# Patient Record
Sex: MALE | Race: WHITE
[De-identification: names, ages, dates, MRNs, and addresses within clinical notes are randomized per-mention and may not be internally consistent; named-entity substitution may affect disease eponyms.]

---

## 2017-01-18 ENCOUNTER — HOSPITAL ENCOUNTER (OUTPATIENT)
Dept: HOSPITAL 45 - C.LAB | Age: 62
Discharge: HOME | End: 2017-01-18
Attending: INTERNAL MEDICINE
Payer: COMMERCIAL

## 2017-01-18 DIAGNOSIS — E11.9: ICD-10-CM

## 2017-01-18 DIAGNOSIS — K35.3: Primary | ICD-10-CM

## 2017-01-18 LAB
ALBUMIN/GLOB SERPL: 0.6 {RATIO} (ref 0.9–2)
ALP SERPL-CCNC: 70 U/L (ref 45–117)
ALT SERPL-CCNC: 11 U/L (ref 12–78)
ANION GAP SERPL CALC-SCNC: 11 MMOL/L (ref 3–11)
AST SERPL-CCNC: 9 U/L (ref 15–37)
BASOPHILS # BLD: 0.03 K/UL (ref 0–0.2)
BASOPHILS NFR BLD: 0.3 %
BUN SERPL-MCNC: 25 MG/DL (ref 7–18)
BUN/CREAT SERPL: 34.3 (ref 10–20)
CALCIUM SERPL-MCNC: 8.3 MG/DL (ref 8.5–10.1)
CHLORIDE SERPL-SCNC: 99 MMOL/L (ref 98–107)
CO2 SERPL-SCNC: 26 MMOL/L (ref 21–32)
COMPLETE: YES
CREAT SERPL-MCNC: 0.72 MG/DL (ref 0.6–1.4)
EOSINOPHIL NFR BLD AUTO: 382 K/UL (ref 130–400)
GLOBULIN SER-MCNC: 4 GM/DL (ref 2.5–4)
GLUCOSE SERPL-MCNC: 276 MG/DL (ref 70–99)
HCT VFR BLD CALC: 32.6 % (ref 42–52)
IG%: 0.4 %
IMM GRANULOCYTES NFR BLD AUTO: 28.5 %
LYMPHOCYTES # BLD: 2.61 K/UL (ref 1.2–3.4)
MCH RBC QN AUTO: 28.9 PG (ref 25–34)
MCHC RBC AUTO-ENTMCNC: 34 G/DL (ref 32–36)
MCV RBC AUTO: 84.9 FL (ref 80–100)
MONOCYTES NFR BLD: 6.3 %
NEUTROPHILS # BLD AUTO: 2.4 %
NEUTROPHILS NFR BLD AUTO: 62.1 %
PMV BLD AUTO: 9.1 FL (ref 7.4–10.4)
POTASSIUM SERPL-SCNC: 4.4 MMOL/L (ref 3.5–5.1)
RBC # BLD AUTO: 3.84 M/UL (ref 4.7–6.1)
SODIUM SERPL-SCNC: 136 MMOL/L (ref 136–145)
WBC # BLD AUTO: 9.15 K/UL (ref 4.8–10.8)

## 2017-01-19 LAB — EST. AVERAGE GLUCOSE BLD GHB EST-MCNC: 258 MG/DL

## 2017-01-23 ENCOUNTER — HOSPITAL ENCOUNTER (OUTPATIENT)
Dept: HOSPITAL 45 - C.CTS | Age: 62
Discharge: HOME | End: 2017-01-23
Attending: SURGERY
Payer: COMMERCIAL

## 2017-01-23 DIAGNOSIS — I25.10: Primary | ICD-10-CM

## 2017-01-23 DIAGNOSIS — I70.202: ICD-10-CM

## 2017-01-24 NOTE — DIAGNOSTIC IMAGING REPORT
CT ANGIOGRAPHY OF THE ABDOMEN AND PELVIS WITH BILATERAL LOWER EXTREMITY RUNOFF



CT DOSE: 1073.08 mGy.cm



CLINICAL HISTORY: Left leg pain. Atherosclerosis.    



TECHNIQUE: Helical axial images of the abdomen and pelvis and both lower

extremities were obtained during arterial phase following intravenous injection

of 119 cc of Optiray 320 IV. Sagittal and coronal reconstructions were reviewed

as well as maximal intensity projections on an independent 3-D workstation.



COMPARISON STUDY:  CT of the abdomen and pelvis September 30, 2016.



FINDINGS: Visualized portions of lower chest demonstrate trace bilateral pleural

effusions and suspected mild pulmonary edema. The heart is moderately enlarged.

A cyst is noted arising from the lower pole of the left kidney. Visualized

caliber of small and large bowel are normal. Visual portions of the adrenal

glands and liver are unremarkable. The pancreas is unremarkable. No suspicious

skeletal lesions are identified.



There is extensive atherosclerotic plaque of the abdominal aorta and major

branch vessels. There is moderate stenosis at the origin of the celiac axis.

There is no aneurysmal dilatation of the abdominal aorta. The renal arteries are

patent. There is mild narrowing of the distal superior mesenteric artery.



There is extensive atherosclerotic plaque within the right lower extremity with

mild multifocal stenoses within the right common iliac and external iliac

arteries. There is mild stenosis of the right common femoral artery. There are

multiple moderate stenoses of the right superficial femoral artery. There is

severe stenosis of the distal right superficial femoral artery as well as the

right popliteal artery with extensive plaque. Evaluation of the calf vessels is

suboptimal as the bolus without Tavo on this examination. The right anterior

tibial artery is likely occluded. The right posterior tibial and peroneal

arteries are likely patent but suboptimally assessed on this exam.



There is mild multifocal stenoses within the left common iliac and external

iliac arteries as well as mild stenosis within the left common femoral artery.

There is occlusion of the left superficial femoral artery with reconstitution at

the level the distal superficial femoral artery and popliteal artery. The calf

vessels are suboptimally assessed on this exam. There is severe stenosis of the

left popliteal artery. The left anterior tibial artery is occluded. The left

posterior tibial and peroneal arteries are likely patent but suboptimally

assessed on this exam.







IMPRESSION:  



1. Extensive  atherosclerotic plaque within the aortoiliac systems and both

lower extremities, left more severe than right.



2. Occlusion of the left superficial femoral artery with reconstitution at the

level of the popliteal artery. The calf vessels within each lower extremity are

suboptimally assessed on this exam due to timing of the contrast bolus. The left

anterior tibial artery is likely occluded while the left peroneal and posterior

tibial arteries are likely patent.



3. Moderate to severe multifocal stenoses within the right superficial femoral

and popliteal arteries. Suspected occlusion of the right anterior tibial artery

and suspected patency of the right posterior tibial and peroneal arteries.







Electronically signed by:  Patrick Corbett M.D.

1/24/2017 10:24 AM



Dictated Date/Time:  1/23/2017 1:47 PM

## 2017-01-27 ENCOUNTER — HOSPITAL ENCOUNTER (OUTPATIENT)
Dept: HOSPITAL 45 - C.LAB | Age: 62
Discharge: HOME | End: 2017-01-27
Attending: SURGERY
Payer: COMMERCIAL

## 2017-01-27 DIAGNOSIS — E11.9: Primary | ICD-10-CM

## 2017-01-27 DIAGNOSIS — I25.10: ICD-10-CM

## 2017-01-27 DIAGNOSIS — I74.3: ICD-10-CM

## 2017-01-27 LAB
BUN SERPL-MCNC: 20 MG/DL (ref 7–18)
BUN/CREAT SERPL: 25.5 (ref 10–20)
CREAT SERPL-MCNC: 0.77 MG/DL (ref 0.6–1.4)

## 2017-05-02 ENCOUNTER — HOSPITAL ENCOUNTER (OUTPATIENT)
Dept: HOSPITAL 45 - C.RAD1850 | Age: 62
Discharge: HOME | End: 2017-05-02
Attending: PHYSICIAN ASSISTANT
Payer: COMMERCIAL

## 2017-05-02 DIAGNOSIS — L97.529: Primary | ICD-10-CM

## 2017-05-02 DIAGNOSIS — M77.32: ICD-10-CM

## 2017-05-02 DIAGNOSIS — M85.872: ICD-10-CM

## 2017-05-02 NOTE — DIAGNOSTIC IMAGING REPORT
LEFT FOOT 3 VIEWS



CLINICAL HISTORY: Heel ulcer.



FINDINGS: 3 views of the left foot are obtained. No prior studies are available

for comparison at the time of dictation. The skeletal structures are osteopenic.

No fracture is seen. No bony erosion or periostitis is identified. There are

small dorsal and plantar calcaneal enthesophytes. Mild degenerative narrowing is

seen at the first metatarsophalangeal joint. The joint spaces are otherwise

maintained. The overlying soft tissues are normal as visualized. Mild

calcification is seen along the course of the plantar fascia.



IMPRESSION:



1. Osteopenia with no acute bony abnormality seen in the left foot.



2. Small heel spurs.







Electronically signed by:  Heber Hubbard M.D.

5/2/2017 4:11 PM



Dictated Date/Time:  5/2/2017 4:10 PM

## 2017-05-09 ENCOUNTER — HOSPITAL ENCOUNTER (OUTPATIENT)
Dept: HOSPITAL 45 - C.LAB | Age: 62
Discharge: HOME | End: 2017-05-09
Attending: EMERGENCY MEDICINE
Payer: COMMERCIAL

## 2017-05-09 DIAGNOSIS — L97.529: Primary | ICD-10-CM

## 2017-05-09 LAB
BUN SERPL-MCNC: 26 MG/DL (ref 7–18)
CREAT SERPL-MCNC: 0.79 MG/DL (ref 0.6–1.4)

## 2017-05-12 ENCOUNTER — HOSPITAL ENCOUNTER (OUTPATIENT)
Dept: HOSPITAL 45 - C.MRIBC | Age: 62
Discharge: HOME | End: 2017-05-12
Attending: PHYSICIAN ASSISTANT
Payer: COMMERCIAL

## 2017-05-12 DIAGNOSIS — S91.302A: Primary | ICD-10-CM
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
## 2017-05-12 NOTE — DIAGNOSTIC IMAGING REPORT
MRI OF THE LEFT HINDFOOT/ANKLE WITH AND WITHOUT CONTRAST



CLINICAL HISTORY: Diabetic nonhealing left heel wound.    



COMPARISON STUDY:  Left foot radiograph May 2, 2017.



TECHNIQUE: Utilizing a 1.5 Mary magnet and dedicated coil, multiplanar,

multiecho imaging of the left ankle/hindfoot was performed pre and postcontrast

administration. Injection of 8.5 cc of Gadavist IV was uneventful.



FINDINGS: The Achilles tendon is intact. A wound overlying the lateral posterior

aspect of the calcaneus is noted. There is subtle increased T2 signal within the

adjacent portion of the calcaneus. T1 signal is preserved. There is no evidence

for osteomyelitis within the left calcaneus. A 1 cm T2 hyperintense enhancing

lesion within the calcaneus may reflect a complex/proteinaceous cyst. There is

moderate edema within the cuboid. No fracture is identified. Talar dome is

intact. There is no abscess. There is mild plantar calcaneal spurring. There is

a suspected partial thickness tear of the peroneus brevis. 



IMPRESSION:  



1. No evidence of osteomyelitis within the left calcaneus. Minimal increased T2

signal within the calcaneus adjacent to the heel wound could reflect minimal

osteitis.



2. Mild to moderate edema within the cuboid which is nonspecific but could be

stress related.



3. Left heel wound. No abscess.



4. Suspected mild partial thickness tear of the peroneus brevis.









Electronically signed by:  Patrick Corbett M.D.

5/12/2017 2:27 PM



Dictated Date/Time:  5/12/2017 2:10 PM

## 2017-05-22 ENCOUNTER — HOSPITAL ENCOUNTER (INPATIENT)
Dept: HOSPITAL 45 - C.MSN | Age: 62
LOS: 5 days | Discharge: HOME HEALTH | DRG: 571 | End: 2017-05-27
Attending: SURGERY | Admitting: SURGERY
Payer: COMMERCIAL

## 2017-05-22 VITALS — OXYGEN SATURATION: 94 %

## 2017-05-22 VITALS
WEIGHT: 194.01 LBS | HEIGHT: 71 IN | BODY MASS INDEX: 27.16 KG/M2 | HEIGHT: 71 IN | WEIGHT: 194.01 LBS | BODY MASS INDEX: 27.16 KG/M2

## 2017-05-22 VITALS — DIASTOLIC BLOOD PRESSURE: 76 MMHG | HEART RATE: 71 BPM | SYSTOLIC BLOOD PRESSURE: 135 MMHG | TEMPERATURE: 98.24 F

## 2017-05-22 VITALS
HEART RATE: 72 BPM | DIASTOLIC BLOOD PRESSURE: 79 MMHG | TEMPERATURE: 98.78 F | SYSTOLIC BLOOD PRESSURE: 180 MMHG | OXYGEN SATURATION: 93 %

## 2017-05-22 VITALS — DIASTOLIC BLOOD PRESSURE: 82 MMHG | HEART RATE: 75 BPM | SYSTOLIC BLOOD PRESSURE: 181 MMHG

## 2017-05-22 VITALS
TEMPERATURE: 97.88 F | SYSTOLIC BLOOD PRESSURE: 136 MMHG | OXYGEN SATURATION: 95 % | HEART RATE: 66 BPM | DIASTOLIC BLOOD PRESSURE: 68 MMHG

## 2017-05-22 VITALS — SYSTOLIC BLOOD PRESSURE: 187 MMHG | HEART RATE: 80 BPM | DIASTOLIC BLOOD PRESSURE: 76 MMHG

## 2017-05-22 DIAGNOSIS — F32.9: ICD-10-CM

## 2017-05-22 DIAGNOSIS — E11.65: ICD-10-CM

## 2017-05-22 DIAGNOSIS — G47.33: ICD-10-CM

## 2017-05-22 DIAGNOSIS — F17.200: ICD-10-CM

## 2017-05-22 DIAGNOSIS — I73.9: ICD-10-CM

## 2017-05-22 DIAGNOSIS — H91.91: ICD-10-CM

## 2017-05-22 DIAGNOSIS — I10: ICD-10-CM

## 2017-05-22 DIAGNOSIS — E78.5: ICD-10-CM

## 2017-05-22 DIAGNOSIS — I25.2: ICD-10-CM

## 2017-05-22 DIAGNOSIS — F41.9: ICD-10-CM

## 2017-05-22 DIAGNOSIS — I25.10: ICD-10-CM

## 2017-05-22 DIAGNOSIS — E11.42: ICD-10-CM

## 2017-05-22 DIAGNOSIS — L97.529: Primary | ICD-10-CM

## 2017-05-22 DIAGNOSIS — E55.9: ICD-10-CM

## 2017-05-22 DIAGNOSIS — E87.1: ICD-10-CM

## 2017-05-22 DIAGNOSIS — E11.21: ICD-10-CM

## 2017-05-22 DIAGNOSIS — E78.00: ICD-10-CM

## 2017-05-22 DIAGNOSIS — Z01.818: ICD-10-CM

## 2017-05-22 LAB
ANION GAP SERPL CALC-SCNC: 10 MMOL/L (ref 3–11)
B-OH-BUTYR SERPL-SCNC: 0.8 MG/DL (ref 0.2–2.81)
BASOPHILS # BLD: 0.02 K/UL (ref 0–0.2)
BASOPHILS NFR BLD: 0.2 %
BUN SERPL-MCNC: 22 MG/DL (ref 7–18)
BUN/CREAT SERPL: 21.9 (ref 10–20)
CALCIUM SERPL-MCNC: 9.7 MG/DL (ref 8.5–10.1)
CHLORIDE SERPL-SCNC: 94 MMOL/L (ref 98–107)
CO2 SERPL-SCNC: 26 MMOL/L (ref 21–32)
COMPLETE: YES
CREAT CL PREDICTED SERPL C-G-VRATE: 84.7 ML/MIN
CREAT SERPL-MCNC: 1 MG/DL (ref 0.6–1.4)
EOSINOPHIL NFR BLD AUTO: 274 K/UL (ref 130–400)
GLUCOSE SERPL-MCNC: 498 MG/DL (ref 70–99)
HCT VFR BLD CALC: 33.3 % (ref 42–52)
IG%: 0.5 %
IMM GRANULOCYTES NFR BLD AUTO: 17.7 %
LYMPHOCYTES # BLD: 1.9 K/UL (ref 1.2–3.4)
MCH RBC QN AUTO: 29.5 PG (ref 25–34)
MCHC RBC AUTO-ENTMCNC: 34.8 G/DL (ref 32–36)
MCV RBC AUTO: 84.7 FL (ref 80–100)
MONOCYTES NFR BLD: 8.8 %
NEUTROPHILS # BLD AUTO: 0.8 %
NEUTROPHILS NFR BLD AUTO: 72 %
PMV BLD AUTO: 9 FL (ref 7.4–10.4)
POTASSIUM SERPL-SCNC: 4.2 MMOL/L (ref 3.5–5.1)
RBC # BLD AUTO: 3.93 M/UL (ref 4.7–6.1)
SODIUM SERPL-SCNC: 130 MMOL/L (ref 136–145)
WBC # BLD AUTO: 10.71 K/UL (ref 4.8–10.8)

## 2017-05-22 RX ADMIN — MORPHINE SULFATE PRN MG: 4 INJECTION, SOLUTION INTRAMUSCULAR; INTRAVENOUS at 16:23

## 2017-05-22 RX ADMIN — OXYCODONE HYDROCHLORIDE AND ACETAMINOPHEN PRN TAB: 5; 325 TABLET ORAL at 14:36

## 2017-05-22 RX ADMIN — GABAPENTIN SCH MG: 100 CAPSULE ORAL at 21:21

## 2017-05-22 RX ADMIN — INSULIN ASPART SCH UNITS: 100 INJECTION, SOLUTION INTRAVENOUS; SUBCUTANEOUS at 17:15

## 2017-05-22 RX ADMIN — DIVALPROEX SODIUM SCH MG: 500 TABLET, DELAYED RELEASE ORAL at 21:20

## 2017-05-22 RX ADMIN — METOPROLOL SUCCINATE SCH MG: 50 TABLET, EXTENDED RELEASE ORAL at 21:21

## 2017-05-22 RX ADMIN — MORPHINE SULFATE PRN MG: 4 INJECTION, SOLUTION INTRAMUSCULAR; INTRAVENOUS at 21:31

## 2017-05-22 RX ADMIN — LISINOPRIL SCH MG: 40 TABLET ORAL at 21:21

## 2017-05-22 RX ADMIN — INSULIN ASPART SCH UNITS: 100 INJECTION, SOLUTION INTRAVENOUS; SUBCUTANEOUS at 21:20

## 2017-05-22 RX ADMIN — PIPERACILLIN AND TAZOBACTAM SCH MLS/HR: 3; .375 INJECTION, POWDER, LYOPHILIZED, FOR SOLUTION INTRAVENOUS; PARENTERAL at 21:21

## 2017-05-22 NOTE — ANESTHESIOLOGY PROGRESS NOTE
Pre-OP Anesthesia Assessment


Date of Note


May 22, 2017.





Review


patient information reviewed, chart reviewed, labs reviewed





Medicine Eval Requested


Due to:  DM





Notes


62 yo male presented with non-healing ulcer over left calcaneus.  PMH includes 

DM, CAD, MI/PTCA yrs ago, HTN, hypercholesterolemia, PVD, GERD, neuropathy, 

anemia, anxiety, depression.  Pt denies recent chest pain or dyspnea and had 

negative DSE 3/16.  He is however, non-compliant with meds and BSG monitoring.  

Glucose has been markedly elevated (over 450) on this admission.  Requested 

hospitalist consult for glycemic control.  Pt has had general anesthesia before 

without complications. Discussed GETA with him.  He expressed understanding and 

signed informed consent.   He will be acceptable candidate for GA for planned 

procedure after glucose is better controlled.

## 2017-05-22 NOTE — MEDICAL CONSULT
Consultation


Date of Consultation:


May 22, 2017.


Attending Physician:


Tim Gallegos M.D.


Reason for Consultation:


pre op management


History of Present Illness


This is a 60 yo m with a history of CAD, HTN, poorly controlled DM that is 

suffering from a left calcaneal ulceration that is nonhealing and requiring 

operative management. the primary service consulted medicine for pre op and 

post op management considering his past medical history. 


   He states that approx a month ago he feels like he must have cut the bottom 

of his left foot. He notes that the cut was healing poorly so decision was made 

for operative management. When he was evaluated in the hospital he was found to 

have BSG > 400 but not in DKA. He states that he does have humulog and lantus 

at home but takes it on an " as needed basis because it has a tendency to 

bottom me out". He is scheduled to follow up with Dr Daley in approx a 

month to optimize glycemic management. He does take Metformin on a daily basis 

and occasionally will check his sugar. 


   He does have a significant history of a  NSTEMI in 2007 with complicated by 

Vfibb and required stent placement. He had a repeat cath in 2010 which revealed 

significant disease and patient was sent to [facility] for complex PCI. His last 

dobutamine stress test was in march of 2016 and was WNL.  At that time CVS was 

consulted and patient was a candidate for surgery. This clearance was used to 

clear patient for an elective appendectomy s/p appendiceal rupture at [facility] 

in nov of 2016 which patient tolerated. No stress test noted since then. 





In my clinical judgment this beneficiary meets acute admission criteria, 

established by CMS, that includes being hospitalized through two midnights.





Past Medical/Surgical History


Medical Problems:


(1) Anginal pain


Status: Acute  





(2) Appendicitis


Status: Acute  





(3) Intra-abdominal abscess


Status: Acute  





(4) Lower abdominal pain of unknown etiology


Status: Acute  





(5) Precordial chest pain


Status: Acute  











Family History





Cancer


Diabetes mellitus


Heart disease


Hypertension





Social History


Smoking Status:  Current Every Day Smoker


Smokeless Tobacco Use:  No


Drug Use:  none


Marital Status:  


Housing Status:  lives with family


Occupation Status:  employed





Allergies


Coded Allergies:  


     No Known Allergies (Verified , 12/1/16)





Current Inpatient Medications





 Current Inpatient Medications








 Medications


  (Trade)  Dose


 Ordered  Sig/Dc


 Route  Start Time


 Stop Time Status Last Admin


Dose Admin


 


 Piperacillin Sod/


 Tazobactam Sod/


 Dextrose


  (Zosyn Iv/D5


 100ml)  115 ml @ 


 28.7 mls/hr  Q8H


 IV  5/22/17 22:00


 5/23/17 21:59  5/22/17 21:21


28.7 MLS/HR


 


 Ondansetron HCl


  (Zofran Inj)  4 mg  Q4H  PRN


 IV  5/22/17 11:45


 6/21/17 11:44   


 


 


 Oxycodone/


 Acetaminophen


  (Percocet


 5-325mg Tab)  1 tab  Q4H  PRN


 PO  5/22/17 11:45


 6/5/17 11:44  5/22/17 14:36


1 TAB


 


 Insulin Aspart


  (novoLOG ASPART)  **SLIDING


 SCALE**


 **If C...  ACHS


 SC  5/22/17 17:15


 6/21/17 17:14  5/22/17 21:20


7 UNITS


 


 Glucose


  (Glucose 40% Gel)  15-30


 GRAMS 15


 GRAMS...  UD  PRN


 PO  5/22/17 11:45


 6/21/17 11:44   


 


 


 Glucose


  (Glucose Chew


 Tab)  4-8


 Tablets 4


 Tabl...  UD  PRN


 PO  5/22/17 11:45


 6/21/17 11:44   


 


 


 Dextrose


  (Dextrose 50%


 50ML Syringe)  25-50ML OF


 50% DW IV


 FOR...  UD  PRN


 IV  5/22/17 11:45


 6/21/17 11:44   


 


 


 Glucagon


  (Glucagon Inj)  1 mg  UD  PRN


 SQ  5/22/17 11:45


 6/21/17 11:44   


 


 


 Piperacillin Sod/


 Tazobactam Sod


  (Consult)  1 ea  UD  PRN


 N/A  5/22/17 14:00


 5/24/17 23:59   


 


 


 Aspirin


  (Ecotrin Tab)  325 mg  QAM


 PO  5/23/17 09:00


 6/22/17 08:59   


 


 


 Clopidogrel


 Bisulfate


  (plAVix TAB)  75 mg  QAM


 PO  5/23/17 09:00


 6/22/17 08:59   


 


 


 Divalproex Sodium


  (Depakote Delay


 Rel Tab)  2,000 mg  HS


 PO  5/22/17 21:00


 6/21/17 20:59  5/22/17 21:20


2,000 MG


 


 Escitalopram


 Oxalate


  (Lexapro Tab)  20 mg  QAM


 PO  5/23/17 09:00


 6/22/17 08:59   


 


 


 Gabapentin


  (Neurontin Cap)  100 mg  HS


 PO  5/22/17 21:00


 6/21/17 20:59  5/22/17 21:21


100 MG


 


 Lisinopril


  (Zestril Tab)  40 mg  BID


 PO  5/22/17 21:00


 6/21/17 20:59  5/22/17 21:21


40 MG


 


 Metoprolol


 Succinate


  (Toprol Xl Tab)  50 mg  BID


 PO  5/22/17 21:00


 6/21/17 20:59  5/22/17 21:21


50 MG


 


 Nitroglycerin


  (Nitrostat Tab)  0.4 mg  PRN  PRN


 UT  5/22/17 15:15


 6/21/17 15:14   


 


 


 Amlodipine


 Besylate


  (Norvasc Tab)  10 mg  QAM


 PO  5/23/17 09:00


 6/22/17 08:59   


 


 


 Morphine Sulfate


  (MoRPHine


 SULFATE INJ)  4 mg  Q2H  PRN


 IV  5/22/17 16:15


 6/5/17 16:14  5/22/17 21:31


4 MG


 


 Miscellaneous


 Information


  (Consult


 Glycemic


 Management


 Pharmacy)  1 ea  UD  PRN


 N/A  5/22/17 22:21


 6/21/17 22:20   


 


 


 Insulin Glargine


  (Lantus Solostar


 Pen)  15 unit  BID


 SC  5/22/17 22:30


 6/21/17 22:29   


 


 


 Insulin Aspart


  (novoLOG ASPART)  **SLIDING


 SCALE**


 **G...  0000,0400


 SC  5/23/17 00:00


 6/22/17 00:00   


 











Review of Systems


Constitutional:  No fever


Eyes:  No worsening of vision


ENT:  No hearing loss


Respiratory:  No cough, No dyspnea at rest, No dyspnea on exertion, No 

shortness of breath, No sputum, No wheezing


Cardiovascular:  No chest pain


Abdomen:  No constipation, No diarrhea, No nausea, No pain, No vomiting


Musculoskeletal:  + problem reported (left calcaneal ulcer and associated pain)

, No joint pain, No muscle pain


Neurologic:  + numbness/tingling (neuropathy B L)


Endocrine:  No fatigue


Integumentary:  + problem reported (ulcer), No rash





Physical Exam











  Date Time  Temp Pulse Resp B/P Pulse Ox O2 Delivery O2 Flow Rate FiO2


 


5/22/17 22:03  75  181/82    


 


5/22/17 21:25  80  187/76    


 


5/22/17 15:18 36.6 66 18 136/68 95 Room Air  


 


5/22/17 14:22     94 Room Air  


 


5/22/17 14:20 36.8 71 18 135/76    








General Appearance:  no apparent distress


Head:  normocephalic, atraumatic


Eyes:  normal inspection


ENT:  normal ENT inspection


Neck:  supple


Respiratory/Chest:  normal breath sounds, no respiratory distress, no accessory 

muscle use


Cardiovascular:  regular rate, rhythm, no murmur


Abdomen/GI:  normal bowel sounds, non tender, soft


Extremities/Musculoskelatal:  no calf tenderness, + pedal edema (surrounding 

the ulceration, left LE is very tender to touch and inflammed)


Neurologic/Psych:  alert, normal mood/affect, oriented x 3


Skin:  normal color, warm/dry, no rash


Lymphatic:  no adenopathy





Laboratory Results





Last 24 Hours








Test


  5/22/17


14:17 5/22/17


17:07 5/22/17


19:30 5/22/17


20:52


 


White Blood Count 10.71 K/uL    


 


Red Blood Count 3.93 M/uL    


 


Hemoglobin 11.6 g/dL    


 


Hematocrit 33.3 %    


 


Mean Corpuscular Volume 84.7 fL    


 


Mean Corpuscular Hemoglobin 29.5 pg    


 


Mean Corpuscular Hemoglobin


Concent 34.8 g/dl 


  


  


  


 


 


Platelet Count 274 K/uL    


 


Mean Platelet Volume 9.0 fL    


 


Neutrophils (%) (Auto) 72.0 %    


 


Lymphocytes (%) (Auto) 17.7 %    


 


Monocytes (%) (Auto) 8.8 %    


 


Eosinophils (%) (Auto) 0.8 %    


 


Basophils (%) (Auto) 0.2 %    


 


Neutrophils # (Auto) 7.71 K/uL    


 


Lymphocytes # (Auto) 1.90 K/uL    


 


Monocytes # (Auto) 0.94 K/uL    


 


Eosinophils # (Auto) 0.09 K/uL    


 


Basophils # (Auto) 0.02 K/uL    


 


RDW Standard Deviation 40.7 fL    


 


RDW Coefficient of Variation 13.0 %    


 


Immature Granulocyte % (Auto) 0.5 %    


 


Immature Granulocyte # (Auto) 0.05 K/uL    


 


Sodium Level 130 mmol/L    


 


Potassium Level 4.2 mmol/L    


 


Chloride Level 94 mmol/L    


 


Carbon Dioxide Level 26 mmol/L    


 


Anion Gap 10.0 mmol/L    


 


Blood Urea Nitrogen 22 mg/dl    


 


Creatinine 1.00 mg/dl    


 


Est Creatinine Clear Calc


Drug Dose 84.7 ml/min 


  


  


  


 


 


Estimated GFR (


American) 95.1 


  


  


  


 


 


Estimated GFR (Non-


American 82.0 


  


  


  


 


 


BUN/Creatinine Ratio 21.9    


 


Random Glucose 498 mg/dl    


 


Calcium Level 9.7 mg/dl    


 


Beta-Hydroxybutyric Acid 0.80 mg/dL    


 


Bedside Glucose  450 mg/dl  385 mg/dl  397 mg/dl 














Test


  5/22/17


22:25 


  


  


 











Assessment & Plan


This is a 60 yo m with a significant CVS history and poorly controlled DM that 

we have been consulted to help manage pre and post op care





Pre op evaluation


Patient has an RCRI of 6.6% secondary to h/o MI and insulin use prior to 

admission


   - patient was originally cleared in mar 2016 and tolerated the appendectomy 

in Nov 2016


   - most recent dobutamine mar 2016 WNL  however > 1 year


   - would recommend CVS clearance prior to surgery





Hyponatremia


NPO overnight


- gentle rehydration with NSS--> I&O


will check BMP in am





Poorly controlled DMII with severe hyperglycemia 


- glycemic consult placed


- HBA1C pending


- Dm educator 





Hypertension


- Continue metoprolol, lisinopril, amlodipine


- HCTZ was held by primary team 


Significant elevation may be secondary to anxiety for pending surgery and pain





S/P NSTEMI


- ASA and plavix cont 








Resident Physician Supervision Note:


Pt evaluated independently. I discussed the case with the resident and agree 

with the findings and plan as documented in the note.  Any exceptions or 

clarifications are listed here:





Consult for surgery : pt is high risk based on previous assessments - Hx CAD, DM


We have requested a cardiology eval pre-op as his RCRI  6/6%





Documented By:  Eleuterio Serrato

















Additional Copies To


,Claude MCCORD M.D.

## 2017-05-22 NOTE — DIAGNOSTIC IMAGING REPORT
CHEST 2 VIEWS ROUTINE



CLINICAL HISTORY: Preoperative evaluation.    



COMPARISON STUDY:  Chest radiograph September 24, 2016.



FINDINGS: Lung volumes are normal. There is no consolidation to suggest

pneumonia. No pneumothorax or pleural effusion is present. Cardiomediastinal

silhouette is normal. Pulmonary vascularity is normal. 



IMPRESSION:  No acute cardiopulmonary findings. 









Electronically signed by:  Patrick Corbett M.D.

5/22/2017 3:27 PM



Dictated Date/Time:  5/22/2017 3:26 PM

## 2017-05-22 NOTE — PHARMACY PROGRESS NOTE
Glycemic: Assessment & Plan


Date of Service


May 22, 2017.





Assessment & Plan


Assessment:


* 62 y/o diabetic with unknown outpatient glycemic control.  Home doses of 

insulin not known at time of consult, nor are when his last dose was 

administered PTA


* Mr. Dixon is currently severely hyperglycemic despite correctional NovoLog 

at 16:00 and 21:00.


* Plan for OR in near future, so will aggressively treat BSGs





Plan:


* 5 units of IV insulin x1 now to help augment SQ insulin doses


* Basal Insulin with Lantus 15 units SQ BID


 * dose is weight based with a stress of 2.  Will need adjusted in AM


* Bolus insulin with NovoLog


 * add overnight Accu-checks


 * CF/CR based on weight and a stress of 3


      -  CF:  20mg/dL/unit


      -  CR:  1 unit per 7g of CHO consumed


      -  Goal:  140-180mg/dL per ADA recommendations


* A1c is pending


 * add to discharge instructions


 * use this information to help guide future inpatient and outpatient glycemic 

regimen

## 2017-05-23 VITALS
HEART RATE: 63 BPM | DIASTOLIC BLOOD PRESSURE: 75 MMHG | OXYGEN SATURATION: 92 % | TEMPERATURE: 98.6 F | SYSTOLIC BLOOD PRESSURE: 133 MMHG

## 2017-05-23 VITALS
TEMPERATURE: 98.78 F | SYSTOLIC BLOOD PRESSURE: 175 MMHG | OXYGEN SATURATION: 92 % | DIASTOLIC BLOOD PRESSURE: 76 MMHG | HEART RATE: 70 BPM

## 2017-05-23 VITALS
DIASTOLIC BLOOD PRESSURE: 65 MMHG | TEMPERATURE: 98.96 F | OXYGEN SATURATION: 93 % | SYSTOLIC BLOOD PRESSURE: 127 MMHG | HEART RATE: 71 BPM

## 2017-05-23 VITALS
HEART RATE: 64 BPM | OXYGEN SATURATION: 94 % | DIASTOLIC BLOOD PRESSURE: 74 MMHG | SYSTOLIC BLOOD PRESSURE: 135 MMHG | TEMPERATURE: 97.88 F

## 2017-05-23 VITALS — OXYGEN SATURATION: 92 % | DIASTOLIC BLOOD PRESSURE: 68 MMHG | HEART RATE: 63 BPM | SYSTOLIC BLOOD PRESSURE: 122 MMHG

## 2017-05-23 VITALS
TEMPERATURE: 98.6 F | OXYGEN SATURATION: 90 % | SYSTOLIC BLOOD PRESSURE: 151 MMHG | HEART RATE: 61 BPM | DIASTOLIC BLOOD PRESSURE: 69 MMHG

## 2017-05-23 VITALS
DIASTOLIC BLOOD PRESSURE: 57 MMHG | SYSTOLIC BLOOD PRESSURE: 128 MMHG | TEMPERATURE: 98.24 F | OXYGEN SATURATION: 92 % | HEART RATE: 67 BPM

## 2017-05-23 VITALS — DIASTOLIC BLOOD PRESSURE: 59 MMHG | HEART RATE: 64 BPM | OXYGEN SATURATION: 93 % | SYSTOLIC BLOOD PRESSURE: 105 MMHG

## 2017-05-23 VITALS — DIASTOLIC BLOOD PRESSURE: 76 MMHG | SYSTOLIC BLOOD PRESSURE: 158 MMHG

## 2017-05-23 VITALS
OXYGEN SATURATION: 90 % | HEART RATE: 59 BPM | TEMPERATURE: 98.96 F | SYSTOLIC BLOOD PRESSURE: 124 MMHG | DIASTOLIC BLOOD PRESSURE: 68 MMHG

## 2017-05-23 VITALS — SYSTOLIC BLOOD PRESSURE: 136 MMHG | HEART RATE: 64 BPM | DIASTOLIC BLOOD PRESSURE: 69 MMHG

## 2017-05-23 VITALS — DIASTOLIC BLOOD PRESSURE: 69 MMHG | SYSTOLIC BLOOD PRESSURE: 148 MMHG

## 2017-05-23 LAB
ANION GAP SERPL CALC-SCNC: 7 MMOL/L (ref 3–11)
BUN SERPL-MCNC: 22 MG/DL (ref 7–18)
BUN/CREAT SERPL: 25.3 (ref 10–20)
CALCIUM SERPL-MCNC: 8.7 MG/DL (ref 8.5–10.1)
CHLORIDE SERPL-SCNC: 98 MMOL/L (ref 98–107)
CO2 SERPL-SCNC: 27 MMOL/L (ref 21–32)
CREAT CL PREDICTED SERPL C-G-VRATE: 99.6 ML/MIN
CREAT SERPL-MCNC: 0.85 MG/DL (ref 0.6–1.4)
EOSINOPHIL NFR BLD AUTO: 257 K/UL (ref 130–400)
EST. AVERAGE GLUCOSE BLD GHB EST-MCNC: 335 MG/DL
GLUCOSE SERPL-MCNC: 148 MG/DL (ref 70–99)
HCT VFR BLD CALC: 31.2 % (ref 42–52)
INR PPP: 0.9 (ref 0.9–1.1)
MCH RBC QN AUTO: 29 PG (ref 25–34)
MCHC RBC AUTO-ENTMCNC: 34 G/DL (ref 32–36)
MCV RBC AUTO: 85.2 FL (ref 80–100)
PARTIAL THROMBOPLASTIN RATIO: 1.2
PMV BLD AUTO: 8.6 FL (ref 7.4–10.4)
POTASSIUM SERPL-SCNC: 4 MMOL/L (ref 3.5–5.1)
PROTHROMBIN TIME: 10 SECONDS (ref 9–12)
RBC # BLD AUTO: 3.66 M/UL (ref 4.7–6.1)
SODIUM SERPL-SCNC: 132 MMOL/L (ref 136–145)
WBC # BLD AUTO: 10.55 K/UL (ref 4.8–10.8)

## 2017-05-23 PROCEDURE — 0JBR0ZZ EXCISION OF LEFT FOOT SUBCUTANEOUS TISSUE AND FASCIA, OPEN APPROACH: ICD-10-PCS | Performed by: SURGERY

## 2017-05-23 RX ADMIN — PIPERACILLIN AND TAZOBACTAM SCH MLS/HR: 3; .375 INJECTION, POWDER, LYOPHILIZED, FOR SOLUTION INTRAVENOUS; PARENTERAL at 05:56

## 2017-05-23 RX ADMIN — PIPERACILLIN AND TAZOBACTAM SCH MLS/HR: 3; .375 INJECTION, POWDER, LYOPHILIZED, FOR SOLUTION INTRAVENOUS; PARENTERAL at 13:42

## 2017-05-23 RX ADMIN — INSULIN ASPART SCH UNITS: 100 INJECTION, SOLUTION INTRAVENOUS; SUBCUTANEOUS at 21:26

## 2017-05-23 RX ADMIN — MORPHINE SULFATE PRN MG: 4 INJECTION, SOLUTION INTRAMUSCULAR; INTRAVENOUS at 08:03

## 2017-05-23 RX ADMIN — Medication SCH MG: at 09:34

## 2017-05-23 RX ADMIN — CLOPIDOGREL BISULFATE SCH MG: 75 TABLET, FILM COATED ORAL at 09:33

## 2017-05-23 RX ADMIN — DIVALPROEX SODIUM SCH MG: 500 TABLET, DELAYED RELEASE ORAL at 21:21

## 2017-05-23 RX ADMIN — MORPHINE SULFATE PRN MG: 4 INJECTION, SOLUTION INTRAMUSCULAR; INTRAVENOUS at 16:22

## 2017-05-23 RX ADMIN — MORPHINE SULFATE PRN MG: 4 INJECTION, SOLUTION INTRAMUSCULAR; INTRAVENOUS at 23:51

## 2017-05-23 RX ADMIN — INSULIN GLARGINE SCH UNIT: 100 INJECTION, SOLUTION SUBCUTANEOUS at 21:27

## 2017-05-23 RX ADMIN — MORPHINE SULFATE PRN MG: 4 INJECTION, SOLUTION INTRAMUSCULAR; INTRAVENOUS at 00:10

## 2017-05-23 RX ADMIN — OXYCODONE HYDROCHLORIDE AND ACETAMINOPHEN PRN TAB: 5; 325 TABLET ORAL at 22:20

## 2017-05-23 RX ADMIN — AMLODIPINE BESYLATE SCH MG: 5 TABLET ORAL at 09:34

## 2017-05-23 RX ADMIN — INSULIN ASPART SCH UNITS: 100 INJECTION, SOLUTION INTRAVENOUS; SUBCUTANEOUS at 13:39

## 2017-05-23 RX ADMIN — MORPHINE SULFATE PRN MG: 4 INJECTION, SOLUTION INTRAMUSCULAR; INTRAVENOUS at 03:45

## 2017-05-23 RX ADMIN — LISINOPRIL SCH MG: 40 TABLET ORAL at 21:21

## 2017-05-23 RX ADMIN — LISINOPRIL SCH MG: 40 TABLET ORAL at 09:34

## 2017-05-23 RX ADMIN — HEPARIN SODIUM SCH UNIT: 10000 INJECTION, SOLUTION INTRAVENOUS; SUBCUTANEOUS at 21:36

## 2017-05-23 RX ADMIN — INSULIN ASPART SCH UNITS: 100 INJECTION, SOLUTION INTRAVENOUS; SUBCUTANEOUS at 18:27

## 2017-05-23 RX ADMIN — METOPROLOL SUCCINATE SCH MG: 50 TABLET, EXTENDED RELEASE ORAL at 21:21

## 2017-05-23 RX ADMIN — INSULIN ASPART SCH UNITS: 100 INJECTION, SOLUTION INTRAVENOUS; SUBCUTANEOUS at 03:46

## 2017-05-23 RX ADMIN — GABAPENTIN SCH MG: 100 CAPSULE ORAL at 21:21

## 2017-05-23 RX ADMIN — ESCITALOPRAM OXALATE SCH MG: 20 TABLET, FILM COATED ORAL at 09:34

## 2017-05-23 RX ADMIN — INSULIN ASPART SCH UNITS: 100 INJECTION, SOLUTION INTRAVENOUS; SUBCUTANEOUS at 12:00

## 2017-05-23 RX ADMIN — METOPROLOL SUCCINATE SCH MG: 50 TABLET, EXTENDED RELEASE ORAL at 09:35

## 2017-05-23 RX ADMIN — MORPHINE SULFATE PRN MG: 4 INJECTION, SOLUTION INTRAMUSCULAR; INTRAVENOUS at 20:01

## 2017-05-23 RX ADMIN — INSULIN ASPART SCH UNITS: 100 INJECTION, SOLUTION INTRAVENOUS; SUBCUTANEOUS at 08:00

## 2017-05-23 NOTE — OPERATIVE REPORT
DATE OF OPERATION:  05/23/2017

 

PREOPERATIVE DIAGNOSIS:  Chronic ulceration lateral aspect of the left heel

with cellulitis into the foot dorsum aspect.  

 

POSTOPERATIVE DIAGNOSIS:  Same, area of ulceration extending approximately

3-4 cm diameter with cellulitis on the dorsum of the feet and abscess in the

soft tissue.

 

PROCEDURE:  Debridement of the skin and subcutaneous tissue down to the

fascia on the lateral aspect of the heel with incision and drainage  counter

incision towards the lower ankle and lateral aspect to the ulceration site

due to the extension of the abscess formation along the tendon sheaths.

 

SURGEON:  Dr. Gallegos.

 

FIRST ASSISTANT:  Wilber Alvarez PA-C.

 

OPERATION AND FINDINGS: 

 

SUMMARY:  The patient was brought into the operating room theater, general

anesthesia.  The left lower leg and ankle were prepped with Betadine solution

and properly draped.  The patient had an area about 3-4 cm with what looked

like necrolysis.  As we were able then we found some very dry  skin

 underneath and we obviously could see some ischemic changes going

into the soft tissue up towards the dorsum of the foot.  With this we used 

sharp dissection to excise all the necrotic tissue to an area that we saw

some bleeding and  the bleeding appeared to be in the inferior aspect of our

wound on the lateral aspect of the ankle.  That diameter was about 3 cm or

so.  The extension towards the ankle itself from the plantar surface was

approximately 5 cm.  As we went down closer to more of the tissue on the

proximal aspect of the lower ankle we were able then to identify that there

was significant amount of purulent material in tendon sheath mostly proximal.

 We took cultures of this for aerobes and anaerobes.  I made a counter

incision from this site up towards just above the ankle where we were able to

easily dissect that area and placed a quarter inch Penrose drain to drain

that compartment.  This extension from the initial incision around the

initial debridement site to this one  was approximately 4-5 cm.  The area

then appeared to be free of any significant debriding necrosis, although the

vascular supply seemed to be somewhat compromised and even though clinically

when we saw the patient  he said that  he had no problem walking extensive

amount of time until he developed this ulceration.  Due to the amount of

bleeding here that we are seeing I did not see anything really pulsatile and

may require reimaging his vessels.  Due to some edema in the ankle yesterday

in the office  I was not able to feel any pulses, but his foot appeared to be

well perfused.  Once this had been completed, we then packed the area with

quarter inch plain gauze and then an ABD and an Ace wrap.  The procedure was

tolerated well by the patient.  I will keep him on broad spectrum antibiotics

and will ask the wound clinic to see him again in a day or so to apply a VAC

system.  Apparently he was seen in their wound clinic approximately a week

ago.

 

 

I attest to the content of the Intraoperative Record and any orders documented therein. Any exceptio
ns are noted below.

## 2017-05-23 NOTE — PHARMACY PROGRESS NOTE
Glycemic Control:  Progress Nt


Date of Service


May 23, 2017.





Scope


Glycemic Pharmacist consulted by Dr Cabrera on 5/22/2017 for glycemic control 

and to write orders per [facility] inpatient glycemic control protocol.





Objective


Accuchecks BSG (last 24hrs):











Test


  5/22/17


17:07 5/22/17


19:30 5/22/17


20:52 5/22/17


23:40


 


Bedside Glucose


  450 mg/dl


(70-99) 385 mg/dl


(70-99) 397 mg/dl


(70-99) 344 mg/dl


(70-99)














Test


  5/23/17


03:39 5/23/17


05:55 5/23/17


07:48 5/23/17


12:58


 


Bedside Glucose


  221 mg/dl


(70-99) 166 mg/dl


(70-99) 


  188 mg/dl


(70-99)


 


Random Glucose


  


  


  148 mg/dl


(70-99) 


 














Test


  5/23/17


13:30 


  


  


 


 


Bedside Glucose


  195 mg/dl


(70-99) 


  


  


 








Laboratory Data (last 24hrs)











Test


  5/22/17


22:25 5/23/17


07:48


 


Hemoglobin A1c 13.3 %  


 


Anion Gap  7.0 mmol/L 


 


BUN/Creatinine Ratio  25.3 


 


Blood Urea Nitrogen  22 mg/dl 


 


Creatinine  0.85 mg/dl 


 


Potassium Level  4.0 mmol/L 


 


Sodium Level  132 mmol/L 


 


White Blood Count  10.55 K/uL 








HbA1c:





 








Test


  5/22/17


22:25


 


Hemoglobin A1c


  13.3 %


(4.5-5.6)  H











Recent Pertinent Medications


Outpatient Anti-diabetic Regimen:


* Lantus and Humalog at home (per diabetes notes from 2016 Dr Arango was 

trying to have patient give himself one dose of  ~30 units Lantus in middle of 

day plus Humalog with his largest meal of the day ~20 units)


* A1c = 13.2 %  5/22/17








The patient is currently receiving:


* Basal insulin:             Lantus 15 units every 12 hours





* Correctional Insulin:   Novolog Correction per scale ACHS


                          Goal Range: Low 140 mg/dL - High 180 mg/dL


                          Correction Factor: 20 mg/dL/unit





* Prandial insulin:         Per carb ratio of 1 unit per 7 grams CHO consumed








Risk Factors for Insulin Resistance:


* Infection: necrotic foot ulcer on Zosyn


* Recent Surgery: POD 0 of I&d of foot 


* Diet: NPO then type 2 diabetic diet





Assessment & Plan


ASSESSMENT:


* ADA & AACE recommend a goal blood sugar range 140-180 mg/dl for the majority 

of critically ill & non-critically ill patients.  However, more stringent 

targets may be selected in individual cases. Will utilize more stringent goal 

of 110-140mg/dl based on patient age & comorbidities. Additionally, tighter 

glycemic control is warranted to facilitate wound/infection healing.


* Mr Dixon is a 60 y/o M with uncontrolled diabetes who is admitted for a 

necrotic foot ulcer. As an outpatient, he struggles with many issues that 

prevent compliance with his insulin. He received 15 units of Lantus + 5 units 

of IV regular insulin on 5/22/17. He received a total of 32 units yesterday 

with his blood sugar ranging from 344 mg/dL to 498 mg/dL.


* His fasting blood sugar today was 166 mg/dL. Since the patient was NPO this 

morning, only Lantus 10 units was ordered. There is very little data regarding 

the patient and his insulin needs. As an outpatient it appeared that they 

attempted to have him take Lantus 30 units and Humalog 20 -30 units once daily. 

Therefore, I am comfortable giving twice daily Lantus in a scale until more 

information about his glycemic requirements are determined. 


* His goal range is tightened to 100-140 in order to promote wound healing. I 

know that this is low for a patient with an A1C of 13% but I do not feel that 

we will achieve adequate glycemic coverage without this low of a goal range.








PLAN FOR INPATIENT GLYCEMIC CONTROL:


* Basal insulin with LANTUS 10-15 units SQ BID (Lantus 10 units if blood sugar 

less than 180 mg/dL and 15 units if blood sugar greater than or equal to 180 mg/

dL)





* Correctional Insulin with NOVOLOG per scale ACHS


 * Goal Range:  Low 100 mg/dL - High 140 mg/dL


 * Correction Factor: 20 mg/dL/unit


 * Nutritional / Prandial insulin per carb ratio of 1 unit per 7 grams CHO 

consumed








* Please note that the plan above was derived based on current level of insulin 

resistance and hospital stress. These recommendations are appropriate for 

inpatient admission only. Plan of care upon discharge will need to be 

reassessed to avoid potential outpatient hypo/hyperglycemia. 








Thank you.

## 2017-05-23 NOTE — [facility] POST OPERATIVE BRIEF NOTE
Immediate Operative Summary


Operative Date


May 23, 2017.





Pre-Operative Diagnosis





left heel necrotic ulceration with cellulitis extendinf to dorsum foot





Post-Operative Diagnosis





same absceaa extendind to tendon sheet to lower ankle





Procedure(s) Performed





Debridement Left heel ulceration





Surgeon


Dr Gallegos





Assistant Surgeon(s)


SURI BACON





Estimated Blood Loss


10 ml





Findings


extensive necrosis and abscee left foot ulceration and abscess





Specimens





Routine culture and anaerobic culture Left heel wound





A. Debridement tissue left heel





Drains


1/4 penrose sub cut

## 2017-05-23 NOTE — HISTORY & PHYSICAL BRIDGE NOTE
H&P Re-Evaluation


Bridge Note:


I have examined the patient, reviewed the History & Physical and in the 

interval since the performance of the History & Physical I have noted the 

following changes of clinical significance: No changes noted ulceration 

improved and better demarcated with central tissue necrosis and surrounding 

cellulitis dec and drainage dec area affected  with tissue loss and necrosis 

chely 4 cm  recent MRI neg for osteal involvement will proceed with i and d and 

debridement today r and c explained

## 2017-05-23 NOTE — HOSPITALIST PROGRESS NOTE
Hospitalist Progress Note


Date of Service


May 23, 2017.





Subjective


Pt evaluation today including:  conversation w/ patient


Patient doing well, having some pain in the heel status post surgery today.  No 

chest pain or shortness of breath.





   All Other Systems:  Reviewed and Negative





Objective


Vital Signs











  Date Time  Temp Pulse Resp B/P Pulse Ox O2 Delivery O2 Flow Rate FiO2


 


5/23/17 21:20  64  136/69    


 


5/23/17 19:01 36.6 64 17 135/74 94 Room Air  


 


5/23/17 17:07      Nasal Cannula 2.0 


 


5/23/17 16:32 37.2 71 16 127/65 93 Ambu-Bag 2.0 


 


5/23/17 15:29 37.2 59 16 124/68 90 Nasal Cannula 2.0 


 


5/23/17 14:30  63 18 122/68 92 Nasal Cannula 2.0 


 


5/23/17 14:00  64 18 105/59 93 Nasal Cannula 2.0 


 


5/23/17 13:30     92 Nasal Cannula 2.0 


 


5/23/17 13:30 36.8 67 16 128/57 92 Nasal Cannula 2.0 


 


5/23/17 13:15 36.9 68 14 111/53 95 Nasal Cannula 2 


 


5/23/17 13:05 36.9 68 10 142/76 95 Nasal Cannula 2 


 


5/23/17 12:55  69 10 149/66 95 Nasal Cannula 2 


 


5/23/17 12:45  61 10 154/72 98 Mask 10 


 


5/23/17 12:35  63 10 104/54 97 Mask 10 


 


5/23/17 12:25 36.5 63 10 107/57 97 Mask 10 


 


5/23/17 08:03      Room Air  


 


5/23/17 07:14 37.0 63 18 133/75 92 Room Air  


 


5/23/17 06:00    148/69    


 


5/23/17 03:45 37.1 70 18 175/76 92 Room Air  


 


5/23/17 01:30    158/76    


 


5/23/17 00:14      Room Air  


 


5/22/17 23:55 37.1 72 18 180/79 93 Room Air  











Physical Exam


General Appearance:  WD/WN, no apparent distress


Eyes:  normal inspection, sclerae normal


ENT:  hearing grossly normal


Neck:  trachea midline


Respiratory/Chest:  lungs clear, normal breath sounds, no respiratory distress, 

no accessory muscle use


Cardiovascular:  regular rate, rhythm, no edema, no gallop, no murmur


Abdomen:  normal bowel sounds, non tender, soft


Extremities:  no calf tenderness, + pertinent finding (left foot with dressing 

in place)


Neurologic/Psychiatric:  alert, normal mood/affect, oriented x 3


Skin:  normal color, warm/dry, no rash





Laboratory Results





Last 24 Hours








Test


  5/22/17


23:40 5/23/17


03:39 5/23/17


05:55 5/23/17


07:48


 


Bedside Glucose 344 mg/dl  221 mg/dl  166 mg/dl  


 


White Blood Count    10.55 K/uL 


 


Red Blood Count    3.66 M/uL 


 


Hemoglobin    10.6 g/dL 


 


Hematocrit    31.2 % 


 


Mean Corpuscular Volume    85.2 fL 


 


Mean Corpuscular Hemoglobin    29.0 pg 


 


Mean Corpuscular Hemoglobin


Concent 


  


  


  34.0 g/dl 


 


 


RDW Standard Deviation    40.8 fL 


 


RDW Coefficient of Variation    13.1 % 


 


Platelet Count    257 K/uL 


 


Mean Platelet Volume    8.6 fL 


 


Sodium Level    132 mmol/L 


 


Potassium Level    4.0 mmol/L 


 


Chloride Level    98 mmol/L 


 


Carbon Dioxide Level    27 mmol/L 


 


Anion Gap    7.0 mmol/L 


 


Blood Urea Nitrogen    22 mg/dl 


 


Creatinine    0.85 mg/dl 


 


Est Creatinine Clear Calc


Drug Dose 


  


  


  99.6 ml/min 


 


 


Estimated GFR (


American) 


  


  


  110.5 


 


 


Estimated GFR (Non-


American 


  


  


  95.4 


 


 


BUN/Creatinine Ratio    25.3 


 


Random Glucose    148 mg/dl 


 


Calcium Level    8.7 mg/dl 














Test


  5/23/17


08:06 5/23/17


12:58 5/23/17


13:30 5/23/17


14:06


 


Bedside Glucose 173 mg/dl  188 mg/dl  195 mg/dl  


 


Prothrombin Time    10.0 SECONDS 


 


Prothromb Time International


Ratio 


  


  


  0.9 


 


 


Activated Partial


Thromboplast Time 


  


  


  31.3 SECONDS 


 


 


Partial Thromboplastin Ratio    1.2 














Test


  5/23/17


16:51 5/23/17


20:49 


  


 


 


Bedside Glucose 209 mg/dl  222 mg/dl   











Assessment and Plan


This is a 62 yo m with a history of CAD and poorly controlled DMII that we have 

been consulted to help manage pre and post op care





Pre op evaluation-seen by cardiology this morning and was at acceptable risk 

for surgery.  Left foot diabetic ulcer and abscess now status post incision and 

drainage with debridement


-Surgical management


-Continue Zosyn


-Follow CBC


-Wound care consult and may need wound VAC





Hyponatremia-pseudohyponatremia secondary to hyperglycemia-improved this 

morning with controlled blood sugar


-Follow PRP





Poorly controlled DMII with severe hyperglycemia -hemoglobin A1c 13.3%.  

Patient admits this is secondary to poor compliance with insulin at home.  He 

is committed to improving this in the future


- glycemic consult placed


- Dm educator consultation appreciated





Hypertension-stable, controlled


- Continue metoprolol, lisinopril, amlodipine


- HCTZ was held by primary team 





S/P NSTEMI, CAD


- ASA and plavix cont , continue beta blocker





Prophylaxis-SCDs





Disposition-to home possibly tomorrow


Full code

## 2017-05-23 NOTE — ANESTHESIOLOGY PROGRESS NOTE
Anesthesia Post Op Note


Date & Time


May 23, 2017 at 13:08





Vital Signs


Pain Intensity:  0





 Vital Signs Past 12 Hours








  Date Time  Temp Pulse Resp B/P Pulse Ox O2 Delivery O2 Flow Rate FiO2


 


5/23/17 13:05 36.9 68 10 142/76 95 Nasal Cannula 2 


 


5/23/17 12:55  69 10 149/66 95 Nasal Cannula 2 


 


5/23/17 12:45  61 10 154/72 98 Mask 10 


 


5/23/17 12:35  63 10 104/54 97 Mask 10 


 


5/23/17 12:25 36.5 63 10 107/57 97 Mask 10 


 


5/23/17 08:03      Room Air  


 


5/23/17 07:14 37.0 63 18 133/75 92 Room Air  


 


5/23/17 06:00    148/69    


 


5/23/17 03:45 37.1 70 18 175/76 92 Room Air  


 


5/23/17 01:30    158/76    











Notes


Mental Status:  alert / awake / arousable, participated in evaluation


Pt Amnestic to Procedure:  Yes


Nausea / Vomiting:  adequately controlled


Pain:  adequately controlled


Airway Patency, RR, SpO2:  stable & adequate


BP & HR:  stable & adequate


Hydration State:  stable & adequate


Anesthetic Complications:  no major complications apparent

## 2017-05-23 NOTE — CARDIOLOGY CONSULTATION
DATE OF CONSULTATION:  2017

 

DATE OF CONSULTATION:  2017.  

 

PERTINENT HISTORY:  Mr. Dixon was admitted yesterday with a  nonhealing

ulcer on the left heel.  He will require surgery later today.  This

consultation was ordered to assist in his cardiac management and preoperative

risk evaluation.

 

The patient is a 61-year-old white male with a complex past medical history

who is well known to me from the outpatient setting.  The patient injured his

left heel several weeks ago and has failed outpatient management of his

nonhealing ulcer.  As above,  he will require an operative debridement later

today.

 

The patient has longstanding history of coronary artery disease.  He suffered

a non-ST elevation MI in 2007 that was complicated by Vfib arrest. 

He did have a stent placed in the distal left circumflex at that time.

 

He had developed an acute coronary syndrome in 2010 and underwent a

cardiac catheterization at this institution.  This revealed a 90% mid LAD

stenosis which required transfer to [facility] for an atherectomy

and placement of a drug-eluting stent.  Other coronary anatomy included 40%

distal LAD stenosis, a 50% mid LCX stenosis, 100% in-stent stenosis in the

distal circumflex, 100% obtuse marginal stenosis, 60% proximal right coronary

stenosis, and 40% mid RCA stenosis.

 

The patient had a dobutamine stress echocardiogram performed in 2016,

which showed no evidence of myocardial ischemia.  He had normal left

ventricular systolic function with an ejection fraction of 65% at that time. 

There is evidence of severe left ventricular hypertrophy and diastolic

dysfunction.

 

The patient is limited by his left heel ulcer, however, is able to carry out

activities of daily life without exertional chest pain.  He can climb a

flight of stairs without difficulty.

 

The patient also carries a history of peripheral vascular disease and a CT

scan performed in January of this year noted at 100% left superficial femoral

artery stenosis with collateralization distally.  The left anterior tibial

artery was also 100% stenosed.  There is evidence of moderate to severe right

SFA disease.

 

Currently, the patient is resting comfortably in bed without complaints.

 

PAST MEDICAL HISTORY:

1. Coronary artery disease -- see above.

2. Non-ST elevation myocardial infarction - Vfib arrest -- 2007.

3. Mid left anterior descending drug-eluting stent - 2010.

4. Negative dobutamine stress test -- 2016.

5. Hypertension.

6. Severe left ventricular hypertrophy - 2016.  

7. Diastolic dysfunction.

8. Hypercholesterolemia.

9. Diabetes mellitus.

10. Diabetic peripheral neuropathy.

11. Peripheral vascular disease -- see above.

12. Cerebrovascular disease -- less than 50% bilateral carotid stenoses,

2015.

13. Right hand trauma.

14. Bipolar disorder.

15. Appendectomy - 2016.

16. Obstructive sleep apnea.

17. GERD.

18. History of cervical surgery.

 

MEDICATIONS:

1. Metoprolol succinate 50 mg b.i.d.

2. Lisinopril 40 mg b.i.d.

3. Aspirin 325 mg daily.

4. Plavix 75 mg per day.

5. Norvasc 10 mg per day.

6. Lexapro 20 mg daily.

7. Lantus insulin 10 units b.i.d.

8. Piperacillin/tazobactam 3.375 grams IV q. 8 hours.

9. Depakote 2000 mg at bedtime.

10. Neurontin 100 mg at bedtime.

 

ALLERGIES:  None.

 

SOCIAL HISTORY:  The patient is  and lives with his wife.  Admits to

smoking one-half to 1 pack of  cigarettes daily.  Does not use alcohol.

 

FAMILY HISTORY:  Father  at age of 78 from esophageal  carcinoma.  Did

have a history of long QT syndrome.  Mother is alive and  well.  Brother

developed  coronary disease in his mid 50s.

 

REVIEW OF SYSTEMS:  A 10-point review of systems was negative except for that

described above.

 

PHYSICAL EXAMINATION:

GENERAL:  This is a well-developed, well-nourished white male in no acute

distress.

VITAL SIGNS:  Blood pressure 133/75 with a regular pulse of 60.  Respiratory

rate is 18.  The patient is afebrile at 37.0 degrees Celsius.  Saturations

92% on room air.

HEAD, EYES, EARS, NOSE, AND THROAT EXAMINATION:  Negative.

NECK:  Supple with full carotid upstrokes.  No obvious bruits.  Jugular

venous pressure is flat at 90 degrees.  There is no thyromegaly.

CARDIOVASCULAR EXAMINATION:  Reveals a regular rhythm with normal S1 and S2. 

A 1/6 basal systolic ejection murmur is noted.  No S3 or S4.

LUNGS:  Clear without rales, rhonchi, or wheezes.

ABDOMEN:  Soft, nontender without bruits.

EXTREMITIES:  Reveal intact radial artery pulses bilaterally.  Left foot is

dressed.  Right hand notes evidence of previous trauma.

 

LABORATORY DATA:  CBC notes hemoglobin of 10.6, hematocrit 31.2, white count

10.5, platelet count 257,000.  Electrolytes note sodium 132, potassium 4.0,

chloride 98, bicarb 27, BUN 22, creatinine 0.85, glucose 148.  Hemoglobin A1c

is 13.3.  EKG notes normal sinus rhythm with evidence of old inferior

myocardial infarction and left ventricular hypertrophy with repolarization

changes.  QT interval is normal.  A prominent U-wave is present.

 

IMPRESSION:  Mr. Dixon will require debridement of a nonhealing ulcer on

the left heel.  He demonstrates normal functional status and does not

experience limiting cardiac symptoms.  He is an acceptable cardiac risk for

surgery as long as he continues his  usual medications.  No further cardiac

testing is necessary.

 

PLAN:

1. Continue usual cardiac medications.

2. Acceptable cardiac risk for surgery.

3. Further recommendations depending on his clinical course.

## 2017-05-24 VITALS
DIASTOLIC BLOOD PRESSURE: 74 MMHG | SYSTOLIC BLOOD PRESSURE: 174 MMHG | TEMPERATURE: 98.42 F | OXYGEN SATURATION: 90 % | HEART RATE: 61 BPM

## 2017-05-24 VITALS
TEMPERATURE: 98.78 F | SYSTOLIC BLOOD PRESSURE: 167 MMHG | OXYGEN SATURATION: 92 % | DIASTOLIC BLOOD PRESSURE: 83 MMHG | HEART RATE: 58 BPM

## 2017-05-24 VITALS
TEMPERATURE: 98.42 F | SYSTOLIC BLOOD PRESSURE: 134 MMHG | HEART RATE: 60 BPM | DIASTOLIC BLOOD PRESSURE: 76 MMHG | OXYGEN SATURATION: 93 %

## 2017-05-24 VITALS
TEMPERATURE: 98.78 F | SYSTOLIC BLOOD PRESSURE: 180 MMHG | OXYGEN SATURATION: 95 % | DIASTOLIC BLOOD PRESSURE: 72 MMHG | HEART RATE: 67 BPM

## 2017-05-24 VITALS
OXYGEN SATURATION: 93 % | HEART RATE: 64 BPM | TEMPERATURE: 98.6 F | SYSTOLIC BLOOD PRESSURE: 180 MMHG | DIASTOLIC BLOOD PRESSURE: 82 MMHG

## 2017-05-24 VITALS
HEART RATE: 61 BPM | SYSTOLIC BLOOD PRESSURE: 182 MMHG | OXYGEN SATURATION: 90 % | DIASTOLIC BLOOD PRESSURE: 75 MMHG | TEMPERATURE: 98.06 F

## 2017-05-24 VITALS — SYSTOLIC BLOOD PRESSURE: 154 MMHG | HEART RATE: 67 BPM | DIASTOLIC BLOOD PRESSURE: 73 MMHG

## 2017-05-24 VITALS — DIASTOLIC BLOOD PRESSURE: 70 MMHG | SYSTOLIC BLOOD PRESSURE: 154 MMHG | HEART RATE: 53 BPM

## 2017-05-24 RX ADMIN — MORPHINE SULFATE PRN MG: 4 INJECTION, SOLUTION INTRAMUSCULAR; INTRAVENOUS at 07:29

## 2017-05-24 RX ADMIN — DIVALPROEX SODIUM SCH MG: 500 TABLET, DELAYED RELEASE ORAL at 19:25

## 2017-05-24 RX ADMIN — METOPROLOL SUCCINATE SCH MG: 50 TABLET, EXTENDED RELEASE ORAL at 19:26

## 2017-05-24 RX ADMIN — INSULIN ASPART SCH UNITS: 100 INJECTION, SOLUTION INTRAVENOUS; SUBCUTANEOUS at 21:34

## 2017-05-24 RX ADMIN — HEPARIN SODIUM SCH UNIT: 10000 INJECTION, SOLUTION INTRAVENOUS; SUBCUTANEOUS at 05:31

## 2017-05-24 RX ADMIN — GABAPENTIN SCH MG: 100 CAPSULE ORAL at 19:24

## 2017-05-24 RX ADMIN — PIPERACILLIN AND TAZOBACTAM SCH MLS/HR: 3; .375 INJECTION, POWDER, LYOPHILIZED, FOR SOLUTION INTRAVENOUS; PARENTERAL at 23:09

## 2017-05-24 RX ADMIN — INSULIN GLARGINE SCH UNIT: 100 INJECTION, SOLUTION SUBCUTANEOUS at 21:36

## 2017-05-24 RX ADMIN — HEPARIN SODIUM SCH UNIT: 10000 INJECTION, SOLUTION INTRAVENOUS; SUBCUTANEOUS at 15:56

## 2017-05-24 RX ADMIN — METOPROLOL SUCCINATE SCH MG: 50 TABLET, EXTENDED RELEASE ORAL at 08:10

## 2017-05-24 RX ADMIN — AMLODIPINE BESYLATE SCH MG: 5 TABLET ORAL at 08:10

## 2017-05-24 RX ADMIN — INSULIN GLARGINE SCH UNIT: 100 INJECTION, SOLUTION SUBCUTANEOUS at 09:35

## 2017-05-24 RX ADMIN — OXYCODONE HYDROCHLORIDE AND ACETAMINOPHEN PRN TAB: 5; 325 TABLET ORAL at 19:41

## 2017-05-24 RX ADMIN — CLOPIDOGREL BISULFATE SCH MG: 75 TABLET, FILM COATED ORAL at 09:41

## 2017-05-24 RX ADMIN — INSULIN ASPART SCH UNITS: 100 INJECTION, SOLUTION INTRAVENOUS; SUBCUTANEOUS at 18:11

## 2017-05-24 RX ADMIN — MORPHINE SULFATE PRN MG: 4 INJECTION, SOLUTION INTRAMUSCULAR; INTRAVENOUS at 16:01

## 2017-05-24 RX ADMIN — OXYCODONE HYDROCHLORIDE AND ACETAMINOPHEN PRN TAB: 5; 325 TABLET ORAL at 08:09

## 2017-05-24 RX ADMIN — MORPHINE SULFATE PRN MG: 4 INJECTION, SOLUTION INTRAMUSCULAR; INTRAVENOUS at 02:51

## 2017-05-24 RX ADMIN — INSULIN ASPART SCH UNITS: 100 INJECTION, SOLUTION INTRAVENOUS; SUBCUTANEOUS at 12:50

## 2017-05-24 RX ADMIN — HEPARIN SODIUM SCH UNIT: 10000 INJECTION, SOLUTION INTRAVENOUS; SUBCUTANEOUS at 21:38

## 2017-05-24 RX ADMIN — ESCITALOPRAM OXALATE SCH MG: 20 TABLET, FILM COATED ORAL at 09:41

## 2017-05-24 RX ADMIN — PIPERACILLIN AND TAZOBACTAM SCH MLS/HR: 3; .375 INJECTION, POWDER, LYOPHILIZED, FOR SOLUTION INTRAVENOUS; PARENTERAL at 08:13

## 2017-05-24 RX ADMIN — MORPHINE SULFATE PRN MG: 4 INJECTION, SOLUTION INTRAMUSCULAR; INTRAVENOUS at 09:30

## 2017-05-24 RX ADMIN — MORPHINE SULFATE PRN MG: 4 INJECTION, SOLUTION INTRAMUSCULAR; INTRAVENOUS at 11:56

## 2017-05-24 RX ADMIN — LISINOPRIL SCH MG: 40 TABLET ORAL at 19:25

## 2017-05-24 RX ADMIN — INSULIN ASPART SCH UNITS: 100 INJECTION, SOLUTION INTRAVENOUS; SUBCUTANEOUS at 09:34

## 2017-05-24 RX ADMIN — LISINOPRIL SCH MG: 40 TABLET ORAL at 08:09

## 2017-05-24 RX ADMIN — Medication SCH MG: at 09:41

## 2017-05-24 RX ADMIN — HYDRALAZINE HYDROCHLORIDE SCH MG: 10 TABLET ORAL at 19:29

## 2017-05-24 RX ADMIN — MORPHINE SULFATE PRN MG: 4 INJECTION, SOLUTION INTRAMUSCULAR; INTRAVENOUS at 19:40

## 2017-05-24 RX ADMIN — PIPERACILLIN AND TAZOBACTAM SCH MLS/HR: 3; .375 INJECTION, POWDER, LYOPHILIZED, FOR SOLUTION INTRAVENOUS; PARENTERAL at 16:01

## 2017-05-24 NOTE — PHARMACY PROGRESS NOTE
Glycemic Control:  Progress Nt


Date of Service


May 24, 2017.





Scope


Glycemic Pharmacist consulted by Dr Cabrera on 5/22/17 for glycemic control and 

to write orders per [facility] inpatient glycemic control protocol.





Objective


Accuchecks BSG (last 24hrs):











Test


  5/23/17


12:58 5/23/17


13:30 5/23/17


16:51 5/23/17


20:49


 


Bedside Glucose


  188 mg/dl


(70-99) 195 mg/dl


(70-99) 209 mg/dl


(70-99) 222 mg/dl


(70-99)














Test


  5/24/17


08:12 


  


  


 


 


Bedside Glucose


  137 mg/dl


(70-99) 


  


  


 








HbA1c:





 








Test


  5/22/17


22:25


 


Hemoglobin A1c


  13.3 %


(4.5-5.6)  H











Recent Pertinent Medications


Outpatient Anti-diabetic Regimen:


* Lantus and Humalog at home (per diabetes notes from 2016 Dr Arango was 

trying to have patient give himself one dose of  ~30 units Lantus in middle of 

day plus Humalog with his largest meal of the day ~20 units)


* A1c = 13.2 %  5/22/17





The patient is currently receiving:


* Basal insulin:             Lantus 10-15 units every 12 hours





* Correctional Insulin:   Novolog Correction per scale ACHS


                          Goal Range: Low 100 mg/dL - High 140 mg/dL


                          Correction Factor: 20 mg/dL/unit





* Prandial insulin:         Per carb ratio of 1 unit per 7 grams CHO consumed





Risk Factors for Insulin Resistance:


* Infection: necrotic foot ulcer on Zosyn


* Recent Surgery: POD 1 of I&D of foot 


* Diet: Type 2 diabetic diet





Assessment & Plan


ASSESSMENT:


* ADA & AACE recommend a goal blood sugar range 140-180 mg/dl for the majority 

of critically ill & non-critically ill patients.  However, more stringent 

targets may be selected in individual cases. Will utilize more stringent goal 

of 100-140mg/dl based on patient age & comorbidities. Additionally, tighter 

glycemic control is warranted to facilitate wound/infection healing.


* Mr Dixon is a 62 y/o M with uncontrolled diabetes who is admitted for a 

necrotic foot ulcer. Patient remains on broad spectrum antibiotics. As an 

outpatient, he struggles with many issues that prevent compliance with his 

insulin. He received a total of 52 units of insulin over the past 24 hours with 

BSGs ranging from 166 to 222 mg/dL.


* Fasting BSG improved today after 25 units of basal yesterday. 


 * Patient only received a dose of 10 units this am based on partial dose 

parameter of BSG less than 180 mg/dL.


 *  Will continue to titrate basal insulin to 15 units BID as tolerated (Dr. Arango was trailing dose of 30 units daily as outpt)


* Will consider loosening bolus insulin parameters based on weight and stress 

of 2 since severe hyperglycemia has resolved





PLAN FOR INPATIENT GLYCEMIC CONTROL:


* Basal insulin


 * LANTUS 13-15 units SQ BID 


 * 10 units if blood sugar less than 140 mg/dL 


 * 15 units if blood sugar greater than or equal to 140 mg/dL





* Correctional Insulin with NOVOLOG per scale ACHS


 * Goal Range:  Low 100 mg/dL - High 140 mg/dL


 * Correction Factor: 20 mg/dL/unit


 * Nutritional / Prandial insulin per carb ratio of 1 unit per 7 grams CHO 

consumed








LOOKING AHEAD FOR DISCHARGE:


* Poor outpatient control evidenced by A1c of 13.3 md/dL 


* Recommend follow up with Dr. Arango on discharge


 * It is noted that Dr. Arango previously recommended Lantus 30 units once 

daily (given mid-day) and Humalog 20 units with the largest meal of the day. 





* Please note that the plan above was derived based on current level of insulin 

resistance and hospital stress. These recommendations are appropriate for 

inpatient admission only. Plan of care upon discharge will need to be 

reassessed to avoid potential outpatient hypo/hyperglycemia. 








Thank you.

## 2017-05-24 NOTE — DIAGNOSTIC IMAGING REPORT
ULTRASOUND BILATERAL LOWER EXTREMITY ARTERIAL; ANKLE-BRACHIAL INDICES



CLINICAL HISTORY: Peripheral arterial disease. Lower extremity ulcer.



COMPARISON STUDY: Bilateral lower extremity CT runoff dated 01/23/2017.



TECHNIQUE: Real-time, grayscale, and color Doppler sonography of the arteries of

the right or left lower extremities performed from the inguinal crease to the

foot. Ankle-brachial indices were attempted.



FINDINGS:



Right brachial pressure measures 167 and left brachial pressure measures 169.

The posterior tibial and dorsalis pedis arteries were not compressible

bilaterally and ankle-brachial indices could not be assessed.



Right lower extremity: There is advanced atherosclerotic calcification and

plaque seen throughout the areas of the right lower extremity. There are

monophasic arterial waveforms in the right common femoral artery with velocities

measuring 117 cm/s. The right profunda femoris artery is patent with velocities

measure up to 89 cm/s. There are monophasic to biphasic arterial waveforms seen

throughout the right superficial femoral artery. Velocities proximally measure

up to 175 cm/s, velocities in the midportion measure up to 132 cm/s, and

velocities within the distal superficial femoral artery measure up to 150 cm/s.

There are monophasic waveforms in the right popliteal artery with velocities

measuring up to 85 cm/s. There is three-vessel runoff to the foot. Velocities

within the calf arteries measure up to 55 cm/s. There are monophasic arterial

waveforms in the calf arteries with blunted arterial upstrokes. The dorsalis

pedis artery is patent with velocities measuring up to 22 cm/s.



Left lower extremity: There is advanced atherosclerotic plaque and irregularity

seen throughout the arteries of the left lower extremity. There are biphasic

arterial waveforms in the left common femoral artery with velocities measuring

up to 124 cm/s. The left profunda femoris artery is patent with velocities

measuring up to 113 cm/s. Again seen is complete thrombosis of the left

superficial femoral artery. There is reconstitution seen within the very distal

left superficial femoral artery where there are monophasic waveforms and blunted

arterial upstrokes. Velocities in the distal left superficial femoral artery

measure up to 44 cm/s. There are monophasic waveforms in the popliteal artery

with velocities measuring up to 35 cm/s. There is two-vessel runoff to the left

foot. There is thrombosis of the distal left peroneal artery. Velocities within

the calf arteries measure up to 74 cm/s. Arterial waveforms in the calf are

monophasic with very blunted arterial upstroke. The dorsalis pedis artery is

patent with velocities measuring up to 21 cm/s.





IMPRESSION:



1. Advanced peripheral vascular disease is present in the lower extremities.



2. There is no sonographic evidence of high-grade stenosis or focal vessel

cutoff throughout the arteries of the right lower extremity.



3. Again seen is complete thrombosis of the left superficial femoral artery with

reconstitution in the distal left superficial/popliteal artery region.



4. There is two-vessel runoff to the left foot. There is thrombosis of the

distal left peroneal artery.



5. Ankle-brachial indices could not be assessed as the ankle vessels were

noncompressible.









Dictated:  5/24/2017 3:45 PM

Transcribed:  5/24/2017 4:53 PM

[email or web address]







Electronically signed by:  Heber Hubbard M.D.

5/25/2017 9:10 AM



Dictated Date/Time:  5/24/2017 3:45 PM

## 2017-05-24 NOTE — SURGERY PROGRESS NOTE
Surgery Progress Note


Date of Service


May 24, 2017.





Subjective


Post OP Day:  1


+ feeling well, + pain controlled


dressing changed last night





Objective


Vital Signs:











  Date Time  Temp Pulse Resp B/P Pulse Ox O2 Delivery O2 Flow Rate FiO2


 


5/24/17 03:28  53  154/70    


 


5/24/17 02:42 36.7 61 18 182/75 90 Room Air  


 


5/23/17 23:47      Room Air  


 


5/23/17 23:12 37.0 61 18 151/69 90 Room Air  


 


5/23/17 21:20  64  136/69    


 


5/23/17 19:01 36.6 64 17 135/74 94 Room Air  


 


5/23/17 17:07      Nasal Cannula 2.0 


 


5/23/17 16:32 37.2 71 16 127/65 93 Ambu-Bag 2.0 


 


5/23/17 15:29 37.2 59 16 124/68 90 Nasal Cannula 2.0 


 


5/23/17 14:30  63 18 122/68 92 Nasal Cannula 2.0 


 


5/23/17 14:00  64 18 105/59 93 Nasal Cannula 2.0 


 


5/23/17 13:30     92 Nasal Cannula 2.0 


 


5/23/17 13:30 36.8 67 16 128/57 92 Nasal Cannula 2.0 


 


5/23/17 13:15 36.9 68 14 111/53 95 Nasal Cannula 2 


 


5/23/17 13:05 36.9 68 10 142/76 95 Nasal Cannula 2 


 


5/23/17 12:55  69 10 149/66 95 Nasal Cannula 2 


 


5/23/17 12:45  61 10 154/72 98 Mask 10 


 


5/23/17 12:35  63 10 104/54 97 Mask 10 


 


5/23/17 12:25 36.5 63 10 107/57 97 Mask 10 


 


5/23/17 08:03      Room Air  


 


5/23/17 07:14 37.0 63 18 133/75 92 Room Air  








Incision(s):  intact (dressing)


Laboratory Results:





Results Past 24 Hours








Test


  5/23/17


07:48 5/23/17


08:06 5/23/17


12:58 5/23/17


13:30 Range/Units


 


 


White Blood Count 10.55    4.8-10.8  K/uL


 


Red Blood Count 3.66    4.7-6.1  M/uL


 


Hemoglobin 10.6    14.0-18.0  g/dL


 


Hematocrit 31.2    42-52  %


 


Mean Corpuscular Volume 85.2      fL


 


Mean Corpuscular Hemoglobin 29.0    25-34  pg


 


Mean Corpuscular Hemoglobin


Concent 34.0


  


  


  


  32-36  g/dl


 


 


RDW Standard Deviation 40.8    36.4-46.3  fL


 


RDW Coefficient of Variation 13.1    11.5-14.5  %


 


Platelet Count 257    130-400  K/uL


 


Mean Platelet Volume 8.6    7.4-10.4  fL


 


Sodium Level 132    136-145  mmol/L


 


Potassium Level 4.0    3.5-5.1  mmol/L


 


Chloride Level 98      mmol/L


 


Carbon Dioxide Level 27    21-32  mmol/L


 


Anion Gap 7.0    3-11  mmol/L


 


Blood Urea Nitrogen 22    7-18  mg/dl


 


Creatinine


  0.85


  


  


  


  0.60-1.40


mg/dl


 


Est Creatinine Clear Calc


Drug Dose 99.6


  


  


  


   ml/min


 


 


Estimated GFR (


American) 110.5


  


  


  


   


 


 


Estimated GFR (Non-


American 95.4


  


  


  


   


 


 


BUN/Creatinine Ratio 25.3    10-20  


 


Random Glucose 148    70-99  mg/dl


 


Calcium Level 8.7    8.5-10.1  mg/dl


 


Bedside Glucose  173 188 195 70-99  mg/dl














Test


  5/23/17


14:06 5/23/17


16:51 5/23/17


20:49 


  Range/Units


 


 


Prothrombin Time


  10.0


  


  


  


  9.0-12.0


SECONDS


 


Prothromb Time International


Ratio 0.9


  


  


  


  0.9-1.1  


 


 


Activated Partial


Thromboplast Time 31.3


  


  


  


  21.0-31.0


SECONDS


 


Partial Thromboplastin Ratio 1.2     


 


Bedside Glucose  209 222  70-99  mg/dl








 Microbiology Results


5/23/17 Gram Stain - Final, Resulted


          


5/23/17 Bacterial Culture, Resulted


          Pending





Assessment & Plan


s/p debridement left heel ulcer


   wound care eval, possible vac


   continue Zosyn

## 2017-05-24 NOTE — SURGERY CONSULTATION
Consultation


Date of Service


May 24, 2017.





Chief Complaint


LLE heel wound, PAD





History of Present Illness


The patient is a 61 year old male with hx of DMII on insulin, HTN, CAD, 

neuropathy, seen in consultation today for vascular status in relation to L 

heel infected wound which has been present for approx 5 wks.  Pt states he is 

unsure how it began, but believes he may have cut it on something.  States it 

became painful and he began seeing wound care center.  Eventually saw Dr Gallegos, who recommended pt undergo surgical debridement, which occurred 

yesterday.  Pt states he had been started on oral abx by wound care, but he had 

no improvement.  Admits calf claudication L>R, which occurs at about 1/4-1/2 

mile and resolves within a few minutes of rest. Denies rest pain or other 

nonhealing wounds.  Denies HA, fever, chills, chest pain, SOB, abd pain, N/V, 

other complaints.





Vitals





 Vital Signs Past 12 Hours








  Date Time  Temp Pulse Resp B/P Pulse Ox O2 Delivery O2 Flow Rate FiO2


 


5/24/17 11:43 36.9 60 18 134/76 93 Room Air  


 


5/24/17 09:27  67  154/73    


 


5/24/17 08:09      Room Air  


 


5/24/17 07:32 37.1 67 16 180/72 95 Room Air  


 


5/24/17 03:28  53  154/70    


 


5/24/17 02:42 36.7 61 18 182/75 90 Room Air  











Allergies


Coded Allergies:  


     No Known Allergies (Verified , 12/1/16)





Home Medications


Scheduled


Amlodipine Besylate (Amlodipine Besylate), 10 MG PO QAM


Aspirin (Aspirin Ec), 325 MG PO QAM


Clopidogrel (Plavix), 75 MG PO QAM


Cyanocobalamin (Vitamin B-12), 1,000 MCG PO QAM


Divalproex Sodium Delay Rel (Depakote Delay Rel *), 2,000 MG PO HS


Escitalopram Oxalate (Escitalopram Oxalate), 20 MG PO QAM


Gabapentin (Gabapentin), 100 MG PO HS


Hydrochlorothiazide (Hctz *), 25 MG PO QAM


Insulin Glargine (Lantus), UNITS SC HS


Insulin Human Lispro (Humalog Kwikpen), UNITS SC UD


Lisinopril (Zestril), 40 MG PO BID


Metformin HCl (Metformin HCl ER), 1 TAB PO BID


Metoprolol Succ (Toprol Xl) (Toprol-Xl), 50 MG PO BID


Simvastatin (Zocor), 80 MG PO HS





Scheduled PRN


Albuterol Sulfate (Proair Respiclick), 2 PUFF INH Q4 PRN for SOB/Wheezing


Fluticasone Propionate (Fluticasone Propionate), 2 SPRAYS INH DAILY PRN for 

Nasal Congestion


Nitroglycerin (Nitrostat), 0.4 MG UT PRN PRN for Chest Pain





Problem List


Medical Problems:


(1) Acute appendicitis with appendiceal abscess


(2) Angina pectoris


(3) CAD (coronary artery disease)


(4) Cardiac arrest


(5) Dehydration


(6) Diabetes


(7) Enterocolitis


(8) Heel ulcer


(9) Hypertension


(10) Ischemic cardiomyopathy








Surgical / Medical History


Hx Cardiac Surgery:  Yes (HEART CATH X2, STENTS X7)


Hx Abdominal Surgery:  No


Hx Cancer Surgery:  No


Hx Thoracic Surgery:  No


Hx Orthopedic:  Yes (C6-7 FUSION (FULL ROM), RT HAND SURGERY S/P TRAUMA (

AMPUTATION TO 3 MIDDLE))


Hx Urinary Tract Surgery:  No


HX Other Surgery:  Yes (appendex)


Past Medical/Surgical History:  Diabetes, Heart Disease, High Cholesterol, 

Hypertension





Family History





Cancer


Diabetes mellitus


Heart disease


Hypertension





Social History


Smoking Status:  Current Every Day Smoker


Hx Tobacco Use In Past Year?:  Yes


Hx Alcohol Use - Type & Amnt:  No


Hx Substance Use -Type & Amnt:  No





Review of Systems


Constitutional:  No chills, No fever, No malaise


Skin:  + change in color


Eyes:  No visual changes


ENMT:  No sore throat


Respiratory:  No STANTON, No cough, No hemoptysis, No short of breath


Cardiovascular:  + edema (L foot), + intermittent claudication, No chest pain, 

No palpitations


Gastrointestinal:  No abdominal pain, No nausea, No vomiting


Neurologic:  No dizziness, No headache, No lethargy, No numbness, No tingling





Physical Exam


Constitutional:  


   General Apperance:  well-nourished, well-developed


   Level of Distress:  chronically ill (mildly)


   Ambulation:  limited ambulation (pt wearing wound vac to L heel)


Psychiatric:  


   Mental Status:  active & alert, normal mood, normal affect


   Orientation:  oriented except where noted, to time, to place, to person


   Memory:  recent memory normal, remote memory normal


Head:  normocephalic, atraumatic


Eyes:  


   EOM:  EOMI


ENMT:  normal ENT inspection, hearing grossly normal


Neck:  supple, trachea midline


Lungs:  


   Respiratory effort:  no dyspnea


   Auscultation:  no rales/crackles, no rhonchi, decreased breath sounds


Cardiovascular:  


   Apical Impulse:  not displaced


   Heart Auscultation:  RRR, no rubs, no gallops


Peripheral Pulses:  


   Pulses:  full and equal, in all extremities except if noted


   Bruits:  none appreciated


   Carotid Pulse:  normal on the left, normal on the right


   Brachial Pulses:  normal on the left, normal on the right


   Radial Pulse:  normal on the left, normal on the right


   Femoral Pulse:  decreased on the left, decreased on the right


   Posterior Tibialis Pulse:  decreased on the right, pertinent finding (

nonpalpable LLE)


   Dorsalis Pedis Pulse:  decreased on the right, pertinent finding (

nonpalpable LLE)


Abdomen:  


   Bowel Sounds:  normal


   Inspection & Palpation:  soft, non-distended, no tenderness, guarding & 

rebound


Musculoskeletal:  normal strength (5/5 throughout), normal tone


Extremities:  


   Upper Right:  no cyanosis, no edema, no varicosities


   Upper Left:  no cyanosis, no edema, no varicosities


   Lower Right:  no cyanosis, no edema, no varicosities, pertinent finding (

dorsal foot and toes with + hair, pink, warm, brisk cap refill)


   Lower Left:  pertinent finding (L heel wound with vac in place.  Foot with +

2 edema, mild erythema dorsal foot.  + hair dorsal foot, none on toes. toes warm

, difficult to assess cap refill d/t betadine paint)


Neurologic:  


   Cranial Nerves:  grossly intact


   Sensation:  grossly intact





Assessment and Plan


ASSESSMENT and PLAN:


   PAD with claudication


   L heel wound


      Pt likely with significant arterial disease d/t multiple comorbidities 

including cigarette use, HTN, CAD, DMII.  CTA from 1/17 reviewed, demonstrates 

significant disease, however, distal flow assessment not optimal.  Will order 

arterial US to reassess and evaluate with [facility]d tomorrow to determine whether 

pt will benefit from vascular intervention.

## 2017-05-24 NOTE — SURGERY PROGRESS NOTE
DATE: 05/24/2017

 

DATE:  05/24/2017.  

 

Paramjit is alert, coherent.  He is anxious to go home.  The intraoperative

findings were discussed with the patient including the not so significant

bleeding in the  I\T\D site.  I took the dressing off.  He still has some

cellulitis, although resolving in the dorsal aspect of the foot.  The packing

is still intact in the wound.  We left that intact with a Penrose.  Will have

wound clinic see the patient.  Clinically before this started the patient

said he could  walk up to a mile with occasional discomfort, he could go  up

a steep incline but on examination again today he has weak bilateral femoral

pulses.  I did not feel any pulses in his left foot but  this may be due to

his swelling.  He does seem to have a good perfusion overall as far as

capillary refill.  With having said this I am still concerned about the

inflow as a problem.  Therefore, we will ask Dr. Rainey to see the patient to

consider doing  arteriogram and possibly therapy if  lesion  is to be found. 

He did have an angiogram 2 years ago that showed some stenosis in the left

popliteal area, although at that time apparently he was not having any

symptoms.

## 2017-05-24 NOTE — HOSPITALIST PROGRESS NOTE
Hospitalist Progress Note


Date of Service


May 24, 2017.





Subjective


Pt evaluation today including:  conversation w/ patient


Patient has some pain in the left foot, but otherwise feeling well.  No nausea 

or vomiting.  Blood pressures have been elevated.





   Constitutional:  No fever


   Respiratory:  No shortness of breath


   Cardiovascular:  No chest pain


   All Other Systems:  Reviewed and Negative





Objective


Vital Signs











  Date Time  Temp Pulse Resp B/P Pulse Ox O2 Delivery O2 Flow Rate FiO2


 


5/24/17 20:00      Nasal Cannula 2.0 


 


5/24/17 19:30 37.0 64 18 180/82 93 Nasal Cannula 2.0 


 


5/24/17 15:33 36.9 61 17 174/74 90 Room Air  


 


5/24/17 11:43 36.9 60 18 134/76 93 Room Air  


 


5/24/17 09:27  67  154/73    


 


5/24/17 08:09      Room Air  


 


5/24/17 07:32 37.1 67 16 180/72 95 Room Air  


 


5/24/17 03:28  53  154/70    


 


5/24/17 02:42 36.7 61 18 182/75 90 Room Air  


 


5/23/17 23:47      Room Air  


 


5/23/17 23:12 37.0 61 18 151/69 90 Room Air  











Physical Exam


General Appearance:  no apparent distress, + thin


Eyes:  normal inspection, sclerae normal


ENT:  hearing grossly normal


Neck:  trachea midline


Respiratory/Chest:  no respiratory distress, no accessory muscle use, + 

decreased breath sounds (throughout, no wheezing)


Cardiovascular:  regular rate, rhythm, no edema, no murmur


Abdomen:  normal bowel sounds, non tender, soft


Extremities:  + pertinent finding (left foot now with wound VAC in place with 

minimal surrounding erythema of the skin)


Neurologic/Psychiatric:  alert, normal mood/affect


Skin:  no rash





Laboratory Results





Last 24 Hours








Test


  5/24/17


08:12 5/24/17


12:02 5/24/17


17:14 5/24/17


20:35


 


Bedside Glucose 137 mg/dl  129 mg/dl  149 mg/dl  177 mg/dl 











Assessment and Plan


This is a 60 yo m with a history of CAD and poorly controlled DMII that we have 

been consulted to help manage pre and post op care





 Left foot diabetic ulcer and abscess now status post incision and drainage 

with debridement-wound VAC now in place, with significant peripheral arterial 

disease seen on arterial Doppler:


IMPRESSION:


1. Advanced peripheral vascular disease is present in the lower extremities.


2. There is no sonographic evidence of high-grade stenosis or focal vessel


cutoff throughout the arteries of the right lower extremity.


3. Again seen is complete thrombosis of the left superficial femoral artery with


reconstitution in the distal left superficial/popliteal artery region.


4. There is two-vessel runoff to the left foot. There is thrombosis of the


distal left peroneal artery.


5. Ankle-brachial indices could not be assessed as the ankle vessels were


noncompressible.





-Surgical management with wound VAC, pain control, bowel regimen needed


-Continue Zosyn


-Follow CBC


-Wound care consult


-Vascular surgery consult did to see if any procedure necessary to reestablish 

improved blood flow





Hyponatremia-pseudohyponatremia secondary to hyperglycemia-improved with 

control of blood sugar


-Follow PRP





Poorly controlled DMII with severe hyperglycemia -hemoglobin A1c 13.3%.  

Patient admits this is secondary to poor compliance with insulin at home.  He 

is committed to improving this in the future.  Hyperglycemia now improved


- glycemic consult placed-continue Lantus 15 units twice a day and NovoLog with 

carb coverage


- Dm educator consultation appreciated





Hypertension-Now uncontrolled, could be secondary to pain


- Continue metoprolol, lisinopril, amlodipine


- HCTZ was held by primary team 


-Add by mouth hydralazine today





S/P NSTEMI, CAD-no active issues


- ASA and plavix cont , continue beta blocker





Prophylaxis-SCDs, subcutaneous heparin





Disposition-to home eventually


Full code

## 2017-05-24 NOTE — ANESTHESIOLOGY PROGRESS NOTE
Anesthesia Post Op Note


Date & Time


May 24, 2017 at 07:52





Vital Signs


Pain Intensity:  8.0





 Vital Signs Past 12 Hours








  Date Time  Temp Pulse Resp B/P Pulse Ox O2 Delivery O2 Flow Rate FiO2


 


5/24/17 07:32 37.1 67 16 180/72 95 Room Air  


 


5/24/17 03:28  53  154/70    


 


5/24/17 02:42 36.7 61 18 182/75 90 Room Air  


 


5/23/17 23:47      Room Air  


 


5/23/17 23:12 37.0 61 18 151/69 90 Room Air  


 


5/23/17 21:20  64  136/69    











Notes


Mental Status:  alert / awake / arousable, participated in evaluation


Pt Amnestic to Procedure:  Yes


Nausea / Vomiting:  adequately controlled


Pain:  adequately controlled


Airway Patency, RR, SpO2:  stable & adequate


BP & HR:  stable & adequate


Hydration State:  stable & adequate


Anesthetic Complications:  no major complications apparent

## 2017-05-25 VITALS
DIASTOLIC BLOOD PRESSURE: 79 MMHG | OXYGEN SATURATION: 93 % | HEART RATE: 56 BPM | TEMPERATURE: 98.78 F | SYSTOLIC BLOOD PRESSURE: 168 MMHG

## 2017-05-25 VITALS — OXYGEN SATURATION: 94 %

## 2017-05-25 VITALS
HEART RATE: 63 BPM | TEMPERATURE: 98.6 F | DIASTOLIC BLOOD PRESSURE: 81 MMHG | SYSTOLIC BLOOD PRESSURE: 167 MMHG | OXYGEN SATURATION: 94 %

## 2017-05-25 VITALS
OXYGEN SATURATION: 93 % | HEART RATE: 59 BPM | SYSTOLIC BLOOD PRESSURE: 161 MMHG | DIASTOLIC BLOOD PRESSURE: 64 MMHG | TEMPERATURE: 98.24 F

## 2017-05-25 VITALS — OXYGEN SATURATION: 93 % | HEART RATE: 85 BPM

## 2017-05-25 LAB
ANION GAP SERPL CALC-SCNC: 6 MMOL/L (ref 3–11)
BASOPHILS # BLD: 0.01 K/UL (ref 0–0.2)
BASOPHILS NFR BLD: 0.1 %
BUN SERPL-MCNC: 26 MG/DL (ref 7–18)
BUN/CREAT SERPL: 29.9 (ref 10–20)
CALCIUM SERPL-MCNC: 8.5 MG/DL (ref 8.5–10.1)
CHLORIDE SERPL-SCNC: 103 MMOL/L (ref 98–107)
CO2 SERPL-SCNC: 29 MMOL/L (ref 21–32)
COMPLETE: YES
CREAT CL PREDICTED SERPL C-G-VRATE: 96.2 ML/MIN
CREAT SERPL-MCNC: 0.88 MG/DL (ref 0.6–1.4)
EOSINOPHIL NFR BLD AUTO: 302 K/UL (ref 130–400)
GLUCOSE SERPL-MCNC: 111 MG/DL (ref 70–99)
HCT VFR BLD CALC: 30.5 % (ref 42–52)
IG%: 0.4 %
IMM GRANULOCYTES NFR BLD AUTO: 21 %
LYMPHOCYTES # BLD: 1.56 K/UL (ref 1.2–3.4)
MAGNESIUM SERPL-MCNC: 2.2 MG/DL (ref 1.8–2.4)
MCH RBC QN AUTO: 29.2 PG (ref 25–34)
MCHC RBC AUTO-ENTMCNC: 34.1 G/DL (ref 32–36)
MCV RBC AUTO: 85.7 FL (ref 80–100)
MONOCYTES NFR BLD: 10.3 %
NEUTROPHILS # BLD AUTO: 2.7 %
NEUTROPHILS NFR BLD AUTO: 65.5 %
PMV BLD AUTO: 8.7 FL (ref 7.4–10.4)
POTASSIUM SERPL-SCNC: 4.4 MMOL/L (ref 3.5–5.1)
RBC # BLD AUTO: 3.56 M/UL (ref 4.7–6.1)
SODIUM SERPL-SCNC: 138 MMOL/L (ref 136–145)
WBC # BLD AUTO: 7.44 K/UL (ref 4.8–10.8)

## 2017-05-25 RX ADMIN — HYDRALAZINE HYDROCHLORIDE SCH MG: 10 TABLET ORAL at 21:46

## 2017-05-25 RX ADMIN — HYDRALAZINE HYDROCHLORIDE SCH MG: 10 TABLET ORAL at 13:05

## 2017-05-25 RX ADMIN — LISINOPRIL SCH MG: 40 TABLET ORAL at 08:31

## 2017-05-25 RX ADMIN — ESCITALOPRAM OXALATE SCH MG: 20 TABLET, FILM COATED ORAL at 08:32

## 2017-05-25 RX ADMIN — HYDRALAZINE HYDROCHLORIDE SCH MG: 10 TABLET ORAL at 08:32

## 2017-05-25 RX ADMIN — INSULIN GLARGINE SCH UNIT: 100 INJECTION, SOLUTION SUBCUTANEOUS at 21:53

## 2017-05-25 RX ADMIN — INSULIN ASPART SCH UNITS: 100 INJECTION, SOLUTION INTRAVENOUS; SUBCUTANEOUS at 09:23

## 2017-05-25 RX ADMIN — CLOPIDOGREL BISULFATE SCH MG: 75 TABLET, FILM COATED ORAL at 08:32

## 2017-05-25 RX ADMIN — MORPHINE SULFATE PRN MG: 4 INJECTION, SOLUTION INTRAMUSCULAR; INTRAVENOUS at 13:36

## 2017-05-25 RX ADMIN — METOPROLOL SUCCINATE SCH MG: 50 TABLET, EXTENDED RELEASE ORAL at 08:32

## 2017-05-25 RX ADMIN — METOPROLOL SUCCINATE SCH MG: 50 TABLET, EXTENDED RELEASE ORAL at 21:46

## 2017-05-25 RX ADMIN — STANDARDIZED SENNA CONCENTRATE AND DOCUSATE SODIUM SCH TAB: 8.6; 5 TABLET ORAL at 08:31

## 2017-05-25 RX ADMIN — STANDARDIZED SENNA CONCENTRATE AND DOCUSATE SODIUM SCH TAB: 8.6; 5 TABLET ORAL at 21:46

## 2017-05-25 RX ADMIN — HEPARIN SODIUM SCH UNIT: 10000 INJECTION, SOLUTION INTRAVENOUS; SUBCUTANEOUS at 21:54

## 2017-05-25 RX ADMIN — INSULIN ASPART SCH UNITS: 100 INJECTION, SOLUTION INTRAVENOUS; SUBCUTANEOUS at 13:12

## 2017-05-25 RX ADMIN — INSULIN ASPART SCH UNITS: 100 INJECTION, SOLUTION INTRAVENOUS; SUBCUTANEOUS at 21:53

## 2017-05-25 RX ADMIN — LISINOPRIL SCH MG: 40 TABLET ORAL at 21:48

## 2017-05-25 RX ADMIN — HEPARIN SODIUM SCH UNIT: 10000 INJECTION, SOLUTION INTRAVENOUS; SUBCUTANEOUS at 13:13

## 2017-05-25 RX ADMIN — Medication SCH GM: at 08:30

## 2017-05-25 RX ADMIN — Medication SCH MG: at 08:32

## 2017-05-25 RX ADMIN — INSULIN ASPART SCH UNITS: 100 INJECTION, SOLUTION INTRAVENOUS; SUBCUTANEOUS at 18:30

## 2017-05-25 RX ADMIN — PIPERACILLIN AND TAZOBACTAM SCH MLS/HR: 3; .375 INJECTION, POWDER, LYOPHILIZED, FOR SOLUTION INTRAVENOUS; PARENTERAL at 23:41

## 2017-05-25 RX ADMIN — AMLODIPINE BESYLATE SCH MG: 5 TABLET ORAL at 08:32

## 2017-05-25 RX ADMIN — PIPERACILLIN AND TAZOBACTAM SCH MLS/HR: 3; .375 INJECTION, POWDER, LYOPHILIZED, FOR SOLUTION INTRAVENOUS; PARENTERAL at 16:14

## 2017-05-25 RX ADMIN — MORPHINE SULFATE PRN MG: 4 INJECTION, SOLUTION INTRAMUSCULAR; INTRAVENOUS at 18:30

## 2017-05-25 RX ADMIN — HEPARIN SODIUM SCH UNIT: 10000 INJECTION, SOLUTION INTRAVENOUS; SUBCUTANEOUS at 05:57

## 2017-05-25 RX ADMIN — GABAPENTIN SCH MG: 100 CAPSULE ORAL at 21:47

## 2017-05-25 RX ADMIN — DIVALPROEX SODIUM SCH MG: 500 TABLET, DELAYED RELEASE ORAL at 21:47

## 2017-05-25 RX ADMIN — INSULIN GLARGINE SCH UNIT: 100 INJECTION, SOLUTION SUBCUTANEOUS at 09:24

## 2017-05-25 RX ADMIN — OXYCODONE HYDROCHLORIDE AND ACETAMINOPHEN PRN TAB: 5; 325 TABLET ORAL at 21:44

## 2017-05-25 RX ADMIN — PIPERACILLIN AND TAZOBACTAM SCH MLS/HR: 3; .375 INJECTION, POWDER, LYOPHILIZED, FOR SOLUTION INTRAVENOUS; PARENTERAL at 08:30

## 2017-05-25 NOTE — PHARMACY PROGRESS NOTE
Glycemic Control:  Progress Nt


Date of Service


May 25, 2017.





Scope


Glycemic Pharmacist consulted by Dr Cabrera on 5/25/17 for glycemic control and 

to write orders per [facility] inpatient glycemic control protocol.





Objective


Accuchecks BSG (last 24hrs):











Test


  5/24/17


17:14 5/24/17


20:35 5/25/17


07:56


 


Bedside Glucose


  149 mg/dl


(70-99) 177 mg/dl


(70-99) 


 


 


Random Glucose


  


  


  111 mg/dl


(70-99)








Laboratory Data (last 24hrs)











Test


  5/25/17


07:56


 


Anion Gap 6.0 mmol/L 


 


BUN/Creatinine Ratio 29.9 


 


Blood Urea Nitrogen 26 mg/dl 


 


Creatinine 0.88 mg/dl 


 


Potassium Level 4.4 mmol/L 


 


Sodium Level 138 mmol/L 


 


White Blood Count 7.44 K/uL 


 


Red Blood Count 3.56 M/uL 


 


Hemoglobin 10.4 g/dL 


 


Hematocrit 30.5 % 


 


Mean Corpuscular Volume 85.7 fL 


 


Mean Corpuscular Hemoglobin 29.2 pg 


 


Mean Corpuscular Hemoglobin


Concent 34.1 g/dl 


 


 


Platelet Count 302 K/uL 


 


Mean Platelet Volume 8.7 fL 


 


Neutrophils (%) (Auto) 65.5 % 


 


Lymphocytes (%) (Auto) 21.0 % 


 


Monocytes (%) (Auto) 10.3 % 


 


Eosinophils (%) (Auto) 2.7 % 


 


Basophils (%) (Auto) 0.1 % 


 


Neutrophils # (Auto) 4.87 K/uL 


 


Lymphocytes # (Auto) 1.56 K/uL 


 


Monocytes # (Auto) 0.77 K/uL 


 


Eosinophils # (Auto) 0.20 K/uL 


 


Basophils # (Auto) 0.01 K/uL 








HbA1c:





 








Test


  5/22/17


22:25


 


Hemoglobin A1c


  13.3 %


(4.5-5.6)  H











Recent Pertinent Medications


Outpatient Anti-diabetic Regimen:


* Lantus and Humalog at home (per diabetes notes from 2016 Dr Arango was 

trying to have patient give himself one dose of  ~30 units Lantus in middle of 

day plus Humalog with his largest meal of the day ~20 units)


* A1c = 13.2 %  5/22/17





The patient is currently receiving:


* Basal insulin:             Lantus 10-15 units every 12 hours





* Correctional Insulin:   Novolog Correction per scale ACHS


                          Goal Range: Low 100 mg/dL - High 140 mg/dL


                          Correction Factor: 20 mg/dL/unit





* Prandial insulin:         Per carb ratio of 1 unit per 7 grams CHO consumed





Risk Factors for Insulin Resistance:


* Infection: necrotic foot ulcer on Zosyn


* Recent Surgery: POD 2 of I&D of foot 


* Diet: Type 2 diabetic diet





Assessment & Plan


ASSESSMENT:


* ADA & AACE recommend a goal blood sugar range 140-180 mg/dl for the majority 

of critically ill & non-critically ill patients.  However, more stringent 

targets may be selected in individual cases. Will utilize more stringent goal 

of 100-140mg/dl based on patient age & comorbidities. Additionally, tighter 

glycemic control is warranted to facilitate wound/infection healing.


* Mr Dixon is a 60 y/o M with uncontrolled diabetes who is admitted for a 

necrotic foot ulcer. Patient remains on broad spectrum antibiotics. As an 

outpatient, he struggles with many issues that prevent compliance with his 

insulin. He received a total of 45 units of insulin over the past 24 hours with 

BSGs ranging from 129 to 177 mg/dL.


* Will continue to titrate basal insulin to 15 units BID as tolerated (Dr. Arango was trailing dose of 30 units daily as outpt)





PLAN FOR INPATIENT GLYCEMIC CONTROL:


* Basal insulin: LANTUS 15 units SQ BID 


 


* Correctional Insulin with NOVOLOG per scale ACHS


 * Goal Range:  Low 100 mg/dL - High 140 mg/dL


 * Correction Factor: 20 mg/dL/unit


 * Nutritional / Prandial insulin per carb ratio of 1 unit per 7 grams CHO 

consumed








LOOKING AHEAD FOR DISCHARGE:


* Poor outpatient control evidenced by A1c of 13.3 md/dL 


* Recommend follow up with Dr. Arango on discharge


 * It is noted that Dr. Arango previously recommended Lantus 30 units once 

daily (given mid-day) and Humalog 20 units with the largest meal of the day. 





* Please note that the plan above was derived based on current level of insulin 

resistance and hospital stress. These recommendations are appropriate for 

inpatient admission only. Plan of care upon discharge will need to be 

reassessed to avoid potential outpatient hypo/hyperglycemia. 








Thank you.

## 2017-05-25 NOTE — PROGRESS NOTE
Progress Note


Date of Service


May 25, 2017.





Progress Note


Patient has a left superficial femoral occlusion on duplex.  Recommend left 

lower extremity arteriography and possible intervention. 


I have discussed the risks options and benefits of the procedure with the 

patient.  The patient understands the risks options and benefits and agrees to 

the procedure.


We will proceed with this tomorrow

## 2017-05-26 VITALS
TEMPERATURE: 98.6 F | SYSTOLIC BLOOD PRESSURE: 156 MMHG | OXYGEN SATURATION: 96 % | HEART RATE: 61 BPM | DIASTOLIC BLOOD PRESSURE: 76 MMHG

## 2017-05-26 VITALS
TEMPERATURE: 98.6 F | HEART RATE: 69 BPM | SYSTOLIC BLOOD PRESSURE: 159 MMHG | DIASTOLIC BLOOD PRESSURE: 77 MMHG | OXYGEN SATURATION: 96 %

## 2017-05-26 VITALS
SYSTOLIC BLOOD PRESSURE: 153 MMHG | DIASTOLIC BLOOD PRESSURE: 77 MMHG | HEART RATE: 62 BPM | OXYGEN SATURATION: 96 % | TEMPERATURE: 98.6 F

## 2017-05-26 VITALS
DIASTOLIC BLOOD PRESSURE: 76 MMHG | OXYGEN SATURATION: 95 % | SYSTOLIC BLOOD PRESSURE: 196 MMHG | TEMPERATURE: 97.7 F | HEART RATE: 64 BPM

## 2017-05-26 VITALS — OXYGEN SATURATION: 91 %

## 2017-05-26 VITALS — DIASTOLIC BLOOD PRESSURE: 86 MMHG | OXYGEN SATURATION: 92 % | SYSTOLIC BLOOD PRESSURE: 179 MMHG

## 2017-05-26 VITALS
TEMPERATURE: 97.52 F | OXYGEN SATURATION: 91 % | HEART RATE: 64 BPM | SYSTOLIC BLOOD PRESSURE: 192 MMHG | DIASTOLIC BLOOD PRESSURE: 94 MMHG

## 2017-05-26 VITALS — DIASTOLIC BLOOD PRESSURE: 80 MMHG | SYSTOLIC BLOOD PRESSURE: 173 MMHG

## 2017-05-26 VITALS
HEART RATE: 70 BPM | DIASTOLIC BLOOD PRESSURE: 86 MMHG | OXYGEN SATURATION: 95 % | TEMPERATURE: 98.6 F | SYSTOLIC BLOOD PRESSURE: 188 MMHG

## 2017-05-26 VITALS
DIASTOLIC BLOOD PRESSURE: 74 MMHG | OXYGEN SATURATION: 95 % | HEART RATE: 61 BPM | SYSTOLIC BLOOD PRESSURE: 146 MMHG | TEMPERATURE: 98.78 F

## 2017-05-26 VITALS
DIASTOLIC BLOOD PRESSURE: 78 MMHG | HEART RATE: 73 BPM | TEMPERATURE: 97.88 F | OXYGEN SATURATION: 91 % | SYSTOLIC BLOOD PRESSURE: 179 MMHG

## 2017-05-26 VITALS
HEART RATE: 61 BPM | DIASTOLIC BLOOD PRESSURE: 83 MMHG | OXYGEN SATURATION: 96 % | TEMPERATURE: 98.6 F | SYSTOLIC BLOOD PRESSURE: 147 MMHG

## 2017-05-26 VITALS
TEMPERATURE: 98.24 F | OXYGEN SATURATION: 96 % | DIASTOLIC BLOOD PRESSURE: 83 MMHG | HEART RATE: 65 BPM | SYSTOLIC BLOOD PRESSURE: 181 MMHG

## 2017-05-26 VITALS
HEART RATE: 58 BPM | TEMPERATURE: 97.7 F | DIASTOLIC BLOOD PRESSURE: 76 MMHG | OXYGEN SATURATION: 94 % | SYSTOLIC BLOOD PRESSURE: 173 MMHG

## 2017-05-26 LAB
EOSINOPHIL NFR BLD AUTO: 328 K/UL (ref 130–400)
HCT VFR BLD CALC: 29.6 % (ref 42–52)
MCH RBC QN AUTO: 29.1 PG (ref 25–34)
MCHC RBC AUTO-ENTMCNC: 33.8 G/DL (ref 32–36)
MCV RBC AUTO: 86 FL (ref 80–100)
PMV BLD AUTO: 8.4 FL (ref 7.4–10.4)
RBC # BLD AUTO: 3.44 M/UL (ref 4.7–6.1)
WBC # BLD AUTO: 7.27 K/UL (ref 4.8–10.8)

## 2017-05-26 PROCEDURE — 047K05Z DILATION OF RIGHT FEMORAL ARTERY WITH TWO DRUG-ELUTING INTRALUMINAL DEVICES, OPEN APPROACH: ICD-10-PCS | Performed by: SURGERY

## 2017-05-26 RX ADMIN — STANDARDIZED SENNA CONCENTRATE AND DOCUSATE SODIUM SCH TAB: 8.6; 5 TABLET ORAL at 12:03

## 2017-05-26 RX ADMIN — AMLODIPINE BESYLATE SCH MG: 5 TABLET ORAL at 08:41

## 2017-05-26 RX ADMIN — METOPROLOL SUCCINATE SCH MG: 50 TABLET, EXTENDED RELEASE ORAL at 12:00

## 2017-05-26 RX ADMIN — HEPARIN SODIUM SCH UNIT: 10000 INJECTION, SOLUTION INTRAVENOUS; SUBCUTANEOUS at 06:00

## 2017-05-26 RX ADMIN — HYDRALAZINE HYDROCHLORIDE SCH MG: 10 TABLET ORAL at 12:01

## 2017-05-26 RX ADMIN — HYDRALAZINE HYDROCHLORIDE SCH MG: 10 TABLET ORAL at 21:00

## 2017-05-26 RX ADMIN — LISINOPRIL SCH MG: 40 TABLET ORAL at 12:02

## 2017-05-26 RX ADMIN — METOPROLOL SUCCINATE SCH MG: 50 TABLET, EXTENDED RELEASE ORAL at 21:01

## 2017-05-26 RX ADMIN — INSULIN GLARGINE SCH UNIT: 100 INJECTION, SOLUTION SUBCUTANEOUS at 21:07

## 2017-05-26 RX ADMIN — ESCITALOPRAM OXALATE SCH MG: 20 TABLET, FILM COATED ORAL at 08:41

## 2017-05-26 RX ADMIN — Medication SCH MG: at 12:01

## 2017-05-26 RX ADMIN — HYDRALAZINE HYDROCHLORIDE SCH MG: 10 TABLET ORAL at 08:41

## 2017-05-26 RX ADMIN — DIVALPROEX SODIUM SCH MG: 500 TABLET, DELAYED RELEASE ORAL at 21:01

## 2017-05-26 RX ADMIN — CLOPIDOGREL BISULFATE SCH MG: 75 TABLET, FILM COATED ORAL at 08:41

## 2017-05-26 RX ADMIN — INSULIN GLARGINE SCH UNIT: 100 INJECTION, SOLUTION SUBCUTANEOUS at 08:42

## 2017-05-26 RX ADMIN — OXYCODONE HYDROCHLORIDE AND ACETAMINOPHEN PRN TAB: 5; 325 TABLET ORAL at 17:13

## 2017-05-26 RX ADMIN — STANDARDIZED SENNA CONCENTRATE AND DOCUSATE SODIUM SCH TAB: 8.6; 5 TABLET ORAL at 08:41

## 2017-05-26 RX ADMIN — CLOPIDOGREL BISULFATE SCH MG: 75 TABLET, FILM COATED ORAL at 12:00

## 2017-05-26 RX ADMIN — HEPARIN SODIUM SCH UNIT: 10000 INJECTION, SOLUTION INTRAVENOUS; SUBCUTANEOUS at 13:24

## 2017-05-26 RX ADMIN — PIPERACILLIN AND TAZOBACTAM SCH MLS/HR: 3; .375 INJECTION, POWDER, LYOPHILIZED, FOR SOLUTION INTRAVENOUS; PARENTERAL at 15:54

## 2017-05-26 RX ADMIN — STANDARDIZED SENNA CONCENTRATE AND DOCUSATE SODIUM SCH TAB: 8.6; 5 TABLET ORAL at 21:01

## 2017-05-26 RX ADMIN — PIPERACILLIN AND TAZOBACTAM SCH MLS/HR: 3; .375 INJECTION, POWDER, LYOPHILIZED, FOR SOLUTION INTRAVENOUS; PARENTERAL at 07:35

## 2017-05-26 RX ADMIN — LISINOPRIL SCH MG: 40 TABLET ORAL at 08:42

## 2017-05-26 RX ADMIN — MORPHINE SULFATE PRN MG: 4 INJECTION, SOLUTION INTRAMUSCULAR; INTRAVENOUS at 15:53

## 2017-05-26 RX ADMIN — PIPERACILLIN AND TAZOBACTAM SCH MLS/HR: 3; .375 INJECTION, POWDER, LYOPHILIZED, FOR SOLUTION INTRAVENOUS; PARENTERAL at 23:23

## 2017-05-26 RX ADMIN — METOPROLOL SUCCINATE SCH MG: 50 TABLET, EXTENDED RELEASE ORAL at 08:42

## 2017-05-26 RX ADMIN — Medication SCH MG: at 08:41

## 2017-05-26 RX ADMIN — HYDROCHLOROTHIAZIDE SCH MG: 25 TABLET ORAL at 12:02

## 2017-05-26 RX ADMIN — GABAPENTIN SCH MG: 100 CAPSULE ORAL at 21:01

## 2017-05-26 RX ADMIN — INSULIN ASPART SCH UNITS: 100 INJECTION, SOLUTION INTRAVENOUS; SUBCUTANEOUS at 13:16

## 2017-05-26 RX ADMIN — MORPHINE SULFATE PRN MG: 4 INJECTION, SOLUTION INTRAMUSCULAR; INTRAVENOUS at 05:56

## 2017-05-26 RX ADMIN — AMLODIPINE BESYLATE SCH MG: 5 TABLET ORAL at 12:00

## 2017-05-26 RX ADMIN — Medication SCH GM: at 08:41

## 2017-05-26 RX ADMIN — MORPHINE SULFATE PRN MG: 4 INJECTION, SOLUTION INTRAMUSCULAR; INTRAVENOUS at 13:22

## 2017-05-26 RX ADMIN — INSULIN ASPART SCH UNITS: 100 INJECTION, SOLUTION INTRAVENOUS; SUBCUTANEOUS at 21:08

## 2017-05-26 RX ADMIN — INSULIN ASPART SCH UNITS: 100 INJECTION, SOLUTION INTRAVENOUS; SUBCUTANEOUS at 18:30

## 2017-05-26 RX ADMIN — MORPHINE SULFATE PRN MG: 4 INJECTION, SOLUTION INTRAMUSCULAR; INTRAVENOUS at 23:23

## 2017-05-26 RX ADMIN — INSULIN ASPART SCH UNITS: 100 INJECTION, SOLUTION INTRAVENOUS; SUBCUTANEOUS at 08:42

## 2017-05-26 RX ADMIN — HYDRALAZINE HYDROCHLORIDE SCH MG: 10 TABLET ORAL at 13:23

## 2017-05-26 RX ADMIN — HYDROCHLOROTHIAZIDE SCH MG: 25 TABLET ORAL at 08:41

## 2017-05-26 RX ADMIN — LISINOPRIL SCH MG: 40 TABLET ORAL at 21:02

## 2017-05-26 RX ADMIN — ESCITALOPRAM OXALATE SCH MG: 20 TABLET, FILM COATED ORAL at 12:01

## 2017-05-26 NOTE — PROGRESS NOTE
Progress Note


Date of Service


May 26, 2017.





Progress Note


he is feeling well post angio, findings were discussed earlier with Dr. Rainey


arrangements for home vac made, follow-up with [facility]ound [facility]


has some bleeding at right groin dressing 


ok for discharge when ok with Dr. Rainey


will d/c on po Clindamycin, BID Lantus and SSI, he seems motivated to have 

better glucose control

## 2017-05-26 NOTE — PROCEDURE NOTE
Pre-Mod Sedation Assessment


General


Date of Moderate Sedation:


May 26, 2017.


Vital Signs:





 Vital Signs Past 12 Hours








  Date Time  Temp Pulse Resp B/P Pulse Ox O2 Delivery O2 Flow Rate FiO2


 


5/26/17 07:50 36.6 73 18 179/78 91 Room Air  


 


5/25/17 23:33 37.1 56 16 168/79 93 Room Air  


 


5/25/17 23:30      Room Air  











Pre-Sedation Airway Assessment


Smoking Status:  Current Every Day Smoker


Mallampati Classification:  Class I


ASA Classification:  Class II





Notes








The planned sedation has been discussed with the patient and consent obtained.  

I have


identified the patient, determined the appropriateness of sedation and have 

assessed the


patient immediately prior to the procedure.  





All medicine(s) and interventions are by my order.

## 2017-05-26 NOTE — HOSPITALIST PROGRESS NOTE
Hospitalist Progress Note


Date of Service


May 25, 2017.





Subjective


Pt evaluation today including:  conversation w/ patient


pt feeling well, not much pain in foot. Eating and denies CP or SOB





   All Other Systems:  Reviewed and Negative





Objective


Vital Signs











  Date Time  Temp Pulse Resp B/P Pulse Ox O2 Delivery O2 Flow Rate FiO2


 


5/25/17 23:33 37.1 56 16 168/79 93 Room Air  


 


5/25/17 16:10      Room Air  


 


5/25/17 15:35  85 16  93 Room Air  


 


5/25/17 15:25 36.8 59 16 161/64 93 Room Air  


 


5/25/17 08:00     94 Room Air  


 


5/25/17 07:25 37.0 63 14 167/81 94 Room Air  











Physical Exam


General Appearance:  no apparent distress, + thin


Eyes:  normal inspection, sclerae normal


ENT:  hearing grossly normal


Neck:  trachea midline


Respiratory/Chest:  no respiratory distress, no accessory muscle use, + 

decreased breath sounds (throughout), + wheezing (scattered throughout)


Cardiovascular:  regular rate, rhythm, no edema, no murmur


Abdomen:  normal bowel sounds, non tender, soft


Extremities:  + pertinent finding (left foot with mild erythema, wound vac in 

place over lateral foot and heel)


Neurologic/Psychiatric:  alert, normal mood/affect, oriented x 3


Skin:  no rash





Laboratory Results





Last 24 Hours








Test


  5/25/17


07:56 5/25/17


08:10 5/25/17


11:59 5/25/17


17:24


 


White Blood Count 7.44 K/uL    


 


Red Blood Count 3.56 M/uL    


 


Hemoglobin 10.4 g/dL    


 


Hematocrit 30.5 %    


 


Mean Corpuscular Volume 85.7 fL    


 


Mean Corpuscular Hemoglobin 29.2 pg    


 


Mean Corpuscular Hemoglobin


Concent 34.1 g/dl 


  


  


  


 


 


Platelet Count 302 K/uL    


 


Mean Platelet Volume 8.7 fL    


 


Neutrophils (%) (Auto) 65.5 %    


 


Lymphocytes (%) (Auto) 21.0 %    


 


Monocytes (%) (Auto) 10.3 %    


 


Eosinophils (%) (Auto) 2.7 %    


 


Basophils (%) (Auto) 0.1 %    


 


Neutrophils # (Auto) 4.87 K/uL    


 


Lymphocytes # (Auto) 1.56 K/uL    


 


Monocytes # (Auto) 0.77 K/uL    


 


Eosinophils # (Auto) 0.20 K/uL    


 


Basophils # (Auto) 0.01 K/uL    


 


RDW Standard Deviation 41.4 fL    


 


RDW Coefficient of Variation 13.0 %    


 


Immature Granulocyte % (Auto) 0.4 %    


 


Immature Granulocyte # (Auto) 0.03 K/uL    


 


Sodium Level 138 mmol/L    


 


Potassium Level 4.4 mmol/L    


 


Chloride Level 103 mmol/L    


 


Carbon Dioxide Level 29 mmol/L    


 


Anion Gap 6.0 mmol/L    


 


Blood Urea Nitrogen 26 mg/dl    


 


Creatinine 0.88 mg/dl    


 


Est Creatinine Clear Calc


Drug Dose 96.2 ml/min 


  


  


  


 


 


Estimated GFR (


American) 109.0 


  


  


  


 


 


Estimated GFR (Non-


American 94.0 


  


  


  


 


 


BUN/Creatinine Ratio 29.9    


 


Random Glucose 111 mg/dl    


 


Calcium Level 8.5 mg/dl    


 


Magnesium Level 2.2 mg/dl    


 


Bedside Glucose  154 mg/dl  198 mg/dl  150 mg/dl 














Test


  5/25/17


20:32 


  


  


 


 


Bedside Glucose 264 mg/dl    











Assessment and Plan


This is a 62 yo m with a history of CAD and poorly controlled DMII that we have 

been consulted to help manage pre and post op care





 Left foot diabetic ulcer and abscess now status post incision and drainage 

with debridement-wound VAC now in place, with significant peripheral arterial 

disease seen on arterial Doppler:


IMPRESSION:


1. Advanced peripheral vascular disease is present in the lower extremities.


2. There is no sonographic evidence of high-grade stenosis or focal vessel


cutoff throughout the arteries of the right lower extremity.


3. Again seen is complete thrombosis of the left superficial femoral artery with


reconstitution in the distal left superficial/popliteal artery region.


4. There is two-vessel runoff to the left foot. There is thrombosis of the


distal left peroneal artery.


5. Ankle-brachial indices could not be assessed as the ankle vessels were


noncompressible.





-Surgical management with wound VAC, pain control, bowel regimen needed


-Continue Zosyn


-Follow CBC


-Wound care consult


-Vascular surgery consult did to see if any procedure necessary to reestablish 

improved blood flow--> going for LLE arteriography and possible intervention 

tomorrow





Hyponatremia-pseudohyponatremia secondary to hyperglycemia-improved with 

control of blood sugar


-Follow PRP





Poorly controlled DMII with severe hyperglycemia -hemoglobin A1c 13.3%.  

Patient admits this is secondary to poor compliance with insulin at home.  He 

is committed to improving this in the future.  Hyperglycemia continues to 

improve


- glycemic consult placed-continue Lantus 15 units twice a day and NovoLog with 

carb coverage


- Dm educator consultation appreciated





Hypertension-remains uncontrolled, could be secondary to pain


- Continue metoprolol, lisinopril, amlodipine


- HCTZ was held by primary team initially


-Added hydralazine 10mg tid--continue this and add home HCTZ 25mg daily back on





S/P NSTEMI, CAD-no active issues


- ASA and plavix cont , continue beta blocker





Prophylaxis-SCDs, subcutaneous heparin





Disposition-to home eventually


Full code

## 2017-05-26 NOTE — HOSPITALIST PROGRESS NOTE
Hospitalist Progress Note


Date of Service


May 26, 2017.


 (Pratima Rhoades PA-C)





Subjective


Pt evaluation today including:  conversation w/ patient, conversation w/ family

, physical exam, chart review, lab review, review of studies


Pain:  Rated 7-8/10


PO Intake:  Good


Voiding:  no voiding problems


The patient was seen and examined this afternoon.  His wife is present at 

bedside.   Pt reports doing well overall, he is back from surgical intervention 

by Dr. Rainey this morning and reports everything went well.  He is having left 

leg pain rated 7-8/10, minor pins and needle sensation but denies any numbness. 

He denies headache, palpitations or flutter as his blood pressure has been 

elevated.  At bedside it was rechecked and was 196/74.





   Constitutional:  No chills, No fatigue, No fever, No sweats


   Eyes:  No diplopia, No problem reported


   ENT:  No nasal symptoms, No trouble swallowing


   Respiratory:  + wheezing (minor), No cough, No shortness of breath


   Cardiovascular:  No chest pain


   Abdomen:  + constipation (last BM was  5 days ago), No pain, No vomiting


   Musculoskeletal:  + problem reported (L leg pain as per HPI), No muscle pain

, No swelling


   Neurologic:  No numbness/tingling, No weakness


   Skin:  No itch, No rash (Pratima Rhoades, TAWANDA)





Objective


Vital Signs











  Date Time  Temp Pulse Resp B/P Pulse Ox O2 Delivery O2 Flow Rate FiO2


 


5/26/17 11:58 36.4 64 16 192/94 91  95.0 


 


5/26/17 11:35 37.0 61 17 147/83 96 Room Air  


 


5/26/17 11:25 37.0 62 16 153/77 96 Nasal Cannula 2.0 


 


5/26/17 11:15 37.0 61 17 156/76 96 Nasal Cannula 2.0 


 


5/26/17 11:05 37.0 69 17 159/77 96 Nasal Cannula 2.0 


 


5/26/17 10:55 37.0 70 16 188/86 95 Nasal Cannula 2.0 


 


5/26/17 10:44  50 16 191/87 93 Nasal Cannula 4 


 


5/26/17 08:48 36.7 64 18 183/90 93 Room Air  


 


5/26/17 07:50 36.6 73 18 179/78 91 Room Air  


 


5/26/17 07:20     91 Room Air  


 


5/25/17 23:33 37.1 56 16 168/79 93 Room Air  


 


5/25/17 23:30      Room Air  


 


5/25/17 16:10      Room Air  


 


5/25/17 15:35  85 16  93 Room Air  


 


5/25/17 15:25 36.8 59 16 161/64 93 Room Air  








 (Pratima Rhoades PA-C)





Physical Exam


General Appearance:  WD/WN, no apparent distress, + thin


Eyes:  PERRL, EOMI


ENT:  hearing grossly normal, pharynx normal, + pertinent finding (multiple 

missing teeth)


Neck:  supple, no JVD


Respiratory/Chest:  chest non-tender, normal breath sounds, + pertinent finding 

(expiratory wheeze, posterior fields not auscultated due to orders to lie flat s

/p procedure.  no rales or rhonchi.)


Cardiovascular:  regular rate, rhythm, no JVD, no murmur


Abdomen:  normal bowel sounds, non tender, soft


Extremities:  non-tender, no calf tenderness, + pertinent finding (left foot 

with + mild erythema, wound vac in place over lateral foot and heel, + R middle

, ring and pinky finger are amputated. )


Neurologic/Psychiatric:  CNs II-XII nml as tested, alert, normal mood/affect


Skin:  normal color, warm/dry


 (Pratima Rhoades PA-C)





Laboratory Results





Last 24 Hours








Test


  5/25/17


17:24 5/25/17


20:32 5/26/17


07:53 5/26/17


08:52


 


Bedside Glucose 150 mg/dl  264 mg/dl   100 mg/dl 


 


White Blood Count   7.27 K/uL  


 


Red Blood Count   3.44 M/uL  


 


Hemoglobin   10.0 g/dL  


 


Hematocrit   29.6 %  


 


Mean Corpuscular Volume   86.0 fL  


 


Mean Corpuscular Hemoglobin   29.1 pg  


 


Mean Corpuscular Hemoglobin


Concent 


  


  33.8 g/dl 


  


 


 


RDW Standard Deviation   42.3 fL  


 


RDW Coefficient of Variation   13.3 %  


 


Platelet Count   328 K/uL  


 


Mean Platelet Volume   8.4 fL  














Test


  5/26/17


11:03 5/26/17


12:16 


  


 


 


Bedside Glucose 99 mg/dl  123 mg/dl   








 (Pratima Rhoades PA-C)





Assessment and Plan


This is a 62 yo m with a history of CAD and poorly controlled DMII that we have 

been consulted to help manage pre and post op care





 Left foot diabetic ulcer and abscess now status post incision and drainage 

with debridement-wound VAC now in place, with significant peripheral arterial 

disease seen on arterial Doppler


- Vascular surgery consulted: Pt underwent procedure 5/26/17: pt is no S/p s/p 

Left Lower Extemity Angiogram,  Percutaneous Transluminal Angioplasty and 

Stenting Left Popliteal and Superficial Femoral Artery, Mechanical Closure of 

Right Femoral Artery by Dr. Rainey


- Surgical management with wound VAC, pain control, bowel regimen needed


- Continue Zosyn (day #4)


- WBC stable without leukocytosis, Hgb is stable, follow daily cbc


- Wound care consult- appreciate recs





Hyponatremia-pseudohyponatremia secondary to hyperglycemia -improved with 

control of blood sugar


- Follow PRP





Poorly controlled DMII with severe hyperglycemia -hemoglobin A1c 13.3%.  - 

secondary to poor compliance with insulin at home.  He is committed to 

improving this in the future.  Hyperglycemia continues to improve


- glycemic consult placed-continue Lantus 15 units twice a day and NovoLog with 

carb coverage


- Dm educator consultation appreciated





Hypertension-remains uncontrolled, SBP in 160s-180s prior to surgical procedure 

today


- Continue metoprolol, lisinopril, amlodipine


- HCTZ was held by primary team initially


- Cont added hydralazine 10mg tid--continue this and add home HCTZ 25mg daily 

back on - pts meds were held this morning prior to surgical procedure. 





S/P NSTEMI, CAD-no active issues


- ASA and plavix cont, continue beta blocker





DVT ppx: SCDs, subcutaneous heparin





CODE STATUS: FULL CODE





Disposition-from home, discharge when medically stable per primary service














 (Pratima Rhoades, TAWANDA)





Reviewed:  Pt Seen/Exam by Me


 (Marilyn Palacio MD)


History


Physician Assistant Supervision Note:


I interviewed and examined the patient. Discussed with JORDI Rhoades and agree 

with findings and plan as documented in the note. Any exceptions or 

clarifications are listed here: 





Having some issues with bleeding from right groin site this afternoon. RN spoke 

to Dr. Rainey and took down dressing, held pressure for 30 min, then reapplied 

pressure dressing and placed sandbag on top. Not a significant amount of 

bleeding on my observation. Pt feeling fine.





Vitals reviewed, BPs remain quite high


NAD, thin


RRR no mgr


CTAB, breathing unlabored, diminished BS throughout


Abd +BS soft NT ND


Ext: right groin with dressing in place with some fresh blood soaked through 

onto gown that was overlying, left foot with wound vac in place, minimal 

surrounding erythema





62 yo male with uncontrolled DMII on insulin, with DM peripheral neuropathy and 

left foot wound/abscess now s/p debridement, with PAD requiring stenting of 

left LE arteries.


-continue pressure dressing to right groin site


-continue ZOsyn and plan to switch to clinda in AM--> growing coag neg staph 

and Strep sensitivity pending from wound cx


-f/u with Wound Care, Ortho, and Vascular after discharge


-Glucose much improved control with insulin and pt received CDE


-BP improved tonight after adding HCTZ back on, continue hydralazine po as well


-hold SQ heparin for bleeding


-possible dc in AM





Documented By:  Marilyn Palacio


 (Marilyn Palacio MD)


Assessment/Plan


This is a 62 yo m with a history of CAD and poorly controlled DMII that we have 

been consulted to help manage pre and post op care





 Left foot diabetic ulcer and abscess now status post incision and drainage 

with debridement-wound VAC now in place, with significant peripheral arterial 

disease seen on arterial Doppler:


IMPRESSION:


1. Advanced peripheral vascular disease is present in the lower extremities.


2. There is no sonographic evidence of high-grade stenosis or focal vessel


cutoff throughout the arteries of the right lower extremity.


3. Again seen is complete thrombosis of the left superficial femoral artery with


reconstitution in the distal left superficial/popliteal artery region.


4. There is two-vessel runoff to the left foot. There is thrombosis of the


distal left peroneal artery.


5. Ankle-brachial indices could not be assessed as the ankle vessels were


noncompressible.





-Surgical management with wound VAC, pain control, bowel regimen needed


-Continue Zosyn


-Follow CBC


-Wound care consult


-Vascular surgery consult did to see if any procedure necessary to reestablish 

improved blood flow--> going for LLE arteriography and possible intervention 

tomorrow





Hyponatremia-pseudohyponatremia secondary to hyperglycemia-improved with 

control of blood sugar


-Follow PRP





Poorly controlled DMII with severe hyperglycemia -hemoglobin A1c 13.3%.  

Patient admits this is secondary to poor compliance with insulin at home.  He 

is committed to improving this in the future.  Hyperglycemia continues to 

improve


- glycemic consult placed-continue Lantus 15 units twice a day and NovoLog with 

carb coverage


- Dm educator consultation appreciated





Hypertension-remains uncontrolled, could be secondary to pain


- Continue metoprolol, lisinopril, amlodipine


- HCTZ was held by primary team initially


-Added hydralazine 10mg tid--continue this and add home HCTZ 25mg daily back on





S/P NSTEMI, CAD-no active issues


- ASA and plavix cont , continue beta blocker





Prophylaxis-SCDs, subcutaneous heparin





Disposition-to home eventually


Full code


 (Marilyn Palacio MD)

## 2017-05-26 NOTE — DISCHARGE INSTRUCTIONS
Discharge Instructions


Date of Service


May 26, 2017.





Admission


Reason for Admission:  Left Necrotic Calcaneus Ulcer





Discharge


Discharge Diagnosis / Problem:  debridement left heel ulcer





Discharge Goals


Goal(s):  Improve disease control





Activity Recommendations


Activity Limitations:  resume your previous activity





.





Instructions / Follow-Up


Instructions / Follow-Up


Dr. Gallegos as needed, call [phone]


Follow-up with wound care center as instructed





Current Hospital Diet


Patient's current hospital diet: Diabetes Type 2 Diet





Discharge Diet


Recommended Diet:  Diabetes Type 2 Diet





Procedures


Procedures Performed:  


Debridement Left heel ulceration





Pending Studies


Studies pending at discharge:  no





Laboratory Results





 Hemoglobin A1c








Test


  5/22/17


22:25 Range/Units


 


 


Estimated Average Glucose 335   mg/dl


 


Hemoglobin A1c 13.3 H 4.5-5.6  %











Medical Emergencies








.


Who to Call and When:





Medical Emergencies:  If at any time you feel your situation is an emergency, 

please call 911 immediately.





.





Non-Emergent Contact


Non-Emergency issues call your:  Surgeon


Call Non-Emergent contact if:  you have a fever, temperature is above 101.5, 

your pain is not controlled, wound has increased drainage, wound has increased 

redness, wound has increased pain, you have any medication questions


.





"Provider Documentation" section prepared by Walt Alvarez.








.





VTE Core Measure


Inpt VTE Proph given/why not?:  Unfractionated heparin SQ

## 2017-05-26 NOTE — PROCEDURE NOTE
Post-Moderate Sedation Plan


General


Date of Moderate Sedation


May 26, 2017.


Vital Signs:





 Vital Signs Past 12 Hours








  Date Time  Temp Pulse Resp B/P Pulse Ox O2 Delivery O2 Flow Rate FiO2


 


5/26/17 08:48 36.7 64 18 183/90 93 Room Air  


 


5/26/17 07:50 36.6 73 18 179/78 91 Room Air  


 


5/25/17 23:33 37.1 56 16 168/79 93 Room Air  


 


5/25/17 23:30      Room Air  











Review - Discharge Plan


Post Moderate Sedation Plan:





On clinical assessment, the patient appears to have tolerated the conscious 

sedation 


without complications.





Patient is recovering as anticipated.





Patient will continue to be monitored by nursing and may be discharged when 

conscious 


sedation discharge criteria are met.

## 2017-05-26 NOTE — [facility] POST OPERATIVE BRIEF NOTE
Immediate Operative Summary


Operative Date


May 26, 2017.





Pre-Operative Diagnosis





left superficial femoral occlusion with non healing wound





Post-Operative Diagnosis





same





Procedure(s) Performed





Left Lower Extemity Angiogram, 


Percutaneous Transluminal Angioplasty and Stenting Left Popliteal and


Superficial Femoral Artery, Mechanical Closure of Right Femoral Artery,


Moderate Concious Sedation 7515 to 1046





Surgeon


Dr. Rainey





Assistant Surgeon(s)


none





Estimated Blood Loss


10 ml





Findings


good PT to doppler


AT occluded distally





Specimens





none





Anesthesia


Local with conscious sedation





Complication(s)


None





Disposition

## 2017-05-26 NOTE — SURGERY PROGRESS NOTE
DATE: 05/26/2017

 

DATE:  05/26/2017.  

 

Paramjit is resting comfortably.  He has no discomfort in his left lower

extremity.  His last vitals showed a temperature of 37.1, pulse 56,

respirations 16, blood pressure 168/79.  The foot is much improved.  The

cellulitis and edema is decreasing.  The VAC system is intact.  The patient

is scheduled to undergo an arteriogram or possibly therapeutic intervention

this morning by Dr. Rainey.  His lab is pending this morning.  Microbiology

so far only showed gram positive cocci, but we will keep him on broad

spectrum antibiotics.  Pending the results of the arteriogram from my point

of view, the patient could be discharged and follow up in the wound clinic. 

Will continue him on some broad spectrum antibiotics on discharge.

## 2017-05-26 NOTE — PROGRESS NOTE
Progress Note


Date of Service


May 26, 2017.





Progress Note


Patient for left lower extremity arteriography with possible intervention. 


I have discussed the risks options and benefits of the procedure with the 

patient.  The patient understands the risks options and benefits and agrees to 

the procedure.


I have examined the patient, reviewed the History & Physical and in the 

interval since the performance of the History & Physical I have noted the 

following changes of clinical significance: No changes noted

## 2017-05-27 VITALS
OXYGEN SATURATION: 95 % | HEART RATE: 60 BPM | TEMPERATURE: 98.6 F | SYSTOLIC BLOOD PRESSURE: 147 MMHG | DIASTOLIC BLOOD PRESSURE: 58 MMHG

## 2017-05-27 VITALS
OXYGEN SATURATION: 95 % | HEART RATE: 60 BPM | SYSTOLIC BLOOD PRESSURE: 147 MMHG | DIASTOLIC BLOOD PRESSURE: 58 MMHG | TEMPERATURE: 98.6 F

## 2017-05-27 VITALS
SYSTOLIC BLOOD PRESSURE: 165 MMHG | TEMPERATURE: 98.78 F | DIASTOLIC BLOOD PRESSURE: 69 MMHG | OXYGEN SATURATION: 95 % | HEART RATE: 74 BPM

## 2017-05-27 VITALS
OXYGEN SATURATION: 99 % | DIASTOLIC BLOOD PRESSURE: 64 MMHG | SYSTOLIC BLOOD PRESSURE: 192 MMHG | TEMPERATURE: 98.96 F | HEART RATE: 63 BPM

## 2017-05-27 VITALS — DIASTOLIC BLOOD PRESSURE: 68 MMHG | SYSTOLIC BLOOD PRESSURE: 168 MMHG

## 2017-05-27 VITALS
DIASTOLIC BLOOD PRESSURE: 82 MMHG | SYSTOLIC BLOOD PRESSURE: 179 MMHG | TEMPERATURE: 99.5 F | OXYGEN SATURATION: 92 % | HEART RATE: 69 BPM

## 2017-05-27 VITALS — DIASTOLIC BLOOD PRESSURE: 68 MMHG | SYSTOLIC BLOOD PRESSURE: 165 MMHG

## 2017-05-27 LAB
ANION GAP SERPL CALC-SCNC: 8 MMOL/L (ref 3–11)
BASOPHILS # BLD: 0.01 K/UL (ref 0–0.2)
BASOPHILS NFR BLD: 0.1 %
BUN SERPL-MCNC: 22 MG/DL (ref 7–18)
BUN/CREAT SERPL: 26.6 (ref 10–20)
CALCIUM SERPL-MCNC: 8.3 MG/DL (ref 8.5–10.1)
CHLORIDE SERPL-SCNC: 101 MMOL/L (ref 98–107)
CO2 SERPL-SCNC: 29 MMOL/L (ref 21–32)
COMPLETE: YES
CREAT CL PREDICTED SERPL C-G-VRATE: 104.6 ML/MIN
CREAT SERPL-MCNC: 0.81 MG/DL (ref 0.6–1.4)
EOSINOPHIL NFR BLD AUTO: 392 K/UL (ref 130–400)
GLUCOSE SERPL-MCNC: 172 MG/DL (ref 70–99)
HCT VFR BLD CALC: 29.9 % (ref 42–52)
IG%: 0.5 %
IMM GRANULOCYTES NFR BLD AUTO: 11.7 %
LYMPHOCYTES # BLD: 1.16 K/UL (ref 1.2–3.4)
MAGNESIUM SERPL-MCNC: 2.1 MG/DL (ref 1.8–2.4)
MCH RBC QN AUTO: 27.8 PG (ref 25–34)
MCHC RBC AUTO-ENTMCNC: 32.4 G/DL (ref 32–36)
MCV RBC AUTO: 85.7 FL (ref 80–100)
MONOCYTES NFR BLD: 15.1 %
NEUTROPHILS # BLD AUTO: 1.6 %
NEUTROPHILS NFR BLD AUTO: 71 %
PMV BLD AUTO: 8.5 FL (ref 7.4–10.4)
POTASSIUM SERPL-SCNC: 3.9 MMOL/L (ref 3.5–5.1)
RBC # BLD AUTO: 3.49 M/UL (ref 4.7–6.1)
SODIUM SERPL-SCNC: 138 MMOL/L (ref 136–145)
WBC # BLD AUTO: 9.92 K/UL (ref 4.8–10.8)

## 2017-05-27 RX ADMIN — METOPROLOL SUCCINATE SCH MG: 50 TABLET, EXTENDED RELEASE ORAL at 08:53

## 2017-05-27 RX ADMIN — AMLODIPINE BESYLATE SCH MG: 5 TABLET ORAL at 08:53

## 2017-05-27 RX ADMIN — PIPERACILLIN AND TAZOBACTAM SCH MLS/HR: 3; .375 INJECTION, POWDER, LYOPHILIZED, FOR SOLUTION INTRAVENOUS; PARENTERAL at 16:00

## 2017-05-27 RX ADMIN — HEPARIN SODIUM SCH UNIT: 10000 INJECTION, SOLUTION INTRAVENOUS; SUBCUTANEOUS at 13:37

## 2017-05-27 RX ADMIN — INSULIN ASPART SCH UNITS: 100 INJECTION, SOLUTION INTRAVENOUS; SUBCUTANEOUS at 17:15

## 2017-05-27 RX ADMIN — CLOPIDOGREL BISULFATE SCH MG: 75 TABLET, FILM COATED ORAL at 08:54

## 2017-05-27 RX ADMIN — HEPARIN SODIUM SCH UNIT: 10000 INJECTION, SOLUTION INTRAVENOUS; SUBCUTANEOUS at 05:33

## 2017-05-27 RX ADMIN — OXYCODONE HYDROCHLORIDE AND ACETAMINOPHEN PRN TAB: 5; 325 TABLET ORAL at 00:56

## 2017-05-27 RX ADMIN — INSULIN ASPART SCH UNITS: 100 INJECTION, SOLUTION INTRAVENOUS; SUBCUTANEOUS at 13:31

## 2017-05-27 RX ADMIN — HYDROCHLOROTHIAZIDE SCH MG: 25 TABLET ORAL at 08:53

## 2017-05-27 RX ADMIN — Medication SCH GM: at 08:55

## 2017-05-27 RX ADMIN — LISINOPRIL SCH MG: 40 TABLET ORAL at 08:53

## 2017-05-27 RX ADMIN — OXYCODONE HYDROCHLORIDE AND ACETAMINOPHEN PRN TAB: 5; 325 TABLET ORAL at 18:40

## 2017-05-27 RX ADMIN — ESCITALOPRAM OXALATE SCH MG: 20 TABLET, FILM COATED ORAL at 08:54

## 2017-05-27 RX ADMIN — INSULIN ASPART SCH UNITS: 100 INJECTION, SOLUTION INTRAVENOUS; SUBCUTANEOUS at 09:02

## 2017-05-27 RX ADMIN — MORPHINE SULFATE PRN MG: 4 INJECTION, SOLUTION INTRAMUSCULAR; INTRAVENOUS at 09:11

## 2017-05-27 RX ADMIN — INSULIN GLARGINE SCH UNIT: 100 INJECTION, SOLUTION SUBCUTANEOUS at 09:05

## 2017-05-27 RX ADMIN — STANDARDIZED SENNA CONCENTRATE AND DOCUSATE SODIUM SCH TAB: 8.6; 5 TABLET ORAL at 08:52

## 2017-05-27 RX ADMIN — PIPERACILLIN AND TAZOBACTAM SCH MLS/HR: 3; .375 INJECTION, POWDER, LYOPHILIZED, FOR SOLUTION INTRAVENOUS; PARENTERAL at 08:04

## 2017-05-27 RX ADMIN — Medication SCH MG: at 08:53

## 2017-05-27 RX ADMIN — OXYCODONE HYDROCHLORIDE AND ACETAMINOPHEN PRN TAB: 5; 325 TABLET ORAL at 12:32

## 2017-05-27 NOTE — PHARMACY PROGRESS NOTE
Glycemic Control:  Progress Nt


Date of Service


May 27, 2017.





Scope


Glycemic Pharmacist consulted by Dr Cabrera on 5/22/17 for glycemic control and 

to write orders per [facility] inpatient glycemic control protocol.





Objective


Accuchecks BSG (last 24hrs):











Test


  5/26/17


12:16 5/26/17


16:40 5/26/17


20:48 5/27/17


06:15


 


Bedside Glucose


  123 mg/dl


(70-99) 114 mg/dl


(70-99) 158 mg/dl


(70-99) 


 


 


Random Glucose


  


  


  


  172 mg/dl


(70-99)














Test


  5/27/17


08:39 


  


  


 


 


Bedside Glucose


  176 mg/dl


(70-99) 


  


  


 








Laboratory Data (last 24hrs)





HbA1c:





 








Test


  5/22/17


22:25


 


Hemoglobin A1c


  13.3 %


(4.5-5.6)  H











Recent Pertinent Medications


Outpatient Anti-diabetic Regimen:


* Lantus and Humalog at home (per diabetes notes from 2016 Dr Arango was 

trying to have patient give himself one dose of  ~30 units Lantus in middle of 

day plus Humalog with his largest meal of the day ~20 units)





The patient is currently receiving:


* Basal insulin:             Lantus 15 units every 12 hours





* Correctional Insulin:   Novolog Correction per scale ACHS


                          Goal Range: Low 100 mg/dL - High 140 mg/dL


                          Correction Factor: 20 mg/dL/unit





* Prandial insulin:         Per carb ratio of 1 unit per 7 grams CHO consumed





Risk Factors for Insulin Resistance:


* Infection: necrotic foot ulcer on Zosyn


* Recent Surgery: s/p I&D of foot 5/23/17  & arteriogram w/intervention 5/26/17


* Diet: Type 2 diabetic diet








Assessment & Plan


ASSESSMENT:


* Mr Dixon is a 62 y/o M with uncontrolled diabetes who is admitted for a 

necrotic foot ulcer. As an outpatient, he struggles with many issues that 

prevent compliance with his insulin. Works with endo Dr Arango


* Patient is currently receiving an average of ~50-60 units of insulin per day


 * 30 units of basal insulin as Lantus 15 units SQ BID


 * 20-30 units of prandial/correctional insulin as average of ~8 units with 

meals and bedtime 


 * BSGs ranging 114 -176mg/dl over the past 24hrs





* AM fasting BSG is slightly above goal range this morning at 176mg/dl. May be 

slightly elevated secondary to decreased basal insulin dose given 5/26 for NPO/

OR. Will consider increasing dose if BSG does not trend back downwards tomorrow 

AM


* Post-prandial BSGs in range at 123, 114, 158. No changes needed to CF/CR








PLAN FOR INPATIENT GLYCEMIC CONTROL: No changes needed to regimen at this time. 


* Basal Insulin: 


 * Lantus Lantus 15 units SQ BID 


 * May consider increasing dose to maintain AM fasting BSG 80- 130 mg/dl {

Consider Lantus 18 units BID?} 


 


* Bolus Insulin - no changes


 * Novolog per scale ACHS


 * Goal Range:  Low 100 mg/dL - High 140 mg/dL (more stringent goal based on 

patient age & comorbidities. Additionally, tighter glycemic control is 

warranted to facilitate wound/infection healing.) 


 * Correction Factor: 20 mg/dL/unit


 * Nutritional / Prandial insulin per carb ratio of 1 unit per 7 grams CHO 

consumed  {this is averaging as ~ 8 units with meals}








LOOKING AHEAD FOR DISCHARGE:


* Poor outpatient control evidenced by A1c of 13.3%


* Recommend follow up with Dr. Arango on discharge


 * It is noted that Dr. Arango previously recommended Lantus 30 units once 

daily (given mid-day) and Humalog 20 units with the largest meal of the day. 


 * Pt is doing well in house on Lantus 15 units SQ BID + NovoLog 8 units TIDM.

## 2017-05-27 NOTE — SURGERY PROGRESS NOTE
Surgery Progress Note


Date of Service


May 27, 2017.





Subjective


+ feeling well, No nausea, No vomiting





Objective


Vital Signs:











  Date Time  Temp Pulse Resp B/P Pulse Ox O2 Delivery O2 Flow Rate FiO2


 


5/27/17 04:05 37.2 63 16 192/64 99 Room Air  


 


5/27/17 00:20    165/68    


 


5/26/17 23:15      Room Air  


 


5/26/17 23:05 36.8 65 18 181/83 96 Room Air  


 


5/26/17 19:43 37.1 61 16 146/74 95 Nasal Cannula 2.0 


 


5/26/17 15:30      Nasal Cannula 2.0 


 


5/26/17 15:16 36.5 58 16 173/76 94 Nasal Cannula 2.0 


 


5/26/17 13:36    173/80    


 


5/26/17 12:58 36.5 64 18 196/76 95 Nasal Cannula 2.0 


 


5/26/17 11:58 36.4 64 16 192/94 91  95.0 


 


5/26/17 11:50    179/86    


 


5/26/17 11:50     92 Nasal Cannula 2.0 


 


5/26/17 11:35 37.0 61 17 147/83 96 Room Air  


 


5/26/17 11:25 37.0 62 16 153/77 96 Nasal Cannula 2.0 


 


5/26/17 11:15 37.0 61 17 156/76 96 Nasal Cannula 2.0 


 


5/26/17 11:05 37.0 69 17 159/77 96 Nasal Cannula 2.0 


 


5/26/17 10:55 37.0 70 16 188/86 95 Nasal Cannula 2.0 


 


5/26/17 10:44  50 16 191/87 93 Nasal Cannula 4 


 


5/26/17 08:48 36.7 64 18 183/90 93 Room Air  


 


5/26/17 07:50 36.6 73 18 179/78 91 Room Air  


 


5/26/17 07:20     91 Room Air  








General Appearance:  no apparent distress


Respiratory/Chest:  no respiratory distress


Abdomen:  soft


Incision(s):  no drainage


Laboratory Results:





Results Past 24 Hours








Test


  5/26/17


07:53 5/26/17


08:52 5/26/17


11:03 5/26/17


12:16 Range/Units


 


 


White Blood Count 7.27    4.8-10.8  K/uL


 


Red Blood Count 3.44    4.7-6.1  M/uL


 


Hemoglobin 10.0    14.0-18.0  g/dL


 


Hematocrit 29.6    42-52  %


 


Mean Corpuscular Volume 86.0      fL


 


Mean Corpuscular Hemoglobin 29.1    25-34  pg


 


Mean Corpuscular Hemoglobin


Concent 33.8


  


  


  


  32-36  g/dl


 


 


RDW Standard Deviation 42.3    36.4-46.3  fL


 


RDW Coefficient of Variation 13.3    11.5-14.5  %


 


Platelet Count 328    130-400  K/uL


 


Mean Platelet Volume 8.4    7.4-10.4  fL


 


Bedside Glucose  100 99 123 70-99  mg/dl














Test


  5/26/17


16:40 5/26/17


20:48 5/27/17


04:44 


  Range/Units


 


 


Bedside Glucose 114 158   70-99  mg/dl











Assessment & Plan


5/27/17- no acute chgs- stable for d/c home with wound vac


             followup in office/ wound clinic

## 2017-05-27 NOTE — HOSPITALIST PROGRESS NOTE
Hospitalist Progress Note


Date of Service


May 27, 2017.





Subjective


Pt evaluation today including:  conversation w/ patient


DOing well except some pain in left foot. No BM yet and refused suppository here

, wants a Rx to take home. BPs still elevated could be secondary to pain but 

will increase hydralazine prior to discharge. No SOB or CP.





   All Other Systems:  Reviewed and Negative





Objective


Vital Signs











  Date Time  Temp Pulse Resp B/P Pulse Ox O2 Delivery O2 Flow Rate FiO2


 


5/27/17 07:26 37.5 69 18 179/82 92 Room Air  


 


5/27/17 04:05 37.2 63 16 192/64 99 Room Air  


 


5/27/17 00:20    165/68    


 


5/26/17 23:15      Room Air  


 


5/26/17 23:05 36.8 65 18 181/83 96 Room Air  


 


5/26/17 19:43 37.1 61 16 146/74 95 Nasal Cannula 2.0 


 


5/26/17 15:30      Nasal Cannula 2.0 


 


5/26/17 15:16 36.5 58 16 173/76 94 Nasal Cannula 2.0 


 


5/26/17 13:36    173/80    


 


5/26/17 12:58 36.5 64 18 196/76 95 Nasal Cannula 2.0 


 


5/26/17 11:58 36.4 64 16 192/94 91  95.0 


 


5/26/17 11:50    179/86    


 


5/26/17 11:50     92 Nasal Cannula 2.0 


 


5/26/17 11:35 37.0 61 17 147/83 96 Room Air  


 


5/26/17 11:25 37.0 62 16 153/77 96 Nasal Cannula 2.0 


 


5/26/17 11:15 37.0 61 17 156/76 96 Nasal Cannula 2.0 


 


5/26/17 11:05 37.0 69 17 159/77 96 Nasal Cannula 2.0 


 


5/26/17 10:55 37.0 70 16 188/86 95 Nasal Cannula 2.0 


 


5/26/17 10:44  50 16 191/87 93 Nasal Cannula 4 











Physical Exam


General Appearance:  WD/WN, no apparent distress


Eyes:  normal inspection, sclerae normal


ENT:  hearing grossly normal


Neck:  trachea midline


Respiratory/Chest:  no respiratory distress, no accessory muscle use, + 

decreased breath sounds (throughout, no wheezes)


Cardiovascular:  regular rate, rhythm, no edema, no murmur


Abdomen:  normal bowel sounds, non tender, soft (but mildly distended)


Extremities:  + pertinent finding (left foot with wound vac in place, minimal 

surrounding erythema)


Neurologic/Psychiatric:  alert, normal mood/affect, oriented x 3


Skin:  no rash





Laboratory Results





Last 24 Hours








Test


  5/26/17


11:03 5/26/17


12:16 5/26/17


16:40 5/26/17


20:48


 


Bedside Glucose 99 mg/dl  123 mg/dl  114 mg/dl  158 mg/dl 














Test


  5/27/17


06:15 5/27/17


08:39 


  


 


 


White Blood Count 9.92 K/uL    


 


Red Blood Count 3.49 M/uL    


 


Hemoglobin 9.7 g/dL    


 


Hematocrit 29.9 %    


 


Mean Corpuscular Volume 85.7 fL    


 


Mean Corpuscular Hemoglobin 27.8 pg    


 


Mean Corpuscular Hemoglobin


Concent 32.4 g/dl 


  


  


  


 


 


Platelet Count 392 K/uL    


 


Mean Platelet Volume 8.5 fL    


 


Neutrophils (%) (Auto) 71.0 %    


 


Lymphocytes (%) (Auto) 11.7 %    


 


Monocytes (%) (Auto) 15.1 %    


 


Eosinophils (%) (Auto) 1.6 %    


 


Basophils (%) (Auto) 0.1 %    


 


Neutrophils # (Auto) 7.04 K/uL    


 


Lymphocytes # (Auto) 1.16 K/uL    


 


Monocytes # (Auto) 1.50 K/uL    


 


Eosinophils # (Auto) 0.16 K/uL    


 


Basophils # (Auto) 0.01 K/uL    


 


RDW Standard Deviation 41.6 fL    


 


RDW Coefficient of Variation 13.3 %    


 


Immature Granulocyte % (Auto) 0.5 %    


 


Immature Granulocyte # (Auto) 0.05 K/uL    


 


Sodium Level 138 mmol/L    


 


Potassium Level 3.9 mmol/L    


 


Chloride Level 101 mmol/L    


 


Carbon Dioxide Level 29 mmol/L    


 


Anion Gap 8.0 mmol/L    


 


Blood Urea Nitrogen 22 mg/dl    


 


Creatinine 0.81 mg/dl    


 


Est Creatinine Clear Calc


Drug Dose 104.6 ml/min 


  


  


  


 


 


Estimated GFR (


American) 112.7 


  


  


  


 


 


Estimated GFR (Non-


American 97.3 


  


  


  


 


 


BUN/Creatinine Ratio 26.6    


 


Random Glucose 172 mg/dl    


 


Calcium Level 8.3 mg/dl    


 


Magnesium Level 2.1 mg/dl    


 


Bedside Glucose  176 mg/dl   











Assessment and Plan


Left foot diabetic ulcer and abscess now status post incision and drainage with 

debridement-wound VAC now in place, with significant peripheral arterial 

disease seen on arterial Doppler


- Vascular surgery consulted: Pt underwent procedure 5/26/17: pt is no S/p s/p 

Left Lower Extemity Angiogram,  Percutaneous Transluminal Angioplasty and 

Stenting Left Popliteal and Superficial Femoral Artery, Mechanical Closure of 

Right Femoral Artery by Dr. Rainey


- Surgical management with wound VAC, pain control, bowel regimen needed


- received Zosyn and now dc to home on po clinda


- WBC stable without leukocytosis, Hgb is stable, follow daily cbc


- Wound care consult- appreciate recs-f/u with wound vac with home RN and then 

in Wound CLinic





Hyponatremia-pseudohyponatremia secondary to hyperglycemia -improved with 

control of blood sugar


- Follow PRP





Poorly controlled DMII with severe hyperglycemia -hemoglobin A1c 13.3%.  - 

secondary to poor compliance with insulin at home.  He is committed to 

improving this in the future.  Hyperglycemia continues to improve


- glycemic consult placed-continue Lantus 15 units twice a day and NovoLog with 

carb coverage


- DM educator consultation appreciated


-f/u PCP within 1 week





Hypertension-remains uncontrolled, SBP in 160s-180s prior to surgical procedure 

today


- Continue metoprolol, lisinopril, amlodipine


- HCTZ was held by primary team initially


- increased hydralazine 25mg tid--continue this and add home HCTZ 25mg daily 

back on 





S/P NSTEMI, CAD-no active issues


- ASA and plavix cont, continue beta blocker





DVT ppx: SCDs, subcutaneous heparin





CODE STATUS: FULL CODE





Disposition-to home today with home health

## 2017-05-31 NOTE — DISCHARGE SUMMARY
PRIMARY DISCHARGE DIAGNOSES:

1.  Left heel ulcer nonhealing with cellulitis.

2.  Peripheral arterial disease.

3.  Left superficial femoral occlusion.

4.  Type 2 diabetes.

5.  Hypertension.

 

 

SECONDARY DISCHARGE DIAGNOSES:

1.  Coronary artery disease.

2.  Systolic dysfunction.

3.  GERD.

 

PROCEDURES PERFORMED:

1.  Debridement of lateral left heel ulcer with incision and drainage and

counter incision towards the lower ankle due to extension of abscess

formation along tendon sheath by Dr. Gallegos on 05/23/2017.

2.  Left lower extremity angiogram with percutaneous transluminal angioplasty

and stenting left popliteal and superficial femoral artery with mechanical

closure of right femoral artery by Dr. Rainey.

 

CONSULTATIONS:

1.  [facility] hospitalist to assist in medical management primarily

diabetes and hypertension.

2.  [facility] cardiology for routine cardiac followup.

3.  Vascular surgery for peripheral arterial disease.

 

HOSPITAL COURSE:  The patient is a 61-year-old male seen in the outpatient

clinic for a left heel ulcer.  He was admitted directly to the surgical

floor, started on IV antibiotics and debridement planned for the next

morning.  His blood sugars were in the 400s on admission; the hospitalist was

consulted to address his diabetes and hypertension.  By the next morning his

blood sugars were in the mid 100s.  He was taken to the operating room for

debridement as planned.  Wound cultures grew coag negative staph

and enterococcus.  On postoperative day 1 wound nurse was consulted, placed a

wound VAC on the heel ulcer.  We also placed a consultation for vascular

surgery given that he had marginal bleeding at the ulcer site

intraoperatively.  He was taken back to the operating room on 05/26/2017 for

angioplasty and stenting.  The procedure was well tolerated again.  He was

observed overnight.  The next morning he was stable for discharge.  

Arrangements had been made for home VAC.

 

DISCHARGE INSTRUCTIONS:  Discharge home.  Follow up with Dr. Gallegos as

needed.  Follow up with the wound care center and for management of wound

VAC.  Follow up with Dr. Rainey in 2-3 weeks.

 

DISCHARGE MEDICATIONS:  Clindamycin 300 mg p.o. q.i.d. x10 days, Colace 100

mg p.o. b.i.d., hydralazine 25 mg p.o. t.i.d., Lantus SoloSTAR 15 units subQ

b.i.d., Percocet 1-2 tablets every 4 hours as needed, bisacodyl 10 mg as

needed and MiraLax powder packet as needed.  The change in Lantus this from

the once a day dosing 15 units now to b.i.d. dosing 15 units.  Also, changes

were made to Zocor which will be 40 mg at bedtime, previously 80 mg at

bedtime.  He is to resume all other home medications including ProAir 2 puffs

q. 4 hours as needed, amlodipine 10 mg daily, aspirin 325 mg daily, Plavix 75

mg daily, vitamin B12 1000 mcg daily, Depakote 2000 mg p.o. at bedtime,

Lexapro 20 mg daily, Flonase nasal spray as needed, gabapentin 100 mg at

bedtime, hydrochlorothiazide 25 mg daily, metformin 1000 mg b.i.d., Toprol-XL

50 mg b.i.d., p.r.n. Nitrostat, Zestril 40 mg b.i.d. and Humalog KwikPen

sliding scale.

 

 

 

MTDD

## 2017-06-09 ENCOUNTER — HOSPITAL ENCOUNTER (INPATIENT)
Dept: HOSPITAL 45 - C.EDB | Age: 62
LOS: 3 days | Discharge: HOME HEALTH | DRG: 291 | End: 2017-06-12
Attending: HOSPITALIST | Admitting: FAMILY MEDICINE
Payer: COMMERCIAL

## 2017-06-09 VITALS
DIASTOLIC BLOOD PRESSURE: 69 MMHG | OXYGEN SATURATION: 96 % | TEMPERATURE: 98.06 F | SYSTOLIC BLOOD PRESSURE: 145 MMHG | HEART RATE: 64 BPM

## 2017-06-09 VITALS
OXYGEN SATURATION: 94 % | HEART RATE: 80 BPM | DIASTOLIC BLOOD PRESSURE: 78 MMHG | SYSTOLIC BLOOD PRESSURE: 187 MMHG | TEMPERATURE: 97.88 F

## 2017-06-09 VITALS
BODY MASS INDEX: 26.54 KG/M2 | HEIGHT: 71 IN | WEIGHT: 189.6 LBS | WEIGHT: 189.6 LBS | HEIGHT: 71 IN | BODY MASS INDEX: 26.54 KG/M2

## 2017-06-09 VITALS
HEART RATE: 64 BPM | TEMPERATURE: 98.06 F | SYSTOLIC BLOOD PRESSURE: 102 MMHG | DIASTOLIC BLOOD PRESSURE: 46 MMHG | OXYGEN SATURATION: 91 %

## 2017-06-09 VITALS — OXYGEN SATURATION: 93 %

## 2017-06-09 VITALS
HEART RATE: 66 BPM | DIASTOLIC BLOOD PRESSURE: 64 MMHG | TEMPERATURE: 98.78 F | OXYGEN SATURATION: 93 % | SYSTOLIC BLOOD PRESSURE: 155 MMHG

## 2017-06-09 VITALS — OXYGEN SATURATION: 96 %

## 2017-06-09 DIAGNOSIS — E11.622: ICD-10-CM

## 2017-06-09 DIAGNOSIS — I73.9: ICD-10-CM

## 2017-06-09 DIAGNOSIS — F17.200: ICD-10-CM

## 2017-06-09 DIAGNOSIS — Z79.02: ICD-10-CM

## 2017-06-09 DIAGNOSIS — Z79.4: ICD-10-CM

## 2017-06-09 DIAGNOSIS — D64.9: ICD-10-CM

## 2017-06-09 DIAGNOSIS — L97.529: ICD-10-CM

## 2017-06-09 DIAGNOSIS — I25.2: ICD-10-CM

## 2017-06-09 DIAGNOSIS — J96.01: ICD-10-CM

## 2017-06-09 DIAGNOSIS — Z79.82: ICD-10-CM

## 2017-06-09 DIAGNOSIS — I50.33: Primary | ICD-10-CM

## 2017-06-09 DIAGNOSIS — Z95.5: ICD-10-CM

## 2017-06-09 DIAGNOSIS — R60.0: ICD-10-CM

## 2017-06-09 DIAGNOSIS — J44.9: ICD-10-CM

## 2017-06-09 DIAGNOSIS — F31.9: ICD-10-CM

## 2017-06-09 DIAGNOSIS — I10: ICD-10-CM

## 2017-06-09 DIAGNOSIS — Z79.899: ICD-10-CM

## 2017-06-09 DIAGNOSIS — I24.8: ICD-10-CM

## 2017-06-09 DIAGNOSIS — E11.65: ICD-10-CM

## 2017-06-09 DIAGNOSIS — Z79.84: ICD-10-CM

## 2017-06-09 DIAGNOSIS — M79.89: ICD-10-CM

## 2017-06-09 DIAGNOSIS — I25.10: ICD-10-CM

## 2017-06-09 LAB
ALP SERPL-CCNC: 83 U/L (ref 45–117)
ALT SERPL-CCNC: 10 U/L (ref 12–78)
ANION GAP SERPL CALC-SCNC: 9 MMOL/L (ref 3–11)
AST SERPL-CCNC: 10 U/L (ref 15–37)
B-OH-BUTYR SERPL-SCNC: 3.53 MG/DL (ref 0.2–2.81)
BASOPHILS # BLD: 0.03 K/UL (ref 0–0.2)
BASOPHILS NFR BLD: 0.2 %
BUN SERPL-MCNC: 24 MG/DL (ref 7–18)
BUN/CREAT SERPL: 27.7 (ref 10–20)
CALCIUM SERPL-MCNC: 8.4 MG/DL (ref 8.5–10.1)
CHLORIDE SERPL-SCNC: 101 MMOL/L (ref 98–107)
CO2 SERPL-SCNC: 27 MMOL/L (ref 21–32)
COMPLETE: YES
CREAT CL PREDICTED SERPL C-G-VRATE: 97.3 ML/MIN
CREAT SERPL-MCNC: 0.87 MG/DL (ref 0.6–1.4)
EOSINOPHIL NFR BLD AUTO: 553 K/UL (ref 130–400)
GLUCOSE SERPL-MCNC: 382 MG/DL (ref 70–99)
HCT VFR BLD CALC: 29.3 % (ref 42–52)
IG%: 0.6 %
IMM GRANULOCYTES NFR BLD AUTO: 11.5 %
LYMPHOCYTES # BLD: 1.7 K/UL (ref 1.2–3.4)
MCH RBC QN AUTO: 28.4 PG (ref 25–34)
MCHC RBC AUTO-ENTMCNC: 33.1 G/DL (ref 32–36)
MCV RBC AUTO: 85.9 FL (ref 80–100)
MONOCYTES NFR BLD: 7.1 %
NEUTROPHILS # BLD AUTO: 2 %
NEUTROPHILS NFR BLD AUTO: 78.6 %
PMV BLD AUTO: 7.9 FL (ref 7.4–10.4)
POINT OF CARE PRO-BNP: 2390 PG/ML (ref 0–900)
POINT OF CARE TROPONIN I: 0.17 NG/ML (ref 0–0.04)
POTASSIUM SERPL-SCNC: 4.2 MMOL/L (ref 3.5–5.1)
RBC # BLD AUTO: 3.41 M/UL (ref 4.7–6.1)
SODIUM SERPL-SCNC: 137 MMOL/L (ref 136–145)
WBC # BLD AUTO: 14.78 K/UL (ref 4.8–10.8)

## 2017-06-09 RX ADMIN — CLINDAMYCIN HYDROCHLORIDE SCH MG: 150 CAPSULE ORAL at 17:46

## 2017-06-09 RX ADMIN — TRAMADOL HYDROCHLORIDE PRN MG: 50 TABLET, COATED ORAL at 21:04

## 2017-06-09 RX ADMIN — CLINDAMYCIN HYDROCHLORIDE SCH MG: 150 CAPSULE ORAL at 20:46

## 2017-06-09 RX ADMIN — INSULIN GLARGINE SCH UNIT: 100 INJECTION, SOLUTION SUBCUTANEOUS at 20:58

## 2017-06-09 RX ADMIN — SIMVASTATIN SCH MG: 80 TABLET, FILM COATED ORAL at 20:49

## 2017-06-09 RX ADMIN — MORPHINE SULFATE PRN MG: 4 INJECTION, SOLUTION INTRAMUSCULAR; INTRAVENOUS at 13:15

## 2017-06-09 RX ADMIN — METOPROLOL SUCCINATE SCH MG: 50 TABLET, EXTENDED RELEASE ORAL at 20:47

## 2017-06-09 RX ADMIN — INSULIN ASPART SCH UNITS: 100 INJECTION, SOLUTION INTRAVENOUS; SUBCUTANEOUS at 20:58

## 2017-06-09 RX ADMIN — HEPARIN SODIUM SCH UNIT: 10000 INJECTION, SOLUTION INTRAVENOUS; SUBCUTANEOUS at 14:27

## 2017-06-09 RX ADMIN — INSULIN ASPART SCH UNITS: 100 INJECTION, SOLUTION INTRAVENOUS; SUBCUTANEOUS at 17:20

## 2017-06-09 RX ADMIN — LISINOPRIL SCH MG: 40 TABLET ORAL at 20:48

## 2017-06-09 RX ADMIN — MORPHINE SULFATE PRN MG: 4 INJECTION, SOLUTION INTRAMUSCULAR; INTRAVENOUS at 17:18

## 2017-06-09 RX ADMIN — HEPARIN SODIUM SCH UNIT: 10000 INJECTION, SOLUTION INTRAVENOUS; SUBCUTANEOUS at 20:59

## 2017-06-09 RX ADMIN — GABAPENTIN SCH MG: 300 CAPSULE ORAL at 20:47

## 2017-06-09 RX ADMIN — MORPHINE SULFATE PRN MG: 4 INJECTION, SOLUTION INTRAMUSCULAR; INTRAVENOUS at 22:25

## 2017-06-09 RX ADMIN — INSULIN ASPART SCH UNITS: 100 INJECTION, SOLUTION INTRAVENOUS; SUBCUTANEOUS at 11:00

## 2017-06-09 RX ADMIN — DIVALPROEX SODIUM SCH MG: 500 TABLET, DELAYED RELEASE ORAL at 20:48

## 2017-06-09 RX ADMIN — INSULIN ASPART SCH UNITS: 100 INJECTION, SOLUTION INTRAVENOUS; SUBCUTANEOUS at 07:00

## 2017-06-09 RX ADMIN — DOCUSATE SODIUM SCH MG: 100 CAPSULE, LIQUID FILLED ORAL at 20:47

## 2017-06-09 RX ADMIN — MORPHINE SULFATE PRN MG: 4 INJECTION, SOLUTION INTRAMUSCULAR; INTRAVENOUS at 07:37

## 2017-06-09 NOTE — DIAGNOSTIC IMAGING REPORT
LEFT LOWER EXTREMITY VENOUS DOPPLER



CLINICAL HISTORY: Left lower extremity swelling.    



COMPARISON STUDY:  CTA of the abdomen and pelvis and lower extremity January 23, 2017. 



TECHNIQUE:  Sonography of the deep venous system of the left lower extremity was

performed. Compression and augmentation were evaluated.



FINDINGS:  The left common femoral, superficial femoral and popliteal veins were

compressible. Augmentation was normal. Flow was shown within the deep calf

vessels. Calf edema was noted. No was made of a 3.7 x 1.1 x 2.6 cm left inguinal

node with fatty hilum. This is nonspecific but likely reactive.



IMPRESSION: 



1. No evidence of deep venous thrombus within the left lower extremity.



2. Mildly enlarged left inguinal lymph node. While indeterminate, a reactive

etiology is favored.







Electronically signed by:  Patrick Corbett M.D.

6/9/2017 7:35 AM



Dictated Date/Time:  6/9/2017 7:33 AM

## 2017-06-09 NOTE — PHARMACY PROGRESS NOTE
Glycemic Control Intl Consult


Date of Service


Jun 9, 2017.





Scope


Glycemic Pharmacist consulted by Dr. Jimenez on 6/9/17 for glycemic control and 

to write orders per [facility] inpatient glycemic control protocol





Objective


Weight (Kilograms):  86.000


Accuchecks BSG (last 24hrs):











Test


  6/9/17


04:47


 


Random Glucose


  382 mg/dl


(70-99)








Laboratory Data (last 24hrs)











Test


  6/9/17


04:47


 


Anion Gap 9.0 mmol/L 


 


BUN/Creatinine Ratio 27.7 


 


Blood Urea Nitrogen 24 mg/dl 


 


Creatinine 0.87 mg/dl 


 


Potassium Level 4.2 mmol/L 


 


Sodium Level 137 mmol/L 


 


White Blood Count 14.78 K/uL 


 


Red Blood Count 3.41 M/uL 


 


Hemoglobin 9.7 g/dL 


 


Hematocrit 29.3 % 


 


Mean Corpuscular Volume 85.9 fL 


 


Mean Corpuscular Hemoglobin 28.4 pg 


 


Mean Corpuscular Hemoglobin


Concent 33.1 g/dl 


 


 


Platelet Count 553 K/uL 


 


Mean Platelet Volume 7.9 fL 


 


Neutrophils (%) (Auto) 78.6 % 


 


Lymphocytes (%) (Auto) 11.5 % 


 


Monocytes (%) (Auto) 7.1 % 


 


Eosinophils (%) (Auto) 2.0 % 


 


Basophils (%) (Auto) 0.2 % 


 


Neutrophils # (Auto) 11.62 K/uL 


 


Lymphocytes # (Auto) 1.70 K/uL 


 


Monocytes # (Auto) 1.05 K/uL 


 


Eosinophils # (Auto) 0.29 K/uL 


 


Basophils # (Auto) 0.03 K/uL 











Recent Pertinent Medications


Outpatient Anti-diabetic Regimen: 


* Lantus 15 untis BID, Humalog SS, Metformin ER 1gm BIDM


* A1c = 13.3 %  from previous admission (5/22/17)





Risk Factors for Insulin Resistance:


* Diet:DM2/AHA





Assessment & Plan


ASSESSMENT:


* ADA & AACE recommend a goal blood sugar range 140-180 mg/dl for the majority 

of critically ill & non-critically ill patients.  However, more stringent 

targets may be selected in individual cases.





* 60 yo male admitted s/p CP.  Pt was recently admitted to [facility] in May 2017 for 

treatment of an ulcer.  A1c was drawn during that admission and it was 13.3% 

indicating uncontrolled diabetes.


* Based upon previous admission data as well as home DM regimen will begin 

below SQ basal/bolus inpatient regimen.


* Will order a bolus of IV regular insulin this morning for BSG > 300 mg/dl.


* Add overnight Novolog check at 0200 to resolve overnight hyperglycemia 

potentially.








PLAN FOR INPATIENT GLYCEMIC CONTROL:


* Start Lantus 15 units SQ BID





* Novolog ACHS + 02


 * Tighten correction factor to 20 mg/dl/unit


 * Tighten carb ratio to 1 unit per 7 grams CHO consumed


 


* IV Regular Insulin 5units x 1 this morning


 


* Narrow goal range to Low 120 mg/dL - High 160 mg/dL for improved glycemic 

control





* Please note that the plan above was derived based on current level of insulin 

resistance and hospital stress. These recommendations are appropriate for 

inpatient admission only. Plan of care upon discharge will need to be 

reassessed to avoid potential outpatient hypo/hyperglycemia. 





Thank you.

## 2017-06-09 NOTE — DIAGNOSTIC IMAGING REPORT
SINGLE VIEW CHEST



CLINICAL HISTORY: Atypical chest pain.



Findings: 2 AP, portable, upright chest radiographs are compared to study dated

5/22/2017. Correlation is made with chest CT dated 3/1/2008. The examination is

degraded by portable technique and patient rotation.  The heart is enlarged. The

pulmonary vasculature is noncongested. There is atherosclerotic calcification of

the thoracic aorta. Emphysema and chronic interstitial thickening is similar to

previous. There is no evidence of superimposed airspace consolidation or pleural

effusion. There is no pneumothorax. The bony thorax appears intact.



IMPRESSION: Cardiomegaly and emphysema with no acute cardiopulmonary

abnormality.







Electronically signed by:  Heber Hubbard M.D.

6/9/2017 8:14 AM



Dictated Date/Time:  6/9/2017 8:12 AM

## 2017-06-09 NOTE — ECHOCARDIOGRAM REPORT
*NOTICE TO RECEIVING PARTY AGENCY**  This information is strictly Confidential and protected under 
[place] law.  [place] law prohibits you from making any further disclosure of this 
information unless further disclosure is expressly permitted by the written consent of the person to 
whom it pertains or is authorized by law.  A general authorization for the release of medical or 
other information is not sufficient for this purpose.  Hospital accepts no responsibility if the 
information is made available to any other person, INCLUDING THE PATIENT.



Interpretation Summary

  *  Name: MAC SPANGLER  Study Date: 2017 03:16 PM  BP: 102/46 mmHg

  *  MRN: [id number]  Patient Location: C.2T\S\S241\S\2  HR: 65

  *  : 1955 ([birth year]/d[birth year]yyy)  Gender: Male  Height: 71 in

  *  Age: 61 yrs  Ethnicity: [place]  Weight: 189 lb

  *  Ordering Physician: Stanislaw Jerez

  *  Referring Physician: Self, Referred

  *  Performed By: Edilia Morales RCS

  *  Accession# [id number]  Account# [id number]

  *  Reason For Study: CHEST PAIN H/O MI WITH STENTS

  *  BSA: 2.1 m2

  *  -- Conclusions --

  *  There is moderate concentric left ventricular hypertrophy.

  *  Left ventricular systolic function is normal.

  *  No regional wall motion abnormalities noted.

  *  The left atrium is mildly dilated.

  *  There is mild mitral regurgitation.

  *  Compared to an echocardiogram from 3/2016, there is minimal change

Procedure Details

  *  A complete two-dimensional transthoracic echocardiogram was performed (2D, M-mode, Doppler and 
color flow Doppler).

Left Ventricle

  *  The left ventricle is grossly normal size.

  *  There is moderate concentric left ventricular hypertrophy.

  *  The basal septum is thickened and angulated consistent with sigmoid septum.

  *  Ejection Fraction = 50-55%.

  *  Left ventricular systolic function is normal.

  *  No regional wall motion abnormalities noted.

Right Ventricle

  *  The right ventricle is normal in size and function.

Atria

  *  The left atrium is mildly dilated.

  *  Right atrial size is normal.

  *  Cannot exclude a small PFO

Mitral Valve

  *  The mitral valve anatomy is normal.

  *  There is mild mitral regurgitation.

Tricuspid Valve

  *  The tricuspid valve anatomy is normal.

  *  There is trace tricuspid regurgitation.

Aortic Valve

  *  Aortic valve sclerosis moderate, without significant aortic valvular stenosis.

  *  No hemodynamically significant valvular aortic stenosis.

  *  There is no significant aortic regurgitation.

Great Vessels

  *  The aortic root is normal size.

Pericardium/Pleural

  *  There is no pericardial effusion.

Great Vessels

  *  Normal inferior vena cava diameter and respiratory variation suggests normal central venous 
pressure.



MMode 2D Measurements and Calculations

IVSd 2.2 cm

IVSs 2.1 cm



LVIDd 4.7 cm

LVIDs 3.8 cm

LVPWd 1.6 cm

LVPWs 1.9 cm



IVS/LVPW 1.4 

FS 19.5 %

EDV(Teich) 103.8 ml

ESV(Teich) 62.2 ml

EF(Teich) 40.1 %



EDV(cubed) 105.7 ml

ESV(cubed) 55.2 ml

EF(cubed) 47.8 %

% IVS thick -5.22 %

% LVPW thick 18.9 %





LV mass(C)d 417.7 grams

LV mass(C)dI 202.9 grams/m\S\2

LV mass(C)s 339.0 grams

LV mass(C)sI 164.7 grams/m\S\2



SV(Teich) 41.6 ml

SI(Teich) 20.2 ml/m\S\2

SV(cubed) 50.5 ml

SI(cubed) 24.5 ml/m\S\2



Ao root diam 3.7 cm

Ao root area 11.0 cm\S\2

ACS 1.7 cm

LA dimension 4.3 cm



LA/Ao 1.1 

LVOT diam 2.2 cm

LVOT area 3.9 cm\S\2





LVAd ap4 42.7 cm\S\2

LVLd ap4 9.7 cm

EDV(MOD-sp4) 153.8 ml

EDV(sp4-el) 159.4 ml

LVAs ap4 27.6 cm\S\2

LVLs ap4 8.2 cm

ESV(MOD-sp4) 81.7 ml

ESV(sp4-el) 79.0 ml

EF(MOD-sp4) 46.9 %

EF(sp4-el) 50.4 %



LVAd ap2 39.6 cm\S\2

LVLd ap2 8.9 cm

EDV(MOD-sp2) 146.0 ml

EDV(sp2-el) 149.7 ml

LVAs ap2 25.5 cm\S\2

LVLs ap2 8.1 cm

ESV(MOD-sp2) 68.0 ml

ESV(sp2-el) 68.4 ml

EF(MOD-sp2) 53.4 %

EF(sp2-el) 54.3 %



LVLd %diff -8.71 %

EDV(MOD-bp) 155.6 ml

LVLs %diff -1.52 %

ESV(MOD-bp) 75.1 ml

EF(MOD-bp) 51.8 %



SV(MOD-sp4) 72.1 ml

SI(MOD-sp4) 35.0 ml/m\S\2





SV(MOD-sp2) 78.0 ml

SI(MOD-sp2) 37.9 ml/m\S\2



SV(MOD-bp) 80.5 ml

SI(MOD-bp) 39.1 ml/m\S\2



SV(sp4-el) 80.4 ml

SI(sp4-el) 39.0 ml/m\S\2



SV(sp2-el) 81.3 ml

SI(sp2-el) 39.5 ml/m\S\2







Doppler Measurements and Calculations

MV E max elton 94.9 cm/sec

MV A max elton 34.0 cm/sec



MV E/A 2.8 



MV P1/2t max elton 120.1 cm/sec

MV P1/2t 50.2 msec

MVA(P1/2t) 4.4 cm\S\2

MV dec slope 700.9 cm/sec\S\2

MV dec time 0.25 sec



Ao V2 max 161.8 cm/sec

Ao max PG 10.5 mmHg

Ao max PG (full) 6.4 mmHg

DENILSON(V,A) 2.4 cm\S\2

DENILSON(V,D) 2.4 cm\S\2





LV V1 max PG 4.0 mmHg



LV V1 max 100.3 cm/sec



MR max elton 404.5 cm/sec

MR max PG 65.4 mmHg



PA V2 max 89.5 cm/sec

PA max PG 3.2 mmHg

## 2017-06-09 NOTE — EMERGENCY ROOM VISIT NOTE
History


Report prepared by Safia:  Mac Laureano


Under the Supervision of:  Dr. Warren Jin D.O.


First contact with patient:  04:45


Chief Complaint:  CHEST PAIN


Stated Complaint:  CHEST PAIN





History of Present Illness


The patient is a 61 year old male who presents to the Emergency Room with 

complaints of constant chest pain beginning yesterday. He also complains of 

SOB. He has a history of a previous heart attack occurring 10 years ago. The 

patient denies any fevers. He denies any pain radiation. He is a smoker. The 

patient notes that he has an ulcer on his left foot and has had problems with 

swelling in his left leg for the past three weeks. He also notes that he did 

not have his night time medication tonight.





   Source of History:  patient


   Onset:  Yesterday


   Position:  chest


   Timing:  constant


   Associated Symptoms:  + SOB, No fevers





Review of Systems


See HPI for pertinent positives and negatives.  A total of ten systems were 

reviewed and were otherwise negative.





Past Medical & Surgical


Medical Problems:


(1) Acute appendicitis with appendiceal abscess


(2) Angina pectoris


(3) CAD (coronary artery disease)


(4) Cardiac arrest


(5) Dehydration


(6) Diabetes


(7) Enterocolitis


(8) Heel ulcer


(9) Hypertension


(10) Ischemic cardiomyopathy








Family History





Cancer


Diabetes mellitus


Heart disease


Hypertension





Social History


Smoking Status:  Current Every Day Smoker


Drug Use:  none


Marital Status:  


Housing Status:  lives with family


Occupation Status:  employed





Current/Historical Medications


Scheduled


Amlodipine Besylate (Amlodipine Besylate), 10 MG PO QAM


Aspirin (Aspirin Ec), 325 MG PO QAM


Bisacodyl (Bisac-Evac), 10 MG [place] QAM


Clopidogrel (Plavix), 75 MG PO QAM


Cyanocobalamin (Vitamin B-12), 1,000 MCG PO QAM


Divalproex Sodium Delay Rel (Depakote Delay Rel *), 2,000 MG PO HS


Docusate Sodium (Colace), 1 CAP PO BID


Escitalopram Oxalate (Escitalopram Oxalate), 20 MG PO QAM


Gabapentin (Gabapentin), 100 MG PO HS


Hydralazine HCl (Hydralazine HCl), 25 MG PO TID


Hydrochlorothiazide (Hctz *), 25 MG PO QAM


Insulin Glargine (Lantus Solostar), 15 UNIT SC BID


Insulin Human Lispro (Humalog Kwikpen), UNITS SC UD


Lisinopril (Zestril), 40 MG PO BID


Metformin HCl (Metformin HCl ER), 1 TAB PO BID


Metoprolol Succ (Toprol Xl) (Toprol-Xl), 50 MG PO BID


Polyethylene (Miralax), 17 GM PO DAILY


Simvastatin (Zocor), 40 MG PO HS





Scheduled PRN


Albuterol Sulfate (Proair Respiclick), 2 PUFF INH Q4 PRN for SOB/Wheezing


Fluticasone Propionate (Fluticasone Propionate), 2 SPRAYS INH DAILY PRN for 

Nasal Congestion


Nitroglycerin (Nitrostat), 0.4 MG UT PRN PRN for Chest Pain


Oxycodone/Acetaminophen 5MG/325MG (Percocet 5MG/325MG), 1-2 TABLETS PO Q4H PRN 

for Pain





Allergies


Coded Allergies:  


     No Known Allergies (Verified , 12/1/16)





Physical Exam


Vital Signs











  Date Time  Temp Pulse Resp B/P (MAP) Pulse Ox O2 Delivery O2 Flow Rate FiO2


 


6/9/17 04:54  88      


 


6/9/17 04:53      Nasal Cannula 2.0 


 


6/9/17 04:53     94 Room Air  


 


6/9/17 04:41 36.8 90 20 200/84 87 Room Air  











Physical Exam


GENERAL: Awake, alert, well-appearing, in no distress


HENT: Normocephalic, atraumatic. Oropharynx unremarkable.


EYES: Normal conjunctiva. Sclera non-icteric.


NECK: Supple. No nuchal rigidity. FROM. No JVD.


RESPIRATORY: Clear to auscultation.


CARDIAC: Regular rate, normal rhythm. Extremities warm and well perfused. 

Pulses equal.


ABDOMEN: Soft, non-distended. No tenderness to palpation. No rebound or 

guarding. No masses.


RECTAL: Deferred.


MUSCULOSKELETAL: Chest examination reveals no tenderness. The back is 

symmetrical on inspection without obvious abnormality. There is no CVA 

tenderness to palpation. No joint edema. 


LOWER EXTREMITIES: LLE: Swollen calf. Tender calf. Wound vac present at the 

foot. RLE with normal calf size. Non-tender. 


NEURO: Normal sensorium. No sensory or motor deficits noted. 


SKIN: No rash or jaundice noted.





Medical Decision & Procedures


ER Provider


Diagnostic Interpretation:


One View Chest X-ray interpreted by me: CHF, cardiomegaly.





Laboratory Results


6/9/17 04:47








Red Blood Count 3.41, Mean Corpuscular Volume 85.9, Mean Corpuscular Hemoglobin 

28.4, Mean Corpuscular Hemoglobin Concent 33.1, Mean Platelet Volume 7.9, 

Neutrophils (%) (Auto) 78.6, Lymphocytes (%) (Auto) 11.5, Monocytes (%) (Auto) 

7.1, Eosinophils (%) (Auto) 2.0, Basophils (%) (Auto) 0.2, Neutrophils # (Auto) 

11.62, Lymphocytes # (Auto) 1.70, Monocytes # (Auto) 1.05, Eosinophils # (Auto) 

0.29, Basophils # (Auto) 0.03





6/9/17 04:47

















Test


  6/9/17


04:47 6/9/17


04:53


 


White Blood Count


  14.78 K/uL


(4.8-10.8) 


 


 


Red Blood Count


  3.41 M/uL


(4.7-6.1) 


 


 


Hemoglobin


  9.7 g/dL


(14.0-18.0) 


 


 


Hematocrit 29.3 % (42-52)  


 


Mean Corpuscular Volume


  85.9 fL


() 


 


 


Mean Corpuscular Hemoglobin


  28.4 pg


(25-34) 


 


 


Mean Corpuscular Hemoglobin


Concent 33.1 g/dl


(32-36) 


 


 


Platelet Count


  553 K/uL


(130-400) 


 


 


Mean Platelet Volume


  7.9 fL


(7.4-10.4) 


 


 


Neutrophils (%) (Auto) 78.6 %  


 


Lymphocytes (%) (Auto) 11.5 %  


 


Monocytes (%) (Auto) 7.1 %  


 


Eosinophils (%) (Auto) 2.0 %  


 


Basophils (%) (Auto) 0.2 %  


 


Neutrophils # (Auto)


  11.62 K/uL


(1.4-6.5) 


 


 


Lymphocytes # (Auto)


  1.70 K/uL


(1.2-3.4) 


 


 


Monocytes # (Auto)


  1.05 K/uL


(0.11-0.59) 


 


 


Eosinophils # (Auto)


  0.29 K/uL


(0-0.5) 


 


 


Basophils # (Auto)


  0.03 K/uL


(0-0.2) 


 


 


RDW Standard Deviation


  43.2 fL


(36.4-46.3) 


 


 


RDW Coefficient of Variation


  13.7 %


(11.5-14.5) 


 


 


Immature Granulocyte % (Auto) 0.6 %  


 


Immature Granulocyte # (Auto)


  0.09 K/uL


(0.00-0.02) 


 


 


Anion Gap


  9.0 mmol/L


(3-11) 


 


 


Est Creatinine Clear Calc


Drug Dose 97.3 ml/min 


  


 


 


Estimated GFR (


American) 109.5 


  


 


 


Estimated GFR (Non-


American 94.5 


  


 


 


BUN/Creatinine Ratio 27.7 (10-20)  


 


Calcium Level


  8.4 mg/dl


(8.5-10.1) 


 


 


Total Bilirubin


  0.1 mg/dl


(0.2-1) 


 


 


Direct Bilirubin


  < 0.1 mg/dl


(0-0.2) 


 


 


Aspartate Amino Transf


(AST/SGOT) 10 U/L (15-37) 


  


 


 


Alanine Aminotransferase


(ALT/SGPT) 10 U/L (12-78) 


  


 


 


Alkaline Phosphatase


  83 U/L


() 


 


 


Total Protein


  7.3 gm/dl


(6.4-8.2) 


 


 


Albumin


  2.0 gm/dl


(3.4-5.0) 


 


 


Beta-Hydroxybutyric Acid


  3.53 mg/dL


(0.2-2.81) 


 


 


Bedside Troponin I


  


  0.170 ng/ml


(0-0.045)


 


NT-Pro-B-Type Natriuretic


Peptide 


  2390 pg/ml


(0-900)





Laboratory results reviewed by me





Medications Administered











 Medications


  (Trade)  Dose


 Ordered  Sig/Dc


 Route  Start Time


 Stop Time Status Last Admin


Dose Admin


 


 Aspirin


  (Aspirin Chew)  324 mg  NOW  STAT


 PO  6/9/17 05:18


 6/9/17 05:19 DC 6/9/17 05:24


324 MG


 


 Furosemide


  (Lasix Inj)  40 mg  NOW  STAT


 IV  6/9/17 05:18


 6/9/17 05:19 DC 6/9/17 05:24


40 MG











ECG


Indication:  chest pain


Rate (beats per minute):  94


Rhythm:  normal sinus


Findings:  nonspecific-ST abn, RBBB (incomplete)





ED Course


0447: The patient was evaluated in room B4B. A complete history and physical 

exam was performed.





0518: Ordered Lasix Inj 40 mg IV, Aspirin Chew 324 mg PO. 





0530: Upon reexamination, the patient was resting. I discussed the test results 

and treatment plan with him. The patient will be evaluated for further 

management.





Medical Decision


Differential diagnoses include but are not limited to; COPD, pneumonia, 

bronchitis, PE, and acute coronary syndrome. 





Patient resting in no distress on have an elevated BNP as well as an elevated 

troponin.  Patient was placed on aspirin and Lasix.  Patient is in no distress.

  The case was discussed with hospitalist for malignant knee for admission.  

Patient also had an ultrasound left lower extremity that did not reveal a DVT.





Consults


Time Called:  0522


Consulting Physician:  Dr. Ama STOUT


Returned Call:  0530


Discussed the patient's case. The patient will be evaluated for further 

treatment and disposition.





Impression





 Primary Impression:  


 Acute coronary syndrome


 Additional Impression:  


 Acute CHF





Critical Care


I have personally spent greater than 35 minutes of critical care time in the 

direct management of this patient.  This includes bedside care, interpretation 

of diagnostic studies, and testing, discussion with consultants, patient, and 

family members, and other required patient management activities.  This 35 

minutes is in excess of all separately billable procedures.





Scribe Attestation


The scribe's documentation has been prepared under my direction and personally 

reviewed by me in its entirety. I confirm that the note above accurately 

reflects all work, treatment, procedures, and medical decision making performed 

by me.





Departure Information


Dispostion


Being Evaluated By Hospitalist





Referrals


Claude Rowley M.D. (PCP)





Patient Instructions


My [facility]





Problem Qualifiers

## 2017-06-09 NOTE — CARDIOLOGY CONSULTATION
Cardiology Consultation


Date of Consultation:


Jun 9, 2017.


Pt evaluation today including:  conversation w/ patient, conversation w/ family

, physical exam, chart review, lab review, review of studies


History of Present Illness


This is a 60 yo Male  with hx of CAD with NSTEMI  (1/2007)  s/p distal Left 

Circumflex stenting , developed ACS in 2010 s/p catheterization found to have 90

% mid LAD stenosis s/p atherectomy at [facility] and ERICK placement,  Ischemic CM, 

HLD (on Zocor),   HTN , PAD s/p transluminal angioplasty and stenting (5/26/2017

)of left popliteal and superficial femoral artery presenting with hx of 

progressive SOB throughout the day within 24 hrs of arrival followed by episode 

of non-radiating Chest pain, 5/6 intensity from rest. . He reports the chest 

pain was constant and lasted for a few hours. Initially he thought it was 

related to his Hiatal Hernia . He eventually took Nitroglycerine around 4 AM 

and got some relief.  He also reports associated presyncope. He denies 

palpitation, syncope,  orthopnea, paroxysmal dyspnea. He has experienced 

progressive LLE edema.





He had a Dobutamine echo in 3/21/16   showing Normal Resting wall motion Stress 

wall motion: Appropriate increase in Left ventricular systolic function and 

decrease in cavity size.  No stress induced segmental wall motion 

abnormalities. Severe Concentric LVH with Type 1 Diastolic Dysfunction.  

Catheterization in  2010 in addition to 90 % mid LAD stenosis, showed 40 % 

distal LAD Stenosis,  50% mid LCX stenosis, 100%  in-stent  stenosis  in the 

distal circumflex, 100% in-stent stenosis  in distal circumflex, 100% obtuse 

marginal stenosis, 60% proximal right coronary stenosis, 40% mid RCA stenosis.





On arrival to ED ,he was found to have BP of 200/84.  EKG with  nonspecific ST 

changes, and an elevated Troponin of .17. Pro BNP of 2390.  CXR showing 

cardiomegaly. He was given ASA  325 mg, and 40 mg Lasix. Currently his Chest 

pain, Sob have resolved while in the hospital.





Past Medical/Surgical History


PAST MEDICAL/SURGICAL HISTORY:


1. Coronary artery disease -- see above.


2. Non-ST elevation myocardial infarction - Vfib arrest -- January 2007.


3. Mid left anterior descending drug-eluting stent - July 2010.


4. Negative dobutamine stress test -- March 2016.


5. Hypertension.


6. Severe left ventricular hypertrophy - March 2016.  


7. Diastolic dysfunction.


8. Hypercholesterolemia.


9. Diabetes mellitus.


10. Diabetic peripheral neuropathy.


11. Peripheral vascular disease -- see above.


12. Cerebrovascular disease -- less than 50% bilateral carotid stenoses,


November 2015.


13. Right hand trauma.


14. Bipolar disorder.


15. Appendectomy - November 2016.


16. Obstructive sleep apnea.


17. GERD.


18. History of cervical surgery.





Family History





Cancer


Diabetes mellitus


Heart disease


Hypertension


M: DM


F:  DM


Daughter: Vasovagal Syncope, Long QT





Social History


Smoking Status:  Current Every Day Smoker


History of Alcohol Use:  No





Review of Systems


Constitutional:  No fever, No chills


Respiratory:  + shortness of breath, + dyspnea at rest (improved)


Cardiac:  + chest pain (resolved), + edema (LLE)


Abdomen:  No pain, No nausea, No vomiting





Allergies


Coded Allergies:  


     No Known Allergies (Verified , 12/1/16)





Medications





Current Inpatient Medications








 Medications


  (Trade)  Dose


 Ordered  Sig/Dc


 Route  Start Time


 Stop Time Status Last Admin


Dose Admin


 


 Heparin Sodium


  (Porcine)


  (Heparin Sq 5000


 Unit/0.5ml)  5,000 unit  Q8


 SQ  6/9/17 14:00


 7/9/17 13:59   


 


 


 Acetaminophen


  (Tylenol Tab)  650 mg  Q4H  PRN


 PO  6/9/17 06:15


 7/9/17 06:14   


 


 


 Al Hydrox/Mg


 Hydrox/Simethicone


  (Maalox Max Susp)  15 ml  Q4H  PRN


 PO  6/9/17 06:15


 7/9/17 06:14   


 


 


 Ondansetron HCl


  (Zofran Inj)  4 mg  Q6H  PRN


 IV  6/9/17 06:15


 7/9/17 06:14   


 


 


 Clopidogrel


 Bisulfate


  (plAVix TAB)  75 mg  QAM


 PO  6/10/17 09:00


 7/10/17 08:59   


 


 


 Cyanocobalamin


  (Vitamin B-12


 Tab)  1,000 mcg  QAM


 PO  6/10/17 09:00


 7/10/17 08:59   


 


 


 Divalproex Sodium


  (Depakote Delay


 Rel Tab)  2,000 mg  HS


 PO  6/9/17 21:00


 7/9/17 20:59   


 


 


 Docusate Sodium


  (coLACE CAP)  100 mg  BID


 PO  6/9/17 21:00


 7/9/17 20:59   


 


 


 Escitalopram


 Oxalate


  (Lexapro Tab)  20 mg  QAM


 PO  6/10/17 09:00


 7/10/17 08:59   


 


 


 Fluticasone


 Propionate


  (Flonase Nasal


 Spray)  2 sprays  DAILY  PRN


 NA  6/9/17 06:15


 7/9/17 06:14   


 


 


 Gabapentin


  (Neurontin Cap)  100 mg  HS


 PO  6/9/17 21:00


 7/9/17 20:59   


 


 


 Hydralazine HCl


  (Apresoline Tab)  25 mg  TID


 PO  6/9/17 14:00


 7/9/17 13:59   


 


 


 Hydrochlorothiazide


  (Hydrochlorothiazide


 Tab)  25 mg  DAILY


 PO  6/10/17 09:00


 7/10/17 08:59   


 


 


 Lisinopril


  (Zestril Tab)  40 mg  BID


 PO  6/9/17 21:00


 7/9/17 20:59   


 


 


 Metoprolol


 Succinate


  (Toprol Xl Tab)  50 mg  BID


 PO  6/9/17 21:00


 7/9/17 20:59   


 


 


 Nitroglycerin


  (Nitrostat Tab)  0.4 mg  PRN  PRN


 UT  6/9/17 06:15


 7/9/17 06:14   


 


 


 Simvastatin


  (Zocor Tab)  40 mg  HS


 PO  6/9/17 21:00


 7/9/17 20:59   


 


 


 Tramadol HCl


  (Ultram Tab)  50 mg  Q6  PRN


 PO  6/9/17 06:15


 7/9/17 06:14   


 


 


 Albuterol


  (Ventolin Hfa


 Inhaler)  2 puffs  Q4  PRN


 INH  6/9/17 06:15


 7/9/17 06:14   


 


 


 Amlodipine


 Besylate


  (Norvasc Tab)  10 mg  QAM


 PO  6/10/17 09:00


 7/10/17 08:59   


 


 


 Insulin Aspart


  (novoLOG ASPART)  **SLIDING


 SCALE**


 **G...  ACHS


 SC  6/9/17 07:00


 7/9/17 06:59  6/9/17 07:00


10 UNITS


 


 Miscellaneous


 Information


  (Consult


 Glycemic


 Management


 Pharmacy)  1 ea  UD  PRN


 N/A  6/9/17 06:45


 7/9/17 06:44   


 


 


 Morphine Sulfate


  (MoRPHine


 SULFATE INJ)  4 mg  Q4H  PRN


 IV  6/9/17 06:45


 6/23/17 06:44  6/9/17 07:37


4 MG


 


 Glucose


  (Glucose 40% Gel)  15-30


 GRAMS 15


 GRAMS...  UD  PRN


 PO  6/9/17 06:45


 7/9/17 06:44   


 


 


 Glucose


  (Glucose Chew


 Tab)  4-8


 Tablets 4


 Tabl...  UD  PRN


 PO  6/9/17 06:45


 7/9/17 06:44   


 


 


 Dextrose


  (Dextrose 50%


 50ML Syringe)  25-50ML OF


 50% DW IV


 FOR...  UD  PRN


 IV  6/9/17 06:45


 7/9/17 06:44   


 


 


 Glucagon


  (Glucagon Inj)  1 mg  UD  PRN


 SQ  6/9/17 06:45


 7/9/17 06:44   


 


 


 Glucose


  (Glucose 40% Gel)  15-30


 GRAMS 15


 GRAMS...  UD  PRN


 PO  6/9/17 07:45


 7/9/17 07:44   


 


 


 Glucose


  (Glucose Chew


 Tab)  4-8


 Tablets 4


 Tabl...  UD  PRN


 PO  6/9/17 07:45


 7/9/17 07:44   


 


 


 Dextrose


  (Dextrose 50%


 50ML Syringe)  25-50ML OF


 50% DW IV


 FOR...  UD  PRN


 IV  6/9/17 07:45


 7/9/17 07:44   


 


 


 Glucagon


  (Glucagon Inj)  1 mg  UD  PRN


 SQ  6/9/17 07:45


 7/9/17 07:44   


 


 


 Insulin Glargine


  (Lantus Solostar


 Pen)  15 unit  BID


 SC  6/9/17 21:00


 7/9/17 20:59   


 


 


 Insulin Human


 Regular 5 units/


 Syringe  5 ml @ 1


 mls/min  TODAY@0940  ONCE


 IV  6/9/17 09:40


 6/9/17 09:44   


 


 


 Insulin Glargine


  (Lantus Solostar


 Pen)  15 unit  TODAY@0940  ONCE


 SC  6/9/17 09:40


 6/9/17 09:41   


 


 


 Insulin Aspart


  (novoLOG ASPART)  **SLIDING


 SCALE**


 **G...  0200


 SC  6/10/17 02:00


 7/10/17 01:59   


 











Physical Exam





Vital Signs Past 12 Hours








  Date Time  Temp Pulse Resp B/P (MAP) Pulse Ox O2 Delivery O2 Flow Rate FiO2


 


6/9/17 06:18  80 20 189/77 95 Nasal Cannula 2.0 


 


6/9/17 06:11 36.6 80 18 187/78 94 Nasal Cannula 2.0 


 


6/9/17 05:09  82 20  94 Nasal Cannula 2.0 


 


6/9/17 05:02    170/88    


 


6/9/17 04:54  88      


 


6/9/17 04:53      Nasal Cannula 2.0 


 


6/9/17 04:53     94 Room Air  


 


6/9/17 04:48    165/99    


 


6/9/17 04:41 36.8 90 20 200/84 87 Room Air  








Constitutional:  


   Level of Distress:  NAD


   Ambulation:  limited ambulation


Head:  normocephalic, atraumatic


ENMT:  hearing grossly normal


Neck:  supple, trachea midline, no masses


Lungs:  


   Respiratory effort:  no dyspnea


   Auscultation:  breath sounds normal, no wheezing, no rales/crackles, no 

rhonchi


Cardiovascular:  


   Heart Auscultation:  RRR, normal S1, normal S2, no rubs, no gallops, II/VI 

JAK


Abdomen:  


   Bowel Sounds:  normal


   Inspection & Palpation:  soft, non-distended, no tenderness, guarding & 

rebound, no masses


   Liver:  non-tender, no hepatomegaly


Extremities:  pertinent finding (LLE non-pitting edema, warm to touch, non-

tender, LEft foot in bandage s/p Diabetic foot ulcer debridement)





Data


Laboratory Results:





Last 24 Hours








Test


  6/9/17


04:47 6/9/17


04:53


 


White Blood Count 14.78 K/uL  


 


Red Blood Count 3.41 M/uL  


 


Hemoglobin 9.7 g/dL  


 


Hematocrit 29.3 %  


 


Mean Corpuscular Volume 85.9 fL  


 


Mean Corpuscular Hemoglobin 28.4 pg  


 


Mean Corpuscular Hemoglobin


Concent 33.1 g/dl 


  


 


 


Platelet Count 553 K/uL  


 


Mean Platelet Volume 7.9 fL  


 


Neutrophils (%) (Auto) 78.6 %  


 


Lymphocytes (%) (Auto) 11.5 %  


 


Monocytes (%) (Auto) 7.1 %  


 


Eosinophils (%) (Auto) 2.0 %  


 


Basophils (%) (Auto) 0.2 %  


 


Neutrophils # (Auto) 11.62 K/uL  


 


Lymphocytes # (Auto) 1.70 K/uL  


 


Monocytes # (Auto) 1.05 K/uL  


 


Eosinophils # (Auto) 0.29 K/uL  


 


Basophils # (Auto) 0.03 K/uL  


 


RDW Standard Deviation 43.2 fL  


 


RDW Coefficient of Variation 13.7 %  


 


Immature Granulocyte % (Auto) 0.6 %  


 


Immature Granulocyte # (Auto) 0.09 K/uL  


 


Sodium Level 137 mmol/L  


 


Potassium Level 4.2 mmol/L  


 


Chloride Level 101 mmol/L  


 


Carbon Dioxide Level 27 mmol/L  


 


Anion Gap 9.0 mmol/L  


 


Blood Urea Nitrogen 24 mg/dl  


 


Creatinine 0.87 mg/dl  


 


Est Creatinine Clear Calc


Drug Dose 97.3 ml/min 


  


 


 


Estimated GFR (


American) 109.5 


  


 


 


Estimated GFR (Non-


American 94.5 


  


 


 


BUN/Creatinine Ratio 27.7  


 


Random Glucose 382 mg/dl  


 


Calcium Level 8.4 mg/dl  


 


Total Bilirubin 0.1 mg/dl  


 


Direct Bilirubin < 0.1 mg/dl  


 


Aspartate Amino Transf


(AST/SGOT) 10 U/L 


  


 


 


Alanine Aminotransferase


(ALT/SGPT) 10 U/L 


  


 


 


Alkaline Phosphatase 83 U/L  


 


Total Protein 7.3 gm/dl  


 


Albumin 2.0 gm/dl  


 


Beta-Hydroxybutyric Acid 3.53 mg/dL  


 


Bedside Troponin I  0.170 ng/ml 


 


NT-Pro-B-Type Natriuretic


Peptide 


  2390 pg/ml 


 








Imaging: 





EKG:





Telemetry reviewed:





Assessment & Plan


THis is a 60 yo poorly compliant Hypertensive,insulin requiring Type 2 Diabetic 

Smoker with hx significant for CAD with diffuse multivessel involvement s/p 

stenting (L Circumflex in 2007, LAD in 2010), Severe concentric LVH,  PAD s/p 

angioplasty of Popliteal/Femoral arteries,presenting with  acute  worsening of 

SOB and Chest pain , mildly elevated Troponin.17 proBNP of 2390,  currently asx 

following duresis with lasix. Given patient's noncompliance, multiple risk 

factors including multivessel disease and  significant PAD, with complete 

resolution of symptoms s/p lasix,  He would likely not be good candidate for 

cath lab at this time. His distribution of chest pain is atypical and being 

that he has experienced resolution of Chest discomfort, Dyspnea,  with Lasix 

while in hospital, it  could indicate a more acute heart failure associated 

etiology of his presentation as opposed to ACS . While its certainly possible 

that he has newly worsening coronary ischemia given his history , the mild 

elevation in troponin makes this less likely. 





Elevated Troponin, Chest pain /SOB


- Acute Heart Failure exacerbation , eleavted BNP ,vs. ACS althougth less 

likely with mild troponin


- Continue to trend troponins q8h


- Stress echo in March 2016 


- Continue aspirin, Plavix, metoprolol , Zocor


- Plan for repeat Echo today 


- Continue diuresis as tolerated being careful to maintain baseline renal 

function





Hypertension: 


-poorly controlled 


- Continue amlodipine Zestril  hydralazine hydrochlorothiazide





T2DM:


Poor insulin compliance 


- Last hemoglobin A1c 13.3% in May 2017


-Per primary team





Lower left extremity swelling:


- post-surgical change  following revascularization likely, Doppler negative 

for DVT


-Continue to monitor





Anemia:


- Hemoglobin at 9.7, stable 


 - Management Per primary team





Peripheral arterial disease:


- asx currently


- s/p Percutaneous Transluminal Angioplasty and Stenting Left Popliteal and 

Superficial Femoral Artery (5/26/17)


-Management Per primary team





The patient was interviewed and examined by me in the presence of Dr. Pierre.  

Above history and physical exam as well as assessment and recommendations were 

reviewed by me.  Agree with history, exam, assessment, and recommendations.  

Echocardiogram and electrocardiogram and labs reviewed by me.


The patient reports starting to have dyspnea yesterday .This progressed 

throughout the day.  He then developed associated sensation of chest tightness.

  Chest tightness was bilaterally in the lower chest.  It was worse with deep 

inspiration.   He was compliant at home with his medications other than 

insulin.  He is not compliant with a low-sodium diet.  Since admission he has 

been treated with diuretics.  With diuresis his dyspnea has resolved.  No 

longer has any chest tightness.  His exam early this afternoon by me revealed 

no jugular venous distention. Scant rales and wheezes at the right base.  Exam 

of extremities shows no edema in the right lower leg.  He does have edema in 

the left leg.  Of note is that he recently underwent a percutaneous 

revascularization procedure in his left leg.  His echocardiogram reveals 

moderate LVH and no segmental wall motion abnormalities.  Normal overall LV 

systolic function.  His electrocardiograms do not reveal any diagnostic ST 

segment or T-wave abnormalities of myocardial ischemia or injury.  His troponin 

I is mildly elevated.  His chest x-ray did not reveal evidence of heart 

failure.  However, the patient has underlying COPD which could mask the 

presence of heart failure on the chest x-ray.  His BNP was elevated. Suspect 

the patient developed acute diastolic heart failure yesterday.  He does have 

known coronary artery disease.  Higher afterload (increased blood pressure) and 

elevated intraventricular pressures would increase myocardial oxygen demand.  

This is in the presence of LVH.  With his underlying coronary artery disease he 

could then have become ischemic.  This would worsen his left ventricular 

diastolic dysfunction.  His symptoms have improved with treatment for heart 

failure.  Do not suspect that he had an acute coronary process.  Would continue 

to treat him for congestive heart failure with diuresis.  Continue current 

antihypertensive regimen.  His blood pressures have improved since admission.  

Monitor renal function, potassium, and sodium, intake and output, and daily 

weights closely.  At this time would not perform a cardiac catheterization.  He 

is undergoing diuresis.  He is diabetic.  These factors would increase risk of 

contrast dye and nephrotoxicity.  The patient has extensive vascular disease.  

This would also increase the risk of any invasive cardiac procedure.  His 

cardiac catheterization in 2010 revealed extensive coronary calcifications.  

Thus, even if it was attempted to perform a percutaneous intervention would be 

of increased risk because of the coronary calcifications.  At this time would 

favor conservative management of his coronary artery disease.  When his failure 

has completely resolved could consider repeating a dobutamine stress 

echocardiogram.  Did undergo a dobutamine stress echocardiogram in March of 2016 which was negative for evidence of myocardial ischemia.  If a dobutamine 

stress echocardiogram revealed evidence of triple-vessel or left main coronary 

artery disease would then recommend cardiac catheterization.  Also would 

recommend cardiac catheterization if he developed clearly unstable anginal 

symptoms.  All of the above was discussed with the patient and with Dr. Pierre. 

The patient agrees to pursuing a conservative course for management of his 

coronary artery disease at this time.  Thank you for asking us to see this 

patient in Cardiology consultation. GIOVANNY Jerez MD





Resident Tracking


Resident Involvement:  Resident Care Provided


Care Provided:  Adult [facility]

## 2017-06-09 NOTE — HISTORY AND PHYSICAL
History & Physical


Date & Time of Service:


Jun 9, 2017 at 06:18


Chief Complaint:


Chest Pain


Primary Care Physician:


Claude Rowley M.D.


History of Present Illness


Source:  patient, spouse


61-year-old male with past medical history of coronary artery disease status 

post stents about 10 years ago, uncontrolled diabetes mellitus, hypertension, 

ischemic cardiomyopathy, left foot diabetic ulcer status post debridement, 

bipolar disorder, chronic smoker presented to the ER with complaints of chest 

pain and shortness of breath that started yesterday.


The patient stated that he developed shortness of breath yesterday which was 

associated with chest pain and dizziness.


He stated that the chest pain was across his chest especially on the left side 

with no radiation, 5/10 in severity associated with diaphoresis which started 

while he was watching TV.


He had taken Rolaids assuming the discomfort was from his hiatal hernia which 

did not provide any relief.


He also complained of shortness of breath but denied any PND or orthopnea.


He was recently admitted in May 2017 for a necrotic left calcaneal diabetic 

foot ulcer . 


 Per patient's wife, he has been noncompliant with his medications especially 

his insulin.  He also complains about pain in his left lower extremity which 

she rates as 8/10.


Denies any headaches, weakness, numbness or tingling.  Denies any abdominal pain

, nausea or vomiting, diarrhea or constipation .denied any bright red bleeding 

per rectum or melena.


 He had underwent a percutaneous transluminal angioplasty and stenting of his 

left popliteal and superficial femoral artery during his last hospitalization 2 

weeks prior .





Past Medical/Surgical History


Medical Problems:


(1) CAD (coronary artery disease)


Status: Chronic  





(2) Cardiac arrest


Status: Resolved  





(3) Diabetes


Status: Chronic  





(4) Hypertension


Status: Chronic  











Family History





Cancer


Diabetes mellitus


Heart disease


Hypertension





Social History


Smoking Status:  Current Every Day Smoker


Drug Use:  none


Marital Status:  


Housing status:  lives with family


Occupational Status:  employed





Immunizations


History of Influenza Vaccine:  No


Influenza Vaccine Date:  Oct 5, 2009


History of Tetanus Vaccine?:  No


Tetanus Immunization Date:  Mar 1, 2004


History of Pneumococcal:  No


Pneumococcal Date:  Oct 15, 2006


History of Hepatitis B Vaccine:  No





Multi-Drug Resistant Organisms


History of MDRO:  No





Allergies


Coded Allergies:  


     No Known Allergies (Verified , 12/1/16)





Home Medications


Scheduled


Amlodipine Besylate (Amlodipine Besylate), 10 MG PO QAM


Aspirin (Aspirin Ec), 325 MG PO QAM


Clopidogrel (Plavix), 75 MG PO QAM


Cyanocobalamin (Vitamin B-12), 1,000 MCG PO QAM


Divalproex Sodium (Depakote Delay Rel), 2,000 MG PO HS


Escitalopram Oxalate (Escitalopram Oxalate), 20 MG PO QAM


Gabapentin (Gabapentin), 100 MG PO HS


Gabapentin (Gabapentin), 300 MG PO HS


Hydralazine HCl (Hydralazine HCl), 25 MG PO TID


Hydrochlorothiazide (Hydrochlorothiazide), 25 MG PO DAILY


Insulin Glargine (Lantus Solostar), 15 UNIT SC BID


Insulin Human Lispro (Humalog Kwikpen), UNITS SC UD


Lisinopril (Zestril), 40 MG PO BID


Metformin HCl (Metformin HCl ER), 1 TAB PO BID


Metoprolol Succ (Toprol Xl) (Toprol-Xl), 50 MG PO BID


Simvastatin (Zocor), 40 MG PO HS





Scheduled PRN


Albuterol Sulfate (Proair Respiclick), 2 PUFF INH Q4 PRN for SOB/Wheezing


Nitroglycerin (Nitrostat), 0.4 MG UT PRN PRN for Chest Pain


Tramadol HCl (Tramadol HCl), 50 MG PO Q6 PRN for Pain





Review of Systems


Constitutional:  No fever, No chills


Eyes:  No worsening of vision


ENT:  No hearing loss


Respiratory:  + cough, + shortness of breath


Cardiovascular:  + chest pain, + problem reported (dizziness), No orthopnea, No 

palpitations


Abdomen:  No pain, No nausea, No vomiting


Musculoskeletal:  + joint pain (left lower extremity pain)


Genitourinary - Male:  No hematuria, No dysuria, No urinary frequency


Neurologic:  No memory loss


Psychiatric:  No depression symptoms


Endocrine:  No fatigue


Hematologic / Lymphatic:  No abnormal bleeding/bruising





Physical Exam


Vital Signs











  Date Time  Temp Pulse Resp B/P (MAP) Pulse Ox O2 Delivery O2 Flow Rate FiO2


 


6/9/17 05:09  82 20  94 Nasal Cannula 2.0 


 


6/9/17 05:02    170/88    


 


6/9/17 04:54  88      


 


6/9/17 04:53      Nasal Cannula 2.0 


 


6/9/17 04:53     94 Room Air  


 


6/9/17 04:48    165/99    


 


6/9/17 04:41 36.8 90 20 200/84 87 Room Air  








General Appearance:  WD/WN, no apparent distress


Head:  normocephalic


Eyes:  normal inspection


ENT:  normal ENT inspection, hearing grossly normal


Neck:  supple


Respiratory/Chest:  chest non-tender, normal breath sounds, no respiratory 

distress, no accessory muscle use, + wheezing (scattered wheezes)


Cardiovascular:  regular rate, rhythm


Abdomen/GI:  normal bowel sounds, non tender, soft


Extremities/Musculoskelatal:  + pertinent finding (left lower extremity swelling

, LLE foot covered in dressing)


Neurologic/Psych:  alert, normal mood/affect, oriented x 3


Skin:  normal color





Diagnostics


Laboratory Results





Results Past 24 Hours








Test


  6/9/17


04:47 6/9/17


04:53 Range/Units


 


 


White Blood Count 14.78  4.8-10.8  K/uL


 


Red Blood Count 3.41  4.7-6.1  M/uL


 


Hemoglobin 9.7  14.0-18.0  g/dL


 


Hematocrit 29.3  42-52  %


 


Mean Corpuscular Volume 85.9    fL


 


Mean Corpuscular Hemoglobin 28.4  25-34  pg


 


Mean Corpuscular Hemoglobin


Concent 33.1


  


  32-36  g/dl


 


 


Platelet Count 553  130-400  K/uL


 


Mean Platelet Volume 7.9  7.4-10.4  fL


 


Neutrophils (%) (Auto) 78.6   %


 


Lymphocytes (%) (Auto) 11.5   %


 


Monocytes (%) (Auto) 7.1   %


 


Eosinophils (%) (Auto) 2.0   %


 


Basophils (%) (Auto) 0.2   %


 


Neutrophils # (Auto) 11.62  1.4-6.5  K/uL


 


Lymphocytes # (Auto) 1.70  1.2-3.4  K/uL


 


Monocytes # (Auto) 1.05  0.11-0.59  K/uL


 


Eosinophils # (Auto) 0.29  0-0.5  K/uL


 


Basophils # (Auto) 0.03  0-0.2  K/uL


 


RDW Standard Deviation 43.2  36.4-46.3  fL


 


RDW Coefficient of Variation 13.7  11.5-14.5  %


 


Immature Granulocyte % (Auto) 0.6   %


 


Immature Granulocyte # (Auto) 0.09  0.00-0.02  K/uL


 


Sodium Level 137  136-145  mmol/L


 


Potassium Level 4.2  3.5-5.1  mmol/L


 


Chloride Level 101    mmol/L


 


Carbon Dioxide Level 27  21-32  mmol/L


 


Anion Gap 9.0  3-11  mmol/L


 


Blood Urea Nitrogen 24  7-18  mg/dl


 


Creatinine


  0.87


  


  0.60-1.40


mg/dl


 


Est Creatinine Clear Calc


Drug Dose 97.3


  


   ml/min


 


 


Estimated GFR (


American) 109.5


  


   


 


 


Estimated GFR (Non-


American 94.5


  


   


 


 


BUN/Creatinine Ratio 27.7  10-20  


 


Random Glucose 382  70-99  mg/dl


 


Calcium Level 8.4  8.5-10.1  mg/dl


 


Total Bilirubin 0.1  0.2-1  mg/dl


 


Direct Bilirubin < 0.1  0-0.2  mg/dl


 


Aspartate Amino Transf


(AST/SGOT) 10


  


  15-37  U/L


 


 


Alanine Aminotransferase


(ALT/SGPT) 10


  


  12-78  U/L


 


 


Alkaline Phosphatase 83    U/L


 


Total Protein 7.3  6.4-8.2  gm/dl


 


Albumin 2.0  3.4-5.0  gm/dl


 


Beta-Hydroxybutyric Acid 3.53  0.2-2.81  mg/dL


 


Bedside Troponin I  0.170 0-0.045  ng/ml


 


NT-Pro-B-Type Natriuretic


Peptide 


  2390


  0-900  pg/ml


 











Impression


Assessment and Plan


61-year-old male with past medical history of coronary artery disease status 

post stents about 10 years ago, uncontrolled diabetes mellitus, hypertension, 

ischemic cardiomyopathy, left foot diabetic ulcer status post debridement, 

bipolar disorder, chronic smoker presented to the ER with complaints of chest 

pain and shortness of breath that started yesterday.








Precordial chest pain


-History of coronary artery disease status post stents 10 years ago


- EKG:


- Initial troponin 0.17 troponins trended every 8 hours


-Stress echo in March 2016 


* Resting wall motion: Normal.  Stress wall motion: Appropriate increase in 

Left ventricular systolic function and decrease in cavity size.  No stress 

induced segmental wall motion abnormalities.


- Cardiology consult


- Continue aspirin, Plavix, metoprolol 50 mg twice a day, Zocor 40 mg daily





Hypertension: Uncontrolled


- Continue amlodipine 10 mg daily, Zestril 40 mg daily, hydralazine 25 mg 3 

times a day, hydrochlorothiazide 25 mg





Poorly controlled diabetes with hyperglycemia:


Poor compliance with insulin


- Glycemic consult


- Last hemoglobin A1c 13.3% in May 2017





Lower left extremity swelling:


DVT versus postsurgical changes from diabetic foot ulcer debridement


- Doppler ultrasound pending





Left foot diabetic ulcer and abscess status post I&D :


Wound care consult





Anemia:


- Hemoglobin at 9.7, stable since discharge


- No active bleeding , Hemoccult ordered





Peripheral arterial disease:


 s/p Left Lower Extemity Angiogram,  Percutaneous Transluminal Angioplasty and 

Stenting Left Popliteal and Superficial Femoral Artery, Mechanical Closure of 

Right Femoral Artery by Dr. Rainey on 5/26/17 


- Pain control with morphine and tramadol





Bipolar disorder:


- Continue Depakote and Lexapro








DVT prophylaxis:


Heparin





Full code





Disposition: Admitted to telemetry





Resident Physician Supervision Note:





I was present with Dr. Jimenez during the history and exam. I discussed the case 

with the resident and agree with the findings and plan as documented in the 

note.  Any exceptions or clarifications are listed here: 





60 y/o M Complex Hx including CAD, PVD, CHF, COPD - continues to smoke


Presents with SOB and CP - Trop is elevated





OE


AAO x 3 


Reduced air entry at bases - mild end-expiratory wheezing


NT, ND, BS+


L leg edema present


No deficits





P:


Treat for CHF - diuresis


Trend trop - elevation likely 2/2 CHF - would provide full dose anticoagulation 

with upward trend - cont ASA, Plavix, Statin


Will obtain LE doppler to insure no post-op DVT


Advised on smoking cessation








Documented By:  Eleuterio Serrato





Level of Care


Telemetry





Advanced Directives


Existing Living Will:  No


Existing Power of :  No





Resuscitation Status


FULL RESUSCITATION





VTE Prophylaxis


VTE Risk Assessment Done? Y/N:  Yes


Risk Level:  Moderate


Given or contraindicated:  Unfractionated heparin SQ





Resident Tracking


Resident Involvement:  Resident Care Provided


Care Provided:  Adult Hospital Medicine

## 2017-06-10 VITALS
SYSTOLIC BLOOD PRESSURE: 148 MMHG | OXYGEN SATURATION: 91 % | HEART RATE: 66 BPM | DIASTOLIC BLOOD PRESSURE: 61 MMHG | TEMPERATURE: 98.78 F

## 2017-06-10 VITALS
TEMPERATURE: 98.42 F | HEART RATE: 72 BPM | DIASTOLIC BLOOD PRESSURE: 81 MMHG | SYSTOLIC BLOOD PRESSURE: 154 MMHG | OXYGEN SATURATION: 90 %

## 2017-06-10 VITALS
TEMPERATURE: 98.06 F | DIASTOLIC BLOOD PRESSURE: 66 MMHG | SYSTOLIC BLOOD PRESSURE: 152 MMHG | OXYGEN SATURATION: 92 % | HEART RATE: 70 BPM

## 2017-06-10 VITALS
HEART RATE: 61 BPM | DIASTOLIC BLOOD PRESSURE: 78 MMHG | OXYGEN SATURATION: 91 % | TEMPERATURE: 98.24 F | SYSTOLIC BLOOD PRESSURE: 155 MMHG

## 2017-06-10 VITALS — OXYGEN SATURATION: 91 %

## 2017-06-10 VITALS
SYSTOLIC BLOOD PRESSURE: 145 MMHG | HEART RATE: 60 BPM | DIASTOLIC BLOOD PRESSURE: 81 MMHG | TEMPERATURE: 98.96 F | OXYGEN SATURATION: 90 %

## 2017-06-10 VITALS — OXYGEN SATURATION: 93 %

## 2017-06-10 VITALS
TEMPERATURE: 98.24 F | OXYGEN SATURATION: 92 % | DIASTOLIC BLOOD PRESSURE: 68 MMHG | HEART RATE: 64 BPM | SYSTOLIC BLOOD PRESSURE: 152 MMHG

## 2017-06-10 VITALS — OXYGEN SATURATION: 90 %

## 2017-06-10 VITALS — OXYGEN SATURATION: 94 %

## 2017-06-10 LAB
ANION GAP SERPL CALC-SCNC: 7 MMOL/L (ref 3–11)
BASOPHILS # BLD: 0.03 K/UL (ref 0–0.2)
BASOPHILS NFR BLD: 0.3 %
BUN SERPL-MCNC: 25 MG/DL (ref 7–18)
BUN/CREAT SERPL: 32.2 (ref 10–20)
CALCIUM SERPL-MCNC: 7.8 MG/DL (ref 8.5–10.1)
CHLORIDE SERPL-SCNC: 103 MMOL/L (ref 98–107)
CHOLEST/HDLC SERPL: 3.9 {RATIO}
CO2 SERPL-SCNC: 28 MMOL/L (ref 21–32)
COMPLETE: YES
CREAT CL PREDICTED SERPL C-G-VRATE: 108.6 ML/MIN
CREAT SERPL-MCNC: 0.78 MG/DL (ref 0.6–1.4)
EOSINOPHIL NFR BLD AUTO: 423 K/UL (ref 130–400)
EOSINOPHIL NFR BLD AUTO: 471 K/UL (ref 130–400)
GLUCOSE SERPL-MCNC: 144 MG/DL (ref 70–99)
GLUCOSE UR QL: 35 MG/DL
HCT VFR BLD CALC: 24.8 % (ref 42–52)
HCT VFR BLD CALC: 25.1 % (ref 42–52)
IG%: 0.5 %
IMM GRANULOCYTES NFR BLD AUTO: 19.4 %
KETONES UR QL STRIP: 65 MG/DL
LYMPHOCYTES # BLD: 1.87 K/UL (ref 1.2–3.4)
MCH RBC QN AUTO: 28.2 PG (ref 25–34)
MCH RBC QN AUTO: 28.3 PG (ref 25–34)
MCHC RBC AUTO-ENTMCNC: 33.1 G/DL (ref 32–36)
MCHC RBC AUTO-ENTMCNC: 33.1 G/DL (ref 32–36)
MCV RBC AUTO: 85.2 FL (ref 80–100)
MCV RBC AUTO: 85.7 FL (ref 80–100)
MONOCYTES NFR BLD: 8.1 %
NEUTROPHILS # BLD AUTO: 3.1 %
NEUTROPHILS NFR BLD AUTO: 68.6 %
NITRITE UR QL STRIP: 182 MG/DL (ref 0–150)
PH UR: 136 MG/DL (ref 0–200)
PMV BLD AUTO: 7.7 FL (ref 7.4–10.4)
PMV BLD AUTO: 8 FL (ref 7.4–10.4)
POTASSIUM SERPL-SCNC: 4.1 MMOL/L (ref 3.5–5.1)
RBC # BLD AUTO: 2.91 M/UL (ref 4.7–6.1)
RBC # BLD AUTO: 2.93 M/UL (ref 4.7–6.1)
SODIUM SERPL-SCNC: 138 MMOL/L (ref 136–145)
VERY LOW DENSITY LIPOPROT CALC: 36 MG/DL
WBC # BLD AUTO: 10.21 K/UL (ref 4.8–10.8)
WBC # BLD AUTO: 9.62 K/UL (ref 4.8–10.8)

## 2017-06-10 RX ADMIN — DOCUSATE SODIUM SCH MG: 100 CAPSULE, LIQUID FILLED ORAL at 21:01

## 2017-06-10 RX ADMIN — TRAMADOL HYDROCHLORIDE PRN MG: 50 TABLET, COATED ORAL at 07:48

## 2017-06-10 RX ADMIN — METOPROLOL SUCCINATE SCH MG: 50 TABLET, EXTENDED RELEASE ORAL at 21:02

## 2017-06-10 RX ADMIN — HEPARIN SODIUM SCH UNIT: 10000 INJECTION, SOLUTION INTRAVENOUS; SUBCUTANEOUS at 13:52

## 2017-06-10 RX ADMIN — INSULIN GLARGINE SCH UNIT: 100 INJECTION, SOLUTION SUBCUTANEOUS at 07:40

## 2017-06-10 RX ADMIN — ESCITALOPRAM OXALATE SCH MG: 20 TABLET, FILM COATED ORAL at 09:24

## 2017-06-10 RX ADMIN — MORPHINE SULFATE PRN MG: 4 INJECTION, SOLUTION INTRAMUSCULAR; INTRAVENOUS at 09:25

## 2017-06-10 RX ADMIN — GABAPENTIN SCH MG: 300 CAPSULE ORAL at 21:02

## 2017-06-10 RX ADMIN — LISINOPRIL SCH MG: 40 TABLET ORAL at 09:25

## 2017-06-10 RX ADMIN — CLINDAMYCIN HYDROCHLORIDE SCH MG: 150 CAPSULE ORAL at 13:50

## 2017-06-10 RX ADMIN — TRAMADOL HYDROCHLORIDE PRN MG: 50 TABLET, COATED ORAL at 16:48

## 2017-06-10 RX ADMIN — MORPHINE SULFATE PRN MG: 4 INJECTION, SOLUTION INTRAMUSCULAR; INTRAVENOUS at 22:28

## 2017-06-10 RX ADMIN — INSULIN ASPART SCH UNITS: 100 INJECTION, SOLUTION INTRAVENOUS; SUBCUTANEOUS at 12:11

## 2017-06-10 RX ADMIN — CLINDAMYCIN HYDROCHLORIDE SCH MG: 150 CAPSULE ORAL at 18:10

## 2017-06-10 RX ADMIN — DOCUSATE SODIUM SCH MG: 100 CAPSULE, LIQUID FILLED ORAL at 09:23

## 2017-06-10 RX ADMIN — HEPARIN SODIUM SCH UNIT: 10000 INJECTION, SOLUTION INTRAVENOUS; SUBCUTANEOUS at 05:11

## 2017-06-10 RX ADMIN — MORPHINE SULFATE PRN MG: 4 INJECTION, SOLUTION INTRAMUSCULAR; INTRAVENOUS at 13:53

## 2017-06-10 RX ADMIN — INSULIN ASPART SCH UNITS: 100 INJECTION, SOLUTION INTRAVENOUS; SUBCUTANEOUS at 16:49

## 2017-06-10 RX ADMIN — Medication SCH MG: at 09:23

## 2017-06-10 RX ADMIN — CLINDAMYCIN HYDROCHLORIDE SCH MG: 150 CAPSULE ORAL at 21:01

## 2017-06-10 RX ADMIN — ACETAMINOPHEN PRN MG: 325 TABLET ORAL at 19:34

## 2017-06-10 RX ADMIN — Medication SCH MCG: at 09:25

## 2017-06-10 RX ADMIN — HEPARIN SODIUM SCH UNIT: 10000 INJECTION, SOLUTION INTRAVENOUS; SUBCUTANEOUS at 21:06

## 2017-06-10 RX ADMIN — ACETAMINOPHEN PRN MG: 325 TABLET ORAL at 01:42

## 2017-06-10 RX ADMIN — LISINOPRIL SCH MG: 40 TABLET ORAL at 21:03

## 2017-06-10 RX ADMIN — INSULIN ASPART SCH UNITS: 100 INJECTION, SOLUTION INTRAVENOUS; SUBCUTANEOUS at 07:39

## 2017-06-10 RX ADMIN — INSULIN ASPART SCH UNITS: 100 INJECTION, SOLUTION INTRAVENOUS; SUBCUTANEOUS at 20:58

## 2017-06-10 RX ADMIN — MORPHINE SULFATE PRN MG: 4 INJECTION, SOLUTION INTRAMUSCULAR; INTRAVENOUS at 18:10

## 2017-06-10 RX ADMIN — AMLODIPINE BESYLATE SCH MG: 5 TABLET ORAL at 09:24

## 2017-06-10 RX ADMIN — CLOPIDOGREL BISULFATE SCH MG: 75 TABLET, FILM COATED ORAL at 09:24

## 2017-06-10 RX ADMIN — METOPROLOL SUCCINATE SCH MG: 50 TABLET, EXTENDED RELEASE ORAL at 09:25

## 2017-06-10 RX ADMIN — INSULIN GLARGINE SCH UNIT: 100 INJECTION, SOLUTION SUBCUTANEOUS at 21:05

## 2017-06-10 RX ADMIN — DIVALPROEX SODIUM SCH MG: 500 TABLET, DELAYED RELEASE ORAL at 21:02

## 2017-06-10 RX ADMIN — MORPHINE SULFATE PRN MG: 4 INJECTION, SOLUTION INTRAMUSCULAR; INTRAVENOUS at 05:11

## 2017-06-10 RX ADMIN — HYDROCHLOROTHIAZIDE SCH MG: 25 TABLET ORAL at 09:24

## 2017-06-10 RX ADMIN — SIMVASTATIN SCH MG: 80 TABLET, FILM COATED ORAL at 21:04

## 2017-06-10 RX ADMIN — CLINDAMYCIN HYDROCHLORIDE SCH MG: 150 CAPSULE ORAL at 09:23

## 2017-06-10 NOTE — PROGRESS NOTE
DATE: 06/10/2017

 

HISTORY OF PRESENT ILLNESS:  The patient was seen by me early this afternoon

in his telemetry unit room.  He states he slept well overnight.  No orthopnea

or dyspnea.  He has no further complaints of any type of chest discomfort. 

No chest tightness or chest pains.  No palpitations.  No lightheadedness or

syncope.  No abdominal pain or nausea.  He continues to have pain in his left

foot.  It is unchanged.  No calf pain.  No cerebrovascular or peripheral

vascular complaints.  He denies any bleeding complaints of any sort.

 

Intake and output yesterday reported to be 1090/600.  Today 240/450.  Today's

weight 86 kg.  Yesterday's weight 86 kg.

 

PHYSICAL EXAMINATION:

VITAL SIGNS:  This morning with oral temperature 36.8, pulse 61, blood

pressure 155/78, pulse oximetry room air 91%.  Repeat blood pressure just

taken was 152/66.  The pulse was 76.

GENERAL APPEARANCE:  Shows him to be in no distress.

NECK:  No jugular venous distention.

LUNGS:  Normal respiratory effort.  Clear.  No rales or wheezes.

HEART:  Regular rate and rhythm.  S1, S2 normal.  No S3 or S4.  2/6 systolic

murmur second right intercostal space.  No diastolic murmur or rub.

ABDOMEN:  Soft.  Nontender.  No palpable masses or organomegaly.  No bruits.

EXTREMITIES:  1+ left pretibial edema.  No edema in right leg.  No calf

tenderness.

NEUROLOGIC:  Alert and oriented x3.  Motor grossly intact.

PSYCHIATRIC:  Affect is normal.

 

LABORATORY DATA:  Today with WBC 9.62, hemoglobin 8.2, hematocrit 24.8,

platelet count 471.  Metabolic profile; sodium 138, potassium 4.1, chloride

103, carbon dioxide 28, BUN 25, creatinine 0.78, and random glucose 144. 

Troponin I today 0.447.  Yesterday, peak troponin I was 0.535.

 

Lipid profile yesterday with triglycerides 182, total cholesterol 136,

calculated LDL 65, HDL 35.

 

CURRENT MEDICATIONS:  NovoLog sliding scale insulin, docusate sodium 100 mg

b.i.d., lisinopril 40 mg b.i.d., metoprolol succinate ER 50 mg b.i.d., Lantus

insulin 50 units subQ b.i.d., hydrochlorothiazide 25 mg daily, Depakote 2000

mg at bedtime, simvastatin 40 mg at bedtime, gabapentin 200 mg at bedtime,

heparin subQ 5000 units q. 8 hours, clopidogrel 75 mg daily, vitamin B12 1000

mcg daily, Lexapro 20 mg daily, Amlodipine 10 mg daily, aspirin 325 mg daily,

clindamycin 300 mg q.i.d.  He is also on several p.r.n. medications.

 

ALLERGIES:  No known drug allergies.

 

Echocardiogram performed yesterday and reviewed by me showed normal left

ventricular systolic function.  Estimated LV ejection fraction 55%.  No

segmental wall motion abnormalities.  Moderate concentric left ventricular

hypertrophy.  Probable normal central venous pressure.  Trace tricuspid

regurgitation.  Aortic valve sclerosis without stenosis.  Mild mitral

regurgitation.

 

ASSESSMENT:

1.  Acute diastolic congestive heart failure.  This is improved with

diuresis.  No current evidence on exam of pulmonary vascular congestion.  He

is lying flat without any complaints.

2.  Hypertension.  Mildly elevated systolic pressure.  Diastolic pressure

well controlled.

3.  Heart rate well controlled.

4.  No anginal complaints at this time.

5.  Mildly elevated troponin I.  Suspect secondary to demand ischemia.

6.  Peripheral arterial disease.

7.  Longstanding history of coronary artery disease.

## 2017-06-10 NOTE — PROGRESS NOTE
DATE: 06/10/2017

 

SUBJECTIVE:  The patient was seen by me early this afternoon in his telemetry

unit bed.  He denies orthopnea or PND overnight.  No dyspnea today.  No chest

pain or other anginal type pains.  No chest complaints of any sort.  No

palpitations, lightheadedness, syncope, abdominal pain or nausea.  No

cerebrovascular complaints.  Has continued pain in his left foot; this is

stable.  No fevers or chills.  He denies any bleeding complaints.

 

CURRENT MEDICATIONS:  Clopidogrel 75 mg daily, vitamin B12 1000 mcg daily,

Lexapro 20 mg daily, HCTZ 25 mg daily, amlodipine 10 mg daily, aspirin 325 mg

daily, Depakote 2000 mg at bedtime, docusate sodium 100 mg b.i.d., lisinopril

40 mg b.i.d., metoprolol succinate ER 50 mg b.i.d., simvastatin 40 mg at

bedtime, Lantus insulin 50 units subQ b.i.d., gabapentin 300 mg at bedtime,

clindamycin 300 mg q.i.d., subQ heparin 5000 units q. 8 hours and several

p.r.n. medications.

 

PHYSICAL EXAMINATION:

GENERAL:  Intake and output yesterday reportedly 1090/600.  Today; 240/450. 

Today's weight; 86 kg.  Yesterday's weight was reported to be 86 kg.

VITAL SIGNS:  Early this afternoon with oral temperature of 36.7, pulse 70,

blood pressure 152/66 and pulse oximetry on nasal cannula oxygen 92%.

NECK:  No jugular venous distention.

LUNGS:  Normal respiratory effort.  Clear.  No rales or wheezes.

HEART:  Regular rate and rhythm.  S1, S2 normal.  No S3 or S4.  A 2/6

systolic murmur second right intercostal space.  No diastolic murmur or rub.

ABDOMEN:  Soft.  Nontender.  No palpable masses or organomegaly.  No bruits.

EXTREMITIES:  Has 1+ pretibial edema on left.  None on the right.  No calf

tenderness.

NEUROLOGIC:  Alert and oriented x3.  Motor grossly intact.

PSYCHIATRIC:  Affect is normal.

 

DATA:  Echocardiogram performed yesterday and reviewed by me shows moderate

LVH, low normal overall LV systolic function, no segmental wall motion

abnormalities of the left ventricle, mild mitral regurgitation, trace

tricuspid regurgitation and probable normal central venous pressure.

 

LABORATORIES:  Today; with hemoglobin 8.2, hematocrit 24.8 and platelet count

471.  Sodium 138, potassium 4.1, chloride 103, carbon dioxide 28, BUN 25,

creatinine 0.78 and random glucose 144.  Troponin I 0.447.  Lipid profile

with triglycerides 182, total cholesterol 136, calculated LDL 65 and HDL 35. 

Yesterday's troponin I was 0.535; this was the peak troponin I during this

admission.

 

ASSESSMENT:

1.  Resolution of acute diastolic congestive heart failure.  No current signs

or symptoms of congestive heart failure.

2.  Mildly elevated troponin I on this admission.  I suspect secondary to

demand ischemia; this is in the presence of significant underlying coronary

artery disease, I do not suspect an acute coronary syndrome. 

Electrocardiogram did not have any acute changes suggestive of myocardial

ischemia or injury.  Echocardiogram shows normal left ventricular systolic

function.  No segmental wall motion abnormalities of the left ventricle.  The

patient has known diffuse coronary artery disease, diffuse calcifications in

his coronary arteries previously documented.

3.  Anemia.  No symptoms of bleeding.  His hemoglobin has decreased despite

diuresis.

4.  Dyslipidemia, good control of LDL.

 

RECOMMENDATIONS:

1.  Continue medical management at this time of his underlying coronary

artery disease.  Would reserve performing a cardiac catheterization procedure

for evidence of ST elevation myocardial infarction or ongoing and

uncontrollable myocardial ischemia.

2.  Would heme test stools .

3.  Anemia workup.

4.  Continue current cardiac medications and doses.  He recently had

interventional procedures performed to the arteries in his left leg.  In

light of this and his coronary artery disease, ideally would like to keep him

on dual antiplatelet therapy.

5.  From a cardiac standpoint, no further evaluation or change in treatment

at this time.

 

 

 

 

CHIVO

## 2017-06-10 NOTE — PHARMACY PROGRESS NOTE
Glycemic Control:  Progress Nt


Date of Service


Nato 10, 2017.





Scope


Glycemic Pharmacist consulted by Dr Jimenez on 6/9/17 for glycemic control and 

to write orders per [facility] inpatient glycemic control protocol.





Objective


Accuchecks BSG (last 24hrs):











Test


  6/9/17


11:19 6/9/17


16:13 6/9/17


20:09 6/10/17


01:43


 


Bedside Glucose


  284 mg/dl


(70-99) 122 mg/dl


(70-99) 154 mg/dl


(70-99) 144 mg/dl


(70-99)


 


Test


  6/10/17


06:06 6/10/17


06:31 


  


 


 


Random Glucose


  144 mg/dl


(70-99) 


  


  


 


 


Bedside Glucose


  


  179 mg/dl


(70-99) 


  


 








Laboratory Data (last 24hrs)











Test


  6/10/17


06:06


 


Anion Gap 7.0 mmol/L 


 


BUN/Creatinine Ratio 32.2 


 


Blood Urea Nitrogen 25 mg/dl 


 


Creatinine 0.78 mg/dl 


 


Potassium Level 4.1 mmol/L 


 


Sodium Level 138 mmol/L 


 


White Blood Count 9.62 K/uL 


 


Red Blood Count 2.91 M/uL 


 


Hemoglobin 8.2 g/dL 


 


Hematocrit 24.8 % 


 


Mean Corpuscular Volume 85.2 fL 


 


Mean Corpuscular Hemoglobin 28.2 pg 


 


Mean Corpuscular Hemoglobin


Concent 33.1 g/dl 


 


 


Platelet Count 471 K/uL 


 


Mean Platelet Volume 8.0 fL 


 


Neutrophils (%) (Auto) 68.6 % 


 


Lymphocytes (%) (Auto) 19.4 % 


 


Monocytes (%) (Auto) 8.1 % 


 


Eosinophils (%) (Auto) 3.1 % 


 


Basophils (%) (Auto) 0.3 % 


 


Neutrophils # (Auto) 6.59 K/uL 


 


Lymphocytes # (Auto) 1.87 K/uL 


 


Monocytes # (Auto) 0.78 K/uL 


 


Eosinophils # (Auto) 0.30 K/uL 


 


Basophils # (Auto) 0.03 K/uL 








HbA1c:


13.3% 5/22/17





Recent Pertinent Medications


Outpatient Anti-diabetic Regimen:


* Lantus 15 units BID*


* Humalog per sliding scale*


* *Patient has not been using insulin for the past 9 days prior to admission


* Metformin ER 1gm BID


* A1c = 13.3 %  5/22/17








The patient is currently receiving:


* Basal insulin:             Lantus 15 units every 12 hours





* Correctional Insulin:   Novolog Correction per scale ACHS and at 0200


                          Goal Range: Low 120 mg/dL - High 160 mg/dL


                          Correction Factor: 20 mg/dL/unit





* Prandial insulin:         Per carb ratio of 1 unit per 7 grams CHO consumed








* Oral Agents:             None currently








Risk Factors for Insulin Resistance:


* Infection: L foot ulcer; receiving Clindamycin PO


* Diet: ordered T2DM / AHA diet





Assessment & Plan


ASSESSMENT:





6/10/17


* Poorly controlled type 2 diabetic admitted for CP and SOB


* He had not been using his insulin for 9 days prior to admission - BSGs in the 

upper 300's yesterday AM however now down to 140-170's range with current 

insulin orders


* This patient is known to the glycemic control service from prior admission.  

Prior admission data reviewed.  Would estimate this patient to require ~50-60 

units of insulin per day when tolerating a diet (3 meals per day).  On prior 

admission his dietary habits had been erratic (skipping meals, eating meals of 

different sizes) leading to fluctuating daily doses.


* Overall the current orders are a reasonable starting point.  Fasting -

179 this AM w/ 30 units basal on board, this is higher than desired however 

this is not a bad fasting BSG at this point considering yesterday's BSGs, plus 

the current Lantus dose has not achieved steady-state.  Plan to continue the 

same Lantus dose for now.  Novolog CF and CR performed well on last admit 

considering erratic diet - will also continue the same and follow BSG pattern.








PLAN FOR INPATIENT GLYCEMIC CONTROL:


* Continuing Lantus 15 units SQ BID


* Continuing correction factor of 20 mg/dl/unit


* Continuing carb ratio of 1 unit per 7 grams CHO consumed


* Continuing goal range of Low 120 mg/dL - High 160 mg/dL


* D/C 0200 BSG check








* Please note that the plan above was derived based on current level of insulin 

resistance and hospital stress. These recommendations are appropriate for 

inpatient admission only. Plan of care upon discharge will need to be 

reassessed to avoid potential outpatient hypo/hyperglycemia. 








Thank you.

## 2017-06-10 NOTE — HOSPITALIST PROGRESS NOTE
Hospitalist Progress Note


Date of Service


Nato 10, 2017.





Subjective


Pt evaluation today including:  conversation w/ patient


Pt had drop in hgb today. Denies any blood in stool, has not had a BM since 

admission, no hematuria. No further CP. Feels well, no abd pain.


He states he doesn't give himself any insulin at home ever. he doesn't like to 

test his glucose as it hurts his fingers. He also reports he did not take the 

antibiotics for his foot wound as he was supposed to





   Constitutional:  No fever


   Musculoskeletal:  + problem reported (foot pain)


   All Other Systems:  Reviewed and Negative





Objective


Vital Signs











  Date Time  Temp Pulse Resp B/P (MAP) Pulse Ox O2 Delivery O2 Flow Rate FiO2


 


6/10/17 20:00      Nasal Cannula 2.0 


 


6/10/17 19:31 37.2 60 16 145/81 (102) 90 Room Air  


 


6/10/17 16:00     90 Room Air  


 


6/10/17 15:18 36.9 72 16 154/81 (105) 90 Room Air  


 


6/10/17 13:55     94 Room Air  


 


6/10/17 12:19 36.7 70 16 152/66 (94) 92 Nasal Cannula  


 


6/10/17 12:00     93 Room Air  


 


6/10/17 08:00     91 Room Air  


 


6/10/17 07:54 36.8 61 16 155/78 (103) 91 Room Air  


 


6/10/17 04:33 36.8 64 18 152/68 (96) 92 Nasal Cannula 2.0 


 


6/10/17 04:00      Nasal Cannula 2.0 


 


6/10/17 00:06 37.1 66 18 148/61 (90) 91 Nasal Cannula 1.0 


 


6/9/17 23:59     93 Nasal Cannula 1.0 











Physical Exam


General Appearance:  no apparent distress, + thin


Eyes:  normal inspection, sclerae normal


ENT:  hearing grossly normal


Neck:  trachea midline


Respiratory/Chest:  no respiratory distress, no accessory muscle use, + 

decreased breath sounds (throughout with scattered exp wheezes)


Cardiovascular:  regular rate, rhythm, no murmur, + pertinent finding (left leg 

with 1+ pitting edema, wound vac in place on left foot)


Abdomen:  normal bowel sounds, non tender, soft


Extremities:  no calf tenderness


Neurologic/Psychiatric:  alert


Skin:  normal color, warm/dry, no rash





Laboratory Results





Last 24 Hours








Test


  6/10/17


01:43 6/10/17


06:06 6/10/17


06:31 6/10/17


11:21


 


Bedside Glucose 144 mg/dl   179 mg/dl  139 mg/dl 


 


White Blood Count  9.62 K/uL   


 


Red Blood Count  2.91 M/uL   


 


Hemoglobin  8.2 g/dL   


 


Hematocrit  24.8 %   


 


Mean Corpuscular Volume  85.2 fL   


 


Mean Corpuscular Hemoglobin  28.2 pg   


 


Mean Corpuscular Hemoglobin


Concent 


  33.1 g/dl 


  


  


 


 


Platelet Count  471 K/uL   


 


Mean Platelet Volume  8.0 fL   


 


Neutrophils (%) (Auto)  68.6 %   


 


Lymphocytes (%) (Auto)  19.4 %   


 


Monocytes (%) (Auto)  8.1 %   


 


Eosinophils (%) (Auto)  3.1 %   


 


Basophils (%) (Auto)  0.3 %   


 


Neutrophils # (Auto)  6.59 K/uL   


 


Lymphocytes # (Auto)  1.87 K/uL   


 


Monocytes # (Auto)  0.78 K/uL   


 


Eosinophils # (Auto)  0.30 K/uL   


 


Basophils # (Auto)  0.03 K/uL   


 


RDW Standard Deviation  43.1 fL   


 


RDW Coefficient of Variation  13.8 %   


 


Immature Granulocyte % (Auto)  0.5 %   


 


Immature Granulocyte # (Auto)  0.05 K/uL   


 


Sodium Level  138 mmol/L   


 


Potassium Level  4.1 mmol/L   


 


Chloride Level  103 mmol/L   


 


Carbon Dioxide Level  28 mmol/L   


 


Anion Gap  7.0 mmol/L   


 


Blood Urea Nitrogen  25 mg/dl   


 


Creatinine  0.78 mg/dl   


 


Est Creatinine Clear Calc


Drug Dose 


  108.6 ml/min 


  


  


 


 


Estimated GFR (


American) 


  114.5 


  


  


 


 


Estimated GFR (Non-


American 


  98.8 


  


  


 


 


BUN/Creatinine Ratio  32.2   


 


Random Glucose  144 mg/dl   


 


Calcium Level  7.8 mg/dl   


 


Troponin I  0.447 ng/ml   


 


Triglycerides Level  182 mg/dl   


 


Cholesterol Level  136 mg/dl   


 


HDL Cholesterol  35 mg/dl   


 


LDL Cholesterol, Calculated  65 mg/dl   


 


VLDL Cholesterol, Calculated  36 mg/dl   


 


Cholesterol/HDL Ratio  3.9   


 


Test


  6/10/17


16:21 6/10/17


16:29 6/10/17


20:31 


 


 


Bedside Glucose 167 mg/dl   119 mg/dl  


 


White Blood Count  10.21 K/uL   


 


Red Blood Count  2.93 M/uL   


 


Hemoglobin  8.3 g/dL   


 


Hematocrit  25.1 %   


 


Mean Corpuscular Volume  85.7 fL   


 


Mean Corpuscular Hemoglobin  28.3 pg   


 


Mean Corpuscular Hemoglobin


Concent 


  33.1 g/dl 


  


  


 


 


RDW Standard Deviation  43.4 fL   


 


RDW Coefficient of Variation  13.8 %   


 


Platelet Count  423 K/uL   


 


Mean Platelet Volume  7.7 fL   











Assessment and Plan


61-year-old male with past medical history of coronary artery disease status 

post stents about 10 years ago, uncontrolled diabetes mellitus, hypertension, 

PAD s/p recent stent placement left leg, ischemic cardiomyopathy, left foot 

diabetic ulcer status post debridement, bipolar disorder, chronic smoker 

presented to the ER with complaints of chest pain and shortness of breath.


He had mildly elevated but stable troponin. ECHO no WMA, preserved EF


ECHO:


 There is moderate concentric left ventricular hypertrophy.


  *  Left ventricular systolic function is normal.


  *  No regional wall motion abnormalities noted.


  *  The left atrium is mildly dilated.


  *  There is mild mitral regurgitation.


  *  Compared to an echocardiogram from 3/2016, there is minimal change





He reports he didn't take any insulin once he left here 9 days ago. He was 

taking antibiotics for his wound but thought he was done with them--> review of 

record shows he was to still been them through 6/10-this is now restarted.


He has no further chest pain, I reviewed Cardiology consultation, no further 

eval being done at this time. He received 1 dose of lasix IV on admission and 

has had no further CP since then. LLE Doppler neg for DVT despite edema. He 

took NTG on the way to the hospital








Chest pain-likely secondary to acute on chronic diastolic CHF


-Continue home meds, DAPT, statin, antihypertensives-beta blocker, ACEI


-will add on long acting nitrate for chest pain/angina


- Cardiology consult appreciated-no further eval warranted at this point


- Continue aspirin, Plavix, metoprolol 50 mg twice a day, Zocor 40 mg daily


-Counseled on smoking cessation 





Hypertension: Uncontrolled


- Continue amlodipine 10 mg daily, Zestril 40 mg daily, hydrochlorothiazide 25 

mg, metoprolol


-stop hydralazine and starting Imdur 30mg daily





Poorly controlled diabetes with hyperglycemia:


Poor compliance with insulin


- Glycemic consult


- Last hemoglobin A1c 13.3% in May 2017


-will prescribe Lantus 20 units qhs to go home with as this will not likely 

make him hypoglycemic but if he is not willing to check his glucose, he can at 

least take this does and it will be ok to check glucose prn given extreme 

noncompliance





Lower left extremity swelling: Doppler neg for DVT





Left foot diabetic ulcer and abscess status post I&D and arterial stent 

placement :


Wound care consult


-continue wound vac


-restarted clindamycin


-Home wound care  and [facility] on Wed





Anemia:Hemoglobin at 9.7 on admisison, now dropped to 8.2 without obvious 

bleeding, repeat remains at 8.3 which is a drop again from last admission


- No active bleeding , Hemoccult ordered


-check Fe studies, B12, folate, TSH


-keep overnight and follow CBC in AM. If stable or goes up, will dc to home





Peripheral arterial disease:


 s/p Left Lower Extemity Angiogram,  Percutaneous Transluminal Angioplasty and 

Stenting Left Popliteal and Superficial Femoral Artery, Mechanical Closure of 

Right Femoral Artery by Dr. Rainey on 5/26/17 


- Pain control with morphine and tramadol





Bipolar disorder:


- Continue Depakote and Lexapro








DVT prophylaxis:


Heparin





Full code





Disposition: Admitted to telemetry

## 2017-06-11 VITALS
SYSTOLIC BLOOD PRESSURE: 156 MMHG | TEMPERATURE: 98.24 F | OXYGEN SATURATION: 94 % | HEART RATE: 61 BPM | DIASTOLIC BLOOD PRESSURE: 74 MMHG

## 2017-06-11 VITALS
TEMPERATURE: 97.52 F | DIASTOLIC BLOOD PRESSURE: 82 MMHG | SYSTOLIC BLOOD PRESSURE: 145 MMHG | OXYGEN SATURATION: 93 % | HEART RATE: 63 BPM

## 2017-06-11 VITALS
DIASTOLIC BLOOD PRESSURE: 66 MMHG | HEART RATE: 60 BPM | SYSTOLIC BLOOD PRESSURE: 154 MMHG | OXYGEN SATURATION: 90 % | TEMPERATURE: 98.24 F

## 2017-06-11 VITALS
TEMPERATURE: 98.96 F | SYSTOLIC BLOOD PRESSURE: 161 MMHG | DIASTOLIC BLOOD PRESSURE: 72 MMHG | OXYGEN SATURATION: 96 % | HEART RATE: 101 BPM

## 2017-06-11 VITALS
HEART RATE: 67 BPM | SYSTOLIC BLOOD PRESSURE: 173 MMHG | TEMPERATURE: 98.42 F | OXYGEN SATURATION: 91 % | DIASTOLIC BLOOD PRESSURE: 70 MMHG

## 2017-06-11 VITALS
TEMPERATURE: 98.78 F | SYSTOLIC BLOOD PRESSURE: 147 MMHG | OXYGEN SATURATION: 90 % | DIASTOLIC BLOOD PRESSURE: 57 MMHG | HEART RATE: 61 BPM

## 2017-06-11 VITALS — SYSTOLIC BLOOD PRESSURE: 161 MMHG | DIASTOLIC BLOOD PRESSURE: 81 MMHG

## 2017-06-11 VITALS
SYSTOLIC BLOOD PRESSURE: 125 MMHG | HEART RATE: 56 BPM | DIASTOLIC BLOOD PRESSURE: 69 MMHG | OXYGEN SATURATION: 95 % | TEMPERATURE: 98.24 F

## 2017-06-11 VITALS — OXYGEN SATURATION: 93 % | HEART RATE: 56 BPM | SYSTOLIC BLOOD PRESSURE: 135 MMHG | DIASTOLIC BLOOD PRESSURE: 68 MMHG

## 2017-06-11 LAB
ANION GAP SERPL CALC-SCNC: 8 MMOL/L (ref 3–11)
BASOPHILS # BLD: 0.03 K/UL (ref 0–0.2)
BASOPHILS NFR BLD: 0.3 %
BUN SERPL-MCNC: 28 MG/DL (ref 7–18)
BUN/CREAT SERPL: 38.5 (ref 10–20)
CALCIUM SERPL-MCNC: 8.2 MG/DL (ref 8.5–10.1)
CHLORIDE SERPL-SCNC: 101 MMOL/L (ref 98–107)
CO2 SERPL-SCNC: 29 MMOL/L (ref 21–32)
COMPLETE: YES
CREAT CL PREDICTED SERPL C-G-VRATE: 116 ML/MIN
CREAT SERPL-MCNC: 0.73 MG/DL (ref 0.6–1.4)
EOSINOPHIL NFR BLD AUTO: 450 K/UL (ref 130–400)
FERRITIN SERPL-MCNC: 73.4 NG/ML (ref 8–388)
GLUCOSE SERPL-MCNC: 96 MG/DL (ref 70–99)
HCT VFR BLD CALC: 24.4 % (ref 42–52)
IG%: 0.5 %
IMM GRANULOCYTES NFR BLD AUTO: 20.4 %
LYMPHOCYTES # BLD: 1.93 K/UL (ref 1.2–3.4)
MAGNESIUM SERPL-MCNC: 2.3 MG/DL (ref 1.8–2.4)
MCH RBC QN AUTO: 29.7 PG (ref 25–34)
MCHC RBC AUTO-ENTMCNC: 34.8 G/DL (ref 32–36)
MCV RBC AUTO: 85.3 FL (ref 80–100)
MONOCYTES NFR BLD: 12.3 %
NEUTROPHILS # BLD AUTO: 2.7 %
NEUTROPHILS NFR BLD AUTO: 63.8 %
PMV BLD AUTO: 8 FL (ref 7.4–10.4)
POTASSIUM SERPL-SCNC: 4.2 MMOL/L (ref 3.5–5.1)
RBC # BLD AUTO: 2.86 M/UL (ref 4.7–6.1)
SODIUM SERPL-SCNC: 138 MMOL/L (ref 136–145)
TIBC SERPL-MCNC: 219 MCG/DL (ref 250–450)
TSH SERPL-ACNC: 3.05 UIU/ML (ref 0.3–4.5)
WBC # BLD AUTO: 9.48 K/UL (ref 4.8–10.8)

## 2017-06-11 RX ADMIN — CLOPIDOGREL BISULFATE SCH MG: 75 TABLET, FILM COATED ORAL at 07:43

## 2017-06-11 RX ADMIN — HEPARIN SODIUM SCH UNIT: 10000 INJECTION, SOLUTION INTRAVENOUS; SUBCUTANEOUS at 05:53

## 2017-06-11 RX ADMIN — TRAMADOL HYDROCHLORIDE PRN MG: 50 TABLET, COATED ORAL at 11:51

## 2017-06-11 RX ADMIN — METOPROLOL SUCCINATE SCH MG: 50 TABLET, EXTENDED RELEASE ORAL at 07:42

## 2017-06-11 RX ADMIN — CLINDAMYCIN HYDROCHLORIDE SCH MG: 150 CAPSULE ORAL at 07:42

## 2017-06-11 RX ADMIN — INSULIN ASPART SCH UNITS: 100 INJECTION, SOLUTION INTRAVENOUS; SUBCUTANEOUS at 21:47

## 2017-06-11 RX ADMIN — CLINDAMYCIN HYDROCHLORIDE SCH MG: 150 CAPSULE ORAL at 20:17

## 2017-06-11 RX ADMIN — LISINOPRIL SCH MG: 40 TABLET ORAL at 07:41

## 2017-06-11 RX ADMIN — INSULIN GLARGINE SCH UNIT: 100 INJECTION, SOLUTION SUBCUTANEOUS at 21:45

## 2017-06-11 RX ADMIN — Medication SCH MCG: at 07:43

## 2017-06-11 RX ADMIN — MORPHINE SULFATE PRN MG: 4 INJECTION, SOLUTION INTRAMUSCULAR; INTRAVENOUS at 20:10

## 2017-06-11 RX ADMIN — HEPARIN SODIUM SCH UNIT: 10000 INJECTION, SOLUTION INTRAVENOUS; SUBCUTANEOUS at 21:46

## 2017-06-11 RX ADMIN — ESCITALOPRAM OXALATE SCH MG: 20 TABLET, FILM COATED ORAL at 07:43

## 2017-06-11 RX ADMIN — OXYCODONE HYDROCHLORIDE AND ACETAMINOPHEN PRN TAB: 5; 325 TABLET ORAL at 17:38

## 2017-06-11 RX ADMIN — DIVALPROEX SODIUM SCH MG: 500 TABLET, DELAYED RELEASE ORAL at 20:19

## 2017-06-11 RX ADMIN — MORPHINE SULFATE PRN MG: 4 INJECTION, SOLUTION INTRAMUSCULAR; INTRAVENOUS at 13:37

## 2017-06-11 RX ADMIN — AMLODIPINE BESYLATE SCH MG: 5 TABLET ORAL at 07:41

## 2017-06-11 RX ADMIN — INSULIN ASPART SCH UNITS: 100 INJECTION, SOLUTION INTRAVENOUS; SUBCUTANEOUS at 07:00

## 2017-06-11 RX ADMIN — INSULIN GLARGINE SCH UNIT: 100 INJECTION, SOLUTION SUBCUTANEOUS at 07:39

## 2017-06-11 RX ADMIN — INSULIN ASPART SCH UNITS: 100 INJECTION, SOLUTION INTRAVENOUS; SUBCUTANEOUS at 16:54

## 2017-06-11 RX ADMIN — MORPHINE SULFATE PRN MG: 4 INJECTION, SOLUTION INTRAMUSCULAR; INTRAVENOUS at 03:03

## 2017-06-11 RX ADMIN — CLINDAMYCIN HYDROCHLORIDE SCH MG: 150 CAPSULE ORAL at 16:55

## 2017-06-11 RX ADMIN — Medication SCH MG: at 07:41

## 2017-06-11 RX ADMIN — METOPROLOL SUCCINATE SCH MG: 50 TABLET, EXTENDED RELEASE ORAL at 20:00

## 2017-06-11 RX ADMIN — DOCUSATE SODIUM SCH MG: 100 CAPSULE, LIQUID FILLED ORAL at 20:18

## 2017-06-11 RX ADMIN — DOCUSATE SODIUM SCH MG: 100 CAPSULE, LIQUID FILLED ORAL at 07:43

## 2017-06-11 RX ADMIN — INSULIN ASPART SCH UNITS: 100 INJECTION, SOLUTION INTRAVENOUS; SUBCUTANEOUS at 11:53

## 2017-06-11 RX ADMIN — LISINOPRIL SCH MG: 40 TABLET ORAL at 20:20

## 2017-06-11 RX ADMIN — HYDROCHLOROTHIAZIDE SCH MG: 25 TABLET ORAL at 07:42

## 2017-06-11 RX ADMIN — CLINDAMYCIN HYDROCHLORIDE SCH MG: 150 CAPSULE ORAL at 14:04

## 2017-06-11 RX ADMIN — SIMVASTATIN SCH MG: 80 TABLET, FILM COATED ORAL at 20:40

## 2017-06-11 RX ADMIN — GABAPENTIN SCH MG: 300 CAPSULE ORAL at 20:19

## 2017-06-11 RX ADMIN — MORPHINE SULFATE PRN MG: 4 INJECTION, SOLUTION INTRAMUSCULAR; INTRAVENOUS at 08:35

## 2017-06-11 RX ADMIN — HEPARIN SODIUM SCH UNIT: 10000 INJECTION, SOLUTION INTRAVENOUS; SUBCUTANEOUS at 13:37

## 2017-06-11 RX ADMIN — FERROUS SULFATE TAB EC 325 MG (65 MG FE EQUIVALENT) SCH MG: 325 (65 FE) TABLET DELAYED RESPONSE at 16:52

## 2017-06-11 NOTE — HOSPITALIST PROGRESS NOTE
Hospitalist Progress Note


Date of Service


Jun 11, 2017.





Subjective


Pt evaluation today including:  conversation w/ patient, physical exam


Pt feeling fine except pain in foot. No Cp or SOB. He did have a 2-step today 

and needs home O2 but this was not requested during regular CM hours and now 

not able to be arranged for discharge today. He is agreeable to using once 

daily Lantus after dc





   Abdomen:  + constipation


   All Other Systems:  Reviewed and Negative





Objective


Vital Signs











  Date Time  Temp Pulse Resp B/P (MAP) Pulse Ox O2 Delivery O2 Flow Rate FiO2


 


6/11/17 16:00      Nasal Cannula 2.0 


 


6/11/17 15:49 36.4 63 18 145/82 (103) 93 Nasal Cannula 2.0 


 


6/11/17 12:07 37.2 101 16 161/79 (106) 96 Room Air  


 


6/11/17 12:00      Nasal Cannula 2.0 


 


6/11/17 09:30    161/81 (107)    


 


6/11/17 08:20 36.9 67 16 173/70 (104) 91   


 


6/11/17 08:00      Nasal Cannula 2.0 


 


6/11/17 04:00 36.8 60 18 154/66 (95) 90 Room Air  


 


6/11/17 04:00      Nasal Cannula 2.0 


 


6/11/17 00:00 37.1 61 18 147/57 (87) 90 Room Air  


 


6/10/17 23:59      Nasal Cannula 2.0 


 


6/10/17 20:00      Nasal Cannula 2.0 


 


6/10/17 19:31 37.2 60 16 145/81 (102) 90 Room Air  











Physical Exam


General Appearance:  no apparent distress, + thin


Eyes:  normal inspection, sclerae normal


ENT:  hearing grossly normal


Neck:  trachea midline


Respiratory/Chest:  no respiratory distress, no accessory muscle use, + wheezing

 (scattered)


Cardiovascular:  regular rate, rhythm, no murmur, + pertinent finding (1+ 

pitting edema left leg, wound vac in place left foot)


Abdomen:  normal bowel sounds, non tender, soft


Extremities:  no calf tenderness


Neurologic/Psychiatric:  alert, normal mood/affect


Skin:  normal color, warm/dry, no rash





Laboratory Results





Last 24 Hours








Test


  6/10/17


20:31 6/11/17


05:54 6/11/17


05:56 6/11/17


11:28


 


Bedside Glucose 119 mg/dl    144 mg/dl 


 


White Blood Count  9.48 K/uL   


 


Red Blood Count  2.86 M/uL   


 


Hemoglobin  8.5 g/dL   


 


Hematocrit  24.4 %   


 


Mean Corpuscular Volume  85.3 fL   


 


Mean Corpuscular Hemoglobin  29.7 pg   


 


Mean Corpuscular Hemoglobin


Concent 


  34.8 g/dl 


  


  


 


 


Platelet Count  450 K/uL   


 


Mean Platelet Volume  8.0 fL   


 


Neutrophils (%) (Auto)  63.8 %   


 


Lymphocytes (%) (Auto)  20.4 %   


 


Monocytes (%) (Auto)  12.3 %   


 


Eosinophils (%) (Auto)  2.7 %   


 


Basophils (%) (Auto)  0.3 %   


 


Neutrophils # (Auto)  6.04 K/uL   


 


Lymphocytes # (Auto)  1.93 K/uL   


 


Monocytes # (Auto)  1.17 K/uL   


 


Eosinophils # (Auto)  0.26 K/uL   


 


Basophils # (Auto)  0.03 K/uL   


 


RDW Standard Deviation  43.2 fL   


 


RDW Coefficient of Variation  13.9 %   


 


Immature Granulocyte % (Auto)  0.5 %   


 


Immature Granulocyte # (Auto)  0.05 K/uL   


 


Vitamin B12 Level  724 pg/mL   


 


Folate  7.61 ng/mL   


 


Sodium Level   138 mmol/L  


 


Potassium Level   4.2 mmol/L  


 


Chloride Level   101 mmol/L  


 


Carbon Dioxide Level   29 mmol/L  


 


Anion Gap   8.0 mmol/L  


 


Blood Urea Nitrogen   28 mg/dl  


 


Creatinine   0.73 mg/dl  


 


Est Creatinine Clear Calc


Drug Dose 


  


  116.0 ml/min 


  


 


 


Estimated GFR (


American) 


  


  117.7 


  


 


 


Estimated GFR (Non-


American 


  


  101.5 


  


 


 


BUN/Creatinine Ratio   38.5  


 


Random Glucose   96 mg/dl  


 


Calcium Level   8.2 mg/dl  


 


Magnesium Level   2.3 mg/dl  


 


Iron Level   30 mcg/dl  


 


Total Iron Binding Capacity   219 mcg/dl  


 


Transferrin   185 mg/dl  


 


Transferrin % Saturation   12 %  


 


Ferritin   73.4 ng/ml  


 


Thyroid Stimulating Hormone


(TSH) 


  


  3.050 uIu/ml 


  


 


 


Test


  6/11/17


16:20 


  


  


 


 


Bedside Glucose 129 mg/dl    











Assessment and Plan


61-year-old male with past medical history of coronary artery disease status 

post stents about 10 years ago, uncontrolled diabetes mellitus, hypertension, 

PAD s/p recent stent placement left leg, ischemic cardiomyopathy, left foot 

diabetic ulcer status post debridement, bipolar disorder, chronic smoker 

presented to the ER with complaints of chest pain and shortness of breath.


He had mildly elevated but stable troponin. ECHO no WMA, preserved EF


ECHO:


 There is moderate concentric left ventricular hypertrophy.


  *  Left ventricular systolic function is normal.


  *  No regional wall motion abnormalities noted.


  *  The left atrium is mildly dilated.


  *  There is mild mitral regurgitation.


  *  Compared to an echocardiogram from 3/2016, there is minimal change





He reports he didn't take any insulin once he left here 9 days PTA. He was 

taking antibiotics for his wound but thought he was done with them--> review of 

record shows he was to still been them through 6/10-this is now restarted given 

his leukocytosis on admission.


He has no further chest pain, I reviewed Cardiology consultation, no further 

eval being done at this time. He received 1 dose of lasix IV on admission and 

has had no further CP since then. LLE Doppler neg for DVT despite edema. He 

took NTG on the way to the hospital








Chest pain-likely secondary to acute on chronic diastolic CHF-now resolved


-will add on long acting nitrate for chest pain/angina


- Cardiology consult appreciated-no further eval warranted at this point


- Continue aspirin, Plavix, metoprolol 50 mg twice a day, Zocor 40 mg daily, 

lisinopril


-Counseled on smoking cessation 





Hypertension: still uncontrolled


- Continue amlodipine 10 mg daily, Zestril 40 mg daily, hydrochlorothiazide 25 

mg, metoprolol


-stopped hydralazine and started Imdur for angina and HTN


-increase Imdur to 60mg daily








COPD, Acute hypoxemic respiratory failure secondary to COPD and acute on 

chronic diastolic CHF, Chronic smoker


-needs home O2 2LNC at rest, 3LNC with ambulation


-counseled on smoking cessation


-albuterol prn








Poorly controlled diabetes with hyperglycemia:


Poor compliance with insulin


- Glycemic consult


- Last hemoglobin A1c 13.3% in May 2017


-will prescribe Lantus 20 units qhs to go home with as this will not likely 

make him hypoglycemic as he is not willing to check his glucose-he says he is 

willing to do this





Lower left extremity swelling: Doppler neg for DVT, is secondary to wound and 

immobility





Left foot diabetic ulcer and abscess status post I&D and arterial stent 

placement- stable, seen at [facility] on Wed, had leukocytosis on admission 

and was not compliant with po clinda at home since last discharge:


Wound care consult


-continue wound vac


-restarted clindamycin


-Home wound care [facility] and [facility] on Wed


-he will need a Rx for percocet on discharge for 1 week supply and then should f

/u with PCP for refills








Anemia:Hemoglobin at 9.7 on admission, now dropped to 8.2 without obvious 

bleeding, repeat remains at 8.5 which is a drop again from last admission. 

Likely Fe-def plus anemia of chronic dz. B12, folate, TSH all normal, Fe 

borderline low


- No active bleeding , Hemoccult ordered but not collected yet


-start FeSO4 325mg bid along with docusate


-follow CBC as outpatient





Peripheral arterial disease:


 s/p Left Lower Extemity Angiogram,  Percutaneous Transluminal Angioplasty and 

Stenting Left Popliteal and Superficial Femoral Artery, Mechanical Closure of 

Right Femoral Artery by Dr. Rainey on 5/26/17 


- Pain control with morphine and tramadol, percocet





Bipolar disorder:


- Continue Depakote and Lexapro








DVT prophylaxis:


Heparin





Full code





Disposition: Admitted to telemetry--> transfer to med/surg as not able to be 

discharged today as home O2 not arranged

## 2017-06-11 NOTE — PHARMACY PROGRESS NOTE
Glycemic Control:  Progress Nt


Date of Service


Jun 11, 2017.





Scope


Glycemic Pharmacist consulted by Dr Jimenez on 6/9/17 for glycemic control and 

to write orders per [facility] inpatient glycemic control protocol.





Objective


Accuchecks BSG (last 24hrs):











Test


  6/10/17


16:21 6/10/17


20:31 6/11/17


05:56 6/11/17


11:28


 


Bedside Glucose


  167 mg/dl


(70-99) 119 mg/dl


(70-99) 


  144 mg/dl


(70-99)


 


Random Glucose


  


  


  96 mg/dl


(70-99) 


 








Laboratory Data (last 24hrs)











Test


  6/10/17


16:29 6/11/17


05:54 6/11/17


05:56


 


White Blood Count 10.21 K/uL  9.48 K/uL  


 


Red Blood Count  2.86 M/uL  


 


Hemoglobin  8.5 g/dL  


 


Hematocrit  24.4 %  


 


Mean Corpuscular Volume  85.3 fL  


 


Mean Corpuscular Hemoglobin  29.7 pg  


 


Mean Corpuscular Hemoglobin


Concent 


  34.8 g/dl 


  


 


 


Platelet Count  450 K/uL  


 


Mean Platelet Volume  8.0 fL  


 


Neutrophils (%) (Auto)  63.8 %  


 


Lymphocytes (%) (Auto)  20.4 %  


 


Monocytes (%) (Auto)  12.3 %  


 


Eosinophils (%) (Auto)  2.7 %  


 


Basophils (%) (Auto)  0.3 %  


 


Neutrophils # (Auto)  6.04 K/uL  


 


Lymphocytes # (Auto)  1.93 K/uL  


 


Monocytes # (Auto)  1.17 K/uL  


 


Eosinophils # (Auto)  0.26 K/uL  


 


Basophils # (Auto)  0.03 K/uL  


 


Anion Gap   8.0 mmol/L 


 


BUN/Creatinine Ratio   38.5 


 


Blood Urea Nitrogen   28 mg/dl 


 


Creatinine   0.73 mg/dl 


 


Potassium Level   4.2 mmol/L 


 


Sodium Level   138 mmol/L 








HbA1c:


13.3 5/22/17





Recent Pertinent Medications


Outpatient Anti-diabetic Regimen:


* Lantus 15 units BID*


* Humalog per sliding scale*


* *Patient has not been using insulin for the past 9 days prior to admission


* Metformin ER 1gm BID


* A1c = 13.3 %  5/22/17








The patient is currently receiving:


* Basal insulin:             Lantus 15 units every 12 hours





* Correctional Insulin:   Novolog Correction per scale ACHS 


                          Goal Range: Low 120 mg/dL - High 160 mg/dL


                          Correction Factor: 20 mg/dL/unit





* Prandial insulin:         Per carb ratio of 1 unit per 7 grams CHO consumed








* Oral Agents:             None currently








Risk Factors for Insulin Resistance:


* Infection: L foot ulcer; receiving Clindamycin PO


* Diet: ordered T2DM / AHA diet and tolerating well





Assessment & Plan


ASSESSMENT:





6/10/17


* Poorly controlled type 2 diabetic admitted for CP and SOB


* He had not been using his insulin for 9 days prior to admission - BSGs in the 

upper 300's yesterday AM however now down to 140-170's range with current 

insulin orders


* This patient is known to the glycemic control service from prior admission.  

Prior admission data reviewed.  Would estimate this patient to require ~50-60 

units of insulin per day when tolerating a diet (3 meals per day).  On prior 

admission his dietary habits had been erratic (skipping meals, eating meals of 

different sizes) leading to fluctuating daily doses.


* Overall the current orders are a reasonable starting point.  Fasting -

179 this AM w/ 30 units basal on board, this is higher than desired however 

this is not a bad fasting BSG at this point considering yesterday's BSGs, plus 

the current Lantus dose has not achieved steady-state.  Plan to continue the 

same Lantus dose for now.  Novolog CF and CR performed well on last admit 

considering erratic diet - will also continue the same and follow BSG pattern.





6/11/17


* Glycemic control acceptable over last 24 hrs, all BSGs at goal


* Fasting BSG 96 this AM w/ 30 units of Lantus on board.  I am concerned that 

this BSG may continue to fall w/ repeat dosing as we are not seeing Lantus 

effects at steady-state at this time as he was noncompliant for 9 days prior to 

admission.  Will empirically reduce Lantus dose.


* All post-prandial BSGs less than 180 over last 24 hrs; no change in CR or CF








PLAN FOR INPATIENT GLYCEMIC CONTROL:


* Decreasing Lantus to 13 units SQ BID


* Continuing correction factor of 20 mg/dl/unit


* Continuing carb ratio of 1 unit per 7 grams CHO consumed


* Continuing goal range of Low 120 mg/dL - High 160 mg/dL - given A1c 13.3%











* Please note that the plan above was derived based on current level of insulin 

resistance and hospital stress. These recommendations are appropriate for 

inpatient admission only. Plan of care upon discharge will need to be 

reassessed to avoid potential outpatient hypo/hyperglycemia. 








Thank you.

## 2017-06-12 VITALS
OXYGEN SATURATION: 93 % | TEMPERATURE: 98.24 F | SYSTOLIC BLOOD PRESSURE: 164 MMHG | HEART RATE: 70 BPM | DIASTOLIC BLOOD PRESSURE: 77 MMHG

## 2017-06-12 VITALS
OXYGEN SATURATION: 93 % | TEMPERATURE: 98.24 F | DIASTOLIC BLOOD PRESSURE: 77 MMHG | SYSTOLIC BLOOD PRESSURE: 164 MMHG | HEART RATE: 70 BPM

## 2017-06-12 VITALS — OXYGEN SATURATION: 93 %

## 2017-06-12 LAB
BASOPHILS # BLD: 0.02 K/UL (ref 0–0.2)
BASOPHILS NFR BLD: 0.2 %
COMPLETE: YES
EOSINOPHIL NFR BLD AUTO: 455 K/UL (ref 130–400)
HCT VFR BLD CALC: 23.9 % (ref 42–52)
IG%: 0.4 %
IMM GRANULOCYTES NFR BLD AUTO: 15.6 %
LYMPHOCYTES # BLD: 1.4 K/UL (ref 1.2–3.4)
MCH RBC QN AUTO: 27.8 PG (ref 25–34)
MCHC RBC AUTO-ENTMCNC: 32.6 G/DL (ref 32–36)
MCV RBC AUTO: 85.1 FL (ref 80–100)
MONOCYTES NFR BLD: 13.1 %
NEUTROPHILS # BLD AUTO: 1.8 %
NEUTROPHILS NFR BLD AUTO: 68.9 %
PMV BLD AUTO: 8.1 FL (ref 7.4–10.4)
RBC # BLD AUTO: 2.81 M/UL (ref 4.7–6.1)
WBC # BLD AUTO: 9 K/UL (ref 4.8–10.8)

## 2017-06-12 RX ADMIN — MORPHINE SULFATE PRN MG: 4 INJECTION, SOLUTION INTRAMUSCULAR; INTRAVENOUS at 07:56

## 2017-06-12 RX ADMIN — ESCITALOPRAM OXALATE SCH MG: 20 TABLET, FILM COATED ORAL at 08:01

## 2017-06-12 RX ADMIN — Medication SCH MCG: at 08:02

## 2017-06-12 RX ADMIN — CLOPIDOGREL BISULFATE SCH MG: 75 TABLET, FILM COATED ORAL at 08:02

## 2017-06-12 RX ADMIN — METOPROLOL SUCCINATE SCH MG: 50 TABLET, EXTENDED RELEASE ORAL at 08:01

## 2017-06-12 RX ADMIN — CLINDAMYCIN HYDROCHLORIDE SCH MG: 150 CAPSULE ORAL at 12:57

## 2017-06-12 RX ADMIN — HEPARIN SODIUM SCH UNIT: 10000 INJECTION, SOLUTION INTRAVENOUS; SUBCUTANEOUS at 13:18

## 2017-06-12 RX ADMIN — DOCUSATE SODIUM SCH MG: 100 CAPSULE, LIQUID FILLED ORAL at 08:01

## 2017-06-12 RX ADMIN — INSULIN GLARGINE SCH UNIT: 100 INJECTION, SOLUTION SUBCUTANEOUS at 08:56

## 2017-06-12 RX ADMIN — LISINOPRIL SCH MG: 40 TABLET ORAL at 08:01

## 2017-06-12 RX ADMIN — HYDROCHLOROTHIAZIDE SCH MG: 25 TABLET ORAL at 08:01

## 2017-06-12 RX ADMIN — HEPARIN SODIUM SCH UNIT: 10000 INJECTION, SOLUTION INTRAVENOUS; SUBCUTANEOUS at 06:28

## 2017-06-12 RX ADMIN — INSULIN ASPART SCH UNITS: 100 INJECTION, SOLUTION INTRAVENOUS; SUBCUTANEOUS at 14:00

## 2017-06-12 RX ADMIN — Medication SCH MG: at 08:02

## 2017-06-12 RX ADMIN — OXYCODONE HYDROCHLORIDE AND ACETAMINOPHEN PRN TAB: 5; 325 TABLET ORAL at 12:58

## 2017-06-12 RX ADMIN — CLINDAMYCIN HYDROCHLORIDE SCH MG: 150 CAPSULE ORAL at 08:02

## 2017-06-12 RX ADMIN — INSULIN ASPART SCH UNITS: 100 INJECTION, SOLUTION INTRAVENOUS; SUBCUTANEOUS at 08:55

## 2017-06-12 RX ADMIN — AMLODIPINE BESYLATE SCH MG: 5 TABLET ORAL at 08:02

## 2017-06-12 RX ADMIN — FERROUS SULFATE TAB EC 325 MG (65 MG FE EQUIVALENT) SCH MG: 325 (65 FE) TABLET DELAYED RESPONSE at 08:01

## 2017-06-12 NOTE — DISCHARGE SUMMARY
Discharge Summary


Date of Service


Jun 12, 2017.


 (Pratima Rhoades PA-C)





Discharge Summary


Admission Date:


Jun 9, 2017 at 06:15


Discharge Date:  Jun 12, 2017


Discharge Disposition:  Home with services


Principal Diagnosis:  acute on chronic diastolic CHF


Problems/Secondary Diagnoses:


Coronary artery disease status post stents about 10 years ago, uncontrolled 

diabetes mellitus, hypertension, PAD s/p recent stent placement left leg, 

ischemic cardiomyopathy, left foot diabetic ulcer status post debridement, 

bipolar disorder, chronic smoker


Immunizations:  


   Have You Had Influenza Vaccine:  No


   Influenza Vaccine Date:  Oct 5, 2009


   History of Tetanus Vaccine?:  No


   Tetanus Immunization Date:  Mar 1, 2004


   History of Pneumococcal:  No


   Pneumococcal Date:  Oct 15, 2006


   History of Hepatitis B Vaccine:  No


Procedures:





SINGLE VIEW CHEST





CLINICAL HISTORY: Atypical chest pain.





Findings: 2 AP, portable, upright chest radiographs are compared to study dated


5/22/2017. Correlation is made with chest CT dated 3/1/2008. The examination is


degraded by portable technique and patient rotation.  The heart is enlarged. The


pulmonary vasculature is noncongested. There is atherosclerotic calcification of


the thoracic aorta. Emphysema and chronic interstitial thickening is similar to


previous. There is no evidence of superimposed airspace consolidation or pleural


effusion. There is no pneumothorax. The bony thorax appears intact.





IMPRESSION: Cardiomegaly and emphysema with no acute cardiopulmonary


abnormality.











Electronically signed by:  Heber Hubbard M.D.


6/9/2017 8:14 AM





Dictated Date/Time:  6/9/2017 8:12 AM





 The status of this report is Signed.   





LEFT LOWER EXTREMITY VENOUS DOPPLER





CLINICAL HISTORY: Left lower extremity swelling.    





COMPARISON STUDY:  CTA of the abdomen and pelvis and lower extremity January 23, 2017. 





TECHNIQUE:  Sonography of the deep venous system of the left lower extremity was


performed. Compression and augmentation were evaluated.





FINDINGS:  The left common femoral, superficial femoral and popliteal veins were


compressible. Augmentation was normal. Flow was shown within the deep calf


vessels. Calf edema was noted. No was made of a 3.7 x 1.1 x 2.6 cm left inguinal


node with fatty hilum. This is nonspecific but likely reactive.





IMPRESSION: 





1. No evidence of deep venous thrombus within the left lower extremity.





2. Mildly enlarged left inguinal lymph node. While indeterminate, a reactive


etiology is favored.











Electronically signed by:  Patrick Corbett M.D.


6/9/2017 7:35 AM


Consultations:


Wound Care


Cardiology


Glycemic management 


 (Pratima Rhoades PA-C)





Medication Reconciliation


New Medications:  


Clindamycin HCl (Clindamycin HCl) 150 Mg Cap


300 MG PO QID for 7 Days, #26 CAP





Docusate Sodium (Docusate Sodium) 100 Mg Cap


100 MG PO BID for 30 Days, #60 CAP





Ferrous Sulfate (Ferrous Sulfate) 325 Mg Tab


325 MG PO BIDM for 30 Days, #60 TAB





Fluticasone Propionate (Fluticasone Propionate) 50 Mcg/Act Spr


2 SPRAYS NA DAILY PRN for Nasal Congestion for 30 Days, #30 DOSE





Insulin Glargine (Lantus Solostar) 100 Unit/Ml Inj


20 UNIT SC QAM for 30 Days, #30 DOSE





Isosorbide Mononitrate (Isosorbide Mononitrate ER) 60 Mg Tab


60 MG PO QAM for 30 Days, #30 TAB





Oxycodone/Acetaminophen 5MG/325MG (Percocet 5MG/325MG)  Tab


2 TAB PO Q4H PRN for Pain for 7 Days, #48 TAB


PAIN


Polyethylene (Miralax) 17 Gm Pow


17 GM PO DAILY for 30 Days, #30 PKT





 


Continued Medications:  


Albuterol Sulfate (Proair Respiclick) 108 Mcg/Act Aer


2 PUFF INH Q4 PRN for SOB/Wheezing





Amlodipine Besylate (Amlodipine Besylate) 10 Mg Tab


10 MG PO QAM





Aspirin (Aspirin Ec) 325 Mg Tab


325 MG PO QAM





Clopidogrel (Plavix) 75 Mg Tab


75 MG PO QAM





Cyanocobalamin (Vitamin B-12) 1,000 Mcg Tab


1000 MCG PO QAM





Divalproex Sodium (Depakote Delay Rel) 500 Mg Tab


2000 MG PO HS





Escitalopram Oxalate (Escitalopram Oxalate) 20 Mg Tab


20 MG PO QAM





Gabapentin (Gabapentin) 100 Mg Cap


100 MG PO HS





Gabapentin (Gabapentin) 300 Mg Cap


300 MG PO HS





Hydrochlorothiazide (Hydrochlorothiazide) 25 Mg Tab


25 MG PO DAILY





Insulin Human Lispro (Humalog Kwikpen) 100 Units/Ml Inj


UNITS SC UD


PER SLIDING SCALE


Lisinopril (Zestril) 40 Mg Tab


40 MG PO BID





Metformin HCl (Metformin HCl ER) 1,000 Mg Tab


1 TAB PO BID





Metoprolol Succ (Toprol Xl) (Toprol-Xl) 50 Mg Tabcr


50 MG PO BID





Nitroglycerin (Nitrostat) 0.4 Mg Tab


0.4 MG UT PRN PRN for Chest Pain





Simvastatin (Zocor) 80 Mg Tab


40 MG PO HS for 30 Days





Tramadol HCl (Tramadol HCl) 50 Mg Tab


50 MG PO Q6 PRN for Pain, #24





 


Discontinued Medications:  


Hydralazine HCl (Hydralazine HCl) 25 Mg Tab


25 MG PO TID, #90 TAB





Insulin Glargine (Lantus Solostar) 100 Unit/Ml Inj


15 UNIT SC BID, #1 EA 3 Refills











Discharge Exam


The patient was seen and examined this morning. Pt reports feeling good today.  

His left ankle is a little sore and the wound vac kept him awake last night. He 

normally sleeps fine with this wound vac however.  He denies any acute 

complaints. 


 


ROS: 


Constitutional: No fever, sweats or chills


Eyes: No diplopia, no worsening or blurred vision


ENT: normal hearing, no trouble swallowing


Respiratory: No cough, sputum, dyspnea at rest or on exertion


Cardiovascular: No chest pain, tightness or palpitations


Abdomen: No pain, nausea, vomiting, diarrhea or constipation


Musculoskeletal: + left ankle soreness,  calf pain, swelling


Neurologic: No weakness, numbness/tingling, or balance problems


Psychiatric: No anxiety or depression


Skin: No rash or itch





PE: 


General: awake, alert, no apparent distress


Head: Normocephalic, atraumatic


ENT: PERRL, EOMI, no pharyngeal exudate, mucous membranes moist, + poor 

dentition


Chest: Clear to auscultation, on room air, no adventitious breath sounds


Cardiac: Regular rate and rhythm, no murmur, no JVD, normal peripheral pulses, 

good capillary refill


Abdominal: NABS x 4 quadrants, soft, nontender to palpation, no rebound, 

guarding or tenderness


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 

place on the left heel wound, +mild peripheral edema on the left lower extremity

, non pitting, calfs nontender to palpation


Psych: Normal mood and affect


Neuro: AAO x 3, strength intact bilaterally and related 5/5, no motor deficits, 

speech is clear, no peripheral sensory deficits





 (Pratima Rhoades, TAWANDA)





Hospital Course


H&P per Alicia Jimenez MD. Resident


History of Present Illness


Source:  patient, spouse


61-year-old male with past medical history of coronary artery disease status 

post stents about 10 years ago, uncontrolled diabetes mellitus, hypertension, 

ischemic cardiomyopathy, left foot diabetic ulcer status post debridement, 

bipolar disorder, chronic smoker presented to the ER with complaints of chest 

pain and shortness of breath that started yesterday.


The patient stated that he developed shortness of breath yesterday which was 

associated with chest pain and dizziness.


He stated that the chest pain was across his chest especially on the left side 

with no radiation, 5/10 in severity associated with diaphoresis which started 

while he was watching TV.


He had taken Rolaids assuming the discomfort was from his hiatal hernia which 

did not provide any relief.


He also complained of shortness of breath but denied any PND or orthopnea.


He was recently admitted in May 2017 for a necrotic left calcaneal diabetic 

foot ulcer . 


 Per patient's wife, he has been noncompliant with his medications especially 

his insulin.  He also complains about pain in his left lower extremity which 

she rates as 8/10.


Denies any headaches, weakness, numbness or tingling.  Denies any abdominal pain

, nausea or vomiting, diarrhea or constipation .denied any bright red bleeding 

per rectum or melena.


 He had underwent a percutaneous transluminal angioplasty and stenting of his 

left popliteal and superficial femoral artery during his last hospitalization 2 

weeks prior .





Physical Exam


Vital Signs











  Date Time  Temp Pulse Resp B/P (MAP) Pulse Ox O2 Delivery O2 Flow Rate FiO2


 


6/9/17 05:09  82 20  94 Nasal Cannula 2.0 


 


6/9/17 05:02    170/88    


 


6/9/17 04:54  88      


 


6/9/17 04:53      Nasal Cannula 2.0 


 


6/9/17 04:53     94 Room Air  


 


6/9/17 04:48    165/99    


 


6/9/17 04:41 36.8 90 20 200/84 87 Room Air  








General Appearance:  WD/WN, no apparent distress


Head:  normocephalic


Eyes:  normal inspection


ENT:  normal ENT inspection, hearing grossly normal


Neck:  supple


Respiratory/Chest:  chest non-tender, normal breath sounds, no respiratory 

distress, no accessory muscle use, + wheezing (scattered wheezes)


Cardiovascular:  regular rate, rhythm


Abdomen/GI:  normal bowel sounds, non tender, soft


Extremities/Musculoskelatal:  + pertinent finding (left lower extremity swelling

, LLE foot covered in dressing)


Neurologic/Psych:  alert, normal mood/affect, oriented x 3


Skin:  normal color








Hospital Course:


61-year-old male with past medical history of coronary artery disease status 

post stents about 10 years ago, uncontrolled diabetes mellitus, hypertension, 

PAD s/p recent stent placement left leg, ischemic cardiomyopathy, left foot 

diabetic ulcer status post debridement, bipolar disorder, chronic smoker 

presented to the ER with complaints of chest pain and shortness of breath.


He had mildly elevated but stable troponin. ECHO no WMA, preserved EF


ECHO:


 There is moderate concentric left ventricular hypertrophy.


  *  Left ventricular systolic function is normal.


  *  No regional wall motion abnormalities noted.


  *  The left atrium is mildly dilated.


  *  There is mild mitral regurgitation.


  *  Compared to an echocardiogram from 3/2016, there is minimal change





He reports he didn't take any insulin once he left here 9 days PTA. He was 

taking antibiotics for his wound but thought he was done with them--> review of 

record shows he was to still been them through 6/10-this is now restarted given 

his leukocytosis on admission.





Chest pain-likely secondary to acute on chronic diastolic CHF-now resolved


- Added  Iimdur 60 mg daily for chest pain/angina and stopped hydralazine.


- Cardiology consult appreciated-no further eval warranted at this point


- Continue aspirin, Plavix, metoprolol 50 mg twice a day, Zocor 40 mg daily, 

lisinopril


- Counseled on smoking cessation 





Hypertension: still uncontrolled


- Continue amlodipine 10 mg daily, Zestril 40 mg daily, hydrochlorothiazide 25 

mg, metoprolol


-stopped hydralazine and started Imdur for angina and HTN





COPD, Acute hypoxemic respiratory failure secondary to COPD and acute on 

chronic diastolic CHF, Chronic smoker


-needs home O2 2LNC at rest, 3LNC with ambulation


-counseled on smoking cessation


-albuterol prn





Poorly controlled diabetes with hyperglycemia:


Poor compliance with insulin


- Glycemic consult


- Last hemoglobin A1c 13.3% in May 2017


-will prescribe Lantus 20 units qhs to go home with as this will not likely 

make him hypoglycemic as he is not willing to check his glucose-he says he is 

willing to do this





Lower left extremity swelling: Doppler neg for DVT, is secondary to wound and 

immobility





Left foot diabetic ulcer and abscess status post I&D and arterial stent 

placement- stable, seen at [facility] on Wed, had leukocytosis on admission 

and was not compliant with po clinda at home since last discharge:


Wound care consult


-continue wound vac


-restarted clindamycin, continue for total of 10 days, can f/u with wound to 

determine stop date.


-Home wound care [facility] and [facility] on Wed


-given a Rx for percocet on discharge for 1 week supply and then should f/u 

with PCP for refills








Anemia:Hemoglobin at 9.7 on admission, now dropped to 8.2 without obvious 

bleeding, repeat remains at 8.5 which is a drop again from last admission. 

Likely Fe-def plus anemia of chronic dz. B12, folate, TSH all normal, Fe 

borderline low


- No active bleeding , Hemoccult ordered but not collected yet


-start FeSO4 325mg bid along with docusate


-follow CBC as outpatient 





Peripheral arterial disease:


 s/p Left Lower Extemity Angiogram,  Percutaneous Transluminal Angioplasty and 

Stenting Left Popliteal and Superficial Femoral Artery, Mechanical Closure of 

Right Femoral Artery by Dr. Rainey on 5/26/17 


- Pain control with morphine and tramadol, percocet





Bipolar disorder:


- Continue Depakote and Lexapro





DVT prophylaxis:


Heparin





CODE STATUS: Full code





Disposition: discharge to home with health services today


Total Time Spent:  Greater than 30 minutes


This includes examination of the patient, discharge planning, medication 

reconciliation, and communication with other providers.


 (Pratima Rhoades PA-C)


PA Physician Supervision Note:





I interviewed and examined the patient. Discussed with Tereza Rhoades PAC and 

agree with findings and plan as documented in the note. Any exceptions or 

clarifications are listed here: None





PT feels about to baseline, vowing to stop drinking and smoking, will follow up 

with wound care for diabetic leg/foot infection





vitals stable


exam with reg heart and clear lungs, for discharge and close follow up in wound 

care clinic, offered nicotine patches and refused





Documented By:  Chuy Omalley


 (Chuy Omalley M.D.)


Discharge Instructions


Please refer to the electronic Patient Visit Report (Discharge Instructions) 

for additional information.


 (Pratima Rhoades PA-C)





Follow-Up


Follow up with your Primary Care Provider within 1 week. 


Follow up with Wound care within 1 week. 


 (Pratima Rhoades, TAWANDA)





Additional Copies To


,Claude MCCORD M.D.

## 2017-06-12 NOTE — DISCHARGE INSTRUCTIONS
Discharge Instructions


Date of Service


Jun 12, 2017.





Admission


Reason for Admission:  Precordial Chest Pain





Discharge


Discharge Diagnosis / Problem:  chest pain and shortness of breath.





Discharge Goals


Goal(s):  Decrease discomfort, Improve function, Increase independence, Improve 

disease control





Activity Recommendations


Activity Limitations:  resume your previous activity


Lifting Limitations:  no more than 25 pounds, gradually increase as tolerated


Exercise/Sports Limitations:  rest today, gradually increase as tolerated


May Resume Sexual Activity:  when tolerated


Shower/Bathe:  no limitations


Driving or Machine Use:  DO NOT DRIVE while taking percocet for pain, you MUST 

be cleared by your PCP prior to driving.





.





Instructions / Follow-Up


Instructions / Follow-Up


You were admitted to [facility] with chest pain and shortness of breath and diagnosed 

with acute on chronic congestive heart failure (CHF) exacerbation.


During your stay here you were treated with cardiac medications, diuresis, and 

other supportive therapy.  


Imaging studies which were completed include Echocardiogram, which was within 

normal limits.


 


For Diabetes: 


You should take Lantus 20 U once in the morning for diabetes.


Check your blood glucose with meals and at bedtime. 


Follow up with your PCP to have a long term sugar level checked in 3 months. 





For high blood pressure:


Medications which have changed: Stop taking hydralazine, instead take Imdur 60 

mg daily (aka. isosorbide mononitrate)





For left diabetic foot wound and abscess: 


- Continue to follow with the wound clinic on Wednesdays with home wound care 

on Monday and Friday. 


- You have been given a prescription to continue antibiotics (Clindamycin) for 

the next 7 days to complete a 10 day course. Finish on 6/19/17.  


- You have been given one week's worth of pain medications, you must follow up 

with your family doctor for further pain medication/pain management. 





Please continue taking all medications as prescribed.





Follow up:


Follow up with your Primary Care Provider within 1 week. 


Follow up with Wound clinic within 1 week.





Current Hospital Diet


Patient's current hospital diet: Diabetes Type 2 Diet, AHA Diet (Heart Healthy)





Discharge Diet


Recommended Diet:  AHA Diet (Heart Healthy), Diabetes Type 2 Diet





Pending Studies


Studies pending at discharge:  no





Laboratory Results





Hemoglobin A1c








Test


  5/22/17


22:25 Range/Units


 


 


Estimated Average Glucose 335   mg/dl


 


Hemoglobin A1c 13.3 H 4.5-5.6  %








Lipid Panel








Test


  6/10/17


06:06 Range/Units


 


 


Triglycerides Level 182 H 0-150  mg/dl


 


Cholesterol Level 136  0-200  mg/dl


 


HDL Cholesterol 35   mg/dl


 


Cholesterol/HDL Ratio 3.9   


 


LDL Cholesterol, Calculated 65   mg/dl











Medical Emergencies








.


Who to Call and When:





Medical Emergencies:  If at any time you feel your situation is an emergency, 

please call 911 immediately.





.





Non-Emergent Contact


Non-Emergency issues call your:  Primary Care Provider


Call Non-Emergent contact if:  you have a fever, temperature is above 100.5, 

your pain is not controlled, your pain is worsening, your pain is unusual for 

you, your pain is concerning you, you have any medication questions





.





Past History


Medical & Surgical History:  


(1) Acute CHF


(2) Precordial chest pain


(3) CAD (coronary artery disease)


(4) Diabetes


(5) Hypertension


(6) Heel ulcer


.








"Provider Documentation" section prepared by Tereza Rhoades.








.





VTE Core Measure


Inpt VTE Proph given/why not?:  Unfractionated heparin SQ, T.E.D. Stockings, SCD

's

## 2017-06-26 ENCOUNTER — HOSPITAL ENCOUNTER (INPATIENT)
Dept: HOSPITAL 45 - C.EDB | Age: 62
LOS: 2 days | Discharge: HOME HEALTH | DRG: 189 | End: 2017-06-28
Attending: INTERNAL MEDICINE | Admitting: HOSPITALIST
Payer: COMMERCIAL

## 2017-06-26 VITALS
TEMPERATURE: 97.88 F | DIASTOLIC BLOOD PRESSURE: 77 MMHG | OXYGEN SATURATION: 100 % | SYSTOLIC BLOOD PRESSURE: 148 MMHG | HEART RATE: 62 BPM

## 2017-06-26 VITALS
SYSTOLIC BLOOD PRESSURE: 95 MMHG | TEMPERATURE: 97.34 F | DIASTOLIC BLOOD PRESSURE: 57 MMHG | OXYGEN SATURATION: 94 % | HEART RATE: 73 BPM

## 2017-06-26 VITALS — OXYGEN SATURATION: 99 % | HEART RATE: 84 BPM

## 2017-06-26 VITALS
TEMPERATURE: 97.52 F | SYSTOLIC BLOOD PRESSURE: 131 MMHG | HEART RATE: 73 BPM | OXYGEN SATURATION: 95 % | DIASTOLIC BLOOD PRESSURE: 66 MMHG

## 2017-06-26 VITALS
OXYGEN SATURATION: 96 % | SYSTOLIC BLOOD PRESSURE: 120 MMHG | HEART RATE: 68 BPM | DIASTOLIC BLOOD PRESSURE: 66 MMHG | TEMPERATURE: 98.6 F

## 2017-06-26 VITALS
HEIGHT: 71 IN | WEIGHT: 189.6 LBS | HEIGHT: 71 IN | WEIGHT: 189.6 LBS | BODY MASS INDEX: 26.54 KG/M2 | BODY MASS INDEX: 26.54 KG/M2 | BODY MASS INDEX: 26.54 KG/M2

## 2017-06-26 VITALS — HEART RATE: 66 BPM | OXYGEN SATURATION: 90 %

## 2017-06-26 VITALS — OXYGEN SATURATION: 95 % | HEART RATE: 88 BPM

## 2017-06-26 VITALS — OXYGEN SATURATION: 96 %

## 2017-06-26 DIAGNOSIS — E11.65: ICD-10-CM

## 2017-06-26 DIAGNOSIS — E11.621: ICD-10-CM

## 2017-06-26 DIAGNOSIS — Z79.891: ICD-10-CM

## 2017-06-26 DIAGNOSIS — A41.9: ICD-10-CM

## 2017-06-26 DIAGNOSIS — Z91.14: ICD-10-CM

## 2017-06-26 DIAGNOSIS — Z86.74: ICD-10-CM

## 2017-06-26 DIAGNOSIS — Z79.82: ICD-10-CM

## 2017-06-26 DIAGNOSIS — Z79.84: ICD-10-CM

## 2017-06-26 DIAGNOSIS — I73.9: ICD-10-CM

## 2017-06-26 DIAGNOSIS — I11.0: ICD-10-CM

## 2017-06-26 DIAGNOSIS — I25.10: ICD-10-CM

## 2017-06-26 DIAGNOSIS — R60.0: ICD-10-CM

## 2017-06-26 DIAGNOSIS — Z99.81: ICD-10-CM

## 2017-06-26 DIAGNOSIS — I25.5: ICD-10-CM

## 2017-06-26 DIAGNOSIS — Z95.5: ICD-10-CM

## 2017-06-26 DIAGNOSIS — I50.33: ICD-10-CM

## 2017-06-26 DIAGNOSIS — Z79.02: ICD-10-CM

## 2017-06-26 DIAGNOSIS — D50.9: ICD-10-CM

## 2017-06-26 DIAGNOSIS — Z79.899: ICD-10-CM

## 2017-06-26 DIAGNOSIS — F17.210: ICD-10-CM

## 2017-06-26 DIAGNOSIS — Z79.4: ICD-10-CM

## 2017-06-26 DIAGNOSIS — J44.1: ICD-10-CM

## 2017-06-26 DIAGNOSIS — J96.01: Primary | ICD-10-CM

## 2017-06-26 DIAGNOSIS — F31.9: ICD-10-CM

## 2017-06-26 DIAGNOSIS — L97.529: ICD-10-CM

## 2017-06-26 LAB
ALBUMIN/GLOB SERPL: 0.4 {RATIO} (ref 0.9–2)
ALP SERPL-CCNC: 65 U/L (ref 45–117)
ALT SERPL-CCNC: 9 U/L (ref 12–78)
ANION GAP SERPL CALC-SCNC: 8 MMOL/L (ref 3–11)
AST SERPL-CCNC: 11 U/L (ref 15–37)
BASE EXCESS STD BLDV CALC-SCNC: -4.2 MMOL/L
BASOPHILS # BLD: 0.04 K/UL (ref 0–0.2)
BASOPHILS NFR BLD: 0.3 %
BUN SERPL-MCNC: 28 MG/DL (ref 7–18)
BUN/CREAT SERPL: 31 (ref 10–20)
CALCIUM SERPL-MCNC: 8.1 MG/DL (ref 8.5–10.1)
CHLORIDE SERPL-SCNC: 107 MMOL/L (ref 98–107)
CKMB/CK RATIO: 1.4 (ref 0–3)
CO2 SERPL-SCNC: 26 MMOL/L (ref 21–32)
COMPLETE: YES
CREAT SERPL-MCNC: 0.9 MG/DL (ref 0.6–1.4)
CRP SERPL-MCNC: 2.65 MG/DL (ref 0–0.29)
EOSINOPHIL NFR BLD AUTO: 501 K/UL (ref 130–400)
GLOBULIN SER-MCNC: 4.8 GM/DL (ref 2.5–4)
GLUCOSE SERPL-MCNC: 260 MG/DL (ref 70–99)
HCT VFR BLD CALC: 28.2 % (ref 42–52)
IG%: 0.7 %
IMM GRANULOCYTES NFR BLD AUTO: 29.6 %
INR PPP: 1 (ref 0.9–1.1)
LYMPHOCYTES # BLD: 4.42 K/UL (ref 1.2–3.4)
MAGNESIUM SERPL-MCNC: 2.3 MG/DL (ref 1.8–2.4)
MCH RBC QN AUTO: 28.1 PG (ref 25–34)
MCHC RBC AUTO-ENTMCNC: 32.3 G/DL (ref 32–36)
MCV RBC AUTO: 87 FL (ref 80–100)
MONOCYTES NFR BLD: 8.8 %
NEUTROPHILS # BLD AUTO: 2.7 %
NEUTROPHILS NFR BLD AUTO: 57.9 %
PARTIAL THROMBOPLASTIN RATIO: 1.1
PCO2 BLDV: 94 MMHG (ref 38–50)
PHOSPHATE SERPL-MCNC: 4.7 MG/DL (ref 2.5–4.9)
PMV BLD AUTO: 8.3 FL (ref 7.4–10.4)
PO2 BLDV: 136 MMHG
POTASSIUM SERPL-SCNC: 3.3 MMOL/L (ref 3.5–5.1)
PROTHROMBIN TIME: 10.7 SECONDS (ref 9–12)
RBC # BLD AUTO: 3.24 M/UL (ref 4.7–6.1)
SAO2 % BLDV FROM PO2: 95 %
SODIUM SERPL-SCNC: 141 MMOL/L (ref 136–145)
WBC # BLD AUTO: 14.94 K/UL (ref 4.8–10.8)

## 2017-06-26 RX ADMIN — METOPROLOL SUCCINATE SCH MG: 50 TABLET, EXTENDED RELEASE ORAL at 21:21

## 2017-06-26 RX ADMIN — IPRATROPIUM BROMIDE AND ALBUTEROL SULFATE SCH ML: .5; 3 SOLUTION RESPIRATORY (INHALATION) at 19:25

## 2017-06-26 RX ADMIN — DOCUSATE SODIUM SCH MG: 100 CAPSULE, LIQUID FILLED ORAL at 21:21

## 2017-06-26 RX ADMIN — IPRATROPIUM BROMIDE AND ALBUTEROL SULFATE SCH ML: .5; 3 SOLUTION RESPIRATORY (INHALATION) at 15:21

## 2017-06-26 RX ADMIN — METHYLPREDNISOLONE SODIUM SUCCINATE SCH MLS/MIN: 1 INJECTION, POWDER, FOR SOLUTION INTRAMUSCULAR; INTRAVENOUS at 15:51

## 2017-06-26 RX ADMIN — GABAPENTIN SCH MG: 300 CAPSULE ORAL at 21:30

## 2017-06-26 RX ADMIN — Medication SCH MG: at 15:52

## 2017-06-26 RX ADMIN — SIMVASTATIN SCH MG: 40 TABLET, FILM COATED ORAL at 21:21

## 2017-06-26 RX ADMIN — METHYLPREDNISOLONE SODIUM SUCCINATE SCH MLS/MIN: 1 INJECTION, POWDER, FOR SOLUTION INTRAMUSCULAR; INTRAVENOUS at 23:27

## 2017-06-26 RX ADMIN — AMOXICILLIN AND CLAVULANATE POTASSIUM SCH MG: 875; 125 TABLET, FILM COATED ORAL at 15:51

## 2017-06-26 RX ADMIN — CLOPIDOGREL BISULFATE SCH MG: 75 TABLET, FILM COATED ORAL at 15:53

## 2017-06-26 RX ADMIN — INSULIN ASPART SCH UNITS: 100 INJECTION, SOLUTION INTRAVENOUS; SUBCUTANEOUS at 21:26

## 2017-06-26 RX ADMIN — OXYCODONE HYDROCHLORIDE AND ACETAMINOPHEN PRN TAB: 5; 325 TABLET ORAL at 16:07

## 2017-06-26 RX ADMIN — HEPARIN SODIUM SCH UNIT: 10000 INJECTION, SOLUTION INTRAVENOUS; SUBCUTANEOUS at 21:27

## 2017-06-26 RX ADMIN — DIVALPROEX SODIUM SCH MG: 500 TABLET, DELAYED RELEASE ORAL at 21:20

## 2017-06-26 RX ADMIN — GABAPENTIN SCH MG: 100 CAPSULE ORAL at 21:21

## 2017-06-26 RX ADMIN — ESCITALOPRAM OXALATE SCH MG: 20 TABLET, FILM COATED ORAL at 15:52

## 2017-06-26 RX ADMIN — INSULIN ASPART SCH UNITS: 100 INJECTION, SOLUTION INTRAVENOUS; SUBCUTANEOUS at 16:55

## 2017-06-26 RX ADMIN — FERROUS SULFATE TAB EC 325 MG (65 MG FE EQUIVALENT) SCH MG: 325 (65 FE) TABLET DELAYED RESPONSE at 16:53

## 2017-06-26 RX ADMIN — AMOXICILLIN AND CLAVULANATE POTASSIUM SCH MG: 875; 125 TABLET, FILM COATED ORAL at 21:22

## 2017-06-26 NOTE — DIAGNOSTIC IMAGING REPORT
SINGLE VIEW CHEST



CLINICAL HISTORY:  Sepsis.



FINDINGS: An AP, portable, upright chest radiograph is compared to study dated

6/9/2017. Correlation is made with chest CT dated 3/1/2008. The examination is

severe degraded by portable technique, motion artifact, and patient rotation. 

The heart is enlarged and there is atherosclerotic calcification of the thoracic

aorta. The pulmonary vasculature is noncongested. Emphysema and chronic

interstitial thickening are similar to previous. No focal airspace consolidation

or large pleural effusion is identified. No pneumothorax is seen. The skeletal

structures are osteopenic. The bony thorax is grossly intact.



IMPRESSION: Cardiomegaly and emphysema. No acute cardiopulmonary abnormality is

seen.







Electronically signed by:  Heber Hubbard M.D.

6/26/2017 10:58 AM



Dictated Date/Time:  6/26/2017 10:52 AM

## 2017-06-26 NOTE — DIAGNOSTIC IMAGING REPORT
CT ANGIOGRAPHY OF THE CHEST, PULMONARY EMBOLUS PROTOCOL



CLINICAL HISTORY: Shortness of breath. Sepsis.    



COMPARISON STUDY:  Chest CT March 1, 2008 and chest radiograph performed earlier

today. 



TECHNIQUE: Following IV administration of 93 mL of Optiray-320, helical axial

images of the chest were obtained utilizing the pulmonary embolus protocol. 

Maximal intensity projections and sagittal and coronal reformats were viewed on

an independent 3D workstation.  IV contrast was administered without

complication.



CT DOSE: 443.81 mGy.cm



FINDINGS:  This exam is markedly compromised by respiratory motion artifact.

There is no central pulmonary venous. The lobar, segmental and subsegmental

pulmonary arteries are inadequately assessed due to respiratory motion. The

heart is mildly enlarged. There is extensive coronary artery calcification.

There is no evidence of thoracic aortic dissection. There is no pneumothorax.

Small bilateral pleural effusions are present. Lungs are suboptimally assessed

due to respiratory motion. Groundglass opacities likely reflect atelectasis or

less likely pulmonary edema. There is interlobular septal thickening. Bony

thorax and upper abdomen are unremarkable.



IMPRESSION:  



1. Study markedly compromised by respiratory motion artifact. No central

pulmonary emboli. The lobar, segmental and subsegmental pulmonary arteries are

inadequately assessed due to respiratory motion.



2. Findings consistent with interstitial pulmonary edema and small bilateral

pleural effusions.



3. Groundglass opacities which likely reflect atelectasis or pulmonary edema.



4. Mild cardiomegaly and extensive coronary artery calcification.











Electronically signed by:  Patrick Corbett M.D.

6/26/2017 12:00 PM



Dictated Date/Time:  6/26/2017 11:53 AM
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
## 2017-06-26 NOTE — EMERGENCY ROOM VISIT NOTE
History


Report prepared by Safia:  Bailee Chance


Under the Supervision of:  Dr. Claude Marcos D.O.


First contact with patient:  10:17


Stated Complaint:  SEPSIS





History of Present Illness


The patient is a 61 year old male who presents to the Emergency Room with 

complaints of worsening altered mental status beginning just prior to arrival. 

Per EMS, the patient's wound vac for his diabteic legs became disconnected this 

morning. The nurse came in to replace it around 9am. The patient suddenly 

became very short of breath and had trouble breathing. EMS was called. The 

patient then within 5 minutes of speaking with EMS his mental status decreased 

significantly. The patient reports no pain. He has a blood sugar level of 280. 

This HPI is limited due to the patient's altered mental status.





   Source of History:  EMS


   Onset:  just PTA


   Position:  other (global)


   Quality:  other (altered mental status)


   Timing:  worsening


   Associated Symptoms:  + SOB


Note:


The patient is having trouble breathing. He denies pain.





Review of Systems


See HPI for pertinent positives & negatives. A total of 10 systems reviewed and 

were otherwise negative.





Past Medical & Surgical


Medical Problems:


(1) Acute appendicitis with appendiceal abscess


(2) Acute respiratory failure


(3) Angina pectoris


(4) CAD (coronary artery disease)


(5) Cardiac arrest


(6) Dehydration


(7) Diabetes


(8) Enterocolitis


(9) Heel ulcer


(10) Hypertension


(11) Ischemic cardiomyopathy


(12) Osteomyelitis of left foot


(13) Precordial chest pain


(14) Sepsis, unspecified organism








Family History





Cancer


Diabetes mellitus


Heart disease


Hypertension





Social History


Smoking Status:  Current Every Day Smoker


Drug Use:  none


Marital Status:  


Housing Status:  lives with family


Occupation Status:  employed





Current/Historical Medications


Scheduled


Amlodipine Besylate (Amlodipine Besylate), 10 MG PO QAM


Amoxicillin & Pot Clavulanate (Augmentin 875-125 mg), 875 MG PO BID


Aspirin (Aspirin Ec), 325 MG PO QAM


Clopidogrel (Plavix), 75 MG PO QAM


Cyanocobalamin (Vitamin B-12), 1,000 MCG PO QAM


Divalproex Sodium (Depakote Delay Rel), 2,000 MG PO HS


Docusate Sodium (Docusate Sodium), 100 MG PO BID


Escitalopram Oxalate (Escitalopram Oxalate), 20 MG PO QAM


Ferrous Sulfate (Ferrous Sulfate), 325 MG PO BIDM


Gabapentin (Gabapentin), 100 MG PO HS


Gabapentin (Gabapentin), 300 MG PO HS


Hydrochlorothiazide (Hydrochlorothiazide), 25 MG PO DAILY


Insulin Glargine (Lantus Solostar), 20 UNIT SC QAM


Insulin Human Lispro (Humalog Kwikpen), UNITS SC UD


Isosorbide Mononitrate (Isosorbide Mononitrate ER), 60 MG PO QAM


Lisinopril (Zestril), 40 MG PO BID


Metformin HCl (Metformin HCl ER), 1 TAB PO BID


Metoprolol Succ (Toprol Xl) (Toprol-Xl), 50 MG PO BID


Polyethylene (Miralax), 17 GM PO DAILY


Simvastatin (Zocor), 40 MG PO HS





Scheduled PRN


Albuterol Sulfate (Proair Respiclick), 2 PUFF INH Q4 PRN for SOB/Wheezing


Fluticasone Propionate (Fluticasone Propionate), 2 SPRAYS NA DAILY PRN for 

Nasal Congestion


Nitroglycerin (Nitrostat), 0.4 MG UT PRN PRN for Chest Pain


Oxycodone/Acetaminophen 5MG/325MG (Percocet 5MG/325MG), 2 TAB PO Q4H PRN for 

Pain


Tramadol HCl (Tramadol HCl), 50 MG PO Q6 PRN for Pain





Allergies


Coded Allergies:  


     No Known Allergies (Verified , 12/1/16)





Physical Exam


Vital Signs











  Date Time  Temp Pulse Resp B/P (MAP) Pulse Ox O2 Delivery O2 Flow Rate FiO2


 


6/26/17 13:09  67      


 


6/26/17 12:56  70 16 104/49 96 Room Air  


 


6/26/17 12:45  84   99   50


 


6/26/17 12:29 36.3 84 18 112/56 100 Non-Rebreather 15.0 


 


6/26/17 11:45  89 20  99   


 


6/26/17 11:44    134/46  Non-Rebreather  


 


6/26/17 11:26    133/65  Non-Rebreather  


 


6/26/17 11:25  88   97   


 


6/26/17 11:24  90 20 139/58 97 Non-Rebreather  


 


6/26/17 11:15  87 18 142/60 98 Non-Rebreather 15.0 


 


6/26/17 11:09  88 20 115/51 97 Non-Rebreather  


 


6/26/17 11:02  85 24 148/69 98 Non-Rebreather 15.0 


 


6/26/17 10:56  86 20 133/100 95 Non-Rebreather  


 


6/26/17 10:51  87 22 158/74 96 Non-Rebreather  


 


6/26/17 10:47    191/159    


 


6/26/17 10:46  85 16  85 Non-Rebreather  


 


6/26/17 10:41  83 24 175/81 95 Non-Rebreather  


 


6/26/17 10:36  85 19  100 Non-Rebreather  


 


6/26/17 10:35    190/86    


 


6/26/17 10:31  89 15  97 Non-Rebreather  


 


6/26/17 10:26  88 22  96 Non-Rebreather  


 


6/26/17 10:22  84      


 


6/26/17 10:20 35.4 86 34 169/89 90 Non-Rebreather  


 


6/26/17 10:20     90 Non-Rebreather  











Physical Exam


GENERAL:  Patient is listless and in moderate distress, appears to have 

significant respiratory distress with tachypnea, follows commands 

intermittently. 


EYES: The conjunctivae are clear.  The pupils are round and reactive. 


EARS, NOSE, MOUTH AND THROAT: The nose is without any evidence of any 

deformity. Mucous membranes are moist tongue is midline 


NECK: The neck is nontender and supple.


RESPIRATORY: Diminished throughout, poor air movements noted, significant 

tachypnea. 


CARDIOVASCULAR:  Tachycardic rate was noted to auscultation, no definite 

murmur. 


GASTROINTESTINAL: The abdomen is soft. Bowel sounds are present in all 

quadrants. Abdomen is nontender


MUSCULOSKELETAL/EXTREMITIES: There is no evidence of gross deformity full range 

of motion is noted in the hips and shoulders


SKIN: Significant swelling to left lower extremity. Wound vac on left lower 

leg. Warm and dry. Pulses symmetric. Multiple areas of ecchymosis on both lower 

extremities. 


NEUROLOGIC:  Patient follows commands slowly and intermittently. Moves all 

extremities symmetrically.





Medical Decision & Procedures


ER Provider


Diagnostic Interpretation:


Radiology results as stated below per my review and radiologist interpretation:





SINGLE VIEW CHEST





CLINICAL HISTORY:  Sepsis.





FINDINGS: An AP, portable, upright chest radiograph is compared to study dated


6/9/2017. Correlation is made with chest CT dated 3/1/2008. The examination is


severe degraded by portable technique, motion artifact, and patient rotation. 


The heart is enlarged and there is atherosclerotic calcification of the thoracic


aorta. The pulmonary vasculature is noncongested. Emphysema and chronic


interstitial thickening are similar to previous. No focal airspace consolidation


or large pleural effusion is identified. No pneumothorax is seen. The skeletal


structures are osteopenic. The bony thorax is grossly intact.





IMPRESSION: Cardiomegaly and emphysema. No acute cardiopulmonary abnormality is


seen.





Electronically signed by:  Heber Hubbard M.D.


6/26/2017 10:58 AM





Dictated Date/Time:  6/26/2017 10:52 AM





CT ANGIOGRAPHY OF THE CHEST, PULMONARY EMBOLUS PROTOCOL





CLINICAL HISTORY: Shortness of breath. Sepsis.    





COMPARISON STUDY:  Chest CT March 1, 2008 and chest radiograph performed earlier


today. 





TECHNIQUE: Following IV administration of 93 mL of Optiray-320, helical axial


images of the chest were obtained utilizing the pulmonary embolus protocol. 


Maximal intensity projections and sagittal and coronal reformats were viewed on


an independent 3D workstation.  IV contrast was administered without


complication.





CT DOSE: 443.81 mGy.cm





FINDINGS:  This exam is markedly compromised by respiratory motion artifact.


There is no central pulmonary venous. The lobar, segmental and subsegmental


pulmonary arteries are inadequately assessed due to respiratory motion. The


heart is mildly enlarged. There is extensive coronary artery calcification.


There is no evidence of thoracic aortic dissection. There is no pneumothorax.


Small bilateral pleural effusions are present. Lungs are suboptimally assessed


due to respiratory motion. Groundglass opacities likely reflect atelectasis or


less likely pulmonary edema. There is interlobular septal thickening. Bony


thorax and upper abdomen are unremarkable.





IMPRESSION:  





1. Study markedly compromised by respiratory motion artifact. No central


pulmonary emboli. The lobar, segmental and subsegmental pulmonary arteries are


inadequately assessed due to respiratory motion.





2. Findings consistent with interstitial pulmonary edema and small bilateral


pleural effusions.





3. Groundglass opacities which likely reflect atelectasis or pulmonary edema.





4. Mild cardiomegaly and extensive coronary artery calcification.





Electronically signed by:  Patrick Corbett M.D.


6/26/2017 12:00 PM





Dictated Date/Time:  6/26/2017 11:53 AM





Laboratory Results


6/26/17 10:25








Red Blood Count 3.24, Mean Corpuscular Volume 87.0, Mean Corpuscular Hemoglobin 

28.1, Mean Corpuscular Hemoglobin Concent 32.3, Mean Platelet Volume 8.3, 

Neutrophils (%) (Auto) 57.9, Lymphocytes (%) (Auto) 29.6, Monocytes (%) (Auto) 

8.8, Eosinophils (%) (Auto) 2.7, Basophils (%) (Auto) 0.3, Neutrophils # (Auto) 

8.65, Lymphocytes # (Auto) 4.42, Monocytes # (Auto) 1.31, Eosinophils # (Auto) 

0.41, Basophils # (Auto) 0.04





6/26/17 10:25

















Test


  6/26/17


10:25 6/26/17


10:28 6/26/17


10:40 6/26/17


10:43


 


White Blood Count


  14.94 K/uL


(4.8-10.8) 


  


  


 


 


Red Blood Count


  3.24 M/uL


(4.7-6.1) 


  


  


 


 


Hemoglobin


  9.1 g/dL


(14.0-18.0) 


  


  


 


 


Hematocrit 28.2 % (42-52)    


 


Mean Corpuscular Volume


  87.0 fL


() 


  


  


 


 


Mean Corpuscular Hemoglobin


  28.1 pg


(25-34) 


  


  


 


 


Mean Corpuscular Hemoglobin


Concent 32.3 g/dl


(32-36) 


  


  


 


 


Platelet Count


  501 K/uL


(130-400) 


  


  


 


 


Mean Platelet Volume


  8.3 fL


(7.4-10.4) 


  


  


 


 


Neutrophils (%) (Auto) 57.9 %    


 


Lymphocytes (%) (Auto) 29.6 %    


 


Monocytes (%) (Auto) 8.8 %    


 


Eosinophils (%) (Auto) 2.7 %    


 


Basophils (%) (Auto) 0.3 %    


 


Neutrophils # (Auto)


  8.65 K/uL


(1.4-6.5) 


  


  


 


 


Lymphocytes # (Auto)


  4.42 K/uL


(1.2-3.4) 


  


  


 


 


Monocytes # (Auto)


  1.31 K/uL


(0.11-0.59) 


  


  


 


 


Eosinophils # (Auto)


  0.41 K/uL


(0-0.5) 


  


  


 


 


Basophils # (Auto)


  0.04 K/uL


(0-0.2) 


  


  


 


 


RDW Standard Deviation


  45.7 fL


(36.4-46.3) 


  


  


 


 


RDW Coefficient of Variation


  14.3 %


(11.5-14.5) 


  


  


 


 


Immature Granulocyte % (Auto) 0.7 %    


 


Immature Granulocyte # (Auto)


  0.11 K/uL


(0.00-0.02) 


  


  


 


 


Erythrocyte Sedimentation Rate


  62 mm/hr


(0-14) 


  


  


 


 


Prothrombin Time


  10.7 SECONDS


(9.0-12.0) 


  


  


 


 


Prothromb Time International


Ratio 1.0 (0.9-1.1) 


  


  


  


 


 


Activated Partial


Thromboplast Time 28.8 SECONDS


(21.0-31.0) 


  


  


 


 


Partial Thromboplastin Ratio 1.1    


 


Anion Gap


  8.0 mmol/L


(3-11) 


  


  


 


 


Estimated GFR (


American) 108.0 


  


  


  


 


 


Estimated GFR (Non-


American 93.2 


  


  


  


 


 


BUN/Creatinine Ratio 31.0 (10-20)    


 


Calcium Level


  8.1 mg/dl


(8.5-10.1) 


  


  


 


 


Phosphorus Level


  4.7 mg/dl


(2.5-4.9) 


  


  


 


 


Magnesium Level


  2.3 mg/dl


(1.8-2.4) 


  


  


 


 


Total Bilirubin


  0.2 mg/dl


(0.2-1) 


  


  


 


 


Aspartate Amino Transf


(AST/SGOT) 11 U/L (15-37) 


  


  


  


 


 


Alanine Aminotransferase


(ALT/SGPT) 9 U/L (12-78) 


  


  


  


 


 


Alkaline Phosphatase


  65 U/L


() 


  


  


 


 


Total Creatine Kinase


  113 U/L


() 


  


  


 


 


Creatine Kinase MB


  1.6 ng/ml


(0.5-3.6) 


  


  


 


 


Creatine Kinase MB Ratio 1.4 (0-3.0)    


 


Troponin I


  0.032 ng/ml


(0-0.045) 


  


  


 


 


C-Reactive Protein


  2.65 mg/dl


(0-0.29) 


  


  


 


 


Pro-B-Type Natriuretic Peptide


  3954 pg/ml


(0-900) 


  


  


 


 


Total Protein


  6.9 gm/dl


(6.4-8.2) 


  


  


 


 


Albumin


  2.1 gm/dl


(3.4-5.0) 


  


  


 


 


Globulin


  4.8 gm/dl


(2.5-4.0) 


  


  


 


 


Albumin/Globulin Ratio 0.4 (0.9-2)    


 


Lipase


  835 U/L


() 


  


  


 


 


Valproic Acid (Depakene) Level


  45 mcg/ml


() 


  


  


 


 


Bedside Lactic Acid Venous


  


  1.63 mmol/L


(0.90-1.70) 


  


 


 


Venous Blood pH


  


  


  7.07


(7.36-7.41) 


 


 


Venous Blood Partial Pressure


CO2 


  


  94 mmHg


(38.0-50.0) 


 


 


Venous Blood Partial Pressure


O2 


  


  136 mmHg 


  


 


 


Venous Blood HCO3   27 mmol/L  


 


Venous Blood Oxygen Saturation   95.0 %  


 


Venous Blood Base Excess   -4.2 mmol/L  


 


Bedside D-Dimer


  


  


  


  > 450 ng/mlFEU


(0-450)





Laboratory results per my review.





ECG


Indication:  SOB/dyspnea


Rate (beats per minute):  88


Rhythm:  normal sinus


Findings:  LBBB (pattern noted), ST depression (Lateral)


Change:  no significant change (from June 9, 2017)





ED Course


1017: The patient was evaluated in room B1. A complete history and physical 

examination were performed. 





1222: I discussed the patient's case with Dr. Clayton IZAGUIRRE. The patient will 

be evaluated for further management. 





1242: The patient is improving. 





1256: Upon reevaluation, the patient is hemodynamically stable. I discussed 

results and treatment plan with him. He verbalizes agreement and understanding. 

I spoke with Dr. Arnold of the [facility]. The patient will be evaluated for further 

management and care.





Medical Decision


Differential diagnosis:


Etiologies such as infections, reactive airway disease, pneumonia, pneumothorax

, COPD, CHF, cardiac ischemia, pulmonary embolism, musculoskeletal, 

gastrointestinal, as well as others were entertained.





Medication Reconciliation: I attest that I have personally reviewed the patient'

s current medications list.





Blood pressure screening: Patient was found to have normal blood pressure on 

screening and does not require follow-up.





The patient is a 61-year-old male who presented to the emergency department for 

an evaluation of shortness of breath. The patient was recently admitted to our 

facility for lower extremity ulcerations. He has a wound VAC in place in his 

left leg. He noted shortness of breath and was brought to the emergency 

department by ambulance. The patient had very severe hypoxia prior to arrival 

and reportedly had an oxygen saturation in the seventies. The patient was 

placed on nonrebreather mask. His oxygen saturation improved but he was found 

to have significant respiratory acidosis on VBG and was placed on BiPAP. The 

patient was reevaluated multiple times. Initially he was very confused but on 

subsequent reevaluation his mental status significantly improved. I discussed 

the patient's laboratory radiographic studies with him. I also discussed his 

case with the on-call [facility] hospitalist. They've agreed to evaluate the 

patient in the emergency department for further management and disposition.





Consults


Time Called:  1220


Consulting Physician:  Dr. Clayton IZAGUIRRE


Returned Call:  1222


I discussed the patient's case with Dr. Clayton IZAGUIRRE. The patient will be 

evaluated for further management.





Impression





 Primary Impression:  


 Respiratory distress


 Additional Impressions:  


 Respiratory acidosis


 Altered mental status


 Pulmonary edema





Critical Care


I have personally spent greater than 45 minutes of critical care time in the 

direct management of this patient.  This includes bedside care, interpretation 

of diagnostic studies, and testing, discussion with consultants, patient, and 

family members, and other required patient management activities.  This 45 

minutes is in excess of all separately billable procedures.





Scribe Attestation


The scribe's documentation has been prepared under my direction and personally 

reviewed by me in its entirety. I confirm that the note above accurately 

reflects all work, treatment, procedures, and medical decision making performed 

by me.





Departure Information


Dispostion


Being Evaluated By Hospitalist





Referrals


Pro,Claude MCCORD M.D. (PCP)





Problem Qualifiers








 Additional Impressions:  


 Altered mental status


 Altered mental status type:  unspecified  Qualified Codes:  R41.82 - Altered 

mental status, unspecified


 Pulmonary edema


 Chronicity:  acute  Qualified Codes:  J81.0 - Acute pulmonary edema

## 2017-06-27 VITALS
OXYGEN SATURATION: 95 % | TEMPERATURE: 98.42 F | HEART RATE: 68 BPM | DIASTOLIC BLOOD PRESSURE: 70 MMHG | SYSTOLIC BLOOD PRESSURE: 147 MMHG

## 2017-06-27 VITALS
TEMPERATURE: 97.52 F | SYSTOLIC BLOOD PRESSURE: 129 MMHG | DIASTOLIC BLOOD PRESSURE: 65 MMHG | HEART RATE: 66 BPM | OXYGEN SATURATION: 95 %

## 2017-06-27 VITALS
DIASTOLIC BLOOD PRESSURE: 69 MMHG | SYSTOLIC BLOOD PRESSURE: 124 MMHG | OXYGEN SATURATION: 96 % | TEMPERATURE: 97.7 F | HEART RATE: 68 BPM

## 2017-06-27 VITALS — OXYGEN SATURATION: 96 % | HEART RATE: 71 BPM

## 2017-06-27 VITALS — HEART RATE: 65 BPM | OXYGEN SATURATION: 94 %

## 2017-06-27 VITALS
OXYGEN SATURATION: 98 % | TEMPERATURE: 98.42 F | SYSTOLIC BLOOD PRESSURE: 175 MMHG | HEART RATE: 76 BPM | DIASTOLIC BLOOD PRESSURE: 81 MMHG

## 2017-06-27 VITALS
TEMPERATURE: 98.24 F | DIASTOLIC BLOOD PRESSURE: 58 MMHG | OXYGEN SATURATION: 96 % | SYSTOLIC BLOOD PRESSURE: 122 MMHG | HEART RATE: 67 BPM

## 2017-06-27 VITALS
DIASTOLIC BLOOD PRESSURE: 76 MMHG | TEMPERATURE: 98.06 F | SYSTOLIC BLOOD PRESSURE: 156 MMHG | OXYGEN SATURATION: 97 % | HEART RATE: 66 BPM

## 2017-06-27 VITALS — HEART RATE: 64 BPM | OXYGEN SATURATION: 98 %

## 2017-06-27 VITALS — OXYGEN SATURATION: 94 % | HEART RATE: 67 BPM

## 2017-06-27 VITALS — OXYGEN SATURATION: 95 %

## 2017-06-27 VITALS — OXYGEN SATURATION: 96 %

## 2017-06-27 LAB
ANION GAP SERPL CALC-SCNC: 9 MMOL/L (ref 3–11)
APPEARANCE UR: CLEAR
BASOPHILS # BLD: 0 K/UL (ref 0–0.2)
BASOPHILS NFR BLD: 0 %
BILIRUB UR-MCNC: (no result) MG/DL
BUN SERPL-MCNC: 41 MG/DL (ref 7–18)
BUN/CREAT SERPL: 36.9 (ref 10–20)
CALCIUM SERPL-MCNC: 7.9 MG/DL (ref 8.5–10.1)
CHLORIDE SERPL-SCNC: 107 MMOL/L (ref 98–107)
CO2 SERPL-SCNC: 23 MMOL/L (ref 21–32)
COLOR UR: YELLOW
COMPLETE: YES
CREAT CL PREDICTED SERPL C-G-VRATE: 77 ML/MIN
CREAT SERPL-MCNC: 1.1 MG/DL (ref 0.6–1.4)
EOSINOPHIL NFR BLD AUTO: 385 K/UL (ref 130–400)
GLUCOSE SERPL-MCNC: 222 MG/DL (ref 70–99)
HCT VFR BLD CALC: 25.5 % (ref 42–52)
IG%: 0.4 %
IMM GRANULOCYTES NFR BLD AUTO: 6.3 %
LYMPHOCYTES # BLD: 0.67 K/UL (ref 1.2–3.4)
MANUAL MICROSCOPIC REQUIRED?: NO
MCH RBC QN AUTO: 27.2 PG (ref 25–34)
MCHC RBC AUTO-ENTMCNC: 32.2 G/DL (ref 32–36)
MCV RBC AUTO: 84.4 FL (ref 80–100)
MONOCYTES NFR BLD: 1.1 %
NEUTROPHILS # BLD AUTO: 0 %
NEUTROPHILS NFR BLD AUTO: 92.2 %
NITRITE UR QL STRIP: (no result)
PH UR STRIP: 5 [PH] (ref 4.5–7.5)
PMV BLD AUTO: 8.4 FL (ref 7.4–10.4)
POTASSIUM SERPL-SCNC: 4.9 MMOL/L (ref 3.5–5.1)
RBC # BLD AUTO: 3.02 M/UL (ref 4.7–6.1)
REVIEW REQ?: YES
SODIUM SERPL-SCNC: 139 MMOL/L (ref 136–145)
SP GR UR STRIP: 1.03 (ref 1–1.03)
URINE EPITHELIAL CELL AUTO: (no result) /LPF (ref 0–5)
UROBILINOGEN UR-MCNC: (no result) MG/DL
WBC # BLD AUTO: 10.68 K/UL (ref 4.8–10.8)
ZZUR CULT IF INDIC CLEAN CATCH: NO

## 2017-06-27 RX ADMIN — IPRATROPIUM BROMIDE AND ALBUTEROL SULFATE SCH ML: .5; 3 SOLUTION RESPIRATORY (INHALATION) at 11:15

## 2017-06-27 RX ADMIN — INSULIN ASPART SCH UNITS: 100 INJECTION, SOLUTION INTRAVENOUS; SUBCUTANEOUS at 11:53

## 2017-06-27 RX ADMIN — METHYLPREDNISOLONE SODIUM SUCCINATE SCH MLS/MIN: 1 INJECTION, POWDER, FOR SOLUTION INTRAMUSCULAR; INTRAVENOUS at 15:54

## 2017-06-27 RX ADMIN — DIVALPROEX SODIUM SCH MG: 500 TABLET, DELAYED RELEASE ORAL at 21:21

## 2017-06-27 RX ADMIN — METOPROLOL SUCCINATE SCH MG: 50 TABLET, EXTENDED RELEASE ORAL at 08:42

## 2017-06-27 RX ADMIN — INSULIN ASPART SCH UNITS: 100 INJECTION, SOLUTION INTRAVENOUS; SUBCUTANEOUS at 17:17

## 2017-06-27 RX ADMIN — DOCUSATE SODIUM SCH MG: 100 CAPSULE, LIQUID FILLED ORAL at 08:39

## 2017-06-27 RX ADMIN — ISOSORBIDE MONONITRATE SCH MG: 60 TABLET ORAL at 08:39

## 2017-06-27 RX ADMIN — ESCITALOPRAM OXALATE SCH MG: 20 TABLET, FILM COATED ORAL at 08:39

## 2017-06-27 RX ADMIN — AMOXICILLIN AND CLAVULANATE POTASSIUM SCH MG: 875; 125 TABLET, FILM COATED ORAL at 08:40

## 2017-06-27 RX ADMIN — METHYLPREDNISOLONE SODIUM SUCCINATE SCH MLS/MIN: 1 INJECTION, POWDER, FOR SOLUTION INTRAMUSCULAR; INTRAVENOUS at 08:40

## 2017-06-27 RX ADMIN — IPRATROPIUM BROMIDE AND ALBUTEROL SULFATE SCH ML: .5; 3 SOLUTION RESPIRATORY (INHALATION) at 19:30

## 2017-06-27 RX ADMIN — HEPARIN SODIUM SCH UNIT: 10000 INJECTION, SOLUTION INTRAVENOUS; SUBCUTANEOUS at 21:26

## 2017-06-27 RX ADMIN — FERROUS SULFATE TAB EC 325 MG (65 MG FE EQUIVALENT) SCH MG: 325 (65 FE) TABLET DELAYED RESPONSE at 17:08

## 2017-06-27 RX ADMIN — IPRATROPIUM BROMIDE AND ALBUTEROL SULFATE SCH ML: .5; 3 SOLUTION RESPIRATORY (INHALATION) at 15:33

## 2017-06-27 RX ADMIN — DOCUSATE SODIUM SCH MG: 100 CAPSULE, LIQUID FILLED ORAL at 21:20

## 2017-06-27 RX ADMIN — OXYCODONE HYDROCHLORIDE AND ACETAMINOPHEN PRN TAB: 5; 325 TABLET ORAL at 08:51

## 2017-06-27 RX ADMIN — NICOTINE SCH PATCH: 7 PATCH, EXTENDED RELEASE TRANSDERMAL at 08:52

## 2017-06-27 RX ADMIN — METOPROLOL SUCCINATE SCH MG: 50 TABLET, EXTENDED RELEASE ORAL at 21:22

## 2017-06-27 RX ADMIN — HEPARIN SODIUM SCH UNIT: 10000 INJECTION, SOLUTION INTRAVENOUS; SUBCUTANEOUS at 08:51

## 2017-06-27 RX ADMIN — SIMVASTATIN SCH MG: 40 TABLET, FILM COATED ORAL at 21:22

## 2017-06-27 RX ADMIN — CLOPIDOGREL BISULFATE SCH MG: 75 TABLET, FILM COATED ORAL at 08:39

## 2017-06-27 RX ADMIN — FERROUS SULFATE TAB EC 325 MG (65 MG FE EQUIVALENT) SCH MG: 325 (65 FE) TABLET DELAYED RESPONSE at 08:39

## 2017-06-27 RX ADMIN — INSULIN ASPART SCH UNITS: 100 INJECTION, SOLUTION INTRAVENOUS; SUBCUTANEOUS at 21:25

## 2017-06-27 RX ADMIN — GABAPENTIN SCH MG: 100 CAPSULE ORAL at 21:22

## 2017-06-27 RX ADMIN — AMOXICILLIN AND CLAVULANATE POTASSIUM SCH MG: 875; 125 TABLET, FILM COATED ORAL at 21:20

## 2017-06-27 RX ADMIN — INSULIN GLARGINE SCH UNIT: 100 INJECTION, SOLUTION SUBCUTANEOUS at 08:50

## 2017-06-27 RX ADMIN — METHYLPREDNISOLONE SODIUM SUCCINATE SCH MLS/MIN: 1 INJECTION, POWDER, FOR SOLUTION INTRAMUSCULAR; INTRAVENOUS at 23:37

## 2017-06-27 RX ADMIN — GABAPENTIN SCH MG: 300 CAPSULE ORAL at 21:21

## 2017-06-27 RX ADMIN — Medication SCH MCG: at 08:39

## 2017-06-27 RX ADMIN — IPRATROPIUM BROMIDE AND ALBUTEROL SULFATE SCH ML: .5; 3 SOLUTION RESPIRATORY (INHALATION) at 07:47

## 2017-06-27 RX ADMIN — INSULIN ASPART SCH UNITS: 100 INJECTION, SOLUTION INTRAVENOUS; SUBCUTANEOUS at 08:50

## 2017-06-27 RX ADMIN — Medication SCH MG: at 08:39

## 2017-06-27 RX ADMIN — OXYCODONE HYDROCHLORIDE AND ACETAMINOPHEN PRN TAB: 5; 325 TABLET ORAL at 22:23

## 2017-06-27 NOTE — PROGRESS NOTE
Subjective


Date of Service:


Jun 27, 2017.


Subjective


Pt evaluation today including:  conversation w/ patient, physical exam, chart 

review, lab review, review of studies, review of inpatient medication list


Pt resting comfortably in bed


Denies any respiratory distress


No concerns or acute events at this time





Problem List


Medical Problems:


(1) Acute CHF


Status: Acute  





(2) Acute coronary syndrome


Status: Acute  





(3) Altered mental status


Status: Acute  





(4) Anginal pain


Status: Acute  





(5) Appendicitis


Status: Acute  





(6) Intra-abdominal abscess


Status: Acute  





(7) Lower abdominal pain of unknown etiology


Status: Acute  





(8) Precordial chest pain


Status: Acute  





(9) Pulmonary edema


Status: Acute  





(10) Respiratory acidosis


Status: Acute  





(11) Respiratory distress


Status: Acute  











Review of Systems


Constitutional:  No fever, No chills, No sweats, No weight loss, No weakness


ENT:  No hearing loss, No unusual epistaxis, No nasal symptoms, No sore throat


Respiratory:  No cough, No sputum, No wheezing, No shortness of breath, No 

dyspnea on exertion


Cardiac:  No chest pain, No orthopnea, No PND, No edema


Abdomen:  No pain, No nausea, No vomiting, No diarrhea, No constipation


Musculoskeletal:  No joint pain, No muscle pain, No swelling, No calf pain


Male :  No dysuria, No urinary frequency, No incontinence, No slowing stream


Neurologic:  No memory loss, No paralysis, No weakness, No numbness/tingling


Psychiatric:  No depression symptoms, No anhedonism, No anxiety, No insomnia


Endo:  No fatigue, No excessive thirst


Skin:  No rash, No itch





Objective


Vital Signs











  Date Time  Temp Pulse Resp B/P (MAP) Pulse Ox O2 Delivery O2 Flow Rate FiO2


 


6/27/17 12:00      Nasal Cannula 5.0 


 


6/27/17 11:18 36.5 68 18 124/69 (87) 96 Nasal Cannula 5.0 


 


6/27/17 11:15  65 20  94 Nasal Cannula 2.0 


 


6/27/17 08:30      Nasal Cannula 5.0 


 


6/27/17 08:19 36.7 66 20 156/76 (102) 97 Nasal Cannula 5.0 


 


6/27/17 07:47  64 20  98 Nasal Cannula 5.0 


 


6/27/17 04:00      Nasal Cannula 5.0 


 


6/27/17 03:47 36.9 76 20 175/88 (117) 98 Nasal Cannula 5.0 





    175/81 (112)    


 


6/26/17 23:59      Nasal Cannula 5.0 


 


6/26/17 23:42 37.0 68 17 120/66 (84) 96 Nasal Cannula 5.0 


 


6/26/17 20:00      Nasal Cannula 5.0 


 


6/26/17 19:49 36.4 73 19 131/66 (87) 95 Nasal Cannula 5.0 


 


6/26/17 19:25  66 20  90 Nasal Cannula 5.0 


 


6/26/17 16:00 36.3 73 16 95/57 (70) 94 Nasal Cannula 5.0 


 


6/26/17 16:00     92 Nasal Cannula 5.0 


 


6/26/17 15:21  88 20  95 Nasal Cannula 5.0 











Physical Exam


General Appearance:  WD/WN, no apparent distress


Eyes:  normal inspection, PERRL, EOMI, sclerae normal


Neck:  supple, no adenopathy, thyroid normal, no JVD


Respiratory/Chest:  chest non-tender, lungs clear, normal breath sounds, no 

respiratory distress


Cardiovascular:  regular rate, rhythm, no edema, no gallop, no JVD


Abdomen:  normal bowel sounds, non tender, soft, no organomegaly


Extremities:  non-tender, normal inspection, no pedal edema, no calf tenderness


Neurologic/Psychiatric:  alert, normal mood/affect, oriented x 3


Skin:  normal color, warm/dry, no rash


Lymphatic:  no adenopathy





Laboratory Results





Last 24 Hours








Test


  6/26/17


14:37 6/26/17


16:37 6/26/17


20:57 6/27/17


06:33


 


Lactic Acid Level 0.7 mmol/L    


 


Bedside Glucose  308 mg/dl  264 mg/dl  248 mg/dl 


 


Test


  6/27/17


06:46 


  


  


 


 


White Blood Count 10.68 K/uL    


 


Red Blood Count 3.02 M/uL    


 


Hemoglobin 8.2 g/dL    


 


Hematocrit 25.5 %    


 


Mean Corpuscular Volume 84.4 fL    


 


Mean Corpuscular Hemoglobin 27.2 pg    


 


Mean Corpuscular Hemoglobin


Concent 32.2 g/dl 


  


  


  


 


 


Platelet Count 385 K/uL    


 


Mean Platelet Volume 8.4 fL    


 


Neutrophils (%) (Auto) 92.2 %    


 


Lymphocytes (%) (Auto) 6.3 %    


 


Monocytes (%) (Auto) 1.1 %    


 


Eosinophils (%) (Auto) 0.0 %    


 


Basophils (%) (Auto) 0.0 %    


 


Neutrophils # (Auto) 9.85 K/uL    


 


Lymphocytes # (Auto) 0.67 K/uL    


 


Monocytes # (Auto) 0.12 K/uL    


 


Eosinophils # (Auto) 0.00 K/uL    


 


Basophils # (Auto) 0.00 K/uL    


 


RDW Standard Deviation 44.3 fL    


 


RDW Coefficient of Variation 14.4 %    


 


Immature Granulocyte % (Auto) 0.4 %    


 


Immature Granulocyte # (Auto) 0.04 K/uL    


 


Red Blood Cell Morphology Unremarkable    


 


Sodium Level 139 mmol/L    


 


Potassium Level 4.9 mmol/L    


 


Chloride Level 107 mmol/L    


 


Carbon Dioxide Level 23 mmol/L    


 


Anion Gap 9.0 mmol/L    


 


Blood Urea Nitrogen 41 mg/dl    


 


Creatinine 1.10 mg/dl    


 


Est Creatinine Clear Calc


Drug Dose 77.0 ml/min 


  


  


  


 


 


Estimated GFR (


American) 84.7 


  


  


  


 


 


Estimated GFR (Non-


American 73.1 


  


  


  


 


 


BUN/Creatinine Ratio 36.9    


 


Random Glucose 222 mg/dl    


 


Calcium Level 7.9 mg/dl    











Assessment and Plan


62 yo M with PMHx of coronary artery disease status post stents about 10 years 

ago, uncontrolled diabetes mellitus, hypertension, PAD s/p recent stent 

placement left leg, ischemic cardiomyopathy, left foot diabetic ulcer status 

post debridement, bipolar disorder, chronic smoker presented to the ER after 

home health visit where he was found to be hypoxic at 85% on 4 L with 

diminished breath sounds per home health nursing. 





Acute respiratory Failure


Sepsis from unknown source


- Admitted to tele 


-Resolved back to home O2


-Unlikely PE, US LE neg for DVT


- Follow blood cultures x 2 drawn in the ER


- White blood count resolved, lactate WNL


- Patient is on Augmentin 875/125 twice a day per wound clinic for the left 

heel ulceration, wound VAC is in place, will continue.


- BNP elevated at 3954 


- Check UA





Acute on chronic diastolic CHF exacerbation


- Slightly elevated BNP at time of admission- Cont Lasix IV


- Continue 60 mg daily for chest pain/angina


- Echo from last admission was reviewed with a preserved EF, no wall motion 

abnormalities.


- Continue aspirin, Plavix, metoprolol 50 mg BID, Zocor 40 mg daily


- Hold HCTZ and lisinopril, amlodipine





Chronic tobacco abuse


- Patient reports smoking cigarettes this morning, was smoking between 6 and 10 

cigarettes yesterday


- Counseled on smoking cessation, nicotine patch ordered





Hypertension:


- Was initially very high, currently stable


- Continue metoprolol 50 mg BID


- Hold amlodipine 10 mg daily, lisinopril 40 mg BID,  and hydrochlorothiazide 

25 mg daily





COPD


-needs home O2 2LNC at rest, 3LNC with ambulation at baseline.  Wean off oxygen 

as able.


-counseled on smoking cessation


-Solumedrol 40 mg Q8H and Duonebs ordered scheduled and when necessary





Poorly controlled diabetes with hyperglycemia:


Poor compliance with insulin


- Glycemic consult


- Last hemoglobin A1c 13.3% in May 2017


-Continue Lantus 20 units qhs, patient reports that he has been checking his 

glucose at home.





Lower left extremity swelling: 


- We will recheck Dopplers for DVT,  likely is secondary to wound and immobility





Left foot diabetic ulcer and abscess status post I&D and arterial stent 

placement- stable


- Wound care consult, continue wound vac


- Continue Augmentin 875/125 twice a day, will await BCx as above





Anemia secondary to chronic iron deficiency


- Hgb= 9.1


- continue iron supplementation 325mg BID along with docusate





Peripheral arterial disease:


-  s/p Left Lower Extemity Angiogram,  Percutaneous Transluminal Angioplasty 

and Stenting Left Popliteal and Superficial Femoral Artery, Mechanical Closure 

of Right Femoral Artery by Dr. Rainey on 5/26/17 


- Pain control with morphine and tramadol, percocet





Bipolar disorder:


- Continue Depakote and Lexapro


- Depakote level 45





DVT prophylaxis: Heparin subq Q12h





CODE STATUS: Full code

## 2017-06-27 NOTE — DIAGNOSTIC IMAGING REPORT
ULTRASOUND BILATERAL LOWER EXTREMITY VENOUS 



CLINICAL HISTORY: Lower extremity edema. Heel wound.



COMPARISON STUDY: Left lower extremity venous ultrasound dated 6/9/2017.



TECHNIQUE: Real-time, grayscale, and color Doppler sonography of the deep veins

of the right and left lower extremity was performed from the inguinal crease to

the calf. Compression and augmentation were utilized. 



FINDINGS: There is no sonographic evidence of deep venous thrombosis identified

in the right or left lower extremity. The common femoral, superficial femoral,

and popliteal veins are patent and normally compressible bilaterally. The

greater saphenous vein and the profunda femoris vein at the junction with the

common femoral vein are clear in both legs. The visualized calf veins are patent

bilaterally. Mildly enlarged inguinal lymph nodes are likely reactive basis.

Atherosclerotic plaque is incidentally noted in the lower extremity arteries.



IMPRESSION: There is no sonographic evidence of deep venous thrombosis

identified in the right or left lower extremity.







Electronically signed by:  Heber Hubbard M.D.

6/27/2017 7:29 AM



Dictated Date/Time:  6/27/2017 7:28 AM

## 2017-06-28 VITALS
HEART RATE: 65 BPM | OXYGEN SATURATION: 90 % | SYSTOLIC BLOOD PRESSURE: 152 MMHG | DIASTOLIC BLOOD PRESSURE: 63 MMHG | TEMPERATURE: 98.42 F

## 2017-06-28 VITALS
TEMPERATURE: 98.06 F | DIASTOLIC BLOOD PRESSURE: 52 MMHG | SYSTOLIC BLOOD PRESSURE: 181 MMHG | HEART RATE: 69 BPM | OXYGEN SATURATION: 91 %

## 2017-06-28 VITALS
TEMPERATURE: 97.7 F | HEART RATE: 71 BPM | DIASTOLIC BLOOD PRESSURE: 94 MMHG | SYSTOLIC BLOOD PRESSURE: 146 MMHG | OXYGEN SATURATION: 95 %

## 2017-06-28 VITALS
TEMPERATURE: 97.7 F | HEART RATE: 71 BPM | DIASTOLIC BLOOD PRESSURE: 94 MMHG | OXYGEN SATURATION: 95 % | SYSTOLIC BLOOD PRESSURE: 146 MMHG

## 2017-06-28 VITALS — OXYGEN SATURATION: 97 % | HEART RATE: 60 BPM

## 2017-06-28 VITALS — HEART RATE: 62 BPM | OXYGEN SATURATION: 96 %

## 2017-06-28 VITALS — OXYGEN SATURATION: 91 % | HEART RATE: 73 BPM

## 2017-06-28 LAB
ANION GAP SERPL CALC-SCNC: 9 MMOL/L (ref 3–11)
BASOPHILS # BLD: 0 K/UL (ref 0–0.2)
BASOPHILS NFR BLD: 0 %
BUN SERPL-MCNC: 59 MG/DL (ref 7–18)
BUN/CREAT SERPL: 41.9 (ref 10–20)
CALCIUM SERPL-MCNC: 8.4 MG/DL (ref 8.5–10.1)
CHLORIDE SERPL-SCNC: 102 MMOL/L (ref 98–107)
CO2 SERPL-SCNC: 23 MMOL/L (ref 21–32)
COMPLETE: YES
CREAT CL PREDICTED SERPL C-G-VRATE: 60.5 ML/MIN
CREAT SERPL-MCNC: 1.4 MG/DL (ref 0.6–1.4)
EOSINOPHIL NFR BLD AUTO: 403 K/UL (ref 130–400)
GLUCOSE SERPL-MCNC: 271 MG/DL (ref 70–99)
HCT VFR BLD CALC: 26.8 % (ref 42–52)
IG%: 0.4 %
IMM GRANULOCYTES NFR BLD AUTO: 6.7 %
LYMPHOCYTES # BLD: 0.96 K/UL (ref 1.2–3.4)
MCH RBC QN AUTO: 26.6 PG (ref 25–34)
MCHC RBC AUTO-ENTMCNC: 31.7 G/DL (ref 32–36)
MCV RBC AUTO: 83.8 FL (ref 80–100)
MONOCYTES NFR BLD: 2.2 %
NEUTROPHILS # BLD AUTO: 0 %
NEUTROPHILS NFR BLD AUTO: 90.7 %
PMV BLD AUTO: 8.4 FL (ref 7.4–10.4)
POTASSIUM SERPL-SCNC: 4.9 MMOL/L (ref 3.5–5.1)
RBC # BLD AUTO: 3.2 M/UL (ref 4.7–6.1)
SODIUM SERPL-SCNC: 134 MMOL/L (ref 136–145)
WBC # BLD AUTO: 14.35 K/UL (ref 4.8–10.8)

## 2017-06-28 RX ADMIN — NICOTINE SCH PATCH: 7 PATCH, EXTENDED RELEASE TRANSDERMAL at 07:57

## 2017-06-28 RX ADMIN — OXYCODONE HYDROCHLORIDE AND ACETAMINOPHEN PRN TAB: 5; 325 TABLET ORAL at 08:06

## 2017-06-28 RX ADMIN — HEPARIN SODIUM SCH UNIT: 10000 INJECTION, SOLUTION INTRAVENOUS; SUBCUTANEOUS at 08:09

## 2017-06-28 RX ADMIN — IPRATROPIUM BROMIDE AND ALBUTEROL SULFATE SCH ML: .5; 3 SOLUTION RESPIRATORY (INHALATION) at 14:52

## 2017-06-28 RX ADMIN — ESCITALOPRAM OXALATE SCH MG: 20 TABLET, FILM COATED ORAL at 07:59

## 2017-06-28 RX ADMIN — IPRATROPIUM BROMIDE AND ALBUTEROL SULFATE SCH ML: .5; 3 SOLUTION RESPIRATORY (INHALATION) at 11:07

## 2017-06-28 RX ADMIN — METOPROLOL SUCCINATE SCH MG: 50 TABLET, EXTENDED RELEASE ORAL at 08:00

## 2017-06-28 RX ADMIN — AMOXICILLIN AND CLAVULANATE POTASSIUM SCH MG: 875; 125 TABLET, FILM COATED ORAL at 08:09

## 2017-06-28 RX ADMIN — Medication SCH MG: at 07:59

## 2017-06-28 RX ADMIN — IPRATROPIUM BROMIDE AND ALBUTEROL SULFATE SCH ML: .5; 3 SOLUTION RESPIRATORY (INHALATION) at 07:18

## 2017-06-28 RX ADMIN — INSULIN GLARGINE SCH UNIT: 100 INJECTION, SOLUTION SUBCUTANEOUS at 08:08

## 2017-06-28 RX ADMIN — ISOSORBIDE MONONITRATE SCH MG: 60 TABLET ORAL at 07:59

## 2017-06-28 RX ADMIN — Medication SCH MCG: at 07:59

## 2017-06-28 RX ADMIN — FERROUS SULFATE TAB EC 325 MG (65 MG FE EQUIVALENT) SCH MG: 325 (65 FE) TABLET DELAYED RESPONSE at 07:59

## 2017-06-28 RX ADMIN — INSULIN ASPART SCH UNITS: 100 INJECTION, SOLUTION INTRAVENOUS; SUBCUTANEOUS at 12:03

## 2017-06-28 RX ADMIN — DOCUSATE SODIUM SCH MG: 100 CAPSULE, LIQUID FILLED ORAL at 07:59

## 2017-06-28 RX ADMIN — CLOPIDOGREL BISULFATE SCH MG: 75 TABLET, FILM COATED ORAL at 07:59

## 2017-06-28 RX ADMIN — INSULIN ASPART SCH UNITS: 100 INJECTION, SOLUTION INTRAVENOUS; SUBCUTANEOUS at 08:07

## 2017-06-28 NOTE — DISCHARGE SUMMARY
Discharge Summary


Date of Service


Jun 28, 2017.





Discharge Summary


Admission Date:


Jun 26, 2017 at 13:09


Discharge Date:  Jun 28, 2017


Discharge Disposition:  Home


Principal Diagnosis:  Sepsis


Immunizations:  


   Have You Had Influenza Vaccine:  No


   Influenza Vaccine Date:  Oct 5, 2009


   History of Tetanus Vaccine?:  No


   Tetanus Immunization Date:  Mar 1, 2004


   History of Pneumococcal:  No


   Pneumococcal Date:  Oct 15, 2006


   History of Hepatitis B Vaccine:  No





Medication Reconciliation


New Medications:  


Albuterol Hfa (Ventolin Hfa) 200 Puffs/42561 Mcg Aers


2 PUFFS INH Q6H, #1 INHALER





 


Continued Medications:  


Albuterol Sulfate (Proair Respiclick) 108 Mcg/Act Aer


2 PUFF INH Q4 PRN for SOB/Wheezing





Amlodipine Besylate (Amlodipine Besylate) 10 Mg Tab


10 MG PO QAM





Amoxicillin & Pot Clavulanate (Augmentin 875-125 mg) 1 Tab Tab


875 MG PO BID for 30 Days, #60 TAB





Aspirin (Aspirin Ec) 325 Mg Tab


325 MG PO QAM





Clopidogrel (Plavix) 75 Mg Tab


75 MG PO QAM





Cyanocobalamin (Vitamin B-12) 1,000 Mcg Tab


1000 MCG PO QAM





Divalproex Sodium (Depakote Delay Rel) 500 Mg Tab


2000 MG PO HS





Docusate Sodium (Docusate Sodium) 100 Mg Cap


100 MG PO BID for 30 Days, #60 CAP





Escitalopram Oxalate (Escitalopram Oxalate) 20 Mg Tab


20 MG PO QAM





Ferrous Sulfate (Ferrous Sulfate) 325 Mg Tab


325 MG PO BIDM for 30 Days, #60 TAB





Fluticasone Propionate (Fluticasone Propionate) 50 Mcg/Act Spr


2 SPRAYS NA DAILY PRN for Nasal Congestion for 30 Days, #30 DOSE





Gabapentin (Gabapentin) 100 Mg Cap


100 MG PO HS





Gabapentin (Gabapentin) 300 Mg Cap


300 MG PO HS





Hydrochlorothiazide (Hydrochlorothiazide) 25 Mg Tab


25 MG PO DAILY





Insulin Glargine (Lantus Solostar) 100 Unit/Ml Inj


20 UNIT SC QAM for 30 Days, #30 DOSE





Insulin Human Lispro (Humalog Kwikpen) 100 Units/Ml Inj


UNITS SC UD


PER SLIDING SCALE


Isosorbide Mononitrate (Isosorbide Mononitrate ER) 60 Mg Tab


60 MG PO QAM for 30 Days, #30 TAB





Lisinopril (Zestril) 40 Mg Tab


40 MG PO BID





Metformin HCl (Metformin HCl ER) 1,000 Mg Tab


1 TAB PO BID





Metoprolol Succ (Toprol Xl) (Toprol-Xl) 50 Mg Tabcr


50 MG PO BID





Nitroglycerin (Nitrostat) 0.4 Mg Tab


0.4 MG UT PRN PRN for Chest Pain





Oxycodone/Acetaminophen 5MG/325MG (Percocet 5MG/325MG)  Tab


2 TAB PO Q4H PRN for Pain for 7 Days, #48 TAB


PAIN


Polyethylene (Miralax) 17 Gm Pow


17 GM PO DAILY for 30 Days, #30 PKT





Simvastatin (Zocor) 80 Mg Tab


40 MG PO HS for 30 Days





Tramadol HCl (Tramadol HCl) 50 Mg Tab


50 MG PO Q6 PRN for Pain, #24











Discharge Exam


Review of Systems:  


   Constitutional:  No fever, No chills, No sweats, No weakness


   Respiratory:  + shortness of breath, No cough, No sputum, No wheezing, No 

dyspnea on exertion, No dyspnea at rest, No hemoptysis


   Cardiovascular:  No chest pain, No orthopnea, No PND, No edema, No 

claudication


   Abdomen:  No pain, No nausea, No vomiting, No diarrhea, No constipation


   Musculoskeletal:  No joint pain, No muscle pain, No swelling, No calf pain


   Genitourinary - Male:  No hematuria, No dysuria, No urinary frequency, No 

urinary urgency


   Neurologic:  No memory loss, No paralysis, No weakness, No numbness/tingling


   Psychiatric:  + anxiety, No depression symptoms, No anhedonism, No insomnia


   Endocrine:  No fatigue, No excessive thirst


   Integumentary:  No rash, No itch


Physical Exam:  


   General Appearance:  WD/WN, no apparent distress


   Eyes:  normal inspection, PERRL, EOMI, sclerae normal


   Neck:  supple, no adenopathy, thyroid normal, no JVD


   Respiratory/Chest:  chest non-tender, lungs clear, normal breath sounds, no 

respiratory distress


   Cardiovascular:  regular rate, rhythm, no edema, no gallop, no JVD


   Abdomen / GI:  normal bowel sounds, non tender, soft, no organomegaly


   Extremities:  normal inspection, no calf tenderness, normal capillary refill

, no pedal edema


   Neurologic/Psychiatric:  CNs II-XII nml as tested, no motor/sensory deficits

, alert, normal mood/affect, normal reflexes


   Skin:  normal color, warm/dry, no rash


   Lymphatic:  no adenopathy





Hospital Course


62 yo M with PMHx of coronary artery disease status post stents about 10 years 

ago, uncontrolled diabetes mellitus, hypertension, PAD s/p recent stent 

placement left leg, ischemic cardiomyopathy, left foot diabetic ulcer status 

post debridement, bipolar disorder, chronic smoker presented to the ER after 

home health visit where he was found to be hypoxic at 85% on 4 L with 

diminished breath sounds per home health nursing. 





Acute respiratory Failure resolved 


Sepsis from unknown source


- Admitted to tele 


-Resolved back to home O2


-Unlikely PE, US LE neg for DVT


- UA unremarkable


- Blood cultures x 2 neg


- White blood count resolved, lactate WNL


- Patient is on Augmentin 875/125 twice a day per wound clinic for the left 

heel ulceration, wound VAC is in place, will continue on discharge


- BNP elevated at 3954 


-Discharged on ventolin inhaler, also instructed to follow up with PCP, will 

need need PFTs and discussion about anxiety





Acute on chronic diastolic CHF exacerbation resolved


- Continue imdur 60 mg daily for chest pain/angina


- Echo from last admission was reviewed with a preserved EF, no wall motion 

abnormalities.


- Continue aspirin, Plavix, metoprolol 50 mg BID, Zocor 40 mg daily


- Hold HCTZ and lisinopril, amlodipine





Chronic tobacco abuse


- Patient reports smoking cigarettes this morning, was smoking between 6 and 10 

cigarettes yesterday


- Counseled on smoking cessation, nicotine patch ordered





Hypertension:


- Was initially very high, currently stable


- Continue metoprolol 50 mg BID


- Hold amlodipine 10 mg daily, lisinopril 40 mg BID,  and hydrochlorothiazide 

25 mg daily





COPD


-needs home O2 2LNC at rest, 3LNC with ambulation at baseline.  Wean off oxygen 

as able.


-counseled on smoking cessation


-Solumedrol 40 mg Q8H and Duonebs ordered scheduled and when necessary





Poorly controlled diabetes with hyperglycemia:


Poor compliance with insulin


- Glycemic consult


- Last hemoglobin A1c 13.3% in May 2017


-Continue Lantus 20 units qhs, patient reports that he has been checking his 

glucose at home.





Lower left extremity swelling: 


- We will recheck Dopplers for DVT,  likely is secondary to wound and immobility





Left foot diabetic ulcer and abscess status post I&D and arterial stent 

placement- stable


- Wound care consult, continue wound vac


- Continue Augmentin 875/125 twice a day





Anemia secondary to chronic iron deficiency


- Hgb= 9.1


- continue iron supplementation 325mg BID along with docusate





Peripheral arterial disease:


-  s/p Left Lower Extemity Angiogram,  Percutaneous Transluminal Angioplasty 

and Stenting Left Popliteal and Superficial Femoral Artery, Mechanical Closure 

of Right Femoral Artery by Dr. Rainey on 5/26/17 


- Pain control with morphine and tramadol, percocet





Bipolar disorder:


- Continue Depakote and Lexapro


- Depakote level 45





DVT prophylaxis: Heparin subq Q12h





CODE STATUS: Full code


Total Time Spent:  Greater than 30 minutes


This includes examination of the patient, discharge planning, medication 

reconciliation, and communication with other providers.





Discharge Instructions


Please refer to the electronic Patient Visit Report (Discharge Instructions) 

for additional information.





Additional Copies To


Claude Rowley M.D.

## 2017-06-28 NOTE — DISCHARGE INSTRUCTIONS
Discharge Instructions


Date of Service


Jun 28, 2017.





Admission


Reason for Admission:  Acute Respiratory Failure, Sepsis





Discharge


Discharge Diagnosis / Problem:  sepsis, anxiety





Discharge Goals


Goal(s):  Decrease discomfort, Improve function, Increase independence, Improve 

disease control, Learn about illness, Diagnostic testing, Prevent Disease 

Progression





Activity Recommendations


Activity Limitations:  resume your previous activity


Exercise/Sports Limitations:  none


Shower/Bathe:  no limitations


Driving or Machine Use:  no limitations





.





Instructions / Follow-Up


Instructions / Follow-Up


Patient to be discharged home


Prescription for albuterol inhaler sent to pharmacy, please take up to 2 pufss 

every 6 hrs as needed for shortness of breath


Please follow up with Dr Rowley in 1-2 weeks for follow up and discussion of 

anxiety


Please continue to take antibiotic augmentin as directed


No further changes in medications


If worsening fevers, chills, shortness of breath please report to ER





Current Hospital Diet


Patient's current hospital diet: Diabetes Type 2 Diet, AHA Diet (Heart Healthy)





Discharge Diet


Recommended Diet:  AHA Diet (Heart Healthy), Diabetes Type 2 Diet





Pending Studies


Studies pending at discharge:  no





Laboratory Results





Hemoglobin A1c








Test


  5/22/17


22:25 Range/Units


 


 


Estimated Average Glucose 335   mg/dl


 


Hemoglobin A1c 13.3 H 4.5-5.6  %








Lipid Panel








Test


  6/10/17


06:06 Range/Units


 


 


Triglycerides Level 182 H 0-150  mg/dl


 


Cholesterol Level 136  0-200  mg/dl


 


HDL Cholesterol 35   mg/dl


 


Cholesterol/HDL Ratio 3.9   


 


LDL Cholesterol, Calculated 65   mg/dl











Medical Emergencies








.


Who to Call and When:





Medical Emergencies:  If at any time you feel your situation is an emergency, 

please call 911 immediately.





.





Non-Emergent Contact


Non-Emergency issues call your:  Primary Care Provider


Call Non-Emergent contact if:  you have a fever, your pain is worsening





.


.








"Provider Documentation" section prepared by Carlos Lee.








.





VTE Core Measure


Inpt VTE Proph given/why not?:  Unfractionated heparin SQ

## 2017-07-06 ENCOUNTER — HOSPITAL ENCOUNTER (INPATIENT)
Dept: HOSPITAL 45 - C.EDB | Age: 62
LOS: 1 days | Discharge: HOME | DRG: 291 | End: 2017-07-07
Attending: HOSPITALIST | Admitting: HOSPITALIST
Payer: COMMERCIAL

## 2017-07-06 VITALS
SYSTOLIC BLOOD PRESSURE: 128 MMHG | DIASTOLIC BLOOD PRESSURE: 89 MMHG | OXYGEN SATURATION: 97 % | HEART RATE: 63 BPM | TEMPERATURE: 98.96 F

## 2017-07-06 VITALS
SYSTOLIC BLOOD PRESSURE: 106 MMHG | DIASTOLIC BLOOD PRESSURE: 52 MMHG | TEMPERATURE: 97.7 F | OXYGEN SATURATION: 94 % | HEART RATE: 74 BPM

## 2017-07-06 VITALS
TEMPERATURE: 97.7 F | HEART RATE: 54 BPM | SYSTOLIC BLOOD PRESSURE: 110 MMHG | OXYGEN SATURATION: 95 % | DIASTOLIC BLOOD PRESSURE: 58 MMHG

## 2017-07-06 VITALS
DIASTOLIC BLOOD PRESSURE: 55 MMHG | HEART RATE: 60 BPM | SYSTOLIC BLOOD PRESSURE: 106 MMHG | OXYGEN SATURATION: 96 % | TEMPERATURE: 97.52 F

## 2017-07-06 VITALS — OXYGEN SATURATION: 100 % | HEART RATE: 94 BPM

## 2017-07-06 VITALS — HEART RATE: 72 BPM | OXYGEN SATURATION: 95 %

## 2017-07-06 VITALS — OXYGEN SATURATION: 98 % | HEART RATE: 64 BPM

## 2017-07-06 VITALS — HEART RATE: 63 BPM

## 2017-07-06 VITALS
TEMPERATURE: 98.96 F | SYSTOLIC BLOOD PRESSURE: 100 MMHG | DIASTOLIC BLOOD PRESSURE: 64 MMHG | HEART RATE: 61 BPM | OXYGEN SATURATION: 96 %

## 2017-07-06 VITALS
SYSTOLIC BLOOD PRESSURE: 131 MMHG | DIASTOLIC BLOOD PRESSURE: 63 MMHG | OXYGEN SATURATION: 97 % | TEMPERATURE: 98.6 F | HEART RATE: 73 BPM

## 2017-07-06 VITALS
BODY MASS INDEX: 28.09 KG/M2 | BODY MASS INDEX: 28.09 KG/M2 | HEIGHT: 71 IN | HEIGHT: 71 IN | WEIGHT: 200.62 LBS | WEIGHT: 200.62 LBS | BODY MASS INDEX: 28.09 KG/M2

## 2017-07-06 VITALS
OXYGEN SATURATION: 94 % | SYSTOLIC BLOOD PRESSURE: 117 MMHG | TEMPERATURE: 98.24 F | HEART RATE: 54 BPM | DIASTOLIC BLOOD PRESSURE: 63 MMHG

## 2017-07-06 VITALS — HEART RATE: 74 BPM | OXYGEN SATURATION: 95 %

## 2017-07-06 VITALS
OXYGEN SATURATION: 97 % | SYSTOLIC BLOOD PRESSURE: 107 MMHG | HEART RATE: 94 BPM | TEMPERATURE: 98.06 F | DIASTOLIC BLOOD PRESSURE: 46 MMHG

## 2017-07-06 VITALS — OXYGEN SATURATION: 95 % | HEART RATE: 70 BPM

## 2017-07-06 DIAGNOSIS — D64.9: ICD-10-CM

## 2017-07-06 DIAGNOSIS — F17.210: ICD-10-CM

## 2017-07-06 DIAGNOSIS — I25.10: ICD-10-CM

## 2017-07-06 DIAGNOSIS — I10: ICD-10-CM

## 2017-07-06 DIAGNOSIS — I25.5: ICD-10-CM

## 2017-07-06 DIAGNOSIS — G47.33: ICD-10-CM

## 2017-07-06 DIAGNOSIS — I50.33: Primary | ICD-10-CM

## 2017-07-06 DIAGNOSIS — E11.65: ICD-10-CM

## 2017-07-06 DIAGNOSIS — E86.0: ICD-10-CM

## 2017-07-06 DIAGNOSIS — Z83.3: ICD-10-CM

## 2017-07-06 DIAGNOSIS — J96.00: ICD-10-CM

## 2017-07-06 DIAGNOSIS — F31.9: ICD-10-CM

## 2017-07-06 DIAGNOSIS — E11.621: ICD-10-CM

## 2017-07-06 DIAGNOSIS — K21.9: ICD-10-CM

## 2017-07-06 LAB
ALBUMIN/GLOB SERPL: 0.5 {RATIO} (ref 0.9–2)
ALP SERPL-CCNC: 90 U/L (ref 45–117)
ALT SERPL-CCNC: 16 U/L (ref 12–78)
ANION GAP SERPL CALC-SCNC: 7 MMOL/L (ref 3–11)
ANION GAP SERPL CALC-SCNC: 9 MMOL/L (ref 3–11)
ARTERIAL PATENCY WRIST A: (no result)
AST SERPL-CCNC: 16 U/L (ref 15–37)
B-OH-BUTYR SERPL-SCNC: 0.96 MG/DL (ref 0.2–2.81)
B-OH-BUTYR SERPL-SCNC: 2.06 MG/DL (ref 0.2–2.81)
BASE EXCESS BLDA CALC-SCNC: -1.9 MEQ/L (ref -9–1.8)
BASOPHILS # BLD: 0.01 K/UL (ref 0–0.2)
BASOPHILS # BLD: 0.02 K/UL (ref 0–0.2)
BASOPHILS NFR BLD: 0.1 %
BASOPHILS NFR BLD: 0.1 %
BUN SERPL-MCNC: 26 MG/DL (ref 7–18)
BUN SERPL-MCNC: 32 MG/DL (ref 7–18)
BUN/CREAT SERPL: 23.3 (ref 10–20)
BUN/CREAT SERPL: 26.3 (ref 10–20)
CALCIUM SERPL-MCNC: 7.6 MG/DL (ref 8.5–10.1)
CALCIUM SERPL-MCNC: 8.1 MG/DL (ref 8.5–10.1)
CHLORIDE SERPL-SCNC: 103 MMOL/L (ref 98–107)
CHLORIDE SERPL-SCNC: 105 MMOL/L (ref 98–107)
CKMB/CK RATIO: 2.8 (ref 0–3)
CO2 SERPL-SCNC: 25 MMOL/L (ref 21–32)
CO2 SERPL-SCNC: 27 MMOL/L (ref 21–32)
COMPLETE: YES
COMPLETE: YES
CREAT CL PREDICTED SERPL C-G-VRATE: 70.6 ML/MIN
CREAT CL PREDICTED SERPL C-G-VRATE: 83.9 ML/MIN
CREAT SERPL-MCNC: 1.1 MG/DL (ref 0.6–1.4)
CREAT SERPL-MCNC: 1.2 MG/DL (ref 0.6–1.4)
EOSINOPHIL NFR BLD AUTO: 352 K/UL (ref 130–400)
EOSINOPHIL NFR BLD AUTO: 354 K/UL (ref 130–400)
EOSINOPHIL NFR BLD AUTO: 514 K/UL (ref 130–400)
GLOBULIN SER-MCNC: 4.7 GM/DL (ref 2.5–4)
GLUCOSE SERPL-MCNC: 370 MG/DL (ref 70–99)
GLUCOSE SERPL-MCNC: 437 MG/DL (ref 70–99)
HCO3 BLDA-SCNC: 26 MMOL/L (ref 19–24)
HCT VFR BLD CALC: 23.1 % (ref 42–52)
HCT VFR BLD CALC: 24 % (ref 42–52)
HCT VFR BLD CALC: 24.3 % (ref 42–52)
HCT VFR BLD CALC: 24.4 % (ref 42–52)
HCT VFR BLD CALC: 30.6 % (ref 42–52)
IG%: 0.6 %
IG%: 1.2 %
IMM GRANULOCYTES NFR BLD AUTO: 15 %
IMM GRANULOCYTES NFR BLD AUTO: 21 %
INR PPP: 0.9 (ref 0.9–1.1)
LYMPHOCYTES # BLD: 1.59 K/UL (ref 1.2–3.4)
LYMPHOCYTES # BLD: 3.43 K/UL (ref 1.2–3.4)
MAGNESIUM SERPL-MCNC: 2.5 MG/DL (ref 1.8–2.4)
MCH RBC QN AUTO: 26.6 PG (ref 25–34)
MCH RBC QN AUTO: 26.7 PG (ref 25–34)
MCH RBC QN AUTO: 27.2 PG (ref 25–34)
MCHC RBC AUTO-ENTMCNC: 31.6 G/DL (ref 32–36)
MCHC RBC AUTO-ENTMCNC: 31.7 G/DL (ref 32–36)
MCHC RBC AUTO-ENTMCNC: 31.7 G/DL (ref 32–36)
MCV RBC AUTO: 84.3 FL (ref 80–100)
MCV RBC AUTO: 84.4 FL (ref 80–100)
MCV RBC AUTO: 85.7 FL (ref 80–100)
MONOCYTES NFR BLD: 5.8 %
MONOCYTES NFR BLD: 6.3 %
NEUTROPHILS # BLD AUTO: 0.1 %
NEUTROPHILS # BLD AUTO: 1.5 %
NEUTROPHILS NFR BLD AUTO: 69.9 %
NEUTROPHILS NFR BLD AUTO: 78.4 %
O2 ADMINISTRATION: (no result)
PARTIAL THROMBOPLASTIN RATIO: 1
PMV BLD AUTO: 8.3 FL (ref 7.4–10.4)
PMV BLD AUTO: 8.4 FL (ref 7.4–10.4)
PMV BLD AUTO: 8.6 FL (ref 7.4–10.4)
PO2 BLDA: 154 MM/HG (ref 80–95)
POTASSIUM SERPL-SCNC: 4.1 MMOL/L (ref 3.5–5.1)
POTASSIUM SERPL-SCNC: 4.9 MMOL/L (ref 3.5–5.1)
PROTHROMBIN TIME: 9.8 SECONDS (ref 9–12)
RBC # BLD AUTO: 2.74 M/UL (ref 4.7–6.1)
RBC # BLD AUTO: 2.88 M/UL (ref 4.7–6.1)
RBC # BLD AUTO: 3.57 M/UL (ref 4.7–6.1)
SAO2 % BLDA: 97 % (ref 90–95)
SODIUM SERPL-SCNC: 137 MMOL/L (ref 136–145)
SODIUM SERPL-SCNC: 139 MMOL/L (ref 136–145)
WBC # BLD AUTO: 10.6 K/UL (ref 4.8–10.8)
WBC # BLD AUTO: 11 K/UL (ref 4.8–10.8)
WBC # BLD AUTO: 16.37 K/UL (ref 4.8–10.8)

## 2017-07-06 RX ADMIN — OXYCODONE HYDROCHLORIDE AND ACETAMINOPHEN PRN TAB: 5; 325 TABLET ORAL at 17:42

## 2017-07-06 RX ADMIN — NITROGLYCERIN SCH INCH: 20 OINTMENT TOPICAL at 07:40

## 2017-07-06 RX ADMIN — FERROUS SULFATE TAB EC 325 MG (65 MG FE EQUIVALENT) SCH MG: 325 (65 FE) TABLET DELAYED RESPONSE at 07:39

## 2017-07-06 RX ADMIN — NITROGLYCERIN SCH INCH: 20 OINTMENT TOPICAL at 12:32

## 2017-07-06 RX ADMIN — OXYCODONE HYDROCHLORIDE AND ACETAMINOPHEN PRN TAB: 5; 325 TABLET ORAL at 09:13

## 2017-07-06 RX ADMIN — INSULIN ASPART SCH UNITS: 100 INJECTION, SOLUTION INTRAVENOUS; SUBCUTANEOUS at 12:31

## 2017-07-06 RX ADMIN — DOCUSATE SODIUM SCH MG: 100 CAPSULE, LIQUID FILLED ORAL at 20:09

## 2017-07-06 RX ADMIN — NITROGLYCERIN SCH INCH: 20 OINTMENT TOPICAL at 03:27

## 2017-07-06 RX ADMIN — AMLODIPINE BESYLATE SCH MG: 5 TABLET ORAL at 09:06

## 2017-07-06 RX ADMIN — ESCITALOPRAM OXALATE SCH MG: 20 TABLET, FILM COATED ORAL at 09:05

## 2017-07-06 RX ADMIN — IPRATROPIUM BROMIDE AND ALBUTEROL SULFATE SCH ML: .5; 3 SOLUTION RESPIRATORY (INHALATION) at 03:16

## 2017-07-06 RX ADMIN — AMOXICILLIN AND CLAVULANATE POTASSIUM SCH MG: 875; 125 TABLET, FILM COATED ORAL at 16:34

## 2017-07-06 RX ADMIN — INSULIN ASPART SCH UNITS: 100 INJECTION, SOLUTION INTRAVENOUS; SUBCUTANEOUS at 06:01

## 2017-07-06 RX ADMIN — ISOSORBIDE MONONITRATE SCH MG: 60 TABLET ORAL at 09:05

## 2017-07-06 RX ADMIN — DOCUSATE SODIUM SCH MG: 100 CAPSULE, LIQUID FILLED ORAL at 09:05

## 2017-07-06 RX ADMIN — OXYCODONE HYDROCHLORIDE AND ACETAMINOPHEN PRN TAB: 5; 325 TABLET ORAL at 22:28

## 2017-07-06 RX ADMIN — IPRATROPIUM BROMIDE AND ALBUTEROL SULFATE SCH ML: .5; 3 SOLUTION RESPIRATORY (INHALATION) at 19:22

## 2017-07-06 RX ADMIN — INSULIN ASPART SCH UNITS: 100 INJECTION, SOLUTION INTRAVENOUS; SUBCUTANEOUS at 21:00

## 2017-07-06 RX ADMIN — Medication SCH GM: at 09:05

## 2017-07-06 RX ADMIN — LISINOPRIL SCH MG: 40 TABLET ORAL at 09:05

## 2017-07-06 RX ADMIN — NITROGLYCERIN SCH INCH: 20 OINTMENT TOPICAL at 17:43

## 2017-07-06 RX ADMIN — METOPROLOL SUCCINATE SCH MG: 50 TABLET, EXTENDED RELEASE ORAL at 20:10

## 2017-07-06 RX ADMIN — AMOXICILLIN AND CLAVULANATE POTASSIUM SCH MG: 875; 125 TABLET, FILM COATED ORAL at 07:39

## 2017-07-06 RX ADMIN — IPRATROPIUM BROMIDE AND ALBUTEROL SULFATE SCH ML: .5; 3 SOLUTION RESPIRATORY (INHALATION) at 14:45

## 2017-07-06 RX ADMIN — LISINOPRIL SCH MG: 40 TABLET ORAL at 20:10

## 2017-07-06 RX ADMIN — FERROUS SULFATE TAB EC 325 MG (65 MG FE EQUIVALENT) SCH MG: 325 (65 FE) TABLET DELAYED RESPONSE at 16:35

## 2017-07-06 RX ADMIN — INSULIN ASPART SCH UNITS: 100 INJECTION, SOLUTION INTRAVENOUS; SUBCUTANEOUS at 16:38

## 2017-07-06 RX ADMIN — Medication SCH MG: at 09:05

## 2017-07-06 RX ADMIN — METOPROLOL SUCCINATE SCH MG: 50 TABLET, EXTENDED RELEASE ORAL at 09:04

## 2017-07-06 RX ADMIN — CLOPIDOGREL BISULFATE SCH MG: 75 TABLET, FILM COATED ORAL at 09:05

## 2017-07-06 RX ADMIN — IPRATROPIUM BROMIDE AND ALBUTEROL SULFATE SCH ML: .5; 3 SOLUTION RESPIRATORY (INHALATION) at 07:15

## 2017-07-06 RX ADMIN — INSULIN ASPART SCH UNITS: 100 INJECTION, SOLUTION INTRAVENOUS; SUBCUTANEOUS at 09:00

## 2017-07-06 RX ADMIN — Medication SCH MCG: at 09:05

## 2017-07-06 NOTE — DIAGNOSTIC IMAGING REPORT
SINGLE VIEW CHEST



CLINICAL HISTORY:  Dyspnea.



FINDINGS: An AP, portable, upright chest radiograph is compared to chest x-ray

and chest CT dated 6/26/2017. The examination is degraded by portable technique

and apical lordotic positioning.  The heart is enlarged and there is

atherosclerotic calcification of the thoracic aorta. Pulmonary vascular

congestion persists. Small pleural effusions are identified. Emphysema is

suspected. Chronic interstitial thickening is similar to previous. The lungs and

pleural spaces are clear. No pneumothorax is seen. The skeletal structures are

osteopenic. The bony thorax is grossly intact.



IMPRESSION:



1. Cardiomegaly. Pulmonary vascular congestion persists.



2. Small pleural effusions.







Electronically signed by:  Heber Hubbard M.D.

7/6/2017 7:30 AM



Dictated Date/Time:  7/6/2017 7:28 AM

## 2017-07-06 NOTE — PHARMACY PROGRESS NOTE
Glycemic Control Intl Consult


Date of Service


Jul 6, 2017.





Scope


Glycemic Pharmacist consulted by Tala Maradiaga on 7/6/17 for glycemic control 

and to write orders per [facility] inpatient glycemic control protocol





Objective


Weight (Kilograms):  89.700


Accuchecks BSG (last 24hrs):











Test


  7/6/17


00:05 7/6/17


04:58 7/6/17


05:54 7/6/17


07:24


 


Random Glucose


  370 mg/dl


(70-99) 437 mg/dl


(70-99) 


  


 


 


Bedside Glucose


  


  


  435 mg/dl


(70-99) 392 mg/dl


(70-99)








Laboratory Data (last 24hrs)











Test


  7/6/17


00:05 7/6/17


04:58 7/6/17


06:27


 


Anion Gap 9.0 mmol/L  7.0 mmol/L  


 


BUN/Creatinine Ratio 23.3  26.3  


 


Blood Urea Nitrogen 26 mg/dl  32 mg/dl  


 


Creatinine 1.10 mg/dl  1.20 mg/dl  


 


Potassium Level 4.1 mmol/L  4.9 mmol/L  


 


Sodium Level 139 mmol/L  137 mmol/L  


 


White Blood Count 16.37 K/uL  11.00 K/uL  10.60 K/uL 


 


Red Blood Count 3.57 M/uL   2.88 M/uL 


 


Hemoglobin 9.7 g/dL   7.7 g/dL 


 


Hematocrit 30.6 %   24.3 % 


 


Mean Corpuscular Volume 85.7 fL   84.4 fL 


 


Mean Corpuscular Hemoglobin 27.2 pg   26.7 pg 


 


Mean Corpuscular Hemoglobin


Concent 31.7 g/dl 


  


  31.7 g/dl 


 


 


Platelet Count 514 K/uL   352 K/uL 


 


Mean Platelet Volume 8.6 fL   8.3 fL 


 


Neutrophils (%) (Auto) 69.9 %   78.4 % 


 


Lymphocytes (%) (Auto) 21.0 %   15.0 % 


 


Monocytes (%) (Auto) 6.3 %   5.8 % 


 


Eosinophils (%) (Auto) 1.5 %   0.1 % 


 


Basophils (%) (Auto) 0.1 %   0.1 % 


 


Neutrophils # (Auto) 11.45 K/uL   8.32 K/uL 


 


Lymphocytes # (Auto) 3.43 K/uL   1.59 K/uL 


 


Monocytes # (Auto) 1.03 K/uL   0.61 K/uL 


 


Eosinophils # (Auto) 0.25 K/uL   0.01 K/uL 


 


Basophils # (Auto) 0.02 K/uL   0.01 K/uL 











Recent Pertinent Medications


Outpatient Anti-diabetic Regimen: 


* Lantus 20 units SQ q AM


* Humalog SSI 


* Metformin 1000mg PO BID


* A1c = 13.3 %  5/22/17





The patient is currently receiving:


* Basal insulin:              


 * Lantus 30 units every 24 hours





* Bolus Insulin:     


 * NovoLog Correction per scale ACHS


      -  Goal Range: Low 120 mg/dL - High 160 mg/dL


      -  Correction Factor: 40 mg/dL/unit


      -  Carb ratio of 1 unit per 9 grams CHO consumed





* Oral Agents: 


 * none at this time





Risk Factors for Insulin Resistance:


* Infection:  Augmentin (30 day course) from outpatient medication list


* Diet:  T2DM/AHA





Assessment & Plan


ASSESSMENT:


* ADA & AACE recommend a goal blood sugar range 140-180 mg/dl for the majority 

of critically ill & non-critically ill patients.  However, more stringent 

targets may be selected in individual cases.


* 62 y/o uncontrolled type 2 diabetic admitted with CHF exacerbation


* BSG on admission elevated (>400mg/dL)


* Frequent admissions - last glycemic regimen reviewed


 * Will require tighter NovoLog parameters


 * Basal insulin - no change at this time (PRN PM dose added for acute 

hyperglycemia x1 on 7/6)


* A1c elevated from May 2017


 * add to discharge instructions


* Mr. Dixon is fearful of hypoglycemia per prior admission CDE note





PLAN FOR INPATIENT GLYCEMIC CONTROL:


* Basal insulin:


 * Continue Lantus 30 units SQ q AM 


 * Add PM dose if BSG is above 180mg/dL


* Bolus insulin:


 * NovoLog SQ AC and HS


 * Correction factor:  20mg/dL/unit


 * Carb ratio:  1 unit per 6 g of CHO consumed


 * Goal:  140-180mg/dL per ADA recommendations


* Orals: 


 * currently being held


* A1c:  13.3% from 5/22/17


 * added to discharge instructions





RECOMMENDATIONS FOR DISCHARGE:


* Awaited





* Please note that the plan above was derived based on current level of insulin 

resistance and hospital stress. These recommendations are appropriate for 

inpatient admission only. Plan of care upon discharge will need to be 

reassessed to avoid potential outpatient hypo/hyperglycemia. 





Thank you.

## 2017-07-06 NOTE — HISTORY AND PHYSICAL
History & Physical


Date & Time of Service:


Jul 6, 2017 at 01:25


Chief Complaint:


Respiratory Distress


Primary Care Physician:


Claude Rowley M.D.


History of Present Illness


Source:  patient


60 y/o M Hx CAD, diastolic CHF, COPD, DM 2, JANE - continues to smoke.  Frequent 

admissions with CP and CHF exacerbations.  Presented with respiratory distress.

  He was able to avoid intubation with use of Bipap and Nebulizers.  Pt had 

nearly identical complaints when admitted 06/24. He denies CP, a productive 

cough, N/V, diaphoresis or fevers.  He is currently being treated for A RLE 

ulcer with a wound vac and 30 day course of Augmentin.





Past Medical/Surgical History


1) CAD - NSTEMI 2007 - L circ stent  -   ACS 2010 - Catheterization showed 90 % 

mid LAD stenosis, 40 % distal LAD Stenosis,  50% mid LCX stenosis, 100% in-

stent stenosis in the distal L circumflex, 100% obtuse marginal stenosis, 60% 

proximal right coronary stenosis, 40% mid RCA stenosis. Pt had an atherectomy 

and ERICK placed in the LAD at [place].





2) Grade 1 diastolic dysfunction on recent echo - Preserved EF 55%





3) DM 2 





4) Bipolar disease





5) COPD - continues to smoke 





6) Diabetic L foot ulcer requiring wound vac





7) V fib arrest following MI 2007





8) JANE





9) GERD





10) PAD - L popliteal and superficial femoral stents





11) Chronic anemia - Hb 8-10








Surgical


Appendectomy


Vascular stenting 2017


Cervical surgery














Family History





Cancer


Diabetes mellitus


Heart disease


Hypertension





Social History


Continues to smoke 1/2 pack daily


Smoking Status:  Current Every Day Smoker


Drug Use:  none


Marital Status:  


Housing status:  lives with family


Occupational Status:  employed





Immunizations


History of Influenza Vaccine:  No


Influenza Vaccine Date:  Oct 5, 2009


History of Tetanus Vaccine?:  No


Tetanus Immunization Date:  Mar 1, 2004


History of Pneumococcal:  No


Pneumococcal Date:  Oct 15, 2006


History of Hepatitis B Vaccine:  No





Multi-Drug Resistant Organisms


History of MDRO:  No





Allergies


Coded Allergies:  


     No Known Allergies (Verified , 7/6/17)





Home Medications


Scheduled


Albuterol Hfa (Ventolin Hfa), 2 PUFFS INH Q6H


Amlodipine Besylate (Amlodipine Besylate), 10 MG PO QAM


Amoxicillin & Pot Clavulanate (Augmentin 875-125 mg), 875 MG PO BID


Aspirin (Aspirin Ec), 325 MG PO QAM


Clopidogrel (Plavix), 75 MG PO QAM


Cyanocobalamin (Vitamin B-12), 1,000 MCG PO QAM


Divalproex Sodium (Depakote Delay Rel), 2,000 MG PO HS


Docusate Sodium (Docusate Sodium), 100 MG PO BID


Escitalopram Oxalate (Escitalopram Oxalate), 20 MG PO QAM


Ferrous Sulfate (Ferrous Sulfate), 325 MG PO BIDM


Gabapentin (Gabapentin), 100 MG PO HS


Gabapentin (Gabapentin), 300 MG PO HS


Hydrochlorothiazide (Hydrochlorothiazide), 25 MG PO DAILY


Insulin Glargine (Lantus Solostar), 20 UNIT SC QAM


Insulin Human Lispro (Humalog Kwikpen), UNITS SC UD


Isosorbide Mononitrate (Isosorbide Mononitrate ER), 60 MG PO QAM


Lisinopril (Zestril), 40 MG PO BID


Metformin HCl (Metformin HCl ER), 1 TAB PO BID


Metoprolol Succ (Toprol Xl) (Toprol-Xl), 50 MG PO BID


Polyethylene (Miralax), 17 GM PO DAILY


Simvastatin (Zocor), 40 MG PO HS





Scheduled PRN


Albuterol Sulfate (Proair Respiclick), 2 PUFF INH Q4 PRN for SOB/Wheezing


Fluticasone Propionate (Fluticasone Propionate), 2 SPRAYS NA DAILY PRN for 

Nasal Congestion


Nitroglycerin (Nitrostat), 0.4 MG UT PRN PRN for Chest Pain


Oxycodone/Acetaminophen 5MG/325MG (Percocet 5MG/325MG), 2 TAB PO Q4H PRN for 

Pain


Tramadol HCl (Tramadol HCl), 50 MG PO Q6 PRN for Pain





Review of Systems


Constitutional:  No fever, No chills, No sweats


Eyes:  No worsening of vision


ENT:  No hearing loss, No unusual epistaxis, No nasal symptoms


Respiratory:  + shortness of breath, + dyspnea on exertion, + dyspnea at rest, 

No cough, No sputum, No wheezing


Cardiovascular:  No chest pain, No orthopnea


Abdomen:  No pain, No nausea, No vomiting


Musculoskeletal:  No joint pain


Genitourinary - Male:  No hematuria, No dysuria


Neurologic:  No memory loss, No paralysis, No weakness


Psychiatric:  No depression symptoms


Endocrine:  No fatigue


Hematologic / Lymphatic:  No abnormal bleeding/bruising


Integumentary:  + problem reported (Necrotic ulcer on Medial aspect of R foot - 

several shallow ulcers with minimal exudate over calves.  States ulcers are 

chronic)


Allergic / Immunologic:  No environmental allergies





Physical Exam


Vital Signs











  Date Time  Temp Pulse Resp B/P (MAP) Pulse Ox O2 Delivery O2 Flow Rate FiO2


 


7/6/17 00:55  94   100   100


 


7/6/17 00:54  89 18  100 BiPAP  100


 


7/6/17 00:31  96 24 151/82 100 Non-Rebreather 15.0 


 


7/6/17 00:12      Non-Rebreather 15.0 


 


7/6/17 00:10      Non-Rebreather 15.0 


 


7/6/17 00:09  97      


 


7/6/17 00:07 36.4 97 38 191/101 97 Non-Rebreather 15.0 








General Appearance:  WD/WN, no apparent distress


Head:  normocephalic


Eyes:  normal inspection, PERRL, EOMI


ENT:  normal ENT inspection, pharynx normal, + pertinent finding (poor dentition

)


Neck:  supple, + JVD


Respiratory/Chest:  chest non-tender, + decreased breath sounds (decreased at 

bases - no audible wheezing - may have mild crackles at bases)


Cardiovascular:  regular rate, rhythm, no gallop, no JVD, no murmur


Abdomen/GI:  normal bowel sounds, non tender, soft


Back:  normal inspection, no CVA tenderness, no muscle spasm


Extremities/Musculoskelatal:  + pertinent finding (B/L 2+ edema present - 

Necrotic ulcer on Medial aspect of R foot - several shallow ulcers with minimal 

exudate over calves.  )


Neurologic/Psych:  CNs II-XII nml as tested, no motor/sensory deficits, alert, 

oriented x 3


Skin:  + pertinent finding (Necrotic ulcer on Medial aspect of R foot - several 

shallow ulcers with minimal exudate over calves.  )





Diagnostics


Laboratory Results





Results Past 24 Hours








Test


  7/6/17


00:05 7/6/17


00:14 Range/Units


 


 


White Blood Count 16.37  4.8-10.8  K/uL


 


Red Blood Count 3.57  4.7-6.1  M/uL


 


Hemoglobin 9.7  14.0-18.0  g/dL


 


Hematocrit 30.6  42-52  %


 


Mean Corpuscular Volume 85.7    fL


 


Mean Corpuscular Hemoglobin 27.2  25-34  pg


 


Mean Corpuscular Hemoglobin


Concent 31.7


  


  32-36  g/dl


 


 


Platelet Count 514  130-400  K/uL


 


Mean Platelet Volume 8.6  7.4-10.4  fL


 


Neutrophils (%) (Auto) 69.9   %


 


Lymphocytes (%) (Auto) 21.0   %


 


Monocytes (%) (Auto) 6.3   %


 


Eosinophils (%) (Auto) 1.5   %


 


Basophils (%) (Auto) 0.1   %


 


Neutrophils # (Auto) 11.45  1.4-6.5  K/uL


 


Lymphocytes # (Auto) 3.43  1.2-3.4  K/uL


 


Monocytes # (Auto) 1.03  0.11-0.59  K/uL


 


Eosinophils # (Auto) 0.25  0-0.5  K/uL


 


Basophils # (Auto) 0.02  0-0.2  K/uL


 


RDW Standard Deviation 46.8  36.4-46.3  fL


 


RDW Coefficient of Variation 14.8  11.5-14.5  %


 


Immature Granulocyte % (Auto) 1.2   %


 


Immature Granulocyte # (Auto) 0.19  0.00-0.02  K/uL


 


Prothrombin Time


  9.8


  


  9.0-12.0


SECONDS


 


Prothromb Time International


Ratio 0.9


  


  0.9-1.1  


 


 


Activated Partial


Thromboplast Time 27.0


  


  21.0-31.0


SECONDS


 


Partial Thromboplastin Ratio 1.0   


 


Sodium Level 139  136-145  mmol/L


 


Potassium Level 4.1  3.5-5.1  mmol/L


 


Chloride Level 103    mmol/L


 


Carbon Dioxide Level 27  21-32  mmol/L


 


Anion Gap 9.0  3-11  mmol/L


 


Blood Urea Nitrogen 26  7-18  mg/dl


 


Creatinine


  1.10


  


  0.60-1.40


mg/dl


 


Est Creatinine Clear Calc


Drug Dose 83.9


  


   ml/min


 


 


Estimated GFR (


American) 84.7


  


   


 


 


Estimated GFR (Non-


American 73.1


  


   


 


 


BUN/Creatinine Ratio 23.3  10-20  


 


Random Glucose 370  70-99  mg/dl


 


Calcium Level 8.1  8.5-10.1  mg/dl


 


Total Bilirubin 0.1  0.2-1  mg/dl


 


Aspartate Amino Transf


(AST/SGOT) 16


  


  15-37  U/L


 


 


Alanine Aminotransferase


(ALT/SGPT) 16


  


  12-78  U/L


 


 


Alkaline Phosphatase 90    U/L


 


Total Creatine Kinase 71    U/L


 


Creatine Kinase MB 2.0  0.5-3.6  ng/ml


 


Creatine Kinase MB Ratio 2.8  0-3.0  


 


Troponin I 0.024  0-0.045  ng/ml


 


Total Protein 7.0  6.4-8.2  gm/dl


 


Albumin 2.3  3.4-5.0  gm/dl


 


Globulin 4.7  2.5-4.0  gm/dl


 


Albumin/Globulin Ratio 0.5  0.9-2  


 


Beta-Hydroxybutyric Acid 0.96  0.2-2.81  mg/dL


 


Arterial Blood pH  7.21 7.35-7.45  


 


Arterial Blood Partial


Pressure CO2 


  67


  35-46  mmHg


 


 


Arterial Blood Partial


Pressure O2 


  154


  80-95  mm/Hg


 


 


Arterial Blood HCO3  26 19-24  mmol/L


 


Arterial Blood Oxygen


Saturation 


  97.0


  90-95  %


 


 


Arterial Blood Base Excess  -1.9 -9-1.8  mEq/L


 


Arterial Blood Gas Delivery  15L  


 


Cameron Test  POS POS  








Microbiology Results


7/6/17 Blood Culture, Received


         Pending


7/6/17 Blood Culture, Received


         Pending





Diagnostic Radiology


CXR is consistent with vascular congestion





EKG


Sinus , PVCs , incomplete LBBB - no evidence of acute ischemia





Impression


Assessment and Plan


60 y/o M Hx CAD, diastolic CHF, COPD, DM 2, JANE - continues to smoke.  Frequent 

admissions with CP and CHF exacerbations.  Presented with respiratory distress.

  He was able to avoid intubation with use of Bipap and Nebulizers.  Pt had 

nearly identical complaints when admitted 06/24. He denies CP, a productive 

cough, N/V, diaphoresis or fevers.  He is currently being treated for A RLE 

ulcer with a wound vac and 30 day course of Augmentin. 





1) Respiratory distress - likely due to CHF although he improved with Nebs and 

Bipap and no Lasix was given.  Additionally his BP on the Bipap was as low as 

80s systolic.  We will transition to a NC, provide IV Lasix as needed and 

monitor his BP and Is/Os on telemetry.  Cont ACE, Bblocker.  COPD exacerbation 

is in differential for his distress however CXR and exam were more consistent 

with CHF.





2) COPD - possible exacerbation - states he uses 02 only PRN at home - unclear 

if he is noncompliant.  We will provide Duonebs and PRN Albuterol in addition 

to an 02 protocol - there is no apparent wheezing so that we will hold off on 

steroids unless his resp status once again worsens.





3) Foot ulcer - he has Leukocytosis which has been present throughout recent 

admissions - he denies a fever or rigors. A wound vac is in place and we will 

consult wound care for ulcerations on his shins.





4) DM - placed on SS - will increase AM Lantus dose in light of his 

hyperglycemia





5) Bipolar - cont Depakote





6) CAD - troponins pending - cont ASA, Plavix, Statin, Imdur





7) Anemia - Hb stable - taking Iron and B12





8) Advised on smoking cessation - does not appear interested in quiting - pt 

does not display adequate competence in self care and may benefit from home 

health visits to avoid frequent readmissions - would discuss with .








Full code - Heparin prophylaxis 


Total time for this admit including review of labs, meds, EKG - discussion with 

pot and ER attending 45 min





Level of Care


Telemetry





Resuscitation Status


FULL RESUSCITATION





VTE Prophylaxis


Given or contraindicated:  Unfractionated heparin SQ

## 2017-07-06 NOTE — HOSPITALIST PROGRESS NOTE
Hospitalist Progress Note


Date of Service


Jul 6, 2017.


 (Tala Maradiaga ., PA-C)





Subjective


Pt evaluation today including:  conversation w/ patient, physical exam, chart 

review, lab review, review of studies, review of inpatient medication list


Voiding:  no voiding problems, no incontinence


Patient states he is feeling well since admission. 


He currently denies any SOB.


   Requiring 3L O2 NC- chronic requirement


   Able to speak full sentences w/out SOB


   Lying flat in bed 


He states prior to admission, his SOB came on abruptly. 


He admits to smoking 1/2 pack per day.


   Discussed smoking cessation 


States he is compliant with his medications. 


Wound VAC in place- follows routinely w/ wound care.


Noted lesions to R shin region- patient states he dropped hot gravy on himself.





Patient denies any fever, chills, sweats, lightheadedness, dizziness, vision 

changes, CP, palpitations, edema, SOB, wheezing, cough, abdominal pain, nausea, 

vomiting, diarrhea, urinary symptoms, melena, numbness/tingling, weakness, 

muscle/joint pain, anxiety/depression, active bleeding, or new skin 

discoloration/changes. 


 (Tala Maradiaga ., PA-C)





Medications





Current Inpatient Medications








 Medications


  (Trade)  Dose


 Ordered  Sig/Dc


 Route  Start Time


 Stop Time Status Last Admin


Dose Admin


 


 Amoxicillin/


 Clavulanate


 Potassium


  (Augmentin Tab)  875 mg  BIDM


 PO  7/6/17 07:15


 7/16/17 07:14  7/6/17 07:39


875 MG


 


 Aspirin


  (Ecotrin Tab)  325 mg  QAM


 PO  7/6/17 09:00


 8/5/17 08:59  7/6/17 09:05


325 MG


 


 Clopidogrel


 Bisulfate


  (plAVix TAB)  75 mg  QAM


 PO  7/6/17 09:00


 8/5/17 08:59  7/6/17 09:05


75 MG


 


 Cyanocobalamin


  (Vitamin B-12


 Tab)  1,000 mcg  QAM


 PO  7/6/17 09:00


 8/5/17 08:59  7/6/17 09:05


1,000 MCG


 


 Divalproex Sodium


  (Depakote Delay


 Rel Tab)  2,000 mg  HS


 PO  7/6/17 21:00


 8/5/17 20:59   


 


 


 Docusate Sodium


  (coLACE CAP)  100 mg  BID


 PO  7/6/17 09:00


 8/5/17 08:59  7/6/17 09:05


100 MG


 


 Escitalopram


 Oxalate


  (Lexapro Tab)  20 mg  QAM


 PO  7/6/17 09:00


 8/5/17 08:59  7/6/17 09:05


20 MG


 


 Ferrous Sulfate


  (Feosol Tab)  325 mg  BIDM


 PO  7/6/17 07:15


 8/5/17 07:59  7/6/17 07:39


325 MG


 


 Fluticasone


 Propionate


  (Flonase Nasal


 Spray)  2 sprays  DAILY  PRN


 NA  7/6/17 01:45


 8/5/17 01:44   


 


 


 Gabapentin


  (Neurontin Cap)  100 mg  HS


 PO  7/6/17 21:00


 8/5/17 20:59   


 


 


 Gabapentin


  (Neurontin Cap)  300 mg  HS


 PO  7/6/17 21:00


 8/5/17 20:59   


 


 


 Insulin Glargine


  (Lantus Solostar


 Pen)  30 units  QAM


 SC  7/6/17 09:00


 8/5/17 08:59  7/6/17 09:01


30 UNITS


 


 Isosorbide


 Mononitrate


  (Imdur Ext Rel


 Tab)  60 mg  QAM


 PO  7/6/17 09:00


 8/5/17 08:59  7/6/17 09:05


60 MG


 


 Lisinopril


  (Zestril Tab)  40 mg  BID


 PO  7/6/17 09:00


 8/5/17 08:59  7/6/17 09:05


40 MG


 


 Metoprolol


 Succinate


  (Toprol Xl Tab)  50 mg  BID


 PO  7/6/17 09:00


 8/5/17 08:59  7/6/17 09:04


50 MG


 


 Oxycodone/


 Acetaminophen


  (Percocet


 5-325mg Tab)  2 tab  Q4H  PRN


 PO  7/6/17 01:45


 7/20/17 01:44  7/6/17 09:13


2 TAB


 


 Polyethylene


  (Miralax Powder


 Packet)  17 gm  DAILY


 PO  7/6/17 09:00


 8/5/17 08:59  7/6/17 09:05


17 GM


 


 Simvastatin


  (Zocor Tab)  40 mg  HS


 PO  7/6/17 21:00


 8/5/17 20:59   


 


 


 Amlodipine


 Besylate


  (Norvasc Tab)  10 mg  QAM


 PO  7/6/17 09:00


 8/5/17 08:59  7/6/17 09:06


10 MG


 


 Albuterol Sulfate


  (Ventolin 0.083%


 2.5MG/3ML Neb)  2.5 mg  Q4H  PRN


 INH  7/6/17 01:45


 8/5/17 01:44   


 


 


 Insulin Aspart


  (novoLOG ASPART)  **SLIDING


 SCALE**


 **G...  ACHS


 SC  7/6/17 06:45


 8/5/17 06:59  7/6/17 09:00


10 UNITS


 


 Heparin Sodium


  (Porcine)


  (Heparin Sq 5000


 Unit/0.5ml)  5,000 unit  Q8


 SQ  7/6/17 06:00


 8/5/17 05:59  7/6/17 05:44


5,000 UNIT


 


 Acetaminophen


  (Tylenol Tab)  650 mg  Q4H  PRN


 PO  7/6/17 01:45


 8/5/17 01:44   


 


 


 Al Hydrox/Mg


 Hydrox/Simethicone


  (Maalox Max Susp)  15 ml  Q4H  PRN


 PO  7/6/17 01:45


 8/5/17 01:44   


 


 


 Magnesium


 Hydroxide


  (Milk Of


 Magnesia Susp)  30 ml  Q12H  PRN


 PO  7/6/17 01:45


 8/5/17 01:44   


 


 


 Zolpidem Tartrate


  (Ambien Tab)  5 mg  HSZ  PRN


 PO  7/6/17 01:45


 8/5/17 01:44   


 


 


 Ondansetron HCl


  (Zofran Inj)  4 mg  Q6H  PRN


 IV  7/6/17 01:45


 8/5/17 01:44   


 


 


 Nitroglycerin


  (Nitrostat Tab)  0.4 mg  UD  PRN


 SL  7/6/17 01:45


 8/5/17 01:44   


 


 


 Nitroglycerin


  (Nitroglycerin


 2% Oint)  1 inch  Q6


 EXT  7/6/17 03:15


 8/5/17 03:14  7/6/17 07:40


1 INCH


 


 Morphine Sulfate


  (MoRPHine


 SULFATE INJ)  2 mg  Q30M  PRN


 IV  7/6/17 01:45


 7/20/17 01:44  7/6/17 09:16


2 MG


 


 Polyethylene


  (Miralax Powder


 Packet)  17 gm  DAILY  PRN


 PO  7/6/17 01:45


 8/5/17 01:44   


 


 


 Glucose


  (Glucose 40% Gel)  15-30


 GRAMS 15


 GRAMS...  UD  PRN


 PO  7/6/17 02:00


 8/5/17 01:59   


 


 


 Glucose


  (Glucose Chew


 Tab)  4-8


 Tablets 4


 Tabl...  UD  PRN


 PO  7/6/17 02:00


 8/5/17 01:59   


 


 


 Dextrose


  (Dextrose 50%


 50ML Syringe)  25-50ML OF


 50% DW IV


 FOR...  UD  PRN


 IV  7/6/17 02:00


 8/5/17 01:59   


 


 


 Glucagon


  (Glucagon Inj)  1 mg  UD  PRN


 SQ  7/6/17 02:00


 8/5/17 01:59   


 


 


 Albuterol/


 Ipratropium


  (Duoneb)  3 ml  Q6R


 INH  7/6/17 03:00


 8/5/17 02:59  7/6/17 07:15


3 ML


 


 Pantoprazole


 Sodium


  (Protonix Tab)  40 mg  QAM


 PO  7/7/17 09:00


 8/6/17 08:59 UNV  


 


 


 Nicotine


  (Nicoderm Cq


 14MG Patch)  1 patch  QAM


 TD  7/7/17 09:00


 8/6/17 08:59 UNV  


 


 


 Miscellaneous


  (Remove Nicoderm


 Patch)  1 ea  HS


 N/A  7/6/17 21:00


 8/5/17 20:59 UNV  


 








 (Tala Maradiaga, PA-C)





Objective


Vital Signs











  Date Time  Temp Pulse Resp B/P (MAP) Pulse Ox O2 Delivery O2 Flow Rate FiO2


 


7/6/17 08:00     97 Nasal Cannula 3.0 


 


7/6/17 08:00 37.0 73 18 131/63 (85) 98 Nasal Cannula 3.0 


 


7/6/17 07:16  64 16  98 Nasal Cannula 3.0 


 


7/6/17 04:00     97 Room Air 4.0 


 


7/6/17 04:00 36.7 94 20 107/46 (66) 95 Nasal Cannula 4.0 


 


7/6/17 03:16  70 18  95 Nasal Cannula 4.0 


 


7/6/17 02:48 36.5 74 21 106/52 94 Nasal Cannula 4.0 


 


7/6/17 02:38  74 20 90/55 95 Nasal Cannula 4.0 


 


7/6/17 02:29  75 18 100/72 99   


 


7/6/17 02:20  75      


 


7/6/17 02:19  76 18 100/72 99 Nasal Cannula 4.0 


 


7/6/17 02:09    92/46    


 


7/6/17 02:07  71      


 


7/6/17 02:01    103/54    


 


7/6/17 02:00     100   


 


7/6/17 01:45     100   


 


7/6/17 01:31    124/64    


 


7/6/17 01:30  79 18  100   


 


7/6/17 01:26  83 18 118/77 100 BiPAP  100


 


7/6/17 00:55  94   100   100


 


7/6/17 00:54  89 18  100 BiPAP  100


 


7/6/17 00:31  96 24 151/82 100 Non-Rebreather 15.0 


 


7/6/17 00:12      Non-Rebreather 15.0 


 


7/6/17 00:10      Non-Rebreather 15.0 


 


7/6/17 00:09  97      


 


7/6/17 00:07 36.4 97 38 191/101 97 Non-Rebreather 15.0 








 (Tala Maradiaga ., PA-C)





Physical Exam


General Appearance:  no apparent distress, + pertinent finding (3L O2 NC )


Eyes:  normal inspection, PERRL


ENT:  hearing grossly normal


Neck:  supple


Respiratory/Chest:  no respiratory distress, no accessory muscle use, + 

decreased breath sounds (throughout ), + rhonchi (bilateral lung bases ), + 

wheezing (throughout)


Cardiovascular:  regular rate, rhythm


Abdomen:  normal bowel sounds, non tender, soft


Extremities:  + pedal edema (+1 bilateral pitting edema ), + swelling (+1-2 

pitting edema of LLE), + pertinent finding (Wound VAC in place to L lateral 

heel region; noted multiple healing lesions to R shin region )


Neurologic/Psychiatric:  alert, normal mood/affect, oriented x 3


Skin:  normal color, warm/dry, no rash


 (Tala Maradiaga ., PA-C)





Laboratory Results





Last 24 Hours








Test


  7/6/17


00:05 7/6/17


00:14 7/6/17


00:30 7/6/17


04:58


 


White Blood Count 16.37 K/uL    11.00 K/uL 


 


Red Blood Count 3.57 M/uL    2.74 M/uL 


 


Hemoglobin 9.7 g/dL    7.3 g/dL 


 


Hematocrit 30.6 %    23.1 % 


 


Mean Corpuscular Volume 85.7 fL    84.3 fL 


 


Mean Corpuscular Hemoglobin 27.2 pg    26.6 pg 


 


Mean Corpuscular Hemoglobin


Concent 31.7 g/dl 


  


  


  31.6 g/dl 


 


 


Platelet Count 514 K/uL    354 K/uL 


 


Mean Platelet Volume 8.6 fL    8.4 fL 


 


Neutrophils (%) (Auto) 69.9 %    


 


Lymphocytes (%) (Auto) 21.0 %    


 


Monocytes (%) (Auto) 6.3 %    


 


Eosinophils (%) (Auto) 1.5 %    


 


Basophils (%) (Auto) 0.1 %    


 


Neutrophils # (Auto) 11.45 K/uL    


 


Lymphocytes # (Auto) 3.43 K/uL    


 


Monocytes # (Auto) 1.03 K/uL    


 


Eosinophils # (Auto) 0.25 K/uL    


 


Basophils # (Auto) 0.02 K/uL    


 


RDW Standard Deviation 46.8 fL    45.5 fL 


 


RDW Coefficient of Variation 14.8 %    14.8 % 


 


Immature Granulocyte % (Auto) 1.2 %    


 


Immature Granulocyte # (Auto) 0.19 K/uL    


 


Prothrombin Time 9.8 SECONDS    


 


Prothromb Time International


Ratio 0.9 


  


  


  


 


 


Activated Partial


Thromboplast Time 27.0 SECONDS 


  


  


  


 


 


Partial Thromboplastin Ratio 1.0    


 


Sodium Level 139 mmol/L    137 mmol/L 


 


Potassium Level 4.1 mmol/L    4.9 mmol/L 


 


Chloride Level 103 mmol/L    105 mmol/L 


 


Carbon Dioxide Level 27 mmol/L    25 mmol/L 


 


Anion Gap 9.0 mmol/L    7.0 mmol/L 


 


Blood Urea Nitrogen 26 mg/dl    32 mg/dl 


 


Creatinine 1.10 mg/dl    1.20 mg/dl 


 


Est Creatinine Clear Calc


Drug Dose 83.9 ml/min 


  


  


  70.6 ml/min 


 


 


Estimated GFR (


American) 84.7 


  


  


  76.3 


 


 


Estimated GFR (Non-


American 73.1 


  


  


  65.8 


 


 


BUN/Creatinine Ratio 23.3    26.3 


 


Random Glucose 370 mg/dl    437 mg/dl 


 


Calcium Level 8.1 mg/dl    7.6 mg/dl 


 


Total Bilirubin 0.1 mg/dl    


 


Aspartate Amino Transf


(AST/SGOT) 16 U/L 


  


  


  


 


 


Alanine Aminotransferase


(ALT/SGPT) 16 U/L 


  


  


  


 


 


Alkaline Phosphatase 90 U/L    


 


Total Creatine Kinase 71 U/L    


 


Creatine Kinase MB 2.0 ng/ml    


 


Creatine Kinase MB Ratio 2.8    


 


Troponin I 0.024 ng/ml    0.075 ng/ml 


 


Pro-B-Type Natriuretic Peptide 3779 pg/ml    


 


Total Protein 7.0 gm/dl    


 


Albumin 2.3 gm/dl    


 


Globulin 4.7 gm/dl    


 


Albumin/Globulin Ratio 0.5    


 


Beta-Hydroxybutyric Acid 0.96 mg/dL    2.06 mg/dL 


 


Arterial Blood pH  7.21   


 


Arterial Blood Partial


Pressure CO2 


  67 mmHg 


  


  


 


 


Arterial Blood Partial


Pressure O2 


  154 mm/Hg 


  


  


 


 


Arterial Blood HCO3  26 mmol/L   


 


Arterial Blood Oxygen


Saturation 


  97.0 % 


  


  


 


 


Arterial Blood Base Excess  -1.9 mEq/L   


 


Arterial Blood Gas Delivery  15L   


 


Cameron Test  POS   


 


Bedside Lactic Acid Venous   2.81 mmol/L  


 


Magnesium Level    2.5 mg/dl 


 


Hepatitis C Antibody Screen    NEG 


 


Test


  7/6/17


05:54 7/6/17


06:27 7/6/17


07:24 7/6/17


09:33


 


Bedside Glucose 435 mg/dl   392 mg/dl  


 


White Blood Count  10.60 K/uL   


 


Red Blood Count  2.88 M/uL   


 


Hemoglobin  7.7 g/dL   7.5 g/dL 


 


Hematocrit  24.3 %   24.4 % 


 


Mean Corpuscular Volume  84.4 fL   


 


Mean Corpuscular Hemoglobin  26.7 pg   


 


Mean Corpuscular Hemoglobin


Concent 


  31.7 g/dl 


  


  


 


 


Platelet Count  352 K/uL   


 


Mean Platelet Volume  8.3 fL   


 


Neutrophils (%) (Auto)  78.4 %   


 


Lymphocytes (%) (Auto)  15.0 %   


 


Monocytes (%) (Auto)  5.8 %   


 


Eosinophils (%) (Auto)  0.1 %   


 


Basophils (%) (Auto)  0.1 %   


 


Neutrophils # (Auto)  8.32 K/uL   


 


Lymphocytes # (Auto)  1.59 K/uL   


 


Monocytes # (Auto)  0.61 K/uL   


 


Eosinophils # (Auto)  0.01 K/uL   


 


Basophils # (Auto)  0.01 K/uL   


 


RDW Standard Deviation  45.5 fL   


 


RDW Coefficient of Variation  14.7 %   


 


Immature Granulocyte % (Auto)  0.6 %   


 


Immature Granulocyte # (Auto)  0.06 K/uL   


 


Creatine Kinase MB    1.9 ng/ml 


 


Creatine Kinase MB Ratio     


 


Troponin I    0.097 ng/ml 








 (Tala Maradiaga, TAWANDA)





Assessment and Plan


60 y/o M Hx CAD, diastolic CHF, COPD, DM 2, JANE - continues to smoke.  Frequent 

admissions with CP and CHF exacerbations.  Presented with respiratory distress.

  He was able to avoid intubation with use of Bipap and Nebulizers.  Pt had 

nearly identical complaints when admitted 06/24. He denies CP, a productive 

cough, N/V, diaphoresis or fevers.  He is currently being treated for A RLE 

ulcer with a wound vac and 30 day course of Augmentin. 





Respiratory distress, ? secondary to to acute on chronic diastolic CHF vs. 

acute on chronic COPD exacerbation vs anxiety vs medical noncompliance:


- Admit to tele for cardiac monitoring


- Trend cardiac enzymes 


- BNP elevated at 3779 


- ECHO 6/9- 


* There is moderate concentric left ventricular hypertrophy


* Left ventricular systolic function is normal.


* No regional wall motion abnormalities noted.


* The left atrium is mildly dilated.


* There is mild mitral regurgitation.


* Compared to an echocardiogram from 3/2016, there is minimal change


- O2 protocol, wean as tolerated- patient states he wears 2-3L O2 supplement at 

home PRN 


- Monitor I&Os and daily weights 


- DuoNebs q6 hrs PRN 


- BCx pending 





CAD: 


- Continue  mg daily, Plavix 75 mg daily, Metoprolol 50 mg BID, Zocor 40 

mg HS


   -- If blood count continues to further drop, will hold ASA and Plavix 


- Follows w/ Dr. Yusuf 





HTN- STABLE: Lisinopril 40 mg BID, HCTZ 25 mg daily, Amlodipine 10 mg daily  





Anemia- type & screen/cross completed on 7/6 w/ 2 units PRBCs held :


- Follow H&H- STABLE 


- h/o GERD- no medications listed- start Protonix 40 mg daily 


- Iron panel from 6/11 reviewed- continue Ferrous 325 mg BID 


- TSH, B12/folate from 6/11 reviewed- WNL- continue B12 1000 mcg daily 


- Stool Hemoccult- negative 





T2DM- UNCONTROLLED- last hA1c 13.3% on 5/22:


- Metformin 1000 mg BID held 


- Lantus 20 u SQ AM INCREASED to 30 u SQ AM due to hyperglycemia- continue to 

titrate PRN  


- BSG ACHS w/ sliding insulin scale 


- Glycemic management consult, appreciate recommendations 


- Diabetic education consult 





Foot ulcer w/ leukocytosis at admission: 


- Leukocytosis RESOLVED- follow CBC 


- Consult wound care, continue wound VAC 


- Continue Augmentin 875 mg BID 





PAD, s/p LLE angiogram, percutaneous transluminal angioplasty/stenting L 

popliteal and superficial femoral artery/mechanical closure of R femoral artery 

by Dr. Rainey on 5/26/17: 


- Continue Tramadol 50 mg q6 hr PRN, Percocet q4 hrs PRN, and Gabapentin 400 mg 

HS   





Chronic left lower extremity edema: Bilateral venous Doppler 6/26 w/ no 

evidence of DVT 





Bipolar/Anxiety: Continue Depakote 2000 mg HS, Lexapro 20 mg daily  





Tobacco abuse- smokes 1/2 pack per day:


- Advised on smoking cessation at bedside 


- Nicotine patch 





GI Prophylaxis: Protonix, Maalox PRN, IV Zofran PRN, Colace and/or Milk of Mag 

PRN, MiraLAX 





DVT Prophylaxis: Heparin TID started at admission- will hold at this time due 

to anemia and trending H&H





Code Status: LEVEL I, FULL





Dispo: From home, lives w/ wife-  consulted




















 (Tala Maradiaga, PAMelC)





Reviewed:  Pt Seen/Exam by Me


 (Rosibel Arnold, )


History


Pt feels his SOB has resolved.  No chest pain.  States he always uses his O2 

while sleeping.  Denies missing med doses. Denies blood in his stool or unusual 

bleeding. Has been tolerating PO without issue.  No abd pain, n/v/d.





Agree with HPI/ROS


 (Rosibel Arnold, DO)


General Appearance:  WD/WN, no apparent distress


Respiratory:  normal breath sounds, no respiratory distress


Cardiovascular:  normal peripheral pulses, regular rate, rhythm


Gastrointestinal:  non tender, soft


Extremities:  non-tender, no pedal edema


Neurologic/Psychiatric:  alert, oriented x 3


Skin Characteristics:  normal color, warm/dry


 (Rosibel Arnold, )


Assessment/Plan


Agree with plan as outlined above.





Acute on chronic respiratory failure that has resolved with bipap and nebs


? compliance with O2 for JANE


Ongoing tobacco use


May be related to Hb status, baseline Hb is 8-10 and drop may be dilutional.  

Hem neg stool





Trop with mild elevation but stable





Continue to monitor overnight.


 (Rosibel Arnold, DO)

## 2017-07-06 NOTE — EMERGENCY ROOM VISIT NOTE
History


Report prepared by Safia:  Gordon Bourgeois


Under the Supervision of:  Dr. Nicky Gay D.O.


First contact with patient:  00:06


Chief Complaint:  RESPIRATORY DISTRESS


Stated Complaint:  RESPIRATORY DISTRESS





History of Present Illness


The patient is a 61 year old male who presents to the Emergency Room via 

Emer[facility] for respiratory distress. Per EMS staff the patient 

was not responding when they arrived on scene. They administered two albuterol 

inhalers, but C-pap was not tolerated. His responsiveness have improved 

significantly at this time. The patient is still complaining of shortness of 

breath upon arrival to the Emergency Department, but denies chest pain or 

abdominal pain. He is on 3L of oxygen at home, and is a daily smoker. The 

patient was in the emergency department for a similar case and was discharged 

on the 28th of June.





   Source of History:  patient, EMS


   Position:  other (Respiratory)


   Quality:  other (Resp Distress)


   Timing:  constant


   Associated Symptoms:  No chest pain, No abdominal pain





Review of Systems


See HPI for pertinent positives & negatives. A total of 10 systems reviewed and 

were otherwise negative.





Past Medical & Surgical


Medical Problems:


(1) Acute appendicitis with appendiceal abscess


(2) Acute respiratory failure


(3) Angina pectoris


(4) CAD (coronary artery disease)


(5) Cardiac arrest


(6) CHF exacerbation


(7) Dehydration


(8) Diabetes


(9) Enterocolitis


(10) Heel ulcer


(11) Hypertension


(12) Ischemic cardiomyopathy


(13) Osteomyelitis of left foot


(14) Precordial chest pain


(15) Respiratory distress


(16) Sepsis, unspecified organism








Family History





Cancer


Diabetes mellitus


Heart disease


Hypertension





Social History


Smoking Status:  Current Every Day Smoker


Drug Use:  none


Marital Status:  


Housing Status:  lives with family


Occupation Status:  employed





Current/Historical Medications


Scheduled


Albuterol Hfa (Ventolin Hfa), 2 PUFFS INH Q6H


Amlodipine Besylate (Amlodipine Besylate), 10 MG PO QAM


Amoxicillin & Pot Clavulanate (Augmentin 875-125 mg), 875 MG PO BID


Aspirin (Aspirin Ec), 325 MG PO QAM


Clopidogrel (Plavix), 75 MG PO QAM


Cyanocobalamin (Vitamin B-12), 1,000 MCG PO QAM


Divalproex Sodium (Depakote Delay Rel), 2,000 MG PO HS


Docusate Sodium (Docusate Sodium), 100 MG PO BID


Escitalopram Oxalate (Escitalopram Oxalate), 20 MG PO QAM


Ferrous Sulfate (Ferrous Sulfate), 325 MG PO BIDM


Gabapentin (Gabapentin), 100 MG PO HS


Gabapentin (Gabapentin), 300 MG PO HS


Hydrochlorothiazide (Hydrochlorothiazide), 25 MG PO DAILY


Insulin Glargine (Lantus Solostar), 20 UNIT SC QAM


Insulin Human Lispro (Humalog Kwikpen), UNITS SC UD


Isosorbide Mononitrate (Isosorbide Mononitrate ER), 60 MG PO QAM


Lisinopril (Zestril), 40 MG PO BID


Metformin HCl (Metformin HCl ER), 1 TAB PO BID


Metoprolol Succ (Toprol Xl) (Toprol-Xl), 50 MG PO BID


Polyethylene (Miralax), 17 GM PO DAILY


Simvastatin (Zocor), 40 MG PO HS





Scheduled PRN


Albuterol Sulfate (Proair Respiclick), 2 PUFF INH Q4 PRN for SOB/Wheezing


Fluticasone Propionate (Fluticasone Propionate), 2 SPRAYS NA DAILY PRN for 

Nasal Congestion


Nitroglycerin (Nitrostat), 0.4 MG UT PRN PRN for Chest Pain


Oxycodone/Acetaminophen 5MG/325MG (Percocet 5MG/325MG), 2 TAB PO Q4H PRN for 

Pain


Tramadol HCl (Tramadol HCl), 50 MG PO Q6 PRN for Pain





Allergies


Coded Allergies:  


     No Known Allergies (Verified , 7/6/17)





Physical Exam


Vital Signs











  Date Time  Temp Pulse Resp B/P (MAP) Pulse Ox O2 Delivery O2 Flow Rate FiO2


 


7/6/17 01:45     100   


 


7/6/17 01:31    124/64    


 


7/6/17 01:30  79 18  100   


 


7/6/17 01:26  83 18 118/77 100 BiPAP  100


 


7/6/17 00:55  94   100   100


 


7/6/17 00:54  89 18  100 BiPAP  100


 


7/6/17 00:31  96 24 151/82 100 Non-Rebreather 15.0 


 


7/6/17 00:12      Non-Rebreather 15.0 


 


7/6/17 00:10      Non-Rebreather 15.0 


 


7/6/17 00:09  97      


 


7/6/17 00:07 36.4 97 38 191/101 97 Non-Rebreather 15.0 











Physical Exam


General: The patient presents in moderate respiratory distress, pale and 

diaphoretic, speaking in 2-3 word sentences. Poor dentition. 


HEENT: Head - normocephalic and atraumatic   Pupils are equal, round, and 

reactive to light.  Extraocular eye muscles are intact, and sclera are 

anicteric.   Nose -  moist nasal mucosa without discharge. Mouth - moist buccal 

mucosa. Poor dentition.  Oropharynx is nonerythematous and there is no 

tonsillar exudate or edema noted.


Neck: Supple; no JVD, nuchal rigidity, cervical lymphadenopathy.


Heart: Tachycardic rate and normal rhythm.  There is a normal S1 and S2 with no 

murmurs, clicks, or gallops appreciated.


Lungs: Diminished breath sounds in all lung fields. Wheezing in the bases. 


Abdomen: Soft, completely nontender, nondistended, with good bowel sounds.  

There are no palpable pulsatile masses or hepatosplenomegaly.  There is no 

guarding, rigidity, or rebound noted.


Extremities: No evidence of cyanosis, clubbing. There is 1+ edema in the legs. 

There is a wound vac in place for a wound on the right hip.  There are easily 

palpable peripheral pulses.


Skin: Pale and diaphoretic.  warm  with good turgor and no rashes.





Medical Decision & Procedures


ER Provider


Diagnostic Interpretation:


Radiology results as stated below per my review: 





CHEST X-RAY: 





CHF present, fluid in the fissures of the right lung, borderline cardiomegaly.





Laboratory Results











Test


  7/6/17


00:05 7/6/17


00:14


 


Prothrombin Time


  9.8 SECONDS


(9.0-12.0) 


 


 


Prothromb Time International


Ratio 0.9 (0.9-1.1) 


  


 


 


Activated Partial


Thromboplast Time 27.0 SECONDS


(21.0-31.0) 


 


 


Partial Thromboplastin Ratio 1.0  


 


Total Bilirubin


  0.1 mg/dl


(0.2-1) 


 


 


Aspartate Amino Transf


(AST/SGOT) 16 U/L (15-37) 


  


 


 


Alanine Aminotransferase


(ALT/SGPT) 16 U/L (12-78) 


  


 


 


Alkaline Phosphatase


  90 U/L


() 


 


 


Total Creatine Kinase


  71 U/L


() 


 


 


Creatine Kinase MB


  2.0 ng/ml


(0.5-3.6) 


 


 


Creatine Kinase MB Ratio 2.8 (0-3.0)  


 


Pro-B-Type Natriuretic Peptide


  3779 pg/ml


(0-900) 


 


 


Total Protein


  7.0 gm/dl


(6.4-8.2) 


 


 


Albumin


  2.3 gm/dl


(3.4-5.0) 


 


 


Globulin


  4.7 gm/dl


(2.5-4.0) 


 


 


Albumin/Globulin Ratio 0.5 (0.9-2)  


 


Arterial Blood pH


  


  7.21


(7.35-7.45)


 


Arterial Blood Partial


Pressure CO2 


  67 mmHg


(35-46)


 


Arterial Blood Partial


Pressure O2 


  154 mm/Hg


(80-95)


 


Arterial Blood HCO3


  


  26 mmol/L


(19-24)


 


Arterial Blood Oxygen


Saturation 


  97.0 % (90-95) 


 


 


Arterial Blood Base Excess


  


  -1.9 mEq/L


(-9-1.8)


 


Arterial Blood Gas Delivery  15L 


 


Cameron Test  POS (POS) 





Laboratory results per my review.





Procedure


Medications Ordered: Furosemide





ECG


Indication:  SOB/dyspnea


Rate (beats per minute):  98


Rhythm:  normal sinus


Findings:  nonspecific-ST abn, ST depression (Inferior, Lateral)


Comparison ECG Date:  6/26/2017


Change:


ST Depressions are unchanged.





ED Course


0005: Past medical records reviewed. The patient was evaluated in room B1. A 

complete history and physical exam was performed.  The patient was in moderate 

respiratory distress but was able to maintain his O2 saturation on a 

nonrebreather mask.  He had laboratory studies drawn along with an ABG.





0058: I checked on the patient at this time. He was resting in bed.  He was 

placed on BiPAP at this time.





0105: I discussed the case with Dr. Serrato - [facility] at this time. He 

will evaluate the patient for further treatment. 





0150: Ordered Furosemide 40 mg IV.





Medical Decision


Patient was found to have normal blood pressure on screening and does not 

require follow-up. 


I attest that I have personally reviewed the patient's current medication list. 








The patient is a 61 year old male who presents to the Emergency Department for 

respiratory distress. 


Differential Diagnosis includes: sepsis, respiratory failure, CHF, hypercarbic 

respiratory failure, pneumonia, COPD exacerbation. 


Laboratory Results were reviewed and show; Normal coagulations studies, white 

count of 16.3, anemic with a hemoglobin of 9.7, BUN of 26, creatinine of 1.1, 

glucose of 370, normal LFTs, BHA is 0.96, Cardiac Enzymes are pending. 


Blood Gas shows a 7.2 pH, pCO2 of 67, Bicarb of 26, consistent with Respiratory 

Acidosis. 





The patient had a very similar presentation is emergency department 

approximately one week ago.  Again, it seems that he is suffering from acute 

respiratory failure.  He does have evidence of heart failure on chest x-ray.  

He was given some IV Lasix.  I discussed the case with the [facility] 

hospitalist and they will evaluate for further management.





Consults


Time Called:  0100


Consulting Physician:  Dr. Ama IZAGUIRRE Hospitalist


Returned Call:  0105


I discussed the case with Dr. Ama IZAGUIRRE Hospitalist at this time. He will 

evaluate the patient for further treatment.





Impression





 Primary Impression:  


 Acute respiratory failure


 Additional Impression:  


 Diabetes mellitus with hyperglycemia





Critical Care


I have personally spent greater than 40 minutes of critical care time in the 

direct management of this patient.  This includes bedside care, interpretation 

of diagnostic studies, and testing, discussion with consultants, patient, and 

family members, and other required patient management activities.  This 40 

minutes is in excess of all separately billable procedures.





Scribe Attestation


The scribe's documentation has been prepared under my direction and personally 

reviewed by me in its entirety. I confirm that the note above accurately 

reflects all work, treatment, procedures, and medical decision making performed 

by me.





Departure Information


Dispostion


Being Evaluated By Hospitalist





Referrals


Claude Rowley M.D. (PCP)





Patient Instructions


Asthma - [facility], COPD - [facility], Croup - [facility], [facility]





Problem Qualifiers

## 2017-07-07 VITALS — OXYGEN SATURATION: 96 % | HEART RATE: 81 BPM

## 2017-07-07 VITALS
DIASTOLIC BLOOD PRESSURE: 60 MMHG | TEMPERATURE: 98.06 F | HEART RATE: 72 BPM | SYSTOLIC BLOOD PRESSURE: 154 MMHG | OXYGEN SATURATION: 95 %

## 2017-07-07 VITALS
TEMPERATURE: 98.06 F | OXYGEN SATURATION: 91 % | HEART RATE: 65 BPM | DIASTOLIC BLOOD PRESSURE: 68 MMHG | SYSTOLIC BLOOD PRESSURE: 154 MMHG

## 2017-07-07 VITALS — OXYGEN SATURATION: 95 % | HEART RATE: 54 BPM

## 2017-07-07 VITALS — OXYGEN SATURATION: 93 % | HEART RATE: 51 BPM

## 2017-07-07 VITALS
SYSTOLIC BLOOD PRESSURE: 154 MMHG | TEMPERATURE: 98.06 F | OXYGEN SATURATION: 91 % | DIASTOLIC BLOOD PRESSURE: 68 MMHG | HEART RATE: 65 BPM

## 2017-07-07 VITALS
HEART RATE: 60 BPM | SYSTOLIC BLOOD PRESSURE: 124 MMHG | OXYGEN SATURATION: 94 % | TEMPERATURE: 97.7 F | DIASTOLIC BLOOD PRESSURE: 62 MMHG

## 2017-07-07 VITALS
SYSTOLIC BLOOD PRESSURE: 109 MMHG | HEART RATE: 51 BPM | OXYGEN SATURATION: 97 % | TEMPERATURE: 97.7 F | DIASTOLIC BLOOD PRESSURE: 64 MMHG

## 2017-07-07 LAB
EOSINOPHIL NFR BLD AUTO: 349 K/UL (ref 130–400)
HCT VFR BLD CALC: 24.9 % (ref 42–52)
MCH RBC QN AUTO: 26.7 PG (ref 25–34)
MCHC RBC AUTO-ENTMCNC: 31.7 G/DL (ref 32–36)
MCV RBC AUTO: 84.1 FL (ref 80–100)
PMV BLD AUTO: 8.3 FL (ref 7.4–10.4)
RBC # BLD AUTO: 2.96 M/UL (ref 4.7–6.1)
WBC # BLD AUTO: 8.19 K/UL (ref 4.8–10.8)

## 2017-07-07 RX ADMIN — AMOXICILLIN AND CLAVULANATE POTASSIUM SCH MG: 875; 125 TABLET, FILM COATED ORAL at 07:41

## 2017-07-07 RX ADMIN — FERROUS SULFATE TAB EC 325 MG (65 MG FE EQUIVALENT) SCH MG: 325 (65 FE) TABLET DELAYED RESPONSE at 16:36

## 2017-07-07 RX ADMIN — INSULIN ASPART SCH UNITS: 100 INJECTION, SOLUTION INTRAVENOUS; SUBCUTANEOUS at 16:39

## 2017-07-07 RX ADMIN — IPRATROPIUM BROMIDE AND ALBUTEROL SULFATE SCH ML: .5; 3 SOLUTION RESPIRATORY (INHALATION) at 01:46

## 2017-07-07 RX ADMIN — INSULIN ASPART SCH UNITS: 100 INJECTION, SOLUTION INTRAVENOUS; SUBCUTANEOUS at 11:00

## 2017-07-07 RX ADMIN — DOCUSATE SODIUM SCH MG: 100 CAPSULE, LIQUID FILLED ORAL at 07:41

## 2017-07-07 RX ADMIN — INSULIN ASPART SCH UNITS: 100 INJECTION, SOLUTION INTRAVENOUS; SUBCUTANEOUS at 00:00

## 2017-07-07 RX ADMIN — AMLODIPINE BESYLATE SCH MG: 5 TABLET ORAL at 07:41

## 2017-07-07 RX ADMIN — IPRATROPIUM BROMIDE AND ALBUTEROL SULFATE SCH ML: .5; 3 SOLUTION RESPIRATORY (INHALATION) at 06:40

## 2017-07-07 RX ADMIN — CLOPIDOGREL BISULFATE SCH MG: 75 TABLET, FILM COATED ORAL at 07:41

## 2017-07-07 RX ADMIN — NITROGLYCERIN SCH INCH: 20 OINTMENT TOPICAL at 05:40

## 2017-07-07 RX ADMIN — Medication SCH MCG: at 07:41

## 2017-07-07 RX ADMIN — IPRATROPIUM BROMIDE AND ALBUTEROL SULFATE SCH ML: .5; 3 SOLUTION RESPIRATORY (INHALATION) at 14:08

## 2017-07-07 RX ADMIN — METOPROLOL SUCCINATE SCH MG: 50 TABLET, EXTENDED RELEASE ORAL at 07:40

## 2017-07-07 RX ADMIN — AMOXICILLIN AND CLAVULANATE POTASSIUM SCH MG: 875; 125 TABLET, FILM COATED ORAL at 16:35

## 2017-07-07 RX ADMIN — INSULIN ASPART SCH UNITS: 100 INJECTION, SOLUTION INTRAVENOUS; SUBCUTANEOUS at 07:37

## 2017-07-07 RX ADMIN — LISINOPRIL SCH MG: 40 TABLET ORAL at 07:39

## 2017-07-07 RX ADMIN — Medication SCH MG: at 07:41

## 2017-07-07 RX ADMIN — ESCITALOPRAM OXALATE SCH MG: 20 TABLET, FILM COATED ORAL at 07:40

## 2017-07-07 RX ADMIN — ISOSORBIDE MONONITRATE SCH MG: 60 TABLET ORAL at 07:39

## 2017-07-07 RX ADMIN — Medication SCH GM: at 07:38

## 2017-07-07 RX ADMIN — NITROGLYCERIN SCH INCH: 20 OINTMENT TOPICAL at 00:00

## 2017-07-07 RX ADMIN — OXYCODONE HYDROCHLORIDE AND ACETAMINOPHEN PRN TAB: 5; 325 TABLET ORAL at 04:43

## 2017-07-07 RX ADMIN — INSULIN ASPART SCH UNITS: 100 INJECTION, SOLUTION INTRAVENOUS; SUBCUTANEOUS at 04:00

## 2017-07-07 RX ADMIN — FERROUS SULFATE TAB EC 325 MG (65 MG FE EQUIVALENT) SCH MG: 325 (65 FE) TABLET DELAYED RESPONSE at 07:40

## 2017-07-07 NOTE — DISCHARGE INSTRUCTIONS
Discharge Instructions


Date of Service


Jul 7, 2017.





Admission


Reason for Admission:  Chf Exacerbation, Respiratory Distress





Discharge


Discharge Diagnosis / Problem:  Acute respiratory distress





Discharge Goals


Goal(s):  Decrease discomfort, Improve function, Increase independence





Activity Recommendations


Activity Limitations:  resume your previous activity





.





Instructions / Follow-Up


Instructions / Follow-Up


Dr. Rowley next week


Sleep study as scheduled


Wound care on Monday as scheduled








Call your Primary Care doctor if any of the following symptoms or problems 

start or get worse:





* Shortness of breath or difficulty breathing


* Wake up at night short of breath


* Chest pain


* Cough


* Swelling of your hands, feet, or legs


* More fatigued or tired with your normal activity


* Palpitations - sudden fast heart beats





WEIGHT





* Weigh yourself every morning after using the bathroom.


* Use the same scale.


* Wear the same amount of clothing.


* Write your weight down on a chart.


* Call your Primary Care doctor if you gain more than 2-3 pounds in 1-2 days.





MEDICATIONS





* Use this discharge instruction sheet for medication instructions.


* Take your medications at the time your doctor ordered.


* Do not skip a dose of your medicines.


* If you miss a dose of medicine, take it as soon as possible, but DO NOT 

DOUBLE A DOSE.


* Read your medicine information when you get home.


* Know all of the side effects of your medicine.  If in doubt, ask your 

pharmacist


* Call your Primary Care doctor's office if you have any side effects.


* Be sure all of your doctors know what medicine and herbs you take (including 

cold, flu, and herbal medicine).








Take the following with you to your follow-up doctor appointments:





* Weight Chart


* Medication List


* List of questions





Do not drink excessive alcohol, beer or wine.





Current Hospital Diet


Patient's current hospital diet: AHA Diet (Heart Healthy), Diabetes Type 2 Diet





Discharge Diet


Recommended Diet:  AHA Diet (Heart Healthy), Diabetes Type 2 Diet





Pending Studies


Studies pending at discharge:  no





Laboratory Results





Hemoglobin A1c








Test


  5/22/17


22:25 Range/Units


 


 


Estimated Average Glucose 335   mg/dl


 


Hemoglobin A1c 13.3 H 4.5-5.6  %








Lipid Panel








Test


  6/10/17


06:06 Range/Units


 


 


Triglycerides Level 182 H 0-150  mg/dl


 


Cholesterol Level 136  0-200  mg/dl


 


HDL Cholesterol 35   mg/dl


 


Cholesterol/HDL Ratio 3.9   


 


LDL Cholesterol, Calculated 65   mg/dl











Medical Emergencies








.


Who to Call and When:





Call 911 or go to the Emergency Room if:





* If at any time you feel your situation is an emergency


* You have tightness or pain in your chest that does not go away with rest or 

Nitroglycerin


* You are very short of breath even with rest





.





Non-Emergent Contact


Non-Emergency issues call your:  Primary Care Provider





.


.








"Provider Documentation" section prepared by Rosibel Arnold.








.





VTE Core Measure


Inpt VTE Proph given/why not?:  Unfractionated heparin SQ

## 2017-07-07 NOTE — DISCHARGE SUMMARY
Discharge Summary


Date of Service


Jul 7, 2017.





Discharge Summary


Admission Date:


Jul 6, 2017 at 01:46


Discharge Date:  Jul 7, 2017


Discharge Disposition:  Home with services


Principal Diagnosis:  Acute respiratory failure


Problems/Secondary Diagnoses:


dCHF with EF 55%


CAD


COPD


DM


JANE on HS O2


Bipolar


PAD


Chronic anemia baseline 8-10


Ongoing tobacco use


Hx of vfib s/p MI


Immunizations:  


   Have You Had Influenza Vaccine:  No


   Influenza Vaccine Date:  Oct 5, 2009


   History of Tetanus Vaccine?:  No


   Tetanus Immunization Date:  Mar 1, 2004


   History of Pneumococcal:  No


   Pneumococcal Date:  Oct 15, 2006


   History of Hepatitis B Vaccine:  No





Medication Reconciliation


New Medications:  


Ipratropium-Albuterol (Combivent Respimat) 1 Aer Aer


1 PUFFS INH QID for 30 Days, INH





Nicotine (Nicoderm Cq 14MG Patch) 14 Mg/24 Hr Dis


1 PATCH TD DAILY for 30 Days, #30 PATCH





Oxycodone/Acetaminophen 5MG/325MG (Percocet 5MG/325MG)  Tab


1 TABLET PO Q6H PRN for Pain, #10 TAB





Collagenase (Santyl) 250 Unit/Gm Oin


1 APPLN EXT MoWeFr@0900 for 30 Days





 


Continued Medications:  


Albuterol Hfa (Ventolin Hfa) 200 Puffs/40743 Mcg Aers


2 PUFFS INH Q6H, #1 INHALER





Albuterol Sulfate (Proair Respiclick) 108 Mcg/Act Aer


2 PUFF INH Q4 PRN for SOB/Wheezing





Amlodipine Besylate (Amlodipine Besylate) 10 Mg Tab


10 MG PO QAM





Amoxicillin & Pot Clavulanate (Augmentin 875-125 mg) 1 Tab Tab


875 MG PO BID for 30 Days, #60 TAB





Aspirin (Aspirin Ec) 325 Mg Tab


325 MG PO QAM





Clopidogrel (Plavix) 75 Mg Tab


75 MG PO QAM





Cyanocobalamin (Vitamin B-12) 1,000 Mcg Tab


1000 MCG PO QAM





Divalproex Sodium (Depakote Delay Rel) 500 Mg Tab


2000 MG PO HS





Docusate Sodium (Docusate Sodium) 100 Mg Cap


100 MG PO BID for 30 Days, #60 CAP





Escitalopram Oxalate (Escitalopram Oxalate) 20 Mg Tab


20 MG PO QAM





Ferrous Sulfate (Ferrous Sulfate) 325 Mg Tab


325 MG PO BIDM for 30 Days, #60 TAB





Fluticasone Propionate (Fluticasone Propionate) 50 Mcg/Act Spr


2 SPRAYS NA DAILY PRN for Nasal Congestion for 30 Days, #30 DOSE





Gabapentin (Gabapentin) 100 Mg Cap


100 MG PO HS





Gabapentin (Gabapentin) 300 Mg Cap


300 MG PO HS





Hydrochlorothiazide (Hydrochlorothiazide) 25 Mg Tab


25 MG PO DAILY





Insulin Glargine (Lantus Solostar) 100 Unit/Ml Inj


20 UNIT SC QAM for 30 Days, #30 DOSE





Insulin Human Lispro (Humalog Kwikpen) 100 Units/Ml Inj


UNITS SC UD


PER SLIDING SCALE


Isosorbide Mononitrate (Isosorbide Mononitrate ER) 60 Mg Tab


60 MG PO QAM for 30 Days, #30 TAB





Lisinopril (Zestril) 40 Mg Tab


40 MG PO BID





Metformin HCl (Metformin HCl ER) 1,000 Mg Tab


1 TAB PO BID





Metoprolol Succ (Toprol Xl) (Toprol-Xl) 50 Mg Tabcr


50 MG PO BID





Nitroglycerin (Nitrostat) 0.4 Mg Tab


0.4 MG UT PRN PRN for Chest Pain





Oxycodone/Acetaminophen 5MG/325MG (Percocet 5MG/325MG)  Tab


2 TAB PO Q4H PRN for Pain for 7 Days, #48 TAB


PAIN


Polyethylene (Miralax) 17 Gm Pow


17 GM PO DAILY for 30 Days, #30 PKT





Simvastatin (Zocor) 80 Mg Tab


40 MG PO HS for 30 Days





Tramadol HCl (Tramadol HCl) 50 Mg Tab


50 MG PO Q6 PRN for Pain, #24











Discharge Exam


Pt is doing well.  He has had no further issues with his breathing.  No chest 

pain.  Has been eating without issue.  Pt denies fever, abd pain, n/v/c/d, LE 

pain or swelling.





ROS reviewed and otherwise neg


Physical Exam:  


   General Appearance:  WD/WN, no apparent distress


   Respiratory/Chest:  normal breath sounds, no respiratory distress


   Cardiovascular:  regular rate, rhythm, no edema


   Abdomen / GI:  non tender, soft


   Extremities:  no calf tenderness, no pedal edema


   Neurologic/Psychiatric:  alert, normal mood/affect, oriented x 3


   Skin:  normal color, warm/dry





Hospital Course


62 y/o M Hx CAD, diastolic CHF, COPD, DM 2, JANE - continues to smoke.  Frequent 

admissions with CP and CHF exacerbations.  Presented with respiratory distress.

  He was able to avoid intubation with use of Bipap and Nebulizers.  Pt had 

nearly identical complaints when admitted 06/24. He denies CP, a productive 

cough, N/V, diaphoresis or fevers.  He is currently being treated for A RLE 

ulcer with a wound vac and 30 day course of Augmentin. 





Respiratory distress, uncertain etiology, possibly medical noncompliance with O2

, although pt denies. 


COPD is stable, trace pleural effusions on CXR


Pt had no return of sx and has felt at his baseline s/p BIPAP and nebs in the ED


Hx of JANE determined on inpt overnight study, will need formal sleep study as 

outpt--NC may not be fully addressing JANE and causing sx given sx are generally 

at night


- Trop with mild elevation that is stable and neg for monitor events


- BNP elevated at 3779 


- ECHO 6/9- 


* There is moderate concentric left ventricular hypertrophy


* Left ventricular systolic function is normal.


* No regional wall motion abnormalities noted.


* The left atrium is mildly dilated.


* There is mild mitral regurgitation.


* Compared to an echocardiogram from 3/2016, there is minimal change 


- BCx neg 


Advised to start combivent and compliance with home O2


Also discussed smoking, pt is down to 1/2ppd from 1ppd, nicotine patch given





CAD: 


- Continue  mg daily, Plavix 75 mg daily, Metoprolol 50 mg BID, Zocor 40 

mg HS


- Follows w/ Dr. Yusuf 





HTN- STABLE: Lisinopril 40 mg BID, HCTZ 25 mg daily, Amlodipine 10 mg daily  





Anemia- hx of baseline Hb 8-10


- Follow H&H- STABLE and improving s/p initial drop, possibly related to IVF


- h/o GERD- no medications listed- start Protonix 40 mg daily 


- Iron panel from 6/11 reviewed- continue Ferrous 325 mg BID 


- TSH, B12/folate from 6/11 reviewed- WNL- continue B12 1000 mcg daily 


- Stool Hemoccult- negative 





T2DM- UNCONTROLLED- last hA1c 13.3% on 5/22:


Meds as above





Foot ulcer w/ leukocytosis at admission: 


- Leukocytosis RESOLVED- follow CBC 


- Consult wound care, continue wound VAC 


- Continue Augmentin 875 mg BID 





PAD, s/p LLE angiogram, percutaneous transluminal angioplasty/stenting L 

popliteal and superficial femoral artery/mechanical closure of R femoral artery 

by Dr. Rainey on 5/26/17: 


- Continue Tramadol 50 mg q6 hr PRN, Percocet q4 hrs PRN, and Gabapentin 400 mg 

HS   





Chronic left lower extremity edema: Bilateral venous Doppler 6/26 w/ no 

evidence of DVT 





Bipolar/Anxiety: Continue Depakote 2000 mg HS, Lexapro 20 mg daily  





Tobacco abuse- smokes 1/2 pack per day:


- Advised on smoking cessation at bedside 


- Nicotine patch 





GI Prophylaxis: Protonix, Maalox PRN, IV Zofran PRN, Colace and/or Milk of Mag 

PRN, MiraLAX 





DVT Prophylaxis: Heparin TID started at admission- will hold at this time due 

to anemia and trending H&H





Code Status: LEVEL I, FULL





Home with  services


Total Time Spent:  Greater than 30 minutes


This includes examination of the patient, discharge planning, medication 

reconciliation, and communication with other providers.





Discharge Instructions


Please refer to the electronic Patient Visit Report (Discharge Instructions) 

for additional information.





Follow-Up


Dr. Rowley next week


Sleep study as scheduled


Wound Care on Monday as scheduled





Additional Copies To


Claude Rowley M.D.

## 2017-07-11 ENCOUNTER — HOSPITAL ENCOUNTER (EMERGENCY)
Dept: HOSPITAL 45 - C.EDB | Age: 62
Discharge: HOME | End: 2017-07-11
Payer: COMMERCIAL

## 2017-07-11 VITALS
WEIGHT: 198.2 LBS | WEIGHT: 198.2 LBS | HEIGHT: 70.98 IN | BODY MASS INDEX: 27.75 KG/M2 | BODY MASS INDEX: 27.75 KG/M2 | HEIGHT: 70.98 IN

## 2017-07-11 VITALS
OXYGEN SATURATION: 94 % | SYSTOLIC BLOOD PRESSURE: 168 MMHG | TEMPERATURE: 97.52 F | DIASTOLIC BLOOD PRESSURE: 90 MMHG | HEART RATE: 63 BPM

## 2017-07-11 VITALS — OXYGEN SATURATION: 99 %

## 2017-07-11 DIAGNOSIS — E11.9: ICD-10-CM

## 2017-07-11 DIAGNOSIS — Z82.49: ICD-10-CM

## 2017-07-11 DIAGNOSIS — Z79.4: ICD-10-CM

## 2017-07-11 DIAGNOSIS — J44.9: ICD-10-CM

## 2017-07-11 DIAGNOSIS — R06.02: Primary | ICD-10-CM

## 2017-07-11 DIAGNOSIS — Z83.3: ICD-10-CM

## 2017-07-11 DIAGNOSIS — I11.0: ICD-10-CM

## 2017-07-11 DIAGNOSIS — I25.5: ICD-10-CM

## 2017-07-11 DIAGNOSIS — Z99.81: ICD-10-CM

## 2017-07-11 DIAGNOSIS — F17.200: ICD-10-CM

## 2017-07-11 DIAGNOSIS — J90: ICD-10-CM

## 2017-07-11 DIAGNOSIS — Z79.84: ICD-10-CM

## 2017-07-11 DIAGNOSIS — Z79.82: ICD-10-CM

## 2017-07-11 DIAGNOSIS — Z86.74: ICD-10-CM

## 2017-07-11 DIAGNOSIS — I25.10: ICD-10-CM

## 2017-07-11 DIAGNOSIS — I50.9: ICD-10-CM

## 2017-07-11 LAB
ANION GAP SERPL CALC-SCNC: 6 MMOL/L (ref 3–11)
BASOPHILS # BLD: 0.02 K/UL (ref 0–0.2)
BASOPHILS NFR BLD: 0.2 %
BUN SERPL-MCNC: 17 MG/DL (ref 7–18)
BUN/CREAT SERPL: 21.2 (ref 10–20)
CALCIUM SERPL-MCNC: 8.7 MG/DL (ref 8.5–10.1)
CHLORIDE SERPL-SCNC: 104 MMOL/L (ref 98–107)
CO2 SERPL-SCNC: 30 MMOL/L (ref 21–32)
COMPLETE: YES
CREAT CL PREDICTED SERPL C-G-VRATE: 108.6 ML/MIN
CREAT SERPL-MCNC: 0.78 MG/DL (ref 0.6–1.4)
EOSINOPHIL NFR BLD AUTO: 391 K/UL (ref 130–400)
GLUCOSE SERPL-MCNC: 83 MG/DL (ref 70–99)
HCT VFR BLD CALC: 29.5 % (ref 42–52)
IG%: 0.2 %
IMM GRANULOCYTES NFR BLD AUTO: 14.4 %
LYMPHOCYTES # BLD: 1.32 K/UL (ref 1.2–3.4)
MCH RBC QN AUTO: 26.1 PG (ref 25–34)
MCHC RBC AUTO-ENTMCNC: 31.2 G/DL (ref 32–36)
MCV RBC AUTO: 83.8 FL (ref 80–100)
MONOCYTES NFR BLD: 6.6 %
NEUTROPHILS # BLD AUTO: 1.3 %
NEUTROPHILS NFR BLD AUTO: 77.3 %
PMV BLD AUTO: 8 FL (ref 7.4–10.4)
POTASSIUM SERPL-SCNC: 4.3 MMOL/L (ref 3.5–5.1)
RBC # BLD AUTO: 3.52 M/UL (ref 4.7–6.1)
SODIUM SERPL-SCNC: 140 MMOL/L (ref 136–145)
WBC # BLD AUTO: 9.15 K/UL (ref 4.8–10.8)

## 2017-07-11 NOTE — EMERGENCY ROOM VISIT NOTE
History


Report prepared by Safia:  Gela Thompson


Under the Supervision of:  Dr. Heber Mendez M.D.


First contact with patient:  14:33


Chief Complaint:  SHORTNESS OF BREATH


Stated Complaint:  SOB





History of Present Illness


The patient is a 61 year old male who presents to the Emergency Room with 

complaints of persistent shortness of breath starting today. He was admitted to 

the hospital for difficulty breathing and was discharged home 4 days ago. He 

was doing well the first few days after being discharged. Today, he was 

watching TV and sitting down when he had a sudden onset of shortness of breath. 

The patient has a history of COPD. He used his inhaler today without relief. He 

is on 2 L of oxygen. The patient reports that he has been taking Lasix 

intermittently but has not been placed on it for a prolonged period of time. He 

denies any fevers, increased cough, or any other complaints.





   Source of History:  patient


   Onset:  today


   Position:  other (global)


   Quality:  other (shortness of breath)


   Timing:  other (persistent)


   Modifying Factors (Relieving):  other (inhaler without relief)


   Associated Symptoms:  No fevers, No cough (increased)





Review of Systems


See HPI for pertinent positives & negatives. A total of 10 systems reviewed and 

were otherwise negative.





Past Medical & Surgical


Medical Problems:


(1) Acute appendicitis with appendiceal abscess


(2) Acute respiratory failure


(3) Angina pectoris


(4) CAD (coronary artery disease)


(5) Cardiac arrest


(6) CHF exacerbation


(7) Dehydration


(8) Diabetes


(9) Enterocolitis


(10) Heel ulcer


(11) Hypertension


(12) Ischemic cardiomyopathy


(13) Osteomyelitis of left foot


(14) Precordial chest pain


(15) Respiratory distress


(16) Sepsis, unspecified organism








Family History





Cancer


Diabetes mellitus


Heart disease


Hypertension





Social History


Smoking Status:  Current Every Day Smoker


Drug Use:  none


Marital Status:  


Housing Status:  lives with family


Occupation Status:  employed





Current/Historical Medications


Scheduled


Albuterol Hfa (Ventolin Hfa), 2 PUFFS INH Q6H


Amlodipine Besylate (Amlodipine Besylate), 10 MG PO QAM


Amoxicillin & Pot Clavulanate (Augmentin 875-125 mg), 1 TAB PO BID


Aspirin (Aspirin Ec), 325 MG PO QAM


Clopidogrel (Plavix), 75 MG PO QAM


Collagenase (Santyl), 1 APPLN EXT 3XWK


Cyanocobalamin (Vitamin B-12), 1,000 MCG PO QAM


Divalproex Sodium (Depakote Delay Rel), 2,000 MG PO HS


Docusate Sodium (Docusate Sodium), 100 MG PO BID


Escitalopram Oxalate (Escitalopram Oxalate), 20 MG PO QAM


Ferrous Sulfate (Kp Ferrous Sulfate), 325 MG PO BIDM


Furosemide (Lasix), 40 MG PO QD


Gabapentin (Gabapentin), 100 MG PO HS


Gabapentin (Gabapentin), 300 MG PO HS


Hydrochlorothiazide (Hydrochlorothiazide), 25 MG PO DAILY


Insulin Glargine (Lantus Solostar), 20 UNITS SC QAM


Insulin Human Lispro (Humalog Kwikpen), UNITS SC UD


Ipratropium-Albuterol (Combivent Respimat), 1 PUFFS INH QID


Isosorbide Mononitrate Ext Rel (Imdur Ext Rel), 60 MG PO QAM


Lisinopril (Zestril), 40 MG PO BID


Metformin HCl (Metformin HCl ER), 1 TAB PO BID


Metoprolol Succ (Toprol Xl) (Toprol-Xl), 50 MG PO BID


Nicotine (Nicoderm Cq 14MG Patch), 1 PATCH TD DAILY


Polyethylene Glycol 3350 (Miralax), 17 GM PO DAILY


Simvastatin (Zocor), 40 MG PO QPM





Scheduled PRN


Albuterol Sulfate (Proair Respiclick), 2 PUFF INH Q4 PRN for SOB/Wheezing


Fluticasone Propionate (Nasal) (Flonase Allergy Relief), 2 SPRAYS VERONICA DAILY PRN 

for CONGESTION


Nitroglycerin (Nitrostat), 0.4 MG UT PRN PRN for Chest Pain


Oxycodone/Acetaminophen 5MG/325MG (Percocet 5MG/325MG), 1-2 TABLETS PO Q4-6H 

PRN for Pain


Tramadol HCl (Tramadol HCl), 50 MG PO Q6 PRN for Pain





Allergies


Coded Allergies:  


     No Known Allergies (Verified , 7/11/17)





Physical Exam


Vital Signs











  Date Time  Temp Pulse Resp B/P (MAP) Pulse Ox O2 Delivery O2 Flow Rate FiO2


 


7/11/17 17:28 36.4 63 26 168/90 94   


 


7/11/17 16:38  65 27  92   


 


7/11/17 16:31    141/65    


 


7/11/17 16:08  61 11  94   


 


7/11/17 16:01    159/70    


 


7/11/17 15:57     96 Nasal Cannula 3.0 


 


7/11/17 15:54    152/68    


 


7/11/17 15:52  63 17 152/68 95 Nasal Cannula 3.0 


 


7/11/17 15:39     99  4.0 


 


7/11/17 15:38  59 17  97   


 


7/11/17 15:36    164/89    


 


7/11/17 15:08  58 20     


 


7/11/17 15:07  55      


 


7/11/17 14:53     99 Room Air  


 


7/11/17 14:09 36.4 94 20 189/123 98 Nasal Cannula 4.0 











Physical Exam


GENERAL: Patient is in no acute distress.


HEENT: No acute trauma, normocephalic atraumatic, mucous membranes moist, no 

nasal congestion, no scleral icterus.


NECK: No stridor, no adenopathy, no meningismus, trachea is midline.


LUNGS: Clear to auscultation bilaterally but diminished bilaterally, no wheeze, 

no rhonchi, breath sounds equal.


HEART: 2/6 systolic murmur, regular rate and rhythm.


ABDOMEN: Soft, nontender, bowel sounds positive, no hernias, no peritonitis.


EXTREMITIES: No cyanosis or edema, full range of motion of all the joints 

without pain or difficulty, no signs for acute trauma. Wound vac to the lateral 

aspect of the left ankle, dressing to the right lower extremity with some 

healing ulcer-like lesions, no cellulitis.  


NEUROLOGIC: Oriented x 3, no acute motor or sensory deficits, no focal weakness.


SKIN: No rash, no jaundice, no diaphoresis.





Medical Decision & Procedures


ER Provider


Diagnostic Interpretation:


X-ray results as stated below per interpretation by me and the radiologist: 





CHEST ONE VIEW PORTABLE





CLINICAL HISTORY: EVALUATE RESPIRATORY DISTRESS. DYSPNEA dyspnea





COMPARISON STUDY:  7/6/2017





FINDINGS: Persistent mild cardia megaly. Prominent pulmonary vasculature


interval small left effusion. Prominent basal lung markings bilaterally. 





IMPRESSION:  Findings of pulmonary vascular congestion. Potential developing


infiltrate left and to lesser extent right base. Small left effusion. 














Electronically signed by:  Gerard Meier M.D.


7/11/2017 2:55 PM





Dictated Date/Time:  7/11/2017 2:54 PM





Laboratory Results


7/11/17 14:45








Red Blood Count 3.52, Mean Corpuscular Volume 83.8, Mean Corpuscular Hemoglobin 

26.1, Mean Corpuscular Hemoglobin Concent 31.2, Mean Platelet Volume 8.0, 

Neutrophils (%) (Auto) 77.3, Lymphocytes (%) (Auto) 14.4, Monocytes (%) (Auto) 

6.6, Eosinophils (%) (Auto) 1.3, Basophils (%) (Auto) 0.2, Neutrophils # (Auto) 

7.07, Lymphocytes # (Auto) 1.32, Monocytes # (Auto) 0.60, Eosinophils # (Auto) 

0.12, Basophils # (Auto) 0.02





7/11/17 14:45

















Test


  7/11/17


14:45


 


White Blood Count


  9.15 K/uL


(4.8-10.8)


 


Red Blood Count


  3.52 M/uL


(4.7-6.1)


 


Hemoglobin


  9.2 g/dL


(14.0-18.0)


 


Hematocrit 29.5 % (42-52) 


 


Mean Corpuscular Volume


  83.8 fL


()


 


Mean Corpuscular Hemoglobin


  26.1 pg


(25-34)


 


Mean Corpuscular Hemoglobin


Concent 31.2 g/dl


(32-36)


 


Platelet Count


  391 K/uL


(130-400)


 


Mean Platelet Volume


  8.0 fL


(7.4-10.4)


 


Neutrophils (%) (Auto) 77.3 % 


 


Lymphocytes (%) (Auto) 14.4 % 


 


Monocytes (%) (Auto) 6.6 % 


 


Eosinophils (%) (Auto) 1.3 % 


 


Basophils (%) (Auto) 0.2 % 


 


Neutrophils # (Auto)


  7.07 K/uL


(1.4-6.5)


 


Lymphocytes # (Auto)


  1.32 K/uL


(1.2-3.4)


 


Monocytes # (Auto)


  0.60 K/uL


(0.11-0.59)


 


Eosinophils # (Auto)


  0.12 K/uL


(0-0.5)


 


Basophils # (Auto)


  0.02 K/uL


(0-0.2)


 


RDW Standard Deviation


  45.6 fL


(36.4-46.3)


 


RDW Coefficient of Variation


  14.9 %


(11.5-14.5)


 


Immature Granulocyte % (Auto) 0.2 % 


 


Immature Granulocyte # (Auto)


  0.02 K/uL


(0.00-0.02)


 


Anion Gap


  6.0 mmol/L


(3-11)


 


Est Creatinine Clear Calc


Drug Dose 108.6 ml/min 


 


 


Estimated GFR (


American) 114.5 


 


 


Estimated GFR (Non-


American 98.8 


 


 


BUN/Creatinine Ratio 21.2 (10-20) 


 


Calcium Level


  8.7 mg/dl


(8.5-10.1)


 


Troponin I


  0.027 ng/ml


(0-0.045)


 


Pro-B-Type Natriuretic Peptide


  7672 pg/ml


(0-900)














Laboratory results reviewed by me.





Medications Administered











 Medications


  (Trade)  Dose


 Ordered  Sig/Dc


 Route  Start Time


 Stop Time Status Last Admin


Dose Admin


 


 Albuterol/


 Ipratropium


  (Duoneb)  3 ml  NOW  STAT


 INH  7/11/17 14:40


 7/11/17 14:42 DC 7/11/17 14:40


3 ML


 


 Furosemide


  (Lasix Inj)  40 mg  NOW  STAT


 IV  7/11/17 15:38


 7/11/17 15:39 DC 7/11/17 15:52


40 MG











ECG


Indication:  SOB/dyspnea


Rate (beats per minute):  55


Rhythm:  sinus bradycardia


Findings:  no ectopy, other (No obvious acute ischemia)





ED Course


1433: The patient was evaluated in room A11A. A complete history and physical 

exam was performed.





1440: DuoNeb 3 ml INH





1538: Lasix Inj 40 mg IV 





1602: I discussed the patient's case with Dr. Martínez, cardiologist with [facility]. He will evaluated the patient at the [facility]. 





1630: Reevaluated the patient. Discussed results and discharge instructions: He 

verbalized understanding and agreement. The patient is ready for discharge.





Medical Decision


Differential diagnosis includes but is not limited to CHF, exacerbation of COPD

, bronchitis or pneumonia, cardiac ischemia, MI. 





The patient was in the hospital and discharged on July 7. 





There is no leukocytosis.  The patient is anemic but this appears improved from 

recent testing.  No significant electrolyte abnormality or kidney failure.  EKG 

shows a sinus rhythm, no acute ischemia.  Cardiac enzyme testing times one is 

not consistent with acute cardiac injury.  Chest x-ray shows some mild CHF and 

a left basilar effusion.  BNP is elevated consistent with fluid overload.





The patient was given a DuoNeb, he received IV Lasix, he has diuresed.





I spoke to the on-call cardiologist.  The patient will be discharged with a 

dose of Lasix tomorrow morning.  He will be seen in the heart failure clinic 

tomorrow at 12:30.  The patient will continue his inhalers as before, he can 

return here for worsening breathing or worsening symptoms.  He appears to be 

fluid overloaded, I suspect this is what is causing his dyspnea.





Consults


Time Called:  1539


Consulting Physician:  Dr. Martínez, cardiologist with [facility]


Returned Call:  1602


I discussed the patient's case with Dr. Martínez, cardiologist with [facility]. He will evaluated the patient at the Heart Clinic.





Impression





 Primary Impression:  


 SOB (shortness of breath)


 Additional Impression:  


 CHF (congestive heart failure)





Scribe Attestation


The scribe's documentation has been prepared under my direction and personally 

reviewed by me in its entirety. I confirm that the note above accurately 

reflects all work, treatment, procedures, and medical decision making performed 

by me.





Departure Information


Dispostion


Home / Self-Care





Prescriptions





Furosemide (LASIX) 20 Mg Tab


40 MG PO QD for 1 Day, #2 TAB


   Prov: Heber Mendez M.D.         7/11/17





Referrals


Claude Rowley M.D. (PCP)





Forms


HOME CARE DOCUMENTATION FORM,                                                 

               IMPORTANT VISIT INFORMATION





Patient Instructions


My [facility]





Additional Instructions





take the lasix in the am tomorrow


see cardiology tomorrow at 1230 as scheduled


return for worsening breathing or symptoms





return for fever


continue your inhalers and oxygen as before





Problem Qualifiers

## 2017-07-11 NOTE — DIAGNOSTIC IMAGING REPORT
CHEST ONE VIEW PORTABLE



CLINICAL HISTORY: EVALUATE RESPIRATORY DISTRESS. DYSPNEA dyspnea



COMPARISON STUDY:  7/6/2017



FINDINGS: Persistent mild cardia megaly. Prominent pulmonary vasculature

interval small left effusion. Prominent basal lung markings bilaterally. 



IMPRESSION:  Findings of pulmonary vascular congestion. Potential developing

infiltrate left and to lesser extent right base. Small left effusion. 









Electronically signed by:  Gerard Meier M.D.

7/11/2017 2:55 PM



Dictated Date/Time:  7/11/2017 2:54 PM

## 2017-07-17 ENCOUNTER — HOSPITAL ENCOUNTER (OUTPATIENT)
Dept: HOSPITAL 45 - C.LAB1850 | Age: 62
Discharge: HOME | End: 2017-07-17
Attending: INTERNAL MEDICINE
Payer: COMMERCIAL

## 2017-07-17 DIAGNOSIS — I50.33: Primary | ICD-10-CM

## 2017-07-17 LAB
ANION GAP SERPL CALC-SCNC: 6 MMOL/L (ref 3–11)
BUN SERPL-MCNC: 36 MG/DL (ref 7–18)
BUN/CREAT SERPL: 35.9 (ref 10–20)
CALCIUM SERPL-MCNC: 8.6 MG/DL (ref 8.5–10.1)
CHLORIDE SERPL-SCNC: 102 MMOL/L (ref 98–107)
CO2 SERPL-SCNC: 31 MMOL/L (ref 21–32)
CREAT SERPL-MCNC: 1 MG/DL (ref 0.6–1.4)
EOSINOPHIL NFR BLD AUTO: 356 K/UL (ref 130–400)
GLUCOSE SERPL-MCNC: 192 MG/DL (ref 70–99)
HCT VFR BLD CALC: 27.2 % (ref 42–52)
MCH RBC QN AUTO: 26.6 PG (ref 25–34)
MCHC RBC AUTO-ENTMCNC: 31.3 G/DL (ref 32–36)
MCV RBC AUTO: 85.3 FL (ref 80–100)
PMV BLD AUTO: 8.2 FL (ref 7.4–10.4)
POTASSIUM SERPL-SCNC: 4.2 MMOL/L (ref 3.5–5.1)
RBC # BLD AUTO: 3.19 M/UL (ref 4.7–6.1)
SODIUM SERPL-SCNC: 139 MMOL/L (ref 136–145)
WBC # BLD AUTO: 7.78 K/UL (ref 4.8–10.8)

## 2017-07-19 ENCOUNTER — HOSPITAL ENCOUNTER (INPATIENT)
Dept: HOSPITAL 45 - C.EDB | Age: 62
LOS: 3 days | Discharge: HOME | DRG: 570 | End: 2017-07-22
Attending: FAMILY MEDICINE | Admitting: HOSPITALIST
Payer: COMMERCIAL

## 2017-07-19 VITALS
WEIGHT: 205.25 LBS | BODY MASS INDEX: 28.73 KG/M2 | HEIGHT: 71 IN | BODY MASS INDEX: 28.73 KG/M2 | HEIGHT: 71 IN | WEIGHT: 205.25 LBS | BODY MASS INDEX: 28.73 KG/M2

## 2017-07-19 VITALS
TEMPERATURE: 97.88 F | SYSTOLIC BLOOD PRESSURE: 161 MMHG | HEART RATE: 69 BPM | OXYGEN SATURATION: 91 % | DIASTOLIC BLOOD PRESSURE: 78 MMHG

## 2017-07-19 VITALS
HEART RATE: 83 BPM | SYSTOLIC BLOOD PRESSURE: 174 MMHG | TEMPERATURE: 98.6 F | OXYGEN SATURATION: 93 % | DIASTOLIC BLOOD PRESSURE: 73 MMHG

## 2017-07-19 VITALS — OXYGEN SATURATION: 92 %

## 2017-07-19 DIAGNOSIS — I73.9: ICD-10-CM

## 2017-07-19 DIAGNOSIS — F17.200: ICD-10-CM

## 2017-07-19 DIAGNOSIS — T24.232A: ICD-10-CM

## 2017-07-19 DIAGNOSIS — I25.10: ICD-10-CM

## 2017-07-19 DIAGNOSIS — Z79.82: ICD-10-CM

## 2017-07-19 DIAGNOSIS — G47.33: ICD-10-CM

## 2017-07-19 DIAGNOSIS — R07.89: ICD-10-CM

## 2017-07-19 DIAGNOSIS — Z95.5: ICD-10-CM

## 2017-07-19 DIAGNOSIS — E11.621: ICD-10-CM

## 2017-07-19 DIAGNOSIS — F31.9: ICD-10-CM

## 2017-07-19 DIAGNOSIS — Z99.81: ICD-10-CM

## 2017-07-19 DIAGNOSIS — I25.2: ICD-10-CM

## 2017-07-19 DIAGNOSIS — L03.116: Primary | ICD-10-CM

## 2017-07-19 DIAGNOSIS — I50.33: ICD-10-CM

## 2017-07-19 DIAGNOSIS — Z79.4: ICD-10-CM

## 2017-07-19 DIAGNOSIS — J44.9: ICD-10-CM

## 2017-07-19 DIAGNOSIS — Z83.3: ICD-10-CM

## 2017-07-19 DIAGNOSIS — Z82.49: ICD-10-CM

## 2017-07-19 DIAGNOSIS — Z79.02: ICD-10-CM

## 2017-07-19 DIAGNOSIS — D64.9: ICD-10-CM

## 2017-07-19 DIAGNOSIS — L97.429: ICD-10-CM

## 2017-07-19 LAB
ALBUMIN/GLOB SERPL: 0.5 {RATIO} (ref 0.9–2)
ALP SERPL-CCNC: 58 U/L (ref 45–117)
ALT SERPL-CCNC: 10 U/L (ref 12–78)
ANION GAP SERPL CALC-SCNC: 5 MMOL/L (ref 3–11)
APPEARANCE UR: CLEAR
AST SERPL-CCNC: 9 U/L (ref 15–37)
BASOPHILS # BLD: 0.02 K/UL (ref 0–0.2)
BASOPHILS NFR BLD: 0.3 %
BILIRUB UR-MCNC: (no result) MG/DL
BUN SERPL-MCNC: 36 MG/DL (ref 7–18)
BUN/CREAT SERPL: 36.9 (ref 10–20)
CALCIUM SERPL-MCNC: 8.2 MG/DL (ref 8.5–10.1)
CHLORIDE SERPL-SCNC: 102 MMOL/L (ref 98–107)
CO2 SERPL-SCNC: 30 MMOL/L (ref 21–32)
COLOR UR: YELLOW
COMPLETE: YES
CREAT CL PREDICTED SERPL C-G-VRATE: 94.5 ML/MIN
CREAT SERPL-MCNC: 0.98 MG/DL (ref 0.6–1.4)
EOSINOPHIL NFR BLD AUTO: 381 K/UL (ref 130–400)
GLOBULIN SER-MCNC: 4.6 GM/DL (ref 2.5–4)
GLUCOSE SERPL-MCNC: 105 MG/DL (ref 70–99)
HCT VFR BLD CALC: 27.9 % (ref 42–52)
IG%: 0.4 %
IMM GRANULOCYTES NFR BLD AUTO: 13.1 %
INR PPP: 1 (ref 0.9–1.1)
LYMPHOCYTES # BLD: 1.01 K/UL (ref 1.2–3.4)
MANUAL MICROSCOPIC REQUIRED?: NO
MCH RBC QN AUTO: 26.1 PG (ref 25–34)
MCHC RBC AUTO-ENTMCNC: 30.8 G/DL (ref 32–36)
MCV RBC AUTO: 84.5 FL (ref 80–100)
MONOCYTES NFR BLD: 17.7 %
NEUTROPHILS # BLD AUTO: 2.1 %
NEUTROPHILS NFR BLD AUTO: 66.4 %
NITRITE UR QL STRIP: (no result)
PARTIAL THROMBOPLASTIN RATIO: 1.2
PH UR STRIP: 6 [PH] (ref 4.5–7.5)
PMV BLD AUTO: 8.1 FL (ref 7.4–10.4)
POTASSIUM SERPL-SCNC: 4.3 MMOL/L (ref 3.5–5.1)
PROTHROMBIN TIME: 10.2 SECONDS (ref 9–12)
RBC # BLD AUTO: 3.3 M/UL (ref 4.7–6.1)
REVIEW REQ?: NO
SODIUM SERPL-SCNC: 137 MMOL/L (ref 136–145)
SP GR UR STRIP: 1.02 (ref 1–1.03)
URINE BILL WITH OR WITHOUT MIC: (no result)
URINE EPITHELIAL CELL AUTO: (no result) /LPF (ref 0–5)
UROBILINOGEN UR-MCNC: (no result) MG/DL
WBC # BLD AUTO: 7.73 K/UL (ref 4.8–10.8)
ZZUR CULT IF INDIC CLEAN CATCH: NO

## 2017-07-19 PROCEDURE — 0JBN0ZZ EXCISION OF RIGHT LOWER LEG SUBCUTANEOUS TISSUE AND FASCIA, OPEN APPROACH: ICD-10-PCS | Performed by: EMERGENCY MEDICINE

## 2017-07-19 RX ADMIN — MIRTAZAPINE SCH MG: 15 TABLET, FILM COATED ORAL at 22:58

## 2017-07-19 RX ADMIN — ALBUTEROL SULFATE SCH PUFFS: 90 AEROSOL, METERED RESPIRATORY (INHALATION) at 23:00

## 2017-07-19 NOTE — EMERGENCY ROOM VISIT NOTE
History


Report prepared by Safia:  Prisca Stark


Under the Supervision of:  Dr. Heber Mendez M.D.


First contact with patient:  19:06


Chief Complaint:  SHORTNESS OF BREATH


Stated Complaint:  SOB, LOW O2


Nursing Triage Summary:  


Pt c/o being short of breath x 1 hour. 





Wears O2 at home 3L, now wearing 5L. 





COPD history. CHF, DM





wound vac on left ankle, increased redeness and swelling





History of Present Illness


The patient is a 61 year old male who presents to the Emergency Room with 

complaints of persistent shortness of breath that started 8 days ago. The 

patient used his inhaler at home but did not experience any relief. The patient 

has not used any breathing treatments recently. The patient is on 3 L of nasal 

cannula oxygen at home. He experienced low oxygen saturations on 3 L of nasal 

cannula oxygen at home so the home health nurse increased his oxygen to 5 L.  

The patient was evaluated in the ED 8 days ago for similar symptoms. He was 

started on Lasix and discharged with a follow-up appointment the next day with 

the [facility]. The patient states that he saw 

Dr. Yusuf and his Lasix was tripled. He states that he has not experienced any 

relief of his symptoms since then. He states that he is not urinating more 

frequently. The patient is also experiencing worsening left lower extremity 

edema. The patient has a wound vac in place on his left leg for a previous 

infection. His wife states that the wound seems to be worse today. She states 

that it is erythematous and warm which is not how it was this morning. He is 

also experiencing mild chills. The patient is on Augmentin for the infection 

and he states that he has been on that for several weeks. The patient saw the 

wound clinic 2 days ago for a follow-up of the wound on his left lower 

extremity. They cleaned the wound and put the wound vac back on. They said that 

the wound looked about the same as it did previously but it is slowly starting 

to heal. The patient denies any history of DVTs and states that he had an 

ultrasound of his left leg previously without any evidence for a DVT. The 

patient is on Depakote and Plavix.





   Source of History:  patient, spouse/significant other (wife)


   Onset:  8 days ago


   Position:  chest


   Quality:  other (shortness of breath)


   Timing:  worsening


   Associated Symptoms:  + chills


Note:


worsening left lower extremity edema, erythema and warmth surround left lower 

extremity wound





Review of Systems


See HPI for pertinent positives & negatives. A total of 10 systems reviewed and 

were otherwise negative.





Past Medical & Surgical


Medical Problems:


(1) Acute appendicitis with appendiceal abscess


(2) Acute respiratory failure


(3) Angina pectoris


(4) CAD (coronary artery disease)


(5) Cardiac arrest


(6) CHF exacerbation


(7) Dehydration


(8) Diabetes


(9) Enterocolitis


(10) Heel ulcer


(11) Hypertension


(12) Ischemic cardiomyopathy


(13) Osteomyelitis of left foot


(14) Precordial chest pain


(15) Respiratory distress


(16) Sepsis, unspecified organism








Family History





Cancer


Diabetes mellitus


Heart disease


Hypertension





Social History


Smoking Status:  Current Every Day Smoker


Drug Use:  none


Marital Status:  


Housing Status:  lives with family


Occupation Status:  employed





Current/Historical Medications


Scheduled


Albuterol Hfa (Ventolin Hfa), 2 PUFFS INH Q6H


Amlodipine Besylate (Amlodipine Besylate), 10 MG PO QAM


Amoxicillin & Pot Clavulanate (Augmentin 875-125 mg), 1 TAB PO BID


Aspirin (Aspirin Ec), 325 MG PO QAM


Clopidogrel (Plavix), 75 MG PO QAM


Collagenase (Santyl), 1 APPLN EXT 3XWK


Cyanocobalamin (Vitamin B-12), 1,000 MCG PO QAM


Divalproex Sodium (Depakote Delay Rel), 2,000 MG PO HS


Docusate Sodium (Docusate Sodium), 100 MG PO BID


Escitalopram Oxalate (Escitalopram Oxalate), 20 MG PO QAM


Ferrous Sulfate (Kp Ferrous Sulfate), 325 MG PO BIDM


Furosemide (Furosemide), 1 TAB PO TID


Gabapentin (Gabapentin), 300 MG PO HS


Insulin Glargine (Lantus Solostar), 20 UNITS SC QAM


Insulin Human Lispro (Humalog Kwikpen), UNITS SC UD


Ipratropium-Albuterol (Combivent Respimat), 1 PUFFS INH QID


Isosorbide Mononitrate Ext Rel (Imdur Ext Rel), 60 MG PO QAM


Lisinopril (Zestril), 40 MG PO BID


Metformin HCl (Metformin HCl ER), 1 TAB PO BID


Metoprolol Succ (Toprol Xl) (Toprol-Xl), 50 MG PO BID


Mirtazapine (Mirtazapine), 1 TAB PO DAILY


Nicotine (Nicoderm Cq 14MG Patch), 1 PATCH TD DAILY


Polyethylene Glycol 3350 (Miralax), 17 GM PO DAILY


Simvastatin (Zocor), 40 MG PO QPM





Scheduled PRN


Albuterol Sulfate (Proair Respiclick), 2 PUFF INH Q4 PRN for SOB/Wheezing


Fluticasone Propionate (Nasal) (Flonase Allergy Relief), 2 SPRAYS VERONICA DAILY PRN 

for CONGESTION


Hydrocodone/Acetaminophen 5MG/325MG (Norco 5MG/325MG), 1 TABLET PO Q6 PRN for 

Pain


Nitroglycerin (Nitrostat), 0.4 MG UT PRN PRN for Chest Pain


Oxycodone/Acetaminophen 5MG/325MG (Percocet 5MG/325MG), 1-2 TABLETS PO Q4-6H 

PRN for Pain


Tramadol HCl (Tramadol HCl), 50 MG PO Q6 PRN for Pain





Allergies


Coded Allergies:  


     No Known Allergies (Verified , 7/19/17)





Physical Exam


Vital Signs











  Date Time  Temp Pulse Resp B/P (MAP) Pulse Ox O2 Delivery O2 Flow Rate FiO2


 


7/19/17 21:36  70 20 152/73 92 Nasal Cannula 4.0 


 


7/19/17 20:22  74 21 164/68 93 Nasal Cannula 3.0 


 


7/19/17 19:25     93 Nasal Cannula 3.0 


 


7/19/17 19:25      Nasal Cannula 3.0 


 


7/19/17 18:46 36.4 70 20 150/75 91 Nasal Cannula 5.0 











Physical Exam


GENERAL: Patient is in no acute distress.


HEENT: No acute trauma, normocephalic atraumatic, mucous membranes moist, no 

nasal congestion, no scleral icterus.


NECK: No stridor, no adenopathy, no meningismus, trachea is midline.


LUNGS: Crackles and wheezing at the bases bilaterally, breath sounds equal, no 

respiratory distress.


HEART: 2/6 systolic murmur, regular rate and rhythm.


ABDOMEN: Soft, nontender, bowel sounds positive, no hernias, no peritonitis.


EXTREMITIES: Wrap on right leg, left leg is significantly edematous with wound 

vac along lateral ankle, there is surround erythema and warmth in the area of 

the wound vac and extending somewhat proximally. 


NEUROLOGIC: Oriented x 3, no acute motor or sensory deficits, no focal weakness.


SKIN: No rash, no jaundice, no diaphoresis.





Medical Decision & Procedures


ER Provider


Diagnostic Interpretation:


Radiology results as stated below per my review and radiologist interpretation:





CHEST ONE VIEW PORTABLE





FINDINGS: Slight increase in cardiac size. Mild increase in pulmonary


vasculature. 





IMPRESSION:  Congestive heart failure 





The above report was generated using voice recognition software.  It may contain


grammatical, syntax or spelling errors.





Electronically signed by:  Gerard Meier M.D.


7/19/2017 7:43 PM





Dictated Date/Time:  7/19/2017 7:42 PM





LEFT VENOUS DOPP LOWER EXT UNILAT





FINDINGS: Study is negative for deep venous process. Compressibility and


augmentation characteristics are unremarkable. Reactive lymph nodes in the left


inguinal region are present measuring up to 3 x 1.5 cm.





IMPRESSION:  


1. Study is negative for deep venous thrombosis.


2. Left inguinal adenopathy 





The above report was generated using voice recognition software.  It may contain


grammatical, syntax or spelling errors.





Electronically signed by:  Gerard Meier M.D.


7/19/2017 8:09 PM





Dictated Date/Time:  7/19/2017 8:08 PM





Laboratory Results


7/19/17 19:30








Red Blood Count 3.30, Mean Corpuscular Volume 84.5, Mean Corpuscular Hemoglobin 

26.1, Mean Corpuscular Hemoglobin Concent 30.8, Mean Platelet Volume 8.1, 

Neutrophils (%) (Auto) 66.4, Lymphocytes (%) (Auto) 13.1, Monocytes (%) (Auto) 

17.7, Eosinophils (%) (Auto) 2.1, Basophils (%) (Auto) 0.3, Neutrophils # (Auto

) 5.14, Lymphocytes # (Auto) 1.01, Monocytes # (Auto) 1.37, Eosinophils # (Auto

) 0.16, Basophils # (Auto) 0.02





7/19/17 19:30

















Test


  7/19/17


19:30 7/19/17


20:45


 


White Blood Count


  7.73 K/uL


(4.8-10.8) 


 


 


Red Blood Count


  3.30 M/uL


(4.7-6.1) 


 


 


Hemoglobin


  8.6 g/dL


(14.0-18.0) 


 


 


Hematocrit 27.9 % (42-52)  


 


Mean Corpuscular Volume


  84.5 fL


() 


 


 


Mean Corpuscular Hemoglobin


  26.1 pg


(25-34) 


 


 


Mean Corpuscular Hemoglobin


Concent 30.8 g/dl


(32-36) 


 


 


Platelet Count


  381 K/uL


(130-400) 


 


 


Mean Platelet Volume


  8.1 fL


(7.4-10.4) 


 


 


Neutrophils (%) (Auto) 66.4 %  


 


Lymphocytes (%) (Auto) 13.1 %  


 


Monocytes (%) (Auto) 17.7 %  


 


Eosinophils (%) (Auto) 2.1 %  


 


Basophils (%) (Auto) 0.3 %  


 


Neutrophils # (Auto)


  5.14 K/uL


(1.4-6.5) 


 


 


Lymphocytes # (Auto)


  1.01 K/uL


(1.2-3.4) 


 


 


Monocytes # (Auto)


  1.37 K/uL


(0.11-0.59) 


 


 


Eosinophils # (Auto)


  0.16 K/uL


(0-0.5) 


 


 


Basophils # (Auto)


  0.02 K/uL


(0-0.2) 


 


 


RDW Standard Deviation


  48.2 fL


(36.4-46.3) 


 


 


RDW Coefficient of Variation


  15.6 %


(11.5-14.5) 


 


 


Immature Granulocyte % (Auto) 0.4 %  


 


Immature Granulocyte # (Auto)


  0.03 K/uL


(0.00-0.02) 


 


 


Prothrombin Time


  10.2 SECONDS


(9.0-12.0) 


 


 


Prothromb Time International


Ratio 1.0 (0.9-1.1) 


  


 


 


Activated Partial


Thromboplast Time 30.4 SECONDS


(21.0-31.0) 


 


 


Partial Thromboplastin Ratio 1.2  


 


Anion Gap


  5.0 mmol/L


(3-11) 


 


 


Est Creatinine Clear Calc


Drug Dose 94.5 ml/min 


  


 


 


Estimated GFR (


American) 97.4 


  


 


 


Estimated GFR (Non-


American 84.1 


  


 


 


BUN/Creatinine Ratio 36.9 (10-20)  


 


Calcium Level


  8.2 mg/dl


(8.5-10.1) 


 


 


Total Bilirubin


  0.2 mg/dl


(0.2-1) 


 


 


Aspartate Amino Transf


(AST/SGOT) 9 U/L (15-37) 


  


 


 


Alanine Aminotransferase


(ALT/SGPT) 10 U/L (12-78) 


  


 


 


Alkaline Phosphatase


  58 U/L


() 


 


 


Troponin I


  0.020 ng/ml


(0-0.045) 


 


 


Pro-B-Type Natriuretic Peptide


  3302 pg/ml


(0-900) 


 


 


Total Protein


  6.8 gm/dl


(6.4-8.2) 


 


 


Albumin


  2.2 gm/dl


(3.4-5.0) 


 


 


Globulin


  4.6 gm/dl


(2.5-4.0) 


 


 


Albumin/Globulin Ratio 0.5 (0.9-2)  


 


Valproic Acid (Depakene) Level


  33 mcg/ml


() 


 


 


Urine Color  YELLOW 


 


Urine Appearance  CLEAR (CLEAR) 


 


Urine pH  6.0 (4.5-7.5) 


 


Urine Specific Gravity


  


  1.017


(1.000-1.030)


 


Urine Protein  3+ (NEG) 


 


Urine Glucose (UA)  NEG (NEG) 


 


Urine Ketones  NEG (NEG) 


 


Urine Occult Blood  2+ (NEG) 


 


Urine Nitrite  NEG (NEG) 


 


Urine Bilirubin  NEG (NEG) 


 


Urine Urobilinogen  NEG (NEG) 


 


Urine Leukocyte Esterase  NEG (NEG) 


 


Urine WBC (Auto)  0 /hpf (0-5) 


 


Urine RBC (Auto)


  


  10-30 /hpf


(0-4)


 


Urine Hyaline Casts (Auto)  1-5 /lpf (0-5) 


 


Urine Epithelial Cells (Auto)


  


  5-10 /lpf


(0-5)


 


Urine Bacteria (Auto)  NEG (NEG) 











Laboratory results reviewed by me.





Medications Administered











 Medications


  (Trade)  Dose


 Ordered  Sig/Dc


 Route  Start Time


 Stop Time Status Last Admin


Dose Admin


 


 Albuterol/


 Ipratropium


  (Duoneb)  3 ml  NOW  STAT


 INH  7/19/17 19:11


 7/19/17 19:15 DC 7/19/17 19:21


3 ML


 


 Ceftriaxone Sodium


  (Rocephin Inj)  1 gm  NOW  STAT


 IV  7/19/17 20:11


 7/19/17 20:12 DC 7/19/17 20:20


1 GM


 


 Furosemide


  (Lasix Inj)  40 mg  NOW  STAT


 IV  7/19/17 20:23


 7/19/17 20:25 DC 7/19/17 20:31


40 MG


 


 Morphine Sulfate


  (MoRPHine


 SULFATE INJ)  4 mg  Q15M  PRN


 IV  7/19/17 20:45


 8/2/17 20:44  7/19/17 20:41


4 MG











ECG


Indication:  SOB/dyspnea


Rate (beats per minute):  71


Rhythm:  normal sinus


Findings:  no acute ischemic change, no ectopy





ED Course


1908: The patient was evaluated in room C7. A complete history and physical 

exam was performed.





1911: Ordered DuoNeb 3 ml INH





2011: Ordered Rocephin Inj 1 gm IV





2023: Ordered Lasix Inj 40 mg IV





2035: Upon reexamination the patient is resting comfortably. I discussed 

results and treatment plan with the patient. He verbalizes agreement and 

understanding. The patient will be evaluated for further management.





2045: Ordered Morphine Sulfate 4 mg IV





2118: Discussed the patient's case with Dr. Stu Gutierrez, a medical resident 

with the [facility]. The patient will be evaluated for 

further management.





Medical Decision


Differential diagnoses considered include cellulitis, DVT, fluid overload, CHF, 

exacerbation of COPD, pneumonia, renal failure.





There is no leukocytosis.  The patient is anemic but this appears baseline 

looking back at previous testing.  No significant electrolyte abnormality or 

kidney failure.  No hepatitis.  There is no coagulopathy.  BNP is elevated 

consistent with fluid overload.  Chest film does show some CHF, no pneumothorax 

or pneumonia.  EKG shows a sinus rhythm, no acute ischemia.  Cardiac enzyme 

testing times one is not consistent with acute cardiac injury.  Left leg 

ultrasound does not show any evidence for DVT.  Blood cultures are pending.  

Valproic acid level is not toxic, urinalysis does not show infection.





The patient is in CHF and has fluid overload.  This is despite outpatient 

treatment for the CHF for the last week.  He also has a worsening left leg 

cellulitis despite Augmentin.  Looking at a recent culture, one of the 

organisms growing is resistant to Augmentin.





The patient requires admission/observation.  He is fluid overloaded and failing 

outpatient treatment for CHF as well as failing outpatient treatment for 

cellulitis.  He did receive IV ceftriaxone, IV Lasix and IV morphine while in 

the ED.  I talked about the findings with the patient.  I talked to the case 

manager.  The on-call hospitalist was consulted.





Medication Reconcilliation


Current Medication List:  was personally reviewed by me





Blood Pressure Screening


Patient's blood pressure:  Elevated blood pressure


Blood pressure disposition:  Elevated BP felt to be situational





Consults


Time Called:  2037


Consulting Physician:  [place]. Stu Gutierrez [place] [facility]


Returned Call:  [phone]


Discussed the patient's case with Dr. Stu Gutierrez, a medical resident with 

the [facility]. The patient will be evaluated for 

further management.





Impression





 Primary Impression:  


 Congestive heart failure


 Additional Impressions:  


 Cellulitis


 Failure of outpatient treatment





Scribe Attestation


The scribe's documentation has been prepared under my direction and personally 

reviewed by me in its entirety. I confirm that the note above accurately 

reflects all work, treatment, procedures, and medical decision making performed 

by me.





Departure Information


Dispostion


Being Evaluated By Hospitalist





Referrals


Pro,Claude MCCORD M.D. (PCP)





Patient Instructions


My [facility]





Problem Qualifiers








 Primary Impression:  


 Congestive heart failure


 Congestive heart failure type:  unspecified congestive heart failure type  


 Additional Impressions:  


 Cellulitis


 Site of cellulitis:  extremity  Site of cellulitis of extremity:  lower 

extremity  Laterality:  left  Qualified Codes:  L03.116 - Cellulitis of left 

lower limb

## 2017-07-19 NOTE — DIAGNOSTIC IMAGING REPORT
CHEST ONE VIEW PORTABLE



CLINICAL HISTORY: EVALUATE RESPIRATORY DISTRESS. DYSPNEA    



COMPARISON STUDY:  7/11/2017



FINDINGS: Slight increase in cardiac size. Mild increase in pulmonary

vasculature. 



IMPRESSION:  Congestive heart failure 











The above report was generated using voice recognition software.  It may contain

grammatical, syntax or spelling errors.









Electronically signed by:  Gerard Meier M.D.

7/19/2017 7:43 PM



Dictated Date/Time:  7/19/2017 7:42 PM

## 2017-07-19 NOTE — DIAGNOSTIC IMAGING REPORT
LEFT VENOUS DOPP LOWER EXT UNILAT



CLINICAL HISTORY: swelling pain



TECHNIQUE: Venous Doppler



COMPARISON STUDY:  6/26/2017



FINDINGS: Study is negative for deep venous process. Compressibility and

augmentation characteristics are unremarkable. Reactive lymph nodes in the left

inguinal region are present measuring up to 3 x 1.5 cm.



IMPRESSION:  

1. Study is negative for deep venous thrombosis.

2. Left inguinal adenopathy 









The above report was generated using voice recognition software.  It may contain

grammatical, syntax or spelling errors.







Electronically signed by:  Gerard Meier M.D.

7/19/2017 8:09 PM



Dictated Date/Time:  7/19/2017 8:08 PM

## 2017-07-19 NOTE — HISTORY AND PHYSICAL
History & Physical


Date & Time of Service:


Jul 19, 2017 at 21:47


Chief Complaint:


Sob, Low O2


Primary Care Physician:


Claude Rowley M.D.


History of Present Illness


Source:  patient, clinic records, hospital records


Mr Dixon is a 61 year old male who presents with shortness of breath. He has 

had multiple recent admissions for diastolic CHF exacerbations. This episode 

started around 16:30 when he lied down on the couch with his legs raised for a 

change of his wound vac and he became acutely short of breath with a heavy 

pressure on his chest. This episode last until he came into the ER. Associated 

coughing. He currently feels back to his baseline. Since discharged on 7/7/17 

he feels he has gained 10lb in weight. That is despite following up in heart 

failure clinic and his Lasix being increased from 40mg PO daily to 80 + 40 mg 

daily. He reports being compliant with medication.





He is also being treated with Augmentin for longstanding diabetic foot ulcer 

and is under the care of wound care management. Wound culture was taken on 7/17/ 2017 grew enterococcus faecalis and klebsiella oxytoca (resistant to 

amoxicillin and cefazolin). He feels the redness and swelling around his foot 

ulcer is becoming much worse in the past 2 days. He also notes subjective 

fevers and chills for the past 2 days.





Past Medical/Surgical History


1) CAD - NSTEMI 2007 - L circ stent  -   ACS 2010 - Catheterization showed 90 % 

mid LAD stenosis, 40 % distal LAD Stenosis,  50% mid LCX stenosis, 100% in-

stent stenosis in the distal L circumflex, 100% obtuse marginal stenosis, 60% 

proximal right coronary stenosis, 40% mid RCA stenosis. Pt had an atherectomy 

and ERICK placed in the LAD at [facility].





2) Grade 1 diastolic dysfunction on recent echo - Preserved EF 55%





3) DM 2 





4) Bipolar disease





5) COPD - continues to smoke 





6) Diabetic L foot ulcer requiring wound vac





7) V fib arrest following MI 2007





8) JANE





9) GERD





10) PAD - L popliteal and superficial femoral stents





11) Chronic anemia - Hb 8-10





PSHx


Appendectomy


Vascular stenting 2017


Cervical surgery





Family History





Cancer


Diabetes mellitus


Heart disease


Hypertension





Social History


Smoking Status:  Current Every Day Smoker


Drug Use:  none


Marital Status:  


Housing status:  lives with family


Occupational Status:  employed





Immunizations


History of Influenza Vaccine:  No


Influenza Vaccine Date:  Oct 5, 2009


History of Tetanus Vaccine?:  No


Tetanus Immunization Date:  Mar 1, 2004


History of Pneumococcal:  No


Pneumococcal Date:  Oct 15, 2006


History of Hepatitis B Vaccine:  No





Multi-Drug Resistant Organisms


History of MDRO:  No





Allergies


Coded Allergies:  


     No Known Allergies (Verified , 7/19/17)





Home Medications


Scheduled


Albuterol Hfa (Ventolin Hfa), 2 PUFFS INH Q6H


Amlodipine Besylate (Amlodipine Besylate), 10 MG PO QAM


Amoxicillin & Pot Clavulanate (Augmentin 875-125 mg), 1 TAB PO BID


Aspirin (Aspirin Ec), 325 MG PO QAM


Clopidogrel (Plavix), 75 MG PO QAM


Collagenase (Santyl), 1 APPLN EXT 3XWK


Cyanocobalamin (Vitamin B-12), 1,000 MCG PO QAM


Divalproex Sodium (Depakote Delay Rel), 2,000 MG PO HS


Docusate Sodium (Docusate Sodium), 100 MG PO BID


Escitalopram Oxalate (Escitalopram Oxalate), 20 MG PO QAM


Ferrous Sulfate (Kp Ferrous Sulfate), 325 MG PO BIDM


Furosemide (Furosemide), 1 TAB PO TID


Gabapentin (Gabapentin), 300 MG PO HS


Insulin Glargine (Lantus Solostar), 20 UNITS SC QAM


Insulin Human Lispro (Humalog Kwikpen), UNITS SC UD


Ipratropium-Albuterol (Combivent Respimat), 1 PUFFS INH QID


Isosorbide Mononitrate Ext Rel (Imdur Ext Rel), 60 MG PO QAM


Lisinopril (Zestril), 40 MG PO BID


Metformin HCl (Metformin HCl ER), 1 TAB PO BID


Metoprolol Succ (Toprol Xl) (Toprol-Xl), 50 MG PO BID


Mirtazapine (Mirtazapine), 1 TAB PO DAILY


Nicotine (Nicoderm Cq 14MG Patch), 1 PATCH TD DAILY


Polyethylene Glycol 3350 (Miralax), 17 GM PO DAILY


Simvastatin (Zocor), 40 MG PO QPM





Scheduled PRN


Albuterol Sulfate (Proair Respiclick), 2 PUFF INH Q4 PRN for SOB/Wheezing


Fluticasone Propionate (Nasal) (Flonase Allergy Relief), 2 SPRAYS VERONICA DAILY PRN 

for CONGESTION


Hydrocodone/Acetaminophen 5MG/325MG (Norco 5MG/325MG), 1 TABLET PO Q6 PRN for 

Pain


Nitroglycerin (Nitrostat), 0.4 MG UT PRN PRN for Chest Pain


Oxycodone/Acetaminophen 5MG/325MG (Percocet 5MG/325MG), 1-2 TABLETS PO Q4-6H 

PRN for Pain


Tramadol HCl (Tramadol HCl), 50 MG PO Q6 PRN for Pain





Review of Systems


Constitutional:  + fever (see HPI), + chills


Eyes:  No worsening of vision


ENT:  No hearing loss


Respiratory:  + cough


Cardiovascular:  + chest pain, + orthopnea, + PND, + edema, No claudication


Abdomen:  No pain, No nausea, No vomiting, No diarrhea, No constipation, No GI 

bleeding


Musculoskeletal:  No joint pain, No muscle pain


Genitourinary - Male:  No hematuria, No dysuria, No urinary frequency, No 

urinary urgency


Endocrine:  + fatigue


Integumentary:  + problem reported (see HPI)





Physical Exam


Vital Signs











  Date Time  Temp Pulse Resp B/P (MAP) Pulse Ox O2 Delivery O2 Flow Rate FiO2


 


7/19/17 21:36  70 20  92 Nasal Cannula 4.0 


 


7/19/17 20:22  74 21 164/68 93 Nasal Cannula 3.0 


 


7/19/17 19:25     93 Nasal Cannula 3.0 


 


7/19/17 19:25      Nasal Cannula 3.0 


 


7/19/17 18:46 36.4 70 20 150/75 91 Nasal Cannula 5.0 








General Appearance:  no apparent distress


Eyes:  normal inspection (pupils equal)


Neck:  supple, no JVD


Respiratory/Chest:  no respiratory distress, no accessory muscle use, + crackles

 (bibasal fine crackles)


Cardiovascular:  regular rate, rhythm, no murmur, normal peripheral pulses


Abdomen/GI:  normal bowel sounds, non tender, soft


Back:  no CVA tenderness


Extremities/Musculoskelatal:  no calf tenderness, normal capillary refill, no 

pedal edema


Neurologic/Psych:  CNs II-XII nml as tested (no facial droop), alert, oriented 

x 3


Skin:  + pertinent finding (erythema and swelling surrounding wound vac 

dressing.)





Diagnostics


Laboratory Results





Results Past 24 Hours








Test


  7/19/17


19:30 7/19/17


20:45 Range/Units


 


 


White Blood Count 7.73  4.8-10.8  K/uL


 


Red Blood Count 3.30  4.7-6.1  M/uL


 


Hemoglobin 8.6  14.0-18.0  g/dL


 


Hematocrit 27.9  42-52  %


 


Mean Corpuscular Volume 84.5    fL


 


Mean Corpuscular Hemoglobin 26.1  25-34  pg


 


Mean Corpuscular Hemoglobin


Concent 30.8


  


  32-36  g/dl


 


 


Platelet Count 381  130-400  K/uL


 


Mean Platelet Volume 8.1  7.4-10.4  fL


 


Neutrophils (%) (Auto) 66.4   %


 


Lymphocytes (%) (Auto) 13.1   %


 


Monocytes (%) (Auto) 17.7   %


 


Eosinophils (%) (Auto) 2.1   %


 


Basophils (%) (Auto) 0.3   %


 


Neutrophils # (Auto) 5.14  1.4-6.5  K/uL


 


Lymphocytes # (Auto) 1.01  1.2-3.4  K/uL


 


Monocytes # (Auto) 1.37  0.11-0.59  K/uL


 


Eosinophils # (Auto) 0.16  0-0.5  K/uL


 


Basophils # (Auto) 0.02  0-0.2  K/uL


 


RDW Standard Deviation 48.2  36.4-46.3  fL


 


RDW Coefficient of Variation 15.6  11.5-14.5  %


 


Immature Granulocyte % (Auto) 0.4   %


 


Immature Granulocyte # (Auto) 0.03  0.00-0.02  K/uL


 


Prothrombin Time


  10.2


  


  9.0-12.0


SECONDS


 


Prothromb Time International


Ratio 1.0


  


  0.9-1.1  


 


 


Activated Partial


Thromboplast Time 30.4


  


  21.0-31.0


SECONDS


 


Partial Thromboplastin Ratio 1.2   


 


Sodium Level 137  136-145  mmol/L


 


Potassium Level 4.3  3.5-5.1  mmol/L


 


Chloride Level 102    mmol/L


 


Carbon Dioxide Level 30  21-32  mmol/L


 


Anion Gap 5.0  3-11  mmol/L


 


Blood Urea Nitrogen 36  7-18  mg/dl


 


Creatinine


  0.98


  


  0.60-1.40


mg/dl


 


Est Creatinine Clear Calc


Drug Dose 94.5


  


   ml/min


 


 


Estimated GFR (


American) 97.4


  


   


 


 


Estimated GFR (Non-


American 84.1


  


   


 


 


BUN/Creatinine Ratio 36.9  10-20  


 


Random Glucose 105  70-99  mg/dl


 


Calcium Level 8.2  8.5-10.1  mg/dl


 


Total Bilirubin 0.2  0.2-1  mg/dl


 


Aspartate Amino Transf


(AST/SGOT) 9


  


  15-37  U/L


 


 


Alanine Aminotransferase


(ALT/SGPT) 10


  


  12-78  U/L


 


 


Alkaline Phosphatase 58    U/L


 


Troponin I 0.020  0-0.045  ng/ml


 


Pro-B-Type Natriuretic Peptide 3302  0-900  pg/ml


 


Total Protein 6.8  6.4-8.2  gm/dl


 


Albumin 2.2  3.4-5.0  gm/dl


 


Globulin 4.6  2.5-4.0  gm/dl


 


Albumin/Globulin Ratio 0.5  0.9-2  


 


Valproic Acid (Depakene) Level 33    mcg/ml


 


Urine Color  YELLOW  


 


Urine Appearance  CLEAR CLEAR  


 


Urine pH  6.0 4.5-7.5  


 


Urine Specific Gravity  1.017 1.000-1.030  


 


Urine Protein  3+ NEG  


 


Urine Glucose (UA)  NEG NEG  


 


Urine Ketones  NEG NEG  


 


Urine Occult Blood  2+ NEG  


 


Urine Nitrite  NEG NEG  


 


Urine Bilirubin  NEG NEG  


 


Urine Urobilinogen  NEG NEG  


 


Urine Leukocyte Esterase  NEG NEG  


 


Urine WBC (Auto)  0 0-5  /hpf


 


Urine RBC (Auto)  10-30 0-4  /hpf


 


Urine Hyaline Casts (Auto)  1-5 0-5  /lpf


 


Urine Epithelial Cells (Auto)  5-10 0-5  /lpf


 


Urine Bacteria (Auto)  NEG NEG  








Microbiology Results


7/19/17 Blood Culture, Received


          Pending


7/19/17 Blood Culture, Received


          Pending





Diagnostic Radiology


LEFT VENOUS DOPP LOWER EXT UNILAT





CLINICAL HISTORY: swelling pain





TECHNIQUE: Venous Doppler





COMPARISON STUDY:  6/26/2017





FINDINGS: Study is negative for deep venous process. Compressibility and


augmentation characteristics are unremarkable. Reactive lymph nodes in the left


inguinal region are present measuring up to 3 x 1.5 cm.





IMPRESSION:  


1. Study is negative for deep venous thrombosis.


2. Left inguinal adenopathy 





The above report was generated using voice recognition software.  It may contain


grammatical, syntax or spelling errors.





Electronically signed by:  Gerard Meier M.D.


7/19/2017 8:09 PM





Dictated Date/Time:  7/19/2017 8:08 PM





CHEST ONE VIEW PORTABLE





CLINICAL HISTORY: EVALUATE RESPIRATORY DISTRESS. DYSPNEA    





COMPARISON STUDY:  7/11/2017





FINDINGS: Slight increase in cardiac size. Mild increase in pulmonary


vasculature. 





IMPRESSION:  Congestive heart failure 





The above report was generated using voice recognition software.  It may contain


grammatical, syntax or spelling errors.





Electronically signed by:  Gerard Meier M.D.


7/19/2017 7:43 PM





Dictated Date/Time:  7/19/2017 7:42 PM





EKG


71bpm NSR, TWI in III and avF





Impression


Assessment and Plan


61 year old male with multiple recent admissions for CHF who presents to the ER 

with shortness of breath after lying flat and increasing cellulitis





Complicated cellulitis and diabetic ulcer with peripheral artery disease, 

failed outpatient treatment with Augmentin.


- CRP increased from June


- treat with ceftriaxone 


- consult wound care provider





Chest pain rule out - TWI new in inferior leads


- serial troponin in morning


- already on aspirin, chest pain and shortness of breath now resolved.





Congestive heart failure - chronic diastolic 


- exacerbated due to positioning (lying down) while changing his wound vac 

dressing. However he has also significantly gained weight since his last 

admission despite increasing his lasix to 80/40mg PO in heart failure clinic.


- shortness of breath now back to baseline. CXR shows slight increase in 

pulmonary congestion


- treat with IV lasix (40mg given in the ER), start 80mg IV tomorrow morning 

then 40mg IV at noon and reassess


- BMP in morning





T2DM


- continue lantus


- sliding scale


- hold metformin while inpatient and increasing lasix dose





Other non acute issues: Chronic anemia (at baseline), CAD, PAD, Bipolar disease

, COPD - continues to smoke, V fib arrest following MI 2007, JANE, GERD


- continue all outpatient medications





VTE Prophylaxis


- heparin 5000 units Q8H





Code


- Full as per patient wishes





Disposition


- observation status to telemetry overnight








Resident Physician Supervision Note:





I was present with Dr. Gutierrez during the history and exam. I discussed the case 

with the resident and agree with the findings and plan as documented in the 

note.  Any exceptions or clarifications are listed here:





60 y/o M with multiple recent admission related to CP, CHF exacerbations - has 

a RLE nonhealing ulcer - treated with a wound vac abd Augmentin - may be 

worsening


Returns for a CC of acute SOB due to recurrent exacerbation of CHF - pt is 

known to me from recent admissions





OE


AAO x 3 - I have found him to be minimally conversive


S1,2 R 


B/L crackles and poor air movement


NT, ND


RLE wound - may be worsening





P:


Pt will be placed on Ceftriaxone and Vanc - we may need to involve ID and 

surgery


Placed on IV Lasix for diuresis 





The pt may not be competent to deal with his multiple medical issues at present 

and may benefit from short-term placement





Documented By:  Eleuterio Serrato





Level of Care


Telemetry





Resuscitation Status


FULL RESUSCITATION





VTE Prophylaxis


VTE Risk Assessment Done? Y/N:  Yes


Risk Level:  High


Given or contraindicated:  Unfractionated heparin SQ





Additional Copies To


Claude Rowley M.D.





Resident Tracking


Resident Involvement:  Resident Care Provided


Care Provided:  Adult ED

## 2017-07-20 VITALS
SYSTOLIC BLOOD PRESSURE: 119 MMHG | HEART RATE: 60 BPM | DIASTOLIC BLOOD PRESSURE: 54 MMHG | OXYGEN SATURATION: 93 % | TEMPERATURE: 97.52 F

## 2017-07-20 VITALS
OXYGEN SATURATION: 91 % | TEMPERATURE: 97.7 F | HEART RATE: 66 BPM | DIASTOLIC BLOOD PRESSURE: 58 MMHG | SYSTOLIC BLOOD PRESSURE: 124 MMHG

## 2017-07-20 VITALS — OXYGEN SATURATION: 92 %

## 2017-07-20 VITALS
SYSTOLIC BLOOD PRESSURE: 99 MMHG | HEART RATE: 72 BPM | OXYGEN SATURATION: 93 % | TEMPERATURE: 98.78 F | DIASTOLIC BLOOD PRESSURE: 42 MMHG

## 2017-07-20 VITALS
HEART RATE: 63 BPM | DIASTOLIC BLOOD PRESSURE: 57 MMHG | OXYGEN SATURATION: 92 % | SYSTOLIC BLOOD PRESSURE: 106 MMHG | TEMPERATURE: 97.52 F

## 2017-07-20 VITALS
TEMPERATURE: 98.78 F | OXYGEN SATURATION: 92 % | DIASTOLIC BLOOD PRESSURE: 58 MMHG | HEART RATE: 68 BPM | SYSTOLIC BLOOD PRESSURE: 122 MMHG

## 2017-07-20 LAB
ANION GAP SERPL CALC-SCNC: 3 MMOL/L (ref 3–11)
BUN SERPL-MCNC: 33 MG/DL (ref 7–18)
BUN/CREAT SERPL: 32.7 (ref 10–20)
CALCIUM SERPL-MCNC: 8.3 MG/DL (ref 8.5–10.1)
CHLORIDE SERPL-SCNC: 103 MMOL/L (ref 98–107)
CO2 SERPL-SCNC: 33 MMOL/L (ref 21–32)
CREAT CL PREDICTED SERPL C-G-VRATE: 91.6 ML/MIN
CREAT SERPL-MCNC: 1 MG/DL (ref 0.6–1.4)
GLUCOSE SERPL-MCNC: 169 MG/DL (ref 70–99)
POTASSIUM SERPL-SCNC: 4.1 MMOL/L (ref 3.5–5.1)
SODIUM SERPL-SCNC: 139 MMOL/L (ref 136–145)

## 2017-07-20 RX ADMIN — FUROSEMIDE SCH MLS/MIN: 10 INJECTION, SOLUTION INTRAMUSCULAR; INTRAVENOUS at 13:28

## 2017-07-20 RX ADMIN — ALBUTEROL SULFATE SCH PUFFS: 90 AEROSOL, METERED RESPIRATORY (INHALATION) at 03:30

## 2017-07-20 RX ADMIN — FUROSEMIDE SCH MLS/MIN: 10 INJECTION, SOLUTION INTRAMUSCULAR; INTRAVENOUS at 09:52

## 2017-07-20 RX ADMIN — ESCITALOPRAM OXALATE SCH MG: 20 TABLET, FILM COATED ORAL at 08:23

## 2017-07-20 RX ADMIN — INSULIN ASPART SCH UNITS: 100 INJECTION, SOLUTION INTRAVENOUS; SUBCUTANEOUS at 13:31

## 2017-07-20 RX ADMIN — ALBUTEROL SULFATE SCH PUFFS: 90 AEROSOL, METERED RESPIRATORY (INHALATION) at 16:15

## 2017-07-20 RX ADMIN — INSULIN ASPART SCH UNITS: 100 INJECTION, SOLUTION INTRAVENOUS; SUBCUTANEOUS at 18:02

## 2017-07-20 RX ADMIN — Medication SCH MCG: at 08:23

## 2017-07-20 RX ADMIN — FERROUS SULFATE TAB EC 325 MG (65 MG FE EQUIVALENT) SCH MG: 325 (65 FE) TABLET DELAYED RESPONSE at 08:21

## 2017-07-20 RX ADMIN — ALBUTEROL SULFATE SCH PUFFS: 90 AEROSOL, METERED RESPIRATORY (INHALATION) at 21:30

## 2017-07-20 RX ADMIN — HYDROCODONE BITARTRATE AND ACETAMINOPHEN PRN TAB: 5; 325 TABLET ORAL at 08:16

## 2017-07-20 RX ADMIN — METOPROLOL SUCCINATE SCH MG: 50 TABLET, EXTENDED RELEASE ORAL at 21:56

## 2017-07-20 RX ADMIN — SIMVASTATIN SCH MG: 40 TABLET, FILM COATED ORAL at 21:59

## 2017-07-20 RX ADMIN — METOPROLOL SUCCINATE SCH MG: 50 TABLET, EXTENDED RELEASE ORAL at 08:23

## 2017-07-20 RX ADMIN — INSULIN ASPART SCH UNITS: 100 INJECTION, SOLUTION INTRAVENOUS; SUBCUTANEOUS at 08:28

## 2017-07-20 RX ADMIN — IPRATROPIUM BROMIDE AND ALBUTEROL SCH PUFFS: 20; 100 SPRAY, METERED RESPIRATORY (INHALATION) at 17:53

## 2017-07-20 RX ADMIN — IPRATROPIUM BROMIDE AND ALBUTEROL SCH PUFFS: 20; 100 SPRAY, METERED RESPIRATORY (INHALATION) at 13:28

## 2017-07-20 RX ADMIN — INSULIN ASPART SCH UNITS: 100 INJECTION, SOLUTION INTRAVENOUS; SUBCUTANEOUS at 22:13

## 2017-07-20 RX ADMIN — HEPARIN SODIUM SCH UNIT: 10000 INJECTION, SOLUTION INTRAVENOUS; SUBCUTANEOUS at 06:13

## 2017-07-20 RX ADMIN — CLOPIDOGREL BISULFATE SCH MG: 75 TABLET, FILM COATED ORAL at 08:23

## 2017-07-20 RX ADMIN — DOCUSATE SODIUM SCH MG: 100 CAPSULE, LIQUID FILLED ORAL at 21:57

## 2017-07-20 RX ADMIN — DIVALPROEX SODIUM SCH MG: 500 TABLET, DELAYED RELEASE ORAL at 21:58

## 2017-07-20 RX ADMIN — DOCUSATE SODIUM SCH MG: 100 CAPSULE, LIQUID FILLED ORAL at 08:22

## 2017-07-20 RX ADMIN — Medication SCH GM: at 08:23

## 2017-07-20 RX ADMIN — ISOSORBIDE MONONITRATE SCH MG: 60 TABLET ORAL at 08:22

## 2017-07-20 RX ADMIN — FERROUS SULFATE TAB EC 325 MG (65 MG FE EQUIVALENT) SCH MG: 325 (65 FE) TABLET DELAYED RESPONSE at 17:54

## 2017-07-20 RX ADMIN — GABAPENTIN SCH MG: 300 CAPSULE ORAL at 21:55

## 2017-07-20 RX ADMIN — Medication SCH MG: at 08:23

## 2017-07-20 RX ADMIN — IPRATROPIUM BROMIDE AND ALBUTEROL SCH PUFFS: 20; 100 SPRAY, METERED RESPIRATORY (INHALATION) at 08:24

## 2017-07-20 RX ADMIN — NICOTINE SCH PATCH: 7 PATCH, EXTENDED RELEASE TRANSDERMAL at 08:19

## 2017-07-20 RX ADMIN — LISINOPRIL SCH MG: 40 TABLET ORAL at 08:23

## 2017-07-20 RX ADMIN — HEPARIN SODIUM SCH UNIT: 10000 INJECTION, SOLUTION INTRAVENOUS; SUBCUTANEOUS at 22:14

## 2017-07-20 RX ADMIN — HEPARIN SODIUM SCH UNIT: 10000 INJECTION, SOLUTION INTRAVENOUS; SUBCUTANEOUS at 13:31

## 2017-07-20 RX ADMIN — CEFTRIAXONE SODIUM SCH MLS/HR: 1 INJECTION, POWDER, FOR SOLUTION INTRAVENOUS at 20:22

## 2017-07-20 RX ADMIN — AMLODIPINE BESYLATE SCH MG: 5 TABLET ORAL at 08:22

## 2017-07-20 RX ADMIN — LISINOPRIL SCH MG: 40 TABLET ORAL at 22:00

## 2017-07-20 RX ADMIN — ALBUTEROL SULFATE SCH PUFFS: 90 AEROSOL, METERED RESPIRATORY (INHALATION) at 09:52

## 2017-07-20 RX ADMIN — INSULIN GLARGINE SCH UNITS: 100 INJECTION, SOLUTION SUBCUTANEOUS at 08:29

## 2017-07-20 RX ADMIN — HYDROCODONE BITARTRATE AND ACETAMINOPHEN PRN TAB: 5; 325 TABLET ORAL at 17:53

## 2017-07-20 RX ADMIN — MIRTAZAPINE SCH MG: 15 TABLET, FILM COATED ORAL at 21:59

## 2017-07-20 RX ADMIN — IPRATROPIUM BROMIDE AND ALBUTEROL SCH PUFFS: 20; 100 SPRAY, METERED RESPIRATORY (INHALATION) at 21:55

## 2017-07-20 NOTE — FAMILY MEDICINE PROGRESS NOTE
Progress Note


Date of Service


Jul 20, 2017.





Subjective


Pt evaluation today including:  conversation w/ patient, physical exam, chart 

review, lab review, review of studies


Pain:  Patient denies any pain this morning


Voiding:  no voiding problems, no incontinence


Patient is resting comfortably in bed with no acute complaints overnight.  

Patient states that he is feeling better and is less short of breath being 

started on Lasix.  He also says that he was seen this morning by wound care, 

and his wound appears improved since his last admission.  The patient also 

states that he has had a 10 pound weight gain since his last admission.





   Constitutional:  No fever, No chills


   Respiratory:  + shortness of breath, No cough, No sputum, No wheezing


   Cardiovascular:  No chest pain, No palpitations


   Abdomen:  No pain, No nausea, No vomiting


   Musculoskeletal:  No joint pain


   Skin:  + problem reported (Improvement of right foot ulcer)





Medications





Current Inpatient Medications








 Medications


  (Trade)  Dose


 Ordered  Sig/Dc


 Route  Start Time


 Stop Time Status Last Admin


Dose Admin


 


 Acetaminophen


  (Tylenol Tab)  650 mg  Q4H  PRN


 PO  7/19/17 21:30


 8/18/17 21:29   


 


 


 Ondansetron HCl


  (Zofran Inj)  4 mg  Q6H  PRN


 IV  7/19/17 21:30


 8/18/17 21:29   


 


 


 Albuterol


  (Ventolin Hfa


 Inhaler)  2 puffs  Q6H


 INH  7/19/17 21:30


 8/18/17 21:29  7/20/17 09:52


2 PUFFS


 


 Aspirin


  (Ecotrin Tab)  325 mg  QAM


 PO  7/20/17 09:00


 8/19/17 08:59  7/20/17 08:23


325 MG


 


 Clopidogrel


 Bisulfate


  (plAVix TAB)  75 mg  QAM


 PO  7/20/17 09:00


 8/19/17 08:59  7/20/17 08:23


75 MG


 


 Cyanocobalamin


  (Vitamin B-12


 Tab)  1,000 mcg  QAM


 PO  7/20/17 09:00


 8/19/17 08:59  7/20/17 08:23


1,000 MCG


 


 Divalproex Sodium


  (Depakote Delay


 Rel Tab)  2,000 mg  HS


 PO  7/20/17 21:00


 8/19/17 20:59   


 


 


 Docusate Sodium


  (coLACE CAP)  100 mg  BID


 PO  7/20/17 09:00


 8/19/17 08:59  7/20/17 08:22


100 MG


 


 Escitalopram


 Oxalate


  (Lexapro Tab)  20 mg  QAM


 PO  7/20/17 09:00


 8/19/17 08:59  7/20/17 08:23


20 MG


 


 Fluticasone


 Propionate


  (Flonase Nasal


 Spray)  2 sprays  DAILY  PRN


 VERONICA  7/19/17 21:30


 8/18/17 21:29   


 


 


 Gabapentin


  (Neurontin Cap)  300 mg  HS


 PO  7/20/17 21:00


 8/19/17 20:59   


 


 


 Acetaminophen/


 Hydrocodone Bitart


  (Norco 5/325 Tab)  1 tab  Q6  PRN


 PO  7/19/17 21:30


 8/2/17 21:29  7/20/17 08:16


1 TAB


 


 Insulin Glargine


  (Lantus Solostar


 Pen)  20 units  QAM


 SC  7/20/17 09:00


 8/19/17 08:59  7/20/17 08:29


20 UNITS


 


 Albuterol/


 Ipratropium


  (Combivent


 Respimat Inh)  1 puffs  QID


 INH  7/20/17 09:00


 8/19/17 08:59  7/20/17 13:28


1 PUFFS


 


 Isosorbide


 Mononitrate


  (Imdur Ext Rel


 Tab)  60 mg  QAM


 PO  7/20/17 09:00


 8/19/17 08:59  7/20/17 08:22


60 MG


 


 Lisinopril


  (Zestril Tab)  40 mg  BID


 PO  7/20/17 09:00


 8/19/17 08:59  7/20/17 08:23


40 MG


 


 Metoprolol


 Succinate


  (Toprol Xl Tab)  50 mg  BID


 PO  7/20/17 09:00


 8/19/17 08:59  7/20/17 08:23


50 MG


 


 Nicotine


  (Nicoderm Cq


 14MG Patch)  1 patch  DAILY


 TD  7/20/17 09:00


 8/19/17 08:59  7/20/17 08:19


1 PATCH


 


 Nitroglycerin


  (Nitrostat Tab)  0.4 mg  PRN  PRN


 UT  7/19/17 21:30


 8/18/17 21:29   


 


 


 Simvastatin


  (Zocor Tab)  40 mg  QPM


 PO  7/20/17 21:00


 8/19/17 20:59   


 


 


 Tramadol HCl


  (Ultram Tab)  50 mg  Q6  PRN


 PO  7/19/17 21:30


 8/18/17 21:29   


 


 


 Amlodipine


 Besylate


  (Norvasc Tab)  10 mg  QAM


 PO  7/20/17 09:00


 8/19/17 08:59  7/20/17 08:22


10 MG


 


 Ferrous Sulfate


  (Feosol Tab)  325 mg  BIDM


 PO  7/20/17 07:30


 8/19/17 07:59  7/20/17 08:21


325 MG


 


 Mirtazapine


  (Remeron Tab)  45 mg  HS


 PO  7/19/17 22:32


 8/18/17 22:31  7/19/17 22:58


45 MG


 


 Polyethylene


  (Miralax Powder


 Packet)  17 gm  DAILY


 PO  7/20/17 09:00


 8/19/17 08:59  7/20/17 08:23


17 GM


 


 Furosemide 80 mg/


 Syringe  8 ml @ 4


 mls/min  QAM


 IV  7/20/17 09:00


 8/19/17 08:59  7/20/17 09:52


4 MLS/MIN


 


 Furosemide 40 mg/


 Syringe  4 ml @ 4


 mls/min  DAILY@1200


 IV  7/20/17 12:00


 8/19/17 11:59  7/20/17 13:28


4 MLS/MIN


 


 Miscellaneous


  (Iv Fluids


 Completed)  1 ea  PRN  PRN


 N/A  7/19/17 21:45


 7/19/18 21:44   


 


 


 Ceftriaxone


 Sodium 1 gm/


 Dextrose  50 ml @ 


 100 mls/hr  Q24H


 IV  7/20/17 20:00


 7/29/17 19:59   


 


 


 Insulin Aspart


  (novoLOG ASPART)  **SLIDING


 SCALE**


 **G...  ACHS


 SC  7/20/17 07:00


 8/19/17 06:59  7/20/17 13:31


5 UNITS


 


 Glucose


  (Glucose 40% Gel)  15-30


 GRAMS 15


 GRAMS...  UD  PRN


 PO  7/19/17 22:15


 8/18/17 22:14   


 


 


 Glucose


  (Glucose Chew


 Tab)  4-8


 Tablets 4


 Tabl...  UD  PRN


 PO  7/19/17 22:15


 8/18/17 22:14   


 


 


 Dextrose


  (Dextrose 50%


 50ML Syringe)  25-50ML OF


 50% DW IV


 FOR...  UD  PRN


 IV  7/19/17 22:15


 8/18/17 22:14   


 


 


 Glucagon


  (Glucagon Inj)  1 mg  UD  PRN


 SQ  7/19/17 22:15


 8/18/17 22:14   


 


 


 Miscellaneous


  (Remove Nicoderm


 Patch)  1 ea  HS


 N/A  7/20/17 21:00


 8/19/17 20:59   


 


 


 Heparin Sodium


  (Porcine)


  (Heparin Sq 5000


 Unit/0.5ml)  5,000 unit  Q8H


 SQ  7/20/17 06:00


 8/19/17 05:59  7/20/17 13:31


5,000 UNIT


 


 Vancomycin HCl


 1500 mg/Sodium


 Chloride  530 ml @ 


 200 mls/hr  Q12H


 IV  7/20/17 18:00


 7/30/17 07:59   


 


 


 Vancomycin HCl


  (Consult)  1 ea  UD  PRN


 N/A  7/20/17 07:00


 8/19/17 06:59   


 











Objective


Vital Signs











  Date Time  Temp Pulse Resp B/P (MAP) Pulse Ox O2 Delivery O2 Flow Rate FiO2


 


7/20/17 15:36 36.4 60 16 119/54 (75) 93 Nasal Cannula 3.0 


 


7/20/17 11:11 36.5 66 20 124/58 (80) 91  3.0 


 


7/20/17 07:37 37.1 68 18 122/58 (79) 92  3.0 


 


7/20/17 04:00     92 Nasal Cannula 3.0 


 


7/20/17 03:42 37.1 72 18 99/42 (61) 93 Nasal Cannula 3.0 


 


7/20/17 00:01     92 Nasal Cannula 3.0 


 


7/19/17 23:50 37.0 83 18 174/73 (106) 93 Nasal Cannula 3.0 


 


7/19/17 22:34 36.6 69 20 161/78 (105) 91 Nasal Cannula 3.0 


 


7/19/17 22:00     92 Nasal Cannula 4.0 


 


7/19/17 21:36  70 20 152/73 92 Nasal Cannula 4.0 


 


7/19/17 20:22  74 21 164/68 93 Nasal Cannula 3.0 


 


7/19/17 19:25     93 Nasal Cannula 3.0 


 


7/19/17 19:25      Nasal Cannula 3.0 


 


7/19/17 18:46 36.4 70 20 150/75 91 Nasal Cannula 5.0 











Physical Exam


General Appearance:  WD/WN, no apparent distress


Eyes:  normal inspection, sclerae normal


Respiratory/Chest:  chest non-tender, + decreased breath sounds, + rales


Cardiovascular:  regular rate, rhythm, no edema, no gallop


Abdomen:  normal bowel sounds, non tender, soft


Extremities:  + pertinent finding (Dressing over the legs are c/d/i. +3 Pitting 

edema of the lower extremities)


Neurologic/Psychiatric:  alert, normal mood/affect, oriented x 3





Laboratory Results





Results Past 24 Hours








Test


  7/19/17


19:30 7/19/17


20:45 7/20/17


06:31 7/20/17


06:41 Range/Units


 


 


White Blood Count 7.73    4.8-10.8  K/uL


 


Red Blood Count 3.30    4.7-6.1  M/uL


 


Hemoglobin 8.6    14.0-18.0  g/dL


 


Hematocrit 27.9    42-52  %


 


Mean Corpuscular Volume 84.5      fL


 


Mean Corpuscular Hemoglobin 26.1    25-34  pg


 


Mean Corpuscular Hemoglobin


Concent 30.8


  


  


  


  32-36  g/dl


 


 


Platelet Count 381    130-400  K/uL


 


Mean Platelet Volume 8.1    7.4-10.4  fL


 


Neutrophils (%) (Auto) 66.4     %


 


Lymphocytes (%) (Auto) 13.1     %


 


Monocytes (%) (Auto) 17.7     %


 


Eosinophils (%) (Auto) 2.1     %


 


Basophils (%) (Auto) 0.3     %


 


Neutrophils # (Auto) 5.14    1.4-6.5  K/uL


 


Lymphocytes # (Auto) 1.01    1.2-3.4  K/uL


 


Monocytes # (Auto) 1.37    0.11-0.59  K/uL


 


Eosinophils # (Auto) 0.16    0-0.5  K/uL


 


Basophils # (Auto) 0.02    0-0.2  K/uL


 


RDW Standard Deviation 48.2    36.4-46.3  fL


 


RDW Coefficient of Variation 15.6    11.5-14.5  %


 


Immature Granulocyte % (Auto) 0.4     %


 


Immature Granulocyte # (Auto) 0.03    0.00-0.02  K/uL


 


Prothrombin Time


  10.2


  


  


  


  9.0-12.0


SECONDS


 


Prothromb Time International


Ratio 1.0


  


  


  


  0.9-1.1  


 


 


Activated Partial


Thromboplast Time 30.4


  


  


  


  21.0-31.0


SECONDS


 


Partial Thromboplastin Ratio 1.2     


 


Sodium Level 137   139 136-145  mmol/L


 


Potassium Level 4.3   4.1 3.5-5.1  mmol/L


 


Chloride Level 102   103   mmol/L


 


Carbon Dioxide Level 30   33 21-32  mmol/L


 


Anion Gap 5.0   3.0 3-11  mmol/L


 


Blood Urea Nitrogen 36   33 7-18  mg/dl


 


Creatinine


  0.98


  


  


  1.00


  0.60-1.40


mg/dl


 


Est Creatinine Clear Calc


Drug Dose 94.5


  


  


  91.6


   ml/min


 


 


Estimated GFR (


American) 97.4


  


  


  95.1


   


 


 


Estimated GFR (Non-


American 84.1


  


  


  82.0


   


 


 


BUN/Creatinine Ratio 36.9   32.7 10-20  


 


Random Glucose 105   169 70-99  mg/dl


 


Calcium Level 8.2   8.3 8.5-10.1  mg/dl


 


Total Bilirubin 0.2    0.2-1  mg/dl


 


Aspartate Amino Transf


(AST/SGOT) 9


  


  


  


  15-37  U/L


 


 


Alanine Aminotransferase


(ALT/SGPT) 10


  


  


  


  12-78  U/L


 


 


Alkaline Phosphatase 58      U/L


 


Troponin I 0.020   0.026 0-0.045  ng/ml


 


C-Reactive Protein 4.84    0-0.29  mg/dl


 


Pro-B-Type Natriuretic Peptide 3302    0-900  pg/ml


 


Total Protein 6.8    6.4-8.2  gm/dl


 


Albumin 2.2    3.4-5.0  gm/dl


 


Globulin 4.6    2.5-4.0  gm/dl


 


Albumin/Globulin Ratio 0.5    0.9-2  


 


Valproic Acid (Depakene) Level 33      mcg/ml


 


Urine Color  YELLOW    


 


Urine Appearance  CLEAR   CLEAR  


 


Urine pH  6.0   4.5-7.5  


 


Urine Specific Gravity  1.017   1.000-1.030  


 


Urine Protein  3+   NEG  


 


Urine Glucose (UA)  NEG   NEG  


 


Urine Ketones  NEG   NEG  


 


Urine Occult Blood  2+   NEG  


 


Urine Nitrite  NEG   NEG  


 


Urine Bilirubin  NEG   NEG  


 


Urine Urobilinogen  NEG   NEG  


 


Urine Leukocyte Esterase  NEG   NEG  


 


Urine WBC (Auto)  0   0-5  /hpf


 


Urine RBC (Auto)  10-30   0-4  /hpf


 


Urine Hyaline Casts (Auto)  1-5   0-5  /lpf


 


Urine Epithelial Cells (Auto)  5-10   0-5  /lpf


 


Urine Bacteria (Auto)  NEG   NEG  


 


Bedside Glucose   176  70-99  mg/dl


 


Test


  7/20/17


11:19 


  


  


  Range/Units


 


 


Bedside Glucose 181    70-99  mg/dl








Microbiology Results


7/19/17 Blood Culture, Received


          Pending


7/19/17 Blood Culture, Received


          Pending





Assessment and Plan


Patient is a 61-year-old male with a complex past medical history is with a 

COPD exacerbation as well as right leg cellulitis.  The patient states today 

that his chest pain is complaining of now resolved, and that his right leg 

cellulitis appears to have improved at the area of the diabetic ulcer.  The 

patient states that his shortness of breath is also improved that he is still 

having significant lower extremity swelling. 





1) Lower Extremity Cellulitis


- Started on Augmentin prior to admission 


- Currently on Vancomycin and Ceftriaxone IV (started on admission)


- Previous wound culture and sensitivities on 7/17 showed resistance to 

Amoxicillin, Cefazolin, and Unasyn


- Wound cellulitis appears to be improving


- Consult to wound care


- Awaiting Dr. Paz recommendations





2) Congestive heart failure


- Currently responding well to IV Lasix daily


- Patient received 40 mg of Lasix in the ED, and a 80 mg of Lasix this morning 

and 40 mg this afternoon.


- Discharge will increase home regimen from Lasix 40 mg a.m. and 80 mg p.m., to 

80 mg twice a day or consider changing the patient to Bumex


- Continue to monitor I's and O's and daily weights


- Continue to monitor kidney function





3) T wave inversions


- Serial troponins negative


- Chest pain resolved 


- Continue home aspirin  





4) T2D


- Sliding Scale insulin


- Lantus 20 mg every morning


- Hold metformin





5) Home Medications


- Continue remaining home medications





6) DVT Prophylaxis


- Heparin





7) Code Status


- Full resuscitation





History


Resident Physician Supervision Note:





I was present with Dr. Spivey during the history and exam. I discussed the case 

with the resident and agree with the findings and plan as documented in the 

note.  Any exceptions or clarifications are listed here.





Pt reports resolution of shortness of breath without chest pain but with 

persistent swelling of the b/l LE greater than normal and some dysuria/

frequency complaint.


General Appearance:  WD/WN, no apparent distress


Respiratory:  chest non-tender, no respiratory distress, decreased breath sounds

, wheezing


Cardiovascular:  normal peripheral pulses, regular rate, rhythm, no murmur, 

other (2+ pitting of the b/l LE to the knees)


Gastrointestinal:  normal bowel sounds, non tender, soft, no organomegaly


Assessment/Plan


62 y/o male h/o CHF and DMII presents w/ progressive orthopnea and worsening 

cellulitis





Cellulitis w/ chronic diabetic LE wound - wound care aware and input 

appreciated - continue ceftriaxone/vanc, check recent Cx and wound care note


Chest pain - improved - continue ASA, trend troponins


CHF - transition to PO lasix 80mg BID tomorrow, trend I/O w/ daily wts


DMII - lantus w/ ISS, holding outpatient metformin





CAD w/ PAD, h/o MI


Bipolar d/o


COPD


Tobacco use


JANE

## 2017-07-20 NOTE — PHARMACY PROGRESS NOTE
Pharmacy Antibiotic Consult


Date of Service:


Jul 20, 2017.


Pharmacy Dosing Scope


Pharmacy is consulted to initiate vancomycin IV dosing therapy, order 

appropriate labs and adjust drug dose/frequency.





Subjective


The patient is a 61 year old male admitted on Jul 19, 2017 at 21:36.





Objective


Height (Feet):  5


Height (Inches):  11.00


Weight (Kilograms):  90.600


Lab Results (24hrs):











Test


  7/19/17


19:30 7/19/17


20:45 7/20/17


06:31 7/20/17


06:41


 


White Blood Count


  7.73 K/uL


(4.8-10.8) 


  


  


 


 


Red Blood Count


  3.30 M/uL


(4.7-6.1) 


  


  


 


 


Hemoglobin


  8.6 g/dL


(14.0-18.0) 


  


  


 


 


Hematocrit 27.9 % (42-52)    


 


Mean Corpuscular Volume


  84.5 fL


() 


  


  


 


 


Mean Corpuscular Hemoglobin


  26.1 pg


(25-34) 


  


  


 


 


Mean Corpuscular Hemoglobin


Concent 30.8 g/dl


(32-36) 


  


  


 


 


Platelet Count


  381 K/uL


(130-400) 


  


  


 


 


Mean Platelet Volume


  8.1 fL


(7.4-10.4) 


  


  


 


 


Neutrophils (%) (Auto) 66.4 %    


 


Lymphocytes (%) (Auto) 13.1 %    


 


Monocytes (%) (Auto) 17.7 %    


 


Eosinophils (%) (Auto) 2.1 %    


 


Basophils (%) (Auto) 0.3 %    


 


Neutrophils # (Auto)


  5.14 K/uL


(1.4-6.5) 


  


  


 


 


Lymphocytes # (Auto)


  1.01 K/uL


(1.2-3.4) 


  


  


 


 


Monocytes # (Auto)


  1.37 K/uL


(0.11-0.59) 


  


  


 


 


Eosinophils # (Auto)


  0.16 K/uL


(0-0.5) 


  


  


 


 


Basophils # (Auto)


  0.02 K/uL


(0-0.2) 


  


  


 


 


RDW Standard Deviation


  48.2 fL


(36.4-46.3) 


  


  


 


 


RDW Coefficient of Variation


  15.6 %


(11.5-14.5) 


  


  


 


 


Immature Granulocyte % (Auto) 0.4 %    


 


Immature Granulocyte # (Auto)


  0.03 K/uL


(0.00-0.02) 


  


  


 


 


Prothrombin Time


  10.2 SECONDS


(9.0-12.0) 


  


  


 


 


Prothromb Time International


Ratio 1.0 (0.9-1.1) 


  


  


  


 


 


Activated Partial


Thromboplast Time 30.4 SECONDS


(21.0-31.0) 


  


  


 


 


Partial Thromboplastin Ratio 1.2    


 


Sodium Level


  137 mmol/L


(136-145) 


  


  139 mmol/L


(136-145)


 


Potassium Level


  4.3 mmol/L


(3.5-5.1) 


  


  4.1 mmol/L


(3.5-5.1)


 


Chloride Level


  102 mmol/L


() 


  


  103 mmol/L


()


 


Carbon Dioxide Level


  30 mmol/L


(21-32) 


  


  33 mmol/L


(21-32)


 


Anion Gap


  5.0 mmol/L


(3-11) 


  


  3.0 mmol/L


(3-11)


 


Blood Urea Nitrogen


  36 mg/dl


(7-18) 


  


  33 mg/dl


(7-18)


 


Creatinine


  0.98 mg/dl


(0.60-1.40) 


  


  1.00 mg/dl


(0.60-1.40)


 


Est Creatinine Clear Calc


Drug Dose 94.5 ml/min 


  


  


  91.6 ml/min 


 


 


Estimated GFR (


American) 97.4 


  


  


  95.1 


 


 


Estimated GFR (Non-


American 84.1 


  


  


  82.0 


 


 


BUN/Creatinine Ratio 36.9 (10-20)    32.7 (10-20) 


 


Random Glucose


  105 mg/dl


(70-99) 


  


  169 mg/dl


(70-99)


 


Calcium Level


  8.2 mg/dl


(8.5-10.1) 


  


  8.3 mg/dl


(8.5-10.1)


 


Total Bilirubin


  0.2 mg/dl


(0.2-1) 


  


  


 


 


Aspartate Amino Transf


(AST/SGOT) 9 U/L (15-37) 


  


  


  


 


 


Alanine Aminotransferase


(ALT/SGPT) 10 U/L (12-78) 


  


  


  


 


 


Alkaline Phosphatase


  58 U/L


() 


  


  


 


 


Troponin I


  0.020 ng/ml


(0-0.045) 


  


  0.026 ng/ml


(0-0.045)


 


C-Reactive Protein


  4.84 mg/dl


(0-0.29) 


  


  


 


 


Pro-B-Type Natriuretic Peptide


  3302 pg/ml


(0-900) 


  


  


 


 


Total Protein


  6.8 gm/dl


(6.4-8.2) 


  


  


 


 


Albumin


  2.2 gm/dl


(3.4-5.0) 


  


  


 


 


Globulin


  4.6 gm/dl


(2.5-4.0) 


  


  


 


 


Albumin/Globulin Ratio 0.5 (0.9-2)    


 


Valproic Acid (Depakene) Level


  33 mcg/ml


() 


  


  


 


 


Urine Color  YELLOW   


 


Urine Appearance  CLEAR (CLEAR)   


 


Urine pH  6.0 (4.5-7.5)   


 


Urine Specific Gravity


  


  1.017


(1.000-1.030) 


  


 


 


Urine Protein  3+ (NEG)   


 


Urine Glucose (UA)  NEG (NEG)   


 


Urine Ketones  NEG (NEG)   


 


Urine Occult Blood  2+ (NEG)   


 


Urine Nitrite  NEG (NEG)   


 


Urine Bilirubin  NEG (NEG)   


 


Urine Urobilinogen  NEG (NEG)   


 


Urine Leukocyte Esterase  NEG (NEG)   


 


Urine WBC (Auto)  0 /hpf (0-5)   


 


Urine RBC (Auto)


  


  10-30 /hpf


(0-4) 


  


 


 


Urine Hyaline Casts (Auto)  1-5 /lpf (0-5)   


 


Urine Epithelial Cells (Auto)


  


  5-10 /lpf


(0-5) 


  


 


 


Urine Bacteria (Auto)  NEG (NEG)   


 


Bedside Glucose


  


  


  176 mg/dl


(70-99) 


 








Micro Results:











Item Value  Date Time


 


RECENT ADMISSION:  


 


Gram Stain - Gemma Coagulase Negative Staph 5/9/17 0000





Ulcer Foot Left Group B beta Strep 


 


Gram Stain - Gemma Enterococcus faecalis 5/23/17 1237





Abscess Heel , Left Coagulase Negative Staph 


 


Gram Stain - Final Enterococcus faecalis 7/17/17 1330





Ulcer Foot Left Klebsiella oxytoca 





    Corynebacterium 


 


THIS ADMISSION:  


 


Blood Culture Received 7/19/17 1923





Blood Pending 


 


Blood Culture Received 7/19/17 1930





Blood Pending 











Assessment & Plan


Assessment:


* 60 y/o who is admitted with acute respiratory failure, CHF exacerbation, and 

chronic diabetic wound


* Recent admission in June and July of 2017 with a multiple antibiotic exposure


 * Most recently on Augmentin, however last wound culture grew Klebsiella 

species that was resistant.


 * It is noted that wound does look worse (increased redness and swelling)





Plan:


* Broaden coverage to include enterococcus AND gram negative coverage for 

worsening wound infection





Vancomycin


* Loading dose:  2250 mg IV (~25mg/kg) x1


* Maintenance dose:  1500 mg IV (~16.5mg/kg) IV every 12 hours


* Estimated T1/2 ~8.5 hours


* Goal trough ~15mcg/mL


* Trough level to be drawn 7/22/17 prior to the 06:00 dose





Ceftriaxone:


* Maintenance dose:  1000 mg IV every 24 hours


 * may consider increasing dose to 2000mg since patient weight is >80kg





Of Note:


* Mr. Dixon does have a history of DM and will be losing anaerobic coverage 

with the discontinuation of Augmentin


 * may consider the addition of metronidazole if anaerobic coverage necessary








Pharmacy will continue to follow and will adjust dose/frequency as necessary.  

Thank you

## 2017-07-21 VITALS
DIASTOLIC BLOOD PRESSURE: 49 MMHG | SYSTOLIC BLOOD PRESSURE: 111 MMHG | HEART RATE: 53 BPM | TEMPERATURE: 98.06 F | OXYGEN SATURATION: 93 %

## 2017-07-21 VITALS
SYSTOLIC BLOOD PRESSURE: 112 MMHG | OXYGEN SATURATION: 95 % | TEMPERATURE: 97.52 F | HEART RATE: 60 BPM | DIASTOLIC BLOOD PRESSURE: 56 MMHG

## 2017-07-21 VITALS
HEART RATE: 73 BPM | OXYGEN SATURATION: 94 % | TEMPERATURE: 99.32 F | DIASTOLIC BLOOD PRESSURE: 84 MMHG | SYSTOLIC BLOOD PRESSURE: 146 MMHG

## 2017-07-21 VITALS
DIASTOLIC BLOOD PRESSURE: 73 MMHG | SYSTOLIC BLOOD PRESSURE: 127 MMHG | TEMPERATURE: 98.96 F | HEART RATE: 61 BPM | OXYGEN SATURATION: 94 %

## 2017-07-21 VITALS
OXYGEN SATURATION: 94 % | SYSTOLIC BLOOD PRESSURE: 162 MMHG | TEMPERATURE: 98.42 F | DIASTOLIC BLOOD PRESSURE: 76 MMHG | HEART RATE: 67 BPM

## 2017-07-21 VITALS
DIASTOLIC BLOOD PRESSURE: 66 MMHG | TEMPERATURE: 98.78 F | OXYGEN SATURATION: 86 % | SYSTOLIC BLOOD PRESSURE: 158 MMHG | HEART RATE: 87 BPM

## 2017-07-21 VITALS — OXYGEN SATURATION: 96 %

## 2017-07-21 LAB
ANION GAP SERPL CALC-SCNC: 5 MMOL/L (ref 3–11)
ANISOCYTOSIS BLD QL SMEAR: PRESENT
BASOPHILS # BLD: 0.02 K/UL (ref 0–0.2)
BASOPHILS NFR BLD: 0.3 %
BUN SERPL-MCNC: 36 MG/DL (ref 7–18)
BUN/CREAT SERPL: 30.3 (ref 10–20)
CALCIUM SERPL-MCNC: 8.4 MG/DL (ref 8.5–10.1)
CHLORIDE SERPL-SCNC: 102 MMOL/L (ref 98–107)
CO2 SERPL-SCNC: 31 MMOL/L (ref 21–32)
COMPLETE: YES
CREAT CL PREDICTED SERPL C-G-VRATE: 77 ML/MIN
CREAT CL PREDICTED SERPL C-G-VRATE: 77 ML/MIN
CREAT SERPL-MCNC: 1.2 MG/DL (ref 0.6–1.4)
CREAT SERPL-MCNC: 1.2 MG/DL (ref 0.6–1.4)
EOSINOPHIL NFR BLD AUTO: 440 K/UL (ref 130–400)
GLUCOSE SERPL-MCNC: 152 MG/DL (ref 70–99)
HCT VFR BLD CALC: 25.8 % (ref 42–52)
HYPOCHROMIA BLD QL SMEAR: PRESENT
IG%: 0.3 %
IMM GRANULOCYTES NFR BLD AUTO: 15 %
LYMPHOCYTES # BLD: 1.18 K/UL (ref 1.2–3.4)
MCH RBC QN AUTO: 26.1 PG (ref 25–34)
MCHC RBC AUTO-ENTMCNC: 30.6 G/DL (ref 32–36)
MCV RBC AUTO: 85.1 FL (ref 80–100)
MONOCYTES NFR BLD: 15.4 %
NEUTROPHILS # BLD AUTO: 2.8 %
NEUTROPHILS NFR BLD AUTO: 66.2 %
PMV BLD AUTO: 8.3 FL (ref 7.4–10.4)
POTASSIUM SERPL-SCNC: 4.5 MMOL/L (ref 3.5–5.1)
RBC # BLD AUTO: 3.03 M/UL (ref 4.7–6.1)
SODIUM SERPL-SCNC: 138 MMOL/L (ref 136–145)
TOXIC GRANULES BLD QL SMEAR: (no result)
VACUOLIZATION: (no result)
WBC # BLD AUTO: 7.88 K/UL (ref 4.8–10.8)

## 2017-07-21 RX ADMIN — DOCUSATE SODIUM SCH MG: 100 CAPSULE, LIQUID FILLED ORAL at 20:32

## 2017-07-21 RX ADMIN — IPRATROPIUM BROMIDE AND ALBUTEROL SCH PUFFS: 20; 100 SPRAY, METERED RESPIRATORY (INHALATION) at 20:31

## 2017-07-21 RX ADMIN — ESCITALOPRAM OXALATE SCH MG: 20 TABLET, FILM COATED ORAL at 07:43

## 2017-07-21 RX ADMIN — LISINOPRIL SCH MG: 40 TABLET ORAL at 07:43

## 2017-07-21 RX ADMIN — Medication SCH MG: at 07:42

## 2017-07-21 RX ADMIN — INSULIN ASPART SCH UNITS: 100 INJECTION, SOLUTION INTRAVENOUS; SUBCUTANEOUS at 20:39

## 2017-07-21 RX ADMIN — HEPARIN SODIUM SCH UNIT: 10000 INJECTION, SOLUTION INTRAVENOUS; SUBCUTANEOUS at 21:42

## 2017-07-21 RX ADMIN — FUROSEMIDE SCH MLS/MIN: 10 INJECTION, SOLUTION INTRAMUSCULAR; INTRAVENOUS at 12:41

## 2017-07-21 RX ADMIN — HEPARIN SODIUM SCH UNIT: 10000 INJECTION, SOLUTION INTRAVENOUS; SUBCUTANEOUS at 14:26

## 2017-07-21 RX ADMIN — CLOPIDOGREL BISULFATE SCH MG: 75 TABLET, FILM COATED ORAL at 07:42

## 2017-07-21 RX ADMIN — IPRATROPIUM BROMIDE AND ALBUTEROL SCH PUFFS: 20; 100 SPRAY, METERED RESPIRATORY (INHALATION) at 07:41

## 2017-07-21 RX ADMIN — HYDROCODONE BITARTRATE AND ACETAMINOPHEN PRN TAB: 5; 325 TABLET ORAL at 20:30

## 2017-07-21 RX ADMIN — LISINOPRIL SCH MG: 40 TABLET ORAL at 20:35

## 2017-07-21 RX ADMIN — INSULIN ASPART SCH UNITS: 100 INJECTION, SOLUTION INTRAVENOUS; SUBCUTANEOUS at 12:01

## 2017-07-21 RX ADMIN — FERROUS SULFATE TAB EC 325 MG (65 MG FE EQUIVALENT) SCH MG: 325 (65 FE) TABLET DELAYED RESPONSE at 17:01

## 2017-07-21 RX ADMIN — INSULIN GLARGINE SCH UNITS: 100 INJECTION, SOLUTION SUBCUTANEOUS at 07:46

## 2017-07-21 RX ADMIN — Medication SCH MCG: at 07:42

## 2017-07-21 RX ADMIN — INSULIN ASPART SCH UNITS: 100 INJECTION, SOLUTION INTRAVENOUS; SUBCUTANEOUS at 17:04

## 2017-07-21 RX ADMIN — MIRTAZAPINE SCH MG: 15 TABLET, FILM COATED ORAL at 20:34

## 2017-07-21 RX ADMIN — ALBUTEROL SULFATE SCH PUFFS: 90 AEROSOL, METERED RESPIRATORY (INHALATION) at 04:22

## 2017-07-21 RX ADMIN — IPRATROPIUM BROMIDE AND ALBUTEROL SCH PUFFS: 20; 100 SPRAY, METERED RESPIRATORY (INHALATION) at 12:41

## 2017-07-21 RX ADMIN — FUROSEMIDE SCH MLS/MIN: 10 INJECTION, SOLUTION INTRAMUSCULAR; INTRAVENOUS at 12:03

## 2017-07-21 RX ADMIN — ALBUTEROL SULFATE SCH PUFFS: 90 AEROSOL, METERED RESPIRATORY (INHALATION) at 17:04

## 2017-07-21 RX ADMIN — HYDROCODONE BITARTRATE AND ACETAMINOPHEN PRN TAB: 5; 325 TABLET ORAL at 00:09

## 2017-07-21 RX ADMIN — HEPARIN SODIUM SCH UNIT: 10000 INJECTION, SOLUTION INTRAVENOUS; SUBCUTANEOUS at 06:14

## 2017-07-21 RX ADMIN — METOPROLOL SUCCINATE SCH MG: 50 TABLET, EXTENDED RELEASE ORAL at 20:32

## 2017-07-21 RX ADMIN — DOCUSATE SODIUM SCH MG: 100 CAPSULE, LIQUID FILLED ORAL at 07:42

## 2017-07-21 RX ADMIN — FUROSEMIDE SCH MLS/MIN: 10 INJECTION, SOLUTION INTRAMUSCULAR; INTRAVENOUS at 07:42

## 2017-07-21 RX ADMIN — HYDROCODONE BITARTRATE AND ACETAMINOPHEN PRN TAB: 5; 325 TABLET ORAL at 06:13

## 2017-07-21 RX ADMIN — ALBUTEROL SULFATE SCH PUFFS: 90 AEROSOL, METERED RESPIRATORY (INHALATION) at 09:58

## 2017-07-21 RX ADMIN — METOPROLOL SUCCINATE SCH MG: 50 TABLET, EXTENDED RELEASE ORAL at 07:43

## 2017-07-21 RX ADMIN — AMLODIPINE BESYLATE SCH MG: 5 TABLET ORAL at 07:42

## 2017-07-21 RX ADMIN — NICOTINE SCH PATCH: 7 PATCH, EXTENDED RELEASE TRANSDERMAL at 07:44

## 2017-07-21 RX ADMIN — ALBUTEROL SULFATE SCH PUFFS: 90 AEROSOL, METERED RESPIRATORY (INHALATION) at 21:40

## 2017-07-21 RX ADMIN — HYDROCODONE BITARTRATE AND ACETAMINOPHEN PRN TAB: 5; 325 TABLET ORAL at 14:24

## 2017-07-21 RX ADMIN — ISOSORBIDE MONONITRATE SCH MG: 60 TABLET ORAL at 07:44

## 2017-07-21 RX ADMIN — INSULIN ASPART SCH UNITS: 100 INJECTION, SOLUTION INTRAVENOUS; SUBCUTANEOUS at 07:40

## 2017-07-21 RX ADMIN — FERROUS SULFATE TAB EC 325 MG (65 MG FE EQUIVALENT) SCH MG: 325 (65 FE) TABLET DELAYED RESPONSE at 07:39

## 2017-07-21 RX ADMIN — SIMVASTATIN SCH MG: 40 TABLET, FILM COATED ORAL at 20:33

## 2017-07-21 RX ADMIN — IPRATROPIUM BROMIDE AND ALBUTEROL SCH PUFFS: 20; 100 SPRAY, METERED RESPIRATORY (INHALATION) at 17:01

## 2017-07-21 RX ADMIN — DIVALPROEX SODIUM SCH MG: 500 TABLET, DELAYED RELEASE ORAL at 20:34

## 2017-07-21 RX ADMIN — GABAPENTIN SCH MG: 300 CAPSULE ORAL at 20:32

## 2017-07-21 RX ADMIN — CEFTRIAXONE SODIUM SCH MLS/HR: 1 INJECTION, POWDER, FOR SOLUTION INTRAVENOUS at 20:29

## 2017-07-21 RX ADMIN — Medication SCH GM: at 07:43
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
## 2017-07-21 NOTE — CLINICAL DOCUMENTATION QUERY
**** CLINICAL DOCUMENTATION QUERY****



Acute on chronic diastolic CHF exacerbation was well documented on H&P but has fallen off 
the record. He was initially aggressively treated to IV Lasix. This diagnosis carries 
significant relative weight and Severity of Illness (SOI). Unless carried through 
documentation it may not be coded by a professional  and its' clinical significance 
lost.



In your clinical opinion is this patient being managed for:

    

                        (X) Acute on chronic diastolic CHF treated with IV Lasix. (Treated 
and resolved)

                        (  ) Not Agree ( Please state diagnosis as ruled out)



*****YOU MUST DOCUMENT ABOVE DIAGNOSTIC STATEMENT IN DAILY PROGRESS NOTES AND DISCHARGE 
SUMMARY. This document is not part of the patient's record.*****



Thank You, Aroldo Reyes, RN  [phone]
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
## 2017-07-22 VITALS
HEART RATE: 63 BPM | SYSTOLIC BLOOD PRESSURE: 126 MMHG | OXYGEN SATURATION: 93 % | TEMPERATURE: 98.78 F | DIASTOLIC BLOOD PRESSURE: 87 MMHG

## 2017-07-22 VITALS
DIASTOLIC BLOOD PRESSURE: 65 MMHG | HEART RATE: 67 BPM | TEMPERATURE: 97.7 F | OXYGEN SATURATION: 90 % | SYSTOLIC BLOOD PRESSURE: 128 MMHG

## 2017-07-22 VITALS
SYSTOLIC BLOOD PRESSURE: 145 MMHG | HEART RATE: 75 BPM | TEMPERATURE: 98.24 F | OXYGEN SATURATION: 95 % | DIASTOLIC BLOOD PRESSURE: 74 MMHG

## 2017-07-22 VITALS
TEMPERATURE: 97.7 F | SYSTOLIC BLOOD PRESSURE: 128 MMHG | HEART RATE: 67 BPM | DIASTOLIC BLOOD PRESSURE: 65 MMHG | OXYGEN SATURATION: 90 %

## 2017-07-22 VITALS
DIASTOLIC BLOOD PRESSURE: 60 MMHG | HEART RATE: 64 BPM | OXYGEN SATURATION: 92 % | SYSTOLIC BLOOD PRESSURE: 120 MMHG | TEMPERATURE: 98.24 F

## 2017-07-22 LAB
CREAT CL PREDICTED SERPL C-G-VRATE: 84.3 ML/MIN
CREAT SERPL-MCNC: 1.1 MG/DL (ref 0.6–1.4)

## 2017-07-22 RX ADMIN — INSULIN ASPART SCH UNITS: 100 INJECTION, SOLUTION INTRAVENOUS; SUBCUTANEOUS at 07:43

## 2017-07-22 RX ADMIN — AMLODIPINE BESYLATE SCH MG: 5 TABLET ORAL at 08:01

## 2017-07-22 RX ADMIN — NICOTINE SCH PATCH: 7 PATCH, EXTENDED RELEASE TRANSDERMAL at 08:03

## 2017-07-22 RX ADMIN — INSULIN GLARGINE SCH UNITS: 100 INJECTION, SOLUTION SUBCUTANEOUS at 08:41

## 2017-07-22 RX ADMIN — Medication SCH MCG: at 08:02

## 2017-07-22 RX ADMIN — FERROUS SULFATE TAB EC 325 MG (65 MG FE EQUIVALENT) SCH MG: 325 (65 FE) TABLET DELAYED RESPONSE at 07:38

## 2017-07-22 RX ADMIN — FUROSEMIDE SCH MLS/MIN: 10 INJECTION, SOLUTION INTRAMUSCULAR; INTRAVENOUS at 08:01

## 2017-07-22 RX ADMIN — IPRATROPIUM BROMIDE AND ALBUTEROL SCH PUFFS: 20; 100 SPRAY, METERED RESPIRATORY (INHALATION) at 11:46

## 2017-07-22 RX ADMIN — HYDROCODONE BITARTRATE AND ACETAMINOPHEN PRN TAB: 5; 325 TABLET ORAL at 04:55

## 2017-07-22 RX ADMIN — IPRATROPIUM BROMIDE AND ALBUTEROL SCH PUFFS: 20; 100 SPRAY, METERED RESPIRATORY (INHALATION) at 08:01

## 2017-07-22 RX ADMIN — ALBUTEROL SULFATE SCH PUFFS: 90 AEROSOL, METERED RESPIRATORY (INHALATION) at 08:03

## 2017-07-22 RX ADMIN — ALBUTEROL SULFATE SCH PUFFS: 90 AEROSOL, METERED RESPIRATORY (INHALATION) at 15:53

## 2017-07-22 RX ADMIN — ESCITALOPRAM OXALATE SCH MG: 20 TABLET, FILM COATED ORAL at 08:02

## 2017-07-22 RX ADMIN — FUROSEMIDE SCH MLS/MIN: 10 INJECTION, SOLUTION INTRAMUSCULAR; INTRAVENOUS at 11:45

## 2017-07-22 RX ADMIN — HEPARIN SODIUM SCH UNIT: 10000 INJECTION, SOLUTION INTRAVENOUS; SUBCUTANEOUS at 14:00

## 2017-07-22 RX ADMIN — ISOSORBIDE MONONITRATE SCH MG: 60 TABLET ORAL at 08:02

## 2017-07-22 RX ADMIN — INSULIN ASPART SCH UNITS: 100 INJECTION, SOLUTION INTRAVENOUS; SUBCUTANEOUS at 11:52

## 2017-07-22 RX ADMIN — ALBUTEROL SULFATE SCH PUFFS: 90 AEROSOL, METERED RESPIRATORY (INHALATION) at 03:21

## 2017-07-22 RX ADMIN — CLOPIDOGREL BISULFATE SCH MG: 75 TABLET, FILM COATED ORAL at 08:02

## 2017-07-22 RX ADMIN — Medication SCH MG: at 08:01

## 2017-07-22 RX ADMIN — Medication SCH GM: at 08:03

## 2017-07-22 RX ADMIN — HEPARIN SODIUM SCH UNIT: 10000 INJECTION, SOLUTION INTRAVENOUS; SUBCUTANEOUS at 05:56

## 2017-07-22 RX ADMIN — METOPROLOL SUCCINATE SCH MG: 50 TABLET, EXTENDED RELEASE ORAL at 08:02

## 2017-07-22 RX ADMIN — DOCUSATE SODIUM SCH MG: 100 CAPSULE, LIQUID FILLED ORAL at 08:01

## 2017-07-22 RX ADMIN — HYDROCODONE BITARTRATE AND ACETAMINOPHEN PRN TAB: 5; 325 TABLET ORAL at 14:44

## 2017-07-22 RX ADMIN — LISINOPRIL SCH MG: 40 TABLET ORAL at 08:03

## 2017-07-22 NOTE — DIAGNOSTIC IMAGING REPORT
CHEST 2 VIEWS ROUTINE



CLINICAL HISTORY: Nighttime shortness of breath.    



COMPARISON STUDY:  Chest radiograph July 19, 2017.



FINDINGS: There is no pneumothorax. There are small bilateral pleural effusions.

Mild left basilar opacity suggests atelectasis. Mild pulmonary edema persists.

Cardiomediastinal silhouette is stable. 



IMPRESSION:  No change in mild pulmonary edema and small bilateral pleural

effusions. 









Electronically signed by:  Patrick Corbett M.D.

7/22/2017 10:48 AM



Dictated Date/Time:  7/22/2017 10:47 AM

## 2017-07-22 NOTE — DISCHARGE SUMMARY
Discharge Summary


Date of Service


Jul 22, 2017.





Discharge Summary


Admission Date:


Jul 20, 2017 at 14:44


Discharge Date:  Jul 22, 2017


Discharge Disposition:  Home


Principal Diagnosis:  Chronic wound infection, SOB, CHF


Immunizations:  


   Have You Had Influenza Vaccine:  No


   Influenza Vaccine Date:  Oct 5, 2009


   History of Tetanus Vaccine?:  No


   Tetanus Immunization Date:  Mar 1, 2004


   History of Pneumococcal:  No


   Pneumococcal Date:  Oct 15, 2006


   History of Hepatitis B Vaccine:  No





Discharge Exam


Pt seen and examined at bedside. Chronic LE wound remains unchanged/mildly 

improved with wound care from consulting service. Patient had another episode 

of subjective shortness of breath overnight which spontaneously resolved but 

did receive nighttime O2. He describes this episode as being anxious and 

gasping upon waking which resolved promptly following. He reports no current SOB

, leg swelling, CP/palpitations, n/v, lightheadedness. His present weight is 

roughly the same as his previous discharge weight and he is at least 2L 

negative (was not using urinal during one day of admission, so no tracked neg 

but significant diuresis)


Review of Systems:  


   Constitutional:  No fever, No chills, No sweats, No weakness


   Respiratory:  + shortness of breath, No cough, No wheezing, No dyspnea on 

exertion


   Cardiovascular:  No chest pain, No edema, No palpitations


   Abdomen:  No pain, No nausea, No vomiting


   Musculoskeletal:  No joint pain, No muscle pain, No swelling


Physical Exam:  


   General Appearance:  WD/WN, no apparent distress


   Respiratory/Chest:  chest non-tender, lungs clear, no respiratory distress, 

+ decreased breath sounds


   Cardiovascular:  regular rate, rhythm, normal peripheral pulses, + pertinent 

finding (trace/1+ pitting edema of the LE stable and chronic)


   Abdomen / GI:  normal bowel sounds, non tender, soft, no organomegaly





Hospital Course


62 y/o male h/o CHF and DMII presented w/ new onset SOB at night in the setting 

of CHF, h/o CAD w/ PAD and MI, chronic LE wound





New onset SOB w/ underlying suspected JANE - no apparent fluid on CXR, troponins 

negative, not positional on exam - would strongly consider testing as 

outpatient and subsequent CPAP use


CHF - continue lasix at 80mg qAM and 80mg qPM, trend I/O w/ daily wts as 

outpatient, f/u w/ PCP within 1 wk


Cellulitis w/ chronic diabetic LE wound - wound care consulted - BCx grew 

contaminant x 1 - restart augmentin on discharge (d/w ID)


DMII - resume metformin as scheduled


CAD w/ PAD, h/o MI - continue ASA, metoprolol


Bipolar d/o - continue depakote, lexapro


COPD - resume home regimen


Tobacco use - encourage cessation, has used NRT in hospital with minimal issue


Total Time Spent:  Greater than 30 minutes


This includes examination of the patient, discharge planning, medication 

reconciliation, and communication with other providers.





Discharge Instructions


Please refer to the electronic Patient Visit Report (Discharge Instructions) 

for additional information.

## 2017-07-22 NOTE — DISCHARGE INSTRUCTIONS
Discharge Instructions


Date of Service


Jul 22, 2017.





Admission


Reason for Admission:  Acute Respiratory Failure, Cellulitis, Chf Exacerb





Discharge


Discharge Diagnosis / Problem:  Chronic wound infection, shortness of breath, 

CHF





Discharge Goals


Goal(s):  Decrease discomfort, Improve function





Activity Recommendations


Activity Limitations:  resume your previous activity





.





Instructions / Follow-Up


Instructions / Follow-Up





Call your Primary Care doctor if any of the following symptoms or problems 

start or get worse:





* Shortness of breath or difficulty breathing


* Wake up at night short of breath


* Chest pain


* Cough


* Swelling of your hands, feet, or legs


* More fatigued or tired with your normal activity


* Palpitations - sudden fast heart beats





WEIGHT





* Weigh yourself every morning after using the bathroom.


* Use the same scale.


* Wear the same amount of clothing.


* Write your weight down on a chart.


* Call your Primary Care doctor if you gain more than 2-3 pounds in 1-2 days.





MEDICATIONS





* Use this discharge instruction sheet for medication instructions.


* Take your medications at the time your doctor ordered.


* Do not skip a dose of your medicines.


* If you miss a dose of medicine, take it as soon as possible, but DO NOT 

DOUBLE A DOSE.


* Read your medicine information when you get home.


* Know all of the side effects of your medicine.  If in doubt, ask your 

pharmacist


* Call your Primary Care doctor's office if you have any side effects.


* Be sure all of your doctors know what medicine and herbs you take (including 

cold, flu, and herbal medicine).








Take the following with you to your follow-up doctor appointments:





* Weight Chart


* Medication List


* List of questions





Do not drink excessive alcohol, beer or wine.





Current Hospital Diet


Patient's current hospital diet: Diabetes Type 2 Diet





Discharge Diet


Recommended Diet:  AHA Diet (Heart Healthy), Low Sodium Diet (2gm Na)





Pending Studies


Studies pending at discharge:  no





Laboratory Results





Hemoglobin A1c








Test


  5/22/17


22:25 Range/Units


 


 


Estimated Average Glucose 335   mg/dl


 


Hemoglobin A1c 13.3 H 4.5-5.6  %








Lipid Panel








Test


  6/10/17


06:06 Range/Units


 


 


Triglycerides Level 182 H 0-150  mg/dl


 


Cholesterol Level 136  0-200  mg/dl


 


HDL Cholesterol 35   mg/dl


 


Cholesterol/HDL Ratio 3.9   


 


LDL Cholesterol, Calculated 65   mg/dl











Medical Emergencies








.


Who to Call and When:





Call 911 or go to the Emergency Room if:





* If at any time you feel your situation is an emergency


* You have tightness or pain in your chest that does not go away with rest or 

Nitroglycerin


* You are very short of breath even with rest





.





Non-Emergent Contact


Non-Emergency issues call your:  Primary Care Provider


Call Non-Emergent contact if:  temperature is above 100.5, your pain is unusual 

for you, wound has increased drainage, wound has increased redness, wound has 

increased pain





.


.








"Provider Documentation" section prepared by Jeffry Miller.








.





VTE Core Measure


Inpt VTE Proph given/why not?:  Unfractionated heparin SQ

## 2017-08-03 NOTE — OPERATIVE REPORT
DATE OF OPERATION:  05/26/2017

 

PREOPERATIVE DIAGNOSIS:  Left superficial femoral artery occlusion with

nonhealing foot wound.

 

POSTOPERATIVE DIAGNOSIS:  Same.

 

PROCEDURES:

1. Left lower extremity arteriography.

2. Percutaneous balloon angioplasty and stenting of the left popliteal and

superficial femoral artery.

3. Mechanical closure of the right femoral artery.

4. Conscious sedation 0924 to 1042.

 

SURGEON:  Dr. Rainey.

 

ANESTHETIC:  Local with sedation.

 

PROCEDURE INDICATIONS:  The patient is a 61-year-old male with occlusion of

his superficial femoral and popliteal artery, and nonhealing ulcer right

foot.  This was debrided and had very little bleeding.  Attempt at

revascularization was recommended.  He understood the risks, options and

benefits and agreed to have this procedure.  The patient was taken to the  

angiogram suite and placed in supine position.  After groins were prepped and

draped in a sterile manner, local anesthetic was administered.  Percutaneous

puncture was made of the right common femoral artery.  A short 0.035 wire was

inserted and 5-Chinese sheath was inserted over the wire.  Using a rim

catheter and 0.035 Glidewire, the left side was cannulated from the right

side.  The rim catheter was passed down to the external iliac artery and

arteriography was performed.  This showed the left common femoral and

profunda femoral artery to be widely patent.  The superficial femoral artery

was occluded at its origin except for small little stump.  It reconstituted

for a short segment at the  proximal popliteal and then was further occluded

and reconstituted in the tibioperoneal trunk.  The outflow was  via a widely

patent posterior tibial artery and a widely patent peroneal which was smaller

than the posterior tibial.  Anterior tibial artery was chronically occluded. 

At that point, the wire was reinserted.  The rim catheter was removed.  Using

the wire the proximal portion of the superficial femoral artery was

cannulated.  The 5 Chinese sheath was then exchanged for a 6-Chinese

destination.  Using a Quick-Cross and the 0.035 wire, the lesion was crossed

with some mild difficulty.  Once the wire was through into the tibioperoneal

trunk the  Quick-Cross was advanced.  Arteriography at that point showed it

to be true lumen of the tibioperoneal trunk.  It was decided at that time to 

balloon this lesion prior.  The stiffened wire was then reinserted.  Using a

4 x 10 Gaastra balloon the entire lesion was ballooned with a 4 mm balloon. 

Due to the extensive amount of plaque present it was decided to stent the

entire lesion.  We started in the distal popliteal with a 5 x 100 Tigress 

followed by a 6 x 100 Tigress for the proximal popliteal and distal SFA.  We

then used a 6 x 100, 7 x 100, 7 x 80 for the proximal superficial femoral

artery.  We then ballooned all the stented area with a 6 x 120 Gaastra.  There

is still some small lip of plaque present above the  previous stent,  the

last stent placed causing a narrowing.  We then placed a 7 x 40 Tigress stent

across this lesion then ballooned it with a 7 x 20 Gaastra.  The end result

was  fairly good.  There was at that point good flow seen going through  the

superficial femoral artery.  The popliteal and tibioperoneal trunk also had

good flow.  After opening up the  tibioperoneal trunk and superficial femoral

and popliteal we were able to pass the wire into the anterior tibial artery

which actually was patent.  The last set of films showed  the outflow  being

the posterior tibial artery  and peroneal artery off the  tibioperoneal trunk

and anterior tibial artery which came off higher in the proximal popliteal. 

Anterior tibial did not go all the way to the foot and was seen only in the

upper calf.  At that point, the 6-Chinese destination was pulled over to the

right side.  Arteriography showed the puncture to be anterior in the common

femoral artery.  This was therefore closed using a Star closure device. 

Adequate hemostasis was then noted at that time.  Excellent Doppler signals

were heard in the foot at the end of the procedure.  The patient left the

angio suite in good condition and tolerated the procedure well.

 

 

I attest to the content of the Intraoperative Record and any orders documented therein. Any exception
s are noted below.

## 2017-08-04 ENCOUNTER — HOSPITAL ENCOUNTER (OUTPATIENT)
Dept: HOSPITAL 45 - C.LAB1850 | Age: 62
Discharge: HOME | End: 2017-08-04
Attending: PHYSICIAN ASSISTANT
Payer: COMMERCIAL

## 2017-08-04 DIAGNOSIS — I10: Primary | ICD-10-CM

## 2017-08-04 DIAGNOSIS — I25.10: ICD-10-CM

## 2017-08-04 LAB
ANION GAP SERPL CALC-SCNC: 5 MMOL/L (ref 3–11)
BUN SERPL-MCNC: 51 MG/DL (ref 7–18)
BUN/CREAT SERPL: 29.7 (ref 10–20)
CALCIUM SERPL-MCNC: 8.5 MG/DL (ref 8.5–10.1)
CHLORIDE SERPL-SCNC: 95 MMOL/L (ref 98–107)
CO2 SERPL-SCNC: 33 MMOL/L (ref 21–32)
CREAT SERPL-MCNC: 1.7 MG/DL (ref 0.6–1.4)
GLUCOSE SERPL-MCNC: 149 MG/DL (ref 70–99)
POTASSIUM SERPL-SCNC: 4 MMOL/L (ref 3.5–5.1)
SODIUM SERPL-SCNC: 133 MMOL/L (ref 136–145)

## 2017-09-26 ENCOUNTER — HOSPITAL ENCOUNTER (OUTPATIENT)
Dept: HOSPITAL 45 - C.LAB1850 | Age: 62
Discharge: HOME | End: 2017-09-26
Attending: PHYSICIAN ASSISTANT
Payer: COMMERCIAL

## 2017-09-26 DIAGNOSIS — M79.605: Primary | ICD-10-CM

## 2017-09-26 LAB
ALBUMIN/GLOB SERPL: 0.5 {RATIO} (ref 0.9–2)
ALP SERPL-CCNC: 62 U/L (ref 45–117)
ALT SERPL-CCNC: 13 U/L (ref 12–78)
ANION GAP SERPL CALC-SCNC: 8 MMOL/L (ref 3–11)
AST SERPL-CCNC: 14 U/L (ref 15–37)
BASOPHILS # BLD: 0.03 K/UL (ref 0–0.2)
BASOPHILS NFR BLD: 0.5 %
BUN SERPL-MCNC: 39 MG/DL (ref 7–18)
BUN/CREAT SERPL: 35.1 (ref 10–20)
CALCIUM SERPL-MCNC: 8.7 MG/DL (ref 8.5–10.1)
CHLORIDE SERPL-SCNC: 104 MMOL/L (ref 98–107)
CO2 SERPL-SCNC: 25 MMOL/L (ref 21–32)
COMPLETE: YES
CREAT SERPL-MCNC: 1.1 MG/DL (ref 0.6–1.4)
CRP SERPL-MCNC: < 0.29 MG/DL (ref 0–0.29)
EOSINOPHIL NFR BLD AUTO: 245 K/UL (ref 130–400)
GLOBULIN SER-MCNC: 4.7 GM/DL (ref 2.5–4)
GLUCOSE SERPL-MCNC: 139 MG/DL (ref 70–99)
HCT VFR BLD CALC: 30.8 % (ref 42–52)
IG%: 0.3 %
IMM GRANULOCYTES NFR BLD AUTO: 13.8 %
LYMPHOCYTES # BLD: 0.87 K/UL (ref 1.2–3.4)
MCH RBC QN AUTO: 27.3 PG (ref 25–34)
MCHC RBC AUTO-ENTMCNC: 31.8 G/DL (ref 32–36)
MCV RBC AUTO: 85.8 FL (ref 80–100)
MONOCYTES NFR BLD: 13 %
NEUTROPHILS # BLD AUTO: 4.4 %
NEUTROPHILS NFR BLD AUTO: 68 %
PMV BLD AUTO: 9.4 FL (ref 7.4–10.4)
POTASSIUM SERPL-SCNC: 4.4 MMOL/L (ref 3.5–5.1)
RBC # BLD AUTO: 3.59 M/UL (ref 4.7–6.1)
SODIUM SERPL-SCNC: 137 MMOL/L (ref 136–145)
WBC # BLD AUTO: 6.3 K/UL (ref 4.8–10.8)

## 2017-10-30 ENCOUNTER — HOSPITAL ENCOUNTER (INPATIENT)
Dept: HOSPITAL 45 - C.EDA | Age: 62
LOS: 17 days | Discharge: NURSING, REHAB OR LONG-TERM CARE | DRG: 292 | End: 2017-11-16
Attending: HOSPITALIST | Admitting: HOSPITALIST
Payer: COMMERCIAL

## 2017-10-30 VITALS
SYSTOLIC BLOOD PRESSURE: 132 MMHG | OXYGEN SATURATION: 95 % | HEART RATE: 56 BPM | TEMPERATURE: 97.7 F | DIASTOLIC BLOOD PRESSURE: 61 MMHG

## 2017-10-30 VITALS
HEART RATE: 67 BPM | SYSTOLIC BLOOD PRESSURE: 171 MMHG | DIASTOLIC BLOOD PRESSURE: 65 MMHG | TEMPERATURE: 97.88 F | OXYGEN SATURATION: 92 %

## 2017-10-30 VITALS
WEIGHT: 195.11 LBS | HEIGHT: 70 IN | HEIGHT: 70 IN | BODY MASS INDEX: 27.93 KG/M2 | BODY MASS INDEX: 27.93 KG/M2 | WEIGHT: 195.11 LBS

## 2017-10-30 DIAGNOSIS — I25.10: ICD-10-CM

## 2017-10-30 DIAGNOSIS — D63.8: ICD-10-CM

## 2017-10-30 DIAGNOSIS — Z95.5: ICD-10-CM

## 2017-10-30 DIAGNOSIS — I11.0: Primary | ICD-10-CM

## 2017-10-30 DIAGNOSIS — Z87.891: ICD-10-CM

## 2017-10-30 DIAGNOSIS — E11.40: ICD-10-CM

## 2017-10-30 DIAGNOSIS — Z79.02: ICD-10-CM

## 2017-10-30 DIAGNOSIS — Z79.899: ICD-10-CM

## 2017-10-30 DIAGNOSIS — N17.9: ICD-10-CM

## 2017-10-30 DIAGNOSIS — J44.9: ICD-10-CM

## 2017-10-30 DIAGNOSIS — I25.2: ICD-10-CM

## 2017-10-30 DIAGNOSIS — T45.515A: ICD-10-CM

## 2017-10-30 DIAGNOSIS — L97.929: ICD-10-CM

## 2017-10-30 DIAGNOSIS — R33.9: ICD-10-CM

## 2017-10-30 DIAGNOSIS — M43.6: ICD-10-CM

## 2017-10-30 DIAGNOSIS — S81.801A: ICD-10-CM

## 2017-10-30 DIAGNOSIS — E78.5: ICD-10-CM

## 2017-10-30 DIAGNOSIS — I50.32: ICD-10-CM

## 2017-10-30 DIAGNOSIS — K21.9: ICD-10-CM

## 2017-10-30 DIAGNOSIS — I73.9: ICD-10-CM

## 2017-10-30 DIAGNOSIS — Z79.01: ICD-10-CM

## 2017-10-30 DIAGNOSIS — F31.9: ICD-10-CM
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
## 2017-10-30 DIAGNOSIS — D50.9: ICD-10-CM

## 2017-10-30 DIAGNOSIS — Z99.81: ICD-10-CM

## 2017-10-30 DIAGNOSIS — E11.628: ICD-10-CM

## 2017-10-30 DIAGNOSIS — Z79.4: ICD-10-CM

## 2017-10-30 DIAGNOSIS — J96.10: ICD-10-CM

## 2017-10-30 DIAGNOSIS — G47.33: ICD-10-CM

## 2017-10-30 DIAGNOSIS — Z83.3: ICD-10-CM

## 2017-10-30 DIAGNOSIS — R79.1: ICD-10-CM

## 2017-10-30 LAB
ALBUMIN/GLOB SERPL: 0.3 {RATIO} (ref 0.9–2)
ALP SERPL-CCNC: 61 U/L (ref 45–117)
ALT SERPL-CCNC: 7 U/L (ref 12–78)
ANION GAP SERPL CALC-SCNC: 8 MMOL/L (ref 3–11)
APPEARANCE UR: CLEAR
AST SERPL-CCNC: 11 U/L (ref 15–37)
BASOPHILS # BLD: 0.03 K/UL (ref 0–0.2)
BASOPHILS NFR BLD: 0.3 %
BILIRUB UR-MCNC: (no result) MG/DL
BUN SERPL-MCNC: 39 MG/DL (ref 7–18)
BUN/CREAT SERPL: 37.7 (ref 10–20)
CALCIUM SERPL-MCNC: 9 MG/DL (ref 8.5–10.1)
CHLORIDE SERPL-SCNC: 104 MMOL/L (ref 98–107)
CO2 SERPL-SCNC: 28 MMOL/L (ref 21–32)
COLOR UR: YELLOW
COMPLETE: YES
CREAT CL PREDICTED SERPL C-G-VRATE: 3.6 ML/MIN
CREAT SERPL-MCNC: 1.03 MG/DL (ref 0.6–1.4)
EOSINOPHIL NFR BLD AUTO: 495 K/UL (ref 130–400)
FLUAV RNA SPEC QL NAA+PROBE: (no result)
FLUBV RNA SPEC QL NAA+PROBE: (no result)
GLOBULIN SER-MCNC: 5.6 GM/DL (ref 2.5–4)
GLUCOSE SERPL-MCNC: 164 MG/DL (ref 70–99)
HCT VFR BLD CALC: 27.1 % (ref 42–52)
IG%: 1 %
IMM GRANULOCYTES NFR BLD AUTO: 11.6 %
INR PPP: 5.9 (ref 0.9–1.1)
LYMPHOCYTES # BLD: 1.26 K/UL (ref 1.2–3.4)
MANUAL MICROSCOPIC REQUIRED?: NO
MCH RBC QN AUTO: 28.1 PG (ref 25–34)
MCHC RBC AUTO-ENTMCNC: 32.8 G/DL (ref 32–36)
MCV RBC AUTO: 85.5 FL (ref 80–100)
MONOCYTES NFR BLD: 10.4 %
NEUTROPHILS # BLD AUTO: 0.6 %
NEUTROPHILS NFR BLD AUTO: 76.1 %
NITRITE UR QL STRIP: (no result)
PARTIAL THROMBOPLASTIN RATIO: 2.4
PH UR STRIP: 5.5 [PH] (ref 4.5–7.5)
PMV BLD AUTO: 8.5 FL (ref 7.4–10.4)
POTASSIUM SERPL-SCNC: 4 MMOL/L (ref 3.5–5.1)
PROTHROMBIN TIME: 68.3 SECONDS (ref 9–12)
RBC # BLD AUTO: 3.17 M/UL (ref 4.7–6.1)
REVIEW REQ?: YES
SODIUM SERPL-SCNC: 140 MMOL/L (ref 136–145)
SP GR UR STRIP: 1.02 (ref 1–1.03)
UROBILINOGEN UR-MCNC: (no result) MG/DL
WBC # BLD AUTO: 10.87 K/UL (ref 4.8–10.8)
ZZUR CULT IF INDIC CLEAN CATCH: NO

## 2017-10-30 RX ADMIN — SIMVASTATIN SCH MG: 40 TABLET, FILM COATED ORAL at 21:15

## 2017-10-30 RX ADMIN — MIRTAZAPINE SCH MG: 15 TABLET, FILM COATED ORAL at 21:14

## 2017-10-30 RX ADMIN — METOPROLOL SUCCINATE SCH MG: 50 TABLET, EXTENDED RELEASE ORAL at 21:15

## 2017-10-30 RX ADMIN — ALBUTEROL SULFATE SCH PUFFS: 90 AEROSOL, METERED RESPIRATORY (INHALATION) at 18:00

## 2017-10-30 RX ADMIN — NITROFURANTOIN (MONOHYDRATE/MACROCRYSTALS) SCH MG: 75; 25 CAPSULE ORAL at 21:13

## 2017-10-30 RX ADMIN — PHYTONADIONE ONE MLS/HR: 10 INJECTION, EMULSION INTRAMUSCULAR; INTRAVENOUS; SUBCUTANEOUS at 13:34

## 2017-10-30 RX ADMIN — TAMSULOSIN HYDROCHLORIDE SCH MG: 0.4 CAPSULE ORAL at 21:14

## 2017-10-30 RX ADMIN — GABAPENTIN SCH MG: 100 CAPSULE ORAL at 21:15

## 2017-10-30 RX ADMIN — CLONAZEPAM SCH MG: 0.5 TABLET ORAL at 21:13

## 2017-10-30 RX ADMIN — PHYTONADIONE ONE MLS/HR: 10 INJECTION, EMULSION INTRAMUSCULAR; INTRAVENOUS; SUBCUTANEOUS at 13:00

## 2017-10-30 RX ADMIN — IPRATROPIUM BROMIDE AND ALBUTEROL SULFATE SCH ML: .5; 3 SOLUTION RESPIRATORY (INHALATION) at 18:51

## 2017-10-30 RX ADMIN — DICLOFENAC SODIUM SCH APPLN: 10 GEL TOPICAL at 20:45

## 2017-10-30 RX ADMIN — OXYCODONE HYDROCHLORIDE PRN MG: 5 TABLET ORAL at 17:31

## 2017-10-30 RX ADMIN — DIVALPROEX SODIUM SCH MG: 500 TABLET, FILM COATED, EXTENDED RELEASE ORAL at 21:15

## 2017-10-30 RX ADMIN — INSULIN GLARGINE SCH UNITS: 100 INJECTION, SOLUTION SUBCUTANEOUS at 21:20

## 2017-10-30 NOTE — HISTORY AND PHYSICAL
History & Physical


Date & Time of Service:


Oct 30, 2017 at 14:36


Chief Complaint:


Headache/Elevated Inr


Primary Care Physician:


Richard Benitez M.D.


History of Present Illness


Source:  patient, spouse, clinic records, hospital records, other ([facility] records)


This is a 60 y/o male with a history of CAD, h/o MI s/p stents, HTN, HLD, 

diastolic CHF, COPD, DM II, anxiety, depression, bipolar disorder, anemia and 

GERD who presented to the ED on 10/30 with persistent headache and elevated 

INR.  The patient states he developed a severe, sharp headache that has 

persisted through today.  He states that the pain is located towards the base 

of his skull and radiates down into the middle of his neck and to his 

shoulders.  He currently rates his pain a 10/10 sharp pain.  He has associated 

neck stiffness and limited neck ROM due to pain.  He denies any recent fevers.  

He notes some intermittent dizziness upon standing.  The patient was also found 

to have an elevated INR of 5.3 per outpatient labs.  His warfarin dose has been 

held since 10/29.  He denies any sherlyn bleeding.  The patient complains of 

ongoing urinary retention and arrives with a High catheter in place, which has 

been there for several weeks.  The patient denies fevers, chills, sweats, chest 

pain, palpitations, claudication, cough, wheezing, shortness of breath, nausea, 

vomiting, abdominal pain, dysuria, hematuria, paralysis, weakness, numbness and 

tingling.





Past Medical/Surgical History


Medical Problems:


(1) CAD (coronary artery disease)


Status: Chronic  





(2) Cardiac arrest


Status: Resolved  





(3) Diabetes mellitus type 2


Status: Chronic  





(4) Hypertension


Status: Chronic  





HLD





H/o MI s/p stents





Diastolic CHF





COPD





Anxiety





Depression





Bipolar disorder





Anemia





GERD





Family History





Cancer (esophageal)


Diabetes mellitus


Heart disease


Hypertension





Social History


Smoking Status:  Former Smoker (recently quit 2-3 months ago)


Smokeless Tobacco Use:  No


Alcohol Use:  none


Drug Use:  none


Marital Status:  


Housing status:  other ([facility])


Occupational Status:  unemployed





Immunizations


History of Influenza Vaccine:  No


Influenza Vaccine Date:  Oct 5, 2009


History of Tetanus Vaccine?:  No


Tetanus Immunization Date:  Mar 1, 2004


History of Pneumococcal:  No


Pneumococcal Date:  Oct 15, 2006


History of Hepatitis B Vaccine:  No





Multi-Drug Resistant Organisms


History of MDRO:  No





Allergies


Coded Allergies:  


     No Known Allergies (Verified , 7/19/17)





Home Medications


Scheduled


Albuterol Hfa (Ventolin Hfa), 2 PUFFS INH Q6H


Amlodipine Besylate (Amlodipine Besylate), 10 MG PO QAM


Bisacodyl (Bisac-Evac), 10 MG NE UD


Clonazepam (Klonopin), 1 MG PO HS


Clopidogrel (Plavix), 75 MG PO QAM


Cyanocobalamin (Vitamin B-12), 100 MCG PO DAILY


Divalproex Sodium (Depakote Delay Rel), 2,000 MG PO HS


Ergocalciferol (Vitamin D 85969 Unit), 50,000 UNIT PO MWF


Escitalopram Oxalate (Escitalopram Oxalate), 20 MG PO QAM


Gabapentin (Neurontin), 100 MG PO HS


Insulin Glargine (Lantus Solostar), 10 UNITS SC HS


Lisinopril (Lisinopril), 40 MG PO DAILY


Magnesium Hydroxide (Milk of Magnesia), 30 MG PO UD


Metoprolol Succ (Toprol Xl) (Toprol-Xl), 50 MG PO BID


Mirtazapine (Remeron), 45 MG PO HS


Nitrofurantoin Monohyd Macrocr (Macrobid), 100 MG PO BID


Pantoprazole (Protonix), 40 MG PO DAILY


Polyethylene (Miralax), 17 GM PO UD


Senna/Docusate Sod (Senokot S), 1 TAB PO QDL


Simvastatin (Zocor), 40 MG PO QPM


Sodium Phosphates (Fleet Enema Six Pack), 133 ML NE UD


Tamsulosin HCl (Tamsulosin HCl), 0.4 MG PO HS


Umeclidinium Bromide (Incruse Ellipta), 1 PUFF INH DAILY





Scheduled PRN


Calcium Carbonate (Tums), 500 MG PO TID PRN for Indigestion


Diclofenac Sodium (Topical) (Voltaren 1% Top Gel), 2 GM TOP QID PRN for back 

pain


Docusate Sodium (Docusate Sodium), 100 MG PO BID PRN for Constipation


Fluticasone Propionate (Nasal) (Flonase Allergy Relief), 2 SPRAYS VERONICA DAILY PRN 

for CONGESTION


Nitroglycerin (Nitrostat), 0.4 MG UT PRN PRN for Chest Pain


Oxycodone Ir (Roxicodone Ir), 5 MG PO Q3H PRN for Pain





Miscellaneous Medications


Glucagon (Glucagon Emergency Kit)


Insulin Aspart (Novolog)





Review of Systems


Constitutional:  No fever, No chills, No sweats


Eyes:  No worsening of vision, No eye pain, No discharge, No diplopia


ENT:  No hearing loss, No nasal symptoms, No trouble swallowing


Respiratory:  No cough, No wheezing, No shortness of breath


Cardiovascular:  No chest pain, No claudication, No palpitations


Abdomen:  No pain, No nausea, No vomiting, No GI bleeding


Musculoskeletal:  No joint pain, No muscle pain, No swelling


Genitourinary - Male:  + urinary retention (chronic High), No hematuria, No 

dysuria


Neurologic:  No paralysis, No weakness, No numbness/tingling


Integumentary:  No rash, No itch, No color change





Physical Exam


Vital Signs











  Date Time  Temp Pulse Resp B/P (MAP) Pulse Ox O2 Delivery O2 Flow Rate FiO2


 


10/30/17 13:30  66      


 


10/30/17 13:07  67 18 156/87 93 Room Air 3.0 


 


10/30/17 11:36     94 Nasal Cannula 3.0 


 


10/30/17 11:36  66 18 173/90 94 Nasal Cannula 3.0 


 


10/30/17 11:25 37.2       


 


10/30/17 10:39  68      


 


10/30/17 10:35 36.5 72 20 157/110 97 Nasal Cannula 2.0 








General appearance:  Well-developed, well-nourished, no apparent distress


Head:  Normocephalic, atraumatic


Eyes:  Normal inspection, PERRL, EOMI


ENT:  Normal ENT inspection, hearing grossly normal, pharynx normal


Neck:  +Limited ROM.  Supple, no JVD, trachea midline


Respiratory/Chest:  +Wheezing throughout.  Normal breath sounds, no respiratory 

distress


Cardiovascular:  Regular rate & rhythm, no gallop, no murmur


Abdomen/GI:  Normal bowel sounds, non-tender, soft


Extremities/Musculoskeletal:  +RLE with wound vac.  LLE in boot.  2+ pitting 

edema bilaterally.  No calf tenderness


Neurological/Psych:  Alert, normal mood/affect, oriented x 3


Skin:  Normal color, warm/dry, no rash





Diagnostics


Laboratory Results





Results Past 24 Hours








Test


  10/30/17


11:25 10/30/17


11:32 10/30/17


11:49 Range/Units


 


 


White Blood Count 10.87   4.8-10.8  K/uL


 


Red Blood Count 3.17   4.7-6.1  M/uL


 


Hemoglobin 8.9   14.0-18.0  g/dL


 


Hematocrit 27.1   42-52  %


 


Mean Corpuscular Volume 85.5     fL


 


Mean Corpuscular Hemoglobin 28.1   25-34  pg


 


Mean Corpuscular Hemoglobin


Concent 32.8


  


  


  32-36  g/dl


 


 


Platelet Count 495   130-400  K/uL


 


Mean Platelet Volume 8.5   7.4-10.4  fL


 


Neutrophils (%) (Auto) 76.1    %


 


Lymphocytes (%) (Auto) 11.6    %


 


Monocytes (%) (Auto) 10.4    %


 


Eosinophils (%) (Auto) 0.6    %


 


Basophils (%) (Auto) 0.3    %


 


Neutrophils # (Auto) 8.28   1.4-6.5  K/uL


 


Lymphocytes # (Auto) 1.26   1.2-3.4  K/uL


 


Monocytes # (Auto) 1.13   0.11-0.59  K/uL


 


Eosinophils # (Auto) 0.06   0-0.5  K/uL


 


Basophils # (Auto) 0.03   0-0.2  K/uL


 


RDW Standard Deviation 44.6   36.4-46.3  fL


 


RDW Coefficient of Variation 14.2   11.5-14.5  %


 


Immature Granulocyte % (Auto) 1.0    %


 


Immature Granulocyte # (Auto) 0.11   0.00-0.02  K/uL


 


Red Blood Cell Morphology Unremarkable    


 


Prothrombin Time


  68.3


  


  


  9.0-12.0


SECONDS


 


Prothromb Time International


Ratio 5.9


  


  


  0.9-1.1  


 


 


Activated Partial


Thromboplast Time 62.3


  


  


  21.0-31.0


SECONDS


 


Partial Thromboplastin Ratio 2.4    


 


Sodium Level 140   136-145  mmol/L


 


Potassium Level 4.0   3.5-5.1  mmol/L


 


Chloride Level 104     mmol/L


 


Carbon Dioxide Level 28   21-32  mmol/L


 


Anion Gap 8.0   3-11  mmol/L


 


Blood Urea Nitrogen 39   7-18  mg/dl


 


Creatinine


  1.03


  


  


  0.60-1.40


mg/dl


 


Est Creatinine Clear Calc


Drug Dose 3.6


  


  


   ml/min


 


 


Estimated GFR (


American) 91.7


  


  


   


 


 


Estimated GFR (Non-


American 79.1


  


  


   


 


 


BUN/Creatinine Ratio 37.7   10-20  


 


Random Glucose 164   70-99  mg/dl


 


Calcium Level 9.0   8.5-10.1  mg/dl


 


Total Bilirubin 0.2   0.2-1  mg/dl


 


Aspartate Amino Transf


(AST/SGOT) 11


  


  


  15-37  U/L


 


 


Alanine Aminotransferase


(ALT/SGPT) 7


  


  


  12-78  U/L


 


 


Alkaline Phosphatase 61     U/L


 


Total Protein 7.4   6.4-8.2  gm/dl


 


Albumin 1.8   3.4-5.0  gm/dl


 


Globulin 5.6   2.5-4.0  gm/dl


 


Albumin/Globulin Ratio 0.3   0.9-2  


 


Bedside Lactic Acid Venous


  


  0.70


  


  0.90-1.70


mmol/L


 


Urine Color   YELLOW  


 


Urine Appearance   CLEAR CLEAR  


 


Urine pH   5.5 4.5-7.5  


 


Urine Specific Gravity   1.021 1.000-1.030  


 


Urine Protein   3+ NEG  


 


Urine Glucose (UA)   NEG NEG  


 


Urine Ketones   1+ NEG  


 


Urine Occult Blood   3+ NEG  


 


Urine Nitrite   NEG NEG  


 


Urine Bilirubin   NEG NEG  


 


Urine Urobilinogen   NEG NEG  


 


Urine Leukocyte Esterase   NEG NEG  


 


Urine WBC (Auto)   1-5 0-5  /hpf


 


Urine RBC (Auto)   >30 0-4  /hpf


 


Urine Hyaline Casts (Auto)   1-5 0-5  /lpf


 


Urine Epithelial Cells (Auto)   10-20 0-5  /lpf


 


Urine Bacteria (Auto)   NEG NEG  


 


Urine Pathogenic Casts    0  /lpf








Microbiology Results


10/30/17 Blood Culture, Received


           Pending


10/30/17 Blood Culture, Received


           Pending





Diagnostic Radiology


Reviewed the following studies and agree with interpretation as follows:





                                                                               

                                                                 


Patient Name: MAC SPANGLER                               Unit Number:  

[id number]                  


 





 











Dictated: 10/30/17 1104


 


Transcribed: 10/30/17 1104


 


JR


 


Printed Date/Time: [~ rep prt dt]/[~ rep prt tm]








 [~ rep ct labl] - [~ rep ct ivnm]


 





   [facility]


 Radiology Department


 [place] [address] [phone]





 











Dictated: 10/30/17 1104


 


Transcribed: 10/30/17 1104


 


JR


 


Printed Date/Time: [~ rep prt dt]/[~ rep prt tm]








 [~ rep ct labl] - [~ rep ct ivnm]


 





 











Patient:  MAC SPANGLER Address1:  [address] Rec:  [id number] Address2:  [facility]


 


Acct ID:  [id number] [facility] Zip:  [place] [address]


 


YOB: 1955          Sex:  M Room/Bed:  


 


Ref Phy:  Richard Benitez M.D. SC: JENNY


 


Att Phy:   Report #:  [id number]


 


Marilia Phy:  Richard Benitez M.D. Test:  HWO


 


Admit Phy:   Technician:  YOANDY


 


Interpreting Phy:  James Bhatia D.O. Diagnosis:  HEADACHE/ELEVATED INR


 


Ordering Phy:  ED, PROTOCOL Service Date:  10/30/17


 


Admit Date: 10/30/17 MNE: [email or web address]


 


 CONF:


 


 DICTATED BY: James Bhatia D.O.]]


 


CC: Chuy Dahl M.D.


ED,PROTOCOL


Richard Benitez M.D.


 


 Endcc:











[~ rep ct add3]]


HEAD WITHOUT CONTRAST (CT)





CLINICAL HISTORY: 61 years-old Male with A4B/ approval by Dr dahl.  Acute


headache without reported trauma.  





TECHNIQUE: Multiple axial CT images of the head were obtained without contrast. 


A dose lowering technique was utilized adhering to the principles of ALARA.





CT DOSE:  729.78 mGycm





COMPARISON: CT head 11/20/2007.





FINDINGS: 





No acute intracranial hemorrhage, midline shift, mass, large territorial


ischemia or abnormal extra-axial collection.  





The calvarium is intact.  The mastoid air cells, and middle ear cavities are


clear. Mild mucosal thickening of the maxillary and ethmoid sinuses. Soft


tissues are unremarkable.





IMPRESSION:  No acute intracranial abnormality.











The above report was generated using voice recognition software. It may contain


grammatical, syntax or spelling errors.











Electronically signed by:  Ashwin Bhatia M.D.


10/30/2017 11:06 AM





Dictated Date/Time:  10/30/2017 11:04 AM





 The status of this report is Signed.   


 Draft = Not yet reviewed or approved by Radiologist.  


 Signed = Reviewed and approved by Radiologist.


<AttendingPhy></AttendingPhy> <FamilyPhy>Richard Benitez M.D.</FamilyPhy> <

PrimaryPhy>Richard Benitez M.D.</PrimaryPhy> <UnitNumber>[id number]</UnitNumber> <

VisitNumber>[id number]</VisitNumber> <PatientName>MAC SPANGLER</

PatientName> <DateOfBirth>1955</DateOfBirth> <Location>[facility]</Location> <

ServiceDate>10/30/17</ServiceDate> <MNE>ESINDI</MNE> <OrderingPhy>ED, PROTOCOL</

OrderingPhy> <OrderingPhyMNE>f rep ord dr new</OrderingPhyMNE> <DictatingPhyMNE>

f rep dict dr new</DictatingPhyMNE> <CCListMNE>f rep ct mne</CCListMNE> <

AdmittingPhyMNE>f pt admit dr new</AdmittingPhyMNE> <AttendingPhyMNE>f pt 

attend dr new</AttendingPhyMNE>


<ConsultingPhyMNE>f pt consult dr new</ConsultingPhyMNE> <FamilyPhyMNE>f pt fam 

dr new</FamilyPhyMNE> <OtherPhyMNE>f pt other dr new</OtherPhyMNE> <

PrimaryPhyMNE>f pt prim care dr new</PrimaryPhyMNE> <ReferringPhyMNE>f pt 

referring dr new</ReferringPhyMNE>














                                                                               

                                                                 


Patient Name: MAC SPANGLER                               Unit Number:  

[id number]                  


 





 











Dictated: 10/30/17 1140


 


Transcribed: 10/30/17 1140


 


JRB


 


Printed Date/Time: [~ rep prt dt]/[~ rep prt tm]








 [~ rep ct labl] - [~ rep ct ivnm]


 





   [facility]


 Radiology Department


 [place] [address] [phone]





 











Dictated: 10/30/17 1140


 


Transcribed: 10/30/17 1140


 


JRB


 


Printed Date/Time: [~ rep prt dt]/[~ rep prt tm]








 [~ rep ct labl] - [~ rep ct ivnm]


 





 











Patient:  MAC SPANGLER Address1:  [address]


 


Med Rec:  [id number] Address2:  [facility]


 


Acct ID:  [id number] [facility] Zip:  [place] [address]


 


YOB: 1955          Sex:  M Room/Bed:  


 


Ref Phy:  Richard Benitez M.D. SC: JENNY Martinez Phy:   Report #:  [id number]


 


Marilia Phy:  Richard Benitez M.D. Test:  CXR1P


 


Admit Phy:   Technician:  DANY


 


Interpreting Phy:  James Bhatia D.O. Diagnosis:  HEADACHE/ELEVATED INR


 


Ordering Phy:  Chuy Dahl MD Service Date:  10/30/17


 


Admit Date: 10/30/17 MNE: PWRSCRIBE


 


 CONF:


 


 DICTATED BY: James Bhatia D.O.]]


 


CC: Chuy Dahl M.D. Koberna, Paul, M.D.


 


 Endcc:











[~ rep ct add3]]


CHEST ONE VIEW PORTABLE





HISTORY:  61 years-old Male Sepsis acute headache with sepsis





COMPARISON: Chest radiograph 8/15/2017





TECHNIQUE: Semiupright AP view of the chest





FINDINGS: 


Cardiac silhouette is mildly enlarged. Pulmonary vascular congestion is noted


with improvement of the previously seen mild pulmonary edema pattern.


Atherosclerosis of the aorta. No pneumothorax, pleural effusion or focal


airspace consolidation. The bones of the chest appear grossly intact.





IMPRESSION: Cardiomegaly and pulmonary vascular congestion without overt


pulmonary edema or focal airspace consolidation to suggest pneumonia. 











The above report was generated using voice recognition software. It may contain


grammatical, syntax or spelling errors.











Electronically signed by:  Ashwin Bhatia M.D.


10/30/2017 11:41 AM





Dictated Date/Time:  10/30/2017 11:40 AM





 The status of this report is Signed.   


 Draft = Not yet reviewed or approved by Radiologist.  


 Signed = Reviewed and approved by Radiologist.


<AttendingPhy></AttendingPhy> <FamilyPhy>Richard Benitez M.D.</FamilyPhy> <

PrimaryPhy>Richard Benitez M.D.</PrimaryPhy> <UnitNumber>[id number]</UnitNumber> <

VisitNumber>[id number]</VisitNumber> <PatientName>MAC SPANGLER</

PatientName> <DateOfBirth>1955</DateOfBirth> <Location>[facility]</Location> <

ServiceDate>10/30/17</ServiceDate> <MNE>ESINDI</MNE> <OrderingPhy>Chuy Dahl MD</OrderingPhy> <OrderingPhyMNE>f rep ord dr new</OrderingPhyMNE> <

DictatingPhyMNE>f rep dict dr new</DictatingPhyMNE> <CCListMNE>f rep ct shaq</

CCListMNE> <AdmittingPhyMNE>f pt admit dr new</AdmittingPhyMNE> <AttendingPhyMNE

>f pt attend dr new</AttendingPhyMNE>


<ConsultingPhyMNE>f pt consult dr new</ConsultingPhyMNE> <FamilyPhyMNE>f pt fam 

dr new</FamilyPhyMNE> <OtherPhyMNE>f pt other dr new</OtherPhyMNE> <

PrimaryPhyMNE>f pt prim care dr new</PrimaryPhyMNE> <ReferringPhyMNE>f pt 

referring dr new</ReferringPhyMNE>





EKG


Reviewed EKG and agree with interpretation as follows:





66 bpm, sinus rhythm with PVCs





Impression


Assessment and Plan


60 y/o male with a history of CAD, h/o MI s/p stents, HTN, HLD, diastolic CHF, 

COPD, DM II, anxiety, depression, bipolar disorder, anemia and GERD who 

presented to the ED on 10/30 with persistent headache and elevated INR.  Head 

CT negative for bleed, no acute abnormality.  CXR shows pulmonary congestion 

without overt pulmonary edema.  EKG shows no ischemic changes.  INR elevated at 

5.9.  WBC slightly elevated at 10.87, hgb 8.9.  





Headache, neck stiffness--cannot rule out meningitis, however this is less 

likely as neck stiffness seems more muscular than nuchal rigidity, and pt has h/

o cervical degenerative disease and sclerosis


-Admit to med/surg for observation


-Blood cultures pending


-Toradol 15 mg IV q6h prn pain


-May apply home Voltaren gel to neck QID prn pain


-Observe for fevers, but suspect this is more muscular





Supratherapeutic INR--HSNV note h/o thrombus, unclear where this is located


-INR 5.9 on arrival


-Hold warfarin, continue to monitor


-No bleed on head CT.  Observe for sherlyn bleeding.  No reversal at this time 

since no bleeding and hgb at baseline





RLE wound, wound vac


-Consult wound care


-Continue oxycodone 5 mg PO q3h prn pain





CAD, h/o MI s/p stents, HTN, HLD--stable


-Continue metoprolol succinate 50 mg PO BID, amlodipine 10 mg PO qd, 

simvastatin 40 mg PO qd, lisinopril 40 mg PO qd





Diastolic CHF--stable, no acute exacerbation





COPD--no acute exacerbation, asymptomatic but wheezing on exam


-DuoNebs QIDR and q2h prn SOB/wheezing


-Continue home inhalers


-O2 by protocol.  H/o chronic respiratory failure w/supplemental O2





DM II--last HgbA1c checked 8/11/17 was 7.5


-Lantus 10 units SC hs


-Insulin sliding scale


-Check BSGs q ac and qhs


-Continue gabapentin 100 mg PO hs for neuropathy





Urinary retention


-Chronic High


-Continue Flomax 0.4 mg PO hs





Anxiety, depression, bipolar disorder


-Continue escitalopram 20 mg PO qd, mirtazapine 45 mg PO hs, Klonopin 1 mg PO hs

, and Depakote 2000 mg PO hs





Anemia of chronic disease--stable


-Baseline hemoglobin around 8, Hgb at baseline on arrival





GERD


-Continue pantoprazole 40 mg PO qd





DVT prophylaxis


-Warfarin supratherapeutic


-SCDs





Code Status


-Level I, FULL RESUSCITATION STATUS





i personally examined pt and verified all eddy points w A Iverson PAC





neck pain.  no f/c/s.  


vitals noted.  laying in bed, appearing uncomfortable.  cn 2-12 intact, no 

focal motor or sensory deficits.  ost/msk - b/l Cspine paraspinals high tone/

tender/decreased ROM - gentle unwinding - improved ROM and pain quite 

significantly (~20% improvement) - pt tolerated well





neck pain - examines and responds biomechanical.  highly doubt meningitis -- LP 

too risky right now w high INR but also seems so low probability will follow 

clinically





somatic dysfunction cervical - OMT as above





otherwise as above





Level of Care


Med/Surg





Resuscitation Status


FULL RESUSCITATION





VTE Prophylaxis


VTE Risk Assessment Done? Y/N:  Yes


Risk Level:  Moderate


Given or contraindicated:  SCD's

## 2017-10-30 NOTE — DIAGNOSTIC IMAGING REPORT
HEAD WITHOUT CONTRAST (CT)



CLINICAL HISTORY: 61 years-old Male with A4B/ approval by Dr springer.  Acute

headache without reported trauma.  



TECHNIQUE: Multiple axial CT images of the head were obtained without contrast. 

A dose lowering technique was utilized adhering to the principles of ALARA.



CT DOSE:  729.78 mGycm



COMPARISON: CT head 11/20/2007.



FINDINGS: 



No acute intracranial hemorrhage, midline shift, mass, large territorial

ischemia or abnormal extra-axial collection.  



The calvarium is intact.  The mastoid air cells, and middle ear cavities are

clear. Mild mucosal thickening of the maxillary and ethmoid sinuses. Soft

tissues are unremarkable.



IMPRESSION:  No acute intracranial abnormality.







The above report was generated using voice recognition software. It may contain

grammatical, syntax or spelling errors.







Electronically signed by:  Ashwin Bhatia M.D.

10/30/2017 11:06 AM



Dictated Date/Time:  10/30/2017 11:04 AM

## 2017-10-30 NOTE — DIAGNOSTIC IMAGING REPORT
CHEST ONE VIEW PORTABLE



HISTORY:  61 years-old Male Sepsis acute headache with sepsis



COMPARISON: Chest radiograph 8/15/2017



TECHNIQUE: Semiupright AP view of the chest



FINDINGS: 

Cardiac silhouette is mildly enlarged. Pulmonary vascular congestion is noted

with improvement of the previously seen mild pulmonary edema pattern.

Atherosclerosis of the aorta. No pneumothorax, pleural effusion or focal

airspace consolidation. The bones of the chest appear grossly intact.



IMPRESSION: Cardiomegaly and pulmonary vascular congestion without overt

pulmonary edema or focal airspace consolidation to suggest pneumonia. 







The above report was generated using voice recognition software. It may contain

grammatical, syntax or spelling errors.







Electronically signed by:  Ashwin Bhatia M.D.

10/30/2017 11:41 AM



Dictated Date/Time:  10/30/2017 11:40 AM

## 2017-10-30 NOTE — EMERGENCY ROOM VISIT NOTE
History


Report prepared by Safia:  Perez Gimenez


Under the Supervision of:  Dr. Chuy Dahl M.D.


First contact with patient:  10:48


Chief Complaint:  HEADACHE


Stated Complaint:  HEADACHE/ELEVATED INR





History of Present Illness


The patient is a 61 year old male on Coumadin who presents to the Emergency 

Room from [facility] with complaints of a persistent headache that started 

last night. He rates the pain as a 7 out of 10 in severity. The patient notes 

that the pain is around his neck and ears. He adds that he started to have 

bilateral shoulder pain yesterday, and just feels "rough". Per the nursing staff

, the patient was 87% on room air on arrival, and does not wear oxygen at home. 

The patient's headache has not been relieved by OxyIR, and his INR today at 

[facility] was 5.3. The patient just had a left leg wound infection, and 

says that he was treated at [facility] and the infection is healing well. He 

denies any fevers, chills, chest pain, shortness of breath, vomiting, or 

abdominal pain.





   Source of History:  patient, nursing staff


   Onset:  Last night


   Position:  head


   Symptom Intensity:  7/10 in severity


   Quality:  other (pain around neck and ears)


   Timing:  other (persistent)


   Associated Symptoms:  + neck pain, No fevers, No chills, No chest pain, No 

SOB, No vomiting, No abdominal pain


Note:


Associated symptoms: Bilateral shoulder pain starting yesterday. 87% on room 

air on arrival. Feels "rough".





Review of Systems


See HPI for pertinent positives & negatives. A total of 10 systems reviewed and 

were otherwise negative.





Past Medical & Surgical


Medical Problems:


(1) Acute appendicitis with appendiceal abscess


(2) Acute renal insufficiency


(3) Acute respiratory failure


(4) Angina pectoris


(5) CAD (coronary artery disease)


(6) Cardiac arrest


(7) Cellulitis


(8) CHF exacerbation


(9) Dehydration


(10) Diabetes


(11) Elevated INR


(12) Enterocolitis


(13) Headache


(14) Heel ulcer


(15) Hypertension


(16) Ischemic cardiomyopathy


(17) Osteomyelitis of left foot


(18) Precordial chest pain


(19) Respiratory distress


(20) Sepsis, unspecified organism


(21) Volume overload








Family History





Cancer (esophageal)


Diabetes mellitus


Heart disease


Hypertension





Social History


Smoking Status:  Current Every Day Smoker


Drug Use:  none


Marital Status:  


Housing Status:  lives with family


Occupation Status:  employed





Current/Historical Medications


Scheduled


Albuterol Hfa (Ventolin Hfa), 2 PUFFS INH Q6H


Amlodipine Besylate (Amlodipine Besylate), 10 MG PO QAM


Bisacodyl (Bisac-Evac), 10 MG ID UD


Clonazepam (Klonopin), 1 MG PO HS


Clopidogrel (Plavix), 75 MG PO QAM


Cyanocobalamin (Vitamin B-12), 100 MCG PO DAILY


Divalproex Sodium (Depakote Delay Rel), 2,000 MG PO HS


Ergocalciferol (Vitamin D [address]603 Unit), 50,000 UNIT PO MWF


Escitalopram Oxalate (Escitalopram Oxalate), 20 MG PO QAM


Gabapentin (Neurontin), 100 MG PO HS


Insulin Glargine (Lantus Solostar), 10 UNITS SC HS


Lisinopril (Lisinopril), 40 MG PO DAILY


Magnesium Hydroxide (Milk of Magnesia), 30 MG PO UD


Metoprolol Succ (Toprol Xl) (Toprol-Xl), 50 MG PO BID


Mirtazapine (Remeron), 45 MG PO HS


Nitrofurantoin Monohyd Macrocr (Macrobid), 100 MG PO BID


Pantoprazole (Protonix), 40 MG PO DAILY


Polyethylene (Miralax), 17 GM PO UD


Senna/Docusate Sod (Senokot S), 1 TAB PO QDL


Simvastatin (Zocor), 40 MG PO QPM


Sodium Phosphates (Fleet Enema Six Pack), 133 ML ID UD


Tamsulosin HCl (Tamsulosin HCl), 0.4 MG PO HS


Umeclidinium Bromide (Incruse Ellipta), 1 PUFF INH DAILY





Scheduled PRN


Calcium Carbonate (Tums), 500 MG PO TID PRN for Indigestion


Diclofenac Sodium (Topical) (Voltaren 1% Top Gel), 2 GM TOP QID PRN for back 

pain


Docusate Sodium (Docusate Sodium), 100 MG PO BID PRN for Constipation


Fluticasone Propionate (Nasal) (Flonase Allergy Relief), 2 SPRAYS VERONICA DAILY PRN 

for CONGESTION


Nitroglycerin (Nitrostat), 0.4 MG UT PRN PRN for Chest Pain


Oxycodone Ir (Roxicodone Ir), 5 MG PO Q3H PRN for Pain





Miscellaneous Medications


Glucagon (Glucagon Emergency Kit)


Insulin Aspart (Novolog)





Allergies


Coded Allergies:  


     No Known Allergies (Verified , 7/19/17)





Physical Exam


Vital Signs











  Date Time  Temp Pulse Resp B/P (MAP) Pulse Ox O2 Delivery O2 Flow Rate FiO2


 


10/30/17 13:30  66      


 


10/30/17 13:07  67 18 156/87 93 Room Air 3.0 


 


10/30/17 11:36     94 Nasal Cannula 3.0 


 


10/30/17 11:36  66 18 173/90 94 Nasal Cannula 3.0 


 


10/30/17 11:25 37.2       


 


10/30/17 10:39  68      


 


10/30/17 10:35 36.5 72 20 157/110 97 Nasal Cannula 2.0 











Physical Exam


Constitutional: Vital signs reviewed.


Eyes: Pupils are equal round reactive to light.  Conjunctiva are noninjected.  


ENT: Pharynx is clear without erythema or exudate.  Mucous membranes are dry.  

Pain with flexion of neck.


Respiratory: Clear to auscultation bilaterally.  Breath sounds are equal 

bilaterally. 


Cardiovascular: Regular rate and rhythm.  No rubs or gallops.


GI: Soft, nondistended and nontender.  Bowel sounds are present.


Musculoskeletal: Wound vac to right leg with lower extremity edema. Amputation 

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


Integumentary: No cyanosis.


Neurological:  The patient is awake but slightly slow to answer questions.  

Cranial nerves II-XII are intact. Motor intact throughout upper extremities. 

Difficult to assess right lower extremity. Left lower extremity is able to 

wiggle toes.


Psychiatric: Normal affect.





Medical Decision & Procedures


ER Provider


Diagnostic Interpretation:


Radiology results as stated below per my review and the radiologist's 

interpretation: 











HEAD WITHOUT CONTRAST (CT)





CLINICAL HISTORY: 61 years-old Male with A4B/ approval by Dr dahl.  Acute


headache without reported trauma.  





TECHNIQUE: Multiple axial CT images of the head were obtained without contrast. 


A dose lowering technique was utilized adhering to the principles of ALARA.





CT DOSE:  729.78 mGycm





COMPARISON: CT head 11/20/2007.





FINDINGS: 





No acute intracranial hemorrhage, midline shift, mass, large territorial


ischemia or abnormal extra-axial collection.  





The calvarium is intact.  The mastoid air cells, and middle ear cavities are


clear. Mild mucosal thickening of the maxillary and ethmoid sinuses. Soft


tissues are unremarkable.





IMPRESSION:  No acute intracranial abnormality.





The above report was generated using voice recognition software. It may contain


grammatical, syntax or spelling errors.





Electronically signed by:  Ashwin Bhatia M.D.


10/30/2017 11:06 AM





Dictated Date/Time:  10/30/2017 11:04 AM











CHEST ONE VIEW PORTABLE





HISTORY:  61 years-old Male Sepsis acute headache with sepsis





COMPARISON: Chest radiograph 8/15/2017





TECHNIQUE: Semiupright AP view of the chest





FINDINGS: 


Cardiac silhouette is mildly enlarged. Pulmonary vascular congestion is noted


with improvement of the previously seen mild pulmonary edema pattern.


Atherosclerosis of the aorta. No pneumothorax, pleural effusion or focal


airspace consolidation. The bones of the chest appear grossly intact.





IMPRESSION: Cardiomegaly and pulmonary vascular congestion without overt


pulmonary edema or focal airspace consolidation to suggest pneumonia. 











The above report was generated using voice recognition software. It may contain


grammatical, syntax or spelling errors.





Electronically signed by:  Ashwin Bhatia M.D.


10/30/2017 11:41 AM





Dictated Date/Time:  10/30/2017 11:40 AM





Laboratory Results


10/30/17 11:25








Red Blood Count 3.17, Mean Corpuscular Volume 85.5, Mean Corpuscular Hemoglobin 

28.1, Mean Corpuscular Hemoglobin Concent 32.8, Mean Platelet Volume 8.5, 

Neutrophils (%) (Auto) 76.1, Lymphocytes (%) (Auto) 11.6, Monocytes (%) (Auto) 

10.4, Eosinophils (%) (Auto) 0.6, Basophils (%) (Auto) 0.3, Neutrophils # (Auto

) 8.28, Lymphocytes # (Auto) 1.26, Monocytes # (Auto) 1.13, Eosinophils # (Auto

) 0.06, Basophils # (Auto) 0.03





10/30/17 11:25

















Test


  10/30/17


11:25 10/30/17


11:32 10/30/17


11:49


 


White Blood Count


  10.87 K/uL


(4.8-10.8) 


  


 


 


Red Blood Count


  3.17 M/uL


(4.7-6.1) 


  


 


 


Hemoglobin


  8.9 g/dL


(14.0-18.0) 


  


 


 


Hematocrit 27.1 % (42-52)   


 


Mean Corpuscular Volume


  85.5 fL


() 


  


 


 


Mean Corpuscular Hemoglobin


  28.1 pg


(25-34) 


  


 


 


Mean Corpuscular Hemoglobin


Concent 32.8 g/dl


(32-36) 


  


 


 


Platelet Count


  495 K/uL


(130-400) 


  


 


 


Mean Platelet Volume


  8.5 fL


(7.4-10.4) 


  


 


 


Neutrophils (%) (Auto) 76.1 %   


 


Lymphocytes (%) (Auto) 11.6 %   


 


Monocytes (%) (Auto) 10.4 %   


 


Eosinophils (%) (Auto) 0.6 %   


 


Basophils (%) (Auto) 0.3 %   


 


Neutrophils # (Auto)


  8.28 K/uL


(1.4-6.5) 


  


 


 


Lymphocytes # (Auto)


  1.26 K/uL


(1.2-3.4) 


  


 


 


Monocytes # (Auto)


  1.13 K/uL


(0.11-0.59) 


  


 


 


Eosinophils # (Auto)


  0.06 K/uL


(0-0.5) 


  


 


 


Basophils # (Auto)


  0.03 K/uL


(0-0.2) 


  


 


 


RDW Standard Deviation


  44.6 fL


(36.4-46.3) 


  


 


 


RDW Coefficient of Variation


  14.2 %


(11.5-14.5) 


  


 


 


Immature Granulocyte % (Auto) 1.0 %   


 


Immature Granulocyte # (Auto)


  0.11 K/uL


(0.00-0.02) 


  


 


 


Red Blood Cell Morphology Unremarkable   


 


Prothrombin Time


  68.3 SECONDS


(9.0-12.0) 


  


 


 


Prothromb Time International


Ratio 5.9 (0.9-1.1) 


  


  


 


 


Activated Partial


Thromboplast Time 62.3 SECONDS


(21.0-31.0) 


  


 


 


Partial Thromboplastin Ratio 2.4   


 


Anion Gap


  8.0 mmol/L


(3-11) 


  


 


 


Est Creatinine Clear Calc


Drug Dose 3.6 ml/min 


  


  


 


 


Estimated GFR (


American) 91.7 


  


  


 


 


Estimated GFR (Non-


American 79.1 


  


  


 


 


BUN/Creatinine Ratio 37.7 (10-20)   


 


Calcium Level


  9.0 mg/dl


(8.5-10.1) 


  


 


 


Total Bilirubin


  0.2 mg/dl


(0.2-1) 


  


 


 


Aspartate Amino Transf


(AST/SGOT) 11 U/L (15-37) 


  


  


 


 


Alanine Aminotransferase


(ALT/SGPT) 7 U/L (12-78) 


  


  


 


 


Alkaline Phosphatase


  61 U/L


() 


  


 


 


Total Protein


  7.4 gm/dl


(6.4-8.2) 


  


 


 


Albumin


  1.8 gm/dl


(3.4-5.0) 


  


 


 


Globulin


  5.6 gm/dl


(2.5-4.0) 


  


 


 


Albumin/Globulin Ratio 0.3 (0.9-2)   


 


Bedside Lactic Acid Venous


  


  0.70 mmol/L


(0.90-1.70) 


 


 


Urine Color   YELLOW 


 


Urine Appearance   CLEAR (CLEAR) 


 


Urine pH   5.5 (4.5-7.5) 


 


Urine Specific Gravity


  


  


  1.021


(1.000-1.030)


 


Urine Protein   3+ (NEG) 


 


Urine Glucose (UA)   NEG (NEG) 


 


Urine Ketones   1+ (NEG) 


 


Urine Occult Blood   3+ (NEG) 


 


Urine Nitrite   NEG (NEG) 


 


Urine Bilirubin   NEG (NEG) 


 


Urine Urobilinogen   NEG (NEG) 


 


Urine Leukocyte Esterase   NEG (NEG) 


 


Urine WBC (Auto)   1-5 /hpf (0-5) 


 


Urine RBC (Auto)   >30 /hpf (0-4) 


 


Urine Hyaline Casts (Auto)   1-5 /lpf (0-5) 


 


Urine Epithelial Cells (Auto)


  


  


  10-20 /lpf


(0-5)


 


Urine Bacteria (Auto)   NEG (NEG) 


 


Urine Pathogenic Casts    /lpf (0) 


 


Influenza Type A (RT-PCR)


  


  


  Neg for Influ


A (NEG)


 


Influenza Type B (RT-PCR)


  


  


  Neg for Influ


B (NEG)





Laboratory results as reviewed by me.





ECG


Indication:  other (nursing protocol)


Rate (beats per minute):  66


Rhythm:  sinus rhythm


Findings:  PVC, other (nonspecific ST and T-wave changes)





ED Course


1100: The patient was evaluated in room A4B. A complete history and physical 

exam was performed.





1235: I reevaluated the patient and he says that he still has a bad headache. 

The patient's wife says that the patient has a history of neck problems normally

, but has never had pain this bad. She also says that the patient's father has 

a history of cluster headaches, but he has never been diagnosed. The patient 

states that he is off the Vancomycin that he was previously on as far as he 

knows. The patient verbally expressed understanding and agreement of the 

treatment plan. The patient will be evaluated for further treatment.





1240: I spoke with Dr. Prater - [facility] internist. We discussed the patient and 

his results. The patient will be further evaluated by Dr. Prater.





Medical Decision


This is a 61-year-old male who presents with headache and an elevated INR.  

Differential diagnosis includes intracranial hemorrhage, migraine headache, 

intracranial mass, cluster headaches, meningitis, encephalitis, influenza.  I 

did perform a limited focused review of portions of the patient's old chart on 

the electronic medical record. The patient's last INR was 1.1 in August. The 

patient had surgery in August for a left leg abscess and myonecrosis of the 

peroneus, longus and brevis.





I did evaluate the patient as noted above.  The patient is presenting with a 

headache which started last night.  He also has neck pain.  Unfortunately his 

INR today was noted to be 5.3 at an outpatient lab.  Lumbar puncture would not 

be indicated at this time given the risk of bleeding.  He has had no fevers but 

I was concerned about the possibility of meningitis.  He was placed in 

isolation.  I did obtain further history from his wife who stated that he has a 

history of neck problems and does have neck pain at times although not as bad 

today.  His father also has a history of cluster headaches although the patient 

has never been diagnosed with cluster headaches.  IV access was established.  

The patient was placed on a continuous cardiac monitor.  I did order a stat CT 

of the head.  I did review the images myself as well as the radiology report as 

described above.  There is no evidence of hemorrhage.  I did order and 

personally review the patient's 12-lead EKG and chest x-ray as described above.

  I did order and review the patient's blood work as noted in the electronic 

medical record.  His white blood cell count is slightly elevated.  He is 

anemic.  His INR is 5.9.  I did reassess the patient.  I did discuss the test 

results with him.  I did recommend hospitalization for further evaluation and 

consideration of empiric treatment for possible meningitis and/or MRI/MRA of 

the head.  I did discuss the case with the hospitalist and .  I did 

treat the patient with IV vitamin K 10 mg.





Medication Reconcilliation


Current Medication List:  was personally reviewed by me





Blood Pressure Screening


Patient's blood pressure:  Elevated blood pressure





Consults


Time Called:  1235


Consulting Physician:  Dr. Josi IZAGUIRRE internist


Returned Call:  1240


I spoke with Dr. Josi IZAGUIRRE internist. We discussed the patient and his 

results. The patient will be further evaluated by Dr. Prater.





Impression





 Primary Impression:  


 Acute headache


 Additional Impressions:  


 Neck pain


 Supertherapeutic INR


 Anemia





Scribe Attestation


The scribe's documentation has been prepared under my direct and personally 

reviewed by me in its entirety. I confirm that the note above accurately 

reflects all work, treatment, procedures, and medical decision making performed 

by me.





Departure Information


Dispostion


Being Evaluated By Hospitalist





Richard Patel M.D. (PCP)





Patient Instructions


My [facility]





Problem Qualifiers








 Primary Impression:  


 Acute headache


 Headache type:  unspecified  Intractability:  intractable  Qualified Codes:  

R51 - Headache


 Additional Impressions:  


 Anemia


 Anemia type:  unspecified type  Qualified Codes:  D64.9 - Anemia, unspecified

## 2017-10-31 VITALS
DIASTOLIC BLOOD PRESSURE: 73 MMHG | OXYGEN SATURATION: 97 % | SYSTOLIC BLOOD PRESSURE: 142 MMHG | HEART RATE: 60 BPM | TEMPERATURE: 97.16 F

## 2017-10-31 VITALS
SYSTOLIC BLOOD PRESSURE: 146 MMHG | DIASTOLIC BLOOD PRESSURE: 76 MMHG | HEART RATE: 59 BPM | TEMPERATURE: 97.7 F | OXYGEN SATURATION: 99 %

## 2017-10-31 VITALS
SYSTOLIC BLOOD PRESSURE: 167 MMHG | TEMPERATURE: 97.7 F | DIASTOLIC BLOOD PRESSURE: 72 MMHG | HEART RATE: 58 BPM | OXYGEN SATURATION: 92 %

## 2017-10-31 VITALS — HEART RATE: 61 BPM | OXYGEN SATURATION: 99 % | TEMPERATURE: 97.52 F

## 2017-10-31 VITALS — HEART RATE: 58 BPM | OXYGEN SATURATION: 96 %

## 2017-10-31 LAB
EOSINOPHIL NFR BLD AUTO: 466 K/UL (ref 130–400)
HCT VFR BLD CALC: 26.7 % (ref 42–52)
INR PPP: 6.7 (ref 0.9–1.1)
MCH RBC QN AUTO: 27.6 PG (ref 25–34)
MCHC RBC AUTO-ENTMCNC: 31.8 G/DL (ref 32–36)
MCV RBC AUTO: 86.7 FL (ref 80–100)
PMV BLD AUTO: 8.5 FL (ref 7.4–10.4)
PROTHROMBIN TIME: 77.4 SECONDS (ref 9–12)
RBC # BLD AUTO: 3.08 M/UL (ref 4.7–6.1)
WBC # BLD AUTO: 8.49 K/UL (ref 4.8–10.8)

## 2017-10-31 RX ADMIN — CLONAZEPAM SCH MG: 0.5 TABLET ORAL at 22:56

## 2017-10-31 RX ADMIN — ALBUTEROL SULFATE SCH PUFFS: 90 AEROSOL, METERED RESPIRATORY (INHALATION) at 23:31

## 2017-10-31 RX ADMIN — SIMVASTATIN SCH MG: 40 TABLET, FILM COATED ORAL at 22:48

## 2017-10-31 RX ADMIN — NITROFURANTOIN (MONOHYDRATE/MACROCRYSTALS) SCH MG: 75; 25 CAPSULE ORAL at 22:48

## 2017-10-31 RX ADMIN — DICLOFENAC SODIUM SCH APPLN: 10 GEL TOPICAL at 16:26

## 2017-10-31 RX ADMIN — DICLOFENAC SODIUM SCH APPLN: 10 GEL TOPICAL at 07:48

## 2017-10-31 RX ADMIN — MIRTAZAPINE SCH MG: 15 TABLET, FILM COATED ORAL at 22:49

## 2017-10-31 RX ADMIN — TAMSULOSIN HYDROCHLORIDE SCH MG: 0.4 CAPSULE ORAL at 22:50

## 2017-10-31 RX ADMIN — Medication SCH CAN: at 16:26

## 2017-10-31 RX ADMIN — INSULIN GLARGINE SCH UNITS: 100 INJECTION, SOLUTION SUBCUTANEOUS at 22:52

## 2017-10-31 RX ADMIN — ALBUTEROL SULFATE SCH PUFFS: 90 AEROSOL, METERED RESPIRATORY (INHALATION) at 16:26

## 2017-10-31 RX ADMIN — IPRATROPIUM BROMIDE AND ALBUTEROL SULFATE SCH ML: .5; 3 SOLUTION RESPIRATORY (INHALATION) at 07:16

## 2017-10-31 RX ADMIN — ALBUTEROL SULFATE SCH PUFFS: 90 AEROSOL, METERED RESPIRATORY (INHALATION) at 12:38

## 2017-10-31 RX ADMIN — DICLOFENAC SODIUM SCH APPLN: 10 GEL TOPICAL at 12:38

## 2017-10-31 RX ADMIN — ALBUTEROL SULFATE SCH PUFFS: 90 AEROSOL, METERED RESPIRATORY (INHALATION) at 06:33

## 2017-10-31 RX ADMIN — STANDARDIZED SENNA CONCENTRATE AND DOCUSATE SODIUM SCH TAB: 8.6; 5 TABLET ORAL at 12:38

## 2017-10-31 RX ADMIN — IPRATROPIUM BROMIDE AND ALBUTEROL SULFATE SCH ML: .5; 3 SOLUTION RESPIRATORY (INHALATION) at 11:46

## 2017-10-31 RX ADMIN — PANTOPRAZOLE SCH MG: 40 TABLET, DELAYED RELEASE ORAL at 07:46

## 2017-10-31 RX ADMIN — METOPROLOL SUCCINATE SCH MG: 50 TABLET, EXTENDED RELEASE ORAL at 20:00

## 2017-10-31 RX ADMIN — LISINOPRIL SCH MG: 20 TABLET ORAL at 07:46

## 2017-10-31 RX ADMIN — GABAPENTIN SCH MG: 100 CAPSULE ORAL at 22:48

## 2017-10-31 RX ADMIN — OXYCODONE HYDROCHLORIDE PRN MG: 5 TABLET ORAL at 12:39

## 2017-10-31 RX ADMIN — METOPROLOL SUCCINATE SCH MG: 50 TABLET, EXTENDED RELEASE ORAL at 07:46

## 2017-10-31 RX ADMIN — ESCITALOPRAM OXALATE SCH MG: 20 TABLET, FILM COATED ORAL at 07:46

## 2017-10-31 RX ADMIN — CLONAZEPAM SCH MG: 0.5 TABLET ORAL at 21:00

## 2017-10-31 RX ADMIN — ALBUTEROL SULFATE SCH PUFFS: 90 AEROSOL, METERED RESPIRATORY (INHALATION) at 00:00

## 2017-10-31 RX ADMIN — Medication SCH MCG: at 07:47

## 2017-10-31 RX ADMIN — DIVALPROEX SODIUM SCH MG: 500 TABLET, FILM COATED, EXTENDED RELEASE ORAL at 22:49

## 2017-10-31 RX ADMIN — AMLODIPINE BESYLATE SCH MG: 5 TABLET ORAL at 07:47

## 2017-10-31 RX ADMIN — NITROFURANTOIN (MONOHYDRATE/MACROCRYSTALS) SCH MG: 75; 25 CAPSULE ORAL at 07:46

## 2017-10-31 RX ADMIN — DICLOFENAC SODIUM SCH APPLN: 10 GEL TOPICAL at 22:47

## 2017-10-31 NOTE — PROGRESS NOTE
Subjective


Date of Service:


Oct 31, 2017.


Subjective


Pt evaluation today including:  conversation w/ patient, physical exam, chart 

review, lab review, review of studies, review of inpatient medication list


Pt confused and oriented only to person


Denies any distress at this time


Resting comfortably in bed





Problem List


Medical Problems:


(1) Acute CHF


Status: Acute  





(2) Acute coronary syndrome


Status: Acute  





(3) Acute headache


Status: Acute  





(4) Altered mental status


Status: Acute  





(5) Anemia


Status: Acute  





(6) Anginal pain


Status: Acute  





(7) Appendicitis


Status: Acute  





(8) Cellulitis


Status: Acute  





(9) CHF (congestive heart failure)


Status: Acute  





(10) Congestive heart failure


Status: Acute  





(11) Diabetes mellitus with hyperglycemia


Status: Acute  





(12) Failure of outpatient treatment


Status: Acute  





(13) Intra-abdominal abscess


Status: Acute  





(14) Lower abdominal pain of unknown etiology


Status: Acute  





(15) Neck pain


Status: Acute  





(16) Precordial chest pain


Status: Acute  





(17) Pulmonary edema


Status: Acute  





(18) Respiratory acidosis


Status: Acute  





(19) Respiratory distress


Status: Acute  





(20) SOB (shortness of breath)


Status: Acute  











Review of Systems


Constitutional:  No fever, No chills, No sweats, No weakness


Eyes:  No worsening of vision, No eye pain, No redness, No discharge


Respiratory:  No cough, No sputum, No wheezing, No shortness of breath


Cardiac:  No chest pain, No orthopnea, No PND, No edema


Abdomen:  No pain, No nausea, No vomiting, No diarrhea, No constipation


Musculoskeletal:  No joint pain, No muscle pain, No swelling, No calf pain


Male :  No dysuria, No urinary frequency, No incontinence, No slowing stream


Neurologic:  No memory loss, No paralysis, No weakness, No numbness/tingling


Psychiatric:  No depression symptoms, No anhedonism, No anxiety, No insomnia


Endo:  No fatigue, No excessive thirst


Skin:  No rash, No itch





Objective


Vital Signs











  Date Time  Temp Pulse Resp B/P (MAP) Pulse Ox O2 Delivery O2 Flow Rate FiO2


 


10/31/17 11:46 36.4 61 18  99 Nasal Cannula 3.0 


 


10/31/17 08:09 36.2 60 16 142/73 (96) 97 Nasal Cannula 3.0 


 


10/31/17 08:00      Nasal Cannula 3.0 


 


10/31/17 07:20  58 18  96 Nasal Cannula 3.0 


 


10/31/17 00:00      Nasal Cannula 3.0 


 


10/30/17 23:50 36.5 56 18 132/61 (84) 95  3.0 


 


10/30/17 16:09 36.6 67 18 171/65 (100) 92  2.0 


 


10/30/17 16:00      Nasal Cannula 3.0 











Physical Exam


General Appearance:  WD/WN, no apparent distress


Eyes:  normal inspection, PERRL, EOMI, sclerae normal


Neck:  supple, no adenopathy, thyroid normal, no JVD


Respiratory/Chest:  chest non-tender, lungs clear, normal breath sounds, no 

respiratory distress


Cardiovascular:  regular rate, rhythm, no edema, no gallop, no JVD


Abdomen:  normal bowel sounds, non tender, soft, no organomegaly


Extremities:  normal range of motion, non-tender, normal inspection, no pedal 

edema


Neurologic/Psychiatric:  no motor/sensory deficits, alert, normal mood/affect, 

+ disoriented


Skin:  normal color, warm/dry, no rash


Lymphatic:  no adenopathy





Laboratory Results





Last 24 Hours








Test


  10/30/17


16:40 10/30/17


20:00 10/31/17


05:26 10/31/17


07:36


 


Bedside Glucose 142 mg/dl  131 mg/dl   106 mg/dl 


 


White Blood Count   8.49 K/uL  


 


Red Blood Count   3.08 M/uL  


 


Hemoglobin   8.5 g/dL  


 


Hematocrit   26.7 %  


 


Mean Corpuscular Volume   86.7 fL  


 


Mean Corpuscular Hemoglobin   27.6 pg  


 


Mean Corpuscular Hemoglobin


Concent 


  


  31.8 g/dl 


  


 


 


RDW Standard Deviation   45.0 fL  


 


RDW Coefficient of Variation   14.3 %  


 


Platelet Count   466 K/uL  


 


Mean Platelet Volume   8.5 fL  


 


Prothrombin Time   77.4 SECONDS  


 


Prothromb Time International


Ratio 


  


  6.7 


  


 


 


Magnesium Level   2.6 mg/dl  


 


Test


  10/31/17


11:32 


  


  


 


 


Bedside Glucose 109 mg/dl    











Assessment and Plan


62 y/o male with a history of CAD, h/o MI s/p stents, HTN, HLD, diastolic CHF, 

COPD, DM II, anxiety, depression, bipolar disorder, anemia and GERD who 

presented to the ED on 10/30 with persistent headache and elevated INR.  Head 

CT negative for bleed, no acute abnormality.  CXR shows pulmonary congestion 

without overt pulmonary edema.  EKG shows no ischemic changes.  INR elevated at 

5.9.  WBC slightly elevated at 10.87, hgb 8.9.  





Headache, neck stiffness--resolved, unlikely infectious


-Admit to med/surg for observation


-Blood cultures pending, NGTD


-Toradol 15 mg IV q6h prn pain


-May apply home Voltaren gel to neck QID prn pain


-Observe for fevers, but suspect this is more muscular





Supratherapeutic INR--HSNV note h/o thrombus, unclear where this is located


-INR 5.9 --> 6.7


-Hold warfarin, continue to monitor


-Observe for sherlyn bleeding.  No reversal at this time since no bleeding and 

hgb at baseline





RLE wound, wound vac


-Consult wound care


-Continue oxycodone 5 mg PO q3h prn pain





CAD, h/o MI s/p stents, HTN, HLD--stable


-Continue metoprolol succinate 50 mg PO BID, amlodipine 10 mg PO qd, 

simvastatin 40 mg PO qd, lisinopril 40 mg PO qd





Diastolic CHF--stable, no acute exacerbation





COPD--no acute exacerbation, asymptomatic but wheezing on exam


-DuoNebs QIDR and q2h prn SOB/wheezing


-Continue home inhalers


-O2 by protocol.  H/o chronic respiratory failure w/supplemental O2





DM II--last HgbA1c checked 8/11/17 was 7.5


-Lantus 10 units SC hs


-Insulin sliding scale


-Check BSGs q ac and qhs


-Continue gabapentin 100 mg PO hs for neuropathy





Urinary retention


-Chronic High


-Continue Flomax 0.4 mg PO hs





Anxiety, depression, bipolar disorder


-Continue escitalopram 20 mg PO qd, mirtazapine 45 mg PO hs, Klonopin 1 mg PO hs

, and Depakote 2000 mg PO hs





Anemia of chronic disease--stable


-Baseline hemoglobin around 8, Hgb at baseline on arrival





GERD


-Continue pantoprazole 40 mg PO qd





DVT prophylaxis


-Warfarin supratherapeutic


-SCDs





Code Status


-Level I, FULL RESUSCITATION STATUS

## 2017-11-01 VITALS
SYSTOLIC BLOOD PRESSURE: 167 MMHG | DIASTOLIC BLOOD PRESSURE: 75 MMHG | HEART RATE: 66 BPM | OXYGEN SATURATION: 98 % | TEMPERATURE: 97.52 F

## 2017-11-01 VITALS
HEART RATE: 60 BPM | DIASTOLIC BLOOD PRESSURE: 73 MMHG | SYSTOLIC BLOOD PRESSURE: 166 MMHG | TEMPERATURE: 97.34 F | OXYGEN SATURATION: 100 %

## 2017-11-01 VITALS — DIASTOLIC BLOOD PRESSURE: 56 MMHG | HEART RATE: 61 BPM | SYSTOLIC BLOOD PRESSURE: 137 MMHG

## 2017-11-01 VITALS — OXYGEN SATURATION: 98 %

## 2017-11-01 LAB
EOSINOPHIL NFR BLD AUTO: 441 K/UL (ref 130–400)
HCT VFR BLD CALC: 26.9 % (ref 42–52)
INR PPP: > 8 (ref 0.9–1.1)
MCH RBC QN AUTO: 27.7 PG (ref 25–34)
MCHC RBC AUTO-ENTMCNC: 32 G/DL (ref 32–36)
MCV RBC AUTO: 86.8 FL (ref 80–100)
PMV BLD AUTO: 8.1 FL (ref 7.4–10.4)
PROTHROMBIN TIME: > 100 SECONDS (ref 9–12)
RBC # BLD AUTO: 3.1 M/UL (ref 4.7–6.1)
WBC # BLD AUTO: 6.8 K/UL (ref 4.8–10.8)

## 2017-11-01 RX ADMIN — ALBUTEROL SULFATE SCH PUFFS: 90 AEROSOL, METERED RESPIRATORY (INHALATION) at 23:20

## 2017-11-01 RX ADMIN — ALBUTEROL SULFATE SCH PUFFS: 90 AEROSOL, METERED RESPIRATORY (INHALATION) at 16:53

## 2017-11-01 RX ADMIN — MIRTAZAPINE SCH MG: 15 TABLET, FILM COATED ORAL at 19:43

## 2017-11-01 RX ADMIN — Medication SCH MCG: at 08:18

## 2017-11-01 RX ADMIN — CLONAZEPAM SCH MG: 0.5 TABLET ORAL at 19:43

## 2017-11-01 RX ADMIN — NITROFURANTOIN (MONOHYDRATE/MACROCRYSTALS) SCH MG: 75; 25 CAPSULE ORAL at 08:18

## 2017-11-01 RX ADMIN — NITROFURANTOIN (MONOHYDRATE/MACROCRYSTALS) SCH MG: 75; 25 CAPSULE ORAL at 19:42

## 2017-11-01 RX ADMIN — Medication SCH CAN: at 16:53

## 2017-11-01 RX ADMIN — AMLODIPINE BESYLATE SCH MG: 5 TABLET ORAL at 08:18

## 2017-11-01 RX ADMIN — LISINOPRIL SCH MG: 20 TABLET ORAL at 08:19

## 2017-11-01 RX ADMIN — ALBUTEROL SULFATE SCH PUFFS: 90 AEROSOL, METERED RESPIRATORY (INHALATION) at 06:17

## 2017-11-01 RX ADMIN — SIMVASTATIN SCH MG: 40 TABLET, FILM COATED ORAL at 19:42

## 2017-11-01 RX ADMIN — ALBUTEROL SULFATE SCH PUFFS: 90 AEROSOL, METERED RESPIRATORY (INHALATION) at 13:20

## 2017-11-01 RX ADMIN — OXYCODONE HYDROCHLORIDE PRN MG: 5 TABLET ORAL at 21:20

## 2017-11-01 RX ADMIN — KETOROLAC TROMETHAMINE PRN MG: 15 INJECTION INTRAMUSCULAR; INTRAVENOUS at 08:12

## 2017-11-01 RX ADMIN — Medication SCH CAN: at 08:20

## 2017-11-01 RX ADMIN — METOPROLOL SUCCINATE SCH MG: 50 TABLET, EXTENDED RELEASE ORAL at 08:18

## 2017-11-01 RX ADMIN — DICLOFENAC SODIUM SCH APPLN: 10 GEL TOPICAL at 16:53

## 2017-11-01 RX ADMIN — TAMSULOSIN HYDROCHLORIDE SCH MG: 0.4 CAPSULE ORAL at 19:42

## 2017-11-01 RX ADMIN — METOPROLOL SUCCINATE SCH MG: 50 TABLET, EXTENDED RELEASE ORAL at 19:43

## 2017-11-01 RX ADMIN — DICLOFENAC SODIUM SCH APPLN: 10 GEL TOPICAL at 08:00

## 2017-11-01 RX ADMIN — DICLOFENAC SODIUM SCH APPLN: 10 GEL TOPICAL at 08:19

## 2017-11-01 RX ADMIN — DICLOFENAC SODIUM SCH APPLN: 10 GEL TOPICAL at 13:20

## 2017-11-01 RX ADMIN — Medication SCH CAN: at 13:20

## 2017-11-01 RX ADMIN — GABAPENTIN SCH MG: 100 CAPSULE ORAL at 19:42

## 2017-11-01 RX ADMIN — ESCITALOPRAM OXALATE SCH MG: 20 TABLET, FILM COATED ORAL at 08:18

## 2017-11-01 RX ADMIN — STANDARDIZED SENNA CONCENTRATE AND DOCUSATE SODIUM SCH TAB: 8.6; 5 TABLET ORAL at 13:20

## 2017-11-01 RX ADMIN — PANTOPRAZOLE SCH MG: 40 TABLET, DELAYED RELEASE ORAL at 08:18

## 2017-11-01 RX ADMIN — DIVALPROEX SODIUM SCH MG: 500 TABLET, FILM COATED, EXTENDED RELEASE ORAL at 19:42

## 2017-11-01 RX ADMIN — DICLOFENAC SODIUM SCH APPLN: 10 GEL TOPICAL at 19:08

## 2017-11-01 RX ADMIN — KETOROLAC TROMETHAMINE PRN MG: 15 INJECTION INTRAMUSCULAR; INTRAVENOUS at 19:07

## 2017-11-01 RX ADMIN — INSULIN GLARGINE SCH UNITS: 100 INJECTION, SOLUTION SUBCUTANEOUS at 19:50

## 2017-11-01 NOTE — PROGRESS NOTE
Subjective


Date of Service:


Nov 1, 2017.


Subjective


Pt evaluation today including:  conversation w/ patient, physical exam, chart 

review, lab review, review of studies, review of inpatient medication list


Reports neck pain this AM and slight headache


No fevers or chills


No bleeding reported per staff


No acute issues overnight





Problem List


Medical Problems:


(1) Acute CHF


Status: Acute  





(2) Acute coronary syndrome


Status: Acute  





(3) Acute headache


Status: Acute  





(4) Altered mental status


Status: Acute  





(5) Anemia


Status: Acute  





(6) Anginal pain


Status: Acute  





(7) Appendicitis


Status: Acute  





(8) Cellulitis


Status: Acute  





(9) CHF (congestive heart failure)


Status: Acute  





(10) Congestive heart failure


Status: Acute  





(11) Diabetes mellitus with hyperglycemia


Status: Acute  





(12) Failure of outpatient treatment


Status: Acute  





(13) Intra-abdominal abscess


Status: Acute  





(14) Lower abdominal pain of unknown etiology


Status: Acute  





(15) Neck pain


Status: Acute  





(16) Precordial chest pain


Status: Acute  





(17) Pulmonary edema


Status: Acute  





(18) Respiratory acidosis


Status: Acute  





(19) Respiratory distress


Status: Acute  





(20) SOB (shortness of breath)


Status: Acute  











Review of Systems


Constitutional:  No fever, No chills, No sweats, No weakness


Eyes:  No worsening of vision, No eye pain, No redness, No discharge


Respiratory:  No cough, No sputum, No wheezing, No shortness of breath, No 

dyspnea on exertion


Cardiac:  No chest pain, No orthopnea, No PND, No edema


Abdomen:  No pain, No nausea, No vomiting, No diarrhea, No constipation


Musculoskeletal:  + muscle pain, No joint pain, No swelling, No calf pain


Male :  No dysuria, No urinary frequency, No incontinence


Neurologic:  No memory loss, No paralysis, No weakness, No numbness/tingling


Psychiatric:  No depression symptoms, No anhedonism, No anxiety, No insomnia


Endo:  No fatigue, No excessive thirst, No excessive urination


Skin:  No rash, No itch





Objective


Vital Signs











  Date Time  Temp Pulse Resp B/P (MAP) Pulse Ox O2 Delivery O2 Flow Rate FiO2


 


11/1/17 13:19     98  3.0 


 


11/1/17 13:17  61 16 137/56 (83)    


 


11/1/17 08:35      Nasal Cannula 3.0 


 


11/1/17 07:01 36.4 66 18 167/75 (105) 98 Nasal Cannula 3.0 


 


11/1/17 00:00      Nasal Cannula 3.0 


 


10/31/17 23:51 36.5 58 20 167/72 (103) 92 Room Air  


 


10/31/17 20:00      Nasal Cannula 3.0 


 


10/31/17 16:44 36.5 59 18 146/76 (99) 99   


 


10/31/17 16:00      Nasal Cannula 3.0 











Physical Exam


General Appearance:  WD/WN, + mild distress


Eyes:  normal inspection, PERRL, EOMI, sclerae normal


Neck:  supple, no adenopathy, thyroid normal, no JVD


Respiratory/Chest:  chest non-tender, lungs clear, normal breath sounds, no 

respiratory distress


Cardiovascular:  regular rate, rhythm, no edema, no gallop, no JVD


Abdomen:  normal bowel sounds, non tender, soft, no organomegaly


Extremities:  normal range of motion, non-tender, normal inspection, no pedal 

edema


Neurologic/Psychiatric:  no motor/sensory deficits, alert, normal mood/affect, 

+ disoriented





Laboratory Results





Last 24 Hours








Test


  10/31/17


16:49 10/31/17


20:04 11/1/17


06:01 11/1/17


07:36


 


Bedside Glucose 108 mg/dl  120 mg/dl   93 mg/dl 


 


White Blood Count   6.80 K/uL  


 


Red Blood Count   3.10 M/uL  


 


Hemoglobin   8.6 g/dL  


 


Hematocrit   26.9 %  


 


Mean Corpuscular Volume   86.8 fL  


 


Mean Corpuscular Hemoglobin   27.7 pg  


 


Mean Corpuscular Hemoglobin


Concent 


  


  32.0 g/dl 


  


 


 


RDW Standard Deviation   45.3 fL  


 


RDW Coefficient of Variation   14.2 %  


 


Platelet Count   441 K/uL  


 


Mean Platelet Volume   8.1 fL  


 


Prothrombin Time


  


  


  > 100.0


SECONDS 


 


 


Prothromb Time International


Ratio 


  


  > 8.0 


  


 


 


Magnesium Level   2.5 mg/dl  


 


Test


  11/1/17


11:40 


  


  


 


 


Bedside Glucose 216 mg/dl    











Assessment and Plan


60 y/o male with a history of CAD, h/o MI s/p stents, HTN, HLD, diastolic CHF, 

COPD, DM II, anxiety, depression, bipolar disorder, anemia and GERD who 

presented to the ED on 10/30 with persistent headache and elevated INR.  Head 

CT negative for bleed, no acute abnormality.  CXR shows pulmonary congestion 

without overt pulmonary edema.  EKG shows no ischemic changes.  INR elevated at 

5.9.  WBC slightly elevated at 10.87, hgb 8.9.  





Headache, neck stiffness--mild, unlikely infectious


-Admit to med/surg for observation


-Blood cultures pending, NGTD


-Toradol 15 mg IV q6h prn pain


-May apply home Voltaren gel to neck QID prn pain, heating pad also added


-Observe for fevers, but suspect this is more muscular





Supratherapeutic INR--[facility] note h/o thrombus, unclear where this is located


-INR 5.9 --> 6.7 --> >8


-Hold warfarin, continue to monitor, Vit K 5 mg x 1 now 11/1/17


-Observe for sherlyn bleeding.  





RLE wound, wound vac


-Consult wound care


-Continue oxycodone 5 mg PO q3h prn pain





CAD, h/o MI s/p stents, HTN, HLD--stable


-Continue metoprolol succinate 50 mg PO BID, amlodipine 10 mg PO qd, 

simvastatin 40 mg PO qd, lisinopril 40 mg PO qd





Diastolic CHF--stable, no acute exacerbation





COPD--no acute exacerbation, asymptomatic but wheezing on exam


-DuoNebs QIDR and q2h prn SOB/wheezing


-Continue home inhalers


-O2 by protocol.  H/o chronic respiratory failure w/supplemental O2





DM II--last HgbA1c checked 8/11/17 was 7.5


-Lantus 10 units SC hs


-Insulin sliding scale


-Check BSGs q ac and qhs


-Continue gabapentin 100 mg PO hs for neuropathy





Urinary retention


-Chronic High


-Continue Flomax 0.4 mg PO hs





Anxiety, depression, bipolar disorder


-Continue escitalopram 20 mg PO qd, mirtazapine 45 mg PO hs, Klonopin 1 mg PO hs

, and Depakote 2000 mg PO hs





Anemia of chronic disease--stable


-Baseline hemoglobin around 8, Hgb at baseline on arrival





GERD


-Continue pantoprazole 40 mg PO qd





DVT prophylaxis


-Warfarin supratherapeutic


-SCDs





Code Status


-Level I, FULL RESUSCITATION STATUS

## 2017-11-02 VITALS
HEART RATE: 49 BPM | TEMPERATURE: 97.52 F | SYSTOLIC BLOOD PRESSURE: 155 MMHG | DIASTOLIC BLOOD PRESSURE: 67 MMHG | OXYGEN SATURATION: 94 %

## 2017-11-02 VITALS — OXYGEN SATURATION: 99 % | SYSTOLIC BLOOD PRESSURE: 143 MMHG | HEART RATE: 58 BPM | DIASTOLIC BLOOD PRESSURE: 87 MMHG

## 2017-11-02 VITALS
DIASTOLIC BLOOD PRESSURE: 67 MMHG | HEART RATE: 49 BPM | TEMPERATURE: 97.52 F | SYSTOLIC BLOOD PRESSURE: 155 MMHG | OXYGEN SATURATION: 94 %

## 2017-11-02 VITALS — SYSTOLIC BLOOD PRESSURE: 181 MMHG | HEART RATE: 64 BPM | DIASTOLIC BLOOD PRESSURE: 73 MMHG

## 2017-11-02 VITALS
OXYGEN SATURATION: 98 % | HEART RATE: 51 BPM | SYSTOLIC BLOOD PRESSURE: 149 MMHG | DIASTOLIC BLOOD PRESSURE: 76 MMHG | TEMPERATURE: 97.52 F

## 2017-11-02 VITALS
TEMPERATURE: 97.52 F | DIASTOLIC BLOOD PRESSURE: 68 MMHG | HEART RATE: 66 BPM | SYSTOLIC BLOOD PRESSURE: 153 MMHG | OXYGEN SATURATION: 96 %

## 2017-11-02 LAB
EOSINOPHIL NFR BLD AUTO: 371 K/UL (ref 130–400)
HCT VFR BLD CALC: 23.2 % (ref 42–52)
INR PPP: 1.4 (ref 0.9–1.1)
MCH RBC QN AUTO: 29 PG (ref 25–34)
MCHC RBC AUTO-ENTMCNC: 33.6 G/DL (ref 32–36)
MCV RBC AUTO: 86.2 FL (ref 80–100)
PMV BLD AUTO: 7.9 FL (ref 7.4–10.4)
PROTHROMBIN TIME: 15.4 SECONDS (ref 9–12)
RBC # BLD AUTO: 2.69 M/UL (ref 4.7–6.1)
WBC # BLD AUTO: 5.81 K/UL (ref 4.8–10.8)

## 2017-11-02 RX ADMIN — LISINOPRIL SCH MG: 20 TABLET ORAL at 09:46

## 2017-11-02 RX ADMIN — WARFARIN SCH MG: 2 TABLET ORAL at 16:26

## 2017-11-02 RX ADMIN — OXYCODONE HYDROCHLORIDE PRN MG: 5 TABLET ORAL at 00:21

## 2017-11-02 RX ADMIN — CLONAZEPAM SCH MG: 0.5 TABLET ORAL at 20:33

## 2017-11-02 RX ADMIN — SIMVASTATIN SCH MG: 40 TABLET, FILM COATED ORAL at 20:26

## 2017-11-02 RX ADMIN — TAMSULOSIN HYDROCHLORIDE SCH MG: 0.4 CAPSULE ORAL at 20:27

## 2017-11-02 RX ADMIN — STANDARDIZED SENNA CONCENTRATE AND DOCUSATE SODIUM SCH TAB: 8.6; 5 TABLET ORAL at 12:11

## 2017-11-02 RX ADMIN — GABAPENTIN SCH MG: 100 CAPSULE ORAL at 20:28

## 2017-11-02 RX ADMIN — METOPROLOL SUCCINATE SCH MG: 50 TABLET, EXTENDED RELEASE ORAL at 20:29

## 2017-11-02 RX ADMIN — KETOROLAC TROMETHAMINE PRN MG: 15 INJECTION INTRAMUSCULAR; INTRAVENOUS at 10:55

## 2017-11-02 RX ADMIN — AMLODIPINE BESYLATE SCH MG: 5 TABLET ORAL at 09:45

## 2017-11-02 RX ADMIN — Medication SCH CAN: at 12:11

## 2017-11-02 RX ADMIN — DIVALPROEX SODIUM SCH MG: 500 TABLET, FILM COATED, EXTENDED RELEASE ORAL at 20:28

## 2017-11-02 RX ADMIN — ESCITALOPRAM OXALATE SCH MG: 20 TABLET, FILM COATED ORAL at 09:44

## 2017-11-02 RX ADMIN — ALBUTEROL SULFATE SCH PUFFS: 90 AEROSOL, METERED RESPIRATORY (INHALATION) at 17:36

## 2017-11-02 RX ADMIN — PANTOPRAZOLE SCH MG: 40 TABLET, DELAYED RELEASE ORAL at 09:45

## 2017-11-02 RX ADMIN — DICLOFENAC SODIUM SCH APPLN: 10 GEL TOPICAL at 20:24

## 2017-11-02 RX ADMIN — DICLOFENAC SODIUM SCH APPLN: 10 GEL TOPICAL at 12:00

## 2017-11-02 RX ADMIN — METOPROLOL SUCCINATE SCH MG: 50 TABLET, EXTENDED RELEASE ORAL at 10:02

## 2017-11-02 RX ADMIN — ALBUTEROL SULFATE SCH PUFFS: 90 AEROSOL, METERED RESPIRATORY (INHALATION) at 06:28

## 2017-11-02 RX ADMIN — NITROFURANTOIN (MONOHYDRATE/MACROCRYSTALS) SCH MG: 75; 25 CAPSULE ORAL at 09:45

## 2017-11-02 RX ADMIN — ALBUTEROL SULFATE SCH PUFFS: 90 AEROSOL, METERED RESPIRATORY (INHALATION) at 23:47

## 2017-11-02 RX ADMIN — ALBUTEROL SULFATE SCH PUFFS: 90 AEROSOL, METERED RESPIRATORY (INHALATION) at 12:11

## 2017-11-02 RX ADMIN — DICLOFENAC SODIUM SCH APPLN: 10 GEL TOPICAL at 09:44

## 2017-11-02 RX ADMIN — NITROFURANTOIN (MONOHYDRATE/MACROCRYSTALS) SCH MG: 75; 25 CAPSULE ORAL at 20:24

## 2017-11-02 RX ADMIN — INSULIN GLARGINE SCH UNITS: 100 INJECTION, SOLUTION SUBCUTANEOUS at 21:16

## 2017-11-02 RX ADMIN — MIRTAZAPINE SCH MG: 15 TABLET, FILM COATED ORAL at 20:27

## 2017-11-02 RX ADMIN — Medication SCH MCG: at 09:45

## 2017-11-02 RX ADMIN — Medication SCH CAN: at 09:44

## 2017-11-02 RX ADMIN — STANDARDIZED SENNA CONCENTRATE AND DOCUSATE SODIUM SCH TAB: 8.6; 5 TABLET ORAL at 12:00

## 2017-11-02 RX ADMIN — DICLOFENAC SODIUM SCH APPLN: 10 GEL TOPICAL at 17:37

## 2017-11-02 RX ADMIN — Medication SCH CAN: at 17:04

## 2017-11-02 NOTE — DISCHARGE INSTRUCTIONS
Discharge Instructions


Date of Service


Nov 2, 2017.





Admission


Reason for Admission:  Elevated Inr,Headache





Discharge


Discharge Diagnosis / Problem:  Elevated INR, headache





Discharge Goals


Goal(s):  Decrease discomfort, Improve function, Increase independence, Improve 

disease control, Learn about illness, Diagnostic testing, Therapeutic 

intervention, Prevent Disease Progression





Activity Recommendations


Activity Limitations:  resume your previous activity


Exercise/Sports Limitations:  as tolerated





.





Instructions / Follow-Up


Instructions / Follow-Up


Patient to be discharged back to [facility]


Discharged on coumadin 2 mg by mouth daily, please check INR on 11/4 and adjust 

accordingly


Neck pain has resolved, likely musculoskeletal in nature


Please resume all other home medications


Follow up with provider at [facility] for further recommendations





Current Hospital Diet


Patient's current hospital diet: Diabetes Type 2 Diet, AHA Diet (Heart Healthy)





Discharge Diet


Recommended Diet:  AHA Diet (Heart Healthy), Diabetes Type 2 Diet





Pending Studies


Studies pending at discharge:  no





Laboratory Results





Hemoglobin A1c








Test


  8/11/17


07:07 Range/Units


 


 


Estimated Average Glucose 169   mg/dl


 


Hemoglobin A1c 7.5 H 4.5-5.6  %











Medical Emergencies








.


Who to Call and When:





Medical Emergencies:  If at any time you feel your situation is an emergency, 

please call 911 immediately.





.





Non-Emergent Contact


Non-Emergency issues call your:  Primary Care Provider


Call Non-Emergent contact if:  you have any medication questions





.


.








"Provider Documentation" section prepared by Carlos Lee.








.





VTE Core Measure


Inpt VTE Proph given/why not?:  SCD's, Contraindicated

## 2017-11-02 NOTE — DISCHARGE SUMMARY
Discharge Summary


Date of Service


Nov 2, 2017.





Discharge Summary


Admission Date:


Oct 30, 2017 at 14:35


Discharge Date:  Nov 2, 2017


Discharge Disposition:  Rehab


Principal Diagnosis:  Supratherapetic INR, neck pain


Immunizations:  


   Have You Had Influenza Vaccine:  No


   Influenza Vaccine Date:  Oct 5, 2009


   History of Tetanus Vaccine?:  No


   Tetanus Immunization Date:  Mar 1, 2004


   History of Pneumococcal:  No


   Pneumococcal Date:  Oct 15, 2006


   History of Hepatitis B Vaccine:  No





Medication Reconciliation


New Medications:  


Warfarin Sod (Coumadin) 2 Mg Tab


2 MG PO DAILY@16 for 30 Days, #30 TAB





 


Continued Medications:  


Albuterol Hfa (Ventolin Hfa) 200 Puffs/49640 Mcg Aers


2 PUFFS INH Q6H, #1 INHALER





Amlodipine Besylate (Amlodipine Besylate) 10 Mg Tab


10 MG PO QAM





Bisacodyl (Bisac-Evac) 10 Mg Sup


10 MG UT UD





Calcium Carbonate (Tums) 500 Mg Chew


500 MG PO TID PRN for Indigestion





Clonazepam (Klonopin) 0.5 Mg Tab


1 MG PO HS, TAB





Clopidogrel (Plavix) 75 Mg Tab


75 MG PO QAM





Cyanocobalamin (Vitamin B-12) 100 Mcg Tab


100 MCG PO DAILY, TAB





Diclofenac Sodium (Topical) (Voltaren 1% Top Gel) 1 % Gel


2 GM TOP QID PRN for back pain





Divalproex Sodium (Depakote Delay Rel) 500 Mg Tab


2000 MG PO HS





Docusate Sodium (Docusate Sodium) 100 Mg Cap


100 MG PO BID PRN for Constipation





Ergocalciferol (Vitamin D 16819 Unit) 50,000 Unit Cap


42732 UNIT PO MWF, CAP





Escitalopram Oxalate (Escitalopram Oxalate) 20 Mg Tab


20 MG PO QAM





Fluticasone Propionate (Nasal) (Flonase Allergy Relief) 50 Mcg/Act Spr


2 SPRAYS VERONICA DAILY PRN for CONGESTION





Gabapentin (Neurontin) 100 Mg Cap


100 MG PO HS, CAP





Glucagon (Glucagon Emergency Kit) 1 Mg Kit








Insulin Aspart (Novolog) 100 Units/Ml Inj





sliding scale


 


 less than 70 = hypoglycemia protocol


  = 0 units


 131-180 = 2 units


 181-240= 4 units


 241-300 = 6 units


 301-350 = 8 units


 351-400 = 10 units


 greater than 400 = 12 units; call md


Insulin Glargine (Lantus Solostar) 100 Unit/Ml Inj


10 UNITS SC HS, PEN





Lisinopril (Lisinopril) 20 Mg Tab


40 MG PO DAILY





Magnesium Hydroxide (Milk of Magnesia) 30 Ml Susp


30 MG PO UD





Metoprolol Succ (Toprol Xl) (Toprol-Xl) 50 Mg Tabcr


50 MG PO BID





Mirtazapine (Remeron) 15 Mg Tab


45 MG PO HS, TAB





Nitrofurantoin Monohyd Macrocr (Macrobid) 100 Mg Cap


100 MG PO BID, #6 CAP


stop 11/03/17


Nitroglycerin (Nitrostat) 0.4 Mg Tab


0.4 MG UT PRN PRN for Chest Pain





Oxycodone Ir (Roxicodone Ir) 5 Mg Tab


5 MG PO Q3H PRN for Pain, TAB





Pantoprazole (Protonix) 40 Mg Tab


40 MG PO DAILY, #30 TAB





Polyethylene (Miralax) 17 Gm Pow


17 GM PO UD





Senna/Docusate Sod (Senokot S) 1 Tab Tab


1 TAB PO QDL, TAB





Simvastatin (Zocor) 40 Mg Tab


40 MG PO QPM, TAB





Sodium Phosphates (Fleet Enema Six Pack) 1 Fide Fide


133 ML UT UD





Tamsulosin HCl (Tamsulosin HCl) 0.4 Mg Cap


0.4 MG PO HS





Umeclidinium Bromide (Incruse Ellipta) 62.5 Mcg/Inh Inh


1 PUFF INH DAILY











Discharge Exam


Review of Systems:  


   Constitutional:  No fever, No chills, No sweats, No weakness


   Respiratory:  No cough, No sputum, No wheezing, No shortness of breath, No 

dyspnea on exertion, No dyspnea at rest


   Cardiovascular:  No chest pain, No orthopnea, No PND, No edema


   Abdomen:  No pain, No nausea, No vomiting, No diarrhea


   Musculoskeletal:  No joint pain, No muscle pain, No swelling, No calf pain


   Genitourinary - Male:  No hematuria, No dysuria, No urinary frequency, No 

urinary urgency


   Neurologic:  No memory loss, No paralysis, No weakness, No numbness/tingling


   Psychiatric:  No depression symptoms, No anhedonism, No anxiety, No insomnia


   Endocrine:  No fatigue, No excessive thirst


   Integumentary:  No rash, No itch


Physical Exam:  


   General Appearance:  WD/WN, no apparent distress


   Eyes:  normal inspection, PERRL, EOMI, sclerae normal


   Neck:  supple, no adenopathy, thyroid normal, no JVD


   Respiratory/Chest:  chest non-tender, lungs clear, normal breath sounds, no 

respiratory distress


   Cardiovascular:  regular rate, rhythm, no edema, no gallop, no JVD


   Abdomen / GI:  normal bowel sounds, non tender, soft, no organomegaly


   Extremities:  normal inspection, no calf tenderness, normal capillary refill

, no pedal edema


   Neurologic/Psychiatric:  CNs II-XII nml as tested, no motor/sensory deficits

, alert, oriented x 3


   Skin:  normal color, warm/dry, no rash


   Lymphatic:  no adenopathy





Hospital Course


60 y/o male with a history of CAD, h/o MI s/p stents, HTN, HLD, diastolic CHF, 

COPD, DM II, anxiety, depression, bipolar disorder, anemia and GERD who 

presented to the ED on 10/30 with persistent headache and elevated INR.  Head 

CT negative for bleed, no acute abnormality.  CXR shows pulmonary congestion 

without overt pulmonary edema.  EKG shows no ischemic changes.  INR elevated at 

5.9.  WBC slightly elevated at 10.87, hgb 8.9.  





Headache, neck stiffness--mild, unlikely infectious, resolved on discharge


-Admit to med/surg for observation


-Blood cultures pending, NGTD


-Toradol 15 mg IV q6h prn pain


-May apply home Voltaren gel to neck QID prn pain, heating pad also added


-Observe for fevers, but suspect this is more muscular





Supratherapeutic INR--[facility] note h/o thrombus, unclear where this is located


-INR 5.9 --> 6.7 --> >8 --> 1.4, continued on coumadin 2 mg PO daily on 

discharge, recheck INR on 11/4


-Hold warfarin, continue to monitor, Vit K 5 mg x 1 11/1/17


-Observe for sherlyn bleeding.  





RLE wound, wound vac


-Consult wound care


-Continue oxycodone 5 mg PO q3h prn pain





CAD, h/o MI s/p stents, HTN, HLD--stable


-Continue metoprolol succinate 50 mg PO BID, amlodipine 10 mg PO qd, 

simvastatin 40 mg PO qd, lisinopril 40 mg PO qd





Diastolic CHF--stable, no acute exacerbation





COPD--no acute exacerbation, asymptomatic but wheezing on exam


-DuoNebs QIDR and q2h prn SOB/wheezing


-Continue home inhalers


-O2 by protocol.  H/o chronic respiratory failure w/supplemental O2





DM II--last HgbA1c checked 8/11/17 was 7.5


-Lantus 10 units SC hs


-Insulin sliding scale


-Check BSGs q ac and qhs


-Continue gabapentin 100 mg PO hs for neuropathy





Urinary retention


-Chronic High


-Continue Flomax 0.4 mg PO hs





Anxiety, depression, bipolar disorder


-Continue escitalopram 20 mg PO qd, mirtazapine 45 mg PO hs, Klonopin 1 mg PO hs

, and Depakote 2000 mg PO hs





Anemia of chronic disease--stable


-Baseline hemoglobin around 8, Hgb at baseline on arrival





GERD


-Continue pantoprazole 40 mg PO qd





DVT prophylaxis


-Warfarin supratherapeutic


-SCDs





Code Status


-Level I, FULL RESUSCITATION STATUS


Total Time Spent:  Greater than 30 minutes


This includes examination of the patient, discharge planning, medication 

reconciliation, and communication with other providers.





Discharge Instructions


Please refer to the electronic Patient Visit Report (Discharge Instructions) 

for additional information.





Additional Copies To


Richard Benitez M.D.

## 2017-11-03 VITALS — SYSTOLIC BLOOD PRESSURE: 171 MMHG | HEART RATE: 56 BPM | DIASTOLIC BLOOD PRESSURE: 74 MMHG

## 2017-11-03 VITALS — DIASTOLIC BLOOD PRESSURE: 88 MMHG | HEART RATE: 64 BPM | SYSTOLIC BLOOD PRESSURE: 182 MMHG

## 2017-11-03 VITALS — DIASTOLIC BLOOD PRESSURE: 78 MMHG | SYSTOLIC BLOOD PRESSURE: 179 MMHG | HEART RATE: 69 BPM

## 2017-11-03 VITALS
OXYGEN SATURATION: 99 % | SYSTOLIC BLOOD PRESSURE: 181 MMHG | DIASTOLIC BLOOD PRESSURE: 75 MMHG | TEMPERATURE: 97.88 F | HEART RATE: 62 BPM

## 2017-11-03 VITALS
OXYGEN SATURATION: 100 % | HEART RATE: 58 BPM | SYSTOLIC BLOOD PRESSURE: 190 MMHG | DIASTOLIC BLOOD PRESSURE: 77 MMHG | TEMPERATURE: 97.7 F

## 2017-11-03 VITALS
SYSTOLIC BLOOD PRESSURE: 178 MMHG | TEMPERATURE: 98.06 F | DIASTOLIC BLOOD PRESSURE: 74 MMHG | OXYGEN SATURATION: 96 % | HEART RATE: 64 BPM

## 2017-11-03 VITALS — OXYGEN SATURATION: 99 %

## 2017-11-03 LAB
INR PPP: 1.2 (ref 0.9–1.1)
PROTHROMBIN TIME: 12.6 SECONDS (ref 9–12)

## 2017-11-03 RX ADMIN — SIMVASTATIN SCH MG: 40 TABLET, FILM COATED ORAL at 21:02

## 2017-11-03 RX ADMIN — STANDARDIZED SENNA CONCENTRATE AND DOCUSATE SODIUM SCH TAB: 8.6; 5 TABLET ORAL at 12:32

## 2017-11-03 RX ADMIN — GABAPENTIN SCH MG: 100 CAPSULE ORAL at 21:01

## 2017-11-03 RX ADMIN — PANTOPRAZOLE SCH MG: 40 TABLET, DELAYED RELEASE ORAL at 08:35

## 2017-11-03 RX ADMIN — KETOROLAC TROMETHAMINE PRN MG: 15 INJECTION INTRAMUSCULAR; INTRAVENOUS at 13:48

## 2017-11-03 RX ADMIN — OXYCODONE HYDROCHLORIDE PRN MG: 5 TABLET ORAL at 10:45

## 2017-11-03 RX ADMIN — DIVALPROEX SODIUM SCH MG: 500 TABLET, FILM COATED, EXTENDED RELEASE ORAL at 21:01

## 2017-11-03 RX ADMIN — NITROFURANTOIN (MONOHYDRATE/MACROCRYSTALS) SCH MG: 75; 25 CAPSULE ORAL at 21:01

## 2017-11-03 RX ADMIN — Medication SCH MCG: at 08:35

## 2017-11-03 RX ADMIN — Medication SCH CAN: at 17:00

## 2017-11-03 RX ADMIN — AMLODIPINE BESYLATE SCH MG: 5 TABLET ORAL at 08:35

## 2017-11-03 RX ADMIN — WARFARIN SCH MG: 2 TABLET ORAL at 15:40

## 2017-11-03 RX ADMIN — DICLOFENAC SODIUM SCH APPLN: 10 GEL TOPICAL at 12:33

## 2017-11-03 RX ADMIN — ESCITALOPRAM OXALATE SCH MG: 20 TABLET, FILM COATED ORAL at 08:34

## 2017-11-03 RX ADMIN — Medication SCH CAN: at 12:33

## 2017-11-03 RX ADMIN — INSULIN GLARGINE SCH UNITS: 100 INJECTION, SOLUTION SUBCUTANEOUS at 21:04

## 2017-11-03 RX ADMIN — METOPROLOL SUCCINATE SCH MG: 50 TABLET, EXTENDED RELEASE ORAL at 08:35

## 2017-11-03 RX ADMIN — ALBUTEROL SULFATE SCH PUFFS: 90 AEROSOL, METERED RESPIRATORY (INHALATION) at 17:13

## 2017-11-03 RX ADMIN — Medication SCH CAN: at 08:34

## 2017-11-03 RX ADMIN — OXYCODONE HYDROCHLORIDE PRN MG: 5 TABLET ORAL at 15:40

## 2017-11-03 RX ADMIN — METOPROLOL SUCCINATE SCH MG: 50 TABLET, EXTENDED RELEASE ORAL at 21:02

## 2017-11-03 RX ADMIN — DICLOFENAC SODIUM SCH APPLN: 10 GEL TOPICAL at 20:59

## 2017-11-03 RX ADMIN — MIRTAZAPINE SCH MG: 15 TABLET, FILM COATED ORAL at 21:00

## 2017-11-03 RX ADMIN — CLONAZEPAM SCH MG: 0.5 TABLET ORAL at 20:59

## 2017-11-03 RX ADMIN — DICLOFENAC SODIUM SCH APPLN: 10 GEL TOPICAL at 08:34

## 2017-11-03 RX ADMIN — DICLOFENAC SODIUM SCH APPLN: 10 GEL TOPICAL at 17:12

## 2017-11-03 RX ADMIN — NITROFURANTOIN (MONOHYDRATE/MACROCRYSTALS) SCH MG: 75; 25 CAPSULE ORAL at 08:35

## 2017-11-03 RX ADMIN — LISINOPRIL SCH MG: 20 TABLET ORAL at 08:36

## 2017-11-03 RX ADMIN — ALBUTEROL SULFATE SCH PUFFS: 90 AEROSOL, METERED RESPIRATORY (INHALATION) at 05:58

## 2017-11-03 RX ADMIN — TAMSULOSIN HYDROCHLORIDE SCH MG: 0.4 CAPSULE ORAL at 21:00

## 2017-11-03 RX ADMIN — OXYCODONE HYDROCHLORIDE PRN MG: 5 TABLET ORAL at 20:59

## 2017-11-03 RX ADMIN — ALBUTEROL SULFATE SCH PUFFS: 90 AEROSOL, METERED RESPIRATORY (INHALATION) at 12:33

## 2017-11-03 NOTE — PROGRESS NOTE
Subjective


Date of Service:


Nov 3, 2017.


Subjective


Pt evaluation today including:  conversation w/ patient, physical exam, chart 

review, lab review, review of studies, review of inpatient medication list


Pt resting in bed


States mild pain in neck but not as bad as it was on admission


No fevers or chills


No acute events overnight





Problem List


Medical Problems:


(1) Acute CHF


Status: Acute  





(2) Acute coronary syndrome


Status: Acute  





(3) Acute headache


Status: Acute  





(4) Altered mental status


Status: Acute  





(5) Anemia


Status: Acute  





(6) Anginal pain


Status: Acute  





(7) Appendicitis


Status: Acute  





(8) Cellulitis


Status: Acute  





(9) CHF (congestive heart failure)


Status: Acute  





(10) Congestive heart failure


Status: Acute  





(11) Diabetes mellitus with hyperglycemia


Status: Acute  





(12) Failure of outpatient treatment


Status: Acute  





(13) Intra-abdominal abscess


Status: Acute  





(14) Lower abdominal pain of unknown etiology


Status: Acute  





(15) Neck pain


Status: Acute  





(16) Precordial chest pain


Status: Acute  





(17) Pulmonary edema


Status: Acute  





(18) Respiratory acidosis


Status: Acute  





(19) Respiratory distress


Status: Acute  





(20) SOB (shortness of breath)


Status: Acute  











Review of Systems


Constitutional:  No fever, No chills, No sweats, No weakness


Eyes:  No worsening of vision, No eye pain, No redness, No discharge


Respiratory:  No cough, No sputum, No wheezing, No shortness of breath


Cardiac:  No chest pain, No orthopnea, No PND


Abdomen:  No pain, No nausea, No vomiting, No diarrhea


Musculoskeletal:  + muscle pain, No joint pain, No swelling, No calf pain


Male :  No dysuria, No urinary frequency, No incontinence, No slowing stream


Psychiatric:  No depression symptoms, No anhedonism, No anxiety, No insomnia


Endo:  No fatigue, No excessive thirst, No excessive urination


Skin:  No rash, No itch





Objective


Vital Signs











  Date Time  Temp Pulse Resp B/P (MAP) Pulse Ox O2 Delivery O2 Flow Rate FiO2


 


11/3/17 09:55      Nasal Cannula 3.0 


 


11/3/17 07:27 36.7 64 18 178/74 (108) 96 Room Air  


 


11/3/17 01:40  56  171/74 (106)    


 


11/3/17 00:41 36.5 58 20 190/77 (114) 100 Nasal Cannula 2.0 


 


11/3/17 00:00      Nasal Cannula 3.0 


 


11/2/17 23:48  58  143/87 (105) 99 Nasal Cannula 3.0 


 


11/2/17 20:20  64  181/73 (109)    


 


11/2/17 16:00      Nasal Cannula 3.0 


 


11/2/17 15:50 36.4 51 16 149/76 (100) 98 Nasal Cannula 3.0 











Physical Exam


General Appearance:  WD/WN, no apparent distress


Eyes:  normal inspection, PERRL, EOMI, sclerae normal


Neck:  supple, no adenopathy, thyroid normal, no JVD


Respiratory/Chest:  chest non-tender, lungs clear, normal breath sounds, no 

respiratory distress


Cardiovascular:  regular rate, rhythm, no edema, no gallop, no JVD


Abdomen:  normal bowel sounds, non tender, soft, no organomegaly


Extremities:  normal range of motion, non-tender, normal inspection, no pedal 

edema


Neurologic/Psychiatric:  CNs II-XII nml as tested, no motor/sensory deficits, 

alert, normal mood/affect


Skin:  normal color, warm/dry, no rash


Lymphatic:  no adenopathy





Laboratory Results





Last 24 Hours








Test


  11/2/17


16:40 11/2/17


20:50 11/3/17


07:44 11/3/17


07:53


 


Bedside Glucose 138 mg/dl  147 mg/dl   146 mg/dl 


 


Prothrombin Time   12.6 SECONDS  


 


Prothromb Time International


Ratio 


  


  1.2 


  


 


 


Test


  11/3/17


11:33 


  


  


 


 


Bedside Glucose 175 mg/dl    











Assessment and Plan


60 y/o male with a history of CAD, h/o MI s/p stents, HTN, HLD, diastolic CHF, 

COPD, DM II, anxiety, depression, bipolar disorder, anemia and GERD who 

presented to the ED on 10/30 with persistent headache and elevated INR.  Head 

CT negative for bleed, no acute abnormality.  CXR shows pulmonary congestion 

without overt pulmonary edema.  EKG shows no ischemic changes.  INR elevated at 

5.9.  WBC slightly elevated at 10.87, hgb 8.9.  





Headache, neck stiffness--mild, unlikely infectious, resolved on discharge


-Admit to med/surg for observation


-Blood cultures pending, NGTD


-Toradol 15 mg IV q6h prn pain


-May apply home Voltaren gel to neck QID prn pain, heating pad also added


-Observe for fevers, but suspect this is more muscular





Supratherapeutic INR--[facility] note h/o thrombus, unclear where this is located


-INR 5.9 --> 6.7 --> >8 --> 1.4, continued on coumadin 2 mg PO daily on 

discharge, recheck INR on 11/4


-Hold warfarin, continue to monitor, Vit K 5 mg x 1 11/1/17


-Observe for sherlyn bleeding.  





RLE wound, wound vac


-Consult wound care


-Continue oxycodone 5 mg PO q3h prn pain





CAD, h/o MI s/p stents, HTN, HLD--stable


-Continue metoprolol succinate 50 mg PO BID, amlodipine 10 mg PO qd, 

simvastatin 40 mg PO qd, lisinopril 40 mg PO qd





Diastolic CHF--stable, no acute exacerbation





COPD--no acute exacerbation, asymptomatic but wheezing on exam


-DuoNebs QIDR and q2h prn SOB/wheezing


-Continue home inhalers


-O2 by protocol.  H/o chronic respiratory failure w/supplemental O2





DM II--last HgbA1c checked 8/11/17 was 7.5


-Lantus 10 units SC hs


-Insulin sliding scale


-Check BSGs q ac and qhs


-Continue gabapentin 100 mg PO hs for neuropathy





Urinary retention


-Chronic High


-Continue Flomax 0.4 mg PO hs





Anxiety, depression, bipolar disorder


-Continue escitalopram 20 mg PO qd, mirtazapine 45 mg PO hs, Klonopin 1 mg PO hs

, and Depakote 2000 mg PO hs





Anemia of chronic disease--stable


-Baseline hemoglobin around 8, Hgb at baseline on arrival





GERD


-Continue pantoprazole 40 mg PO qd





DVT prophylaxis


-Warfarin supratherapeutic


-SCDs





Code Status


-Level I, FULL RESUSCITATION STATUS





Dispo: Awaiting [facility] insurance auth

## 2017-11-04 VITALS
TEMPERATURE: 98.42 F | DIASTOLIC BLOOD PRESSURE: 77 MMHG | HEART RATE: 62 BPM | SYSTOLIC BLOOD PRESSURE: 170 MMHG | OXYGEN SATURATION: 94 %

## 2017-11-04 VITALS — OXYGEN SATURATION: 99 %

## 2017-11-04 VITALS — DIASTOLIC BLOOD PRESSURE: 86 MMHG | SYSTOLIC BLOOD PRESSURE: 188 MMHG | HEART RATE: 53 BPM

## 2017-11-04 VITALS
DIASTOLIC BLOOD PRESSURE: 85 MMHG | HEART RATE: 51 BPM | SYSTOLIC BLOOD PRESSURE: 172 MMHG | OXYGEN SATURATION: 100 % | TEMPERATURE: 97.7 F

## 2017-11-04 VITALS — DIASTOLIC BLOOD PRESSURE: 70 MMHG | HEART RATE: 66 BPM | SYSTOLIC BLOOD PRESSURE: 170 MMHG

## 2017-11-04 VITALS
HEART RATE: 56 BPM | OXYGEN SATURATION: 99 % | TEMPERATURE: 98.24 F | DIASTOLIC BLOOD PRESSURE: 76 MMHG | SYSTOLIC BLOOD PRESSURE: 180 MMHG

## 2017-11-04 VITALS
HEART RATE: 51 BPM | TEMPERATURE: 98.24 F | DIASTOLIC BLOOD PRESSURE: 64 MMHG | OXYGEN SATURATION: 99 % | SYSTOLIC BLOOD PRESSURE: 136 MMHG

## 2017-11-04 VITALS — HEART RATE: 60 BPM

## 2017-11-04 LAB
ALBUMIN/GLOB SERPL: 0.3 {RATIO} (ref 0.9–2)
ALP SERPL-CCNC: 53 U/L (ref 45–117)
ALT SERPL-CCNC: < 6 U/L (ref 12–78)
ANION GAP SERPL CALC-SCNC: 1 MMOL/L (ref 3–11)
AST SERPL-CCNC: 12 U/L (ref 15–37)
BASOPHILS # BLD: 0.02 K/UL (ref 0–0.2)
BASOPHILS NFR BLD: 0.4 %
BUN SERPL-MCNC: 33 MG/DL (ref 7–18)
BUN/CREAT SERPL: 33.4 (ref 10–20)
CALCIUM SERPL-MCNC: 8.5 MG/DL (ref 8.5–10.1)
CHLORIDE SERPL-SCNC: 107 MMOL/L (ref 98–107)
CO2 SERPL-SCNC: 28 MMOL/L (ref 21–32)
COMPLETE: YES
CREAT CL PREDICTED SERPL C-G-VRATE: 90.9 ML/MIN
CREAT SERPL-MCNC: 0.98 MG/DL (ref 0.6–1.4)
EOSINOPHIL NFR BLD AUTO: 384 K/UL (ref 130–400)
GLOBULIN SER-MCNC: 5 GM/DL (ref 2.5–4)
GLUCOSE SERPL-MCNC: 112 MG/DL (ref 70–99)
HCT VFR BLD CALC: 25.6 % (ref 42–52)
IG%: 0.8 %
IMM GRANULOCYTES NFR BLD AUTO: 24 %
INR PPP: 1.1 (ref 0.9–1.1)
LYMPHOCYTES # BLD: 1.25 K/UL (ref 1.2–3.4)
MCH RBC QN AUTO: 28.2 PG (ref 25–34)
MCHC RBC AUTO-ENTMCNC: 32.8 G/DL (ref 32–36)
MCV RBC AUTO: 85.9 FL (ref 80–100)
MONOCYTES NFR BLD: 13.6 %
NEUTROPHILS # BLD AUTO: 3.5 %
NEUTROPHILS NFR BLD AUTO: 57.7 %
PARTIAL THROMBOPLASTIN RATIO: 1.3
PARTIAL THROMBOPLASTIN RATIO: 1.8
PMV BLD AUTO: 8 FL (ref 7.4–10.4)
POTASSIUM SERPL-SCNC: 4.5 MMOL/L (ref 3.5–5.1)
PROTHROMBIN TIME: 12.3 SECONDS (ref 9–12)
RBC # BLD AUTO: 2.98 M/UL (ref 4.7–6.1)
SODIUM SERPL-SCNC: 136 MMOL/L (ref 136–145)
WBC # BLD AUTO: 5.21 K/UL (ref 4.8–10.8)

## 2017-11-04 RX ADMIN — ESCITALOPRAM OXALATE SCH MG: 20 TABLET, FILM COATED ORAL at 08:48

## 2017-11-04 RX ADMIN — METOPROLOL SUCCINATE SCH MG: 50 TABLET, EXTENDED RELEASE ORAL at 08:49

## 2017-11-04 RX ADMIN — Medication SCH CAN: at 16:50

## 2017-11-04 RX ADMIN — DIVALPROEX SODIUM SCH MG: 500 TABLET, FILM COATED, EXTENDED RELEASE ORAL at 21:07

## 2017-11-04 RX ADMIN — METOPROLOL SUCCINATE SCH MG: 50 TABLET, EXTENDED RELEASE ORAL at 19:49

## 2017-11-04 RX ADMIN — OXYCODONE HYDROCHLORIDE PRN MG: 5 TABLET ORAL at 23:41

## 2017-11-04 RX ADMIN — CLONAZEPAM SCH MG: 0.5 TABLET ORAL at 21:06

## 2017-11-04 RX ADMIN — DICLOFENAC SODIUM SCH APPLN: 10 GEL TOPICAL at 19:49

## 2017-11-04 RX ADMIN — Medication SCH CAN: at 12:31

## 2017-11-04 RX ADMIN — MIRTAZAPINE SCH MG: 15 TABLET, FILM COATED ORAL at 21:08

## 2017-11-04 RX ADMIN — DICLOFENAC SODIUM SCH APPLN: 10 GEL TOPICAL at 08:46

## 2017-11-04 RX ADMIN — STANDARDIZED SENNA CONCENTRATE AND DOCUSATE SODIUM SCH TAB: 8.6; 5 TABLET ORAL at 12:31

## 2017-11-04 RX ADMIN — AMLODIPINE BESYLATE SCH MG: 5 TABLET ORAL at 08:49

## 2017-11-04 RX ADMIN — SIMVASTATIN SCH MG: 40 TABLET, FILM COATED ORAL at 21:07

## 2017-11-04 RX ADMIN — INSULIN GLARGINE SCH UNITS: 100 INJECTION, SOLUTION SUBCUTANEOUS at 21:10

## 2017-11-04 RX ADMIN — ALBUTEROL SULFATE SCH PUFFS: 90 AEROSOL, METERED RESPIRATORY (INHALATION) at 05:51

## 2017-11-04 RX ADMIN — Medication SCH CAN: at 08:47

## 2017-11-04 RX ADMIN — DICLOFENAC SODIUM SCH APPLN: 10 GEL TOPICAL at 12:31

## 2017-11-04 RX ADMIN — PANTOPRAZOLE SCH MG: 40 TABLET, DELAYED RELEASE ORAL at 08:49

## 2017-11-04 RX ADMIN — Medication SCH MCG: at 08:50

## 2017-11-04 RX ADMIN — HEPARIN SODIUM PRN MLS/HR: 1000 INJECTION, SOLUTION INTRAVENOUS; SUBCUTANEOUS at 11:10

## 2017-11-04 RX ADMIN — ALBUTEROL SULFATE SCH PUFFS: 90 AEROSOL, METERED RESPIRATORY (INHALATION) at 12:31

## 2017-11-04 RX ADMIN — TAMSULOSIN HYDROCHLORIDE SCH MG: 0.4 CAPSULE ORAL at 21:08

## 2017-11-04 RX ADMIN — GABAPENTIN SCH MG: 100 CAPSULE ORAL at 21:08

## 2017-11-04 RX ADMIN — WARFARIN SCH MG: 2 TABLET ORAL at 16:51

## 2017-11-04 RX ADMIN — ALBUTEROL SULFATE SCH PUFFS: 90 AEROSOL, METERED RESPIRATORY (INHALATION) at 18:14

## 2017-11-04 RX ADMIN — ALBUTEROL SULFATE SCH PUFFS: 90 AEROSOL, METERED RESPIRATORY (INHALATION) at 00:45

## 2017-11-04 RX ADMIN — LISINOPRIL SCH MG: 20 TABLET ORAL at 08:50

## 2017-11-04 RX ADMIN — DICLOFENAC SODIUM SCH APPLN: 10 GEL TOPICAL at 16:50

## 2017-11-04 RX ADMIN — KETOROLAC TROMETHAMINE PRN MG: 15 INJECTION INTRAMUSCULAR; INTRAVENOUS at 06:04

## 2017-11-04 NOTE — DIAGNOSTIC IMAGING REPORT
CT HEAD WITHOUT CONTRAST (CT)



CLINICAL HISTORY: Headache and neck stiffness, prior CT head is negative,     



COMPARISON STUDY:  10/30/2017



TECHNIQUE:  Axial CT of the brain is performed from the vertex to the skull

base. IV contrast was not administered for this examination. A dose lowering

technique was utilized adhering to the principles of ALARA.

 



CT DOSE: 709.48 mGy.cm



FINDINGS:



No intra or extra-axial mass lesions are visualized. There is no CT evidence of

acute cortical infarction. There is no evidence of midline shift. There is no

acute  hemorrhage. No calvarial fractures are visualized. 

There are minimal white matter hypodensities likely on a small vessel basis.

There is no evidence of pathologic ventricular dilatation.

There is a left mastoid effusion.



IMPRESSION: 

1. Left mastoid effusion

2. Otherwise negative noncontrast head CT.







Electronically signed by:  Basil Chapin M.D.

11/4/2017 10:36 AM



Dictated Date/Time:  11/4/2017 10:31 AM

## 2017-11-04 NOTE — PROGRESS NOTE
Subjective


Date of Service:


Nov 4, 2017.


Subjective


Pt evaluation today including:  conversation w/ patient, physical exam, chart 

review, lab review, review of studies, review of inpatient medication list





Problem List


Medical Problems:


(1) Acute CHF


Status: Acute  





(2) Acute coronary syndrome


Status: Acute  





(3) Acute headache


Status: Acute  





(4) Altered mental status


Status: Acute  





(5) Anemia


Status: Acute  





(6) Anginal pain


Status: Acute  





(7) Appendicitis


Status: Acute  





(8) Cellulitis


Status: Acute  





(9) CHF (congestive heart failure)


Status: Acute  





(10) Congestive heart failure


Status: Acute  





(11) Diabetes mellitus with hyperglycemia


Status: Acute  





(12) Failure of outpatient treatment


Status: Acute  





(13) Intra-abdominal abscess


Status: Acute  





(14) Lower abdominal pain of unknown etiology


Status: Acute  





(15) Neck pain


Status: Acute  





(16) Precordial chest pain


Status: Acute  





(17) Pulmonary edema


Status: Acute  





(18) Respiratory acidosis


Status: Acute  





(19) Respiratory distress


Status: Acute  





(20) SOB (shortness of breath)


Status: Acute  











Review of Systems


Constitutional:  + fatigue, No see HPI, No fever, No chills, No sweats, No 

weight loss, No weakness, No problem reported


Eyes:  No see HPI, No worsening of vision, No eye pain, No redness, No discharge

, No diplopia, No problem reported


ENT:  No see HPI, No hearing loss, No unusual epistaxis, No nasal symptoms, No 

sore throat, No tinnitus, No dental problems, No trouble swallowing, No problem 

reported


Respiratory:  No see HPI, No cough, No sputum, No wheezing, No shortness of 

breath, No dyspnea on exertion, No dyspnea at rest, No hemoptysis, No problem 

reported


Cardiac:  No see HPI, No chest pain, No orthopnea, No PND, No edema, No 

claudication, No palpitations, No problem reported


Abdomen:  No see HPI, No pain, No nausea, No vomiting, No diarrhea, No 

constipation, No GI bleeding, No problem reported


Musculoskeletal:  + joint pain, + muscle pain, + calf pain


Male :  No see HPI, No dysuria, No urinary frequency, No incontinence, No 

nocturia more than once/night, No slowing stream, No hematuria, No sexual 

dysfunction, No problem reported


Neurologic:  No see HPI, No memory loss, No paralysis, No weakness, No numbness/

tingling, No vertigo, No balance problems, No problem reported


Psychiatric:  No see HPI, No depression symptoms, No anhedonism, No anxiety, No 

insomnia, No substance abuse, No problem reported


Endo:  No see HPI, No fatigue, No excessive thirst, No excessive urination, No 

problem reported


Skin:  No see HPI, No rash, No itch, No new/changing skin lesions, No color 

change, No bleeding, No problem reported





Medications





Current Inpatient Medications








 Medications


  (Trade)  Dose


 Ordered  Sig/Dc


 Route  Start Time


 Stop Time Status Last Admin


Dose Admin


 


 Acetaminophen


  (Tylenol Tab)  650 mg  Q4H  PRN


 PO  10/30/17 14:15


 11/29/17 14:14   


 


 


 Al Hydrox/Mg


 Hydrox/Simethicone


  (Maalox Max Susp)  15 ml  Q4H  PRN


 PO  10/30/17 14:15


 11/29/17 14:14   


 


 


 Magnesium


 Hydroxide


  (Milk Of


 Magnesia Susp)  30 ml  Q6H  PRN


 PO  10/30/17 14:15


 11/29/17 14:14   


 


 


 Polyethylene


  (Miralax Powder


 Packet)  17 gm  DAILY  PRN


 PO  10/30/17 14:15


 11/29/17 14:14   


 


 


 Ondansetron HCl


  (Zofran Inj)  4 mg  Q6H  PRN


 IV  10/30/17 14:15


 11/29/17 14:14   


 


 


 Glucose


  (Glucose 40% Gel)  15-30


 GRAMS 15


 GRAMS...  UD  PRN


 PO  10/30/17 14:15


 11/29/17 14:14   


 


 


 Glucose


  (Glucose Chew


 Tab)  4-8


 Tablets 4


 Tabl...  UD  PRN


 PO  10/30/17 14:15


 11/29/17 14:14   


 


 


 Dextrose


  (Dextrose 50%


 50ML Syringe)  25-50ML OF


 50% DW IV


 FOR...  UD  PRN


 IV  10/30/17 14:15


 11/29/17 14:14   


 


 


 Glucagon


  (Glucagon Inj)  1 mg  UD  PRN


 SQ  10/30/17 14:15


 11/29/17 14:14   


 


 


 Albuterol


  (Ventolin Hfa


 Inhaler)  2 puffs  Q6


 INH  10/30/17 18:00


 11/29/17 17:59  11/4/17 05:51


2 PUFFS


 


 Bisacodyl


  (Dulcolax Supp)  10 mg  DAILY  PRN


 LA  10/30/17 14:15


 11/29/17 14:14   


 


 


 Calcium Carbonate


  (Tums Chew Tab)  500 mg  TID  PRN


 PO  10/30/17 14:15


 11/29/17 14:14   


 


 


 Clonazepam


  (Klonopin Tab)  1 mg  HS


 PO  10/30/17 21:00


 11/29/17 20:59  11/3/17 20:59


1 MG


 


 Cyanocobalamin


  (Vitamin B-12


 Tab)  100 mcg  DAILY


 PO  10/31/17 08:00


 11/30/17 08:59  11/4/17 08:50


100 MCG


 


 Divalproex Sodium


  (Depakote


 Extended Rel Tab)  2,000 mg  HS


 PO  10/30/17 21:00


 11/29/17 20:59  11/3/17 21:01


2,000 MG


 


 Docusate Sodium


  (coLACE CAP)  100 mg  BID  PRN


 PO  10/30/17 14:15


 11/29/17 14:14   


 


 


 Escitalopram


 Oxalate


  (Lexapro Tab)  20 mg  QAM


 PO  10/31/17 08:00


 11/30/17 08:59  11/4/17 08:48


20 MG


 


 Fluticasone


 Propionate


  (Flonase Nasal


 Spray)  2 sprays  DAILY  PRN


 VERONICA  10/30/17 14:15


 11/29/17 14:14   


 


 


 Gabapentin


  (Neurontin Cap)  100 mg  HS


 PO  10/30/17 21:00


 11/29/17 20:59  11/3/17 21:01


100 MG


 


 Insulin Glargine


  (Lantus Solostar


 Pen)  10 units  HS


 SC  10/30/17 21:00


 11/29/17 20:59  11/3/17 21:04


10 UNITS


 


 Lisinopril


  (Zestril Tab)  40 mg  DAILY


 PO  10/31/17 08:00


 11/30/17 08:59  11/4/17 08:50


40 MG


 


 Metoprolol


 Succinate


  (Toprol Xl Tab)  50 mg  BID


 PO  10/30/17 20:00


 11/29/17 20:59  11/4/17 08:49


50 MG


 


 Mirtazapine


  (Remeron Tab)  45 mg  HS


 PO  10/30/17 21:00


 11/29/17 20:59  11/3/17 21:00


45 MG


 


 Nitroglycerin


  (Nitrostat Tab)  0.4 mg  PRN  PRN


 UT  10/30/17 14:15


 11/29/17 14:14   


 


 


 Oxycodone HCl


  (Roxicodone


 Immediate Rel Tab)  5 mg  Q3H  PRN


 PO  10/30/17 14:15


 11/13/17 14:14  11/3/17 20:59


5 MG


 


 Pantoprazole


 Sodium


  (Protonix Tab)  40 mg  DAILY


 PO  10/31/17 08:00


 11/30/17 08:59  11/4/17 08:49


40 MG


 


 Senna/Docusate


 Sodium


  (Senokot S Tab)  1 tab  QDL


 PO  10/31/17 11:00


 11/30/17 10:59  10/31/17 12:38


1 TAB


 


 Simvastatin


  (Zocor Tab)  40 mg  QPM


 PO  10/30/17 21:00


 11/29/17 20:59  11/3/17 21:02


40 MG


 


 Tamsulosin HCl


  (Flomax Cap)  0.4 mg  HS


 PO  10/30/17 21:00


 11/29/17 20:59  11/3/17 21:00


0.4 MG


 


 Amlodipine


 Besylate


  (Norvasc Tab)  10 mg  QAM


 PO  10/31/17 08:00


 11/30/17 08:59  11/4/17 08:49


10 MG


 


 Ketorolac


 Tromethamine


  (Toradol Inj)  15 mg  Q6H  PRN


 IV  10/30/17 14:15


 11/4/17 14:14  11/4/17 06:04


15 MG


 


 Miscellaneous


 Information


  (Order Awaiting


 Action)  1 ea  QS


 N/A  10/31/17 00:00


 11/30/17 00:00   


 


 


 Diclofenac Sodium


  (Voltaren 1% Top


 Gel)  1 appln  QID


 EXT  10/30/17 20:45


 11/29/17 14:14  11/3/17 20:59


1 APPLN


 


 Miscellaneous


  (Iv Fluids


 Completed)  1 ea  PRN  PRN


 N/A  10/30/17 21:00


 10/30/18 20:59   


 


 


 Enteral


 Nutritional


 Formula


  (Boost Glucose


 Control)  1 can  TIDM


 PO  10/31/17 17:00


 11/30/17 16:59  11/3/17 12:33


1 CAN


 


 Warfarin Sodium


  (Coumadin Tab)  2 mg  DAILY@16


 PO  11/2/17 16:00


 12/2/17 15:59  11/3/17 15:40


2 MG


 


 Hydralazine HCl


  (Apresoline Tab)  100 mg  BID


 PO  11/4/17 09:05


 12/4/17 09:04   


 











Objective


Vital Signs











  Date Time  Temp Pulse Resp B/P (MAP) Pulse Ox O2 Delivery O2 Flow Rate FiO2


 


11/4/17 07:25 36.5 51 18 172/85 (114) 100  3.0 


 


11/4/17 00:20 36.8 56 18 209/85 (126) 99 Room Air  





    180/76 (110)    


 


11/4/17 00:00     99 Nasal Cannula 3.0 


 


11/3/17 22:29  69  179/78 (111)    


 


11/3/17 20:55  64  182/88 (119)    


 


11/3/17 16:00     99 Nasal Cannula 3.0 


 


11/3/17 15:39 36.6 62 18 181/75 (110) 99 Nasal Cannula 3.0 


 


11/3/17 09:55      Nasal Cannula 3.0 











Physical Exam


General Appearance:  WD/WN, no apparent distress


Eyes:  normal inspection, EOMI


ENT:  normal ENT inspection, hearing grossly normal


Neck:  supple


Respiratory/Chest:  chest non-tender, lungs clear, normal breath sounds, no 

respiratory distress, no accessory muscle use


Cardiovascular:  regular rate, rhythm, no edema, no gallop, no JVD, no murmur


Abdomen:  normal bowel sounds, non tender, soft, no organomegaly, no pulsatile 

mass


Extremities:  normal range of motion, non-tender, normal inspection, + 

pertinent finding (wound vac is in place)


Neurologic/Psychiatric:  CNs II-XII nml as tested, no motor/sensory deficits, 

alert, normal mood/affect, oriented x 3


Skin:  normal color, warm/dry, no rash





Laboratory Results





Last 24 Hours








Test


  11/3/17


11:33 11/3/17


16:22 11/3/17


20:20 11/4/17


07:27


 


Bedside Glucose 175 mg/dl  170 mg/dl  168 mg/dl  122 mg/dl 


 


Test


  11/4/17


08:10 11/4/17


09:17 


  


 


 


White Blood Count 5.21 K/uL    


 


Red Blood Count 2.98 M/uL    


 


Hemoglobin 8.4 g/dL    


 


Hematocrit 25.6 %    


 


Mean Corpuscular Volume 85.9 fL    


 


Mean Corpuscular Hemoglobin 28.2 pg    


 


Mean Corpuscular Hemoglobin


Concent 32.8 g/dl 


  


  


  


 


 


Platelet Count 384 K/uL    


 


Mean Platelet Volume 8.0 fL    


 


Neutrophils (%) (Auto) 57.7 %    


 


Lymphocytes (%) (Auto) 24.0 %    


 


Monocytes (%) (Auto) 13.6 %    


 


Eosinophils (%) (Auto) 3.5 %    


 


Basophils (%) (Auto) 0.4 %    


 


Neutrophils # (Auto) 3.01 K/uL    


 


Lymphocytes # (Auto) 1.25 K/uL    


 


Monocytes # (Auto) 0.71 K/uL    


 


Eosinophils # (Auto) 0.18 K/uL    


 


Basophils # (Auto) 0.02 K/uL    


 


RDW Standard Deviation 45.2 fL    


 


RDW Coefficient of Variation 14.3 %    


 


Immature Granulocyte % (Auto) 0.8 %    


 


Immature Granulocyte # (Auto) 0.04 K/uL    


 


Sodium Level 136 mmol/L    


 


Potassium Level 4.5 mmol/L    


 


Chloride Level 107 mmol/L    


 


Carbon Dioxide Level 28 mmol/L    


 


Anion Gap 1.0 mmol/L    


 


Blood Urea Nitrogen 33 mg/dl    


 


Creatinine 0.98 mg/dl    


 


Est Creatinine Clear Calc


Drug Dose 90.9 ml/min 


  


  


  


 


 


Estimated GFR (


American) 97.4 


  


  


  


 


 


Estimated GFR (Non-


American 84.1 


  


  


  


 


 


BUN/Creatinine Ratio 33.4    


 


Random Glucose 112 mg/dl    


 


Lactic Acid Level 0.8 mmol/L    


 


Calcium Level 8.5 mg/dl    


 


Total Bilirubin 0.2 mg/dl    


 


Aspartate Amino Transf


(AST/SGOT) 12 U/L 


  


  


  


 


 


Alanine Aminotransferase


(ALT/SGPT) < 6 U/L 


  


  


  


 


 


Alkaline Phosphatase 53 U/L    


 


Total Protein 6.7 gm/dl    


 


Albumin 1.7 gm/dl    


 


Globulin 5.0 gm/dl    


 


Albumin/Globulin Ratio 0.3    











Assessment and Plan


61 years old man with multiple medical problems.  Patient had severe CAD with 

non-STEMI 2007 complicated by V. fib and stent distal left circumflex.


2010 also required a drug-eluting stents in mid LAD, and prior mention stent in 

left circumflex had 100% in-stent stenosis.


Patient also has a peripheral vascular disease with 100 a stenosis of left 

superficial femoral artery and 100% stenosis of left anterior tibial artery 

status post angiography and stenting of the gluteal and SFA on May 2017.


Patient was admitted on August 2017 for cellulitis and myonecrosis.  He 

required extensive I&D currently has a wound VAC.


He presented on October 13 to the ER with headache and neck stiffness, found to 

have a supratherapeutic INR


Patient is poor historian he does not know why he is in Coumadin, mentioned 

somewhere in his history that he had a blood clot without any details about 

location or nature of the blood clot


As mentioned above patient had a recent stent placed in his gluteal and SFA 6 

months ago


Patient distinctly remember that when they started him in Coumadin they took 

him off Plavix





Assessment


Supratherapeutic INR, currently subtherapeutic


Giving his recent arterial stents in gluteal and SFA, will start him on heparin 

drip until INR is therapeutic


Report in order to retrieve patient's records from primary care physician and 

from [facility] in order to understand why he is on Coumadin and if another 

alternative like Eliquis can be considered during his problems and keeping a 

therapeutic INR





Headache and neck stiffness


As patient is on a blood thinner will stop Toradol


Headache improved, no neurologic deficit


Before starting heparin will get a CT scan head to rule out any occult bleed or 

evolving bleeding, previous CAT scan head was negative


Avoid MRI as he gave a history of sharp nail in his finger





CAD with multiple stents in the past


Obviously outpatient treatment team decided that anticoagulation is sufficient, 

currently not on any antiplatelet





Chronic anemia, combined anemia of chronic disease plus iron deficiency anemia


Continue iron supplement and stool softener





Essential hypertension, uncontrolled


Patient was in lisinopril 20 mg twice a day currently taking 40 mg daily


Also on Toprol-XL twice a day


-Continue metoprolol succinate 50 mg PO BID, amlodipine 10 mg PO qd, 

simvastatin 40 mg PO qd, lisinopril 40 mg PO qd


Added hydralazine 100 mg twice a day today


Continue to monitor





Chronic respiratory failure on home O2 2-3 L


Likely multifactorial, suspect pulmonary hypertension


When I entered the room today patient was snoring really loud, suspected sleep 

apnea


We'll order nighttime oxygen monitor





Recent lower extremity abscess/myonecrosis


wound vac looks intact


Consult wound care


Patient said that he takes care of this wound VAC with [facility] and he will 

continue to follow up with them








Diastolic CHF--stable, no acute exacerbation





COPD--no acute exacerbation, asymptomatic but wheezing on exam


-DuoNebs QIDR and q2h prn SOB/wheezing


-Continue home inhalers


-O2 by protocol.  H/o chronic respiratory failure w/supplemental O2





DM II--last HgbA1c checked 8/11/17 was 7.5


-Lantus 10 units SC hs


-Insulin sliding scale


-Check BSGs q ac and qhs


-Continue gabapentin 100 mg PO hs for neuropathy





Urinary retention


-Chronic High


-Continue Flomax 0.4 mg PO hs





Anxiety, depression, bipolar disorder


-Continue escitalopram 20 mg PO qd, mirtazapine 45 mg PO hs, Klonopin 1 mg PO hs

, and Depakote 2000 mg PO hs








GERD


-Continue pantoprazole 40 mg PO qd





DVT prophylaxis


-Warfarin supratherapeutic


-SCDs





Code Status


-Level I, FULL RESUSCITATION STATUS

## 2017-11-05 VITALS
SYSTOLIC BLOOD PRESSURE: 171 MMHG | HEART RATE: 64 BPM | TEMPERATURE: 98.24 F | DIASTOLIC BLOOD PRESSURE: 65 MMHG | OXYGEN SATURATION: 95 %

## 2017-11-05 VITALS — DIASTOLIC BLOOD PRESSURE: 75 MMHG | OXYGEN SATURATION: 96 % | SYSTOLIC BLOOD PRESSURE: 190 MMHG | HEART RATE: 62 BPM

## 2017-11-05 VITALS — OXYGEN SATURATION: 95 %

## 2017-11-05 VITALS
TEMPERATURE: 98.24 F | HEART RATE: 63 BPM | OXYGEN SATURATION: 95 % | SYSTOLIC BLOOD PRESSURE: 183 MMHG | DIASTOLIC BLOOD PRESSURE: 71 MMHG

## 2017-11-05 VITALS
TEMPERATURE: 97.88 F | SYSTOLIC BLOOD PRESSURE: 184 MMHG | OXYGEN SATURATION: 97 % | HEART RATE: 54 BPM | DIASTOLIC BLOOD PRESSURE: 77 MMHG

## 2017-11-05 VITALS — SYSTOLIC BLOOD PRESSURE: 194 MMHG | DIASTOLIC BLOOD PRESSURE: 71 MMHG

## 2017-11-05 LAB
ALBUMIN/GLOB SERPL: 0.4 {RATIO} (ref 0.9–2)
ALP SERPL-CCNC: 58 U/L (ref 45–117)
ALT SERPL-CCNC: 7 U/L (ref 12–78)
ANION GAP SERPL CALC-SCNC: 7 MMOL/L (ref 3–11)
AST SERPL-CCNC: 13 U/L (ref 15–37)
BASOPHILS # BLD: 0.02 K/UL (ref 0–0.2)
BASOPHILS NFR BLD: 0.3 %
BUN SERPL-MCNC: 35 MG/DL (ref 7–18)
BUN/CREAT SERPL: 34.3 (ref 10–20)
CALCIUM SERPL-MCNC: 8.5 MG/DL (ref 8.5–10.1)
CHLORIDE SERPL-SCNC: 104 MMOL/L (ref 98–107)
CO2 SERPL-SCNC: 28 MMOL/L (ref 21–32)
COMPLETE: YES
CREAT CL PREDICTED SERPL C-G-VRATE: 88.2 ML/MIN
CREAT SERPL-MCNC: 1.01 MG/DL (ref 0.6–1.4)
EOSINOPHIL NFR BLD AUTO: 369 K/UL (ref 130–400)
GLOBULIN SER-MCNC: 4.8 GM/DL (ref 2.5–4)
GLUCOSE SERPL-MCNC: 116 MG/DL (ref 70–99)
HCT VFR BLD CALC: 27.3 % (ref 42–52)
IG%: 0.9 %
IMM GRANULOCYTES NFR BLD AUTO: 22.5 %
INR PPP: 1.1 (ref 0.9–1.1)
LYMPHOCYTES # BLD: 1.31 K/UL (ref 1.2–3.4)
MAGNESIUM SERPL-MCNC: 2.2 MG/DL (ref 1.8–2.4)
MCH RBC QN AUTO: 27.3 PG (ref 25–34)
MCHC RBC AUTO-ENTMCNC: 31.9 G/DL (ref 32–36)
MCV RBC AUTO: 85.6 FL (ref 80–100)
MONOCYTES NFR BLD: 9.6 %
NEUTROPHILS # BLD AUTO: 3.4 %
NEUTROPHILS NFR BLD AUTO: 63.3 %
PARTIAL THROMBOPLASTIN RATIO: 1.5
PARTIAL THROMBOPLASTIN RATIO: 2.1
PMV BLD AUTO: 8 FL (ref 7.4–10.4)
POTASSIUM SERPL-SCNC: 4.4 MMOL/L (ref 3.5–5.1)
PROTHROMBIN TIME: 11.4 SECONDS (ref 9–12)
RBC # BLD AUTO: 3.19 M/UL (ref 4.7–6.1)
SODIUM SERPL-SCNC: 139 MMOL/L (ref 136–145)
WBC # BLD AUTO: 5.83 K/UL (ref 4.8–10.8)

## 2017-11-05 RX ADMIN — LISINOPRIL SCH MG: 20 TABLET ORAL at 08:09

## 2017-11-05 RX ADMIN — ESCITALOPRAM OXALATE SCH MG: 20 TABLET, FILM COATED ORAL at 08:09

## 2017-11-05 RX ADMIN — Medication SCH CAN: at 12:35

## 2017-11-05 RX ADMIN — CLONAZEPAM SCH MG: 0.5 TABLET ORAL at 20:52

## 2017-11-05 RX ADMIN — HEPARIN SODIUM PRN MLS/HR: 1000 INJECTION, SOLUTION INTRAVENOUS; SUBCUTANEOUS at 02:51

## 2017-11-05 RX ADMIN — ALBUTEROL SULFATE SCH PUFFS: 90 AEROSOL, METERED RESPIRATORY (INHALATION) at 17:48

## 2017-11-05 RX ADMIN — Medication SCH CAN: at 17:45

## 2017-11-05 RX ADMIN — DICLOFENAC SODIUM SCH APPLN: 10 GEL TOPICAL at 08:00

## 2017-11-05 RX ADMIN — HEPARIN SODIUM PRN MLS/HR: 1000 INJECTION, SOLUTION INTRAVENOUS; SUBCUTANEOUS at 23:08

## 2017-11-05 RX ADMIN — CLONAZEPAM SCH MG: 0.5 TABLET ORAL at 12:35

## 2017-11-05 RX ADMIN — SIMVASTATIN SCH MG: 40 TABLET, FILM COATED ORAL at 21:01

## 2017-11-05 RX ADMIN — STANDARDIZED SENNA CONCENTRATE AND DOCUSATE SODIUM SCH TAB: 8.6; 5 TABLET ORAL at 12:36

## 2017-11-05 RX ADMIN — METOPROLOL SUCCINATE SCH MG: 50 TABLET, EXTENDED RELEASE ORAL at 08:09

## 2017-11-05 RX ADMIN — ALBUTEROL SULFATE SCH PUFFS: 90 AEROSOL, METERED RESPIRATORY (INHALATION) at 07:22

## 2017-11-05 RX ADMIN — METOPROLOL SUCCINATE SCH MG: 50 TABLET, EXTENDED RELEASE ORAL at 21:01

## 2017-11-05 RX ADMIN — Medication SCH CAN: at 08:10

## 2017-11-05 RX ADMIN — DIVALPROEX SODIUM SCH MG: 500 TABLET, FILM COATED, EXTENDED RELEASE ORAL at 20:58

## 2017-11-05 RX ADMIN — OXYCODONE HYDROCHLORIDE PRN MG: 5 TABLET ORAL at 20:52

## 2017-11-05 RX ADMIN — HEPARIN SODIUM PRN MLS/HR: 1000 INJECTION, SOLUTION INTRAVENOUS; SUBCUTANEOUS at 10:19

## 2017-11-05 RX ADMIN — DICLOFENAC SODIUM SCH APPLN: 10 GEL TOPICAL at 12:35

## 2017-11-05 RX ADMIN — MIRTAZAPINE SCH MG: 15 TABLET, FILM COATED ORAL at 20:58

## 2017-11-05 RX ADMIN — ALBUTEROL SULFATE SCH PUFFS: 90 AEROSOL, METERED RESPIRATORY (INHALATION) at 12:36

## 2017-11-05 RX ADMIN — OXYCODONE HYDROCHLORIDE PRN MG: 5 TABLET ORAL at 14:28

## 2017-11-05 RX ADMIN — DICLOFENAC SODIUM SCH APPLN: 10 GEL TOPICAL at 17:45

## 2017-11-05 RX ADMIN — AMLODIPINE BESYLATE SCH MG: 5 TABLET ORAL at 08:11

## 2017-11-05 RX ADMIN — DICLOFENAC SODIUM SCH APPLN: 10 GEL TOPICAL at 21:04

## 2017-11-05 RX ADMIN — TAMSULOSIN HYDROCHLORIDE SCH MG: 0.4 CAPSULE ORAL at 20:58

## 2017-11-05 RX ADMIN — GABAPENTIN SCH MG: 100 CAPSULE ORAL at 20:58

## 2017-11-05 RX ADMIN — PANTOPRAZOLE SCH MG: 40 TABLET, DELAYED RELEASE ORAL at 08:09

## 2017-11-05 RX ADMIN — Medication SCH MCG: at 08:09

## 2017-11-05 RX ADMIN — SPIRONOLACTONE SCH MG: 25 TABLET, FILM COATED ORAL at 20:56

## 2017-11-05 RX ADMIN — HEPARIN SODIUM PRN MLS/HR: 1000 INJECTION, SOLUTION INTRAVENOUS; SUBCUTANEOUS at 17:47

## 2017-11-05 RX ADMIN — INSULIN GLARGINE SCH UNITS: 100 INJECTION, SOLUTION SUBCUTANEOUS at 21:04

## 2017-11-05 RX ADMIN — ALBUTEROL SULFATE SCH PUFFS: 90 AEROSOL, METERED RESPIRATORY (INHALATION) at 00:00

## 2017-11-05 RX ADMIN — WARFARIN SCH MG: 2 TABLET ORAL at 16:02

## 2017-11-05 NOTE — PROGRESS NOTE
Subjective


Date of Service:


Nov 5, 2017.


Subjective


Pt evaluation today including:  conversation w/ patient, physical exam, chart 

review, lab review, review of inpatient medication list





Problem List


Medical Problems:


(1) Acute CHF


Status: Acute  





(2) Acute coronary syndrome


Status: Acute  





(3) Acute headache


Status: Acute  





(4) Altered mental status


Status: Acute  





(5) Anemia


Status: Acute  





(6) Anginal pain


Status: Acute  





(7) Appendicitis


Status: Acute  





(8) Cellulitis


Status: Acute  





(9) CHF (congestive heart failure)


Status: Acute  





(10) Congestive heart failure


Status: Acute  





(11) Diabetes mellitus with hyperglycemia


Status: Acute  





(12) Failure of outpatient treatment


Status: Acute  





(13) Intra-abdominal abscess


Status: Acute  





(14) Lower abdominal pain of unknown etiology


Status: Acute  





(15) Neck pain


Status: Acute  





(16) Precordial chest pain


Status: Acute  





(17) Pulmonary edema


Status: Acute  





(18) Respiratory acidosis


Status: Acute  





(19) Respiratory distress


Status: Acute  





(20) SOB (shortness of breath)


Status: Acute  











Review of Systems


Constitutional:  No see HPI, No fever, No chills, No sweats, No weight loss, No 

weakness, No fatigue, No problem reported


Eyes:  No see HPI, No worsening of vision, No eye pain, No redness, No discharge

, No diplopia, No problem reported


ENT:  No see HPI, No hearing loss, No unusual epistaxis, No nasal symptoms, No 

sore throat, No tinnitus, No dental problems, No trouble swallowing, No problem 

reported


Respiratory:  No see HPI, No cough, No sputum, No wheezing, No shortness of 

breath, No dyspnea on exertion, No dyspnea at rest, No hemoptysis, No problem 

reported


Cardiac:  No see HPI, No chest pain, No orthopnea, No PND, No edema, No 

claudication, No palpitations, No problem reported


Abdomen:  No see HPI, No pain, No nausea, No vomiting, No diarrhea, No 

constipation, No GI bleeding, No problem reported


Musculoskeletal:  + problem reported (wound vac appears in place), No see HPI, 

No joint pain, No muscle pain, No swelling, No calf pain


Male :  No see HPI, No dysuria, No urinary frequency, No incontinence, No 

nocturia more than once/night, No slowing stream, No hematuria, No sexual 

dysfunction, No problem reported


Neurologic:  No see HPI, No memory loss, No paralysis, No weakness, No numbness/

tingling, No vertigo, No balance problems, No problem reported


Psychiatric:  No see HPI, No depression symptoms, No anhedonism, No anxiety, No 

insomnia, No substance abuse, No problem reported


Heme:  No see HPI, No abnormal bleeding/bruising, No clotting problems, No 

swollen lymph nodes, No night sweats, No problem reported


Endo:  No see HPI, No fatigue, No excessive thirst, No excessive urination, No 

problem reported


Skin:  No see HPI, No rash, No itch, No new/changing skin lesions, No color 

change, No bleeding, No problem reported





Objective


Vital Signs











  Date Time  Temp Pulse Resp B/P (MAP) Pulse Ox O2 Delivery O2 Flow Rate FiO2


 


11/5/17 16:00     95 Nasal Cannula 3.0 


 


11/5/17 15:28 36.8 63 18 183/71 (108) 95 Room Air  


 


11/5/17 08:22      Nasal Cannula 3.0 


 


11/5/17 07:24 36.6 54 16 184/77 (112) 97 Room Air  


 


11/5/17 00:01      Nasal Cannula 3.0 


 


11/4/17 23:21 36.9 62 16 170/77 (108) 94 Room Air  


 


11/4/17 20:00      Nasal Cannula 3.0 


 


11/4/17 19:51  66  170/70 (103)    











Physical Exam


General Appearance:  WD/WN, no apparent distress


Eyes:  normal inspection, EOMI


ENT:  normal ENT inspection, hearing grossly normal


Neck:  supple


Respiratory/Chest:  chest non-tender, lungs clear, normal breath sounds, no 

respiratory distress, no accessory muscle use


Cardiovascular:  regular rate, rhythm, no edema, no gallop, no JVD, no murmur


Abdomen:  non tender, soft, no organomegaly, no pulsatile mass


Extremities:  normal range of motion, + pertinent finding (wound VAC appears in 

place)


Neurologic/Psychiatric:  CNs II-XII nml as tested, no motor/sensory deficits, 

alert, normal mood/affect, oriented x 3


Skin:  normal color, warm/dry, no rash





Laboratory Results





Last 24 Hours








Test


  11/4/17


20:09 11/5/17


07:33 11/5/17


07:59 11/5/17


10:51


 


Bedside Glucose 157 mg/dl  139 mg/dl   182 mg/dl 


 


White Blood Count   5.83 K/uL  


 


Red Blood Count   3.19 M/uL  


 


Hemoglobin   8.7 g/dL  


 


Hematocrit   27.3 %  


 


Mean Corpuscular Volume   85.6 fL  


 


Mean Corpuscular Hemoglobin   27.3 pg  


 


Mean Corpuscular Hemoglobin


Concent 


  


  31.9 g/dl 


  


 


 


Platelet Count   369 K/uL  


 


Mean Platelet Volume   8.0 fL  


 


Neutrophils (%) (Auto)   63.3 %  


 


Lymphocytes (%) (Auto)   22.5 %  


 


Monocytes (%) (Auto)   9.6 %  


 


Eosinophils (%) (Auto)   3.4 %  


 


Basophils (%) (Auto)   0.3 %  


 


Neutrophils # (Auto)   3.69 K/uL  


 


Lymphocytes # (Auto)   1.31 K/uL  


 


Monocytes # (Auto)   0.56 K/uL  


 


Eosinophils # (Auto)   0.20 K/uL  


 


Basophils # (Auto)   0.02 K/uL  


 


RDW Standard Deviation   45.4 fL  


 


RDW Coefficient of Variation   14.3 %  


 


Immature Granulocyte % (Auto)   0.9 %  


 


Immature Granulocyte # (Auto)   0.05 K/uL  


 


Red Blood Cell Morphology   Unremarkable  


 


Prothrombin Time   11.4 SECONDS  


 


Prothromb Time International


Ratio 


  


  1.1 


  


 


 


Activated Partial


Thromboplast Time 


  


  39.8 SECONDS 


  


 


 


Partial Thromboplastin Ratio   1.5  


 


Sodium Level   139 mmol/L  


 


Potassium Level   4.4 mmol/L  


 


Chloride Level   104 mmol/L  


 


Carbon Dioxide Level   28 mmol/L  


 


Anion Gap   7.0 mmol/L  


 


Blood Urea Nitrogen   35 mg/dl  


 


Creatinine   1.01 mg/dl  


 


Est Creatinine Clear Calc


Drug Dose 


  


  88.2 ml/min 


  


 


 


Estimated GFR (


American) 


  


  93.9 


  


 


 


Estimated GFR (Non-


American 


  


  81.0 


  


 


 


BUN/Creatinine Ratio   34.3  


 


Random Glucose   116 mg/dl  


 


Calcium Level   8.5 mg/dl  


 


Magnesium Level   2.2 mg/dl  


 


Total Bilirubin   0.1 mg/dl  


 


Aspartate Amino Transf


(AST/SGOT) 


  


  13 U/L 


  


 


 


Alanine Aminotransferase


(ALT/SGPT) 


  


  7 U/L 


  


 


 


Alkaline Phosphatase   58 U/L  


 


Total Protein   6.6 gm/dl  


 


Albumin   1.8 gm/dl  


 


Globulin   4.8 gm/dl  


 


Albumin/Globulin Ratio   0.4  


 


Test


  11/5/17


16:07 11/5/17


16:36 


  


 


 


Activated Partial


Thromboplast Time 55.3 SECONDS 


  


  


  


 


 


Partial Thromboplastin Ratio 2.1    


 


Bedside Glucose  195 mg/dl   











Assessment and Plan


61 years old man with multiple medical problems.  Patient had severe CAD with 

non-STEMI 2007 complicated by V. fib and stent distal left circumflex.


2010 also required a drug-eluting stents in mid LAD, and prior mention stent in 

left circumflex had 100% in-stent stenosis.


Patient also has a peripheral vascular disease with 100 a stenosis of left 

superficial femoral artery and 100% stenosis of left anterior tibial artery 

status post angiography and stenting of the gluteal and SFA on May 2017.


Patient was admitted on August 2017 for cellulitis and myonecrosis.  He 

required extensive I&D currently has a wound VAC.


He presented on October 13 to the ER with headache and neck stiffness, found to 

have a supratherapeutic INR


Patient is poor historian he does not know why he is in Coumadin, mentioned 

somewhere in his history that he had a blood clot without any details about 

location or nature of the blood clot


As mentioned above patient had a recent stent placed in his gluteal and SFA 6 

months ago


I confirmed with [facility] his meds, he was on both Coumadin and Plavix


I also confirmed the presence of Aortic thrombus.








Patient was discharged and awaiting preauthorization


Supposed to bridged by Lovenox until INR becomes therapeutic





Assessment


Supratherapeutic INR, currently subtherapeutic, will start him on heparin drip 

until INR is therapeutic


Giving his recent arterial stents in gluteal and SFA, will restart his plavix 

as per his discharge meds list from [place]





Headache and neck stiffness


As patient is on a blood thinner will stop Toradol


Headache improved, no neurologic deficit


Before starting heparin  CT scan head was done and ruled out any intracranial 

bleed


Avoid MRI as he gave a history of sharp nail in his finger





CAD with multiple stents in the past


Continue Plavix and statin





Chronic anemia, combined anemia of chronic disease plus iron deficiency anemia


Continue iron supplement and stool softener





Essential hypertension, uncontrolled


Patient was in lisinopril 20 mg twice a day currently taking 40 mg daily


Also on Toprol-XL twice a day


-Continue metoprolol succinate 50 mg PO BID, amlodipine 10 mg PO qd, 

simvastatin 40 mg PO qd, lisinopril 40 mg PO qd


11/4 Added hydralazine 100 mg TID and continue to have high blood pressure


Spironolactone 25 mg twice a day was added 11/5, potassium level should be 

monitored closely


Continue to monitor





Chronic respiratory failure on home O2 2-3 L


Likely multifactorial, suspect pulmonary hypertension


When I entered the room today patient was snoring really loud, suspected sleep 

apnea


nighttime oxygen monitor showed severe obstructive sleep apnea, oxygen 

saturation dropped between 85 and 89  almost 25 times, and between 80 and 85 

once, possibly contributing to his high blood pressure, will need official 

sleep study and CPAP machine as an outpatient





Recent lower extremity abscess/myonecrosis


wound vac looks intact


Consult wound care


Patient said that he takes care of this wound VAC with [facility] and he will 

continue to follow up with them








Diastolic CHF--stable, no acute exacerbation





COPD--no acute exacerbation, asymptomatic but wheezing on exam


-DuoNebs QIDR and q2h prn SOB/wheezing


-Continue home inhalers


-O2 by protocol.  H/o chronic respiratory failure w/supplemental O2





DM II--last HgbA1c checked 8/11/17 was 7.5


-Lantus 10 units SC hs


-Insulin sliding scale


-Check BSGs q ac and qhs


-Continue gabapentin 100 mg PO hs for neuropathy





Urinary retention


-Chronic High


-Continue Flomax 0.4 mg PO hs





Anxiety, depression, bipolar disorder


-Continue escitalopram 20 mg PO qd, mirtazapine 45 mg PO hs, Klonopin 1 mg PO hs

, and Depakote 2000 mg PO hs








GERD


-Continue pantoprazole 40 mg PO qd





DVT prophylaxis


-Warfarin supratherapeutic


-SCDs





Code Status


-Level I, FULL RESUSCITATION STATUS

## 2017-11-06 VITALS
OXYGEN SATURATION: 95 % | HEART RATE: 54 BPM | DIASTOLIC BLOOD PRESSURE: 71 MMHG | SYSTOLIC BLOOD PRESSURE: 191 MMHG | TEMPERATURE: 98.24 F

## 2017-11-06 VITALS — SYSTOLIC BLOOD PRESSURE: 180 MMHG | DIASTOLIC BLOOD PRESSURE: 88 MMHG

## 2017-11-06 VITALS
SYSTOLIC BLOOD PRESSURE: 187 MMHG | HEART RATE: 63 BPM | DIASTOLIC BLOOD PRESSURE: 64 MMHG | TEMPERATURE: 97.52 F | OXYGEN SATURATION: 99 %

## 2017-11-06 LAB
ALBUMIN/GLOB SERPL: 0.4 {RATIO} (ref 0.9–2)
ALP SERPL-CCNC: 51 U/L (ref 45–117)
ALT SERPL-CCNC: 9 U/L (ref 12–78)
ANION GAP SERPL CALC-SCNC: 8 MMOL/L (ref 3–11)
AST SERPL-CCNC: 11 U/L (ref 15–37)
BASOPHILS # BLD: 0.01 K/UL (ref 0–0.2)
BASOPHILS NFR BLD: 0.1 %
BUN SERPL-MCNC: 35 MG/DL (ref 7–18)
BUN/CREAT SERPL: 29.5 (ref 10–20)
CALCIUM SERPL-MCNC: 8.1 MG/DL (ref 8.5–10.1)
CHLORIDE SERPL-SCNC: 107 MMOL/L (ref 98–107)
CO2 SERPL-SCNC: 26 MMOL/L (ref 21–32)
COMPLETE: YES
CREAT CL PREDICTED SERPL C-G-VRATE: 74.9 ML/MIN
CREAT SERPL-MCNC: 1.19 MG/DL (ref 0.6–1.4)
EOSINOPHIL NFR BLD AUTO: 254 K/UL (ref 130–400)
GLOBULIN SER-MCNC: 4.4 GM/DL (ref 2.5–4)
GLUCOSE SERPL-MCNC: 125 MG/DL (ref 70–99)
HCT VFR BLD CALC: 22.9 % (ref 42–52)
IG%: 0.9 %
IMM GRANULOCYTES NFR BLD AUTO: 21.3 %
INR PPP: 1.1 (ref 0.9–1.1)
LYMPHOCYTES # BLD: 1.49 K/UL (ref 1.2–3.4)
MAGNESIUM SERPL-MCNC: 2.1 MG/DL (ref 1.8–2.4)
MCH RBC QN AUTO: 27.6 PG (ref 25–34)
MCHC RBC AUTO-ENTMCNC: 32.3 G/DL (ref 32–36)
MCV RBC AUTO: 85.4 FL (ref 80–100)
MONOCYTES NFR BLD: 13.5 %
NEUTROPHILS # BLD AUTO: 2.9 %
NEUTROPHILS NFR BLD AUTO: 61.3 %
PARTIAL THROMBOPLASTIN RATIO: 1.8
PARTIAL THROMBOPLASTIN RATIO: 2.1
PHOSPHATE SERPL-MCNC: 4.3 MG/DL (ref 2.5–4.9)
PMV BLD AUTO: 7.6 FL (ref 7.4–10.4)
POTASSIUM SERPL-SCNC: 4 MMOL/L (ref 3.5–5.1)
PROTHROMBIN TIME: 11.8 SECONDS (ref 9–12)
RBC # BLD AUTO: 2.68 M/UL (ref 4.7–6.1)
SODIUM SERPL-SCNC: 141 MMOL/L (ref 136–145)
WBC # BLD AUTO: 6.98 K/UL (ref 4.8–10.8)

## 2017-11-06 RX ADMIN — Medication SCH CAN: at 08:26

## 2017-11-06 RX ADMIN — DICLOFENAC SODIUM SCH APPLN: 10 GEL TOPICAL at 19:52

## 2017-11-06 RX ADMIN — STANDARDIZED SENNA CONCENTRATE AND DOCUSATE SODIUM SCH TAB: 8.6; 5 TABLET ORAL at 12:00

## 2017-11-06 RX ADMIN — METOPROLOL SUCCINATE SCH MG: 50 TABLET, EXTENDED RELEASE ORAL at 08:00

## 2017-11-06 RX ADMIN — PANTOPRAZOLE SCH MG: 40 TABLET, DELAYED RELEASE ORAL at 08:21

## 2017-11-06 RX ADMIN — INSULIN GLARGINE SCH UNITS: 100 INJECTION, SOLUTION SUBCUTANEOUS at 21:43

## 2017-11-06 RX ADMIN — Medication SCH CAN: at 12:12

## 2017-11-06 RX ADMIN — SPIRONOLACTONE SCH MG: 25 TABLET, FILM COATED ORAL at 18:04

## 2017-11-06 RX ADMIN — CLOPIDOGREL BISULFATE SCH MG: 75 TABLET, FILM COATED ORAL at 08:20

## 2017-11-06 RX ADMIN — GABAPENTIN SCH MG: 100 CAPSULE ORAL at 19:52

## 2017-11-06 RX ADMIN — ALBUTEROL SULFATE SCH PUFFS: 90 AEROSOL, METERED RESPIRATORY (INHALATION) at 18:03

## 2017-11-06 RX ADMIN — Medication SCH CAN: at 18:03

## 2017-11-06 RX ADMIN — WARFARIN SCH MG: 3 TABLET ORAL at 16:18

## 2017-11-06 RX ADMIN — ACETAMINOPHEN PRN MG: 325 TABLET ORAL at 00:10

## 2017-11-06 RX ADMIN — ENOXAPARIN SODIUM SCH MG: 100 INJECTION SUBCUTANEOUS at 20:55

## 2017-11-06 RX ADMIN — METOPROLOL SUCCINATE SCH MG: 50 TABLET, EXTENDED RELEASE ORAL at 19:53

## 2017-11-06 RX ADMIN — ACETAMINOPHEN PRN MG: 325 TABLET ORAL at 13:46

## 2017-11-06 RX ADMIN — TAMSULOSIN HYDROCHLORIDE SCH MG: 0.4 CAPSULE ORAL at 19:54

## 2017-11-06 RX ADMIN — SIMVASTATIN SCH MG: 40 TABLET, FILM COATED ORAL at 19:52

## 2017-11-06 RX ADMIN — DICLOFENAC SODIUM SCH APPLN: 10 GEL TOPICAL at 12:31

## 2017-11-06 RX ADMIN — DICLOFENAC SODIUM SCH APPLN: 10 GEL TOPICAL at 08:20

## 2017-11-06 RX ADMIN — ALBUTEROL SULFATE SCH PUFFS: 90 AEROSOL, METERED RESPIRATORY (INHALATION) at 12:12

## 2017-11-06 RX ADMIN — CLONIDINE HYDROCHLORIDE SCH MG: 0.1 TABLET ORAL at 19:56

## 2017-11-06 RX ADMIN — ALBUTEROL SULFATE SCH PUFFS: 90 AEROSOL, METERED RESPIRATORY (INHALATION) at 00:10

## 2017-11-06 RX ADMIN — ALBUTEROL SULFATE SCH PUFFS: 90 AEROSOL, METERED RESPIRATORY (INHALATION) at 05:41

## 2017-11-06 RX ADMIN — OXYCODONE HYDROCHLORIDE PRN MG: 5 TABLET ORAL at 16:18

## 2017-11-06 RX ADMIN — HEPARIN SODIUM PRN MLS/HR: 1000 INJECTION, SOLUTION INTRAVENOUS; SUBCUTANEOUS at 07:02

## 2017-11-06 RX ADMIN — CLONAZEPAM SCH MG: 0.5 TABLET ORAL at 20:51

## 2017-11-06 RX ADMIN — HEPARIN SODIUM PRN MLS/HR: 1000 INJECTION, SOLUTION INTRAVENOUS; SUBCUTANEOUS at 07:28

## 2017-11-06 RX ADMIN — HEPARIN SODIUM PRN MLS/HR: 1000 INJECTION, SOLUTION INTRAVENOUS; SUBCUTANEOUS at 05:22

## 2017-11-06 RX ADMIN — DICLOFENAC SODIUM SCH APPLN: 10 GEL TOPICAL at 18:03

## 2017-11-06 RX ADMIN — DIVALPROEX SODIUM SCH MG: 500 TABLET, FILM COATED, EXTENDED RELEASE ORAL at 19:55

## 2017-11-06 RX ADMIN — LISINOPRIL SCH MG: 20 TABLET ORAL at 08:21

## 2017-11-06 RX ADMIN — SPIRONOLACTONE SCH MG: 25 TABLET, FILM COATED ORAL at 08:21

## 2017-11-06 RX ADMIN — MIRTAZAPINE SCH MG: 15 TABLET, FILM COATED ORAL at 19:54

## 2017-11-06 RX ADMIN — AMLODIPINE BESYLATE SCH MG: 5 TABLET ORAL at 08:20

## 2017-11-06 RX ADMIN — ESCITALOPRAM OXALATE SCH MG: 20 TABLET, FILM COATED ORAL at 08:20

## 2017-11-06 RX ADMIN — Medication SCH MCG: at 08:21

## 2017-11-06 RX ADMIN — ALBUTEROL SULFATE SCH PUFFS: 90 AEROSOL, METERED RESPIRATORY (INHALATION) at 23:43

## 2017-11-06 NOTE — DIAGNOSTIC IMAGING REPORT
ANGIO ABD/PELVIS WITH CONTRAST



CLINICAL HISTORY: 61 years-old Male presenting with evaluate renal artery. 



TECHNIQUE: Multidetector CT angiography of the abdomen and pelvis was performed

after the administration of intravenous contrast. 3-D volumetric and/or maximum

intensity projection (MIP) images were subsequently reconstructed for review. IV

contrast: 95 mL of Optiray 320. A dose lowering technique was used consistent

with the principles of ALARA (as low as reasonably achievable). Stenosis

measurements were based on NASCET-like criteria.



COMPARISON: 1/23/2017.



CT DOSE (mGy.cm): The estimated cumulative dose is 689.11 mGy.cm.



FINDINGS:



 topogram: Unremarkable.



Lung bases: Dependent changes likely atelectasis. Mild multichamber enlargement

of the heart. Coronary artery calcification. Small bilateral pleural effusions. 



Liver: Normal morphology. Conventional hepatic arterial anatomy.



Biliary: No gross biliary ductal dilatation allowing for the phase of contrast.

Normal gallbladder.



Pancreas: Normal allowing for the phase of contrast.



Spleen: Well-defined round hypodensity at the inferior aspect of the spleen,

possibly cyst/pseudocyst, lymphangioma, or hemangioma.



Adrenal glands: Normal allowing for the phase of contrast.



Kidneys and ureters: Mild nonspecific perinephric fat stranding. Normal

appearance of the renal parenchyma allowing for the phase of contrast. Single

main renal arteries patent at their origins and along their courses. Significant

calcified atherosclerotic plaque burden throughout. No hydronephrosis.



Bladder: Decompressed with a High catheter.



Pelvic organs: Prostate and seminal vesicles normal.



Bowel: Few diverticula noted in the sigmoid colon. Moderate stool burden

throughout normal caliber colon. No bowel obstruction.



Peritoneal cavity: No free fluid or intraperitoneal gas.



Lymph nodes: No gross lymphadenopathy allowing for the phase of contrast.



Vasculature: Significant calcified and noncalcified atherosclerotic plaque

throughout the normal caliber abdominal aorta. A stent is likely present within

the left superficial femoral artery, which is patent. With mild stenosis of the

proximal sella iliac artery. Calcified plaque at the origin of the superior

mesenteric artery does not significantly narrow its origin. Origins of the renal

arteries are patent bilaterally. Inferior mesenteric artery origin may be mildly

narrowed by calcified atherosclerotic plaque. More distally along the course of

the DANY, the luminal diameter is narrowed by approximately 50% (series 3 image

233). Bilateral common and external iliac arteries patent.



Abdominal wall: Mild diffuse body wall edema suggested.



Musculoskeletal: Degenerative changes of the spine. The greatest degree of

degenerative changes noted at L3-4.



IMPRESSION:

1.  Atherosclerosis without significant narrowing of the single main renal

arteries. Additional disease burden as above.







Electronically signed by:  Richard Deluna M.D.

11/6/2017 3:18 PM



Dictated Date/Time:  11/6/2017 3:05 PM

## 2017-11-06 NOTE — HOSPITALIST PROGRESS NOTE
Hospitalist Progress Note


Date of Service


Nov 6, 2017.


 (Natalia Ha CRNP)





Subjective


Pt evaluation today including:  conversation w/ patient, physical exam, chart 

review, lab review, review of studies, review of inpatient medication list


Voiding:  gillespie catheter in place


Mr. Dixon reports pain in his legs but otherwise has no complaints. Wound 

vac in place. 





ROS


Constitutional: no chills, aches, sweats or fever


Respiratory: no sob,cough, sputum, or wheezing


Cardiac: no chest pain, palpitations, edema, orthopnea or lightheadedness


GI: no abdominal pain, nausea, vomiting, diarrhea or constipation


: no dysuria or hesitancy


Extremities: see HPI


Skin: no rash


 (Natalia Ha CRNP)





Medications











 Medications


  (Trade)  Dose


 Ordered  Sig/Dc


 Route  Start Time


 Stop Time Status Last Admin


Dose Admin


 


 Clopidogrel


 Bisulfate


  (plAVix TAB)  75 mg  QAM


 PO  11/6/17 08:00


 12/6/17 07:59  11/6/17 08:20


75 MG


 


 Hydralazine HCl


  (Apresoline Tab)  100 mg  TID


 PO  11/5/17 16:00


 12/5/17 15:59  11/6/17 13:44


100 MG


 


 Spironolactone


  (Aldactone Tab)  25 mg  BID17


 PO  11/5/17 19:30


 12/5/17 19:29  11/6/17 08:21


25 MG


 


 Heparin Sodium


  (Porcine) 3000


 unit/Syringe  3 ml @ 10


 mls/min  NOW  ONCE


 IV  11/6/17 05:30


 11/6/17 05:31 DC 11/6/17 05:40


10 MLS/MIN


 


 Clonidine HCl


  (Catapres Tab)  0.1 mg  TODAY@1315  ONCE


 PO  11/6/17 13:15


 11/6/17 13:16 DC 11/6/17 13:44


0.1 MG








 (Natalia Ha CRNP)





Objective


Vital Signs











  Date Time  Temp Pulse Resp B/P (MAP) Pulse Ox O2 Delivery O2 Flow Rate FiO2


 


11/6/17 09:35      Room Air  


 


11/6/17 07:13 36.8 54 20 191/71 (111) 95   


 


11/6/17 00:05      Room Air  


 


11/5/17 22:50 36.8 64 18 171/65 (100) 95 Room Air  


 


11/5/17 20:51  62  190/75 (113) 96 Room Air  


 


11/5/17 18:10    194/71 (112)    


 


11/5/17 16:00     95 Nasal Cannula 3.0 


 


11/5/17 15:28 36.8 63 18 183/71 (108) 95 Room Air  








 (Natalia Ha CRNP)





Physical Exam


Notes:


General: no distress


Eyes: normal inspection, PERLL


Respiratory: chest non tender, clear to auscultation, normal breath sounds, no 

respiratory distress, no accessory muscle use


Cardiac: regular rate and rhythm, no rub or gallop, no murmur, no edema, no jvd


GI/: active bowel sounds, no abd pain or tenderness, soft, non distended


Extremities: normal range of motion, generalized weakness, non tender 


Neuro/Psych: alert and oriented x 3, normal mood and affect


Skin: normal color, dry


 (Natalia Ha CRNP)





Laboratory Results





Last 24 Hours








Test


  11/5/17


16:07 11/5/17


16:36 11/5/17


20:10 11/6/17


04:38


 


Activated Partial


Thromboplast Time 55.3 SECONDS 


  


  


  45.6 SECONDS 


 


 


Partial Thromboplastin Ratio 2.1    1.8 


 


Bedside Glucose  195 mg/dl  166 mg/dl  


 


White Blood Count    6.98 K/uL 


 


Red Blood Count    2.68 M/uL 


 


Hemoglobin    7.4 g/dL 


 


Hematocrit    22.9 % 


 


Mean Corpuscular Volume    85.4 fL 


 


Mean Corpuscular Hemoglobin    27.6 pg 


 


Mean Corpuscular Hemoglobin


Concent 


  


  


  32.3 g/dl 


 


 


Platelet Count    254 K/uL 


 


Mean Platelet Volume    7.6 fL 


 


Neutrophils (%) (Auto)    61.3 % 


 


Lymphocytes (%) (Auto)    21.3 % 


 


Monocytes (%) (Auto)    13.5 % 


 


Eosinophils (%) (Auto)    2.9 % 


 


Basophils (%) (Auto)    0.1 % 


 


Neutrophils # (Auto)    4.28 K/uL 


 


Lymphocytes # (Auto)    1.49 K/uL 


 


Monocytes # (Auto)    0.94 K/uL 


 


Eosinophils # (Auto)    0.20 K/uL 


 


Basophils # (Auto)    0.01 K/uL 


 


RDW Standard Deviation    45.3 fL 


 


RDW Coefficient of Variation    14.5 % 


 


Immature Granulocyte % (Auto)    0.9 % 


 


Immature Granulocyte # (Auto)    0.06 K/uL 


 


Red Blood Cell Morphology    Unremarkable 


 


Prothrombin Time    11.8 SECONDS 


 


Prothromb Time International


Ratio 


  


  


  1.1 


 


 


Sodium Level    141 mmol/L 


 


Potassium Level    4.0 mmol/L 


 


Chloride Level    107 mmol/L 


 


Carbon Dioxide Level    26 mmol/L 


 


Anion Gap    8.0 mmol/L 


 


Blood Urea Nitrogen    35 mg/dl 


 


Creatinine    1.19 mg/dl 


 


Est Creatinine Clear Calc


Drug Dose 


  


  


  74.9 ml/min 


 


 


Estimated GFR (


American) 


  


  


  77.0 


 


 


Estimated GFR (Non-


American 


  


  


  66.5 


 


 


BUN/Creatinine Ratio    29.5 


 


Random Glucose    125 mg/dl 


 


Calcium Level    8.1 mg/dl 


 


Phosphorus Level    4.3 mg/dl 


 


Magnesium Level    2.1 mg/dl 


 


Total Bilirubin    0.1 mg/dl 


 


Aspartate Amino Transf


(AST/SGOT) 


  


  


  11 U/L 


 


 


Alanine Aminotransferase


(ALT/SGPT) 


  


  


  9 U/L 


 


 


Alkaline Phosphatase    51 U/L 


 


Total Protein    6.1 gm/dl 


 


Albumin    1.7 gm/dl 


 


Globulin    4.4 gm/dl 


 


Albumin/Globulin Ratio    0.4 


 


Test


  11/6/17


07:26 11/6/17


11:23 11/6/17


11:50 


 


 


Bedside Glucose 99 mg/dl  141 mg/dl   


 


Activated Partial


Thromboplast Time 


  


  53.3 SECONDS 


  


 


 


Partial Thromboplastin Ratio   2.1  








 (Natalia Ha, TERI)





Assessment and Plan


60 y/o male with a history of CAD, h/o MI s/p stents, HTN, HLD, diastolic CHF, 

COPD, DM II, anxiety, depression, bipolar disorder, anemia and GERD who 

presented to the ED on 10/30 with persistent headache and elevated INR.  Head 

CT negative for bleed, no acute abnormality.  CXR shows pulmonary congestion 

without overt pulmonary edema.  EKG shows no ischemic changes.  INR elevated at 

5.9.  WBC slightly elevated at 10.87, hgb 8.9.  





Headache, neck stiffness--mild, unlikely infectious, resolved on discharge


-Blood cultures pending, NGTD


-May apply home Voltaren gel to neck QID prn pain, heating pad also added


-Observe for fevers, but suspect this is more muscular


- resolved





Supratherapeutic INR--[facility] note h/o thrombus, unclear where this is located


-INR 5.9 --> 6.7 --> >8 --> 1.4


-Hold warfarin, continue to monitor, Vit K 5 mg x 1 11/1/17


-Observe for sherlyn bleeding.  


- 1.1 after 4 doses of coumadin resumed, increased dose to 3 mg 11/6





RLE wound, wound vac


-Consult wound care


-Continue oxycodone 5 mg PO q3h prn pain





CAD, h/o MI s/p stents, HTN, HLD--stable


-Continue metoprolol succinate 50 mg PO BID, amlodipine 10 mg PO qd, 

simvastatin 40 mg PO qd, lisinopril 40 mg PO qd, spironolactone 25 mg


- blood pressures continue to be elevated - SBP 190s - started clonidine 


- CTA renal artery





Diastolic CHF--stable, no acute exacerbation





COPD--no acute exacerbation, asymptomatic but wheezing on exam


-DuoNebs QIDR and q2h prn SOB/wheezing


-Continue home inhalers


-O2 by protocol.  H/o chronic respiratory failure w/supplemental O2





DM II--last HgbA1c checked 8/11/17 was 7.5


-Lantus 10 units SC hs


-Insulin sliding scale


-Check BSGs q ac and qhs


-Continue gabapentin 100 mg PO hs for neuropathy





Urinary retention


-Chronic Gillespie


-Continue Flomax 0.4 mg PO hs





Anxiety, depression, bipolar disorder


-Continue escitalopram 20 mg PO qd, mirtazapine 45 mg PO hs, Klonopin 1 mg PO hs

, and Depakote 2000 mg PO hs





Anemia of chronic disease--stable


-Baseline hemoglobin around 8, Hgb at baseline on arrival


- dropped to 7.4 11/6 - monitor for s/s of sherlyn bleeding





GERD


-Continue pantoprazole 40 mg PO qd





DVT prophylaxis


-Warfarin supratherapeutic on admission


- enoxaparin bridge/warfarin


-SCDs





Code Status


-Level I, FULL RESUSCITATION STATUS





Dispo: Denies [facility], awaiting SNF placement


 (Natalia Ha ., TERI)


NP Physician Supervision Note:





I interviewed and examined the patient. Discussed with Natalia Ha NP and 

agree with findings and plan as documented in the note. Any exceptions or 

clarifications are listed here: None





Patient here with multiple complications of diabetes including macrovascular 

peripheral disease with recent stenting lower extremity infections with wound 

VAC in place and at the coagulation in need of bridging he's having difficulty 

with blood pressure control there was concern for renal artery stenosis however 

CT angiogram refutes this thought he'll need continued management with multiple 

medications to attempt to control his blood pressure is





His biggest concerns or leg pain however he continues a poor capillary refill 

of his lower extremities consistent with his macrovascular peripheral artery 

disease 5 vital signs show hypertension persists


Cardiac exam is regular with no murmurs or gallops lungs are clear lower 

extremities are warm to touch but poor capillary refill next





Plans will be to try to augment blood pressure control continue to bridge until 

Coumadin takes effect initially his Coumadin was fine his home dose but now it 

is low on lower than his home dose with looking towards placement in a subacute 

nursing facility for his multiple comorbid conditions 


Documented By:  Chuy Omalley


 (Chuy Omalley M.D.)

## 2017-11-07 VITALS
TEMPERATURE: 98.06 F | DIASTOLIC BLOOD PRESSURE: 64 MMHG | OXYGEN SATURATION: 94 % | SYSTOLIC BLOOD PRESSURE: 168 MMHG | HEART RATE: 58 BPM

## 2017-11-07 VITALS
TEMPERATURE: 97.52 F | SYSTOLIC BLOOD PRESSURE: 174 MMHG | OXYGEN SATURATION: 93 % | DIASTOLIC BLOOD PRESSURE: 75 MMHG | HEART RATE: 64 BPM

## 2017-11-07 VITALS
DIASTOLIC BLOOD PRESSURE: 64 MMHG | HEART RATE: 53 BPM | SYSTOLIC BLOOD PRESSURE: 146 MMHG | TEMPERATURE: 97.88 F | OXYGEN SATURATION: 98 %

## 2017-11-07 VITALS
DIASTOLIC BLOOD PRESSURE: 91 MMHG | TEMPERATURE: 97.7 F | OXYGEN SATURATION: 96 % | SYSTOLIC BLOOD PRESSURE: 186 MMHG | HEART RATE: 58 BPM

## 2017-11-07 VITALS — SYSTOLIC BLOOD PRESSURE: 145 MMHG | DIASTOLIC BLOOD PRESSURE: 69 MMHG

## 2017-11-07 VITALS — SYSTOLIC BLOOD PRESSURE: 190 MMHG | DIASTOLIC BLOOD PRESSURE: 65 MMHG

## 2017-11-07 VITALS — SYSTOLIC BLOOD PRESSURE: 169 MMHG | HEART RATE: 58 BPM | DIASTOLIC BLOOD PRESSURE: 82 MMHG

## 2017-11-07 VITALS — OXYGEN SATURATION: 98 %

## 2017-11-07 LAB
EOSINOPHIL NFR BLD AUTO: 272 K/UL (ref 130–400)
HCT VFR BLD CALC: 25.9 % (ref 42–52)
INR PPP: 1.1 (ref 0.9–1.1)
MCH RBC QN AUTO: 26.8 PG (ref 25–34)
MCHC RBC AUTO-ENTMCNC: 30.9 G/DL (ref 32–36)
MCV RBC AUTO: 86.6 FL (ref 80–100)
PARTIAL THROMBOPLASTIN RATIO: 1.2
PMV BLD AUTO: 7.8 FL (ref 7.4–10.4)
PROTHROMBIN TIME: 11.6 SECONDS (ref 9–12)
RBC # BLD AUTO: 2.99 M/UL (ref 4.7–6.1)
WBC # BLD AUTO: 5.24 K/UL (ref 4.8–10.8)

## 2017-11-07 RX ADMIN — OXYCODONE HYDROCHLORIDE PRN MG: 5 TABLET ORAL at 22:00

## 2017-11-07 RX ADMIN — SIMVASTATIN SCH MG: 40 TABLET, FILM COATED ORAL at 20:06

## 2017-11-07 RX ADMIN — LISINOPRIL SCH MG: 20 TABLET ORAL at 08:01

## 2017-11-07 RX ADMIN — TAMSULOSIN HYDROCHLORIDE SCH MG: 0.4 CAPSULE ORAL at 20:04

## 2017-11-07 RX ADMIN — Medication SCH MCG: at 08:00

## 2017-11-07 RX ADMIN — CLONAZEPAM SCH MG: 0.5 TABLET ORAL at 20:01

## 2017-11-07 RX ADMIN — ESCITALOPRAM OXALATE SCH MG: 20 TABLET, FILM COATED ORAL at 08:00

## 2017-11-07 RX ADMIN — DICLOFENAC SODIUM SCH APPLN: 10 GEL TOPICAL at 12:36

## 2017-11-07 RX ADMIN — CLOPIDOGREL BISULFATE SCH MG: 75 TABLET, FILM COATED ORAL at 08:00

## 2017-11-07 RX ADMIN — ACETAMINOPHEN PRN MG: 325 TABLET ORAL at 23:42

## 2017-11-07 RX ADMIN — ALBUTEROL SULFATE SCH PUFFS: 90 AEROSOL, METERED RESPIRATORY (INHALATION) at 23:41

## 2017-11-07 RX ADMIN — ALBUTEROL SULFATE SCH PUFFS: 90 AEROSOL, METERED RESPIRATORY (INHALATION) at 06:38

## 2017-11-07 RX ADMIN — PANTOPRAZOLE SCH MG: 40 TABLET, DELAYED RELEASE ORAL at 08:00

## 2017-11-07 RX ADMIN — ENOXAPARIN SODIUM SCH MG: 100 INJECTION SUBCUTANEOUS at 08:02

## 2017-11-07 RX ADMIN — ALBUTEROL SULFATE SCH PUFFS: 90 AEROSOL, METERED RESPIRATORY (INHALATION) at 12:36

## 2017-11-07 RX ADMIN — Medication SCH CAN: at 16:31

## 2017-11-07 RX ADMIN — DICLOFENAC SODIUM SCH APPLN: 10 GEL TOPICAL at 07:59

## 2017-11-07 RX ADMIN — OXYCODONE HYDROCHLORIDE PRN MG: 5 TABLET ORAL at 13:18

## 2017-11-07 RX ADMIN — INSULIN GLARGINE SCH UNITS: 100 INJECTION, SOLUTION SUBCUTANEOUS at 20:11

## 2017-11-07 RX ADMIN — STANDARDIZED SENNA CONCENTRATE AND DOCUSATE SODIUM SCH TAB: 8.6; 5 TABLET ORAL at 12:00

## 2017-11-07 RX ADMIN — AMLODIPINE BESYLATE SCH MG: 5 TABLET ORAL at 08:00

## 2017-11-07 RX ADMIN — CLONIDINE HYDROCHLORIDE SCH MG: 0.1 TABLET ORAL at 08:00

## 2017-11-07 RX ADMIN — Medication SCH CAN: at 07:59

## 2017-11-07 RX ADMIN — METOPROLOL SUCCINATE SCH MG: 50 TABLET, EXTENDED RELEASE ORAL at 19:59

## 2017-11-07 RX ADMIN — DICLOFENAC SODIUM SCH APPLN: 10 GEL TOPICAL at 16:31

## 2017-11-07 RX ADMIN — ENOXAPARIN SODIUM SCH MG: 100 INJECTION SUBCUTANEOUS at 20:07

## 2017-11-07 RX ADMIN — SPIRONOLACTONE SCH MG: 25 TABLET, FILM COATED ORAL at 08:01

## 2017-11-07 RX ADMIN — MIRTAZAPINE SCH MG: 15 TABLET, FILM COATED ORAL at 20:05

## 2017-11-07 RX ADMIN — DIVALPROEX SODIUM SCH MG: 500 TABLET, FILM COATED, EXTENDED RELEASE ORAL at 20:03

## 2017-11-07 RX ADMIN — DICLOFENAC SODIUM SCH APPLN: 10 GEL TOPICAL at 20:00

## 2017-11-07 RX ADMIN — GABAPENTIN SCH MG: 100 CAPSULE ORAL at 20:05

## 2017-11-07 RX ADMIN — WARFARIN SCH MG: 3 TABLET ORAL at 16:31

## 2017-11-07 RX ADMIN — METOPROLOL SUCCINATE SCH MG: 50 TABLET, EXTENDED RELEASE ORAL at 08:00

## 2017-11-07 RX ADMIN — DICLOFENAC SODIUM SCH APPLN: 10 GEL TOPICAL at 12:00

## 2017-11-07 RX ADMIN — Medication SCH CAN: at 12:37

## 2017-11-07 RX ADMIN — ALBUTEROL SULFATE SCH PUFFS: 90 AEROSOL, METERED RESPIRATORY (INHALATION) at 18:21

## 2017-11-07 RX ADMIN — SPIRONOLACTONE SCH MG: 25 TABLET, FILM COATED ORAL at 18:20

## 2017-11-07 NOTE — HOSPITALIST PROGRESS NOTE
Hospitalist Progress Note


Date of Service


Nov 7, 2017.


 (Natalia Ha CRNP)





Subjective


Pt evaluation today including:  conversation w/ patient, physical exam, chart 

review, lab review, review of inpatient medication list


Voiding:  gillespie catheter in place


Mr. Dixon reports some pain in his legs when ambulating but otherwise has no 

complaints. His headaches have resolved. 





ROS


Constitutional: no chills, aches, sweats or fever


Respiratory: no sob,cough, sputum, or wheezing


Cardiac: no chest pain, palpitations, edema, orthopnea or lightheadedness


GI: no abdominal pain, nausea, vomiting, diarrhea or constipation


: no dysuria or hesitancy


Extremities: see HPI


Skin: no rash


 (Natalia Ha CRNP)





Medications





Medications Administered








 Medications


  (Trade)  Dose


 Ordered  Sig/Dc


 Route  Start Time


 Stop Time Status Last Admin


Dose Admin


 


 Acetaminophen


  (Tylenol Tab)  650 mg  Q4H  PRN


 PO  10/30/17 14:15


 11/29/17 14:14  11/6/17 13:46


650 MG


 


 Albuterol


  (Ventolin Hfa


 Inhaler)  2 puffs  Q6


 INH  10/30/17 18:00


 11/29/17 17:59  11/7/17 12:36


2 PUFFS


 


 Clonazepam


  (Klonopin Tab)  1 mg  HS


 PO  10/30/17 21:00


 11/5/17 10:54 DC 11/4/17 21:06


1 MG


 


 Cyanocobalamin


  (Vitamin B-12


 Tab)  100 mcg  DAILY


 PO  10/31/17 08:00


 11/30/17 08:59  11/7/17 08:00


100 MCG


 


 Divalproex Sodium


  (Depakote


 Extended Rel Tab)  2,000 mg  HS


 PO  10/30/17 21:00


 11/29/17 20:59  11/6/17 19:55


2,000 MG


 


 Escitalopram


 Oxalate


  (Lexapro Tab)  20 mg  QAM


 PO  10/31/17 08:00


 11/30/17 08:59  11/7/17 08:00


20 MG


 


 Gabapentin


  (Neurontin Cap)  100 mg  HS


 PO  10/30/17 21:00


 11/29/17 20:59  11/6/17 19:52


100 MG


 


 Insulin Glargine


  (Lantus Solostar


 Pen)  10 units  HS


 SC  10/30/17 21:00


 11/29/17 20:59  11/6/17 21:43


10 UNITS


 


 Lisinopril


  (Zestril Tab)  40 mg  DAILY


 PO  10/31/17 08:00


 11/30/17 08:59  11/7/17 08:01


40 MG


 


 Metoprolol


 Succinate


  (Toprol Xl Tab)  50 mg  BID


 PO  10/30/17 20:00


 11/29/17 20:59  11/6/17 19:53


50 MG


 


 Mirtazapine


  (Remeron Tab)  45 mg  HS


 PO  10/30/17 21:00


 11/29/17 20:59  11/6/17 19:54


45 MG


 


 Nitrofurantoin


 Macrocrystals


  (Macrobid Cap)  100 mg  BID


 PO  10/30/17 20:00


 11/3/17 20:59 DC 11/3/17 21:01


100 MG


 


 Oxycodone HCl


  (Roxicodone


 Immediate Rel Tab)  5 mg  Q3H  PRN


 PO  10/30/17 14:15


 11/6/17 15:18 DC 11/5/17 20:52


5 MG


 


 Pantoprazole


 Sodium


  (Protonix Tab)  40 mg  DAILY


 PO  10/31/17 08:00


 11/30/17 08:59  11/7/17 08:00


40 MG


 


 Senna/Docusate


 Sodium


  (Senokot S Tab)  1 tab  QDL


 PO  10/31/17 11:00


 11/30/17 10:59  10/31/17 12:38


1 TAB


 


 Simvastatin


  (Zocor Tab)  40 mg  QPM


 PO  10/30/17 21:00


 11/29/17 20:59  11/6/17 19:52


40 MG


 


 Tamsulosin HCl


  (Flomax Cap)  0.4 mg  HS


 PO  10/30/17 21:00


 11/29/17 20:59  11/6/17 19:54


0.4 MG


 


 Amlodipine


 Besylate


  (Norvasc Tab)  10 mg  QAM


 PO  10/31/17 08:00


 11/30/17 08:59  11/7/17 08:00


10 MG


 


 Ketorolac


 Tromethamine


  (Toradol Inj)  15 mg  Q6H  PRN


 IV  10/30/17 14:15


 11/4/17 09:16 DC 11/4/17 06:04


15 MG


 


 Albuterol/


 Ipratropium


  (Duoneb)  3 ml  QIDR


 INH  10/30/17 16:00


 10/31/17 12:19 DC 10/31/17 07:16


3 ML


 


 Diclofenac Sodium


  (Voltaren 1% Top


 Gel)  1 appln  QID


 EXT  10/30/17 20:45


 11/29/17 14:14  11/7/17 07:59


1 APPLN


 


 Enteral


 Nutritional


 Formula


  (Boost Glucose


 Control)  1 can  TIDM


 PO  10/31/17 17:00


 11/30/17 16:59  11/7/17 12:37


1 CAN


 


 Phytonadione


  (Mephyton Tab)  5 mg  NOW  STAT


 PO  11/1/17 07:42


 11/1/17 07:46 DC 11/1/17 08:19


5 MG


 


 Diphenhydramine


 HCl


  (Benadryl Cap)  25 mg  NOW  ONCE


 PO  11/1/17 21:00


 11/1/17 21:01 DC 11/1/17 20:59


25 MG


 


 Warfarin Sodium


  (Coumadin Tab)  2 mg  DAILY@16


 PO  11/2/17 16:00


 11/6/17 12:51 DC 11/5/17 16:02


2 MG


 


 Hydralazine HCl


  (Apresoline Tab)  100 mg  BID


 PO  11/4/17 09:05


 11/5/17 15:46 DC 11/5/17 08:10


100 MG


 


 Heparin Sodium


  (Porcine) 6000


 unit/Syringe  6 ml @ 10


 mls/min  1045  ONCE


 IV  11/4/17 10:45


 11/4/17 10:46 DC 11/4/17 11:07


10 MLS/MIN


 


 Heparin Sodium


  (Porcine) 43367


 unit/Dextrose  500 ml @ 


 38 mls/hr  [id number] PRN


 IV  11/4/17 10:30


 11/7/17 12:46 DC 11/6/17 07:28


38 MLS/HR


 


 Heparin Sodium


  (Porcine) 6000


 unit/Syringe  6 ml @ 10


 mls/min  0945  ONCE


 IV  11/5/17 09:45


 11/5/17 09:46 DC 11/5/17 10:17


10 MLS/MIN


 


 Clopidogrel


 Bisulfate


  (plAVix TAB)  75 mg  QAM


 PO  11/6/17 08:00


 12/6/17 07:59  11/7/17 08:00


75 MG


 


 Clonazepam


  (Klonopin Tab)  1 mg  HS


 PO  11/5/17 12:00


 11/29/17 20:59  11/6/17 20:51


1 MG


 


 Hydralazine HCl


  (Apresoline Tab)  100 mg  TID


 PO  11/5/17 16:00


 12/5/17 15:59  11/7/17 14:02


100 MG


 


 Spironolactone


  (Aldactone Tab)  25 mg  BID17


 PO  11/5/17 19:30


 12/5/17 19:29  11/7/17 08:01


25 MG


 


 Heparin Sodium


  (Porcine) 3000


 unit/Syringe  3 ml @ 10


 mls/min  NOW  ONCE


 IV  11/6/17 05:30


 11/6/17 05:31 DC 11/6/17 05:40


10 MLS/MIN


 


 Warfarin Sodium


  (Coumadin Tab)  3 mg  DAILY@16


 PO  11/6/17 16:00


 12/6/17 15:59  11/6/17 16:18


3 MG


 


 Enoxaparin Sodium


  (Lovenox Inj)  90 mg  Q12


 SQ  11/6/17 21:00


 12/6/17 20:59  11/7/17 08:02


90 MG


 


 Clonidine HCl


  (Catapres Tab)  0.1 mg  TODAY@1315  ONCE


 PO  11/6/17 13:15


 11/6/17 13:16 DC 11/6/17 13:44


0.1 MG


 


 Miscellaneous


  (Stop Order)  1 ea  TODAY@2100


 N/A  11/6/17 21:00


 11/6/17 22:00 DC 11/6/17 20:51


1 EA


 


 Clonidine HCl


  (Catapres Tab)  0.1 mg  BID


 PO  11/6/17 20:00


 11/7/17 08:33 DC 11/7/17 08:00


0.1 MG


 


 Oxycodone HCl


  (Roxicodone


 Immediate Rel Tab)  10 mg  Q3H  PRN


 PO  11/6/17 15:30


 11/13/17 14:14  11/7/17 13:18


10 MG


 


 Clonidine HCl


  (Catapres-Tts-2


 0.2mg/24hr Patch)  1 patch  Q7D


 TD  11/7/17 10:00


 12/7/17 09:59  11/7/17 11:20


1 PATCH


 


 Furosemide


  (Lasix Tab)  20 mg  NOW  ONCE


 PO  11/7/17 10:45


 11/7/17 11:33 DC 11/7/17 12:36


20 MG








 (Natalia Ha CRNP)





Objective


Vital Signs











  Date Time  Temp Pulse Resp B/P (MAP) Pulse Ox O2 Delivery O2 Flow Rate FiO2


 


11/7/17 14:02    190/65 (106)    


 


11/7/17 10:54      Room Air  


 


11/7/17 07:18 36.5 58 18 186/91 (122) 96 Room Air  


 


11/7/17 00:48      Nasal Cannula 3.0 


 


11/7/17 00:02 36.4 64 18 174/75 (108) 93 Room Air  


 


11/6/17 20:00      Nasal Cannula 3.0 


 


11/6/17 16:00      Room Air  


 


11/6/17 15:36 36.4 63 18 187/64 (105) 99 Room Air  








 (Natalia Ha CRNP)





Physical Exam


Notes:


General: no distress


Eyes: normal inspection, PERLL


Respiratory: chest non tender, clear to auscultation, normal breath sounds, no 

respiratory distress, no accessory muscle use


Cardiac: regular rate and rhythm, no rub or gallop, systolic murmur 3/6, no 

edema, no jvd


GI/: active bowel sounds, no abd pain or tenderness, soft, non distended


Extremities: normal range of motion, normal strength, non tender 


Neuro/Psych: alert and oriented x 3, normal mood and affect


Skin: normal color, dry, wound vac in place


 (Natalia Ha CRNP)





Laboratory Results





Last 24 Hours








Test


  11/6/17


16:13 11/6/17


21:21 11/7/17


07:46 11/7/17


08:12


 


Bedside Glucose 162 mg/dl  201 mg/dl  131 mg/dl  


 


Activated Partial


Thromboplast Time 


  


  


  31.7 SECONDS 


 


 


Partial Thromboplastin Ratio    1.2 


 


Test


  11/7/17


08:13 11/7/17


10:18 11/7/17


11:34 


 


 


White Blood Count 5.24 K/uL    


 


Red Blood Count 2.99 M/uL    


 


Hemoglobin 8.0 g/dL    


 


Hematocrit 25.9 %    


 


Mean Corpuscular Volume 86.6 fL    


 


Mean Corpuscular Hemoglobin 26.8 pg    


 


Mean Corpuscular Hemoglobin


Concent 30.9 g/dl 


  


  


  


 


 


RDW Standard Deviation 47.7 fL    


 


RDW Coefficient of Variation 15.0 %    


 


Platelet Count 272 K/uL    


 


Mean Platelet Volume 7.8 fL    


 


Prothrombin Time  11.6 SECONDS   


 


Prothromb Time International


Ratio 


  1.1 


  


  


 


 


Bedside Glucose   190 mg/dl  








 (Natalia Ha, TERI)





Assessment and Plan


60 y/o male with a history of CAD, h/o MI s/p stents, HTN, HLD, diastolic CHF, 

COPD, DM II, anxiety, depression, bipolar disorder, anemia and GERD who 

presented to the ED on 10/30 with persistent headache and elevated INR.  Head 

CT negative for bleed, no acute abnormality.  CXR shows pulmonary congestion 

without overt pulmonary edema.  EKG shows no ischemic changes.  INR elevated at 

5.9.  WBC slightly elevated at 10.87, hgb 8.9.  





Headache, neck stiffness--mild, unlikely infectious, resolved on discharge


-Blood cultures pending, NGTD


-May apply home Voltaren gel to neck QID prn pain, heating pad also added


-Observe for fevers, but suspect this is more muscular


- resolved





Supratherapeutic INR--[facility] note h/o thrombus, unclear where this is located


-INR 5.9 --> 6.7 --> >8 --> 1.4


-Held warfarinand gave Vit K 5 mg x 1 11/1/17


- 1.1 after 4 doses of coumadin resumed, increased dose to 3 mg 11/6, given 

extra 2mg today for a total of 5mg





RLE wound, wound vac


-Consult wound care


-Continue oxycodone 5 mg PO q3h prn pain





CAD, h/o MI s/p stents, HTN, HLD--stable


-Continue metoprolol succinate 50 mg PO BID, amlodipine 10 mg PO qd, 

simvastatin 40 mg PO qd, lisinopril 40 mg PO qd, clonidine patch


- CTA renal artery did not show renal artery stenosis


- per Dr. Mullins recommendation, increased spironolactone to 50 mg BID


- orthostatics qs





Diastolic CHF--stable, no acute exacerbation





COPD--no acute exacerbation, asymptomatic but wheezing on exam


-DuoNebs QIDR and q2h prn SOB/wheezing


-Continue home inhalers


-O2 by protocol.  H/o chronic respiratory failure w/supplemental O2





DM II--last HgbA1c checked 8/11/17 was 7.5


-Lantus 10 units SC hs


-Insulin sliding scale


-Check BSGs q ac and qhs


-Continue gabapentin 100 mg PO hs for neuropathy





Urinary retention


-Chronic Gillespie


-Continue Flomax 0.4 mg PO hs





Anxiety, depression, bipolar disorder


-Continue escitalopram 20 mg PO qd, mirtazapine 45 mg PO hs, Klonopin 1 mg PO hs

, and Depakote 2000 mg PO hs





Anemia of chronic disease--stable


-Baseline hemoglobin around 8, Hgb at baseline on arrival





GERD


-Continue pantoprazole 40 mg PO qd





DVT prophylaxis


-Warfarin supratherapeutic on admission


- enoxaparin bridge/warfarin


-SCDs





Code Status


-Level I, FULL RESUSCITATION STATUS





Dispo: Denied [facility], awaiting SNF placement


 (Natalia Ha ., TERI)


NP Physician Supervision Note:





I interviewed and examined the patient. Discussed with Natalia Ha NP and 

agree with findings and plan as documented in the note. Any exceptions or 

clarifications are listed here: None





Patient says he feels quite well today's headache is resolved she's having some 

leg pain and is encouraged to ambulate.  He's had challenges having his INR 

returned to normal after being reversed with vitamin K 


vital signs show his blood pressure be still elevated





Cardiac exam is regular with no murmurs lungs are clear his distal extremities 

are with poor capillary refill but are warm his catheter without cords or focal 

areas of pain or swelling





Patient has fair peripheral artery disease marked hypertension here initially 

with coagulopathy from Coumadin and awaiting nursing home placement and 

reentrant coagulation due to concern for arterial thrombus





Documented By:  Chuy Omalley


 (Chuy Omalley M.D.)

## 2017-11-07 NOTE — NEPHROLOGY CONSULTATION
Nephrology Consultation


Date & Providers





Date of Consultation:  Nov 7, 2017.





Primary Care Provider:  Richard Benitez M.D.





Referring Provider:





Reason for Consultation


Hypertension





History of Present Illness


Paramjit Dixon is a 61-year-old  male who was seen and evaluated this 

morning for evaluation of persistent hypertension. I first met Paramjit during a 

prior hospitalization at [facility] in August 2017.  Paramjit was admitted a few days 

ago with headache and accelerated hypertension. Since admission, clonidine, 

hydralazine and spironolactone have been added. At this time, Paramjit feels well. 

He was resting comfortably during my assessment. He reported resolution of his 

headache. He denied chest pain or palpitations. He denied shortness of breath. 





Medical history is complicated and includes diabetes mellitus II, hypertension, 

coronary artery disease, chronic diastolic CHF, COPD, anemia, hyperlipidemia, 

tobacco abuse, depression, bipolar disorder and PAD.





Blood pressure was previously well controlled on a regimen of amlodipine 10 mg 

daily, lisinopril 40 mg daily, Toprol XL 50 mg BID, and furosemide 80 mg twice 

daily. Records from the cardiology/CHF clinic as well as the patient's PCP were 

reviewed today. Paramjit does not know his home medications. It is not clear to me 

at this time why furosemide was not listed on home medications. HCTZ was also 

listed on recent outpatient notes but it is not clear Paramjit was taking these 

medications.





There is some concern for medical adherence. Paramjit never had follow up in the 

nephrology clinic after his discharge in August. He has a history of some 

confusion with home medications.





Past Medical/Surgical History


Medical:


Chronic diastolic CHF


Essential hypertension


History of FITZ


Diabetes mellitus


Coronary artery disease


Hyperlipidemia


Tobacco abuse


LVH


PAD


Lower extremity ulcers


Depression


Bipolar disorder


Surgical:


Cardiac catheterization with PCI and stent placement, Left leg irrigation and 

debridement, application of VAC, Left ankle irrigation and debridement of 

diabetic foot ulcer, LLE angio with stenting in 5/2017








Allergies


Coded Allergies:  


     No Known Allergies (Verified , 7/19/17)





Inpatient Medications





Current Inpatient Medications








 Medications


  (Trade)  Dose


 Ordered  Sig/Dc


 Route  Start Time


 Stop Time Status Last Admin


Dose Admin


 


 Acetaminophen


  (Tylenol Tab)  650 mg  Q4H  PRN


 PO  10/30/17 14:15


 11/29/17 14:14  11/6/17 13:46


650 MG


 


 Al Hydrox/Mg


 Hydrox/Simethicone


  (Maalox Max Susp)  15 ml  Q4H  PRN


 PO  10/30/17 14:15


 11/29/17 14:14   


 


 


 Magnesium


 Hydroxide


  (Milk Of


 Magnesia Susp)  30 ml  Q6H  PRN


 PO  10/30/17 14:15


 11/29/17 14:14   


 


 


 Polyethylene


  (Miralax Powder


 Packet)  17 gm  DAILY  PRN


 PO  10/30/17 14:15


 11/29/17 14:14   


 


 


 Ondansetron HCl


  (Zofran Inj)  4 mg  Q6H  PRN


 IV  10/30/17 14:15


 11/29/17 14:14   


 


 


 Glucose


  (Glucose 40% Gel)  15-30


 GRAMS 15


 GRAMS...  UD  PRN


 PO  10/30/17 14:15


 11/29/17 14:14   


 


 


 Glucose


  (Glucose Chew


 Tab)  4-8


 Tablets 4


 Tabl...  UD  PRN


 PO  10/30/17 14:15


 11/29/17 14:14   


 


 


 Dextrose


  (Dextrose 50%


 50ML Syringe)  25-50ML OF


 50% DW IV


 FOR...  UD  PRN


 IV  10/30/17 14:15


 11/29/17 14:14   


 


 


 Glucagon


  (Glucagon Inj)  1 mg  UD  PRN


 SQ  10/30/17 14:15


 11/29/17 14:14   


 


 


 Albuterol


  (Ventolin Hfa


 Inhaler)  2 puffs  Q6


 INH  10/30/17 18:00


 11/29/17 17:59  11/7/17 06:38


2 PUFFS


 


 Bisacodyl


  (Dulcolax Supp)  10 mg  DAILY  PRN


 UT  10/30/17 14:15


 11/29/17 14:14   


 


 


 Calcium Carbonate


  (Tums Chew Tab)  500 mg  TID  PRN


 PO  10/30/17 14:15


 11/29/17 14:14   


 


 


 Cyanocobalamin


  (Vitamin B-12


 Tab)  100 mcg  DAILY


 PO  10/31/17 08:00


 11/30/17 08:59  11/7/17 08:00


100 MCG


 


 Divalproex Sodium


  (Depakote


 Extended Rel Tab)  2,000 mg  HS


 PO  10/30/17 21:00


 11/29/17 20:59  11/6/17 19:55


2,000 MG


 


 Docusate Sodium


  (coLACE CAP)  100 mg  BID  PRN


 PO  10/30/17 14:15


 11/29/17 14:14   


 


 


 Escitalopram


 Oxalate


  (Lexapro Tab)  20 mg  QAM


 PO  10/31/17 08:00


 11/30/17 08:59  11/7/17 08:00


20 MG


 


 Fluticasone


 Propionate


  (Flonase Nasal


 Spray)  2 sprays  DAILY  PRN


 VERONICA  10/30/17 14:15


 11/29/17 14:14   


 


 


 Gabapentin


  (Neurontin Cap)  100 mg  HS


 PO  10/30/17 21:00


 11/29/17 20:59  11/6/17 19:52


100 MG


 


 Insulin Glargine


  (Lantus Solostar


 Pen)  10 units  HS


 SC  10/30/17 21:00


 11/29/17 20:59  11/6/17 21:43


10 UNITS


 


 Lisinopril


  (Zestril Tab)  40 mg  DAILY


 PO  10/31/17 08:00


 11/30/17 08:59  11/7/17 08:01


40 MG


 


 Metoprolol


 Succinate


  (Toprol Xl Tab)  50 mg  BID


 PO  10/30/17 20:00


 11/29/17 20:59  11/6/17 19:53


50 MG


 


 Mirtazapine


  (Remeron Tab)  45 mg  HS


 PO  10/30/17 21:00


 11/29/17 20:59  11/6/17 19:54


45 MG


 


 Nitroglycerin


  (Nitrostat Tab)  0.4 mg  PRN  PRN


 UT  10/30/17 14:15


 11/29/17 14:14   


 


 


 Pantoprazole


 Sodium


  (Protonix Tab)  40 mg  DAILY


 PO  10/31/17 08:00


 11/30/17 08:59  11/7/17 08:00


40 MG


 


 Senna/Docusate


 Sodium


  (Senokot S Tab)  1 tab  QDL


 PO  10/31/17 11:00


 11/30/17 10:59  10/31/17 12:38


1 TAB


 


 Simvastatin


  (Zocor Tab)  40 mg  QPM


 PO  10/30/17 21:00


 11/29/17 20:59  11/6/17 19:52


40 MG


 


 Tamsulosin HCl


  (Flomax Cap)  0.4 mg  HS


 PO  10/30/17 21:00


 11/29/17 20:59  11/6/17 19:54


0.4 MG


 


 Amlodipine


 Besylate


  (Norvasc Tab)  10 mg  QAM


 PO  10/31/17 08:00


 11/30/17 08:59  11/7/17 08:00


10 MG


 


 Miscellaneous


 Information


  (Order Awaiting


 Action)  1 ea  QS


 N/A  10/31/17 00:00


 11/30/17 00:00   


 


 


 Diclofenac Sodium


  (Voltaren 1% Top


 Gel)  1 appln  QID


 EXT  10/30/17 20:45


 11/29/17 14:14  11/7/17 07:59


1 APPLN


 


 Miscellaneous


  (Iv Fluids


 Completed)  1 ea  PRN  PRN


 N/A  10/30/17 21:00


 10/30/18 20:59   


 


 


 Enteral


 Nutritional


 Formula


  (Boost Glucose


 Control)  1 can  TIDM


 PO  10/31/17 17:00


 11/30/17 16:59  11/7/17 07:59


1 CAN


 


 Heparin Sodium


  (Porcine) 30310


 unit/Dextrose  500 ml @ 


 38 mls/hr  [id number] PRN


 IV  11/4/17 10:30


 12/6/17 21:00  11/6/17 07:28


38 MLS/HR


 


 Clopidogrel


 Bisulfate


  (plAVix TAB)  75 mg  QAM


 PO  11/6/17 08:00


 12/6/17 07:59  11/7/17 08:00


75 MG


 


 Clonazepam


  (Klonopin Tab)  1 mg  HS


 PO  11/5/17 12:00


 11/29/17 20:59  11/6/17 20:51


1 MG


 


 Hydralazine HCl


  (Apresoline Tab)  100 mg  TID


 PO  11/5/17 16:00


 12/5/17 15:59  11/7/17 07:59


100 MG


 


 Spironolactone


  (Aldactone Tab)  25 mg  BID17


 PO  11/5/17 19:30


 12/5/17 19:29  11/7/17 08:01


25 MG


 


 Warfarin Sodium


  (Coumadin Tab)  3 mg  DAILY@16


 PO  11/6/17 16:00


 12/6/17 15:59  11/6/17 16:18


3 MG


 


 Enoxaparin Sodium


  (Lovenox Inj)  90 mg  Q12


 SQ  11/6/17 21:00


 12/6/17 20:59  11/7/17 08:02


90 MG


 


 Ioversol


  (Optiray 320)  100 ml  UD  PRN


 IV  11/6/17 14:15


 11/10/17 14:14   


 


 


 Oxycodone HCl


  (Roxicodone


 Immediate Rel Tab)  10 mg  Q3H  PRN


 PO  11/6/17 15:30


 11/13/17 14:14  11/6/17 16:18


10 MG


 


 Clonidine HCl


  (Catapres-Tts-2


 0.2mg/24hr Patch)  1 patch  Q7D


 TD  11/7/17 10:00


 12/7/17 09:59  11/7/17 11:20


1 PATCH


 


 Miscellaneous


  (Remove


 Clonidine Patch)  1 ea  Q7D


 N/A  11/14/17 09:59


 12/14/17 09:58   


 


 


 Miscellaneous


 Information


  (Check Clonidine


 Patch Placement)  1 ea  QS


 N/A  11/7/17 16:00


 12/7/17 15:59   


 











Family History





Cancer (esophageal)


Diabetes mellitus


Heart disease


Hypertension





Social History


Smoking Status:  Current Every Day Smoker


Smokeless Tobacco Use:  No


Alcohol Use:  none


Drug Use:  none


Marital Status:  


Housing Status:  other ([facility])


Occupation:  employed





Review of Systems


A complete review of systems was performed.  Pertinent positives are noted 

above. All other systems are negative.





Physical Exam











  Date Time  Temp Pulse Resp B/P (MAP) Pulse Ox O2 Delivery O2 Flow Rate FiO2


 


11/7/17 10:54      Room Air  


 


11/7/17 07:18 36.5 58 18 186/91 (122) 96 Room Air  


 


11/7/17 00:48      Nasal Cannula 3.0 


 


11/7/17 00:02 36.4 64 18 174/75 (108) 93 Room Air  


 


11/6/17 20:00      Nasal Cannula 3.0 


 


11/6/17 16:00      Room Air  


 


11/6/17 15:36 36.4 63 18 187/64 (105) 99 Room Air  


 


11/6/17 13:39    180/88 (118)    








General Appearance:  no apparent distress, + thin


Head:  normocephalic, atraumatic


Eyes:  normal inspection, sclerae normal


ENT:  normal ENT inspection, pharynx normal, + pertinent finding (oral mucosa 

slightly dry)


Neck:  supple, + JVD


Respiratory/Chest:  lungs clear, no respiratory distress, no accessory muscle 

use


Cardiovascular:  regular rate, rhythm, no gallop


Abdomen/GI:  non tender, soft


Back:  no CVA tenderness


Extremities/Musculoskelatal:  normal inspection, + pedal edema (trace-1+ 

pitting edema)


Neurologic/Psych:  alert, normal mood/affect


Skin:  normal color





Laboratory Results





Last 24 Hours








Test


  11/6/17


16:13 11/6/17


21:21 11/7/17


07:46 11/7/17


08:12


 


Bedside Glucose 162 mg/dl  201 mg/dl  131 mg/dl  


 


Activated Partial


Thromboplast Time 


  


  


  31.7 SECONDS 


 


 


Partial Thromboplastin Ratio    1.2 


 


Test


  11/7/17


08:13 11/7/17


10:18 11/7/17


11:34 


 


 


White Blood Count 5.24 K/uL    


 


Red Blood Count 2.99 M/uL    


 


Hemoglobin 8.0 g/dL    


 


Hematocrit 25.9 %    


 


Mean Corpuscular Volume 86.6 fL    


 


Mean Corpuscular Hemoglobin 26.8 pg    


 


Mean Corpuscular Hemoglobin


Concent 30.9 g/dl 


  


  


  


 


 


RDW Standard Deviation 47.7 fL    


 


RDW Coefficient of Variation 15.0 %    


 


Platelet Count 272 K/uL    


 


Mean Platelet Volume 7.8 fL    


 


Prothrombin Time  11.6 SECONDS   


 


Prothromb Time International


Ratio 


  1.1 


  


  


 


 


Bedside Glucose   190 mg/dl  











Impression





(1) Accelerated hypertension


(2) PAD (peripheral artery disease)


(3) Diastolic CHF, chronic


(4) LVH (left ventricular hypertrophy)


Paramjit is a 61-year-old  male with DMII, hypertension, dyslipidemia, CAD

, chronic diastolic CHF, diffuse calcific vascular disease, PAD and depression 

as well as bipolar disorder. He was admitted with headache. Hospitalization 

complicated by difficult to manage blood pressure.





Volume status replete. No evidence of decompensated heart failure.





CTA of the abdomen and renal arteries reviewed today. Diffuse calcific and 

atherosclerotic disease noted without significant KIANA. 





Hypertension consistent with essential hypertension. Cannot completely exclude 

serotonin syndrome but presentation is atypical and considered unlikely. 





I suspect that Paramjit will benefit most from titrate of a diuretic at this time. 

I will restart a loop diuretic gently today and increase as tolerated.  There 

certainly remains room to increase spironolactone as well. 





He is adequately beta blocked with a heart rate of 50-60. I will try to wean 

clonidine as tolerated.





Recommendations


-- Furosemide 20 mg now


-- Continue amlodipine 10 mg daily, lisinopril 40 mg daily and Toprol XL 50 mg 

BID


-- Wean clonidine once BP appropriate


-- Hydralazine 100 mg Q 8 hrs PRN hypertension


-- Document I/O's


-- Check BP in both arms


-- Check metabolic profile daily


-- Sodium restrict diet


-- Check orthostatic vital signs QAM

## 2017-11-08 VITALS — OXYGEN SATURATION: 96 %

## 2017-11-08 VITALS
SYSTOLIC BLOOD PRESSURE: 193 MMHG | HEART RATE: 69 BPM | OXYGEN SATURATION: 97 % | DIASTOLIC BLOOD PRESSURE: 84 MMHG | TEMPERATURE: 97.7 F

## 2017-11-08 VITALS
SYSTOLIC BLOOD PRESSURE: 184 MMHG | DIASTOLIC BLOOD PRESSURE: 71 MMHG | HEART RATE: 48 BPM | OXYGEN SATURATION: 96 % | TEMPERATURE: 97.52 F

## 2017-11-08 VITALS — OXYGEN SATURATION: 98 % | DIASTOLIC BLOOD PRESSURE: 86 MMHG | SYSTOLIC BLOOD PRESSURE: 199 MMHG | HEART RATE: 75 BPM

## 2017-11-08 VITALS — HEART RATE: 75 BPM | SYSTOLIC BLOOD PRESSURE: 180 MMHG | DIASTOLIC BLOOD PRESSURE: 75 MMHG

## 2017-11-08 VITALS
OXYGEN SATURATION: 95 % | HEART RATE: 58 BPM | DIASTOLIC BLOOD PRESSURE: 62 MMHG | SYSTOLIC BLOOD PRESSURE: 180 MMHG | TEMPERATURE: 97.52 F

## 2017-11-08 VITALS — DIASTOLIC BLOOD PRESSURE: 69 MMHG | SYSTOLIC BLOOD PRESSURE: 185 MMHG

## 2017-11-08 VITALS — TEMPERATURE: 97.88 F | OXYGEN SATURATION: 97 % | HEART RATE: 59 BPM

## 2017-11-08 VITALS — DIASTOLIC BLOOD PRESSURE: 84 MMHG | SYSTOLIC BLOOD PRESSURE: 150 MMHG | HEART RATE: 88 BPM

## 2017-11-08 VITALS — OXYGEN SATURATION: 98 %

## 2017-11-08 VITALS — SYSTOLIC BLOOD PRESSURE: 175 MMHG | DIASTOLIC BLOOD PRESSURE: 68 MMHG

## 2017-11-08 LAB
ANION GAP SERPL CALC-SCNC: 6 MMOL/L (ref 3–11)
BUN SERPL-MCNC: 37 MG/DL (ref 7–18)
BUN/CREAT SERPL: 32.1 (ref 10–20)
CALCIUM SERPL-MCNC: 8.3 MG/DL (ref 8.5–10.1)
CHLORIDE SERPL-SCNC: 109 MMOL/L (ref 98–107)
CO2 SERPL-SCNC: 26 MMOL/L (ref 21–32)
CREAT CL PREDICTED SERPL C-G-VRATE: 76.8 ML/MIN
CREAT SERPL-MCNC: 1.16 MG/DL (ref 0.6–1.4)
GLUCOSE SERPL-MCNC: 89 MG/DL (ref 70–99)
INR PPP: 1.1 (ref 0.9–1.1)
PHOSPHATE SERPL-MCNC: 4.8 MG/DL (ref 2.5–4.9)
POTASSIUM SERPL-SCNC: 4.4 MMOL/L (ref 3.5–5.1)
PROTHROMBIN TIME: 12 SECONDS (ref 9–12)
SODIUM SERPL-SCNC: 141 MMOL/L (ref 136–145)

## 2017-11-08 RX ADMIN — WARFARIN SCH MG: 3 TABLET ORAL at 16:13

## 2017-11-08 RX ADMIN — METOPROLOL SUCCINATE SCH MG: 50 TABLET, EXTENDED RELEASE ORAL at 07:44

## 2017-11-08 RX ADMIN — AMLODIPINE BESYLATE SCH MG: 5 TABLET ORAL at 08:06

## 2017-11-08 RX ADMIN — METOPROLOL SUCCINATE SCH MG: 50 TABLET, EXTENDED RELEASE ORAL at 20:28

## 2017-11-08 RX ADMIN — PANTOPRAZOLE SCH MG: 40 TABLET, DELAYED RELEASE ORAL at 08:06

## 2017-11-08 RX ADMIN — ALBUTEROL SULFATE SCH PUFFS: 90 AEROSOL, METERED RESPIRATORY (INHALATION) at 23:42

## 2017-11-08 RX ADMIN — INSULIN GLARGINE SCH UNITS: 100 INJECTION, SOLUTION SUBCUTANEOUS at 20:38

## 2017-11-08 RX ADMIN — CLOPIDOGREL BISULFATE SCH MG: 75 TABLET, FILM COATED ORAL at 08:06

## 2017-11-08 RX ADMIN — STANDARDIZED SENNA CONCENTRATE AND DOCUSATE SODIUM SCH TAB: 8.6; 5 TABLET ORAL at 12:00

## 2017-11-08 RX ADMIN — ALBUTEROL SULFATE SCH PUFFS: 90 AEROSOL, METERED RESPIRATORY (INHALATION) at 05:57

## 2017-11-08 RX ADMIN — OXYCODONE HYDROCHLORIDE PRN MG: 5 TABLET ORAL at 13:16

## 2017-11-08 RX ADMIN — TAMSULOSIN HYDROCHLORIDE SCH MG: 0.4 CAPSULE ORAL at 20:29

## 2017-11-08 RX ADMIN — OXYCODONE HYDROCHLORIDE PRN MG: 5 TABLET ORAL at 18:11

## 2017-11-08 RX ADMIN — FUROSEMIDE SCH MG: 20 TABLET ORAL at 16:14

## 2017-11-08 RX ADMIN — DICLOFENAC SODIUM SCH APPLN: 10 GEL TOPICAL at 08:05

## 2017-11-08 RX ADMIN — Medication SCH CAN: at 08:00

## 2017-11-08 RX ADMIN — OXYCODONE HYDROCHLORIDE PRN MG: 5 TABLET ORAL at 21:19

## 2017-11-08 RX ADMIN — Medication SCH CAN: at 17:33

## 2017-11-08 RX ADMIN — DICLOFENAC SODIUM SCH APPLN: 10 GEL TOPICAL at 12:00

## 2017-11-08 RX ADMIN — ENOXAPARIN SODIUM SCH MG: 100 INJECTION SUBCUTANEOUS at 08:08

## 2017-11-08 RX ADMIN — ESCITALOPRAM OXALATE SCH MG: 20 TABLET, FILM COATED ORAL at 08:06

## 2017-11-08 RX ADMIN — SPIRONOLACTONE SCH MG: 25 TABLET, FILM COATED ORAL at 08:07

## 2017-11-08 RX ADMIN — ALBUTEROL SULFATE SCH PUFFS: 90 AEROSOL, METERED RESPIRATORY (INHALATION) at 18:06

## 2017-11-08 RX ADMIN — SPIRONOLACTONE SCH MG: 25 TABLET, FILM COATED ORAL at 16:13

## 2017-11-08 RX ADMIN — LISINOPRIL SCH MG: 20 TABLET ORAL at 08:07

## 2017-11-08 RX ADMIN — MIRTAZAPINE SCH MG: 15 TABLET, FILM COATED ORAL at 20:28

## 2017-11-08 RX ADMIN — ALBUTEROL SULFATE SCH PUFFS: 90 AEROSOL, METERED RESPIRATORY (INHALATION) at 17:33

## 2017-11-08 RX ADMIN — ENOXAPARIN SODIUM SCH MG: 100 INJECTION SUBCUTANEOUS at 20:30

## 2017-11-08 RX ADMIN — Medication SCH MCG: at 08:06

## 2017-11-08 RX ADMIN — SIMVASTATIN SCH MG: 40 TABLET, FILM COATED ORAL at 20:28

## 2017-11-08 RX ADMIN — DICLOFENAC SODIUM SCH APPLN: 10 GEL TOPICAL at 17:32

## 2017-11-08 RX ADMIN — CLONAZEPAM SCH MG: 0.5 TABLET ORAL at 20:27

## 2017-11-08 RX ADMIN — GABAPENTIN SCH MG: 100 CAPSULE ORAL at 21:18

## 2017-11-08 RX ADMIN — Medication SCH CAN: at 12:25

## 2017-11-08 RX ADMIN — DIVALPROEX SODIUM SCH MG: 500 TABLET, FILM COATED, EXTENDED RELEASE ORAL at 20:29

## 2017-11-08 RX ADMIN — ALBUTEROL SULFATE SCH PUFFS: 90 AEROSOL, METERED RESPIRATORY (INHALATION) at 12:26

## 2017-11-08 RX ADMIN — FUROSEMIDE SCH MG: 20 TABLET ORAL at 10:42

## 2017-11-08 RX ADMIN — DICLOFENAC SODIUM SCH APPLN: 10 GEL TOPICAL at 20:40

## 2017-11-08 NOTE — HOSPITALIST PROGRESS NOTE
Hospitalist Progress Note


Date of Service


Nov 8, 2017.


 (Natalia Ha CRNP)





Subjective


Pt evaluation today including:  conversation w/ patient, physical exam, chart 

review, lab review, review of inpatient medication list


Voiding:  gillespie catheter in place


Mr. Dixon still has some pain in his legs, he is hoping ambulating today 

will help.  He does not have a headache now, though it does come and go but 

seems to be tolerable and well controlled with pain regimen. He has not had any 

dizziness with standing with the increase in his blood pressure medications and 

apparent orthostasis in his vital signs. 





ROS


Constitutional: no chills, aches, sweats or fever


Respiratory: no sob,cough, sputum, or wheezing


Cardiac: no chest pain, palpitations, edema, orthopnea or lightheadedness


GI: no abdominal pain, nausea, vomiting, diarrhea or constipation


: no dysuria or hesitancy


Extremities: see HPI


Skin: no rash


 (Natalia Ha CRNP)





Medications











 Medications


  (Trade)  Dose


 Ordered  Sig/Dc


 Route  Start Time


 Stop Time Status Last Admin


Dose Admin


 


 Miscellaneous


 Information


  (Check Clonidine


 Patch Placement)  1 ea  QS


 N/A  11/7/17 16:00


 12/7/17 15:59  11/8/17 08:11


1 EA


 


 Warfarin Sodium


  (Coumadin Tab)  2 mg  1600  ONCE


 PO  11/7/17 16:00


 11/7/17 16:01 DC 11/7/17 16:31


2 MG


 


 Spironolactone


  (Aldactone Tab)  50 mg  BID17


 PO  11/7/17 17:00


 12/5/17 19:29  11/8/17 08:07


50 MG


 


 Furosemide


  (Lasix Tab)  20 mg  BID17


 PO  11/8/17 09:00


 12/8/17 08:59  11/8/17 10:42


20 MG


 


 Furosemide


  (Lasix Tab)  20 mg  1745  ONCE


 PO  11/7/17 17:45


 11/7/17 17:46 DC 11/7/17 18:21


20 MG








 (Natalia Ha CRNP)





Objective


Vital Signs











  Date Time  Temp Pulse Resp B/P (MAP) Pulse Ox O2 Delivery O2 Flow Rate FiO2


 


11/8/17 10:46 36.4 58 18 180/62 (101) 95   


 


11/8/17 10:35      Room Air  


 


11/8/17 08:11     96 Room Air  


 


11/8/17 07:30 36.4 48 16 184/71 (108) 96   


 


11/8/17 00:26    175/68 (103)    


 


11/8/17 00:00      Room Air  


 


11/7/17 23:52 36.7 58 20 168/64 (98) 94 Room Air  


 


11/7/17 19:58  58  169/82 (111)    


 


11/7/17 18:34     98 Room Air  


 


11/7/17 17:15    165/68 (100)    





    145/69 (94)    





    123/64 (83)    


 


11/7/17 15:29 36.6 53 18 146/64 (91) 98 Room Air  


 


11/7/17 14:02    190/65 (106)    








 (Natalia Ha CRNP)





Physical Exam


Notes:


General: no distress


Eyes: normal inspection, PERLL


Respiratory: chest non tender, clear to auscultation, normal breath sounds, no 

respiratory distress, no accessory muscle use


Cardiac: regular rate and rhythm, no rub or gallop, III/VI systolic murmur, no 

edema, no jvd


GI/: active bowel sounds, no abd pain or tenderness, soft, non distended


Extremities: normal range of motion, normal strength, non tender 


Neuro/Psych: alert and oriented x 3, normal mood and affect


Skin: normal color, dry, wound vac in place


 (Natalia Ha CRNP)





Laboratory Results





Last 24 Hours








Test


  11/7/17


11:34 11/7/17


16:30 11/7/17


19:50 11/8/17


05:39


 


Bedside Glucose 190 mg/dl  157 mg/dl  198 mg/dl  


 


Prothrombin Time    12.0 SECONDS 


 


Prothromb Time International


Ratio 


  


  


  1.1 


 


 


Sodium Level    141 mmol/L 


 


Potassium Level    4.4 mmol/L 


 


Chloride Level    109 mmol/L 


 


Carbon Dioxide Level    26 mmol/L 


 


Anion Gap    6.0 mmol/L 


 


Blood Urea Nitrogen    37 mg/dl 


 


Creatinine    1.16 mg/dl 


 


Est Creatinine Clear Calc


Drug Dose 


  


  


  76.8 ml/min 


 


 


Estimated GFR (


American) 


  


  


  79.4 


 


 


Estimated GFR (Non-


American 


  


  


  68.5 


 


 


BUN/Creatinine Ratio    32.1 


 


Random Glucose    89 mg/dl 


 


Calcium Level    8.3 mg/dl 


 


Phosphorus Level    4.8 mg/dl 


 


Albumin    1.8 gm/dl 


 


Test


  11/8/17


07:26 


  


  


 


 


Bedside Glucose 104 mg/dl    








 (Natalia Ha, TERI)





Assessment and Plan


60 y/o male with a history of CAD, h/o MI s/p stents, HTN, HLD, diastolic CHF, 

COPD, DM II, anxiety, depression, bipolar disorder, anemia and GERD who 

presented to the ED on 10/30 with persistent headache and elevated INR.    





Headache, neck stiffness


-Blood cultures showed no growth


-Voltaren gel, heating pad 


- largely resolved





Supratherapeutic INR--[facility] note h/o thrombus, unclear where this is located


-INR peaked at >8 and patient was given Vitamin K 11/1, no s/s of bleeding


- Lovenox bridge, coumadin restarted 11/2





RLE wound, wound vac


-Wound care


-Continue oxycodone 5 mg PO q3h prn pain





CAD, h/o MI s/p stents, HTN, HLD


-Continue metoprolol succinate 50 mg PO BID, amlodipine 10 mg PO qd, 

simvastatin 40 mg PO qd, lisinopril 40 mg PO qd, clonidine patch


- CTA renal artery did not show renal artery stenosis


- per Dr. Mullins recommendation, increased spironolactone to 50 mg BID and started 

bid lasix 11/7


- orthostatics qs showed orthostasis however patient is asymptomatic





Diastolic CHF


- chest x ray on admission initially showed pulmonary congestion which appears 

to have resolved


- continue metoprolol, also receiving diuretics for BP





COPD


- no s/s of exacerbation


-DuoNebs QIDR and q2h prn SOB/wheezing


-Continue home inhalers


-O2 by protocol, wears 2L chronically





DM II--last HgbA1c checked 8/11/17 was 7.5


-Lantus 10 units SC hs


-Insulin sliding scale


-Check BSGs q ac and qhs


-Continue gabapentin 100 mg PO hs for neuropathy





Urinary retention


-Chronic Gillespie


-Continue Flomax 0.4 mg PO hs





Anxiety, depression, bipolar disorder


-Continue escitalopram 20 mg PO qd, mirtazapine 45 mg PO hs, Klonopin 1 mg PO hs

, and Depakote 2000 mg PO hs





Anemia of chronic disease-


-Baseline hemoglobin around 8 and has remained stable





GERD


-Continue pantoprazole 40 mg PO qd





DVT prophylaxis


- enoxaprin


-SCDs





Code Status


-Level I, FULL RESUSCITATION STATUS





Dispo: approved for [facility], awaiting bed


 (Natalia Ha ., TERI)


NP Physician Supervision Note:





I interviewed and examined the patient. Discussed with Natalia Ha NP and 

agree with findings and plan as documented in the note. Any exceptions or 

clarifications are listed here: None





Patient is in about the same condition he is improving awaiting anticoagulation 

for the concerning aortic thrombus and severe peripheral vascular disease he 

does need help with regard to his care as he has wound vacs on his lower 

extremities from diabetic associated infection





Regarding his bipolar disease this has been extremely stable this has no 

nothing to do with his need for subacute rehabilitation and skilled nursing 

care next





Vital signs are reviewed and stable with exception of orthostatic blood 

pressure changes in the face of significant continued hypertension





Cardiac exam is distant lungs are clear lower extremity with poor pulses wound 

VAC in place





Most pressing issue revolves around the patient's hypertension this is now 

requiring multiple agents including diuretics with the oversight of nephrology





The patient's coagulopathy is reversed almost too much now having difficult 

time regaining re-anticoagulation using bridging Lovenox and reinstituting his 

heparin





His current conditions including his mental health COPD diabetes coronary 

disease have been stable during the stay his lower extremity wounds are 

attended to by wound care nurses with wound VAC in place will anticipate 

disposition to skilled nursing facility the next few days











Documented By:  Chuy Omalley


 (Chuy Omalley M.D.)

## 2017-11-08 NOTE — NEPHROLOGY PROGRESS NOTE
Nephrology Progress Note


Date of Service


Nov 8, 2017.





Chief Complaint


Hypertension





Subjective


No acute events overnight. Paramjit is comfortable this morning with wound care 

being provided.  No chest pains or palpitations. No shortness of breath. No 

headache. BP checked prior to AM medications.





Review of Systems


A complete review of systems was performed.  Pertinent positives are noted 

above. All other systems are negative.





Vital Signs





Last 8 Hrs








  Date Time  Temp Pulse Resp B/P (MAP) Pulse Ox O2 Delivery O2 Flow Rate FiO2


 


11/8/17 10:35      Room Air  


 


11/8/17 08:11     96 Room Air  


 


11/8/17 07:30 36.4 48 16 184/71 (108) 96   











Last Recorded Weight


Weight (Kilograms):  88.500





Physical Exam


General Appearance:  no apparent distress, + thin


Head:  normocephalic, atraumatic


Eyes:  normal inspection, sclerae normal


ENT:  normal ENT inspection, + pertinent finding (saumya mucosa slightly dry)


Neck:  supple, + JVD (10 cm)


Respiratory/Chest:  lungs clear, no respiratory distress, no accessory muscle 

use


Cardiovascular:  regular rate, rhythm, no gallop


Abdomen/GI:  non tender, soft


Extremities/Musculoskelatal:  normal inspection, no pedal edema


Neurologic/Psych:  alert, normal mood/affect





Family History





Cancer (esophageal)


Diabetes mellitus


Heart disease


Hypertension





Social History


Smokeless Tobacco Use:  No


Alcohol Use:  none


Drug Use:  none


Marital Status:  


Housing Status:  other ([facility])


Occupation:  employed





Laboratory Results


Past 24 Hours


11/8/17 05:39

















Test


  11/7/17


11:34 11/7/17


16:30 11/7/17


19:50 11/8/17


05:39


 


Bedside Glucose


  190 mg/dl


(70-99) 157 mg/dl


(70-99) 198 mg/dl


(70-99) 


 


 


Prothrombin Time


  


  


  


  12.0 SECONDS


(9.0-12.0)


 


Prothromb Time International


Ratio 


  


  


  1.1 (0.9-1.1) 


 


 


Anion Gap


  


  


  


  6.0 mmol/L


(3-11)


 


Est Creatinine Clear Calc


Drug Dose 


  


  


  76.8 ml/min 


 


 


Estimated GFR (


American) 


  


  


  79.4 


 


 


Estimated GFR (Non-


American 


  


  


  68.5 


 


 


BUN/Creatinine Ratio    32.1 (10-20) 


 


Calcium Level


  


  


  


  8.3 mg/dl


(8.5-10.1)


 


Phosphorus Level


  


  


  


  4.8 mg/dl


(2.5-4.9)


 


Albumin


  


  


  


  1.8 gm/dl


(3.4-5.0)


 


Test


  11/8/17


07:26 


  


  


 


 


Bedside Glucose


  104 mg/dl


(70-99) 


  


  


 











Allergies


Coded Allergies:  


     No Known Allergies (Verified , 7/19/17)





Medications





Current Inpatient Medications








 Medications


  (Trade)  Dose


 Ordered  Sig/Dc


 Route  Start Time


 Stop Time Status Last Admin


Dose Admin


 


 Acetaminophen


  (Tylenol Tab)  650 mg  Q4H  PRN


 PO  10/30/17 14:15


 11/29/17 14:14  11/7/17 23:42


650 MG


 


 Al Hydrox/Mg


 Hydrox/Simethicone


  (Maalox Max Susp)  15 ml  Q4H  PRN


 PO  10/30/17 14:15


 11/29/17 14:14   


 


 


 Magnesium


 Hydroxide


  (Milk Of


 Magnesia Susp)  30 ml  Q6H  PRN


 PO  10/30/17 14:15


 11/29/17 14:14   


 


 


 Polyethylene


  (Miralax Powder


 Packet)  17 gm  DAILY  PRN


 PO  10/30/17 14:15


 11/29/17 14:14   


 


 


 Ondansetron HCl


  (Zofran Inj)  4 mg  Q6H  PRN


 IV  10/30/17 14:15


 11/29/17 14:14   


 


 


 Glucose


  (Glucose 40% Gel)  15-30


 GRAMS 15


 GRAMS...  UD  PRN


 PO  10/30/17 14:15


 11/29/17 14:14   


 


 


 Glucose


  (Glucose Chew


 Tab)  4-8


 Tablets 4


 Tabl...  UD  PRN


 PO  10/30/17 14:15


 11/29/17 14:14   


 


 


 Dextrose


  (Dextrose 50%


 50ML Syringe)  25-50ML OF


 50% DW IV


 FOR...  UD  PRN


 IV  10/30/17 14:15


 11/29/17 14:14   


 


 


 Glucagon


  (Glucagon Inj)  1 mg  UD  PRN


 SQ  10/30/17 14:15


 11/29/17 14:14   


 


 


 Albuterol


  (Ventolin Hfa


 Inhaler)  2 puffs  Q6


 INH  10/30/17 18:00


 11/29/17 17:59  11/7/17 18:21


2 PUFFS


 


 Bisacodyl


  (Dulcolax Supp)  10 mg  DAILY  PRN


 ID  10/30/17 14:15


 11/29/17 14:14   


 


 


 Calcium Carbonate


  (Tums Chew Tab)  500 mg  TID  PRN


 PO  10/30/17 14:15


 11/29/17 14:14   


 


 


 Cyanocobalamin


  (Vitamin B-12


 Tab)  100 mcg  DAILY


 PO  10/31/17 08:00


 11/30/17 08:59  11/8/17 08:06


100 MCG


 


 Divalproex Sodium


  (Depakote


 Extended Rel Tab)  2,000 mg  HS


 PO  10/30/17 21:00


 11/29/17 20:59  11/7/17 20:03


2,000 MG


 


 Docusate Sodium


  (coLACE CAP)  100 mg  BID  PRN


 PO  10/30/17 14:15


 11/29/17 14:14   


 


 


 Escitalopram


 Oxalate


  (Lexapro Tab)  20 mg  QAM


 PO  10/31/17 08:00


 11/30/17 08:59  11/8/17 08:06


20 MG


 


 Fluticasone


 Propionate


  (Flonase Nasal


 Spray)  2 sprays  DAILY  PRN


 VERONICA  10/30/17 14:15


 11/29/17 14:14   


 


 


 Gabapentin


  (Neurontin Cap)  100 mg  HS


 PO  10/30/17 21:00


 11/29/17 20:59  11/7/17 20:05


100 MG


 


 Insulin Glargine


  (Lantus Solostar


 Pen)  10 units  HS


 SC  10/30/17 21:00


 11/29/17 20:59  11/7/17 20:11


10 UNITS


 


 Lisinopril


  (Zestril Tab)  40 mg  DAILY


 PO  10/31/17 08:00


 11/30/17 08:59  11/8/17 08:07


40 MG


 


 Metoprolol


 Succinate


  (Toprol Xl Tab)  50 mg  BID


 PO  10/30/17 20:00


 11/29/17 20:59  11/6/17 19:53


50 MG


 


 Mirtazapine


  (Remeron Tab)  45 mg  HS


 PO  10/30/17 21:00


 11/29/17 20:59  11/7/17 20:05


45 MG


 


 Nitroglycerin


  (Nitrostat Tab)  0.4 mg  PRN  PRN


 UT  10/30/17 14:15


 11/29/17 14:14   


 


 


 Pantoprazole


 Sodium


  (Protonix Tab)  40 mg  DAILY


 PO  10/31/17 08:00


 11/30/17 08:59  11/8/17 08:06


40 MG


 


 Senna/Docusate


 Sodium


  (Senokot S Tab)  1 tab  QDL


 PO  10/31/17 11:00


 11/30/17 10:59  10/31/17 12:38


1 TAB


 


 Simvastatin


  (Zocor Tab)  40 mg  QPM


 PO  10/30/17 21:00


 11/29/17 20:59  11/7/17 20:06


40 MG


 


 Tamsulosin HCl


  (Flomax Cap)  0.4 mg  HS


 PO  10/30/17 21:00


 11/29/17 20:59  11/7/17 20:04


0.4 MG


 


 Amlodipine


 Besylate


  (Norvasc Tab)  10 mg  QAM


 PO  10/31/17 08:00


 11/30/17 08:59  11/8/17 08:06


10 MG


 


 Miscellaneous


 Information


  (Order Awaiting


 Action)  1 ea  QS


 N/A  10/31/17 00:00


 11/30/17 00:00   


 


 


 Diclofenac Sodium


  (Voltaren 1% Top


 Gel)  1 appln  QID


 EXT  10/30/17 20:45


 11/29/17 14:14  11/8/17 08:05


1 APPLN


 


 Miscellaneous


  (Iv Fluids


 Completed)  1 ea  PRN  PRN


 N/A  10/30/17 21:00


 10/30/18 20:59   


 


 


 Enteral


 Nutritional


 Formula


  (Boost Glucose


 Control)  1 can  TIDM


 PO  10/31/17 17:00


 11/30/17 16:59  11/8/17 08:00


1 CAN


 


 Clopidogrel


 Bisulfate


  (plAVix TAB)  75 mg  QAM


 PO  11/6/17 08:00


 12/6/17 07:59  11/8/17 08:06


75 MG


 


 Clonazepam


  (Klonopin Tab)  1 mg  HS


 PO  11/5/17 12:00


 11/29/17 20:59  11/7/17 20:01


1 MG


 


 Hydralazine HCl


  (Apresoline Tab)  100 mg  TID


 PO  11/5/17 16:00


 12/5/17 15:59  11/8/17 08:06


100 MG


 


 Warfarin Sodium


  (Coumadin Tab)  3 mg  DAILY@16


 PO  11/6/17 16:00


 12/6/17 15:59  11/7/17 16:31


3 MG


 


 Enoxaparin Sodium


  (Lovenox Inj)  90 mg  Q12


 SQ  11/6/17 21:00


 12/6/17 20:59  11/8/17 08:08


90 MG


 


 Ioversol


  (Optiray 320)  100 ml  UD  PRN


 IV  11/6/17 14:15


 11/10/17 14:14   


 


 


 Oxycodone HCl


  (Roxicodone


 Immediate Rel Tab)  10 mg  Q3H  PRN


 PO  11/6/17 15:30


 11/13/17 14:14  11/7/17 22:00


10 MG


 


 Clonidine HCl


  (Catapres-Tts-2


 0.2mg/24hr Patch)  1 patch  Q7D


 TD  11/7/17 10:00


 12/7/17 09:59  11/7/17 11:20


1 PATCH


 


 Miscellaneous


  (Remove


 Clonidine Patch)  1 ea  Q7D


 N/A  11/14/17 09:59


 12/14/17 09:58   


 


 


 Miscellaneous


 Information


  (Check Clonidine


 Patch Placement)  1 ea  QS


 N/A  11/7/17 16:00


 12/7/17 15:59  11/8/17 08:11


1 EA


 


 Spironolactone


  (Aldactone Tab)  50 mg  BID17


 PO  11/7/17 17:00


 12/5/17 19:29  11/8/17 08:07


50 MG


 


 Furosemide


  (Lasix Tab)  20 mg  BID17


 PO  11/8/17 09:00


 12/8/17 08:59   


 


 


 Warfarin Sodium


  (Coumadin Tab)  2 mg  1600  ONCE


 PO  11/8/17 16:00


 11/8/17 16:01   


 











Impression





(1) Accelerated hypertension


(2) PAD (peripheral artery disease)


(3) Diastolic CHF, chronic


(4) LVH (left ventricular hypertrophy)


Paramjit is a 61-year-old  male with DMII, hypertension, dyslipidemia, CAD

, chronic diastolic CHF, diffuse calcific vascular disease, PAD and depression 

as well as bipolar disorder. He was admitted with headache. Hospitalization 

complicated by difficult to manage blood pressure.





Volume status replete. No evidence of decompensated heart failure.





CTA of the abdomen and renal arteries reviewed. Diffuse calcific and 

atherosclerotic disease noted without significant KIANA. 





Hypertension consistent with essential hypertension. Cannot completely exclude 

serotonin syndrome but presentation is atypical and considered unlikely. 





Diuretics being titrated. Metabolic profile stable. 





Beta blockers have been held for bradycardia.





Recommendations


-- Check BP in both arms


-- Medications will not be adjusted for first morning blood pressure prior to 

medications


-- Avoid checking BP overnight during hours of sleep


-- Continue amlodipine 10 mg daily, lisinopril 40 mg daily and Toprol XL 50 mg 

BID


-- Furosemide 20 mg twice daily


-- Spironolactone 50 mg twice daily


-- Hydralazine 100 mg Q 8 hrs PRN hypertension


-- Document I/O's


-- Check metabolic profile daily


-- Sodium restrict diet


-- Check orthostatic vital signs daily

## 2017-11-09 VITALS — SYSTOLIC BLOOD PRESSURE: 180 MMHG | DIASTOLIC BLOOD PRESSURE: 55 MMHG | HEART RATE: 61 BPM

## 2017-11-09 VITALS
SYSTOLIC BLOOD PRESSURE: 165 MMHG | TEMPERATURE: 97.34 F | DIASTOLIC BLOOD PRESSURE: 60 MMHG | OXYGEN SATURATION: 96 % | HEART RATE: 53 BPM

## 2017-11-09 VITALS — DIASTOLIC BLOOD PRESSURE: 35 MMHG | HEART RATE: 58 BPM | SYSTOLIC BLOOD PRESSURE: 110 MMHG

## 2017-11-09 VITALS — OXYGEN SATURATION: 96 %

## 2017-11-09 VITALS — TEMPERATURE: 98.24 F | HEART RATE: 62 BPM | OXYGEN SATURATION: 95 %

## 2017-11-09 VITALS
TEMPERATURE: 98.06 F | OXYGEN SATURATION: 96 % | SYSTOLIC BLOOD PRESSURE: 144 MMHG | HEART RATE: 47 BPM | DIASTOLIC BLOOD PRESSURE: 57 MMHG

## 2017-11-09 VITALS — HEART RATE: 53 BPM | DIASTOLIC BLOOD PRESSURE: 40 MMHG | SYSTOLIC BLOOD PRESSURE: 140 MMHG

## 2017-11-09 VITALS
DIASTOLIC BLOOD PRESSURE: 50 MMHG | TEMPERATURE: 97.88 F | HEART RATE: 55 BPM | OXYGEN SATURATION: 94 % | SYSTOLIC BLOOD PRESSURE: 170 MMHG

## 2017-11-09 LAB
ANION GAP SERPL CALC-SCNC: 7 MMOL/L (ref 3–11)
BUN SERPL-MCNC: 39 MG/DL (ref 7–18)
BUN/CREAT SERPL: 28 (ref 10–20)
CALCIUM SERPL-MCNC: 8.4 MG/DL (ref 8.5–10.1)
CHLORIDE SERPL-SCNC: 107 MMOL/L (ref 98–107)
CO2 SERPL-SCNC: 25 MMOL/L (ref 21–32)
CREAT CL PREDICTED SERPL C-G-VRATE: 64.6 ML/MIN
CREAT SERPL-MCNC: 1.38 MG/DL (ref 0.6–1.4)
EOSINOPHIL NFR BLD AUTO: 248 K/UL (ref 130–400)
GLUCOSE SERPL-MCNC: 133 MG/DL (ref 70–99)
HCT VFR BLD CALC: 24.6 % (ref 42–52)
INR PPP: 1.1 (ref 0.9–1.1)
MCH RBC QN AUTO: 28.2 PG (ref 25–34)
MCHC RBC AUTO-ENTMCNC: 32.5 G/DL (ref 32–36)
MCV RBC AUTO: 86.6 FL (ref 80–100)
PMV BLD AUTO: 8.8 FL (ref 7.4–10.4)
POTASSIUM SERPL-SCNC: 4.4 MMOL/L (ref 3.5–5.1)
PROTHROMBIN TIME: 12.1 SECONDS (ref 9–12)
RBC # BLD AUTO: 2.84 M/UL (ref 4.7–6.1)
SODIUM SERPL-SCNC: 139 MMOL/L (ref 136–145)
WBC # BLD AUTO: 5.95 K/UL (ref 4.8–10.8)

## 2017-11-09 RX ADMIN — ENOXAPARIN SODIUM SCH MG: 100 INJECTION SUBCUTANEOUS at 08:18

## 2017-11-09 RX ADMIN — PANTOPRAZOLE SCH MG: 40 TABLET, DELAYED RELEASE ORAL at 08:16

## 2017-11-09 RX ADMIN — ENOXAPARIN SODIUM SCH MG: 100 INJECTION SUBCUTANEOUS at 19:58

## 2017-11-09 RX ADMIN — ESCITALOPRAM OXALATE SCH MG: 20 TABLET, FILM COATED ORAL at 08:16

## 2017-11-09 RX ADMIN — ALBUTEROL SULFATE SCH PUFFS: 90 AEROSOL, METERED RESPIRATORY (INHALATION) at 17:51

## 2017-11-09 RX ADMIN — DICLOFENAC SODIUM SCH APPLN: 10 GEL TOPICAL at 16:39

## 2017-11-09 RX ADMIN — OXYCODONE HYDROCHLORIDE PRN MG: 5 TABLET ORAL at 18:34

## 2017-11-09 RX ADMIN — CLONAZEPAM SCH MG: 0.5 TABLET ORAL at 19:57

## 2017-11-09 RX ADMIN — GABAPENTIN SCH MG: 100 CAPSULE ORAL at 19:57

## 2017-11-09 RX ADMIN — DICLOFENAC SODIUM SCH APPLN: 10 GEL TOPICAL at 12:00

## 2017-11-09 RX ADMIN — DIVALPROEX SODIUM SCH MG: 500 TABLET, FILM COATED, EXTENDED RELEASE ORAL at 19:57

## 2017-11-09 RX ADMIN — MIRTAZAPINE SCH MG: 15 TABLET, FILM COATED ORAL at 19:57

## 2017-11-09 RX ADMIN — STANDARDIZED SENNA CONCENTRATE AND DOCUSATE SODIUM SCH TAB: 8.6; 5 TABLET ORAL at 12:00

## 2017-11-09 RX ADMIN — SIMVASTATIN SCH MG: 40 TABLET, FILM COATED ORAL at 19:57

## 2017-11-09 RX ADMIN — ALBUTEROL SULFATE SCH PUFFS: 90 AEROSOL, METERED RESPIRATORY (INHALATION) at 23:20

## 2017-11-09 RX ADMIN — Medication SCH CAN: at 08:16

## 2017-11-09 RX ADMIN — WARFARIN SCH MG: 3 TABLET ORAL at 16:41

## 2017-11-09 RX ADMIN — LISINOPRIL SCH MG: 20 TABLET ORAL at 08:17

## 2017-11-09 RX ADMIN — SPIRONOLACTONE SCH MG: 25 TABLET, FILM COATED ORAL at 16:40

## 2017-11-09 RX ADMIN — DICLOFENAC SODIUM SCH APPLN: 10 GEL TOPICAL at 19:51

## 2017-11-09 RX ADMIN — SPIRONOLACTONE SCH MG: 25 TABLET, FILM COATED ORAL at 08:17

## 2017-11-09 RX ADMIN — FUROSEMIDE SCH MG: 20 TABLET ORAL at 08:17

## 2017-11-09 RX ADMIN — OXYCODONE HYDROCHLORIDE PRN MG: 5 TABLET ORAL at 12:49

## 2017-11-09 RX ADMIN — ALBUTEROL SULFATE SCH PUFFS: 90 AEROSOL, METERED RESPIRATORY (INHALATION) at 05:33

## 2017-11-09 RX ADMIN — DICLOFENAC SODIUM SCH APPLN: 10 GEL TOPICAL at 08:15

## 2017-11-09 RX ADMIN — CLOPIDOGREL BISULFATE SCH MG: 75 TABLET, FILM COATED ORAL at 08:16

## 2017-11-09 RX ADMIN — METOPROLOL SUCCINATE SCH MG: 50 TABLET, EXTENDED RELEASE ORAL at 19:57

## 2017-11-09 RX ADMIN — TAMSULOSIN HYDROCHLORIDE SCH MG: 0.4 CAPSULE ORAL at 19:56

## 2017-11-09 RX ADMIN — AMLODIPINE BESYLATE SCH MG: 5 TABLET ORAL at 08:16

## 2017-11-09 RX ADMIN — Medication SCH CAN: at 12:28

## 2017-11-09 RX ADMIN — METOPROLOL SUCCINATE SCH MG: 50 TABLET, EXTENDED RELEASE ORAL at 08:00

## 2017-11-09 RX ADMIN — Medication SCH CAN: at 16:39

## 2017-11-09 RX ADMIN — ALBUTEROL SULFATE SCH PUFFS: 90 AEROSOL, METERED RESPIRATORY (INHALATION) at 12:29

## 2017-11-09 RX ADMIN — Medication SCH MCG: at 08:16

## 2017-11-09 RX ADMIN — OXYCODONE HYDROCHLORIDE PRN MG: 5 TABLET ORAL at 02:04

## 2017-11-09 RX ADMIN — INSULIN GLARGINE SCH UNITS: 100 INJECTION, SOLUTION SUBCUTANEOUS at 19:59

## 2017-11-09 NOTE — HOSPITALIST PROGRESS NOTE
Hospitalist Progress Note


Date of Service


Nov 9, 2017.


 (Natalia Ha CRNP)





Subjective


Pt evaluation today including:  conversation w/ patient, physical exam, chart 

review, lab review, review of inpatient medication list


Voiding:  no voiding problems


Mr. Del Toro leg pain has improved. He continues to be headache free. He was 

able to ambulate around the room. 





ROS


Constitutional: no chills, aches, sweats or fever


Respiratory: no sob,cough, sputum, or wheezing


Cardiac: no chest pain, palpitations, edema, orthopnea or lightheadedness


GI: no abdominal pain, nausea, vomiting, diarrhea or constipation


: no dysuria or hesitancy


Extremities: see HPI


Skin: no rash


 (Natalia Ha CRNP)





Objective


Vital Signs











  Date Time  Temp Pulse Resp B/P (MAP) Pulse Ox O2 Delivery O2 Flow Rate FiO2


 


11/9/17 14:51 36.7 47 18 144/57 (86) 96 Room Air  


 


11/9/17 11:28 36.6 55 12 170/50 (90) 94 Room Air  


 


11/9/17 11:07      Room Air  


 


11/9/17 10:53     96 Room Air  


 


11/9/17 09:49  58  110/35 (60)    


 


11/9/17 09:48  53  140/40 (73)    


 


11/9/17 09:47  61  180/55 (96)    


 


11/9/17 07:34 36.3 53 16 165/60 (95) 96 Room Air  


 


11/9/17 00:00      Room Air  


 


11/8/17 23:53 36.6 59 20  97   


 


11/8/17 23:42    185/69 (107)    


 


11/8/17 23:19  88  150/84 (106)    


 


11/8/17 19:23      Room Air  


 


11/8/17 16:00     98 Room Air  








 (Natalia Ha CRNP)





Physical Exam


Notes:


General: no distress


Eyes: normal inspection, PERLL


Respiratory: chest non tender, clear to auscultation, normal breath sounds, no 

respiratory distress, no accessory muscle use


Cardiac: regular rate and rhythm, no rub or gallop, no murmur, no edema, no jvd


GI/: active bowel sounds, no abd pain or tenderness, soft, non distended


Extremities: normal range of motion, normal strength, non tender 


Neuro/Psych: alert and oriented x 3, normal mood and affect


Skin: normal color, dry, wound vac in place 


 (Natalia Ha CRNP)





Laboratory Results





Last 24 Hours








Test


  11/8/17


16:48 11/8/17


20:28 11/9/17


06:06 11/9/17


07:32


 


Bedside Glucose 182 mg/dl  190 mg/dl   116 mg/dl 


 


White Blood Count   5.95 K/uL  


 


Red Blood Count   2.84 M/uL  


 


Hemoglobin   8.0 g/dL  


 


Hematocrit   24.6 %  


 


Mean Corpuscular Volume   86.6 fL  


 


Mean Corpuscular Hemoglobin   28.2 pg  


 


Mean Corpuscular Hemoglobin


Concent 


  


  32.5 g/dl 


  


 


 


RDW Standard Deviation   48.8 fL  


 


RDW Coefficient of Variation   15.1 %  


 


Platelet Count   248 K/uL  


 


Mean Platelet Volume   8.8 fL  


 


Prothrombin Time   12.1 SECONDS  


 


Prothromb Time International


Ratio 


  


  1.1 


  


 


 


Sodium Level   139 mmol/L  


 


Potassium Level   4.4 mmol/L  


 


Chloride Level   107 mmol/L  


 


Carbon Dioxide Level   25 mmol/L  


 


Anion Gap   7.0 mmol/L  


 


Blood Urea Nitrogen   39 mg/dl  


 


Creatinine   1.38 mg/dl  


 


Est Creatinine Clear Calc


Drug Dose 


  


  64.6 ml/min 


  


 


 


Estimated GFR (


American) 


  


  64.4 


  


 


 


Estimated GFR (Non-


American 


  


  55.6 


  


 


 


BUN/Creatinine Ratio   28.0  


 


Random Glucose   133 mg/dl  


 


Calcium Level   8.4 mg/dl  


 


Test


  11/9/17


11:26 


  


  


 


 


Bedside Glucose 157 mg/dl    








 (Natalia Ha CRNP)





Assessment and Plan


60 y/o male with a history of CAD, h/o MI s/p stents, HTN, HLD, diastolic CHF, 

COPD, DM II, anxiety, depression, bipolar disorder, anemia and GERD who 

presented to the ED on 10/30 with persistent headache and elevated INR.    





Headache, neck stiffness


-Blood cultures showed no growth


-Voltaren gel, heating pad 


- resolved





Supratherapeutic INR--[facility] note h/o thrombus, unclear where this is located


-INR peaked at >8 and patient was given Vitamin K 11/1, no s/s of bleeding


- Lovenox bridge, coumadin restarted 11/2, slow to become therapeutic, given 5 

mg the past two days but will continue today with 3 mg dose to avoid 

overshooting his INR and becoming supratherapeutic again





RLE wound, wound vac


-Wound care 


-Continue oxycodone 5 mg PO q3h prn pain





CAD, h/o MI s/p stents, HTN, HLD, diastolic CHF


-Clonidine patch dose increased as pressures remain resistant to treatment, 

continue metoprolol, amlodipine, simvastatin, lisinopril 


- per Dr. Mullins recommendation lasix decreased today 


- orthostatics qs showed orthostasis, patient denies dizziness or light 

headedness when standing


- no s/s of continued pulmonary congestion as evident on cxr at admission





COPD


-continue DuoNebs, wears 2L chronically





DM II--last HgbA1c checked 8/11/17 was 7.5


-Lantus, ss


-Continue gabapentin for neuropathy





Urinary retention


-Chronic High


-Continue Flomax 0.4 mg PO hs





Anxiety, depression, bipolar disorder


-Continue escitalopram, mirtazapine, Klonopin, and Depakote





Anemia of chronic disease-


-Baseline hemoglobin around 8 and has remained stable


- continue to monitor cbc





GERD


-Continue pantoprazole 40 mg PO qd





DVT prophylaxis


- enoxaprin bridge


-SCDs





Code Status


-Level I, FULL RESUSCITATION STATUS





Dispo: approved for [facility], awaiting bed


 (Natalia Ha .TERI)


NP Physician Supervision Note:





I interviewed and examined the patient. Discussed with Natalia Ha NP and 

agree with findings and plan as documented in the note. Any exceptions or 

clarifications are listed here: None





Patient has no complaints or problems he still has minor leg discomfort 

difficulty with blood pressure control however overall doing well with stable 

mental state despite his history of bipolar disorder vital signs continue show 

elevated blood pressure and some orthostatic change without symptoms





Cardiac exam is distant but regular lungs are clear without wheezes or crackles 

distal extremities have poor pulses but intact capillary refill





Patient initially presented with coagulopathy and treatment for diabetic lower 

extremity infections.  Anticoagulations we believe is for aortic thrombus 

continue to bridge with Lovenox while Coumadin re-and states itself the patient 

was given vitamin K and this has been a longer process than usual continuing 

treatment for his lower extremity wounds and having renal oversight for his 

renal failure and hypertension





Documented By:  Chuy Omalley


 (Chuy Omalley M.D.)

## 2017-11-09 NOTE — NEPHROLOGY PROGRESS NOTE
Nephrology Progress Note


Date of Service


Nov 9, 2017.





Chief Complaint


Hypertension





Nicole Yusuf was seen and evaluated this morning. He feels well. He had some leg pain 

following wound vac change yesterday. This is improving. He notes edema in his 

legs is receding. Appetite is good. He is breathing comfortably. He has been 

out of bed and ambulating in his room. He does note some lightheadedness when 

he initially stands but this improves quickly. He denies any chest pain or 

palpitations. He denies significant dyspnea.





Review of Systems


A complete review of systems was performed.  Pertinent positives are noted 

above. All other systems are negative.





Vital Signs





Last 8 Hrs








  Date Time  Temp Pulse Resp B/P (MAP) Pulse Ox O2 Delivery O2 Flow Rate FiO2


 


11/9/17 11:28 36.6 55 12 170/50 (90) 94 Room Air  


 


11/9/17 11:07      Room Air  


 


11/9/17 10:53     96 Room Air  


 


11/9/17 09:49  58  110/35 (60)    


 


11/9/17 09:48  53  140/40 (73)    


 


11/9/17 09:47  61  180/55 (96)    


 


11/9/17 07:34 36.3 53 16 165/60 (95) 96 Room Air  











Last Recorded Weight


Weight (Kilograms):  88.500





Physical Exam


General Appearance:  no apparent distress, + thin


Head:  normocephalic, atraumatic


Eyes:  normal inspection, sclerae normal


ENT:  normal ENT inspection, pharynx normal, + pertinent finding (oral mucosa 

slightly dry)


Neck:  supple, no JVD, + pertinent finding (L>R BL bruits)


Respiratory/Chest:  lungs clear, no accessory muscle use, + decreased breath 

sounds


Cardiovascular:  no gallop, no murmur, + bradycardia


Abdomen/GI:  non tender, soft


Genitourinary - Male:  + pertinent finding (High draining yellow urine)


Extremities/Musculoskelatal:  normal capillary refill, + pedal edema, + 

pertinent finding (LE ulcers and wound with RLE wound vac)


Neurologic/Psych:  alert, normal mood/affect





Family History





Cancer (esophageal)


Diabetes mellitus


Heart disease


Hypertension





Social History


Smokeless Tobacco Use:  No


Alcohol Use:  none


Drug Use:  none


Marital Status:  


Housing Status:  other ([facility])


Occupation:  employed





Laboratory Results


Past 24 Hours


11/9/17 06:06








11/9/17 06:06

















Test


  11/8/17


16:48 11/8/17


20:28 11/9/17


06:06 11/9/17


07:32


 


Bedside Glucose


  182 mg/dl


(70-99) 190 mg/dl


(70-99) 


  116 mg/dl


(70-99)


 


Red Blood Count


  


  


  2.84 M/uL


(4.7-6.1) 


 


 


Mean Corpuscular Volume


  


  


  86.6 fL


() 


 


 


Mean Corpuscular Hemoglobin


  


  


  28.2 pg


(25-34) 


 


 


Mean Corpuscular Hemoglobin


Concent 


  


  32.5 g/dl


(32-36) 


 


 


RDW Standard Deviation


  


  


  48.8 fL


(36.4-46.3) 


 


 


RDW Coefficient of Variation


  


  


  15.1 %


(11.5-14.5) 


 


 


Mean Platelet Volume


  


  


  8.8 fL


(7.4-10.4) 


 


 


Prothrombin Time


  


  


  12.1 SECONDS


(9.0-12.0) 


 


 


Prothromb Time International


Ratio 


  


  1.1 (0.9-1.1) 


  


 


 


Anion Gap


  


  


  7.0 mmol/L


(3-11) 


 


 


Est Creatinine Clear Calc


Drug Dose 


  


  64.6 ml/min 


  


 


 


Estimated GFR (


American) 


  


  64.4 


  


 


 


Estimated GFR (Non-


American 


  


  55.6 


  


 


 


BUN/Creatinine Ratio   28.0 (10-20)  


 


Calcium Level


  


  


  8.4 mg/dl


(8.5-10.1) 


 


 


Test


  11/9/17


11:26 


  


  


 


 


Bedside Glucose


  157 mg/dl


(70-99) 


  


  


 











Allergies


Coded Allergies:  


     No Known Allergies (Verified , 7/19/17)





Medications





Current Inpatient Medications








 Medications


  (Trade)  Dose


 Ordered  Sig/Dc


 Route  Start Time


 Stop Time Status Last Admin


Dose Admin


 


 Acetaminophen


  (Tylenol Tab)  650 mg  Q4H  PRN


 PO  10/30/17 14:15


 11/29/17 14:14  11/7/17 23:42


650 MG


 


 Al Hydrox/Mg


 Hydrox/Simethicone


  (Maalox Max Susp)  15 ml  Q4H  PRN


 PO  10/30/17 14:15


 11/29/17 14:14   


 


 


 Magnesium


 Hydroxide


  (Milk Of


 Magnesia Susp)  30 ml  Q6H  PRN


 PO  10/30/17 14:15


 11/29/17 14:14   


 


 


 Polyethylene


  (Miralax Powder


 Packet)  17 gm  DAILY  PRN


 PO  10/30/17 14:15


 11/29/17 14:14   


 


 


 Ondansetron HCl


  (Zofran Inj)  4 mg  Q6H  PRN


 IV  10/30/17 14:15


 11/29/17 14:14   


 


 


 Glucose


  (Glucose 40% Gel)  15-30


 GRAMS 15


 GRAMS...  UD  PRN


 PO  10/30/17 14:15


 11/29/17 14:14   


 


 


 Glucose


  (Glucose Chew


 Tab)  4-8


 Tablets 4


 Tabl...  UD  PRN


 PO  10/30/17 14:15


 11/29/17 14:14   


 


 


 Dextrose


  (Dextrose 50%


 50ML Syringe)  25-50ML OF


 50% DW IV


 FOR...  UD  PRN


 IV  10/30/17 14:15


 11/29/17 14:14   


 


 


 Glucagon


  (Glucagon Inj)  1 mg  UD  PRN


 SQ  10/30/17 14:15


 11/29/17 14:14   


 


 


 Albuterol


  (Ventolin Hfa


 Inhaler)  2 puffs  Q6


 INH  10/30/17 18:00


 11/29/17 17:59  11/9/17 12:29


2 PUFFS


 


 Bisacodyl


  (Dulcolax Supp)  10 mg  DAILY  PRN


 ID  10/30/17 14:15


 11/29/17 14:14   


 


 


 Calcium Carbonate


  (Tums Chew Tab)  500 mg  TID  PRN


 PO  10/30/17 14:15


 11/29/17 14:14   


 


 


 Cyanocobalamin


  (Vitamin B-12


 Tab)  100 mcg  DAILY


 PO  10/31/17 08:00


 11/30/17 08:59  11/9/17 08:16


100 MCG


 


 Divalproex Sodium


  (Depakote


 Extended Rel Tab)  2,000 mg  HS


 PO  10/30/17 21:00


 11/29/17 20:59  11/8/17 20:29


2,000 MG


 


 Docusate Sodium


  (coLACE CAP)  100 mg  BID  PRN


 PO  10/30/17 14:15


 11/29/17 14:14   


 


 


 Escitalopram


 Oxalate


  (Lexapro Tab)  20 mg  QAM


 PO  10/31/17 08:00


 11/30/17 08:59  11/9/17 08:16


20 MG


 


 Fluticasone


 Propionate


  (Flonase Nasal


 Spray)  2 sprays  DAILY  PRN


 VERONICA  10/30/17 14:15


 11/29/17 14:14   


 


 


 Gabapentin


  (Neurontin Cap)  100 mg  HS


 PO  10/30/17 21:00


 11/29/17 20:59  11/8/17 21:18


100 MG


 


 Insulin Glargine


  (Lantus Solostar


 Pen)  10 units  HS


 SC  10/30/17 21:00


 11/29/17 20:59  11/8/17 20:38


10 UNITS


 


 Lisinopril


  (Zestril Tab)  40 mg  DAILY


 PO  10/31/17 08:00


 11/30/17 08:59  11/9/17 08:17


40 MG


 


 Metoprolol


 Succinate


  (Toprol Xl Tab)  50 mg  BID


 PO  10/30/17 20:00


 11/29/17 20:59  11/8/17 20:28


50 MG


 


 Mirtazapine


  (Remeron Tab)  45 mg  HS


 PO  10/30/17 21:00


 11/29/17 20:59  11/8/17 20:28


45 MG


 


 Nitroglycerin


  (Nitrostat Tab)  0.4 mg  PRN  PRN


 UT  10/30/17 14:15


 11/29/17 14:14   


 


 


 Pantoprazole


 Sodium


  (Protonix Tab)  40 mg  DAILY


 PO  10/31/17 08:00


 11/30/17 08:59  11/9/17 08:16


40 MG


 


 Senna/Docusate


 Sodium


  (Senokot S Tab)  1 tab  QDL


 PO  10/31/17 11:00


 11/30/17 10:59  10/31/17 12:38


1 TAB


 


 Simvastatin


  (Zocor Tab)  40 mg  QPM


 PO  10/30/17 21:00


 11/29/17 20:59  11/8/17 20:28


40 MG


 


 Tamsulosin HCl


  (Flomax Cap)  0.4 mg  HS


 PO  10/30/17 21:00


 11/29/17 20:59  11/8/17 20:29


0.4 MG


 


 Amlodipine


 Besylate


  (Norvasc Tab)  10 mg  QAM


 PO  10/31/17 08:00


 11/30/17 08:59  11/9/17 08:16


10 MG


 


 Miscellaneous


 Information


  (Order Awaiting


 Action)  1 ea  QS


 N/A  10/31/17 00:00


 11/30/17 00:00   


 


 


 Diclofenac Sodium


  (Voltaren 1% Top


 Gel)  1 appln  QID


 EXT  10/30/17 20:45


 11/29/17 14:14  11/9/17 08:15


1 APPLN


 


 Miscellaneous


  (Iv Fluids


 Completed)  1 ea  PRN  PRN


 N/A  10/30/17 21:00


 10/30/18 20:59   


 


 


 Enteral


 Nutritional


 Formula


  (Boost Glucose


 Control)  1 can  TIDM


 PO  10/31/17 17:00


 11/30/17 16:59  11/9/17 12:28


1 CAN


 


 Clopidogrel


 Bisulfate


  (plAVix TAB)  75 mg  QAM


 PO  11/6/17 08:00


 12/6/17 07:59  11/9/17 08:16


75 MG


 


 Clonazepam


  (Klonopin Tab)  1 mg  HS


 PO  11/5/17 12:00


 11/29/17 20:59  11/8/17 20:27


1 MG


 


 Hydralazine HCl


  (Apresoline Tab)  100 mg  TID


 PO  11/5/17 16:00


 12/5/17 15:59  11/9/17 08:16


100 MG


 


 Warfarin Sodium


  (Coumadin Tab)  3 mg  DAILY@16


 PO  11/6/17 16:00


 12/6/17 15:59  11/8/17 16:13


3 MG


 


 Enoxaparin Sodium


  (Lovenox Inj)  90 mg  Q12


 SQ  11/6/17 21:00


 12/6/17 20:59  11/9/17 08:18


90 MG


 


 Ioversol


  (Optiray 320)  100 ml  UD  PRN


 IV  11/6/17 14:15


 11/10/17 14:14   


 


 


 Oxycodone HCl


  (Roxicodone


 Immediate Rel Tab)  10 mg  Q3H  PRN


 PO  11/6/17 15:30


 11/13/17 14:14  11/9/17 12:49


10 MG


 


 Spironolactone


  (Aldactone Tab)  50 mg  BID17


 PO  11/7/17 17:00


 12/5/17 19:29  11/9/17 08:17


50 MG


 


 Furosemide


  (Lasix Tab)  20 mg  BID17


 PO  11/8/17 09:00


 12/8/17 08:59  11/9/17 08:17


20 MG


 


 Clonidine HCl


  (Catapres-Tts-3


 0.3mg/24hr Patch)  1 patch  CQWK


 TD  11/9/17 13:00


 12/9/17 12:59   


 


 


 Miscellaneous


  (Remove


 Clonidine Patch)  1 ea  Q7D


 N/A  11/9/17 12:59


 12/9/17 12:58   


 


 


 Miscellaneous


 Information


  (Check Clonidine


 Patch Placement)  1 ea  QS


 N/A  11/9/17 16:00


 12/9/17 15:59   


 











Impression





(1) Accelerated hypertension


(2) PAD (peripheral artery disease)


(3) Diastolic CHF, chronic


(4) LVH (left ventricular hypertrophy)


Paramjit is a 61-year-old  male with DMII, hypertension, dyslipidemia, CAD

, chronic diastolic CHF, diffuse calcific vascular disease, PAD and depression 

as well as bipolar disorder. He has chronic urinary retention and an indwelling 

High. He was admitted with headache. Hospitalization complicated by 

accelerated hypertension.





He does have a component of pseudohypertension. There is a blood pressure 

discrepancy between arms. He also has known severe calcific vascular disease.





Blood pressure is to be checked in his left arm, manually and with the patient 

sitting.





Volume status replete. Edema improving. Creatinine raised slightly overnight. 

No evidence of decompensated heart failure.





Orthostatic vitals noted. Significant drop in systolic pressure. Heart rate 

unchanged.





CTA of the abdomen and renal arteries showed diffuse calcific and 

atherosclerotic disease noted without significant KIANA. 





Diuretics being titrated. Metabolic profile stable with subtle upward trend in 

creatinine.





Beta blockers have been held for bradycardia.





Recommendations


-- BP should be checked 3 times per day in the left arm with patient sitting at 

rest using a manual cuff


-- Medications will not be adjusted for first morning blood pressure


-- Avoid checking BP overnight during hours of sleep


-- Continue amlodipine 10 mg daily, lisinopril 40 mg daily and Toprol XL 50 mg 

BID (held for bradycardia)


-- Furosemide 20 mg twice daily will be reduced to once daily today


-- Spironolactone 50 mg twice daily


-- Hydralazine 100 mg Q 8 hrs PRN SBP >160 mmHg


-- Document I/O's


-- Check metabolic profile daily


-- Sodium restrict diet


-- Check orthostatic vital signs each morning following morning medications

## 2017-11-10 VITALS
OXYGEN SATURATION: 97 % | DIASTOLIC BLOOD PRESSURE: 62 MMHG | SYSTOLIC BLOOD PRESSURE: 130 MMHG | TEMPERATURE: 97.7 F | HEART RATE: 53 BPM

## 2017-11-10 VITALS
TEMPERATURE: 97.7 F | DIASTOLIC BLOOD PRESSURE: 56 MMHG | OXYGEN SATURATION: 96 % | SYSTOLIC BLOOD PRESSURE: 140 MMHG | HEART RATE: 58 BPM

## 2017-11-10 VITALS
SYSTOLIC BLOOD PRESSURE: 121 MMHG | DIASTOLIC BLOOD PRESSURE: 55 MMHG | TEMPERATURE: 97.88 F | HEART RATE: 53 BPM | OXYGEN SATURATION: 93 %

## 2017-11-10 VITALS — OXYGEN SATURATION: 96 %

## 2017-11-10 VITALS — DIASTOLIC BLOOD PRESSURE: 84 MMHG | SYSTOLIC BLOOD PRESSURE: 168 MMHG

## 2017-11-10 LAB
ANION GAP SERPL CALC-SCNC: 8 MMOL/L (ref 3–11)
BUN SERPL-MCNC: 43 MG/DL (ref 7–18)
BUN/CREAT SERPL: 27.5 (ref 10–20)
CALCIUM SERPL-MCNC: 8.5 MG/DL (ref 8.5–10.1)
CHLORIDE SERPL-SCNC: 109 MMOL/L (ref 98–107)
CO2 SERPL-SCNC: 25 MMOL/L (ref 21–32)
CREAT CL PREDICTED SERPL C-G-VRATE: 57.1 ML/MIN
CREAT SERPL-MCNC: 1.56 MG/DL (ref 0.6–1.4)
GLUCOSE SERPL-MCNC: 100 MG/DL (ref 70–99)
INR PPP: 1.2 (ref 0.9–1.1)
POTASSIUM SERPL-SCNC: 4.8 MMOL/L (ref 3.5–5.1)
PROTHROMBIN TIME: 12.8 SECONDS (ref 9–12)
SODIUM SERPL-SCNC: 142 MMOL/L (ref 136–145)

## 2017-11-10 RX ADMIN — PANTOPRAZOLE SCH MG: 40 TABLET, DELAYED RELEASE ORAL at 08:22

## 2017-11-10 RX ADMIN — STANDARDIZED SENNA CONCENTRATE AND DOCUSATE SODIUM SCH TAB: 8.6; 5 TABLET ORAL at 12:00

## 2017-11-10 RX ADMIN — DIVALPROEX SODIUM SCH MG: 500 TABLET, FILM COATED, EXTENDED RELEASE ORAL at 21:04

## 2017-11-10 RX ADMIN — Medication SCH CAN: at 12:00

## 2017-11-10 RX ADMIN — INSULIN GLARGINE SCH UNITS: 100 INJECTION, SOLUTION SUBCUTANEOUS at 20:56

## 2017-11-10 RX ADMIN — LISINOPRIL SCH MG: 20 TABLET ORAL at 08:23

## 2017-11-10 RX ADMIN — CLOPIDOGREL BISULFATE SCH MG: 75 TABLET, FILM COATED ORAL at 08:23

## 2017-11-10 RX ADMIN — ENOXAPARIN SODIUM SCH MG: 100 INJECTION SUBCUTANEOUS at 21:03

## 2017-11-10 RX ADMIN — WARFARIN SCH MG: 3 TABLET ORAL at 16:56

## 2017-11-10 RX ADMIN — MIRTAZAPINE SCH MG: 15 TABLET, FILM COATED ORAL at 21:02

## 2017-11-10 RX ADMIN — ALBUTEROL SULFATE SCH PUFFS: 90 AEROSOL, METERED RESPIRATORY (INHALATION) at 16:57

## 2017-11-10 RX ADMIN — OXYCODONE HYDROCHLORIDE PRN MG: 5 TABLET ORAL at 18:28

## 2017-11-10 RX ADMIN — Medication SCH MCG: at 08:24

## 2017-11-10 RX ADMIN — OXYCODONE HYDROCHLORIDE PRN MG: 5 TABLET ORAL at 09:20

## 2017-11-10 RX ADMIN — GABAPENTIN SCH MG: 100 CAPSULE ORAL at 21:01

## 2017-11-10 RX ADMIN — DICLOFENAC SODIUM SCH APPLN: 10 GEL TOPICAL at 08:21

## 2017-11-10 RX ADMIN — ALBUTEROL SULFATE SCH PUFFS: 90 AEROSOL, METERED RESPIRATORY (INHALATION) at 05:19

## 2017-11-10 RX ADMIN — ENOXAPARIN SODIUM SCH MG: 100 INJECTION SUBCUTANEOUS at 08:26

## 2017-11-10 RX ADMIN — Medication SCH CAN: at 08:00

## 2017-11-10 RX ADMIN — ESCITALOPRAM OXALATE SCH MG: 20 TABLET, FILM COATED ORAL at 08:26

## 2017-11-10 RX ADMIN — METOPROLOL SUCCINATE SCH MG: 50 TABLET, EXTENDED RELEASE ORAL at 08:22

## 2017-11-10 RX ADMIN — AMLODIPINE BESYLATE SCH MG: 5 TABLET ORAL at 08:22

## 2017-11-10 RX ADMIN — Medication SCH CAN: at 16:57

## 2017-11-10 RX ADMIN — TAMSULOSIN HYDROCHLORIDE SCH MG: 0.4 CAPSULE ORAL at 21:01

## 2017-11-10 RX ADMIN — DICLOFENAC SODIUM SCH APPLN: 10 GEL TOPICAL at 12:00

## 2017-11-10 RX ADMIN — CLONAZEPAM SCH MG: 0.5 TABLET ORAL at 21:17

## 2017-11-10 RX ADMIN — OXYCODONE HYDROCHLORIDE PRN MG: 5 TABLET ORAL at 21:32

## 2017-11-10 RX ADMIN — ALBUTEROL SULFATE SCH PUFFS: 90 AEROSOL, METERED RESPIRATORY (INHALATION) at 14:32

## 2017-11-10 RX ADMIN — SPIRONOLACTONE SCH MG: 25 TABLET, FILM COATED ORAL at 16:56

## 2017-11-10 RX ADMIN — METOPROLOL SUCCINATE SCH MG: 50 TABLET, EXTENDED RELEASE ORAL at 21:07

## 2017-11-10 RX ADMIN — SIMVASTATIN SCH MG: 40 TABLET, FILM COATED ORAL at 21:00

## 2017-11-10 RX ADMIN — SPIRONOLACTONE SCH MG: 25 TABLET, FILM COATED ORAL at 08:26

## 2017-11-10 RX ADMIN — DICLOFENAC SODIUM SCH APPLN: 10 GEL TOPICAL at 16:57

## 2017-11-10 RX ADMIN — DICLOFENAC SODIUM SCH APPLN: 10 GEL TOPICAL at 20:57

## 2017-11-10 NOTE — NEPHROLOGY PROGRESS NOTE
Nephrology Progress Note


Date of Service


Nov 10, 2017.





Chief Complaint


Follow-up for resistant hypertension and FITZ





Subjective


Paramjit was seen and examined this am.  Blood pressure seems to be slowly 

improving.  Overall feeling well.  Denies headache.  Renal function has been 

slowly worsening, creatinine 1.6.  Decent urine output.





Review of Systems


A complete review of systems was performed.  Pertinent positives are noted 

above. All other systems are negative.





Vital Signs





Last 8 Hrs








  Date Time  Temp Pulse Resp B/P (MAP) Pulse Ox O2 Delivery O2 Flow Rate FiO2


 


11/10/17 07:55 36.5 58 18 140/56 (84) 96 Room Air  


 


11/10/17 05:15    168/64 (98)    


 


11/10/17 05:15    152/64 (93)    


 


11/10/17 05:15    172/84 (113)    











Last Recorded Weight


Weight (Kilograms):  88.500





Physical Exam


GENERAL:  Middle-aged male, AAA x 3,  pleasant, healthy-appearing, not in any 

distress.


NECK: Supple, no JVD.


RESPIRATORY: Normal breathing efforts, no accessory muscle use, clear to 

auscultation bilaterally, no wheezes or rales.


CARDIOVASCULAR: S1, S2 normal, rate rhythm regular.


EXTREMITY:  Wound VAC in right leg


NEURO: speech fluent.


PSYCHIATRY: Normal mood and judgment





Family History





Cancer (esophageal)


Diabetes mellitus


Heart disease


Hypertension





Social History


Smokeless Tobacco Use:  No


Alcohol Use:  none


Drug Use:  none


Marital Status:  


Housing Status:  other ([facility])


Occupation:  employed





Laboratory Results


Past 24 Hours


11/10/17 05:38

















Test


  11/9/17


11:26 11/9/17


16:45 11/9/17


20:14 11/10/17


05:38


 


Bedside Glucose


  157 mg/dl


(70-99) 122 mg/dl


(70-99) 108 mg/dl


(70-99) 


 


 


Prothrombin Time


  


  


  


  12.8 SECONDS


(9.0-12.0)


 


Prothromb Time International


Ratio 


  


  


  1.2 (0.9-1.1) 


 


 


Anion Gap


  


  


  


  8.0 mmol/L


(3-11)


 


Est Creatinine Clear Calc


Drug Dose 


  


  


  57.1 ml/min 


 


 


Estimated GFR (


American) 


  


  


  55.5 


 


 


Estimated GFR (Non-


American 


  


  


  47.9 


 


 


BUN/Creatinine Ratio    27.5 (10-20) 


 


Calcium Level


  


  


  


  8.5 mg/dl


(8.5-10.1)


 


Test


  11/10/17


07:48 


  


  


 


 


Bedside Glucose


  100 mg/dl


(70-99) 


  


  


 











Allergies


Coded Allergies:  


     No Known Allergies (Verified , 7/19/17)





Medications





Current Inpatient Medications








 Medications


  (Trade)  Dose


 Ordered  Sig/Dc


 Route  Start Time


 Stop Time Status Last Admin


Dose Admin


 


 Acetaminophen


  (Tylenol Tab)  650 mg  Q4H  PRN


 PO  10/30/17 14:15


 11/29/17 14:14  11/7/17 23:42


650 MG


 


 Al Hydrox/Mg


 Hydrox/Simethicone


  (Maalox Max Susp)  15 ml  Q4H  PRN


 PO  10/30/17 14:15


 11/29/17 14:14   


 


 


 Magnesium


 Hydroxide


  (Milk Of


 Magnesia Susp)  30 ml  Q6H  PRN


 PO  10/30/17 14:15


 11/29/17 14:14   


 


 


 Polyethylene


  (Miralax Powder


 Packet)  17 gm  DAILY  PRN


 PO  10/30/17 14:15


 11/29/17 14:14   


 


 


 Ondansetron HCl


  (Zofran Inj)  4 mg  Q6H  PRN


 IV  10/30/17 14:15


 11/29/17 14:14   


 


 


 Glucose


  (Glucose 40% Gel)  15-30


 GRAMS 15


 GRAMS...  UD  PRN


 PO  10/30/17 14:15


 11/29/17 14:14   


 


 


 Glucose


  (Glucose Chew


 Tab)  4-8


 Tablets 4


 Tabl...  UD  PRN


 PO  10/30/17 14:15


 11/29/17 14:14   


 


 


 Dextrose


  (Dextrose 50%


 50ML Syringe)  25-50ML OF


 50% DW IV


 FOR...  UD  PRN


 IV  10/30/17 14:15


 11/29/17 14:14   


 


 


 Glucagon


  (Glucagon Inj)  1 mg  UD  PRN


 SQ  10/30/17 14:15


 11/29/17 14:14   


 


 


 Albuterol


  (Ventolin Hfa


 Inhaler)  2 puffs  Q6


 INH  10/30/17 18:00


 11/29/17 17:59  11/10/17 05:19


2 PUFFS


 


 Bisacodyl


  (Dulcolax Supp)  10 mg  DAILY  PRN


 MT  10/30/17 14:15


 11/29/17 14:14   


 


 


 Calcium Carbonate


  (Tums Chew Tab)  500 mg  TID  PRN


 PO  10/30/17 14:15


 11/29/17 14:14   


 


 


 Cyanocobalamin


  (Vitamin B-12


 Tab)  100 mcg  DAILY


 PO  10/31/17 08:00


 11/30/17 08:59  11/10/17 08:24


100 MCG


 


 Divalproex Sodium


  (Depakote


 Extended Rel Tab)  2,000 mg  HS


 PO  10/30/17 21:00


 11/29/17 20:59  11/9/17 19:57


2,000 MG


 


 Docusate Sodium


  (coLACE CAP)  100 mg  BID  PRN


 PO  10/30/17 14:15


 11/29/17 14:14   


 


 


 Escitalopram


 Oxalate


  (Lexapro Tab)  20 mg  QAM


 PO  10/31/17 08:00


 11/30/17 08:59  11/10/17 08:26


20 MG


 


 Fluticasone


 Propionate


  (Flonase Nasal


 Spray)  2 sprays  DAILY  PRN


 VERONICA  10/30/17 14:15


 11/29/17 14:14   


 


 


 Gabapentin


  (Neurontin Cap)  100 mg  HS


 PO  10/30/17 21:00


 11/29/17 20:59  11/9/17 19:57


100 MG


 


 Insulin Glargine


  (Lantus Solostar


 Pen)  10 units  HS


 SC  10/30/17 21:00


 11/29/17 20:59  11/9/17 19:59


10 UNITS


 


 Lisinopril


  (Zestril Tab)  40 mg  DAILY


 PO  10/31/17 08:00


 11/30/17 08:59  11/10/17 08:23


40 MG


 


 Metoprolol


 Succinate


  (Toprol Xl Tab)  50 mg  BID


 PO  10/30/17 20:00


 11/29/17 20:59  11/10/17 08:22


50 MG


 


 Mirtazapine


  (Remeron Tab)  45 mg  HS


 PO  10/30/17 21:00


 11/29/17 20:59  11/9/17 19:57


45 MG


 


 Nitroglycerin


  (Nitrostat Tab)  0.4 mg  PRN  PRN


 UT  10/30/17 14:15


 11/29/17 14:14   


 


 


 Pantoprazole


 Sodium


  (Protonix Tab)  40 mg  DAILY


 PO  10/31/17 08:00


 11/30/17 08:59  11/10/17 08:22


40 MG


 


 Senna/Docusate


 Sodium


  (Senokot S Tab)  1 tab  QDL


 PO  10/31/17 11:00


 11/30/17 10:59  10/31/17 12:38


1 TAB


 


 Simvastatin


  (Zocor Tab)  40 mg  QPM


 PO  10/30/17 21:00


 11/29/17 20:59  11/9/17 19:57


40 MG


 


 Tamsulosin HCl


  (Flomax Cap)  0.4 mg  HS


 PO  10/30/17 21:00


 11/29/17 20:59  11/9/17 19:56


0.4 MG


 


 Amlodipine


 Besylate


  (Norvasc Tab)  10 mg  QAM


 PO  10/31/17 08:00


 11/30/17 08:59  11/10/17 08:22


10 MG


 


 Miscellaneous


 Information


  (Order Awaiting


 Action)  1 ea  QS


 N/A  10/31/17 00:00


 11/30/17 00:00   


 


 


 Diclofenac Sodium


  (Voltaren 1% Top


 Gel)  1 appln  QID


 EXT  10/30/17 20:45


 11/29/17 14:14  11/10/17 08:21


1 APPLN


 


 Miscellaneous


  (Iv Fluids


 Completed)  1 ea  PRN  PRN


 N/A  10/30/17 21:00


 10/30/18 20:59   


 


 


 Enteral


 Nutritional


 Formula


  (Boost Glucose


 Control)  1 can  TIDM


 PO  10/31/17 17:00


 11/30/17 16:59  11/10/17 08:00


1 CAN


 


 Clopidogrel


 Bisulfate


  (plAVix TAB)  75 mg  QAM


 PO  11/6/17 08:00


 12/6/17 07:59  11/10/17 08:23


75 MG


 


 Clonazepam


  (Klonopin Tab)  1 mg  HS


 PO  11/5/17 12:00


 11/29/17 20:59  11/9/17 19:57


1 MG


 


 Hydralazine HCl


  (Apresoline Tab)  100 mg  TID


 PO  11/5/17 16:00


 12/5/17 15:59  11/10/17 08:23


100 MG


 


 Warfarin Sodium


  (Coumadin Tab)  3 mg  DAILY@16


 PO  11/6/17 16:00


 12/6/17 15:59  11/9/17 16:41


3 MG


 


 Enoxaparin Sodium


  (Lovenox Inj)  90 mg  Q12


 SQ  11/6/17 21:00


 12/6/17 20:59  11/10/17 08:26


90 MG


 


 Ioversol


  (Optiray 320)  100 ml  UD  PRN


 IV  11/6/17 14:15


 11/10/17 14:14   


 


 


 Oxycodone HCl


  (Roxicodone


 Immediate Rel Tab)  10 mg  Q3H  PRN


 PO  11/6/17 15:30


 11/13/17 14:14  11/10/17 09:20


10 MG


 


 Spironolactone


  (Aldactone Tab)  50 mg  BID17


 PO  11/7/17 17:00


 12/5/17 19:29  11/10/17 08:26


50 MG


 


 Clonidine HCl


  (Catapres-Tts-3


 0.3mg/24hr Patch)  1 patch  CQWK


 TD  11/9/17 13:00


 12/9/17 12:59  11/9/17 14:18


1 PATCH


 


 Miscellaneous


  (Remove


 Clonidine Patch)  1 ea  Q7D


 N/A  11/9/17 12:59


 12/9/17 12:58  11/9/17 14:18


1 EA


 


 Miscellaneous


 Information


  (Check Clonidine


 Patch Placement)  1 ea  QS


 N/A  11/9/17 16:00


 12/9/17 15:59  11/10/17 08:00


1 EA











Impression





(1) Accelerated hypertension


(2) PAD (peripheral artery disease)


(3) Diastolic CHF, chronic


(4) LVH (left ventricular hypertrophy)


Paramjit is a 61-year-old  male with DMII, hypertension, dyslipidemia, CAD

, chronic diastolic CHF, diffuse calcific vascular disease, PAD and depression 

as well as bipolar disorder. He has chronic urinary retention and an indwelling 

High. He was admitted with headache. Hospitalization complicated by 

accelerated hypertension.





He does have a component of pseudohypertension. There is a blood pressure 

discrepancy between arms. He also has known severe calcific vascular disease.





Blood pressure is to be checked in his left arm, manually and with the patient 

sitting.





Volume status replete. Edema improving. Creatinine raised slightly overnight. 

No evidence of decompensated heart failure.





Orthostatic vitals noted. Significant drop in systolic pressure. Heart rate 

unchanged.





CTA of the abdomen and renal arteries showed diffuse calcific and 

atherosclerotic disease noted without significant KIANA. 





Diuretics being titrated. Metabolic profile stable with subtle upward trend in 

creatinine.





Beta blockers have been held for bradycardia.





Recommendations





-- Continue amlodipine 10 mg daily, lisinopril 40 mg daily and Toprol XL 50 mg 

BID (held for bradycardia)


-- hold furosemide 


-- continue Spironolactone 50 mg twice daily and Hydralazine 100 mg Q 8 hrs PRN 

SBP >160 mmHg


-- Document I/O's


-- Check metabolic profile daily


-- Sodium restrict diet


-- Check orthostatic vital signs each morning following morning medications

## 2017-11-10 NOTE — HOSPITALIST PROGRESS NOTE
Hospitalist Progress Note


Date of Service


Nov 10, 2017.


 (Natalia Ha CRNP)





Subjective


Pt evaluation today including:  conversation w/ patient, physical exam, chart 

review, lab review, review of inpatient medication list


Voiding:  no voiding problems


Mr. Dixon complains of pain in his legs that is worst when dangling over the 

edge of the bed and is relieved by putting his legs up.  He is otherwise 

without complaint.





ROS


Constitutional: no chills, aches, sweats or fever


Respiratory: no sob,cough, sputum, or wheezing


Cardiac: no chest pain, palpitations, edema, orthopnea or lightheadedness


GI: no abdominal pain, nausea, vomiting, diarrhea or constipation


: no dysuria or hesitancy


Extremities:see HPI


Skin: no rash


 (Natalia Ha CRNP)





Medications





Medications Administered








 Medications


  (Trade)  Dose


 Ordered  Sig/Dc


 Route  Start Time


 Stop Time Status Last Admin


Dose Admin


 


 Acetaminophen


  (Tylenol Tab)  650 mg  Q4H  PRN


 PO  10/30/17 14:15


 11/29/17 14:14  11/7/17 23:42


650 MG


 


 Albuterol


  (Ventolin Hfa


 Inhaler)  2 puffs  Q6


 INH  10/30/17 18:00


 11/29/17 17:59  11/10/17 05:19


2 PUFFS


 


 Clonazepam


  (Klonopin Tab)  1 mg  HS


 PO  10/30/17 21:00


 11/5/17 10:54 DC 11/4/17 21:06


1 MG


 


 Cyanocobalamin


  (Vitamin B-12


 Tab)  100 mcg  DAILY


 PO  10/31/17 08:00


 11/30/17 08:59  11/10/17 08:24


100 MCG


 


 Divalproex Sodium


  (Depakote


 Extended Rel Tab)  2,000 mg  HS


 PO  10/30/17 21:00


 11/29/17 20:59  11/9/17 19:57


2,000 MG


 


 Escitalopram


 Oxalate


  (Lexapro Tab)  20 mg  QAM


 PO  10/31/17 08:00


 11/30/17 08:59  11/10/17 08:26


20 MG


 


 Gabapentin


  (Neurontin Cap)  100 mg  HS


 PO  10/30/17 21:00


 11/29/17 20:59  11/9/17 19:57


100 MG


 


 Insulin Glargine


  (Lantus Solostar


 Pen)  10 units  HS


 SC  10/30/17 21:00


 11/29/17 20:59  11/9/17 19:59


10 UNITS


 


 Lisinopril


  (Zestril Tab)  40 mg  DAILY


 PO  10/31/17 08:00


 11/30/17 08:59  11/10/17 08:23


40 MG


 


 Metoprolol


 Succinate


  (Toprol Xl Tab)  50 mg  BID


 PO  10/30/17 20:00


 11/29/17 20:59  11/10/17 08:22


50 MG


 


 Mirtazapine


  (Remeron Tab)  45 mg  HS


 PO  10/30/17 21:00


 11/29/17 20:59  11/9/17 19:57


45 MG


 


 Nitrofurantoin


 Macrocrystals


  (Macrobid Cap)  100 mg  BID


 PO  10/30/17 20:00


 11/3/17 20:59 DC 11/3/17 21:01


100 MG


 


 Oxycodone HCl


  (Roxicodone


 Immediate Rel Tab)  5 mg  Q3H  PRN


 PO  10/30/17 14:15


 11/6/17 15:18 DC 11/5/17 20:52


5 MG


 


 Pantoprazole


 Sodium


  (Protonix Tab)  40 mg  DAILY


 PO  10/31/17 08:00


 11/30/17 08:59  11/10/17 08:22


40 MG


 


 Senna/Docusate


 Sodium


  (Senokot S Tab)  1 tab  QDL


 PO  10/31/17 11:00


 11/30/17 10:59  10/31/17 12:38


1 TAB


 


 Simvastatin


  (Zocor Tab)  40 mg  QPM


 PO  10/30/17 21:00


 11/29/17 20:59  11/9/17 19:57


40 MG


 


 Tamsulosin HCl


  (Flomax Cap)  0.4 mg  HS


 PO  10/30/17 21:00


 11/29/17 20:59  11/9/17 19:56


0.4 MG


 


 Amlodipine


 Besylate


  (Norvasc Tab)  10 mg  QAM


 PO  10/31/17 08:00


 11/30/17 08:59  11/10/17 08:22


10 MG


 


 Ketorolac


 Tromethamine


  (Toradol Inj)  15 mg  Q6H  PRN


 IV  10/30/17 14:15


 11/4/17 09:16 DC 11/4/17 06:04


15 MG


 


 Albuterol/


 Ipratropium


  (Duoneb)  3 ml  QIDR


 INH  10/30/17 16:00


 10/31/17 12:19 DC 10/31/17 07:16


3 ML


 


 Diclofenac Sodium


  (Voltaren 1% Top


 Gel)  1 appln  QID


 EXT  10/30/17 20:45


 11/29/17 14:14  11/10/17 08:21


1 APPLN


 


 Enteral


 Nutritional


 Formula


  (Boost Glucose


 Control)  1 can  TIDM


 PO  10/31/17 17:00


 11/30/17 16:59  11/10/17 08:00


1 CAN


 


 Phytonadione


  (Mephyton Tab)  5 mg  NOW  STAT


 PO  11/1/17 07:42


 11/1/17 07:46 DC 11/1/17 08:19


5 MG


 


 Diphenhydramine


 HCl


  (Benadryl Cap)  25 mg  NOW  ONCE


 PO  11/1/17 21:00


 11/1/17 21:01 DC 11/1/17 20:59


25 MG


 


 Warfarin Sodium


  (Coumadin Tab)  2 mg  DAILY@16


 PO  11/2/17 16:00


 11/6/17 12:51 DC 11/5/17 16:02


2 MG


 


 Hydralazine HCl


  (Apresoline Tab)  100 mg  BID


 PO  11/4/17 09:05


 11/5/17 15:46 DC 11/5/17 08:10


100 MG


 


 Heparin Sodium


  (Porcine) 6000


 unit/Syringe  6 ml @ 10


 mls/min  1045  ONCE


 IV  11/4/17 10:45


 11/4/17 10:46 DC 11/4/17 11:07


10 MLS/MIN


 


 Heparin Sodium


  (Porcine) 68642


 unit/Dextrose  500 ml @ 


 38 mls/hr  [id number] PRN


 IV  11/4/17 10:30


 11/7/17 12:46 DC 11/6/17 07:28


38 MLS/HR


 


 Heparin Sodium


  (Porcine) 6000


 unit/Syringe  6 ml @ 10


 mls/min  0945  ONCE


 IV  11/5/17 09:45


 11/5/17 09:46 DC 11/5/17 10:17


10 MLS/MIN


 


 Clopidogrel


 Bisulfate


  (plAVix TAB)  75 mg  QAM


 PO  11/6/17 08:00


 12/6/17 07:59  11/10/17 08:23


75 MG


 


 Clonazepam


  (Klonopin Tab)  1 mg  HS


 PO  11/5/17 12:00


 11/29/17 20:59  11/9/17 19:57


1 MG


 


 Hydralazine HCl


  (Apresoline Tab)  100 mg  TID


 PO  11/5/17 16:00


 12/5/17 15:59  11/10/17 08:23


100 MG


 


 Spironolactone


  (Aldactone Tab)  25 mg  BID17


 PO  11/5/17 19:30


 11/7/17 15:41 DC 11/7/17 08:01


25 MG


 


 Heparin Sodium


  (Porcine) 3000


 unit/Syringe  3 ml @ 10


 mls/min  NOW  ONCE


 IV  11/6/17 05:30


 11/6/17 05:31 DC 11/6/17 05:40


10 MLS/MIN


 


 Warfarin Sodium


  (Coumadin Tab)  3 mg  DAILY@16


 PO  11/6/17 16:00


 12/6/17 15:59  11/9/17 16:41


3 MG


 


 Enoxaparin Sodium


  (Lovenox Inj)  90 mg  Q12


 SQ  11/6/17 21:00


 12/6/17 20:59  11/10/17 08:26


90 MG


 


 Clonidine HCl


  (Catapres Tab)  0.1 mg  TODAY@1315  ONCE


 PO  11/6/17 13:15


 11/6/17 13:16 DC 11/6/17 13:44


0.1 MG


 


 Miscellaneous


  (Stop Order)  1 ea  TODAY@2100


 N/A  11/6/17 21:00


 11/6/17 22:00 DC 11/6/17 20:51


1 EA


 


 Clonidine HCl


  (Catapres Tab)  0.1 mg  BID


 PO  11/6/17 20:00


 11/7/17 08:33 DC 11/7/17 08:00


0.1 MG


 


 Oxycodone HCl


  (Roxicodone


 Immediate Rel Tab)  10 mg  Q3H  PRN


 PO  11/6/17 15:30


 11/13/17 14:14  11/10/17 09:20


10 MG


 


 Clonidine HCl


  (Catapres-Tts-2


 0.2mg/24hr Patch)  1 patch  Q7D


 TD  11/7/17 10:00


 11/9/17 12:02 DC 11/7/17 11:20


1 PATCH


 


 Miscellaneous


 Information


  (Check Clonidine


 Patch Placement)  1 ea  QS


 N/A  11/7/17 16:00


 11/9/17 12:02 DC 11/9/17 08:18


1 EA


 


 Furosemide


  (Lasix Tab)  20 mg  NOW  ONCE


 PO  11/7/17 10:45


 11/7/17 11:33 DC 11/7/17 12:36


20 MG


 


 Warfarin Sodium


  (Coumadin Tab)  2 mg  1600  ONCE


 PO  11/7/17 16:00


 11/7/17 16:01 DC 11/7/17 16:31


2 MG


 


 Spironolactone


  (Aldactone Tab)  50 mg  BID17


 PO  11/7/17 17:00


 12/5/17 19:29  11/10/17 08:26


50 MG


 


 Furosemide


  (Lasix Tab)  20 mg  BID17


 PO  11/8/17 09:00


 11/9/17 13:01 DC 11/9/17 08:17


20 MG


 


 Furosemide


  (Lasix Tab)  20 mg  1745  ONCE


 PO  11/7/17 17:45


 11/7/17 17:46 DC 11/7/17 18:21


20 MG


 


 Warfarin Sodium


  (Coumadin Tab)  2 mg  1600  ONCE


 PO  11/8/17 16:00


 11/8/17 16:01 DC 11/8/17 16:12


2 MG


 


 Clonidine HCl


  (Catapres-Tts-3


 0.3mg/24hr Patch)  1 patch  CQWK


 TD  11/9/17 13:00


 12/9/17 12:59  11/9/17 14:18


1 PATCH


 


 Miscellaneous


  (Remove


 Clonidine Patch)  1 ea  Q7D


 N/A  11/9/17 12:59


 12/9/17 12:58  11/9/17 14:18


1 EA


 


 Miscellaneous


 Information


  (Check Clonidine


 Patch Placement)  1 ea  QS


 N/A  11/9/17 16:00


 12/9/17 15:59  11/10/17 08:00


1 EA


 


 Furosemide


  (Lasix Tab)  20 mg  DAILY


 PO  11/10/17 08:00


 11/10/17 09:29 DC 11/10/17 08:24


20 MG








 (Natalia Ha, TERI)





Objective


Vital Signs











  Date Time  Temp Pulse Resp B/P (MAP) Pulse Ox O2 Delivery O2 Flow Rate FiO2


 


11/10/17 11:52 36.5 53 18 130/62 (84) 97 Room Air  


 


11/10/17 08:00     96 Room Air  


 


11/10/17 07:55 36.5 58 18 140/56 (84) 96 Room Air  


 


11/10/17 05:15    168/64 (98)    


 


11/10/17 05:15    152/64 (93)    


 


11/10/17 05:15    172/84 (113)    


 


11/10/17 00:00      Room Air  


 


11/9/17 23:42 36.8 62 20  95   


 


11/9/17 20:00      Room Air  


 


11/9/17 16:52      Room Air  


 


11/9/17 14:51 36.7 47 18 144/57 (86) 96 Room Air  








 (Natalia Ha CRNP)





Physical Exam


Notes:


General: no distress


Eyes: normal inspection, PERLL


Respiratory: chest non tender, clear to auscultation, normal breath sounds, no 

respiratory distress, no accessory muscle use


Cardiac: regular rate and rhythm, no rub or gallop, III/IV systolic murmur, 

trace edema ankles bilaterally, no jvd


GI/: active bowel sounds, no abd pain or tenderness, soft, non distended


Extremities: normal range of motion, normal strength, non tender 


Neuro/Psych: alert and oriented x 3, normal mood and affect


Skin: normal color, dry, wound vac in place


 (Natalia Ha CRNP)





Laboratory Results





Last 24 Hours








Test


  11/9/17


16:45 11/9/17


20:14 11/10/17


05:38 11/10/17


07:48


 


Bedside Glucose 122 mg/dl  108 mg/dl   100 mg/dl 


 


Prothrombin Time   12.8 SECONDS  


 


Prothromb Time International


Ratio 


  


  1.2 


  


 


 


Sodium Level   142 mmol/L  


 


Potassium Level   4.8 mmol/L  


 


Chloride Level   109 mmol/L  


 


Carbon Dioxide Level   25 mmol/L  


 


Anion Gap   8.0 mmol/L  


 


Blood Urea Nitrogen   43 mg/dl  


 


Creatinine   1.56 mg/dl  


 


Est Creatinine Clear Calc


Drug Dose 


  


  57.1 ml/min 


  


 


 


Estimated GFR (


American) 


  


  55.5 


  


 


 


Estimated GFR (Non-


American 


  


  47.9 


  


 


 


BUN/Creatinine Ratio   27.5  


 


Random Glucose   100 mg/dl  


 


Calcium Level   8.5 mg/dl  


 


Test


  11/10/17


11:47 


  


  


 


 


Bedside Glucose 141 mg/dl    








 (Natalia Ha CRNP)





Assessment and Plan


62 y/o male with a history of CAD, h/o MI s/p stents, HTN, HLD, diastolic CHF, 

COPD, DM II, anxiety, depression, bipolar disorder, anemia and GERD who 

presented to the ED on 10/30 with persistent headache and elevated INR.    





Headache, neck stiffness


-Blood cultures showed no growth


-Voltaren gel, heating pad 


- resolved





Supratherapeutic INR--HSNV note h/o thrombus, unclear where this is located


-INR peaked at >8 and patient was given Vitamin K 11/1, no s/s of bleeding


- Lovenox bridge, coumadin restarted 11/2, slow to become therapeutic, continue 

home dose of 3mg per day coumadin dose to slowly bring INR back to therapeutic





RLE wound, wound vac


-Wound care 


-Continue oxycodone 5 mg PO q3h prn pain


- elevate legs when possible





CAD, h/o MI s/p stents, HTN, HLD, diastolic CHF


-Blood pressures finally decreasing after being quite resistant to treatment, 

continue metoprolol, amlodipine, simvastatin, lisinopril, clonidine patch


- per Dr. Galvan, hold further furosemide


- orthostatics qs showed orthostasis, patient does have some lightheadedness 

but is able to come to standing slowly to avoid symptoms





COPD


-continue DuoNebs, wears 2L chronically





DM II--last HgbA1c checked 8/11/17 was 7.5


-Lantus, ss


-Continue gabapentin for neuropathy





Urinary retention


-Chronic High


-Continue Flomax 0.4 mg PO hs





Anxiety, depression, bipolar disorder


-Continue escitalopram, mirtazapine, Klonopin, and Depakote


- bipolar disorder stable





Anemia of chronic disease-


-Baseline hemoglobin around 8 and has remained stable


- cbc with am labs





GERD


-Continue pantoprazole 40 mg PO qd





DVT prophylaxis


- enoxaprin bridge


-SCDs





Code Status


-Level I, FULL RESUSCITATION STATUS





Dispo: approved for [facility], bed available Monday


 (Natalia Ha ., TERI)


Attending Attestation:


Pt seen/examined, chart reviewed, care plan d/w TERI Ha.


I agree w/ the eddy components of her documentation.





Pt's posterior head/neck pain is improved.  


Has limited ROM of the neck at times.


Otherwise feeling ok with no new complaints.





VSS, no fever





gen - nad


neck - with rotation and flexion there is restricted movement


mouth - MMM


neck - no JVD


heart - RRR


lungs - CTA b/l 


abd - soft, NT


ext - trace edema right leg, none on left; wound vac in place right distal leg; 

pulses 1+ b/l 





A/P:


1.  recent headaches - improved.  Due to cervical spine DJD? 


Has h/o surgery of c-spine for DJD.  


Would reimage if pain recurs.





2.  anemia - repeat CBC am.





3.  supratherapeutic INR - resolved, resumed coumadin. 





4.  acute kidney injury - repeat BMP in am.  Uncertain etiology.





dispo - SNF on Monday





Stu Youngblood MD


 (Stu Youngblood MD)

## 2017-11-11 VITALS
HEART RATE: 56 BPM | TEMPERATURE: 97.7 F | SYSTOLIC BLOOD PRESSURE: 152 MMHG | DIASTOLIC BLOOD PRESSURE: 78 MMHG | OXYGEN SATURATION: 95 %

## 2017-11-11 VITALS — HEART RATE: 59 BPM | TEMPERATURE: 97.52 F | OXYGEN SATURATION: 92 %

## 2017-11-11 VITALS — OXYGEN SATURATION: 92 %

## 2017-11-11 VITALS — OXYGEN SATURATION: 94 %

## 2017-11-11 VITALS — OXYGEN SATURATION: 95 % | TEMPERATURE: 98.06 F | HEART RATE: 63 BPM

## 2017-11-11 VITALS — DIASTOLIC BLOOD PRESSURE: 60 MMHG | HEART RATE: 61 BPM | SYSTOLIC BLOOD PRESSURE: 140 MMHG

## 2017-11-11 VITALS — HEART RATE: 47 BPM | TEMPERATURE: 98.06 F | OXYGEN SATURATION: 95 %

## 2017-11-11 VITALS — SYSTOLIC BLOOD PRESSURE: 158 MMHG | DIASTOLIC BLOOD PRESSURE: 87 MMHG | HEART RATE: 66 BPM

## 2017-11-11 LAB
ANION GAP SERPL CALC-SCNC: 7 MMOL/L (ref 3–11)
BUN SERPL-MCNC: 44 MG/DL (ref 7–18)
BUN/CREAT SERPL: 25 (ref 10–20)
CALCIUM SERPL-MCNC: 7.9 MG/DL (ref 8.5–10.1)
CHLORIDE SERPL-SCNC: 107 MMOL/L (ref 98–107)
CO2 SERPL-SCNC: 25 MMOL/L (ref 21–32)
CREAT CL PREDICTED SERPL C-G-VRATE: 50.6 ML/MIN
CREAT SERPL-MCNC: 1.76 MG/DL (ref 0.6–1.4)
EOSINOPHIL NFR BLD AUTO: 178 K/UL (ref 130–400)
GLUCOSE SERPL-MCNC: 130 MG/DL (ref 70–99)
HCT VFR BLD CALC: 23.6 % (ref 42–52)
INR PPP: 1.2 (ref 0.9–1.1)
MCH RBC QN AUTO: 27.6 PG (ref 25–34)
MCHC RBC AUTO-ENTMCNC: 31.8 G/DL (ref 32–36)
MCV RBC AUTO: 86.8 FL (ref 80–100)
PMV BLD AUTO: 8.7 FL (ref 7.4–10.4)
POTASSIUM SERPL-SCNC: 4.3 MMOL/L (ref 3.5–5.1)
PROTHROMBIN TIME: 12.5 SECONDS (ref 9–12)
RBC # BLD AUTO: 2.72 M/UL (ref 4.7–6.1)
SODIUM SERPL-SCNC: 139 MMOL/L (ref 136–145)
WBC # BLD AUTO: 5.24 K/UL (ref 4.8–10.8)

## 2017-11-11 RX ADMIN — ALBUTEROL SULFATE SCH PUFFS: 90 AEROSOL, METERED RESPIRATORY (INHALATION) at 00:46

## 2017-11-11 RX ADMIN — CLOPIDOGREL BISULFATE SCH MG: 75 TABLET, FILM COATED ORAL at 08:49

## 2017-11-11 RX ADMIN — GABAPENTIN SCH MG: 100 CAPSULE ORAL at 21:02

## 2017-11-11 RX ADMIN — CLONAZEPAM SCH MG: 0.5 TABLET ORAL at 21:01

## 2017-11-11 RX ADMIN — TAMSULOSIN HYDROCHLORIDE SCH MG: 0.4 CAPSULE ORAL at 21:02

## 2017-11-11 RX ADMIN — METOPROLOL SUCCINATE SCH MG: 50 TABLET, EXTENDED RELEASE ORAL at 21:04

## 2017-11-11 RX ADMIN — OXYCODONE HYDROCHLORIDE PRN MG: 5 TABLET ORAL at 00:47

## 2017-11-11 RX ADMIN — Medication SCH CAN: at 08:00

## 2017-11-11 RX ADMIN — METOPROLOL SUCCINATE SCH MG: 50 TABLET, EXTENDED RELEASE ORAL at 08:51

## 2017-11-11 RX ADMIN — SPIRONOLACTONE SCH MG: 25 TABLET, FILM COATED ORAL at 08:51

## 2017-11-11 RX ADMIN — DICLOFENAC SODIUM SCH APPLN: 10 GEL TOPICAL at 12:00

## 2017-11-11 RX ADMIN — ALBUTEROL SULFATE SCH PUFFS: 90 AEROSOL, METERED RESPIRATORY (INHALATION) at 05:46

## 2017-11-11 RX ADMIN — ENOXAPARIN SODIUM SCH MG: 100 INJECTION SUBCUTANEOUS at 21:03

## 2017-11-11 RX ADMIN — Medication SCH CAN: at 17:27

## 2017-11-11 RX ADMIN — Medication SCH MCG: at 08:46

## 2017-11-11 RX ADMIN — DIVALPROEX SODIUM SCH MG: 500 TABLET, FILM COATED, EXTENDED RELEASE ORAL at 21:03

## 2017-11-11 RX ADMIN — INSULIN GLARGINE SCH UNITS: 100 INJECTION, SOLUTION SUBCUTANEOUS at 20:56

## 2017-11-11 RX ADMIN — ALBUTEROL SULFATE SCH PUFFS: 90 AEROSOL, METERED RESPIRATORY (INHALATION) at 12:38

## 2017-11-11 RX ADMIN — MIRTAZAPINE SCH MG: 15 TABLET, FILM COATED ORAL at 21:03

## 2017-11-11 RX ADMIN — SPIRONOLACTONE SCH MG: 25 TABLET, FILM COATED ORAL at 17:28

## 2017-11-11 RX ADMIN — ESCITALOPRAM OXALATE SCH MG: 20 TABLET, FILM COATED ORAL at 08:49

## 2017-11-11 RX ADMIN — AMLODIPINE BESYLATE SCH MG: 5 TABLET ORAL at 08:50

## 2017-11-11 RX ADMIN — DICLOFENAC SODIUM SCH APPLN: 10 GEL TOPICAL at 17:00

## 2017-11-11 RX ADMIN — ALBUTEROL SULFATE SCH PUFFS: 90 AEROSOL, METERED RESPIRATORY (INHALATION) at 17:28

## 2017-11-11 RX ADMIN — Medication SCH CAN: at 12:00

## 2017-11-11 RX ADMIN — PANTOPRAZOLE SCH MG: 40 TABLET, DELAYED RELEASE ORAL at 08:47

## 2017-11-11 RX ADMIN — LISINOPRIL SCH MG: 20 TABLET ORAL at 08:47

## 2017-11-11 RX ADMIN — STANDARDIZED SENNA CONCENTRATE AND DOCUSATE SODIUM SCH TAB: 8.6; 5 TABLET ORAL at 12:00

## 2017-11-11 RX ADMIN — DICLOFENAC SODIUM SCH APPLN: 10 GEL TOPICAL at 20:00

## 2017-11-11 RX ADMIN — ENOXAPARIN SODIUM SCH MG: 100 INJECTION SUBCUTANEOUS at 08:46

## 2017-11-11 RX ADMIN — SIMVASTATIN SCH MG: 40 TABLET, FILM COATED ORAL at 21:04

## 2017-11-11 RX ADMIN — ALBUTEROL SULFATE SCH PUFFS: 90 AEROSOL, METERED RESPIRATORY (INHALATION) at 21:05

## 2017-11-11 RX ADMIN — OXYCODONE HYDROCHLORIDE PRN MG: 5 TABLET ORAL at 08:49

## 2017-11-11 RX ADMIN — DICLOFENAC SODIUM SCH APPLN: 10 GEL TOPICAL at 08:00

## 2017-11-11 RX ADMIN — WARFARIN SCH MG: 3 TABLET ORAL at 17:28

## 2017-11-11 NOTE — PROGRESS NOTE
Subjective


Date of Service:


Nov 11, 2017.


Subjective


Pt evaluation today including:  conversation w/ patient, physical exam, chart 

review, lab review


Pain:  denies


PO Intake:  normal


Voiding:  gillespie catheter in place


no issues overnight


just resting and watching TV


has not noted any melena or BRBPR





Problem List


Medical Problems:


(1) Acute CHF


Status: Acute  





(2) Acute coronary syndrome


Status: Acute  





(3) Acute headache


Status: Acute  





(4) Altered mental status


Status: Acute  





(5) Anemia


Status: Acute  





(6) Anginal pain


Status: Acute  





(7) Appendicitis


Status: Acute  





(8) Cellulitis


Status: Acute  





(9) CHF (congestive heart failure)


Status: Acute  





(10) Congestive heart failure


Status: Acute  





(11) Diabetes mellitus with hyperglycemia


Status: Acute  





(12) Failure of outpatient treatment


Status: Acute  





(13) Intra-abdominal abscess


Status: Acute  





(14) Lower abdominal pain of unknown etiology


Status: Acute  





(15) Neck pain


Status: Acute  





(16) Precordial chest pain


Status: Acute  





(17) Pulmonary edema


Status: Acute  





(18) Respiratory acidosis


Status: Acute  





(19) Respiratory distress


Status: Acute  





(20) SOB (shortness of breath)


Status: Acute  











Review of Systems


Constitutional:  No fever


Respiratory:  No shortness of breath


Cardiac:  No chest pain, No orthopnea


Abdomen:  No pain





Objective


Vital Signs











  Date Time  Temp Pulse Resp B/P (MAP) Pulse Ox O2 Delivery O2 Flow Rate FiO2


 


11/11/17 21:05  61  140/60 (86)    


 


11/11/17 20:00     92 Room Air  


 


11/11/17 16:00     92 Room Air  


 


11/11/17 15:37 36.5 56 18 152/78 (102) 95 Room Air  


 


11/11/17 08:42  66  158/87 (110)    


 


11/11/17 08:00     94 Room Air  


 


11/11/17 07:38 36.4 59 18  92 Room Air  


 


11/11/17 00:17 36.7 63 20  95 Room Air  


 


11/11/17 00:00      Room Air  











Physical Exam


General Appearance:  no apparent distress, + pertinent finding (chronically ill 

appearing)


ENT:  pharynx normal


Neck:  no JVD


Respiratory/Chest:  lungs clear, no respiratory distress, no accessory muscle 

use


Cardiovascular:  regular rate, rhythm, no gallop


Abdomen:  normal bowel sounds, non tender, soft, no organomegaly


Extremities:  + pedal edema


Neurologic/Psychiatric:  alert, oriented x 3


Skin:  + pallor


Comments:


pulses b/l feet about 1+





wound vac in place right shin; multiple optifoam dressings in place b/l shins





Laboratory Results





Last 24 Hours








Test


  11/11/17


05:39 11/11/17


07:26 11/11/17


11:40 11/11/17


16:54


 


White Blood Count 5.24 K/uL    


 


Red Blood Count 2.72 M/uL    


 


Hemoglobin 7.5 g/dL    


 


Hematocrit 23.6 %    


 


Mean Corpuscular Volume 86.8 fL    


 


Mean Corpuscular Hemoglobin 27.6 pg    


 


Mean Corpuscular Hemoglobin


Concent 31.8 g/dl 


  


  


  


 


 


RDW Standard Deviation 48.4 fL    


 


RDW Coefficient of Variation 15.2 %    


 


Platelet Count 178 K/uL    


 


Mean Platelet Volume 8.7 fL    


 


Prothrombin Time 12.5 SECONDS    


 


Prothromb Time International


Ratio 1.2 


  


  


  


 


 


Sodium Level 139 mmol/L    


 


Potassium Level 4.3 mmol/L    


 


Chloride Level 107 mmol/L    


 


Carbon Dioxide Level 25 mmol/L    


 


Anion Gap 7.0 mmol/L    


 


Blood Urea Nitrogen 44 mg/dl    


 


Creatinine 1.76 mg/dl    


 


Est Creatinine Clear Calc


Drug Dose 50.6 ml/min 


  


  


  


 


 


Estimated GFR (


American) 48.0 


  


  


  


 


 


Estimated GFR (Non-


American 41.4 


  


  


  


 


 


BUN/Creatinine Ratio 25.0    


 


Random Glucose 130 mg/dl    


 


Calcium Level 7.9 mg/dl    


 


Bedside Glucose  156 mg/dl  145 mg/dl  153 mg/dl 


 


Test


  11/11/17


19:41 


  


  


 


 


Bedside Glucose 169 mg/dl    











Assessment and Plan


60yo male -





1.  recent headaches - improved/resolved.  Due to cervical spine DJD? 


Has h/o surgery of c-spine for DJD.  


Would reimage if pain recurs.





2.  anemia - chronic, likely due to chronic disease anemia.  


Iron studies earlier this summer acceptable. 


B12 normal. 


folate was borderline low.


recheck folate & iron studies in am. 


Tx if Hb drops below 7.5.  





3.  supratherapeutic INR - resolved, resumed coumadin.  Also on therapeutic 

lovenox. 





4.  acute kidney injury - worse today modestly.  Hold ACE.  Appreciate 

nephrology input. 





5.  CAD, h/o MI s/p stents - noted; no ischemic symptoms at this time.  Cont BB

, statin, plavix. 





6.  PAD s/p stents of LEs - Dr. Rainey 2017 - cont plavix. 





7.  chronic LE wounds with wound vac on right - managed by Dr. Paz & wound 

care team. 





8.  hyperlipidemia - statin. 





9.  chronic diastolic CHF - compensated. 





10.  COPD - stable. 





11.  T2DM - controlled. 





12.  bipolar disorder - on depakote large dose - check level AM. 





[facility] Monday?


Discharge planning:  skilled nursing facility

## 2017-11-11 NOTE — NEPHROLOGY PROGRESS NOTE
Nephrology Progress Note


Date of Service


Nov 11, 2017.





Chief Complaint


Follow-up for resistant hypertension and FITZ





Nicole Yusuf was seen and examined in his room this morning.  Blood pressure much 

better controlled, denies any headache, chest pain. Creatinine continues to 

worsen slowly, 1.8 this morning.  He has been net negative.





Review of Systems


A complete review of systems was performed.  Pertinent positives are noted 

above. All other systems are negative.





Vital Signs





Last 8 Hrs








  Date Time  Temp Pulse Resp B/P (MAP) Pulse Ox O2 Delivery O2 Flow Rate FiO2


 


11/11/17 08:42  66  158/87 (110)    


 


11/11/17 08:00     94 Room Air  


 


11/11/17 07:38 36.4 59 18  92 Room Air  











Last Recorded Weight


Weight (Kilograms):  88.500





Physical Exam


GENERAL:  Middle-aged male, AAA x 3,  pleasant, healthy-appearing, not in any 

distress.


NECK: Supple, no JVD.


RESPIRATORY: Normal breathing efforts, no accessory muscle use, clear to 

auscultation bilaterally, no wheezes or rales.


CARDIOVASCULAR: S1, S2 normal, rate rhythm regular.


EXTREMITY:  Wound VAC in right leg


NEURO: speech fluent.


PSYCHIATRY: Normal mood and judgment





Family History





Cancer (esophageal)


Diabetes mellitus


Heart disease


Hypertension





Social History


Smokeless Tobacco Use:  No


Alcohol Use:  none


Drug Use:  none


Marital Status:  


Housing Status:  other ([facility])


Occupation:  employed





Laboratory Results


Past 24 Hours


11/11/17 05:39








11/11/17 05:39

















Test


  11/10/17


11:47 11/10/17


16:57 11/10/17


20:22 11/11/17


05:39


 


Bedside Glucose


  141 mg/dl


(70-99) 137 mg/dl


(70-99) 175 mg/dl


(70-99) 


 


 


Red Blood Count


  


  


  


  2.72 M/uL


(4.7-6.1)


 


Mean Corpuscular Volume


  


  


  


  86.8 fL


()


 


Mean Corpuscular Hemoglobin


  


  


  


  27.6 pg


(25-34)


 


Mean Corpuscular Hemoglobin


Concent 


  


  


  31.8 g/dl


(32-36)


 


RDW Standard Deviation


  


  


  


  48.4 fL


(36.4-46.3)


 


RDW Coefficient of Variation


  


  


  


  15.2 %


(11.5-14.5)


 


Mean Platelet Volume


  


  


  


  8.7 fL


(7.4-10.4)


 


Prothrombin Time


  


  


  


  12.5 SECONDS


(9.0-12.0)


 


Prothromb Time International


Ratio 


  


  


  1.2 (0.9-1.1) 


 


 


Anion Gap


  


  


  


  7.0 mmol/L


(3-11)


 


Est Creatinine Clear Calc


Drug Dose 


  


  


  50.6 ml/min 


 


 


Estimated GFR (


American) 


  


  


  48.0 


 


 


Estimated GFR (Non-


American 


  


  


  41.4 


 


 


BUN/Creatinine Ratio    25.0 (10-20) 


 


Calcium Level


  


  


  


  7.9 mg/dl


(8.5-10.1)


 


Test


  11/11/17


07:26 


  


  


 


 


Bedside Glucose


  156 mg/dl


(70-99) 


  


  


 











Allergies


Coded Allergies:  


     No Known Allergies (Verified , 7/19/17)





Medications





Current Inpatient Medications








 Medications


  (Trade)  Dose


 Ordered  Sig/Dc


 Route  Start Time


 Stop Time Status Last Admin


Dose Admin


 


 Acetaminophen


  (Tylenol Tab)  650 mg  Q4H  PRN


 PO  10/30/17 14:15


 11/29/17 14:14  11/7/17 23:42


650 MG


 


 Al Hydrox/Mg


 Hydrox/Simethicone


  (Maalox Max Susp)  15 ml  Q4H  PRN


 PO  10/30/17 14:15


 11/29/17 14:14   


 


 


 Magnesium


 Hydroxide


  (Milk Of


 Magnesia Susp)  30 ml  Q6H  PRN


 PO  10/30/17 14:15


 11/29/17 14:14   


 


 


 Polyethylene


  (Miralax Powder


 Packet)  17 gm  DAILY  PRN


 PO  10/30/17 14:15


 11/29/17 14:14   


 


 


 Ondansetron HCl


  (Zofran Inj)  4 mg  Q6H  PRN


 IV  10/30/17 14:15


 11/29/17 14:14   


 


 


 Glucose


  (Glucose 40% Gel)  15-30


 GRAMS 15


 GRAMS...  UD  PRN


 PO  10/30/17 14:15


 11/29/17 14:14   


 


 


 Glucose


  (Glucose Chew


 Tab)  4-8


 Tablets 4


 Tabl...  UD  PRN


 PO  10/30/17 14:15


 11/29/17 14:14   


 


 


 Dextrose


  (Dextrose 50%


 50ML Syringe)  25-50ML OF


 50% DW IV


 FOR...  UD  PRN


 IV  10/30/17 14:15


 11/29/17 14:14   


 


 


 Glucagon


  (Glucagon Inj)  1 mg  UD  PRN


 SQ  10/30/17 14:15


 11/29/17 14:14   


 


 


 Albuterol


  (Ventolin Hfa


 Inhaler)  2 puffs  Q6


 INH  10/30/17 18:00


 11/29/17 17:59  11/11/17 00:46


2 PUFFS


 


 Bisacodyl


  (Dulcolax Supp)  10 mg  DAILY  PRN


 MN  10/30/17 14:15


 11/29/17 14:14   


 


 


 Calcium Carbonate


  (Tums Chew Tab)  500 mg  TID  PRN


 PO  10/30/17 14:15


 11/29/17 14:14   


 


 


 Cyanocobalamin


  (Vitamin B-12


 Tab)  100 mcg  DAILY


 PO  10/31/17 08:00


 11/30/17 08:59  11/11/17 08:46


100 MCG


 


 Divalproex Sodium


  (Depakote


 Extended Rel Tab)  2,000 mg  HS


 PO  10/30/17 21:00


 11/29/17 20:59  11/10/17 21:04


2,000 MG


 


 Docusate Sodium


  (coLACE CAP)  100 mg  BID  PRN


 PO  10/30/17 14:15


 11/29/17 14:14   


 


 


 Escitalopram


 Oxalate


  (Lexapro Tab)  20 mg  QAM


 PO  10/31/17 08:00


 11/30/17 08:59  11/11/17 08:49


20 MG


 


 Fluticasone


 Propionate


  (Flonase Nasal


 Spray)  2 sprays  DAILY  PRN


 VERONICA  10/30/17 14:15


 11/29/17 14:14   


 


 


 Gabapentin


  (Neurontin Cap)  100 mg  HS


 PO  10/30/17 21:00


 11/29/17 20:59  11/10/17 21:01


100 MG


 


 Insulin Glargine


  (Lantus Solostar


 Pen)  10 units  HS


 SC  10/30/17 21:00


 11/29/17 20:59  11/10/17 20:56


10 UNITS


 


 Metoprolol


 Succinate


  (Toprol Xl Tab)  50 mg  BID


 PO  10/30/17 20:00


 11/29/17 20:59  11/11/17 08:51


50 MG


 


 Mirtazapine


  (Remeron Tab)  45 mg  HS


 PO  10/30/17 21:00


 11/29/17 20:59  11/10/17 21:02


45 MG


 


 Nitroglycerin


  (Nitrostat Tab)  0.4 mg  PRN  PRN


 UT  10/30/17 14:15


 11/29/17 14:14   


 


 


 Pantoprazole


 Sodium


  (Protonix Tab)  40 mg  DAILY


 PO  10/31/17 08:00


 11/30/17 08:59  11/11/17 08:47


40 MG


 


 Senna/Docusate


 Sodium


  (Senokot S Tab)  1 tab  QDL


 PO  10/31/17 11:00


 11/30/17 10:59  10/31/17 12:38


1 TAB


 


 Simvastatin


  (Zocor Tab)  40 mg  QPM


 PO  10/30/17 21:00


 11/29/17 20:59  11/10/17 21:00


40 MG


 


 Tamsulosin HCl


  (Flomax Cap)  0.4 mg  HS


 PO  10/30/17 21:00


 11/29/17 20:59  11/10/17 21:01


0.4 MG


 


 Amlodipine


 Besylate


  (Norvasc Tab)  10 mg  QAM


 PO  10/31/17 08:00


 11/30/17 08:59  11/11/17 08:50


10 MG


 


 Miscellaneous


 Information


  (Order Awaiting


 Action)  1 ea  QS


 N/A  10/31/17 00:00


 11/30/17 00:00   


 


 


 Diclofenac Sodium


  (Voltaren 1% Top


 Gel)  1 appln  QID


 EXT  10/30/17 20:45


 11/29/17 14:14  11/10/17 08:21


1 APPLN


 


 Miscellaneous


  (Iv Fluids


 Completed)  1 ea  PRN  PRN


 N/A  10/30/17 21:00


 10/30/18 20:59   


 


 


 Enteral


 Nutritional


 Formula


  (Boost Glucose


 Control)  1 can  TIDM


 PO  10/31/17 17:00


 11/30/17 16:59  11/11/17 08:00


1 CAN


 


 Clopidogrel


 Bisulfate


  (plAVix TAB)  75 mg  QAM


 PO  11/6/17 08:00


 12/6/17 07:59  11/11/17 08:49


75 MG


 


 Clonazepam


  (Klonopin Tab)  1 mg  HS


 PO  11/5/17 12:00


 11/29/17 20:59  11/10/17 21:17


1 MG


 


 Hydralazine HCl


  (Apresoline Tab)  100 mg  TID


 PO  11/5/17 16:00


 12/5/17 15:59  11/11/17 08:50


100 MG


 


 Warfarin Sodium


  (Coumadin Tab)  3 mg  DAILY@16


 PO  11/6/17 16:00


 12/6/17 15:59  11/10/17 16:56


3 MG


 


 Enoxaparin Sodium


  (Lovenox Inj)  90 mg  Q12


 SQ  11/6/17 21:00


 12/6/17 20:59  11/11/17 08:46


90 MG


 


 Oxycodone HCl


  (Roxicodone


 Immediate Rel Tab)  10 mg  Q3H  PRN


 PO  11/6/17 15:30


 11/13/17 14:14  11/11/17 08:49


10 MG


 


 Spironolactone


  (Aldactone Tab)  50 mg  BID17


 PO  11/7/17 17:00


 12/5/17 19:29  11/11/17 08:51


50 MG


 


 Clonidine HCl


  (Catapres-Tts-3


 0.3mg/24hr Patch)  1 patch  CQWK


 TD  11/9/17 13:00


 12/9/17 12:59  11/9/17 14:18


1 PATCH


 


 Miscellaneous


  (Remove


 Clonidine Patch)  1 ea  Q7D


 N/A  11/9/17 12:59


 12/9/17 12:58  11/9/17 14:18


1 EA


 


 Miscellaneous


 Information


  (Check Clonidine


 Patch Placement)  1 ea  QS


 N/A  11/9/17 16:00


 12/9/17 15:59  11/11/17 08:00


1 EA











Impression





(1) Accelerated hypertension


(2) PAD (peripheral artery disease)


(3) Diastolic CHF, chronic


(4) LVH (left ventricular hypertrophy)


Paramjit is a 61-year-old  male with DMII, hypertension, dyslipidemia, CAD

, chronic diastolic CHF, diffuse calcific vascular disease, PAD and depression 

as well as bipolar disorder. He has chronic urinary retention and an indwelling 

High. He was admitted with headache. Hospitalization complicated by 

accelerated hypertension.





He does have a component of pseudohypertension. There is a blood pressure 

discrepancy between arms. He also has known severe calcific vascular disease.





Blood pressure is to be checked in his left arm, manually and with the patient 

sitting.





Volume status replete. Edema improving. Creatinine raised slightly overnight. 

No evidence of decompensated heart failure.





Orthostatic vitals noted. Significant drop in systolic pressure. Heart rate 

unchanged.





CTA of the abdomen and renal arteries showed diffuse calcific and 

atherosclerotic disease noted without significant KIANA. 





Diuretics being titrated. Metabolic profile stable with subtle upward trend in 

creatinine.





Beta blockers have been held for bradycardia.





Recommendations





-- Continue amlodipine 10 mg daily and Toprol XL 50 mg BID (held for bradycardia

)


-- hold lisinopril in the setting of progressive worsening of renal function


-- continue Spironolactone 50 mg twice daily and Hydralazine 100 mg Q 8 hrs PRN 

SBP >160 mmHg


-- Document I/O's


-- Check metabolic profile daily


-- Sodium restrict diet


-- Check orthostatic vital signs each morning following morning medications

## 2017-11-12 VITALS — DIASTOLIC BLOOD PRESSURE: 70 MMHG | SYSTOLIC BLOOD PRESSURE: 168 MMHG | HEART RATE: 62 BPM

## 2017-11-12 VITALS
HEART RATE: 55 BPM | DIASTOLIC BLOOD PRESSURE: 79 MMHG | SYSTOLIC BLOOD PRESSURE: 169 MMHG | TEMPERATURE: 97.88 F | OXYGEN SATURATION: 95 %

## 2017-11-12 VITALS — OXYGEN SATURATION: 96 % | HEART RATE: 57 BPM | TEMPERATURE: 97.7 F

## 2017-11-12 VITALS — TEMPERATURE: 97.7 F | HEART RATE: 54 BPM | OXYGEN SATURATION: 97 %

## 2017-11-12 VITALS — OXYGEN SATURATION: 92 %

## 2017-11-12 LAB
ANION GAP SERPL CALC-SCNC: 6 MMOL/L (ref 3–11)
BUN SERPL-MCNC: 44 MG/DL (ref 7–18)
BUN/CREAT SERPL: 26.4 (ref 10–20)
CALCIUM SERPL-MCNC: 8 MG/DL (ref 8.5–10.1)
CHLORIDE SERPL-SCNC: 108 MMOL/L (ref 98–107)
CO2 SERPL-SCNC: 27 MMOL/L (ref 21–32)
CREAT CL PREDICTED SERPL C-G-VRATE: 53.4 ML/MIN
CREAT SERPL-MCNC: 1.67 MG/DL (ref 0.6–1.4)
EOSINOPHIL NFR BLD AUTO: 188 K/UL (ref 130–400)
FERRITIN SERPL-MCNC: 71.9 NG/ML (ref 8–388)
GLUCOSE SERPL-MCNC: 144 MG/DL (ref 70–99)
HCT VFR BLD CALC: 24.1 % (ref 42–52)
INR PPP: 1.1 (ref 0.9–1.1)
MCH RBC QN AUTO: 27.4 PG (ref 25–34)
MCHC RBC AUTO-ENTMCNC: 32 G/DL (ref 32–36)
MCV RBC AUTO: 85.8 FL (ref 80–100)
PMV BLD AUTO: 9.1 FL (ref 7.4–10.4)
POTASSIUM SERPL-SCNC: 4.6 MMOL/L (ref 3.5–5.1)
PROTHROMBIN TIME: 11.9 SECONDS (ref 9–12)
RBC # BLD AUTO: 2.81 M/UL (ref 4.7–6.1)
SODIUM SERPL-SCNC: 141 MMOL/L (ref 136–145)
TIBC SERPL-MCNC: 244 MCG/DL (ref 250–450)
WBC # BLD AUTO: 5.72 K/UL (ref 4.8–10.8)

## 2017-11-12 RX ADMIN — CLOPIDOGREL BISULFATE SCH MG: 75 TABLET, FILM COATED ORAL at 08:08

## 2017-11-12 RX ADMIN — DIVALPROEX SODIUM SCH MG: 500 TABLET, FILM COATED, EXTENDED RELEASE ORAL at 21:52

## 2017-11-12 RX ADMIN — WARFARIN SCH MG: 3 TABLET ORAL at 15:36

## 2017-11-12 RX ADMIN — SPIRONOLACTONE SCH MG: 25 TABLET, FILM COATED ORAL at 09:06

## 2017-11-12 RX ADMIN — Medication SCH CAN: at 08:09

## 2017-11-12 RX ADMIN — PANTOPRAZOLE SCH MG: 40 TABLET, DELAYED RELEASE ORAL at 08:08

## 2017-11-12 RX ADMIN — CLONAZEPAM SCH MG: 0.5 TABLET ORAL at 21:47

## 2017-11-12 RX ADMIN — DICLOFENAC SODIUM SCH APPLN: 10 GEL TOPICAL at 12:05

## 2017-11-12 RX ADMIN — ENOXAPARIN SODIUM SCH MG: 100 INJECTION SUBCUTANEOUS at 08:11

## 2017-11-12 RX ADMIN — MIRTAZAPINE SCH MG: 15 TABLET, FILM COATED ORAL at 21:53

## 2017-11-12 RX ADMIN — Medication SCH CAN: at 12:05

## 2017-11-12 RX ADMIN — ESCITALOPRAM OXALATE SCH MG: 20 TABLET, FILM COATED ORAL at 08:08

## 2017-11-12 RX ADMIN — ALBUTEROL SULFATE SCH PUFFS: 90 AEROSOL, METERED RESPIRATORY (INHALATION) at 06:00

## 2017-11-12 RX ADMIN — DICLOFENAC SODIUM SCH APPLN: 10 GEL TOPICAL at 21:51

## 2017-11-12 RX ADMIN — Medication SCH MCG: at 08:07

## 2017-11-12 RX ADMIN — OXYCODONE HYDROCHLORIDE PRN MG: 5 TABLET ORAL at 16:28

## 2017-11-12 RX ADMIN — DICLOFENAC SODIUM SCH APPLN: 10 GEL TOPICAL at 15:36

## 2017-11-12 RX ADMIN — DICLOFENAC SODIUM SCH APPLN: 10 GEL TOPICAL at 08:10

## 2017-11-12 RX ADMIN — SIMVASTATIN SCH MG: 40 TABLET, FILM COATED ORAL at 21:57

## 2017-11-12 RX ADMIN — GABAPENTIN SCH MG: 100 CAPSULE ORAL at 21:50

## 2017-11-12 RX ADMIN — OXYCODONE HYDROCHLORIDE PRN MG: 5 TABLET ORAL at 21:48

## 2017-11-12 RX ADMIN — TAMSULOSIN HYDROCHLORIDE SCH MG: 0.4 CAPSULE ORAL at 21:50

## 2017-11-12 RX ADMIN — SPIRONOLACTONE SCH MG: 25 TABLET, FILM COATED ORAL at 16:29

## 2017-11-12 RX ADMIN — ALBUTEROL SULFATE SCH PUFFS: 90 AEROSOL, METERED RESPIRATORY (INHALATION) at 12:05

## 2017-11-12 RX ADMIN — INSULIN GLARGINE SCH UNITS: 100 INJECTION, SOLUTION SUBCUTANEOUS at 21:56

## 2017-11-12 RX ADMIN — METOPROLOL SUCCINATE SCH MG: 50 TABLET, EXTENDED RELEASE ORAL at 08:08

## 2017-11-12 RX ADMIN — AMLODIPINE BESYLATE SCH MG: 5 TABLET ORAL at 08:08

## 2017-11-12 RX ADMIN — ENOXAPARIN SODIUM SCH MG: 100 INJECTION SUBCUTANEOUS at 21:51

## 2017-11-12 RX ADMIN — Medication SCH CAN: at 16:29

## 2017-11-12 RX ADMIN — METOPROLOL SUCCINATE SCH MG: 50 TABLET, EXTENDED RELEASE ORAL at 21:54

## 2017-11-12 RX ADMIN — ALBUTEROL SULFATE SCH PUFFS: 90 AEROSOL, METERED RESPIRATORY (INHALATION) at 18:00

## 2017-11-12 RX ADMIN — STANDARDIZED SENNA CONCENTRATE AND DOCUSATE SODIUM SCH TAB: 8.6; 5 TABLET ORAL at 12:06

## 2017-11-12 NOTE — PROGRESS NOTE
Subjective


Date of Service:


Nov 12, 2017.


Subjective


Pt evaluation today including:  conversation w/ patient, physical exam, chart 

review, lab review, review of inpatient medication list


Pain:  feet - chronic


PO Intake:  fair


Voiding:  gillespie catheter in place


pt w/o complaints today


he was resting upon my arrival


he stated "I've just been sleeping and watching football"





when asked if he is depressed he said "well, maybe"





Problem List


Medical Problems:


(1) Acute CHF


Status: Acute  





(2) Acute coronary syndrome


Status: Acute  





(3) Acute headache


Status: Acute  





(4) Altered mental status


Status: Acute  





(5) Anemia


Status: Acute  





(6) Anginal pain


Status: Acute  





(7) Appendicitis


Status: Acute  





(8) Cellulitis


Status: Acute  





(9) CHF (congestive heart failure)


Status: Acute  





(10) Congestive heart failure


Status: Acute  





(11) Diabetes mellitus with hyperglycemia


Status: Acute  





(12) Failure of outpatient treatment


Status: Acute  





(13) Intra-abdominal abscess


Status: Acute  





(14) Lower abdominal pain of unknown etiology


Status: Acute  





(15) Neck pain


Status: Acute  





(16) Precordial chest pain


Status: Acute  





(17) Pulmonary edema


Status: Acute  





(18) Respiratory acidosis


Status: Acute  





(19) Respiratory distress


Status: Acute  





(20) SOB (shortness of breath)


Status: Acute  











Review of Systems


Constitutional:  No fever


Respiratory:  No shortness of breath


Cardiac:  No chest pain, No orthopnea


Abdomen:  No pain, No diarrhea





Objective


Vital Signs











  Date Time  Temp Pulse Resp B/P (MAP) Pulse Ox O2 Delivery O2 Flow Rate FiO2


 


11/12/17 15:43 36.6 55 16 169/79 (109) 95 Room Air  


 


11/12/17 15:12      Room Air  


 


11/12/17 08:30  62  168/70 (102)    


 


11/12/17 08:00      Room Air  


 


11/12/17 07:42 36.5 57 18  96   


 


11/12/17 00:00     92 Room Air  


 


11/11/17 23:03 36.7 47 18  95 Room Air  


 


11/11/17 21:05  61  140/60 (86)    


 


11/11/17 20:00     92 Room Air  











Physical Exam


General Appearance:  no apparent distress, + pertinent finding (flat affect)


ENT:  pharynx normal


Neck:  no JVD


Respiratory/Chest:  lungs clear, no respiratory distress, no accessory muscle 

use


Cardiovascular:  regular rate, rhythm, no gallop, + systolic murmur (1/6 LSB), 

+ extra beats


Abdomen:  normal bowel sounds, non tender, soft, no organomegaly


Extremities:  + pedal edema (right leg - about 1+; left leg about trace )


Neurologic/Psychiatric:  alert, oriented x 3, + depressed affect


Skin:  + pertinent finding (wound vac in place on right shin; multiple 

ulcerations on both lower legs; largest is the lateral aspect of the left shin; 

this is about 3cm in width and 8-10 cm in height; there are copious secretions, 

some purulent, on the surface of this ulcer; inferior to this ulcer is another 

linear ulcer extending onto the foot )


Comments:


vascular - pulses both feet about 1+ b/l





Laboratory Results





Last 24 Hours








Test


  11/11/17


16:54 11/11/17


19:41 11/12/17


05:19 11/12/17


07:38


 


Bedside Glucose 153 mg/dl  169 mg/dl   151 mg/dl 


 


White Blood Count   5.72 K/uL  


 


Red Blood Count   2.81 M/uL  


 


Hemoglobin   7.7 g/dL  


 


Hematocrit   24.1 %  


 


Mean Corpuscular Volume   85.8 fL  


 


Mean Corpuscular Hemoglobin   27.4 pg  


 


Mean Corpuscular Hemoglobin


Concent 


  


  32.0 g/dl 


  


 


 


RDW Standard Deviation   48.2 fL  


 


RDW Coefficient of Variation   15.3 %  


 


Platelet Count   188 K/uL  


 


Mean Platelet Volume   9.1 fL  


 


Sodium Level   141 mmol/L  


 


Potassium Level   4.6 mmol/L  


 


Chloride Level   108 mmol/L  


 


Carbon Dioxide Level   27 mmol/L  


 


Anion Gap   6.0 mmol/L  


 


Blood Urea Nitrogen   44 mg/dl  


 


Creatinine   1.67 mg/dl  


 


Est Creatinine Clear Calc


Drug Dose 


  


  53.4 ml/min 


  


 


 


Estimated GFR (


American) 


  


  51.1 


  


 


 


Estimated GFR (Non-


American 


  


  44.1 


  


 


 


BUN/Creatinine Ratio   26.4  


 


Random Glucose   144 mg/dl  


 


Calcium Level   8.0 mg/dl  


 


Iron Level   56 mcg/dl  


 


Total Iron Binding Capacity   244 mcg/dl  


 


Transferrin   195 mg/dl  


 


Transferrin % Saturation   21 %  


 


Ferritin   71.9 ng/ml  


 


Folate   6.01 ng/mL  


 


Valproic Acid (Depakene) Level   47 mcg/ml  


 


Test


  11/12/17


11:41 11/12/17


13:50 


  


 


 


Bedside Glucose 174 mg/dl    


 


Prothrombin Time  11.9 SECONDS   


 


Prothromb Time International


Ratio 


  1.1 


  


  


 











Assessment and Plan


62yo male -





1.  recent headaches - improved/resolved.  Due to cervical spine DJD? 


Has h/o surgery of c-spine for DJD.  


Would reimage if pain recurs.


He has not had any further symptoms. 





2.  anemia - chronic, likely due to chronic disease anemia.  


Baseline Hb is about 9-10. 


Iron studies acceptable.  B12 wnl.  Folate low-normal. 


Tx if Hb drops below 7.5.  


At this time does not appear to be symptomatic from it. 





3.  supratherapeutic INR - resolved, resumed coumadin.  Also on therapeutic 

lovenox until coumadin is back to goal. 





4.  acute kidney injury - slightly better today.  Continue to hold ACE.  

Appreciate nephrology input. 





5.  CAD, h/o MI s/p stents - noted; no ischemic symptoms at this time.  Cont BB

, statin, plavix. 





6.  PAD s/p stents of LEs - Dr. Rainey 2017 - cont plavix. 





7.  chronic LE wounds with wound vac on right - managed by Dr. Paz & wound 

care team. 





8.  hyperlipidemia - statin. 





9.  chronic diastolic CHF - compensated. 





10.  COPD - stable. 





11.  T2DM - controlled. 





12.  bipolar disorder - on depakote - level is acceptable.


He expressed depressive symptoms today and during all my visits he is either 

sleeping or with flat affect.


Will ask psych to see given the complexity of his medications. 





13.  HTN - was quite labile earlier in his stay.  Nephrology adjusted meds. 


Lasix/ACE being held due to FITZ. 


Other meds - amlodipine, BB, aldactone - have been continued. 








dispo - [facility] Monday?


Continued [facility] stay due to:  other (acute kidney injury)


Discharge planning:  skilled nursing facility

## 2017-11-12 NOTE — NEPHROLOGY PROGRESS NOTE
Nephrology Progress Note


Date of Service


Nov 12, 2017.





Chief Complaint


Follow-up for resistant hypertension and FITZ





Subjective


Paramjit was seen and examined in his room this morning.  Blood pressure still 

remained somewhat variable but overall much better controlled.  Denies any 

headache, chest pain or shortness of Breath.  Creatinine stabilized at 1.7, 

Lasix and lisinopril on hold.  Electrolyte acceptable.





Review of Systems


A complete review of systems was performed.  Pertinent positives are noted 

above. All other systems are negative.





Vital Signs





Last 8 Hrs








  Date Time  Temp Pulse Resp B/P (MAP) Pulse Ox O2 Delivery O2 Flow Rate FiO2


 


11/12/17 08:30  62  168/70 (102)    


 


11/12/17 08:00      Room Air  


 


11/12/17 07:42 36.5 57 18  96   











Last Recorded Weight


Weight (Kilograms):  88.500





Physical Exam


GENERAL:  Middle-aged male, AAA x 3,  pleasant, healthy-appearing, not in any 

distress.


NECK: Supple, no JVD.


RESPIRATORY: Normal breathing efforts, no accessory muscle use, clear to 

auscultation bilaterally, no wheezes or rales.


CARDIOVASCULAR: S1, S2 normal, rate rhythm regular.


EXTREMITY:  Wound VAC in right leg


NEURO: speech fluent.


PSYCHIATRY: Normal mood and judgment





Family History





Cancer (esophageal)


Diabetes mellitus


Heart disease


Hypertension





Social History


Smokeless Tobacco Use:  No


Alcohol Use:  none


Drug Use:  none


Marital Status:  


Housing Status:  other ([facility])


Occupation:  employed





Laboratory Results


Past 24 Hours


11/12/17 05:19








11/12/17 05:19

















Test


  11/11/17


11:40 11/11/17


16:54 11/11/17


19:41 11/12/17


05:19


 


Bedside Glucose


  145 mg/dl


(70-99) 153 mg/dl


(70-99) 169 mg/dl


(70-99) 


 


 


Red Blood Count


  


  


  


  2.81 M/uL


(4.7-6.1)


 


Mean Corpuscular Volume


  


  


  


  85.8 fL


()


 


Mean Corpuscular Hemoglobin


  


  


  


  27.4 pg


(25-34)


 


Mean Corpuscular Hemoglobin


Concent 


  


  


  32.0 g/dl


(32-36)


 


RDW Standard Deviation


  


  


  


  48.2 fL


(36.4-46.3)


 


RDW Coefficient of Variation


  


  


  


  15.3 %


(11.5-14.5)


 


Mean Platelet Volume


  


  


  


  9.1 fL


(7.4-10.4)


 


Anion Gap


  


  


  


  6.0 mmol/L


(3-11)


 


Est Creatinine Clear Calc


Drug Dose 


  


  


  53.4 ml/min 


 


 


Estimated GFR (


American) 


  


  


  51.1 


 


 


Estimated GFR (Non-


American 


  


  


  44.1 


 


 


BUN/Creatinine Ratio    26.4 (10-20) 


 


Calcium Level


  


  


  


  8.0 mg/dl


(8.5-10.1)


 


Iron Level


  


  


  


  56 mcg/dl


()


 


Total Iron Binding Capacity


  


  


  


  244 mcg/dl


(250-450)


 


Transferrin


  


  


  


  195 mg/dl


(200-360)


 


Transferrin % Saturation    21 % (20-50) 


 


Ferritin


  


  


  


  71.9 ng/ml


(8.0-388.0)


 


Folate


  


  


  


  6.01 ng/mL


(>5.38)


 


Valproic Acid (Depakene) Level


  


  


  


  47 mcg/ml


()


 


Test


  11/12/17


07:38 


  


  


 


 


Bedside Glucose


  151 mg/dl


(70-99) 


  


  


 











Allergies


Coded Allergies:  


     No Known Allergies (Verified , 7/19/17)





Medications





Current Inpatient Medications








 Medications


  (Trade)  Dose


 Ordered  Sig/Dc


 Route  Start Time


 Stop Time Status Last Admin


Dose Admin


 


 Acetaminophen


  (Tylenol Tab)  650 mg  Q4H  PRN


 PO  10/30/17 14:15


 11/29/17 14:14  11/7/17 23:42


650 MG


 


 Al Hydrox/Mg


 Hydrox/Simethicone


  (Maalox Max Susp)  15 ml  Q4H  PRN


 PO  10/30/17 14:15


 11/29/17 14:14   


 


 


 Magnesium


 Hydroxide


  (Milk Of


 Magnesia Susp)  30 ml  Q6H  PRN


 PO  10/30/17 14:15


 11/29/17 14:14   


 


 


 Polyethylene


  (Miralax Powder


 Packet)  17 gm  DAILY  PRN


 PO  10/30/17 14:15


 11/29/17 14:14   


 


 


 Ondansetron HCl


  (Zofran Inj)  4 mg  Q6H  PRN


 IV  10/30/17 14:15


 11/29/17 14:14   


 


 


 Glucose


  (Glucose 40% Gel)  15-30


 GRAMS 15


 GRAMS...  UD  PRN


 PO  10/30/17 14:15


 11/29/17 14:14   


 


 


 Glucose


  (Glucose Chew


 Tab)  4-8


 Tablets 4


 Tabl...  UD  PRN


 PO  10/30/17 14:15


 11/29/17 14:14   


 


 


 Dextrose


  (Dextrose 50%


 50ML Syringe)  25-50ML OF


 50% DW IV


 FOR...  UD  PRN


 IV  10/30/17 14:15


 11/29/17 14:14   


 


 


 Glucagon


  (Glucagon Inj)  1 mg  UD  PRN


 SQ  10/30/17 14:15


 11/29/17 14:14   


 


 


 Albuterol


  (Ventolin Hfa


 Inhaler)  2 puffs  Q6


 INH  10/30/17 18:00


 11/29/17 17:59  11/11/17 12:38


2 PUFFS


 


 Bisacodyl


  (Dulcolax Supp)  10 mg  DAILY  PRN


 AL  10/30/17 14:15


 11/29/17 14:14   


 


 


 Calcium Carbonate


  (Tums Chew Tab)  500 mg  TID  PRN


 PO  10/30/17 14:15


 11/29/17 14:14   


 


 


 Cyanocobalamin


  (Vitamin B-12


 Tab)  100 mcg  DAILY


 PO  10/31/17 08:00


 11/30/17 08:59  11/12/17 08:07


100 MCG


 


 Divalproex Sodium


  (Depakote


 Extended Rel Tab)  2,000 mg  HS


 PO  10/30/17 21:00


 11/29/17 20:59  11/11/17 21:03


2,000 MG


 


 Docusate Sodium


  (coLACE CAP)  100 mg  BID  PRN


 PO  10/30/17 14:15


 11/29/17 14:14   


 


 


 Escitalopram


 Oxalate


  (Lexapro Tab)  20 mg  QAM


 PO  10/31/17 08:00


 11/30/17 08:59  11/12/17 08:08


20 MG


 


 Fluticasone


 Propionate


  (Flonase Nasal


 Spray)  2 sprays  DAILY  PRN


 VERONICA  10/30/17 14:15


 11/29/17 14:14   


 


 


 Gabapentin


  (Neurontin Cap)  100 mg  HS


 PO  10/30/17 21:00


 11/29/17 20:59  11/11/17 21:02


100 MG


 


 Insulin Glargine


  (Lantus Solostar


 Pen)  10 units  HS


 SC  10/30/17 21:00


 11/29/17 20:59  11/11/17 20:56


10 UNITS


 


 Metoprolol


 Succinate


  (Toprol Xl Tab)  50 mg  BID


 PO  10/30/17 20:00


 11/29/17 20:59  11/12/17 08:08


50 MG


 


 Mirtazapine


  (Remeron Tab)  45 mg  HS


 PO  10/30/17 21:00


 11/29/17 20:59  11/11/17 21:03


45 MG


 


 Nitroglycerin


  (Nitrostat Tab)  0.4 mg  PRN  PRN


 UT  10/30/17 14:15


 11/29/17 14:14   


 


 


 Pantoprazole


 Sodium


  (Protonix Tab)  40 mg  DAILY


 PO  10/31/17 08:00


 11/30/17 08:59  11/12/17 08:08


40 MG


 


 Senna/Docusate


 Sodium


  (Senokot S Tab)  1 tab  QDL


 PO  10/31/17 11:00


 11/30/17 10:59  10/31/17 12:38


1 TAB


 


 Simvastatin


  (Zocor Tab)  40 mg  QPM


 PO  10/30/17 21:00


 11/29/17 20:59  11/11/17 21:04


40 MG


 


 Tamsulosin HCl


  (Flomax Cap)  0.4 mg  HS


 PO  10/30/17 21:00


 11/29/17 20:59  11/11/17 21:02


0.4 MG


 


 Amlodipine


 Besylate


  (Norvasc Tab)  10 mg  QAM


 PO  10/31/17 08:00


 11/30/17 08:59  11/12/17 08:08


10 MG


 


 Miscellaneous


 Information


  (Order Awaiting


 Action)  1 ea  QS


 N/A  10/31/17 00:00


 11/30/17 00:00   


 


 


 Diclofenac Sodium


  (Voltaren 1% Top


 Gel)  1 appln  QID


 EXT  10/30/17 20:45


 11/29/17 14:14  11/12/17 08:10


1 APPLN


 


 Miscellaneous


  (Iv Fluids


 Completed)  1 ea  PRN  PRN


 N/A  10/30/17 21:00


 10/30/18 20:59   


 


 


 Enteral


 Nutritional


 Formula


  (Boost Glucose


 Control)  1 can  TIDM


 PO  10/31/17 17:00


 11/30/17 16:59  11/12/17 08:09


1 CAN


 


 Clopidogrel


 Bisulfate


  (plAVix TAB)  75 mg  QAM


 PO  11/6/17 08:00


 12/6/17 07:59  11/12/17 08:08


75 MG


 


 Clonazepam


  (Klonopin Tab)  1 mg  HS


 PO  11/5/17 12:00


 11/29/17 20:59  11/11/17 21:01


1 MG


 


 Hydralazine HCl


  (Apresoline Tab)  100 mg  TID


 PO  11/5/17 16:00


 12/5/17 15:59  11/12/17 08:09


100 MG


 


 Warfarin Sodium


  (Coumadin Tab)  3 mg  DAILY@16


 PO  11/6/17 16:00


 12/6/17 15:59  11/11/17 17:28


3 MG


 


 Enoxaparin Sodium


  (Lovenox Inj)  90 mg  Q12


 SQ  11/6/17 21:00


 12/6/17 20:59  11/12/17 08:11


90 MG


 


 Oxycodone HCl


  (Roxicodone


 Immediate Rel Tab)  10 mg  Q3H  PRN


 PO  11/6/17 15:30


 11/13/17 14:14  11/11/17 08:49


10 MG


 


 Spironolactone


  (Aldactone Tab)  50 mg  BID17


 PO  11/7/17 17:00


 12/5/17 19:29  11/12/17 09:06


50 MG


 


 Clonidine HCl


  (Catapres-Tts-3


 0.3mg/24hr Patch)  1 patch  CQWK


 TD  11/9/17 13:00


 12/9/17 12:59  11/9/17 14:18


1 PATCH


 


 Miscellaneous


  (Remove


 Clonidine Patch)  1 ea  Q7D


 N/A  11/9/17 12:59


 12/9/17 12:58  11/9/17 14:18


1 EA


 


 Miscellaneous


 Information


  (Check Clonidine


 Patch Placement)  1 ea  QS


 N/A  11/9/17 16:00


 12/9/17 15:59  11/12/17 08:10


1 EA











Impression





(1) Accelerated hypertension


(2) PAD (peripheral artery disease)


(3) Diastolic CHF, chronic


(4) LVH (left ventricular hypertrophy)


Paramjit is a 61-year-old  male with DMII, hypertension, dyslipidemia, CAD

, chronic diastolic CHF, diffuse calcific vascular disease, PAD and depression 

as well as bipolar disorder. He has chronic urinary retention and an indwelling 

High. He was admitted with headache. Hospitalization complicated by 

accelerated hypertension.





He does have a component of pseudohypertension. There is a blood pressure 

discrepancy between arms. He also has known severe calcific vascular disease. 

Blood pressure is to be checked in his left arm, manually and with the patient 

sitting. Creatinine was 1.1 on admission which has been slowly worsening during 

hospital stay over last few days, creatinine went up to 1.8 yesterday.  Lasix 

and lisinopril has been on hold and creatinine seems to be stabilizing, 1.7 

today, electrolyte acceptable.  Blood pressure did not change significantly 

after stopping Lasix and with lisinopril. Orthostatic vitals noted. Significant 

drop in systolic pressure. Heart rate unchanged. CTA of the abdomen and renal 

arteries showed diffuse calcific and atherosclerotic disease noted without 

significant KIANA. 





Beta blockers have been held for bradycardia.





Recommendations





-- Continue amlodipine 10 mg daily and Toprol XL 50 mg BID (held for bradycardia

), continue to hold Lasix and lisinopril for now, hopefully renal function will 

stabilize and start to improve


-- continue Spironolactone 50 mg twice daily and Hydralazine 100 mg Q 8 hrs PRN 

SBP >160 mmHg


-- Document I/O's


-- Check metabolic profile daily


-- Sodium restrict diet


-- Check orthostatic vital signs each morning following morning medications

## 2017-11-13 VITALS — TEMPERATURE: 97.7 F | DIASTOLIC BLOOD PRESSURE: 63 MMHG | SYSTOLIC BLOOD PRESSURE: 138 MMHG | HEART RATE: 44 BPM

## 2017-11-13 VITALS — SYSTOLIC BLOOD PRESSURE: 158 MMHG | HEART RATE: 56 BPM | TEMPERATURE: 97.52 F | DIASTOLIC BLOOD PRESSURE: 66 MMHG

## 2017-11-13 VITALS
OXYGEN SATURATION: 95 % | TEMPERATURE: 97.52 F | SYSTOLIC BLOOD PRESSURE: 148 MMHG | DIASTOLIC BLOOD PRESSURE: 54 MMHG | HEART RATE: 48 BPM

## 2017-11-13 VITALS
OXYGEN SATURATION: 98 % | HEART RATE: 67 BPM | TEMPERATURE: 98.24 F | DIASTOLIC BLOOD PRESSURE: 70 MMHG | SYSTOLIC BLOOD PRESSURE: 142 MMHG

## 2017-11-13 VITALS — HEART RATE: 61 BPM | DIASTOLIC BLOOD PRESSURE: 64 MMHG | TEMPERATURE: 97.34 F | SYSTOLIC BLOOD PRESSURE: 145 MMHG

## 2017-11-13 VITALS
HEART RATE: 47 BPM | OXYGEN SATURATION: 94 % | DIASTOLIC BLOOD PRESSURE: 81 MMHG | TEMPERATURE: 97.52 F | SYSTOLIC BLOOD PRESSURE: 156 MMHG

## 2017-11-13 VITALS — DIASTOLIC BLOOD PRESSURE: 56 MMHG | TEMPERATURE: 97.52 F | HEART RATE: 48 BPM | SYSTOLIC BLOOD PRESSURE: 134 MMHG

## 2017-11-13 VITALS
OXYGEN SATURATION: 95 % | SYSTOLIC BLOOD PRESSURE: 165 MMHG | TEMPERATURE: 97.7 F | DIASTOLIC BLOOD PRESSURE: 66 MMHG | HEART RATE: 49 BPM

## 2017-11-13 VITALS — OXYGEN SATURATION: 95 %

## 2017-11-13 VITALS
DIASTOLIC BLOOD PRESSURE: 49 MMHG | HEART RATE: 51 BPM | SYSTOLIC BLOOD PRESSURE: 127 MMHG | OXYGEN SATURATION: 96 % | TEMPERATURE: 97.34 F

## 2017-11-13 VITALS
OXYGEN SATURATION: 96 % | DIASTOLIC BLOOD PRESSURE: 74 MMHG | SYSTOLIC BLOOD PRESSURE: 131 MMHG | HEART RATE: 44 BPM | TEMPERATURE: 97.34 F

## 2017-11-13 VITALS
SYSTOLIC BLOOD PRESSURE: 121 MMHG | HEART RATE: 50 BPM | OXYGEN SATURATION: 95 % | TEMPERATURE: 97.88 F | DIASTOLIC BLOOD PRESSURE: 57 MMHG

## 2017-11-13 VITALS
DIASTOLIC BLOOD PRESSURE: 47 MMHG | OXYGEN SATURATION: 95 % | HEART RATE: 49 BPM | TEMPERATURE: 97.7 F | SYSTOLIC BLOOD PRESSURE: 131 MMHG

## 2017-11-13 VITALS — HEART RATE: 52 BPM | TEMPERATURE: 97.7 F | SYSTOLIC BLOOD PRESSURE: 150 MMHG | DIASTOLIC BLOOD PRESSURE: 70 MMHG

## 2017-11-13 VITALS — OXYGEN SATURATION: 95 % | HEART RATE: 62 BPM

## 2017-11-13 VITALS — OXYGEN SATURATION: 96 %

## 2017-11-13 LAB
ANION GAP SERPL CALC-SCNC: 9 MMOL/L (ref 3–11)
BUN SERPL-MCNC: 47 MG/DL (ref 7–18)
BUN/CREAT SERPL: 26.2 (ref 10–20)
CALCIUM SERPL-MCNC: 8.4 MG/DL (ref 8.5–10.1)
CHLORIDE SERPL-SCNC: 107 MMOL/L (ref 98–107)
CO2 SERPL-SCNC: 25 MMOL/L (ref 21–32)
CREAT CL PREDICTED SERPL C-G-VRATE: 50.1 ML/MIN
CREAT SERPL-MCNC: 1.78 MG/DL (ref 0.6–1.4)
EOSINOPHIL NFR BLD AUTO: 175 K/UL (ref 130–400)
GLUCOSE SERPL-MCNC: 131 MG/DL (ref 70–99)
HCT VFR BLD CALC: 22.4 % (ref 42–52)
INR PPP: 1.1 (ref 0.9–1.1)
MCH RBC QN AUTO: 27.9 PG (ref 25–34)
MCHC RBC AUTO-ENTMCNC: 32.1 G/DL (ref 32–36)
MCV RBC AUTO: 86.8 FL (ref 80–100)
PMV BLD AUTO: 9.2 FL (ref 7.4–10.4)
POTASSIUM SERPL-SCNC: 4.9 MMOL/L (ref 3.5–5.1)
PROTHROMBIN TIME: 11.8 SECONDS (ref 9–12)
RBC # BLD AUTO: 2.58 M/UL (ref 4.7–6.1)
SODIUM SERPL-SCNC: 141 MMOL/L (ref 136–145)
WBC # BLD AUTO: 5.3 K/UL (ref 4.8–10.8)

## 2017-11-13 RX ADMIN — ALBUTEROL SULFATE SCH PUFFS: 90 AEROSOL, METERED RESPIRATORY (INHALATION) at 00:52

## 2017-11-13 RX ADMIN — DICLOFENAC SODIUM SCH APPLN: 10 GEL TOPICAL at 17:30

## 2017-11-13 RX ADMIN — DIVALPROEX SODIUM SCH MG: 500 TABLET, FILM COATED, EXTENDED RELEASE ORAL at 19:46

## 2017-11-13 RX ADMIN — OXYCODONE HYDROCHLORIDE PRN MG: 5 TABLET ORAL at 19:47

## 2017-11-13 RX ADMIN — ALBUTEROL SULFATE SCH PUFFS: 90 AEROSOL, METERED RESPIRATORY (INHALATION) at 18:30

## 2017-11-13 RX ADMIN — INSULIN GLARGINE SCH UNITS: 100 INJECTION, SOLUTION SUBCUTANEOUS at 19:53

## 2017-11-13 RX ADMIN — PANTOPRAZOLE SCH MG: 40 TABLET, DELAYED RELEASE ORAL at 08:00

## 2017-11-13 RX ADMIN — DICLOFENAC SODIUM SCH APPLN: 10 GEL TOPICAL at 12:15

## 2017-11-13 RX ADMIN — Medication SCH CAN: at 12:14

## 2017-11-13 RX ADMIN — CLONAZEPAM SCH MG: 0.5 TABLET ORAL at 19:52

## 2017-11-13 RX ADMIN — OXYCODONE HYDROCHLORIDE PRN MG: 5 TABLET ORAL at 03:10

## 2017-11-13 RX ADMIN — SIMVASTATIN SCH MG: 40 TABLET, FILM COATED ORAL at 19:46

## 2017-11-13 RX ADMIN — STANDARDIZED SENNA CONCENTRATE AND DOCUSATE SODIUM SCH TAB: 8.6; 5 TABLET ORAL at 12:14

## 2017-11-13 RX ADMIN — ALBUTEROL SULFATE SCH PUFFS: 90 AEROSOL, METERED RESPIRATORY (INHALATION) at 23:58

## 2017-11-13 RX ADMIN — ENOXAPARIN SODIUM SCH MG: 100 INJECTION SUBCUTANEOUS at 09:00

## 2017-11-13 RX ADMIN — Medication SCH CAN: at 08:07

## 2017-11-13 RX ADMIN — TAMSULOSIN HYDROCHLORIDE SCH MG: 0.4 CAPSULE ORAL at 19:46

## 2017-11-13 RX ADMIN — METOPROLOL SUCCINATE SCH MG: 50 TABLET, EXTENDED RELEASE ORAL at 08:00

## 2017-11-13 RX ADMIN — SPIRONOLACTONE SCH MG: 25 TABLET, FILM COATED ORAL at 08:03

## 2017-11-13 RX ADMIN — ALBUTEROL SULFATE SCH PUFFS: 90 AEROSOL, METERED RESPIRATORY (INHALATION) at 12:15

## 2017-11-13 RX ADMIN — ENOXAPARIN SODIUM SCH MG: 100 INJECTION SUBCUTANEOUS at 08:04

## 2017-11-13 RX ADMIN — Medication SCH CAN: at 17:31

## 2017-11-13 RX ADMIN — OXYCODONE HYDROCHLORIDE PRN MG: 5 TABLET ORAL at 12:51

## 2017-11-13 RX ADMIN — DICLOFENAC SODIUM SCH APPLN: 10 GEL TOPICAL at 19:43

## 2017-11-13 RX ADMIN — GABAPENTIN SCH MG: 100 CAPSULE ORAL at 19:46

## 2017-11-13 RX ADMIN — Medication SCH MCG: at 08:02

## 2017-11-13 RX ADMIN — ESCITALOPRAM OXALATE SCH MG: 20 TABLET, FILM COATED ORAL at 08:01

## 2017-11-13 RX ADMIN — AMLODIPINE BESYLATE SCH MG: 5 TABLET ORAL at 08:02

## 2017-11-13 RX ADMIN — METOPROLOL SUCCINATE SCH MG: 50 TABLET, EXTENDED RELEASE ORAL at 19:45

## 2017-11-13 RX ADMIN — CLOPIDOGREL BISULFATE SCH MG: 75 TABLET, FILM COATED ORAL at 08:00

## 2017-11-13 RX ADMIN — DICLOFENAC SODIUM SCH APPLN: 10 GEL TOPICAL at 08:02

## 2017-11-13 RX ADMIN — ALBUTEROL SULFATE SCH PUFFS: 90 AEROSOL, METERED RESPIRATORY (INHALATION) at 06:00

## 2017-11-13 RX ADMIN — SPIRONOLACTONE SCH MG: 25 TABLET, FILM COATED ORAL at 17:31

## 2017-11-13 RX ADMIN — MIRTAZAPINE SCH MG: 15 TABLET, FILM COATED ORAL at 19:47

## 2017-11-13 NOTE — PSYCHIATRIC HISTORY & PHYSICAL
History


Date of Service


Nov 13, 2017.





Identifying Data





Paramjit Dixon is a 61-year-old male who currently lives in [place], has a 

history of bipolar disorder and anxiety, and is admitted with multiple medical 

problems including acute kidney injury, elevated INR, uncontrolled blood 

pressure, and headaches.  Psychiatry was consulted for depression and due to 

nursing home referral and target process.





Chief Complaint


"Well just a little depressed, that's all".





History of Present Illness


The patient's states that his mood has been lower over the past month or so 

since he started having more medical problems and frequent hospitalizations.  

He rates his mood a 2 out of 10, and states that he is frustrated with having 

to be in the hospital so much and with not feeling well.  He scored a 5 on the 

PHQ 9, which is indicative of mild depression.  He denies that he has had 

suicidal thoughts, and is aware that he is going to a nursing home.  He reports 

good compliance with his psychotropic medications, and thinks that they are 

helpful for mood.  He was seen Dr. Johnathan Castaneda at [facility], but admits 

he has not been seen in many months due to frequent hospitalizations.





Past Psychiatric History


Current OP Treatment:  psychiatrist (Dr. Skinner at [facility] from 2006 

until May 2017)


Prior Psych Hospitalizations:  none


Suicide Attempts:  No


Past Medication Trials


Effexor Xr, sertraline





Past Medical/Surgical History





(1) Ischemic cardiomyopathy


(2) Anemia


(3) LVH (left ventricular hypertrophy)


(4) PAD (peripheral artery disease)


(5) Diastolic CHF, chronic


(6) CAD (coronary artery disease)


(7) Diabetes


(8) Hypertension





Allergies


Allergies:  


Coded Allergies:  


     No Known Allergies (Verified , 7/19/17)





Home Medications


Scheduled


Albuterol Hfa (Ventolin Hfa), 2 PUFFS INH Q6H


Amlodipine Besylate (Amlodipine Besylate), 10 MG PO QAM


Bisacodyl (Bisac-Evac), 10 MG KY UD


Clonazepam (Klonopin), 1 MG PO HS


Clopidogrel (Plavix), 75 MG PO QAM


Cyanocobalamin (Vitamin B-12), 100 MCG PO DAILY


Diclofenac Sod (Voltaren), 1 APPLN EXT BID


Divalproex Sodium (Depakote Delay Rel), 2,000 MG PO HS


Ergocalciferol (Vitamin D 63456 Unit), 50,000 UNIT PO MWF


Escitalopram Oxalate (Escitalopram Oxalate), 20 MG PO QAM


Gabapentin (Neurontin), 100 MG PO HS


Hydralazine HCl (Hydralazine HCl), 100 MG PO TID


Insulin Glargine (Lantus Solostar), 10 UNITS SC HS


Lisinopril (Lisinopril), 40 MG PO DAILY


Magnesium Hydroxide (Milk of Magnesia), 30 MG PO UD


Metoprolol Succ (Toprol Xl) (Toprol-Xl), 50 MG PO BID


Mirtazapine (Remeron), 45 MG PO HS


Nitrofurantoin Monohyd Macrocr (Macrobid), 100 MG PO BID


Nutritional Supplements (Boost), 1 CAN PO TIDM


Pantoprazole (Protonix), 40 MG PO DAILY


Polyethylene (Miralax), 17 GM PO UD


Senna/Docusate Sod (Senokot S), 1 TAB PO QDL


Simvastatin (Zocor), 40 MG PO QPM


Sodium Phosphates (Fleet Enema Six Pack), 133 ML KY UD


Tamsulosin HCl (Tamsulosin HCl), 0.4 MG PO HS


Thiamine Hcl (Vitamin B-1), 100 MG PO DAILY


Umeclidinium Bromide (Incruse Ellipta), 1 PUFF INH DAILY


Warfarin Sod (Coumadin), 3 MG PO DAILY@16





Scheduled PRN


Calcium Carbonate (Tums), 500 MG PO TID PRN for Indigestion


Diclofenac Sodium (Topical) (Voltaren 1% Top Gel), 2 GM TOP QID PRN for back 

pain


Docusate Sodium (Docusate Sodium), 100 MG PO BID PRN for Constipation


Fluticasone Propionate (Nasal) (Flonase Allergy Relief), 2 SPRAYS VERONICA DAILY PRN 

for CONGESTION


Nitroglycerin (Nitrostat), 0.4 MG UT PRN PRN for Chest Pain


Oxycodone Ir (Roxicodone Ir), 5 MG PO Q3H PRN for Pain


Polyethylene (Miralax), 17 GM PO DAILY PRN for Constipation





Miscellaneous Medications


Glucagon (Glucagon Emergency Kit)


Insulin Aspart (Novolog)





Family History





Cancer (esophageal)


Diabetes mellitus


Heart disease


Hypertension


History of Suicide:  No


History of Substance Abuse:  No


Psychiatric History:  Yes (sister with bipolar disorder, mother with depression 

and multiple psychiatric hospitalizations)





Alcohol Use


Alcohol Use In Past 12 Months:  No





Smoking Use


Smoking Status:  Former Smoker (smoked 1 pack per day for 45 years, and quit 1-1

/2 months ago)





Personal History


Lives in:  [place]


Childhood:


Born and raised in [place].


Education:  other ([employer] in [employer], and [employer] for [employer])


Work History:


Retired. Previously worked as a  for [employer] for over 10 years.


Relationship History:  


Children:  1 adult daughter


Spiritual Affiliation:  Buddhist


Legal History:  none


Psychological Trauma History:  Denies Hx Traumatic Event





Examination


Physical Examination


A physical exam was performed [in the ER] [on the medical floor] prior to 

admission to the unit by [ ].  I accept that physical as correct/medical 

clearance for the inpatient physical exam.





Vital Signs





Vital Signs Past 12 Hours








  Date Time  Temp Pulse Resp B/P (MAP) Pulse Ox O2 Delivery O2 Flow Rate FiO2


 


11/13/17 08:30     95 Room Air 3.0 


 


11/13/17 08:00  62 18  95 Room Air  


 


11/13/17 07:22 36.5 49 16 131/47 (75) 95 Room Air  











Laboratory Results





Last 24 Hours








Test


  11/12/17


13:50 11/12/17


17:03 11/12/17


20:04 11/13/17


06:40


 


Prothrombin Time 11.9 SECONDS    11.8 SECONDS 


 


Prothromb Time International


Ratio 1.1 


  


  


  1.1 


 


 


Bedside Glucose  192 mg/dl  184 mg/dl  


 


White Blood Count    5.30 K/uL 


 


Red Blood Count    2.58 M/uL 


 


Hemoglobin    7.2 g/dL 


 


Hematocrit    22.4 % 


 


Mean Corpuscular Volume    86.8 fL 


 


Mean Corpuscular Hemoglobin    27.9 pg 


 


Mean Corpuscular Hemoglobin


Concent 


  


  


  32.1 g/dl 


 


 


RDW Standard Deviation    48.8 fL 


 


RDW Coefficient of Variation    15.4 % 


 


Platelet Count    175 K/uL 


 


Mean Platelet Volume    9.2 fL 


 


Sodium Level    141 mmol/L 


 


Potassium Level    4.9 mmol/L 


 


Chloride Level    107 mmol/L 


 


Carbon Dioxide Level    25 mmol/L 


 


Anion Gap    9.0 mmol/L 


 


Blood Urea Nitrogen    47 mg/dl 


 


Creatinine    1.78 mg/dl 


 


Est Creatinine Clear Calc


Drug Dose 


  


  


  50.1 ml/min 


 


 


Estimated GFR (


American) 


  


  


  47.3 


 


 


Estimated GFR (Non-


American 


  


  


  40.8 


 


 


BUN/Creatinine Ratio    26.2 


 


Random Glucose    131 mg/dl 


 


Calcium Level    8.4 mg/dl 


 


Test


  11/13/17


07:42 11/13/17


11:20 


  


 


 


Bedside Glucose 113 mg/dl  134 mg/dl   











Mental Examination


During interview pt is:  alert and oriented, cooperative


Appearance:  appropriately dressed, other (skin yellow)


Eye contact is:  fair


Motor behavior is:  no abnormal motor movements


Speech:  normal in rate, rhythm & volume


Affect:  constricted


Mood is:  other ("a little depressed")


Thought process:  goal directed


Thought content:  reality based without delusions


Suicidal thought are:  denied


Homicidal thoughts are:  denied


Hallucinations:  denies auditory, denies visual


Intelligence estimated to be:  average


Insight:  fair


Judgement:  fair





Impression / Recommendations


Impression


61-year-old  white male with a history of bipolar disorder and anxiety 

who has had frequent hospitalizations for multiple medical problems, and is now 

being referred to a nursing home.  He reports that mood has been lower over the 

past 6 months in the context of multiple medical problems, and is frustrated 

with his frequent hospitalizations.  He does feel that his psychotropic 

medications are helpful, and would recommend continuing them with outpatient 

follow-up once he goes to the nursing home.  His mood does not improve even 

with improvement of his medical problems, could consider medication adjustments

, and as he is on maximum doses of citalopram and mirtazapine, he may require a 

different antidepressant trial.  He may benefit from interpersonal therapy as 

well as it is available.  There are no psychiatric contraindications to nursing 

home placement.





CPT Code


Initial Hospital Care:  86006

## 2017-11-13 NOTE — HOSPITALIST PROGRESS NOTE
Hospitalist Progress Note


Date of Service


Nov 13, 2017.





Subjective


Pt evaluation today including:  conversation w/ patient, physical exam, lab 

review, review of studies, review of inpatient medication list


Voiding:  gillespie catheter in place (draining clear/yellow urine )


Patient laying in bed. Alert and oriented. 


Denies any complaints at present. 


Eating and drinking OK. 


Denies headache. 





Patient denies any fever, chills, sweats, lightheadedness, dizziness, vision 

changes, CP, palpitations, edema, SOB, wheezing, cough, abdominal pain, nausea, 

vomiting, diarrhea, urinary symptoms, melena, numbness/tingling, weakness, 

muscle/joint pain, anxiety/depression, active bleeding, or new skin 

discoloration/changes.





Medications





Current Inpatient Medications








 Medications


  (Trade)  Dose


 Ordered  Sig/Dc


 Route  Start Time


 Stop Time Status Last Admin


Dose Admin


 


 Acetaminophen


  (Tylenol Tab)  650 mg  Q4H  PRN


 PO  10/30/17 14:15


 11/29/17 14:14  11/7/17 23:42


650 MG


 


 Al Hydrox/Mg


 Hydrox/Simethicone


  (Maalox Max Susp)  15 ml  Q4H  PRN


 PO  10/30/17 14:15


 11/29/17 14:14   


 


 


 Magnesium


 Hydroxide


  (Milk Of


 Magnesia Susp)  30 ml  Q6H  PRN


 PO  10/30/17 14:15


 11/29/17 14:14   


 


 


 Polyethylene


  (Miralax Powder


 Packet)  17 gm  DAILY  PRN


 PO  10/30/17 14:15


 11/29/17 14:14   


 


 


 Ondansetron HCl


  (Zofran Inj)  4 mg  Q6H  PRN


 IV  10/30/17 14:15


 11/29/17 14:14   


 


 


 Glucose


  (Glucose 40% Gel)  15-30


 GRAMS 15


 GRAMS...  UD  PRN


 PO  10/30/17 14:15


 11/29/17 14:14   


 


 


 Glucose


  (Glucose Chew


 Tab)  4-8


 Tablets 4


 Tabl...  UD  PRN


 PO  10/30/17 14:15


 11/29/17 14:14   


 


 


 Dextrose


  (Dextrose 50%


 50ML Syringe)  25-50ML OF


 50% DW IV


 FOR...  UD  PRN


 IV  10/30/17 14:15


 11/29/17 14:14   


 


 


 Glucagon


  (Glucagon Inj)  1 mg  UD  PRN


 SQ  10/30/17 14:15


 11/29/17 14:14   


 


 


 Albuterol


  (Ventolin Hfa


 Inhaler)  2 puffs  Q6


 INH  10/30/17 18:00


 11/29/17 17:59  11/12/17 18:00


2 PUFFS


 


 Bisacodyl


  (Dulcolax Supp)  10 mg  DAILY  PRN


 NE  10/30/17 14:15


 11/29/17 14:14   


 


 


 Calcium Carbonate


  (Tums Chew Tab)  500 mg  TID  PRN


 PO  10/30/17 14:15


 11/29/17 14:14   


 


 


 Cyanocobalamin


  (Vitamin B-12


 Tab)  100 mcg  DAILY


 PO  10/31/17 08:00


 11/30/17 08:59  11/13/17 08:02


100 MCG


 


 Divalproex Sodium


  (Depakote


 Extended Rel Tab)  2,000 mg  HS


 PO  10/30/17 21:00


 11/29/17 20:59  11/12/17 21:52


2,000 MG


 


 Docusate Sodium


  (coLACE CAP)  100 mg  BID  PRN


 PO  10/30/17 14:15


 11/29/17 14:14   


 


 


 Escitalopram


 Oxalate


  (Lexapro Tab)  20 mg  QAM


 PO  10/31/17 08:00


 11/30/17 08:59  11/13/17 08:01


20 MG


 


 Fluticasone


 Propionate


  (Flonase Nasal


 Spray)  2 sprays  DAILY  PRN


 VERONICA  10/30/17 14:15


 11/29/17 14:14   


 


 


 Gabapentin


  (Neurontin Cap)  100 mg  HS


 PO  10/30/17 21:00


 11/29/17 20:59  11/12/17 21:50


100 MG


 


 Insulin Glargine


  (Lantus Solostar


 Pen)  10 units  HS


 SC  10/30/17 21:00


 11/29/17 20:59  11/12/17 21:56


10 UNITS


 


 Metoprolol


 Succinate


  (Toprol Xl Tab)  50 mg  BID


 PO  10/30/17 20:00


 11/29/17 20:59  11/13/17 08:00


50 MG


 


 Mirtazapine


  (Remeron Tab)  45 mg  HS


 PO  10/30/17 21:00


 11/29/17 20:59  11/12/17 21:53


45 MG


 


 Nitroglycerin


  (Nitrostat Tab)  0.4 mg  PRN  PRN


 UT  10/30/17 14:15


 11/29/17 14:14   


 


 


 Pantoprazole


 Sodium


  (Protonix Tab)  40 mg  DAILY


 PO  10/31/17 08:00


 11/30/17 08:59  11/13/17 08:00


40 MG


 


 Senna/Docusate


 Sodium


  (Senokot S Tab)  1 tab  QDL


 PO  10/31/17 11:00


 11/30/17 10:59  11/12/17 12:06


1 TAB


 


 Simvastatin


  (Zocor Tab)  40 mg  QPM


 PO  10/30/17 21:00


 11/29/17 20:59  11/12/17 21:57


40 MG


 


 Tamsulosin HCl


  (Flomax Cap)  0.4 mg  HS


 PO  10/30/17 21:00


 11/29/17 20:59  11/12/17 21:50


0.4 MG


 


 Amlodipine


 Besylate


  (Norvasc Tab)  10 mg  QAM


 PO  10/31/17 08:00


 11/30/17 08:59  11/13/17 08:02


10 MG


 


 Miscellaneous


 Information


  (Order Awaiting


 Action)  1 ea  QS


 N/A  10/31/17 00:00


 11/30/17 00:00   


 


 


 Diclofenac Sodium


  (Voltaren 1% Top


 Gel)  1 appln  QID


 EXT  10/30/17 20:45


 11/29/17 14:14  11/13/17 08:02


1 APPLN


 


 Miscellaneous


  (Iv Fluids


 Completed)  1 ea  PRN  PRN


 N/A  10/30/17 21:00


 10/30/18 20:59   


 


 


 Enteral


 Nutritional


 Formula


  (Boost Glucose


 Control)  1 can  TIDM


 PO  10/31/17 17:00


 11/30/17 16:59  11/13/17 08:07


1 CAN


 


 Clopidogrel


 Bisulfate


  (plAVix TAB)  75 mg  QAM


 PO  11/6/17 08:00


 12/6/17 07:59  11/13/17 08:00


75 MG


 


 Clonazepam


  (Klonopin Tab)  1 mg  HS


 PO  11/5/17 12:00


 11/29/17 20:59  11/12/17 21:47


1 MG


 


 Hydralazine HCl


  (Apresoline Tab)  100 mg  TID


 PO  11/5/17 16:00


 12/5/17 15:59  11/13/17 08:03


100 MG


 


 Warfarin Sodium


  (Coumadin Tab)  3 mg  DAILY@16


 PO  11/6/17 16:00


 12/6/17 15:59 Future Hold 11/12/17 15:36


3 MG


 


 Enoxaparin Sodium


  (Lovenox Inj)  90 mg  Q12


 SQ  11/6/17 21:00


 12/6/17 20:59 Future Hold 11/12/17 21:51


90 MG


 


 Oxycodone HCl


  (Roxicodone


 Immediate Rel Tab)  10 mg  Q3H  PRN


 PO  11/6/17 15:30


 11/13/17 14:14  11/13/17 03:10


10 MG


 


 Spironolactone


  (Aldactone Tab)  50 mg  BID17


 PO  11/7/17 17:00


 12/5/17 19:29  11/13/17 08:03


50 MG


 


 Clonidine HCl


  (Catapres-Tts-3


 0.3mg/24hr Patch)  1 patch  CQWK


 TD  11/9/17 13:00


 12/9/17 12:59  11/9/17 14:18


1 PATCH


 


 Miscellaneous


  (Remove


 Clonidine Patch)  1 ea  Q7D


 N/A  11/9/17 12:59


 12/9/17 12:58  11/9/17 14:18


1 EA


 


 Miscellaneous


 Information


  (Check Clonidine


 Patch Placement)  1 ea  QS


 N/A  11/9/17 16:00


 12/9/17 15:59  11/13/17 08:04


1 EA











Objective


Vital Signs











  Date Time  Temp Pulse Resp B/P (MAP) Pulse Ox O2 Delivery O2 Flow Rate FiO2


 


11/13/17 08:00  62 18  95 Room Air  


 


11/13/17 07:22 36.5 49 16 131/47 (75) 95 Room Air  


 


11/13/17 00:00      Room Air  


 


11/12/17 23:00 36.5 54 18  97 Room Air  


 


11/12/17 20:00      Room Air  


 


11/12/17 15:43 36.6 55 16 169/79 (109) 95 Room Air  


 


11/12/17 15:12      Room Air  











Physical Exam


General Appearance:  no apparent distress


Eyes:  normal inspection, PERRL


ENT:  hearing grossly normal


Neck:  supple


Respiratory/Chest:  lungs clear, no respiratory distress, no accessory muscle 

use


Cardiovascular:  regular rate, rhythm


Abdomen:  normal bowel sounds, non tender, soft


Extremities:  + pertinent finding (+wound vac to RLE )


Neurologic/Psychiatric:  alert, normal mood/affect, oriented x 3


Skin:  warm/dry, no rash, + pallor





Laboratory Results





Last 24 Hours








Test


  11/12/17


11:41 11/12/17


13:50 11/12/17


17:03 11/12/17


20:04


 


Bedside Glucose 174 mg/dl   192 mg/dl  184 mg/dl 


 


Prothrombin Time  11.9 SECONDS   


 


Prothromb Time International


Ratio 


  1.1 


  


  


 


 


Test


  11/13/17


06:40 11/13/17


07:42 


  


 


 


White Blood Count 5.30 K/uL    


 


Red Blood Count 2.58 M/uL    


 


Hemoglobin 7.2 g/dL    


 


Hematocrit 22.4 %    


 


Mean Corpuscular Volume 86.8 fL    


 


Mean Corpuscular Hemoglobin 27.9 pg    


 


Mean Corpuscular Hemoglobin


Concent 32.1 g/dl 


  


  


  


 


 


RDW Standard Deviation 48.8 fL    


 


RDW Coefficient of Variation 15.4 %    


 


Platelet Count 175 K/uL    


 


Mean Platelet Volume 9.2 fL    


 


Prothrombin Time 11.8 SECONDS    


 


Prothromb Time International


Ratio 1.1 


  


  


  


 


 


Sodium Level 141 mmol/L    


 


Potassium Level 4.9 mmol/L    


 


Chloride Level 107 mmol/L    


 


Carbon Dioxide Level 25 mmol/L    


 


Anion Gap 9.0 mmol/L    


 


Blood Urea Nitrogen 47 mg/dl    


 


Creatinine 1.78 mg/dl    


 


Est Creatinine Clear Calc


Drug Dose 50.1 ml/min 


  


  


  


 


 


Estimated GFR (


American) 47.3 


  


  


  


 


 


Estimated GFR (Non-


American 40.8 


  


  


  


 


 


BUN/Creatinine Ratio 26.2    


 


Random Glucose 131 mg/dl    


 


Calcium Level 8.4 mg/dl    


 


Bedside Glucose  113 mg/dl   











Assessment and Plan


60 y/o male, with PMHx of CAD, h/o MI s/p stents, HTN, HLD, diastolic CHF, COPD

, DM II, anxiety, depression, bipolar disorder, anemia and GERD, who presented 

to the ED because of persistent headache and elevated INR. 





Acute on chronic anemia, likely due to chronic disease anemia- baseline hgb 9.0-

10.0:   


- Iron studies, b12, folate- reviewed and WNL 


- Fecal occult pending


- Consent obtained- transfuse 2 u PRBC on 11/13 due to hgb of 7.2


- Follow H&H  





Supratherapeutic INR at >8.0- RESOLVED: 


- Held Coumadin until INR within goal range 


- Resumed Coumadin on 11/2- following PT/INR and adjusting dosage PRN for INR 

goal of 2-3- also bridging with Lovenox 1mg/kg until therapeutic INR 


   -- Hold anticoagulation on 11/13 due to worsening anemia  





FITZ:


- Holding nephrotoxic agents (Lasix and Lisinopril) and renally dosing 

medication 


- Nephrology consulted, appreciate recommendations 





Accelerated HTN- STABLE: 


- Lasix and Lisinopril held due to FITZ 


- Continue Amlodipine 10 mg daily, Hydralazine 100 mg TID, BB, Aldactone 


- CTA unremarkable for significant renal artery stenosis


- Nephrology following 


   -- Need to simplify regime prior to discharge- recommending changing regimen 

to Labetalol 100 mg BID, Amlodipine 10 QAM and Spironolactone 50 mg BID





Recent headaches, ?secondary to cervical spine DJD and accelerated HTN- RESOLVED

:


- BCx negative 


- Voltaren gel QID and heating pad 





CAD, h/o MI s/p stents- STABLE: Continue Toprol XL 50 mg BID, Zocor 40 mg daily

, Plavix 75 mg daily 





PAD s/p stents of LEs- Dr. Rainey 2017: Continue Plavix 





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 Paz & wound care 

team 





HLD: Continue Zocor 40 mg daily 





Chronic diastolic CHF- compensated: 


- Lasix held due to FITZ 


- Continue Aldactone 50 mg BID 





COPD- STABLE: Continue home inhalers 





T2DM w/ neuropathy- CONTROLLED: 


- Continue Lantus 10 u HS 


- BSG ACHS and ISS 


- Continue Gabapentin 100 mg HS





Bipolar disorder, anxiety, depression : 


- Continue Depakote 2,000 mg HS- Depakote level reviewed 


- Continue Klonopin 1 mg HS, Remeron 45 mg HS, Lexapro 20 mg HS  


- Psychiatry consulted due to active depression, appreciate recommendations 





GI prophylaxis: Protonix 40 mg daily





DVT prophylaxis: Chemical anticoagulation held due to anemia 





Code Status: LEVEL I, FULL 





Dispo: Planning for [facility] once medically stable- PT/OT and CM following

## 2017-11-13 NOTE — NEPHROLOGY PROGRESS NOTE
Nephrology Progress Note


Date of Service


Nov 13, 2017.





Chief Complaint


Hypertension, FITZ





Subjective


Mr. Dixon was seen & examined in the PCU this morning.  He currently denies 

HA, angina or uremic symptoms.  He readily acknowledges that he does not always 

take his medications as prescribed at home due to the complexity of his medical 

regimen.  He denies overt blood loss





Review of Systems


Constitutional:  No fever


Cardiovascular:  No chest pain


Respiratory:  No dyspnea at rest


Abdomen:  No pain, No nausea, No vomiting, No hematochezia, No melena


Genitourinary - Male:  No dysuria


Extremities:  No leg edema


A complete review of systems was performed.  Pertinent positives are noted 

above. All other systems are negative.





Vital Signs





Last 8 Hrs








  Date Time  Temp Pulse Resp B/P (MAP) Pulse Ox O2 Delivery O2 Flow Rate FiO2


 


11/13/17 08:30     95 Room Air 3.0 


 


11/13/17 08:00  62 18  95 Room Air  


 


11/13/17 07:22 36.5 49 16 131/47 (75) 95 Room Air  











Last Recorded Weight


Weight (Kilograms):  88.500





Physical Exam


General Appearance:  no apparent distress


Head:  normocephalic, atraumatic


Eyes:  PERRL, EOMI


Neck:  no adenopathy


Respiratory/Chest:  lungs clear, no respiratory distress


Cardiovascular:  regular rate, rhythm


Abdomen/GI:  normal bowel sounds, non tender, soft


Extremities/Musculoskelatal:  no pedal edema


Neurologic/Psych:  alert, oriented x 3





Family History





Cancer (esophageal)


Diabetes mellitus


Heart disease


Hypertension





Social History


Smokeless Tobacco Use:  No


Alcohol Use:  none


Drug Use:  none


Marital Status:  


Housing Status:  other ([facility])


Occupation:  employed





Laboratory Results


Past 24 Hours


11/13/17 06:40








11/13/17 06:40

















Test


  11/12/17


11:41 11/12/17


13:50 11/12/17


17:03 11/12/17


20:04


 


Bedside Glucose


  174 mg/dl


(70-99) 


  192 mg/dl


(70-99) 184 mg/dl


(70-99)


 


Prothrombin Time


  


  11.9 SECONDS


(9.0-12.0) 


  


 


 


Prothromb Time International


Ratio 


  1.1 (0.9-1.1) 


  


  


 


 


Test


  11/13/17


06:40 11/13/17


07:42 


  


 


 


Red Blood Count


  2.58 M/uL


(4.7-6.1) 


  


  


 


 


Mean Corpuscular Volume


  86.8 fL


() 


  


  


 


 


Mean Corpuscular Hemoglobin


  27.9 pg


(25-34) 


  


  


 


 


Mean Corpuscular Hemoglobin


Concent 32.1 g/dl


(32-36) 


  


  


 


 


RDW Standard Deviation


  48.8 fL


(36.4-46.3) 


  


  


 


 


RDW Coefficient of Variation


  15.4 %


(11.5-14.5) 


  


  


 


 


Mean Platelet Volume


  9.2 fL


(7.4-10.4) 


  


  


 


 


Prothrombin Time


  11.8 SECONDS


(9.0-12.0) 


  


  


 


 


Prothromb Time International


Ratio 1.1 (0.9-1.1) 


  


  


  


 


 


Anion Gap


  9.0 mmol/L


(3-11) 


  


  


 


 


Est Creatinine Clear Calc


Drug Dose 50.1 ml/min 


  


  


  


 


 


Estimated GFR (


American) 47.3 


  


  


  


 


 


Estimated GFR (Non-


American 40.8 


  


  


  


 


 


BUN/Creatinine Ratio 26.2 (10-20)    


 


Calcium Level


  8.4 mg/dl


(8.5-10.1) 


  


  


 


 


Bedside Glucose


  


  113 mg/dl


(70-99) 


  


 











Allergies


Coded Allergies:  


     No Known Allergies (Verified , 7/19/17)





Medications





Current Inpatient Medications








 Medications


  (Trade)  Dose


 Ordered  Sig/Dc


 Route  Start Time


 Stop Time Status Last Admin


Dose Admin


 


 Acetaminophen


  (Tylenol Tab)  650 mg  Q4H  PRN


 PO  10/30/17 14:15


 11/29/17 14:14  11/7/17 23:42


650 MG


 


 Al Hydrox/Mg


 Hydrox/Simethicone


  (Maalox Max Susp)  15 ml  Q4H  PRN


 PO  10/30/17 14:15


 11/29/17 14:14   


 


 


 Magnesium


 Hydroxide


  (Milk Of


 Magnesia Susp)  30 ml  Q6H  PRN


 PO  10/30/17 14:15


 11/29/17 14:14   


 


 


 Polyethylene


  (Miralax Powder


 Packet)  17 gm  DAILY  PRN


 PO  10/30/17 14:15


 11/29/17 14:14   


 


 


 Ondansetron HCl


  (Zofran Inj)  4 mg  Q6H  PRN


 IV  10/30/17 14:15


 11/29/17 14:14   


 


 


 Glucose


  (Glucose 40% Gel)  15-30


 GRAMS 15


 GRAMS...  UD  PRN


 PO  10/30/17 14:15


 11/29/17 14:14   


 


 


 Glucose


  (Glucose Chew


 Tab)  4-8


 Tablets 4


 Tabl...  UD  PRN


 PO  10/30/17 14:15


 11/29/17 14:14   


 


 


 Dextrose


  (Dextrose 50%


 50ML Syringe)  25-50ML OF


 50% DW IV


 FOR...  UD  PRN


 IV  10/30/17 14:15


 11/29/17 14:14   


 


 


 Glucagon


  (Glucagon Inj)  1 mg  UD  PRN


 SQ  10/30/17 14:15


 11/29/17 14:14   


 


 


 Albuterol


  (Ventolin Hfa


 Inhaler)  2 puffs  Q6


 INH  10/30/17 18:00


 11/29/17 17:59  11/12/17 18:00


2 PUFFS


 


 Bisacodyl


  (Dulcolax Supp)  10 mg  DAILY  PRN


 CO  10/30/17 14:15


 11/29/17 14:14   


 


 


 Calcium Carbonate


  (Tums Chew Tab)  500 mg  TID  PRN


 PO  10/30/17 14:15


 11/29/17 14:14   


 


 


 Cyanocobalamin


  (Vitamin B-12


 Tab)  100 mcg  DAILY


 PO  10/31/17 08:00


 11/30/17 08:59  11/13/17 08:02


100 MCG


 


 Divalproex Sodium


  (Depakote


 Extended Rel Tab)  2,000 mg  HS


 PO  10/30/17 21:00


 11/29/17 20:59  11/12/17 21:52


2,000 MG


 


 Docusate Sodium


  (coLACE CAP)  100 mg  BID  PRN


 PO  10/30/17 14:15


 11/29/17 14:14   


 


 


 Escitalopram


 Oxalate


  (Lexapro Tab)  20 mg  QAM


 PO  10/31/17 08:00


 11/30/17 08:59  11/13/17 08:01


20 MG


 


 Fluticasone


 Propionate


  (Flonase Nasal


 Spray)  2 sprays  DAILY  PRN


 VERONICA  10/30/17 14:15


 11/29/17 14:14   


 


 


 Gabapentin


  (Neurontin Cap)  100 mg  HS


 PO  10/30/17 21:00


 11/29/17 20:59  11/12/17 21:50


100 MG


 


 Insulin Glargine


  (Lantus Solostar


 Pen)  10 units  HS


 SC  10/30/17 21:00


 11/29/17 20:59  11/12/17 21:56


10 UNITS


 


 Metoprolol


 Succinate


  (Toprol Xl Tab)  50 mg  BID


 PO  10/30/17 20:00


 11/29/17 20:59  11/13/17 08:00


50 MG


 


 Mirtazapine


  (Remeron Tab)  45 mg  HS


 PO  10/30/17 21:00


 11/29/17 20:59  11/12/17 21:53


45 MG


 


 Nitroglycerin


  (Nitrostat Tab)  0.4 mg  PRN  PRN


 UT  10/30/17 14:15


 11/29/17 14:14   


 


 


 Pantoprazole


 Sodium


  (Protonix Tab)  40 mg  DAILY


 PO  10/31/17 08:00


 11/30/17 08:59  11/13/17 08:00


40 MG


 


 Senna/Docusate


 Sodium


  (Senokot S Tab)  1 tab  QDL


 PO  10/31/17 11:00


 11/30/17 10:59  11/12/17 12:06


1 TAB


 


 Simvastatin


  (Zocor Tab)  40 mg  QPM


 PO  10/30/17 21:00


 11/29/17 20:59  11/12/17 21:57


40 MG


 


 Tamsulosin HCl


  (Flomax Cap)  0.4 mg  HS


 PO  10/30/17 21:00


 11/29/17 20:59  11/12/17 21:50


0.4 MG


 


 Amlodipine


 Besylate


  (Norvasc Tab)  10 mg  QAM


 PO  10/31/17 08:00


 11/30/17 08:59  11/13/17 08:02


10 MG


 


 Miscellaneous


 Information


  (Order Awaiting


 Action)  1 ea  QS


 N/A  10/31/17 00:00


 11/30/17 00:00   


 


 


 Diclofenac Sodium


  (Voltaren 1% Top


 Gel)  1 appln  QID


 EXT  10/30/17 20:45


 11/29/17 14:14  11/13/17 08:02


1 APPLN


 


 Miscellaneous


  (Iv Fluids


 Completed)  1 ea  PRN  PRN


 N/A  10/30/17 21:00


 10/30/18 20:59   


 


 


 Enteral


 Nutritional


 Formula


  (Boost Glucose


 Control)  1 can  TIDM


 PO  10/31/17 17:00


 11/30/17 16:59  11/13/17 08:07


1 CAN


 


 Clopidogrel


 Bisulfate


  (plAVix TAB)  75 mg  QAM


 PO  11/6/17 08:00


 12/6/17 07:59  11/13/17 08:00


75 MG


 


 Clonazepam


  (Klonopin Tab)  1 mg  HS


 PO  11/5/17 12:00


 11/29/17 20:59  11/12/17 21:47


1 MG


 


 Hydralazine HCl


  (Apresoline Tab)  100 mg  TID


 PO  11/5/17 16:00


 12/5/17 15:59  11/13/17 08:03


100 MG


 


 Warfarin Sodium


  (Coumadin Tab)  3 mg  DAILY@16


 PO  11/6/17 16:00


 12/6/17 15:59 Future Hold 11/12/17 15:36


3 MG


 


 Enoxaparin Sodium


  (Lovenox Inj)  90 mg  Q12


 SQ  11/6/17 21:00


 12/6/17 20:59 Future Hold 11/12/17 21:51


90 MG


 


 Oxycodone HCl


  (Roxicodone


 Immediate Rel Tab)  10 mg  Q3H  PRN


 PO  11/6/17 15:30


 11/13/17 14:14  11/13/17 03:10


10 MG


 


 Spironolactone


  (Aldactone Tab)  50 mg  BID17


 PO  11/7/17 17:00


 12/5/17 19:29  11/13/17 08:03


50 MG


 


 Clonidine HCl


  (Catapres-Tts-3


 0.3mg/24hr Patch)  1 patch  CQWK


 TD  11/9/17 13:00


 12/9/17 12:59  11/9/17 14:18


1 PATCH


 


 Miscellaneous


  (Remove


 Clonidine Patch)  1 ea  Q7D


 N/A  11/9/17 12:59


 12/9/17 12:58  11/9/17 14:18


1 EA


 


 Miscellaneous


 Information


  (Check Clonidine


 Patch Placement)  1 ea  QS


 N/A  11/9/17 16:00


 12/9/17 15:59  11/13/17 08:04


1 EA











Impression





(1) Accelerated hypertension


(2) PAD (peripheral artery disease)


(3) Diastolic CHF, chronic


(4) LVH (left ventricular hypertrophy)


Paramjit is a 61-year-old  male with DMII, hypertension, dyslipidemia, CAD

, chronic diastolic CHF, diffuse calcific vascular disease, PAD and depression 

as well as bipolar disorder. He has chronic urinary retention and an indwelling 

High. He was admitted with headache. Hospitalization complicated by 

accelerated hypertension.





He does have a component of pseudohypertension. There is a blood pressure 

discrepancy between arms. He also has known severe calcific vascular disease. 

Blood pressure is to be checked in his left arm, manually and with the patient 

sitting. Creatinine was 1.1 on admission which has been slowly worsening during 

hospital stay over last few days, creatinine went up to 1.8 yesterday.  Lasix 

and lisinopril has been on hold and creatinine seems to be stabilizing, 1.7 

today, electrolyte acceptable.  Blood pressure did not change significantly 

after stopping Lasix and with lisinopril. Orthostatic vitals noted. Significant 

drop in systolic pressure. Heart rate unchanged. CTA of the abdomen and renal 

arteries showed diffuse calcific and atherosclerotic disease noted without 

significant KIANA.





Recommendations


HYPERTENSION:


-- Current regimen consists of Metoprolol Succinate, Amlodipine, Hydralazine, 

Spironolactone and Clonidine TTS-3


-- Lisinopril stopped due to progressive renal insufficiency


-- Renal artery doppler was negative for KIANA


-- Need to simplify regime prior to discharge.  Combination Metoprolol + 

Clonidine may be contributing to progressive bradycardia.  Hydralazine may not 

provide additional vasodilatory effect over Amlodipine.  Recommend changing 

regimen to Labetalol 100 mg BID, Amlodipine 10 mg qAM and Spironolactone 50 mg 

po BID.





ACUTE KIDNEY INJURY:


-- Continue to hold Lisinopril


-- Question whether anemia may be contributing


-- Monitor serial PRP





ANEMIA:


-- Consider blood transfusion to keep Hgb > 8.0

## 2017-11-14 VITALS — SYSTOLIC BLOOD PRESSURE: 182 MMHG | DIASTOLIC BLOOD PRESSURE: 54 MMHG | HEART RATE: 66 BPM

## 2017-11-14 VITALS
TEMPERATURE: 97.7 F | DIASTOLIC BLOOD PRESSURE: 62 MMHG | SYSTOLIC BLOOD PRESSURE: 158 MMHG | OXYGEN SATURATION: 96 % | HEART RATE: 52 BPM

## 2017-11-14 VITALS
TEMPERATURE: 98.06 F | DIASTOLIC BLOOD PRESSURE: 64 MMHG | SYSTOLIC BLOOD PRESSURE: 167 MMHG | HEART RATE: 49 BPM | OXYGEN SATURATION: 96 %

## 2017-11-14 VITALS — OXYGEN SATURATION: 96 %

## 2017-11-14 VITALS — DIASTOLIC BLOOD PRESSURE: 66 MMHG | SYSTOLIC BLOOD PRESSURE: 195 MMHG | HEART RATE: 63 BPM

## 2017-11-14 VITALS
TEMPERATURE: 97.88 F | DIASTOLIC BLOOD PRESSURE: 62 MMHG | OXYGEN SATURATION: 96 % | HEART RATE: 52 BPM | SYSTOLIC BLOOD PRESSURE: 168 MMHG

## 2017-11-14 LAB
ANION GAP SERPL CALC-SCNC: 8 MMOL/L (ref 3–11)
BUN SERPL-MCNC: 42 MG/DL (ref 7–18)
BUN/CREAT SERPL: 28.6 (ref 10–20)
CALCIUM SERPL-MCNC: 8.2 MG/DL (ref 8.5–10.1)
CHLORIDE SERPL-SCNC: 108 MMOL/L (ref 98–107)
CO2 SERPL-SCNC: 25 MMOL/L (ref 21–32)
CREAT CL PREDICTED SERPL C-G-VRATE: 60.2 ML/MIN
CREAT SERPL-MCNC: 1.48 MG/DL (ref 0.6–1.4)
GLUCOSE SERPL-MCNC: 91 MG/DL (ref 70–99)
HCT VFR BLD CALC: 27.7 % (ref 42–52)
INR PPP: 1.1 (ref 0.9–1.1)
PARTIAL THROMBOPLASTIN RATIO: 1.1
POTASSIUM SERPL-SCNC: 4.9 MMOL/L (ref 3.5–5.1)
PROTHROMBIN TIME: 11.5 SECONDS (ref 9–12)
SODIUM SERPL-SCNC: 141 MMOL/L (ref 136–145)

## 2017-11-14 RX ADMIN — ESCITALOPRAM OXALATE SCH MG: 20 TABLET, FILM COATED ORAL at 08:32

## 2017-11-14 RX ADMIN — GABAPENTIN SCH MG: 100 CAPSULE ORAL at 20:30

## 2017-11-14 RX ADMIN — SIMVASTATIN SCH MG: 40 TABLET, FILM COATED ORAL at 20:46

## 2017-11-14 RX ADMIN — PANTOPRAZOLE SCH MG: 40 TABLET, DELAYED RELEASE ORAL at 08:32

## 2017-11-14 RX ADMIN — DICLOFENAC SODIUM SCH APPLN: 10 GEL TOPICAL at 08:45

## 2017-11-14 RX ADMIN — CLONAZEPAM SCH MG: 0.5 TABLET ORAL at 20:49

## 2017-11-14 RX ADMIN — CLOPIDOGREL BISULFATE SCH MG: 75 TABLET, FILM COATED ORAL at 08:34

## 2017-11-14 RX ADMIN — LABETALOL HYDROCHLORIDE SCH MG: 100 TABLET, FILM COATED ORAL at 20:43

## 2017-11-14 RX ADMIN — STANDARDIZED SENNA CONCENTRATE AND DOCUSATE SODIUM SCH TAB: 8.6; 5 TABLET ORAL at 12:00

## 2017-11-14 RX ADMIN — Medication SCH CAN: at 08:32

## 2017-11-14 RX ADMIN — ALBUTEROL SULFATE SCH PUFFS: 90 AEROSOL, METERED RESPIRATORY (INHALATION) at 05:34

## 2017-11-14 RX ADMIN — METOPROLOL SUCCINATE SCH MG: 50 TABLET, EXTENDED RELEASE ORAL at 08:35

## 2017-11-14 RX ADMIN — TAMSULOSIN HYDROCHLORIDE SCH MG: 0.4 CAPSULE ORAL at 20:45

## 2017-11-14 RX ADMIN — Medication SCH CAN: at 12:00

## 2017-11-14 RX ADMIN — Medication SCH MCG: at 08:33

## 2017-11-14 RX ADMIN — SPIRONOLACTONE SCH MG: 25 TABLET, FILM COATED ORAL at 08:35

## 2017-11-14 RX ADMIN — ALBUTEROL SULFATE SCH PUFFS: 90 AEROSOL, METERED RESPIRATORY (INHALATION) at 18:49

## 2017-11-14 RX ADMIN — Medication SCH CAN: at 17:00

## 2017-11-14 RX ADMIN — WARFARIN SCH MG: 3 TABLET ORAL at 16:11

## 2017-11-14 RX ADMIN — DIVALPROEX SODIUM SCH MG: 500 TABLET, FILM COATED, EXTENDED RELEASE ORAL at 20:44

## 2017-11-14 RX ADMIN — MIRTAZAPINE SCH MG: 15 TABLET, FILM COATED ORAL at 20:46

## 2017-11-14 RX ADMIN — DICLOFENAC SODIUM SCH APPLN: 10 GEL TOPICAL at 20:29

## 2017-11-14 RX ADMIN — DICLOFENAC SODIUM SCH APPLN: 10 GEL TOPICAL at 16:12

## 2017-11-14 RX ADMIN — INSULIN GLARGINE SCH UNITS: 100 INJECTION, SOLUTION SUBCUTANEOUS at 20:55

## 2017-11-14 RX ADMIN — OXYCODONE HYDROCHLORIDE PRN MG: 5 TABLET ORAL at 19:20

## 2017-11-14 RX ADMIN — DICLOFENAC SODIUM SCH APPLN: 10 GEL TOPICAL at 12:00

## 2017-11-14 RX ADMIN — ACETAMINOPHEN PRN MG: 325 TABLET ORAL at 20:53

## 2017-11-14 RX ADMIN — ALBUTEROL SULFATE SCH PUFFS: 90 AEROSOL, METERED RESPIRATORY (INHALATION) at 23:53

## 2017-11-14 RX ADMIN — AMLODIPINE BESYLATE SCH MG: 5 TABLET ORAL at 08:34

## 2017-11-14 RX ADMIN — SPIRONOLACTONE SCH MG: 25 TABLET, FILM COATED ORAL at 16:14

## 2017-11-14 RX ADMIN — ALBUTEROL SULFATE SCH PUFFS: 90 AEROSOL, METERED RESPIRATORY (INHALATION) at 12:09

## 2017-11-14 NOTE — HOSPITALIST PROGRESS NOTE
Hospitalist Progress Note


Date of Service


Nov 14, 2017.





Subjective


Pt evaluation today including:  conversation w/ patient, physical exam, lab 

review, review of inpatient medication list


Voiding:  gillespie catheter in place


Patient laying bed. Admits to tiredness this AM. 


Denies any complaints. 


Eating and drinking OK. 





Patient denies any fever, chills, sweats, lightheadedness, dizziness, vision 

changes, CP, palpitations, edema, SOB, wheezing, cough, abdominal pain, nausea, 

vomiting, diarrhea, urinary symptoms, melena, numbness/tingling, weakness, 

muscle/joint pain, anxiety/depression, active bleeding, or new skin 

discoloration/changes.





Medications





Current Inpatient Medications








 Medications


  (Trade)  Dose


 Ordered  Sig/Dc


 Route  Start Time


 Stop Time Status Last Admin


Dose Admin


 


 Acetaminophen


  (Tylenol Tab)  650 mg  Q4H  PRN


 PO  10/30/17 14:15


 11/29/17 14:14  11/7/17 23:42


650 MG


 


 Al Hydrox/Mg


 Hydrox/Simethicone


  (Maalox Max Susp)  15 ml  Q4H  PRN


 PO  10/30/17 14:15


 11/29/17 14:14   


 


 


 Magnesium


 Hydroxide


  (Milk Of


 Magnesia Susp)  30 ml  Q6H  PRN


 PO  10/30/17 14:15


 11/29/17 14:14   


 


 


 Polyethylene


  (Miralax Powder


 Packet)  17 gm  DAILY  PRN


 PO  10/30/17 14:15


 11/29/17 14:14   


 


 


 Ondansetron HCl


  (Zofran Inj)  4 mg  Q6H  PRN


 IV  10/30/17 14:15


 11/29/17 14:14   


 


 


 Glucose


  (Glucose 40% Gel)  15-30


 GRAMS 15


 GRAMS...  UD  PRN


 PO  10/30/17 14:15


 11/29/17 14:14   


 


 


 Glucose


  (Glucose Chew


 Tab)  4-8


 Tablets 4


 Tabl...  UD  PRN


 PO  10/30/17 14:15


 11/29/17 14:14   


 


 


 Dextrose


  (Dextrose 50%


 50ML Syringe)  25-50ML OF


 50% DW IV


 FOR...  UD  PRN


 IV  10/30/17 14:15


 11/29/17 14:14   


 


 


 Glucagon


  (Glucagon Inj)  1 mg  UD  PRN


 SQ  10/30/17 14:15


 11/29/17 14:14   


 


 


 Albuterol


  (Ventolin Hfa


 Inhaler)  2 puffs  Q6


 INH  10/30/17 18:00


 11/29/17 17:59  11/14/17 05:34


2 PUFFS


 


 Bisacodyl


  (Dulcolax Supp)  10 mg  DAILY  PRN


 OR  10/30/17 14:15


 11/29/17 14:14   


 


 


 Calcium Carbonate


  (Tums Chew Tab)  500 mg  TID  PRN


 PO  10/30/17 14:15


 11/29/17 14:14   


 


 


 Cyanocobalamin


  (Vitamin B-12


 Tab)  100 mcg  DAILY


 PO  10/31/17 08:00


 11/30/17 08:59  11/14/17 08:33


100 MCG


 


 Divalproex Sodium


  (Depakote


 Extended Rel Tab)  2,000 mg  HS


 PO  10/30/17 21:00


 11/29/17 20:59  11/13/17 19:46


2,000 MG


 


 Docusate Sodium


  (coLACE CAP)  100 mg  BID  PRN


 PO  10/30/17 14:15


 11/29/17 14:14   


 


 


 Escitalopram


 Oxalate


  (Lexapro Tab)  20 mg  QAM


 PO  10/31/17 08:00


 11/30/17 08:59  11/14/17 08:32


20 MG


 


 Fluticasone


 Propionate


  (Flonase Nasal


 Spray)  2 sprays  DAILY  PRN


 VERONICA  10/30/17 14:15


 11/29/17 14:14   


 


 


 Gabapentin


  (Neurontin Cap)  100 mg  HS


 PO  10/30/17 21:00


 11/29/17 20:59  11/13/17 19:46


100 MG


 


 Insulin Glargine


  (Lantus Solostar


 Pen)  10 units  HS


 SC  10/30/17 21:00


 11/29/17 20:59  11/13/17 19:53


10 UNITS


 


 Mirtazapine


  (Remeron Tab)  45 mg  HS


 PO  10/30/17 21:00


 11/29/17 20:59  11/13/17 19:47


45 MG


 


 Nitroglycerin


  (Nitrostat Tab)  0.4 mg  PRN  PRN


 UT  10/30/17 14:15


 11/29/17 14:14   


 


 


 Pantoprazole


 Sodium


  (Protonix Tab)  40 mg  DAILY


 PO  10/31/17 08:00


 11/30/17 08:59  11/14/17 08:32


40 MG


 


 Senna/Docusate


 Sodium


  (Senokot S Tab)  1 tab  QDL


 PO  10/31/17 11:00


 11/30/17 10:59  11/13/17 12:14


1 TAB


 


 Simvastatin


  (Zocor Tab)  40 mg  QPM


 PO  10/30/17 21:00


 11/29/17 20:59  11/13/17 19:46


40 MG


 


 Tamsulosin HCl


  (Flomax Cap)  0.4 mg  HS


 PO  10/30/17 21:00


 11/29/17 20:59  11/13/17 19:46


0.4 MG


 


 Amlodipine


 Besylate


  (Norvasc Tab)  10 mg  QAM


 PO  10/31/17 08:00


 11/30/17 08:59  11/14/17 08:34


10 MG


 


 Miscellaneous


 Information


  (Order Awaiting


 Action)  1 ea  QS


 N/A  10/31/17 00:00


 11/30/17 00:00   


 


 


 Diclofenac Sodium


  (Voltaren 1% Top


 Gel)  1 appln  QID


 EXT  10/30/17 20:45


 11/29/17 14:14  11/13/17 12:15


1 APPLN


 


 Miscellaneous


  (Iv Fluids


 Completed)  1 ea  PRN  PRN


 N/A  10/30/17 21:00


 10/30/18 20:59   


 


 


 Enteral


 Nutritional


 Formula


  (Boost Glucose


 Control)  1 can  TIDM


 PO  10/31/17 17:00


 11/30/17 16:59  11/13/17 17:31


1 CAN


 


 Clopidogrel


 Bisulfate


  (plAVix TAB)  75 mg  QAM


 PO  11/6/17 08:00


 12/6/17 07:59  11/14/17 08:34


75 MG


 


 Clonazepam


  (Klonopin Tab)  1 mg  HS


 PO  11/5/17 12:00


 11/29/17 20:59  11/13/17 19:52


1 MG


 


 Warfarin Sodium


  (Coumadin Tab)  3 mg  DAILY@16


 PO  11/6/17 16:00


 12/6/17 15:59 Future hold 11/12/17 15:36


3 MG


 


 Enoxaparin Sodium


  (Lovenox Inj)  90 mg  Q12


 SQ  11/6/17 21:00


 12/6/17 20:59 Future Hold 11/12/17 21:51


90 MG


 


 Spironolactone


  (Aldactone Tab)  50 mg  BID17


 PO  11/7/17 17:00


 12/5/17 19:29  11/14/17 08:35


50 MG


 


 Miscellaneous


  (Remove


 Clonidine Patch)  1 ea  Q7D


 N/A  11/9/17 12:59


 12/9/17 12:58  11/9/17 14:18


1 EA


 


 Miscellaneous


 Information


  (Check Clonidine


 Patch Placement)  1 ea  QS


 N/A  11/9/17 16:00


 12/9/17 15:59  11/14/17 08:35


1 EA


 


 Oxycodone HCl


  (Roxicodone


 Immediate Rel Tab)  10 mg  Q3H  PRN


 PO  11/13/17 18:00


 11/27/17 17:59  11/13/17 19:47


10 MG


 


 Labetalol HCl


  (Normodyne Tab)  100 mg  BID


 PO  11/14/17 20:00


 12/14/17 19:59   


 











Objective


Vital Signs











  Date Time  Temp Pulse Resp B/P (MAP) Pulse Ox O2 Delivery O2 Flow Rate FiO2


 


11/14/17 11:43 36.6 52 20 168/62 (97) 96 Room Air  


 


11/14/17 10:27 36.5 52 20 158/62 (94) 96 Room Air  


 


11/14/17 08:30      Room Air  


 


11/14/17 00:00     96 Room Air  


 


11/13/17 23:19 36.8 67 20 142/70 (94) 98 Room Air  


 


11/13/17 20:00     96 Room Air  


 


11/13/17 18:00 36.3 61 20 145/64    


 


11/13/17 17:30 36.4 56 20 158/66    


 


11/13/17 16:48 36.3 44 18 131/74 96   


 


11/13/17 16:20 36.5 44 20 138/63    


 


11/13/17 16:02 36.3 51 20 127/49 96   


 


11/13/17 15:58     95 Room Air  


 


11/13/17 14:53 36.5 49 20 165/66 95   


 


11/13/17 14:13 36.4 47 18 156/81 94   


 


11/13/17 13:31 36.6 50 18 121/57 95   


 


11/13/17 13:02 36.4 48 20 134/56    


 


11/13/17 12:45 36.4 48 20 148/54 95   


 


11/13/17 12:25 36.5 52 20 150/70    











Physical Exam


General Appearance:  no apparent distress


Eyes:  normal inspection, PERRL


ENT:  hearing grossly normal


Neck:  supple


Respiratory/Chest:  lungs clear, no respiratory distress, no accessory muscle 

use


Cardiovascular:  regular rate, rhythm


Abdomen:  normal bowel sounds, non tender, soft


Extremities:  no pedal edema, no calf tenderness, + pertinent finding (wound 

vac RLE )


Neurologic/Psychiatric:  alert, normal mood/affect, oriented x 3


Skin:  normal color, warm/dry, no rash





Laboratory Results





Last 24 Hours








Test


  11/13/17


16:25 11/13/17


19:56 11/14/17


05:42 11/14/17


07:29


 


Bedside Glucose 129 mg/dl  119 mg/dl   90 mg/dl 


 


Hemoglobin   9.2 g/dL  


 


Hematocrit   27.7 %  


 


Prothrombin Time   11.5 SECONDS  


 


Prothromb Time International


Ratio 


  


  1.1 


  


 


 


Activated Partial


Thromboplast Time 


  


  29.2 SECONDS 


  


 


 


Partial Thromboplastin Ratio   1.1  


 


Sodium Level   141 mmol/L  


 


Potassium Level   4.9 mmol/L  


 


Chloride Level   108 mmol/L  


 


Carbon Dioxide Level   25 mmol/L  


 


Anion Gap   8.0 mmol/L  


 


Blood Urea Nitrogen   42 mg/dl  


 


Creatinine   1.48 mg/dl  


 


Est Creatinine Clear Calc


Drug Dose 


  


  60.2 ml/min 


  


 


 


Estimated GFR (


American) 


  


  59.2 


  


 


 


Estimated GFR (Non-


American 


  


  51.1 


  


 


 


BUN/Creatinine Ratio   28.6  


 


Random Glucose   91 mg/dl  


 


Calcium Level   8.2 mg/dl  


 


Test


  11/14/17


08:55 11/14/17


11:32 


  


 


 


Stool Occult Blood NEGATIVE    


 


Bedside Glucose  124 mg/dl   











Assessment and Plan


62 y/o male, with PMHx of CAD, h/o MI s/p stents, HTN, HLD, diastolic CHF, COPD

, DM II, anxiety, depression, bipolar disorder, anemia and GERD, who presented 

to the ED because of persistent headache and elevated INR. 





Acute on chronic anemia, likely due to chronic disease anemia- baseline hgb 9.0-

10.0:   


- Iron studies, b12, folate- reviewed and WNL 


- Fecal occult negative 


- Consent obtained- transfuse 2 u PRBC on 11/13 due to hgb of 7.2


- Follow H&H- STABLE at 9.2 





Supratherapeutic INR at >8.0- RESOLVED, h/o thrombus: 


- Held Coumadin until INR within goal range while supratherapeutic 


- Resumed Coumadin on 11/2- following PT/INR and adjusting dosage PRN for INR 

goal of 2-3- also bridged with Lovenox 1mg/kg until therapeutic INR 


   -- Hold anticoagulation on 11/13 due to worsening anemia- no signs of 

bleeding, H&H stable, resume on 11/14





FITZ- IMPROVING:


- Holding nephrotoxic agents (Lisinopril) and renally dosing medication 


- Nephrology consulted, appreciate recommendations


   -- f/u w/ Dr. Mullins in 1-2 weeks after discharge  





Accelerated HTN- STABLE: 


- Lisinopril held due to FITZ 


- Amlodipine 10 mg daily, Hydralazine 100 mg TID, BB, Aldactone, Clonidine- 

discontinued Clonidine and Hydralazine, changed BB per nephro recommendations   


- CTA unremarkable for significant renal artery stenosis


- Nephrology following 


   -- Need to simplify regimen- recommend changing regimen to Labetalol 100 mg 

BID, Amlodipine 10 QAM and Spironolactone 50 mg BID





Recent headaches, ?secondary to cervical spine DJD and accelerated HTN- RESOLVED

:


- BCx negative 


- Voltaren gel QID and heating pad 





CAD, h/o MI s/p stents- STABLE: 


- Continue Zocor 40 mg daily, Plavix 75 mg daily 


- Toprol XL 50 mg BID changed to Labetalol 100 mg BID per nephrology 

recommendations 





PAD s/p stents of LEs- Dr. Rainey 2017: Continue Plavix 





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 Paz & wound care 

team 





HLD: Continue Zocor 40 mg daily 





Chronic diastolic CHF- COMPENSATED: Continue Aldactone 50 mg BID 





COPD- STABLE: Continue home inhalers 





T2DM w/ neuropathy- CONTROLLED: 


- Continue Lantus 10 u HS 


- BSG ACHS and ISS 


- Continue Gabapentin 100 mg HS





Bipolar disorder, anxiety, depression : 


- Continue Depakote 2,000 mg HS- Depakote level reviewed 


- Continue Klonopin 1 mg HS, Remeron 45 mg HS, Lexapro 20 mg HS  


- Psychiatry consulted due to active depression, appreciate recommendations- no 

new recommendations at this time 





GI prophylaxis: Protonix 40 mg daily





DVT prophylaxis: Coumadin 





Code Status: LEVEL I, FULL 





Dispo: Medically stable for discharge pending placement, planning for [facility]- PT/OT and CM following

## 2017-11-14 NOTE — PSYCHIATRIC CONSULTATION
Psychiatric Consultation





Date of Service:  Nov 14, 2017.








Identifying Data





Paramjit Dixon is a 61-year-old male who currently lives in [place], has a 

history of bipolar disorder and anxiety, and is admitted with multiple medical 

problems including acute kidney injury, elevated INR, uncontrolled blood 

pressure, and headaches.  Psychiatry was consulted for depression and due to 

nursing home referral and target process.





Chief Complaint


"Well just a little depressed, that's all".





History of Present Illness


The patient's states that his mood has been lower over the past month or so 

since he started having more medical problems and frequent hospitalizations.  

He rates his mood a 2 out of 10, and states that he is frustrated with having 

to be in the hospital so much and with not feeling well.  He scored a 5 on the 

PHQ 9, which is indicative of mild depression.  He denies that he has had 

suicidal thoughts, and is aware that he is going to a nursing home.  He reports 

good compliance with his psychotropic medications, and thinks that they are 

helpful for mood.  He was seen Dr. Johnathan Castaneda at [facility], but admits 

he has not been seen in many months due to frequent hospitalizations.





Past Psychiatric History


Current OP Treatment:  psychiatrist (Dr. Skinner at [facility] from 2006 

until May 2017)


Prior Psych Hospitalizations:  none


Suicide Attempts:  No


Past Medication Trials


Effexor Xr, sertraline





Past Medical/Surgical History





(1) Ischemic cardiomyopathy


(2) Anemia


(3) LVH (left ventricular hypertrophy)


(4) PAD (peripheral artery disease)


(5) Diastolic CHF, chronic


(6) CAD (coronary artery disease)


(7) Diabetes


(8) Hypertension





Allergies


Allergies:  


Coded Allergies:  


     No Known Allergies (Verified , 7/19/17)





Home Medications


Scheduled


Albuterol Hfa (Ventolin Hfa), 2 PUFFS INH Q6H


Amlodipine Besylate (Amlodipine Besylate), 10 MG PO QAM


Bisacodyl (Bisac-Evac), 10 MG ME UD


Clonazepam (Klonopin), 1 MG PO HS


Clopidogrel (Plavix), 75 MG PO QAM


Cyanocobalamin (Vitamin B-12), 100 MCG PO DAILY


Diclofenac Sod (Voltaren), 1 APPLN EXT BID


Divalproex Sodium (Depakote Delay Rel), 2,000 MG PO HS


Ergocalciferol (Vitamin D 02096 Unit), 50,000 UNIT PO MWF


Escitalopram Oxalate (Escitalopram Oxalate), 20 MG PO QAM


Gabapentin (Neurontin), 100 MG PO HS


Hydralazine HCl (Hydralazine HCl), 100 MG PO TID


Insulin Glargine (Lantus Solostar), 10 UNITS SC HS


Lisinopril (Lisinopril), 40 MG PO DAILY


Magnesium Hydroxide (Milk of Magnesia), 30 MG PO UD


Metoprolol Succ (Toprol Xl) (Toprol-Xl), 50 MG PO BID


Mirtazapine (Remeron), 45 MG PO HS


Nitrofurantoin Monohyd Macrocr (Macrobid), 100 MG PO BID


Nutritional Supplements (Boost), 1 CAN PO TIDM


Pantoprazole (Protonix), 40 MG PO DAILY


Polyethylene (Miralax), 17 GM PO UD


Senna/Docusate Sod (Senokot S), 1 TAB PO QDL


Simvastatin (Zocor), 40 MG PO QPM


Sodium Phosphates (Fleet Enema Six Pack), 133 ML ME UD


Tamsulosin HCl (Tamsulosin HCl), 0.4 MG PO HS


Thiamine Hcl (Vitamin B-1), 100 MG PO DAILY


Umeclidinium Bromide (Incruse Ellipta), 1 PUFF INH DAILY


Warfarin Sod (Coumadin), 3 MG PO DAILY@16





Scheduled PRN


Calcium Carbonate (Tums), 500 MG PO TID PRN for Indigestion


Diclofenac Sodium (Topical) (Voltaren 1% Top Gel), 2 GM TOP QID PRN for back 

pain


Docusate Sodium (Docusate Sodium), 100 MG PO BID PRN for Constipation


Fluticasone Propionate (Nasal) (Flonase Allergy Relief), 2 SPRAYS VERONICA DAILY PRN 

for CONGESTION


Nitroglycerin (Nitrostat), 0.4 MG UT PRN PRN for Chest Pain


Oxycodone Ir (Roxicodone Ir), 5 MG PO Q3H PRN for Pain


Polyethylene (Miralax), 17 GM PO DAILY PRN for Constipation





Miscellaneous Medications


Glucagon (Glucagon Emergency Kit)


Insulin Aspart (Novolog)





Family History





Cancer (esophageal)


Diabetes mellitus


Heart disease


Hypertension


History of Suicide:  No


History of Substance Abuse:  No


Psychiatric History:  Yes (sister with bipolar disorder, mother with depression 

and multiple psychiatric hospitalizations)





Alcohol Use


Alcohol Use In Past 12 Months:  No





Smoking Use


Smoking Status:  Former Smoker (smoked 1 pack per day for 45 years, and quit 1-1

/2 months ago)





Personal History


Lives in:  [place]


Childhood:


Born and raised in [place].


Education:  other ([employer] in [employer], and [employer] for [employer])


Work History:


Retired. Previously worked as a  for [employer] for over 10 years.


Relationship History:  


Children:  1 adult daughter


Spiritual Affiliation:  Spiritism


Legal History:  none


Psychological Trauma History:  Denies Hx Traumatic Event





Vital Signs





Vital Signs Past 12 Hours








  Date Time  Temp Pulse Resp B/P (MAP) Pulse Ox O2 Delivery O2 Flow Rate FiO2


 


11/13/17 08:30     95 Room Air 3.0 


 


11/13/17 08:00  62 18  95 Room Air  


 


11/13/17 07:22 36.5 49 16 131/47 (75) 95 Room Air  











Laboratory Results





Last 24 Hours








Test


  11/12/17


13:50 11/12/17


17:03 11/12/17


20:04 11/13/17


06:40


 


Prothrombin Time 11.9 SECONDS    11.8 SECONDS 


 


Prothromb Time International


Ratio 1.1 


  


  


  1.1 


 


 


Bedside Glucose  192 mg/dl  184 mg/dl  


 


White Blood Count    5.30 K/uL 


 


Red Blood Count    2.58 M/uL 


 


Hemoglobin    7.2 g/dL 


 


Hematocrit    22.4 % 


 


Mean Corpuscular Volume    86.8 fL 


 


Mean Corpuscular Hemoglobin    27.9 pg 


 


Mean Corpuscular Hemoglobin


Concent 


  


  


  32.1 g/dl 


 


 


RDW Standard Deviation    48.8 fL 


 


RDW Coefficient of Variation    15.4 % 


 


Platelet Count    175 K/uL 


 


Mean Platelet Volume    9.2 fL 


 


Sodium Level    141 mmol/L 


 


Potassium Level    4.9 mmol/L 


 


Chloride Level    107 mmol/L 


 


Carbon Dioxide Level    25 mmol/L 


 


Anion Gap    9.0 mmol/L 


 


Blood Urea Nitrogen    47 mg/dl 


 


Creatinine    1.78 mg/dl 


 


Est Creatinine Clear Calc


Drug Dose 


  


  


  50.1 ml/min 


 


 


Estimated GFR (


American) 


  


  


  47.3 


 


 


Estimated GFR (Non-


American 


  


  


  40.8 


 


 


BUN/Creatinine Ratio    26.2 


 


Random Glucose    131 mg/dl 


 


Calcium Level    8.4 mg/dl 


 


Test


  11/13/17


07:42 11/13/17


11:20 


  


 


 


Bedside Glucose 113 mg/dl  134 mg/dl   











Mental Examination


During interview pt is:  alert and oriented, cooperative


Appearance:  appropriately dressed, other (skin yellow)


Eye contact is:  fair


Motor behavior is:  no abnormal motor movements


Speech:  normal in rate, rhythm & volume


Affect:  constricted


Mood is:  other ("a little depressed")


Thought process:  goal directed


Thought content:  reality based without delusions


Suicidal thought are:  denied


Homicidal thoughts are:  denied


Hallucinations:  denies auditory, denies visual


Intelligence estimated to be:  average


Insight:  fair


Judgement:  fair





 Psychiatric H&P: Impression v4 


Impression / Recommendations


Impression


61-year-old  white male with a history of bipolar disorder and anxiety 

who has had frequent hospitalizations for multiple medical problems, and is now 

being referred to a nursing home.  He reports that mood has been lower over the 

past 6 months in the context of multiple medical problems, and is frustrated 

with his frequent hospitalizations.  He does feel that his psychotropic 

medications are helpful, and would recommend continuing them with outpatient 

follow-up once he goes to the nursing home.  His mood does not improve even 

with improvement of his medical problems, could consider medication adjustments

, and as he is on maximum doses of citalopram and mirtazapine, he may require a 

different antidepressant trial.  He may benefit from interpersonal therapy as 

well as it is available.  There are no psychiatric contraindications to nursing 

home placement.





CPT Code


Initial Hospital Care:  [id number]

## 2017-11-14 NOTE — NEPHROLOGY PROGRESS NOTE
Nephrology Progress Note


Date of Service


Nov 14, 2017.





Chief Complaint


Hypertension, FTIZ





Subjective


Mr. Dixon was seen & examined in his hospital room this morning.  He 

complained of fatigue but voiced no other medical concerns.  He noted that he 

will likely be transferred to [facility] soon.  He indicated 

that nursing assistance will be helpful to ensure that he is taking his 

medications as prescribed.





Review of Systems


Constitutional:  No fever


Cardiovascular:  No chest pain


Respiratory:  No dyspnea at rest


Abdomen:  No pain, No nausea, No vomiting


Genitourinary - Male:  No dysuria


Extremities:  No leg edema


A complete review of systems was performed.  Pertinent positives are noted 

above. All other systems are negative.





Vital Signs





Last 8 Hrs








  Date Time  Temp Pulse Resp B/P (MAP) Pulse Ox O2 Delivery O2 Flow Rate FiO2


 


11/14/17 10:27 36.5 52 20 158/62 (94) 96 Room Air  


 


11/14/17 08:30      Room Air  











Last Recorded Weight


Weight (Kilograms):  88.500





Physical Exam


General Appearance:  no apparent distress


Head:  normocephalic, atraumatic


Eyes:  PERRL, EOMI


Neck:  no adenopathy


Respiratory/Chest:  lungs clear, no respiratory distress


Cardiovascular:  regular rate, rhythm


Abdomen/GI:  normal bowel sounds, non tender, soft


Extremities/Musculoskelatal:  no calf tenderness, no pedal edema


Neurologic/Psych:  alert, oriented x 3





Family History





Cancer (esophageal)


Diabetes mellitus


Heart disease


Hypertension





Social History


Smokeless Tobacco Use:  No


Alcohol Use:  none


Drug Use:  none


Marital Status:  


Housing Status:  other ([facility])


Occupation:  employed





Laboratory Results


Past 24 Hours


11/14/17 05:42








11/14/17 05:42

















Test


  11/13/17


11:20 11/13/17


16:25 11/13/17


19:56 11/14/17


05:42


 


Bedside Glucose


  134 mg/dl


(70-99) 129 mg/dl


(70-99) 119 mg/dl


(70-99) 


 


 


Prothrombin Time


  


  


  


  11.5 SECONDS


(9.0-12.0)


 


Prothromb Time International


Ratio 


  


  


  1.1 (0.9-1.1) 


 


 


Activated Partial


Thromboplast Time 


  


  


  29.2 SECONDS


(21.0-31.0)


 


Partial Thromboplastin Ratio    1.1 


 


Anion Gap


  


  


  


  8.0 mmol/L


(3-11)


 


Est Creatinine Clear Calc


Drug Dose 


  


  


  60.2 ml/min 


 


 


Estimated GFR (


American) 


  


  


  59.2 


 


 


Estimated GFR (Non-


American 


  


  


  51.1 


 


 


BUN/Creatinine Ratio    28.6 (10-20) 


 


Calcium Level


  


  


  


  8.2 mg/dl


(8.5-10.1)


 


Test


  11/14/17


07:29 11/14/17


08:55 


  


 


 


Bedside Glucose


  90 mg/dl


(70-99) 


  


  


 


 


Stool Occult Blood


  


  NEGATIVE


(NEGATIVE) 


  


 











Allergies


Coded Allergies:  


     No Known Allergies (Verified , 7/19/17)





Medications





Current Inpatient Medications








 Medications


  (Trade)  Dose


 Ordered  Sig/Dc


 Route  Start Time


 Stop Time Status Last Admin


Dose Admin


 


 Acetaminophen


  (Tylenol Tab)  650 mg  Q4H  PRN


 PO  10/30/17 14:15


 11/29/17 14:14  11/7/17 23:42


650 MG


 


 Al Hydrox/Mg


 Hydrox/Simethicone


  (Maalox Max Susp)  15 ml  Q4H  PRN


 PO  10/30/17 14:15


 11/29/17 14:14   


 


 


 Magnesium


 Hydroxide


  (Milk Of


 Magnesia Susp)  30 ml  Q6H  PRN


 PO  10/30/17 14:15


 11/29/17 14:14   


 


 


 Polyethylene


  (Miralax Powder


 Packet)  17 gm  DAILY  PRN


 PO  10/30/17 14:15


 11/29/17 14:14   


 


 


 Ondansetron HCl


  (Zofran Inj)  4 mg  Q6H  PRN


 IV  10/30/17 14:15


 11/29/17 14:14   


 


 


 Glucose


  (Glucose 40% Gel)  15-30


 GRAMS 15


 GRAMS...  UD  PRN


 PO  10/30/17 14:15


 11/29/17 14:14   


 


 


 Glucose


  (Glucose Chew


 Tab)  4-8


 Tablets 4


 Tabl...  UD  PRN


 PO  10/30/17 14:15


 11/29/17 14:14   


 


 


 Dextrose


  (Dextrose 50%


 50ML Syringe)  25-50ML OF


 50% DW IV


 FOR...  UD  PRN


 IV  10/30/17 14:15


 11/29/17 14:14   


 


 


 Glucagon


  (Glucagon Inj)  1 mg  UD  PRN


 SQ  10/30/17 14:15


 11/29/17 14:14   


 


 


 Albuterol


  (Ventolin Hfa


 Inhaler)  2 puffs  Q6


 INH  10/30/17 18:00


 11/29/17 17:59  11/14/17 05:34


2 PUFFS


 


 Bisacodyl


  (Dulcolax Supp)  10 mg  DAILY  PRN


 NH  10/30/17 14:15


 11/29/17 14:14   


 


 


 Calcium Carbonate


  (Tums Chew Tab)  500 mg  TID  PRN


 PO  10/30/17 14:15


 11/29/17 14:14   


 


 


 Cyanocobalamin


  (Vitamin B-12


 Tab)  100 mcg  DAILY


 PO  10/31/17 08:00


 11/30/17 08:59  11/14/17 08:33


100 MCG


 


 Divalproex Sodium


  (Depakote


 Extended Rel Tab)  2,000 mg  HS


 PO  10/30/17 21:00


 11/29/17 20:59  11/13/17 19:46


2,000 MG


 


 Docusate Sodium


  (coLACE CAP)  100 mg  BID  PRN


 PO  10/30/17 14:15


 11/29/17 14:14   


 


 


 Escitalopram


 Oxalate


  (Lexapro Tab)  20 mg  QAM


 PO  10/31/17 08:00


 11/30/17 08:59  11/14/17 08:32


20 MG


 


 Fluticasone


 Propionate


  (Flonase Nasal


 Spray)  2 sprays  DAILY  PRN


 VERONICA  10/30/17 14:15


 11/29/17 14:14   


 


 


 Gabapentin


  (Neurontin Cap)  100 mg  HS


 PO  10/30/17 21:00


 11/29/17 20:59  11/13/17 19:46


100 MG


 


 Insulin Glargine


  (Lantus Solostar


 Pen)  10 units  HS


 SC  10/30/17 21:00


 11/29/17 20:59  11/13/17 19:53


10 UNITS


 


 Mirtazapine


  (Remeron Tab)  45 mg  HS


 PO  10/30/17 21:00


 11/29/17 20:59  11/13/17 19:47


45 MG


 


 Nitroglycerin


  (Nitrostat Tab)  0.4 mg  PRN  PRN


 UT  10/30/17 14:15


 11/29/17 14:14   


 


 


 Pantoprazole


 Sodium


  (Protonix Tab)  40 mg  DAILY


 PO  10/31/17 08:00


 11/30/17 08:59  11/14/17 08:32


40 MG


 


 Senna/Docusate


 Sodium


  (Senokot S Tab)  1 tab  QDL


 PO  10/31/17 11:00


 11/30/17 10:59  11/13/17 12:14


1 TAB


 


 Simvastatin


  (Zocor Tab)  40 mg  QPM


 PO  10/30/17 21:00


 11/29/17 20:59  11/13/17 19:46


40 MG


 


 Tamsulosin HCl


  (Flomax Cap)  0.4 mg  HS


 PO  10/30/17 21:00


 11/29/17 20:59  11/13/17 19:46


0.4 MG


 


 Amlodipine


 Besylate


  (Norvasc Tab)  10 mg  QAM


 PO  10/31/17 08:00


 11/30/17 08:59  11/14/17 08:34


10 MG


 


 Miscellaneous


 Information


  (Order Awaiting


 Action)  1 ea  QS


 N/A  10/31/17 00:00


 11/30/17 00:00   


 


 


 Diclofenac Sodium


  (Voltaren 1% Top


 Gel)  1 appln  QID


 EXT  10/30/17 20:45


 11/29/17 14:14  11/13/17 12:15


1 APPLN


 


 Miscellaneous


  (Iv Fluids


 Completed)  1 ea  PRN  PRN


 N/A  10/30/17 21:00


 10/30/18 20:59   


 


 


 Enteral


 Nutritional


 Formula


  (Boost Glucose


 Control)  1 can  TIDM


 PO  10/31/17 17:00


 11/30/17 16:59  11/13/17 17:31


1 CAN


 


 Clopidogrel


 Bisulfate


  (plAVix TAB)  75 mg  QAM


 PO  11/6/17 08:00


 12/6/17 07:59  11/14/17 08:34


75 MG


 


 Clonazepam


  (Klonopin Tab)  1 mg  HS


 PO  11/5/17 12:00


 11/29/17 20:59  11/13/17 19:52


1 MG


 


 Hydralazine HCl


  (Apresoline Tab)  100 mg  TID


 PO  11/5/17 16:00


 12/5/17 15:59  11/14/17 08:34


100 MG


 


 Warfarin Sodium


  (Coumadin Tab)  3 mg  DAILY@16


 PO  11/6/17 16:00


 12/6/17 15:59 Future hold 11/12/17 15:36


3 MG


 


 Enoxaparin Sodium


  (Lovenox Inj)  90 mg  Q12


 SQ  11/6/17 21:00


 12/6/17 20:59 Future Hold 11/12/17 21:51


90 MG


 


 Spironolactone


  (Aldactone Tab)  50 mg  BID17


 PO  11/7/17 17:00


 12/5/17 19:29  11/14/17 08:35


50 MG


 


 Miscellaneous


  (Remove


 Clonidine Patch)  1 ea  Q7D


 N/A  11/9/17 12:59


 12/9/17 12:58  11/9/17 14:18


1 EA


 


 Miscellaneous


 Information


  (Check Clonidine


 Patch Placement)  1 ea  QS


 N/A  11/9/17 16:00


 12/9/17 15:59  11/14/17 08:35


1 EA


 


 Oxycodone HCl


  (Roxicodone


 Immediate Rel Tab)  10 mg  Q3H  PRN


 PO  11/13/17 18:00


 11/27/17 17:59  11/13/17 19:47


10 MG


 


 Labetalol HCl


  (Normodyne Tab)  100 mg  BID


 PO  11/14/17 20:00


 12/14/17 19:59   


 











Impression





(1) Accelerated hypertension


(2) PAD (peripheral artery disease)


(3) Diastolic CHF, chronic


(4) LVH (left ventricular hypertrophy)


Paramjit is a 61-year-old  male with DMII, hypertension, dyslipidemia, CAD

, chronic diastolic CHF, diffuse calcific vascular disease, PAD and depression 

as well as bipolar disorder. He has chronic urinary retention and an indwelling 

High. He was admitted with headache. Hospitalization complicated by 

accelerated hypertension.





Patient does have significant PVD.  There is a difference in blood pressure 

readings between arms.  All blood pressures should be obtained from the L arm 

while in the seated position.





Recommendations


HYPERTENSION:


-- Current regimen consists of Metoprolol Succinate, Amlodipine, Hydralazine, 

Spironolactone and Clonidine TTS-3


-- Lisinopril stopped due to progressive renal insufficiency


-- Renal artery doppler was negative for KIANA


-- Need to simplify regimen prior to discharge.  Combination Metoprolol + 

Clonidine may be contributing to progressive bradycardia.  Hydralazine may not 

provide additional vasodilatory effect over Amlodipine.  Recommend changing 

regimen to Labetalol 100 mg BID, Amlodipine 10 mg qAM and Spironolactone 50 mg 

po BID.





ACUTE KIDNEY INJURY:


-- Continue to hold Lisinopril


-- Patient was transfused 2 U PRBC yesterday.  Kidney function is now 

improving.  Patient is nonoliguric 


-- Monitor PRP 





ANEMIA:


-- Hgb has improved from 7.2 to 9.2 following 2 U PRBC.  FOBT was negative x 1.

  Will monitor.





OTHER:


-- If discharge is anticipated please schedule hospital follow up visit w/ Dr. Felix Mullins ([facility] [place] [facility] [phone]) in 1 - 2 weeks

## 2017-11-15 VITALS
DIASTOLIC BLOOD PRESSURE: 121 MMHG | SYSTOLIC BLOOD PRESSURE: 172 MMHG | HEART RATE: 63 BPM | OXYGEN SATURATION: 90 % | TEMPERATURE: 98.06 F

## 2017-11-15 VITALS — HEART RATE: 62 BPM

## 2017-11-15 VITALS — OXYGEN SATURATION: 91 % | HEART RATE: 57 BPM | TEMPERATURE: 98.42 F

## 2017-11-15 VITALS — HEART RATE: 48 BPM | DIASTOLIC BLOOD PRESSURE: 82 MMHG | SYSTOLIC BLOOD PRESSURE: 170 MMHG

## 2017-11-15 VITALS — TEMPERATURE: 97.34 F | SYSTOLIC BLOOD PRESSURE: 164 MMHG | DIASTOLIC BLOOD PRESSURE: 60 MMHG | OXYGEN SATURATION: 96 %

## 2017-11-15 VITALS
OXYGEN SATURATION: 94 % | TEMPERATURE: 97.7 F | HEART RATE: 60 BPM | SYSTOLIC BLOOD PRESSURE: 152 MMHG | DIASTOLIC BLOOD PRESSURE: 60 MMHG

## 2017-11-15 VITALS — DIASTOLIC BLOOD PRESSURE: 74 MMHG | SYSTOLIC BLOOD PRESSURE: 168 MMHG

## 2017-11-15 LAB
ANION GAP SERPL CALC-SCNC: 7 MMOL/L (ref 3–11)
BUN SERPL-MCNC: 43 MG/DL (ref 7–18)
BUN/CREAT SERPL: 29.9 (ref 10–20)
CALCIUM SERPL-MCNC: 8.7 MG/DL (ref 8.5–10.1)
CHLORIDE SERPL-SCNC: 107 MMOL/L (ref 98–107)
CO2 SERPL-SCNC: 28 MMOL/L (ref 21–32)
CREAT CL PREDICTED SERPL C-G-VRATE: 62.7 ML/MIN
CREAT SERPL-MCNC: 1.42 MG/DL (ref 0.6–1.4)
EOSINOPHIL NFR BLD AUTO: 162 K/UL (ref 130–400)
GLUCOSE SERPL-MCNC: 114 MG/DL (ref 70–99)
HCT VFR BLD CALC: 27.1 % (ref 42–52)
INR PPP: 1.1 (ref 0.9–1.1)
MCH RBC QN AUTO: 27.9 PG (ref 25–34)
MCHC RBC AUTO-ENTMCNC: 32.1 G/DL (ref 32–36)
MCV RBC AUTO: 86.9 FL (ref 80–100)
PMV BLD AUTO: 8.7 FL (ref 7.4–10.4)
POTASSIUM SERPL-SCNC: 4.4 MMOL/L (ref 3.5–5.1)
PROTHROMBIN TIME: 11.5 SECONDS (ref 9–12)
RBC # BLD AUTO: 3.12 M/UL (ref 4.7–6.1)
SODIUM SERPL-SCNC: 142 MMOL/L (ref 136–145)
WBC # BLD AUTO: 5.72 K/UL (ref 4.8–10.8)

## 2017-11-15 RX ADMIN — Medication SCH CAN: at 12:33

## 2017-11-15 RX ADMIN — PANTOPRAZOLE SCH MG: 40 TABLET, DELAYED RELEASE ORAL at 07:57

## 2017-11-15 RX ADMIN — ALBUTEROL SULFATE SCH PUFFS: 90 AEROSOL, METERED RESPIRATORY (INHALATION) at 17:17

## 2017-11-15 RX ADMIN — WARFARIN SCH MG: 3 TABLET ORAL at 17:14

## 2017-11-15 RX ADMIN — SPIRONOLACTONE SCH MG: 25 TABLET, FILM COATED ORAL at 07:58

## 2017-11-15 RX ADMIN — SPIRONOLACTONE SCH MG: 25 TABLET, FILM COATED ORAL at 17:15

## 2017-11-15 RX ADMIN — STANDARDIZED SENNA CONCENTRATE AND DOCUSATE SODIUM SCH TAB: 8.6; 5 TABLET ORAL at 12:33

## 2017-11-15 RX ADMIN — INSULIN GLARGINE SCH UNITS: 100 INJECTION, SOLUTION SUBCUTANEOUS at 20:03

## 2017-11-15 RX ADMIN — Medication SCH CAN: at 17:00

## 2017-11-15 RX ADMIN — ESCITALOPRAM OXALATE SCH MG: 20 TABLET, FILM COATED ORAL at 07:56

## 2017-11-15 RX ADMIN — ACETAMINOPHEN PRN MG: 325 TABLET ORAL at 14:12

## 2017-11-15 RX ADMIN — Medication SCH CAN: at 08:00

## 2017-11-15 RX ADMIN — OXYCODONE HYDROCHLORIDE PRN MG: 5 TABLET ORAL at 23:21

## 2017-11-15 RX ADMIN — DICLOFENAC SODIUM SCH APPLN: 10 GEL TOPICAL at 08:00

## 2017-11-15 RX ADMIN — DICLOFENAC SODIUM SCH APPLN: 10 GEL TOPICAL at 17:00

## 2017-11-15 RX ADMIN — DIVALPROEX SODIUM SCH MG: 500 TABLET, FILM COATED, EXTENDED RELEASE ORAL at 20:00

## 2017-11-15 RX ADMIN — OXYCODONE HYDROCHLORIDE PRN MG: 5 TABLET ORAL at 11:09

## 2017-11-15 RX ADMIN — ALBUTEROL SULFATE SCH PUFFS: 90 AEROSOL, METERED RESPIRATORY (INHALATION) at 12:33

## 2017-11-15 RX ADMIN — ALBUTEROL SULFATE SCH PUFFS: 90 AEROSOL, METERED RESPIRATORY (INHALATION) at 06:00

## 2017-11-15 RX ADMIN — LABETALOL HYDROCHLORIDE SCH MG: 100 TABLET, FILM COATED ORAL at 20:00

## 2017-11-15 RX ADMIN — AMLODIPINE BESYLATE SCH MG: 5 TABLET ORAL at 07:58

## 2017-11-15 RX ADMIN — GABAPENTIN SCH MG: 100 CAPSULE ORAL at 19:58

## 2017-11-15 RX ADMIN — CLOPIDOGREL BISULFATE SCH MG: 75 TABLET, FILM COATED ORAL at 07:56

## 2017-11-15 RX ADMIN — LABETALOL HYDROCHLORIDE SCH MG: 100 TABLET, FILM COATED ORAL at 07:57

## 2017-11-15 RX ADMIN — FERROUS SULFATE TAB EC 325 MG (65 MG FE EQUIVALENT) SCH MG: 325 (65 FE) TABLET DELAYED RESPONSE at 17:16

## 2017-11-15 RX ADMIN — DICLOFENAC SODIUM SCH APPLN: 10 GEL TOPICAL at 12:33

## 2017-11-15 RX ADMIN — MIRTAZAPINE SCH MG: 15 TABLET, FILM COATED ORAL at 20:01

## 2017-11-15 RX ADMIN — Medication SCH MCG: at 07:56

## 2017-11-15 RX ADMIN — SIMVASTATIN SCH MG: 40 TABLET, FILM COATED ORAL at 19:59

## 2017-11-15 RX ADMIN — TAMSULOSIN HYDROCHLORIDE SCH MG: 0.4 CAPSULE ORAL at 19:59

## 2017-11-15 RX ADMIN — DICLOFENAC SODIUM SCH APPLN: 10 GEL TOPICAL at 19:54

## 2017-11-15 RX ADMIN — STANDARDIZED SENNA CONCENTRATE AND DOCUSATE SODIUM SCH TAB: 8.6; 5 TABLET ORAL at 12:34

## 2017-11-15 RX ADMIN — CLONAZEPAM SCH MG: 0.5 TABLET ORAL at 19:56

## 2017-11-15 NOTE — HOSPITALIST PROGRESS NOTE
Hospitalist Progress Note


Date of Service


Nov 15, 2017.





Subjective


Pt evaluation today including:  conversation w/ patient, physical exam, lab 

review, review of inpatient medication list


Voiding:  gillespie catheter in place (draining clear/yellow urine )


Patient resting in bed. States he is feeling well. 


Eating and drinking OK. 


Discussed the need to work w/ PT today- agrees.


Eating and drinking OK.





Patient denies any fever, chills, sweats, lightheadedness, dizziness, vision 

changes, CP, palpitations, edema, SOB, wheezing, cough, abdominal pain, nausea, 

vomiting, diarrhea, urinary symptoms, melena, numbness/tingling, weakness, 

muscle/joint pain, anxiety/depression, active bleeding, or new skin 

discoloration/changes.





Medications





Current Inpatient Medications








 Medications


  (Trade)  Dose


 Ordered  Sig/Dc


 Route  Start Time


 Stop Time Status Last Admin


Dose Admin


 


 Acetaminophen


  (Tylenol Tab)  650 mg  Q4H  PRN


 PO  10/30/17 14:15


 11/29/17 14:14  11/14/17 20:53


650 MG


 


 Al Hydrox/Mg


 Hydrox/Simethicone


  (Maalox Max Susp)  15 ml  Q4H  PRN


 PO  10/30/17 14:15


 11/29/17 14:14   


 


 


 Magnesium


 Hydroxide


  (Milk Of


 Magnesia Susp)  30 ml  Q6H  PRN


 PO  10/30/17 14:15


 11/29/17 14:14   


 


 


 Polyethylene


  (Miralax Powder


 Packet)  17 gm  DAILY  PRN


 PO  10/30/17 14:15


 11/29/17 14:14   


 


 


 Ondansetron HCl


  (Zofran Inj)  4 mg  Q6H  PRN


 IV  10/30/17 14:15


 11/29/17 14:14   


 


 


 Glucose


  (Glucose 40% Gel)  15-30


 GRAMS 15


 GRAMS...  UD  PRN


 PO  10/30/17 14:15


 11/29/17 14:14   


 


 


 Glucose


  (Glucose Chew


 Tab)  4-8


 Tablets 4


 Tabl...  UD  PRN


 PO  10/30/17 14:15


 11/29/17 14:14   


 


 


 Dextrose


  (Dextrose 50%


 50ML Syringe)  25-50ML OF


 50% DW IV


 FOR...  UD  PRN


 IV  10/30/17 14:15


 11/29/17 14:14   


 


 


 Glucagon


  (Glucagon Inj)  1 mg  UD  PRN


 SQ  10/30/17 14:15


 11/29/17 14:14   


 


 


 Albuterol


  (Ventolin Hfa


 Inhaler)  2 puffs  Q6


 INH  10/30/17 18:00


 11/29/17 17:59  11/15/17 12:33


2 PUFFS


 


 Bisacodyl


  (Dulcolax Supp)  10 mg  DAILY  PRN


 IL  10/30/17 14:15


 11/29/17 14:14   


 


 


 Calcium Carbonate


  (Tums Chew Tab)  500 mg  TID  PRN


 PO  10/30/17 14:15


 11/29/17 14:14   


 


 


 Cyanocobalamin


  (Vitamin B-12


 Tab)  100 mcg  DAILY


 PO  10/31/17 08:00


 11/30/17 08:59  11/15/17 07:56


100 MCG


 


 Divalproex Sodium


  (Depakote


 Extended Rel Tab)  2,000 mg  HS


 PO  10/30/17 21:00


 11/29/17 20:59  11/14/17 20:44


2,000 MG


 


 Docusate Sodium


  (coLACE CAP)  100 mg  BID  PRN


 PO  10/30/17 14:15


 11/29/17 14:14   


 


 


 Escitalopram


 Oxalate


  (Lexapro Tab)  20 mg  QAM


 PO  10/31/17 08:00


 11/30/17 08:59  11/15/17 07:56


20 MG


 


 Fluticasone


 Propionate


  (Flonase Nasal


 Spray)  2 sprays  DAILY  PRN


 VERONICA  10/30/17 14:15


 11/29/17 14:14   


 


 


 Gabapentin


  (Neurontin Cap)  100 mg  HS


 PO  10/30/17 21:00


 11/29/17 20:59  11/14/17 20:30


100 MG


 


 Insulin Glargine


  (Lantus Solostar


 Pen)  10 units  HS


 SC  10/30/17 21:00


 11/29/17 20:59  11/14/17 20:55


10 UNITS


 


 Mirtazapine


  (Remeron Tab)  45 mg  HS


 PO  10/30/17 21:00


 11/29/17 20:59  11/14/17 20:46


45 MG


 


 Nitroglycerin


  (Nitrostat Tab)  0.4 mg  PRN  PRN


 UT  10/30/17 14:15


 11/29/17 14:14   


 


 


 Pantoprazole


 Sodium


  (Protonix Tab)  40 mg  DAILY


 PO  10/31/17 08:00


 11/30/17 08:59  11/15/17 07:57


40 MG


 


 Senna/Docusate


 Sodium


  (Senokot S Tab)  1 tab  QDL


 PO  10/31/17 11:00


 11/30/17 10:59  11/13/17 12:14


1 TAB


 


 Simvastatin


  (Zocor Tab)  40 mg  QPM


 PO  10/30/17 21:00


 11/29/17 20:59  11/14/17 20:46


40 MG


 


 Tamsulosin HCl


  (Flomax Cap)  0.4 mg  HS


 PO  10/30/17 21:00


 11/29/17 20:59  11/14/17 20:45


0.4 MG


 


 Amlodipine


 Besylate


  (Norvasc Tab)  10 mg  QAM


 PO  10/31/17 08:00


 11/30/17 08:59  11/15/17 07:58


10 MG


 


 Miscellaneous


 Information


  (Order Awaiting


 Action)  1 ea  QS


 N/A  10/31/17 00:00


 11/30/17 00:00   


 


 


 Diclofenac Sodium


  (Voltaren 1% Top


 Gel)  1 appln  QID


 EXT  10/30/17 20:45


 11/29/17 14:14  11/14/17 20:29


1 APPLN


 


 Miscellaneous


  (Iv Fluids


 Completed)  1 ea  PRN  PRN


 N/A  10/30/17 21:00


 10/30/18 20:59   


 


 


 Enteral


 Nutritional


 Formula


  (Boost Glucose


 Control)  1 can  TIDM


 PO  10/31/17 17:00


 11/30/17 16:59  11/13/17 17:31


1 CAN


 


 Clopidogrel


 Bisulfate


  (plAVix TAB)  75 mg  QAM


 PO  11/6/17 08:00


 12/6/17 07:59  11/15/17 07:56


75 MG


 


 Clonazepam


  (Klonopin Tab)  1 mg  HS


 PO  11/5/17 12:00


 11/29/17 20:59  11/14/17 20:49


1 MG


 


 Warfarin Sodium


  (Coumadin Tab)  3 mg  DAILY@16


 PO  11/6/17 16:00


 12/6/17 15:59 Future hold 11/14/17 16:11


3 MG


 


 Enoxaparin Sodium


  (Lovenox Inj)  90 mg  Q12


 SQ  11/6/17 21:00


 12/6/17 20:59 Future Hold 11/12/17 21:51


90 MG


 


 Spironolactone


  (Aldactone Tab)  50 mg  BID17


 PO  11/7/17 17:00


 12/5/17 19:29  11/15/17 07:58


50 MG


 


 Oxycodone HCl


  (Roxicodone


 Immediate Rel Tab)  10 mg  Q3H  PRN


 PO  11/13/17 18:00


 11/27/17 17:59  11/15/17 11:09


10 MG


 


 Labetalol HCl


  (Normodyne Tab)  100 mg  BID


 PO  11/14/17 20:00


 12/14/17 19:59  11/15/17 07:57


100 MG











Objective


Vital Signs











  Date Time  Temp Pulse Resp B/P (MAP) Pulse Ox O2 Delivery O2 Flow Rate FiO2


 


11/15/17 09:00      Room Air  


 


11/15/17 08:45  62      


 


11/15/17 08:04      Room Air  


 


11/15/17 07:49 36.5 60 18 152/60 (90) 94   


 


11/15/17 00:22 36.9 57 20  91 Room Air  


 


11/15/17 00:00      Room Air  


 


11/14/17 20:41  66 16 182/54 (96)    


 


11/14/17 20:32  63  195/66 (109)    


 


11/14/17 16:00      Room Air  


 


11/14/17 15:33 36.7 49 20 167/64 (98) 96 Room Air  











Physical Exam


General Appearance:  no apparent distress


Eyes:  PERRL


ENT:  hearing grossly normal


Neck:  supple


Respiratory/Chest:  lungs clear, no respiratory distress, no accessory muscle 

use


Cardiovascular:  regular rate, rhythm


Abdomen:  normal bowel sounds, non tender, soft


Extremities:  + pertinent finding (RLE wound vac )


Neurologic/Psychiatric:  alert, oriented x 3


Skin:  normal color, warm/dry, no rash





Laboratory Results





Last 24 Hours








Test


  11/14/17


16:24 11/14/17


19:43 11/15/17


05:38 11/15/17


07:36


 


Bedside Glucose 169 mg/dl  175 mg/dl   113 mg/dl 


 


White Blood Count   5.72 K/uL  


 


Red Blood Count   3.12 M/uL  


 


Hemoglobin   8.7 g/dL  


 


Hematocrit   27.1 %  


 


Mean Corpuscular Volume   86.9 fL  


 


Mean Corpuscular Hemoglobin   27.9 pg  


 


Mean Corpuscular Hemoglobin


Concent 


  


  32.1 g/dl 


  


 


 


RDW Standard Deviation   48.1 fL  


 


RDW Coefficient of Variation   15.1 %  


 


Platelet Count   162 K/uL  


 


Mean Platelet Volume   8.7 fL  


 


Prothrombin Time   11.5 SECONDS  


 


Prothromb Time International


Ratio 


  


  1.1 


  


 


 


Sodium Level   142 mmol/L  


 


Potassium Level   4.4 mmol/L  


 


Chloride Level   107 mmol/L  


 


Carbon Dioxide Level   28 mmol/L  


 


Anion Gap   7.0 mmol/L  


 


Blood Urea Nitrogen   43 mg/dl  


 


Creatinine   1.42 mg/dl  


 


Est Creatinine Clear Calc


Drug Dose 


  


  62.7 ml/min 


  


 


 


Estimated GFR (


American) 


  


  62.2 


  


 


 


Estimated GFR (Non-


American 


  


  53.7 


  


 


 


BUN/Creatinine Ratio   29.9  


 


Random Glucose   114 mg/dl  


 


Calcium Level   8.7 mg/dl  


 


Test


  11/15/17


11:22 


  


  


 


 


Bedside Glucose 157 mg/dl    











Assessment and Plan


60 y/o male, with PMHx of CAD, h/o MI s/p stents, HTN, HLD, diastolic CHF, COPD

, DM II, anxiety, depression, bipolar disorder, anemia and GERD, who presented 

to the ED because of persistent headache and elevated INR. 





Acute on chronic anemia, likely due to chronic disease anemia- baseline hgb 9.0-

10.0:   


- Iron studies, b12, folate- reviewed and WNL 


- Fecal occult negative 


- Start Ferrous sulfate supplement BID 


- Consent obtained- transfuse 2 u PRBC on 11/13 due to hgb of 7.2


- Follow H&H- STABLE 





Supratherapeutic INR at >8.0- RESOLVED: Held Coumadin until INR within goal 

range while supratherapeutic 





h/o thrombus: Coumadin resumed- following PT/INR and adjusting dosage PRN for 

INR goal of 2-3





FITZ- STABLE:


- Holding nephrotoxic agents (Lisinopril) and renally dosing medication 


- Nephrology consulted, appreciate recommendations


   -- f/u w/ Dr. Mullins in 1-2 weeks after discharge  





Accelerated HTN- STABLE: 


- Lisinopril held due to FITZ   


- CTA unremarkable for significant renal artery stenosis


- Nephrology following 


   -- Simplified regimen- Labetalol 100 mg BID, Amlodipine 10 QAM, and 

Spironolactone 50 mg BID





Recent headaches, ?secondary to cervical spine DJD and accelerated HTN- RESOLVED

:


- BCx negative 


- Voltaren gel QID and heating pad 





CAD, h/o MI s/p stents- STABLE: Continue Zocor 40 mg daily, Plavix 75 mg daily, 

Labetalol 100 mg BID 





PAD s/p stents of LEs- Dr. Rainey 2017: Continue Plavix 





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 Paz & wound care 

team 





HLD: Continue Zocor 40 mg daily 





Chronic diastolic CHF- COMPENSATED: Continue Aldactone 50 mg BID 





COPD- STABLE: Continue home inhalers 





T2DM w/ neuropathy- CONTROLLED: 


- Continue Lantus 10 u HS 


- BSG ACHS and ISS 


- Continue Gabapentin 100 mg HS





Bipolar disorder, anxiety, depression : 


- Continue Depakote 2,000 mg HS- Depakote level reviewed 


- Continue Klonopin 1 mg HS, Remeron 45 mg HS, Lexapro 20 mg HS  


- Psychiatry consulted due to active depression, appreciate recommendations- no 

new recommendations at this time 





Urinary retention w/ chronic Gillespie: Continue Flomax 0.4 mg HS 





GI prophylaxis: Protonix 40 mg daily





DVT prophylaxis: Coumadin 





Code Status: LEVEL I, FULL 





Dispo: Medically stable for discharge pending placement, planning for [facility]- PT/OT and CM following

## 2017-11-15 NOTE — NEPHROLOGY PROGRESS NOTE
Nephrology Progress Note


Date of Service


Nov 15, 2017.





Chief Complaint


Hypertension, FITZ





Subjective


Mr. Dixon was seen & examined in his hospital room this morning.  He denied 

HA or overt blood loss.  He is tolerating his blood pressure regimen without 

side effect.  He is awaiting transfer to [facility].





Review of Systems


Constitutional:  No fever


Cardiovascular:  No chest pain


Respiratory:  No dyspnea at rest


Abdomen:  No pain, No nausea, No vomiting


Extremities:  No leg edema


A complete review of systems was performed.  Pertinent positives are noted 

above. All other systems are negative.





Vital Signs





Last 8 Hrs








  Date Time  Temp Pulse Resp B/P (MAP) Pulse Ox O2 Delivery O2 Flow Rate FiO2


 


11/15/17 09:00      Room Air  


 


11/15/17 08:04      Room Air  


 


11/15/17 07:49 36.5 60 18 152/60 (90) 94   











Last Recorded Weight


Weight (Kilograms):  88.500





Physical Exam


General Appearance:  no apparent distress


Head:  normocephalic, atraumatic


Eyes:  PERRL


Neck:  no adenopathy


Respiratory/Chest:  lungs clear


Cardiovascular:  regular rate, rhythm


Abdomen/GI:  normal bowel sounds, non tender, soft


Extremities/Musculoskelatal:  no calf tenderness, no pedal edema


Neurologic/Psych:  alert, oriented x 3





Family History





Cancer (esophageal)


Diabetes mellitus


Heart disease


Hypertension





Social History


Smokeless Tobacco Use:  No


Alcohol Use:  none


Drug Use:  none


Marital Status:  


Housing Status:  other ([facility])


Occupation:  employed





Laboratory Results


Past 24 Hours


11/15/17 05:38








11/15/17 05:38

















Test


  11/14/17


11:32 11/14/17


16:24 11/14/17


19:43 11/15/17


05:38


 


Bedside Glucose


  124 mg/dl


(70-99) 169 mg/dl


(70-99) 175 mg/dl


(70-99) 


 


 


Red Blood Count


  


  


  


  3.12 M/uL


(4.7-6.1)


 


Mean Corpuscular Volume


  


  


  


  86.9 fL


()


 


Mean Corpuscular Hemoglobin


  


  


  


  27.9 pg


(25-34)


 


Mean Corpuscular Hemoglobin


Concent 


  


  


  32.1 g/dl


(32-36)


 


RDW Standard Deviation


  


  


  


  48.1 fL


(36.4-46.3)


 


RDW Coefficient of Variation


  


  


  


  15.1 %


(11.5-14.5)


 


Mean Platelet Volume


  


  


  


  8.7 fL


(7.4-10.4)


 


Prothrombin Time


  


  


  


  11.5 SECONDS


(9.0-12.0)


 


Prothromb Time International


Ratio 


  


  


  1.1 (0.9-1.1) 


 


 


Anion Gap


  


  


  


  7.0 mmol/L


(3-11)


 


Est Creatinine Clear Calc


Drug Dose 


  


  


  62.7 ml/min 


 


 


Estimated GFR (


American) 


  


  


  62.2 


 


 


Estimated GFR (Non-


American 


  


  


  53.7 


 


 


BUN/Creatinine Ratio    29.9 (10-20) 


 


Calcium Level


  


  


  


  8.7 mg/dl


(8.5-10.1)


 


Test


  11/15/17


07:36 


  


  


 


 


Bedside Glucose


  113 mg/dl


(70-99) 


  


  


 











Allergies


Coded Allergies:  


     No Known Allergies (Verified , 7/19/17)





Medications





Current Inpatient Medications








 Medications


  (Trade)  Dose


 Ordered  Sig/Dc


 Route  Start Time


 Stop Time Status Last Admin


Dose Admin


 


 Acetaminophen


  (Tylenol Tab)  650 mg  Q4H  PRN


 PO  10/30/17 14:15


 11/29/17 14:14  11/14/17 20:53


650 MG


 


 Al Hydrox/Mg


 Hydrox/Simethicone


  (Maalox Max Susp)  15 ml  Q4H  PRN


 PO  10/30/17 14:15


 11/29/17 14:14   


 


 


 Magnesium


 Hydroxide


  (Milk Of


 Magnesia Susp)  30 ml  Q6H  PRN


 PO  10/30/17 14:15


 11/29/17 14:14   


 


 


 Polyethylene


  (Miralax Powder


 Packet)  17 gm  DAILY  PRN


 PO  10/30/17 14:15


 11/29/17 14:14   


 


 


 Ondansetron HCl


  (Zofran Inj)  4 mg  Q6H  PRN


 IV  10/30/17 14:15


 11/29/17 14:14   


 


 


 Glucose


  (Glucose 40% Gel)  15-30


 GRAMS 15


 GRAMS...  UD  PRN


 PO  10/30/17 14:15


 11/29/17 14:14   


 


 


 Glucose


  (Glucose Chew


 Tab)  4-8


 Tablets 4


 Tabl...  UD  PRN


 PO  10/30/17 14:15


 11/29/17 14:14   


 


 


 Dextrose


  (Dextrose 50%


 50ML Syringe)  25-50ML OF


 50% DW IV


 FOR...  UD  PRN


 IV  10/30/17 14:15


 11/29/17 14:14   


 


 


 Glucagon


  (Glucagon Inj)  1 mg  UD  PRN


 SQ  10/30/17 14:15


 11/29/17 14:14   


 


 


 Albuterol


  (Ventolin Hfa


 Inhaler)  2 puffs  Q6


 INH  10/30/17 18:00


 11/29/17 17:59  11/14/17 18:49


2 PUFFS


 


 Bisacodyl


  (Dulcolax Supp)  10 mg  DAILY  PRN


 UT  10/30/17 14:15


 11/29/17 14:14   


 


 


 Calcium Carbonate


  (Tums Chew Tab)  500 mg  TID  PRN


 PO  10/30/17 14:15


 11/29/17 14:14   


 


 


 Cyanocobalamin


  (Vitamin B-12


 Tab)  100 mcg  DAILY


 PO  10/31/17 08:00


 11/30/17 08:59  11/15/17 07:56


100 MCG


 


 Divalproex Sodium


  (Depakote


 Extended Rel Tab)  2,000 mg  HS


 PO  10/30/17 21:00


 11/29/17 20:59  11/14/17 20:44


2,000 MG


 


 Docusate Sodium


  (coLACE CAP)  100 mg  BID  PRN


 PO  10/30/17 14:15


 11/29/17 14:14   


 


 


 Escitalopram


 Oxalate


  (Lexapro Tab)  20 mg  QAM


 PO  10/31/17 08:00


 11/30/17 08:59  11/15/17 07:56


20 MG


 


 Fluticasone


 Propionate


  (Flonase Nasal


 Spray)  2 sprays  DAILY  PRN


 VERONICA  10/30/17 14:15


 11/29/17 14:14   


 


 


 Gabapentin


  (Neurontin Cap)  100 mg  HS


 PO  10/30/17 21:00


 11/29/17 20:59  11/14/17 20:30


100 MG


 


 Insulin Glargine


  (Lantus Solostar


 Pen)  10 units  HS


 SC  10/30/17 21:00


 11/29/17 20:59  11/14/17 20:55


10 UNITS


 


 Mirtazapine


  (Remeron Tab)  45 mg  HS


 PO  10/30/17 21:00


 11/29/17 20:59  11/14/17 20:46


45 MG


 


 Nitroglycerin


  (Nitrostat Tab)  0.4 mg  PRN  PRN


 UT  10/30/17 14:15


 11/29/17 14:14   


 


 


 Pantoprazole


 Sodium


  (Protonix Tab)  40 mg  DAILY


 PO  10/31/17 08:00


 11/30/17 08:59  11/15/17 07:57


40 MG


 


 Senna/Docusate


 Sodium


  (Senokot S Tab)  1 tab  QDL


 PO  10/31/17 11:00


 11/30/17 10:59  11/13/17 12:14


1 TAB


 


 Simvastatin


  (Zocor Tab)  40 mg  QPM


 PO  10/30/17 21:00


 11/29/17 20:59  11/14/17 20:46


40 MG


 


 Tamsulosin HCl


  (Flomax Cap)  0.4 mg  HS


 PO  10/30/17 21:00


 11/29/17 20:59  11/14/17 20:45


0.4 MG


 


 Amlodipine


 Besylate


  (Norvasc Tab)  10 mg  QAM


 PO  10/31/17 08:00


 11/30/17 08:59  11/15/17 07:58


10 MG


 


 Miscellaneous


 Information


  (Order Awaiting


 Action)  1 ea  QS


 N/A  10/31/17 00:00


 11/30/17 00:00   


 


 


 Diclofenac Sodium


  (Voltaren 1% Top


 Gel)  1 appln  QID


 EXT  10/30/17 20:45


 11/29/17 14:14  11/14/17 20:29


1 APPLN


 


 Miscellaneous


  (Iv Fluids


 Completed)  1 ea  PRN  PRN


 N/A  10/30/17 21:00


 10/30/18 20:59   


 


 


 Enteral


 Nutritional


 Formula


  (Boost Glucose


 Control)  1 can  TIDM


 PO  10/31/17 17:00


 11/30/17 16:59  11/13/17 17:31


1 CAN


 


 Clopidogrel


 Bisulfate


  (plAVix TAB)  75 mg  QAM


 PO  11/6/17 08:00


 12/6/17 07:59  11/15/17 07:56


75 MG


 


 Clonazepam


  (Klonopin Tab)  1 mg  HS


 PO  11/5/17 12:00


 11/29/17 20:59  11/14/17 20:49


1 MG


 


 Warfarin Sodium


  (Coumadin Tab)  3 mg  DAILY@16


 PO  11/6/17 16:00


 12/6/17 15:59 Future hold 11/14/17 16:11


3 MG


 


 Enoxaparin Sodium


  (Lovenox Inj)  90 mg  Q12


 SQ  11/6/17 21:00


 12/6/17 20:59 Future Hold 11/12/17 21:51


90 MG


 


 Spironolactone


  (Aldactone Tab)  50 mg  BID17


 PO  11/7/17 17:00


 12/5/17 19:29  11/15/17 07:58


50 MG


 


 Oxycodone HCl


  (Roxicodone


 Immediate Rel Tab)  10 mg  Q3H  PRN


 PO  11/13/17 18:00


 11/27/17 17:59  11/14/17 19:20


10 MG


 


 Labetalol HCl


  (Normodyne Tab)  100 mg  BID


 PO  11/14/17 20:00


 12/14/17 19:59  11/15/17 07:57


100 MG











Impression





(1) Accelerated hypertension


(2) PAD (peripheral artery disease)


(3) Diastolic CHF, chronic


(4) LVH (left ventricular hypertrophy)


Paramjit is a 61-year-old  male with DMII, hypertension, dyslipidemia, CAD

, chronic diastolic CHF, diffuse calcific vascular disease, PAD and depression 

as well as bipolar disorder. He has chronic urinary retention and an indwelling 

High. He was admitted with headache. Hospitalization complicated by 

accelerated hypertension.





Patient does have significant PVD.  There is a difference in blood pressure 

readings between arms.  All blood pressures should be obtained from the L arm 

while in the seated position.





Recommendations


HYPERTENSION:


-- Lisinopril stopped due to progressive renal insufficiency


-- Renal artery doppler was negative for KIANA


-- Medical regimen changed to Labetalol 100 mg BID, Amlodipine 10 mg qAM and 

Spironolactone 50 mg po BID this am.





ACUTE KIDNEY INJURY:


-- Continue to hold Lisinopril


-- Patient was transfused 2 U PRBC yesterday.  Kidney function continues to 

gradually improve.  Patient is nonoliguric 


-- Monitor PRP 





ANEMIA:


-- Hgb is trending down.  FOBT was negative x 1. 


-- Will order iron studies and repeat FOBT





OTHER:


-- If discharge is anticipated please schedule hospital follow up visit w/ Dr. Felix Mullins ([facility] [place] [facility] [phone]) in 1 - 2 weeks

## 2017-11-16 VITALS — SYSTOLIC BLOOD PRESSURE: 158 MMHG | HEART RATE: 52 BPM | DIASTOLIC BLOOD PRESSURE: 76 MMHG

## 2017-11-16 VITALS — SYSTOLIC BLOOD PRESSURE: 142 MMHG | DIASTOLIC BLOOD PRESSURE: 68 MMHG | HEART RATE: 80 BPM

## 2017-11-16 VITALS — SYSTOLIC BLOOD PRESSURE: 148 MMHG | DIASTOLIC BLOOD PRESSURE: 72 MMHG | HEART RATE: 72 BPM

## 2017-11-16 VITALS — SYSTOLIC BLOOD PRESSURE: 154 MMHG | HEART RATE: 56 BPM | DIASTOLIC BLOOD PRESSURE: 72 MMHG

## 2017-11-16 VITALS
HEART RATE: 64 BPM | TEMPERATURE: 97.52 F | DIASTOLIC BLOOD PRESSURE: 70 MMHG | OXYGEN SATURATION: 97 % | SYSTOLIC BLOOD PRESSURE: 150 MMHG

## 2017-11-16 VITALS
SYSTOLIC BLOOD PRESSURE: 154 MMHG | OXYGEN SATURATION: 96 % | TEMPERATURE: 97.52 F | DIASTOLIC BLOOD PRESSURE: 72 MMHG | HEART RATE: 56 BPM

## 2017-11-16 VITALS — OXYGEN SATURATION: 96 %

## 2017-11-16 VITALS
HEART RATE: 52 BPM | DIASTOLIC BLOOD PRESSURE: 78 MMHG | TEMPERATURE: 97.52 F | OXYGEN SATURATION: 96 % | SYSTOLIC BLOOD PRESSURE: 160 MMHG

## 2017-11-16 LAB
ANION GAP SERPL CALC-SCNC: 8 MMOL/L (ref 3–11)
BUN SERPL-MCNC: 38 MG/DL (ref 7–18)
BUN/CREAT SERPL: 29.5 (ref 10–20)
CALCIUM SERPL-MCNC: 8.4 MG/DL (ref 8.5–10.1)
CHLORIDE SERPL-SCNC: 108 MMOL/L (ref 98–107)
CO2 SERPL-SCNC: 26 MMOL/L (ref 21–32)
CREAT CL PREDICTED SERPL C-G-VRATE: 69.1 ML/MIN
CREAT SERPL-MCNC: 1.29 MG/DL (ref 0.6–1.4)
EOSINOPHIL NFR BLD AUTO: 186 K/UL (ref 130–400)
FERRITIN SERPL-MCNC: 93.9 NG/ML (ref 8–388)
GLUCOSE SERPL-MCNC: 146 MG/DL (ref 70–99)
HCT VFR BLD CALC: 27.8 % (ref 42–52)
MCH RBC QN AUTO: 28.1 PG (ref 25–34)
MCHC RBC AUTO-ENTMCNC: 32.4 G/DL (ref 32–36)
MCV RBC AUTO: 86.9 FL (ref 80–100)
PMV BLD AUTO: 9.1 FL (ref 7.4–10.4)
POTASSIUM SERPL-SCNC: 4.3 MMOL/L (ref 3.5–5.1)
RBC # BLD AUTO: 3.2 M/UL (ref 4.7–6.1)
SODIUM SERPL-SCNC: 142 MMOL/L (ref 136–145)
TIBC SERPL-MCNC: 255 MCG/DL (ref 250–450)
WBC # BLD AUTO: 5.54 K/UL (ref 4.8–10.8)

## 2017-11-16 RX ADMIN — FERROUS SULFATE TAB EC 325 MG (65 MG FE EQUIVALENT) SCH MG: 325 (65 FE) TABLET DELAYED RESPONSE at 07:49

## 2017-11-16 RX ADMIN — Medication SCH MCG: at 07:50

## 2017-11-16 RX ADMIN — ALBUTEROL SULFATE SCH PUFFS: 90 AEROSOL, METERED RESPIRATORY (INHALATION) at 05:18

## 2017-11-16 RX ADMIN — STANDARDIZED SENNA CONCENTRATE AND DOCUSATE SODIUM SCH TAB: 8.6; 5 TABLET ORAL at 12:00

## 2017-11-16 RX ADMIN — AMLODIPINE BESYLATE SCH MG: 5 TABLET ORAL at 07:49

## 2017-11-16 RX ADMIN — ESCITALOPRAM OXALATE SCH MG: 20 TABLET, FILM COATED ORAL at 07:48

## 2017-11-16 RX ADMIN — ALBUTEROL SULFATE SCH PUFFS: 90 AEROSOL, METERED RESPIRATORY (INHALATION) at 12:11

## 2017-11-16 RX ADMIN — DICLOFENAC SODIUM SCH APPLN: 10 GEL TOPICAL at 07:49

## 2017-11-16 RX ADMIN — PANTOPRAZOLE SCH MG: 40 TABLET, DELAYED RELEASE ORAL at 07:49

## 2017-11-16 RX ADMIN — DICLOFENAC SODIUM SCH APPLN: 10 GEL TOPICAL at 12:00

## 2017-11-16 RX ADMIN — LABETALOL HYDROCHLORIDE SCH MG: 100 TABLET, FILM COATED ORAL at 07:48

## 2017-11-16 RX ADMIN — CLOPIDOGREL BISULFATE SCH MG: 75 TABLET, FILM COATED ORAL at 07:49

## 2017-11-16 RX ADMIN — Medication SCH CAN: at 07:54

## 2017-11-16 RX ADMIN — OXYCODONE HYDROCHLORIDE PRN MG: 5 TABLET ORAL at 07:54

## 2017-11-16 RX ADMIN — OXYCODONE HYDROCHLORIDE PRN MG: 5 TABLET ORAL at 15:18

## 2017-11-16 RX ADMIN — ALBUTEROL SULFATE SCH PUFFS: 90 AEROSOL, METERED RESPIRATORY (INHALATION) at 00:00

## 2017-11-16 RX ADMIN — SPIRONOLACTONE SCH MG: 25 TABLET, FILM COATED ORAL at 07:48

## 2017-11-16 RX ADMIN — Medication SCH CAN: at 12:11

## 2017-11-16 NOTE — DISCHARGE SUMMARY
Discharge Summary


Date of Service


Nov 16, 2017.





Discharge Summary


Admission Date:


Nov 4, 2017 at 19:22


Discharge Date:  Nov 16, 2017


Discharge Disposition:  Rehab


Principal Diagnosis:  Accelerated HTN


Problems/Secondary Diagnoses:


Acute on chronic anemia, likely due to chronic disease anemia


h/o thrombus


Supratherapeutic INR 


FITZ


headaches


cervical spine DJD 


CAD


h/o MI s/p stents


PAD s/p stents of LEs


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


HLD


Chronic diastolic CHF


COPD


T2DM w/ neuropathy


Bipolar disorder


anxiety


depression


Urinary retention w/ chronic High


Immunizations:  


   Have You Had Influenza Vaccine:  No


   Influenza Vaccine Date:  Oct 5, 2009


   History of Tetanus Vaccine?:  No


   Tetanus Immunization Date:  Mar 1, 2004


   History of Pneumococcal:  No


   Pneumococcal Date:  Oct 15, 2006


   History of Hepatitis B Vaccine:  No


Procedures:


CHEST ONE VIEW PORTABLE





HISTORY:  61 years-old Male Sepsis acute headache with sepsis





COMPARISON: Chest radiograph 8/15/2017





TECHNIQUE: Semiupright AP view of the chest





FINDINGS: 


Cardiac silhouette is mildly enlarged. Pulmonary vascular congestion is noted


with improvement of the previously seen mild pulmonary edema pattern.


Atherosclerosis of the aorta. No pneumothorax, pleural effusion or focal


airspace consolidation. The bones of the chest appear grossly intact.





IMPRESSION: Cardiomegaly and pulmonary vascular congestion without overt


pulmonary edema or focal airspace consolidation to suggest pneumonia. 











The above report was generated using voice recognition software. It may contain


grammatical, syntax or spelling errors.











Electronically signed by:  Ashwin Bhatia M.D.


10/30/2017 11:41 AM





Dictated Date/Time:  10/30/2017 11:40 AM





 The status of this report is Signed.   


 Draft = Not yet reviewed or approved by Radiologist.  


Signed = Reviewed and approved by Radiologist.





HEAD WITHOUT CONTRAST (CT)





CLINICAL HISTORY: 61 years-old Male with A4B/ approval by Dr springer.  Acute


headache without reported trauma.  





TECHNIQUE: Multiple axial CT images of the head were obtained without contrast. 


A dose lowering technique was utilized adhering to the principles of ALARA.





CT DOSE:  729.78 mGycm





COMPARISON: CT head 11/20/2007.





FINDINGS: 





No acute intracranial hemorrhage, midline shift, mass, large territorial


ischemia or abnormal extra-axial collection.  





The calvarium is intact.  The mastoid air cells, and middle ear cavities are


clear. Mild mucosal thickening of the maxillary and ethmoid sinuses. Soft


tissues are unremarkable.





IMPRESSION:  No acute intracranial abnormality.











The above report was generated using voice recognition software. It may contain


grammatical, syntax or spelling errors.











Electronically signed by:  Ashwin Bhatia M.D.


10/30/2017 11:06 AM





Dictated Date/Time:  10/30/2017 11:04 AM





The status of this report is Signed.   


Draft = Not yet reviewed or approved by Radiologist.  


Signed = Reviewed and approved by Radiologist.





CT HEAD WITHOUT CONTRAST (CT)





CLINICAL HISTORY: Headache and neck stiffness, prior CT head is negative,     





COMPARISON STUDY:  10/30/2017





TECHNIQUE:  Axial CT of the brain is performed from the vertex to the skull


base. IV contrast was not administered for this examination. A dose lowering


technique was utilized adhering to the principles of ALARA.


 





CT DOSE: 709.48 mGy.cm





FINDINGS:





No intra or extra-axial mass lesions are visualized. There is no CT evidence of


acute cortical infarction. There is no evidence of midline shift. There is no


acute  hemorrhage. No calvarial fractures are visualized. 


There are minimal white matter hypodensities likely on a small vessel basis.


There is no evidence of pathologic ventricular dilatation.


There is a left mastoid effusion.





IMPRESSION: 


1. Left mastoid effusion


2. Otherwise negative noncontrast head CT.











Electronically signed by:  Basil Chapin M.D.


11/4/2017 10:36 AM





Dictated Date/Time:  11/4/2017 10:31 AM





 The status of this report is Signed.   


 Draft = Not yet reviewed or approved by Radiologist.  


Signed = Reviewed and approved by Radiologist.





ANGIO ABD/PELVIS WITH CONTRAST





CLINICAL HISTORY: 61 years-old Male presenting with evaluate renal artery. 





TECHNIQUE: Multidetector CT angiography of the abdomen and pelvis was performed


after the administration of intravenous contrast. 3-D volumetric and/or maximum


intensity projection (MIP) images were subsequently reconstructed for review. IV


contrast: 95 mL of Optiray 320. A dose lowering technique was used consistent


with the principles of ALARA (as low as reasonably achievable). Stenosis


measurements were based on NASCET-like criteria.





COMPARISON: 1/23/2017.





CT DOSE (mGy.cm): The estimated cumulative dose is 689.11 mGy.cm.





FINDINGS:





 topogram: Unremarkable.





Lung bases: Dependent changes likely atelectasis. Mild multichamber enlargement


of the heart. Coronary artery calcification. Small bilateral pleural effusions. 





Liver: Normal morphology. Conventional hepatic arterial anatomy.





Biliary: No gross biliary ductal dilatation allowing for the phase of contrast.


Normal gallbladder.





Pancreas: Normal allowing for the phase of contrast.





Spleen: Well-defined round hypodensity at the inferior aspect of the spleen,


possibly cyst/pseudocyst, lymphangioma, or hemangioma.





Adrenal glands: Normal allowing for the phase of contrast.





Kidneys and ureters: Mild nonspecific perinephric fat stranding. Normal


appearance of the renal parenchyma allowing for the phase of contrast. Single


main renal arteries patent at their origins and along their courses. Significant


calcified atherosclerotic plaque burden throughout. No hydronephrosis.





Bladder: Decompressed with a High catheter.





Pelvic organs: Prostate and seminal vesicles normal.





Bowel: Few diverticula noted in the sigmoid colon. Moderate stool burden


throughout normal caliber colon. No bowel obstruction.





Peritoneal cavity: No free fluid or intraperitoneal gas.





Lymph nodes: No gross lymphadenopathy allowing for the phase of contrast.





Vasculature: Significant calcified and noncalcified atherosclerotic plaque


throughout the normal caliber abdominal aorta. A stent is likely present within


the left superficial femoral artery, which is patent. With mild stenosis of the


proximal sella iliac artery. Calcified plaque at the origin of the superior


mesenteric artery does not significantly narrow its origin. Origins of the renal


arteries are patent bilaterally. Inferior mesenteric artery origin may be mildly


narrowed by calcified atherosclerotic plaque. More distally along the course of


the DANY, the luminal diameter is narrowed by approximately 50% (series 3 image


233). Bilateral common and external iliac arteries patent.





Abdominal wall: Mild diffuse body wall edema suggested.





Musculoskeletal: Degenerative changes of the spine. The greatest degree of


degenerative changes noted at L3-4.





IMPRESSION:


1.  Atherosclerosis without significant narrowing of the single main renal


arteries. Additional disease burden as above.











Electronically signed by:  Richard Deluna M.D.


11/6/2017 3:18 PM





Dictated Date/Time:  11/6/2017 3:05 PM





 The status of this report is Signed.   


 Draft = Not yet reviewed or approved by Radiologist.  


Signed = Reviewed and approved by Radiologist.


Consultations:


Nephrology


Psychiatry





Medication Reconciliation


New Medications:  


Labetalol Hcl (Labetalol Hcl) 200 Mg Tab


1 TAB PO BID for 30 Days, #60 TAB 5 Refills





Diclofenac Sod (Voltaren) 100 Appln/100 Gm Gel


1 APPLN EXT BID for 7 Days, #60 GM





Ferrous Sulfate (Ferrous Sulfate) 325 Mg Tab


325 MG PO BIDM for 30 Days, TAB





Nutritional Supplements (Boost) 1 Liq Liq


1 CAN PO TIDM for 30 Days, #90 CAN





Oxycodone HCl (Oxycodone HCl) 5 Mg Tab


10 MG PO Q3H PRN for Pain, #12 TAB





Polyethylene (Miralax) 17 Gm Pow


17 GM PO DAILY PRN for Constipation for 30 Days, #30 PKT





Spironolactone (Spironolactone) 25 Mg Tab


50 MG PO BID17 for 30 Days, TAB





Warfarin Sod (Coumadin) 2 Mg Tab


5 MG PO DAILY@16 for 30 Days, #30 TAB


check INR daily and adjust dose accordingly


 


Continued Medications:  


Albuterol Hfa (Ventolin Hfa) 200 Puffs/85637 Mcg Aers


2 PUFFS INH Q6H, #1 INHALER





Amlodipine Besylate (Amlodipine Besylate) 10 Mg Tab


10 MG PO QAM





Bisacodyl (Bisac-Evac) 10 Mg Sup


10 MG TN UD





Calcium Carbonate (Tums) 500 Mg Chew


500 MG PO TID PRN for Indigestion





Clonazepam (Klonopin) 0.5 Mg Tab


1 MG PO HS, TAB





Clopidogrel (Plavix) 75 Mg Tab


75 MG PO QAM





Cyanocobalamin (Vitamin B-12) 100 Mcg Tab


100 MCG PO DAILY, TAB





Diclofenac Sodium (Topical) (Voltaren 1% Top Gel) 1 % Gel


2 GM TOP QID PRN for back pain





Divalproex Sodium (Depakote Delay Rel) 500 Mg Tab


2000 MG PO HS





Docusate Sodium (Docusate Sodium) 100 Mg Cap


100 MG PO BID PRN for Constipation





Ergocalciferol (Vitamin D 14645 Unit) 50,000 Unit Cap


42449 UNIT PO MWF, CAP





Escitalopram Oxalate (Escitalopram Oxalate) 20 Mg Tab


20 MG PO QAM





Fluticasone Propionate (Nasal) (Flonase Allergy Relief) 50 Mcg/Act Spr


2 SPRAYS VERONICA DAILY PRN for CONGESTION





Gabapentin (Neurontin) 100 Mg Cap


100 MG PO HS, CAP





Glucagon (Glucagon Emergency Kit) 1 Mg Kit








Insulin Aspart (Novolog) 100 Units/Ml Inj





sliding scale


 


 less than 70 = hypoglycemia protocol


  = 0 units


 131-180 = 2 units


 181-240= 4 units


 241-300 = 6 units


 301-350 = 8 units


 351-400 = 10 units


 greater than 400 = 12 units; call md


Insulin Glargine (Lantus Solostar) 100 Unit/Ml Inj


10 UNITS SC HS, PEN





Magnesium Hydroxide (Milk of Magnesia) 30 Ml Susp


30 MG PO UD





Mirtazapine (Remeron) 15 Mg Tab


45 MG PO HS, TAB





Nitroglycerin (Nitrostat) 0.4 Mg Tab


0.4 MG UT PRN PRN for Chest Pain





Pantoprazole (Protonix) 40 Mg Tab


40 MG PO DAILY, #30 TAB





Polyethylene (Miralax) 17 Gm Pow


17 GM PO UD





Senna/Docusate Sod (Senokot S) 1 Tab Tab


1 TAB PO QDL, TAB





Simvastatin (Zocor) 40 Mg Tab


40 MG PO QPM, TAB





Sodium Phosphates (Fleet Enema Six Pack) 1 Fide Fide


133 ML TN UD





Tamsulosin HCl (Tamsulosin HCl) 0.4 Mg Cap


0.4 MG PO HS





Umeclidinium Bromide (Incruse Ellipta) 62.5 Mcg/Inh Inh


1 PUFF INH DAILY











Discharge Exam


Review of Systems:  


   Constitutional:  No fever, No chills, No sweats, No weakness, No fatigue


   ENT:  No hearing loss


   Respiratory:  No cough, No shortness of breath, No hemoptysis


   Cardiovascular:  No chest pain, No edema, No palpitations


   Abdomen:  No pain, No nausea, No vomiting, No diarrhea, No constipation


   Musculoskeletal:  No joint pain, No muscle pain, No swelling, No calf pain


   Genitourinary - Male:  No hematuria, No dysuria


   Neurologic:  No weakness, No numbness/tingling


   Psychiatric:  No depression symptoms, No anxiety


   Endocrine:  No fatigue


   Hematologic / Lymphatic:  No abnormal bleeding/bruising


   Integumentary:  No rash, No itch, No new/changing skin lesions


Physical Exam:  


   General Appearance:  no apparent distress


   Eyes:  normal inspection, PERRL


   ENT:  hearing grossly normal


   Neck:  supple


   Respiratory/Chest:  lungs clear, no respiratory distress, no accessory 

muscle use


   Cardiovascular:  regular rate, rhythm


   Abdomen / GI:  normal bowel sounds, non tender, soft


   Extremities:  + swelling (+1 pitting edema to RLE ), + pertinent finding (

RLE wound vac)


   Neurologic/Psychiatric:  alert, oriented x 3, + depressed affect


   Skin:  normal color, warm/dry, no rash





Hospital Course


Admission H&P:





This is a 62 y/o male with a history of CAD, h/o MI s/p stents, HTN, HLD, 

diastolic CHF, COPD, DM II, anxiety, depression, bipolar disorder, anemia and 

GERD who presented to the ED on 10/30 with persistent headache and elevated 

INR.  The patient states he developed a severe, sharp headache that has 

persisted through today.  He states that the pain is located towards the base 

of his skull and radiates down into the middle of his neck and to his 

shoulders.  He currently rates his pain a 10/10 sharp pain.  He has associated 

neck stiffness and limited neck ROM due to pain.  He denies any recent fevers.  

He notes some intermittent dizziness upon standing.  The patient was also found 

to have an elevated INR of 5.3 per outpatient labs.  His warfarin dose has been 

held since 10/29.  He denies any sherlyn bleeding.  The patient complains of 

ongoing urinary retention and arrives with a High catheter in place, which has 

been there for several weeks.  The patient denies fevers, chills, sweats, chest 

pain, palpitations, claudication, cough, wheezing, shortness of breath, nausea, 

vomiting, abdominal pain, dysuria, hematuria, paralysis, weakness, numbness and 

tingling.





Physical Exam


Vital Signs











  Date Time  Temp Pulse Resp B/P (MAP) Pulse Ox O2 Delivery O2 Flow Rate FiO2


 


10/30/17 13:30  66      


 


10/30/17 13:07  67 18 156/87 93 Room Air 3.0 


 


10/30/17 11:36     94 Nasal Cannula 3.0 


 


10/30/17 11:36  66 18 173/90 94 Nasal Cannula 3.0 


 


10/30/17 11:25 37.2       


 


10/30/17 10:39  68      


 


10/30/17 10:35 36.5 72 20 157/110 97 Nasal Cannula 2.0 








General appearance:  Well-developed, well-nourished, no apparent distress


Head:  Normocephalic, atraumatic


Eyes:  Normal inspection, PERRL, EOMI


ENT:  Normal ENT inspection, hearing grossly normal, pharynx normal


Neck:  +Limited ROM.  Supple, no JVD, trachea midline


Respiratory/Chest:  +Wheezing throughout.  Normal breath sounds, no respiratory 

distress


Cardiovascular:  Regular rate & rhythm, no gallop, no murmur


Abdomen/GI:  Normal bowel sounds, non-tender, soft


Extremities/Musculoskeletal:  +RLE with wound vac.  LLE in boot.  2+ pitting 

edema bilaterally.  No calf tenderness


Neurological/Psych:  Alert, normal mood/affect, oriented x 3


Skin:  Normal color, warm/dry, no rash





Hospital Course:





62 y/o male, with PMHx of CAD, h/o MI s/p stents, HTN, HLD, diastolic CHF, COPD

, DM II, anxiety, depression, bipolar disorder, anemia and GERD, who presented 

to the ED because of persistent headache and elevated INR. 





Acute on chronic anemia, likely due to chronic disease anemia- baseline hgb 9.0-

10.0- STABLE:


- Consent obtained- transfused 2 u PRBC on 11/13 due to hgb of 7.2- at 

discharge 9.0    


- Iron studies, b12, folate- reviewed and WNL 


- Fecal occult negative 


- Start Ferrous sulfate supplement BID 


- Follow H&H- STABLE 





h/o thrombus: 


- Continue Coumadin- given 7.5 mg prior to discharge- start 5 mg tomorrow (11/17

)- check INR on 11/17, then MWF- adjust dose PRN for INR goal of 2-3 





Supratherapeutic INR at >8.0- RESOLVED: Held Coumadin until INR within goal 

range while supratherapeutic 





FITZ- RESOLVED:


- Discontinue Lisinopril 


- Nephrology consulted, appreciate recommendations- f/u w/ Dr. Mullins in 1-2 weeks 

after discharge  





Accelerated HTN- STABLE: Labetalol 200 mg BID, Amlodipine 10 QAM, and 

Spironolactone 50 mg BID





Recent headaches, ?secondary to cervical spine DJD and accelerated HTN- RESOLVED

: Voltaren gel QID and heating pad 





CAD, h/o MI s/p stents- STABLE: Continue Zocor 40 mg daily, Plavix 75 mg daily, 

Labetalol 200 mg BID 





PAD s/p stents of LEs- Dr. Rainey 2017: Continue Plavix 





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 Paz & wound care 

team 





HLD: Continue Zocor 40 mg daily 





Chronic diastolic CHF- COMPENSATED: Continue Aldactone 50 mg BID 





COPD- STABLE: Continue home inhalers 





T2DM w/ neuropathy- CONTROLLED: 


- Continue Lantus 10 u HS 


- BSG ACHS and ISS 


- Continue Gabapentin 100 mg HS





Bipolar disorder, anxiety, depression : 


- Continue Depakote 2,000 mg HS- Depakote level reviewed 


- Continue Klonopin 1 mg HS, Remeron 45 mg HS, Lexapro 20 mg HS  


- Psychiatry consulted due to active depression, appreciate recommendations- no 

new recommendations at this time 





Urinary retention w/ chronic High: Continue Flomax 0.4 mg HS 





GI prophylaxis: Protonix 40 mg daily





DVT prophylaxis: Coumadin 





Code Status: LEVEL I, FULL 





Dispo: Discharge to [facility]


Total Time Spent:  Greater than 30 minutes


This includes examination of the patient, discharge planning, medication 

reconciliation, and communication with other providers.





Discharge Instructions


Please refer to the electronic Patient Visit Report (Discharge Instructions) 

for additional information.





Follow-Up


Follow-up with [facility] provider within 24-48 hours 





Please follow-up with your PCP within 5-7 days after discharge from [facility]





Please follow-up with Nephrology- Dr. Mullins in 1-2 weeks 





Continue outpatient follow-up with psychiatry 





Please follow-up/keep all of your subspecialty appointments





Additional Copies To


[facility]

## 2017-11-16 NOTE — NEPHROLOGY PROGRESS NOTE
Nephrology Progress Note


Date of Service


Nov 16, 2017.





Chief Complaint


Hypertension, FITZ





Subjective


Mr. Dixon was seen & examined in his hospital room this morning.  He is 

tolerating his current blood pressure regimen without side effect.  His primary 

concern today is RLE discomfort.  The R foot is pink and warm.  I am unable to 

palpate pulses, however, 05/17 RLE arterial doppler was negative for 

significant stenosis.  Wound vac remains in place.





Review of Systems


Constitutional:  No fever


Cardiovascular:  No chest pain


Respiratory:  No dyspnea at rest


Abdomen:  No pain, No nausea, No vomiting


Extremities:  No leg edema


A complete review of systems was performed.  Pertinent positives are noted 

above. All other systems are negative.





Vital Signs





Last 8 Hrs








  Date Time  Temp Pulse Resp B/P (MAP) Pulse Ox O2 Delivery O2 Flow Rate FiO2


 


11/16/17 08:04  72  148/72 (97)    


 


11/16/17 08:02  80  142/68 (92)    


 


11/16/17 08:00 36.4 64 16 150/70 (96) 97 Room Air  











Last Recorded Weight


Weight (Kilograms):  88.500





Physical Exam


General Appearance:  no apparent distress


Head:  normocephalic, atraumatic


Eyes:  PERRL, EOMI


Neck:  no adenopathy


Respiratory/Chest:  lungs clear


Cardiovascular:  regular rate, rhythm


Abdomen/GI:  normal bowel sounds, non tender, soft


Extremities/Musculoskelatal:  + pertinent finding (trace LE edema.  Unable to 

palpate R DP/PT pulses)


Neurologic/Psych:  alert, oriented x 3





Family History





Cancer (esophageal)


Diabetes mellitus


Heart disease


Hypertension





Social History


Smokeless Tobacco Use:  No


Alcohol Use:  none


Drug Use:  none


Marital Status:  


Housing Status:  other ([facility])


Occupation:  employed





Laboratory Results


Past 24 Hours


11/16/17 05:14








11/16/17 05:14

















Test


  11/15/17


11:22 11/15/17


16:14 11/15/17


20:01 11/16/17


05:14


 


Bedside Glucose


  157 mg/dl


(70-99) 173 mg/dl


(70-99) 157 mg/dl


(70-99) 


 


 


Red Blood Count


  


  


  


  3.20 M/uL


(4.7-6.1)


 


Mean Corpuscular Volume


  


  


  


  86.9 fL


()


 


Mean Corpuscular Hemoglobin


  


  


  


  28.1 pg


(25-34)


 


Mean Corpuscular Hemoglobin


Concent 


  


  


  32.4 g/dl


(32-36)


 


RDW Standard Deviation


  


  


  


  47.1 fL


(36.4-46.3)


 


RDW Coefficient of Variation


  


  


  


  14.9 %


(11.5-14.5)


 


Mean Platelet Volume


  


  


  


  9.1 fL


(7.4-10.4)


 


Anion Gap


  


  


  


  8.0 mmol/L


(3-11)


 


Est Creatinine Clear Calc


Drug Dose 


  


  


  69.1 ml/min 


 


 


Estimated GFR (


American) 


  


  


  69.9 


 


 


Estimated GFR (Non-


American 


  


  


  60.3 


 


 


BUN/Creatinine Ratio    29.5 (10-20) 


 


Calcium Level


  


  


  


  8.4 mg/dl


(8.5-10.1)


 


Iron Level


  


  


  


  42 mcg/dl


()


 


Total Iron Binding Capacity


  


  


  


  255 mcg/dl


(250-450)


 


Ferritin


  


  


  


  93.9 ng/ml


(8.0-388.0)


 


Test


  11/16/17


07:53 


  


  


 


 


Bedside Glucose


  126 mg/dl


(70-99) 


  


  


 











Allergies


Coded Allergies:  


     No Known Allergies (Verified , 7/19/17)





Medications





Current Inpatient Medications








 Medications


  (Trade)  Dose


 Ordered  Sig/Dc


 Route  Start Time


 Stop Time Status Last Admin


Dose Admin


 


 Acetaminophen


  (Tylenol Tab)  650 mg  Q4H  PRN


 PO  10/30/17 14:15


 11/29/17 14:14  11/15/17 14:12


650 MG


 


 Al Hydrox/Mg


 Hydrox/Simethicone


  (Maalox Max Susp)  15 ml  Q4H  PRN


 PO  10/30/17 14:15


 11/29/17 14:14   


 


 


 Magnesium


 Hydroxide


  (Milk Of


 Magnesia Susp)  30 ml  Q6H  PRN


 PO  10/30/17 14:15


 11/29/17 14:14   


 


 


 Polyethylene


  (Miralax Powder


 Packet)  17 gm  DAILY  PRN


 PO  10/30/17 14:15


 11/29/17 14:14   


 


 


 Ondansetron HCl


  (Zofran Inj)  4 mg  Q6H  PRN


 IV  10/30/17 14:15


 11/29/17 14:14   


 


 


 Glucose


  (Glucose 40% Gel)  15-30


 GRAMS 15


 GRAMS...  UD  PRN


 PO  10/30/17 14:15


 11/29/17 14:14   


 


 


 Glucose


  (Glucose Chew


 Tab)  4-8


 Tablets 4


 Tabl...  UD  PRN


 PO  10/30/17 14:15


 11/29/17 14:14   


 


 


 Dextrose


  (Dextrose 50%


 50ML Syringe)  25-50ML OF


 50% DW IV


 FOR...  UD  PRN


 IV  10/30/17 14:15


 11/29/17 14:14   


 


 


 Glucagon


  (Glucagon Inj)  1 mg  UD  PRN


 SQ  10/30/17 14:15


 11/29/17 14:14   


 


 


 Albuterol


  (Ventolin Hfa


 Inhaler)  2 puffs  Q6


 INH  10/30/17 18:00


 11/29/17 17:59  11/16/17 05:18


2 PUFFS


 


 Bisacodyl


  (Dulcolax Supp)  10 mg  DAILY  PRN


 NV  10/30/17 14:15


 11/29/17 14:14   


 


 


 Calcium Carbonate


  (Tums Chew Tab)  500 mg  TID  PRN


 PO  10/30/17 14:15


 11/29/17 14:14   


 


 


 Cyanocobalamin


  (Vitamin B-12


 Tab)  100 mcg  DAILY


 PO  10/31/17 08:00


 11/30/17 08:59  11/16/17 07:50


100 MCG


 


 Divalproex Sodium


  (Depakote


 Extended Rel Tab)  2,000 mg  HS


 PO  10/30/17 21:00


 11/29/17 20:59  11/15/17 20:00


2,000 MG


 


 Docusate Sodium


  (coLACE CAP)  100 mg  BID  PRN


 PO  10/30/17 14:15


 11/29/17 14:14   


 


 


 Escitalopram


 Oxalate


  (Lexapro Tab)  20 mg  QAM


 PO  10/31/17 08:00


 11/30/17 08:59  11/16/17 07:48


20 MG


 


 Fluticasone


 Propionate


  (Flonase Nasal


 Spray)  2 sprays  DAILY  PRN


 VERONICA  10/30/17 14:15


 11/29/17 14:14   


 


 


 Gabapentin


  (Neurontin Cap)  100 mg  HS


 PO  10/30/17 21:00


 11/29/17 20:59  11/15/17 19:58


100 MG


 


 Insulin Glargine


  (Lantus Solostar


 Pen)  10 units  HS


 SC  10/30/17 21:00


 11/29/17 20:59  11/15/17 20:03


10 UNITS


 


 Mirtazapine


  (Remeron Tab)  45 mg  HS


 PO  10/30/17 21:00


 11/29/17 20:59  11/15/17 20:01


45 MG


 


 Nitroglycerin


  (Nitrostat Tab)  0.4 mg  PRN  PRN


 UT  10/30/17 14:15


 11/29/17 14:14   


 


 


 Pantoprazole


 Sodium


  (Protonix Tab)  40 mg  DAILY


 PO  10/31/17 08:00


 11/30/17 08:59  11/16/17 07:49


40 MG


 


 Senna/Docusate


 Sodium


  (Senokot S Tab)  1 tab  QDL


 PO  10/31/17 11:00


 11/30/17 10:59  11/13/17 12:14


1 TAB


 


 Simvastatin


  (Zocor Tab)  40 mg  QPM


 PO  10/30/17 21:00


 11/29/17 20:59  11/15/17 19:59


40 MG


 


 Tamsulosin HCl


  (Flomax Cap)  0.4 mg  HS


 PO  10/30/17 21:00


 11/29/17 20:59  11/15/17 19:59


0.4 MG


 


 Amlodipine


 Besylate


  (Norvasc Tab)  10 mg  QAM


 PO  10/31/17 08:00


 11/30/17 08:59  11/16/17 07:49


10 MG


 


 Miscellaneous


 Information


  (Order Awaiting


 Action)  1 ea  QS


 N/A  10/31/17 00:00


 11/30/17 00:00   


 


 


 Diclofenac Sodium


  (Voltaren 1% Top


 Gel)  1 appln  QID


 EXT  10/30/17 20:45


 11/29/17 14:14  11/14/17 20:29


1 APPLN


 


 Miscellaneous


  (Iv Fluids


 Completed)  1 ea  PRN  PRN


 N/A  10/30/17 21:00


 10/30/18 20:59   


 


 


 Enteral


 Nutritional


 Formula


  (Boost Glucose


 Control)  1 can  TIDM


 PO  10/31/17 17:00


 11/30/17 16:59  11/16/17 07:54


1 CAN


 


 Clopidogrel


 Bisulfate


  (plAVix TAB)  75 mg  QAM


 PO  11/6/17 08:00


 12/6/17 07:59  11/16/17 07:49


75 MG


 


 Clonazepam


  (Klonopin Tab)  1 mg  HS


 PO  11/5/17 12:00


 11/29/17 20:59  11/15/17 19:56


1 MG


 


 Warfarin Sodium


  (Coumadin Tab)  3 mg  DAILY@16


 PO  11/6/17 16:00


 12/6/17 15:59 Future hold 11/15/17 17:14


3 MG


 


 Enoxaparin Sodium


  (Lovenox Inj)  90 mg  Q12


 SQ  11/6/17 21:00


 12/6/17 20:59 Future Hold 11/12/17 21:51


90 MG


 


 Spironolactone


  (Aldactone Tab)  50 mg  BID17


 PO  11/7/17 17:00


 12/5/17 19:29  11/16/17 07:48


50 MG


 


 Oxycodone HCl


  (Roxicodone


 Immediate Rel Tab)  10 mg  Q3H  PRN


 PO  11/13/17 18:00


 11/27/17 17:59  11/16/17 07:54


10 MG


 


 Ferrous Sulfate


  (Feosol Tab)  325 mg  BIDM


 PO  11/15/17 17:00


 12/15/17 16:59  11/16/17 07:49


325 MG


 


 Labetalol HCl


  (Normodyne Tab)  200 mg  BID


 PO  11/16/17 20:00


 12/14/17 19:59   


 











Impression





(1) Accelerated hypertension


(2) PAD (peripheral artery disease)


(3) Diastolic CHF, chronic


(4) LVH (left ventricular hypertrophy)


Paramjit is a 61-year-old  male with DMII, hypertension, dyslipidemia, CAD

, chronic diastolic CHF, diffuse calcific vascular disease, PAD and depression 

as well as bipolar disorder. He has chronic urinary retention and an indwelling 

High. He was admitted with headache. Hospitalization complicated by 

accelerated hypertension.  11/06/17 abdominal CTA was negative for significant 

KIANA





Patient does have significant PVD.  There is a difference in blood pressure 

readings between arms.  All blood pressures should be obtained from the L arm 

while in the seated position.





Recommendations


HYPERTENSION:


-- Lisinopril stopped due to progressive renal insufficiency


-- 11/06/17 abdominal CTA was negative for KIANA


-- Medical regimen changed to Labetalol, Amlodipine 10 mg qAM and 

Spironolactone 50 mg po BID.  


-- Blood pressure remains mildly elevated.  Will increase Labetalol to 200 mg 

po BID





ACUTE KIDNEY INJURY:


-- Continue to hold Lisinopril


-- Kidney function has recovered.  Volume status and electrolyte balance are 

acceptable.  Will monitor





ANEMIA:


-- Hgb has stablilized at ~ 9.0.  FOBT was negative x 3. 


-- Iron saturation 21%, ferritin 93.





OTHER:


-- If discharge is anticipated please schedule hospital follow up visit w/ Dr. Felix Mullins ([facility] [place] [facility] [phone]) in 1 - 2 weeks

## 2017-11-16 NOTE — NEPHROLOGY PROGRESS NOTE
Nephrology Progress Note


Date of Service


Nov 16, 2017.





Chief Complaint


Hypertension, FITZ





Subjective


Mr. Dixon was seen & examined in his hospital room this morning.





Review of Systems


A complete review of systems was performed.  Pertinent positives are noted 

above. All other systems are negative.





Vital Signs





Last 8 Hrs








  Date Time  Temp Pulse Resp B/P (MAP) Pulse Ox O2 Delivery O2 Flow Rate FiO2


 


11/16/17 08:04  72  148/72 (97)    


 


11/16/17 08:02  80  142/68 (92)    


 


11/16/17 08:00 36.4 64 16 150/70 (96) 97 Room Air  











Last Recorded Weight


Weight (Kilograms):  88.500





Family History





Cancer (esophageal)


Diabetes mellitus


Heart disease


Hypertension





Social History


Smokeless Tobacco Use:  No


Alcohol Use:  none


Drug Use:  none


Marital Status:  


Housing Status:  other ([facility])


Occupation:  employed





Laboratory Results


Past 24 Hours


11/16/17 05:14








11/16/17 05:14

















Test


  11/15/17


11:22 11/15/17


16:14 11/15/17


20:01 11/16/17


05:14


 


Bedside Glucose


  157 mg/dl


(70-99) 173 mg/dl


(70-99) 157 mg/dl


(70-99) 


 


 


Red Blood Count


  


  


  


  3.20 M/uL


(4.7-6.1)


 


Mean Corpuscular Volume


  


  


  


  86.9 fL


()


 


Mean Corpuscular Hemoglobin


  


  


  


  28.1 pg


(25-34)


 


Mean Corpuscular Hemoglobin


Concent 


  


  


  32.4 g/dl


(32-36)


 


RDW Standard Deviation


  


  


  


  47.1 fL


(36.4-46.3)


 


RDW Coefficient of Variation


  


  


  


  14.9 %


(11.5-14.5)


 


Mean Platelet Volume


  


  


  


  9.1 fL


(7.4-10.4)


 


Anion Gap


  


  


  


  8.0 mmol/L


(3-11)


 


Est Creatinine Clear Calc


Drug Dose 


  


  


  69.1 ml/min 


 


 


Estimated GFR (


American) 


  


  


  69.9 


 


 


Estimated GFR (Non-


American 


  


  


  60.3 


 


 


BUN/Creatinine Ratio    29.5 (10-20) 


 


Calcium Level


  


  


  


  8.4 mg/dl


(8.5-10.1)


 


Iron Level


  


  


  


  42 mcg/dl


()


 


Total Iron Binding Capacity


  


  


  


  255 mcg/dl


(250-450)


 


Ferritin


  


  


  


  93.9 ng/ml


(8.0-388.0)


 


Test


  11/16/17


07:53 


  


  


 


 


Bedside Glucose


  126 mg/dl


(70-99) 


  


  


 











Allergies


Coded Allergies:  


     No Known Allergies (Verified , 7/19/17)





Medications





Current Inpatient Medications








 Medications


  (Trade)  Dose


 Ordered  Sig/Dc


 Route  Start Time


 Stop Time Status Last Admin


Dose Admin


 


 Acetaminophen


  (Tylenol Tab)  650 mg  Q4H  PRN


 PO  10/30/17 14:15


 11/29/17 14:14  11/15/17 14:12


650 MG


 


 Al Hydrox/Mg


 Hydrox/Simethicone


  (Maalox Max Susp)  15 ml  Q4H  PRN


 PO  10/30/17 14:15


 11/29/17 14:14   


 


 


 Magnesium


 Hydroxide


  (Milk Of


 Magnesia Susp)  30 ml  Q6H  PRN


 PO  10/30/17 14:15


 11/29/17 14:14   


 


 


 Polyethylene


  (Miralax Powder


 Packet)  17 gm  DAILY  PRN


 PO  10/30/17 14:15


 11/29/17 14:14   


 


 


 Ondansetron HCl


  (Zofran Inj)  4 mg  Q6H  PRN


 IV  10/30/17 14:15


 11/29/17 14:14   


 


 


 Glucose


  (Glucose 40% Gel)  15-30


 GRAMS 15


 GRAMS...  UD  PRN


 PO  10/30/17 14:15


 11/29/17 14:14   


 


 


 Glucose


  (Glucose Chew


 Tab)  4-8


 Tablets 4


 Tabl...  UD  PRN


 PO  10/30/17 14:15


 11/29/17 14:14   


 


 


 Dextrose


  (Dextrose 50%


 50ML Syringe)  25-50ML OF


 50% DW IV


 FOR...  UD  PRN


 IV  10/30/17 14:15


 11/29/17 14:14   


 


 


 Glucagon


  (Glucagon Inj)  1 mg  UD  PRN


 SQ  10/30/17 14:15


 11/29/17 14:14   


 


 


 Albuterol


  (Ventolin Hfa


 Inhaler)  2 puffs  Q6


 INH  10/30/17 18:00


 11/29/17 17:59  11/16/17 05:18


2 PUFFS


 


 Bisacodyl


  (Dulcolax Supp)  10 mg  DAILY  PRN


 CT  10/30/17 14:15


 11/29/17 14:14   


 


 


 Calcium Carbonate


  (Tums Chew Tab)  500 mg  TID  PRN


 PO  10/30/17 14:15


 11/29/17 14:14   


 


 


 Cyanocobalamin


  (Vitamin B-12


 Tab)  100 mcg  DAILY


 PO  10/31/17 08:00


 11/30/17 08:59  11/16/17 07:50


100 MCG


 


 Divalproex Sodium


  (Depakote


 Extended Rel Tab)  2,000 mg  HS


 PO  10/30/17 21:00


 11/29/17 20:59  11/15/17 20:00


2,000 MG


 


 Docusate Sodium


  (coLACE CAP)  100 mg  BID  PRN


 PO  10/30/17 14:15


 11/29/17 14:14   


 


 


 Escitalopram


 Oxalate


  (Lexapro Tab)  20 mg  QAM


 PO  10/31/17 08:00


 11/30/17 08:59  11/16/17 07:48


20 MG


 


 Fluticasone


 Propionate


  (Flonase Nasal


 Spray)  2 sprays  DAILY  PRN


 VERONICA  10/30/17 14:15


 11/29/17 14:14   


 


 


 Gabapentin


  (Neurontin Cap)  100 mg  HS


 PO  10/30/17 21:00


 11/29/17 20:59  11/15/17 19:58


100 MG


 


 Insulin Glargine


  (Lantus Solostar


 Pen)  10 units  HS


 SC  10/30/17 21:00


 11/29/17 20:59  11/15/17 20:03


10 UNITS


 


 Mirtazapine


  (Remeron Tab)  45 mg  HS


 PO  10/30/17 21:00


 11/29/17 20:59  11/15/17 20:01


45 MG


 


 Nitroglycerin


  (Nitrostat Tab)  0.4 mg  PRN  PRN


 UT  10/30/17 14:15


 11/29/17 14:14   


 


 


 Pantoprazole


 Sodium


  (Protonix Tab)  40 mg  DAILY


 PO  10/31/17 08:00


 11/30/17 08:59  11/16/17 07:49


40 MG


 


 Senna/Docusate


 Sodium


  (Senokot S Tab)  1 tab  QDL


 PO  10/31/17 11:00


 11/30/17 10:59  11/13/17 12:14


1 TAB


 


 Simvastatin


  (Zocor Tab)  40 mg  QPM


 PO  10/30/17 21:00


 11/29/17 20:59  11/15/17 19:59


40 MG


 


 Tamsulosin HCl


  (Flomax Cap)  0.4 mg  HS


 PO  10/30/17 21:00


 11/29/17 20:59  11/15/17 19:59


0.4 MG


 


 Amlodipine


 Besylate


  (Norvasc Tab)  10 mg  QAM


 PO  10/31/17 08:00


 11/30/17 08:59  11/16/17 07:49


10 MG


 


 Miscellaneous


 Information


  (Order Awaiting


 Action)  1 ea  QS


 N/A  10/31/17 00:00


 11/30/17 00:00   


 


 


 Diclofenac Sodium


  (Voltaren 1% Top


 Gel)  1 appln  QID


 EXT  10/30/17 20:45


 11/29/17 14:14  11/14/17 20:29


1 APPLN


 


 Miscellaneous


  (Iv Fluids


 Completed)  1 ea  PRN  PRN


 N/A  10/30/17 21:00


 10/30/18 20:59   


 


 


 Enteral


 Nutritional


 Formula


  (Boost Glucose


 Control)  1 can  TIDM


 PO  10/31/17 17:00


 11/30/17 16:59  11/16/17 07:54


1 CAN


 


 Clopidogrel


 Bisulfate


  (plAVix TAB)  75 mg  QAM


 PO  11/6/17 08:00


 12/6/17 07:59  11/16/17 07:49


75 MG


 


 Clonazepam


  (Klonopin Tab)  1 mg  HS


 PO  11/5/17 12:00


 11/29/17 20:59  11/15/17 19:56


1 MG


 


 Warfarin Sodium


  (Coumadin Tab)  3 mg  DAILY@16


 PO  11/6/17 16:00


 12/6/17 15:59 Future hold 11/15/17 17:14


3 MG


 


 Enoxaparin Sodium


  (Lovenox Inj)  90 mg  Q12


 SQ  11/6/17 21:00


 12/6/17 20:59 Future Hold 11/12/17 21:51


90 MG


 


 Spironolactone


  (Aldactone Tab)  50 mg  BID17


 PO  11/7/17 17:00


 12/5/17 19:29  11/16/17 07:48


50 MG


 


 Oxycodone HCl


  (Roxicodone


 Immediate Rel Tab)  10 mg  Q3H  PRN


 PO  11/13/17 18:00


 11/27/17 17:59  11/16/17 07:54


10 MG


 


 Ferrous Sulfate


  (Feosol Tab)  325 mg  BIDM


 PO  11/15/17 17:00


 12/15/17 16:59  11/16/17 07:49


325 MG


 


 Labetalol HCl


  (Normodyne Tab)  200 mg  BID


 PO  11/16/17 20:00


 12/14/17 19:59   


 











Impression





(1) Accelerated hypertension


(2) PAD (peripheral artery disease)


(3) Diastolic CHF, chronic


(4) LVH (left ventricular hypertrophy)


Paramjit is a 61-year-old  male with DMII, hypertension, dyslipidemia, CAD

, chronic diastolic CHF, diffuse calcific vascular disease, PAD and depression 

as well as bipolar disorder. He has chronic urinary retention and an indwelling 

High. He was admitted with headache. Hospitalization complicated by 

accelerated hypertension.





Patient does have significant PVD.  There is a difference in blood pressure 

readings between arms.  All blood pressures should be obtained from the L arm 

while in the seated position.





Recommendations


HYPERTENSION:


-- Lisinopril stopped due to progressive renal insufficiency


-- Renal artery doppler was negative for KIANA


-- Medical regimen changed to Labetalol 100 mg BID, Amlodipine 10 mg qAM and 

Spironolactone 50 mg po BID this am.





ACUTE KIDNEY INJURY:


-- Continue to hold Lisinopril


-- Patient was transfused 2 U PRBC yesterday.  Kidney function continues to 

gradually improve.  Patient is nonoliguric 


-- Monitor PRP 





ANEMIA:


-- Hgb is trending down.  FOBT was negative x 1. 


-- Will order iron studies and repeat FOBT





OTHER:


-- If discharge is anticipated please schedule hospital follow up visit w/ Dr. Felix Mullins ([facility] [place] [facility] [phone]) in 1 - 2 weeks

## 2017-11-16 NOTE — DISCHARGE INSTRUCTIONS
Discharge Instructions


Date of Service


Nov 16, 2017.





Admission


Reason for Admission:  Elevated Inr,Headache





Discharge


Discharge Diagnosis / Problem:  HTN; anemia; FITZ





Discharge Goals


Goal(s):  Decrease discomfort, Improve function, Increase independence, Improve 

disease control, Improve nutritional status, Learn about illness, Diagnostic 

testing, Therapeutic intervention, Prevent Disease Progression





Activity Recommendations


Activity Level:  Assistance Required


Therapies:  Physical Therapy, Occupational Therapy





.





Additional Information


Patient informed of condition:  Yes


Advance Directives:  No


DNR:  No


Level of Care:  Acute Rehab


Communicable Disease:  No


Prognosis:  Improving


High Catheter:  Yes





Instructions / Follow-Up


Instructions / Follow-Up


Acute on chronic anemia, likely due to chronic disease anemia- baseline hgb 9.0-

10.0- STABLE:


- Consent obtained- transfused 2 u PRBC on 11/13 due to hgb of 7.2   


- Iron studies, b12, folate- reviewed and WNL 


- Fecal occult negative 


- Start Ferrous sulfate supplement BID 


- Follow H&H- STABLE 





h/o thrombus: Continue Coumadin- given 7.5 mg prior to discharge (11/16)- start 

5 mg tomorrow (11/17)- check INR on 11/17, then MWF- adjust dose PRN for INR 

goal of 2-3 





FITZ- RESOLVED:


- Discontinue Lisinopril 


- Nephrology consulted, appreciate recommendations- f/u w/ Dr. Mullins in 1-2 weeks 

after discharge  





Accelerated HTN- STABLE: Labetalol 200 mg BID, Amlodipine 10 QAM, and 

Spironolactone 50 mg BID





Recent headaches, ?secondary to cervical spine DJD and accelerated HTN- RESOLVED

: Voltaren gel QID and heating pad 





CAD, h/o MI s/p stents- STABLE: Continue Zocor 40 mg daily, Plavix 75 mg daily, 

Labetalol 200 mg BID 





PAD s/p stents of LEs- Dr. Rainey 2017: Continue Plavix 





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 Paz & wound care 

team 





HLD: Continue Zocor 40 mg daily 





Chronic diastolic CHF- COMPENSATED: Continue Aldactone 50 mg BID 





COPD- STABLE: Continue home inhalers 





T2DM w/ neuropathy- CONTROLLED: 


- Continue Lantus 10 u HS 


- BSG ACHS and ISS 


- Continue Gabapentin 100 mg HS





Bipolar disorder, anxiety, depression : 


- Continue Depakote 2,000 mg HS- Depakote level reviewed 


- Continue Klonopin 1 mg HS, Remeron 45 mg HS, Lexapro 20 mg HS  


- Psychiatry consulted due to active depression, appreciate recommendations- no 

new recommendations at this time 





Urinary retention w/ chronic High: Continue Flomax 0.4 mg HS 





GI prophylaxis: Protonix 40 mg daily





DVT prophylaxis: Coumadin 





Code Status: LEVEL I, FULL 





Dispo: Discharge to [facility] 





FOLLOW-UPS:





Follow-up with [facility] provider within 24-48 hours 





Please follow-up with your PCP within 5-7 days after discharge from [facility]





Please follow-up with Nephrology- Dr. Mullins in 1-2 weeks 





Continue outpatient follow-up with Psychiatry





Please follow-up/keep all of your subspecialty appointments





Current Hospital Diet


Patient's current hospital diet: Diabetes Type 2 Diet, AHA Diet (Heart Healthy)

, Low Sodium Diet (2gm Na)





Discharge Diet


Recommended Diet:  AHA Diet (Heart Healthy), Low Sodium Diet (2gm Na), Diabetes 

Type 2 Diet





Pending Studies


Studies pending at discharge:  no





Physician Orders On Transfer


Special Precautions:


Fall precautions


Dressing Changes:


Continue RLE wound care


IV Therapy:


None


Vital Signs:


Routine


Additional Orders:


Follow INR


POLST Discussion:  without POLST completion





Medical Emergencies








.


Who to Call and When:





Medical Emergencies:  If at any time you feel your situation is an emergency, 

please call 911 immediately.





.





Non-Emergent Contact


Non-Emergency issues call your:  Primary Care Provider





.


.








"Provider Documentation" section prepared by Tala Maradiaga.








.





Core Measure Problem


Core Measures:  None

## 2017-11-18 ENCOUNTER — HOSPITAL ENCOUNTER (OUTPATIENT)
Dept: HOSPITAL 45 - C.LABCC | Age: 62
Discharge: NURSING, REHAB OR LONG-TERM CARE | End: 2017-11-18
Attending: INTERNAL MEDICINE
Payer: COMMERCIAL

## 2017-11-18 DIAGNOSIS — I25.2: ICD-10-CM

## 2017-11-18 DIAGNOSIS — Z98.890: ICD-10-CM

## 2017-11-18 DIAGNOSIS — I25.10: Primary | ICD-10-CM

## 2017-11-18 LAB
INR PPP: 1.1 (ref 0.9–1.1)
PROTHROMBIN TIME: 11.4 SECONDS (ref 9–12)

## 2017-11-20 ENCOUNTER — HOSPITAL ENCOUNTER (OUTPATIENT)
Dept: HOSPITAL 45 - C.LABCC | Age: 62
Discharge: NURSING, REHAB OR LONG-TERM CARE | End: 2017-11-20
Attending: INTERNAL MEDICINE
Payer: COMMERCIAL

## 2017-11-20 DIAGNOSIS — Z95.5: ICD-10-CM

## 2017-11-20 DIAGNOSIS — I25.10: Primary | ICD-10-CM

## 2017-11-20 DIAGNOSIS — I25.2: ICD-10-CM

## 2017-11-20 LAB
INR PPP: 1.1 (ref 0.9–1.1)
PROTHROMBIN TIME: 12 SECONDS (ref 9–12)

## 2017-11-24 ENCOUNTER — HOSPITAL ENCOUNTER (OUTPATIENT)
Dept: HOSPITAL 45 - C.LABCC | Age: 62
Discharge: NURSING, REHAB OR LONG-TERM CARE | End: 2017-11-24
Attending: INTERNAL MEDICINE
Payer: COMMERCIAL

## 2017-11-24 DIAGNOSIS — Z86.718: Primary | ICD-10-CM

## 2017-11-24 LAB
INR PPP: 1.3 (ref 0.9–1.1)
PROTHROMBIN TIME: 14.5 SECONDS (ref 9–12)

## 2017-11-27 ENCOUNTER — HOSPITAL ENCOUNTER (OUTPATIENT)
Dept: HOSPITAL 45 - C.LABCC | Age: 62
Discharge: NURSING, REHAB OR LONG-TERM CARE | End: 2017-11-27
Attending: INTERNAL MEDICINE
Payer: COMMERCIAL

## 2017-11-27 DIAGNOSIS — N17.9: ICD-10-CM

## 2017-11-27 DIAGNOSIS — E78.6: ICD-10-CM

## 2017-11-27 DIAGNOSIS — D64.9: Primary | ICD-10-CM

## 2017-11-27 DIAGNOSIS — I25.10: ICD-10-CM

## 2017-11-27 LAB
ALBUMIN/GLOB SERPL: 0.5 {RATIO} (ref 0.9–2)
ALP SERPL-CCNC: 47 U/L (ref 45–117)
ALT SERPL-CCNC: 12 U/L (ref 12–78)
ANION GAP SERPL CALC-SCNC: 9 MMOL/L (ref 3–11)
AST SERPL-CCNC: 14 U/L (ref 15–37)
BASOPHILS # BLD: 0.03 K/UL (ref 0–0.2)
BASOPHILS NFR BLD: 0.6 %
BUN SERPL-MCNC: 62 MG/DL (ref 7–18)
BUN/CREAT SERPL: 40.5 (ref 10–20)
CALCIUM SERPL-MCNC: 8.5 MG/DL (ref 8.5–10.1)
CHLORIDE SERPL-SCNC: 108 MMOL/L (ref 98–107)
CO2 SERPL-SCNC: 24 MMOL/L (ref 21–32)
COMPLETE: YES
CREAT SERPL-MCNC: 1.53 MG/DL (ref 0.6–1.4)
EOSINOPHIL NFR BLD AUTO: 174 K/UL (ref 130–400)
GLOBULIN SER-MCNC: 4 GM/DL (ref 2.5–4)
GLUCOSE SERPL-MCNC: 116 MG/DL (ref 70–99)
HCT VFR BLD CALC: 26.5 % (ref 42–52)
IG%: 0.4 %
IMM GRANULOCYTES NFR BLD AUTO: 26.7 %
INR PPP: 1.7 (ref 0.9–1.1)
LYMPHOCYTES # BLD: 1.33 K/UL (ref 1.2–3.4)
MCH RBC QN AUTO: 28.1 PG (ref 25–34)
MCHC RBC AUTO-ENTMCNC: 32.1 G/DL (ref 32–36)
MCV RBC AUTO: 87.7 FL (ref 80–100)
MONOCYTES NFR BLD: 13.8 %
NEUTROPHILS # BLD AUTO: 8.2 %
NEUTROPHILS NFR BLD AUTO: 50.3 %
PMV BLD AUTO: 9.4 FL (ref 7.4–10.4)
POTASSIUM SERPL-SCNC: 4.9 MMOL/L (ref 3.5–5.1)
PROTHROMBIN TIME: 18.3 SECONDS (ref 9–12)
RBC # BLD AUTO: 3.02 M/UL (ref 4.7–6.1)
SODIUM SERPL-SCNC: 141 MMOL/L (ref 136–145)
TOXIC GRANULES BLD QL SMEAR: (no result)
WBC # BLD AUTO: 4.99 K/UL (ref 4.8–10.8)

## 2017-12-08 ENCOUNTER — HOSPITAL ENCOUNTER (OUTPATIENT)
Dept: HOSPITAL 45 - C.LAB1850 | Age: 62
Discharge: HOME | End: 2017-12-08
Attending: PHYSICIAN ASSISTANT
Payer: COMMERCIAL

## 2017-12-08 DIAGNOSIS — I10: Primary | ICD-10-CM

## 2017-12-08 DIAGNOSIS — I25.10: ICD-10-CM

## 2017-12-08 LAB
ANION GAP SERPL CALC-SCNC: 8 MMOL/L (ref 3–11)
BUN SERPL-MCNC: 74 MG/DL (ref 7–18)
BUN/CREAT SERPL: 44.1 (ref 10–20)
CALCIUM SERPL-MCNC: 8.3 MG/DL (ref 8.5–10.1)
CHLORIDE SERPL-SCNC: 107 MMOL/L (ref 98–107)
CO2 SERPL-SCNC: 26 MMOL/L (ref 21–32)
CREAT SERPL-MCNC: 1.68 MG/DL (ref 0.6–1.4)
GLUCOSE SERPL-MCNC: 104 MG/DL (ref 70–99)
INR PPP: 1 (ref 0.9–1.1)
MAGNESIUM SERPL-MCNC: 2 MG/DL (ref 1.8–2.4)
PHOSPHATE SERPL-MCNC: 3.8 MG/DL (ref 2.5–4.9)
POTASSIUM SERPL-SCNC: 4 MMOL/L (ref 3.5–5.1)
PROTHROMBIN TIME: 10.5 SECONDS (ref 9–12)
SODIUM SERPL-SCNC: 141 MMOL/L (ref 136–145)

## 2017-12-14 ENCOUNTER — HOSPITAL ENCOUNTER (OUTPATIENT)
Dept: HOSPITAL 45 - C.LAB1850 | Age: 62
Discharge: HOME | End: 2017-12-14
Attending: INTERNAL MEDICINE
Payer: COMMERCIAL

## 2017-12-14 DIAGNOSIS — I50.33: Primary | ICD-10-CM

## 2017-12-14 DIAGNOSIS — I11.0: ICD-10-CM

## 2017-12-14 LAB
ANION GAP SERPL CALC-SCNC: 7 MMOL/L (ref 3–11)
BUN SERPL-MCNC: 46 MG/DL (ref 7–18)
BUN/CREAT SERPL: 31.9 (ref 10–20)
CALCIUM SERPL-MCNC: 8.6 MG/DL (ref 8.5–10.1)
CHLORIDE SERPL-SCNC: 105 MMOL/L (ref 98–107)
CO2 SERPL-SCNC: 26 MMOL/L (ref 21–32)
CREAT SERPL-MCNC: 1.43 MG/DL (ref 0.6–1.4)
GLUCOSE SERPL-MCNC: 139 MG/DL (ref 70–99)
MAGNESIUM SERPL-MCNC: 2.1 MG/DL (ref 1.8–2.4)
PHOSPHATE SERPL-MCNC: 4.8 MG/DL (ref 2.5–4.9)
POTASSIUM SERPL-SCNC: 4.8 MMOL/L (ref 3.5–5.1)
SODIUM SERPL-SCNC: 138 MMOL/L (ref 136–145)

## 2017-12-20 ENCOUNTER — HOSPITAL ENCOUNTER (OUTPATIENT)
Dept: HOSPITAL 45 - C.EDB | Age: 62
Setting detail: OBSERVATION
LOS: 1 days | Discharge: HOME HEALTH | End: 2017-12-21
Attending: HOSPITALIST | Admitting: HOSPITALIST
Payer: COMMERCIAL

## 2017-12-20 VITALS
WEIGHT: 196.43 LBS | BODY MASS INDEX: 27.5 KG/M2 | HEIGHT: 71 IN | BODY MASS INDEX: 27.5 KG/M2 | HEIGHT: 71 IN | WEIGHT: 196.43 LBS

## 2017-12-20 VITALS
DIASTOLIC BLOOD PRESSURE: 60 MMHG | HEART RATE: 69 BPM | TEMPERATURE: 97.88 F | SYSTOLIC BLOOD PRESSURE: 167 MMHG | OXYGEN SATURATION: 94 %

## 2017-12-20 VITALS
HEART RATE: 69 BPM | DIASTOLIC BLOOD PRESSURE: 60 MMHG | SYSTOLIC BLOOD PRESSURE: 167 MMHG | TEMPERATURE: 97.88 F | OXYGEN SATURATION: 92 %

## 2017-12-20 DIAGNOSIS — R10.9: ICD-10-CM

## 2017-12-20 DIAGNOSIS — I25.10: ICD-10-CM

## 2017-12-20 DIAGNOSIS — I50.32: ICD-10-CM

## 2017-12-20 DIAGNOSIS — Z79.01: ICD-10-CM

## 2017-12-20 DIAGNOSIS — D64.9: ICD-10-CM

## 2017-12-20 DIAGNOSIS — Z79.899: ICD-10-CM

## 2017-12-20 DIAGNOSIS — G47.33: ICD-10-CM

## 2017-12-20 DIAGNOSIS — Z79.4: ICD-10-CM

## 2017-12-20 DIAGNOSIS — R07.9: Primary | ICD-10-CM

## 2017-12-20 DIAGNOSIS — J44.9: ICD-10-CM

## 2017-12-20 DIAGNOSIS — Z87.891: ICD-10-CM

## 2017-12-20 DIAGNOSIS — I73.9: ICD-10-CM

## 2017-12-20 DIAGNOSIS — Z95.5: ICD-10-CM

## 2017-12-20 DIAGNOSIS — E11.9: ICD-10-CM

## 2017-12-20 DIAGNOSIS — F31.9: ICD-10-CM

## 2017-12-20 DIAGNOSIS — L97.509: ICD-10-CM

## 2017-12-20 DIAGNOSIS — I25.2: ICD-10-CM

## 2017-12-20 LAB
ALBUMIN SERPL-MCNC: 2.6 GM/DL (ref 3.4–5)
ALP SERPL-CCNC: 60 U/L (ref 45–117)
ALT SERPL-CCNC: 11 U/L (ref 12–78)
AST SERPL-CCNC: 12 U/L (ref 15–37)
BASOPHILS # BLD: 0.01 K/UL (ref 0–0.2)
BASOPHILS NFR BLD: 0.1 %
BUN SERPL-MCNC: 36 MG/DL (ref 7–18)
CALCIUM SERPL-MCNC: 8.1 MG/DL (ref 8.5–10.1)
CK MB SERPL-MCNC: 3.8 NG/ML (ref 0.5–3.6)
CO2 SERPL-SCNC: 24 MMOL/L (ref 21–32)
CREAT SERPL-MCNC: 1.29 MG/DL (ref 0.6–1.4)
EOS ABS #: 0.18 K/UL (ref 0–0.5)
EOSINOPHIL NFR BLD AUTO: 278 K/UL (ref 130–400)
GLUCOSE SERPL-MCNC: 140 MG/DL (ref 70–99)
HCT VFR BLD CALC: 25.8 % (ref 42–52)
HGB BLD-MCNC: 8.6 G/DL (ref 14–18)
IG#: 0.05 K/UL (ref 0–0.02)
IMM GRANULOCYTES NFR BLD AUTO: 12.3 %
INR PPP: 1.4 (ref 0.9–1.1)
LIPASE: 96 U/L (ref 73–393)
LYMPHOCYTES # BLD: 0.89 K/UL (ref 1.2–3.4)
MCH RBC QN AUTO: 29.6 PG (ref 25–34)
MCHC RBC AUTO-ENTMCNC: 33.3 G/DL (ref 32–36)
MCV RBC AUTO: 88.7 FL (ref 80–100)
MONO ABS #: 0.85 K/UL (ref 0.11–0.59)
MONOCYTES NFR BLD: 11.8 %
NEUT ABS #: 5.25 K/UL (ref 1.4–6.5)
NEUTROPHILS # BLD AUTO: 2.5 %
NEUTROPHILS NFR BLD AUTO: 72.6 %
PMV BLD AUTO: 8.7 FL (ref 7.4–10.4)
POTASSIUM SERPL-SCNC: 5.1 MMOL/L (ref 3.5–5.1)
PROT SERPL-MCNC: 7.3 GM/DL (ref 6.4–8.2)
RED CELL DISTRIBUTION WIDTH CV: 16.4 % (ref 11.5–14.5)
RED CELL DISTRIBUTION WIDTH SD: 53.3 FL (ref 36.4–46.3)
SODIUM SERPL-SCNC: 138 MMOL/L (ref 136–145)
WBC # BLD AUTO: 7.23 K/UL (ref 4.8–10.8)

## 2017-12-20 RX ADMIN — INSULIN ASPART SCH UNITS: 100 INJECTION, SOLUTION INTRAVENOUS; SUBCUTANEOUS at 22:21

## 2017-12-20 NOTE — NUR
A/Obs: Pt admitted for precordial chest pain. A+O X 4. SR w/1st degree and IVCD on monitor, 
denies chest pain at this time. Lung sounds clear on 2L nasal cannula. 1 assist w/walker 
OOB. SL in L AC, WNL. Oriented to room and call bell. Will continue to monitor.

## 2017-12-20 NOTE — DIAGNOSTIC IMAGING REPORT
CHEST ONE VIEW PORTABLE



CLINICAL HISTORY: 62 years-old Male presenting with chest pain. 



TECHNIQUE: Portable upright AP view of the chest was obtained.



COMPARISON: 10/30/2017.



FINDINGS:

Atherosclerosis of aortic arch. Cardiac silhouette enlarged, unchanged.

Pulmonary vascular prominence. Hazy and reticular central and basilar opacities.

No large pleural effusion or pneumothorax. Osseous structures normal. Upper

abdomen normal.



IMPRESSION:

1.  Cardiomegaly with developing pulmonary edema.







Electronically signed by:  Richard Deluna M.D.

12/20/2017 7:20 PM



Dictated Date/Time:  12/20/2017 7:19 PM

## 2017-12-20 NOTE — DIAGNOSTIC IMAGING REPORT
HEAD WITHOUT CONTRAST (CT)



CLINICAL HISTORY: 62 years-old Male presenting with dizziness, blurry vision. 



TECHNIQUE: Multidetector CT imaging of the head was performed without the use of

intravenous contrast. IV contrast: None. A dose lowering technique was used

consistent with the principles of ALARA (as low as reasonably achievable). 



COMPARISON: 11/4/2017..



CT DOSE (mGy.cm): The estimated cumulative dose is 634.23 mGy.cm.



FINDINGS: 



 topogram: Unremarkable.



Ventricles and sulci normal in size. Brain parenchyma normal in appearance with

preserved gray-white differentiation. No mass effect or midline shift. No

hemorrhage or acute territorial infarct. No extra-axial fluid collection. Trace

fluid in the left mastoid air cells. Calvarium intact.    



IMPRESSION:

1.  No acute intracranial abnormality. 







Electronically signed by:  Richard Deluan M.D.

12/20/2017 6:39 PM



Dictated Date/Time:  12/20/2017 6:35 PM

## 2017-12-20 NOTE — HISTORY AND PHYSICAL
History & Physical


Date & Time of Service:


Dec 20, 2017 at 20:49


Chief Complaint:


Ab Pain


Primary Care Physician:


Claude Rowley M.D.


History of Present Illness


Source:  patient


63 y/o M with extensive medical issues including severe CAD, PVD, COPD, DM II, 

bipolar disease, chronic anemia, diastolic CHF, recent diagnosis of atrial 

thrombus - on Lovenox bridging to Coumadin - pt had an MI at [facility] 10/17 

which was medically managed.  Presents with L subcostal pain and central chest 

pressure - denies SOB, N/V, lightheadedness or diaphoresis.  Initial EKG and 

troponin do not support acute ischemia.





Past Medical/Surgical History


Medical Problems:


1) CAD - NSTEMI 2007 - L circ stent  -   ACS 2010 - Catheterization showed 90 % 

mid LAD stenosis, 40 % distal LAD Stenosis,  50% mid LCX stenosis, 100% in-

stent stenosis in the distal L circumflex, 100% obtuse marginal stenosis, 60% 

proximal right coronary stenosis, 40% mid RCA stenosis. Pt had an atherectomy 

and ERICK placed in the LAD at [facility].  Suffered additional MI at [facility] 10/17.





2) Grade 1 diastolic dysfunction on recent echo - Preserved EF 55%





3) DM 2 





4) Bipolar disease





5) COPD - continues to smoke 





6) Diabetic L foot ulcer requiring wound vac





7) V fib arrest following MI 2007





8) JANE





9) GERD





10) PAD - B/L  femoral stents





11) Chronic anemia - Hb 8-10





12) Atrial thrombus





13) Chronic LE ulcers








Family History





Cancer (esophageal)


Diabetes mellitus


Heart disease


Hypertension





Social History


The pt quit smoking 2 months ago


Smoking Status:  Former Smoker


Drug Use:  none


Marital Status:  


Housing status:  other


Occupational Status:  employed





Immunizations


History of Influenza Vaccine:  No


Influenza Vaccine Date:  Oct 5, 2009


History of Tetanus Vaccine?:  No


Tetanus Immunization Date:  Mar 1, 2004


History of Pneumococcal:  No


Pneumococcal Date:  Oct 15, 2006


History of Hepatitis B Vaccine:  No





Multi-Drug Resistant Organisms


History of MDRO:  No





Allergies


Coded Allergies:  


     No Known Allergies (Verified , 7/19/17)





Home Medications


Scheduled


Amlodipine Besylate (Amlodipine Besylate), 10 MG PO QAM


Clopidogrel Bisulfate (Clopidogrel), 75 MG PO QAM


Cyanocobalamin (Vitamin B12), 1,000 MCG PO DAILY


Divalproex Sodium (Depakote Delay Rel), 2,000 MG PO HS


Enoxaparin Sodium (Lovenox), 0.8 ML SC QD@1700


Escitalopram Oxalate (Escitalopram Oxalate), 20 MG PO QAM


Ferrous Sulfate (Ferrous Sulfate), 325 MG PO BIDM


Gabapentin (Neurontin), 300 MG PO HS


Insulin Glargine (Lantus Solostar), 10 UNITS SC HS


Insulin Lispro (Human) (Humalog Kwikpen), 1 DOSE SC AC


Metoprolol Succinate (Metoprolol Succinate ER), 50 MG PO BID


Mirtazapine (Mirtazapine), 45 MG PO HS


Simvastatin (Simvastatin), 80 MG PO DAILY


Spironolactone (Aldactone), 50 MG PO BID


Tamsulosin HCl (Tamsulosin HCl), 0.4 MG PO HS


Warfarin Sodium (Warfarin Sodium), 1 DOSE PO UD





Scheduled PRN


Acetaminophen (Tylenol), 500 MG PO Q4-6HRS PRN for Pain


Albuterol Sulfate (Proair Respiclick), 1-2 PUFFS INH Q4-6HRS PRN for SOB/

Wheezing


Clonazepam (Clonazepam), 1 MG PO HS PRN for Anxiety


Docusate Sodium (Docusate Sodium), 100 MG PO BID PRN for Constipation


Fluticasone Propionate (Nasal) (Flonase Allergy Relief), 2 SPRAYS VERONICA DAILY PRN 

for Nasal Congestion


Glucagon (Glucagon Emergency Kit), 1 MG UD PRN for Hypoglycemia Protocol


Nitroglycerin (Nitrostat), 0.4 MG UT UD PRN for Chest Pain


Oxycodone HCl (Oxycodone HCl), 5 MG PO Q6H PRN for Breakthrough Pain


Oxycodone Hcl (Oxycodone Hcl), 10 MG PO Q12 PRN for Severe Pain





Review of Systems


Constitutional:  No fever, No chills, No sweats


Eyes:  No worsening of vision


ENT:  No hearing loss, No unusual epistaxis, No nasal symptoms


Respiratory:  No cough, No sputum, No wheezing


Cardiovascular:  + chest pain, No orthopnea, No PND


Abdomen:  No pain, No nausea, No vomiting


Musculoskeletal:  No joint pain


Genitourinary - Male:  No hematuria, No dysuria


Neurologic:  No memory loss, No paralysis, No weakness


Psychiatric:  No depression symptoms


Endocrine:  No fatigue


Hematologic / Lymphatic:  No abnormal bleeding/bruising


Integumentary:  + problem reported (Chronic B/L LE ulcers)


Allergic / Immunologic:  No environmental allergies





Physical Exam


Vital Signs











  Date Time  Temp Pulse Resp B/P (MAP) Pulse Ox O2 Delivery O2 Flow Rate FiO2


 


12/20/17 20:02  69 20 186/81 93 Room Air  


 


12/20/17 19:37  71 21  95 Room Air  


 


12/20/17 19:32    178/63    


 


12/20/17 19:07  59 14  96   


 


12/20/17 19:02  68 20 167/101 94 Room Air  


 


12/20/17 18:47  60 20 175/81 94 Nasal Cannula 2.0 


 


12/20/17 18:20  79 16  88   


 


12/20/17 18:03    188/82    


 


12/20/17 17:50  75 15  87   


 


12/20/17 17:48  61      


 


12/20/17 17:32    197/78    


 


12/20/17 17:28    158/77    


 


12/20/17 17:25    158/111    


 


12/20/17 17:22     94 Nasal Cannula 2.0 


 


12/20/17 17:22     94 Nasal Cannula 2.0 


 


12/20/17 17:22 36.6 66 15 158/111 90 Room Air  


 


12/20/17 17:22     90 Room Air  








General Appearance:  WD/WN, no apparent distress


Head:  normocephalic


ENT:  normal ENT inspection, hearing grossly normal


Neck:  supple, no JVD


Respiratory/Chest:  chest non-tender, lungs clear


Cardiovascular:  regular rate, rhythm, no edema, no gallop


Abdomen/GI:  normal bowel sounds, non tender, soft


Back:  normal inspection, no CVA tenderness, no muscle spasm


Extremities/Musculoskelatal:  normal inspection, no calf tenderness, normal 

capillary refill, no pedal edema, normal range of motion


Neurologic/Psych:  CNs II-XII nml as tested, no motor/sensory deficits, alert, 

oriented x 3


Skin:  normal color, warm/dry, no rash





Diagnostics


Laboratory Results





Results Past 24 Hours








Test


  12/20/17


17:42 12/20/17


18:00 12/20/17


18:12 Range/Units


 


 


Creatine Kinase MB Ratio    0-3.0  


 


White Blood Count  7.23  4.8-10.8  K/uL


 


Red Blood Count  2.91  4.7-6.1  M/uL


 


Hemoglobin  8.6  14.0-18.0  g/dL


 


Hematocrit  25.8  42-52  %


 


Mean Corpuscular Volume  88.7    fL


 


Mean Corpuscular Hemoglobin  29.6  25-34  pg


 


Mean Corpuscular Hemoglobin


Concent 


  33.3


  


  32-36  g/dl


 


 


Platelet Count  278  130-400  K/uL


 


Mean Platelet Volume  8.7  7.4-10.4  fL


 


Neutrophils (%) (Auto)  72.6   %


 


Lymphocytes (%) (Auto)  12.3   %


 


Monocytes (%) (Auto)  11.8   %


 


Eosinophils (%) (Auto)  2.5   %


 


Basophils (%) (Auto)  0.1   %


 


Neutrophils # (Auto)  5.25  1.4-6.5  K/uL


 


Lymphocytes # (Auto)  0.89  1.2-3.4  K/uL


 


Monocytes # (Auto)  0.85  0.11-0.59  K/uL


 


Eosinophils # (Auto)  0.18  0-0.5  K/uL


 


Basophils # (Auto)  0.01  0-0.2  K/uL


 


RDW Standard Deviation  53.3  36.4-46.3  fL


 


RDW Coefficient of Variation  16.4  11.5-14.5  %


 


Immature Granulocyte % (Auto)  0.7   %


 


Immature Granulocyte # (Auto)  0.05  0.00-0.02  K/uL


 


Polychromasia  1+   


 


Prothrombin Time


  


  15.0


  


  9.0-12.0


SECONDS


 


Prothromb Time International


Ratio 


  1.4


  


  0.9-1.1  


 


 


Sodium Level  138  136-145  mmol/L


 


Potassium Level  5.1  3.5-5.1  mmol/L


 


Chloride Level  108    mmol/L


 


Carbon Dioxide Level  24  21-32  mmol/L


 


Anion Gap  6.0  3-11  mmol/L


 


Blood Urea Nitrogen  36  7-18  mg/dl


 


Creatinine


  


  1.29


  


  0.60-1.40


mg/dl


 


Est Creatinine Clear Calc


Drug Dose 


  70.6


  


   ml/min


 


 


Estimated GFR (


American) 


  69.4


  


   


 


 


Estimated GFR (Non-


American 


  59.9


  


   


 


 


BUN/Creatinine Ratio  27.5  10-20  


 


Random Glucose  140  70-99  mg/dl


 


Calcium Level  8.1  8.5-10.1  mg/dl


 


Total Bilirubin  0.3  0.2-1  mg/dl


 


Aspartate Amino Transf


(AST/SGOT) 


  12


  


  15-37  U/L


 


 


Alanine Aminotransferase


(ALT/SGPT) 


  11


  


  12-78  U/L


 


 


Alkaline Phosphatase  60    U/L


 


Creatine Kinase MB  3.8  0.5-3.6  ng/ml


 


Troponin I  0.020  0-0.045  ng/ml


 


Total Protein  7.3  6.4-8.2  gm/dl


 


Albumin  2.6  3.4-5.0  gm/dl


 


Globulin  4.7  2.5-4.0  gm/dl


 


Albumin/Globulin Ratio  0.6  0.9-2  


 


Lipase  96    U/L


 


Urine Color   YELLOW  


 


Urine Appearance   CLEAR CLEAR  


 


Urine pH   5.0 4.5-7.5  


 


Urine Specific Gravity   1.019 1.000-1.030  


 


Urine Protein   3+ NEG  


 


Urine Glucose (UA)   NEG NEG  


 


Urine Ketones   TRACE NEG  


 


Urine Occult Blood   2+ NEG  


 


Urine Nitrite   NEG NEG  


 


Urine Bilirubin   NEG NEG  


 


Urine Urobilinogen   NEG NEG  


 


Urine Leukocyte Esterase   NEG NEG  


 


Urine WBC (Auto)   1-5 0-5  /hpf


 


Urine RBC (Auto)   10-30 0-4  /hpf


 


Urine Hyaline Casts (Auto)   1-5 0-5  /lpf


 


Urine Epithelial Cells (Auto)   5-10 0-5  /lpf


 


Urine Bacteria (Auto)   NEG NEG  











EKG


Sinus rhythm - frequent PVCs





Impression


Assessment and Plan


63 y/o M with extensive medical issues including severe CAD, PVD, COPD, DM II, 

bipolar disease, chronic anemia, diastolic CHF, recent diagnosis of atrial 

thrombus - on Lovenox bridging to Coumadin - pt had an MI at [facility] 10/17 

which was medically managed.  Presents with L subcostal pain and central chest 

pressure - denies SOB, N/V, lightheadedness or diaphoresis.  Initial EKG and 

troponin do not support acute ischemia.





1) CP - documented severe CAD - assigned to telemetry - serial enzymes 

requested.  The pt is anticoagulated with Lovenox and Coumadin presently. Cont 

Statin and B blocker.





2) Atrial thrombus - Pt received Lovenox today and is bridging to Coumadin - 

INR is subtherapeutic.  We will increase his Coumadin dose AM.  





3) Diastolic CHF - frequently presents with volume overload - currently 

euvolemic  - cont diuretic, Bblocker





4) COPD - no evidence of exacerbation





5) Bipolar disease - cont Depakote, Clonazepam, Remeron, Citalopram





6) PVD - follows with Dr Rainey - he has had B/L lower extrem intervention and 

per pt is due for additional procedures eventually





7) Anemia - Hb approximately at baseline - trend AM





8) DM - placed on SS





9) LE ulcers - has home wound care EOD








Full code - Lovenox and Coumadin anticoagulation


Total time for this admit including review of labs, meds, EKG, extyensive 

records - discussion with pt and ER attending - 45 min





Level of Care


Telemetry





Resuscitation Status


FULL RESUSCITATION





VTE Prophylaxis


Given or contraindicated:  Enoxaparin (Lovenox)SQ, Warfarin (Coumadin)

## 2017-12-20 NOTE — EMERGENCY ROOM VISIT NOTE
History


First contact with patient:  17:24


Chief Complaint:  CARDIAC ASSESSMENT


Stated Complaint:  AB PAIN


Nursing Triage Summary:  


Pt. reported sudden onset of left upper quadrant abdominal pain at 1600, 


stabbing in nature, the lasted only a few minutes. The patient reports that 

this 


pain was accompanied by chest pressure and shortness of breath. Upon arrival, 


pt. does not report abdominal pain but does have chest pressure.





History of Present Illness


The patient is a 62 year old male who presents to the Emergency Room with 

complaints of abdominal pain and chest pain that started earlier today.  The 

patient reports the abdominal pain as a sharp, stabbing sensation that has been 

intermittent.  He denies any nausea or vomiting.  No fevers.  Denies any 

changes in bowel habits.  He also had an episode of chest pressure, dizziness 

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 

therapist.  The patient does have a history of cardiac disease.  He is also 

feeling short of breath.  He did not take anything for pain.  He does not 

tolerate nitroglycerin.





Review of Systems


10 system review performed and  negative unless noted in HPI or below





Past Medical/Surgical History


Medical Problems:


(1) Accelerated hypertension


(2) Acute appendicitis with appendiceal abscess


(3) Acute renal insufficiency


(4) Acute respiratory failure


(5) Angina pectoris


(6) Bipolar disorder


(7) CAD (coronary artery disease)


(8) Cardiac arrest


(9) Cellulitis


(10) CHF exacerbation


(11) Dehydration


(12) Diabetes


(13) Diastolic CHF, chronic


(14) Elevated INR


(15) Enterocolitis


(16) Headache


(17) Heel ulcer


(18) Hypertension


(19) Ischemic cardiomyopathy


(20) LVH (left ventricular hypertrophy)


(21) Osteomyelitis of left foot


(22) PAD (peripheral artery disease)


(23) Precordial chest pain


(24) Respiratory distress


(25) Sepsis, unspecified organism


(26) Volume overload








Family History





Cancer (esophageal)


Diabetes mellitus


Heart disease


Hypertension





Social History


Smoking Status:  Former Smoker


Drug Use:  none


Marital Status:  


Housing Status:  lives with family


Occupation Status:  employed





Current/Historical Medications


Scheduled


Amlodipine Besylate (Amlodipine Besylate), 10 MG PO QAM


Clopidogrel Bisulfate (Clopidogrel), 75 MG PO QAM


Cyanocobalamin (Vitamin B12), 1,000 MCG PO DAILY


Divalproex Sodium (Depakote Delay Rel), 2,000 MG PO HS


Enoxaparin Sodium (Lovenox), 0.8 ML SC QD@1700


Escitalopram Oxalate (Escitalopram Oxalate), 20 MG PO QAM


Ferrous Sulfate (Ferrous Sulfate), 325 MG PO BIDM


Gabapentin (Neurontin), 300 MG PO HS


Insulin Glargine (Lantus Solostar), 10 UNITS SC HS


Insulin Lispro (Human) (Humalog Kwikpen), 1 DOSE SC AC


Metoprolol Succinate (Metoprolol Succinate ER), 50 MG PO BID


Mirtazapine (Mirtazapine), 45 MG PO HS


Simvastatin (Simvastatin), 80 MG PO DAILY


Spironolactone (Aldactone), 50 MG PO BID


Tamsulosin HCl (Tamsulosin HCl), 0.4 MG PO HS


Warfarin Sodium (Warfarin Sodium), 1 DOSE PO UD





Scheduled PRN


Acetaminophen (Tylenol), 500 MG PO Q4-6HRS PRN for Pain


Albuterol Sulfate (Proair Respiclick), 1-2 PUFFS INH Q4-6HRS PRN for SOB/

Wheezing


Clonazepam (Clonazepam), 1 MG PO HS PRN for Anxiety


Docusate Sodium (Docusate Sodium), 100 MG PO BID PRN for Constipation


Fluticasone Propionate (Nasal) (Flonase Allergy Relief), 2 SPRAYS VERONICA DAILY PRN 

for Nasal Congestion


Glucagon (Glucagon Emergency Kit), 1 MG UD PRN for Hypoglycemia Protocol


Nitroglycerin (Nitrostat), 0.4 MG UT UD PRN for Chest Pain


Oxycodone HCl (Oxycodone HCl), 5 MG PO Q6H PRN for Breakthrough Pain


Oxycodone Hcl (Oxycodone Hcl), 10 MG PO Q12 PRN for Severe Pain





Physical Exam


Vital Signs











  Date Time  Temp Pulse Resp B/P (MAP) Pulse Ox O2 Delivery O2 Flow Rate FiO2


 


12/20/17 20:37  63 15  91   


 


12/20/17 20:32    151/79    


 


12/20/17 20:07  69 18  93   


 


12/20/17 20:02  69 20 186/81 93 Room Air  


 


12/20/17 19:37  71 21  95 Room Air  


 


12/20/17 19:32    178/63    


 


12/20/17 19:07  59 14  96   


 


12/20/17 19:02  68 20 167/101 94 Room Air  


 


12/20/17 18:47  60 20 175/81 94 Nasal Cannula 2.0 


 


12/20/17 18:20  79 16  88   


 


12/20/17 18:03    188/82    


 


12/20/17 17:50  75 15  87   


 


12/20/17 17:48  61      


 


12/20/17 17:32    197/78    


 


12/20/17 17:28    158/77    


 


12/20/17 17:25    158/111    


 


12/20/17 17:22     94 Nasal Cannula 2.0 


 


12/20/17 17:22     94 Nasal Cannula 2.0 


 


12/20/17 17:22 36.6 66 15 158/111 90 Room Air  


 


12/20/17 17:22     90 Room Air  











Physical Exam


GENERAL: 62-year-old male, in no acute distress, nondiaphoretic, 


SKIN: There are incisional healing wounds with good granulation tissue noted to 

the medial and lateral aspect of the right calf.  There is also a pressure 

ulcer noted to the left lateral malleolus with mild surrounding erythema.  No 

purulent drainage from the wounds..


HEAD: Normocephalic atraumatic.  


EYES: Pupils equal round and reactive to light and accommodation.  Conjunctivae 

without injection, sclerae without icterus.  Extraocular movements intact.  


MOUTH: Mucous membranes moist.  


NECK: No lymphadenopathy. Cervical spine is nontender.  No JVD.


HEART: Occasionally irregular.  No murmurs.


LUNGS: Clear to auscultation bilaterally without wheezes, rales or rhonchi.   

No accessory muscle use.


ABDOMEN: Positive bowel sounds x 4.Soft, nontender, without organomegaly. No 

guarding or rebound tenderness.


MUSCULOSKELETAL: No muscle atrophy, erythema, or edema noted.  Strength 5/5 

throughout.


NEURO: Patient was alert and oriented to person place and time.  Cerebellar 

function intact.  Cranial nerves grossly intact.  Normal sensation to touch. No 

focal neurological deficits.





Medical Decision & Procedures


ER Provider


Diagnostic Interpretation:











CT head


IMPRESSION:


                                                                               

                                                                 


Patient Name: MAC SPANGLER                               Unit Number:  

[id number]                  


 








 











Dictated: 12/20/17 1921


 


Transcribed: 12/20/17 1921


 


PBS


 


Printed Date/Time: [~ rep prt dt]/[~ rep prt tm]








 [~ rep ct labl] - [~ rep ct ivnm]


 





   [facility]


 Radiology Department


 [place] [id number] [phone]





 











Dictated: 12/20/17 1921


 


Transcribed: 12/20/17 1921


 


PBS


 


Printed Date/Time: [~ rep prt dt]/[~ rep prt tm]








 [~ rep ct labl] - [~ rep ct ivnm]


 





IMPRESSION:


1.  No obstruction or gross free air.











Electronically signed by:  Richard Deluna M.D.


12/20/2017 7:23 PM





Dictated Date/Time:  12/20/2017 7:21 PM





 The status of this report is Signed.   


 Draft = Not yet reviewed or approved by Radiologist.  


 Signed = Reviewed and approved by Radiologist.


<AttendingPhy></AttendingPhy> <FamilyPhy>Claude Rowley M.D.</FamilyPhy> <

PrimaryPhy>Claude Rowley M.D.</PrimaryPhy> <UnitNumber>[id number]</UnitNumber

> <VisitNumber>[id number]</VisitNumber> <PatientName>MAC SPANGLER</

PatientName> <DateOfBirth>1955</DateOfBirth> <Location>C.EDB</Location> <

ServiceDate>12/20/17</ServiceDate> <MNE>ESINDI</MNE> <OrderingPhy>Genet Maya PA-C</OrderingPhy> <OrderingPhyMNE>f rep ord dr new</OrderingPhyMNE> <

DictatingPhyMNE>f rep dict dr new</DictatingPhyMNE> <CCListMNE>f rep ct mne</

CCListMNE> <AdmittingPhyMNE>f pt admit dr new</AdmittingPhyMNE> <AttendingPhyMNE

>f pt attend dr new</AttendingPhyMNE>


<ConsultingPhyMNE>f pt consult dr new</ConsultingPhyMNE> <FamilyPhyMNE>f pt fam 

dr new</FamilyPhyMNE> <OtherPhyMNE>f pt other dr new</OtherPhyMNE> <

PrimaryPhyMNE>f pt prim care dr new</PrimaryPhyMNE> <ReferringPhyMNE>f pt 

referring dr new</ReferringPhyMNE>





1.  No acute intracranial abnormality. 











Electronically signed by:  Richard Deluna M.D.


12/20/2017 6:39 PM





Dictated Date/Time:  12/20/2017 6:35 PM





 The status of this report is Signed.   


 Draft = Not yet reviewed or approved by Radiologist.  


 Signed = Reviewed and approved by Radiologist.


<AttendingPhy></AttendingPhy> <FamilyPhy>Claude Rowley M.D.</FamilyPhy> <

PrimaryPhy>Claude Rowley M.D.</PrimaryPhy> <UnitNumber>[id number]</UnitNumber

> <VisitNumber>[id number]</VisitNumber> <PatientName>MAC SPANGLER</

PatientName> <DateOfBirth>1955</DateOfBirth> <Location>C.EDB</Location> <

ServiceDate>12/20/17</ServiceDate> <MNE>ESINDI</MNE> <OrderingPhy>Genet Maya PA-C</OrderingPhy> <OrderingPhyMNE>f rep ord dr new</OrderingPhyMNE> <

DictatingPhyMNE>f rep dict dr new</DictatingPhyMNE> <CCListMNE>f rep ct mne</

CCListMNE> <AdmittingPhyMNE>f pt admit dr new</AdmittingPhyMNE> <AttendingPhyMNE

>f pt attend dr new</AttendingPhyMNE>


<ConsultingPhyMNE>f pt consult dr new</ConsultingPhyMNE> <FamilyPhyMNE>f pt fam 

dr new</FamilyPhyMNE> <OtherPhyMNE>f pt other dr new</OtherPhyMNE> <

PrimaryPhyMNE>f pt prim care dr new</PrimaryPhyMNE> <ReferringPhyMNE>f pt 

referring dr new</ReferringPhyMNE>





CXR





                                                                               

                                                                 


Patient Name: MAC SPANGLER                               Unit Number:  

[id number]                  


 








 











Dictated: 12/20/17 1919


 


Transcribed: 12/20/17 1919


 


PBS


 


Printed Date/Time: [~ rep prt dt]/[~ rep prt tm]








 [~ rep ct labl] - [~ rep ct ivnm]


 





   [facility]


 Radiology Department


 [place] [address] [phone]





 











Dictated: 12/20/17 1919


 


Transcribed: 12/20/17 1919


 


PBS


 


Printed Date/Time: [~ rep prt dt]/[~ rep prt tm]








 [~ rep ct labl] - [~ rep ct ivnm]


 





IMPRESSION:


1.  Cardiomegaly with developing pulmonary edema.











Electronically signed by:  Richard Deluna M.D.


12/20/2017 7:20 PM





Dictated Date/Time:  12/20/2017 7:19 PM





 The status of this report is Signed.   


 Draft = Not yet reviewed or approved by Radiologist.  


 Signed = Reviewed and approved by Radiologist.


<AttendingPhy></AttendingPhy> <FamilyPhy>Claude Rowley M.D.</FamilyPhy> <

PrimaryPhy>Claude Rowley M.D.</PrimaryPhy> <UnitNumber>[id number]</UnitNumber

> <VisitNumber>[id number]</VisitNumber> <PatientName>MAC SPANGLER SONNY</

PatientName> <DateOfBirth>1955</DateOfBirth> <Location>C.EDB</Location> <

ServiceDate>12/20/17</ServiceDate> <MNE>ESINDI</MNE> <OrderingPhy>Genet Maya PA-C</OrderingPhy> <OrderingPhyMNE>f rep ord dr new</OrderingPhyMNE> <

DictatingPhyMNE>f rep dict dr new</DictatingPhyMNE> <CCListMNE>f rep ct mne</

CCListMNE> <AdmittingPhyMNE>f pt admit dr new</AdmittingPhyMNE> <AttendingPhyMNE

>f pt attend dr new</AttendingPhyMNE>


<ConsultingPhyMNE>f pt consult dr new</ConsultingPhyMNE> <FamilyPhyMNE>f pt fam 

dr new</FamilyPhyMNE> <OtherPhyMNE>f pt other dr new</OtherPhyMNE> <

PrimaryPhyMNE>f pt prim care dr new</PrimaryPhyMNE> <ReferringPhyMNE>f pt 

referring dr new</ReferringPhyMNE>





Laboratory Results


12/20/17 18:00








Red Blood Count 2.91, Mean Corpuscular Volume 88.7, Mean Corpuscular Hemoglobin 

29.6, Mean Corpuscular Hemoglobin Concent 33.3, Mean Platelet Volume 8.7, 

Neutrophils (%) (Auto) 72.6, Lymphocytes (%) (Auto) 12.3, Monocytes (%) (Auto) 

11.8, Eosinophils (%) (Auto) 2.5, Basophils (%) (Auto) 0.1, Neutrophils # (Auto

) 5.25, Lymphocytes # (Auto) 0.89, Monocytes # (Auto) 0.85, Eosinophils # (Auto

) 0.18, Basophils # (Auto) 0.01





12/20/17 18:00

















Test


  12/20/17


17:42 12/20/17


18:00 12/20/17


18:12


 


Creatine Kinase MB Ratio  (0-3.0)   


 


White Blood Count


  


  7.23 K/uL


(4.8-10.8) 


 


 


Red Blood Count


  


  2.91 M/uL


(4.7-6.1) 


 


 


Hemoglobin


  


  8.6 g/dL


(14.0-18.0) 


 


 


Hematocrit  25.8 % (42-52)  


 


Mean Corpuscular Volume


  


  88.7 fL


() 


 


 


Mean Corpuscular Hemoglobin


  


  29.6 pg


(25-34) 


 


 


Mean Corpuscular Hemoglobin


Concent 


  33.3 g/dl


(32-36) 


 


 


Platelet Count


  


  278 K/uL


(130-400) 


 


 


Mean Platelet Volume


  


  8.7 fL


(7.4-10.4) 


 


 


Neutrophils (%) (Auto)  72.6 %  


 


Lymphocytes (%) (Auto)  12.3 %  


 


Monocytes (%) (Auto)  11.8 %  


 


Eosinophils (%) (Auto)  2.5 %  


 


Basophils (%) (Auto)  0.1 %  


 


Neutrophils # (Auto)


  


  5.25 K/uL


(1.4-6.5) 


 


 


Lymphocytes # (Auto)


  


  0.89 K/uL


(1.2-3.4) 


 


 


Monocytes # (Auto)


  


  0.85 K/uL


(0.11-0.59) 


 


 


Eosinophils # (Auto)


  


  0.18 K/uL


(0-0.5) 


 


 


Basophils # (Auto)


  


  0.01 K/uL


(0-0.2) 


 


 


RDW Standard Deviation


  


  53.3 fL


(36.4-46.3) 


 


 


RDW Coefficient of Variation


  


  16.4 %


(11.5-14.5) 


 


 


Immature Granulocyte % (Auto)  0.7 %  


 


Immature Granulocyte # (Auto)


  


  0.05 K/uL


(0.00-0.02) 


 


 


Polychromasia  1+  


 


Prothrombin Time


  


  15.0 SECONDS


(9.0-12.0) 


 


 


Prothromb Time International


Ratio 


  1.4 (0.9-1.1) 


  


 


 


Anion Gap


  


  6.0 mmol/L


(3-11) 


 


 


Est Creatinine Clear Calc


Drug Dose 


  70.6 ml/min 


  


 


 


Estimated GFR (


American) 


  69.4 


  


 


 


Estimated GFR (Non-


American 


  59.9 


  


 


 


BUN/Creatinine Ratio  27.5 (10-20)  


 


Calcium Level


  


  8.1 mg/dl


(8.5-10.1) 


 


 


Total Bilirubin


  


  0.3 mg/dl


(0.2-1) 


 


 


Aspartate Amino Transf


(AST/SGOT) 


  12 U/L (15-37) 


  


 


 


Alanine Aminotransferase


(ALT/SGPT) 


  11 U/L (12-78) 


  


 


 


Alkaline Phosphatase


  


  60 U/L


() 


 


 


Creatine Kinase MB


  


  3.8 ng/ml


(0.5-3.6) 


 


 


Total Protein


  


  7.3 gm/dl


(6.4-8.2) 


 


 


Albumin


  


  2.6 gm/dl


(3.4-5.0) 


 


 


Globulin


  


  4.7 gm/dl


(2.5-4.0) 


 


 


Albumin/Globulin Ratio  0.6 (0.9-2)  


 


Lipase


  


  96 U/L


() 


 


 


Urine Color   YELLOW 


 


Urine Appearance   CLEAR (CLEAR) 


 


Urine pH   5.0 (4.5-7.5) 


 


Urine Specific Gravity


  


  


  1.019


(1.000-1.030)


 


Urine Protein   3+ (NEG) 


 


Urine Glucose (UA)   NEG (NEG) 


 


Urine Ketones   TRACE (NEG) 


 


Urine Occult Blood   2+ (NEG) 


 


Urine Nitrite   NEG (NEG) 


 


Urine Bilirubin   NEG (NEG) 


 


Urine Urobilinogen   NEG (NEG) 


 


Urine Leukocyte Esterase   NEG (NEG) 


 


Urine WBC (Auto)   1-5 /hpf (0-5) 


 


Urine RBC (Auto)


  


  


  10-30 /hpf


(0-4)


 


Urine Hyaline Casts (Auto)   1-5 /lpf (0-5) 


 


Urine Epithelial Cells (Auto)


  


  


  5-10 /lpf


(0-5)


 


Urine Bacteria (Auto)   NEG (NEG) 











Medications Administered











 Medications


  (Trade)  Dose


 Ordered  Sig/Dc


 Route  Start Time


 Stop Time Status Last Admin


Dose Admin


 


 Aspirin


  (Aspirin Chew)  324 mg  NOW  STAT


 PO  12/20/17 17:42


 12/20/17 17:48 DC 12/20/17 17:51


324 MG


 


 Morphine Sulfate


  (MoRPHine


 SULFATE INJ)  4 mg  ONE  STAT


 IV  12/20/17 18:28


 12/20/17 18:29 DC 12/20/17 18:47


4 MG


 


 Hydromorphone HCl


  (Dilaudid Inj)  1 mg  ONE  ONCE


 IV  12/20/17 20:00


 12/20/17 20:01 DC 12/20/17 20:06


1 MG











ECG


Indication:  chest pain


Rate (beats per minute):  65


Rhythm:  normal sinus


Findings:  PVC, other (peaked T waves in the anterior leads.  No significant 

change when compared to prior.)


Change:  no significant change





ED Course


Patient was seen and examined


Vital signs including  blood pressure were reviewed


medications list was verified with patient


Labs were obtained, and a saline lock was established


The patient was given aspirin 324 mg chewable by mouth


He was then ordered morphine 4 mg IV


The workup was discussed my attending physician who is in agreement with my plan


The patient was reassessed and still complaining of pain.  He was given an 

additional dose of pain medication.  We discussed his workup.  He voiced 

understanding.


The case was discussed with case management and subsequently the [facility] who agreed to observe the patient overnight.





Medical Decision


Differential diagnosis: Acute myocardial infarction, cardiac arrhythmia, anemia

, gastritis, pancreatitis, vascular disease, pneumothorax, pneumonia, bronchitis

, pericarditis,





This patient is a 62-year-old male with a history of coronary disease presents 

to emergency department with abdominal pain, chest pain and shortness of 

breath.  The abdominal pain had resolved.  On exam, his abdomen was benign.  

His EKG shows sinus rhythm, and appears unchanged when compared to prior.  The 

patient does have a significant cardiac history.  His initial troponin is 

negative.  I am suspicious of cardiac ischemia with his history of heart 

disease.  Do not feel comfortable sending the patient home, and feel that he 

would be best observed overnight with continued workup and treatment.  The 

patient is in agreement with this plan.  He will stay for observation overnight 

for further workup and treatment.





This chart was completed in part utilizing Dragon Speech Voice Recognition 

software. Attempts were made to minimize the grammatical errors, random word 

insertions, pronoun errors and incomplete sentences. Any formal questions or 

concerns about the content, text or information contained within the body of 

this dictation should be directly addressed to the provider for clarification.





Medication Reconcilliation


Current Medication List:  was personally reviewed by me





Blood Pressure Screening


Patient's blood pressure:  Elevated blood pressure





Impression





 Primary Impression:  


 Chest pain





Departure Information


Referrals


Pro,Claude MCCORD M.D. (PCP)





Patient Instructions


My [facility]

## 2017-12-21 VITALS
TEMPERATURE: 97.88 F | OXYGEN SATURATION: 94 % | DIASTOLIC BLOOD PRESSURE: 82 MMHG | HEART RATE: 61 BPM | SYSTOLIC BLOOD PRESSURE: 131 MMHG

## 2017-12-21 VITALS — DIASTOLIC BLOOD PRESSURE: 61 MMHG | SYSTOLIC BLOOD PRESSURE: 113 MMHG | HEART RATE: 78 BPM

## 2017-12-21 VITALS
HEART RATE: 64 BPM | TEMPERATURE: 98.24 F | SYSTOLIC BLOOD PRESSURE: 149 MMHG | DIASTOLIC BLOOD PRESSURE: 59 MMHG | OXYGEN SATURATION: 100 %

## 2017-12-21 VITALS — OXYGEN SATURATION: 92 %

## 2017-12-21 VITALS
SYSTOLIC BLOOD PRESSURE: 173 MMHG | OXYGEN SATURATION: 93 % | HEART RATE: 61 BPM | TEMPERATURE: 98.24 F | DIASTOLIC BLOOD PRESSURE: 72 MMHG

## 2017-12-21 VITALS
OXYGEN SATURATION: 93 % | TEMPERATURE: 98.24 F | SYSTOLIC BLOOD PRESSURE: 110 MMHG | HEART RATE: 61 BPM | DIASTOLIC BLOOD PRESSURE: 66 MMHG

## 2017-12-21 VITALS
TEMPERATURE: 98.24 F | HEART RATE: 61 BPM | OXYGEN SATURATION: 93 % | DIASTOLIC BLOOD PRESSURE: 66 MMHG | SYSTOLIC BLOOD PRESSURE: 110 MMHG

## 2017-12-21 LAB
BUN SERPL-MCNC: 35 MG/DL (ref 7–18)
CALCIUM SERPL-MCNC: 7.8 MG/DL (ref 8.5–10.1)
CO2 SERPL-SCNC: 25 MMOL/L (ref 21–32)
CREAT SERPL-MCNC: 1.38 MG/DL (ref 0.6–1.4)
GLUCOSE SERPL-MCNC: 146 MG/DL (ref 70–99)
INR PPP: 1.5 (ref 0.9–1.1)
POTASSIUM SERPL-SCNC: 4.7 MMOL/L (ref 3.5–5.1)
SODIUM SERPL-SCNC: 139 MMOL/L (ref 136–145)

## 2017-12-21 RX ADMIN — SPIRONOLACTONE SCH MG: 25 TABLET, FILM COATED ORAL at 08:20

## 2017-12-21 RX ADMIN — OXYCODONE HYDROCHLORIDE PRN MG: 5 TABLET ORAL at 11:10

## 2017-12-21 RX ADMIN — INSULIN ASPART SCH UNITS: 100 INJECTION, SOLUTION INTRAVENOUS; SUBCUTANEOUS at 08:19

## 2017-12-21 RX ADMIN — INSULIN ASPART SCH UNITS: 100 INJECTION, SOLUTION INTRAVENOUS; SUBCUTANEOUS at 16:30

## 2017-12-21 RX ADMIN — OXYCODONE HYDROCHLORIDE PRN MG: 5 TABLET ORAL at 12:23

## 2017-12-21 RX ADMIN — FERROUS SULFATE TAB EC 325 MG (65 MG FE EQUIVALENT) SCH MG: 325 (65 FE) TABLET DELAYED RESPONSE at 08:19

## 2017-12-21 RX ADMIN — FERROUS SULFATE TAB EC 325 MG (65 MG FE EQUIVALENT) SCH MG: 325 (65 FE) TABLET DELAYED RESPONSE at 16:52

## 2017-12-21 RX ADMIN — INSULIN ASPART SCH UNITS: 100 INJECTION, SOLUTION INTRAVENOUS; SUBCUTANEOUS at 12:25

## 2017-12-21 RX ADMIN — SPIRONOLACTONE SCH MG: 25 TABLET, FILM COATED ORAL at 16:52

## 2017-12-21 NOTE — NUR
Pt seen due to WOCN notification.  Please refer to linked assessment

-------------------------------------------------------------------------------

Addendum: 12/21/17 at 1236 by Bernard Arizmendi RD

-------------------------------------------------------------------------------

Amended: Links added.

## 2017-12-21 NOTE — NUR
RN Navigator [facility]: Pt has an appt in place w/ Dr. Yusuf on Friday December 22nd at 2:00 pm.  He is also scheduled w/ Dr. Rowley on Thursday December 28th at 2:00 
pm.  



Per request of Brenda Reyes PA-C, I called the wound clinic and left a message asking them 
to schedule an appt regarding diabetic ulcers on bilateral lower extremities and to contact 
the pt w/ the details.

## 2017-12-21 NOTE — NUR
A:Patient resting in bed. A&O x4. VSS on 2L N.C. SR c 1 degree IVCD on monitor. Patient 
denies chest pain or shortness of breath at this time. Patient indicates chronic bilateral 
leg pain. Medication administered per MD order. B/L lower leg dressing removed and redressed 
with same dressing as prior, adaptic, abd, kerlix. WCN consulted. Patient states he is 
working with PT to regain strength to walk independently, states currently he uses a walker 
with assist. Chronic gillespie patent draining concentrated yellow. Patient instructed to ring 
to assistance. Bed alarm and Q1hour rounding in place. Will continue to monitor.

## 2017-12-21 NOTE — NUR
A: Pt A&Ox4 and denies chest pain at this time. VSS. Pt in sinus bradycardia with PVCs on 
the monitor. High intact and draining clear yellow urine. Dressings to bilateral lower 
extremities C, D, I. No verbalized needs at this time. Will continue to monitor. 

-------------------------------------------------------------------------------

Addendum: 12/21/17 at 1903 by Susan Vicente RN

-------------------------------------------------------------------------------

IVCD also noted on strip

## 2017-12-21 NOTE — NUR
A: Resting in bed. No issues at this time. Assessment completed. High patent and draining. 
Will continue to monitor.

## 2017-12-21 NOTE — NUR
A: The patient's Right lower extremity dressing changed due to soil. Supplies setup to the 
bedside. Removed the dressing RLE. Ulcer noted. Reddish wound bed, pink periwound noted. 
Scant green/yellowish drainage noted to the dressing. Two pieces of adaptic applied to the 
ulcer of the right out aspect RLE. 3 4x4 gauze pads applied to the Left lateral skin field. 
ABD Applied. Wrapped with Kerlex. Secured with tape. The patient tolerated this dressing 
change well. Ate 100% Diet. Dangle to the edge of the bed. SL intact, patent. High catheter 
patent and draining clear yellow urine. Educated the patient to ring for assistance as 
needed. Also, educated the patient to call for the nurse if a need arises.

## 2017-12-21 NOTE — NUR
A: Patient resting in bed. VSS on 2L N.C. SB c 1 degree on monitor. Bed alarm and Q1 hour 
rounding maintained. Assessment unchanged from previous. Will continue to monitor.

## 2017-12-21 NOTE — NUR
A: Pt discharged. All discharge instructions given. Pt verbalizes understanding. Dressings 
on bilateral legs changed. All discharge instructions give to patient who verbalizes 
understanding. Pt is A&Ox4. IV site removed with bleeding which was resolved by applying 
direct pressure. Pt able to dress himself and was escorted to the car by CNA via wheelchair. 
All belongings with patient.

## 2017-12-21 NOTE — DISCHARGE INSTRUCTIONS
Discharge Instructions


Date of Service


Dec 21, 2017.





Admission


Reason for Admission:  Precordial Chest Pain





Discharge


Discharge Diagnosis / Problem:  Chest Pain





Discharge Goals


Goal(s):  Decrease discomfort, Improve function, Increase independence





Activity Recommendations


Activity Limitations:  resume your previous activity





.





Instructions / Follow-Up


Instructions / Follow-Up


Chest Pain:


- Your cardiac enzymes were checked and are negative and no findings suggesting 

this is a new heart attack


- Keep your appointment with Dr. Yusuf and the coagulation clinic tomorrow for 

follow-up.


- If your pain returns please use your nitroglycerin and if the pain does not 

resolve please return to the hospital





Atrial Clot:


- Continue your coumadin and Lovenox as prescribed by the coumadin clinic and 

see them tomorrow to adjust your medication. INR is still low at 1.5 today





Leg Ulcers:


- Continue your services at home for these and a message was left at the wound 

clinic to call and see about having a follow-up appointment with them.





Current Hospital Diet


Patient's current hospital diet: AHA Diet (Heart Healthy), Diabetes Type 2 Diet





Discharge Diet


Recommended Diet:  AHA Diet (Heart Healthy), Diabetes Type 2 Diet





Pending Studies


Studies pending at discharge:  no





Medical Emergencies








.


Who to Call and When:





Medical Emergencies:  If at any time you feel your situation is an emergency, 

please call 911 immediately.





.





Non-Emergent Contact


Non-Emergency issues call your:  Primary Care Provider


Call Non-Emergent contact if:  you have a fever, your pain is concerning you, 

you have any medication questions





.


.








"Provider Documentation" section prepared by Brenda Reyes.








.





VTE Core Measure


Inpt VTE Proph given/why not?:  Enoxaparin (Lovenox)SQ, Warfarin (Coumadin)





[place] Drug Monitoring Program


Search Results:  patient reviewed within database, no issues identified

## 2017-12-21 NOTE — DISCHARGE SUMMARY
Discharge Summary


Date of Service


Dec 21, 2017.





Discharge Summary


Admission Date:


Dec 20, 2017 at 20:52


Discharge Date:  Dec 21, 2017


Discharge Disposition:  Home


Principal Diagnosis:  Chest Pain/Abdominal Pain


Problems/Secondary Diagnoses:


1. CAD S/P NSTEMI (2007) and MI (Oct 2017)


2. S/P Stents and Atherectomy


3. Chronic Diastolic CHF


4. T2DM


5. Bipolar Disease


6. COPD


7. Diabetic Foot Ulceration


8. VF Arrest after MI (2007)


9. JANE


10. GERD


11. PAD S/P B/L Femoral Stents


12. Chronic Anemia


13. Atrial Thrombus


Immunizations:  


   Have You Had Influenza Vaccine:  No


   Influenza Vaccine Date:  Oct 5, 2009


   History of Tetanus Vaccine?:  No


   Tetanus Immunization Date:  Mar 1, 2004


   History of Pneumococcal:  No


   Pneumococcal Date:  Oct 15, 2006


   History of Hepatitis B Vaccine:  No


Procedures:


KUB





FINDINGS:


Nonobstructive bowel gas pattern. No gross pneumoperitoneum.





Significant atherosclerosis. The renal shadows are poorly evaluated.





Degenerative changes of the spine. Lung bases clear.





IMPRESSION:


1.  No obstruction or gross free air.





HEAD WITHOUT CONTRAST (CT)





FINDINGS: 





 topogram: Unremarkable.





Ventricles and sulci normal in size. Brain parenchyma normal in appearance with


preserved gray-white differentiation. No mass effect or midline shift. No


hemorrhage or acute territorial infarct. No extra-axial fluid collection. Trace


fluid in the left mastoid air cells. Calvarium intact.    





IMPRESSION:


1.  No acute intracranial abnormality.





CHEST ONE VIEW PORTABLE





FINDINGS:


Atherosclerosis of aortic arch. Cardiac silhouette enlarged, unchanged.


Pulmonary vascular prominence. Hazy and reticular central and basilar opacities.


No large pleural effusion or pneumothorax. Osseous structures normal. Upper


abdomen normal.





IMPRESSION:


1.  Cardiomegaly with developing pulmonary edema.





Medication Reconciliation


Continued Medications:  


Acetaminophen (Tylenol) 500 Mg Tab


500 MG PO Q4-6HRS PRN for Pain, TAB





Albuterol Sulfate (Proair Respiclick) 108 Mcg/Act Aer


1-2 PUFFS INH Q4-6HRS PRN for SOB/Wheezing





Amlodipine Besylate (Amlodipine Besylate) 10 Mg Tab


10 MG PO QAM





Clonazepam (Clonazepam) 1 Mg Tab


1 MG PO HS PRN for Anxiety





Clopidogrel Bisulfate (Clopidogrel) 75 Mg Tab


75 MG PO QAM





Cyanocobalamin (Vitamin B12) 1,000 Mcg Tab


1000 MCG PO DAILY





Divalproex Sodium (Depakote Delay Rel) 500 Mg Tab


2000 MG PO HS





Docusate Sodium (Docusate Sodium) 100 Mg Cap


100 MG PO BID PRN for Constipation





Enoxaparin Sodium (Lovenox) 80 Mg/0.8 Ml Inj


0.8 ML SC QD@1700





Escitalopram Oxalate (Escitalopram Oxalate) 20 Mg Tab


20 MG PO QAM





Ferrous Sulfate (Ferrous Sulfate) 325 Mg Tab


325 MG PO BIDM





Fluticasone Propionate (Nasal) (Flonase Allergy Relief) 50 Mcg/Act Spr


2 SPRAYS VERONICA DAILY PRN for Nasal Congestion





Gabapentin (Neurontin) 300 Mg Cap


300 MG PO HS, CAP





Glucagon (Glucagon Emergency Kit) 1 Mg Kit


1 MG UD PRN for Hypoglycemia Protocol





Insulin Glargine (Lantus Solostar) 100 Unit/Ml Inj


10 UNITS SC HS, PEN





Insulin Lispro (Human) (Humalog Kwikpen) 100 Unit/Ml Inj


1 DOSE SC AC


COVERAGE AS DIRECTED BY SLIDING SCALE


Metoprolol Succinate (Metoprolol Succinate ER) 50 Mg Tabcr


50 MG PO BID





Mirtazapine (Mirtazapine) 45 Mg Tab


45 MG PO HS, TAB





Nitroglycerin (Nitrostat) 0.4 Mg Tab


0.4 MG UT UD PRN for Chest Pain


DISSOLVE ONE TABLET UNDER THE TONGUE AS DIRECTED


Oxycodone HCl (Oxycodone HCl) 5 Mg Tab


5 MG PO Q6H PRN for Breakthrough Pain





Oxycodone Hcl (Oxycodone Hcl) 10 Mg Tab


10 MG PO Q12 PRN for Severe Pain





Simvastatin (Simvastatin) 80 Mg Tab


80 MG PO DAILY





Spironolactone (Aldactone) 50 Mg Tab


50 MG PO BID





Tamsulosin HCl (Tamsulosin HCl) 0.4 Mg Cap


0.4 MG PO HS





Warfarin Sodium (Warfarin Sodium) 5 Mg Tab


1 DOSE PO UD


TAKE DAILY AS DIRECTED BY ANTICOAGULATION CLINIC/MD








Discharge Exam


Review of Systems:  


   Constitutional:  No fever, No chills


   Respiratory:  No cough, No shortness of breath


   Cardiovascular:  No chest pain, No palpitations


   Abdomen:  + pain (intermittent [id number]), No nausea, No vomiting, 

No diarrhea, No constipation, No GI bleeding, No problem reported (denies 

heartburn)


   Musculoskeletal:  + problem reported (pain around ulceration of b/l legs - 

chronic), No swelling


   Genitourinary - Male:  No dysuria


   Hematologic / Lymphatic:  No abnormal bleeding/bruising


   Integumentary:  No rash


Physical Exam:  


   General Appearance:  no apparent distress


   Eyes:  sclerae normal


   ENT:  hearing grossly normal


   Neck:  supple, no JVD, trachea midline


   Respiratory/Chest:  lungs clear, normal breath sounds, no respiratory 

distress, no accessory muscle use


   Cardiovascular:  regular rate, rhythm, no gallop, no murmur


   Abdomen / GI:  normal bowel sounds, non tender, soft


   Extremities:  + pertinent finding (dressing applied to b/l lower extremities 

wounds C/D/I)


   Neurologic/Psychiatric:  alert, oriented x 3


   Skin:  normal color, warm/dry





Hospital Course


ADMISSION: 63 y/o M with extensive medical issues including severe CAD, PVD, 

COPD, DM II, bipolar disease, chronic anemia, diastolic CHF, recent diagnosis 

of atrial thrombus - on Lovenox bridging to Coumadin - pt had an MI at [place] 

10/17 which was medically managed.  Presents with L subcostal pain and central 

chest pressure - denies SOB, N/V, lightheadedness or diaphoresis.  Initial EKG 

and troponin do not support acute ischemia.





HOSPITAL COURSE: Mr. Dixon was admitted for LUQ Abdominal Pain with 

Radiation into L Chest. He reports that this pain was similar to his previous 

MIs and came in for evaluation. He states the pain came on suddenly when 

working with PT and did not completely resolve until he received Dilaudid 1 mg. 

Patient state he was chest pain free throughout the night. He does have NTG but 

states he does not need it and states he doesn't tolerate this medication well. 

Serial troponins drawn and negative. He was mostly NSR and sinus jorge on 

monitor with occ. bigeminy and PVCs rates largely in 60s. Upon examination on 12 /21 patient reported that he had no chest pain but some pain around his chronic 

ulcers of his legs. States he his Oxy did not give relief and requested further 

Dilaudid so he could sleep. Did discuss that I cannot give pain medication for 

sleep and then stated he did still have pain in his legs and then stated it was 

still present in his LUQ and into his chest. Patient took his prescribed pain 

for breakthrough pain. Patient has remained chest pain free throughout the day 

and did give a GI cocktail to see if this produced some improvement. Patient is 

undergoing Lovenox to Coumadin bridge and states he has been doing this x 2 

weeks and his INR is still subtherapeutic. States he is not eating a large 

amount of green vegetables but says he tries to be consistent with it. Will 

give dose of Coumadin 10 mg today, states he is currently taking 7.5 mg daily. 

Patient does have frequent admissions for fluid overload but appears euvolemic 

at this time and will continue his prescribed Spironolactone. Patient will have 

F/U with wound care and continue dressing changes per home nursing. Patient is 

chest pain free and only complaining of chronic leg pain and will be D/C'd 

without follow-up appointments tomorrow.











Dr. Hazel Note


I performed a history and physical exam during my face to face encounter with 

patient. I reviewed above note and agree with it. I discussed plan of care with 

patient and APC and answered all of the patient's questions





My exam is below


  General Appearance:  WD/WN, no apparent distress


   Eyes:  sclerae normal


   Neck:  supple, no JVD, trachea midline


   Respiratory/Chest:  no respiratory distress, no accessory muscle use, 


   Cardiovascular:  regular rate, rhythm, 


   Abdomen / GI:  normal bowel sounds, non tender, soft


   Neurologic/Psychiatric:  alert


   Skin:  normal color, warm/dry, dressing applied to lower extremity wound


Total Time Spent:  Greater than 30 minutes


This includes examination of the patient, discharge planning, medication 

reconciliation, and communication with other providers.





Discharge Instructions


Please refer to the electronic Patient Visit Report (Discharge Instructions) 

for additional information.





Additional Copies To


Claude Yusuf M.D.; Claude Rowley M.D.

## 2017-12-22 ENCOUNTER — HOSPITAL ENCOUNTER (OUTPATIENT)
Dept: HOSPITAL 45 - C.EDB | Age: 62
Setting detail: OBSERVATION
LOS: 2 days | Discharge: HOME | End: 2017-12-24
Attending: HOSPITALIST | Admitting: HOSPITALIST
Payer: COMMERCIAL

## 2017-12-22 VITALS
TEMPERATURE: 97.88 F | DIASTOLIC BLOOD PRESSURE: 63 MMHG | OXYGEN SATURATION: 92 % | HEART RATE: 75 BPM | SYSTOLIC BLOOD PRESSURE: 173 MMHG

## 2017-12-22 VITALS
HEART RATE: 80 BPM | SYSTOLIC BLOOD PRESSURE: 182 MMHG | TEMPERATURE: 97.88 F | OXYGEN SATURATION: 93 % | DIASTOLIC BLOOD PRESSURE: 81 MMHG

## 2017-12-22 VITALS — OXYGEN SATURATION: 92 %

## 2017-12-22 VITALS
HEART RATE: 70 BPM | DIASTOLIC BLOOD PRESSURE: 61 MMHG | TEMPERATURE: 97.88 F | OXYGEN SATURATION: 94 % | SYSTOLIC BLOOD PRESSURE: 155 MMHG

## 2017-12-22 VITALS
WEIGHT: 200.4 LBS | BODY MASS INDEX: 28.69 KG/M2 | HEIGHT: 70 IN | WEIGHT: 200.4 LBS | BODY MASS INDEX: 28.69 KG/M2 | HEIGHT: 70 IN

## 2017-12-22 VITALS
TEMPERATURE: 98.24 F | SYSTOLIC BLOOD PRESSURE: 200 MMHG | DIASTOLIC BLOOD PRESSURE: 74 MMHG | OXYGEN SATURATION: 94 % | HEART RATE: 91 BPM

## 2017-12-22 DIAGNOSIS — N18.9: ICD-10-CM

## 2017-12-22 DIAGNOSIS — Z79.4: ICD-10-CM

## 2017-12-22 DIAGNOSIS — Z79.01: ICD-10-CM

## 2017-12-22 DIAGNOSIS — G47.33: ICD-10-CM

## 2017-12-22 DIAGNOSIS — Z82.49: ICD-10-CM

## 2017-12-22 DIAGNOSIS — K21.9: ICD-10-CM

## 2017-12-22 DIAGNOSIS — J96.10: ICD-10-CM

## 2017-12-22 DIAGNOSIS — Z83.3: ICD-10-CM

## 2017-12-22 DIAGNOSIS — I25.10: ICD-10-CM

## 2017-12-22 DIAGNOSIS — F31.9: ICD-10-CM

## 2017-12-22 DIAGNOSIS — D64.9: ICD-10-CM

## 2017-12-22 DIAGNOSIS — I73.9: ICD-10-CM

## 2017-12-22 DIAGNOSIS — E11.621: ICD-10-CM

## 2017-12-22 DIAGNOSIS — L97.529: ICD-10-CM

## 2017-12-22 DIAGNOSIS — I25.2: ICD-10-CM

## 2017-12-22 DIAGNOSIS — F17.200: ICD-10-CM

## 2017-12-22 DIAGNOSIS — I50.41: Primary | ICD-10-CM

## 2017-12-22 DIAGNOSIS — I34.0: ICD-10-CM

## 2017-12-22 DIAGNOSIS — J44.9: ICD-10-CM

## 2017-12-22 LAB
ALBUMIN SERPL-MCNC: 2.4 GM/DL (ref 3.4–5)
ALP SERPL-CCNC: 60 U/L (ref 45–117)
ALT SERPL-CCNC: 11 U/L (ref 12–78)
AST SERPL-CCNC: 13 U/L (ref 15–37)
BASOPHILS # BLD: 0.01 K/UL (ref 0–0.2)
BASOPHILS NFR BLD: 0.2 %
BUN SERPL-MCNC: 41 MG/DL (ref 7–18)
CALCIUM SERPL-MCNC: 8.4 MG/DL (ref 8.5–10.1)
CO2 SERPL-SCNC: 27 MMOL/L (ref 21–32)
CREAT SERPL-MCNC: 1.27 MG/DL (ref 0.6–1.4)
EOS ABS #: 0.12 K/UL (ref 0–0.5)
EOSINOPHIL NFR BLD AUTO: 299 K/UL (ref 130–400)
GLUCOSE SERPL-MCNC: 133 MG/DL (ref 70–99)
HCT VFR BLD CALC: 23.9 % (ref 42–52)
HGB BLD-MCNC: 7.9 G/DL (ref 14–18)
IG#: 0.04 K/UL (ref 0–0.02)
IMM GRANULOCYTES NFR BLD AUTO: 19.4 %
INFLUENZA B ANTIGEN: (no result)
INR PPP: 1.9 (ref 0.9–1.1)
LYMPHOCYTES # BLD: 1.06 K/UL (ref 1.2–3.4)
MCH RBC QN AUTO: 29.4 PG (ref 25–34)
MCHC RBC AUTO-ENTMCNC: 33.1 G/DL (ref 32–36)
MCV RBC AUTO: 88.8 FL (ref 80–100)
MONO ABS #: 0.56 K/UL (ref 0.11–0.59)
MONOCYTES NFR BLD: 10.3 %
NEUT ABS #: 3.67 K/UL (ref 1.4–6.5)
NEUTROPHILS # BLD AUTO: 2.2 %
NEUTROPHILS NFR BLD AUTO: 67.2 %
PMV BLD AUTO: 8.5 FL (ref 7.4–10.4)
POTASSIUM SERPL-SCNC: 5 MMOL/L (ref 3.5–5.1)
PROT SERPL-MCNC: 7 GM/DL (ref 6.4–8.2)
PTT PATIENT: 36.4 SECONDS (ref 21–31)
RED CELL DISTRIBUTION WIDTH CV: 16.5 % (ref 11.5–14.5)
RED CELL DISTRIBUTION WIDTH SD: 53.2 FL (ref 36.4–46.3)
SODIUM SERPL-SCNC: 139 MMOL/L (ref 136–145)
WBC # BLD AUTO: 5.46 K/UL (ref 4.8–10.8)

## 2017-12-22 RX ADMIN — MIRTAZAPINE SCH MG: 15 TABLET, FILM COATED ORAL at 22:10

## 2017-12-22 RX ADMIN — NITROGLYCERIN SCH INCH: 20 OINTMENT TOPICAL at 22:15

## 2017-12-22 RX ADMIN — SPIRONOLACTONE SCH MG: 25 TABLET, FILM COATED ORAL at 22:11

## 2017-12-22 RX ADMIN — GABAPENTIN SCH MG: 300 CAPSULE ORAL at 22:11

## 2017-12-22 RX ADMIN — METOPROLOL SUCCINATE SCH MG: 50 TABLET, EXTENDED RELEASE ORAL at 22:10

## 2017-12-22 RX ADMIN — DIVALPROEX SODIUM SCH MG: 500 TABLET, DELAYED RELEASE ORAL at 22:12

## 2017-12-22 RX ADMIN — TAMSULOSIN HYDROCHLORIDE SCH MG: 0.4 CAPSULE ORAL at 22:12

## 2017-12-22 NOTE — DIAGNOSTIC IMAGING REPORT
CHEST ONE VIEW PORTABLE



CLINICAL HISTORY: Respiratory distress    



COMPARISON STUDY:  12/20/2017



FINDINGS: The heart is borderline enlarged. There is slight progression of the

interstitial pulmonary edema pattern. There is no lobar consolidation.[ Trace

pleural effusions are suspected



IMPRESSION: Slight worsening of the interstitial pulmonary edema pattern







Electronically signed by:  Basil Chapin M.D.

12/22/2017 5:59 PM



Dictated Date/Time:  12/22/2017 5:58 PM

## 2017-12-22 NOTE — EMERGENCY ROOM VISIT NOTE
History


Report prepared by Safia:  Mac Laureano


Under the Supervision of:  Dr. Heber Mendez M.D.


First contact with patient:  17:34


Chief Complaint:  RESPIRATORY PROBLEMS


Stated Complaint:  SOB





History of Present Illness


The patient is a 62 year old male who presents to the Emergency Room with 

complaints of persistent shortness of breath beginning last night. He was 

admitted as an inpatient for chest pain two days ago and was discharged 

yesterday. His hemoglobin was 8.4 upon discharge. The patient's oxygen 

saturation was found to be 84% upon arrival to the ED today. He states that his 

oxygen saturation dropped to 75% at the lowest last night. He states that his 

oxygen saturations have been in the 80's all day today. The patient states that 

he felt normal upon discharge from the hospital yesterday. He also complains 

"feeling wheezy". He recently stopped using supplemental oxygen at home, but 

was previously on 2-4 L. The patient denies any fevers, or new cough. He is on 

Depakote and Coumadin. He has not been on steroids for his breathing problems 

before.





   Source of History:  patient


   Onset:  Last night


   Symptom Intensity:  Oxygen saturation of 75% at the lowest


   Quality:  other (shortness of breath)


   Timing:  other (persistent)


   Associated Symptoms:  + abdominal pain (LLQ), No fevers, No cough (new)


Note:


Additional symptoms: "feeling wheezy".





Review of Systems


See HPI for pertinent positives & negatives. A total of 10 systems reviewed and 

were otherwise negative.





Past Medical & Surgical


Medical Problems:


(1) Accelerated hypertension


(2) Acute appendicitis with appendiceal abscess


(3) Acute renal insufficiency


(4) Acute respiratory failure


(5) Angina pectoris


(6) Bipolar disorder


(7) CAD (coronary artery disease)


(8) Cardiac arrest


(9) Cellulitis


(10) CHF exacerbation


(11) Dehydration


(12) Diabetes


(13) Diastolic CHF, chronic


(14) Elevated INR


(15) Enterocolitis


(16) Headache


(17) Heel ulcer


(18) Hypertension


(19) Ischemic cardiomyopathy


(20) LVH (left ventricular hypertrophy)


(21) Osteomyelitis of left foot


(22) PAD (peripheral artery disease)


(23) Precordial chest pain


(24) Respiratory distress


(25) Sepsis, unspecified organism


(26) Volume overload








Family History





Cancer (esophageal)


Diabetes mellitus


Heart disease


Hypertension





Social History


Smoking Status:  Former Smoker


Drug Use:  none


Marital Status:  


Housing Status:  lives with family


Occupation Status:  employed





Current/Historical Medications


Scheduled


Amlodipine Besylate (Amlodipine Besylate), 10 MG PO QAM


Clopidogrel Bisulfate (Clopidogrel), 75 MG PO QAM


Cyanocobalamin (Vitamin B12), 1,000 MCG PO DAILY


Divalproex Sodium (Depakote Delay Rel), 2,000 MG PO HS


Enoxaparin Sodium (Lovenox), 0.8 ML SC QD@1700


Escitalopram Oxalate (Escitalopram Oxalate), 20 MG PO QAM


Ferrous Sulfate (Ferrous Sulfate), 325 MG PO BIDM


Gabapentin (Neurontin), 300 MG PO HS


Insulin Glargine (Lantus Solostar), 10 UNITS SC HS


Insulin Lispro (Human) (Humalog Kwikpen), 1 DOSE SC AC


Metoprolol Succinate (Metoprolol Succinate ER), 50 MG PO BID


Mirtazapine (Mirtazapine), 45 MG PO HS


Simvastatin (Simvastatin), 80 MG PO DAILY


Spironolactone (Aldactone), 50 MG PO BID


Tamsulosin HCl (Tamsulosin HCl), 0.4 MG PO HS


Warfarin Sodium (Warfarin Sodium), 1 DOSE PO UD





Scheduled PRN


Acetaminophen (Tylenol), 500 MG PO Q4-6HRS PRN for Pain


Albuterol Sulfate (Proair Respiclick), 1-2 PUFFS INH Q4-6HRS PRN for SOB/

Wheezing


Clonazepam (Clonazepam), 1 MG PO HS PRN for Anxiety


Docusate Sodium (Docusate Sodium), 100 MG PO BID PRN for Constipation


Fluticasone Propionate (Nasal) (Flonase Allergy Relief), 2 SPRAYS VERONICA DAILY PRN 

for Nasal Congestion


Glucagon (Glucagon Emergency Kit), 1 MG UD PRN for Hypoglycemia Protocol


Nitroglycerin (Nitrostat), 0.4 MG UT UD PRN for Chest Pain


Oxycodone HCl (Oxycodone HCl), 5 MG PO Q6H PRN for Breakthrough Pain


Oxycodone Hcl (Oxycodone Hcl), 10 MG PO Q12 PRN for Severe Pain





Allergies


Coded Allergies:  


     No Known Allergies (Verified , 7/19/17)





Physical Exam


Vital Signs











  Date Time  Temp Pulse Resp B/P (MAP) Pulse Ox O2 Delivery O2 Flow Rate FiO2


 


12/22/17 20:16  72 20 158/89 91 Nasal Cannula 4.0 


 


12/22/17 18:42  68 16 175/75 97 Nasal Cannula  


 


12/22/17 17:50  67      


 


12/22/17 17:44     97 Nasal Cannula 4.0 


 


12/22/17 17:43     95 Nasal Cannula 4.0 


 


12/22/17 17:42     97 Nasal Cannula 4.0 


 


12/22/17 17:36      Nasal Cannula 4.0 


 


12/22/17 17:36 36.6 68 16 190/81 95 Nasal Cannula 4.0 











Physical Exam


GENERAL: Patient is in no acute distress.


HEENT: No acute trauma, normocephalic atraumatic, mucous membranes moist, no 

nasal congestion, no scleral icterus.


NECK: No stridor, no adenopathy, no meningismus, trachea is midline.


LUNGS: Decreased breath sounds bilaterally. Crackles at both bases. Breath 

sounds equal. No wheezing. 


HEART: 2/6 systolic murmur. Irregular rhythm with a normal rate. 


ABDOMEN: Soft, nontender, bowel sounds positive, no hernias, no peritonitis.


EXTREMITIES: No cyanosis, full range of motion of all the joints without pain 

or difficulty, no signs for acute trauma. Mild bilateral pedal edema. Bandage 

on the right leg. 


NEUROLOGIC: Oriented x 3, no acute motor or sensory deficits, no focal weakness.


SKIN: No rash, no jaundice, no diaphoresis.





Medical Decision & Procedures


ER Provider


Diagnostic Interpretation:


Radiology results as stated below per my review and radiologist interpretation:





CHEST ONE VIEW PORTABLE





FINDINGS: The heart is borderline enlarged. There is slight progression of the


interstitial pulmonary edema pattern. There is no lobar consolidation.[ Trace


pleural effusions are suspected





IMPRESSION: Slight worsening of the interstitial pulmonary edema pattern





Electronically signed by:  Basil Chapin M.D.


12/22/2017 5:59 PM





Laboratory Results


12/22/17 17:55








Red Blood Count 2.69, Mean Corpuscular Volume 88.8, Mean Corpuscular Hemoglobin 

29.4, Mean Corpuscular Hemoglobin Concent 33.1, Mean Platelet Volume 8.5, 

Neutrophils (%) (Auto) 67.2, Lymphocytes (%) (Auto) 19.4, Monocytes (%) (Auto) 

10.3, Eosinophils (%) (Auto) 2.2, Basophils (%) (Auto) 0.2, Neutrophils # (Auto

) 3.67, Lymphocytes # (Auto) 1.06, Monocytes # (Auto) 0.56, Eosinophils # (Auto

) 0.12, Basophils # (Auto) 0.01





12/22/17 17:55

















Test


  12/22/17


17:55 12/22/17


18:55


 


White Blood Count


  5.46 K/uL


(4.8-10.8) 


 


 


Red Blood Count


  2.69 M/uL


(4.7-6.1) 


 


 


Hemoglobin


  7.9 g/dL


(14.0-18.0) 


 


 


Hematocrit 23.9 % (42-52)  


 


Mean Corpuscular Volume


  88.8 fL


() 


 


 


Mean Corpuscular Hemoglobin


  29.4 pg


(25-34) 


 


 


Mean Corpuscular Hemoglobin


Concent 33.1 g/dl


(32-36) 


 


 


Platelet Count


  299 K/uL


(130-400) 


 


 


Mean Platelet Volume


  8.5 fL


(7.4-10.4) 


 


 


Neutrophils (%) (Auto) 67.2 %  


 


Lymphocytes (%) (Auto) 19.4 %  


 


Monocytes (%) (Auto) 10.3 %  


 


Eosinophils (%) (Auto) 2.2 %  


 


Basophils (%) (Auto) 0.2 %  


 


Neutrophils # (Auto)


  3.67 K/uL


(1.4-6.5) 


 


 


Lymphocytes # (Auto)


  1.06 K/uL


(1.2-3.4) 


 


 


Monocytes # (Auto)


  0.56 K/uL


(0.11-0.59) 


 


 


Eosinophils # (Auto)


  0.12 K/uL


(0-0.5) 


 


 


Basophils # (Auto)


  0.01 K/uL


(0-0.2) 


 


 


RDW Standard Deviation


  53.2 fL


(36.4-46.3) 


 


 


RDW Coefficient of Variation


  16.5 %


(11.5-14.5) 


 


 


Immature Granulocyte % (Auto) 0.7 %  


 


Immature Granulocyte # (Auto)


  0.04 K/uL


(0.00-0.02) 


 


 


Microcytosis PRESENT  


 


Prothrombin Time


  20.2 SECONDS


(9.0-12.0) 


 


 


Prothromb Time International


Ratio 1.9 (0.9-1.1) 


  


 


 


Activated Partial


Thromboplast Time 36.4 SECONDS


(21.0-31.0) 


 


 


Partial Thromboplastin Ratio 1.4  


 


Anion Gap


  3.0 mmol/L


(3-11) 


 


 


Est Creatinine Clear Calc


Drug Dose 71.3 ml/min 


  


 


 


Estimated GFR (


American) 70.7 


  


 


 


Estimated GFR (Non-


American 61.0 


  


 


 


BUN/Creatinine Ratio 31.9 (10-20)  


 


Calcium Level


  8.4 mg/dl


(8.5-10.1) 


 


 


Magnesium Level


  2.7 mg/dl


(1.8-2.4) 


 


 


Total Bilirubin


  0.2 mg/dl


(0.2-1) 


 


 


Aspartate Amino Transf


(AST/SGOT) 13 U/L (15-37) 


  


 


 


Alanine Aminotransferase


(ALT/SGPT) 11 U/L (12-78) 


  


 


 


Alkaline Phosphatase


  60 U/L


() 


 


 


Troponin I


  0.990 ng/ml


(0-0.045) 


 


 


Pro-B-Type Natriuretic Peptide


  06718 pg/ml


(0-900) 


 


 


Total Protein


  7.0 gm/dl


(6.4-8.2) 


 


 


Albumin


  2.4 gm/dl


(3.4-5.0) 


 


 


Globulin


  4.6 gm/dl


(2.5-4.0) 


 


 


Albumin/Globulin Ratio 0.5 (0.9-2)  


 


Valproic Acid (Depakene) Level


  55 mcg/ml


() 


 


 


Influenza Type A Antigen


  


  Neg for Influ


A (NEG)


 


Influenza Type B Antigen


  


  Neg for Influ


B (NEG)





Laboratory results reviewed by me.





Medications Administered











 Medications


  (Trade)  Dose


 Ordered  Sig/Dc


 Route  Start Time


 Stop Time Status Last Admin


Dose Admin


 


 Albuterol/


 Ipratropium


  (Duoneb)  3 ml  NOW  STAT


 INH  12/22/17 17:37


 12/22/17 17:41 DC 12/22/17 18:01


3 ML


 


 Methylprednisolone


 Sodium Succinate


  (Solu-Medrol IV)  60 mg  NOW  STAT


 IV  12/22/17 17:41


 12/22/17 17:42 DC 12/22/17 18:01


60 MG


 


 Ceftriaxone Sodium


  (Rocephin Inj)  1 gm  NOW  STAT


 IV  12/22/17 17:41


 12/22/17 17:42 DC 12/22/17 18:40


1 GM


 


 Furosemide


  (Lasix Inj)  40 mg  NOW  STAT


 IV  12/22/17 18:18


 12/22/17 18:19 DC 12/22/17 18:53


40 MG











ECG


Indication:  SOB/dyspnea


Rate (beats per minute):  65


Rhythm:  normal sinus


Findings:  no acute ischemic change, no ectopy, other (Non-specific T-wave 

change)





ED Course


1735: The patient was evaluated in room B4B. A complete history and physical 

exam was performed.





1737: Ordered 3 mL INH.





1741: Ordered Rocephin Inj 1 gm IV, Solu-Medrol 60 mg IV.





1818: Ordered Lasix Inj 40 mg IV. 





1901: Upon reexamination the patient is resting comfortably. I discussed 

results and treatment plan with the patient. He verbalizes agreement and 

understanding. I spoke with Dr. Serrato of the [facility]. We 

discussed the patient's results and findings. The patient will be evaluated by 

[facility] for further management.





Medical Decision


The patient is a 62 year old male who presents to the ED with complaints of 

shortness of breath.  Differential diagnoses considered include exacerbation of 

COPD, bronchitis, pneumonia, anemia, cardiac ischemia, CHF, electrolyte 

imbalance and influenza. 





There is no leukocytosis.  The patient is anemic, the number today is slightly 

lower than before-the value may be low secondary to dilutional factors.





There is no significant electrolyte abnormality, kidney failure or hepatitis.  

INR is elevated consistent with Coumadin use.  Chest x-ray shows heart failure, 

no pneumonia.  BNP is quite elevated consistent with fluid overload.  EKG shows 

a sinus rhythm, there is no acute ischemia.  Cardiac enzyme testing times one 

is elevated consistent with possible cardiac injury or strain.  Influenza 

testing is negative.  Blood cultures are pending.  Valproic acid level was 

nontoxic.





The patient presents hypoxic and short of breath.  He appears fluid overloaded 

by laboratory testing and x-ray.  He has a low hemoglobin, the value is lower 

than his baseline, this lower value may be dilutional.





Given the elevated cardiac markers, given the hypoxia, given the elevated BNP, 

given the findings of CHF on film, given the elevated troponin, a hospital stay 

was felt warranted.  I spoke to the patient and the .  The on-call 

hospitalist was consulted.





The patient received a Xopenex Atrovent neb, he was given IV Solu-Medrol and IV 

ceftriaxone.  He received a dose of IV Lasix.  He is currently resting 

comfortably with supplemental O2.





Medication Reconcilliation


Current Medication List:  was personally reviewed by me





Blood Pressure Screening


Patient's blood pressure:  Elevated blood pressure


Blood pressure disposition:  Referred to PCP





Consults


Time Called:  1853


Consulting Physician:  Dr. Serrato -[facility]


Returned Call:  [phone]


Discussed the patient's case. The patient will be evaluated for further 

management.





Impression





 Primary Impression:  


 Hypoxia


 Additional Impressions:  


 CHF (congestive heart failure)


 Elevated troponin


 Anemia





Critical Care


I have personally spent greater than 45 minutes of critical care time in the 

direct management of this patient.  This includes bedside care, interpretation 

of diagnostic studies, and testing, discussion with consultants, patient, and 

family members, and other required patient management activities.  This 45 

minutes is in excess of all separately billable procedures.





Scribe Attestation


The scribe's documentation has been prepared under my direction and personally 

reviewed by me in its entirety. I confirm that the note above accurately 

reflects all work, treatment, procedures, and medical decision making performed 

by me.





Departure Information


Dispostion


Being Evaluated By Hospitalist





Referrals


Pro,Claude MCCORD M.D. (PCP)





Patient Instructions


My [facility]





Problem Qualifiers

## 2017-12-22 NOTE — NUR
A:  Assessment completed, full assessment in the EMR.   Patient is resting at this time, 
only complaint is that of leg pain.  Patient was given morphine by previous shift, is not 
due for any other medications at this time.  Patient to be received blood this evening, he 
is aware, has had transfusion before.  No other needs at this time, call bell within reach.  
Dr. Frankel also made aware of patients elevated blood pressures.

## 2017-12-22 NOTE — NUR
A: Patient arrived onto unit from ED into room 230-1. Patient alert and oriented. Patient 
educated on fall risk. Red socks applied. Patient verbalized understanding of safety 
concerns. Monitor applied. Patient SR with PVCs. VS stable. Left leg fasciotomy sites c/d/i. 
dressings changed with Vaseline gauze 4x4 and Kerlix. Right ankle ulcer dressing changed 
with Vaseline gauze, 4x4, and Kerlix. All other skin intact. Patient denies any needs at 
this time. Will continue to monitor.

## 2017-12-23 VITALS
TEMPERATURE: 97.88 F | HEART RATE: 91 BPM | DIASTOLIC BLOOD PRESSURE: 60 MMHG | OXYGEN SATURATION: 92 % | SYSTOLIC BLOOD PRESSURE: 145 MMHG

## 2017-12-23 VITALS
OXYGEN SATURATION: 96 % | HEART RATE: 91 BPM | TEMPERATURE: 97.88 F | DIASTOLIC BLOOD PRESSURE: 60 MMHG | SYSTOLIC BLOOD PRESSURE: 145 MMHG

## 2017-12-23 VITALS
HEART RATE: 75 BPM | SYSTOLIC BLOOD PRESSURE: 160 MMHG | DIASTOLIC BLOOD PRESSURE: 65 MMHG | TEMPERATURE: 97.88 F | OXYGEN SATURATION: 96 %

## 2017-12-23 VITALS
DIASTOLIC BLOOD PRESSURE: 94 MMHG | SYSTOLIC BLOOD PRESSURE: 175 MMHG | OXYGEN SATURATION: 96 % | TEMPERATURE: 98.06 F | HEART RATE: 60 BPM

## 2017-12-23 VITALS
DIASTOLIC BLOOD PRESSURE: 58 MMHG | TEMPERATURE: 97.52 F | SYSTOLIC BLOOD PRESSURE: 135 MMHG | HEART RATE: 92 BPM | OXYGEN SATURATION: 91 %

## 2017-12-23 VITALS
HEART RATE: 73 BPM | DIASTOLIC BLOOD PRESSURE: 64 MMHG | TEMPERATURE: 97.88 F | OXYGEN SATURATION: 95 % | SYSTOLIC BLOOD PRESSURE: 161 MMHG

## 2017-12-23 VITALS — OXYGEN SATURATION: 92 %

## 2017-12-23 VITALS
DIASTOLIC BLOOD PRESSURE: 67 MMHG | HEART RATE: 87 BPM | TEMPERATURE: 97.88 F | OXYGEN SATURATION: 94 % | SYSTOLIC BLOOD PRESSURE: 162 MMHG

## 2017-12-23 VITALS
SYSTOLIC BLOOD PRESSURE: 167 MMHG | OXYGEN SATURATION: 94 % | HEART RATE: 59 BPM | DIASTOLIC BLOOD PRESSURE: 72 MMHG | TEMPERATURE: 98.06 F

## 2017-12-23 VITALS
DIASTOLIC BLOOD PRESSURE: 72 MMHG | OXYGEN SATURATION: 95 % | TEMPERATURE: 97.88 F | SYSTOLIC BLOOD PRESSURE: 166 MMHG | HEART RATE: 54 BPM

## 2017-12-23 VITALS
SYSTOLIC BLOOD PRESSURE: 145 MMHG | DIASTOLIC BLOOD PRESSURE: 51 MMHG | HEART RATE: 77 BPM | TEMPERATURE: 98.6 F | OXYGEN SATURATION: 93 %

## 2017-12-23 VITALS
TEMPERATURE: 98.24 F | SYSTOLIC BLOOD PRESSURE: 200 MMHG | HEART RATE: 91 BPM | OXYGEN SATURATION: 94 % | DIASTOLIC BLOOD PRESSURE: 74 MMHG

## 2017-12-23 VITALS
DIASTOLIC BLOOD PRESSURE: 86 MMHG | SYSTOLIC BLOOD PRESSURE: 146 MMHG | TEMPERATURE: 97.52 F | HEART RATE: 59 BPM | OXYGEN SATURATION: 97 %

## 2017-12-23 LAB
BASOPHILS # BLD: 0.01 K/UL (ref 0–0.2)
BASOPHILS # BLD: 0.02 K/UL (ref 0–0.2)
BASOPHILS NFR BLD: 0.2 %
BASOPHILS NFR BLD: 0.3 %
BUN SERPL-MCNC: 48 MG/DL (ref 7–18)
BUN SERPL-MCNC: 52 MG/DL (ref 7–18)
CALCIUM SERPL-MCNC: 8.1 MG/DL (ref 8.5–10.1)
CALCIUM SERPL-MCNC: 8.4 MG/DL (ref 8.5–10.1)
CO2 SERPL-SCNC: 26 MMOL/L (ref 21–32)
CO2 SERPL-SCNC: 30 MMOL/L (ref 21–32)
CREAT SERPL-MCNC: 1.56 MG/DL (ref 0.6–1.4)
CREAT SERPL-MCNC: 1.59 MG/DL (ref 0.6–1.4)
EOS ABS #: 0 K/UL (ref 0–0.5)
EOS ABS #: 0.11 K/UL (ref 0–0.5)
EOSINOPHIL NFR BLD AUTO: 261 K/UL (ref 130–400)
EOSINOPHIL NFR BLD AUTO: 263 K/UL (ref 130–400)
GLUCOSE SERPL-MCNC: 148 MG/DL (ref 70–99)
GLUCOSE SERPL-MCNC: 222 MG/DL (ref 70–99)
HCT VFR BLD CALC: 28.8 % (ref 42–52)
HCT VFR BLD CALC: 29.4 % (ref 42–52)
HGB BLD-MCNC: 9.6 G/DL (ref 14–18)
HGB BLD-MCNC: 9.8 G/DL (ref 14–18)
IG#: 0.04 K/UL (ref 0–0.02)
IG#: 0.06 K/UL (ref 0–0.02)
IMM GRANULOCYTES NFR BLD AUTO: 15.5 %
IMM GRANULOCYTES NFR BLD AUTO: 26 %
INR PPP: 4.3 (ref 0.9–1.1)
LYMPHOCYTES # BLD: 0.95 K/UL (ref 1.2–3.4)
LYMPHOCYTES # BLD: 1.8 K/UL (ref 1.2–3.4)
MCH RBC QN AUTO: 29.5 PG (ref 25–34)
MCH RBC QN AUTO: 29.9 PG (ref 25–34)
MCHC RBC AUTO-ENTMCNC: 33.3 G/DL (ref 32–36)
MCHC RBC AUTO-ENTMCNC: 33.3 G/DL (ref 32–36)
MCV RBC AUTO: 88.6 FL (ref 80–100)
MCV RBC AUTO: 89.6 FL (ref 80–100)
MONO ABS #: 0.56 K/UL (ref 0.11–0.59)
MONO ABS #: 0.71 K/UL (ref 0.11–0.59)
MONOCYTES NFR BLD: 10.2 %
MONOCYTES NFR BLD: 9.2 %
NEUT ABS #: 4.23 K/UL (ref 1.4–6.5)
NEUT ABS #: 4.55 K/UL (ref 1.4–6.5)
NEUTROPHILS # BLD AUTO: 0 %
NEUTROPHILS # BLD AUTO: 1.6 %
NEUTROPHILS NFR BLD AUTO: 61 %
NEUTROPHILS NFR BLD AUTO: 74.4 %
PMV BLD AUTO: 8.4 FL (ref 7.4–10.4)
PMV BLD AUTO: 8.5 FL (ref 7.4–10.4)
POTASSIUM SERPL-SCNC: 4.8 MMOL/L (ref 3.5–5.1)
POTASSIUM SERPL-SCNC: 4.8 MMOL/L (ref 3.5–5.1)
RED CELL DISTRIBUTION WIDTH CV: 15.8 % (ref 11.5–14.5)
RED CELL DISTRIBUTION WIDTH CV: 16.2 % (ref 11.5–14.5)
RED CELL DISTRIBUTION WIDTH SD: 51 FL (ref 36.4–46.3)
RED CELL DISTRIBUTION WIDTH SD: 52.1 FL (ref 36.4–46.3)
SODIUM SERPL-SCNC: 138 MMOL/L (ref 136–145)
SODIUM SERPL-SCNC: 138 MMOL/L (ref 136–145)
WBC # BLD AUTO: 6.11 K/UL (ref 4.8–10.8)
WBC # BLD AUTO: 6.93 K/UL (ref 4.8–10.8)

## 2017-12-23 RX ADMIN — ATORVASTATIN CALCIUM SCH MG: 40 TABLET, FILM COATED ORAL at 08:43

## 2017-12-23 RX ADMIN — SPIRONOLACTONE SCH MG: 25 TABLET, FILM COATED ORAL at 19:35

## 2017-12-23 RX ADMIN — FERROUS SULFATE TAB EC 325 MG (65 MG FE EQUIVALENT) SCH MG: 325 (65 FE) TABLET DELAYED RESPONSE at 16:45

## 2017-12-23 RX ADMIN — OXYCODONE HYDROCHLORIDE PRN MG: 5 TABLET ORAL at 10:15

## 2017-12-23 RX ADMIN — CLOPIDOGREL BISULFATE SCH MG: 75 TABLET, FILM COATED ORAL at 08:44

## 2017-12-23 RX ADMIN — OXYCODONE HYDROCHLORIDE PRN MG: 5 TABLET ORAL at 11:05

## 2017-12-23 RX ADMIN — INSULIN ASPART SCH UNITS: 100 INJECTION, SOLUTION INTRAVENOUS; SUBCUTANEOUS at 12:00

## 2017-12-23 RX ADMIN — SPIRONOLACTONE SCH MG: 25 TABLET, FILM COATED ORAL at 08:43

## 2017-12-23 RX ADMIN — MIRTAZAPINE SCH MG: 15 TABLET, FILM COATED ORAL at 19:34

## 2017-12-23 RX ADMIN — METOPROLOL SUCCINATE SCH MG: 50 TABLET, EXTENDED RELEASE ORAL at 08:45

## 2017-12-23 RX ADMIN — INSULIN ASPART SCH UNITS: 100 INJECTION, SOLUTION INTRAVENOUS; SUBCUTANEOUS at 21:00

## 2017-12-23 RX ADMIN — ESCITALOPRAM OXALATE SCH MG: 20 TABLET, FILM COATED ORAL at 08:44

## 2017-12-23 RX ADMIN — DIVALPROEX SODIUM SCH MG: 500 TABLET, DELAYED RELEASE ORAL at 19:36

## 2017-12-23 RX ADMIN — TAMSULOSIN HYDROCHLORIDE SCH MG: 0.4 CAPSULE ORAL at 19:35

## 2017-12-23 RX ADMIN — NITROGLYCERIN SCH INCH: 20 OINTMENT TOPICAL at 11:57

## 2017-12-23 RX ADMIN — GABAPENTIN SCH MG: 300 CAPSULE ORAL at 19:35

## 2017-12-23 RX ADMIN — AMLODIPINE BESYLATE SCH MG: 5 TABLET ORAL at 08:45

## 2017-12-23 RX ADMIN — INSULIN ASPART SCH UNITS: 100 INJECTION, SOLUTION INTRAVENOUS; SUBCUTANEOUS at 10:08

## 2017-12-23 RX ADMIN — INSULIN ASPART SCH UNITS: 100 INJECTION, SOLUTION INTRAVENOUS; SUBCUTANEOUS at 16:45

## 2017-12-23 RX ADMIN — Medication SCH MCG: at 08:45

## 2017-12-23 RX ADMIN — METOPROLOL SUCCINATE SCH MG: 50 TABLET, EXTENDED RELEASE ORAL at 19:34

## 2017-12-23 RX ADMIN — FERROUS SULFATE TAB EC 325 MG (65 MG FE EQUIVALENT) SCH MG: 325 (65 FE) TABLET DELAYED RESPONSE at 08:44

## 2017-12-23 RX ADMIN — NITROGLYCERIN SCH INCH: 20 OINTMENT TOPICAL at 06:00

## 2017-12-23 RX ADMIN — OXYCODONE HYDROCHLORIDE PRN MG: 5 TABLET ORAL at 21:39

## 2017-12-23 NOTE — NUR
Patient in NAD.  SB-SR on monitor with PVC's.  Denies any CP/pressure.  Was having some leg 
pain earlier and this has been relieved with Oxycodone.  Blood sugar 158.  Tolerating diet.  
Voiding CYU.



Denies needs at this time.  Will continue to monitor.

## 2017-12-23 NOTE — NUR
Spoke with RN from [facility].  Paramjit had his flu vaccine there on 10/1/2017.  Will D/C 
flu vaccine order.

## 2017-12-23 NOTE — NUR
Patient awake, A+Ox4.  Denies pain.  Vital signs are stable.  SR on monitor with PVC's.  
Denies any CP/pressure/palpitations.  Pulses palpable.  Some B/LLE edema noted.  Lung sounds 
clear/diminished throughout.  Currently on 3L O2 sats > 93%.  He denies SOB.  +BS x 4.  Had 
BM last night.  Tolerating diet.  Blood sugar 235- no Insulin orders.  Voiding CYU in 
urinal.  B/LLE dressings C/D/I.  Peripheral IV x 2 saline locked and WNL.



Explained plan for day.  Will get in touch with patient's doctor about Insulin orders.  
Patient denies needs at this time.  Will continue to monitor.

## 2017-12-23 NOTE — NUR
Patient resting in bed, alert and oriented. Patient on 2L NC lungs clear but diminished. 
Patient denies SOB at this time. Patient SB with PVCs, PACs in the mid 50s. Pulses palpable, 
+2 to +3 BLLE edema especially in the feet.  Abdomen soft and nontender. BM noted earlier 
this afternoon. Patient voiding adequately in the urinal and toilet. Skin intact except BL 
leg fasciotomy sites. Dressings clean dry and intact. Dressing consists of Vaseline gauze, 
4x4, abd pads, and Kerlix. 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 IV 
sites flushed and patent. Patient denies any further needs at this time. VS stable. Will 
continue to monitor.

## 2017-12-23 NOTE — ECHOCARDIOGRAM REPORT
*NOTICE TO RECEIVING PARTY AGENCY**  This information is strictly Confidential and protected under 
[place] law.  [place] law prohibits you from making any further disclosure of this 
information unless further disclosure is expressly permitted by the written consent of the person to 
whom it pertains or is authorized by law.  A general authorization for the release of medical or 
other information is not sufficient for this purpose.  Hospital accepts no responsibility if the 
information is made available to any other person, INCLUDING THE PATIENT.



Interpretation Summary

  *  Name: MAC SPANGLER  Study Date: 2017 11:12 AM  BP: 145/60 mmHg

  *  MRN: [id number]  Patient Location: [facility]  HR: 57

  *  : 1955 (M/d/yyyy)  Gender: Male  Height: 71 in

  *  Age: 62 yrs  Ethnicity: [place]  Weight: 200 lb

  *  Ordering Physician: Eleuterio Serrato

  *  Referring Physician: Self, Referred

  *  Performed By: Mitchell Morocho RDCS

  *  Accession# [id number]  Account# [id number]

  *  Reason For Study: Assess for wall motion abnormality

  *  BSA: 2.1 m2

  *  -- Conclusions --

  *  The left ventricle is normal in size.

  *  There is moderate concentric left ventricular hypertrophy.

  *  Ejection Fraction = 55%.

  *  Left ventricular systolic function is normal.

  *  The basal inferio, basal to mid inferolateral and basal to mid lateral wall are akinetic.

  *  The right ventricular cavity size is enlarged (proximal parasternal long axis right ventricular 
outflow tract dimension >3.3 cm).

  *  The right ventricular systolic function is normal as assessed by tricuspid annular plane 
systolic excursion (TAPSE) (normal >1.5 cm).

  *  The left atrium is severely dilated.

  *  Mild to moderate valvular aortic stenosis.

  *  MG 11 mm/hg with a DI of 0.39

  *  Dilated inferior vena cava with reduced collapsability with sniff indicates an elevated right 
atrial pressure of 15 mmHg

  *  Diastolic dysfunction, Grade II, consistent with elevated left atrial pressure.

Procedure Details

  *  A complete two-dimensional transthoracic echocardiogram was performed (2D, M-mode, Doppler and 
color flow Doppler).

  *  The study was diagnostic quality.

Left Ventricle

  *  The left ventricle is normal in size.

  *  There is moderate concentric left ventricular hypertrophy.

  *  Ejection Fraction = 55-60%.

  *  Left ventricular systolic function is normal.

  *  The basal inferio, basal to mid inferolateral and basal to mid lateral wall are akinetic.

Right Ventricle

  *  The right ventricular cavity size is enlarged (proximal parasternal long axis right ventricular 
outflow tract dimension >3.3 cm).

  *  The right ventricular systolic function is normal as assessed by tricuspid annular plane 
systolic excursion (TAPSE) (normal >1.5 cm).

Atria

  *  The left atrium is severely dilated.

  *  The right atrium is mildly dilated.

Mitral Valve

  *  The mitral valve leaflets appear normal. There is no evidence of stenosis, fluttering, or 
prolapse.

  *  There is mild mitral regurgitation.

Tricuspid Valve

  *  The tricuspid valve is not well visualized, but is grossly normal.

  *  There is trace tricuspid regurgitation.

Aortic Valve

  *  Mild to moderate valvular aortic stenosis.

  *  MG 11 mm/hg with a DI of 0.39

  *  Trace aortic regurgitation.

Pulmonic Valve

  *  The pulmonic valve is not well seen, but is grossly normal.

  *  Mild pulmonic valvular regurgitation.

Great Vessels

  *  The aortic root is normal size.

Pericardium/Pleural

  *  There is no pericardial effusion.

Great Vessels

  *  Dilated inferior vena cava with reduced collapsability with sniff indicates an elevated right 
atrial pressure of 15 mmHg

Left Ventricular Diastolic Function

  *  Diastolic dysfunction, Grade II, consistent with elevated left atrial pressure.



MMode 2D Measurements and Calculations

IVSd 1.7 cm

IVSs 2.2 cm



LVIDd 5.5 cm

LVIDs 3.7 cm

LVPWd 1.3 cm

LVPWs 1.7 cm



IVS/LVPW 1.3 

FS 33.1 %

EDV(Teich) 144.8 ml

ESV(Teich) 56.4 ml

EF(Teich) 61.1 %



EDV(cubed) 162.6 ml

ESV(cubed) 48.7 ml

EF(cubed) 70.0 %

% IVS thick 28.7 %

% LVPW thick 29.9 %





LV mass(C)d 381.8 grams

LV mass(C)dI 181.0 grams/m\S\2

LV mass(C)s 328.6 grams

LV mass(C)sI 155.8 grams/m\S\2



SV(Teich) 88.5 ml

SI(Teich) 41.9 ml/m\S\2

SV(cubed) 113.8 ml

SI(cubed) 54.0 ml/m\S\2



Ao root diam 3.6 cm

Ao root area 10.0 cm\S\2

ACS 1.5 cm

LA dimension 5.1 cm



asc Aorta Diam 3.9 cm





LA/Ao 1.4 

LVOT diam 2.2 cm

LVOT area 3.8 cm\S\2



LVAd ap4 43.2 cm\S\2

LVLd ap4 9.8 cm

EDV(MOD-sp4) 158.8 ml

EDV(sp4-el) 161.5 ml

LVAs ap4 24.4 cm\S\2

LVLs ap4 8.5 cm

ESV(MOD-sp4) 64.5 ml

ESV(sp4-el) 59.3 ml

EF(MOD-sp4) 59.4 %

EF(sp4-el) 63.3 %



LVAd ap2 36.6 cm\S\2

LVLd ap2 10.2 cm

EDV(MOD-sp2) 108.0 ml

EDV(sp2-el) 111.5 ml

LVAs ap2 21.8 cm\S\2

LVLs ap2 9.1 cm

ESV(MOD-sp2) 47.6 ml

ESV(sp2-el) 44.6 ml

EF(MOD-sp2) 55.9 %

EF(sp2-el) 60.0 %



LVLd %diff 3.8 %

EDV(MOD-bp) 133.8 ml

LVLs %diff 6.2 %

ESV(MOD-bp) 54.4 ml

EF(MOD-bp) 59.3 %





SV(MOD-sp4) 94.4 ml

SI(MOD-sp4) 44.7 ml/m\S\2



SV(MOD-sp2) 60.4 ml

SI(MOD-sp2) 28.6 ml/m\S\2



SV(MOD-bp) 79.4 ml

SI(MOD-bp) 37.6 ml/m\S\2



SV(sp4-el) 102.2 ml

SI(sp4-el) 48.4 ml/m\S\2





SV(sp2-el) 66.8 ml

SI(sp2-el) 31.7 ml/m\S\2













Doppler Measurements and Calculations

MV E max elton 119.0 cm/sec

MV A max elton 66.2 cm/sec



MV E/A 1.8 



MV dec time 0.15 sec



Ao V2 max 224.5 cm/sec

Ao max PG 20.2 mmHg

Ao max PG (full) 17.1 mmHg

Ao V2 mean 138.5 cm/sec

Ao mean PG 8.9 mmHg

Ao mean PG (full) 7.6 mmHg

Ao V2 VTI 53.1 cm

DENILSON(I,A) 1.7 cm\S\2

DENILSON(I,D) 1.7 cm\S\2

DENILSON(V,A) 1.5 cm\S\2

DENILSON(V,D) 1.5 cm\S\2





LV V1 max PG 3.1 mmHg

LV V1 mean PG 1.3 mmHg



LV V1 max 87.9 cm/sec

LV V1 mean 51.6 cm/sec

LV V1 VTI 23.0 cm



SV(Ao) 533.0 ml

SI(Ao) 252.8 ml/m\S\2

SV(LVOT) 88.2 ml

SI(LVOT) 41.8 ml/m\S\2



PA V2 max 104.4 cm/sec

PA max PG 4.4 mmHg

## 2017-12-23 NOTE — NUR
On call case management note.  Pt identified on social service screening tool as 30 day 
readmission.  Pt was setup with wound clinic last admission-per EMR.  Spoke with pt.  Pt 
a&o.  Pt lives at home with wife.  Pt uses a walker and wheelchair.  Pt is independent with 
adl's.  Pts wife does the driving.  Pt lives in a bilevel house with a ramp to enter with 1 
story living.  Pts sister in law helps with laundry.  Pt said he wife is not in good health 
either.  Pt is open with [facility] for therapy and wound care.  Pt said "needs new 
home health when insurance changes."  Gave pt list however pt is going to wait and keep [facility] 
and have them switch him.  With permission made rereferral to [facility].  Assistant 
to fax.  Pt denies any further discharge needs at this time.  Recommend PT/OT evals prior to 
discharge.  Case management to follow with pt.

## 2017-12-23 NOTE — CARDIOLOGY CONSULTATION
DATE OF CONSULTATION:  12/23/2017

 

REQUESTING PHYSICIAN:  Eleuterio Serrato.

 

CONSULTANT:  Fredy Luna DO, [facility] for Dr. Claude Yusuf, who is the patient's primary cardiologist.

 

REASON FOR CONSULTATION:  Recent discharge for chest discomfort and

readmission for heart failure.

 

HISTORY OF PRESENT ILLNESS:  Paramjit was recently admitted with chest

discomfort.  His troponins were negative.  His EKG did not show any changes

and he was subsequently discharged home.  He returned with symptoms

consistent with heart failure.  He had PND and orthopnea.  He notes lying

flat, he was short of breath.  He denies any chest pain or chest pressure

with these episodes.  He notes with just walking, he was more short of breath

and he denied any chest pain or chest pressure with walking either.

 

He denies any lightheadedness or dizziness, palpitations or feeling his heart

racing.  He describes his appetite as relatively stable.  He does believe his

abdomen is more distended.  He has mild chronic lower extremity edema after

fasciotomies from October 2017 in [place].

 

He denies any fevers, chills or sweats.  He denies any bleeding, bruising,

dark stools or black stools.  He does note that his weight at home has been

relatively stable.  The rest of review of systems is otherwise negative.

 

PAST MEDICAL HISTORY:

1.  Coronary artery disease, status post non-ST elevation myocardial

infarction in 2007 with stenting of the distal circumflex; V-fib arrest at

the time of his 2007 heart attack.

2.  Acute coronary syndrome in 2010 with a drug-eluting stent to the mid LAD

and a previous stent in the circumflex was occluded.

3.  Peripheral arterial disease with total occlusion of the left FSA and

total occlusion of the left anterior tibial, status post stenting of his

popliteal and SFA in May 2017.

4.  Dobutamine stress echo in March 2016 without evidence of ischemia.

5.  History of cellulitis secondary to a history of diabetic ulcers.

6.  History of heart failure and pulmonary edema in August of 2017.

7.  History of acute kidney injury on chronic kidney disease.

8.  Possible gastrointestinal blood loss, status post blood transfusions in

the past with blood transfusion this admission.

9.  History of chronic obstructive pulmonary disease and chronic respiratory

failure.

10.  Echo in June of 2017, moderate left ventricular hypertrophy, preserved

left ventricular systolic function, no wall motion abnormalities, mild left

atrial enlargement and mild mitral regurgitation.

11.  Bipolar disorder.

12.  Esophageal reflux disease.

13.  Obstructive sleep apnea.

14.  Left atrial thrombus, on anticoagulation.

 

FAMILY HISTORY:  Positive for esophageal cancer, diabetes, heart disease and

hypertension.

 

SOCIAL HISTORY:  He is a smoker.  He is .

 

ALLERGIES:  No known drug allergies.

 

OUTPATIENT MEDICATIONS:  Reviewed.

 

PHYSICAL EXAMINATION:

GENERAL:  He is awake, alert, and oriented x3.  He is in no acute distress. 

He notes his breathing feels much better and is back to baseline.  His

orthopnea has resolved and he denies any chest discomfort.

HEENNT:  2+ carotid upstrokes.  No evidence of carotid bruits.  His jugular

venous pressure appeared elevated.  His sclerae anicteric.  His hearing is

normal.

LUNGS:  Decreased breath sounds in the right base.  No rales, rhonchi or

wheezing.

HEART:  Regular rate and rhythm.  He had a 2/6 crescendo-decrescendo murmur,

which was early to mid peaking.  There were no appreciable diastolic murmurs.

ABDOMEN:  Soft and chronically distended.  Positive bowel sounds.

EXTREMITIES:  Mild bilateral lower extremity edema.

PSYCHIATRIC:  His affect appeared appropriate.

 

DIAGNOSTIC STUDIES:  Chest x-ray was read as heart failure.  Peak troponin

1.260, trending down to 1.010.  BUN of 48 with a creatinine of 1.56 and a BNP

on admission of 12,802.  Of note, his admission BUN was 41 with a creatinine

of 1.27.  Hemoglobin on admission was 7.9 and after 2 units of blood 9.6. 

His echocardiogram from June 2017 was reviewed.

 

IMPRESSION:

1.  Admission for paroxysmal nocturnal dyspnea and orthopnea and acute on

chronic diastolic heart failure.

2.  Elevated troponin, likely demand ischemia.  He has no anginal symptoms. 

He does have significant left ventricular hypertrophy, which may be

contributing to this as well.

3.  Diffuse coronary artery disease and peripheral arterial disease for

medical therapy.

4.  Recent non-ST elevation myocardial infarction in October 2017 at [facility] with an unknown peak troponin level.

5.  Chronic kidney disease.

6.  Obstructive sleep apnea.

 

He is on a very high dose of Aldactone at 50 mg twice a day.  It is unclear

as to whether this may be for liver disease.  From a cardiac standpoint, this

would be a very high dose.  He looks more prerenal today.  I would weigh him

on a standing scale and I would reduce his diuretics given the concern for

progressive renal insufficiency.

 

From a coronary artery disease standpoint, I would treat him medically.  He

is already on Plavix and anticoagulation with Coumadin, beta blockers and

amlodipine.  If he can be on statin therapy given his diffuse coronary

disease and vascular disease, he should be on high intensity statin therapy.

 

I would try to keep his hemoglobin greater than 9 given his diffuse vascular

disease.

 

We will reduce his furosemide to 20 mg p.o. in the morning.  Otherwise, I

would continue to treat him medically.



I would consider a renal arterial duplex given his diffuse CAD and flash 
pulmonary edema

 

Thank you for allowing us to participate in his care.

 

 

 

CHIVO

## 2017-12-23 NOTE — PROGRESS NOTE
Subjective


Date of Service:


Dec 23, 2017.


Subjective


Pt evaluation today including:  conversation w/ patient, physical exam


Patient reports having SOB upon exertion. He also reports that he requires 

oxygen at rest and that this helps ease his shortness of breath.


Patient  denies any chest pain, nausea, vomiting.





Problem List


Medical Problems:


(1) Acute CHF


Status: Acute  





(2) Acute coronary syndrome


Status: Acute  





(3) Acute headache


Status: Acute  





(4) Altered mental status


Status: Acute  





(5) Anemia


Status: Acute  





(6) Anemia


Status: Acute  





(7) Anginal pain


Status: Acute  





(8) Appendicitis


Status: Acute  





(9) Cellulitis


Status: Acute  





(10) Chest pain


Status: Acute  





(11) CHF (congestive heart failure)


Status: Acute  





(12) CHF (congestive heart failure)


Status: Acute  





(13) Congestive heart failure


Status: Acute  





(14) Diabetes mellitus with hyperglycemia


Status: Acute  





(15) Elevated troponin


Status: Acute  





(16) Failure of outpatient treatment


Status: Acute  





(17) Hypoxia


Status: Acute  





(18) Intra-abdominal abscess


Status: Acute  





(19) Lower abdominal pain of unknown etiology


Status: Acute  





(20) Neck pain


Status: Acute  





(21) Precordial chest pain


Status: Acute  





(22) Pulmonary edema


Status: Acute  





(23) Respiratory acidosis


Status: Acute  





(24) Respiratory distress


Status: Acute  





(25) SOB (shortness of breath)


Status: Acute  











Review of Systems


Constitutional:  No fever, No chills


Respiratory:  No cough, No sputum


Cardiac:  No chest pain, No orthopnea


Abdomen:  No pain, No nausea


Neurologic:  No memory loss, No paralysis


Heme:  No abnormal bleeding/bruising


Endo:  No fatigue


Skin:  No rash


All Other Systems:  Reviewed and Negative





Objective


Vital Signs











  Date Time  Temp Pulse Resp B/P (MAP) Pulse Ox O2 Delivery O2 Flow Rate FiO2


 


12/23/17 20:00      Nasal Cannula 2.0 


 


12/23/17 19:34 36.7 59 20 167/72 (103) 94 Nasal Cannula 4.0 


 


12/23/17 16:00      Nasal Cannula 3.0 


 


12/23/17 15:26 36.6 54 20 166/72 (103) 95 Nasal Cannula 3.0 


 


12/23/17 12:25 36.4 59 20 146/86 (106) 97 Nasal Cannula 3.5 


 


12/23/17 12:00      Nasal Cannula 3.0 


 


12/23/17 08:00      Nasal Cannula 3.0 


 


12/23/17 07:18 36.6 73 16 161/64 (96) 95 Room Air  


 


12/23/17 04:37 36.6 91 20 145/60 96   


 


12/23/17 04:20     92 Nasal Cannula 3.0 


 


12/23/17 03:37 36.6 91 20 145/60 92   


 


12/23/17 03:07 36.4 92 20 135/58 91   


 


12/23/17 02:50 37.0 77 20 145/51 93   


 


12/23/17 01:26 36.6 87 20 162/67 94   


 


12/23/17 00:26 36.6 75 20 160/65 96   


 


12/22/17 23:59     92 Nasal Cannula 3.0 


 


12/22/17 23:56 36.6 75 20 173/63 92   


 


12/22/17 23:38 36.6 70 20 155/61 94   


 


12/22/17 23:05 36.8 91 19 200/74 (116) 94 Nasal Cannula 2.0 











Physical Exam


Comments:





General Appearance:  WD/WN, no apparent distress


Head:  normocephalic


Eyes:  normal inspection


ENT:  normal ENT inspection


Neck:  supple, + JVD


Respiratory/Chest:  chest non-tender, + crackles (B/L at bases)


Cardiovascular:  regular rate, rhythm, no edema, no gallop


Abdomen/GI:  normal bowel sounds, non tender, soft


Back:  normal inspection, no CVA tenderness


Extremities/Musculoskelatal:  normal inspection, no calf tenderness, normal 

capillary refill


Neurologic/Psych:  CNs II-XII nml as tested, no motor/sensory deficits, alert


Skin:  + pallor





Laboratory Results





Last 24 Hours








Test


  12/23/17


05:27 12/23/17


06:49 12/23/17


09:30 12/23/17


09:31


 


Troponin I 1.080 ng/ml   1.010 ng/ml  


 


Bedside Glucose  235 mg/dl   


 


Sodium Level   138 mmol/L  


 


Potassium Level   4.8 mmol/L  


 


Chloride Level   105 mmol/L  


 


Carbon Dioxide Level   30 mmol/L  


 


Anion Gap   3.0 mmol/L  


 


Blood Urea Nitrogen   48 mg/dl  


 


Creatinine   1.56 mg/dl  


 


Est Creatinine Clear Calc


Drug Dose 


  


  56.3 ml/min 


  


 


 


Estimated GFR (


American) 


  


  55.1 


  


 


 


Estimated GFR (Non-


American 


  


  47.6 


  


 


 


BUN/Creatinine Ratio   31.0  


 


Random Glucose   222 mg/dl  


 


Calcium Level   8.4 mg/dl  


 


White Blood Count    6.11 K/uL 


 


Red Blood Count    3.25 M/uL 


 


Hemoglobin    9.6 g/dL 


 


Hematocrit    28.8 % 


 


Mean Corpuscular Volume    88.6 fL 


 


Mean Corpuscular Hemoglobin    29.5 pg 


 


Mean Corpuscular Hemoglobin


Concent 


  


  


  33.3 g/dl 


 


 


Platelet Count    263 K/uL 


 


Mean Platelet Volume    8.4 fL 


 


Neutrophils (%) (Auto)    74.4 % 


 


Lymphocytes (%) (Auto)    15.5 % 


 


Monocytes (%) (Auto)    9.2 % 


 


Eosinophils (%) (Auto)    0.0 % 


 


Basophils (%) (Auto)    0.2 % 


 


Neutrophils # (Auto)    4.55 K/uL 


 


Lymphocytes # (Auto)    0.95 K/uL 


 


Monocytes # (Auto)    0.56 K/uL 


 


Eosinophils # (Auto)    0.00 K/uL 


 


Basophils # (Auto)    0.01 K/uL 


 


RDW Standard Deviation    51.0 fL 


 


RDW Coefficient of Variation    15.8 % 


 


Immature Granulocyte % (Auto)    0.7 % 


 


Immature Granulocyte # (Auto)    0.04 K/uL 


 


Test


  12/23/17


11:52 12/23/17


15:07 12/23/17


16:13 12/23/17


21:01


 


Bedside Glucose 158 mg/dl   119 mg/dl  124 mg/dl 


 


Prothrombin Time  44.3 SECONDS   


 


Prothromb Time International


Ratio 


  4.3 


  


  


 











Assessment and Plan


63 y/o M with extensive medical issues including severe CAD, PVD, COPD, DM II, 

bipolar disease, chronic anemia, diastolic CHF, recent diagnosis of atrial 

thrombus - on Lovenox bridging to Coumadin - pt had an MI at [place] 10/17 

which was medically managed. The was admitted to the hospital on day prior with 

complaints of CP and was discharged the following day after ruling out for an 

MI.  He presented with SOB and was hypoxic on arrival to the ER.  He denies CP, 

cough, fever, N/V, dysuria.  





1) SOB - H/O chronic, diastolic CHF exacerbation 


- pt received Lasix in the ER


-Patient appears to be pre-renal. Will diecrease diurtetic lasix to 20 mg PO 

DAILY


Patient has severe CAD which may be worsened by being hypovolemic from the 

diuretic


  We will continue his B blocker and apply NTG ointment.


-appreciate cardio recommendations  





2) Anemia - as his troponin is elevated and he has a history of CAD, 


transfused 2 units PRBCs 


Hemoglobin stable


- he last required transfusions in mid November.





3) CAD 


- elevated troponin w/out related symptoms


 - may be due to CHF exacerbation or demand ischemia as he was hypoxic on 

arrival.


Echo shows LVH which may be contributing.


 He is fully anticoagulated.  


We will cont his B blocker and change his statin dose to reflect high-intensity 

therapy.  





4) DM II - placed on SS





5) Atrial thrombus - His INR is therapeutic - 


we will recheck AM and can DC daily Lovenox 





6) COPD - no evidence of exacerbation - will remain on prescribed inhalers.





7) Bipolar disease - stable - cont Remeron, Clonazepam, Depakote








Full code - anticoagulated with Coumadin

## 2017-12-23 NOTE — PHARMACY PROGRESS NOTE
Glycemic Control Intl Consult


Date of Service


Dec 23, 2017.





Scope


Glycemic Pharmacist consulted by Dr Hazel on 12/23/17 for glycemic control 

and to write orders per [facility] inpatient glycemic control protocol





Objective


Weight (Kilograms):  88.200


Accuchecks BSG (last 24hrs):











Test


  12/22/17


17:55 12/22/17


22:13 12/23/17


06:49 12/23/17


09:30


 


Random Glucose


  133 mg/dl


(70-99) 


  


  


 


 


Bedside Glucose


  


  201 mg/dl


(70-99) 235 mg/dl


(70-99) 


 








Laboratory Data (last 24hrs)











Test


  12/22/17


17:55 12/23/17


09:30 12/23/17


09:31


 


Anion Gap 3.0 mmol/L   


 


BUN/Creatinine Ratio 31.9   


 


Blood Urea Nitrogen 41 mg/dl   


 


Creatinine 1.27 mg/dl   


 


Potassium Level 5.0 mmol/L   


 


Sodium Level 139 mmol/L   


 


White Blood Count 5.46 K/uL   6.11 K/uL 


 


Red Blood Count 2.69 M/uL   3.25 M/uL 


 


Hemoglobin 7.9 g/dL   9.6 g/dL 


 


Hematocrit 23.9 %   28.8 % 


 


Mean Corpuscular Volume 88.8 fL   88.6 fL 


 


Mean Corpuscular Hemoglobin 29.4 pg   29.5 pg 


 


Mean Corpuscular Hemoglobin


Concent 33.1 g/dl 


  


  33.3 g/dl 


 


 


Platelet Count 299 K/uL   263 K/uL 


 


Mean Platelet Volume 8.5 fL   8.4 fL 


 


Neutrophils (%) (Auto) 67.2 %   74.4 % 


 


Lymphocytes (%) (Auto) 19.4 %   15.5 % 


 


Monocytes (%) (Auto) 10.3 %   9.2 % 


 


Eosinophils (%) (Auto) 2.2 %   0.0 % 


 


Basophils (%) (Auto) 0.2 %   0.2 % 


 


Neutrophils # (Auto) 3.67 K/uL   4.55 K/uL 


 


Lymphocytes # (Auto) 1.06 K/uL   0.95 K/uL 


 


Monocytes # (Auto) 0.56 K/uL   0.56 K/uL 


 


Eosinophils # (Auto) 0.12 K/uL   0.00 K/uL 


 


Basophils # (Auto) 0.01 K/uL   0.01 K/uL 








HbA1c


7.5% 8/11/17





Recent Pertinent Medications


Outpatient Anti-diabetic Regimen: 


* Lantus 10 units SQ Q HS


* A1c = 7.5 %  8/11/17











Risk Factors for Insulin Resistance:


* Steroids: Solu-medrol 60mg IV x 1 12/22/17 @ 1801


* Diet: ordered T2DM / AHA diet





Assessment & Plan


ASSESSMENT:





12/23/17


* Type 2 diabetic admitted last evening for SOB, hypoxia, likely secondary to 

ADHF and anemia


* Glycemic control service has followed this patient on previous admissions


* Patient's admission Glu 133, however has climbed since admission likely due 

to stress combined with IV steroid administration and missing his Lantus dose 

yesterday


* Fasting BSG up to 235 this AM, will initiate Lantus this AM in a "stressed" 

home dose, then resume HS dosing this evening however utilize a scale for the 

dose as he will still have Lantus on board from this AM


* Will initiate sliding scale Novolog CF / CR using parameters that performed 

well on prior admission


* Patient is tolerating his breakfast meal today.  No further steroid doses 

have been ordered.








PLAN FOR INPATIENT GLYCEMIC CONTROL:


* Lantus 15 units SQ x 1, then Lantus Q HS using the following scale:


 * BSG less than 110: 0 units


 * -160: give 5 units


 * BSG above 160: give 10 units


* Correction factor 25 mg/dl/unit


* Carb ratio 1 unit per 8 grams CHO consumed


* Goal range Low 120 mg/dL - High 160 mg/dL





* Please note that the plan above was derived based on current level of insulin 

resistance and hospital stress. These recommendations are appropriate for 

inpatient admission only. Plan of care upon discharge will need to be 

reassessed to avoid potential outpatient hypo/hyperglycemia. 





Thank you.

## 2017-12-23 NOTE — NUR
Patient in NAD.  Resting comfortably.  SB-SR on monitor with PAC's and PVC's.  Denies 
CP/pressure.  Voiding without issue.  Dressing changed to B/LLE.  Wounds healing.  2+ edema 
to bilateral feet.  Tolerating diet.  Blood sugars improving.  



Denies needs at this time.  Will continue to monitor.

## 2017-12-24 VITALS
TEMPERATURE: 98.24 F | DIASTOLIC BLOOD PRESSURE: 62 MMHG | HEART RATE: 54 BPM | OXYGEN SATURATION: 95 % | SYSTOLIC BLOOD PRESSURE: 144 MMHG

## 2017-12-24 VITALS
TEMPERATURE: 98.06 F | OXYGEN SATURATION: 93 % | DIASTOLIC BLOOD PRESSURE: 47 MMHG | SYSTOLIC BLOOD PRESSURE: 98 MMHG | HEART RATE: 72 BPM

## 2017-12-24 VITALS
HEART RATE: 59 BPM | TEMPERATURE: 98.6 F | SYSTOLIC BLOOD PRESSURE: 148 MMHG | DIASTOLIC BLOOD PRESSURE: 62 MMHG | OXYGEN SATURATION: 96 %

## 2017-12-24 VITALS
DIASTOLIC BLOOD PRESSURE: 47 MMHG | HEART RATE: 72 BPM | OXYGEN SATURATION: 93 % | SYSTOLIC BLOOD PRESSURE: 98 MMHG | TEMPERATURE: 98.06 F

## 2017-12-24 VITALS
DIASTOLIC BLOOD PRESSURE: 70 MMHG | HEART RATE: 60 BPM | TEMPERATURE: 98.42 F | SYSTOLIC BLOOD PRESSURE: 138 MMHG | OXYGEN SATURATION: 97 %

## 2017-12-24 LAB
EOSINOPHIL NFR BLD AUTO: 273 K/UL (ref 130–400)
HCT VFR BLD CALC: 27.5 % (ref 42–52)
HGB BLD-MCNC: 8.9 G/DL (ref 14–18)
INR PPP: 2.6 (ref 0.9–1.1)
MCH RBC QN AUTO: 29 PG (ref 25–34)
MCHC RBC AUTO-ENTMCNC: 32.4 G/DL (ref 32–36)
MCV RBC AUTO: 89.6 FL (ref 80–100)
PMV BLD AUTO: 8.8 FL (ref 7.4–10.4)
RED CELL DISTRIBUTION WIDTH CV: 16 % (ref 11.5–14.5)
RED CELL DISTRIBUTION WIDTH SD: 51.5 FL (ref 36.4–46.3)
WBC # BLD AUTO: 5.72 K/UL (ref 4.8–10.8)

## 2017-12-24 RX ADMIN — SPIRONOLACTONE SCH MG: 25 TABLET, FILM COATED ORAL at 08:07

## 2017-12-24 RX ADMIN — AMLODIPINE BESYLATE SCH MG: 5 TABLET ORAL at 08:06

## 2017-12-24 RX ADMIN — CLOPIDOGREL BISULFATE SCH MG: 75 TABLET, FILM COATED ORAL at 08:06

## 2017-12-24 RX ADMIN — FERROUS SULFATE TAB EC 325 MG (65 MG FE EQUIVALENT) SCH MG: 325 (65 FE) TABLET DELAYED RESPONSE at 08:05

## 2017-12-24 RX ADMIN — FERROUS SULFATE TAB EC 325 MG (65 MG FE EQUIVALENT) SCH MG: 325 (65 FE) TABLET DELAYED RESPONSE at 17:06

## 2017-12-24 RX ADMIN — ATORVASTATIN CALCIUM SCH MG: 40 TABLET, FILM COATED ORAL at 08:06

## 2017-12-24 RX ADMIN — Medication SCH MCG: at 08:06

## 2017-12-24 RX ADMIN — ESCITALOPRAM OXALATE SCH MG: 20 TABLET, FILM COATED ORAL at 08:06

## 2017-12-24 RX ADMIN — OXYCODONE HYDROCHLORIDE PRN MG: 5 TABLET ORAL at 10:03

## 2017-12-24 RX ADMIN — INSULIN ASPART SCH UNITS: 100 INJECTION, SOLUTION INTRAVENOUS; SUBCUTANEOUS at 12:17

## 2017-12-24 RX ADMIN — INSULIN ASPART SCH UNITS: 100 INJECTION, SOLUTION INTRAVENOUS; SUBCUTANEOUS at 08:09

## 2017-12-24 RX ADMIN — METOPROLOL SUCCINATE SCH MG: 50 TABLET, EXTENDED RELEASE ORAL at 08:08

## 2017-12-24 NOTE — DISCHARGE INSTRUCTIONS
Discharge Instructions


Date of Service


Dec 24, 2017.





Admission


Reason for Admission:  Anemia; Chf Exacerbation





Discharge


Discharge Diagnosis / Problem:  Anemia; CHF exacerbation





Discharge Goals


Goal(s):  Decrease discomfort, Improve function





Activity Recommendations


Activity Limitations:  resume your previous activity





.





Instructions / Follow-Up


Instructions / Follow-Up


F/U with cardiology in 1 week


Followup with Primary care provider this week.





will now be taking 7.5 mg of warfarin. will get retested on tuesday





Current Hospital Diet


Patient's current hospital diet: AHA Diet (Heart Healthy), Diabetes Type 2 Diet





Discharge Diet


Recommended Diet:  AHA Diet (Heart Healthy), Diabetes Type 2 Diet





Pending Studies


Studies pending at discharge:  no





Medical Emergencies








.


Who to Call and When:





Medical Emergencies:  If at any time you feel your situation is an emergency, 

please call 911 immediately.





.





Non-Emergent Contact


Non-Emergency issues call your:  Primary Care Provider


Call Non-Emergent contact if:  you have any medication questions





.


.








"Provider Documentation" section prepared by Jorge Hazel.








.





VTE Core Measure


Inpt VTE Proph given/why not?:  Warfarin (Coumadin)

## 2017-12-24 NOTE — NUR
Patient in NAD.  VSS.  SB on monitor with PAC's and PVC's.  Denies CP/pressure.  Had 2-step 
done; did not qualify for home O2.  Patient is concerned about this.  In fact, he is 
currently wearing 2L O2 via NC because it makes him feel more comfortable.  



Will continue to monitor.

## 2017-12-24 NOTE — DIAGNOSTIC IMAGING REPORT
DUPLEX RENAL ARTERY ULTRASOUND



CLINICAL HISTORY: Flash Pulmonary edema. Peripheral artery disease    



COMPARISON STUDY:  CT angiography abdomen pelvis dated 11/6/2017



FINDINGS: The right kidney measures 14 cm in length. The left kidney measures

14.3 cm in length.



Both renal arteries are patent.



The peak systolic velocity within the aorta was 98 cm/s.



Peak systolic velocity within the right renal artery is 116 cm/s. Peak systolic

velocity within the left renal artery is 109 cm/s.



There is trace ascites. There are bilateral pleural effusions.



IMPRESSION:  

1. No evidence of renal artery stenosis

2. Bilateral pleural effusions

3. Trace ascites 









Electronically signed by:  Basil Chapin M.D.

12/24/2017 4:51 PM



Dictated Date/Time:  12/24/2017 4:48 PM

## 2017-12-24 NOTE — NUR
Patient A+Ox4, in NAD.  Denies pain at this time.  Sinus jorge/sinus arrhythmia on monitor; 
bigeminy at times, rate 50's.  He denies any CP/pressure.  Radial pulse 60's-70's.  B/L feet 
2+ edema.  Lung sounds clear/diminished.  Sats > 92% on 2L O2 via NC.  Denies SOB; states 
that he feels he needs O2 at home- before he came to the hospital he couldn't get his pulse 
oximeter to read > 85% and it scared him.  Denies nausea.  Tolerating diet.  +BS x 4.  Blood 
sugar this morning= 98.  Voiding CYU in urinal.  B/LLE dressings are C/D/I.  #22 right 
forearm and #20 left forearm saline locked and WNL.



Explained plan for day.  Will talk to physician about patient's concerns re: O2.  Will talk 
to cardiologist about bigeminy/rhythm- will get EKG.  Patient denies needs.  Will continue 
to monitor.

## 2017-12-24 NOTE — NUR
Previous assessment unchanged. Patient asleep in bed. VS remain stable. Patient appears 
comfortable. Will continue to monitor.

## 2017-12-24 NOTE — NUR
Patient discharged home.  His wife is here to take him home.  All discharge instructions 
reviewed and all questions answered.  Patient given dose of Isosorbide for tomorrow due to 
the holiday.  Saline lock x 2 and cardiac monitor removed.  



Patient and wife escorted to main lobby via wheelchair with CNA.

## 2017-12-24 NOTE — CARDIOLOGY FOLLOW-UP
Subjective


General


Date of Service:


Dec 24, 2017.


Pt evaluation today including:  conversation w/ patient, chart review, lab 

review, review of studies, conversation w/ consultant





History of Present Illness


The patient is a 62 year old male





Allergies


Coded Allergies:  


     No Known Allergies (Verified , 7/19/17)





Social History


Smoking Status:  Former Smoker


Hx Tobacco Use In Past Year?:  Yes (August 2017.)


Hx Alcohol Use - Type And Amou:  No


Hx Substance Use - Type And Am:  Yes (morphine.)





Problem List


Medical Problems:


(1) Acute CHF


Status: Acute  





(2) Acute coronary syndrome


Status: Acute  





(3) Acute headache


Status: Acute  





(4) Altered mental status


Status: Acute  





(5) Anemia


Status: Acute  





(6) Anemia


Status: Acute  





(7) Anginal pain


Status: Acute  





(8) Appendicitis


Status: Acute  





(9) Cellulitis


Status: Acute  





(10) Chest pain


Status: Acute  





(11) CHF (congestive heart failure)


Status: Acute  





(12) CHF (congestive heart failure)


Status: Acute  





(13) Congestive heart failure


Status: Acute  





(14) Diabetes mellitus with hyperglycemia


Status: Acute  





(15) Elevated troponin


Status: Acute  





(16) Failure of outpatient treatment


Status: Acute  





(17) Hypoxia


Status: Acute  





(18) Intra-abdominal abscess


Status: Acute  





(19) Lower abdominal pain of unknown etiology


Status: Acute  





(20) Neck pain


Status: Acute  





(21) Precordial chest pain


Status: Acute  





(22) Pulmonary edema


Status: Acute  





(23) Respiratory acidosis


Status: Acute  





(24) Respiratory distress


Status: Acute  





(25) SOB (shortness of breath)


Status: Acute  











Review of Systems


Respiratory:  + dyspnea on exertion, No shortness of breath, No dyspnea at rest


Cardiac:  No chest pain, No edema, No palpitations





Physical Exam


Vital Signs





Last Vital Signs Documentation








  Date Time  Temp Pulse Resp B/P (MAP) Pulse Ox O2 Delivery O2 Flow Rate FiO2


 


12/24/17 07:46 37.0 59 18 148/62 (90) 96   


 


12/24/17 04:50      Nasal Cannula 3.0 











Physical Exam


Constitutional:  


   Level of Distress:  chronically ill


Lungs:  


   Auscultation:  no wheezing, no rales/crackles, no rhonchi, decreased breath 

sounds


Cardiovascular:  


   Heart Auscultation:  RRR (with ectopy), no murmurs, no rubs, no gallops


Abdomen:  


   Bowel Sounds:  normal


   Inspection & Palpation:  soft, no tenderness, guarding & rebound, distended


Extremities:  no edema





Assessment and Plan


Assessment and Plan


IMPRESSION:


1.  Admission for paroxysmal nocturnal dyspnea and orthopnea and acute on


chronic diastolic heart failure.


2.  Elevated troponin, likely demand ischemia.  He has no anginal symptoms. 


He does have significant left ventricular hypertrophy, which may be


contributing to this as well.


3.  Diffuse coronary artery disease and peripheral arterial disease for


medical therapy.


4.  Recent non-ST elevation myocardial infarction in October 2017 at [facility] with an unknown peak troponin level.


5.  Chronic kidney disease.


6.  Obstructive sleep apnea.





Add Imdur to medical regiment


No room to increase BB and on Max dose norvasc


Lasix 20mg and extra dose for weight gain 2 lbs in 24-48 hours


On plavix and coumadin


O2 sat 90% with ambulation--concern with not having O2 at home





Laboratory Results





Last 24 Hours








Test


  12/23/17


11:52 12/23/17


15:07 12/23/17


16:13 12/23/17


21:01


 


Bedside Glucose 158 mg/dl   119 mg/dl  124 mg/dl 


 


Prothrombin Time  44.3 SECONDS   


 


Prothromb Time International


Ratio 


  4.3 


  


  


 


 


Test


  12/23/17


23:11 12/24/17


05:56 


  


 


 


White Blood Count 6.93 K/uL  5.72 K/uL   


 


Red Blood Count 3.28 M/uL  3.07 M/uL   


 


Hemoglobin 9.8 g/dL  8.9 g/dL   


 


Hematocrit 29.4 %  27.5 %   


 


Mean Corpuscular Volume 89.6 fL  89.6 fL   


 


Mean Corpuscular Hemoglobin 29.9 pg  29.0 pg   


 


Mean Corpuscular Hemoglobin


Concent 33.3 g/dl 


  32.4 g/dl 


  


  


 


 


Platelet Count 261 K/uL  273 K/uL   


 


Mean Platelet Volume 8.5 fL  8.8 fL   


 


Neutrophils (%) (Auto) 61.0 %    


 


Lymphocytes (%) (Auto) 26.0 %    


 


Monocytes (%) (Auto) 10.2 %    


 


Eosinophils (%) (Auto) 1.6 %    


 


Basophils (%) (Auto) 0.3 %    


 


Neutrophils # (Auto) 4.23 K/uL    


 


Lymphocytes # (Auto) 1.80 K/uL    


 


Monocytes # (Auto) 0.71 K/uL    


 


Eosinophils # (Auto) 0.11 K/uL    


 


Basophils # (Auto) 0.02 K/uL    


 


RDW Standard Deviation 52.1 fL  51.5 fL   


 


RDW Coefficient of Variation 16.2 %  16.0 %   


 


Immature Granulocyte % (Auto) 0.9 %    


 


Immature Granulocyte # (Auto) 0.06 K/uL    


 


Sodium Level 138 mmol/L    


 


Potassium Level 4.8 mmol/L    


 


Chloride Level 106 mmol/L    


 


Carbon Dioxide Level 26 mmol/L    


 


Anion Gap 6.0 mmol/L    


 


Blood Urea Nitrogen 52 mg/dl    


 


Creatinine 1.59 mg/dl    


 


Est Creatinine Clear Calc


Drug Dose 55.3 ml/min 


  


  


  


 


 


Estimated GFR (


American) 53.9 


  


  


  


 


 


Estimated GFR (Non-


American 46.5 


  


  


  


 


 


BUN/Creatinine Ratio 32.5    


 


Random Glucose 148 mg/dl    


 


Calcium Level 8.1 mg/dl    


 


Prothrombin Time  26.5 SECONDS   


 


Prothromb Time International


Ratio 


  2.6

## 2017-12-24 NOTE — NUR
Patient in NAD.  Has been resting comfortably this afternoon.  SR on monitor with PAC's and 
PVC's.  Denies any CP/pressure.  Sats > 92% on 2L NC.  Tolerating diet.  Dressings to lower 
extremities remain C/D/I.  



Patient going to Ultrasound for renal artery duplex.  Goes with RN off monitor (okay per Dr. Hazel).  Will continue to monitor.

## 2017-12-24 NOTE — PHARMACY PROGRESS NOTE
Glycemic Control Progress Note


Date of Service


Dec 24, 2017.





Scope


Glycemic Pharmacist consulted for glycemic control to write orders per [facility] 

inpatient glycemic control protocol.





Objective


Accuchecks BSG (last 24hrs):











Test


  12/23/17


11:52 12/23/17


16:13 12/23/17


21:01 12/23/17


23:11


 


Bedside Glucose


  158 mg/dl


(70-99) 119 mg/dl


(70-99) 124 mg/dl


(70-99) 


 


 


Random Glucose


  


  


  


  148 mg/dl


(70-99)








HbA1c:


7.5% 8/11/17





Recent Pertinent Medications


Outpatient Anti-diabetic Regimen: 


* Lantus 10 units SQ Q HS


* A1c = 7.5 %  8/11/17 (not rechecked this admission secondary to transfusion)











Risk Factors for Insulin Resistance:


* Steroids: Solu-medrol 60mg IV x 1 12/22/17 @ 1801


* Diet: ordered T2DM / AHA diet





Assessment & Plan


ASSESSMENT:





12/23/17


* Type 2 diabetic admitted last evening for SOB, hypoxia, likely secondary to 

ADHF and anemia


* Glycemic control service has followed this patient on previous admissions


* Patient's admission Glu 133, however has climbed since admission likely due 

to stress combined with IV steroid administration and missing his Lantus dose 

yesterday


* Fasting BSG up to 235 this AM, will initiate Lantus this AM in a "stressed" 

home dose, then resume HS dosing this evening however utilize a scale for the 

dose as he will still have Lantus on board from this AM


* Will initiate sliding scale Novolog CF / CR using parameters that performed 

well on prior admission


* Patient is tolerating his breakfast meal today.  No further steroid doses 

have been ordered.





12/24/17


* BSGs have been at goal since insulin changes implemented yesterday


* Steroid effects likely minimal at this point, solumedrol last given 12/22 PM


* Fasting BSG 98 this AM with 20 units Lantus on board (15 units in AM + 5 

units in the PM yesterday).  Will begin to titrate back to HS dosing of Lantus 

as this was his outpt regimen.  Will attempt to use his outpt regimen and 

adjust if inadequate.  His basal insulin requirements can vary widely based 

upon prior admissions.  


* On the whole however, he typically requires ~40 units of insulin per day when 

tolerating a diet.


* His post-prandial BSGs were well controlled yesterday using CR 8 in the AM (

while some steroid effect still present), then using CR 10 later in the day.  

Will continue the larger prandial doses in the AM for now given we are moving 

basal dose to bedtime and he may need extra rapid acting initially this AM.








PLAN FOR INPATIENT GLYCEMIC CONTROL:


* Titrate Lantus to HS dosing; give 5 units Lantus this AM, then 5-10 units 

this PM based upon the following scale:


 * BSG less than 110 give 5 units


 *  or greater give 10 units


* Continue correction factor 25 mg/dl/unit


* Carb ratio 1 unit per 8 grams CHO consumed w/ breakfast and lunch, 1 unit per 

10 gms CHO consumed with dinner 


* Goal range Low 120 mg/dL - High 160 mg/dL





* Please note that the plan above was derived based on current level of insulin 

resistance and hospital stress. These recommendations are appropriate for 

inpatient admission only. Plan of care upon discharge will need to be 

reassessed to avoid potential outpatient hypo/hyperglycemia. 





Thank you.

## 2017-12-25 NOTE — NUR
on call case management note.  Upon chart review found pt to be discharged.  Case management 
was not notified of any further discharge needs.  Notified [facility] of discharge.  
Faxed discharge instructions.

## 2017-12-31 ENCOUNTER — HOSPITAL ENCOUNTER (INPATIENT)
Dept: HOSPITAL 45 - C.EDA | Age: 62
LOS: 6 days | Discharge: HOME | DRG: 193 | End: 2018-01-06
Attending: FAMILY MEDICINE | Admitting: STUDENT IN AN ORGANIZED HEALTH CARE EDUCATION/TRAINING PROGRAM
Payer: COMMERCIAL

## 2017-12-31 VITALS
HEIGHT: 71 IN | BODY MASS INDEX: 28.64 KG/M2 | BODY MASS INDEX: 28.64 KG/M2 | WEIGHT: 204.59 LBS | HEIGHT: 71 IN | WEIGHT: 204.59 LBS

## 2017-12-31 DIAGNOSIS — Z83.3: ICD-10-CM

## 2017-12-31 DIAGNOSIS — I51.3: ICD-10-CM

## 2017-12-31 DIAGNOSIS — I25.10: ICD-10-CM

## 2017-12-31 DIAGNOSIS — D63.8: ICD-10-CM

## 2017-12-31 DIAGNOSIS — Z82.49: ICD-10-CM

## 2017-12-31 DIAGNOSIS — G89.29: ICD-10-CM

## 2017-12-31 DIAGNOSIS — I10: ICD-10-CM

## 2017-12-31 DIAGNOSIS — J96.01: ICD-10-CM

## 2017-12-31 DIAGNOSIS — Z79.4: ICD-10-CM

## 2017-12-31 DIAGNOSIS — J44.1: ICD-10-CM

## 2017-12-31 DIAGNOSIS — D50.9: ICD-10-CM

## 2017-12-31 DIAGNOSIS — I25.2: ICD-10-CM

## 2017-12-31 DIAGNOSIS — N18.3: ICD-10-CM

## 2017-12-31 DIAGNOSIS — Z87.891: ICD-10-CM

## 2017-12-31 DIAGNOSIS — J18.9: Primary | ICD-10-CM

## 2017-12-31 DIAGNOSIS — F41.9: ICD-10-CM

## 2017-12-31 DIAGNOSIS — F31.9: ICD-10-CM

## 2017-12-31 DIAGNOSIS — E78.5: ICD-10-CM

## 2017-12-31 NOTE — DISCHARGE SUMMARY
Discharge Summary


Date of Service


Dec 24, 2017.





Discharge Summary


Admission Date:


Dec 22, 2017 at 20:29


Discharge Date:  Dec 24, 2017


Discharge Disposition:  Home


Principal Diagnosis:  SOB in setting of severe CAD and systolic and diastolic 

failure


Problems/Secondary Diagnoses:


As discussed below in the hospital course


Immunizations:  


   Have You Had Influenza Vaccine:  No


   Influenza Vaccine Date:  Oct 5, 2009


   History of Tetanus Vaccine?:  No


   Tetanus Immunization Date:  Mar 1, 2004


   History of Pneumococcal:  No


   Pneumococcal Date:  Oct 15, 2006


   History of Hepatitis B Vaccine:  No


Consultations:


REASON FOR CONSULTATION:  Recent discharge for chest discomfort and


readmission for heart failure.


 


HISTORY OF PRESENT ILLNESS:  Paramjit was recently admitted with chest


discomfort.  His troponins were negative.  His EKG did not show any changes


and he was subsequently discharged home.  He returned with symptoms


consistent with heart failure.  He had PND and orthopnea.  He notes lying


flat, he was short of breath.  He denies any chest pain or chest pressure


with these episodes.  He notes with just walking, he was more short of breath


and he denied any chest pain or chest pressure with walking either.


 


He denies any lightheadedness or dizziness, palpitations or feeling his heart


racing.  He describes his appetite as relatively stable.  He does believe his


abdomen is more distended.  He has mild chronic lower extremity edema after


fasciotomies from October 2017 in [place].


 


He denies any fevers, chills or sweats.  He denies any bleeding, bruising,


dark stools or black stools.  He does note that his weight at home has been


relatively stable.  The rest of review of systems is otherwise negative.


 


PAST MEDICAL HISTORY:


1.  Coronary artery disease, status post non-ST elevation myocardial


infarction in 2007 with stenting of the distal circumflex; V-fib arrest at


the time of his 2007 heart attack.


2.  Acute coronary syndrome in 2010 with a drug-eluting stent to the mid LAD


and a previous stent in the circumflex was occluded.


3.  Peripheral arterial disease with total occlusion of the left FSA and


total occlusion of the left anterior tibial, status post stenting of his


popliteal and SFA in May 2017.


4.  Dobutamine stress echo in March 2016 without evidence of ischemia.


5.  History of cellulitis secondary to a history of diabetic ulcers.


6.  History of heart failure and pulmonary edema in August of 2017.


7.  History of acute kidney injury on chronic kidney disease.


8.  Possible gastrointestinal blood loss, status post blood transfusions in


the past with blood transfusion this admission.


9.  History of chronic obstructive pulmonary disease and chronic respiratory


failure.


10.  Echo in June of 2017, moderate left ventricular hypertrophy, preserved


left ventricular systolic function, no wall motion abnormalities, mild left


atrial enlargement and mild mitral regurgitation.


11.  Bipolar disorder.


12.  Esophageal reflux disease.


13.  Obstructive sleep apnea.


14.  Left atrial thrombus, on anticoagulation.


 


FAMILY HISTORY:  Positive for esophageal cancer, diabetes, heart disease and


hypertension.


 


SOCIAL HISTORY:  He is a smoker.  He is .


 


ALLERGIES:  No known drug allergies.


 


OUTPATIENT MEDICATIONS:  Reviewed.


 


PHYSICAL EXAMINATION:


GENERAL:  He is awake, alert, and oriented x3.  He is in no acute distress. 


He notes his breathing feels much better and is back to baseline.  His


orthopnea has resolved and he denies any chest discomfort.


HEENNT:  2+ carotid upstrokes.  No evidence of carotid bruits.  His jugular


venous pressure appeared elevated.  His sclerae anicteric.  His hearing is


normal.


LUNGS:  Decreased breath sounds in the right base.  No rales, rhonchi or


wheezing.


HEART:  Regular rate and rhythm.  He had a 2/6 crescendo-decrescendo murmur,


which was early to mid peaking.  There were no appreciable diastolic murmurs.


ABDOMEN:  Soft and chronically distended.  Positive bowel sounds.


EXTREMITIES:  Mild bilateral lower extremity edema.


PSYCHIATRIC:  His affect appeared appropriate.


 


DIAGNOSTIC STUDIES:  Chest x-ray was read as heart failure.  Peak troponin


1.260, trending down to 1.010.  BUN of 48 with a creatinine of 1.56 and a BNP


on admission of 12,802.  Of note, his admission BUN was 41 with a creatinine


of 1.27.  Hemoglobin on admission was 7.9 and after 2 units of blood 9.6. 


His echocardiogram from June 2017 was reviewed.


 


IMPRESSION:


1.  Admission for paroxysmal nocturnal dyspnea and orthopnea and acute on


chronic diastolic heart failure.


2.  Elevated troponin, likely demand ischemia.  He has no anginal symptoms. 


He does have significant left ventricular hypertrophy, which may be


contributing to this as well.


3.  Diffuse coronary artery disease and peripheral arterial disease for


medical therapy.


4.  Recent non-ST elevation myocardial infarction in October 2017 at [facility] with an unknown peak troponin level.


5.  Chronic kidney disease.


6.  Obstructive sleep apnea.


 


He is on a very high dose of Aldactone at 50 mg twice a day.  It is unclear


as to whether this may be for liver disease.  From a cardiac standpoint, this


would be a very high dose.  He looks more prerenal today.  I would weigh him


on a standing scale and I would reduce his diuretics given the concern for


progressive renal insufficiency.


 


From a coronary artery disease standpoint, I would treat him medically.  He


is already on Plavix and anticoagulation with Coumadin, beta blockers and


amlodipine.  If he can be on statin therapy given his diffuse coronary


disease and vascular disease, he should be on high intensity statin therapy.


 


I would try to keep his hemoglobin greater than 9 given his diffuse vascular


disease.


 


We will reduce his furosemide to 20 mg p.o. in the morning.  Otherwise, I


would continue to treat him medically.





I would consider a renal arterial duplex given his diffuse CAD and flash 

pulmonary edema


 


Thank you for allowing us to participate in his care.





Followup on 12/24/17


IMPRESSION:


1.  Admission for paroxysmal nocturnal dyspnea and orthopnea and acute on


chronic diastolic heart failure.


2.  Elevated troponin, likely demand ischemia.  He has no anginal symptoms. 


He does have significant left ventricular hypertrophy, which may be


contributing to this as well.


3.  Diffuse coronary artery disease and peripheral arterial disease for


medical therapy.


4.  Recent non-ST elevation myocardial infarction in October 2017 at [facility] with an unknown peak troponin level.


5.  Chronic kidney disease.


6.  Obstructive sleep apnea.





Add Imdur to medical regiment


No room to increase BB and on Max dose norvasc


Lasix 20mg and extra dose for weight gain 2 lbs in 24-48 hours


On plavix and coumadin


O2 sat 90% with ambulation--concern with not having O2 at home





Medication Reconciliation


New Medications:  


Atorvastatin (Atorvastatin Calcium) 40 Mg Tab


80 MG PO QAM for 30 Days, #60 TAB





Isosorbide Mononitrate (Isosorbide Mononitrate ER) 30 Mg Tabcr


30 MG PO QAM for 30 Days, #30 TAB 1 Refill





 


Continued Medications:  


Acetaminophen (Tylenol) 500 Mg Tab


500 MG PO Q4-6HRS PRN for Pain, TAB





Albuterol Sulfate (Proair Respiclick) 108 Mcg/Act Aer


1-2 PUFFS INH Q4-6HRS PRN for SOB/Wheezing





Amlodipine Besylate (Amlodipine Besylate) 10 Mg Tab


10 MG PO QAM





Clonazepam (Clonazepam) 1 Mg Tab


1 MG PO HS PRN for Anxiety





Clopidogrel Bisulfate (Clopidogrel) 75 Mg Tab


75 MG PO QAM





Cyanocobalamin (Vitamin B12) 1,000 Mcg Tab


1000 MCG PO DAILY





Divalproex Sodium (Depakote Delay Rel) 500 Mg Tab


2000 MG PO HS





Docusate Sodium (Docusate Sodium) 100 Mg Cap


100 MG PO BID PRN for Constipation





Escitalopram Oxalate (Escitalopram Oxalate) 20 Mg Tab


20 MG PO QAM





Ferrous Sulfate (Ferrous Sulfate) 325 Mg Tab


325 MG PO BIDM





Fluticasone Propionate (Nasal) (Flonase Allergy Relief) 50 Mcg/Act Spr


2 SPRAYS VERONICA DAILY PRN for Nasal Congestion





Gabapentin (Neurontin) 300 Mg Cap


300 MG PO HS, CAP





Glucagon (Glucagon Emergency Kit) 1 Mg Kit


1 MG UD PRN for Hypoglycemia Protocol





Insulin Glargine (Lantus Solostar) 100 Unit/Ml Inj


10 UNITS SC HS, PEN





Insulin Lispro (Human) (Humalog Kwikpen) 100 Unit/Ml Inj


1 DOSE SC AC


COVERAGE AS DIRECTED BY SLIDING SCALE


Metoprolol Succinate (Metoprolol Succinate ER) 50 Mg Tabcr


50 MG PO BID





Mirtazapine (Mirtazapine) 45 Mg Tab


45 MG PO HS, TAB





Nitroglycerin (Nitrostat) 0.4 Mg Tab


0.4 MG UT UD PRN for Chest Pain


DISSOLVE ONE TABLET UNDER THE TONGUE AS DIRECTED


Oxycodone HCl (Oxycodone HCl) 5 Mg Tab


5 MG PO Q6H PRN for Breakthrough Pain





Oxycodone Hcl (Oxycodone Hcl) 10 Mg Tab


10 MG PO Q12 PRN for Severe Pain





Spironolactone (Aldactone) 50 Mg Tab


50 MG PO BID





Tamsulosin HCl (Tamsulosin HCl) 0.4 Mg Cap


0.4 MG PO HS





 


Discontinued Medications:  


Enoxaparin Sodium (Lovenox) 80 Mg/0.8 Ml Inj


0.8 ML SC QD@1700





Simvastatin (Simvastatin) 80 Mg Tab


80 MG PO DAILY





Warfarin Sodium (Warfarin Sodium) 5 Mg Tab


1 DOSE PO UD


TAKE DAILY AS DIRECTED BY ANTICOAGULATION CLINIC/MD








Discharge Exam


Review of Systems:  


   Constitutional:  No fever, No chills


   Respiratory:  No cough, No sputum


   Cardiovascular:  + orthopnea, No chest pain


   Abdomen:  No pain, No nausea


   Neurologic:  No memory loss, No paralysis


   Psychiatric:  No depression symptoms, No anhedonism


   Endocrine:  No fatigue


   Hematologic / Lymphatic:  No abnormal bleeding/bruising


   Integumentary:  No rash, No itch


Physical Exam:  


   General Appearance:  WD/WN, no apparent distress


   Neck:  supple, no adenopathy


   Respiratory/Chest:  chest non-tender, lungs clear, normal breath sounds


   Cardiovascular:  regular rate, rhythm, no edema


   Abdomen / GI:  normal bowel sounds, non tender, soft


   Skin:  normal color


   Lymphatic:  no adenopathy





Hospital Course





Hospital Course


61 y/o M with extensive medical issues including severe CAD, PVD, COPD, DM II, 

bipolar disease, chronic anemia, diastolic CHF, recent diagnosis of atrial 

thrombus - on Lovenox bridging to Coumadin - pt had an MI at [place] 10/17 

which was medically managed. The was admitted to the hospital on day prior with 

complaints of CP and was discharged the following day after ruling out for an 

MI.  He presented with SOB and was hypoxic on arrival to the ER.  He denies CP, 

cough, fever, N/V, dysuria.  





1) SOB - H/O chronic, diastolic CHF exacerbation and severe CAD


Patient did not appear to be anginal and his echo cardiogram did not show any 

new signs of infarct


Patient did  however have LVH which was deemed severe


Patient was also anemic and required blood transfusions


Lasix was then decreased by Cardio and Imdur was added.


Patient improved and was able to ambulate keeping his o2 sat above 88% (90%)


Will continue current home meds including BB, aldactone.


May consider decreasing as outpatient.


Statin was increased to high intensity.





2) Anemia - as his troponin is elevated and he has a history of CAD, 


transfused 2 units PRBCs 


Hemoglobin stable


- he last required transfusions in mid November.





3) CAD 


- elevated troponin w/out related symptoms


 - may be due to CHF exacerbation or demand ischemia as he was hypoxic on 

arrival.


Echo shows LVH which may be contributing.


 He is fully anticoagulated.  


We will cont his B blocker and change his statin dose to reflect high-intensity 

therapy.  





4) DM II - placed on SS





5) Atrial thrombus - His INR is therapeutic - 


we will recheck AM and can DC daily Lovenox 





6) COPD - no evidence of exacerbation - will remain on prescribed inhalers.





7) Bipolar disease - stable - cont Remeron, Clonazepam, Depakote


Total Time Spent:  Greater than 30 minutes


This includes examination of the patient, discharge planning, medication 

reconciliation, and communication with other providers.





Discharge Instructions


Please refer to the electronic Patient Visit Report (Discharge Instructions) 

for additional information.





Follow-Up


As per discharge instructions.





Additional Copies To


Claude Rowley M.D.

## 2018-01-01 VITALS — HEART RATE: 83 BPM | OXYGEN SATURATION: 92 %

## 2018-01-01 VITALS
SYSTOLIC BLOOD PRESSURE: 144 MMHG | TEMPERATURE: 98.24 F | HEART RATE: 86 BPM | OXYGEN SATURATION: 92 % | DIASTOLIC BLOOD PRESSURE: 57 MMHG

## 2018-01-01 VITALS
DIASTOLIC BLOOD PRESSURE: 61 MMHG | SYSTOLIC BLOOD PRESSURE: 156 MMHG | OXYGEN SATURATION: 90 % | TEMPERATURE: 98.24 F | HEART RATE: 72 BPM

## 2018-01-01 VITALS — OXYGEN SATURATION: 94 % | HEART RATE: 70 BPM

## 2018-01-01 VITALS
HEART RATE: 73 BPM | DIASTOLIC BLOOD PRESSURE: 65 MMHG | SYSTOLIC BLOOD PRESSURE: 161 MMHG | OXYGEN SATURATION: 91 % | TEMPERATURE: 97.88 F

## 2018-01-01 VITALS
DIASTOLIC BLOOD PRESSURE: 82 MMHG | HEART RATE: 78 BPM | SYSTOLIC BLOOD PRESSURE: 159 MMHG | OXYGEN SATURATION: 95 % | TEMPERATURE: 98.42 F

## 2018-01-01 VITALS
HEART RATE: 72 BPM | SYSTOLIC BLOOD PRESSURE: 158 MMHG | OXYGEN SATURATION: 91 % | TEMPERATURE: 97.7 F | DIASTOLIC BLOOD PRESSURE: 67 MMHG

## 2018-01-01 VITALS
OXYGEN SATURATION: 90 % | HEART RATE: 69 BPM | SYSTOLIC BLOOD PRESSURE: 131 MMHG | TEMPERATURE: 98.06 F | DIASTOLIC BLOOD PRESSURE: 58 MMHG

## 2018-01-01 VITALS — HEART RATE: 85 BPM | OXYGEN SATURATION: 91 %

## 2018-01-01 VITALS — HEART RATE: 69 BPM | OXYGEN SATURATION: 94 %

## 2018-01-01 VITALS — OXYGEN SATURATION: 91 % | HEART RATE: 74 BPM

## 2018-01-01 LAB
ALBUMIN SERPL-MCNC: 2.4 GM/DL (ref 3.4–5)
ALP SERPL-CCNC: 57 U/L (ref 45–117)
ALT SERPL-CCNC: 12 U/L (ref 12–78)
AST SERPL-CCNC: 11 U/L (ref 15–37)
BASOPHILS # BLD: 0.01 K/UL (ref 0–0.2)
BASOPHILS NFR BLD: 0.1 %
BUN SERPL-MCNC: 40 MG/DL (ref 7–18)
CALCIUM SERPL-MCNC: 8 MG/DL (ref 8.5–10.1)
CO2 SERPL-SCNC: 24 MMOL/L (ref 21–32)
CREAT SERPL-MCNC: 1.7 MG/DL (ref 0.6–1.4)
EOS ABS #: 0.16 K/UL (ref 0–0.5)
EOSINOPHIL NFR BLD AUTO: 263 K/UL (ref 130–400)
GLUCOSE SERPL-MCNC: 216 MG/DL (ref 70–99)
HBA1C MFR BLD: 6.3 % (ref 4.5–5.6)
HCT VFR BLD CALC: 29.3 % (ref 42–52)
HGB BLD-MCNC: 9.6 G/DL (ref 14–18)
IG#: 0.07 K/UL (ref 0–0.02)
IMM GRANULOCYTES NFR BLD AUTO: 9.8 %
INR PPP: 2.2 (ref 0.9–1.1)
LYMPHOCYTES # BLD: 1.12 K/UL (ref 1.2–3.4)
MCH RBC QN AUTO: 29.9 PG (ref 25–34)
MCHC RBC AUTO-ENTMCNC: 32.8 G/DL (ref 32–36)
MCV RBC AUTO: 91.3 FL (ref 80–100)
MONO ABS #: 1.24 K/UL (ref 0.11–0.59)
MONOCYTES NFR BLD: 10.8 %
NEUT ABS #: 8.84 K/UL (ref 1.4–6.5)
NEUTROPHILS # BLD AUTO: 1.4 %
NEUTROPHILS NFR BLD AUTO: 77.3 %
PMV BLD AUTO: 9 FL (ref 7.4–10.4)
POTASSIUM SERPL-SCNC: 4.4 MMOL/L (ref 3.5–5.1)
PROT SERPL-MCNC: 7.2 GM/DL (ref 6.4–8.2)
RED CELL DISTRIBUTION WIDTH CV: 15.4 % (ref 11.5–14.5)
RED CELL DISTRIBUTION WIDTH SD: 51.4 FL (ref 36.4–46.3)
SODIUM SERPL-SCNC: 141 MMOL/L (ref 136–145)
WBC # BLD AUTO: 11.44 K/UL (ref 4.8–10.8)

## 2018-01-01 RX ADMIN — DIVALPROEX SODIUM SCH MG: 500 TABLET, DELAYED RELEASE ORAL at 20:38

## 2018-01-01 RX ADMIN — ISOSORBIDE MONONITRATE SCH MG: 30 TABLET, EXTENDED RELEASE ORAL at 09:33

## 2018-01-01 RX ADMIN — IPRATROPIUM BROMIDE AND ALBUTEROL SULFATE SCH ML: .5; 3 SOLUTION RESPIRATORY (INHALATION) at 07:06

## 2018-01-01 RX ADMIN — IPRATROPIUM BROMIDE AND ALBUTEROL SULFATE SCH ML: .5; 3 SOLUTION RESPIRATORY (INHALATION) at 05:47

## 2018-01-01 RX ADMIN — TAMSULOSIN HYDROCHLORIDE SCH MG: 0.4 CAPSULE ORAL at 20:37

## 2018-01-01 RX ADMIN — SPIRONOLACTONE SCH MG: 25 TABLET, FILM COATED ORAL at 17:31

## 2018-01-01 RX ADMIN — METOPROLOL SUCCINATE SCH MG: 50 TABLET, EXTENDED RELEASE ORAL at 09:34

## 2018-01-01 RX ADMIN — IPRATROPIUM BROMIDE AND ALBUTEROL SULFATE SCH ML: .5; 3 SOLUTION RESPIRATORY (INHALATION) at 20:00

## 2018-01-01 RX ADMIN — SPIRONOLACTONE SCH MG: 25 TABLET, FILM COATED ORAL at 09:33

## 2018-01-01 RX ADMIN — IPRATROPIUM BROMIDE AND ALBUTEROL SULFATE SCH ML: .5; 3 SOLUTION RESPIRATORY (INHALATION) at 11:15

## 2018-01-01 RX ADMIN — GABAPENTIN SCH MG: 300 CAPSULE ORAL at 20:41

## 2018-01-01 RX ADMIN — INSULIN ASPART SCH UNITS: 100 INJECTION, SOLUTION INTRAVENOUS; SUBCUTANEOUS at 09:37

## 2018-01-01 RX ADMIN — OXYCODONE HYDROCHLORIDE PRN MG: 5 TABLET ORAL at 10:41

## 2018-01-01 RX ADMIN — AMLODIPINE BESYLATE SCH MG: 5 TABLET ORAL at 09:34

## 2018-01-01 RX ADMIN — INSULIN GLARGINE SCH UNITS: 100 INJECTION, SOLUTION SUBCUTANEOUS at 21:12

## 2018-01-01 RX ADMIN — INSULIN ASPART SCH UNITS: 100 INJECTION, SOLUTION INTRAVENOUS; SUBCUTANEOUS at 21:13

## 2018-01-01 RX ADMIN — ESCITALOPRAM OXALATE SCH MG: 20 TABLET, FILM COATED ORAL at 09:35

## 2018-01-01 RX ADMIN — WARFARIN SODIUM SCH MG: 7.5 TABLET ORAL at 17:31

## 2018-01-01 RX ADMIN — INSULIN ASPART SCH UNITS: 100 INJECTION, SOLUTION INTRAVENOUS; SUBCUTANEOUS at 12:06

## 2018-01-01 RX ADMIN — PIPERACILLIN AND TAZOBACTAM SCH MLS/HR: 3; .375 INJECTION, POWDER, LYOPHILIZED, FOR SOLUTION INTRAVENOUS; PARENTERAL at 11:59

## 2018-01-01 RX ADMIN — MIRTAZAPINE SCH MG: 15 TABLET, FILM COATED ORAL at 20:41

## 2018-01-01 RX ADMIN — IPRATROPIUM BROMIDE AND ALBUTEROL SULFATE SCH ML: .5; 3 SOLUTION RESPIRATORY (INHALATION) at 15:14

## 2018-01-01 RX ADMIN — CLOPIDOGREL BISULFATE SCH MG: 75 TABLET, FILM COATED ORAL at 09:33

## 2018-01-01 RX ADMIN — ATORVASTATIN CALCIUM SCH MG: 40 TABLET, FILM COATED ORAL at 09:35

## 2018-01-01 RX ADMIN — PIPERACILLIN AND TAZOBACTAM SCH MLS/HR: 3; .375 INJECTION, POWDER, LYOPHILIZED, FOR SOLUTION INTRAVENOUS; PARENTERAL at 20:27

## 2018-01-01 RX ADMIN — INSULIN ASPART SCH UNITS: 100 INJECTION, SOLUTION INTRAVENOUS; SUBCUTANEOUS at 17:35

## 2018-01-01 RX ADMIN — METOPROLOL SUCCINATE SCH MG: 50 TABLET, EXTENDED RELEASE ORAL at 20:41

## 2018-01-01 RX ADMIN — IPRATROPIUM BROMIDE AND ALBUTEROL SULFATE SCH ML: .5; 3 SOLUTION RESPIRATORY (INHALATION) at 23:21

## 2018-01-01 RX ADMIN — OXYCODONE HYDROCHLORIDE PRN MG: 5 TABLET ORAL at 22:44

## 2018-01-01 NOTE — EMERGENCY ROOM VISIT NOTE
History


Report prepared by Pamelaibrhona:  Mac Laureano


Under the Supervision of:  Dr. Aleksandra Mir D.O.


First contact with patient:  00:00


Chief Complaint:  SHORTNESS OF BREATH


Stated Complaint:  SHORT OF BREATH





History of Present Illness


The patient is a 62 year old male who presents to the Emergency Room with 

complaints of worsening shortness of breath beginning this morning. The patient'

s symptoms were present upon waking up. He has been admitted as an inpatient 

multiple times for similar symptoms in the past month. He was discharged 

without supplemental oxygen each time. The patient was given a breathing 

treatment en route which improved his symptoms. He also complains of a cough. 

His cough produces a green sputum. The patient denies chest pain, fevers, 

diarrhea, urinary symptoms, or runny nose. EMS states that the patient's oxygen 

saturations were 83 on scene, but it raised to the low 90's with supplemental 

oxygen. The patient denies missing any recent medication dosages. He notes that 

he had hot dogs and sauerkraut for dinner tonight. He is a former smoker and 

quit 6 months ago. The patient has a history of MI with cardiac stents in 

place.  Patient does not wear home oxygen, does use nebulizer treatments at 

home.  States his breathing treatments at home to help temporarily but he feels 

the do not last as long as they have previously.  Denies sick contact at home.





Review of EMR shows patient with recent admissions for acute on chronic CHF and 

chest pain.  Patient had recently been in rehabilitation following fasciotomy 

of right lower extremity and wounds to left lower extremity.





   Source of History:  patient


   Onset:  This morning


   Quality:  other (shortness of breath)


   Timing:  worsening


   Modifying Factors (Relieving):  other (Breathing treatment)


   Associated Symptoms:  + cough (produces green sputum), No fevers, No chest 

pain, No diarrhea, No urinary symptoms





Review of Systems


See HPI for pertinent positives & negatives. A total of 10 systems reviewed and 

were otherwise negative.





Past Medical & Surgical


Medical Problems:


(1) Accelerated hypertension


(2) Acute appendicitis with appendiceal abscess


(3) Acute renal insufficiency


(4) Acute respiratory failure


(5) Angina pectoris


(6) Bipolar disorder


(7) CAD (coronary artery disease)


(8) Cardiac arrest


(9) Cellulitis


(10) CHF exacerbation


(11) Dehydration


(12) Diabetes


(13) Diastolic CHF, chronic


(14) Elevated INR


(15) Enterocolitis


(16) HCAP (healthcare-associated pneumonia)


(17) Headache


(18) Heel ulcer


(19) Hypertension


(20) Ischemic cardiomyopathy


(21) LVH (left ventricular hypertrophy)


(22) Osteomyelitis of left foot


(23) PAD (peripheral artery disease)


(24) Precordial chest pain


(25) Respiratory distress


(26) Sepsis, unspecified organism


(27) Volume overload








Family History





Cancer (esophageal)


Diabetes mellitus


Heart disease


Hypertension





Social History


Smoking Status:  Former Smoker


Drug Use:  none


Marital Status:  


Housing Status:  lives with family


Occupation Status:  employed





Current/Historical Medications


Scheduled


Amlodipine Besylate (Amlodipine Besylate), 10 MG PO QAM


Atorvastatin (Atorvastatin Calcium), 80 MG PO QAM


Clopidogrel Bisulfate (Clopidogrel), 75 MG PO QAM


Cyanocobalamin (Vitamin B12), 1,000 MCG PO DAILY


Divalproex Sodium (Depakote Delay Rel), 2,000 MG PO HS


Escitalopram Oxalate (Escitalopram Oxalate), 20 MG PO QAM


Ferrous Sulfate (Ferrous Sulfate), 325 MG PO BIDM


Gabapentin (Neurontin), 300 MG PO HS


Insulin Glargine (Lantus Solostar), 10 UNITS SC HS


Insulin Lispro (Human) (Humalog Kwikpen), 1 DOSE SC AC


Isosorbide Mononitrate (Isosorbide Mononitrate ER), 30 MG PO QAM


Metoprolol Succinate (Metoprolol Succinate ER), 50 MG PO BID


Mirtazapine (Mirtazapine), 45 MG PO HS


Spironolactone (Aldactone), 50 MG PO BID


Tamsulosin HCl (Tamsulosin HCl), 0.4 MG PO HS


Warfarin Sodium (Warfarin Sodium), 7.5 MG PO 5XWK


Warfarin Sodium (Warfarin Sodium), 10 MG PO 2XWK





Scheduled PRN


Acetaminophen (Tylenol), 500 MG PO Q4-6HRS PRN for Pain


Albuterol Sulfate (Proair Respiclick), 1-2 PUFFS INH Q4-6HRS PRN for SOB/

Wheezing


Clonazepam (Clonazepam), 1 MG PO HS PRN for Anxiety


Docusate Sodium (Docusate Sodium), 100 MG PO BID PRN for Constipation


Fluticasone Propionate (Nasal) (Flonase Allergy Relief), 2 SPRAYS VERONICA DAILY PRN 

for Nasal Congestion


Glucagon (Glucagon Emergency Kit), 1 MG UD PRN for Hypoglycemia Protocol


Nitroglycerin (Nitrostat), 0.4 MG UT UD PRN for Chest Pain


Oxycodone HCl (Oxycodone HCl), 5 MG PO Q6H PRN for Breakthrough Pain


Oxycodone Hcl (Oxycodone Hcl), 10 MG PO Q12 PRN for Severe Pain





Allergies


Coded Allergies:  


     No Known Allergies (Verified , 1/1/18)





Physical Exam


Vital Signs











  Date Time  Temp Pulse Resp B/P (MAP) Pulse Ox O2 Delivery O2 Flow Rate FiO2


 


1/1/18 03:15  76 22 124/89 93   


 


1/1/18 02:36  74 22 136/92 93 Mask 3.0 


 


1/1/18 01:15  84 22 162/75 94 Mask 5.0 


 


1/1/18 00:24     95 Mask 5.0 


 


1/1/18 00:06  88      


 


1/1/18 00:00 36.9 88 28 156/99 87 Nasal Cannula 4.0 


 


1/1/18 00:00     95 Mask 5.0 


 


1/1/18 00:00     83 Room Air  











Physical Exam


GENERAL: alert, well appearing, well nourished, no distress, non-toxic 


EYE EXAM: normal conjunctiva, PERRL and EOM's grossly intact


OROPHARYNX: no exudate, no erythema, lips, buccal mucosa, and tongue normal. 

Poor dentition. Dry mucous membranes.


NECK: supple, no nuchal rigidity, no adenopathy, non-tender


LUNGS: Decreased breath sounds. Slightly coarse at the bases. Normal chest wall 

mechanics


HEART: no murmurs, S1 normal and S2 normal 


ABDOMEN: abdomen soft, non-tender, normo-active bowel sounds, no masses, no 

rebound or guarding. 


BACK: Back is symmetrical on inspection and there is no deformity, no midline 

tenderness, no CVA tenderness. 


SKIN: no rashes and no bruising 


UPPER EXTREMITIES: upper extremities are grossly normal. 


LOWER EXTREMITIES: Dressing to the RLE intact. Atrophy noted to the distal RLE 

and foot. Normal pulse. Dressing to the LLE intact. Prior amputations noted to 

the right hand. Well healed. 


NEURO EXAM: Normal sensorium, cranial nerves II-XII grossly intact, normal 

speech,  no gross weakness of arms, no gross weakness of legs.





Medical Decision & Procedures


ER Provider


Diagnostic Interpretation:


One View Chest X-ray interpreted by me: No effusion. No cardiomegaly. Mild 

calcification of the aortic knob. Bilateral increased interstitial markings 

compared to prior. Area of questionable consolidation in the right middle lobe.





Laboratory Results


1/1/18 00:12








Red Blood Count 3.21, Mean Corpuscular Volume 91.3, Mean Corpuscular Hemoglobin 

29.9, Mean Corpuscular Hemoglobin Concent 32.8, Mean Platelet Volume 9.0, 

Neutrophils (%) (Auto) 77.3, Lymphocytes (%) (Auto) 9.8, Monocytes (%) (Auto) 

10.8, Eosinophils (%) (Auto) 1.4, Basophils (%) (Auto) 0.1, Neutrophils # (Auto

) 8.84, Lymphocytes # (Auto) 1.12, Monocytes # (Auto) 1.24, Eosinophils # (Auto

) 0.16, Basophils # (Auto) 0.01





1/1/18 00:12

















Test


  1/1/18


00:12


 


White Blood Count


  11.44 K/uL


(4.8-10.8)


 


Red Blood Count


  3.21 M/uL


(4.7-6.1)


 


Hemoglobin


  9.6 g/dL


(14.0-18.0)


 


Hematocrit 29.3 % (42-52) 


 


Mean Corpuscular Volume


  91.3 fL


()


 


Mean Corpuscular Hemoglobin


  29.9 pg


(25-34)


 


Mean Corpuscular Hemoglobin


Concent 32.8 g/dl


(32-36)


 


Platelet Count


  263 K/uL


(130-400)


 


Mean Platelet Volume


  9.0 fL


(7.4-10.4)


 


Neutrophils (%) (Auto) 77.3 % 


 


Lymphocytes (%) (Auto) 9.8 % 


 


Monocytes (%) (Auto) 10.8 % 


 


Eosinophils (%) (Auto) 1.4 % 


 


Basophils (%) (Auto) 0.1 % 


 


Neutrophils # (Auto)


  8.84 K/uL


(1.4-6.5)


 


Lymphocytes # (Auto)


  1.12 K/uL


(1.2-3.4)


 


Monocytes # (Auto)


  1.24 K/uL


(0.11-0.59)


 


Eosinophils # (Auto)


  0.16 K/uL


(0-0.5)


 


Basophils # (Auto)


  0.01 K/uL


(0-0.2)


 


RDW Standard Deviation


  51.4 fL


(36.4-46.3)


 


RDW Coefficient of Variation


  15.4 %


(11.5-14.5)


 


Immature Granulocyte % (Auto) 0.6 % 


 


Immature Granulocyte # (Auto)


  0.07 K/uL


(0.00-0.02)


 


Prothrombin Time


  23.1 SECONDS


(9.0-12.0)


 


Prothromb Time International


Ratio 2.2 (0.9-1.1) 


 


 


Anion Gap


  9.0 mmol/L


(3-11)


 


Est Creatinine Clear Calc


Drug Dose 53.9 ml/min 


 


 


Estimated GFR (


American) 49.7 


 


 


Estimated GFR (Non-


American 42.9 


 


 


BUN/Creatinine Ratio 23.7 (10-20) 


 


Calcium Level


  8.0 mg/dl


(8.5-10.1)


 


Total Bilirubin


  0.2 mg/dl


(0.2-1)


 


Aspartate Amino Transf


(AST/SGOT) 11 U/L (15-37) 


 


 


Alanine Aminotransferase


(ALT/SGPT) 12 U/L (12-78) 


 


 


Alkaline Phosphatase


  57 U/L


()


 


Troponin I


  0.019 ng/ml


(0-0.045)


 


Pro-B-Type Natriuretic Peptide


  39807 pg/ml


(0-900)


 


Total Protein


  7.2 gm/dl


(6.4-8.2)


 


Albumin


  2.4 gm/dl


(3.4-5.0)


 


Globulin


  4.8 gm/dl


(2.5-4.0)


 


Albumin/Globulin Ratio 0.5 (0.9-2) 














 Date/Time


Source Procedure


Growth Status


 


 


 1/1/18 00:00


Nasal MRSA DNA Surveillance Screen - Final


Specimen Negative for MRSA by DNA Probe Complete





Laboratory results per my review.





Medications Administered











 Medications


  (Trade)  Dose


 Ordered  Sig/Dc


 Route  Start Time


 Stop Time Status Last Admin


Dose Admin


 


 Furosemide


  (Lasix Inj)  40 mg  NOW  STAT


 IV  1/1/18 01:06


 1/1/18 01:07 DC 1/1/18 01:14


40 MG


 


 Levofloxacin


  (Levaquin / D5W)  750 mg  NOW  ONCE


 IV  1/1/18 01:15


 1/1/18 01:16 DC 1/1/18 01:14


750 MG


 


 Cefepime HCl 2000


 mg/Dextrose  122 ml @ 


 200 mls/hr  NOW  STAT


 IV  1/1/18 01:08


 1/1/18 01:44 DC 1/1/18 02:27


200 MLS/HR











ECG


Indication:  SOB/dyspnea


Rate (beats per minute):  87


Rhythm:  normal sinus


Findings:  other (Normal axis. Normal intervals. Non-specific ST changes in V5-

V6. No acute ST elevation. )





ED Course


0009: The patient was evaluated in room A11B. A complete history and physical 

exam was performed. 





0255: Upon reevaluation, the patient is resting comfortably. I discussed the 

findings and the treatment plan with the patient.  He expresses agreement and 

understanding.  I spoke with Dr. Rivera of the [facility].  

The patient will be evaluated for further management.





Medical Decision


Differential diagnosis:


Etiologies such as infections, reactive airway disease, pneumonia, pneumothorax

, COPD, CHF, cardiac ischemia, pulmonary embolism, musculoskeletal, 

gastrointestinal, as well as others were entertained.





Patient with significant reactive medical history over the course of the last 

year and several recent admissions.  Patient recently admitted within the last 

month.  Patient's anemia appears stable compared to prior episodes, chronic 

kidney disease appear stable compared to prior episodes.  Patient INR 

therapeutic at 2.2, doubt PE contribute shortness of breath.  Given patient's 

description of symptoms as well as chest x-ray findings, I feel this is a 

combination of acute on chronic CHF and evolving pneumonia.  Patient admits to 

recent dietary indiscretion over the holidays which could've contributed to his 

CHF.  Patient is a prior smoker and has underlying COPD which is likely a risk 

for his pneumonia as well, however was recently admitted to the hospital so 

will be covered for potential healthcare associated pneumonia.  Patient was 

able to be maintained on additional supplemental oxygen here.  Initially 

requiring 5-6 L/m, was able to be turned back to 3 L/m, but cannot be taken off 

down to room air.  Patient felt improved following initial nebulizer treatment.

  Given dose of Lasix and antibiotics started.  Patient aware of all results, 

and my concern regarding his respiratory status.  Patient's bilateral lower 

extremities have dressings in place, no evidence of evolving cellulitis, 

bilateral pulses palpable, patient reported that they have slowly been 

improving with wound care and dressing changes.





Medication Reconcilliation


Current Medication List:  was personally reviewed by me





Blood Pressure Screening


Patient's blood pressure:  Elevated blood pressure


Blood pressure disposition:  Referred to PCP





Consults


Time Called:  0225


Consulting Physician:  Dr. Rivera -[facility]


Returned Call:  0255


I reviewed the patient's case with Dr. Rivera.  [facility] will evaluate the 

patient for further management.





Impression





 Primary Impression:  


 Dyspnea


 Additional Impressions:  


 Hypoxia


 Tachypnea


 Pneumonia


 Acute on chronic congestive heart failure


 COPD (chronic obstructive pulmonary disease)





Scribe Attestation


The scribe's documentation has been prepared under my direction and personally 

reviewed by me in its entirety. I confirm that the note above accurately 

reflects all work, treatment, procedures, and medical decision making performed 

by me.





Departure Information


Dispostion


Being Evaluated By Hospitalist





Referrals


Pro,Claude MCCORD M.D. (PCP)





Patient Instructions


My [facility]





Problem Qualifiers








 Primary Impression:  


 Dyspnea


 Dyspnea type:  shortness of breath  Qualified Codes:  R06.02 - Shortness of 

breath


 Additional Impressions:  


 Pneumonia


 Pneumonia type:  due to unspecified organism  Laterality:  right  Lung location

:  middle lobe of lung  Qualified Codes:  J18.1 - Lobar pneumonia, unspecified 

organism


 Acute on chronic congestive heart failure


 Congestive heart failure type:  combined  Qualified Codes:  I50.43 - Acute on 

chronic combined systolic (congestive) and diastolic (congestive) heart failure


 COPD (chronic obstructive pulmonary disease)


 COPD type:  unspecified COPD  Qualified Codes:  J44.9 - Chronic obstructive 

pulmonary disease, unspecified

## 2018-01-01 NOTE — FAMILY MEDICINE PROGRESS NOTE
Progress Note


Date of Service


Jan 1, 2018.





Subjective


Pt evaluation today including:  conversation w/ patient, physical exam, chart 

review, lab review, review of studies, review of inpatient medication list


Pain:  denies


PO Intake:  adequate


Voiding:  no voiding problems


NO acute events overnight. Patient reprots productive cough. Patient denies SOB

, Chest pain, Palpitation, presyncope.





   Constitutional:  No fever, No chills


   Respiratory:  + cough, + sputum, No shortness of breath


   Cardiovascular:  No chest pain, No edema, No palpitations


   Abdomen:  No pain, No nausea, No vomiting


   Skin:  No rash, No itch





Medications





Current Inpatient Medications








 Medications


  (Trade)  Dose


 Ordered  Sig/Dc


 Route  Start Time


 Stop Time Status Last Admin


Dose Admin


 


 Acetaminophen


  (Tylenol Tab)  650 mg  Q4H  PRN


 PO  1/1/18 02:45


 1/31/18 02:44   


 


 


 Magnesium


 Hydroxide


  (Milk Of


 Magnesia Susp)  30 ml  Q6H  PRN


 PO  1/1/18 02:45


 1/31/18 02:44   


 


 


 Polyethylene


  (Miralax Powder


 Packet)  17 gm  DAILY  PRN


 PO  1/1/18 02:45


 1/31/18 02:44   


 


 


 Ondansetron HCl


  (Zofran Inj)  4 mg  Q6H  PRN


 IV  1/1/18 02:45


 1/31/18 02:44   


 


 


 Levofloxacin 750


 mg/Prmx  150 ml @ 


 100 mls/hr  Q24H


 IV  1/2/18 01:00


 1/2/18 02:29   


 


 


 Atorvastatin


 Calcium


  (Lipitor Tab)  80 mg  QAM


 PO  1/1/18 09:00


 1/31/18 08:59  1/1/18 09:35


80 MG


 


 Clonazepam


  (Klonopin Tab)  1 mg  HS  PRN


 PO  1/1/18 02:45


 1/31/18 02:44   


 


 


 Clopidogrel


 Bisulfate


  (plAVix TAB)  75 mg  QAM


 PO  1/1/18 09:00


 1/31/18 08:59  1/1/18 09:33


75 MG


 


 Divalproex Sodium


  (Depakote Delay


 Rel Tab)  2,000 mg  HS


 PO  1/1/18 21:00


 1/31/18 20:59   


 


 


 Docusate Sodium


  (coLACE CAP)  100 mg  BID  PRN


 PO  1/1/18 02:45


 1/31/18 02:44   


 


 


 Escitalopram


 Oxalate


  (Lexapro Tab)  20 mg  QAM


 PO  1/1/18 09:00


 1/31/18 08:59  1/1/18 09:35


20 MG


 


 Fluticasone


 Propionate


  (Flonase Nasal


 Spray)  2 sprays  DAILY  PRN


 VERONICA  1/1/18 02:45


 1/31/18 02:44   


 


 


 Gabapentin


  (Neurontin Cap)  300 mg  HS


 PO  1/1/18 21:00


 1/31/18 20:59   


 


 


 Isosorbide


 Mononitrate


  (Imdur Ext Rel


 Tab)  30 mg  QAM


 PO  1/1/18 09:00


 1/31/18 08:59  1/1/18 09:33


30 MG


 


 Metoprolol


 Succinate


  (Toprol Xl Tab)  50 mg  BID


 PO  1/1/18 09:00


 1/31/18 08:59  1/1/18 09:34


50 MG


 


 Nitroglycerin


  (Nitrostat Tab)  0.4 mg  UD  PRN


 UT  1/1/18 02:45


 1/31/18 02:44   


 


 


 Tamsulosin HCl


  (Flomax Cap)  0.4 mg  HS


 PO  1/1/18 21:00


 1/31/18 20:59   


 


 


 Warfarin Sodium


  (Coumadin Tab)  7.5 mg  SuMoWeThFr@1600


 PO  1/1/18 16:00


 1/31/18 15:59   


 


 


 Amlodipine


 Besylate


  (Norvasc Tab)  10 mg  QAM


 PO  1/1/18 09:00


 1/31/18 08:59  1/1/18 09:34


10 MG


 


 Mirtazapine


  (Remeron Tab)  45 mg  HS


 PO  1/1/18 21:00


 1/31/18 20:59   


 


 


 Spironolactone


  (Aldactone Tab)  50 mg  BID17


 PO  1/1/18 09:00


 1/31/18 08:59  1/1/18 09:33


50 MG


 


 Warfarin Sodium


  (Coumadin Tab)  10 mg  TuSa@1600


 PO  1/2/18 16:00


 2/1/18 15:59   


 


 


 Albuterol


  (Ventolin Hfa


 Inhaler)  2 puffs  Q4H  PRN


 INH  1/1/18 02:45


 1/31/18 02:44   


 


 


 Glucose


  (Glucose 40% Gel)  15-30


 GRAMS 15


 GRAMS...  UD  PRN


 PO  1/1/18 03:15


 1/31/18 03:14   


 


 


 Glucose


  (Glucose Chew


 Tab)  4-8


 Tablets 4


 Tabl...  UD  PRN


 PO  1/1/18 03:15


 1/31/18 03:14   


 


 


 Dextrose


  (Dextrose 50%


 50ML Syringe)  25-50ML OF


 50% DW IV


 FOR...  UD  PRN


 IV  1/1/18 03:15


 1/31/18 03:14   


 


 


 Glucagon


  (Glucagon Inj)  1 mg  UD  PRN


 SQ  1/1/18 03:15


 1/31/18 03:14   


 


 


 Albuterol/


 Ipratropium


  (Duoneb)  3 ml  QIDR


 INH  1/1/18 03:30


 1/31/18 03:29  1/1/18 11:15


3 ML


 


 Piperacillin Sod/


 Tazobactam Sod


  (Consult)  1 ea  UD  PRN


 N/A  1/1/18 04:45


 1/31/18 04:44   


 


 


 Vancomycin HCl


  (Consult)  1 ea  UD  PRN


 N/A  1/1/18 05:00


 1/31/18 04:59   


 


 


 Oxycodone HCl


  (Roxicodone


 Immediate Rel Tab)  5 mg  QID  PRN


 PO  1/1/18 05:30


 1/15/18 05:29  1/1/18 10:41


5 MG


 


 Miscellaneous


 Information


  (Consult


 Glycemic


 Management


 Pharmacy)  1 ea  UD  PRN


 N/A  1/1/18 07:31


 1/31/18 07:30   


 


 


 Insulin Glargine


  (Lantus Solostar


 Pen)  10 units  BID


 SC  1/1/18 09:00


 1/31/18 08:59  1/1/18 09:38


10 UNITS


 


 Insulin Aspart


  (novoLOG ASPART)  SLIDING


 SCALE  ACHS


 SC  1/1/18 07:30


 1/31/18 07:29  1/1/18 09:37


5 UNITS


 


 Piperacillin Sod/


 Tazobactam Sod


 3.375 gm/Dextrose  115 ml @ 


 28.75 mls/


 hr  Q8H


 IV  1/1/18 12:00


 1/3/18 11:59   


 











Objective


Vital Signs











  Date Time  Temp Pulse Resp B/P (MAP) Pulse Ox O2 Delivery O2 Flow Rate FiO2


 


1/1/18 12:00      Nasal Cannula 3.0 


 


1/1/18 11:47 36.8 72 18 156/61 (92) 90 Nasal Cannula 2.0 


 


1/1/18 11:15  69 14  94 Nasal Cannula 3.0 


 


1/1/18 08:00      Nasal Cannula 3.0 


 


1/1/18 07:34 36.5 72 24 158/67 (97) 91 Nasal Cannula 3.0 


 


1/1/18 07:09  74 14  91 Nasal Cannula 3.0 


 


1/1/18 04:20 36.9 78 24 159/82 95 Mask 3.0 


 


1/1/18 03:15  76 22 124/89 93   


 


1/1/18 02:36  74 22 136/92 93 Mask 3.0 


 


1/1/18 01:15  84 22 162/75 94 Mask 5.0 


 


1/1/18 00:24     95 Mask 5.0 


 


1/1/18 00:06  88      


 


1/1/18 00:00 36.9 88 28 156/99 87 Nasal Cannula 4.0 


 


1/1/18 00:00     95 Mask 5.0 


 


1/1/18 00:00     83 Room Air  











Physical Exam


Notes:


GENERAL: alert, no distress, non-toxic 


EYE EXAM: normal conjunctiva, PERRL and EOM's grossly intact


NECK: supple, no nuchal rigidity, no adenopathy, non-tender


LUNGS: RLL crackles. Normal chest wall mechanics


HEART: no murmurs, S1 normal and S2 normal 


ABDOMEN: abdomen soft, non-tender, normo-active bowel sounds, no masses, no 

rebound or guarding. 


LOWER EXTREMITIES: No pitting edema.


NEURO EXAM: Normal sensorium, cranial nerves II-XII  grossly intact, normal 

speech





Laboratory Results





Results Past 24 Hours








Test


  1/1/18


00:12 1/1/18


06:43 1/1/18


09:56 Range/Units


 


 


White Blood Count 11.44   4.8-10.8  K/uL


 


Red Blood Count 3.21   4.7-6.1  M/uL


 


Hemoglobin 9.6   14.0-18.0  g/dL


 


Hematocrit 29.3   42-52  %


 


Mean Corpuscular Volume 91.3     fL


 


Mean Corpuscular Hemoglobin 29.9   25-34  pg


 


Mean Corpuscular Hemoglobin


Concent 32.8


  


  


  32-36  g/dl


 


 


Platelet Count 263   130-400  K/uL


 


Mean Platelet Volume 9.0   7.4-10.4  fL


 


Neutrophils (%) (Auto) 77.3    %


 


Lymphocytes (%) (Auto) 9.8    %


 


Monocytes (%) (Auto) 10.8    %


 


Eosinophils (%) (Auto) 1.4    %


 


Basophils (%) (Auto) 0.1    %


 


Neutrophils # (Auto) 8.84   1.4-6.5  K/uL


 


Lymphocytes # (Auto) 1.12   1.2-3.4  K/uL


 


Monocytes # (Auto) 1.24   0.11-0.59  K/uL


 


Eosinophils # (Auto) 0.16   0-0.5  K/uL


 


Basophils # (Auto) 0.01   0-0.2  K/uL


 


RDW Standard Deviation 51.4   36.4-46.3  fL


 


RDW Coefficient of Variation 15.4   11.5-14.5  %


 


Immature Granulocyte % (Auto) 0.6    %


 


Immature Granulocyte # (Auto) 0.07   0.00-0.02  K/uL


 


Prothrombin Time


  23.1


  


  


  9.0-12.0


SECONDS


 


Prothromb Time International


Ratio 2.2


  


  


  0.9-1.1  


 


 


Sodium Level 141   136-145  mmol/L


 


Potassium Level 4.4   3.5-5.1  mmol/L


 


Chloride Level 108     mmol/L


 


Carbon Dioxide Level 24   21-32  mmol/L


 


Anion Gap 9.0   3-11  mmol/L


 


Blood Urea Nitrogen 40   7-18  mg/dl


 


Creatinine


  1.70


  


  


  0.60-1.40


mg/dl


 


Est Creatinine Clear Calc


Drug Dose 53.9


  


  


   ml/min


 


 


Estimated GFR (


American) 49.7


  


  


   


 


 


Estimated GFR (Non-


American 42.9


  


  


   


 


 


BUN/Creatinine Ratio 23.7   10-20  


 


Random Glucose 216   70-99  mg/dl


 


Calcium Level 8.0   8.5-10.1  mg/dl


 


Total Bilirubin 0.2   0.2-1  mg/dl


 


Aspartate Amino Transf


(AST/SGOT) 11


  


  


  15-37  U/L


 


 


Alanine Aminotransferase


(ALT/SGPT) 12


  


  


  12-78  U/L


 


 


Alkaline Phosphatase 57     U/L


 


Troponin I 0.019   0-0.045  ng/ml


 


Pro-B-Type Natriuretic Peptide 65578   0-900  pg/ml


 


Total Protein 7.2   6.4-8.2  gm/dl


 


Albumin 2.4   3.4-5.0  gm/dl


 


Globulin 4.8   2.5-4.0  gm/dl


 


Albumin/Globulin Ratio 0.5   0.9-2  


 


Bedside Glucose  251  70-99  mg/dl


 


Estimated Average Glucose   134  mg/dl


 


Hemoglobin A1c   6.3 4.5-5.6  %








Microbiology Results


1/1/18 MRSA DNA Surveillance Screen, Received


         Pending





Assessment and Plan


61 yo M w/ COPD , HTN, PAD, DM, CKD Stage III, L Atrial Thrombus, Bipolar 

disorder/Anxiety presenting with Dyspnea, found to be in Acute Hypoxic 

Respiratory failure





 


1) Acute Hypoxic Respiratory Failure: likely secondary to HCAP 


-Cough persists,  SOB improves, still requiring 3L O2


- CXR - Airspace opacities are present at both lung bases. likely related to 

interstitial edema,


- Obtained MRSA Swab: negative


- D/C Vancomycin based on MRSA swab,Continue to Levaquin,  Continue Zosyn


- Maintian Oxygen replacement to maintain sats > 90%,  Attempt to Wean Oxygen 


- Duonebs q4h while awake and q2h PRN for SOB














2) CKD Stage 3


- stable


- Creatinine of 1.7 (baseline approximately 1.4)


- F/u daily BMP's





3) CAD - stable asx


      s/p NSTEMI (2007) s/p stent distal circumflex; V-fib arrest at the time 

of his 2007 MI.  s/p drug-eluting stent( 2010) to the mid LAD and a previous 

stent in the circumflex was occluded.


- PAD - S/p stenting of his popliteal and SFA in May 2017


- Plavix 75mg QAM


- Metoprolol Succinate 50mg





4) Hyperlipidemia


- Atorvastatin 80mg QAM





5) CHF


- Asx, stable


- Lasix 40mg IV in the ED


- Lasix reportedly taken at home for weight gain


- ProBNP 39298 (on 12/23 was 09069)


- Echo 12/23: 


* Ejection Fraction = 55%.


* The basal inferior, basal to mid inferolateral and basal to mid lateral wall 

are akinetic.


* The right ventricular cavity size is enlarged (proximal parasternal long axis 

right ventricular outflow tract dimension >3.3 cm).


* The right ventricular systolic function is normal as assessed by tricuspid 

annular plane systolic excursion (TAPSE) (normal >1.5 cm).


* The left atrium is severely dilated.


* Mild to moderate valvular aortic stenosis.


* Diastolic dysfunction, Grade II, consistent with elevated left atrial 

pressure.





6) Anxiety


- Clonazepam 1mg PO HS PRN 





7) Left Atrial Thrombus


- Coumadin  10 mg daily





8) HTN


- Continue home Metoprolol, Amlodipine, Aldactone





9) Anemia of Chronic Disease


- Hgb 9.6 --> Baseline 9-10


- On Ferrous Sulfate 325mg PO BID at Home





10) Bipolar Disorder


- Continue home Depakote





11) Chronic Pain in lower extremities


- Oxycodone 5mg qid





12) Code Status


- Full Resuscitation





13) DVT Prophylaxis


- Warfarin


Continued [facility] stay due to:  multiple IV medications needed


Discharge planning:  home, home with home health





Resident Tracking


Resident Involvement:  Resident Care Provided


Care Provided:  Adult Hospital Medicine





History


Resident Physician Supervision Note:





I was present with Dr. Pierre during the history and exam. I discussed the case 

with the resident and agree with the findings and plan as documented in the 

note.  Any exceptions or clarifications are listed here.





Pt reports improvement in shortness of breath and discomfort with activity, 

though still persistent and requiring oxygen. Reports no fever, chest pain, 

lightheadedness, nausea.


General Appearance:  WD/WN, no apparent distress


Respiratory:  chest non-tender, no respiratory distress, decreased breath sounds

, rales (RLL rales)


Cardiovascular:  normal peripheral pulses, regular rate, rhythm, no murmur


Assessment/Plan


61 y/o male h/o COPD, HTN, PAD, DMII, CKD III, left atrial thrombus, bipolar/

anxeity p/w SOB





AHRF 2/2 HCAP - O2 requirement persists, CXR w/ airspace opacities w/ ?RLL, 

MRSA swab negative


- MRSA swab negative - can d/c vancomycin


- continue levofloxacin and Zosyn, monitor


- wean O2 as tolerated


- duonebs





CHF - continue furosemide, metoprolol - echo completed w/ EF 55% - would 

consider addition of ACE-I if not contraindicated when CKD returns to baseline


CKD III - mildly elevated Cr @ 1.7. Trend, avoid nephrotoxic medications


DMII - adjust for hyperglycemia


CAD s/p STEMI 2007 w/ arrest and ERICK - continue plavix, metoprolol


HLD - continue atorvastatin


Anxiety - continue clonazepam


Left atrial thrombus - continue coumadin, INR 2.2. Caution re: levofloxacin


HTN - continue amlodipine, metoprolol, aldactone


Anemia - stable, will restart FeSO4 at home





FULL Code

## 2018-01-01 NOTE — PHARMACY PROGRESS NOTE
Pharmacy Abx Dose Short Note


Date of Service


Jan 1, 2018.





Assessment & Plan











Item Value  Date Time


 


MRSA DNA Surveillance Screen - Final Complete 1/1/18 0000





Nasal Specimen Negative for MRSA by DNA Probe 


 


White Blood Count 11.44 K/uL H 1/1/18 0012


 


Creatinine 1.70 mg/dl H 1/1/18 0012


 


Est Creatinine Clear Calc Drug Dose 53.9 ml/min 1/1/18 0012








Assessment


62 year old male receiving broad spectrum VANC/ZOSYN for empiric treatment


* Day # 1/2 of antimicrobial therapy.





Plan


Vancomycin


* Estimated P'kinetics:  Vd0.7L/kg, Ke~0.0492hr-1, T1/2~14 hours


* Modified VANC load:  VANC 1 g + VANC 1 g (22mg/kg total dose), then


* Maintenance Dose:  Vanc 1500mg (~16mg/kg) IV every 16 hours


* If VANC therapy continued beyond 48-hour EMPIRIC duration, will order VANC 

trough prior to 1/3/18 1600 dose


* Goal trough level: 15 to 20 mcg/mL





ZOSYN-IV:  Continue 3.375 grams IV CI every 8 hours for est GFR > 20mL/min





Pharmacy will continue to follow and will adjust dose/frequency as necessary.  

Thank you.

## 2018-01-01 NOTE — PHARMACY PROGRESS NOTE
Glycemic Control Intl Consult


Date of Service


Jan 1, 2018.





Scope


Glycemic Pharmacist consulted by Dr Pierre on 1/1/2018 for glycemic control and 

to write orders per formerly [facility] inpatient glycemic control protocol





Objective


Weight (Kilograms):  90.300


Accuchecks BSG (last 24hrs):











Test


  1/1/18


00:12 1/1/18


06:43 1/1/18


11:08


 


Random Glucose


  216 mg/dl


(70-99) 


  


 


 


Bedside Glucose


  


  251 mg/dl


(70-99) 264 mg/dl


(70-99)








Laboratory Data (last 24hrs)











Test


  1/1/18


00:12 1/1/18


09:56


 


Anion Gap 9.0 mmol/L  


 


BUN/Creatinine Ratio 23.7  


 


Blood Urea Nitrogen 40 mg/dl  


 


Creatinine 1.70 mg/dl  


 


Potassium Level 4.4 mmol/L  


 


Sodium Level 141 mmol/L  


 


White Blood Count 11.44 K/uL  


 


Red Blood Count 3.21 M/uL  


 


Hemoglobin 9.6 g/dL  


 


Hematocrit 29.3 %  


 


Mean Corpuscular Volume 91.3 fL  


 


Mean Corpuscular Hemoglobin 29.9 pg  


 


Mean Corpuscular Hemoglobin


Concent 32.8 g/dl 


  


 


 


Platelet Count 263 K/uL  


 


Mean Platelet Volume 9.0 fL  


 


Neutrophils (%) (Auto) 77.3 %  


 


Lymphocytes (%) (Auto) 9.8 %  


 


Monocytes (%) (Auto) 10.8 %  


 


Eosinophils (%) (Auto) 1.4 %  


 


Basophils (%) (Auto) 0.1 %  


 


Neutrophils # (Auto) 8.84 K/uL  


 


Lymphocytes # (Auto) 1.12 K/uL  


 


Monocytes # (Auto) 1.24 K/uL  


 


Eosinophils # (Auto) 0.16 K/uL  


 


Basophils # (Auto) 0.01 K/uL  


 


Hemoglobin A1c  6.3 % 








HbA1c











Test


  1/1/18


09:56


 


Hemoglobin A1c


  6.3 %


(4.5-5.6)  H











Recent Pertinent Medications


Outpatient Anti-diabetic Regimen: 


* Lantus 10 units SQ qPM








The patient is currently receiving:


* Basal insulin:              Lantus 10 units every 24 hours in the evening (

ordered to start evening of 1/1/18)





* Correctional Insulin:     Novolog Correction per scale ACHS


                            Goal Range: Low -- mg/dL - High -- mg/dL


                            Correction Factor: -- mg/dL/unit





* Prandial insulin:           Per carb ratio of 1 unit per -- grams CHO consumed








Risk Factors for Insulin Resistance:


* Infection: Pneumonia on vanco/Zosyn/LVQ


* Diet: type 2 diabetic diet





Assessment & Plan


ASSESSMENT:


* Mr Dixon is a 63 y/o M with a PMH of COPD, HTN, PAD, CAD, and anxiety who 

presents with pneumonia (considered hospital acquired). He is well-known to the 

glycemic. Typically whenever he is here, the patient requires around 40 units/

day...however, his recorded home regimen is half of what he was taking before 

his prior admission (HbA1C indicates controlled). 


* The patient did not receive any insulin yet this morning. Schedule 10 units 

of Lantus twice daily, hold if blood sugar less than 120 mg/dL. Around 20 units 

of basal appears to be sufficient for patient based upon several previous 

admissions. At lunch, the patient's blood sugar remained elevated which 

supports that the patient may need more insulin that what his home regimen 

indicates. Add on 0200 accucheck to ensure patient has blood sugar controlled 

over 24 hours.


* For Novolog, utilized parameters from multiple other previous 

hospitalizations which may be too aggressive... however, as mentioned above, 

the patient's blood sugar remained high at lunchtime so his insulin 

requirements may be higher than previous admissions.





PLAN FOR INPATIENT GLYCEMIC CONTROL:





* Basal insulin with LANTUS 10 units SQ BID hold if blood sugar less than 120 mg

/dL





* Correctional Insulin with NOVOLOG / REGULAR per scale ACHS or Q6hrs while NPO


 * Goal Range:  Low 110 mg/dL - High 140 mg/dL


 * Correction Factor: 25 mg/dL/unit


 * Nutritional / Prandial insulin per carb ratio of 1 unit per 8 grams CHO 

consumed





* Please note that the plan above was derived based on current level of insulin 

resistance and hospital stress. These recommendations are appropriate for 

inpatient admission only. Plan of care upon discharge will need to be 

reassessed to avoid potential outpatient hypo/hyperglycemia. 





Thank you.

## 2018-01-01 NOTE — DIAGNOSTIC IMAGING REPORT
SINGLE VIEW CHEST



CLINICAL HISTORY:  Dyspnea.



FINDINGS: An AP, portable, upright chest radiograph is compared to study dated

12/22/2017. The heart is enlarged and there is atherosclerotic calcification of

the thoracic aorta. There is pulmonary vascular congestion and interstitial

edema. More focal airspace opacities are present at the lung bases. Small

pleural effusions are noted. No pneumothorax is seen. The skeletal structures

are osteopenic. The bony thorax is grossly intact.



IMPRESSION:



1. Cardiomegaly with evidence of congestive failure and interstitial edema.



2. Small pleural effusions.



3. Airspace opacities are present at both lung bases. This is likely related to

interstitial edema. Correlate clinically for evidence of superimposed pneumonia.







Electronically signed by:  Heber Hubbard M.D.

1/1/2018 11:08 AM



Dictated Date/Time:  1/1/2018 11:06 AM

## 2018-01-01 NOTE — HISTORY AND PHYSICAL
History & Physical


Date & Time of Service:


Jan 1, 2018 at 03:00


Chief Complaint:


Short Of Breath


Primary Care Physician:


Claude Rowley M.D.


History of Present Illness


Source:  patient


Patient is a 62 year old male with a past medical history of COPD, HTN, PAD, HLD

, DM, Anxiety, Bipolar Disorder, Chronic Pain, Left Atrial Thrombus on 

Anticoagulation that presents with a 1 day history of shortness of breath. The 

patient states that he woke up this morning with new onset shortness of breath. 

He has been having progressive shortness of breath for the last few months 

requiring multiple admissions, but states that this shortness of breath is more 

pronounced. He has been having an associated cough with productive green 

sputum. He also appreciates that he was feeling feverish today. He denies any 

chest pain, nausea, vomiting, abdominal pain, sick contacts, or travel. At 

baseline he has PND and orthopnea and sleeps on multiple pillows at night. He 

has a 45 Pack year smoking history. He is on lasix as needed for weight gain 

and states he took one dose this morning although it did not improve his 

symptoms. The patient is not currently on home oxygen.





Past Medical/Surgical History


Medical Problems:


(1) CAD (coronary artery disease)


Status: Chronic  





(2) Cardiac arrest


Status: Resolved  





(3) Diabetes


Status: Chronic  





(4) Hypertension


Status: Chronic  











Family History





Cancer (esophageal)


Diabetes mellitus


Heart disease


Hypertension





Social History


Smoking Status:  Former Smoker


Smokeless Tobacco Use:  No


Alcohol Use:  none


Drug Use:  none


Marital Status:  


Housing status:  lives with family, other


Occupational Status:  employed





Immunizations


History of Influenza Vaccine:  No


Influenza Vaccine Date:  Oct 5, 2009


History of Tetanus Vaccine?:  No


Tetanus Immunization Date:  Mar 1, 2004


History of Pneumococcal:  No


Pneumococcal Date:  Oct 15, 2006


History of Hepatitis B Vaccine:  No





Multi-Drug Resistant Organisms


History of MDRO:  No





Allergies


Coded Allergies:  


     No Known Allergies (Verified , 1/1/18)





Home Medications


Scheduled


Amlodipine Besylate (Amlodipine Besylate), 10 MG PO QAM


Atorvastatin (Atorvastatin Calcium), 80 MG PO QAM


Clopidogrel Bisulfate (Clopidogrel), 75 MG PO QAM


Cyanocobalamin (Vitamin B12), 1,000 MCG PO DAILY


Divalproex Sodium (Depakote Delay Rel), 2,000 MG PO HS


Escitalopram Oxalate (Escitalopram Oxalate), 20 MG PO QAM


Ferrous Sulfate (Ferrous Sulfate), 325 MG PO BIDM


Gabapentin (Neurontin), 300 MG PO HS


Insulin Glargine (Lantus Solostar), 10 UNITS SC HS


Insulin Lispro (Human) (Humalog Kwikpen), 1 DOSE SC AC


Isosorbide Mononitrate (Isosorbide Mononitrate ER), 30 MG PO QAM


Metoprolol Succinate (Metoprolol Succinate ER), 50 MG PO BID


Mirtazapine (Mirtazapine), 45 MG PO HS


Spironolactone (Aldactone), 50 MG PO BID


Tamsulosin HCl (Tamsulosin HCl), 0.4 MG PO HS


Warfarin Sodium (Warfarin Sodium), 7.5 MG PO 5XWK


Warfarin Sodium (Warfarin Sodium), 10 MG PO 2XWK





Scheduled PRN


Acetaminophen (Tylenol), 500 MG PO Q4-6HRS PRN for Pain


Albuterol Sulfate (Proair Respiclick), 1-2 PUFFS INH Q4-6HRS PRN for SOB/

Wheezing


Clonazepam (Clonazepam), 1 MG PO HS PRN for Anxiety


Docusate Sodium (Docusate Sodium), 100 MG PO BID PRN for Constipation


Fluticasone Propionate (Nasal) (Flonase Allergy Relief), 2 SPRAYS VERONICA DAILY PRN 

for Nasal Congestion


Glucagon (Glucagon Emergency Kit), 1 MG UD PRN for Hypoglycemia Protocol


Nitroglycerin (Nitrostat), 0.4 MG UT UD PRN for Chest Pain


Oxycodone HCl (Oxycodone HCl), 5 MG PO Q6H PRN for Breakthrough Pain


Oxycodone Hcl (Oxycodone Hcl), 10 MG PO Q12 PRN for Severe Pain





Review of Systems


Constitutional:  + fever, + fatigue, No chills, No sweats


Respiratory:  + cough, + sputum, + shortness of breath, + dyspnea on exertion, 

No wheezing


Cardiovascular:  + orthopnea, + PND, No chest pain, No edema


Abdomen:  No pain, No nausea, No vomiting, No diarrhea, No constipation





Physical Exam


Vital Signs











  Date Time  Temp Pulse Resp B/P (MAP) Pulse Ox O2 Delivery O2 Flow Rate FiO2


 


1/1/18 02:36  74 22 136/92 93 Mask 3.0 


 


1/1/18 01:15  84 22 162/75 94 Mask 5.0 


 


1/1/18 00:24     95 Mask 5.0 


 


1/1/18 00:06  88      


 


1/1/18 00:00 36.9 88 28 156/99 87 Nasal Cannula 4.0 


 


1/1/18 00:00     95 Mask 5.0 


 


1/1/18 00:00     83 Room Air  








General Appearance:  WD/WN, no apparent distress


Head:  normocephalic, atraumatic


Eyes:  normal inspection, sclerae normal


Respiratory/Chest:  chest non-tender, no respiratory distress, + decreased 

breath sounds, + crackles, + wheezing


Cardiovascular:  regular rate, rhythm, no JVD, no murmur


Abdomen/GI:  normal bowel sounds, non tender, soft


Extremities/Musculoskelatal:  no calf tenderness, no pedal edema


Neurologic/Psych:  alert, normal mood/affect, oriented x 3





Diagnostics


Laboratory Results





Results Past 24 Hours








Test


  1/1/18


00:12 Range/Units


 


 


White Blood Count 11.44 4.8-10.8  K/uL


 


Red Blood Count 3.21 4.7-6.1  M/uL


 


Hemoglobin 9.6 14.0-18.0  g/dL


 


Hematocrit 29.3 42-52  %


 


Mean Corpuscular Volume 91.3   fL


 


Mean Corpuscular Hemoglobin 29.9 25-34  pg


 


Mean Corpuscular Hemoglobin


Concent 32.8


  32-36  g/dl


 


 


Platelet Count 263 130-400  K/uL


 


Mean Platelet Volume 9.0 7.4-10.4  fL


 


Neutrophils (%) (Auto) 77.3  %


 


Lymphocytes (%) (Auto) 9.8  %


 


Monocytes (%) (Auto) 10.8  %


 


Eosinophils (%) (Auto) 1.4  %


 


Basophils (%) (Auto) 0.1  %


 


Neutrophils # (Auto) 8.84 1.4-6.5  K/uL


 


Lymphocytes # (Auto) 1.12 1.2-3.4  K/uL


 


Monocytes # (Auto) 1.24 0.11-0.59  K/uL


 


Eosinophils # (Auto) 0.16 0-0.5  K/uL


 


Basophils # (Auto) 0.01 0-0.2  K/uL


 


RDW Standard Deviation 51.4 36.4-46.3  fL


 


RDW Coefficient of Variation 15.4 11.5-14.5  %


 


Immature Granulocyte % (Auto) 0.6  %


 


Immature Granulocyte # (Auto) 0.07 0.00-0.02  K/uL


 


Prothrombin Time


  23.1


  9.0-12.0


SECONDS


 


Prothromb Time International


Ratio 2.2


  0.9-1.1  


 


 


Sodium Level 141 136-145  mmol/L


 


Potassium Level 4.4 3.5-5.1  mmol/L


 


Chloride Level 108   mmol/L


 


Carbon Dioxide Level 24 21-32  mmol/L


 


Anion Gap 9.0 3-11  mmol/L


 


Blood Urea Nitrogen 40 7-18  mg/dl


 


Creatinine


  1.70


  0.60-1.40


mg/dl


 


Est Creatinine Clear Calc


Drug Dose 53.9


   ml/min


 


 


Estimated GFR (


American) 49.7


   


 


 


Estimated GFR (Non-


American 42.9


   


 


 


BUN/Creatinine Ratio 23.7 10-20  


 


Random Glucose 216 70-99  mg/dl


 


Calcium Level 8.0 8.5-10.1  mg/dl


 


Total Bilirubin 0.2 0.2-1  mg/dl


 


Aspartate Amino Transf


(AST/SGOT) 11


  15-37  U/L


 


 


Alanine Aminotransferase


(ALT/SGPT) 12


  12-78  U/L


 


 


Alkaline Phosphatase 57   U/L


 


Troponin I 0.019 0-0.045  ng/ml


 


Pro-B-Type Natriuretic Peptide 60497 0-900  pg/ml


 


Total Protein 7.2 6.4-8.2  gm/dl


 


Albumin 2.4 3.4-5.0  gm/dl


 


Globulin 4.8 2.5-4.0  gm/dl


 


Albumin/Globulin Ratio 0.5 0.9-2  











Impression


Assessment and Plan


Patient is a 62 year old male with a past medical history of COPD, HTN, PAD, HLD

, DM, Anxiety, Bipolar Disorder, Chronic Pain, Left Atrial Thrombus on 

Anticoagulation that presents with a 1 day history of shortness of breath





1) Acute Hypoxic Respiratory Failure 2/2 HCAP 


- Admit to telemetry for continue O2 monitoring


- [facility] Antibiotic Coverage - Vancomycin, Zosyn, Levaquin


- Oxygen replacement to maintain sats > 90% --> Currently on 2L


- Duonebs q4h while awake and q2h PRN for SOB


- CXR - worsening right lower/middle lobe infiltrate





2) CKD Stage 3


- Creatinine of 1.7 (1.59 on discharge last week, baseline approximately 1.4)





3) CAD - status post non-ST elevation myocardial infarction in 2007 with 

stenting of the distal circumflex; V-fib arrest at the time of his 2007 heart 

attack. 2010 drug-eluting stent to the mid LAD and a previous stent in the 

circumflex was occluded.


- PAD - S/p stenting of his popliteal and SFA in May 2017


- Plavix 75mg QAM


- Metoprolol Succinate 50mg





4) Hyperlipidemia


- Atorvastatin 80mg QAM





5) CHF


- Lasix 40mg IV in the ED


- Lasix is currently not on his medication reconciliation, but patient states 

that he take it PRN for weight gain


- ProBNP 21515 (on 12/23 was 13392)


- Echo 12/23: 


* Ejection Fraction = 55%.


* The basal inferior, basal to mid inferolateral and basal to mid lateral wall 

are akinetic.


* The right ventricular cavity size is enlarged (proximal parasternal long axis 

right ventricular outflow tract dimension >3.3 cm).


* The right ventricular systolic function is normal as assessed by tricuspid 

annular plane systolic excursion (TAPSE) (normal >1.5 cm).


* The left atrium is severely dilated.


* Mild to moderate valvular aortic stenosis.


* Diastolic dysfunction, Grade II, consistent with elevated left atrial 

pressure.





6) Anxiety


- Clonazepam 1mg PO HS PRN 





7) Left Atrial Thrombus


- Coumadin 





8) HTN


- Continue home Metoprolol, Amlodipine, Aldactone





9) Anemia of Chronic Disease


- Hgb 9.6 --> Baseline 9-10


- On Ferrous Sulfate 325mg PO BID at Home





10) Bipolar Disorder


- Continue home Depakote





11) Chronic Pain in lower extremities


- Oxycodone 5mg qid





12) Code Status


- Full Resuscitation





13) DVT Prophylaxis


- Warfarin








Attending addendum:








I have physically seen this patient, have supervised the medical residents 

activities, and agree with the H&P unless as otherwise noted.





Assessment and Plan:





Acute respiratory failure with hypoxia/pneumonia--


The patient will be admitted to telemetry for serial cardiac enzymes, cardiac 

rhythm monitoring and a 2-D echocardiogram with Dopplers.


Vancomycin IV per pharmacokinetic monitoring, Zosyn IV and Levaquin IV


duonebs every 4 hours while awake every 2 hours when necessary





CAD/hypertension/CHF/left atrial thrombus--


Continue amlodipine 10 mg by mouth every morning, Plavix 75 mg by mouth daily, 

Imdur extended release 30 mg every morning, metoprolol succinate ER 50 mg by 

mouth twice a day, spironolactone 50 mg by mouth twice a day and warfarin.





Hyperlipidemia--


Continue atorvastatin 80 mg by mouth every morning





Diabetes mellitus--


Continue Lantus insulin 10 units subcutaneous daily at bedtime


Place on Accu-Cheks before meals and at bedtime with NovoLog coverage per scale





Seizure disorder--


Continue Depakote extended release 2000 mg at bedtime, gabapentin 300 mg at 

bedtime.





BPH--


Continue tamsulosin 0.4 mg by mouth bedtime





Anxiety/insomnia--continue Lexapro 20 mg by mouth every morning and mirtazapine 

45 mg by mouth at bedtime





Level of Care


Telemetry





Advanced Directives


Existing Advance Directive:  No


Existing Living Will:  No


Existing Power of :  No





Resuscitation Status


FULL RESUSCITATION





VTE Prophylaxis


VTE Risk Assessment Done? Y/N:  Yes


Risk Level:  Low


Given or contraindicated:  Warfarin (Coumadin)





Social Service Consult


None Apply

## 2018-01-02 VITALS — HEART RATE: 61 BPM | OXYGEN SATURATION: 94 %

## 2018-01-02 VITALS
HEART RATE: 63 BPM | DIASTOLIC BLOOD PRESSURE: 77 MMHG | SYSTOLIC BLOOD PRESSURE: 137 MMHG | TEMPERATURE: 97.88 F | OXYGEN SATURATION: 91 %

## 2018-01-02 VITALS — HEART RATE: 74 BPM | OXYGEN SATURATION: 91 %

## 2018-01-02 VITALS
SYSTOLIC BLOOD PRESSURE: 119 MMHG | DIASTOLIC BLOOD PRESSURE: 61 MMHG | HEART RATE: 81 BPM | OXYGEN SATURATION: 92 % | TEMPERATURE: 98.06 F

## 2018-01-02 VITALS
SYSTOLIC BLOOD PRESSURE: 126 MMHG | TEMPERATURE: 98.24 F | OXYGEN SATURATION: 91 % | DIASTOLIC BLOOD PRESSURE: 65 MMHG | HEART RATE: 66 BPM

## 2018-01-02 VITALS
SYSTOLIC BLOOD PRESSURE: 137 MMHG | HEART RATE: 68 BPM | DIASTOLIC BLOOD PRESSURE: 49 MMHG | TEMPERATURE: 98.24 F | OXYGEN SATURATION: 91 %

## 2018-01-02 VITALS — HEART RATE: 67 BPM | OXYGEN SATURATION: 91 %

## 2018-01-02 VITALS
OXYGEN SATURATION: 90 % | SYSTOLIC BLOOD PRESSURE: 127 MMHG | DIASTOLIC BLOOD PRESSURE: 50 MMHG | TEMPERATURE: 97.7 F | HEART RATE: 60 BPM

## 2018-01-02 VITALS
TEMPERATURE: 97.7 F | SYSTOLIC BLOOD PRESSURE: 159 MMHG | OXYGEN SATURATION: 92 % | HEART RATE: 63 BPM | DIASTOLIC BLOOD PRESSURE: 71 MMHG

## 2018-01-02 VITALS — OXYGEN SATURATION: 95 %

## 2018-01-02 VITALS — OXYGEN SATURATION: 93 % | HEART RATE: 57 BPM

## 2018-01-02 LAB
BASOPHILS # BLD: 0.01 K/UL (ref 0–0.2)
BASOPHILS NFR BLD: 0.1 %
BUN SERPL-MCNC: 51 MG/DL (ref 7–18)
CALCIUM SERPL-MCNC: 8.3 MG/DL (ref 8.5–10.1)
CO2 SERPL-SCNC: 26 MMOL/L (ref 21–32)
CREAT SERPL-MCNC: 1.82 MG/DL (ref 0.6–1.4)
CREAT SERPL-MCNC: 1.89 MG/DL (ref 0.6–1.4)
EOS ABS #: 0.07 K/UL (ref 0–0.5)
EOSINOPHIL NFR BLD AUTO: 254 K/UL (ref 130–400)
GLUCOSE SERPL-MCNC: 103 MG/DL (ref 70–99)
HCT VFR BLD CALC: 25.5 % (ref 42–52)
HGB BLD-MCNC: 8.4 G/DL (ref 14–18)
IG#: 0.05 K/UL (ref 0–0.02)
IMM GRANULOCYTES NFR BLD AUTO: 13.8 %
INR PPP: 2.8 (ref 0.9–1.1)
LYMPHOCYTES # BLD: 1.17 K/UL (ref 1.2–3.4)
MCH RBC QN AUTO: 30.1 PG (ref 25–34)
MCHC RBC AUTO-ENTMCNC: 32.9 G/DL (ref 32–36)
MCV RBC AUTO: 91.4 FL (ref 80–100)
MONO ABS #: 0.98 K/UL (ref 0.11–0.59)
MONOCYTES NFR BLD: 11.5 %
NEUT ABS #: 6.22 K/UL (ref 1.4–6.5)
NEUTROPHILS # BLD AUTO: 0.8 %
NEUTROPHILS NFR BLD AUTO: 73.2 %
PMV BLD AUTO: 9 FL (ref 7.4–10.4)
POTASSIUM SERPL-SCNC: 4.8 MMOL/L (ref 3.5–5.1)
RED CELL DISTRIBUTION WIDTH CV: 15.7 % (ref 11.5–14.5)
RED CELL DISTRIBUTION WIDTH SD: 53.5 FL (ref 36.4–46.3)
SODIUM SERPL-SCNC: 140 MMOL/L (ref 136–145)
WBC # BLD AUTO: 8.5 K/UL (ref 4.8–10.8)

## 2018-01-02 RX ADMIN — IPRATROPIUM BROMIDE AND ALBUTEROL SULFATE SCH ML: .5; 3 SOLUTION RESPIRATORY (INHALATION) at 15:08

## 2018-01-02 RX ADMIN — DIVALPROEX SODIUM SCH MG: 500 TABLET, DELAYED RELEASE ORAL at 20:04

## 2018-01-02 RX ADMIN — WARFARIN SODIUM SCH MG: 5 TABLET ORAL at 16:51

## 2018-01-02 RX ADMIN — ACETAMINOPHEN PRN MG: 325 TABLET ORAL at 12:23

## 2018-01-02 RX ADMIN — INSULIN ASPART SCH UNITS: 100 INJECTION, SOLUTION INTRAVENOUS; SUBCUTANEOUS at 16:57

## 2018-01-02 RX ADMIN — IPRATROPIUM BROMIDE AND ALBUTEROL SULFATE SCH ML: .5; 3 SOLUTION RESPIRATORY (INHALATION) at 07:20

## 2018-01-02 RX ADMIN — OXYCODONE HYDROCHLORIDE PRN MG: 5 TABLET ORAL at 08:08

## 2018-01-02 RX ADMIN — INSULIN ASPART SCH UNITS: 100 INJECTION, SOLUTION INTRAVENOUS; SUBCUTANEOUS at 08:05

## 2018-01-02 RX ADMIN — MIRTAZAPINE SCH MG: 15 TABLET, FILM COATED ORAL at 20:03

## 2018-01-02 RX ADMIN — IPRATROPIUM BROMIDE AND ALBUTEROL SULFATE SCH ML: .5; 3 SOLUTION RESPIRATORY (INHALATION) at 20:19

## 2018-01-02 RX ADMIN — AMOXICILLIN AND CLAVULANATE POTASSIUM SCH MG: 875; 125 TABLET, FILM COATED ORAL at 17:20

## 2018-01-02 RX ADMIN — OXYCODONE HYDROCHLORIDE PRN MG: 5 TABLET ORAL at 12:23

## 2018-01-02 RX ADMIN — METOPROLOL SUCCINATE SCH MG: 50 TABLET, EXTENDED RELEASE ORAL at 08:10

## 2018-01-02 RX ADMIN — CLOPIDOGREL BISULFATE SCH MG: 75 TABLET, FILM COATED ORAL at 08:10

## 2018-01-02 RX ADMIN — SPIRONOLACTONE SCH MG: 25 TABLET, FILM COATED ORAL at 16:52

## 2018-01-02 RX ADMIN — ATORVASTATIN CALCIUM SCH MG: 40 TABLET, FILM COATED ORAL at 08:10

## 2018-01-02 RX ADMIN — GABAPENTIN SCH MG: 300 CAPSULE ORAL at 20:04

## 2018-01-02 RX ADMIN — OXYCODONE HYDROCHLORIDE PRN MG: 5 TABLET ORAL at 20:03

## 2018-01-02 RX ADMIN — INSULIN GLARGINE SCH UNITS: 100 INJECTION, SOLUTION SUBCUTANEOUS at 08:06

## 2018-01-02 RX ADMIN — PIPERACILLIN AND TAZOBACTAM SCH MLS/HR: 3; .375 INJECTION, POWDER, LYOPHILIZED, FOR SOLUTION INTRAVENOUS; PARENTERAL at 04:16

## 2018-01-02 RX ADMIN — IPRATROPIUM BROMIDE AND ALBUTEROL SULFATE SCH ML: .5; 3 SOLUTION RESPIRATORY (INHALATION) at 11:01

## 2018-01-02 RX ADMIN — SPIRONOLACTONE SCH MG: 25 TABLET, FILM COATED ORAL at 08:09

## 2018-01-02 RX ADMIN — INSULIN GLARGINE SCH UNITS: 100 INJECTION, SOLUTION SUBCUTANEOUS at 21:31

## 2018-01-02 RX ADMIN — TAMSULOSIN HYDROCHLORIDE SCH MG: 0.4 CAPSULE ORAL at 20:04

## 2018-01-02 RX ADMIN — ISOSORBIDE MONONITRATE SCH MG: 30 TABLET, EXTENDED RELEASE ORAL at 08:09

## 2018-01-02 RX ADMIN — INSULIN ASPART SCH UNITS: 100 INJECTION, SOLUTION INTRAVENOUS; SUBCUTANEOUS at 11:53

## 2018-01-02 RX ADMIN — ESCITALOPRAM OXALATE SCH MG: 20 TABLET, FILM COATED ORAL at 08:10

## 2018-01-02 RX ADMIN — AMLODIPINE BESYLATE SCH MG: 5 TABLET ORAL at 08:10

## 2018-01-02 RX ADMIN — METOPROLOL SUCCINATE SCH MG: 50 TABLET, EXTENDED RELEASE ORAL at 21:27

## 2018-01-02 RX ADMIN — INSULIN ASPART SCH UNITS: 100 INJECTION, SOLUTION INTRAVENOUS; SUBCUTANEOUS at 21:00

## 2018-01-02 NOTE — FAMILY MEDICINE PROGRESS NOTE
Progress Note


Date of Service


Jan 2, 2018.





Subjective


Pt evaluation today including:  conversation w/ patient, physical exam, chart 

review, lab review, review of studies, review of inpatient medication list


Pain:  worsneing LE pain at wounds


PO Intake:  adequarte.


Voiding:  no voiding problems


NO acute events overnight. Patient reports SOB but improved.  He denies fevers 

chills, n/v, abdominal pain. He reports 2 episodes of diarrhea this morning.  

He denies Chest Pain palpitation, presyncope.





Medications





Current Inpatient Medications








 Medications


  (Trade)  Dose


 Ordered  Sig/Dc


 Route  Start Time


 Stop Time Status Last Admin


Dose Admin


 


 Acetaminophen


  (Tylenol Tab)  650 mg  Q4H  PRN


 PO  1/1/18 02:45


 1/31/18 02:44   


 


 


 Magnesium


 Hydroxide


  (Milk Of


 Magnesia Susp)  30 ml  Q6H  PRN


 PO  1/1/18 02:45


 1/31/18 02:44   


 


 


 Polyethylene


  (Miralax Powder


 Packet)  17 gm  DAILY  PRN


 PO  1/1/18 02:45


 1/31/18 02:44   


 


 


 Ondansetron HCl


  (Zofran Inj)  4 mg  Q6H  PRN


 IV  1/1/18 02:45


 1/31/18 02:44   


 


 


 Atorvastatin


 Calcium


  (Lipitor Tab)  80 mg  QAM


 PO  1/1/18 09:00


 1/31/18 08:59  1/2/18 08:10


80 MG


 


 Clonazepam


  (Klonopin Tab)  1 mg  HS  PRN


 PO  1/1/18 02:45


 1/31/18 02:44   


 


 


 Clopidogrel


 Bisulfate


  (plAVix TAB)  75 mg  QAM


 PO  1/1/18 09:00


 1/31/18 08:59  1/2/18 08:10


75 MG


 


 Divalproex Sodium


  (Depakote Delay


 Rel Tab)  2,000 mg  HS


 PO  1/1/18 21:00


 1/31/18 20:59  1/1/18 20:38


2,000 MG


 


 Docusate Sodium


  (coLACE CAP)  100 mg  BID  PRN


 PO  1/1/18 02:45


 1/31/18 02:44   


 


 


 Escitalopram


 Oxalate


  (Lexapro Tab)  20 mg  QAM


 PO  1/1/18 09:00


 1/31/18 08:59  1/2/18 08:10


20 MG


 


 Fluticasone


 Propionate


  (Flonase Nasal


 Spray)  2 sprays  DAILY  PRN


 VERONICA  1/1/18 02:45


 1/31/18 02:44   


 


 


 Gabapentin


  (Neurontin Cap)  300 mg  HS


 PO  1/1/18 21:00


 1/31/18 20:59  1/1/18 20:41


300 MG


 


 Isosorbide


 Mononitrate


  (Imdur Ext Rel


 Tab)  30 mg  QAM


 PO  1/1/18 09:00


 1/31/18 08:59  1/2/18 08:09


30 MG


 


 Metoprolol


 Succinate


  (Toprol Xl Tab)  50 mg  BID


 PO  1/1/18 09:00


 1/31/18 08:59  1/2/18 08:10


50 MG


 


 Nitroglycerin


  (Nitrostat Tab)  0.4 mg  UD  PRN


 UT  1/1/18 02:45


 1/31/18 02:44   


 


 


 Tamsulosin HCl


  (Flomax Cap)  0.4 mg  HS


 PO  1/1/18 21:00


 1/31/18 20:59  1/1/18 20:37


0.4 MG


 


 Warfarin Sodium


  (Coumadin Tab)  7.5 mg  SuMoWeThFr@1600


 PO  1/1/18 16:00


 1/31/18 15:59  1/1/18 17:31


7.5 MG


 


 Amlodipine


 Besylate


  (Norvasc Tab)  10 mg  QAM


 PO  1/1/18 09:00


 1/31/18 08:59  1/2/18 08:10


10 MG


 


 Mirtazapine


  (Remeron Tab)  45 mg  HS


 PO  1/1/18 21:00


 1/31/18 20:59  1/1/18 20:41


45 MG


 


 Spironolactone


  (Aldactone Tab)  50 mg  BID17


 PO  1/1/18 09:00


 1/31/18 08:59  1/2/18 08:09


50 MG


 


 Warfarin Sodium


  (Coumadin Tab)  10 mg  TuSa@1600


 PO  1/2/18 16:00


 2/1/18 15:59   


 


 


 Albuterol


  (Ventolin Hfa


 Inhaler)  2 puffs  Q4H  PRN


 INH  1/1/18 02:45


 1/31/18 02:44   


 


 


 Glucose


  (Glucose 40% Gel)  15-30


 GRAMS 15


 GRAMS...  UD  PRN


 PO  1/1/18 03:15


 1/31/18 03:14   


 


 


 Glucose


  (Glucose Chew


 Tab)  4-8


 Tablets 4


 Tabl...  UD  PRN


 PO  1/1/18 03:15


 1/31/18 03:14   


 


 


 Dextrose


  (Dextrose 50%


 50ML Syringe)  25-50ML OF


 50% DW IV


 FOR...  UD  PRN


 IV  1/1/18 03:15


 1/31/18 03:14   


 


 


 Glucagon


  (Glucagon Inj)  1 mg  UD  PRN


 SQ  1/1/18 03:15


 1/31/18 03:14   


 


 


 Albuterol/


 Ipratropium


  (Duoneb)  3 ml  QIDR


 INH  1/1/18 03:30


 1/31/18 03:29  1/2/18 07:20


3 ML


 


 Piperacillin Sod/


 Tazobactam Sod


  (Consult)  1 ea  UD  PRN


 N/A  1/1/18 04:45


 1/31/18 04:44   


 


 


 Oxycodone HCl


  (Roxicodone


 Immediate Rel Tab)  5 mg  QID  PRN


 PO  1/1/18 05:30


 1/15/18 05:29  1/2/18 08:08


5 MG


 


 Miscellaneous


 Information


  (Consult


 Glycemic


 Management


 Pharmacy)  1 ea  UD  PRN


 N/A  1/1/18 07:31


 1/31/18 07:30   


 


 


 Insulin Aspart


  (novoLOG ASPART)  SLIDING


 SCALE  ACHS


 SC  1/1/18 07:30


 1/31/18 07:29  1/2/18 08:05


10 UNITS


 


 Piperacillin Sod/


 Tazobactam Sod


 3.375 gm/Dextrose  115 ml @ 


 28.75 mls/


 hr  Q8H


 IV  1/1/18 12:00


 1/3/18 11:59  1/2/18 04:16


28.75 MLS/HR


 


 Insulin Glargine


  (Lantus Solostar


 Pen)  SEE


 PROTOCOL


 TEXT  BID


 SC  1/1/18 21:00


 1/31/18 20:59  1/2/18 08:06


10 UNITS











Objective


Vital Signs











  Date Time  Temp Pulse Resp B/P (MAP) Pulse Ox O2 Delivery O2 Flow Rate FiO2


 


1/2/18 07:42 36.7 81 16 119/61 (80) 92   


 


1/2/18 07:20  67 20  91 Nasal Cannula 1.0 


 


1/2/18 04:00      Nasal Cannula 1.0 





      Humidified Oxygen  


 


1/2/18 03:11 36.6 63 19 137/77 (97) 91 Nasal Cannula 1.0 


 


1/1/18 23:59      Nasal Cannula 1.0 





      Humidified Oxygen  


 


1/1/18 23:37 36.6 73 26 161/65 (97) 91 Nasal Cannula 1.0 


 


1/1/18 23:21  83 22  92 Nasal Cannula 1.0 


 


1/1/18 20:30  85 14  91 Nasal Cannula 1.0 


 


1/1/18 20:00      Nasal Cannula 1.0 


 


1/1/18 19:05 36.7 69 19 131/58 (82) 90 Nasal Cannula 2.0 


 


1/1/18 16:00      Nasal Cannula 1.0 


 


1/1/18 15:44 36.8 86 19 144/57 (86) 92 Nasal Cannula 1.0 


 


1/1/18 15:14  70 14  94 Nasal Cannula 2.0 


 


1/1/18 12:00      Nasal Cannula 3.0 


 


1/1/18 11:47 36.8 72 18 156/61 (92) 90 Nasal Cannula 2.0 


 


1/1/18 11:15  69 14  94 Nasal Cannula 3.0 











Physical Exam


Notes:


GENERAL: alert, no distress, non-toxic 


EYE EXAM: normal conjunctiva, PERRL and EOM's grossly intact


NECK: supple, no nuchal rigidity, no adenopathy, non-tender


LUNGS: chest clear . Normal chest wall mechanics


HEART:systolic  murmur, S1 normal and S2 normal 


ABDOMEN: abdomen soft, non-tender, normo-active bowel sounds, no masses, no 

rebound or guarding. 


LOWER EXTREMITIES: No pitting edema.


NEURO EXAM: Normal sensorium, cranial nerves II-XII  grossly intact, normal 

speech





Laboratory Results





Results Past 24 Hours








Test


  1/1/18


11:08 1/1/18


16:00 1/1/18


20:07 1/2/18


02:02 Range/Units


 


 


Bedside Glucose 264 250 166 148 70-99  mg/dl


 


Test


  1/2/18


06:36 1/2/18


07:18 


  


  Range/Units


 


 


Bedside Glucose 122    70-99  mg/dl


 


White Blood Count  8.50   4.8-10.8  K/uL


 


Red Blood Count  2.79   4.7-6.1  M/uL


 


Hemoglobin  8.4   14.0-18.0  g/dL


 


Hematocrit  25.5   42-52  %


 


Mean Corpuscular Volume  91.4     fL


 


Mean Corpuscular Hemoglobin  30.1   25-34  pg


 


Mean Corpuscular Hemoglobin


Concent 


  32.9


  


  


  32-36  g/dl


 


 


Platelet Count  254   130-400  K/uL


 


Mean Platelet Volume  9.0   7.4-10.4  fL


 


Neutrophils (%) (Auto)  73.2    %


 


Lymphocytes (%) (Auto)  13.8    %


 


Monocytes (%) (Auto)  11.5    %


 


Eosinophils (%) (Auto)  0.8    %


 


Basophils (%) (Auto)  0.1    %


 


Neutrophils # (Auto)  6.22   1.4-6.5  K/uL


 


Lymphocytes # (Auto)  1.17   1.2-3.4  K/uL


 


Monocytes # (Auto)  0.98   0.11-0.59  K/uL


 


Eosinophils # (Auto)  0.07   0-0.5  K/uL


 


Basophils # (Auto)  0.01   0-0.2  K/uL


 


RDW Standard Deviation  53.5   36.4-46.3  fL


 


RDW Coefficient of Variation  15.7   11.5-14.5  %


 


Immature Granulocyte % (Auto)  0.6    %


 


Immature Granulocyte # (Auto)  0.05   0.00-0.02  K/uL


 


Sodium Level  140   136-145  mmol/L


 


Potassium Level  4.8   3.5-5.1  mmol/L


 


Chloride Level  109     mmol/L


 


Carbon Dioxide Level  26   21-32  mmol/L


 


Anion Gap  5.0   3-11  mmol/L


 


Blood Urea Nitrogen  51   7-18  mg/dl


 


Creatinine


  


  1.89


  


  


  0.60-1.40


mg/dl


 


Est Creatinine Clear Calc


Drug Dose 


  47.8


  


  


   ml/min


 


 


Estimated GFR (


American) 


  43.7


  


  


   


 


 


Estimated GFR (Non-


American 


  37.7


  


  


   


 


 


BUN/Creatinine Ratio  26.9   10-20  


 


Random Glucose  103   70-99  mg/dl


 


Calcium Level  8.3   8.5-10.1  mg/dl








Microbiology Results


1/2/18 Gram Stain - Final, Resulted


         


1/2/18 Sputum Culture, Resulted


         Pending





Assessment and Plan


61 yo M w/ COPD , HTN, PAD, DM, CKD Stage III, L Atrial Thrombus, Bipolar 

disorder/Anxiety presenting with Dyspnea, found to be in Acute Hypoxic 

Respiratory failure





 


1) Acute Hypoxic Respiratory Failure: likely secondary to HCAP 


-productive Cough persists,  SOB improved, weaned down to 1L o2, maintaining O2 

saturation


- CXR - Airspace opacities are present at both lung bases. likely related to 

interstitial edema,


- Obtained MRSA Swab: negative


- D/C'd levaquin, D/C 'd Vancomycin based on MRSA swab 1/1


  D/C Zosyn,   Switch to PO Augmentin 875 mg BID


- Duonebs q4h while awake and q2h PRN for SOB








2) CKD Stage 3


- stable


- Creatinine of 1.7 (baseline approximately 1.4)


- F/u daily BMP's





3) CAD - stable asx


      s/p NSTEMI (2007) s/p stent distal circumflex; V-fib arrest at the time 

of his 2007 MI.  s/p drug-eluting stent( 2010) to the mid LAD and a previous 

stent in the circumflex was occluded.


- PAD - S/p stenting of his popliteal and SFA in May 2017


- Plavix 75mg QAM


- Metoprolol Succinate 50mg





4) Hyperlipidemia


- Atorvastatin 80mg QAM





5) CHF


- Asx, stable


- Lasix 40mg IV in the ED


- Lasix reportedly taken at home for weight gain


- ProBNP 39353 (on 12/23 was 57954)


- Echo 12/23: 


* Ejection Fraction = 55%.


* The basal inferior, basal to mid inferolateral and basal to mid lateral wall 

are akinetic.


* The right ventricular cavity size is enlarged (proximal parasternal long axis 

right ventricular outflow tract dimension >3.3 cm).


* The right ventricular systolic function is normal as assessed by tricuspid 

annular plane systolic excursion (TAPSE) (normal >1.5 cm).


* The left atrium is severely dilated.


* Mild to moderate valvular aortic stenosis.


* Diastolic dysfunction, Grade II, consistent with elevated left atrial 

pressure.





6) Anxiety


- Clonazepam 1mg PO HS PRN 





7) Left Atrial Thrombus


- Coumadin  10 mg daily





8) HTN


- Continue home Metoprolol, Amlodipine, Aldactone





9) Anemia of Chronic Disease


- at Baseline 9-10


- On Ferrous Sulfate 325mg PO BID at Home





10) Bipolar Disorder


- Continue home Depakote





11) Chronic Pain in lower extremities


- Oxycodone 5mg qid





12) Code Status


- Full Resuscitation





13) DVT Prophylaxis


- Warfarin


Continued [facility] stay due to:  multiple IV medications needed


Discharge planning:  home with home health





Resident Tracking


Resident Involvement:  Resident Care Provided


Care Provided:  Adult Hospital Medicine





History


Resident Physician Supervision Note:





I was present with Dr. Pierre during the history and exam. I discussed the case 

with the resident and agree with the findings and plan as documented in the 

note.  Any exceptions or clarifications are listed here.





Pt reports persistent intermittently productive cough of yellow sputum and 

shortness of breath which continues to improve. 2 episodes today of loose, 

watery green diarrhea w/o foul odor or blood reported. Reports no abd pain, n/v

, lightheadedness, CP/palpitations.


General Appearance:  no apparent distress


Respiratory:  chest non-tender, no respiratory distress, decreased breath sounds


Cardiovascular:  normal peripheral pulses, regular rate, rhythm, no murmur


Gastrointestinal:  normal bowel sounds, non tender, soft, no organomegaly


Assessment/Plan


63 y/o male h/o COPD, HTN, PAD, DMII, CKD III, left atrial thrombus, bipolar/

anxeity p/w SOB





AHRF 2/2 HCAP - O2 requirement persists, CXR w/ airspace opacities w/ ?RLL, 

MRSA swab negative


- continue levofloxacin and Zosyn, monitor - transition to PO tm, likely 

levofloxacin


- wean O2 as tolerated


- duonebs PRN





Anemia - drop from 9.6 to 8.4 - guiac today, repeat INR, recheck CBC in AM, 

monitor for melena





CHF - continue furosemide, metoprolol - echo completed w/ EF 55% - would 

consider addition of ACE-I if not contraindicated when CKD returns to baseline


CKD III - mildly elevated Cr. Trend, avoid nephrotoxic medications


DMII - adjust for hyperglycemia


CAD s/p STEMI 2007 w/ arrest and ERICK - continue plavix, metoprolol


HLD - continue atorvastatin


Anxiety - continue clonazepam


Left atrial thrombus - continue coumadin, INR 2.8


HTN - continue amlodipine, metoprolol, aldactone





FULL Code

## 2018-01-02 NOTE — DIAGNOSTIC IMAGING REPORT
CHEST ONE VIEW PORTABLE



HISTORY:  62 years-old Male shortness of breath acute shortness of breath



COMPARISON: Chest radiograph 1/1/2018



TECHNIQUE: Portable AP view of the chest



FINDINGS: 

Cardiac silhouette is mildly enlarged. Atherosclerosis of the aorta. Slightly

improved pulmonary vascular congestion with interstitial edema. Patchy alveolar

opacities are noted at the level the lung bases with trace effusions. No

pneumothorax identified. Degenerative changes are seen within the shoulders and

spine.



IMPRESSION: 

1. Cardiomegaly with slightly improved mild pulmonary edema.

2. Small bilateral pleural effusions.

3. Bibasilar alveolar opacities likely related to alveolar pulmonary edema and

atelectasis. Superimposed pneumonia is also within the differential. 







The above report was generated using voice recognition software. It may contain

grammatical, syntax or spelling errors.







Electronically signed by:  Ashwin Bhatia M.D.

1/2/2018 10:21 PM



Dictated Date/Time:  1/2/2018 10:18 PM

## 2018-01-03 VITALS
SYSTOLIC BLOOD PRESSURE: 162 MMHG | OXYGEN SATURATION: 95 % | DIASTOLIC BLOOD PRESSURE: 73 MMHG | TEMPERATURE: 98.24 F | HEART RATE: 56 BPM

## 2018-01-03 VITALS
OXYGEN SATURATION: 95 % | SYSTOLIC BLOOD PRESSURE: 134 MMHG | DIASTOLIC BLOOD PRESSURE: 48 MMHG | TEMPERATURE: 98.96 F | HEART RATE: 62 BPM

## 2018-01-03 VITALS — HEART RATE: 57 BPM | OXYGEN SATURATION: 91 %

## 2018-01-03 VITALS
OXYGEN SATURATION: 92 % | TEMPERATURE: 98.06 F | SYSTOLIC BLOOD PRESSURE: 166 MMHG | DIASTOLIC BLOOD PRESSURE: 72 MMHG | HEART RATE: 61 BPM

## 2018-01-03 VITALS
TEMPERATURE: 98.24 F | OXYGEN SATURATION: 92 % | DIASTOLIC BLOOD PRESSURE: 73 MMHG | SYSTOLIC BLOOD PRESSURE: 157 MMHG | HEART RATE: 63 BPM

## 2018-01-03 VITALS
DIASTOLIC BLOOD PRESSURE: 74 MMHG | SYSTOLIC BLOOD PRESSURE: 187 MMHG | TEMPERATURE: 97.88 F | OXYGEN SATURATION: 94 % | HEART RATE: 68 BPM

## 2018-01-03 VITALS — HEART RATE: 76 BPM | OXYGEN SATURATION: 94 %

## 2018-01-03 VITALS — HEART RATE: 78 BPM | OXYGEN SATURATION: 92 %

## 2018-01-03 VITALS
OXYGEN SATURATION: 94 % | HEART RATE: 65 BPM | SYSTOLIC BLOOD PRESSURE: 163 MMHG | DIASTOLIC BLOOD PRESSURE: 66 MMHG | TEMPERATURE: 97.88 F

## 2018-01-03 VITALS — OXYGEN SATURATION: 92 % | HEART RATE: 54 BPM

## 2018-01-03 LAB
BASOPHILS # BLD: 0.02 K/UL (ref 0–0.2)
BASOPHILS NFR BLD: 0.2 %
BUN SERPL-MCNC: 53 MG/DL (ref 7–18)
CALCIUM SERPL-MCNC: 8.5 MG/DL (ref 8.5–10.1)
CO2 SERPL-SCNC: 25 MMOL/L (ref 21–32)
CREAT SERPL-MCNC: 1.68 MG/DL (ref 0.6–1.4)
CREAT SERPL-MCNC: 1.72 MG/DL (ref 0.6–1.4)
EOS ABS #: 0.23 K/UL (ref 0–0.5)
EOSINOPHIL NFR BLD AUTO: 281 K/UL (ref 130–400)
GLUCOSE SERPL-MCNC: 79 MG/DL (ref 70–99)
HCT VFR BLD CALC: 26.7 % (ref 42–52)
HGB BLD-MCNC: 8.5 G/DL (ref 14–18)
IG#: 0.06 K/UL (ref 0–0.02)
IMM GRANULOCYTES NFR BLD AUTO: 13.2 %
LYMPHOCYTES # BLD: 1.11 K/UL (ref 1.2–3.4)
MCH RBC QN AUTO: 29.3 PG (ref 25–34)
MCHC RBC AUTO-ENTMCNC: 31.8 G/DL (ref 32–36)
MCV RBC AUTO: 92.1 FL (ref 80–100)
MONO ABS #: 1 K/UL (ref 0.11–0.59)
MONOCYTES NFR BLD: 11.9 %
NEUT ABS #: 5.97 K/UL (ref 1.4–6.5)
NEUTROPHILS # BLD AUTO: 2.7 %
NEUTROPHILS NFR BLD AUTO: 71.3 %
PMV BLD AUTO: 8.9 FL (ref 7.4–10.4)
POTASSIUM SERPL-SCNC: 4.9 MMOL/L (ref 3.5–5.1)
RED CELL DISTRIBUTION WIDTH CV: 15.7 % (ref 11.5–14.5)
RED CELL DISTRIBUTION WIDTH SD: 52.5 FL (ref 36.4–46.3)
SODIUM SERPL-SCNC: 139 MMOL/L (ref 136–145)
WBC # BLD AUTO: 8.39 K/UL (ref 4.8–10.8)

## 2018-01-03 RX ADMIN — INSULIN ASPART SCH UNITS: 100 INJECTION, SOLUTION INTRAVENOUS; SUBCUTANEOUS at 17:17

## 2018-01-03 RX ADMIN — ATORVASTATIN CALCIUM SCH MG: 40 TABLET, FILM COATED ORAL at 07:57

## 2018-01-03 RX ADMIN — OXYCODONE HYDROCHLORIDE PRN MG: 5 TABLET ORAL at 23:57

## 2018-01-03 RX ADMIN — AMOXICILLIN AND CLAVULANATE POTASSIUM SCH MG: 875; 125 TABLET, FILM COATED ORAL at 07:57

## 2018-01-03 RX ADMIN — IPRATROPIUM BROMIDE AND ALBUTEROL SULFATE SCH ML: .5; 3 SOLUTION RESPIRATORY (INHALATION) at 11:18

## 2018-01-03 RX ADMIN — OXYCODONE HYDROCHLORIDE PRN MG: 5 TABLET ORAL at 15:37

## 2018-01-03 RX ADMIN — INSULIN GLARGINE SCH UNITS: 100 INJECTION, SOLUTION SUBCUTANEOUS at 21:01

## 2018-01-03 RX ADMIN — ACETAMINOPHEN PRN MG: 325 TABLET ORAL at 07:54

## 2018-01-03 RX ADMIN — IPRATROPIUM BROMIDE AND ALBUTEROL SULFATE SCH ML: .5; 3 SOLUTION RESPIRATORY (INHALATION) at 06:28

## 2018-01-03 RX ADMIN — ISOSORBIDE MONONITRATE SCH MG: 30 TABLET, EXTENDED RELEASE ORAL at 07:57

## 2018-01-03 RX ADMIN — AMOXICILLIN AND CLAVULANATE POTASSIUM SCH MG: 875; 125 TABLET, FILM COATED ORAL at 15:38

## 2018-01-03 RX ADMIN — OXYCODONE HYDROCHLORIDE PRN MG: 5 TABLET ORAL at 07:54

## 2018-01-03 RX ADMIN — DIVALPROEX SODIUM SCH MG: 500 TABLET, DELAYED RELEASE ORAL at 20:55

## 2018-01-03 RX ADMIN — ACETAMINOPHEN PRN MG: 325 TABLET ORAL at 15:37

## 2018-01-03 RX ADMIN — ESCITALOPRAM OXALATE SCH MG: 20 TABLET, FILM COATED ORAL at 07:57

## 2018-01-03 RX ADMIN — METOPROLOL SUCCINATE SCH MG: 50 TABLET, EXTENDED RELEASE ORAL at 20:56

## 2018-01-03 RX ADMIN — TAMSULOSIN HYDROCHLORIDE SCH MG: 0.4 CAPSULE ORAL at 20:55

## 2018-01-03 RX ADMIN — MIRTAZAPINE SCH MG: 15 TABLET, FILM COATED ORAL at 20:56

## 2018-01-03 RX ADMIN — IPRATROPIUM BROMIDE AND ALBUTEROL SULFATE SCH ML: .5; 3 SOLUTION RESPIRATORY (INHALATION) at 19:47

## 2018-01-03 RX ADMIN — CLOPIDOGREL BISULFATE SCH MG: 75 TABLET, FILM COATED ORAL at 07:57

## 2018-01-03 RX ADMIN — AMLODIPINE BESYLATE SCH MG: 5 TABLET ORAL at 07:56

## 2018-01-03 RX ADMIN — INSULIN ASPART SCH UNITS: 100 INJECTION, SOLUTION INTRAVENOUS; SUBCUTANEOUS at 21:00

## 2018-01-03 RX ADMIN — WARFARIN SODIUM SCH MG: 7.5 TABLET ORAL at 15:38

## 2018-01-03 RX ADMIN — INSULIN ASPART SCH UNITS: 100 INJECTION, SOLUTION INTRAVENOUS; SUBCUTANEOUS at 11:56

## 2018-01-03 RX ADMIN — GUAIFENESIN SCH MG: 600 TABLET, EXTENDED RELEASE ORAL at 20:55

## 2018-01-03 RX ADMIN — METOPROLOL SUCCINATE SCH MG: 50 TABLET, EXTENDED RELEASE ORAL at 07:57

## 2018-01-03 RX ADMIN — GABAPENTIN SCH MG: 300 CAPSULE ORAL at 20:56

## 2018-01-03 RX ADMIN — SPIRONOLACTONE SCH MG: 25 TABLET, FILM COATED ORAL at 15:39

## 2018-01-03 RX ADMIN — SPIRONOLACTONE SCH MG: 25 TABLET, FILM COATED ORAL at 07:58

## 2018-01-03 RX ADMIN — FAMOTIDINE SCH MG: 20 TABLET, FILM COATED ORAL at 07:58

## 2018-01-03 RX ADMIN — IPRATROPIUM BROMIDE AND ALBUTEROL SULFATE SCH ML: .5; 3 SOLUTION RESPIRATORY (INHALATION) at 15:16

## 2018-01-03 RX ADMIN — INSULIN ASPART SCH UNITS: 100 INJECTION, SOLUTION INTRAVENOUS; SUBCUTANEOUS at 08:01

## 2018-01-03 RX ADMIN — INSULIN GLARGINE SCH UNITS: 100 INJECTION, SOLUTION SUBCUTANEOUS at 08:02

## 2018-01-03 NOTE — FAMILY MEDICINE PROGRESS NOTE
Progress Note


Date of Service


Andrea 3, 2018.





Subjective


Pt evaluation today including:  conversation w/ patient, conversation w/ family

, physical exam, chart review, lab review, review of studies, review of 

inpatient medication list


Pain:  denies


PO Intake:  adequate


Voiding:  no voiding problems


Overnight, patient experienced worsening SOB, cough.  CXR was ordered showing  

Bibasilar alveolar opacities likely related to alveolar pulmonary edema vs. 

atelectasis vs  Superimposed pneumonia. Oxygen was increased to 4L though 

patient had not desaturated below 90%.  His SOB has improved. He denied Chest 

pain, SOB, calf tenderness, presyncope.





   Constitutional:  No fever, No chills, No weakness


   Respiratory:  + cough, + shortness of breath


   Cardiovascular:  No chest pain, No edema


   Abdomen:  No pain, No nausea, No vomiting, No diarrhea


   Skin:  No rash, No itch





Medications





Current Inpatient Medications








 Medications


  (Trade)  Dose


 Ordered  Sig/Dc


 Route  Start Time


 Stop Time Status Last Admin


Dose Admin


 


 Acetaminophen


  (Tylenol Tab)  650 mg  Q4H  PRN


 PO  1/1/18 02:45


 1/31/18 02:44  1/2/18 12:23


650 MG


 


 Magnesium


 Hydroxide


  (Milk Of


 Magnesia Susp)  30 ml  Q6H  PRN


 PO  1/1/18 02:45


 1/31/18 02:44   


 


 


 Polyethylene


  (Miralax Powder


 Packet)  17 gm  DAILY  PRN


 PO  1/1/18 02:45


 1/31/18 02:44   


 


 


 Ondansetron HCl


  (Zofran Inj)  4 mg  Q6H  PRN


 IV  1/1/18 02:45


 1/31/18 02:44   


 


 


 Atorvastatin


 Calcium


  (Lipitor Tab)  80 mg  QAM


 PO  1/1/18 09:00


 1/31/18 08:59  1/2/18 08:10


80 MG


 


 Clonazepam


  (Klonopin Tab)  1 mg  HS  PRN


 PO  1/1/18 02:45


 1/31/18 02:44   


 


 


 Clopidogrel


 Bisulfate


  (plAVix TAB)  75 mg  QAM


 PO  1/1/18 09:00


 1/31/18 08:59  1/2/18 08:10


75 MG


 


 Divalproex Sodium


  (Depakote Delay


 Rel Tab)  2,000 mg  HS


 PO  1/1/18 21:00


 1/31/18 20:59  1/2/18 20:04


2,000 MG


 


 Docusate Sodium


  (coLACE CAP)  100 mg  BID  PRN


 PO  1/1/18 02:45


 1/31/18 02:44   


 


 


 Escitalopram


 Oxalate


  (Lexapro Tab)  20 mg  QAM


 PO  1/1/18 09:00


 1/31/18 08:59  1/2/18 08:10


20 MG


 


 Fluticasone


 Propionate


  (Flonase Nasal


 Spray)  2 sprays  DAILY  PRN


 VERONICA  1/1/18 02:45


 1/31/18 02:44   


 


 


 Gabapentin


  (Neurontin Cap)  300 mg  HS


 PO  1/1/18 21:00


 1/31/18 20:59  1/2/18 20:04


300 MG


 


 Isosorbide


 Mononitrate


  (Imdur Ext Rel


 Tab)  30 mg  QAM


 PO  1/1/18 09:00


 1/31/18 08:59  1/2/18 08:09


30 MG


 


 Metoprolol


 Succinate


  (Toprol Xl Tab)  50 mg  BID


 PO  1/1/18 09:00


 1/31/18 08:59  1/2/18 21:27


50 MG


 


 Nitroglycerin


  (Nitrostat Tab)  0.4 mg  UD  PRN


 UT  1/1/18 02:45


 1/31/18 02:44   


 


 


 Tamsulosin HCl


  (Flomax Cap)  0.4 mg  HS


 PO  1/1/18 21:00


 1/31/18 20:59  1/2/18 20:04


0.4 MG


 


 Warfarin Sodium


  (Coumadin Tab)  7.5 mg  SuMoWeThFr@1600


 PO  1/1/18 16:00


 1/31/18 15:59  1/1/18 17:31


7.5 MG


 


 Amlodipine


 Besylate


  (Norvasc Tab)  10 mg  QAM


 PO  1/1/18 09:00


 1/31/18 08:59  1/2/18 08:10


10 MG


 


 Mirtazapine


  (Remeron Tab)  45 mg  HS


 PO  1/1/18 21:00


 1/31/18 20:59  1/2/18 20:03


45 MG


 


 Spironolactone


  (Aldactone Tab)  50 mg  BID17


 PO  1/1/18 09:00


 1/31/18 08:59  1/2/18 16:52


50 MG


 


 Warfarin Sodium


  (Coumadin Tab)  10 mg  TuSa@1600


 PO  1/2/18 16:00


 2/1/18 15:59  1/2/18 16:51


10 MG


 


 Albuterol


  (Ventolin Hfa


 Inhaler)  2 puffs  Q4H  PRN


 INH  1/1/18 02:45


 1/31/18 02:44   


 


 


 Glucose


  (Glucose 40% Gel)  15-30


 GRAMS 15


 GRAMS...  UD  PRN


 PO  1/1/18 03:15


 1/31/18 03:14   


 


 


 Glucose


  (Glucose Chew


 Tab)  4-8


 Tablets 4


 Tabl...  UD  PRN


 PO  1/1/18 03:15


 1/31/18 03:14   


 


 


 Dextrose


  (Dextrose 50%


 50ML Syringe)  25-50ML OF


 50% DW IV


 FOR...  UD  PRN


 IV  1/1/18 03:15


 1/31/18 03:14   


 


 


 Glucagon


  (Glucagon Inj)  1 mg  UD  PRN


 SQ  1/1/18 03:15


 1/31/18 03:14   


 


 


 Albuterol/


 Ipratropium


  (Duoneb)  3 ml  QIDR


 INH  1/1/18 03:30


 1/31/18 03:29  1/3/18 06:28


3 ML


 


 Oxycodone HCl


  (Roxicodone


 Immediate Rel Tab)  5 mg  QID  PRN


 PO  1/1/18 05:30


 1/15/18 05:29  1/2/18 20:03


5 MG


 


 Miscellaneous


 Information


  (Consult


 Glycemic


 Management


 Pharmacy)  1 ea  UD  PRN


 N/A  1/1/18 07:31


 1/31/18 07:30   


 


 


 Insulin Aspart


  (novoLOG ASPART)  SLIDING


 SCALE  ACHS


 SC  1/1/18 07:30


 1/31/18 07:29  1/2/18 16:57


5 UNITS


 


 Insulin Glargine


  (Lantus Solostar


 Pen)  SEE


 PROTOCOL


 TEXT  BID


 SC  1/1/18 21:00


 1/31/18 20:59  1/2/18 21:31


10 UNITS


 


 Amoxicillin/


 Clavulanate


 Potassium


  (Augmentin Tab)  875 mg  BIDM


 PO  1/2/18 16:45


 1/8/18 16:44  1/2/18 17:20


875 MG


 


 Famotidine


  (Pepcid Tab)  20 mg  QAM


 PO  1/3/18 09:00


 2/2/18 08:59   


 











Objective


Vital Signs











  Date Time  Temp Pulse Resp B/P (MAP) Pulse Ox O2 Delivery O2 Flow Rate FiO2


 


1/3/18 07:37 36.8 63 20 157/73 (101) 92  4.0 


 


1/3/18 06:28  78 16  92 Nasal Cannula 4.0 


 


1/3/18 04:00      Nasal Cannula 3.0 


 


1/3/18 03:13 36.6 65 19 163/66 (98) 94 Nasal Cannula 3.0 


 


1/2/18 23:59      Nasal Cannula 3.0 


 


1/2/18 23:55 36.8 68 19 137/49 (78) 91 Nasal Cannula 4.0 


 


1/2/18 20:47     95 Nasal Cannula 4.0 


 


1/2/18 20:19  74 16  91 Room Air  


 


1/2/18 20:00      Nasal Cannula 1.0 


 


1/2/18 19:21 36.5 63 20 159/71 (100) 92 Nasal Cannula 1.0 


 


1/2/18 16:00      Nasal Cannula 1.0 


 


1/2/18 15:28 36.5 60 20 127/50 (75) 90 Room Air  


 


1/2/18 15:08  57 16  93 Nasal Cannula 1.0 


 


1/2/18 12:00      Nasal Cannula 1.0 


 


1/2/18 11:34 36.8 66 18 126/65 (85) 91 Nasal Cannula 1.0 


 


1/2/18 11:01  61 16  94 Nasal Cannula 1.0 


 


1/2/18 08:00      Nasal Cannula 1.0 











Physical Exam


Notes:


GENERAL: alert, no distress, non-toxic 


EYE EXAM: normal conjunctiva, PERRL and EOM's grossly intact


NECK: supple, no nuchal rigidity, no adenopathy, non-tender


LUNGS: Bilateral expiratory wheeze Normal chest wall mechanics


HEART:systolic  murmur, S1 normal and S2 normal 


ABDOMEN: abdomen soft, non-tender, normo-active bowel sounds, no masses, no 

rebound or guarding. 


LOWER EXTREMITIES: No pitting edema.


NEURO EXAM: Normal sensorium, cranial nerves II-XII  grossly intact, normal 

speech





Laboratory Results





Results Past 24 Hours








Test


  1/2/18


10:47 1/2/18


11:15 1/2/18


16:30 1/2/18


18:20 Range/Units


 


 


Prothrombin Time


  29.3


  


  


  


  9.0-12.0


SECONDS


 


Prothromb Time International


Ratio 2.8


  


  


  


  0.9-1.1  


 


 


Bedside Glucose  148 82  70-99  mg/dl


 


Stool Occult Blood    NEGATIVE NEGATIVE  


 


Test


  1/2/18


20:09 1/3/18


06:41 1/3/18


07:06 


  Range/Units


 


 


Bedside Glucose 92 86   70-99  mg/dl








Microbiology Results


1/2/18 C.difficile Toxin B Gene (PCR) - Final, Complete


         No C. difficile toxin B gene detected





Assessment and Plan


61 yo M w/ COPD , HTN, PAD, DM, CKD Stage III, L Atrial Thrombus, Bipolar 

disorder/Anxiety presenting with Dyspnea, found to be in Acute Hypoxic 

Respiratory failure





 


1) Acute Hypoxic Respiratory Failure: likely secondary to HCAP 


-productive Cough persists,  worsened SOB overnight, up to 4L. attempt to wean


- CXR - Airspace opacities are present at both lung bases. likely related to 

interstitial edema,


- Obtained MRSA Swab: negative


- D/C'd levaquin, D/C 'd Vancomycin based on MRSA swab 1/1


  D/C Zosyn,   Switch to PO Augmentin 875 mg BID


- Wheezing: Duonebs q4h while awake and q2h PRN for SOB








2) CKD Stage 3


- stable


- (baseline approximately 1.4)


- F/u daily BMP's





3) CAD - stable asx


      s/p NSTEMI (2007) s/p stent distal circumflex; V-fib arrest at the time 

of his 2007 MI.  s/p drug-eluting stent( 2010) to the mid LAD and a previous 

stent in the circumflex was occluded.


- PAD - S/p stenting of his popliteal and SFA in May 2017


- Plavix 75mg QAM


- Metoprolol Succinate 50mg





4) Hyperlipidemia


- Atorvastatin 80mg QAM





5) CHF


- Asx, stable


- Lasix 40mg IV in the ED


- Lasix reportedly taken at home for weight gain


- ProBNP 71761 (on 12/23 was [id number])


- Echo 12/23: 


* Ejection Fraction = 55%.


* The basal inferior, basal to mid inferolateral and basal to mid lateral wall 

are akinetic.


* The right ventricular cavity size is enlarged (proximal parasternal long axis 

right ventricular outflow tract dimension >3.3 cm).


* The right ventricular systolic function is normal as assessed by tricuspid 

annular plane systolic excursion (TAPSE) (normal >1.5 cm).


* The left atrium is severely dilated.


* Mild to moderate valvular aortic stenosis.


* Diastolic dysfunction, Grade II, consistent with elevated left atrial 

pressure.





6) Anxiety


- Clonazepam 1mg PO HS PRN 





7) Left Atrial Thrombus


- Coumadin  10 mg daily





8) HTN


- Continue home Metoprolol, Amlodipine, Aldactone





9) Anemia of Chronic Disease


- at Baseline 9-10


- On Ferrous Sulfate 325mg PO BID at Home





10) Bipolar Disorder


- Continue home Depakote





11) Chronic Pain in lower extremities


- Oxycodone 5mg qid





12) Code Status


- Full Resuscitation





13) DVT Prophylaxis


- Warfarin


Continued [facility] stay due to:  abnormal vital signs


Discharge planning:  home with home health





Resident Tracking


Resident Involvement:  Resident Care Provided


Care Provided:  Adult Hospital Medicine





History


Resident Physician Supervision Note:





I was present with Dr. Pierre during the history and exam. I discussed the case 

with the resident and agree with the findings and plan as documented in the 

note.  Any exceptions or clarifications are listed here.





Overnight, pt experienced increased shortness of breath which resulted in 4L O2 

for comfort which ameliorated symptoms. On review today, he does not recall any 

events that may have precipitated this change. On review of his history, he has 

been recently started on tiotropium with an evident diagnosis of COPD. His 

recent echocardiogram shows preserved EF. He reports no lightheadedness, nausea/

vomiting, chest pain, paresthesias.


General Appearance:  no apparent distress


Respiratory:  chest non-tender, no respiratory distress, decreased breath 

sounds (stable from previous), wheezing (mild end exp wheeze scattered)


Cardiovascular:  normal peripheral pulses, regular rate, rhythm, no edema, no 

murmur


Gastrointestinal:  normal bowel sounds, non tender, soft, no organomegaly


Assessment/Plan


61 y/o male h/o COPD, HTN, PAD, DMII, CKD III, left atrial thrombus, bipolar/

anxeity p/w SOB





AHRF 2/2 HCAP  in the setting of ?COPD - O2 requirement increased - did narrow 

abx (no WBC increase, fever, cough), new wheeze without considerable CXR changes

, MRSA swab negative, FITZ on CKD persists


- continue augmentin


- trial of prednisone in the setting of ?COPD with monitoring for improvement


- wean O2 as tolerated


- duonebs q4 w/ PRN





Anemia - stable, guiac and C. diff neg w/ nl BM now


CHF - continue furosemide, metoprolol - echo completed w/ EF 55% - would 

consider addition of ACE-I if not contraindicated when CKD returns to baseline


CKD III - elevated Cr. Trend, avoid nephrotoxic medications


DMII - adjust for hyperglycemia


CAD s/p STEMI 2007 w/ arrest and ERICK - continue plavix, metoprolol


HLD - continue atorvastatin


Anxiety - continue clonazepam


Left atrial thrombus - continue coumadin, INR 2.8


HTN - continue amlodipine, metoprolol, aldactone





FULL Code

## 2018-01-03 NOTE — PHARMACY PROGRESS NOTE
Glycemic Control Progress Note


Date of Service


Andrea 3, 2018.





Scope


Glycemic Pharmacist consulted for glycemic control to write orders per [facility], located within [facility] 

inpatient glycemic control protocol.





Objective


Accuchecks BSG (last 24hrs):











Test


  1/2/18


11:15 1/2/18


16:30 1/2/18


20:09 1/3/18


06:41


 


Bedside Glucose


  148 mg/dl


(70-99) 82 mg/dl


(70-99) 92 mg/dl


(70-99) 86 mg/dl


(70-99)


 


Test


  1/3/18


07:06 


  


  


 


 


Random Glucose


  79 mg/dl


(70-99) 


  


  


 








HbA1c:











Test


  1/1/18


09:56


 


Hemoglobin A1c


  6.3 %


(4.5-5.6)  H











Recent Pertinent Medications








The patient is currently receiving:


* Basal insulin:             Lantus 10 units every 12 hours





* Correctional Insulin:   Novolog Correction per scale ACHS


                          Goal Range: Low 110 mg/dL - High 140 mg/dL


                          Correction Factor: 25 mg/dL/unit





* Prandial insulin:         Per carb ratio of 1 unit per 8 grams CHO consumed





Outpatient Anti-Diabetic Meds


Lantus 10 units qPM





Novolog sliding scale





Assessment & Plan


ASSESSMENT:


* See progress note from 1/1/18 for more background info, in short: patient 

admitted with acute hypoxic respiratory failure likely secondary to healthcare 

associated pneumonia.  He is a very complicated patient with frequent 

hospitalizations past few months. 


 


* Pt receiving SQ basal bolus insulin regimen for hyperglycemia secondary to 

baseline DM, HCAP on Augmentin





* Patient is currently receiving an average of 44 units of insulin per day


 * 20 units of basal insulin


 


 * 24 units of prandial/correctional insulin


 


 * BSGs ranging 82 -  148 mg/dl over the past 24hrs





* Changes needed to insulin regimen: 


 * AM Fasting BSG = 86 mg/dl. This is below goal range for patient based on 

inpatient targets and co-morbidities. Therefore Basal insulin needs decreased 

to prevent hypoglycemia


 


 * Post-prandial BSGs are in range therefore no changes needed to CF/CR


 


 * Total daily dose = 44 units.  Decrease in total daily dose is needed








PLAN FOR INPATIENT GLYCEMIC CONTROL:


* Decreasing Lantus to 7 units SQ BID (HOLD if BG <80)


* Continuing correction factor 25 mg/dl/unit


* Continuing carb ratio 1 unit per 8 grams CHO consumed


* Continuing goal range Low 110 mg/dL - High 140 mg/dL








RECOMMENDATIONS FOR DISCHARGE:


* Patient with history of poor medication adherence and dietary indiscretion.  

It seems reasonable to restart outpatient regimen with close f/u needed to 

titrate doses.  








* Please note that the plan above was derived based on current level of insulin 

resistance and hospital stress. These recommendations are appropriate for 

inpatient admission only. Plan of care upon discharge will need to be 

reassessed to avoid potential outpatient hypo/hyperglycemia. 








Thank you.

## 2018-01-04 VITALS
TEMPERATURE: 97.88 F | HEART RATE: 63 BPM | OXYGEN SATURATION: 92 % | SYSTOLIC BLOOD PRESSURE: 174 MMHG | DIASTOLIC BLOOD PRESSURE: 87 MMHG

## 2018-01-04 VITALS
TEMPERATURE: 99.14 F | SYSTOLIC BLOOD PRESSURE: 164 MMHG | DIASTOLIC BLOOD PRESSURE: 63 MMHG | OXYGEN SATURATION: 92 % | HEART RATE: 55 BPM

## 2018-01-04 VITALS
SYSTOLIC BLOOD PRESSURE: 164 MMHG | OXYGEN SATURATION: 94 % | DIASTOLIC BLOOD PRESSURE: 70 MMHG | HEART RATE: 57 BPM | TEMPERATURE: 97.88 F

## 2018-01-04 VITALS
OXYGEN SATURATION: 92 % | SYSTOLIC BLOOD PRESSURE: 164 MMHG | HEART RATE: 63 BPM | DIASTOLIC BLOOD PRESSURE: 73 MMHG | TEMPERATURE: 98.42 F

## 2018-01-04 VITALS
DIASTOLIC BLOOD PRESSURE: 91 MMHG | OXYGEN SATURATION: 94 % | HEART RATE: 88 BPM | SYSTOLIC BLOOD PRESSURE: 183 MMHG | TEMPERATURE: 97.34 F

## 2018-01-04 VITALS
OXYGEN SATURATION: 92 % | SYSTOLIC BLOOD PRESSURE: 174 MMHG | DIASTOLIC BLOOD PRESSURE: 87 MMHG | HEART RATE: 63 BPM | TEMPERATURE: 97.88 F

## 2018-01-04 VITALS — HEART RATE: 54 BPM | OXYGEN SATURATION: 94 %

## 2018-01-04 VITALS — SYSTOLIC BLOOD PRESSURE: 172 MMHG | DIASTOLIC BLOOD PRESSURE: 75 MMHG

## 2018-01-04 VITALS
SYSTOLIC BLOOD PRESSURE: 206 MMHG | TEMPERATURE: 97.52 F | HEART RATE: 53 BPM | DIASTOLIC BLOOD PRESSURE: 77 MMHG | OXYGEN SATURATION: 92 %

## 2018-01-04 VITALS — HEART RATE: 54 BPM | OXYGEN SATURATION: 96 %

## 2018-01-04 VITALS — OXYGEN SATURATION: 92 %

## 2018-01-04 VITALS — HEART RATE: 64 BPM | OXYGEN SATURATION: 94 %

## 2018-01-04 VITALS — OXYGEN SATURATION: 92 % | HEART RATE: 54 BPM

## 2018-01-04 LAB
BASOPHILS # BLD: 0.01 K/UL (ref 0–0.2)
BASOPHILS NFR BLD: 0.1 %
BUN SERPL-MCNC: 56 MG/DL (ref 7–18)
CALCIUM SERPL-MCNC: 8.3 MG/DL (ref 8.5–10.1)
CO2 SERPL-SCNC: 24 MMOL/L (ref 21–32)
CREAT SERPL-MCNC: 1.58 MG/DL (ref 0.6–1.4)
EOS ABS #: 0.01 K/UL (ref 0–0.5)
EOSINOPHIL NFR BLD AUTO: 257 K/UL (ref 130–400)
GLUCOSE SERPL-MCNC: 165 MG/DL (ref 70–99)
HCT VFR BLD CALC: 28.1 % (ref 42–52)
HGB BLD-MCNC: 9.3 G/DL (ref 14–18)
IG#: 0.06 K/UL (ref 0–0.02)
IMM GRANULOCYTES NFR BLD AUTO: 11 %
INR PPP: 3.1 (ref 0.9–1.1)
LYMPHOCYTES # BLD: 0.85 K/UL (ref 1.2–3.4)
MCH RBC QN AUTO: 29.7 PG (ref 25–34)
MCHC RBC AUTO-ENTMCNC: 33.1 G/DL (ref 32–36)
MCV RBC AUTO: 89.8 FL (ref 80–100)
MONO ABS #: 0.59 K/UL (ref 0.11–0.59)
MONOCYTES NFR BLD: 7.6 %
NEUT ABS #: 6.2 K/UL (ref 1.4–6.5)
NEUTROPHILS # BLD AUTO: 0.1 %
NEUTROPHILS NFR BLD AUTO: 80.4 %
PMV BLD AUTO: 8.6 FL (ref 7.4–10.4)
POTASSIUM SERPL-SCNC: 5 MMOL/L (ref 3.5–5.1)
RED CELL DISTRIBUTION WIDTH CV: 15.3 % (ref 11.5–14.5)
RED CELL DISTRIBUTION WIDTH SD: 49.6 FL (ref 36.4–46.3)
SODIUM SERPL-SCNC: 136 MMOL/L (ref 136–145)
WBC # BLD AUTO: 7.72 K/UL (ref 4.8–10.8)

## 2018-01-04 RX ADMIN — AMOXICILLIN AND CLAVULANATE POTASSIUM SCH MG: 875; 125 TABLET, FILM COATED ORAL at 17:01

## 2018-01-04 RX ADMIN — IPRATROPIUM BROMIDE AND ALBUTEROL SULFATE SCH ML: .5; 3 SOLUTION RESPIRATORY (INHALATION) at 07:49

## 2018-01-04 RX ADMIN — SPIRONOLACTONE SCH MG: 25 TABLET, FILM COATED ORAL at 08:06

## 2018-01-04 RX ADMIN — IPRATROPIUM BROMIDE AND ALBUTEROL SULFATE SCH ML: .5; 3 SOLUTION RESPIRATORY (INHALATION) at 11:19

## 2018-01-04 RX ADMIN — WARFARIN SODIUM SCH MG: 7.5 TABLET ORAL at 17:01

## 2018-01-04 RX ADMIN — FAMOTIDINE SCH MG: 20 TABLET, FILM COATED ORAL at 08:07

## 2018-01-04 RX ADMIN — AMLODIPINE BESYLATE SCH MG: 5 TABLET ORAL at 08:05

## 2018-01-04 RX ADMIN — IPRATROPIUM BROMIDE AND ALBUTEROL SULFATE SCH ML: .5; 3 SOLUTION RESPIRATORY (INHALATION) at 15:16

## 2018-01-04 RX ADMIN — INSULIN GLARGINE SCH UNITS: 100 INJECTION, SOLUTION SUBCUTANEOUS at 20:18

## 2018-01-04 RX ADMIN — GUAIFENESIN SCH MG: 600 TABLET, EXTENDED RELEASE ORAL at 08:07

## 2018-01-04 RX ADMIN — CLOPIDOGREL BISULFATE SCH MG: 75 TABLET, FILM COATED ORAL at 08:05

## 2018-01-04 RX ADMIN — METOPROLOL SUCCINATE SCH MG: 50 TABLET, EXTENDED RELEASE ORAL at 20:10

## 2018-01-04 RX ADMIN — INSULIN GLARGINE SCH UNITS: 100 INJECTION, SOLUTION SUBCUTANEOUS at 08:12

## 2018-01-04 RX ADMIN — ISOSORBIDE MONONITRATE SCH MG: 30 TABLET, EXTENDED RELEASE ORAL at 08:06

## 2018-01-04 RX ADMIN — TAMSULOSIN HYDROCHLORIDE SCH MG: 0.4 CAPSULE ORAL at 20:09

## 2018-01-04 RX ADMIN — INSULIN ASPART SCH UNITS: 100 INJECTION, SOLUTION INTRAVENOUS; SUBCUTANEOUS at 12:21

## 2018-01-04 RX ADMIN — GUAIFENESIN SCH MG: 600 TABLET, EXTENDED RELEASE ORAL at 20:09

## 2018-01-04 RX ADMIN — INSULIN ASPART SCH UNITS: 100 INJECTION, SOLUTION INTRAVENOUS; SUBCUTANEOUS at 17:05

## 2018-01-04 RX ADMIN — ESCITALOPRAM OXALATE SCH MG: 20 TABLET, FILM COATED ORAL at 08:05

## 2018-01-04 RX ADMIN — IPRATROPIUM BROMIDE AND ALBUTEROL SULFATE SCH ML: .5; 3 SOLUTION RESPIRATORY (INHALATION) at 19:37

## 2018-01-04 RX ADMIN — ACETAMINOPHEN PRN MG: 325 TABLET ORAL at 00:48

## 2018-01-04 RX ADMIN — GABAPENTIN SCH MG: 300 CAPSULE ORAL at 20:09

## 2018-01-04 RX ADMIN — MIRTAZAPINE SCH MG: 15 TABLET, FILM COATED ORAL at 20:08

## 2018-01-04 RX ADMIN — OXYCODONE HYDROCHLORIDE PRN MG: 5 TABLET ORAL at 20:58

## 2018-01-04 RX ADMIN — HYDRALAZINE HYDROCHLORIDE SCH MG: 10 TABLET ORAL at 20:08

## 2018-01-04 RX ADMIN — ATORVASTATIN CALCIUM SCH MG: 40 TABLET, FILM COATED ORAL at 08:05

## 2018-01-04 RX ADMIN — DIVALPROEX SODIUM SCH MG: 500 TABLET, DELAYED RELEASE ORAL at 20:09

## 2018-01-04 RX ADMIN — INSULIN ASPART SCH UNITS: 100 INJECTION, SOLUTION INTRAVENOUS; SUBCUTANEOUS at 08:10

## 2018-01-04 RX ADMIN — METOPROLOL SUCCINATE SCH MG: 50 TABLET, EXTENDED RELEASE ORAL at 08:05

## 2018-01-04 RX ADMIN — AMOXICILLIN AND CLAVULANATE POTASSIUM SCH MG: 875; 125 TABLET, FILM COATED ORAL at 08:04

## 2018-01-04 RX ADMIN — INSULIN ASPART SCH UNITS: 100 INJECTION, SOLUTION INTRAVENOUS; SUBCUTANEOUS at 20:18

## 2018-01-04 RX ADMIN — SPIRONOLACTONE SCH MG: 25 TABLET, FILM COATED ORAL at 17:02

## 2018-01-04 NOTE — PHARMACY PROGRESS NOTE
Pharmacy Glycemic Short Note 2


Date of Service


Jan 4, 2018.





OUTPATIENT ANTIDIABETIC REGIMEN: 


* Lantus 10 units qPM


* Novolog sliding scale


* A1c 6.3% 1/1/18








ASSESSMENT:


* Mr. Dixon received 44 units of insulin yesterday with BSGs ranging from 76-

184 mg/dL in the past 24 hours


* Changes to insulin resistance:


 * Started once daily prednisone 40 mg (1st dose late yesterday)


* Fasting BSG = 184; this is already up 100 mg/dL from yesterday, most likely 

due to the dose of prednisone last night


* Postprandial BSGs were on the lower side yesterday but I expect them to 

increase today with the once daily prednisone


* Once daily NPH given with once daily prednisone is typically the most 

effective way to control postprandial elevation from steroids


* Will plan to add 0.4 units/kg of NPH x 1 today and re-assess tomorrow


* Lantus was already titrated down yesterday for lower fasting BSG.  Will 

continue this for usual basal needs.


* Will also continue Novolog to cover standard needs outside of prednisone








PLAN FOR INPATIENT GLYCEMIC CONTROL:





* NPH 35 units x 1 this AM to cover prednisone dose





* Basal insulin


 * Lantus 7 units SQ BID 





* Bolus insulin


 * NovoLog per scale ACHS or Q6hrs while NPO


 * Goal Range:  Low 110 mg/dL - High 140 mg/dL


 * Correction Factor:  25 mg/dL/unit


 * Nutritional / Prandial insulin per carb ratio of  1 unit per 8 grams CHO 

consumed

## 2018-01-04 NOTE — FAMILY MEDICINE PROGRESS NOTE
Progress Note


Date of Service


Jan 4, 2018.





Subjective


Pt evaluation today including:  conversation w/ patient, physical exam, chart 

review, lab review, review of studies, conversation w/ consultant, review of 

inpatient medication list


Pain:  denies


PO Intake:  adequate


Voiding:  no voiding problems


BP this am elevated up to 206/89 prior to antihypertensive administration. Bp 

eventually came down to 180's systolic. Patient denies HA, visual changes, N/V 

Chest pain, . SOB has resolved per patient. He reports persistent cough.





   Constitutional:  No fever, No chills, No weakness


   Respiratory:  + cough, No shortness of breath


   Cardiovascular:  No chest pain, No edema, No palpitations


   Abdomen:  No pain, No nausea, No vomiting, No diarrhea, No constipation





Medications





Current Inpatient Medications








 Medications


  (Trade)  Dose


 Ordered  Sig/Dc


 Route  Start Time


 Stop Time Status Last Admin


Dose Admin


 


 Acetaminophen


  (Tylenol Tab)  650 mg  Q4H  PRN


 PO  1/1/18 02:45


 1/31/18 02:44  1/4/18 00:48


650 MG


 


 Magnesium


 Hydroxide


  (Milk Of


 Magnesia Susp)  30 ml  Q6H  PRN


 PO  1/1/18 02:45


 1/31/18 02:44   


 


 


 Polyethylene


  (Miralax Powder


 Packet)  17 gm  DAILY  PRN


 PO  1/1/18 02:45


 1/31/18 02:44   


 


 


 Ondansetron HCl


  (Zofran Inj)  4 mg  Q6H  PRN


 IV  1/1/18 02:45


 1/31/18 02:44   


 


 


 Atorvastatin


 Calcium


  (Lipitor Tab)  80 mg  QAM


 PO  1/1/18 09:00


 1/31/18 08:59  1/4/18 08:05


80 MG


 


 Clonazepam


  (Klonopin Tab)  1 mg  HS  PRN


 PO  1/1/18 02:45


 1/31/18 02:44  1/4/18 00:48


1 MG


 


 Clopidogrel


 Bisulfate


  (plAVix TAB)  75 mg  QAM


 PO  1/1/18 09:00


 1/31/18 08:59  1/4/18 08:05


75 MG


 


 Divalproex Sodium


  (Depakote Delay


 Rel Tab)  2,000 mg  HS


 PO  1/1/18 21:00


 1/31/18 20:59  1/3/18 20:55


2,000 MG


 


 Docusate Sodium


  (coLACE CAP)  100 mg  BID  PRN


 PO  1/1/18 02:45


 1/31/18 02:44   


 


 


 Escitalopram


 Oxalate


  (Lexapro Tab)  20 mg  QAM


 PO  1/1/18 09:00


 1/31/18 08:59  1/4/18 08:05


20 MG


 


 Fluticasone


 Propionate


  (Flonase Nasal


 Spray)  2 sprays  DAILY  PRN


 VERONICA  1/1/18 02:45


 1/31/18 02:44   


 


 


 Gabapentin


  (Neurontin Cap)  300 mg  HS


 PO  1/1/18 21:00


 1/31/18 20:59  1/3/18 20:56


300 MG


 


 Isosorbide


 Mononitrate


  (Imdur Ext Rel


 Tab)  30 mg  QAM


 PO  1/1/18 09:00


 1/31/18 08:59  1/4/18 08:06


30 MG


 


 Metoprolol


 Succinate


  (Toprol Xl Tab)  50 mg  BID


 PO  1/1/18 09:00


 1/31/18 08:59  1/4/18 08:05


50 MG


 


 Nitroglycerin


  (Nitrostat Tab)  0.4 mg  UD  PRN


 UT  1/1/18 02:45


 1/31/18 02:44   


 


 


 Tamsulosin HCl


  (Flomax Cap)  0.4 mg  HS


 PO  1/1/18 21:00


 1/31/18 20:59  1/3/18 20:55


0.4 MG


 


 Warfarin Sodium


  (Coumadin Tab)  7.5 mg  SuMoWeThFr@1600


 PO  1/1/18 16:00


 1/31/18 15:59  1/3/18 15:38


7.5 MG


 


 Amlodipine


 Besylate


  (Norvasc Tab)  10 mg  QAM


 PO  1/1/18 09:00


 1/31/18 08:59  1/4/18 08:05


10 MG


 


 Mirtazapine


  (Remeron Tab)  45 mg  HS


 PO  1/1/18 21:00


 1/31/18 20:59  1/3/18 20:56


45 MG


 


 Spironolactone


  (Aldactone Tab)  50 mg  BID17


 PO  1/1/18 09:00


 1/31/18 08:59  1/4/18 08:06


50 MG


 


 Warfarin Sodium


  (Coumadin Tab)  10 mg  TuSa@1600


 PO  1/2/18 16:00


 2/1/18 15:59  1/2/18 16:51


10 MG


 


 Albuterol


  (Ventolin Hfa


 Inhaler)  2 puffs  Q4H  PRN


 INH  1/1/18 02:45


 1/31/18 02:44   


 


 


 Glucose


  (Glucose 40% Gel)  15-30


 GRAMS 15


 GRAMS...  UD  PRN


 PO  1/1/18 03:15


 1/31/18 03:14   


 


 


 Glucose


  (Glucose Chew


 Tab)  4-8


 Tablets 4


 Tabl...  UD  PRN


 PO  1/1/18 03:15


 1/31/18 03:14   


 


 


 Dextrose


  (Dextrose 50%


 50ML Syringe)  25-50ML OF


 50% DW IV


 FOR...  UD  PRN


 IV  1/1/18 03:15


 1/31/18 03:14   


 


 


 Glucagon


  (Glucagon Inj)  1 mg  UD  PRN


 SQ  1/1/18 03:15


 1/31/18 03:14   


 


 


 Albuterol/


 Ipratropium


  (Duoneb)  3 ml  QIDR


 INH  1/1/18 03:30


 1/31/18 03:29  1/4/18 11:19


3 ML


 


 Oxycodone HCl


  (Roxicodone


 Immediate Rel Tab)  5 mg  QID  PRN


 PO  1/1/18 05:30


 1/15/18 05:29  1/3/18 23:57


5 MG


 


 Insulin Aspart


  (novoLOG ASPART)  SLIDING


 SCALE  ACHS


 SC  1/1/18 07:30


 1/31/18 07:29  1/4/18 12:21


5 UNITS


 


 Insulin Glargine


  (Lantus Solostar


 Pen)  SEE


 PROTOCOL


 TEXT  BID


 SC  1/1/18 21:00


 1/31/18 20:59  1/4/18 08:12


7 UNITS


 


 Amoxicillin/


 Clavulanate


 Potassium


  (Augmentin Tab)  875 mg  BIDM


 PO  1/2/18 16:45


 1/8/18 16:44  1/4/18 08:04


875 MG


 


 Famotidine


  (Pepcid Tab)  20 mg  QAM


 PO  1/3/18 09:00


 2/2/18 08:59  1/4/18 08:07


20 MG


 


 Guaifenesin


  (Mucinex Contr


 Rel Tab)  1,200 mg  Q12


 PO  1/3/18 21:00


 2/2/18 20:59  1/4/18 08:07


1,200 MG


 


 Prednisone


  (PredniSONE TAB)  40 mg  DAILY


 PO  1/4/18 09:00


 2/3/18 08:59  1/4/18 08:06


40 MG


 


 Hydralazine HCl


  (Apresoline Tab)  10 mg  Q6  PRN


 PO  1/4/18 08:00


 2/3/18 07:59  1/4/18 12:21


10 MG











Objective


Vital Signs











  Date Time  Temp Pulse Resp B/P (MAP) Pulse Ox O2 Delivery O2 Flow Rate FiO2


 


1/4/18 12:00      Nasal Cannula 2.0 


 


1/4/18 11:46 36.3 88 18 183/91 (121) 94  3.0 


 


1/4/18 11:19  54 16  94 Nasal Cannula 3.0 


 


1/4/18 08:17 36.4 53 18 206/89 (128) 92  2.0 





    194/77 (116)    


 


1/4/18 08:00      Nasal Cannula 2.0 


 


1/4/18 07:50  54 16  92 Nasal Cannula 3.0 


 


1/4/18 04:00      Nasal Cannula 2.0 


 


1/4/18 03:05 37.3 55 19 164/63 (96) 92 Nasal Cannula 2.0 


 


1/4/18 00:49    172/75 (107)    


 


1/3/18 23:59      Nasal Cannula 2.0 


 


1/3/18 23:38 36.6 68 24 187/74 (111) 94 Nasal Cannula 2.0 


 


1/3/18 20:23 36.7 61 18 166/72 (103) 92 Nasal Cannula 3.0 


 


1/3/18 20:00      Nasal Cannula 4.0 


 


1/3/18 19:49  54 16  92 Nasal Cannula 3.0 


 


1/3/18 16:00      Nasal Cannula 4.0 


 


1/3/18 15:43 36.8 56 20 162/73 (102) 95 Nasal Cannula 3.0 


 


1/3/18 15:16  57 16  91 Nasal Cannula 3.0 











Physical Exam


General Appearance:  WD/WN, no apparent distress


Eyes:  PERRL, EOMI


Neck:  supple, trachea midline


Respiratory/Chest:  normal breath sounds, no respiratory distress, + crackles


Cardiovascular:  no edema, no murmur


Abdomen:  normal bowel sounds, non tender, soft


Extremities:  no pedal edema


Neurologic/Psychiatric:  alert, normal mood/affect, oriented x 3


Skin:  normal color, warm/dry





Laboratory Results


  


Results Past 24 Hours








Test


  1/3/18


20:44 1/4/18


06:50 1/4/18


07:33 1/4/18


11:33 Range/Units


 


 


Bedside Glucose 91 184  154 70-99  mg/dl


 


White Blood Count   7.72  4.8-10.8  K/uL


 


Red Blood Count   3.13  4.7-6.1  M/uL


 


Hemoglobin   9.3  14.0-18.0  g/dL


 


Hematocrit   28.1  42-52  %


 


Mean Corpuscular Volume   89.8    fL


 


Mean Corpuscular Hemoglobin   29.7  25-34  pg


 


Mean Corpuscular Hemoglobin


Concent 


  


  33.1


  


  32-36  g/dl


 


 


Platelet Count   257  130-400  K/uL


 


Mean Platelet Volume   8.6  7.4-10.4  fL


 


Neutrophils (%) (Auto)   80.4   %


 


Lymphocytes (%) (Auto)   11.0   %


 


Monocytes (%) (Auto)   7.6   %


 


Eosinophils (%) (Auto)   0.1   %


 


Basophils (%) (Auto)   0.1   %


 


Neutrophils # (Auto)   6.20  1.4-6.5  K/uL


 


Lymphocytes # (Auto)   0.85  1.2-3.4  K/uL


 


Monocytes # (Auto)   0.59  0.11-0.59  K/uL


 


Eosinophils # (Auto)   0.01  0-0.5  K/uL


 


Basophils # (Auto)   0.01  0-0.2  K/uL


 


RDW Standard Deviation   49.6  36.4-46.3  fL


 


RDW Coefficient of Variation   15.3  11.5-14.5  %


 


Immature Granulocyte % (Auto)   0.8   %


 


Immature Granulocyte # (Auto)   0.06  0.00-0.02  K/uL


 


Prothrombin Time


  


  


  31.6


  


  9.0-12.0


SECONDS


 


Prothromb Time International


Ratio 


  


  3.1


  


  0.9-1.1  


 


 


Sodium Level   136  136-145  mmol/L


 


Potassium Level   5.0  3.5-5.1  mmol/L


 


Chloride Level   107    mmol/L


 


Carbon Dioxide Level   24  21-32  mmol/L


 


Anion Gap   5.0  3-11  mmol/L


 


Blood Urea Nitrogen   56  7-18  mg/dl


 


Creatinine


  


  


  1.58


  


  0.60-1.40


mg/dl


 


Est Creatinine Clear Calc


Drug Dose 


  


  57.9


  


   ml/min


 


 


Estimated GFR (


American) 


  


  54.3


  


   


 


 


Estimated GFR (Non-


American 


  


  46.8


  


   


 


 


BUN/Creatinine Ratio   35.4  10-20  


 


Random Glucose   165  70-99  mg/dl


 


Calcium Level   8.3  8.5-10.1  mg/dl











Assessment and Plan


61 yo M w/ COPD , HTN, PAD, DM, CKD Stage III, L Atrial Thrombus, Bipolar 

disorder/Anxiety presenting with Dyspnea, found to be in Acute Hypoxic 

Respiratory failure





 


1) Acute Hypoxic Respiratory Failure: likely secondary to HCAP 


-productive Cough persists,  SOB significantly improved, weaned to 2L NC. 

attempt to wean further


- CXR (1/2) - Airspace opacities are present at both lung bases. likely related 

to interstitial edema,


- Obtained MRSA Swab: negative


- Continue Augmentin 875 mg BID


- Wheezing: resolved, Continue  Duonebs








2) CKD Stage 3


- stable


- (baseline approximately 1.4)


- F/u daily BMP's





3) CAD - stable asx


      s/p NSTEMI (2007) s/p stent distal circumflex; V-fib arrest at the time 

of his 2007 MI.  s/p drug-eluting stent( 2010) to the mid LAD and a previous 

stent in the circumflex was occluded.


- PAD - S/p stenting of his popliteal and SFA in May 2017


- Plavix 75mg QAM


- Metoprolol Succinate 50mg





4) Hyperlipidemia


- Atorvastatin 80mg QAM





5) CHF


- Asx, stable


- Lasix 40mg IV in the ED


- Lasix reportedly taken at home for weight gain


- ProBNP 13000 (on 12/23 was [id number])


- Echo 12/23: 


* Ejection Fraction = 55%.


* The basal inferior, basal to mid inferolateral and basal to mid lateral wall 

are akinetic.


* The right ventricular cavity size is enlarged (proximal parasternal long axis 

right ventricular outflow tract dimension >3.3 cm).


* The right ventricular systolic function is normal as assessed by tricuspid 

annular plane systolic excursion (TAPSE) (normal >1.5 cm).


* The left atrium is severely dilated.


* Mild to moderate valvular aortic stenosis.


* Diastolic dysfunction, Grade II, consistent with elevated left atrial 

pressure.





6) Anxiety


- Clonazepam 1mg PO HS PRN 





7) Left Atrial Thrombus


- Coumadin  10 mg daily





8) HTN


- inadequately controlled


- Continue home Metoprolol, Amlodipine, Aldactone


- Added PRN Hydralazine





9) Anemia of Chronic Disease


- at Baseline 9-10


- On Ferrous Sulfate 325mg PO BID at Home





10) Bipolar Disorder


- Continue home Depakote





11) Chronic Pain in lower extremities


- Oxycodone 5mg qid





12) Code Status


- Full Resuscitation





13) DVT Prophylaxis


- Warfarin


Continued [facility] stay due to:  abnormal vital signs


Discharge planning:  home





Resident Tracking


Resident Involvement:  Resident Care Provided


Care Provided:  Adult Hospital Medicine





History


Resident Physician Supervision Note:





I was present with Dr. Pierre during the history and exam. I discussed the case 

with the resident and agree with the findings and plan as documented in the 

note.  Any exceptions or clarifications are listed here.





Pt reports improvement in shortness of breath and wheezing since starting 

steroid therapy and is feeling closer to baseline. Reports no HA, 

lightheadedness, chest pain, palpitations, nausea, melena, diarrhea/constipation

, fatigue, vision/hearing changes.


General Appearance:  no apparent distress


Eye Exam:  bilateral eye PERRL, bilateral eye EOMI


Respiratory:  chest non-tender, no respiratory distress, decreased breath sounds


Cardiovascular:  normal peripheral pulses, regular rate, rhythm, no edema, no 

murmur


Gastrointestinal:  normal bowel sounds, non tender, soft, no organomegaly


Neurologic/Psychiatric:  CNs II-XII nml as tested, alert, normal mood/affect, 

oriented x 3


Assessment/Plan


63 y/o male h/o COPD, HTN, PAD, DMII, CKD III, left atrial thrombus, bipolar/

anxeity p/w SOB





AHRF 2/2 HCAP  in the setting of ?COPD - O2 requirement improving - continue 

augmentin, prednisone, duonebs. Wean O2 as tolerated





CHF - continue furosemide, metoprolol - echo completed w/ EF 55% - would 

consider addition of ACE-I if not contraindicated when CKD returns to baseline


HTN - amlodipine 10mg, metoprolol 50mg, imdur 30mg, spironolactone 50mg. Add 

chlorthalidone 25mg. Hydralazine PRN


Anemia - stable, guiac and C. diff neg w/ nl BM now


CKD III - elevated Cr. Trend, avoid nephrotoxic medications


DMII - adjust for hyperglycemia


CAD s/p STEMI 2007 w/ arrest and ERICK - continue plavix, metoprolol


HLD - continue atorvastatin


Anxiety - continue clonazepam


Left atrial thrombus - continue coumadin


HTN - continue amlodipine, metoprolol, aldactone





FULL Code

## 2018-01-05 VITALS — HEART RATE: 59 BPM | OXYGEN SATURATION: 92 %

## 2018-01-05 VITALS — OXYGEN SATURATION: 96 % | HEART RATE: 71 BPM | SYSTOLIC BLOOD PRESSURE: 162 MMHG | DIASTOLIC BLOOD PRESSURE: 73 MMHG

## 2018-01-05 VITALS — OXYGEN SATURATION: 91 %

## 2018-01-05 VITALS — HEART RATE: 62 BPM | DIASTOLIC BLOOD PRESSURE: 101 MMHG | SYSTOLIC BLOOD PRESSURE: 207 MMHG

## 2018-01-05 VITALS
TEMPERATURE: 97.88 F | DIASTOLIC BLOOD PRESSURE: 96 MMHG | OXYGEN SATURATION: 93 % | HEART RATE: 63 BPM | SYSTOLIC BLOOD PRESSURE: 213 MMHG

## 2018-01-05 VITALS — SYSTOLIC BLOOD PRESSURE: 173 MMHG | DIASTOLIC BLOOD PRESSURE: 76 MMHG | HEART RATE: 71 BPM

## 2018-01-05 VITALS — HEART RATE: 60 BPM | DIASTOLIC BLOOD PRESSURE: 73 MMHG | SYSTOLIC BLOOD PRESSURE: 186 MMHG

## 2018-01-05 VITALS
HEART RATE: 60 BPM | OXYGEN SATURATION: 98 % | TEMPERATURE: 98.06 F | SYSTOLIC BLOOD PRESSURE: 167 MMHG | DIASTOLIC BLOOD PRESSURE: 68 MMHG

## 2018-01-05 VITALS — HEART RATE: 61 BPM | OXYGEN SATURATION: 92 %

## 2018-01-05 VITALS — DIASTOLIC BLOOD PRESSURE: 67 MMHG | OXYGEN SATURATION: 92 % | HEART RATE: 93 BPM | SYSTOLIC BLOOD PRESSURE: 122 MMHG

## 2018-01-05 VITALS — OXYGEN SATURATION: 96 % | HEART RATE: 57 BPM

## 2018-01-05 VITALS — DIASTOLIC BLOOD PRESSURE: 75 MMHG | SYSTOLIC BLOOD PRESSURE: 206 MMHG

## 2018-01-05 VITALS — HEART RATE: 64 BPM | OXYGEN SATURATION: 95 %

## 2018-01-05 VITALS — OXYGEN SATURATION: 98 %

## 2018-01-05 LAB
BASOPHILS # BLD: 0.02 K/UL (ref 0–0.2)
BASOPHILS NFR BLD: 0.2 %
BUN SERPL-MCNC: 51 MG/DL (ref 7–18)
CALCIUM SERPL-MCNC: 8.4 MG/DL (ref 8.5–10.1)
CO2 SERPL-SCNC: 27 MMOL/L (ref 21–32)
CREAT SERPL-MCNC: 1.45 MG/DL (ref 0.6–1.4)
EOS ABS #: 0.05 K/UL (ref 0–0.5)
EOSINOPHIL NFR BLD AUTO: 287 K/UL (ref 130–400)
GLUCOSE SERPL-MCNC: 163 MG/DL (ref 70–99)
HCT VFR BLD CALC: 28.4 % (ref 42–52)
HGB BLD-MCNC: 9.3 G/DL (ref 14–18)
IG#: 0.16 K/UL (ref 0–0.02)
IMM GRANULOCYTES NFR BLD AUTO: 15.7 %
LYMPHOCYTES # BLD: 1.42 K/UL (ref 1.2–3.4)
MCH RBC QN AUTO: 29.5 PG (ref 25–34)
MCHC RBC AUTO-ENTMCNC: 32.7 G/DL (ref 32–36)
MCV RBC AUTO: 90.2 FL (ref 80–100)
MONO ABS #: 0.95 K/UL (ref 0.11–0.59)
MONOCYTES NFR BLD: 10.5 %
NEUT ABS #: 6.45 K/UL (ref 1.4–6.5)
NEUTROPHILS # BLD AUTO: 0.6 %
NEUTROPHILS NFR BLD AUTO: 71.2 %
PMV BLD AUTO: 8.9 FL (ref 7.4–10.4)
POTASSIUM SERPL-SCNC: 4.7 MMOL/L (ref 3.5–5.1)
RED CELL DISTRIBUTION WIDTH CV: 15.4 % (ref 11.5–14.5)
RED CELL DISTRIBUTION WIDTH SD: 50.7 FL (ref 36.4–46.3)
SODIUM SERPL-SCNC: 138 MMOL/L (ref 136–145)
WBC # BLD AUTO: 9.05 K/UL (ref 4.8–10.8)

## 2018-01-05 RX ADMIN — ATORVASTATIN CALCIUM SCH MG: 40 TABLET, FILM COATED ORAL at 07:52

## 2018-01-05 RX ADMIN — CLOPIDOGREL BISULFATE SCH MG: 75 TABLET, FILM COATED ORAL at 07:53

## 2018-01-05 RX ADMIN — GABAPENTIN SCH MG: 300 CAPSULE ORAL at 20:48

## 2018-01-05 RX ADMIN — INSULIN ASPART SCH UNITS: 100 INJECTION, SOLUTION INTRAVENOUS; SUBCUTANEOUS at 17:12

## 2018-01-05 RX ADMIN — INSULIN GLARGINE SCH UNITS: 100 INJECTION, SOLUTION SUBCUTANEOUS at 21:00

## 2018-01-05 RX ADMIN — SPIRONOLACTONE SCH MG: 25 TABLET, FILM COATED ORAL at 17:09

## 2018-01-05 RX ADMIN — MIRTAZAPINE SCH MG: 15 TABLET, FILM COATED ORAL at 20:49

## 2018-01-05 RX ADMIN — OXYCODONE HYDROCHLORIDE PRN MG: 5 TABLET ORAL at 09:18

## 2018-01-05 RX ADMIN — TAMSULOSIN HYDROCHLORIDE SCH MG: 0.4 CAPSULE ORAL at 20:47

## 2018-01-05 RX ADMIN — INSULIN GLARGINE SCH UNITS: 100 INJECTION, SOLUTION SUBCUTANEOUS at 08:00

## 2018-01-05 RX ADMIN — INSULIN ASPART SCH UNITS: 100 INJECTION, SOLUTION INTRAVENOUS; SUBCUTANEOUS at 07:59

## 2018-01-05 RX ADMIN — HYDRALAZINE HYDROCHLORIDE SCH MG: 10 TABLET ORAL at 00:32

## 2018-01-05 RX ADMIN — HYDRALAZINE HYDROCHLORIDE SCH MG: 10 TABLET ORAL at 05:03

## 2018-01-05 RX ADMIN — GUAIFENESIN SCH MG: 600 TABLET, EXTENDED RELEASE ORAL at 20:47

## 2018-01-05 RX ADMIN — HYDRALAZINE HYDROCHLORIDE SCH MG: 10 TABLET ORAL at 12:28

## 2018-01-05 RX ADMIN — AMOXICILLIN AND CLAVULANATE POTASSIUM SCH MG: 875; 125 TABLET, FILM COATED ORAL at 17:09

## 2018-01-05 RX ADMIN — INSULIN ASPART SCH UNITS: 100 INJECTION, SOLUTION INTRAVENOUS; SUBCUTANEOUS at 12:32

## 2018-01-05 RX ADMIN — WARFARIN SODIUM SCH MG: 7.5 TABLET ORAL at 16:31

## 2018-01-05 RX ADMIN — FAMOTIDINE SCH MG: 20 TABLET, FILM COATED ORAL at 07:51

## 2018-01-05 RX ADMIN — SPIRONOLACTONE SCH MG: 25 TABLET, FILM COATED ORAL at 07:52

## 2018-01-05 RX ADMIN — DIVALPROEX SODIUM SCH MG: 500 TABLET, DELAYED RELEASE ORAL at 20:46

## 2018-01-05 RX ADMIN — ISOSORBIDE MONONITRATE SCH MG: 30 TABLET, EXTENDED RELEASE ORAL at 07:51

## 2018-01-05 RX ADMIN — INSULIN ASPART SCH UNITS: 100 INJECTION, SOLUTION INTRAVENOUS; SUBCUTANEOUS at 20:59

## 2018-01-05 RX ADMIN — HYDRALAZINE HYDROCHLORIDE SCH MG: 10 TABLET ORAL at 23:47

## 2018-01-05 RX ADMIN — HYDRALAZINE HYDROCHLORIDE SCH MG: 10 TABLET ORAL at 17:56

## 2018-01-05 RX ADMIN — AMLODIPINE BESYLATE SCH MG: 5 TABLET ORAL at 06:26

## 2018-01-05 RX ADMIN — GUAIFENESIN SCH MG: 600 TABLET, EXTENDED RELEASE ORAL at 07:52

## 2018-01-05 RX ADMIN — IPRATROPIUM BROMIDE AND ALBUTEROL SULFATE SCH ML: .5; 3 SOLUTION RESPIRATORY (INHALATION) at 06:57

## 2018-01-05 RX ADMIN — IPRATROPIUM BROMIDE AND ALBUTEROL SULFATE SCH ML: .5; 3 SOLUTION RESPIRATORY (INHALATION) at 19:35

## 2018-01-05 RX ADMIN — METOPROLOL SUCCINATE SCH MG: 50 TABLET, EXTENDED RELEASE ORAL at 20:49

## 2018-01-05 RX ADMIN — ESCITALOPRAM OXALATE SCH MG: 20 TABLET, FILM COATED ORAL at 07:52

## 2018-01-05 RX ADMIN — IPRATROPIUM BROMIDE AND ALBUTEROL SULFATE SCH ML: .5; 3 SOLUTION RESPIRATORY (INHALATION) at 11:09

## 2018-01-05 RX ADMIN — IPRATROPIUM BROMIDE AND ALBUTEROL SULFATE SCH ML: .5; 3 SOLUTION RESPIRATORY (INHALATION) at 15:24

## 2018-01-05 RX ADMIN — METOPROLOL SUCCINATE SCH MG: 50 TABLET, EXTENDED RELEASE ORAL at 06:25

## 2018-01-05 RX ADMIN — AMOXICILLIN AND CLAVULANATE POTASSIUM SCH MG: 875; 125 TABLET, FILM COATED ORAL at 07:51

## 2018-01-05 RX ADMIN — OXYCODONE HYDROCHLORIDE PRN MG: 5 TABLET ORAL at 17:38

## 2018-01-05 NOTE — FAMILY MEDICINE PROGRESS NOTE
Progress Note


Date of Service


Jan 5, 2018.





Subjective


Pt evaluation today including:  conversation w/ patient, physical exam, chart 

review, lab review, review of studies, review of inpatient medication list


Feels similar to the previous day. Still having productive white phlegm cough. 

Feels he is going to need oxygen in order to go home.





   All Other Systems:  Reviewed and Negative





Medications





Current Inpatient Medications








 Medications


  (Trade)  Dose


 Ordered  Sig/Dc


 Route  Start Time


 Stop Time Status Last Admin


Dose Admin


 


 Acetaminophen


  (Tylenol Tab)  650 mg  Q4H  PRN


 PO  1/1/18 02:45


 1/31/18 02:44  1/4/18 00:48


650 MG


 


 Magnesium


 Hydroxide


  (Milk Of


 Magnesia Susp)  30 ml  Q6H  PRN


 PO  1/1/18 02:45


 1/31/18 02:44   


 


 


 Polyethylene


  (Miralax Powder


 Packet)  17 gm  DAILY  PRN


 PO  1/1/18 02:45


 1/31/18 02:44   


 


 


 Ondansetron HCl


  (Zofran Inj)  4 mg  Q6H  PRN


 IV  1/1/18 02:45


 1/31/18 02:44   


 


 


 Atorvastatin


 Calcium


  (Lipitor Tab)  80 mg  QAM


 PO  1/1/18 09:00


 1/31/18 08:59  1/5/18 07:52


80 MG


 


 Clonazepam


  (Klonopin Tab)  1 mg  HS  PRN


 PO  1/1/18 02:45


 1/31/18 02:44  1/4/18 00:48


1 MG


 


 Clopidogrel


 Bisulfate


  (plAVix TAB)  75 mg  QAM


 PO  1/1/18 09:00


 1/31/18 08:59  1/5/18 07:53


75 MG


 


 Divalproex Sodium


  (Depakote Delay


 Rel Tab)  2,000 mg  HS


 PO  1/1/18 21:00


 1/31/18 20:59  1/4/18 20:09


2,000 MG


 


 Docusate Sodium


  (coLACE CAP)  100 mg  BID  PRN


 PO  1/1/18 02:45


 1/31/18 02:44   


 


 


 Escitalopram


 Oxalate


  (Lexapro Tab)  20 mg  QAM


 PO  1/1/18 09:00


 1/31/18 08:59  1/5/18 07:52


20 MG


 


 Fluticasone


 Propionate


  (Flonase Nasal


 Spray)  2 sprays  DAILY  PRN


 VERONICA  1/1/18 02:45


 1/31/18 02:44   


 


 


 Gabapentin


  (Neurontin Cap)  300 mg  HS


 PO  1/1/18 21:00


 1/31/18 20:59  1/4/18 20:09


300 MG


 


 Isosorbide


 Mononitrate


  (Imdur Ext Rel


 Tab)  30 mg  QAM


 PO  1/1/18 09:00


 1/31/18 08:59  1/5/18 07:51


30 MG


 


 Metoprolol


 Succinate


  (Toprol Xl Tab)  50 mg  BID


 PO  1/1/18 09:00


 1/31/18 08:59  1/5/18 06:25


50 MG


 


 Nitroglycerin


  (Nitrostat Tab)  0.4 mg  UD  PRN


 UT  1/1/18 02:45


 1/31/18 02:44   


 


 


 Tamsulosin HCl


  (Flomax Cap)  0.4 mg  HS


 PO  1/1/18 21:00


 1/31/18 20:59  1/4/18 20:09


0.4 MG


 


 Warfarin Sodium


  (Coumadin Tab)  7.5 mg  SuMoWeThFr@1600


 PO  1/1/18 16:00


 1/31/18 15:59  1/4/18 17:01


7.5 MG


 


 Amlodipine


 Besylate


  (Norvasc Tab)  10 mg  QAM


 PO  1/1/18 09:00


 1/31/18 08:59  1/5/18 06:26


10 MG


 


 Mirtazapine


  (Remeron Tab)  45 mg  HS


 PO  1/1/18 21:00


 1/31/18 20:59  1/4/18 20:08


45 MG


 


 Spironolactone


  (Aldactone Tab)  50 mg  BID17


 PO  1/1/18 09:00


 1/31/18 08:59  1/5/18 07:52


50 MG


 


 Warfarin Sodium


  (Coumadin Tab)  10 mg  TuSa@1600


 PO  1/2/18 16:00


 2/1/18 15:59  1/2/18 16:51


10 MG


 


 Albuterol


  (Ventolin Hfa


 Inhaler)  2 puffs  Q4H  PRN


 INH  1/1/18 02:45


 1/31/18 02:44   


 


 


 Glucose


  (Glucose 40% Gel)  15-30


 GRAMS 15


 GRAMS...  UD  PRN


 PO  1/1/18 03:15


 1/31/18 03:14   


 


 


 Glucose


  (Glucose Chew


 Tab)  4-8


 Tablets 4


 Tabl...  UD  PRN


 PO  1/1/18 03:15


 1/31/18 03:14   


 


 


 Dextrose


  (Dextrose 50%


 50ML Syringe)  25-50ML OF


 50% DW IV


 FOR...  UD  PRN


 IV  1/1/18 03:15


 1/31/18 03:14   


 


 


 Glucagon


  (Glucagon Inj)  1 mg  UD  PRN


 SQ  1/1/18 03:15


 1/31/18 03:14   


 


 


 Albuterol/


 Ipratropium


  (Duoneb)  3 ml  QIDR


 INH  1/1/18 03:30


 1/31/18 03:29  1/5/18 06:57


3 ML


 


 Oxycodone HCl


  (Roxicodone


 Immediate Rel Tab)  5 mg  QID  PRN


 PO  1/1/18 05:30


 1/15/18 05:29  1/5/18 09:18


5 MG


 


 Insulin Aspart


  (novoLOG ASPART)  SLIDING


 SCALE  ACHS


 SC  1/1/18 07:30


 1/31/18 07:29  1/5/18 07:59


10 UNITS


 


 Insulin Glargine


  (Lantus Solostar


 Pen)  SEE


 PROTOCOL


 TEXT  BID


 SC  1/1/18 21:00


 1/31/18 20:59  1/5/18 08:00


7 UNITS


 


 Amoxicillin/


 Clavulanate


 Potassium


  (Augmentin Tab)  875 mg  BIDM


 PO  1/2/18 16:45


 1/8/18 16:44  1/5/18 07:51


875 MG


 


 Famotidine


  (Pepcid Tab)  20 mg  QAM


 PO  1/3/18 09:00


 2/2/18 08:59  1/5/18 07:51


20 MG


 


 Guaifenesin


  (Mucinex Contr


 Rel Tab)  1,200 mg  Q12


 PO  1/3/18 21:00


 2/2/18 20:59  1/5/18 07:52


1,200 MG


 


 Prednisone


  (PredniSONE TAB)  40 mg  DAILY


 PO  1/4/18 09:00


 2/3/18 08:59  1/5/18 07:53


40 MG


 


 Hydralazine HCl


  (Apresoline Tab)  10 mg  Q6


 PO  1/4/18 20:00


 2/3/18 19:59  1/5/18 05:03


10 MG











Objective


Vital Signs











  Date Time  Temp Pulse Resp B/P (MAP) Pulse Ox O2 Delivery O2 Flow Rate FiO2


 


1/5/18 08:11 36.6 63 20 213/96 (135) 93 Room Air  


 


1/5/18 07:01  57 16  96 Nasal Cannula 3.0 


 


1/5/18 06:21  62  207/101 (136)    


 


1/5/18 05:02    206/75 (118)    


 


1/5/18 00:00      Nasal Cannula 2.0 


 


1/4/18 23:23 36.9 63 18 164/73 (103) 92 Nasal Cannula 3.0 


 


1/4/18 21:00     92 Nasal Cannula 2.0 


 


1/4/18 20:25 36.6 63 24  92  2.0 


 


1/4/18 20:00      Nasal Cannula 2.0 


 


1/4/18 19:43 36.6 63 24 174/87 (116) 92 Nasal Cannula 3.0 


 


1/4/18 19:39  64 16  94 Nasal Cannula 3.0 


 


1/4/18 16:00      Nasal Cannula 4.0 


 


1/4/18 15:22 36.6 57 18 164/70 (101) 94 Room Air  


 


1/4/18 15:16  54 16  96 Nasal Cannula 3.0 


 


1/4/18 12:00      Nasal Cannula 2.0 


 


1/4/18 11:46 36.3 88 18 183/91 (121) 94  3.0 


 


1/4/18 11:19  54 16  94 Nasal Cannula 3.0 











Physical Exam


General Appearance:  WD/WN, no apparent distress


Eyes:  normal inspection


Neck:  no JVD, trachea midline


Respiratory/Chest:  lungs clear (clear but reduced breath sounds throughout), 

normal breath sounds, no respiratory distress, no accessory muscle use


Cardiovascular:  regular rate, rhythm, no edema, no murmur


Abdomen:  normal bowel sounds, non tender, soft


Extremities:  no pedal edema


Neurologic/Psychiatric:  no motor/sensory deficits (grossly), alert, normal mood

/affect


Skin:  + pertinent finding (leg scabbing noted without surrounding cellulitis, 

right wound dressed)





Laboratory Results


1/5/18 05:22








Red Blood Count 3.15, Mean Corpuscular Volume 90.2, Mean Corpuscular Hemoglobin 

29.5, Mean Corpuscular Hemoglobin Concent 32.7, Mean Platelet Volume 8.9, 

Neutrophils (%) (Auto) 71.2, Lymphocytes (%) (Auto) 15.7, Monocytes (%) (Auto) 

10.5, Eosinophils (%) (Auto) 0.6, Basophils (%) (Auto) 0.2, Neutrophils # (Auto

) 6.45, Lymphocytes # (Auto) 1.42, Monocytes # (Auto) 0.95, Eosinophils # (Auto

) 0.05, Basophils # (Auto) 0.02





1/5/18 05:22

















Test


  1/5/18


05:22 1/5/18


07:27


 


White Blood Count


  9.05 K/uL


(4.8-10.8) 


 


 


Red Blood Count


  3.15 M/uL


(4.7-6.1) 


 


 


Hemoglobin


  9.3 g/dL


(14.0-18.0) 


 


 


Hematocrit 28.4 % (42-52)  


 


Mean Corpuscular Volume


  90.2 fL


() 


 


 


Mean Corpuscular Hemoglobin


  29.5 pg


(25-34) 


 


 


Mean Corpuscular Hemoglobin


Concent 32.7 g/dl


(32-36) 


 


 


Platelet Count


  287 K/uL


(130-400) 


 


 


Mean Platelet Volume


  8.9 fL


(7.4-10.4) 


 


 


Neutrophils (%) (Auto) 71.2 %  


 


Lymphocytes (%) (Auto) 15.7 %  


 


Monocytes (%) (Auto) 10.5 %  


 


Eosinophils (%) (Auto) 0.6 %  


 


Basophils (%) (Auto) 0.2 %  


 


Neutrophils # (Auto)


  6.45 K/uL


(1.4-6.5) 


 


 


Lymphocytes # (Auto)


  1.42 K/uL


(1.2-3.4) 


 


 


Monocytes # (Auto)


  0.95 K/uL


(0.11-0.59) 


 


 


Eosinophils # (Auto)


  0.05 K/uL


(0-0.5) 


 


 


Basophils # (Auto)


  0.02 K/uL


(0-0.2) 


 


 


RDW Standard Deviation


  50.7 fL


(36.4-46.3) 


 


 


RDW Coefficient of Variation


  15.4 %


(11.5-14.5) 


 


 


Immature Granulocyte % (Auto) 1.8 %  


 


Immature Granulocyte # (Auto)


  0.16 K/uL


(0.00-0.02) 


 


 


Anion Gap


  4.0 mmol/L


(3-11) 


 


 


Est Creatinine Clear Calc


Drug Dose 63.0 ml/min 


  


 


 


Estimated GFR (


American) 60.2 


  


 


 


Estimated GFR (Non-


American 52.0 


  


 


 


BUN/Creatinine Ratio 35.2 (10-20)  


 


Calcium Level


  8.4 mg/dl


(8.5-10.1) 


 


 


Bedside Glucose


  


  193 mg/dl


(70-99)











Assessment and Plan


63 yo M w/ COPD , HTN, PAD, DM, CKD Stage III, L Atrial Thrombus, Bipolar 

disorder/Anxiety presenting with Dyspnea, found to be in Acute Hypoxic 

Respiratory failure





 


Acute Hypoxic Respiratory Failure: likely secondary to HCAP 


-productive Cough persists,  SOB significantly improved, weaned to 2L NC. 

attempt to wean further - will likely need 2 step before discharge


- CXR (1/2) - Airspace opacities are present at both lung bases. likely related 

to interstitial edema,


- Obtained MRSA Swab: negative


- Continue Augmentin 875 mg BID, not on azithromycin and appears to be 

improving therefore will defer coverage for atypicals


- Wheezing: resolved, Continue  Duonebs





COPD exacerbation - wheezing on examination, never had PFTs but trial of 

tiotropium by PCP.


- started prednisone yesterday 50mg PO daily


- continue duonebs Q4H DC and PRN





Hypertension


- started chlorthalidone yesterday


- increase hydralazine to 25mg Q6H DC


- Continue metoprolol (limited by HR) and aldactone


- recently stopped ACEi as outpatient due to rising Cr therefore will not 

implement that at this time





CKD Stage 3


- stable


- (baseline approximately 1.4)


- F/u daily BMP's





CAD - stable asx


      s/p NSTEMI (2007) s/p stent distal circumflex; V-fib arrest at the time 

of his 2007 MI.  s/p drug-eluting stent( 2010) to the mid LAD and a previous 

stent in the circumflex was occluded.


- PAD - S/p stenting of his popliteal and SFA in May 2017


- Plavix 75mg QAM


- Metoprolol Succinate 50mg


- Atorvastatin 80mg QPM





Hyperlipidemia


- Atorvastatin 80mg QAM





CHF


- Lasix 40mg IV in the ED


- Lasix reportedly taken at home PRN for weight gain


- ProBNP 75016 (on 12/23 was [id number])


- Echo 12/23: 


* Ejection Fraction = 55%.


* The basal inferior, basal to mid inferolateral and basal to mid lateral wall 

are akinetic.


* The right ventricular cavity size is enlarged (proximal parasternal long axis 

right ventricular outflow tract dimension >3.3 cm).


* The right ventricular systolic function is normal as assessed by tricuspid 

annular plane systolic excursion (TAPSE) (normal >1.5 cm).


* The left atrium is severely dilated.


* Mild to moderate valvular aortic stenosis.


* Diastolic dysfunction, Grade II, consistent with elevated left atrial 

pressure.


- no current exacerbation


- I&Os stable





Anxiety


- Clonazepam 1mg PO HS PRN 





Left Atrial Thrombus


- Coumadin 10 mg daily


- INR 3.1 1/4/17





Anemia of Chronic Disease


- at Baseline 9-10


- On Ferrous Sulfate 325mg PO BID at Home





Bipolar Disorder


- Continue home Depakote





Chronic Pain in lower extremities


- Oxycodone 5mg qid





VTE Prophylaxis


- Warfarin with therapeutic INR





Code


- Full





Disposition


- plan for home in discharge, continued stay due to uncontrolled HTN and O2 

requirements





History


Resident Physician Supervision Note:





I was present with Dr. Gutierrez during the history and exam. I discussed the case 

with the resident and agree with the findings and plan as documented in the 

note.  Any exceptions or clarifications are listed here.





Pt reports continued improvement of shortness of breath and nonproductive 

cough. Reports no HA, CP, palpitations, leg swelling, nausea, abd pain


General Appearance:  WD/WN, no apparent distress


Respiratory:  chest non-tender, lungs clear, normal breath sounds, no 

respiratory distress


Cardiovascular:  normal peripheral pulses, regular rate, rhythm, no murmur


Gastrointestinal:  normal bowel sounds, non tender, soft, no organomegaly


Assessment/Plan


63 y/o male h/o COPD, HTN, PAD, DMII, CKD III, left atrial thrombus, bipolar/

anxeity p/w SOB





AHRF 2/2 HCAP  in the setting of ?COPD - O2 requirement improving - continue 

augmentin, prednisone, duonebs. Wean O2 as tolerated


CHF - continue furosemide, metoprolol - echo completed w/ EF 55% - would 

consider addition of ACE-I if not contraindicated when CKD returns to baseline


HTN - amlodipine 10mg, metoprolol 50mg, imdur 30mg, spironolactone 50mg. Add 

chlorthalidone 25mg. Hydralazine 25mg q6





CKD III - elevated Cr. Trend, avoid nephrotoxic medications


DMII - adjust for hyperglycemia


Anemia - stable, guiac and C. diff neg w/ nl BM now


CAD s/p STEMI 2007 w/ arrest and ERICK - continue plavix, metoprolol


HLD - continue atorvastatin


Anxiety - continue clonazepam


Left atrial thrombus - continue coumadin


HTN - continue amlodipine, metoprolol, aldactone





FULL Code

## 2018-01-06 VITALS — HEART RATE: 56 BPM | DIASTOLIC BLOOD PRESSURE: 63 MMHG | SYSTOLIC BLOOD PRESSURE: 156 MMHG

## 2018-01-06 VITALS — DIASTOLIC BLOOD PRESSURE: 73 MMHG | HEART RATE: 62 BPM | SYSTOLIC BLOOD PRESSURE: 167 MMHG

## 2018-01-06 VITALS — OXYGEN SATURATION: 91 % | HEART RATE: 57 BPM

## 2018-01-06 VITALS
SYSTOLIC BLOOD PRESSURE: 151 MMHG | OXYGEN SATURATION: 92 % | DIASTOLIC BLOOD PRESSURE: 74 MMHG | HEART RATE: 61 BPM | TEMPERATURE: 97.52 F

## 2018-01-06 VITALS
HEART RATE: 66 BPM | OXYGEN SATURATION: 94 % | TEMPERATURE: 97.88 F | SYSTOLIC BLOOD PRESSURE: 146 MMHG | DIASTOLIC BLOOD PRESSURE: 66 MMHG

## 2018-01-06 VITALS — OXYGEN SATURATION: 92 % | HEART RATE: 55 BPM

## 2018-01-06 VITALS — DIASTOLIC BLOOD PRESSURE: 75 MMHG | HEART RATE: 58 BPM | SYSTOLIC BLOOD PRESSURE: 170 MMHG

## 2018-01-06 VITALS
TEMPERATURE: 98.42 F | OXYGEN SATURATION: 92 % | SYSTOLIC BLOOD PRESSURE: 175 MMHG | HEART RATE: 64 BPM | DIASTOLIC BLOOD PRESSURE: 77 MMHG

## 2018-01-06 VITALS — OXYGEN SATURATION: 94 % | HEART RATE: 57 BPM

## 2018-01-06 LAB
BUN SERPL-MCNC: 44 MG/DL (ref 7–18)
CALCIUM SERPL-MCNC: 8.4 MG/DL (ref 8.5–10.1)
CO2 SERPL-SCNC: 28 MMOL/L (ref 21–32)
CREAT SERPL-MCNC: 1.2 MG/DL (ref 0.6–1.4)
GLUCOSE SERPL-MCNC: 96 MG/DL (ref 70–99)
INR PPP: 3.5 (ref 0.9–1.1)
POTASSIUM SERPL-SCNC: 4.6 MMOL/L (ref 3.5–5.1)
SODIUM SERPL-SCNC: 139 MMOL/L (ref 136–145)

## 2018-01-06 RX ADMIN — SPIRONOLACTONE SCH MG: 25 TABLET, FILM COATED ORAL at 08:01

## 2018-01-06 RX ADMIN — HYDRALAZINE HYDROCHLORIDE SCH MG: 10 TABLET ORAL at 11:25

## 2018-01-06 RX ADMIN — IPRATROPIUM BROMIDE AND ALBUTEROL SULFATE SCH ML: .5; 3 SOLUTION RESPIRATORY (INHALATION) at 15:27

## 2018-01-06 RX ADMIN — AMOXICILLIN AND CLAVULANATE POTASSIUM SCH MG: 875; 125 TABLET, FILM COATED ORAL at 17:31

## 2018-01-06 RX ADMIN — IPRATROPIUM BROMIDE AND ALBUTEROL SULFATE SCH ML: .5; 3 SOLUTION RESPIRATORY (INHALATION) at 11:19

## 2018-01-06 RX ADMIN — INSULIN ASPART SCH UNITS: 100 INJECTION, SOLUTION INTRAVENOUS; SUBCUTANEOUS at 11:47

## 2018-01-06 RX ADMIN — FAMOTIDINE SCH MG: 20 TABLET, FILM COATED ORAL at 07:59

## 2018-01-06 RX ADMIN — OXYCODONE HYDROCHLORIDE PRN MG: 5 TABLET ORAL at 15:58

## 2018-01-06 RX ADMIN — AMOXICILLIN AND CLAVULANATE POTASSIUM SCH MG: 875; 125 TABLET, FILM COATED ORAL at 08:00

## 2018-01-06 RX ADMIN — ISOSORBIDE MONONITRATE SCH MG: 30 TABLET, EXTENDED RELEASE ORAL at 08:00

## 2018-01-06 RX ADMIN — OXYCODONE HYDROCHLORIDE PRN MG: 5 TABLET ORAL at 08:56

## 2018-01-06 RX ADMIN — HYDRALAZINE HYDROCHLORIDE SCH MG: 10 TABLET ORAL at 17:37

## 2018-01-06 RX ADMIN — HYDRALAZINE HYDROCHLORIDE SCH MG: 10 TABLET ORAL at 06:16

## 2018-01-06 RX ADMIN — INSULIN GLARGINE SCH UNITS: 100 INJECTION, SOLUTION SUBCUTANEOUS at 07:45

## 2018-01-06 RX ADMIN — AMLODIPINE BESYLATE SCH MG: 5 TABLET ORAL at 07:59

## 2018-01-06 RX ADMIN — METOPROLOL SUCCINATE SCH MG: 50 TABLET, EXTENDED RELEASE ORAL at 08:00

## 2018-01-06 RX ADMIN — SPIRONOLACTONE SCH MG: 25 TABLET, FILM COATED ORAL at 17:31

## 2018-01-06 RX ADMIN — GUAIFENESIN SCH MG: 600 TABLET, EXTENDED RELEASE ORAL at 07:59

## 2018-01-06 RX ADMIN — ATORVASTATIN CALCIUM SCH MG: 40 TABLET, FILM COATED ORAL at 07:59

## 2018-01-06 RX ADMIN — WARFARIN SODIUM SCH MG: 5 TABLET ORAL at 16:00

## 2018-01-06 RX ADMIN — INSULIN ASPART SCH UNITS: 100 INJECTION, SOLUTION INTRAVENOUS; SUBCUTANEOUS at 17:26

## 2018-01-06 RX ADMIN — IPRATROPIUM BROMIDE AND ALBUTEROL SULFATE SCH ML: .5; 3 SOLUTION RESPIRATORY (INHALATION) at 07:40

## 2018-01-06 RX ADMIN — ACETAMINOPHEN PRN MG: 325 TABLET ORAL at 17:37

## 2018-01-06 RX ADMIN — INSULIN ASPART SCH UNITS: 100 INJECTION, SOLUTION INTRAVENOUS; SUBCUTANEOUS at 07:45

## 2018-01-06 RX ADMIN — ESCITALOPRAM OXALATE SCH MG: 20 TABLET, FILM COATED ORAL at 07:59

## 2018-01-06 RX ADMIN — CLOPIDOGREL BISULFATE SCH MG: 75 TABLET, FILM COATED ORAL at 07:59

## 2018-01-06 NOTE — DISCHARGE INSTRUCTIONS
Discharge Instructions


Date of Service


Jan 6, 2018.





Admission


Reason for Admission:  HCAP





Discharge


Discharge Diagnosis / Problem:  Pneumonia, COPD exacerbation





Discharge Goals


Goal(s):  Increase independence





Activity Recommendations


Activity Limitations:  resume your previous activity





.





Instructions / Follow-Up


Instructions / Follow-Up


You were diagnosed with pneumonia and COPD exacerbation. Oxygen testing on 

discharge did not show you needed oxygen. Do not take your warfarin today as 

INR was 3.5 on discharge. You can start it as previously prescribed tomorrow. 

Please take all other medications as prescribed.





Follow up appt made w/ Dr. Andrés NEFF, Annmarie Ayala, on Thursday January 11th 

at 9:30 am.





Current Hospital Diet


Patient's current hospital diet: AHA Diet (Heart Healthy), Diabetes Type 2 Diet





Discharge Diet


Recommended Diet:  AHA Diet (Heart Healthy), Diabetes Type 2 Diet





Pending Studies


Studies pending at discharge:  no





Laboratory Results





Hemoglobin A1c








Test


  1/1/18


09:56 Range/Units


 


 


Estimated Average Glucose 134   mg/dl


 


Hemoglobin A1c 6.3 H 4.5-5.6  %











Medical Emergencies








.


Who to Call and When:





Medical Emergencies:  If at any time you feel your situation is an emergency, 

please call 911 immediately.





.





Non-Emergent Contact


Non-Emergency issues call your:  Primary Care Provider





.


.








"Provider Documentation" section prepared by Stu Gutierrez.








.





VTE Core Measure


Inpt VTE Proph given/why not?:  Warfarin (Coumadin)

## 2018-01-06 NOTE — FAMILY MEDICINE PROGRESS NOTE
Progress Note


Date of Service


Jan 6, 2018.





Subjective


Pt evaluation today including:  conversation w/ patient, physical exam, chart 

review, lab review, review of studies, review of inpatient medication list


Patient feeling well today. Ready for discharge but awaiting 2 step 

examination. Still not 100% but feels he is improving day to day.





   All Other Systems:  Reviewed and Negative





Medications





Current Inpatient Medications








 Medications


  (Trade)  Dose


 Ordered  Sig/Dc


 Route  Start Time


 Stop Time Status Last Admin


Dose Admin


 


 Acetaminophen


  (Tylenol Tab)  650 mg  Q4H  PRN


 PO  1/1/18 02:45


 1/31/18 02:44  1/4/18 00:48


650 MG


 


 Magnesium


 Hydroxide


  (Milk Of


 Magnesia Susp)  30 ml  Q6H  PRN


 PO  1/1/18 02:45


 1/31/18 02:44   


 


 


 Polyethylene


  (Miralax Powder


 Packet)  17 gm  DAILY  PRN


 PO  1/1/18 02:45


 1/31/18 02:44   


 


 


 Ondansetron HCl


  (Zofran Inj)  4 mg  Q6H  PRN


 IV  1/1/18 02:45


 1/31/18 02:44   


 


 


 Atorvastatin


 Calcium


  (Lipitor Tab)  80 mg  QAM


 PO  1/1/18 09:00


 1/31/18 08:59  1/6/18 07:59


80 MG


 


 Clonazepam


  (Klonopin Tab)  1 mg  HS  PRN


 PO  1/1/18 02:45


 1/31/18 02:44  1/4/18 00:48


1 MG


 


 Clopidogrel


 Bisulfate


  (plAVix TAB)  75 mg  QAM


 PO  1/1/18 09:00


 1/31/18 08:59  1/6/18 07:59


75 MG


 


 Divalproex Sodium


  (Depakote Delay


 Rel Tab)  2,000 mg  HS


 PO  1/1/18 21:00


 1/31/18 20:59  1/5/18 20:46


2,000 MG


 


 Docusate Sodium


  (coLACE CAP)  100 mg  BID  PRN


 PO  1/1/18 02:45


 1/31/18 02:44   


 


 


 Escitalopram


 Oxalate


  (Lexapro Tab)  20 mg  QAM


 PO  1/1/18 09:00


 1/31/18 08:59  1/6/18 07:59


20 MG


 


 Fluticasone


 Propionate


  (Flonase Nasal


 Spray)  2 sprays  DAILY  PRN


 VERONICA  1/1/18 02:45


 1/31/18 02:44   


 


 


 Gabapentin


  (Neurontin Cap)  300 mg  HS


 PO  1/1/18 21:00


 1/31/18 20:59  1/5/18 20:48


300 MG


 


 Isosorbide


 Mononitrate


  (Imdur Ext Rel


 Tab)  30 mg  QAM


 PO  1/1/18 09:00


 1/31/18 08:59  1/6/18 08:00


30 MG


 


 Metoprolol


 Succinate


  (Toprol Xl Tab)  50 mg  BID


 PO  1/1/18 09:00


 1/31/18 08:59  1/6/18 08:00


50 MG


 


 Nitroglycerin


  (Nitrostat Tab)  0.4 mg  UD  PRN


 UT  1/1/18 02:45


 1/31/18 02:44   


 


 


 Tamsulosin HCl


  (Flomax Cap)  0.4 mg  HS


 PO  1/1/18 21:00


 1/31/18 20:59  1/5/18 20:47


0.4 MG


 


 Warfarin Sodium


  (Coumadin Tab)  7.5 mg  SuMoWeThFr@1600


 PO  1/1/18 16:00


 1/31/18 15:59  1/5/18 16:31


7.5 MG


 


 Amlodipine


 Besylate


  (Norvasc Tab)  10 mg  QAM


 PO  1/1/18 09:00


 1/31/18 08:59  1/6/18 07:59


10 MG


 


 Mirtazapine


  (Remeron Tab)  45 mg  HS


 PO  1/1/18 21:00


 1/31/18 20:59  1/5/18 20:49


45 MG


 


 Spironolactone


  (Aldactone Tab)  50 mg  BID17


 PO  1/1/18 09:00


 1/31/18 08:59  1/6/18 08:01


50 MG


 


 Warfarin Sodium


  (Coumadin Tab)  10 mg  TuSa@1600


 PO  1/2/18 16:00


 2/1/18 15:59  1/2/18 16:51


10 MG


 


 Albuterol


  (Ventolin Hfa


 Inhaler)  2 puffs  Q4H  PRN


 INH  1/1/18 02:45


 1/31/18 02:44   


 


 


 Glucose


  (Glucose 40% Gel)  15-30


 GRAMS 15


 GRAMS...  UD  PRN


 PO  1/1/18 03:15


 1/31/18 03:14   


 


 


 Glucose


  (Glucose Chew


 Tab)  4-8


 Tablets 4


 Tabl...  UD  PRN


 PO  1/1/18 03:15


 1/31/18 03:14   


 


 


 Dextrose


  (Dextrose 50%


 50ML Syringe)  25-50ML OF


 50% DW IV


 FOR...  UD  PRN


 IV  1/1/18 03:15


 1/31/18 03:14   


 


 


 Glucagon


  (Glucagon Inj)  1 mg  UD  PRN


 SQ  1/1/18 03:15


 1/31/18 03:14   


 


 


 Albuterol/


 Ipratropium


  (Duoneb)  3 ml  QIDR


 INH  1/1/18 03:30


 1/31/18 03:29  1/6/18 07:40


3 ML


 


 Oxycodone HCl


  (Roxicodone


 Immediate Rel Tab)  5 mg  QID  PRN


 PO  1/1/18 05:30


 1/15/18 05:29  1/6/18 08:56


5 MG


 


 Insulin Aspart


  (novoLOG ASPART)  SLIDING


 SCALE  ACHS


 SC  1/1/18 07:30


 1/31/18 07:29  1/6/18 07:45


10 UNITS


 


 Amoxicillin/


 Clavulanate


 Potassium


  (Augmentin Tab)  875 mg  BIDM


 PO  1/2/18 16:45


 1/8/18 16:44  1/6/18 08:00


875 MG


 


 Famotidine


  (Pepcid Tab)  20 mg  QAM


 PO  1/3/18 09:00


 2/2/18 08:59  1/6/18 07:59


20 MG


 


 Guaifenesin


  (Mucinex Contr


 Rel Tab)  1,200 mg  Q12


 PO  1/3/18 21:00


 2/2/18 20:59  1/6/18 07:59


1,200 MG


 


 Prednisone


  (PredniSONE TAB)  40 mg  DAILY


 PO  1/4/18 09:00


 2/3/18 08:59  1/6/18 08:00


40 MG


 


 Hydralazine HCl


  (Apresoline Tab)  25 mg  Q6


 PO  1/5/18 12:00


 2/3/18 19:59  1/6/18 06:16


25 MG


 


 Insulin Glargine


  (Lantus Solostar


 Pen)  SEE


 PROTOCOL


 TEXT  BID


 SC  1/5/18 21:00


 1/31/18 20:59  1/6/18 07:45


15 UNITS











Objective


Vital Signs











  Date Time  Temp Pulse Resp B/P (MAP) Pulse Ox O2 Delivery O2 Flow Rate FiO2


 


1/6/18 07:56 36.4 61 18 151/74 (99) 92 Room Air  


 


1/6/18 07:40  57 16  94 Room Air  


 


1/6/18 06:14  58 18 170/75 (106)    


 


1/6/18 00:08 36.9 64 20 175/77 (109) 92 Room Air  


 


1/6/18 00:00      Nasal Cannula 2.0 


 


1/5/18 21:00     91 Room Air  


 


1/5/18 19:35  64 16  95 Room Air  


 


1/5/18 17:53  71 18 162/73 (102) 96  2.0 


 


1/5/18 16:00     98 Nebulizer 2.0 


 


1/5/18 15:36 36.7 60 16 167/68 (101) 98 Nebulizer  


 


1/5/18 15:24  59 16  92 Nasal Cannula 2.0 


 


1/5/18 14:00  93 18 122/67 (85) 92 Nasal Cannula 2.0 


 


1/5/18 12:15  71  173/76 (108)    


 


1/5/18 11:09  61 16  92 Nasal Cannula 2.0 











Physical Exam


General Appearance:  WD/WN, no apparent distress


Respiratory/Chest:  lungs clear, normal breath sounds (no wheezing or crackles)

, no respiratory distress, no accessory muscle use


Cardiovascular:  regular rate, rhythm, no murmur


Abdomen:  normal bowel sounds, non tender, soft


Extremities:  normal capillary refill


Neurologic/Psychiatric:  no motor/sensory deficits (grossly), alert





Assessment and Plan


63 yo M w/ COPD , HTN, PAD, DM, CKD Stage III, L Atrial Thrombus, Bipolar 

disorder/Anxiety presenting with Dyspnea, found to be in Acute Hypoxic 

Respiratory failure





Uncontrolled Hypertension


- started chlorthalidone 1/4/18


- increased hydralazine to 25mg Q6H DC 1/5/18


- Continue metoprolol (limited by HR) and Aldactone


- recently stopped ACEi as outpatient due to rising Cr therefore will not 

implement that at this time


- will need outpatient follow up


 


Acute Hypoxic Respiratory Failure: likely secondary to HCAP and COPD 

exacerbation


- productive Cough persists,  SOB significantly improved, weaned to 2L NC. 

attempt to wean further - will likely need 2 step before discharge


- CXR (1/2) - Airspace opacities are present at both lung bases. likely related 

to interstitial edema,


- Obtained MRSA Swab: negative


- Continue Augmentin 875 mg BID, not on azithromycin and appears to be 

improving therefore will defer coverage for atypicals


- Wheezing: resolved, Continue  Duonebs and prednisone





COPD exacerbation - wheezing on examination, never had PFTs but trial of 

tiotropium by PCP and long term smoker.


- Continue prednisone for total 7 day burst and stop


- continue duonebs Q4H DC and PRN





CKD Stage 3


- stable


- (baseline approximately 1.4)


- F/u daily BMP's





CAD - stable asx


      s/p NSTEMI (2007) s/p stent distal circumflex; V-fib arrest at the time 

of his 2007 MI.  s/p drug-eluting stent( 2010) to the mid LAD and a previous 

stent in the circumflex was occluded.


- PAD - S/p stenting of his popliteal and SFA in May 2017


- Plavix 75mg QAM


- Metoprolol Succinate 50mg


- Atorvastatin 80mg QPM





Hyperlipidemia


- Atorvastatin 80mg QAM





CHF


- Lasix 40mg IV in the ED


- Lasix reportedly taken at home PRN for weight gain


- ProBNP 14625 (on 12/23 was 67240)


- Echo 12/23: 


* Ejection Fraction = 55%.


* The basal inferior, basal to mid inferolateral and basal to mid lateral wall 

are akinetic.


* The right ventricular cavity size is enlarged (proximal parasternal long axis 

right ventricular outflow tract dimension >3.3 cm).


* The right ventricular systolic function is normal as assessed by tricuspid 

annular plane systolic excursion (TAPSE) (normal >1.5 cm).


* The left atrium is severely dilated.


* Mild to moderate valvular aortic stenosis.


* Diastolic dysfunction, Grade II, consistent with elevated left atrial 

pressure.


- no current exacerbation


- I&Os stable





Anxiety


- Clonazepam 1mg PO HS PRN 





Left Atrial Thrombus


- Coumadin 10 mg daily


- INR 3.1 1/4/17, pending from today





Anemia of Chronic Disease and iron def.


- at Baseline 9-10


- On Ferrous Sulfate 325mg PO BID at Home





Bipolar Disorder


- Continue home Depakote





Chronic Pain in lower extremities


- Oxycodone 5mg qid PRN





VTE Prophylaxis


- Warfarin with therapeutic INR





Code


- Full





Disposition


- plan for home today after 2 step to determine oxygen needs





Resident Tracking


Resident Involvement:  Resident Care Provided


Care Provided:  Adult Hospital Medicine





History


Resident Physician Supervision Note:





I was present with Dr. Gutierrez during the history and exam. I discussed the case 

with the resident and agree with the findings and plan as documented in the 

note.  Any exceptions or clarifications are listed here.





Pt reports return to near baseline and is resting comfortably off O2. 

Tolerating hydralazine regimen for blood pressure control while on prednisone. 

Reports no HA, lightheadedness, chest pain, palpitations, leg swelling.


General Appearance:  WD/WN, no apparent distress


Respiratory:  chest non-tender, no respiratory distress, decreased breath sounds


Cardiovascular:  normal peripheral pulses, regular rate, rhythm, no murmur


Assessment/Plan


63 y/o male h/o COPD, HTN, PAD, DMII, CKD III, left atrial thrombus, bipolar/

anxeity p/w SOB





AHRF 2/2 HCAP  in the setting of ?COPD - O2 requirement improving - continue 

augmentin, prednisone, duonebs.


CHF - continue furosemide, metoprolol - echo completed w/ EF 55% - would 

consider addition of ACE-I if not contraindicated when CKD returns to baseline


HTN - amlodipine 10mg, metoprolol 50mg, imdur 30mg, spironolactone 50mg. Add 

chlorthalidone 25mg. Hydralazine 25mg q6





CKD III - elevated Cr. Trend, avoid nephrotoxic medications


DMII - adjust for hyperglycemia


Anemia - stable, guiac and C. diff neg w/ nl BM now


CAD s/p STEMI 2007 w/ arrest and ERICK - continue plavix, metoprolol


HLD - continue atorvastatin


Anxiety - continue clonazepam


Left atrial thrombus - continue coumadin


HTN - continue amlodipine, metoprolol, aldactone





FULL Code

## 2018-01-06 NOTE — DISCHARGE SUMMARY
Discharge Summary


Date of Service


Jan 6, 2018.





Discharge Summary


Admission Date:


Jan 1, 2018 at 03:18


Discharge Date:  Jan 6, 2018


Discharge Disposition:  Home


Principal Diagnosis:  Healthcare acquired pneumonia


Problems/Secondary Diagnoses:


Uncontrolled Hypertension


Acute Hypoxic Respiratory Failure


COPD exacerbation


Immunizations:  


   Have You Had Influenza Vaccine:  No


   Influenza Vaccine Date:  Oct 5, 2009


   History of Tetanus Vaccine?:  No


   Tetanus Immunization Date:  Mar 1, 2004


   History of Pneumococcal:  No


   Pneumococcal Date:  Oct 15, 2006


   History of Hepatitis B Vaccine:  No





Medication Reconciliation


New Medications:  


Amoxicillin & Pot Clavulanate (Amoxicillin/Clavulanate P) 1 Tab Tab


875 MG PO BIDM for 1 Day, #2 TAB





Hydralazine HCl (Hydralazine HCl) 10 Mg Tab


25 MG PO Q6 for 30 Days, #300 TAB





Prednisone (Prednisone) 20 Mg Tab


40 MG PO DAILY for 3 Days, #6 TAB





 


Continued Medications:  


Acetaminophen (Tylenol) 500 Mg Tab


500 MG PO Q4-6HRS PRN for Pain, TAB





Albuterol Sulfate (Proair Respiclick) 108 Mcg/Act Aer


1-2 PUFFS INH Q4-6HRS PRN for SOB/Wheezing





Amlodipine Besylate (Amlodipine Besylate) 10 Mg Tab


10 MG PO QAM





Atorvastatin (Atorvastatin Calcium) 40 Mg Tab


80 MG PO QAM for 30 Days, #60 TAB





Clonazepam (Clonazepam) 1 Mg Tab


1 MG PO HS PRN for Anxiety





Clopidogrel Bisulfate (Clopidogrel) 75 Mg Tab


75 MG PO QAM





Cyanocobalamin (Vitamin B12) 1,000 Mcg Tab


1000 MCG PO DAILY





Divalproex Sodium (Depakote Delay Rel) 500 Mg Tab


2000 MG PO HS





Docusate Sodium (Docusate Sodium) 100 Mg Cap


100 MG PO BID PRN for Constipation





Escitalopram Oxalate (Escitalopram Oxalate) 20 Mg Tab


20 MG PO QAM





Ferrous Sulfate (Ferrous Sulfate) 325 Mg Tab


325 MG PO BIDM





Fluticasone Propionate (Nasal) (Flonase Allergy Relief) 50 Mcg/Act Spr


2 SPRAYS VERONICA DAILY PRN for Nasal Congestion





Gabapentin (Neurontin) 300 Mg Cap


300 MG PO HS, CAP





Glucagon (Glucagon Emergency Kit) 1 Mg Kit


1 MG UD PRN for Hypoglycemia Protocol





Insulin Glargine (Lantus Solostar) 100 Unit/Ml Inj


10 UNITS SC HS, PEN





Insulin Lispro (Human) (Humalog Kwikpen) 100 Unit/Ml Inj


1 DOSE SC AC


COVERAGE AS DIRECTED BY SLIDING SCALE


Isosorbide Mononitrate (Isosorbide Mononitrate ER) 30 Mg Tabcr


30 MG PO QAM for 30 Days, #30 TAB 1 Refill





Metoprolol Succinate (Metoprolol Succinate ER) 50 Mg Tabcr


50 MG PO BID





Mirtazapine (Mirtazapine) 45 Mg Tab


45 MG PO HS, TAB





Nitroglycerin (Nitrostat) 0.4 Mg Tab


0.4 MG UT UD PRN for Chest Pain


DISSOLVE ONE TABLET UNDER THE TONGUE AS DIRECTED


Oxycodone HCl (Oxycodone HCl) 5 Mg Tab


5 MG PO Q6H PRN for Breakthrough Pain





Oxycodone Hcl (Oxycodone Hcl) 10 Mg Tab


10 MG PO Q12 PRN for Severe Pain





Spironolactone (Aldactone) 50 Mg Tab


50 MG PO BID





Tamsulosin HCl (Tamsulosin HCl) 0.4 Mg Cap


0.4 MG PO HS





Warfarin Sodium (Warfarin Sodium) 7.5 Mg Tab


7.5 MG PO 5XWK, TAB





Warfarin Sodium (Warfarin Sodium) 10 Mg Tab


10 MG PO 2XWK


TUES, SAT








Discharge Exam


please see progress note from today





Hospital Course


Mr Raymundo hay is a 62 year old male with COPD, HTN, PAD, DM, CKD Stage 3, L 

Atrial Thrombus, Bipolar disorder/Anxiety who presented to [facility] ER 

with shortness of breath and found to be in Acute Hypoxic Respiratory failure. 

He was treated with IV antibiotics but did not improve significantly until the 

addition of prednisone.





Uncontrolled Hypertension


- started chlorthalidone 1/4/18


- increased hydralazine to 25mg Q6H KAYLA 1/5/18


- Continue metoprolol (limited by HR) and Aldactone


- recently stopped ACEi as outpatient due to rising Cr therefore will not 

implement that at this time


- will need outpatient follow up


 


Acute Hypoxic Respiratory Failure: likely secondary to HCAP and COPD 

exacerbation


- productive Cough persists,  SOB significantly improved, weaned to 2L NC. 

attempt to wean further - will likely need 2 step before discharge


- CXR (1/2) - Airspace opacities are present at both lung bases. likely related 

to interstitial edema,


- Obtained MRSA Swab: negative


- Continue Augmentin 875 mg BID, not on azithromycin and appears to be 

improving therefore will defer coverage for atypicals


- Wheezing: resolved, Continue  Duonebs and prednisone





COPD exacerbation - wheezing on examination, never had PFTs but trial of 

tiotropium by PCP and long term smoker.


- Continue prednisone for total 7 day burst and stop


- continue duonebs Q4H KAYLA and PRN





CKD Stage 3


- stable


- (baseline approximately 1.4)


- F/u daily BMP's





CAD - stable asx


      s/p NSTEMI (2007) s/p stent distal circumflex; V-fib arrest at the time 

of his 2007 MI.  s/p drug-eluting stent( 2010) to the mid LAD and a previous 

stent in the circumflex was occluded.


- PAD - S/p stenting of his popliteal and SFA in May 2017


- Plavix 75mg QAM


- Metoprolol Succinate 50mg


- Atorvastatin 80mg QPM





Hyperlipidemia


- Atorvastatin 80mg QAM





CHF


- Lasix 40mg IV in the ED


- Lasix reportedly taken at home PRN for weight gain


- ProBNP 94690 (on 12/23 was 09816)


- Echo 12/23: 


* Ejection Fraction = 55%.


* The basal inferior, basal to mid inferolateral and basal to mid lateral wall 

are akinetic.


* The right ventricular cavity size is enlarged (proximal parasternal long axis 

right ventricular outflow tract dimension >3.3 cm).


* The right ventricular systolic function is normal as assessed by tricuspid 

annular plane systolic excursion (TAPSE) (normal >1.5 cm).


* The left atrium is severely dilated.


* Mild to moderate valvular aortic stenosis.


* Diastolic dysfunction, Grade II, consistent with elevated left atrial 

pressure.


- no current exacerbation


- I&Os stable





Anxiety


- Clonazepam 1mg PO HS PRN 





Left Atrial Thrombus


- Coumadin 10 mg daily


- INR 3.1 1/4/17, pending from today





Anemia of Chronic Disease and iron def.


- at Baseline 9-10


- On Ferrous Sulfate 325mg PO BID at Home





Bipolar Disorder


- Continue home Depakote





Chronic Pain in lower extremities


- Oxycodone 5mg qid PRN





VTE Prophylaxis


- Warfarin with therapeutic INR





Code


- Full





Disposition


- plan for home today after 2 step to determine oxygen needs


This includes examination of the patient, discharge planning, medication 

reconciliation, and communication with other providers.





Discharge Instructions


Please refer to the electronic Patient Visit Report (Discharge Instructions) 

for additional information.

## 2018-01-12 ENCOUNTER — HOSPITAL ENCOUNTER (EMERGENCY)
Dept: HOSPITAL 45 - C.EDB | Age: 63
Discharge: HOME | End: 2018-01-12
Payer: COMMERCIAL

## 2018-01-12 VITALS
HEIGHT: 70.98 IN | HEIGHT: 70.98 IN | WEIGHT: 209.44 LBS | BODY MASS INDEX: 29.32 KG/M2 | BODY MASS INDEX: 29.32 KG/M2 | WEIGHT: 209.44 LBS

## 2018-01-12 VITALS — SYSTOLIC BLOOD PRESSURE: 160 MMHG | DIASTOLIC BLOOD PRESSURE: 63 MMHG | HEART RATE: 62 BPM | OXYGEN SATURATION: 92 %

## 2018-01-12 VITALS — TEMPERATURE: 97.88 F

## 2018-01-12 DIAGNOSIS — I10: ICD-10-CM

## 2018-01-12 DIAGNOSIS — Z83.3: ICD-10-CM

## 2018-01-12 DIAGNOSIS — I25.5: ICD-10-CM

## 2018-01-12 DIAGNOSIS — Z79.01: ICD-10-CM

## 2018-01-12 DIAGNOSIS — Z82.49: ICD-10-CM

## 2018-01-12 DIAGNOSIS — E11.9: ICD-10-CM

## 2018-01-12 DIAGNOSIS — F31.9: ICD-10-CM

## 2018-01-12 DIAGNOSIS — Z79.4: ICD-10-CM

## 2018-01-12 DIAGNOSIS — Z87.891: ICD-10-CM

## 2018-01-12 DIAGNOSIS — Z79.899: ICD-10-CM

## 2018-01-12 DIAGNOSIS — Z80.9: ICD-10-CM

## 2018-01-12 DIAGNOSIS — Z87.19: ICD-10-CM

## 2018-01-12 DIAGNOSIS — I50.33: ICD-10-CM

## 2018-01-12 DIAGNOSIS — Z86.19: ICD-10-CM

## 2018-01-12 DIAGNOSIS — J18.9: Primary | ICD-10-CM

## 2018-01-12 DIAGNOSIS — I73.9: ICD-10-CM

## 2018-01-12 LAB
ALBUMIN SERPL-MCNC: 2.4 GM/DL (ref 3.4–5)
ALP SERPL-CCNC: 57 U/L (ref 45–117)
ALT SERPL-CCNC: 19 U/L (ref 12–78)
AST SERPL-CCNC: 14 U/L (ref 15–37)
BASOPHILS # BLD: 0.03 K/UL (ref 0–0.2)
BASOPHILS NFR BLD: 0.2 %
BUN SERPL-MCNC: 42 MG/DL (ref 7–18)
CALCIUM SERPL-MCNC: 8 MG/DL (ref 8.5–10.1)
CK MB SERPL-MCNC: 8.6 NG/ML (ref 0.5–3.6)
CO2 SERPL-SCNC: 24 MMOL/L (ref 21–32)
CREAT SERPL-MCNC: 1.42 MG/DL (ref 0.6–1.4)
EOS ABS #: 0.19 K/UL (ref 0–0.5)
EOSINOPHIL NFR BLD AUTO: 253 K/UL (ref 130–400)
GLUCOSE SERPL-MCNC: 128 MG/DL (ref 70–99)
HCT VFR BLD CALC: 28.5 % (ref 42–52)
HGB BLD-MCNC: 9.4 G/DL (ref 14–18)
IG#: 0.1 K/UL (ref 0–0.02)
IMM GRANULOCYTES NFR BLD AUTO: 6.2 %
INR PPP: 1.1 (ref 0.9–1.1)
LIPASE: 102 U/L (ref 73–393)
LYMPHOCYTES # BLD: 0.9 K/UL (ref 1.2–3.4)
MCH RBC QN AUTO: 29.9 PG (ref 25–34)
MCHC RBC AUTO-ENTMCNC: 33 G/DL (ref 32–36)
MCV RBC AUTO: 90.8 FL (ref 80–100)
MONO ABS #: 0.4 K/UL (ref 0.11–0.59)
MONOCYTES NFR BLD: 2.7 %
NEUT ABS #: 12.96 K/UL (ref 1.4–6.5)
NEUTROPHILS # BLD AUTO: 1.3 %
NEUTROPHILS NFR BLD AUTO: 88.9 %
PMV BLD AUTO: 8.5 FL (ref 7.4–10.4)
POTASSIUM SERPL-SCNC: 5.2 MMOL/L (ref 3.5–5.1)
PROT SERPL-MCNC: 6.3 GM/DL (ref 6.4–8.2)
PTT PATIENT: 26.1 SECONDS (ref 21–31)
RED CELL DISTRIBUTION WIDTH CV: 15.8 % (ref 11.5–14.5)
RED CELL DISTRIBUTION WIDTH SD: 52.2 FL (ref 36.4–46.3)
SODIUM SERPL-SCNC: 140 MMOL/L (ref 136–145)
WBC # BLD AUTO: 14.58 K/UL (ref 4.8–10.8)

## 2018-01-12 NOTE — DIAGNOSTIC IMAGING REPORT
SINGLE VIEW CHEST



CLINICAL HISTORY:  Fever. Sepsis.



FINDINGS: An AP, portable, upright chest radiograph is compared to study dated

1/2/2018. The examination is degraded by portable technique and patient

rotation.  The heart is enlarged and there is atherosclerotic calcification of

the thoracic aorta. Mild pulmonary vascular congestion persists. This has

improved from 1/2/2018. There is left basilar consolidation. Small pleural

effusions are identified. No pneumothorax is seen. The skeletal structures are

osteopenic. The bony thorax is grossly intact.



IMPRESSION:



1. Cardiomegaly with mild pulmonary vascular congestion. This has improved from

1/2/2018.



2. There is left basilar consolidation concerning for pneumonia. Clinical

correlation will be required and radiographic Follow-up to resolution is

recommended.



3. Small pleural effusions, left larger than right.







Electronically signed by:  Heber Hubbard M.D.

1/12/2018 2:44 PM



Dictated Date/Time:  1/12/2018 2:43 PM

## 2018-01-12 NOTE — EMERGENCY ROOM VISIT NOTE
History


Report prepared by Safia:  Jaun Ruelas


Under the Supervision of:  Dr. Felix Villalobos D.O.


First contact with patient:  14:17


Chief Complaint:  SWELLING TO EXTREMITY


Stated Complaint:  WEIGHT GAIN DR REFERRED





History of Present Illness


The patient is a 62 year old male who presents to the Emergency Room with 

complaints of persistent weight gain for two weeks PTA. The patient states that 

he has gained 25 pounds in two weeks. He notes shortness of breath, bilateral 

leg pain, and abdominal swelling. He currently rates his pain a 6/10 in 

severity. He notes that he was getting lab work done today and was advised to 

come to the ED for evaluation.He states that he did not get his lab drawn while 

at his PCPs today. He has a history of CHF. He was recently seen in the ED for 

pneumonia January 1, 2018. He was prescribed Augmentin and Prednisone. He takes 

Lasix, though he has not taken it today. He has gained 5 pounds in the last 

five days.





   Source of History:  patient


   Onset:  two weeks PTA


   Position:  toe(s) (global ), other


   Symptom Intensity:  6/10


   Quality:  other (weight gain)


   Timing:  other (persistent)


   Associated Symptoms:  + SOB


Note:


He notes leg pain, abdominal swelling, and weight gain.





Review of Systems


See HPI for pertinent positives & negatives. A total of 10 systems reviewed and 

were otherwise negative.





Past Medical & Surgical


Medical Problems:


(1) Accelerated hypertension


(2) Acute appendicitis with appendiceal abscess


(3) Acute renal insufficiency


(4) Acute respiratory failure


(5) Angina pectoris


(6) Bipolar disorder


(7) CAD (coronary artery disease)


(8) Cardiac arrest


(9) Cellulitis


(10) CHF exacerbation


(11) Dehydration


(12) Diabetes


(13) Diastolic CHF, chronic


(14) Elevated INR


(15) Enterocolitis


(16) HCAP (healthcare-associated pneumonia)


(17) Headache


(18) Heel ulcer


(19) Hypertension


(20) Ischemic cardiomyopathy


(21) LVH (left ventricular hypertrophy)


(22) Osteomyelitis of left foot


(23) PAD (peripheral artery disease)


(24) Precordial chest pain


(25) Respiratory distress


(26) Sepsis, unspecified organism


(27) Volume overload








Family History





Cancer (esophageal)


Diabetes mellitus


Heart disease


Hypertension





Social History


Smoking Status:  Former Smoker


Drug Use:  none


Marital Status:  


Housing Status:  lives with family


Occupation Status:  employed





Current/Historical Medications


Scheduled


Amlodipine Besylate (Amlodipine Besylate), 10 MG PO QAM


Atorvastatin (Lipitor), 80 MG PO DAILY


Azithromycin (Zithromax Z-Dima), 0 PO UD


Clopidogrel Bisulfate (Clopidogrel), 75 MG PO QAM


Cyanocobalamin (Vitamin B12), 1,000 MCG PO DAILY


Divalproex Sodium (Depakote Delay Rel), 2,000 MG PO HS


Escitalopram Oxalate (Escitalopram Oxalate), 20 MG PO QAM


Ferrous Sulfate (Ferrous Sulfate), 325 MG PO BIDM


Furosemide (Lasix), 20 MG PO PRN


Gabapentin (Neurontin), 300 MG PO HS


Hydralazine Hcl (Apresoline), 25 MG PO QID


Insulin Glargine (Lantus Solostar), 10 UNITS SC HS


Insulin Lispro (Human) (Humalog Kwikpen), 1 DOSE SC AC


Isosorbide Mononitrate Ext Rel (Imdur Ext Rel), 30 MG PO QAM


Metoprolol Succinate (Metoprolol Succinate ER), 50 MG PO BID


Mirtazapine (Mirtazapine), 45 MG PO HS


Spironolactone (Aldactone), 50 MG PO BID


Tamsulosin HCl (Tamsulosin HCl), 0.4 MG PO HS


Tiotropium Bromide (Spiriva Handihaler), 1 CAP INH DAILY


Warfarin Sodium (Warfarin Sodium), 7.5 MG PO 5XWK


Warfarin Sodium (Warfarin Sodium), 10 MG PO 2XWK





Scheduled PRN


Acetaminophen (Tylenol), 500 MG PO Q4-6HRS PRN for Pain


Albuterol Sulfate (Proair Respiclick), 1-2 PUFFS INH Q4-6HRS PRN for SOB/

Wheezing


Clonazepam (Clonazepam), 1 MG PO HS PRN for Anxiety


Docusate Sodium (Docusate Sodium), 100 MG PO BID PRN for Constipation


Fluticasone Propionate (Nasal) (Flonase Allergy Relief), 2 SPRAYS VERONICA DAILY PRN 

for Nasal Congestion


Glucagon (Glucagon Emergency Kit), 1 MG UD PRN for Hypoglycemia Protocol


Nitroglycerin (Nitrostat), 0.4 MG UT UD PRN for Chest Pain


Oxycodone HCl (Oxycodone HCl), 5 MG PO Q6H PRN for Breakthrough Pain


Oxycodone Hcl (Oxycodone Hcl), 10 MG PO Q12 PRN for Severe Pain





Allergies


Coded Allergies:  


     No Known Allergies (Verified , 1/12/18)





Physical Exam


Vital Signs











  Date Time  Temp Pulse Resp B/P (MAP) Pulse Ox O2 Delivery O2 Flow Rate FiO2


 


1/12/18 16:05  58      


 


1/12/18 15:00  58 20 175/79 91 Room Air  


 


1/12/18 14:12 36.6 60 20 152/76 96 Room Air  











Physical Exam


CONSTITUTIONAL/VITAL SIGNS: Reviewed / noted above.


GENERAL: Non-toxic in appearance. 


INTEGUMENTARY: Warm, dry, and Pink.


HEAD: Normocephalic.


EYES: without scleral icterus or trauma.


ENT/OROPHARYNX: clear and moist.


LYMPHADENOPATHY/NECK: Is supple without lymphadenopathy or meningismus.


RESPIRATORY: Lungs clear and equal.


CARDIOVASCULAR: Regular rate and rhythm.


GI/ABDOMEN: Soft and nontender. No organomegaly or pulsatile mass. No rebound 

or guarding. Normal bowel sounds.


EXTREMITIES: Warm and well perfused. Mild lower extremity edema. 


BACK: No CVA tenderness.


NEUROLOGICAL: Intact without focal deficits. 


PSYCHIATRIC: normal affect.


MUSCULOSKELETAL: Normally developed with good muscle tone.





Medical Decision & Procedures


ER Provider


Diagnostic Interpretation:


Radiology results as stated below per my review and radiologist interpretation:








SINGLE VIEW CHEST





CLINICAL HISTORY:  Fever. Sepsis.





FINDINGS: An AP, portable, upright chest radiograph is compared to study dated


1/2/2018. The examination is degraded by portable technique and patient


rotation.  The heart is enlarged and there is atherosclerotic calcification of


the thoracic aorta. Mild pulmonary vascular congestion persists. This has


improved from 1/2/2018. There is left basilar consolidation. Small pleural


effusions are identified. No pneumothorax is seen. The skeletal structures are


osteopenic. The bony thorax is grossly intact.





IMPRESSION:





1. Cardiomegaly with mild pulmonary vascular congestion. This has improved from


1/2/2018.





2. There is left basilar consolidation concerning for pneumonia. Clinical


correlation will be required and radiographic Follow-up to resolution is


recommended.





3. Small pleural effusions, left larger than right.











Electronically signed by:  Heber Hubbard M.D.


1/12/2018 2:44 PM





Dictated Date/Time:  1/12/2018 2:43 PM





Laboratory Results


1/12/18 14:45








Red Blood Count 3.14, Mean Corpuscular Volume 90.8, Mean Corpuscular Hemoglobin 

29.9, Mean Corpuscular Hemoglobin Concent 33.0, Mean Platelet Volume 8.5, 

Neutrophils (%) (Auto) 88.9, Lymphocytes (%) (Auto) 6.2, Monocytes (%) (Auto) 

2.7, Eosinophils (%) (Auto) 1.3, Basophils (%) (Auto) 0.2, Neutrophils # (Auto) 

12.96, Lymphocytes # (Auto) 0.90, Monocytes # (Auto) 0.40, Eosinophils # (Auto) 

0.19, Basophils # (Auto) 0.03





1/12/18 14:45

















Test


  1/12/18


14:45


 


White Blood Count


  14.58 K/uL


(4.8-10.8)


 


Red Blood Count


  3.14 M/uL


(4.7-6.1)


 


Hemoglobin


  9.4 g/dL


(14.0-18.0)


 


Hematocrit 28.5 % (42-52) 


 


Mean Corpuscular Volume


  90.8 fL


()


 


Mean Corpuscular Hemoglobin


  29.9 pg


(25-34)


 


Mean Corpuscular Hemoglobin


Concent 33.0 g/dl


(32-36)


 


Platelet Count


  253 K/uL


(130-400)


 


Mean Platelet Volume


  8.5 fL


(7.4-10.4)


 


Neutrophils (%) (Auto) 88.9 % 


 


Lymphocytes (%) (Auto) 6.2 % 


 


Monocytes (%) (Auto) 2.7 % 


 


Eosinophils (%) (Auto) 1.3 % 


 


Basophils (%) (Auto) 0.2 % 


 


Neutrophils # (Auto)


  12.96 K/uL


(1.4-6.5)


 


Lymphocytes # (Auto)


  0.90 K/uL


(1.2-3.4)


 


Monocytes # (Auto)


  0.40 K/uL


(0.11-0.59)


 


Eosinophils # (Auto)


  0.19 K/uL


(0-0.5)


 


Basophils # (Auto)


  0.03 K/uL


(0-0.2)


 


RDW Standard Deviation


  52.2 fL


(36.4-46.3)


 


RDW Coefficient of Variation


  15.8 %


(11.5-14.5)


 


Immature Granulocyte % (Auto) 0.7 % 


 


Immature Granulocyte # (Auto)


  0.10 K/uL


(0.00-0.02)


 


Prothrombin Time


  11.8 SECONDS


(9.0-12.0)


 


Prothromb Time International


Ratio 1.1 (0.9-1.1) 


 


 


Activated Partial


Thromboplast Time 26.1 SECONDS


(21.0-31.0)


 


Partial Thromboplastin Ratio 1.0 


 


Anion Gap


  5.0 mmol/L


(3-11)


 


Est Creatinine Clear Calc


Drug Dose 65.1 ml/min 


 


 


Estimated GFR (


American) 61.8 


 


 


Estimated GFR (Non-


American 53.3 


 


 


BUN/Creatinine Ratio 29.6 (10-20) 


 


Calcium Level


  8.0 mg/dl


(8.5-10.1)


 


Total Bilirubin


  0.4 mg/dl


(0.2-1)


 


Direct Bilirubin


  < 0.1 mg/dl


(0-0.2)


 


Aspartate Amino Transf


(AST/SGOT) 14 U/L (15-37) 


 


 


Alanine Aminotransferase


(ALT/SGPT) 19 U/L (12-78) 


 


 


Alkaline Phosphatase


  57 U/L


()


 


Total Creatine Kinase


  127 U/L


()


 


Creatine Kinase MB


  8.6 ng/ml


(0.5-3.6)


 


Creatine Kinase MB Ratio 6.8 (0-3.0) 


 


Troponin I


  0.033 ng/ml


(0-0.045)


 


Total Protein


  6.3 gm/dl


(6.4-8.2)


 


Albumin


  2.4 gm/dl


(3.4-5.0)


 


Lipase


  102 U/L


()





Laboratory results as stated above per my review.





Medications Administered











 Medications


  (Trade)  Dose


 Ordered  Sig/Dc


 Route  Start Time


 Stop Time Status Last Admin


Dose Admin


 


 Azithromycin


  (Zithromax Tab)  500 mg  NOW  STAT


 PO  1/12/18 15:21


 1/12/18 15:22 DC 1/12/18 15:33


500 MG











ECG


Indication:  weakness


Rate (beats per minute):  56


Rhythm:  sinus rhythm


Findings:  PVC, no acute ischemic change





ED Course


1420: Previous medical records were reviewed. The patient was evaluated in room 

C1B. A complete history and physical examination was performed.





1521: Ordered Azithromycin 500 mg PO





1650: I reassessed the patient at this time. He is feeling better and resting 

comfortably. I discussed the results and treatment plan with the patient. I 

answered all pertaining questions that he had. He expressed understanding and 

verbalized agreement. The patient will be discharged home.





Medical Decision


Differentials considered include acute myocardial infarction, acute coronary 

syndrome, myocarditis, pericarditis, pericardial effusions /tamponade, 

esophageal perforation, pulmonary embolism, pneumonia, pneumothorax, 

cardiomyopathy, congestive heart, anemia, and COPD/asthma exacerbation.





This is a 62-year-old male who presents to the ED with a chief complaint of 

being sent over for blood work.  The patient states that he was at the PCPs 

office and was to have blood work today.  The PCP contacted the physician 

assistant on-call for the cardiologist and advised them that he had gained 25 

pounds in the past 2 weeks.  She did not realize the patient was just 

discharged from the hospital after a 6 day stay, 6 days ago.  The patient is on 

again about 2 kg since that time.  The patient states that he does have some 

when necessary Lasix to take if he gains weight but states that his scale at 

home did not suggest that he gained any weight over the past 6 days.  The 

patient has no specific complaints.  He states that he might be a little short 

of breath compared to his baseline and might have a little bloating.  He has no 

other complaints.  He is on Coumadin chronically.  His vital signs are stable.  

His physical exam was unremarkable.  He has some mild bilateral lower extremity 

edema.  The lungs are diminished but mostly clear.  Chest x-ray reveals a left 

base infiltrate.  White blood cell count was 14.5.  Hemoglobin is 9.4.  This is 

baseline.  BUN and creatinine are 42 and 1.4 respectively.  This is baseline 

for the patient.  Potassium was mildly elevated at 5.2.  Metabolic panel was 

otherwise unremarkable and a troponin was negative.  EKG did not show any acute 

ischemic changes.  The patient was told the results of the test.  He is felt to 

be stable for discharge and outpatient follow-up.





Medication Reconcilliation


Current Medication List:  was personally reviewed by me





Blood Pressure Screening


Patient's blood pressure:  Elevated blood pressure


Blood pressure disposition:  Referred to PCP





Impression





 Primary Impression:  


 Pneumonia





Scribe Attestation


The scribe's documentation has been prepared under my direction and personally 

reviewed by me in its entirety. I confirm that the note above accurately 

reflects all work, treatment, procedures, and medical decision making performed 

by me.





Departure Information


Dispostion


Home / Self-Care





Prescriptions





Azithromycin (ZITHROMAX Z-DIMA) 250 Mg Tab


0 PO UD, #1 PKT


   2 TABS DAY 1, THEN 1 TAB DAILY FOR 4 DAYS


   Prov: Felix Villalobos D.O.         1/12/18





Referrals


Pro,Claude MCCORD M.D. (PCP)





Forms


HOME CARE DOCUMENTATION FORM,                                                 

               IMPORTANT VISIT INFORMATION, WORK / SCHOOL INSTRUCTIONS





Patient Instructions


ED Pneumonia Adult, My [facility]





Additional Instructions





Follow-up with your doctor for further care and evaluation in 1-2 days.  Return 

to the emergency department for worsening or new symptoms or any concerns.


You have been examined and treated today on an emergency basis only. This is 

not a substitute for, or an effort to provide, complete comprehensive medical 

care. It is impossible to recognize and treat all injuries or illnesses in a 

single emergency department visit. It is therefore important that you follow up 

closely with your doctor.  Call as soon as possible for an appointment..





Zithromax as prescribed.

## 2018-01-16 ENCOUNTER — HOSPITAL ENCOUNTER (INPATIENT)
Dept: HOSPITAL 45 - C.EDB | Age: 63
LOS: 7 days | Discharge: HOME | DRG: 291 | End: 2018-01-23
Attending: HOSPITALIST | Admitting: HOSPITALIST
Payer: COMMERCIAL

## 2018-01-16 VITALS
DIASTOLIC BLOOD PRESSURE: 66 MMHG | HEART RATE: 54 BPM | TEMPERATURE: 98.24 F | SYSTOLIC BLOOD PRESSURE: 147 MMHG | OXYGEN SATURATION: 92 %

## 2018-01-16 VITALS
OXYGEN SATURATION: 94 % | HEART RATE: 60 BPM | TEMPERATURE: 98.06 F | DIASTOLIC BLOOD PRESSURE: 69 MMHG | SYSTOLIC BLOOD PRESSURE: 147 MMHG

## 2018-01-16 VITALS
HEART RATE: 68 BPM | DIASTOLIC BLOOD PRESSURE: 71 MMHG | TEMPERATURE: 99.14 F | SYSTOLIC BLOOD PRESSURE: 150 MMHG | OXYGEN SATURATION: 93 %

## 2018-01-16 VITALS
HEIGHT: 70 IN | WEIGHT: 199.74 LBS | BODY MASS INDEX: 28.59 KG/M2 | BODY MASS INDEX: 28.59 KG/M2 | HEIGHT: 70 IN | BODY MASS INDEX: 28.59 KG/M2 | WEIGHT: 199.74 LBS

## 2018-01-16 VITALS — OXYGEN SATURATION: 93 % | HEART RATE: 48 BPM | DIASTOLIC BLOOD PRESSURE: 70 MMHG | SYSTOLIC BLOOD PRESSURE: 135 MMHG

## 2018-01-16 VITALS
TEMPERATURE: 98.06 F | HEART RATE: 63 BPM | DIASTOLIC BLOOD PRESSURE: 57 MMHG | OXYGEN SATURATION: 93 % | SYSTOLIC BLOOD PRESSURE: 120 MMHG

## 2018-01-16 VITALS
TEMPERATURE: 98.24 F | HEART RATE: 51 BPM | SYSTOLIC BLOOD PRESSURE: 126 MMHG | OXYGEN SATURATION: 94 % | DIASTOLIC BLOOD PRESSURE: 64 MMHG

## 2018-01-16 DIAGNOSIS — I25.110: ICD-10-CM

## 2018-01-16 DIAGNOSIS — Z79.4: ICD-10-CM

## 2018-01-16 DIAGNOSIS — H43.391: ICD-10-CM

## 2018-01-16 DIAGNOSIS — I73.9: ICD-10-CM

## 2018-01-16 DIAGNOSIS — I51.3: ICD-10-CM

## 2018-01-16 DIAGNOSIS — D64.9: ICD-10-CM

## 2018-01-16 DIAGNOSIS — N18.2: ICD-10-CM

## 2018-01-16 DIAGNOSIS — E11.21: ICD-10-CM

## 2018-01-16 DIAGNOSIS — E11.65: ICD-10-CM

## 2018-01-16 DIAGNOSIS — M10.072: ICD-10-CM

## 2018-01-16 DIAGNOSIS — L97.909: ICD-10-CM

## 2018-01-16 DIAGNOSIS — B95.61: ICD-10-CM

## 2018-01-16 DIAGNOSIS — B95.62: ICD-10-CM

## 2018-01-16 DIAGNOSIS — F31.9: ICD-10-CM

## 2018-01-16 DIAGNOSIS — Z79.01: ICD-10-CM

## 2018-01-16 DIAGNOSIS — J96.01: ICD-10-CM

## 2018-01-16 DIAGNOSIS — E11.621: ICD-10-CM

## 2018-01-16 DIAGNOSIS — I50.33: Primary | ICD-10-CM

## 2018-01-16 DIAGNOSIS — Z87.891: ICD-10-CM

## 2018-01-16 DIAGNOSIS — I25.2: ICD-10-CM

## 2018-01-16 DIAGNOSIS — G47.33: ICD-10-CM

## 2018-01-16 DIAGNOSIS — E11.51: ICD-10-CM

## 2018-01-16 DIAGNOSIS — J44.9: ICD-10-CM

## 2018-01-16 LAB
ALBUMIN SERPL-MCNC: 2.2 GM/DL (ref 3.4–5)
ALP SERPL-CCNC: 61 U/L (ref 45–117)
ALT SERPL-CCNC: 17 U/L (ref 12–78)
AST SERPL-CCNC: 12 U/L (ref 15–37)
BASOPHILS # BLD: 0.03 K/UL (ref 0–0.2)
BASOPHILS NFR BLD: 0.2 %
BUN SERPL-MCNC: 45 MG/DL (ref 7–18)
BUN SERPL-MCNC: 46 MG/DL (ref 7–18)
CALCIUM SERPL-MCNC: 7.7 MG/DL (ref 8.5–10.1)
CALCIUM SERPL-MCNC: 8 MG/DL (ref 8.5–10.1)
CO2 SERPL-SCNC: 25 MMOL/L (ref 21–32)
CO2 SERPL-SCNC: 26 MMOL/L (ref 21–32)
CREAT SERPL-MCNC: 1.66 MG/DL (ref 0.6–1.4)
CREAT SERPL-MCNC: 1.73 MG/DL (ref 0.6–1.4)
EOS ABS #: 0.18 K/UL (ref 0–0.5)
EOSINOPHIL NFR BLD AUTO: 253 K/UL (ref 130–400)
GLUCOSE SERPL-MCNC: 156 MG/DL (ref 70–99)
GLUCOSE SERPL-MCNC: 243 MG/DL (ref 70–99)
HCT VFR BLD CALC: 26.7 % (ref 42–52)
HGB BLD-MCNC: 8.7 G/DL (ref 14–18)
IG#: 0.07 K/UL (ref 0–0.02)
IMM GRANULOCYTES NFR BLD AUTO: 7.2 %
INR PPP: 2.7 (ref 0.9–1.1)
LYMPHOCYTES # BLD: 0.95 K/UL (ref 1.2–3.4)
MCH RBC QN AUTO: 29.9 PG (ref 25–34)
MCHC RBC AUTO-ENTMCNC: 32.6 G/DL (ref 32–36)
MCV RBC AUTO: 91.8 FL (ref 80–100)
MONO ABS #: 0.89 K/UL (ref 0.11–0.59)
MONOCYTES NFR BLD: 6.8 %
NEUT ABS #: 11 K/UL (ref 1.4–6.5)
NEUTROPHILS # BLD AUTO: 1.4 %
NEUTROPHILS NFR BLD AUTO: 83.9 %
PMV BLD AUTO: 8.9 FL (ref 7.4–10.4)
POTASSIUM SERPL-SCNC: 5.2 MMOL/L (ref 3.5–5.1)
POTASSIUM SERPL-SCNC: 5.4 MMOL/L (ref 3.5–5.1)
PROT SERPL-MCNC: 6 GM/DL (ref 6.4–8.2)
PTT PATIENT: 33 SECONDS (ref 21–31)
RED CELL DISTRIBUTION WIDTH CV: 15.7 % (ref 11.5–14.5)
RED CELL DISTRIBUTION WIDTH SD: 52.7 FL (ref 36.4–46.3)
SODIUM SERPL-SCNC: 138 MMOL/L (ref 136–145)
SODIUM SERPL-SCNC: 140 MMOL/L (ref 136–145)
WBC # BLD AUTO: 13.12 K/UL (ref 4.8–10.8)

## 2018-01-16 RX ADMIN — FERROUS SULFATE TAB EC 325 MG (65 MG FE EQUIVALENT) SCH MG: 325 (65 FE) TABLET DELAYED RESPONSE at 07:05

## 2018-01-16 RX ADMIN — SPIRONOLACTONE SCH MG: 25 TABLET, FILM COATED ORAL at 07:05

## 2018-01-16 RX ADMIN — ATORVASTATIN CALCIUM SCH MG: 40 TABLET, FILM COATED ORAL at 07:32

## 2018-01-16 RX ADMIN — NITROGLYCERIN SCH INCH: 20 OINTMENT TOPICAL at 07:03

## 2018-01-16 RX ADMIN — GABAPENTIN SCH MG: 300 CAPSULE ORAL at 20:16

## 2018-01-16 RX ADMIN — DIVALPROEX SODIUM SCH MG: 500 TABLET, DELAYED RELEASE ORAL at 20:16

## 2018-01-16 RX ADMIN — OXYCODONE HYDROCHLORIDE PRN MG: 5 TABLET ORAL at 07:30

## 2018-01-16 RX ADMIN — MIRTAZAPINE SCH MG: 15 TABLET, FILM COATED ORAL at 20:17

## 2018-01-16 RX ADMIN — WARFARIN SODIUM SCH MG: 7.5 TABLET ORAL at 16:29

## 2018-01-16 RX ADMIN — Medication SCH MCG: at 07:33

## 2018-01-16 RX ADMIN — ACETAMINOPHEN PRN MG: 325 TABLET ORAL at 17:18

## 2018-01-16 RX ADMIN — NITROGLYCERIN SCH INCH: 20 OINTMENT TOPICAL at 16:29

## 2018-01-16 RX ADMIN — CLOPIDOGREL BISULFATE SCH MG: 75 TABLET, FILM COATED ORAL at 07:32

## 2018-01-16 RX ADMIN — ISOSORBIDE MONONITRATE SCH MG: 30 TABLET, EXTENDED RELEASE ORAL at 07:31

## 2018-01-16 RX ADMIN — FERROUS SULFATE TAB EC 325 MG (65 MG FE EQUIVALENT) SCH MG: 325 (65 FE) TABLET DELAYED RESPONSE at 16:29

## 2018-01-16 RX ADMIN — METOPROLOL SUCCINATE SCH MG: 50 TABLET, EXTENDED RELEASE ORAL at 20:08

## 2018-01-16 RX ADMIN — INSULIN GLARGINE SCH UNITS: 100 INJECTION, SOLUTION SUBCUTANEOUS at 20:19

## 2018-01-16 RX ADMIN — AMLODIPINE BESYLATE SCH MG: 5 TABLET ORAL at 07:32

## 2018-01-16 RX ADMIN — SPIRONOLACTONE SCH MG: 25 TABLET, FILM COATED ORAL at 16:29

## 2018-01-16 RX ADMIN — ESCITALOPRAM OXALATE SCH MG: 20 TABLET, FILM COATED ORAL at 07:31

## 2018-01-16 RX ADMIN — NITROGLYCERIN SCH INCH: 20 OINTMENT TOPICAL at 11:40

## 2018-01-16 RX ADMIN — TAMSULOSIN HYDROCHLORIDE SCH MG: 0.4 CAPSULE ORAL at 20:17

## 2018-01-16 RX ADMIN — FUROSEMIDE SCH MLS/MIN: 10 INJECTION, SOLUTION INTRAMUSCULAR; INTRAVENOUS at 20:22

## 2018-01-16 RX ADMIN — METOPROLOL SUCCINATE SCH MG: 50 TABLET, EXTENDED RELEASE ORAL at 07:32

## 2018-01-16 RX ADMIN — TIOTROPIUM BROMIDE SCH PUFF: 18 CAPSULE ORAL; RESPIRATORY (INHALATION) at 07:30

## 2018-01-16 RX ADMIN — FUROSEMIDE SCH MLS/MIN: 10 INJECTION, SOLUTION INTRAMUSCULAR; INTRAVENOUS at 20:15

## 2018-01-16 NOTE — HOSPITALIST PROGRESS NOTE
Hospitalist Progress Note


Date of Service


Jan 16, 2018.





Subjective


Pt evaluation today including:  conversation w/ patient, physical exam, chart 

review, lab review, review of studies, review of inpatient medication list


Patient seen and evaluated. Admitted early this morning for CHF exacerbation 

with reporting non-compliance with dietary restrictions. Frequent admissions 

due to fluid overload.


Reporting already feeling better. Laying flat in bed without dyspnea. No 

labored breathing. Still with supplementation O2 on. Cr. mildly elevated but 

baseline hard to ascertain given labile nature of it.


Verbalizes no complaints. States he has chronic lower extremity pain that is 

currently controlled.





   Constitutional:  No fever, No chills


   Respiratory:  + dyspnea on exertion, No cough, No dyspnea at rest


   Cardiovascular:  No chest pain, No orthopnea


   Abdomen:  No pain, No nausea, No vomiting, No diarrhea, No constipation


   Musculoskeletal:  + problem reported (b/l leg pain - chronic)


   Male :  No dysuria


   Heme:  No abnormal bleeding/bruising





Medications





Current Inpatient Medications








 Medications


  (Trade)  Dose


 Ordered  Sig/Dc


 Route  Start Time


 Stop Time Status Last Admin


Dose Admin


 


 Atorvastatin


 Calcium


  (Lipitor Tab)  80 mg  DAILY


 PO  1/16/18 09:00


 2/15/18 08:59  1/16/18 07:32


80 MG


 


 Clonazepam


  (Klonopin Tab)  1 mg  HS  PRN


 PO  1/16/18 06:00


 2/15/18 05:59   


 


 


 Clopidogrel


 Bisulfate


  (plAVix TAB)  75 mg  QAM


 PO  1/16/18 09:00


 2/15/18 08:59  1/16/18 07:32


75 MG


 


 Divalproex Sodium


  (Depakote Delay


 Rel Tab)  2,000 mg  HS


 PO  1/16/18 21:00


 2/15/18 20:59   


 


 


 Escitalopram


 Oxalate


  (Lexapro Tab)  20 mg  QAM


 PO  1/16/18 09:00


 2/15/18 08:59  1/16/18 07:31


20 MG


 


 Gabapentin


  (Neurontin Cap)  300 mg  HS


 PO  1/16/18 21:00


 2/15/18 20:59   


 


 


 Hydralazine HCl


  (Apresoline Tab)  25 mg  QID


 PO  1/16/18 09:00


 2/15/18 08:59  1/16/18 07:31


25 MG


 


 Insulin Glargine


  (Lantus Solostar


 Pen)  10 units  HS


 SC  1/16/18 21:00


 2/15/18 20:59   


 


 


 Isosorbide


 Mononitrate


  (Imdur Ext Rel


 Tab)  30 mg  QAM


 PO  1/16/18 09:00


 2/15/18 08:59  1/16/18 07:31


30 MG


 


 Metoprolol


 Succinate


  (Toprol Xl Tab)  50 mg  BID


 PO  1/16/18 09:00


 2/15/18 08:59  1/16/18 07:32


50 MG


 


 Tamsulosin HCl


  (Flomax Cap)  0.4 mg  HS


 PO  1/16/18 21:00


 2/15/18 20:59   


 


 


 Tiotropium Bromide


  (Spiriva


 Handihaler


 Inhaler)  1 puff  DAILY


 INH  1/16/18 09:00


 2/15/18 08:59  1/16/18 07:30


1 PUFF


 


 Warfarin Sodium


  (Coumadin Tab)  7.5 mg  DAILY@1600


 PO  1/16/18 16:00


 2/15/18 15:59   


 


 


 Albuterol


  (Ventolin Hfa


 Inhaler)  2 puffs  Q4H  PRN


 INH  1/16/18 06:00


 2/15/18 05:59   


 


 


 Amlodipine


 Besylate


  (Norvasc Tab)  10 mg  QAM


 PO  1/16/18 09:00


 2/15/18 08:59  1/16/18 07:32


10 MG


 


 Cyanocobalamin


  (Vitamin B-12


 Tab)  1,000 mcg  DAILY


 PO  1/16/18 09:00


 2/15/18 08:59  1/16/18 07:33


1,000 MCG


 


 Ferrous Sulfate


  (Feosol Tab)  325 mg  BIDM


 PO  1/16/18 07:15


 2/15/18 07:59  1/16/18 07:05


325 MG


 


 Mirtazapine


  (Remeron Tab)  45 mg  HS


 PO  1/16/18 21:00


 2/15/18 20:59   


 


 


 Oxycodone HCl


  (Roxicodone


 Immediate Rel Tab)  10 mg  Q12  PRN


 PO  1/16/18 06:00


 2/15/18 05:59  1/16/18 07:30


10 MG


 


 Spironolactone


  (Aldactone Tab)  50 mg  BIDM


 PO  1/16/18 07:15


 2/15/18 07:14  1/16/18 07:05


50 MG


 


 Furosemide 20 mg/


 Syringe  2 ml @ 4


 mls/min  BID


 IV  1/16/18 20:00


 2/15/18 19:59   


 


 


 Acetaminophen


  (Tylenol Tab)  650 mg  Q4H  PRN


 PO  1/16/18 06:00


 2/15/18 05:59   


 


 


 Al Hydrox/Mg


 Hydrox/Simethicone


  (Maalox Max Susp)  15 ml  Q4H  PRN


 PO  1/16/18 06:00


 2/15/18 05:59   


 


 


 Magnesium


 Hydroxide


  (Milk Of


 Magnesia Susp)  30 ml  Q12H  PRN


 PO  1/16/18 06:00


 2/15/18 05:59   


 


 


 Ondansetron HCl


  (Zofran Inj)  4 mg  Q6H  PRN


 IV  1/16/18 06:00


 2/15/18 05:59   


 


 


 Nitroglycerin


  (Nitrostat Tab)  0.4 mg  UD  PRN


 SL  1/16/18 06:00


 2/15/18 05:59   


 


 


 Nitroglycerin


  (Nitroglycerin


 2% Oint)  1 inch  Q6


 EXT  1/16/18 06:45


 2/15/18 06:44  1/16/18 07:03


1 INCH


 


 Morphine Sulfate


  (MoRPHine


 SULFATE INJ)  2 mg  Q30M  PRN


 IV  1/16/18 06:00


 1/30/18 05:59   


 


 


 Polyethylene


  (Miralax Powder


 Packet)  17 gm  DAILY  PRN


 PO  1/16/18 06:00


 2/15/18 05:59   


 


 


 Glucose


  (Glucose 40% Gel)  15-30


 GRAMS 15


 GRAMS...  UD  PRN


 PO  1/16/18 06:45


 2/15/18 06:44   


 


 


 Glucose


  (Glucose Chew


 Tab)  4-8


 Tablets 4


 Tabl...  UD  PRN


 PO  1/16/18 06:45


 2/15/18 06:44   


 


 


 Dextrose


  (Dextrose 50%


 50ML Syringe)  25-50ML OF


 50% DW IV


 FOR...  UD  PRN


 IV  1/16/18 06:45


 2/15/18 06:44   


 


 


 Glucagon


  (Glucagon Inj)  1 mg  UD  PRN


 SQ  1/16/18 06:45


 2/15/18 06:44   


 











Objective


Vital Signs











  Date Time  Temp Pulse Resp B/P (MAP) Pulse Ox O2 Delivery O2 Flow Rate FiO2


 


1/16/18 08:00 37.3 68 18 150/71 (97) 93 Nasal Cannula 2.0 


 


1/16/18 08:00     93 Nasal Cannula 2.0 


 


1/16/18 06:30 36.7 60 18 147/69 94 Nasal Cannula 2.0 


 


1/16/18 06:24  65 20 134/52 95   


 


1/16/18 04:44  70 22 135/65 94 Nasal Cannula 2.0 


 


1/16/18 04:05  67      


 


1/16/18 03:48  72 18 177/91 99 Oxymask 5.0 


 


1/16/18 03:28     94 Oxyhood 5.0 


 


1/16/18 03:28     94 Oxymask 5.0 


 


1/16/18 03:26  82      


 


1/16/18 03:20 36.5 79 28 202/89 87 Room Air  


 


1/16/18 03:20     87 Room Air  











Physical Exam


General Appearance:  WD/WN, no apparent distress


ENT:  hearing grossly normal


Neck:  no JVD


Respiratory/Chest:  no respiratory distress, no accessory muscle use, + crackles

 (bases b/l)


Cardiovascular:  regular rate, rhythm, + systolic murmur


Abdomen:  normal bowel sounds, non tender, soft


Extremities:  + pertinent finding (dressing applied to lower extremities C/D/I)


Neurologic/Psychiatric:  alert


Skin:  normal color, warm/dry





Laboratory Results





Last 24 Hours








Test


  1/16/18


03:50 1/16/18


04:05 1/16/18


04:12


 


White Blood Count 13.12 K/uL   


 


Red Blood Count 2.91 M/uL   


 


Hemoglobin 8.7 g/dL   


 


Hematocrit 26.7 %   


 


Mean Corpuscular Volume 91.8 fL   


 


Mean Corpuscular Hemoglobin 29.9 pg   


 


Mean Corpuscular Hemoglobin


Concent 32.6 g/dl 


  


  


 


 


Platelet Count 253 K/uL   


 


Mean Platelet Volume 8.9 fL   


 


Neutrophils (%) (Auto) 83.9 %   


 


Lymphocytes (%) (Auto) 7.2 %   


 


Monocytes (%) (Auto) 6.8 %   


 


Eosinophils (%) (Auto) 1.4 %   


 


Basophils (%) (Auto) 0.2 %   


 


Neutrophils # (Auto) 11.00 K/uL   


 


Lymphocytes # (Auto) 0.95 K/uL   


 


Monocytes # (Auto) 0.89 K/uL   


 


Eosinophils # (Auto) 0.18 K/uL   


 


Basophils # (Auto) 0.03 K/uL   


 


RDW Standard Deviation 52.7 fL   


 


RDW Coefficient of Variation 15.7 %   


 


Immature Granulocyte % (Auto) 0.5 %   


 


Immature Granulocyte # (Auto) 0.07 K/uL   


 


Red Blood Cell Morphology Unremarkable   


 


Prothrombin Time 28.0 SECONDS   


 


Prothromb Time International


Ratio 2.7 


  


  


 


 


Activated Partial


Thromboplast Time 33.0 SECONDS 


  


  


 


 


Partial Thromboplastin Ratio 1.3   


 


Sodium Level 140 mmol/L   


 


Potassium Level 5.4 mmol/L   


 


Chloride Level 110 mmol/L   


 


Carbon Dioxide Level 25 mmol/L   


 


Anion Gap 5.0 mmol/L   


 


Blood Urea Nitrogen 45 mg/dl   


 


Creatinine 1.73 mg/dl   


 


Estimated GFR (


American) 48.7 


  


  


 


 


Estimated GFR (Non-


American 42.0 


  


  


 


 


BUN/Creatinine Ratio 26.1   


 


Random Glucose 243 mg/dl   


 


Calcium Level 7.7 mg/dl   


 


Total Bilirubin 0.2 mg/dl   


 


Aspartate Amino Transf


(AST/SGOT) 12 U/L 


  


  


 


 


Alanine Aminotransferase


(ALT/SGPT) 17 U/L 


  


  


 


 


Alkaline Phosphatase 61 U/L   


 


Troponin I 0.026 ng/ml   


 


Pro-B-Type Natriuretic Peptide 9978 pg/ml   


 


Total Protein 6.0 gm/dl   


 


Albumin 2.2 gm/dl   


 


Globulin 3.8 gm/dl   


 


Albumin/Globulin Ratio 0.6   


 


Procalcitonin < 0.05 ng/ml   


 


Bedside Lactic Acid Venous  0.89 mmol/L  


 


Arterial Blood pH   7.33 


 


Arterial Blood Partial


Pressure CO2 


  


  44 mmHg 


 


 


Arterial Blood Partial


Pressure O2 


  


  99 mm/Hg 


 


 


Arterial Blood HCO3   23 mmol/L 


 


Arterial Blood Oxygen


Saturation 


  


  95.6 % 


 


 


Arterial Blood Base Excess   -2.8 mEq/L 


 


Arterial Blood Gas Delivery   5L 


 


Cameron Test   POS 











Assessment and Plan


61 y/o M with extensive medical issues including severe CAD, PVD, COPD, DM II, 

bipolar disease, chronic anemia, diastolic CHF, recent diagnosis of atrial 

thrombus - on Coumadin - pt had an MI at [place] 10/17 which was medically 

managed. He frequently presents to the hospital due to SOB/volume overload and 

recently presented with CP and anemia as well. 


The pt presents today due to acute SOB.  Initial imaging and exam is consistent 

with exacerbation of CHF.  He was requiring high-flow oxygen on arrival but has 

improved with Lasix administration.  He denies CP, N/V, diarrhea or dysuria.  





Acute Hypoxic Respiratory Failure 2/2 Acute on Chronic Diastolic CHF:


- Reporting dietary non-compliance - normally does not use home O2


- Continue Lasix 20 mg IV daily and monitor I&Os and daily weights; monitor 

kidney function


- Continue Spironolactone 50 mg BID





Elevated Cr:


- Baseline is hard to determine due to labile on labs in-hospital


- Continue to monitor with diuresis 





CAD: STABLE


- Troponins remain negative


- Plavix 75 mg daily


- Norvasc 10 mg daily, Hydralazine 25 mg QID, Imdur 30 mg daily, and Toprol XL 

50 mg BID, Lipitor 80 mg daily





T2DM:


- Continue Lantus and SSI





Chronic Atrial Thrombus:


- INR continues to be therapeutic - continue Coumadin and monitor INR





COPD without Exacerbation:


- Ventolin PRN





Bipolar Disorder: STABLE


- Depakote 2 g dailiy, Lexapro 20 mg daily, Remeron 45 mg daily, and Klonopin





DVT Prophylaxis: Coumadin





Code Status: FULL RESUSCITATION





Disposition: Continue diuresis and hopeful D/C 1-2 days


Continued [facility] stay due to:  multiple IV medications needed


Discharge planning:  home

## 2018-01-16 NOTE — DIAGNOSTIC IMAGING REPORT
CHEST ONE VIEW PORTABLE



HISTORY:  62 years-old Male SOB. Cough. Hypoxia.  Acute cough with shortness of

breath



COMPARISON: Chest radiograph 1/12/2018



TECHNIQUE: Portable AP view of the chest



FINDINGS: 

Cardiac silhouette is again mildly enlarged. Atherosclerosis of the aorta.

Moderate pulmonary vascular congestion with progressive interstitial coarsening.

No pneumothorax. Trace bilateral pleural effusions. Trace fluid tracks along the

minor fissure. Patchy bibasilar opacities are noted, right than left. Bones of

the chest appear grossly intact.



IMPRESSION: 

1. Cardiomegaly with pulmonary vascular congestion and mild pulmonary edema

which has slightly progressed from comparison.

2. Patchy bibasilar opacities, right greater than left suggests atelectasis or

pneumonia.

3. Trace pleural effusions. 







The above report was generated using voice recognition software. It may contain

grammatical, syntax or spelling errors.







Electronically signed by:  Ashwin Bhatia M.D.

1/16/2018 7:00 AM



Dictated Date/Time:  1/16/2018 6:57 AM

## 2018-01-16 NOTE — EMERGENCY ROOM VISIT NOTE
History


First contact with patient:  03:22


Chief Complaint:  SHORTNESS OF BREATH


Stated Complaint:  SHORT OF BREATH


Nursing Triage Summary:  


pt reports that he was seen here in the ed for pneumonia on friday and was 


started on z pack. pt has +2 pitting edema to BLE and dry cough. hx copd, chf, 


MI, DM.  per ems O2 was 68% on room air and pt was placed on NRB.





History of Present Illness


The patient is a 62 year old male who presents to the Emergency Room via 

ambulance with complaints of shortness of breath worsening over the past one 

day.  The patient has a long-standing history of multiple medical conditions 

including coronary artery disease, CHF, atrial thrombus, pneumonia, and COPD.  

The patient has a home pulse oximeter and states that his oxygen was 76% on 

room air.  He does not wear oxygen at home, and recently completed Zithromax 

for possible pneumonia.  The patient was initially feeling better while on 

Zithromax, but states that he has now worsened.  He is also complaining of 

weight gain of around 5 pounds over the past 2 or 3 days.  He does take Lasix 

but is unsure if he took this today.  The patient has dyspnea on exertion, 

which is not new.  He has been given high flow oxygen by EMS, and this has 

brought his oxygen saturation into the 90's.  He is not having distinct chest 

pain or abdominal pain.  He rates his discomfort a 2/10.





Review of Systems


More than 10 systems were reviewed and otherwise negative with the exception of 

history of present illness.





Past Medical/Surgical History


Medical Problems:


(1) Accelerated hypertension


(2) Acute appendicitis with appendiceal abscess


(3) Acute renal insufficiency


(4) Acute respiratory failure


(5) Angina pectoris


(6) Bipolar disorder


(7) CAD (coronary artery disease)


(8) Cardiac arrest


(9) Cellulitis


(10) CHF exacerbation


(11) Dehydration


(12) Diabetes


(13) Diastolic CHF, chronic


(14) Elevated INR


(15) Enterocolitis


(16) HCAP (healthcare-associated pneumonia)


(17) Headache


(18) Heel ulcer


(19) Hypertension


(20) Ischemic cardiomyopathy


(21) LVH (left ventricular hypertrophy)


(22) Osteomyelitis of left foot


(23) PAD (peripheral artery disease)


(24) Precordial chest pain


(25) Respiratory distress


(26) Sepsis, unspecified organism


(27) Volume overload








Family History





Cancer (esophageal)


Diabetes mellitus


Heart disease


Hypertension





Social History


Smoking Status:  Former Smoker


Drug Use:  none


Marital Status:  


Housing Status:  lives with family


Occupation Status:  employed





Current/Historical Medications


Scheduled


Amlodipine Besylate (Amlodipine Besylate), 10 MG PO QAM


Atorvastatin (Lipitor), 80 MG PO DAILY


Azithromycin (Zithromax Z-Dima), 0 PO UD


Clopidogrel Bisulfate (Clopidogrel), 75 MG PO QAM


Cyanocobalamin (Vitamin B12), 1,000 MCG PO DAILY


Divalproex Sodium (Depakote Delay Rel), 2,000 MG PO HS


Escitalopram Oxalate (Escitalopram Oxalate), 20 MG PO QAM


Ferrous Sulfate (Ferrous Sulfate), 325 MG PO BIDM


Furosemide (Lasix), 20 MG PO PRN


Gabapentin (Neurontin), 300 MG PO HS


Hydralazine Hcl (Apresoline), 25 MG PO QID


Insulin Glargine (Lantus Solostar), 10 UNITS SC HS


Insulin Lispro (Human) (Humalog Kwikpen), 1 DOSE SC AC


Isosorbide Mononitrate Ext Rel (Imdur Ext Rel), 30 MG PO QAM


Metoprolol Succinate (Metoprolol Succinate ER), 50 MG PO BID


Mirtazapine (Mirtazapine), 45 MG PO HS


Spironolactone (Aldactone), 50 MG PO BID


Tamsulosin HCl (Tamsulosin HCl), 0.4 MG PO HS


Tiotropium Bromide (Spiriva Handihaler), 1 CAP INH DAILY


Warfarin Sodium (Warfarin Sodium), 7.5 MG PO 5XWK


Warfarin Sodium (Warfarin Sodium), 10 MG PO 2XWK





Scheduled PRN


Acetaminophen (Tylenol), 500 MG PO Q4-6HRS PRN for Pain


Albuterol Sulfate (Proair Respiclick), 1-2 PUFFS INH Q4-6HRS PRN for SOB/

Wheezing


Clonazepam (Clonazepam), 1 MG PO HS PRN for Anxiety


Docusate Sodium (Docusate Sodium), 100 MG PO BID PRN for Constipation


Fluticasone Propionate (Nasal) (Flonase Allergy Relief), 2 SPRAYS VERONICA DAILY PRN 

for Nasal Congestion


Glucagon (Glucagon Emergency Kit), 1 MG UD PRN for Hypoglycemia Protocol


Nitroglycerin (Nitrostat), 0.4 MG UT UD PRN for Chest Pain


Oxycodone HCl (Oxycodone HCl), 5 MG PO Q6H PRN for Breakthrough Pain


Oxycodone Hcl (Oxycodone Hcl), 10 MG PO Q12 PRN for Severe Pain





Physical Exam


Vital Signs











  Date Time  Temp Pulse Resp B/P (MAP) Pulse Ox O2 Delivery O2 Flow Rate FiO2


 


1/16/18 04:44  70 22 135/65 94 Nasal Cannula 2.0 


 


1/16/18 04:05  67      


 


1/16/18 03:48  72 18 177/91 99 Oxymask 5.0 


 


1/16/18 03:28     94 Oxyhood 5.0 


 


1/16/18 03:28     94 Oxymask 5.0 


 


1/16/18 03:26  82      


 


1/16/18 03:20 36.5 79 28 202/89 87 Room Air  


 


1/16/18 03:20     87 Room Air  











Physical Exam


VITALS: Vitals are noted on the nurse's note and reviewed by myself.  Vital 

signs with retention and hypoxia


GENERAL: White male who appears in respiratory distress  


NECK: Supple without nuchal rigidity.  No lymphadenopathy.  No thyromegaly.  

Cervical spine is nontender.  


HEART: Regular rate and rhythm  


LUNGS: Diminished breath sounds appreciated throughout.  Crackles heard best in 

the left side lung base and more minimally in the right lung base.


ABDOMEN: Positive normal bowel sounds x 4.  Soft, nontender, without masses or 

organomegaly.  No guarding or rebound tenderness.


MUSCULOSKELETAL:  Bilateral lower extremity pitting edema noted.


NEURO: Patient was alert and oriented to person place and time. CN II through 

XII grossly intact.





Medical Decision & Procedures


Laboratory Results


1/16/18 03:50








Red Blood Count 2.91, Mean Corpuscular Volume 91.8, Mean Corpuscular Hemoglobin 

29.9, Mean Corpuscular Hemoglobin Concent 32.6, Mean Platelet Volume 8.9, 

Neutrophils (%) (Auto) 83.9, Lymphocytes (%) (Auto) 7.2, Monocytes (%) (Auto) 

6.8, Eosinophils (%) (Auto) 1.4, Basophils (%) (Auto) 0.2, Neutrophils # (Auto) 

11.00, Lymphocytes # (Auto) 0.95, Monocytes # (Auto) 0.89, Eosinophils # (Auto) 

0.18, Basophils # (Auto) 0.03





1/16/18 03:50

















Test


  1/16/18


03:50 1/16/18


04:05 1/16/18


04:12


 


White Blood Count


  13.12 K/uL


(4.8-10.8) 


  


 


 


Red Blood Count


  2.91 M/uL


(4.7-6.1) 


  


 


 


Hemoglobin


  8.7 g/dL


(14.0-18.0) 


  


 


 


Hematocrit 26.7 % (42-52)   


 


Mean Corpuscular Volume


  91.8 fL


() 


  


 


 


Mean Corpuscular Hemoglobin


  29.9 pg


(25-34) 


  


 


 


Mean Corpuscular Hemoglobin


Concent 32.6 g/dl


(32-36) 


  


 


 


Platelet Count


  253 K/uL


(130-400) 


  


 


 


Mean Platelet Volume


  8.9 fL


(7.4-10.4) 


  


 


 


Neutrophils (%) (Auto) 83.9 %   


 


Lymphocytes (%) (Auto) 7.2 %   


 


Monocytes (%) (Auto) 6.8 %   


 


Eosinophils (%) (Auto) 1.4 %   


 


Basophils (%) (Auto) 0.2 %   


 


Neutrophils # (Auto)


  11.00 K/uL


(1.4-6.5) 


  


 


 


Lymphocytes # (Auto)


  0.95 K/uL


(1.2-3.4) 


  


 


 


Monocytes # (Auto)


  0.89 K/uL


(0.11-0.59) 


  


 


 


Eosinophils # (Auto)


  0.18 K/uL


(0-0.5) 


  


 


 


Basophils # (Auto)


  0.03 K/uL


(0-0.2) 


  


 


 


RDW Standard Deviation


  52.7 fL


(36.4-46.3) 


  


 


 


RDW Coefficient of Variation


  15.7 %


(11.5-14.5) 


  


 


 


Immature Granulocyte % (Auto) 0.5 %   


 


Immature Granulocyte # (Auto)


  0.07 K/uL


(0.00-0.02) 


  


 


 


Red Blood Cell Morphology Unremarkable   


 


Prothrombin Time


  28.0 SECONDS


(9.0-12.0) 


  


 


 


Prothromb Time International


Ratio 2.7 (0.9-1.1) 


  


  


 


 


Activated Partial


Thromboplast Time 33.0 SECONDS


(21.0-31.0) 


  


 


 


Partial Thromboplastin Ratio 1.3   


 


Anion Gap


  5.0 mmol/L


(3-11) 


  


 


 


Estimated GFR (


American) 48.7 


  


  


 


 


Estimated GFR (Non-


American 42.0 


  


  


 


 


BUN/Creatinine Ratio 26.1 (10-20)   


 


Calcium Level


  7.7 mg/dl


(8.5-10.1) 


  


 


 


Total Bilirubin


  0.2 mg/dl


(0.2-1) 


  


 


 


Aspartate Amino Transf


(AST/SGOT) 12 U/L (15-37) 


  


  


 


 


Alanine Aminotransferase


(ALT/SGPT) 17 U/L (12-78) 


  


  


 


 


Alkaline Phosphatase


  61 U/L


() 


  


 


 


Pro-B-Type Natriuretic Peptide


  9978 pg/ml


(0-900) 


  


 


 


Total Protein


  6.0 gm/dl


(6.4-8.2) 


  


 


 


Albumin


  2.2 gm/dl


(3.4-5.0) 


  


 


 


Globulin


  3.8 gm/dl


(2.5-4.0) 


  


 


 


Albumin/Globulin Ratio 0.6 (0.9-2)   


 


Procalcitonin


  < 0.05 ng/ml


(0-0.5) 


  


 


 


Bedside Lactic Acid Venous


  


  0.89 mmol/L


(0.90-1.70) 


 


 


Arterial Blood pH


  


  


  7.33


(7.35-7.45)


 


Arterial Blood Partial


Pressure CO2 


  


  44 mmHg


(35-46)


 


Arterial Blood Partial


Pressure O2 


  


  99 mm/Hg


(80-95)


 


Arterial Blood HCO3


  


  


  23 mmol/L


(19-24)


 


Arterial Blood Oxygen


Saturation 


  


  95.6 % (90-95) 


 


 


Arterial Blood Base Excess


  


  


  -2.8 mEq/L


(-9-1.8)


 


Arterial Blood Gas Delivery   5L 


 


Cameron Test   POS (POS) 











Medications Administered











 Medications


  (Trade)  Dose


 Ordered  Sig/Dc


 Route  Start Time


 Stop Time Status Last Admin


Dose Admin


 


 Albuterol/


 Ipratropium


  (Duoneb)  3 ml  NOW  ONCE


 INH  1/16/18 03:45


 1/16/18 03:46 DC 1/16/18 04:16


3 ML


 


 Furosemide


  (Lasix Inj)  40 mg  STK-MED ONCE


 .ROUTE  1/16/18 04:14


 1/16/18 04:15 DC 1/16/18 04:16


20 MG











ED Course


Physical exam and history were performed. Nursing notes, EMR, and Medication 

List were personally reviewed. 





Patient appears to be in acute respiratory distress.  He has several 

comorbidities that may be contributing to this.  IV access was established and 

labs were obtained.  The patient was given a DuoNeb as well as IV Lasix here in 

the department.  Portable chest x-ray was performed. EKG was normal sinus 

rhythm without acute ST elevation.  The patient was placed on a cardiac monitor.





The patient's blood work is as above and was reviewed.  He does have a mildly 

elevated white blood cell count 13,000.  He is slightly anemic, which does 

appear chronic.  The patient does have an elevated BUN/creatinine, which 

appears slightly worse than normal.  His point-of-care troponin was 0.03.  ABGs 

do show acidosis with PO2 of 99.  BNP is nearly 10,000.  Lactic acid is less 

than 1 with cultures pending.  His INR is 2.7 which is considered therapeutic.  

Chest x-ray is consistent with fluid overload.  The patient's blood pressure 

did improve with the DuoNeb and diuretics.





The case was discussed with my attending physician, Dr. Gay, who also 

reviewed the patient's studies.  We do not feel the patient is stable for 

discharge home as he appears to be in acute respiratory distress.  The case was 

discussed with the on-call hospitalist who agreed to evaluate the patient here 

in the department.  Please see their dictation for further patient course, plan

, and disposition.





The chart was completed utilizing Dragon Speech Voice Recognition Software. 

Grammatical errors, random word insertions, pronoun errors, and incomplete 

sentences are an occasional consequence of this system due to software 

limitations, ambient noise, and hardware issues. Any formal questions or 

concerns about the content, text, or information contained within the body of 

this dictation should be directly addressed to the provider for clarification.


.





Medical Decision


Differential diagnosis includes, but is not limited to:CHF, COPD exacerbation, 

myocardial infarction, dysrhythmia, pericarditis, pneumothorax, aortic aneurysm/

dissection, DVT/PE, anxiety, GERD, PUD, electrolyte imbalance, thyroid disorder

, pneumonia, bronchitis, pancreatitis, and others





Blood Pressure Screening


Blood pressure disposition:  Elevated BP felt to be situational





Impression





 Primary Impression:  


 Respiratory distress


 Additional Impression:  


 Congestive heart failure





Departure Information


Referrals


Pro,Claude MCCORD M.D. (PCP)





Patient Instructions


My [facility]





Problem Qualifiers

## 2018-01-16 NOTE — HISTORY AND PHYSICAL
History & Physical


Date & Time of Service:


Jan 16, 2018 at 05:20


Chief Complaint:


Short Of Breath


Primary Care Physician:


Claude Rowley M.D.


History of Present Illness


Source:  patient


63 y/o M with extensive medical issues including severe CAD, PVD, COPD, DM II, 

bipolar disease, chronic anemia, diastolic CHF, recent diagnosis of atrial 

thrombus - on Coumadin - pt had an MI at [place] 10/17 which was medically 

managed. He frequently presents to the hospital due to SOB/volume overload and 

recently presented with CP and anemia as well. 


The pt presents today due to acute SOB.  Initial imaging and exam is consistent 

with exacerbation of CHF.  He was requiring high-flow oxygen on arrival but has 

improved with Lasix administration.  He denies CP, N/V, diarrhea or dysuria.  


The pt admits to dietary indiscretion and states that he had dinner at a truck 

stop where he enjoyed salad with ranch dressing and a pork chop.











Past Medical/Surgical History


1) CAD - NSTEMI 2007 - L circ stent  -   ACS 2010 - Catheterization showed 90 % 

mid LAD stenosis, 40 % distal LAD Stenosis,  50% mid LCX stenosis, 100% in-

stent stenosis in the distal L circumflex, 100% obtuse marginal stenosis, 60% 

proximal right coronary stenosis, 40% mid RCA stenosis. Pt had an atherectomy 

and ERICK placed in the LAD at [place].  Suffered additional MI at [place] 10/17.





2) Grade 1 diastolic dysfunction on recent echo - Preserved EF 55%





3) DM 2 





4) Bipolar disease





5) COPD - continues to smoke 





6) Diabetic L foot ulcer requiring wound vac





7) V fib arrest following MI 2007





8) JANE





9) GERD





10) PAD - B/L  femoral stents





11) Chronic anemia - Hb 8-10





12) Atrial thrombus





13) Chronic LE ulcers








Family History





Cancer (esophageal)


Diabetes mellitus


Heart disease


Hypertension





Social History


Smoking Status:  Former Smoker


Drug Use:  none


Marital Status:  


Housing status:  lives with family, other


Occupational Status:  employed





Immunizations


History of Influenza Vaccine:  No


Influenza Vaccine Date:  Oct 5, 2009


History of Tetanus Vaccine?:  No


Tetanus Immunization Date:  Mar 1, 2004


History of Pneumococcal:  No


Pneumococcal Date:  Oct 15, 2006


History of Hepatitis B Vaccine:  No





Multi-Drug Resistant Organisms


History of MDRO:  No





Allergies


Coded Allergies:  


     No Known Allergies (Verified , 1/16/18)





Home Medications


Scheduled


Amlodipine Besylate (Amlodipine Besylate), 10 MG PO QAM


Atorvastatin (Lipitor), 80 MG PO DAILY


Azithromycin (Zithromax Z-Dima), 0 PO UD


Clopidogrel Bisulfate (Clopidogrel), 75 MG PO QAM


Cyanocobalamin (Vitamin B12), 1,000 MCG PO DAILY


Divalproex Sodium (Depakote Delay Rel), 2,000 MG PO HS


Escitalopram Oxalate (Escitalopram Oxalate), 20 MG PO QAM


Ferrous Sulfate (Ferrous Sulfate), 325 MG PO BIDM


Furosemide (Lasix), 20 MG PO PRN


Gabapentin (Neurontin), 300 MG PO HS


Hydralazine Hcl (Apresoline), 25 MG PO QID


Insulin Glargine (Lantus Solostar), 10 UNITS SC HS


Insulin Lispro (Human) (Humalog Kwikpen), 1 DOSE SC AC


Isosorbide Mononitrate Ext Rel (Imdur Ext Rel), 30 MG PO QAM


Metoprolol Succinate (Metoprolol Succinate ER), 50 MG PO BID


Mirtazapine (Mirtazapine), 45 MG PO HS


Spironolactone (Aldactone), 50 MG PO BID


Tamsulosin HCl (Tamsulosin HCl), 0.4 MG PO HS


Tiotropium Bromide (Spiriva Handihaler), 1 CAP INH DAILY


Warfarin Sodium (Warfarin Sodium), 7.5 MG PO 5XWK


Warfarin Sodium (Warfarin Sodium), 10 MG PO 2XWK





Scheduled PRN


Acetaminophen (Tylenol), 500 MG PO Q4-6HRS PRN for Pain


Albuterol Sulfate (Proair Respiclick), 1-2 PUFFS INH Q4-6HRS PRN for SOB/

Wheezing


Clonazepam (Clonazepam), 1 MG PO HS PRN for Anxiety


Docusate Sodium (Docusate Sodium), 100 MG PO BID PRN for Constipation


Fluticasone Propionate (Nasal) (Flonase Allergy Relief), 2 SPRAYS VERONICA DAILY PRN 

for Nasal Congestion


Glucagon (Glucagon Emergency Kit), 1 MG UD PRN for Hypoglycemia Protocol


Nitroglycerin (Nitrostat), 0.4 MG UT UD PRN for Chest Pain


Oxycodone HCl (Oxycodone HCl), 5 MG PO Q6H PRN for Breakthrough Pain


Oxycodone Hcl (Oxycodone Hcl), 10 MG PO Q12 PRN for Severe Pain





Review of Systems


Constitutional:  No fever, No chills, No sweats


Eyes:  No worsening of vision


ENT:  No hearing loss, No unusual epistaxis, No nasal symptoms


Respiratory:  + shortness of breath, + dyspnea on exertion, + dyspnea at rest, 

No cough, No sputum, No wheezing


Cardiovascular:  No chest pain, No orthopnea, No PND


Abdomen:  No pain, No vomiting


Musculoskeletal:  No joint pain


Genitourinary - Male:  No hematuria, No dysuria


Neurologic:  No memory loss, No paralysis, No weakness


Psychiatric:  No depression symptoms


Endocrine:  No fatigue


Hematologic / Lymphatic:  No abnormal bleeding/bruising


Integumentary:  No rash


Allergic / Immunologic:  No environmental allergies





Physical Exam


Vital Signs











  Date Time  Temp Pulse Resp B/P (MAP) Pulse Ox O2 Delivery O2 Flow Rate FiO2


 


1/16/18 04:44  70 22 135/65 94 Nasal Cannula 2.0 


 


1/16/18 04:05  67      


 


1/16/18 03:48  72 18 177/91 99 Oxymask 5.0 


 


1/16/18 03:28     94 Oxyhood 5.0 


 


1/16/18 03:28     94 Oxymask 5.0 


 


1/16/18 03:26  82      


 


1/16/18 03:20 36.5 79 28 202/89 87 Room Air  


 


1/16/18 03:20     87 Room Air  








General Appearance:  WD/WN, no apparent distress


Head:  normocephalic


Eyes:  normal inspection


ENT:  normal ENT inspection, hearing grossly normal, TMs normal, pharynx normal


Neck:  supple, no JVD


Respiratory/Chest:  + crackles (BL at bases)


Cardiovascular:  regular rate, rhythm, no edema, no gallop, + systolic murmur (3

/6)


Abdomen/GI:  normal bowel sounds, non tender, soft


Back:  normal inspection, no CVA tenderness


Extremities/Musculoskelatal:  normal inspection, no calf tenderness, normal 

capillary refill


Neurologic/Psych:  CNs II-XII nml as tested, no motor/sensory deficits, alert, 

normal mood/affect, normal reflexes, oriented x 3


Skin:  normal color, warm/dry, no rash





Diagnostics


Laboratory Results





Results Past 24 Hours








Test


  1/16/18


03:50 1/16/18


04:05 1/16/18


04:12 1/16/18


05:18 Range/Units


 


 


White Blood Count 13.12    4.8-10.8  K/uL


 


Red Blood Count 2.91    4.7-6.1  M/uL


 


Hemoglobin 8.7    14.0-18.0  g/dL


 


Hematocrit 26.7    42-52  %


 


Mean Corpuscular Volume 91.8      fL


 


Mean Corpuscular Hemoglobin 29.9    25-34  pg


 


Mean Corpuscular Hemoglobin


Concent 32.6


  


  


  


  32-36  g/dl


 


 


Platelet Count 253    130-400  K/uL


 


Mean Platelet Volume 8.9    7.4-10.4  fL


 


Neutrophils (%) (Auto) 83.9     %


 


Lymphocytes (%) (Auto) 7.2     %


 


Monocytes (%) (Auto) 6.8     %


 


Eosinophils (%) (Auto) 1.4     %


 


Basophils (%) (Auto) 0.2     %


 


Neutrophils # (Auto) 11.00    1.4-6.5  K/uL


 


Lymphocytes # (Auto) 0.95    1.2-3.4  K/uL


 


Monocytes # (Auto) 0.89    0.11-0.59  K/uL


 


Eosinophils # (Auto) 0.18    0-0.5  K/uL


 


Basophils # (Auto) 0.03    0-0.2  K/uL


 


RDW Standard Deviation 52.7    36.4-46.3  fL


 


RDW Coefficient of Variation 15.7    11.5-14.5  %


 


Immature Granulocyte % (Auto) 0.5     %


 


Immature Granulocyte # (Auto) 0.07    0.00-0.02  K/uL


 


Red Blood Cell Morphology Unremarkable     


 


Prothrombin Time


  28.0


  


  


  


  9.0-12.0


SECONDS


 


Prothromb Time International


Ratio 2.7


  


  


  


  0.9-1.1  


 


 


Activated Partial


Thromboplast Time 33.0


  


  


  


  21.0-31.0


SECONDS


 


Partial Thromboplastin Ratio 1.3     


 


Sodium Level 140    136-145  mmol/L


 


Potassium Level 5.4    3.5-5.1  mmol/L


 


Chloride Level 110      mmol/L


 


Carbon Dioxide Level 25    21-32  mmol/L


 


Anion Gap 5.0    3-11  mmol/L


 


Blood Urea Nitrogen 45    7-18  mg/dl


 


Creatinine


  1.73


  


  


  


  0.60-1.40


mg/dl


 


Estimated GFR (


American) 48.7


  


  


  


   


 


 


Estimated GFR (Non-


American 42.0


  


  


  


   


 


 


BUN/Creatinine Ratio 26.1    10-20  


 


Random Glucose 243    70-99  mg/dl


 


Calcium Level 7.7    8.5-10.1  mg/dl


 


Total Bilirubin 0.2    0.2-1  mg/dl


 


Aspartate Amino Transf


(AST/SGOT) 12


  


  


  


  15-37  U/L


 


 


Alanine Aminotransferase


(ALT/SGPT) 17


  


  


  


  12-78  U/L


 


 


Alkaline Phosphatase 61      U/L


 


Pro-B-Type Natriuretic Peptide 9978    0-900  pg/ml


 


Total Protein 6.0    6.4-8.2  gm/dl


 


Albumin 2.2    3.4-5.0  gm/dl


 


Globulin 3.8    2.5-4.0  gm/dl


 


Albumin/Globulin Ratio 0.6    0.9-2  


 


Procalcitonin < 0.05    0-0.5  ng/ml


 


Bedside Lactic Acid Venous


  


  0.89


  


  


  0.90-1.70


mmol/L


 


Arterial Blood pH   7.33  7.35-7.45  


 


Arterial Blood Partial


Pressure CO2 


  


  44


  


  35-46  mmHg


 


 


Arterial Blood Partial


Pressure O2 


  


  99


  


  80-95  mm/Hg


 


 


Arterial Blood HCO3   23  19-24  mmol/L


 


Arterial Blood Oxygen


Saturation 


  


  95.6


  


  90-95  %


 


 


Arterial Blood Base Excess   -2.8  -9-1.8  mEq/L


 


Arterial Blood Gas Delivery   5L   


 


Cameron Test   POS  POS  








Microbiology Results


1/16/18 Blood Culture, Received


          Pending


1/16/18 Blood Culture, Received


          Pending





Diagnostic Radiology


CXR: CHF





EKG


NSR - PACs - no evidence of ischemia





Impression


Assessment and Plan


63 y/o M with extensive medical issues including severe CAD, PVD, COPD, DM II, 

bipolar disease, chronic anemia, diastolic CHF, recent diagnosis of atrial 

thrombus - on Coumadin - pt had an MI at [place] 10/17 which was medically 

managed. He frequently presents to the hospital due to SOB/volume overload and 

recently presented with CP and anemia as well. 


The pt presents today due to acute SOB.  Initial imaging and exam is consistent 

with exacerbation of CHF.  He was requiring high-flow oxygen on arrival but has 

improved with Lasix administration.  He denies CP, N/V, diarrhea or dysuria.  





1) SOB - acute on chronic, diastolic CHF - pt received Lasix in the ER - 

Continue diuresis with Lasix and Aldactone until hypoxia resolves.  We will 

continue his B blocker and apply NTG ointment.  He does not normally use 02 at 

home.  He has been informed reg dietary indiscretion that sodium may cancel out 

the effect of diuretics and should be avoided.  





3) CAD - Troponin is pending on admission - We will cont his B blocker and 

statin   





4) DM II - placed on SS 





5) Atrial thrombus - INR is therapeutic at 2.7 - cont Coumadin.





6) COPD - no evidence of exacerbation - will remain on prescribed inhalers and 

an 02 protocol.





7) Bipolar disease - stable - cont Remeron, Clonazepam, Depakote.





8) Pt states he recently quit smoking.





9) Pts renal function appears to vary depending on his volume status.  BUN/

Creat are slightly above baseline on admission.  Can trend following diuresis.





10) Anemia - Hb is currently above baseline 











Full code - anticoagulated with Coumadin


Total time for this admit including review of labs, meds imaging, records, EKG 

- discussion with pt and ER attending - 42 min





Level of Care


Telemetry





Resuscitation Status


FULL RESUSCITATION





VTE Prophylaxis


Given or contraindicated:  Warfarin (Coumadin)

## 2018-01-17 VITALS
TEMPERATURE: 97.7 F | DIASTOLIC BLOOD PRESSURE: 80 MMHG | OXYGEN SATURATION: 93 % | SYSTOLIC BLOOD PRESSURE: 158 MMHG | HEART RATE: 58 BPM

## 2018-01-17 VITALS
DIASTOLIC BLOOD PRESSURE: 81 MMHG | SYSTOLIC BLOOD PRESSURE: 114 MMHG | HEART RATE: 60 BPM | TEMPERATURE: 98.42 F | OXYGEN SATURATION: 96 %

## 2018-01-17 VITALS
TEMPERATURE: 98.6 F | HEART RATE: 72 BPM | SYSTOLIC BLOOD PRESSURE: 129 MMHG | OXYGEN SATURATION: 95 % | DIASTOLIC BLOOD PRESSURE: 65 MMHG

## 2018-01-17 VITALS
TEMPERATURE: 97.7 F | OXYGEN SATURATION: 94 % | HEART RATE: 62 BPM | SYSTOLIC BLOOD PRESSURE: 130 MMHG | DIASTOLIC BLOOD PRESSURE: 49 MMHG

## 2018-01-17 VITALS
TEMPERATURE: 99.14 F | SYSTOLIC BLOOD PRESSURE: 141 MMHG | HEART RATE: 56 BPM | DIASTOLIC BLOOD PRESSURE: 56 MMHG | OXYGEN SATURATION: 94 %

## 2018-01-17 VITALS
TEMPERATURE: 98.6 F | DIASTOLIC BLOOD PRESSURE: 63 MMHG | HEART RATE: 67 BPM | OXYGEN SATURATION: 95 % | SYSTOLIC BLOOD PRESSURE: 134 MMHG

## 2018-01-17 LAB
BUN SERPL-MCNC: 51 MG/DL (ref 7–18)
CALCIUM SERPL-MCNC: 7.9 MG/DL (ref 8.5–10.1)
CO2 SERPL-SCNC: 23 MMOL/L (ref 21–32)
CREAT SERPL-MCNC: 1.59 MG/DL (ref 0.6–1.4)
EOSINOPHIL NFR BLD AUTO: 212 K/UL (ref 130–400)
GLUCOSE SERPL-MCNC: 169 MG/DL (ref 70–99)
HCT VFR BLD CALC: 23.8 % (ref 42–52)
HGB BLD-MCNC: 7.8 G/DL (ref 14–18)
MCH RBC QN AUTO: 30 PG (ref 25–34)
MCHC RBC AUTO-ENTMCNC: 32.8 G/DL (ref 32–36)
MCV RBC AUTO: 91.5 FL (ref 80–100)
PMV BLD AUTO: 8.8 FL (ref 7.4–10.4)
POTASSIUM SERPL-SCNC: 5.5 MMOL/L (ref 3.5–5.1)
RED CELL DISTRIBUTION WIDTH CV: 15.8 % (ref 11.5–14.5)
RED CELL DISTRIBUTION WIDTH SD: 53.1 FL (ref 36.4–46.3)
SODIUM SERPL-SCNC: 139 MMOL/L (ref 136–145)
WBC # BLD AUTO: 7.81 K/UL (ref 4.8–10.8)

## 2018-01-17 RX ADMIN — INSULIN ASPART SCH UNITS: 100 INJECTION, SOLUTION INTRAVENOUS; SUBCUTANEOUS at 21:00

## 2018-01-17 RX ADMIN — INSULIN ASPART SCH UNITS: 100 INJECTION, SOLUTION INTRAVENOUS; SUBCUTANEOUS at 18:03

## 2018-01-17 RX ADMIN — WARFARIN SODIUM SCH MG: 7.5 TABLET ORAL at 15:55

## 2018-01-17 RX ADMIN — SPIRONOLACTONE SCH MG: 25 TABLET, FILM COATED ORAL at 15:55

## 2018-01-17 RX ADMIN — Medication SCH MCG: at 07:58

## 2018-01-17 RX ADMIN — FERROUS SULFATE TAB EC 325 MG (65 MG FE EQUIVALENT) SCH MG: 325 (65 FE) TABLET DELAYED RESPONSE at 07:56

## 2018-01-17 RX ADMIN — DIVALPROEX SODIUM SCH MG: 500 TABLET, DELAYED RELEASE ORAL at 21:37

## 2018-01-17 RX ADMIN — AMLODIPINE BESYLATE SCH MG: 5 TABLET ORAL at 07:57

## 2018-01-17 RX ADMIN — METOPROLOL SUCCINATE SCH MG: 50 TABLET, EXTENDED RELEASE ORAL at 21:41

## 2018-01-17 RX ADMIN — SPIRONOLACTONE SCH MG: 25 TABLET, FILM COATED ORAL at 07:56

## 2018-01-17 RX ADMIN — METOPROLOL SUCCINATE SCH MG: 50 TABLET, EXTENDED RELEASE ORAL at 07:58

## 2018-01-17 RX ADMIN — NITROGLYCERIN SCH INCH: 20 OINTMENT TOPICAL at 06:08

## 2018-01-17 RX ADMIN — ESCITALOPRAM OXALATE SCH MG: 20 TABLET, FILM COATED ORAL at 07:57

## 2018-01-17 RX ADMIN — INSULIN GLARGINE SCH UNITS: 100 INJECTION, SOLUTION SUBCUTANEOUS at 21:47

## 2018-01-17 RX ADMIN — OXYCODONE HYDROCHLORIDE PRN MG: 5 TABLET ORAL at 21:48

## 2018-01-17 RX ADMIN — CLOPIDOGREL BISULFATE SCH MG: 75 TABLET, FILM COATED ORAL at 07:58

## 2018-01-17 RX ADMIN — MIRTAZAPINE SCH MG: 15 TABLET, FILM COATED ORAL at 21:38

## 2018-01-17 RX ADMIN — ATORVASTATIN CALCIUM SCH MG: 40 TABLET, FILM COATED ORAL at 07:57

## 2018-01-17 RX ADMIN — FUROSEMIDE SCH MLS/MIN: 10 INJECTION, SOLUTION INTRAMUSCULAR; INTRAVENOUS at 21:57

## 2018-01-17 RX ADMIN — TAMSULOSIN HYDROCHLORIDE SCH MG: 0.4 CAPSULE ORAL at 21:38

## 2018-01-17 RX ADMIN — GABAPENTIN SCH MG: 300 CAPSULE ORAL at 21:39

## 2018-01-17 RX ADMIN — TIOTROPIUM BROMIDE SCH PUFF: 18 CAPSULE ORAL; RESPIRATORY (INHALATION) at 07:56

## 2018-01-17 RX ADMIN — NITROGLYCERIN SCH INCH: 20 OINTMENT TOPICAL at 11:56

## 2018-01-17 RX ADMIN — ISOSORBIDE MONONITRATE SCH MG: 30 TABLET, EXTENDED RELEASE ORAL at 07:57

## 2018-01-17 RX ADMIN — FERROUS SULFATE TAB EC 325 MG (65 MG FE EQUIVALENT) SCH MG: 325 (65 FE) TABLET DELAYED RESPONSE at 15:55

## 2018-01-17 RX ADMIN — FUROSEMIDE SCH MLS/MIN: 10 INJECTION, SOLUTION INTRAMUSCULAR; INTRAVENOUS at 07:56

## 2018-01-17 RX ADMIN — OXYCODONE HYDROCHLORIDE PRN MG: 5 TABLET ORAL at 00:00

## 2018-01-17 RX ADMIN — NITROGLYCERIN SCH INCH: 20 OINTMENT TOPICAL at 00:02

## 2018-01-17 NOTE — HOSPITALIST PROGRESS NOTE
Hospitalist Progress Note


Date of Service


Jan 17, 2018.


 (Brenda Reyes PA-C)





Subjective


Pt evaluation today including:  conversation w/ patient, physical exam, chart 

review, lab review, review of studies, review of inpatient medication list


Patient seen and evaluated. No acute events overnight.


Laying flat in bed and denies orthopnea. Feels that his breathing is a lot more 

improved but still requiring O2. 


States he had a sleep study in [place] but did not meet JANE criteria. Does 

desaturate when sleeping.


Currently at a + fluid balance. Cr is improving.





   Constitutional:  No fever, No chills


   Respiratory:  + dyspnea on exertion, No cough, No dyspnea at rest


   Cardiovascular:  No chest pain, No orthopnea


   Abdomen:  No pain, No nausea, No vomiting


   Musculoskeletal:  + problem reported (chronic pain in wounds b/l legs)


   Male :  No dysuria


   Heme:  No abnormal bleeding/bruising


   Skin:  No rash (Brenda Reyes PA-C)





Medications





Current Inpatient Medications








 Medications


  (Trade)  Dose


 Ordered  Sig/Dc


 Route  Start Time


 Stop Time Status Last Admin


Dose Admin


 


 Atorvastatin


 Calcium


  (Lipitor Tab)  80 mg  DAILY


 PO  1/16/18 09:00


 2/15/18 08:59  1/17/18 07:57


80 MG


 


 Clonazepam


  (Klonopin Tab)  1 mg  HS  PRN


 PO  1/16/18 06:00


 2/15/18 05:59   


 


 


 Clopidogrel


 Bisulfate


  (plAVix TAB)  75 mg  QAM


 PO  1/16/18 09:00


 2/15/18 08:59  1/17/18 07:58


75 MG


 


 Divalproex Sodium


  (Depakote Delay


 Rel Tab)  2,000 mg  HS


 PO  1/16/18 21:00


 2/15/18 20:59  1/16/18 20:16


2,000 MG


 


 Escitalopram


 Oxalate


  (Lexapro Tab)  20 mg  QAM


 PO  1/16/18 09:00


 2/15/18 08:59  1/17/18 07:57


20 MG


 


 Gabapentin


  (Neurontin Cap)  300 mg  HS


 PO  1/16/18 21:00


 2/15/18 20:59  1/16/18 20:16


300 MG


 


 Hydralazine HCl


  (Apresoline Tab)  25 mg  QID


 PO  1/16/18 09:00


 2/15/18 08:59  1/17/18 07:56


25 MG


 


 Insulin Glargine


  (Lantus Solostar


 Pen)  10 units  HS


 SC  1/16/18 21:00


 2/15/18 20:59  1/16/18 20:19


10 UNITS


 


 Isosorbide


 Mononitrate


  (Imdur Ext Rel


 Tab)  30 mg  QAM


 PO  1/16/18 09:00


 2/15/18 08:59  1/17/18 07:57


30 MG


 


 Metoprolol


 Succinate


  (Toprol Xl Tab)  50 mg  BID


 PO  1/16/18 09:00


 2/15/18 08:59  1/17/18 07:58


50 MG


 


 Tamsulosin HCl


  (Flomax Cap)  0.4 mg  HS


 PO  1/16/18 21:00


 2/15/18 20:59  1/16/18 20:17


0.4 MG


 


 Tiotropium Bromide


  (Spiriva


 Handihaler


 Inhaler)  1 puff  DAILY


 INH  1/16/18 09:00


 2/15/18 08:59  1/17/18 07:56


1 PUFF


 


 Warfarin Sodium


  (Coumadin Tab)  7.5 mg  DAILY@1600


 PO  1/16/18 16:00


 2/15/18 15:59  1/16/18 16:29


7.5 MG


 


 Albuterol


  (Ventolin Hfa


 Inhaler)  2 puffs  Q4H  PRN


 INH  1/16/18 06:00


 2/15/18 05:59   


 


 


 Amlodipine


 Besylate


  (Norvasc Tab)  10 mg  QAM


 PO  1/16/18 09:00


 2/15/18 08:59  1/17/18 07:57


10 MG


 


 Cyanocobalamin


  (Vitamin B-12


 Tab)  1,000 mcg  DAILY


 PO  1/16/18 09:00


 2/15/18 08:59  1/17/18 07:58


1,000 MCG


 


 Ferrous Sulfate


  (Feosol Tab)  325 mg  BIDM


 PO  1/16/18 07:15


 2/15/18 07:59  1/17/18 07:56


325 MG


 


 Mirtazapine


  (Remeron Tab)  45 mg  HS


 PO  1/16/18 21:00


 2/15/18 20:59  1/16/18 20:17


45 MG


 


 Oxycodone HCl


  (Roxicodone


 Immediate Rel Tab)  10 mg  Q12  PRN


 PO  1/16/18 06:00


 2/15/18 05:59  1/17/18 00:00


10 MG


 


 Spironolactone


  (Aldactone Tab)  50 mg  BIDM


 PO  1/16/18 07:15


 2/15/18 07:14  1/17/18 07:56


50 MG


 


 Furosemide 20 mg/


 Syringe  2 ml @ 4


 mls/min  BID


 IV  1/16/18 20:00


 2/15/18 19:59  1/17/18 07:56


4 MLS/MIN


 


 Acetaminophen


  (Tylenol Tab)  650 mg  Q4H  PRN


 PO  1/16/18 06:00


 2/15/18 05:59  1/16/18 17:18


650 MG


 


 Al Hydrox/Mg


 Hydrox/Simethicone


  (Maalox Max Susp)  15 ml  Q4H  PRN


 PO  1/16/18 06:00


 2/15/18 05:59   


 


 


 Magnesium


 Hydroxide


  (Milk Of


 Magnesia Susp)  30 ml  Q12H  PRN


 PO  1/16/18 06:00


 2/15/18 05:59   


 


 


 Ondansetron HCl


  (Zofran Inj)  4 mg  Q6H  PRN


 IV  1/16/18 06:00


 2/15/18 05:59   


 


 


 Nitroglycerin


  (Nitrostat Tab)  0.4 mg  UD  PRN


 SL  1/16/18 06:00


 2/15/18 05:59   


 


 


 Nitroglycerin


  (Nitroglycerin


 2% Oint)  1 inch  Q6


 EXT  1/16/18 06:45


 2/15/18 06:44  1/17/18 06:08


1 INCH


 


 Morphine Sulfate


  (MoRPHine


 SULFATE INJ)  2 mg  Q30M  PRN


 IV  1/16/18 06:00


 1/30/18 05:59   


 


 


 Polyethylene


  (Miralax Powder


 Packet)  17 gm  DAILY  PRN


 PO  1/16/18 06:00


 2/15/18 05:59   


 


 


 Glucose


  (Glucose 40% Gel)  15-30


 GRAMS 15


 GRAMS...  UD  PRN


 PO  1/16/18 06:45


 2/15/18 06:44   


 


 


 Glucose


  (Glucose Chew


 Tab)  4-8


 Tablets 4


 Tabl...  UD  PRN


 PO  1/16/18 06:45


 2/15/18 06:44   


 


 


 Dextrose


  (Dextrose 50%


 50ML Syringe)  25-50ML OF


 50% DW IV


 FOR...  UD  PRN


 IV  1/16/18 06:45


 2/15/18 06:44   


 


 


 Glucagon


  (Glucagon Inj)  1 mg  UD  PRN


 SQ  1/16/18 06:45


 2/15/18 06:44   


 








 (Brenda Reyes PA-C)





Objective


Vital Signs











  Date Time  Temp Pulse Resp B/P (MAP) Pulse Ox O2 Delivery O2 Flow Rate FiO2


 


1/17/18 04:00 36.5 58 15 158/80 (106) 93 Nasal Cannula 3.0 


 


1/17/18 04:00      Nasal Cannula 3.0 


 


1/17/18 00:00      Nasal Cannula 3.0 


 


1/17/18 00:00 36.9 60 21 114/81 (92) 96 Nasal Cannula 3.0 


 


1/16/18 20:01 36.8 51 18 126/64 (84) 94 Nasal Cannula 2.0 


 


1/16/18 20:00      Nasal Cannula 2.0 


 


1/16/18 18:02  48 12 135/70 (91) 93 Nasal Cannula 2.0 


 


1/16/18 16:01 36.8 54 17 147/66 (93) 92 Room Air  


 


1/16/18 16:00      Nasal Cannula 2.0 


 


1/16/18 12:00 36.7 63 18 120/57 (78) 93 Room Air  


 


1/16/18 12:00     93 Nasal Cannula 2.0 








 (Brenda Reyes, PA-C)





Physical Exam


General Appearance:  no apparent distress


ENT:  hearing grossly normal


Neck:  supple, no JVD, trachea midline


Respiratory/Chest:  no respiratory distress, no accessory muscle use, + crackles

 (minimal bases b/l)


Cardiovascular:  regular rate, rhythm, + systolic murmur


Abdomen:  normal bowel sounds, non tender, soft


Extremities:  + pertinent finding (dressing applied that is C/D/I)


 (Brenda Reyes, PA-C)





Laboratory Results





Last 24 Hours








Test


  1/16/18


11:04 1/16/18


12:01 1/16/18


16:13 1/16/18


20:12


 


Bedside Glucose 170 mg/dl   169 mg/dl  140 mg/dl 


 


Sodium Level  138 mmol/L   


 


Potassium Level  5.2 mmol/L   


 


Chloride Level  109 mmol/L   


 


Carbon Dioxide Level  26 mmol/L   


 


Anion Gap  3.0 mmol/L   


 


Blood Urea Nitrogen  46 mg/dl   


 


Creatinine  1.66 mg/dl   


 


Est Creatinine Clear Calc


Drug Dose 


  55.2 ml/min 


  


  


 


 


Estimated GFR (


American) 


  51.1 


  


  


 


 


Estimated GFR (Non-


American 


  44.1 


  


  


 


 


BUN/Creatinine Ratio  27.9   


 


Random Glucose  156 mg/dl   


 


Calcium Level  8.0 mg/dl   


 


Troponin I  0.114 ng/ml   


 


Test


  1/17/18


06:13 1/17/18


08:14 1/17/18


08:29 


 


 


Bedside Glucose 101 mg/dl    








 (Brenda Reyes, PAMelC)





Assessment and Plan


63 y/o M with extensive medical issues including severe CAD, PVD, COPD, DM II, 

bipolar disease, chronic anemia, diastolic CHF, recent diagnosis of atrial 

thrombus - on Coumadin - pt had an MI at [place] 10/17 which was medically 

managed. He frequently presents to the hospital due to SOB/volume overload and 

recently presented with CP and anemia as well. 


The pt presents today due to acute SOB.  Initial imaging and exam is consistent 

with exacerbation of CHF.  He was requiring high-flow oxygen on arrival but has 

improved with Lasix administration.  He denies CP, N/V, diarrhea or dysuria.  





Acute Hypoxic Respiratory Failure 2/2 Acute on Chronic Diastolic CHF: Dietary 

Non-Compliance


- Still requiring supplemental O2 normally is on RA; Currently +220 net fluid 

balance


- Will perform overnight pulse ox to evaluate night O2 needs - may need chronic 

O2 and may benefit from two step


- Continue Lasix 20 mg IV daily and monitor I&Os and daily weights; monitor 

kidney function


- Continue Spironolactone 50 mg BID





Elevated Cr:


- Baseline is hard to determine due to labile on labs in-hospital


- Continue to monitor with diuresis; continues to improve





B/L Chronic Diabetic Wounds:


- Culture with staph - will continue to monitor; will look at wounds tomorrow 

and consideration for treatment at that time


   -- Has been afebrile and leukocytosis resolved; reporting pain at baseline





CAD: STABLE


- Troponin mildly bumped and trending down - no further intervention at this 

time; denies CP


- Plavix 75 mg daily


- Norvasc 10 mg daily, Hydralazine 25 mg QID, Imdur 30 mg daily, and Toprol XL 

50 mg BID, Lipitor 80 mg daily





T2DM:


- Continue Lantus and SSI





Chronic Atrial Thrombus:


- INR continues to be therapeutic - continue Coumadin and monitor INR





COPD without Exacerbation:


- Ventolin PRN





Bipolar Disorder: STABLE


- Depakote 2 g daily, Lexapro 20 mg daily, Remeron 45 mg daily, and Klonopin





DVT Prophylaxis: Coumadin





Code Status: FULL RESUSCITATION





Disposition: Continue diuresis and hopeful D/C 1-2 days; overnight pulse ox


Continued [facility] stay due to:  multiple IV medications needed


Discharge planning:  home


 (Brenda Reyes, PA-C)


PA Physician Supervision Note:





I interviewed and examined the patient. Discussed with Brenda Reyes PAC and 

agree with findings and plan as documented in the note. Any exceptions or 

clarifications are listed here: None





Patient continues to improve able a flat having reasonable glucose control 

there was some concern about need for oxygen especially at night which may 

predispose him to heart failure episodes.  Incidentally his surface wound 

cultures have shown staph this appears to be separate from systemic infection 

we will reinspect his wound the morning to determine if with this can be 

treated topically versus with a systemic antibiotic





His vital signs are stable he's afebrile cardiac exam is distant there is a 

murmur which is slight his lungs are clear in all fields





Recurrent acute systolic heart failure improved with diuretics will check 

nocturnal oxygen to see if hypoxia plays a role in his exacerbations and as 

mentioned above determine what his wound cultures me in his clinical picture


Documented By:  Chuy Omalley


 (Chuy Omalley M.D.)

## 2018-01-18 VITALS
HEART RATE: 52 BPM | OXYGEN SATURATION: 93 % | TEMPERATURE: 98.42 F | DIASTOLIC BLOOD PRESSURE: 50 MMHG | SYSTOLIC BLOOD PRESSURE: 134 MMHG

## 2018-01-18 VITALS
SYSTOLIC BLOOD PRESSURE: 170 MMHG | OXYGEN SATURATION: 92 % | DIASTOLIC BLOOD PRESSURE: 63 MMHG | TEMPERATURE: 98.06 F | HEART RATE: 61 BPM

## 2018-01-18 VITALS
DIASTOLIC BLOOD PRESSURE: 62 MMHG | TEMPERATURE: 98.24 F | SYSTOLIC BLOOD PRESSURE: 151 MMHG | HEART RATE: 52 BPM | OXYGEN SATURATION: 92 %

## 2018-01-18 VITALS
HEART RATE: 54 BPM | SYSTOLIC BLOOD PRESSURE: 119 MMHG | OXYGEN SATURATION: 92 % | TEMPERATURE: 98.78 F | DIASTOLIC BLOOD PRESSURE: 75 MMHG

## 2018-01-18 VITALS
DIASTOLIC BLOOD PRESSURE: 69 MMHG | SYSTOLIC BLOOD PRESSURE: 158 MMHG | HEART RATE: 54 BPM | TEMPERATURE: 97.88 F | OXYGEN SATURATION: 93 %

## 2018-01-18 VITALS
HEART RATE: 56 BPM | SYSTOLIC BLOOD PRESSURE: 119 MMHG | DIASTOLIC BLOOD PRESSURE: 75 MMHG | TEMPERATURE: 98.96 F | OXYGEN SATURATION: 91 %

## 2018-01-18 VITALS
HEART RATE: 67 BPM | TEMPERATURE: 99.14 F | DIASTOLIC BLOOD PRESSURE: 61 MMHG | OXYGEN SATURATION: 94 % | SYSTOLIC BLOOD PRESSURE: 157 MMHG

## 2018-01-18 VITALS
SYSTOLIC BLOOD PRESSURE: 141 MMHG | TEMPERATURE: 99.14 F | DIASTOLIC BLOOD PRESSURE: 87 MMHG | OXYGEN SATURATION: 96 % | HEART RATE: 73 BPM

## 2018-01-18 VITALS
SYSTOLIC BLOOD PRESSURE: 135 MMHG | DIASTOLIC BLOOD PRESSURE: 62 MMHG | OXYGEN SATURATION: 93 % | TEMPERATURE: 99.32 F | HEART RATE: 66 BPM

## 2018-01-18 VITALS — OXYGEN SATURATION: 93 %

## 2018-01-18 VITALS
SYSTOLIC BLOOD PRESSURE: 146 MMHG | OXYGEN SATURATION: 93 % | HEART RATE: 54 BPM | TEMPERATURE: 98.06 F | DIASTOLIC BLOOD PRESSURE: 65 MMHG

## 2018-01-18 VITALS
HEART RATE: 56 BPM | TEMPERATURE: 98.96 F | SYSTOLIC BLOOD PRESSURE: 134 MMHG | DIASTOLIC BLOOD PRESSURE: 59 MMHG | OXYGEN SATURATION: 91 %

## 2018-01-18 VITALS
HEART RATE: 57 BPM | OXYGEN SATURATION: 94 % | TEMPERATURE: 98.24 F | DIASTOLIC BLOOD PRESSURE: 63 MMHG | SYSTOLIC BLOOD PRESSURE: 172 MMHG

## 2018-01-18 VITALS — OXYGEN SATURATION: 94 %

## 2018-01-18 LAB
BUN SERPL-MCNC: 52 MG/DL (ref 7–18)
CALCIUM SERPL-MCNC: 8 MG/DL (ref 8.5–10.1)
CO2 SERPL-SCNC: 24 MMOL/L (ref 21–32)
CREAT SERPL-MCNC: 1.57 MG/DL (ref 0.6–1.4)
EOSINOPHIL NFR BLD AUTO: 208 K/UL (ref 130–400)
GLUCOSE SERPL-MCNC: 122 MG/DL (ref 70–99)
HCT VFR BLD CALC: 23.7 % (ref 42–52)
HCT VFR BLD CALC: 24.4 % (ref 42–52)
HGB BLD-MCNC: 7.6 G/DL (ref 14–18)
HGB BLD-MCNC: 7.9 G/DL (ref 14–18)
INR PPP: 3.1 (ref 0.9–1.1)
MCH RBC QN AUTO: 29.3 PG (ref 25–34)
MCHC RBC AUTO-ENTMCNC: 32.1 G/DL (ref 32–36)
MCV RBC AUTO: 91.5 FL (ref 80–100)
PMV BLD AUTO: 9 FL (ref 7.4–10.4)
POTASSIUM SERPL-SCNC: 5.1 MMOL/L (ref 3.5–5.1)
RED CELL DISTRIBUTION WIDTH CV: 15.5 % (ref 11.5–14.5)
RED CELL DISTRIBUTION WIDTH SD: 52 FL (ref 36.4–46.3)
SODIUM SERPL-SCNC: 141 MMOL/L (ref 136–145)
WBC # BLD AUTO: 6.29 K/UL (ref 4.8–10.8)

## 2018-01-18 RX ADMIN — INSULIN ASPART SCH UNITS: 100 INJECTION, SOLUTION INTRAVENOUS; SUBCUTANEOUS at 17:38

## 2018-01-18 RX ADMIN — INSULIN GLARGINE SCH UNITS: 100 INJECTION, SOLUTION SUBCUTANEOUS at 20:54

## 2018-01-18 RX ADMIN — DIVALPROEX SODIUM SCH MG: 500 TABLET, DELAYED RELEASE ORAL at 20:44

## 2018-01-18 RX ADMIN — SPIRONOLACTONE SCH MG: 25 TABLET, FILM COATED ORAL at 08:38

## 2018-01-18 RX ADMIN — INSULIN ASPART SCH UNITS: 100 INJECTION, SOLUTION INTRAVENOUS; SUBCUTANEOUS at 14:07

## 2018-01-18 RX ADMIN — AMLODIPINE BESYLATE SCH MG: 5 TABLET ORAL at 08:43

## 2018-01-18 RX ADMIN — Medication SCH MCG: at 08:42

## 2018-01-18 RX ADMIN — FERROUS SULFATE TAB EC 325 MG (65 MG FE EQUIVALENT) SCH MG: 325 (65 FE) TABLET DELAYED RESPONSE at 08:38

## 2018-01-18 RX ADMIN — CLOPIDOGREL BISULFATE SCH MG: 75 TABLET, FILM COATED ORAL at 08:42

## 2018-01-18 RX ADMIN — METOPROLOL SUCCINATE SCH MG: 50 TABLET, EXTENDED RELEASE ORAL at 08:43

## 2018-01-18 RX ADMIN — ATORVASTATIN CALCIUM SCH MG: 40 TABLET, FILM COATED ORAL at 08:42

## 2018-01-18 RX ADMIN — WARFARIN SODIUM SCH MG: 7.5 TABLET ORAL at 16:15

## 2018-01-18 RX ADMIN — INSULIN ASPART SCH UNITS: 100 INJECTION, SOLUTION INTRAVENOUS; SUBCUTANEOUS at 08:48

## 2018-01-18 RX ADMIN — TIOTROPIUM BROMIDE SCH PUFF: 18 CAPSULE ORAL; RESPIRATORY (INHALATION) at 08:40

## 2018-01-18 RX ADMIN — FUROSEMIDE SCH MLS/MIN: 10 INJECTION, SOLUTION INTRAMUSCULAR; INTRAVENOUS at 08:41

## 2018-01-18 RX ADMIN — METOPROLOL SUCCINATE SCH MG: 50 TABLET, EXTENDED RELEASE ORAL at 20:48

## 2018-01-18 RX ADMIN — SPIRONOLACTONE SCH MG: 25 TABLET, FILM COATED ORAL at 16:16

## 2018-01-18 RX ADMIN — TAMSULOSIN HYDROCHLORIDE SCH MG: 0.4 CAPSULE ORAL at 20:45

## 2018-01-18 RX ADMIN — OXYCODONE HYDROCHLORIDE PRN MG: 5 TABLET ORAL at 20:56

## 2018-01-18 RX ADMIN — ACETAMINOPHEN PRN MG: 325 TABLET ORAL at 23:31

## 2018-01-18 RX ADMIN — FUROSEMIDE SCH MLS/MIN: 10 INJECTION, SOLUTION INTRAMUSCULAR; INTRAVENOUS at 20:42

## 2018-01-18 RX ADMIN — OXYCODONE HYDROCHLORIDE PRN MG: 5 TABLET ORAL at 08:51

## 2018-01-18 RX ADMIN — FERROUS SULFATE TAB EC 325 MG (65 MG FE EQUIVALENT) SCH MG: 325 (65 FE) TABLET DELAYED RESPONSE at 16:16

## 2018-01-18 RX ADMIN — ESCITALOPRAM OXALATE SCH MG: 20 TABLET, FILM COATED ORAL at 08:40

## 2018-01-18 RX ADMIN — GABAPENTIN SCH MG: 300 CAPSULE ORAL at 20:45

## 2018-01-18 RX ADMIN — INSULIN ASPART SCH UNITS: 100 INJECTION, SOLUTION INTRAVENOUS; SUBCUTANEOUS at 20:52

## 2018-01-18 RX ADMIN — MIRTAZAPINE SCH MG: 15 TABLET, FILM COATED ORAL at 20:47

## 2018-01-18 RX ADMIN — PANTOPRAZOLE SCH MG: 40 TABLET, DELAYED RELEASE ORAL at 20:56

## 2018-01-18 RX ADMIN — ISOSORBIDE MONONITRATE SCH MG: 30 TABLET, EXTENDED RELEASE ORAL at 08:41

## 2018-01-18 NOTE — PROGRESS NOTE
Progress Note


Date of Service


Jan 18, 2018.





Progress Note


discussed nocturnal oximetry with respiratory, once again may not be able to 

obtain adequate sample because of poor perfusion however will attempt


No forehead probes available for nocturnal oximetry

## 2018-01-18 NOTE — HOSPITALIST PROGRESS NOTE
Hospitalist Progress Note


Date of Service


Jan 18, 2018.


 (Brenda Reyes PAMelC)





Subjective


Pt evaluation today including:  conversation w/ patient, physical exam, chart 

review, lab review, review of studies, review of inpatient medication list


Patient seen and evaluated. No acute events overnight.


Continuing to need supplemental O2. Reports breathing is stable. Lungs clear 

but diminished.


Unable to do overnight pulse ox due to poor circulation. Continues to be at a 

positive balance.


Kidney function is improving gradually. 


Given reducing Hgb, SOB that does not really seem to be directly related to 

fluid overload, bigeminy on monitor, and pallor - will transfuse 1 unit now


B12, Folate, and Iron studies unremarkable - anemia supports that of chronic 

disease. Denies BRBPR and Melena





   Constitutional:  No fever, No chills


   Respiratory:  + dyspnea on exertion, No cough, No dyspnea at rest


   Cardiovascular:  No chest pain, No palpitations


   Abdomen:  No pain, No nausea, No vomiting, No diarrhea, No constipation, No 

GI bleeding


   Musculoskeletal:  + problem reported (pain at wound sites b/l legs - chronic 

reporting unchanged)


   Male :  No dysuria


   Heme:  No abnormal bleeding/bruising (Brenda Reyes, PA-C)





Medications





Current Inpatient Medications








 Medications


  (Trade)  Dose


 Ordered  Sig/Dc


 Route  Start Time


 Stop Time Status Last Admin


Dose Admin


 


 Atorvastatin


 Calcium


  (Lipitor Tab)  80 mg  DAILY


 PO  1/16/18 09:00


 2/15/18 08:59  1/18/18 08:42


80 MG


 


 Clonazepam


  (Klonopin Tab)  1 mg  HS  PRN


 PO  1/16/18 06:00


 2/15/18 05:59   


 


 


 Clopidogrel


 Bisulfate


  (plAVix TAB)  75 mg  QAM


 PO  1/16/18 09:00


 2/15/18 08:59  1/18/18 08:42


75 MG


 


 Divalproex Sodium


  (Depakote Delay


 Rel Tab)  2,000 mg  HS


 PO  1/16/18 21:00


 2/15/18 20:59  1/17/18 21:37


2,000 MG


 


 Escitalopram


 Oxalate


  (Lexapro Tab)  20 mg  QAM


 PO  1/16/18 09:00


 2/15/18 08:59  1/18/18 08:40


20 MG


 


 Gabapentin


  (Neurontin Cap)  300 mg  HS


 PO  1/16/18 21:00


 2/15/18 20:59  1/17/18 21:39


300 MG


 


 Hydralazine HCl


  (Apresoline Tab)  25 mg  QID


 PO  1/16/18 09:00


 2/15/18 08:59  1/18/18 08:41


25 MG


 


 Insulin Glargine


  (Lantus Solostar


 Pen)  10 units  HS


 SC  1/16/18 21:00


 2/15/18 20:59  1/17/18 21:47


10 UNITS


 


 Isosorbide


 Mononitrate


  (Imdur Ext Rel


 Tab)  30 mg  QAM


 PO  1/16/18 09:00


 2/15/18 08:59  1/18/18 08:41


30 MG


 


 Metoprolol


 Succinate


  (Toprol Xl Tab)  50 mg  BID


 PO  1/16/18 09:00


 2/15/18 08:59  1/18/18 08:43


50 MG


 


 Tamsulosin HCl


  (Flomax Cap)  0.4 mg  HS


 PO  1/16/18 21:00


 2/15/18 20:59  1/17/18 21:38


0.4 MG


 


 Tiotropium Bromide


  (Spiriva


 Handihaler


 Inhaler)  1 puff  DAILY


 INH  1/16/18 09:00


 2/15/18 08:59  1/18/18 08:40


1 PUFF


 


 Warfarin Sodium


  (Coumadin Tab)  7.5 mg  DAILY@1600


 PO  1/16/18 16:00


 2/15/18 15:59  1/17/18 15:55


7.5 MG


 


 Albuterol


  (Ventolin Hfa


 Inhaler)  2 puffs  Q4H  PRN


 INH  1/16/18 06:00


 2/15/18 05:59   


 


 


 Amlodipine


 Besylate


  (Norvasc Tab)  10 mg  QAM


 PO  1/16/18 09:00


 2/15/18 08:59  1/18/18 08:43


10 MG


 


 Cyanocobalamin


  (Vitamin B-12


 Tab)  1,000 mcg  DAILY


 PO  1/16/18 09:00


 2/15/18 08:59  1/18/18 08:42


1,000 MCG


 


 Ferrous Sulfate


  (Feosol Tab)  325 mg  BIDM


 PO  1/16/18 07:15


 2/15/18 07:59  1/18/18 08:38


325 MG


 


 Mirtazapine


  (Remeron Tab)  45 mg  HS


 PO  1/16/18 21:00


 2/15/18 20:59  1/17/18 21:38


45 MG


 


 Oxycodone HCl


  (Roxicodone


 Immediate Rel Tab)  10 mg  Q12  PRN


 PO  1/16/18 06:00


 2/15/18 05:59  1/18/18 08:51


10 MG


 


 Spironolactone


  (Aldactone Tab)  50 mg  BIDM


 PO  1/16/18 07:15


 2/15/18 07:14  1/18/18 08:38


50 MG


 


 Furosemide 20 mg/


 Syringe  2 ml @ 4


 mls/min  BID


 IV  1/16/18 20:00


 2/15/18 19:59  1/18/18 08:41


4 MLS/MIN


 


 Acetaminophen


  (Tylenol Tab)  650 mg  Q4H  PRN


 PO  1/16/18 06:00


 2/15/18 05:59  1/16/18 17:18


650 MG


 


 Al Hydrox/Mg


 Hydrox/Simethicone


  (Maalox Max Susp)  15 ml  Q4H  PRN


 PO  1/16/18 06:00


 2/15/18 05:59   


 


 


 Magnesium


 Hydroxide


  (Milk Of


 Magnesia Susp)  30 ml  Q12H  PRN


 PO  1/16/18 06:00


 2/15/18 05:59   


 


 


 Ondansetron HCl


  (Zofran Inj)  4 mg  Q6H  PRN


 IV  1/16/18 06:00


 2/15/18 05:59   


 


 


 Nitroglycerin


  (Nitrostat Tab)  0.4 mg  UD  PRN


 SL  1/16/18 06:00


 2/15/18 05:59   


 


 


 Morphine Sulfate


  (MoRPHine


 SULFATE INJ)  2 mg  Q30M  PRN


 IV  1/16/18 06:00


 1/30/18 05:59   


 


 


 Polyethylene


  (Miralax Powder


 Packet)  17 gm  DAILY  PRN


 PO  1/16/18 06:00


 2/15/18 05:59   


 


 


 Glucose


  (Glucose 40% Gel)  15-30


 GRAMS 15


 GRAMS...  UD  PRN


 PO  1/16/18 06:45


 2/15/18 06:44   


 


 


 Glucose


  (Glucose Chew


 Tab)  4-8


 Tablets 4


 Tabl...  UD  PRN


 PO  1/16/18 06:45


 2/15/18 06:44   


 


 


 Dextrose


  (Dextrose 50%


 50ML Syringe)  25-50ML OF


 50% DW IV


 FOR...  UD  PRN


 IV  1/16/18 06:45


 2/15/18 06:44   


 


 


 Glucagon


  (Glucagon Inj)  1 mg  UD  PRN


 SQ  1/16/18 06:45


 2/15/18 06:44   


 


 


 Oxycodone HCl


  (Roxicodone


 Immediate Rel Tab)  5 mg  Q6H  PRN


 PO  1/17/18 15:45


 1/31/18 15:44   


 


 


 Insulin Aspart


  (novoLOG ASPART)  SLIDING


 SCALE


 PARAMETER  ACHS


 SC  1/17/18 17:28


 2/16/18 17:27  1/18/18 08:48


4 UNITS








 (Brenda Reyes, PA-C)





Objective


Vital Signs











  Date Time  Temp Pulse Resp B/P (MAP) Pulse Ox O2 Delivery O2 Flow Rate FiO2


 


1/18/18 11:52 37.2 56 20 119/75 (90) 91 Nasal Cannula 3.0 


 


1/18/18 11:36      Nasal Cannula 3.0 


 


1/18/18 11:31 37.4 66 18 135/62 (86) 93 Nasal Cannula 3.0 


 


1/18/18 09:00      Nasal Cannula 3.0 


 


1/18/18 07:53 37.3 67 20 157/61 (93) 94 Nasal Cannula 3.0 


 


1/18/18 04:13 36.7 61 16 170/63 (98) 92 Room Air  


 


1/18/18 04:00      Nasal Cannula 3.0 


 


1/18/18 00:07 37.3 73 18 141/87 (105) 96  3.0 


 


1/18/18 00:00      Nasal Cannula 3.0 


 


1/17/18 20:00      Nasal Cannula 3.0 


 


1/17/18 18:52 37.0 72 18 129/65 (86) 95 Nasal Cannula 3.0 


 


1/17/18 16:27      Nasal Cannula 3.0 


 


1/17/18 15:26 37.3 56 20 141/56 (84) 94  3.0 








 (Brenda Reyes, PA-C)





Physical Exam


General Appearance:  WD/WN, no apparent distress


Eyes:  sclerae normal


ENT:  hearing grossly normal


Neck:  supple, no JVD, trachea midline


Respiratory/Chest:  lungs clear, no respiratory distress, no accessory muscle 

use, + decreased breath sounds (bases b/l)


Cardiovascular:  regular rate, rhythm, + systolic murmur


Abdomen:  normal bowel sounds, non tender, soft


Extremities:  no pedal edema, no calf tenderness, + pertinent finding (wounds 

to b/l legs without significant erythema; appear to have good granulation)


Neurologic/Psychiatric:  alert, oriented x 3


Skin:  + pallor


 (Brenda Reyes, PA-C)





Laboratory Results





Last 24 Hours








Test


  1/17/18


16:28 1/17/18


20:23 1/18/18


05:57 1/18/18


10:50


 


Bedside Glucose 173 mg/dl  124 mg/dl   118 mg/dl 


 


White Blood Count   6.29 K/uL  


 


Red Blood Count   2.59 M/uL  


 


Hemoglobin   7.6 g/dL  


 


Hematocrit   23.7 %  


 


Mean Corpuscular Volume   91.5 fL  


 


Mean Corpuscular Hemoglobin   29.3 pg  


 


Mean Corpuscular Hemoglobin


Concent 


  


  32.1 g/dl 


  


 


 


RDW Standard Deviation   52.0 fL  


 


RDW Coefficient of Variation   15.5 %  


 


Platelet Count   208 K/uL  


 


Mean Platelet Volume   9.0 fL  


 


Prothrombin Time   32.1 SECONDS  


 


Prothromb Time International


Ratio 


  


  3.1 


  


 


 


Sodium Level   141 mmol/L  


 


Potassium Level   5.1 mmol/L  


 


Chloride Level   109 mmol/L  


 


Carbon Dioxide Level   24 mmol/L  


 


Anion Gap   8.0 mmol/L  


 


Blood Urea Nitrogen   52 mg/dl  


 


Creatinine   1.57 mg/dl  


 


Est Creatinine Clear Calc


Drug Dose 


  


  57.6 ml/min 


  


 


 


Estimated GFR (


American) 


  


  54.7 


  


 


 


Estimated GFR (Non-


American 


  


  47.2 


  


 


 


BUN/Creatinine Ratio   33.2  


 


Random Glucose   122 mg/dl  


 


Calcium Level   8.0 mg/dl  


 


Test


  1/18/18


12:00 


  


  


 








 (Brenda Reyes, PA-C)





Assessment and Plan


61 y/o M with extensive medical issues including severe CAD, PVD, COPD, DM II, 

bipolar disease, chronic anemia, diastolic CHF, recent diagnosis of atrial 

thrombus - on Coumadin - pt had an MI at [facility] 10/17 which was medically 

managed. He frequently presents to the hospital due to SOB/volume overload and 

recently presented with CP and anemia as well. 


The pt presents today due to acute SOB.  Initial imaging and exam is consistent 

with exacerbation of CHF.  He was requiring high-flow oxygen on arrival but has 

improved with Lasix administration.  He denies CP, N/V, diarrhea or dysuria.  





Acute Hypoxic Respiratory Failure 2/2 Acute on Chronic Diastolic CHF vs Acute 

Anemia:


- Still requiring supplemental O2 normally is on RA and continues to be at a + 

fluid balance - does not appear to be fluid overloaded


- Difficulty with overnight pulse ox due to poor perfusion; will re-try and 

obtain two step prior to D/C


- Continue Lasix 20 mg IV daily and monitor I&Os and daily weights; monitor 

kidney function


- Continue Spironolactone 50 mg BID





Acute on Chronic Anemia: Anemia of Chronic Disease:


- Hgb continues to drop - possible presentation was due to this; no known cause 

during previous admission requiring transfusion


- B12, Folate, Iron Studies WNL; Labs support anemia of chronic disease


- Transfuse 1 unit and monitor





Elevated Cr: IMPROVING


- Baseline is hard to determine due to labile on labs in-hospital


- Continue to monitor with diuresis and with transfusion; continues to improve





B/L Chronic Diabetic Wounds:


- Culture with staph that is MSSA - wounds look at baseline per patient and 

appears to be improving


- Will hold off on further antibiotics at this time


- Wound care following





CAD: STABLE


- Troponin mildly bumped and trending down - no further intervention at this 

time; denies CP


- Plavix 75 mg daily


- Norvasc 10 mg daily, Hydralazine 25 mg QID, Imdur 30 mg daily, and Toprol XL 

50 mg BID, Lipitor 80 mg daily





T2DM:


- Continue Lantus and SSI





Chronic Atrial Thrombus:


- INR continues to be therapeutic - continue Coumadin and monitor INR





COPD without Exacerbation:


- Ventolin PRN





Bipolar Disorder: STABLE


- Depakote 2 g daily, Lexapro 20 mg daily, Remeron 45 mg daily, and Klonopin





DVT Prophylaxis: Coumadin





Code Status: FULL RESUSCITATION





Disposition: Continue diuresis and hopeful D/C 1-2 days; overnight pulse ox and 

two step prior to D/C


Continued [facility] stay due to:  multiple IV medications needed


 (Brenda Reyes, PAMelC)


PA Physician Supervision Note:





I interviewed and examined the patient. Discussed with Brenda Reyes PAC and 

agree with findings and plan as documented in the note. Any exceptions or 

clarifications are listed here: None





This patient is in his usual state however he developed a worsening anemia 

attempts at nocturnal oxymetry were thwarted by poor peripheral pulses and 

waveform which cannot be ascertained by respiratory therapy he otherwise feels 

back to his usual self we're attempting to measure nocturnal oxygen to see if 

that could be a precipitating factor in his heart failure.  His anemia 

currently is unexplained he'll be transfused 1 unit we'll trend his anemia





Vital signs are stable except for relative bradycardia


Cardiac exam is distant regular with a murmur lungs have decreased breath 

sounds at the bases





Acute systolic heart failure which is resolved with IV Lasix


Anemia undetermined if acute blood loss or chronic diseases transfusion 1 unit 

previous iron and vitamin levels have been without significant issues we'll 

determine if blood loss by checking hemoglobin in a.m. continue PPI


Continue to attempt nocturnal oximetry





Documented By:  Chuy Omalley


 (Chuy Omalley M.D.)

## 2018-01-19 VITALS
TEMPERATURE: 98.42 F | HEART RATE: 55 BPM | OXYGEN SATURATION: 92 % | SYSTOLIC BLOOD PRESSURE: 143 MMHG | DIASTOLIC BLOOD PRESSURE: 59 MMHG

## 2018-01-19 VITALS
DIASTOLIC BLOOD PRESSURE: 80 MMHG | SYSTOLIC BLOOD PRESSURE: 167 MMHG | TEMPERATURE: 98.24 F | HEART RATE: 60 BPM | OXYGEN SATURATION: 96 %

## 2018-01-19 VITALS
HEART RATE: 61 BPM | DIASTOLIC BLOOD PRESSURE: 57 MMHG | SYSTOLIC BLOOD PRESSURE: 133 MMHG | TEMPERATURE: 98.96 F | OXYGEN SATURATION: 95 %

## 2018-01-19 VITALS
DIASTOLIC BLOOD PRESSURE: 72 MMHG | OXYGEN SATURATION: 93 % | HEART RATE: 60 BPM | TEMPERATURE: 98.6 F | SYSTOLIC BLOOD PRESSURE: 176 MMHG

## 2018-01-19 VITALS — OXYGEN SATURATION: 92 % | HEART RATE: 68 BPM

## 2018-01-19 VITALS
OXYGEN SATURATION: 92 % | SYSTOLIC BLOOD PRESSURE: 144 MMHG | HEART RATE: 52 BPM | TEMPERATURE: 98.6 F | DIASTOLIC BLOOD PRESSURE: 56 MMHG

## 2018-01-19 VITALS
TEMPERATURE: 98.42 F | OXYGEN SATURATION: 93 % | DIASTOLIC BLOOD PRESSURE: 65 MMHG | SYSTOLIC BLOOD PRESSURE: 160 MMHG | HEART RATE: 43 BPM

## 2018-01-19 VITALS
TEMPERATURE: 98.42 F | SYSTOLIC BLOOD PRESSURE: 158 MMHG | HEART RATE: 48 BPM | DIASTOLIC BLOOD PRESSURE: 61 MMHG | OXYGEN SATURATION: 92 %

## 2018-01-19 VITALS — OXYGEN SATURATION: 92 %

## 2018-01-19 LAB
BUN SERPL-MCNC: 51 MG/DL (ref 7–18)
CALCIUM SERPL-MCNC: 8.5 MG/DL (ref 8.5–10.1)
CO2 SERPL-SCNC: 27 MMOL/L (ref 21–32)
CREAT SERPL-MCNC: 1.63 MG/DL (ref 0.6–1.4)
EOSINOPHIL NFR BLD AUTO: 195 K/UL (ref 130–400)
GLUCOSE SERPL-MCNC: 115 MG/DL (ref 70–99)
HCT VFR BLD CALC: 27.3 % (ref 42–52)
HGB BLD-MCNC: 9 G/DL (ref 14–18)
INR PPP: 2.9 (ref 0.9–1.1)
MCH RBC QN AUTO: 29.7 PG (ref 25–34)
MCHC RBC AUTO-ENTMCNC: 33 G/DL (ref 32–36)
MCV RBC AUTO: 90.1 FL (ref 80–100)
PMV BLD AUTO: 8.4 FL (ref 7.4–10.4)
POTASSIUM SERPL-SCNC: 5.1 MMOL/L (ref 3.5–5.1)
RED CELL DISTRIBUTION WIDTH CV: 15.8 % (ref 11.5–14.5)
RED CELL DISTRIBUTION WIDTH SD: 51.8 FL (ref 36.4–46.3)
SODIUM SERPL-SCNC: 141 MMOL/L (ref 136–145)
WBC # BLD AUTO: 5.99 K/UL (ref 4.8–10.8)

## 2018-01-19 RX ADMIN — ESCITALOPRAM OXALATE SCH MG: 20 TABLET, FILM COATED ORAL at 08:48

## 2018-01-19 RX ADMIN — TAMSULOSIN HYDROCHLORIDE SCH MG: 0.4 CAPSULE ORAL at 20:38

## 2018-01-19 RX ADMIN — PANTOPRAZOLE SCH MG: 40 TABLET, DELAYED RELEASE ORAL at 20:38

## 2018-01-19 RX ADMIN — INSULIN GLARGINE SCH UNITS: 100 INJECTION, SOLUTION SUBCUTANEOUS at 20:42

## 2018-01-19 RX ADMIN — OXYCODONE HYDROCHLORIDE PRN MG: 5 TABLET ORAL at 20:46

## 2018-01-19 RX ADMIN — DIVALPROEX SODIUM SCH MG: 500 TABLET, DELAYED RELEASE ORAL at 20:37

## 2018-01-19 RX ADMIN — PANTOPRAZOLE SCH MG: 40 TABLET, DELAYED RELEASE ORAL at 08:49

## 2018-01-19 RX ADMIN — SPIRONOLACTONE SCH MG: 25 TABLET, FILM COATED ORAL at 17:54

## 2018-01-19 RX ADMIN — MIRTAZAPINE SCH MG: 15 TABLET, FILM COATED ORAL at 20:38

## 2018-01-19 RX ADMIN — GABAPENTIN SCH MG: 300 CAPSULE ORAL at 20:37

## 2018-01-19 RX ADMIN — INSULIN ASPART SCH UNITS: 100 INJECTION, SOLUTION INTRAVENOUS; SUBCUTANEOUS at 17:56

## 2018-01-19 RX ADMIN — AMLODIPINE BESYLATE SCH MG: 5 TABLET ORAL at 08:49

## 2018-01-19 RX ADMIN — INSULIN ASPART SCH UNITS: 100 INJECTION, SOLUTION INTRAVENOUS; SUBCUTANEOUS at 08:54

## 2018-01-19 RX ADMIN — METOPROLOL SUCCINATE SCH MG: 50 TABLET, EXTENDED RELEASE ORAL at 08:58

## 2018-01-19 RX ADMIN — INSULIN ASPART SCH UNITS: 100 INJECTION, SOLUTION INTRAVENOUS; SUBCUTANEOUS at 20:43

## 2018-01-19 RX ADMIN — FERROUS SULFATE TAB EC 325 MG (65 MG FE EQUIVALENT) SCH MG: 325 (65 FE) TABLET DELAYED RESPONSE at 17:53

## 2018-01-19 RX ADMIN — SPIRONOLACTONE SCH MG: 25 TABLET, FILM COATED ORAL at 08:49

## 2018-01-19 RX ADMIN — ISOSORBIDE MONONITRATE SCH MG: 30 TABLET, EXTENDED RELEASE ORAL at 08:48

## 2018-01-19 RX ADMIN — Medication SCH MCG: at 08:48

## 2018-01-19 RX ADMIN — ATORVASTATIN CALCIUM SCH MG: 40 TABLET, FILM COATED ORAL at 08:48

## 2018-01-19 RX ADMIN — WARFARIN SODIUM SCH MG: 7.5 TABLET ORAL at 17:54

## 2018-01-19 RX ADMIN — INSULIN ASPART SCH UNITS: 100 INJECTION, SOLUTION INTRAVENOUS; SUBCUTANEOUS at 12:41

## 2018-01-19 RX ADMIN — METOPROLOL SUCCINATE SCH MG: 50 TABLET, EXTENDED RELEASE ORAL at 08:47

## 2018-01-19 RX ADMIN — FUROSEMIDE SCH MLS/MIN: 10 INJECTION, SOLUTION INTRAMUSCULAR; INTRAVENOUS at 17:54

## 2018-01-19 RX ADMIN — METOPROLOL SUCCINATE SCH MG: 50 TABLET, EXTENDED RELEASE ORAL at 20:37

## 2018-01-19 RX ADMIN — FERROUS SULFATE TAB EC 325 MG (65 MG FE EQUIVALENT) SCH MG: 325 (65 FE) TABLET DELAYED RESPONSE at 08:48

## 2018-01-19 RX ADMIN — CLOPIDOGREL BISULFATE SCH MG: 75 TABLET, FILM COATED ORAL at 08:48

## 2018-01-19 RX ADMIN — TIOTROPIUM BROMIDE SCH PUFF: 18 CAPSULE ORAL; RESPIRATORY (INHALATION) at 08:49

## 2018-01-19 RX ADMIN — FUROSEMIDE SCH MLS/MIN: 10 INJECTION, SOLUTION INTRAMUSCULAR; INTRAVENOUS at 08:50

## 2018-01-19 NOTE — DIAGNOSTIC IMAGING REPORT
CHEST 2 VIEWS ROUTINE



HISTORY:  62 years-old Male Dyspnea on Exertion; CHF Exacerbation acute dyspnea



COMPARISON: Chest radiograph 1/16/2018 and 1/12/2018



TECHNIQUE: AP and lateral views of the chest



FINDINGS: 

Cardiac silhouette is again moderately enlarged, unchanged. Atherosclerosis of

the aorta. Pulmonary vascular congestion with mild pulmonary edema persists.

There is trace fluid tracking along the fissures. Small bilateral pleural

effusions with patchy bibasilar opacities, slightly increased from comparison.

There is no pneumothorax. Bones of the chest appear grossly intact.



IMPRESSION: 

1. Cardiomegaly with persistent mild pulmonary edema.

2. Slightly increased size of small bilateral pleural effusions with mildly

progressive hazy bibasilar opacities suggesting atelectasis. 







The above report was generated using voice recognition software. It may contain

grammatical, syntax or spelling errors.







Electronically signed by:  Ashwin Bhatia M.D.

1/19/2018 4:12 PM



Dictated Date/Time:  1/19/2018 4:10 PM

## 2018-01-20 VITALS
DIASTOLIC BLOOD PRESSURE: 65 MMHG | HEART RATE: 56 BPM | TEMPERATURE: 98.06 F | SYSTOLIC BLOOD PRESSURE: 137 MMHG | OXYGEN SATURATION: 92 %

## 2018-01-20 VITALS
SYSTOLIC BLOOD PRESSURE: 155 MMHG | HEART RATE: 63 BPM | DIASTOLIC BLOOD PRESSURE: 58 MMHG | OXYGEN SATURATION: 93 % | TEMPERATURE: 98.06 F

## 2018-01-20 VITALS
SYSTOLIC BLOOD PRESSURE: 160 MMHG | HEART RATE: 79 BPM | OXYGEN SATURATION: 94 % | DIASTOLIC BLOOD PRESSURE: 63 MMHG | TEMPERATURE: 98.96 F

## 2018-01-20 VITALS
OXYGEN SATURATION: 93 % | DIASTOLIC BLOOD PRESSURE: 80 MMHG | TEMPERATURE: 98.24 F | HEART RATE: 31 BPM | SYSTOLIC BLOOD PRESSURE: 118 MMHG

## 2018-01-20 VITALS — DIASTOLIC BLOOD PRESSURE: 73 MMHG | OXYGEN SATURATION: 96 % | SYSTOLIC BLOOD PRESSURE: 153 MMHG | TEMPERATURE: 98.42 F

## 2018-01-20 VITALS
SYSTOLIC BLOOD PRESSURE: 129 MMHG | TEMPERATURE: 98.42 F | HEART RATE: 52 BPM | OXYGEN SATURATION: 94 % | DIASTOLIC BLOOD PRESSURE: 52 MMHG

## 2018-01-20 LAB
BUN SERPL-MCNC: 48 MG/DL (ref 7–18)
CALCIUM SERPL-MCNC: 8.3 MG/DL (ref 8.5–10.1)
CO2 SERPL-SCNC: 29 MMOL/L (ref 21–32)
CREAT SERPL-MCNC: 1.62 MG/DL (ref 0.6–1.4)
EOSINOPHIL NFR BLD AUTO: 189 K/UL (ref 130–400)
GLUCOSE SERPL-MCNC: 97 MG/DL (ref 70–99)
HCT VFR BLD CALC: 27.3 % (ref 42–52)
HGB BLD-MCNC: 8.9 G/DL (ref 14–18)
INR PPP: 2.7 (ref 0.9–1.1)
MCH RBC QN AUTO: 29.1 PG (ref 25–34)
MCHC RBC AUTO-ENTMCNC: 32.6 G/DL (ref 32–36)
MCV RBC AUTO: 89.2 FL (ref 80–100)
PMV BLD AUTO: 8.5 FL (ref 7.4–10.4)
POTASSIUM SERPL-SCNC: 5 MMOL/L (ref 3.5–5.1)
RED CELL DISTRIBUTION WIDTH CV: 15.5 % (ref 11.5–14.5)
RED CELL DISTRIBUTION WIDTH SD: 50.3 FL (ref 36.4–46.3)
SODIUM SERPL-SCNC: 143 MMOL/L (ref 136–145)
WBC # BLD AUTO: 5.07 K/UL (ref 4.8–10.8)

## 2018-01-20 RX ADMIN — FERROUS SULFATE TAB EC 325 MG (65 MG FE EQUIVALENT) SCH MG: 325 (65 FE) TABLET DELAYED RESPONSE at 07:51

## 2018-01-20 RX ADMIN — AMLODIPINE BESYLATE SCH MG: 5 TABLET ORAL at 07:52

## 2018-01-20 RX ADMIN — SPIRONOLACTONE SCH MG: 25 TABLET, FILM COATED ORAL at 17:19

## 2018-01-20 RX ADMIN — CLOPIDOGREL BISULFATE SCH MG: 75 TABLET, FILM COATED ORAL at 07:53

## 2018-01-20 RX ADMIN — INSULIN ASPART SCH UNITS: 100 INJECTION, SOLUTION INTRAVENOUS; SUBCUTANEOUS at 12:59

## 2018-01-20 RX ADMIN — TIOTROPIUM BROMIDE SCH PUFF: 18 CAPSULE ORAL; RESPIRATORY (INHALATION) at 07:49

## 2018-01-20 RX ADMIN — INSULIN GLARGINE SCH UNITS: 100 INJECTION, SOLUTION SUBCUTANEOUS at 21:09

## 2018-01-20 RX ADMIN — TAMSULOSIN HYDROCHLORIDE SCH MG: 0.4 CAPSULE ORAL at 21:05

## 2018-01-20 RX ADMIN — PANTOPRAZOLE SCH MG: 40 TABLET, DELAYED RELEASE ORAL at 07:50

## 2018-01-20 RX ADMIN — MIRTAZAPINE SCH MG: 15 TABLET, FILM COATED ORAL at 21:05

## 2018-01-20 RX ADMIN — GABAPENTIN SCH MG: 300 CAPSULE ORAL at 21:05

## 2018-01-20 RX ADMIN — FUROSEMIDE SCH MLS/MIN: 10 INJECTION, SOLUTION INTRAMUSCULAR; INTRAVENOUS at 17:17

## 2018-01-20 RX ADMIN — ATORVASTATIN CALCIUM SCH MG: 40 TABLET, FILM COATED ORAL at 07:53

## 2018-01-20 RX ADMIN — INSULIN ASPART SCH UNITS: 100 INJECTION, SOLUTION INTRAVENOUS; SUBCUTANEOUS at 21:00

## 2018-01-20 RX ADMIN — Medication SCH MCG: at 07:53

## 2018-01-20 RX ADMIN — INSULIN ASPART SCH UNITS: 100 INJECTION, SOLUTION INTRAVENOUS; SUBCUTANEOUS at 17:20

## 2018-01-20 RX ADMIN — SPIRONOLACTONE SCH MG: 25 TABLET, FILM COATED ORAL at 07:50

## 2018-01-20 RX ADMIN — DIVALPROEX SODIUM SCH MG: 500 TABLET, DELAYED RELEASE ORAL at 21:05

## 2018-01-20 RX ADMIN — FUROSEMIDE SCH MLS/MIN: 10 INJECTION, SOLUTION INTRAMUSCULAR; INTRAVENOUS at 07:50

## 2018-01-20 RX ADMIN — WARFARIN SODIUM SCH MG: 7.5 TABLET ORAL at 17:18

## 2018-01-20 RX ADMIN — INSULIN ASPART SCH UNITS: 100 INJECTION, SOLUTION INTRAVENOUS; SUBCUTANEOUS at 08:01

## 2018-01-20 RX ADMIN — ISOSORBIDE MONONITRATE SCH MG: 30 TABLET, EXTENDED RELEASE ORAL at 07:50

## 2018-01-20 RX ADMIN — OXYCODONE HYDROCHLORIDE PRN MG: 5 TABLET ORAL at 21:19

## 2018-01-20 RX ADMIN — METOPROLOL SUCCINATE SCH MG: 50 TABLET, EXTENDED RELEASE ORAL at 07:51

## 2018-01-20 RX ADMIN — PANTOPRAZOLE SCH MG: 40 TABLET, DELAYED RELEASE ORAL at 21:05

## 2018-01-20 RX ADMIN — FERROUS SULFATE TAB EC 325 MG (65 MG FE EQUIVALENT) SCH MG: 325 (65 FE) TABLET DELAYED RESPONSE at 17:17

## 2018-01-20 RX ADMIN — METOPROLOL SUCCINATE SCH MG: 50 TABLET, EXTENDED RELEASE ORAL at 21:06

## 2018-01-20 RX ADMIN — ESCITALOPRAM OXALATE SCH MG: 20 TABLET, FILM COATED ORAL at 07:50

## 2018-01-20 NOTE — PROGRESS NOTE
Subjective


Date of Service:


Jan 20, 2018.


Subjective


Pt evaluation today including:  conversation w/ patient, physical exam, chart 

review, lab review


Pain:  denies


PO Intake:  normal


Voiding:  no voiding problems


tele with PVCS, bigeminy, etc but no sustained dysrhythmia





feels better


less congestion


no orthopnea


less STANTON when walking to the bathroom





Problem List


Medical Problems:


(1) Acute CHF


Status: Acute  





(2) Acute coronary syndrome


Status: Acute  





(3) Acute headache


Status: Acute  





(4) Acute on chronic congestive heart failure


Status: Acute  





(5) Altered mental status


Status: Acute  





(6) Anemia


Status: Acute  





(7) Anemia


Status: Acute  





(8) Anginal pain


Status: Acute  





(9) Appendicitis


Status: Acute  





(10) Cellulitis


Status: Acute  





(11) Chest pain


Status: Acute  





(12) CHF (congestive heart failure)


Status: Acute  





(13) CHF (congestive heart failure)


Status: Acute  





(14) Congestive heart failure


Status: Acute  





(15) Congestive heart failure


Status: Acute  





(16) COPD (chronic obstructive pulmonary disease)


Status: Acute  





(17) Diabetes mellitus with hyperglycemia


Status: Acute  





(18) Dyspnea


Status: Acute  





(19) Elevated troponin


Status: Acute  





(20) Failure of outpatient treatment


Status: Acute  





(21) Hypoxia


Status: Acute  





(22) Hypoxia


Status: Acute  





(23) Intra-abdominal abscess


Status: Acute  





(24) Lower abdominal pain of unknown etiology


Status: Acute  





(25) Neck pain


Status: Acute  





(26) Pneumonia


Status: Acute  





(27) Pneumonia


Status: Acute  





(28) Precordial chest pain


Status: Acute  





(29) Pulmonary edema


Status: Acute  





(30) Respiratory acidosis


Status: Acute  





(31) Respiratory distress


Status: Acute  





(32) SOB (shortness of breath)


Status: Acute  





(33) Tachypnea


Status: Acute  











Review of Systems


Constitutional:  No fever


Respiratory:  + cough, No wheezing


Cardiac:  + edema (but improved), No chest pain, No orthopnea





Objective


Vital Signs











  Date Time  Temp Pulse Resp B/P (MAP) Pulse Ox O2 Delivery O2 Flow Rate FiO2


 


1/20/18 20:35 36.9 52 19 129/52 (77) 94 Nasal Cannula 2.0 


 


1/20/18 16:00      Nasal Cannula 3.0 


 


1/20/18 15:44 36.7 56 20 137/65 (89) 92 Nasal Cannula 2.0 


 


1/20/18 12:34 36.9  56 153/73 (99) 96 Nasal Cannula 3.0 


 


1/20/18 12:00      Nasal Cannula 3.0 


 


1/20/18 08:00      Nasal Cannula 3.0 


 


1/20/18 07:51 37.2 79 20 160/63 (95) 94 Nasal Cannula 3.0 


 


1/20/18 04:12 36.7 63 18 155/58 (90) 93 Nasal Cannula 3.0 


 


1/20/18 04:00      Nasal Cannula 3.0 


 


1/20/18 00:01      Nasal Cannula 3.0 


 


1/19/18 23:26 37.2 61 18 133/57 (82) 95 Nasal Cannula 3.0 











Physical Exam


General Appearance:  no apparent distress


ENT:  pharynx normal


Neck:  no JVD


Respiratory/Chest:  no respiratory distress, no accessory muscle use, + rales (

most inferior part of bases - much better today)


Cardiovascular:  + pertinent finding (irregular, s1, s2, 2/6 systolic murmur 

RUSB/LSB)


Abdomen:  normal bowel sounds, non tender, soft, no organomegaly


Extremities:  + swelling (b/l legs, but improved today)


Neurologic/Psychiatric:  alert, oriented x 3, + depressed affect


Skin:  + pertinent finding (minor ulceration, right shin - no change)





Laboratory Results





Last 24 Hours








Test


  1/20/18


06:42 1/20/18


07:06 1/20/18


10:58 1/20/18


16:30


 


White Blood Count 5.07 K/uL    


 


Red Blood Count 3.06 M/uL    


 


Hemoglobin 8.9 g/dL    


 


Hematocrit 27.3 %    


 


Mean Corpuscular Volume 89.2 fL    


 


Mean Corpuscular Hemoglobin 29.1 pg    


 


Mean Corpuscular Hemoglobin


Concent 32.6 g/dl 


  


  


  


 


 


RDW Standard Deviation 50.3 fL    


 


RDW Coefficient of Variation 15.5 %    


 


Platelet Count 189 K/uL    


 


Mean Platelet Volume 8.5 fL    


 


Prothrombin Time 27.9 SECONDS    


 


Prothromb Time International


Ratio 2.7 


  


  


  


 


 


Sodium Level 143 mmol/L    


 


Potassium Level 5.0 mmol/L    


 


Chloride Level 106 mmol/L    


 


Carbon Dioxide Level 29 mmol/L    


 


Anion Gap 8.0 mmol/L    


 


Blood Urea Nitrogen 48 mg/dl    


 


Creatinine 1.62 mg/dl    


 


Est Creatinine Clear Calc


Drug Dose 54.8 ml/min 


  


  


  


 


 


Estimated GFR (


American) 52.7 


  


  


  


 


 


Estimated GFR (Non-


American 45.5 


  


  


  


 


 


BUN/Creatinine Ratio 29.4    


 


Random Glucose 97 mg/dl    


 


Calcium Level 8.3 mg/dl    


 


Bedside Glucose  95 mg/dl  162 mg/dl  122 mg/dl 


 


Test


  1/20/18


20:40 


  


  


 


 


Bedside Glucose 138 mg/dl    











Assessment and Plan


61yo male -





1.  acute hypoxic respiratory failure 2nd to acute/chronic diastolic CHF - 

improving lung exam and symptom-wise also improving. 


Cr stable on labs today.


Cont lasix 40mg BID; may be approaching euvolemia. 


Labs again in am. 


cont to wean O2. 





2.  anemia - chronic - likely due to chronic disease.  s/p 1 unit of PRBCs.  H/

H stable.  NO evidence of GI bleeding, etc. 





3.  CKD stage 2 - creatinine mildly above baseline but overall stable.  BMP in 

am. 





4.  chronic RLE wound - culture with MRSA and MSSA - I believe these are 

colonization and not pathogenic at this time.  Defer on abx. 





5.  CAD - mild troponin elevation likely myocardial demand ischemia in setting 

of #1 above.  Follow. 


Cont Plavix 75 mg daily, Norvasc 10 mg daily, Hydralazine 25 mg QID, Imdur 30 

mg daily, Toprol XL 50 mg BID, Lipitor 80 mg daily





6.  T2DM - controlled


- Continue Lantus and SSI





7.  Chronic Atrial Thrombus - cont coumadin, INR therapeutic, daily INR while 

hospitalized





8.  COPD - without exacerbation at this time.  Cont home inhalers. 





9.  Bipolar Disorder: STABLE


- Depakote 2 g daily, Lexapro 20 mg daily, Remeron 45 mg daily, and Klonopin





10.  DVT proph - coumadin.





progressing nicely


will need to assess home O2 needs at d/c, if needed 





await PT, OT evals to determine dispo


patient not motivated to move/get out of bed unfortunately


Continued [facility] stay due to:  multiple IV medications needed


Discharge planning:  home with home health

## 2018-01-21 VITALS
DIASTOLIC BLOOD PRESSURE: 60 MMHG | TEMPERATURE: 98.06 F | OXYGEN SATURATION: 94 % | HEART RATE: 50 BPM | SYSTOLIC BLOOD PRESSURE: 149 MMHG

## 2018-01-21 VITALS
OXYGEN SATURATION: 95 % | DIASTOLIC BLOOD PRESSURE: 76 MMHG | HEART RATE: 56 BPM | SYSTOLIC BLOOD PRESSURE: 177 MMHG | TEMPERATURE: 98.42 F

## 2018-01-21 VITALS
SYSTOLIC BLOOD PRESSURE: 168 MMHG | DIASTOLIC BLOOD PRESSURE: 83 MMHG | OXYGEN SATURATION: 95 % | HEART RATE: 54 BPM | TEMPERATURE: 98.06 F

## 2018-01-21 VITALS
HEART RATE: 57 BPM | SYSTOLIC BLOOD PRESSURE: 132 MMHG | TEMPERATURE: 98.06 F | OXYGEN SATURATION: 92 % | DIASTOLIC BLOOD PRESSURE: 59 MMHG

## 2018-01-21 VITALS — SYSTOLIC BLOOD PRESSURE: 159 MMHG | HEART RATE: 59 BPM | DIASTOLIC BLOOD PRESSURE: 73 MMHG

## 2018-01-21 VITALS
SYSTOLIC BLOOD PRESSURE: 158 MMHG | OXYGEN SATURATION: 94 % | TEMPERATURE: 98.06 F | HEART RATE: 53 BPM | DIASTOLIC BLOOD PRESSURE: 72 MMHG

## 2018-01-21 VITALS — OXYGEN SATURATION: 98 %

## 2018-01-21 LAB
BUN SERPL-MCNC: 51 MG/DL (ref 7–18)
CALCIUM SERPL-MCNC: 7.9 MG/DL (ref 8.5–10.1)
CO2 SERPL-SCNC: 30 MMOL/L (ref 21–32)
CREAT SERPL-MCNC: 1.9 MG/DL (ref 0.6–1.4)
GLUCOSE SERPL-MCNC: 134 MG/DL (ref 70–99)
INR PPP: 2.7 (ref 0.9–1.1)
POTASSIUM SERPL-SCNC: 4.4 MMOL/L (ref 3.5–5.1)
SODIUM SERPL-SCNC: 138 MMOL/L (ref 136–145)

## 2018-01-21 RX ADMIN — SPIRONOLACTONE SCH MG: 25 TABLET, FILM COATED ORAL at 07:47

## 2018-01-21 RX ADMIN — OXYCODONE HYDROCHLORIDE PRN MG: 5 TABLET ORAL at 23:23

## 2018-01-21 RX ADMIN — CLOPIDOGREL BISULFATE SCH MG: 75 TABLET, FILM COATED ORAL at 07:47

## 2018-01-21 RX ADMIN — ISOSORBIDE MONONITRATE SCH MG: 30 TABLET, EXTENDED RELEASE ORAL at 22:01

## 2018-01-21 RX ADMIN — Medication SCH MCG: at 07:45

## 2018-01-21 RX ADMIN — ISOSORBIDE MONONITRATE SCH MG: 30 TABLET, EXTENDED RELEASE ORAL at 07:44

## 2018-01-21 RX ADMIN — FERROUS SULFATE TAB EC 325 MG (65 MG FE EQUIVALENT) SCH MG: 325 (65 FE) TABLET DELAYED RESPONSE at 15:41

## 2018-01-21 RX ADMIN — WARFARIN SODIUM SCH MG: 7.5 TABLET ORAL at 15:40

## 2018-01-21 RX ADMIN — ATORVASTATIN CALCIUM SCH MG: 40 TABLET, FILM COATED ORAL at 07:46

## 2018-01-21 RX ADMIN — AMLODIPINE BESYLATE SCH MG: 5 TABLET ORAL at 07:45

## 2018-01-21 RX ADMIN — TAMSULOSIN HYDROCHLORIDE SCH MG: 0.4 CAPSULE ORAL at 22:00

## 2018-01-21 RX ADMIN — MIRTAZAPINE SCH MG: 15 TABLET, FILM COATED ORAL at 22:01

## 2018-01-21 RX ADMIN — TIOTROPIUM BROMIDE SCH PUFF: 18 CAPSULE ORAL; RESPIRATORY (INHALATION) at 07:49

## 2018-01-21 RX ADMIN — FERROUS SULFATE TAB EC 325 MG (65 MG FE EQUIVALENT) SCH MG: 325 (65 FE) TABLET DELAYED RESPONSE at 07:47

## 2018-01-21 RX ADMIN — SPIRONOLACTONE SCH MG: 25 TABLET, FILM COATED ORAL at 15:41

## 2018-01-21 RX ADMIN — INSULIN ASPART SCH UNITS: 100 INJECTION, SOLUTION INTRAVENOUS; SUBCUTANEOUS at 16:51

## 2018-01-21 RX ADMIN — INSULIN ASPART SCH UNITS: 100 INJECTION, SOLUTION INTRAVENOUS; SUBCUTANEOUS at 21:00

## 2018-01-21 RX ADMIN — PANTOPRAZOLE SCH MG: 40 TABLET, DELAYED RELEASE ORAL at 22:01

## 2018-01-21 RX ADMIN — GABAPENTIN SCH MG: 300 CAPSULE ORAL at 22:01

## 2018-01-21 RX ADMIN — PANTOPRAZOLE SCH MG: 40 TABLET, DELAYED RELEASE ORAL at 07:46

## 2018-01-21 RX ADMIN — ESCITALOPRAM OXALATE SCH MG: 20 TABLET, FILM COATED ORAL at 07:46

## 2018-01-21 RX ADMIN — METOPROLOL SUCCINATE SCH MG: 50 TABLET, EXTENDED RELEASE ORAL at 22:01

## 2018-01-21 RX ADMIN — METOPROLOL SUCCINATE SCH MG: 50 TABLET, EXTENDED RELEASE ORAL at 07:44

## 2018-01-21 RX ADMIN — INSULIN ASPART SCH UNITS: 100 INJECTION, SOLUTION INTRAVENOUS; SUBCUTANEOUS at 07:52

## 2018-01-21 RX ADMIN — INSULIN GLARGINE SCH UNITS: 100 INJECTION, SOLUTION SUBCUTANEOUS at 22:03

## 2018-01-21 RX ADMIN — OXYCODONE HYDROCHLORIDE PRN MG: 5 TABLET ORAL at 11:37

## 2018-01-21 RX ADMIN — DIVALPROEX SODIUM SCH MG: 500 TABLET, DELAYED RELEASE ORAL at 22:00

## 2018-01-21 RX ADMIN — INSULIN ASPART SCH UNITS: 100 INJECTION, SOLUTION INTRAVENOUS; SUBCUTANEOUS at 11:37

## 2018-01-21 NOTE — PROGRESS NOTE
Subjective


Date of Service:


Jan 21, 2018.


Subjective


Pt evaluation today including:  conversation w/ patient, physical exam, chart 

review, lab review, conversation w/ consultant (jenny by phone), review of 

inpatient medication list


Pain:  left ankle and left great toe - started after walking this AM


PO Intake:  normal


Voiding:  no voiding problems


tele with PVCs and occasional bigeminy overnight


no sustained dysrhythmia





patient w/o cough or dyspnea today





he states he has been having flashes of light and floaters for about 1 month


he thinks it is from the right eye


denies obvious visual field cuts


hasn't been to eye doctor in several years 


flashes of light have been worse in the last 2-3 days 


no h/o migraines





he also mentions that after taking a walk this AM he had significant pain in 

his left great toe and left ankle, on the medial aspect; denies pain laterally 


denies any injury





Problem List


Medical Problems:


(1) Acute CHF


Status: Acute  





(2) Acute coronary syndrome


Status: Acute  





(3) Acute headache


Status: Acute  





(4) Acute on chronic congestive heart failure


Status: Acute  





(5) Altered mental status


Status: Acute  





(6) Anemia


Status: Acute  





(7) Anemia


Status: Acute  





(8) Anginal pain


Status: Acute  





(9) Appendicitis


Status: Acute  





(10) Cellulitis


Status: Acute  





(11) Chest pain


Status: Acute  





(12) CHF (congestive heart failure)


Status: Acute  





(13) CHF (congestive heart failure)


Status: Acute  





(14) Congestive heart failure


Status: Acute  





(15) Congestive heart failure


Status: Acute  





(16) COPD (chronic obstructive pulmonary disease)


Status: Acute  





(17) Diabetes mellitus with hyperglycemia


Status: Acute  





(18) Dyspnea


Status: Acute  





(19) Elevated troponin


Status: Acute  





(20) Failure of outpatient treatment


Status: Acute  





(21) Hypoxia


Status: Acute  





(22) Hypoxia


Status: Acute  





(23) Intra-abdominal abscess


Status: Acute  





(24) Lower abdominal pain of unknown etiology


Status: Acute  





(25) Neck pain


Status: Acute  





(26) Pneumonia


Status: Acute  





(27) Pneumonia


Status: Acute  





(28) Precordial chest pain


Status: Acute  





(29) Pulmonary edema


Status: Acute  





(30) Respiratory acidosis


Status: Acute  





(31) Respiratory distress


Status: Acute  





(32) SOB (shortness of breath)


Status: Acute  





(33) Tachypnea


Status: Acute  











Review of Systems


Constitutional:  No fever, No chills


Eyes:  + see HPI, No worsening of vision, No eye pain


Respiratory:  No cough, No sputum, No wheezing, No shortness of breath, No 

dyspnea on exertion


Cardiac:  + edema, No chest pain, No orthopnea, No PND


Abdomen:  No pain, No nausea, No vomiting





Objective


Vital Signs











  Date Time  Temp Pulse Resp B/P (MAP) Pulse Ox O2 Delivery O2 Flow Rate FiO2


 


1/21/18 16:00      Nasal Cannula 2.0 


 


1/21/18 15:49 36.7 53 20 158/72 (100) 94 Nasal Cannula 2.0 





      Humidified Oxygen  


 


1/21/18 12:00      Nasal Cannula 2.0 


 


1/21/18 11:54 36.7 54 18 168/83 (111) 95 Nasal Cannula 2.0 


 


1/21/18 09:56     98 Nasal Cannula 2.0 


 


1/21/18 09:28  59  159/73 (101)    


 


1/21/18 08:00      Nasal Cannula 3.0 


 


1/21/18 07:41 36.9 56 16 177/76 (109) 95 Nasal Cannula 3.0 


 


1/21/18 04:08 36.7 57 18 132/59 (83) 92 Nasal Cannula 2.0 


 


1/21/18 04:00      Nasal Cannula 2.0 


 


1/21/18 00:02      Nasal Cannula 2.0 


 


1/20/18 23:30 36.8 31 17 118/80 (93) 93 Nasal Cannula 2.0 


 


1/20/18 20:35 36.9 52 19 129/52 (77) 94 Nasal Cannula 2.0 


 


1/20/18 20:00      Nasal Cannula 2.0 











Physical Exam


General Appearance:  no apparent distress


Eyes:  + pertinent finding (visual fields are full by direct confrontation in 

both eyes; fundoscopic exam attempted in right eye - optic disc sharp, unable 

to visual retina otherwise as pupil was quite constricted)


ENT:  pharynx normal, + pertinent finding (poor dentition, however)


Neck:  no JVD


Respiratory/Chest:  lungs clear, no respiratory distress, no accessory muscle 

use, + rales (minimal course BS bases)


Cardiovascular:  + pertinent finding (irregular, s1, s2, 2/6 holosystolic 

murmur LSB)


Abdomen:  normal bowel sounds, non tender, soft, no organomegaly


Extremities:  + pedal edema (less than 1+ edema b/l ), + pertinent finding (

left great toe - tender to palpation over first MTP joint; minimal swelling; 

left ankle with mild joint effusion, tender to touch, not hot however )


Neurologic/Psychiatric:  alert, oriented x 3


Skin:  + pertinent finding (tiny ulcer, right shin, in the middle of a linear 

scar - no drainage today; left shin scar clean; left lateral ankle - 1 - 1.5cm 

in diameter shallow ulceration, clean, no odor; minimal surrounding erythema)





Laboratory Results





Last 24 Hours








Test


  1/20/18


20:40 1/21/18


06:05 1/21/18


11:31 1/21/18


13:40


 


Bedside Glucose 138 mg/dl   76 mg/dl  


 


Prothrombin Time  27.6 SECONDS   


 


Prothromb Time International


Ratio 


  2.7 


  


  


 


 


Sodium Level  138 mmol/L   


 


Potassium Level  4.4 mmol/L   


 


Chloride Level  103 mmol/L   


 


Carbon Dioxide Level  30 mmol/L   


 


Anion Gap  5.0 mmol/L   


 


Blood Urea Nitrogen  51 mg/dl   


 


Creatinine  1.90 mg/dl   


 


Est Creatinine Clear Calc


Drug Dose 


  46.7 ml/min 


  


  


 


 


Estimated GFR (


American) 


  43.4 


  


  


 


 


Estimated GFR (Non-


American 


  37.5 


  


  


 


 


BUN/Creatinine Ratio  27.0   


 


Random Glucose  134 mg/dl   


 


Calcium Level  7.9 mg/dl   


 


Magnesium Level  2.0 mg/dl   


 


Stool Occult Blood    NEGATIVE 


 


Test


  1/21/18


14:29 1/21/18


16:18 


  


 


 


Erythrocyte Sedimentation Rate 45 mm/hr    


 


Uric Acid 9.6 mg/dl    


 


Bedside Glucose  176 mg/dl   











Assessment and Plan


63yo male -





1.  acute hypoxic respiratory failure 2nd to acute/chronic diastolic CHF - 

resolved. 





2.  acute/chronic diastolic CHF - appears euvolemic on exam today, and BUN & Cr 

jacinto overnight.


At this time will stop the lasix IV.  Hold aldactone for now as well. 


Repeat BMP in am. 





2.  anemia - chronic - likely due to chronic disease.  s/p 1 unit of PRBCs.  H/

H stable.  NO evidence of GI bleeding, etc. 





3.  acute kidney injury, likely 2nd to over-diuresis, in setting of CKD stage 2 

- hold further lasix.  BMP in am.  Avoid NSAIDs. 





4.  chronic RLE wound, chronic wound left lateral ankle - culture with MRSA and 

MSSA - I believe these are colonization and not pathogenic at this time.  

Neither site appears acutely infection.


Continue to monitor these sites carefully and provide optifoams as previous. 





5.  CAD - mild troponin elevation likely myocardial demand ischemia in setting 

of #1 above.  Follow. 


Cont Plavix 75 mg daily, Norvasc 10 mg daily, Hydralazine 25 mg QID, Imdur 30 

mg daily, Toprol XL 50 mg BID, Lipitor 80 mg daily





6.  T2DM - controlled


- Continue Lantus and SSI





7.  Chronic Atrial Thrombus - cont coumadin, INR therapeutic, daily INR while 

hospitalized





8.  COPD - without exacerbation at this time.  Cont home inhalers.  need to 

assess for ambulatory O2 at d/c but hopefully he will not need any O2. 





9.  Bipolar Disorder: STABLE


- Depakote 2 g daily, Lexapro 20 mg daily, Remeron 45 mg daily, and Klonopin





10.  DVT proph - coumadin.





11.  floaters, flashes of light - present x 1 month.  Could be due to retinal 

disease in light of his vascular disease and DM. 


Spoke with on-call ophthomology - he recommended f/u in the office asap after 

discharge.


if the floaters or flashes of light worsen he would need urgent dilated exam 

while hospitalized.


watch for any new visual field cut.  





12.  left ankle pain, left great toe pain - suspect gout. 


colchicine 0.6mg x 1.


check uric acid and sed rate. 


consider prednisone low-dose; not a candidate for NSAIDs due to acute kidney 

injury. 


check x-rays of left foot & ankle to be sure there is no odd fracture, 

infectious process/osteomyelitis, etc. 





progressing nicely


will need to assess home O2 needs at d/c, if needed 





await PT, OT evals to determine dispo, however patient is ambulating in 

hallways without assistance


Continued [facility] stay due to:  multiple IV medications needed


Discharge planning:  home with home health

## 2018-01-21 NOTE — DIAGNOSTIC IMAGING REPORT
L FOOT MIN 3 VIEWS ROUTINE, L ANKLE MIN 3 VIEWS ROUTINE



HISTORY:  62 years-old Male left great toe pain, eval fracture, ?gout acute left

toe and ankle pain with concern for fracture



COMPARISON: None available



TECHNIQUE: 3 views of the left foot and 3 views of the left ankle



FINDINGS: 



FOOT:

Bones are mildly demineralized. Subcortical cystic changes are seen with mild

joint space narrowing within the first MTP joint. No acute fracture, subluxation

or erosions identified. Moderate to extensive soft tissue swelling about the

forefoot. No stress fracture identified. Small Achilles and moderate plantar

enthesophyte about the calcaneus. 1.0 cm linear calcification is seen within the

region of the proximal plantar fascia suggesting dystrophic calcification.



Ankle:

No acute fracture or subluxation. No osteochondral defect or significant joint

space narrowing. Mild soft tissue swelling about the ankle with peripheral

vascular disease.



IMPRESSION: 

1. Moderate to extensive forefoot soft tissue swelling without acute fracture or

subluxation.

2. Mildly demineralized appearance of the bones.

3. Mild joint space narrowing with subchondral lucencies suggesting cystic

changes of the first MTP joint. No erosive changes identified to suggest gouty

arthropathy.

4. Evidence of chronic plantar fasciitis.







The above report was generated using voice recognition software. It may contain

grammatical, syntax or spelling errors.







Electronically signed by:  Ashwin Bhatia M.D.

1/21/2018 6:49 PM



Dictated Date/Time:  1/21/2018 6:46 PM

## 2018-01-22 VITALS
SYSTOLIC BLOOD PRESSURE: 105 MMHG | OXYGEN SATURATION: 92 % | DIASTOLIC BLOOD PRESSURE: 44 MMHG | TEMPERATURE: 98.06 F | HEART RATE: 53 BPM

## 2018-01-22 VITALS
DIASTOLIC BLOOD PRESSURE: 71 MMHG | OXYGEN SATURATION: 95 % | TEMPERATURE: 97.7 F | SYSTOLIC BLOOD PRESSURE: 144 MMHG | HEART RATE: 51 BPM

## 2018-01-22 VITALS — HEART RATE: 50 BPM | TEMPERATURE: 98.6 F | OXYGEN SATURATION: 97 %

## 2018-01-22 VITALS
SYSTOLIC BLOOD PRESSURE: 175 MMHG | OXYGEN SATURATION: 98 % | HEART RATE: 53 BPM | TEMPERATURE: 98.24 F | DIASTOLIC BLOOD PRESSURE: 73 MMHG

## 2018-01-22 VITALS
HEART RATE: 60 BPM | DIASTOLIC BLOOD PRESSURE: 58 MMHG | OXYGEN SATURATION: 95 % | TEMPERATURE: 98.6 F | SYSTOLIC BLOOD PRESSURE: 150 MMHG

## 2018-01-22 VITALS
DIASTOLIC BLOOD PRESSURE: 52 MMHG | HEART RATE: 60 BPM | TEMPERATURE: 97.7 F | OXYGEN SATURATION: 92 % | SYSTOLIC BLOOD PRESSURE: 114 MMHG

## 2018-01-22 VITALS
OXYGEN SATURATION: 91 % | SYSTOLIC BLOOD PRESSURE: 149 MMHG | HEART RATE: 56 BPM | DIASTOLIC BLOOD PRESSURE: 57 MMHG | TEMPERATURE: 98.06 F

## 2018-01-22 VITALS — OXYGEN SATURATION: 92 %

## 2018-01-22 LAB
BUN SERPL-MCNC: 52 MG/DL (ref 7–18)
CALCIUM SERPL-MCNC: 7.8 MG/DL (ref 8.5–10.1)
CO2 SERPL-SCNC: 27 MMOL/L (ref 21–32)
CREAT SERPL-MCNC: 2.04 MG/DL (ref 0.6–1.4)
EOSINOPHIL NFR BLD AUTO: 200 K/UL (ref 130–400)
GLUCOSE SERPL-MCNC: 207 MG/DL (ref 70–99)
HCT VFR BLD CALC: 27.2 % (ref 42–52)
HGB BLD-MCNC: 9.2 G/DL (ref 14–18)
INR PPP: 2.9 (ref 0.9–1.1)
MCH RBC QN AUTO: 29.8 PG (ref 25–34)
MCHC RBC AUTO-ENTMCNC: 33.8 G/DL (ref 32–36)
MCV RBC AUTO: 88 FL (ref 80–100)
PMV BLD AUTO: 8.8 FL (ref 7.4–10.4)
POTASSIUM SERPL-SCNC: 4.9 MMOL/L (ref 3.5–5.1)
RED CELL DISTRIBUTION WIDTH CV: 14.8 % (ref 11.5–14.5)
RED CELL DISTRIBUTION WIDTH SD: 47.8 FL (ref 36.4–46.3)
SODIUM SERPL-SCNC: 135 MMOL/L (ref 136–145)
WBC # BLD AUTO: 5.08 K/UL (ref 4.8–10.8)

## 2018-01-22 RX ADMIN — GABAPENTIN SCH MG: 300 CAPSULE ORAL at 20:56

## 2018-01-22 RX ADMIN — INSULIN ASPART SCH UNITS: 100 INJECTION, SOLUTION INTRAVENOUS; SUBCUTANEOUS at 12:36

## 2018-01-22 RX ADMIN — PANTOPRAZOLE SCH MG: 40 TABLET, DELAYED RELEASE ORAL at 10:01

## 2018-01-22 RX ADMIN — ISOSORBIDE MONONITRATE SCH MG: 30 TABLET, EXTENDED RELEASE ORAL at 10:01

## 2018-01-22 RX ADMIN — FERROUS SULFATE TAB EC 325 MG (65 MG FE EQUIVALENT) SCH MG: 325 (65 FE) TABLET DELAYED RESPONSE at 10:00

## 2018-01-22 RX ADMIN — FERROUS SULFATE TAB EC 325 MG (65 MG FE EQUIVALENT) SCH MG: 325 (65 FE) TABLET DELAYED RESPONSE at 17:11

## 2018-01-22 RX ADMIN — DIVALPROEX SODIUM SCH MG: 500 TABLET, DELAYED RELEASE ORAL at 20:56

## 2018-01-22 RX ADMIN — MIRTAZAPINE SCH MG: 15 TABLET, FILM COATED ORAL at 20:56

## 2018-01-22 RX ADMIN — Medication SCH MCG: at 10:02

## 2018-01-22 RX ADMIN — ESCITALOPRAM OXALATE SCH MG: 20 TABLET, FILM COATED ORAL at 10:01

## 2018-01-22 RX ADMIN — METOPROLOL SUCCINATE SCH MG: 50 TABLET, EXTENDED RELEASE ORAL at 10:02

## 2018-01-22 RX ADMIN — TAMSULOSIN HYDROCHLORIDE SCH MG: 0.4 CAPSULE ORAL at 20:56

## 2018-01-22 RX ADMIN — AMLODIPINE BESYLATE SCH MG: 5 TABLET ORAL at 10:01

## 2018-01-22 RX ADMIN — INSULIN ASPART SCH UNITS: 100 INJECTION, SOLUTION INTRAVENOUS; SUBCUTANEOUS at 17:14

## 2018-01-22 RX ADMIN — ACETAMINOPHEN PRN MG: 325 TABLET ORAL at 00:50

## 2018-01-22 RX ADMIN — OXYCODONE HYDROCHLORIDE PRN MG: 5 TABLET ORAL at 23:17

## 2018-01-22 RX ADMIN — CLOPIDOGREL BISULFATE SCH MG: 75 TABLET, FILM COATED ORAL at 10:01

## 2018-01-22 RX ADMIN — TIOTROPIUM BROMIDE SCH PUFF: 18 CAPSULE ORAL; RESPIRATORY (INHALATION) at 10:00

## 2018-01-22 RX ADMIN — ATORVASTATIN CALCIUM SCH MG: 40 TABLET, FILM COATED ORAL at 10:01

## 2018-01-22 RX ADMIN — INSULIN GLARGINE SCH UNITS: 100 INJECTION, SOLUTION SUBCUTANEOUS at 21:34

## 2018-01-22 RX ADMIN — INSULIN ASPART SCH UNITS: 100 INJECTION, SOLUTION INTRAVENOUS; SUBCUTANEOUS at 21:33

## 2018-01-22 RX ADMIN — METOPROLOL SUCCINATE SCH MG: 50 TABLET, EXTENDED RELEASE ORAL at 20:55

## 2018-01-22 RX ADMIN — WARFARIN SODIUM SCH MG: 7.5 TABLET ORAL at 17:11

## 2018-01-22 RX ADMIN — INSULIN ASPART SCH UNITS: 100 INJECTION, SOLUTION INTRAVENOUS; SUBCUTANEOUS at 10:07

## 2018-01-22 RX ADMIN — ISOSORBIDE MONONITRATE SCH MG: 30 TABLET, EXTENDED RELEASE ORAL at 20:56

## 2018-01-22 NOTE — HOSPITALIST PROGRESS NOTE
Hospitalist Progress Note


Date of Service


Jan 22, 2018.


 (Brenda Reyes, PA-C)





Subjective


Pt evaluation today including:  conversation w/ patient, physical exam, chart 

review, lab review, review of studies, conversation w/ consultant, review of 

inpatient medication list


Patient seen and evaluated. No acute events overnight. Remains sinus/sinus 

jorge with bigem on tele.


Reporting breathing feels at baseline. Will continue to wean to RA. Given his 

COPD he likely lives even in the high 80s. Review of other hospitalizations he 

averages low 90s.


Will complete overnight pulse ox with anticipation that he can likely be D/C'd 

tomorrow. Diuresing is on hold due to elevated Cr. Will monitor this in AM


Reports toe and ankle pain is improving. No tenderness with palpation. Will 

continue a small course of oral steroids for gout.





   Constitutional:  No fever, No chills


   Respiratory:  No cough, No shortness of breath


   Cardiovascular:  No chest pain


   Abdomen:  No pain, No nausea, No vomiting, No diarrhea, No constipation


   Musculoskeletal:  + joint pain (L great toe and ankle)


   Heme:  No abnormal bleeding/bruising


   Skin:  No rash (Brenda Reyes, PA-C)





Medications





Current Inpatient Medications








 Medications


  (Trade)  Dose


 Ordered  Sig/Dc


 Route  Start Time


 Stop Time Status Last Admin


Dose Admin


 


 Atorvastatin


 Calcium


  (Lipitor Tab)  80 mg  DAILY


 PO  1/16/18 09:00


 2/15/18 08:59  1/22/18 10:01


80 MG


 


 Clonazepam


  (Klonopin Tab)  1 mg  HS  PRN


 PO  1/16/18 06:00


 2/15/18 05:59   


 


 


 Clopidogrel


 Bisulfate


  (plAVix TAB)  75 mg  QAM


 PO  1/16/18 09:00


 2/15/18 08:59  1/22/18 10:01


75 MG


 


 Divalproex Sodium


  (Depakote Delay


 Rel Tab)  2,000 mg  HS


 PO  1/16/18 21:00


 2/15/18 20:59  1/21/18 22:00


2,000 MG


 


 Escitalopram


 Oxalate


  (Lexapro Tab)  20 mg  QAM


 PO  1/16/18 09:00


 2/15/18 08:59  1/22/18 10:01


20 MG


 


 Gabapentin


  (Neurontin Cap)  300 mg  HS


 PO  1/16/18 21:00


 2/15/18 20:59  1/21/18 22:01


300 MG


 


 Hydralazine HCl


  (Apresoline Tab)  25 mg  QID


 PO  1/16/18 09:00


 2/15/18 08:59  1/22/18 12:33


25 MG


 


 Insulin Glargine


  (Lantus Solostar


 Pen)  10 units  HS


 SC  1/16/18 21:00


 2/15/18 20:59  1/21/18 22:03


10 UNITS


 


 Metoprolol


 Succinate


  (Toprol Xl Tab)  50 mg  BID


 PO  1/16/18 09:00


 2/15/18 08:59  1/22/18 10:02


50 MG


 


 Tamsulosin HCl


  (Flomax Cap)  0.4 mg  HS


 PO  1/16/18 21:00


 2/15/18 20:59  1/21/18 22:00


0.4 MG


 


 Tiotropium Bromide


  (Spiriva


 Handihaler


 Inhaler)  1 puff  DAILY


 INH  1/16/18 09:00


 2/15/18 08:59  1/22/18 10:00


1 PUFF


 


 Warfarin Sodium


  (Coumadin Tab)  7.5 mg  DAILY@1600


 PO  1/16/18 16:00


 2/15/18 15:59  1/21/18 15:40


7.5 MG


 


 Albuterol


  (Ventolin Hfa


 Inhaler)  2 puffs  Q4H  PRN


 INH  1/16/18 06:00


 2/15/18 05:59   


 


 


 Amlodipine


 Besylate


  (Norvasc Tab)  10 mg  QAM


 PO  1/16/18 09:00


 2/15/18 08:59  1/22/18 10:01


10 MG


 


 Cyanocobalamin


  (Vitamin B-12


 Tab)  1,000 mcg  DAILY


 PO  1/16/18 09:00


 2/15/18 08:59  1/22/18 10:02


1,000 MCG


 


 Ferrous Sulfate


  (Feosol Tab)  325 mg  BIDM


 PO  1/16/18 07:15


 2/15/18 07:59  1/22/18 10:00


325 MG


 


 Mirtazapine


  (Remeron Tab)  45 mg  HS


 PO  1/16/18 21:00


 2/15/18 20:59  1/21/18 22:01


45 MG


 


 Oxycodone HCl


  (Roxicodone


 Immediate Rel Tab)  10 mg  Q12  PRN


 PO  1/16/18 06:00


 2/15/18 05:59  1/21/18 23:23


10 MG


 


 Spironolactone


  (Aldactone Tab)  50 mg  BIDM


 PO  1/16/18 07:15


 2/15/18 07:14 Future Hold 1/21/18 15:41


50 MG


 


 Acetaminophen


  (Tylenol Tab)  650 mg  Q4H  PRN


 PO  1/16/18 06:00


 2/15/18 05:59  1/22/18 00:50


650 MG


 


 Al Hydrox/Mg


 Hydrox/Simethicone


  (Maalox Max Susp)  15 ml  Q4H  PRN


 PO  1/16/18 06:00


 2/15/18 05:59   


 


 


 Magnesium


 Hydroxide


  (Milk Of


 Magnesia Susp)  30 ml  Q12H  PRN


 PO  1/16/18 06:00


 2/15/18 05:59   


 


 


 Ondansetron HCl


  (Zofran Inj)  4 mg  Q6H  PRN


 IV  1/16/18 06:00


 2/15/18 05:59   


 


 


 Nitroglycerin


  (Nitrostat Tab)  0.4 mg  UD  PRN


 SL  1/16/18 06:00


 2/15/18 05:59   


 


 


 Morphine Sulfate


  (MoRPHine


 SULFATE INJ)  2 mg  Q30M  PRN


 IV  1/16/18 06:00


 1/30/18 05:59   


 


 


 Polyethylene


  (Miralax Powder


 Packet)  17 gm  DAILY  PRN


 PO  1/16/18 06:00


 2/15/18 05:59   


 


 


 Glucose


  (Glucose 40% Gel)  15-30


 GRAMS 15


 GRAMS...  UD  PRN


 PO  1/16/18 06:45


 2/15/18 06:44   


 


 


 Glucose


  (Glucose Chew


 Tab)  4-8


 Tablets 4


 Tabl...  UD  PRN


 PO  1/16/18 06:45


 2/15/18 06:44   


 


 


 Dextrose


  (Dextrose 50%


 50ML Syringe)  25-50ML OF


 50% DW IV


 FOR...  UD  PRN


 IV  1/16/18 06:45


 2/15/18 06:44   


 


 


 Glucagon


  (Glucagon Inj)  1 mg  UD  PRN


 SQ  1/16/18 06:45


 2/15/18 06:44   


 


 


 Oxycodone HCl


  (Roxicodone


 Immediate Rel Tab)  5 mg  Q6H  PRN


 PO  1/17/18 15:45


 1/31/18 15:44   


 


 


 Insulin Aspart


  (novoLOG ASPART)  SLIDING


 SCALE


 PARAMETER  ACHS


 SC  1/17/18 17:28


 2/16/18 17:27  1/22/18 12:36


4 UNITS


 


 Pantoprazole


 Sodium


  (Protonix Tab)  40 mg  BID


 PO  1/18/18 21:00


 1/22/18 20:59  1/22/18 10:01


40 MG


 


 Prednisone


  (PredniSONE TAB)  20 mg  QAM


 PO  1/22/18 09:00


 2/21/18 08:59  1/22/18 10:01


20 MG


 


 Isosorbide


 Mononitrate


  (Imdur Ext Rel


 Tab)  30 mg  BID


 PO  1/21/18 21:00


 2/15/18 08:59  1/22/18 10:01


30 MG








 (Brenda Reyes PA-C)





Objective


Vital Signs











  Date Time  Temp Pulse Resp B/P (MAP) Pulse Ox O2 Delivery O2 Flow Rate FiO2


 


1/22/18 12:37 36.8 53 20 175/73 (107) 98 Nasal Cannula 2.0 


 


1/22/18 12:26      Nasal Cannula 2.0 


 


1/22/18 08:46 37.0 50 14  97 Nasal Cannula 2.0 


 


1/22/18 08:01      Nasal Cannula 2.0 


 


1/22/18 07:48 36.5 51 16 144/71 (95) 95 Room Air  


 


1/22/18 04:00 36.7 56 18 149/57 (87) 91 Nasal Cannula 2.0 


 


1/22/18 04:00      Nasal Cannula 2.0 


 


1/22/18 00:02 36.8 62 20 150/62 (91) 95 Nasal Cannula 2.0 


 


1/22/18 00:02      Nasal Cannula 2.0 


 


1/21/18 20:50 36.7 50 19 149/60 (89) 94 Nasal Cannula 2.0 


 


1/21/18 20:00      Nasal Cannula 2.0 


 


1/21/18 16:00      Nasal Cannula 2.0 


 


1/21/18 15:49 36.7 53 20 158/72 (100) 94 Nasal Cannula 2.0 





      Humidified Oxygen  








 (Brenda Reyes PA-C)





Physical Exam


General Appearance:  no apparent distress


Eyes:  sclerae normal


ENT:  hearing grossly normal


Neck:  supple, no JVD, trachea midline


Respiratory/Chest:  lungs clear, no respiratory distress, no accessory muscle 

use, + decreased breath sounds (bases b/l)


Cardiovascular:  regular rate, rhythm, + systolic murmur


Abdomen:  normal bowel sounds, non tender, soft


Extremities:  no pedal edema, + pertinent finding (no point tenderness to L 

ankle or L great toe, no evidence of erythema; b/l leg wounds with dressings 

applied C/D/I)


Neurologic/Psychiatric:  alert


Skin:  normal color


 (Brenda Reyes PA-C)





Laboratory Results





Last 24 Hours








Test


  1/21/18


14:29 1/21/18


16:18 1/21/18


20:34 1/22/18


06:25


 


Erythrocyte Sedimentation Rate 45 mm/hr    


 


Uric Acid 9.6 mg/dl    


 


Bedside Glucose  176 mg/dl  128 mg/dl  


 


White Blood Count    5.08 K/uL 


 


Red Blood Count    3.09 M/uL 


 


Hemoglobin    9.2 g/dL 


 


Hematocrit    27.2 % 


 


Mean Corpuscular Volume    88.0 fL 


 


Mean Corpuscular Hemoglobin    29.8 pg 


 


Mean Corpuscular Hemoglobin


Concent 


  


  


  33.8 g/dl 


 


 


RDW Standard Deviation    47.8 fL 


 


RDW Coefficient of Variation    14.8 % 


 


Platelet Count    200 K/uL 


 


Mean Platelet Volume    8.8 fL 


 


Prothrombin Time    30.3 SECONDS 


 


Prothromb Time International


Ratio 


  


  


  2.9 


 


 


Sodium Level    135 mmol/L 


 


Potassium Level    4.9 mmol/L 


 


Chloride Level    103 mmol/L 


 


Carbon Dioxide Level    27 mmol/L 


 


Anion Gap    5.0 mmol/L 


 


Blood Urea Nitrogen    52 mg/dl 


 


Creatinine    2.04 mg/dl 


 


Est Creatinine Clear Calc


Drug Dose 


  


  


  43.5 ml/min 


 


 


Estimated GFR (


American) 


  


  


  39.9 


 


 


Estimated GFR (Non-


American 


  


  


  34.4 


 


 


BUN/Creatinine Ratio    25.6 


 


Random Glucose    207 mg/dl 


 


Calcium Level    7.8 mg/dl 


 


Test


  1/22/18


06:54 1/22/18


11:32 


  


 


 


Bedside Glucose 212 mg/dl  176 mg/dl   








 (Brenda Reyes PA-C)





Assessment and Plan


63 y/o M with extensive medical issues including severe CAD, PVD, COPD, DM II, 

bipolar disease, chronic anemia, diastolic CHF, recent diagnosis of atrial 

thrombus - on Coumadin - pt had an MI at [place] 10/17 which was medically 

managed. He frequently presents to the hospital due to SOB/volume overload and 

recently presented with CP and anemia as well. 


The pt presents today due to acute SOB.  Initial imaging and exam is consistent 

with exacerbation of CHF.  He was requiring high-flow oxygen on arrival but has 

improved with Lasix administration.  He denies CP, N/V, diarrhea or dysuria.  





Acute Hypoxic Respiratory Failure 2/2 Acute on Chronic Diastolic CHF vs Acute 

Anemia: RESOLVED


- Will repeat pulse oximetry as testing early during admission would qualify 

him - however will need one within 48 hrs of D/C - will repeat tonight and get 

two-step in AM


- Continue to follow fluid restriction - Lasix and Spironolactone on hold due 

to elevated Cr





Gout:


- Elevated ESR and Uric Acid - responding to dose of colchicine and prednisone


- Continue Prednisone 20 mg daily





Acute on Chronic Anemia: Anemia of Chronic Disease: STABLE


- Hgb improved to 9; transfused 1 unit on 1/18


- B12, Folate, Iron Studies WNL; Labs support anemia of chronic disease





Elevated Cr:


- Baseline is hard to determine due to labile on labs in-hospital; continue to 

monitor with diuretics on hold





B/L Chronic Diabetic Wounds:


- Culture with staph that is MSSA and MRSA - wounds look at baseline per 

patient and appears to be improving; likely colonization and will hold 

treatment at this time


- Wound care following





CAD: STABLE


- Troponin mildly bumped and trending down - no further intervention at this 

time; denies CP


- Plavix 75 mg daily


- Norvasc 10 mg daily, Hydralazine 25 mg QID, Imdur 30 mg BID, and Toprol XL 50 

mg BID, Lipitor 80 mg daily





T2DM:


- Continue Lantus and SSI





Chronic Atrial Thrombus:


- INR continues to be therapeutic - continue Coumadin and monitor INR





COPD without Exacerbation:


- Ventolin PRN





Bipolar Disorder: STABLE


- Depakote 2 g daily, Lexapro 20 mg daily, Remeron 45 mg daily, and Klonopin





DVT Prophylaxis: Coumadin





Code Status: FULL RESUSCITATION





Disposition: 


- Overnight pulse ox to see if he qualifies for home O2 at least at night; two-

step prior to D/C


   -- Assess renal function in AM - likely D/C tomorrow or next day


Discharge planning:  home


 (Brenda Reyes, PAMelC)


Attending Attestation:


Pt seen/examined, chart reviewed, care plan d/w JORDI Reyes.


I agree w/ the eddy components of her documentation.





Pt w/o orthopnea, cough, or dyspnea.


Had "hard night" due to left ankle/great toe pain. 


However, pain did improve through the day today.





VSS


no fever


tele overnight - NSR and bigeminy / PVCs





gen - nad, lying flat comfortably


neck - no JVD


heart - irregular, s1, s2, 2/6 systolic murmur LSB


lungs - CTA b/l 


ext - trace edema b/l 


musculo - left great toe with no tenderness w/ palpation today; left ankle - 

effusion is smaller, less tender, no warmth or erythema


skin - left ankle lateral ulcer clean, no purulent drainage, no odor, no 

tenderness





Cr 2 


INR 2.9


ankle/foot x-rays, left - no fractures, no signs of osteomyelitis


uric acid very high in the 9's





A/P:


1.  acute/chronic diastolic CHF - now compensated - acute component resolved. 


2.  suspected acute gouty arthritis - left ankle/left great toe - improved, but 

still painful.


colchicine 0.6mg po x 1


cont low-dose prednisone


3.  acute kidney injury - cont to hold diuretics; repeat BMP am


4.  atrial thrombus, on chronic coumadin - INR therapeutic


5.  chronic wounds of b/l LEs - no signs of superinfection despite recent 

surface swab cultures (suspect these are colonizers, not pathogenic at this time

)





left message for wife 1/22/18


possible d/c tomorrow if ambulating ok


agree with overnight pulse ox study





SANDRA GRIMES MD


 (Stu Grimes MD)

## 2018-01-23 VITALS
OXYGEN SATURATION: 94 % | TEMPERATURE: 97.7 F | SYSTOLIC BLOOD PRESSURE: 160 MMHG | DIASTOLIC BLOOD PRESSURE: 65 MMHG | HEART RATE: 63 BPM

## 2018-01-23 VITALS
DIASTOLIC BLOOD PRESSURE: 72 MMHG | SYSTOLIC BLOOD PRESSURE: 167 MMHG | HEART RATE: 56 BPM | OXYGEN SATURATION: 95 % | TEMPERATURE: 97.7 F

## 2018-01-23 VITALS
HEART RATE: 63 BPM | TEMPERATURE: 98.24 F | DIASTOLIC BLOOD PRESSURE: 51 MMHG | OXYGEN SATURATION: 93 % | SYSTOLIC BLOOD PRESSURE: 116 MMHG

## 2018-01-23 VITALS
HEART RATE: 56 BPM | SYSTOLIC BLOOD PRESSURE: 159 MMHG | TEMPERATURE: 98.24 F | DIASTOLIC BLOOD PRESSURE: 69 MMHG | OXYGEN SATURATION: 95 %

## 2018-01-23 VITALS
OXYGEN SATURATION: 94 % | HEART RATE: 57 BPM | SYSTOLIC BLOOD PRESSURE: 146 MMHG | TEMPERATURE: 98.06 F | DIASTOLIC BLOOD PRESSURE: 57 MMHG

## 2018-01-23 VITALS
DIASTOLIC BLOOD PRESSURE: 57 MMHG | OXYGEN SATURATION: 94 % | SYSTOLIC BLOOD PRESSURE: 146 MMHG | HEART RATE: 57 BPM | TEMPERATURE: 98.06 F

## 2018-01-23 VITALS — OXYGEN SATURATION: 94 %

## 2018-01-23 LAB
BUN SERPL-MCNC: 55 MG/DL (ref 7–18)
CALCIUM SERPL-MCNC: 8.2 MG/DL (ref 8.5–10.1)
CO2 SERPL-SCNC: 26 MMOL/L (ref 21–32)
CREAT SERPL-MCNC: 1.82 MG/DL (ref 0.6–1.4)
GLUCOSE SERPL-MCNC: 231 MG/DL (ref 70–99)
INR PPP: 4 (ref 0.9–1.1)
POTASSIUM SERPL-SCNC: 4.5 MMOL/L (ref 3.5–5.1)
SODIUM SERPL-SCNC: 138 MMOL/L (ref 136–145)

## 2018-01-23 RX ADMIN — FERROUS SULFATE TAB EC 325 MG (65 MG FE EQUIVALENT) SCH MG: 325 (65 FE) TABLET DELAYED RESPONSE at 17:09

## 2018-01-23 RX ADMIN — AMLODIPINE BESYLATE SCH MG: 5 TABLET ORAL at 08:15

## 2018-01-23 RX ADMIN — INSULIN ASPART SCH UNITS: 100 INJECTION, SOLUTION INTRAVENOUS; SUBCUTANEOUS at 08:21

## 2018-01-23 RX ADMIN — INSULIN ASPART SCH UNITS: 100 INJECTION, SOLUTION INTRAVENOUS; SUBCUTANEOUS at 12:43

## 2018-01-23 RX ADMIN — ESCITALOPRAM OXALATE SCH MG: 20 TABLET, FILM COATED ORAL at 08:15

## 2018-01-23 RX ADMIN — FERROUS SULFATE TAB EC 325 MG (65 MG FE EQUIVALENT) SCH MG: 325 (65 FE) TABLET DELAYED RESPONSE at 08:14

## 2018-01-23 RX ADMIN — OXYCODONE HYDROCHLORIDE PRN MG: 5 TABLET ORAL at 15:52

## 2018-01-23 RX ADMIN — ATORVASTATIN CALCIUM SCH MG: 40 TABLET, FILM COATED ORAL at 08:15

## 2018-01-23 RX ADMIN — Medication SCH MCG: at 08:15

## 2018-01-23 RX ADMIN — CLOPIDOGREL BISULFATE SCH MG: 75 TABLET, FILM COATED ORAL at 08:15

## 2018-01-23 RX ADMIN — INSULIN ASPART SCH UNITS: 100 INJECTION, SOLUTION INTRAVENOUS; SUBCUTANEOUS at 17:14

## 2018-01-23 RX ADMIN — TIOTROPIUM BROMIDE SCH PUFF: 18 CAPSULE ORAL; RESPIRATORY (INHALATION) at 08:15

## 2018-01-23 RX ADMIN — ISOSORBIDE MONONITRATE SCH MG: 30 TABLET, EXTENDED RELEASE ORAL at 08:15

## 2018-01-23 RX ADMIN — METOPROLOL SUCCINATE SCH MG: 50 TABLET, EXTENDED RELEASE ORAL at 08:15

## 2018-01-23 NOTE — DISCHARGE INSTRUCTIONS
Discharge Instructions


Date of Service


Jan 23, 2018.





Admission


Reason for Admission:  Chf Exacerbation





Discharge


Discharge Diagnosis / Problem:  CHF Exacerbation





Discharge Goals


Goal(s):  Decrease discomfort, Improve function, Increase independence





Activity Recommendations


Activity Limitations:  resume your previous activity





.





Instructions / Follow-Up


Instructions / Follow-Up


Acute on Chronic Congestive Heart Failure:


- Hold your Lasix and Spironolactone today on 1/23. You may resume this 

tomorrow on 1/24.


- It will be important to track your weights and maintain a low sodium diet. 

Also would limit your oral drinks to about 6 glasses


- You will be given a prescription to check your kidney function in the next 

couple days - you can get this done where you normally get lab work and the 

results will go to your family doctor





Gout:


- You will take Prednisone on 1/24, 1/25, and 1/26 for your foot/toe pain


- Avoid beer, shellfish, sardines, organ meats like liver





Chronic Diabetic Wounds:


- Continue to follow with the wound center





Chronic Atrial Thrombus:


- You INR is at 4 today


   -- Hold Coumadin today and tomorrow - 1/23 and 1/24 then resume your 

previously prescribed dose and would recommend going to the coumadin clinic on 1 /25 to check your INR (Coumadin Level)





Night Oxygen


- You qualify for night oxygen -  you will need to wear 2 L only at night to 

help with your breathing and good oxygenation to your body








Call your Primary Care doctor if any of the following symptoms or problems 

start or get worse:





* Shortness of breath or difficulty breathing


* Wake up at night short of breath


* Chest pain


* Cough


* Swelling of your hands, feet, or legs


* More fatigued or tired with your normal activity


* Palpitations - sudden fast heart beats





WEIGHT





* Weigh yourself every morning after using the bathroom.


* Use the same scale.


* Wear the same amount of clothing.


* Write your weight down on a chart.


* Call your Primary Care doctor if you gain more than 2-3 pounds in 1-2 days.





MEDICATIONS





* Use this discharge instruction sheet for medication instructions.


* Take your medications at the time your doctor ordered.


* Do not skip a dose of your medicines.


* If you miss a dose of medicine, take it as soon as possible, but DO NOT 

DOUBLE A DOSE.


* Read your medicine information when you get home.


* Know all of the side effects of your medicine.  If in doubt, ask your 

pharmacist


* Call your Primary Care doctor's office if you have any side effects.


* Be sure all of your doctors know what medicine and herbs you take (including 

cold, flu, and herbal medicine).








Take the following with you to your follow-up doctor appointments:





* Weight Chart


* Medication List


* List of questions





Do not drink excessive alcohol, beer or wine.





Current Hospital Diet


Patient's current hospital diet: AHA Diet (Heart Healthy), Diabetes Type 2 Diet





Discharge Diet


Recommended Diet:  AHA Diet (Heart Healthy), Diabetes Type 2 Diet





Pending Studies


Studies pending at discharge:  no





Laboratory Results





Hemoglobin A1c








Test


  1/1/18


09:56 Range/Units


 


 


Estimated Average Glucose 134   mg/dl


 


Hemoglobin A1c 6.3 H 4.5-5.6  %











Medical Emergencies








.


Who to Call and When:





Call 911 or go to the Emergency Room if:





* If at any time you feel your situation is an emergency


* You have tightness or pain in your chest that does not go away with rest or 

Nitroglycerin


* You are very short of breath even with rest





.





Non-Emergent Contact


Non-Emergency issues call your:  Primary Care Provider


Call Non-Emergent contact if:  you have a fever, your pain is concerning you, 

you have any medication questions





.


.








"Provider Documentation" section prepared by Brenda Reyes.








.





VTE Core Measure


Inpt VTE Proph given/why not?:  Warfarin (Coumadin)

## 2018-01-23 NOTE — DISCHARGE SUMMARY
Discharge Summary


Date of Service


Jan 23, 2018.





Discharge Summary


Admission Date:


Jan 16, 2018 at 15:07


Discharge Date:  Jan 23, 2018


Discharge Disposition:  Home with services


Principal Diagnosis:  Acute Hypoxic Respiratory Failure 2/2 Acute Diastolic CHF


Problems/Secondary Diagnoses:


1. CAD S/P NSTEMI - L Circ Stent - ACS 20190 - Recent MI in October 2017


2. Grade I Diastolic CHF


3. T2DM


4. Bipolar Disease


5. COPD


6. Chronic Diabetic Foot Ulcer


7. JANE


8. GERD


9. PAD - B/L Femoral Stents


10. Chronic Anemia - Likely Anemia of Chronic Disease


11. CKD Stage II


12. Atrial Thrombus 


13. suspected acute gout - left great toe and left ankle - improved


14. visual disturbance, right eye (flashes of light, floaters) - ophthomology 

follow-up recommended asap


Immunizations:  


   Have You Had Influenza Vaccine:  No


   Influenza Vaccine Date:  Oct 5, 2009


   History of Tetanus Vaccine?:  No


   Tetanus Immunization Date:  Mar 1, 2004


   History of Pneumococcal:  No


   Pneumococcal Date:  Oct 15, 2006


   History of Hepatitis B Vaccine:  No


Procedures:


CHEST ONE VIEW PORTABLE





FINDINGS: 


Cardiac silhouette is again mildly enlarged. Atherosclerosis of the aorta.


Moderate pulmonary vascular congestion with progressive interstitial coarsening.


No pneumothorax. Trace bilateral pleural effusions. Trace fluid tracks along the


minor fissure. Patchy bibasilar opacities are noted, right than left. Bones of


the chest appear grossly intact.





IMPRESSION: 


1. Cardiomegaly with pulmonary vascular congestion and mild pulmonary edema


which has slightly progressed from comparison.


2. Patchy bibasilar opacities, right greater than left suggests atelectasis or


pneumonia.


3. Trace pleural effusions. 





L FOOT MIN 3 VIEWS ROUTINE, L ANKLE MIN 3 VIEWS ROUTINE





FINDINGS: 





FOOT:


Bones are mildly demineralized. Subcortical cystic changes are seen with mild


joint space narrowing within the first MTP joint. No acute fracture, subluxation


or erosions identified. Moderate to extensive soft tissue swelling about the


forefoot. No stress fracture identified. Small Achilles and moderate plantar


enthesophyte about the calcaneus. 1.0 cm linear calcification is seen within the


region of the proximal plantar fascia suggesting dystrophic calcification.





Ankle:


No acute fracture or subluxation. No osteochondral defect or significant joint


space narrowing. Mild soft tissue swelling about the ankle with peripheral


vascular disease.





IMPRESSION: 


1. Moderate to extensive forefoot soft tissue swelling without acute fracture or


subluxation.


2. Mildly demineralized appearance of the bones.


3. Mild joint space narrowing with subchondral lucencies suggesting cystic


changes of the first MTP joint. No erosive changes identified to suggest gouty


arthropathy.


4. Evidence of chronic plantar fasciitis.








PRBC infusion x 1 unit


Consultations:


PT, OT





Medication Reconciliation


New Medications:  


Prednisone (Prednisone) 20 Mg Tab


20 MG PO QAM for 3 Days, #3 TAB


Start on 1/24


 


Changed Medications:  


Furosemide (Lasix) 20 Mg Tab


20 MG PO DAILY for 14 Days, #14 TAB (Changed from: PRN; Removed Instructions)





 


Continued Medications:  


Acetaminophen (Tylenol) 500 Mg Tab


500 MG PO Q4-6HRS PRN for Pain, TAB





Albuterol Sulfate (Proair Respiclick) 108 Mcg/Act Aer


1-2 PUFFS INH Q4-6HRS PRN for SOB/Wheezing





Amlodipine Besylate (Amlodipine Besylate) 10 Mg Tab


10 MG PO QAM





Atorvastatin (Lipitor) 80 Mg Tab


80 MG PO DAILY, TAB





Clonazepam (Clonazepam) 1 Mg Tab


1 MG PO HS PRN for Anxiety





Clopidogrel Bisulfate (Clopidogrel) 75 Mg Tab


75 MG PO QAM





Cyanocobalamin (Vitamin B12) 1,000 Mcg Tab


1000 MCG PO DAILY





Divalproex Sodium (Depakote Delay Rel) 500 Mg Tab


2000 MG PO HS





Docusate Sodium (Docusate Sodium) 100 Mg Cap


100 MG PO BID PRN for Constipation





Escitalopram Oxalate (Escitalopram Oxalate) 20 Mg Tab


20 MG PO QAM





Ferrous Sulfate (Ferrous Sulfate) 325 Mg Tab


325 MG PO BIDM





Fluticasone Propionate (Nasal) (Flonase Allergy Relief) 50 Mcg/Act Spr


2 SPRAYS VERONICA DAILY PRN for Nasal Congestion





Gabapentin (Neurontin) 300 Mg Cap


300 MG PO HS, CAP





Glucagon (Glucagon Emergency Kit) 1 Mg Kit


1 MG UD PRN for Hypoglycemia Protocol





Hydralazine Hcl (Apresoline) 25 Mg Tab


25 MG PO QID, TAB





Insulin Glargine (Lantus Solostar) 100 Unit/Ml Inj


10 UNITS SC HS, PEN





Insulin Lispro (Human) (Humalog Kwikpen) 100 Unit/Ml Inj


1 DOSE SC AC


COVERAGE AS DIRECTED BY SLIDING SCALE


Isosorbide Mononitrate Ext Rel (Imdur Ext Rel) 30 Mg Ertab


30 MG PO QAM, TAB





Metoprolol Succinate (Metoprolol Succinate ER) 50 Mg Tabcr


50 MG PO BID





Mirtazapine (Mirtazapine) 45 Mg Tab


45 MG PO HS, TAB





Nitroglycerin (Nitrostat) 0.4 Mg Tab


0.4 MG UT UD PRN for Chest Pain


DISSOLVE ONE TABLET UNDER THE TONGUE AS DIRECTED


Oxycodone HCl (Oxycodone HCl) 5 Mg Tab


5 MG PO Q6H PRN for Breakthrough Pain





Oxycodone Hcl (Oxycodone Hcl) 10 Mg Tab


10 MG PO Q12 PRN for Severe Pain





Spironolactone (Aldactone) 50 Mg Tab


50 MG PO BID





Tamsulosin HCl (Tamsulosin HCl) 0.4 Mg Cap


0.4 MG PO HS





Tiotropium Bromide (Spiriva Handihaler) 30 Puff/540 Mcg Aerp


1 CAP INH DAILY, INHALER





Warfarin Sodium (Warfarin Sodium) 7.5 Mg Tab


7.5 MG PO 5XWK, TAB





Warfarin Sodium (Warfarin Sodium) 10 Mg Tab


10 MG PO 2XWK


TUES, SAT








Discharge Exam


Review of Systems:  


   Constitutional:  No fever, No chills


   ENT:  No nasal symptoms, No sore throat


   Respiratory:  No cough, No shortness of breath


   Cardiovascular:  No chest pain


   Abdomen:  No pain, No nausea, No vomiting, No diarrhea, No constipation


   Musculoskeletal:  + joint pain (L ankle and great toe - improving)


   Genitourinary - Male:  No dysuria


   Hematologic / Lymphatic:  No abnormal bleeding/bruising


Physical Exam:  


   General Appearance:  no apparent distress


   Eyes:  sclerae normal


   ENT:  hearing grossly normal


   Neck:  supple, no JVD, trachea midline


   Respiratory/Chest:  lungs clear, no respiratory distress, no accessory 

muscle use, + decreased breath sounds (bases b/l)


   Cardiovascular:  regular rate, rhythm, + systolic murmur


   Abdomen / GI:  normal bowel sounds, non tender, soft


   Extremities:  + pertinent finding (chronic wounds with dressings placed to b/

l lower extremities)


   Neurologic/Psychiatric:  alert, oriented x 3


   Skin:  warm/dry





Hospital Course


ADMISSION: 63 y/o M with extensive medical issues including severe CAD, PVD, 

COPD, DM II, bipolar disease, chronic anemia, diastolic CHF, recent diagnosis 

of atrial thrombus - on Coumadin - pt had an MI at [place] 10/17 which was 

medically managed. He frequently presents to the hospital due to SOB/volume 

overload and recently presented with CP and anemia as well. 


The pt presents today due to acute SOB.  Initial imaging and exam is consistent 

with exacerbation of CHF.  He was requiring high-flow oxygen on arrival but has 

improved with Lasix administration.  He denies CP, N/V, diarrhea or dysuria.  


The pt admits to dietary indiscretion and states that he had dinner at a truck 

stop where he enjoyed salad with ranch dressing and a pork chop.





HOSPITAL COURSE: Mr. Dixon was admitted for Acute Hypoxic Respiratory 

Failure 2/2 Acute on Chronic Diastolic CHF. Overnight pulse oximetry reveals 

that he would qualify for 2L NC at night. 2 step did not support need for O2 

during the day.





Acute Hypoxic Respiratory Failure 2/2 Acute on Chronic Diastolic CHF vs Acute 

Anemia: RESOLVED


- Suspected JANE component and qualified for 2 L NC at night - Rx given and will 

use [facility] for O2 supplies


- Continued Spironolactone BID and instructed Lasix 20 mg daily


- Encouraged dietary compliance to include low sodium diet and fluid 

restriction - patient does admit to non-compliance





Acute Gout:


- Elevated ESR and Uric Acid - responding to dose of colchicine and prednisone


- Prednisone 20 mg daily x 3 more days to complete 5 day course - is improving


- Recommended low purine diet - given underlying kidney disease he may be more 

prone to further gout attacks and may benefit from Allopurinol





Acute on Chronic Anemia: Anemia of Chronic Disease: STABLE


- Initially in admission he was not diuresing and Hgb dropped. Transfused 1 

unit on 1/18 that improved Hgb with question of contribution to SOB but did not 

appear to change clinical picture.


- B12, Folate, Iron Studies WNL; Labs support anemia of chronic disease





Elevated Cr on CKD Stage III:


- Baseline is hard to determine due to labile on labs in-hospital; currently at 

1.8...possible baseline 1.2-1.5?


- Recommended to hold Lasix and Spironolactone on 1/23 and resume on 1/24. Rx 

given for BMP lab draw in next few days





B/L Chronic Diabetic Wounds:


- Culture with staph that is MSSA and MRSA - wounds look at baseline per 

patient and appears to be improving; likely colonization and will hold 

treatment at this time


- Established wound care F/U - especially for L ankle wound that may need 

debridement?





CAD: STABLE


- Troponin mildly bumped and trended down during admission - no further 

intervention at this time; denies CP


- Plavix 75 mg daily, Norvasc 10 mg daily, Hydralazine 25 mg QID, Imdur 30 mg 

BID, and Toprol XL 50 mg BID, Lipitor 80 mg daily





Chronic Atrial Thrombus:


- INR supratherapeutic at 4.0. Advised to hold Coumadin x 2 days and then 

continue previously prescribed Coumadin dose with INR check with Coumadin Clinic


- No signs/symptoms of active bleeding





-------------------------------------------------------------


Attending Discharge Note & Attestation:


Pt seen/examined, chart reviewed, discharge care plan d/w JORDI Reyes.


I agree w/ the key components of her discharge summary. 





63yo male with numerous medical problems including CAD, COPD, chronic diastolic 

CHF, CKD, T2DM, atrial thrombus, PAD - presented with dyspnea.


Found to be in decompensated CHF and required high-flow oxygen at admission.  


During his stay he was diuresed to a dry weight of approximately 90kg.  


Oxygen during the daytime was weaned off; 2-step O2 test showed he did NOT need 

daytime O2.  However, overnight oximetry study suggested he needs 2 L of NC o2 

with sleep.  


Other issues addressed while here - 


* suspected acute gout of left great toe & left ankle - improved w/ prednisone 

& colchicine


* chronic wounds of b/l LEs - local wound care provided; no evidence of active 

infection of any wound while here


* acute kidney injury - 2nd to overdiuresis - improving prior to discharge; 

discharge Cr 1.8


* acute/chronic anemia - s/p 1 unit PRBCs; discharge hemoglobin was 9.2 


* right eye visual disturbance - floaters/flashes of light x 1 month - ophtho f/

u arranged for the week of discharge





Discharge exam -


gen - nad


neck - no JVD


mouth - MMM


heart - irregular, s1, s2, 1-2/6 JAK LSB


lungs - CTA b/l 


abd - soft, NT


ext - trace edema b/l


musculo - left ankle synovitis improved; no tenderness with palpation; no 

redness or warmth.  Left great toe without synovitis, tenderness, or warmth.


skin - small ulcer, right shin - clean; well-healed vertical scars of b/l 

shins.  Left ankle, lateral malleolus ulcer - clean, serous drainage only, no 

odor/warmth/redness/purulent discharge


eyes - visual fields full by direct confrontation in both eyes 





Stu Youngblood MD


Total Time Spent:  Greater than 30 minutes


This includes examination of the patient, discharge planning, medication 

reconciliation, and communication with other providers.





Discharge Instructions


Please refer to the electronic Patient Visit Report (Discharge Instructions) 

for additional information.





Follow-Up


1.  Dr. White - [facility] - Friday January 26 at 12:30pm.


2.  Annmarie Ayala in Dr. Rowley's office on Friday, January 26 at 1:00pm.





Additional Copies To


Annmarie Ayala PA-C; Jovan Poole M.D.; Claude Rowley M.D.

## 2018-02-02 ENCOUNTER — HOSPITAL ENCOUNTER (INPATIENT)
Dept: HOSPITAL 45 - C.EDC | Age: 63
LOS: 17 days | Discharge: HOME HEALTH | DRG: 291 | End: 2018-02-19
Attending: HOSPITALIST | Admitting: HOSPITALIST
Payer: COMMERCIAL

## 2018-02-02 VITALS
SYSTOLIC BLOOD PRESSURE: 167 MMHG | DIASTOLIC BLOOD PRESSURE: 78 MMHG | TEMPERATURE: 97.7 F | OXYGEN SATURATION: 93 % | HEART RATE: 64 BPM

## 2018-02-02 VITALS
OXYGEN SATURATION: 95 % | HEART RATE: 55 BPM | SYSTOLIC BLOOD PRESSURE: 188 MMHG | DIASTOLIC BLOOD PRESSURE: 117 MMHG | TEMPERATURE: 97.7 F

## 2018-02-02 VITALS
BODY MASS INDEX: 29.57 KG/M2 | HEIGHT: 71 IN | WEIGHT: 211.2 LBS | HEIGHT: 71 IN | WEIGHT: 211.2 LBS | BODY MASS INDEX: 29.57 KG/M2

## 2018-02-02 DIAGNOSIS — Z91.14: ICD-10-CM

## 2018-02-02 DIAGNOSIS — N18.3: ICD-10-CM

## 2018-02-02 DIAGNOSIS — R00.1: ICD-10-CM

## 2018-02-02 DIAGNOSIS — K21.9: ICD-10-CM

## 2018-02-02 DIAGNOSIS — T45.515A: ICD-10-CM

## 2018-02-02 DIAGNOSIS — I25.10: ICD-10-CM

## 2018-02-02 DIAGNOSIS — D64.9: ICD-10-CM

## 2018-02-02 DIAGNOSIS — I25.2: ICD-10-CM

## 2018-02-02 DIAGNOSIS — Z87.891: ICD-10-CM

## 2018-02-02 DIAGNOSIS — J96.01: ICD-10-CM

## 2018-02-02 DIAGNOSIS — N17.9: ICD-10-CM

## 2018-02-02 DIAGNOSIS — E11.9: ICD-10-CM

## 2018-02-02 DIAGNOSIS — E11.40: ICD-10-CM

## 2018-02-02 DIAGNOSIS — E11.621: ICD-10-CM

## 2018-02-02 DIAGNOSIS — D68.32: ICD-10-CM

## 2018-02-02 DIAGNOSIS — Z99.81: ICD-10-CM

## 2018-02-02 DIAGNOSIS — D63.8: ICD-10-CM

## 2018-02-02 DIAGNOSIS — Z79.01: ICD-10-CM

## 2018-02-02 DIAGNOSIS — I50.33: Primary | ICD-10-CM

## 2018-02-02 DIAGNOSIS — E87.2: ICD-10-CM

## 2018-02-02 DIAGNOSIS — I10: ICD-10-CM

## 2018-02-02 DIAGNOSIS — J96.02: ICD-10-CM

## 2018-02-02 DIAGNOSIS — F41.9: ICD-10-CM

## 2018-02-02 DIAGNOSIS — F31.9: ICD-10-CM

## 2018-02-02 DIAGNOSIS — E78.5: ICD-10-CM

## 2018-02-02 DIAGNOSIS — Z98.61: ICD-10-CM

## 2018-02-02 DIAGNOSIS — F32.9: ICD-10-CM

## 2018-02-02 DIAGNOSIS — I73.9: ICD-10-CM

## 2018-02-02 DIAGNOSIS — Z79.4: ICD-10-CM

## 2018-02-02 DIAGNOSIS — I51.3: ICD-10-CM

## 2018-02-02 DIAGNOSIS — Z82.49: ICD-10-CM

## 2018-02-02 LAB
ALBUMIN SERPL-MCNC: 2.5 GM/DL (ref 3.4–5)
ALP SERPL-CCNC: 61 U/L (ref 45–117)
ALT SERPL-CCNC: 16 U/L (ref 12–78)
AST SERPL-CCNC: 10 U/L (ref 15–37)
BASOPHILS # BLD: 0.01 K/UL (ref 0–0.2)
BASOPHILS NFR BLD: 0.2 %
BUN SERPL-MCNC: 45 MG/DL (ref 7–18)
CALCIUM SERPL-MCNC: 7.9 MG/DL (ref 8.5–10.1)
CO2 SERPL-SCNC: 26 MMOL/L (ref 21–32)
CREAT SERPL-MCNC: 1.54 MG/DL (ref 0.6–1.4)
EOS ABS #: 0.24 K/UL (ref 0–0.5)
EOSINOPHIL NFR BLD AUTO: 216 K/UL (ref 130–400)
GLUCOSE SERPL-MCNC: 149 MG/DL (ref 70–99)
HCT VFR BLD CALC: 25.1 % (ref 42–52)
HGB BLD-MCNC: 8.2 G/DL (ref 14–18)
IG#: 0.04 K/UL (ref 0–0.02)
IMM GRANULOCYTES NFR BLD AUTO: 14.2 %
INR PPP: 1.2 (ref 0.9–1.1)
LYMPHOCYTES # BLD: 0.92 K/UL (ref 1.2–3.4)
MCH RBC QN AUTO: 29.9 PG (ref 25–34)
MCHC RBC AUTO-ENTMCNC: 32.7 G/DL (ref 32–36)
MCV RBC AUTO: 91.6 FL (ref 80–100)
MONO ABS #: 0.6 K/UL (ref 0.11–0.59)
MONOCYTES NFR BLD: 9.2 %
NEUT ABS #: 4.68 K/UL (ref 1.4–6.5)
NEUTROPHILS # BLD AUTO: 3.7 %
NEUTROPHILS NFR BLD AUTO: 72.1 %
PMV BLD AUTO: 9 FL (ref 7.4–10.4)
POTASSIUM SERPL-SCNC: 5.1 MMOL/L (ref 3.5–5.1)
PROT SERPL-MCNC: 6.3 GM/DL (ref 6.4–8.2)
PTT PATIENT: 28.9 SECONDS (ref 21–31)
RED CELL DISTRIBUTION WIDTH CV: 15.2 % (ref 11.5–14.5)
RED CELL DISTRIBUTION WIDTH SD: 50.8 FL (ref 36.4–46.3)
SODIUM SERPL-SCNC: 138 MMOL/L (ref 136–145)
WBC # BLD AUTO: 6.49 K/UL (ref 4.8–10.8)

## 2018-02-02 RX ADMIN — DIVALPROEX SODIUM SCH MG: 500 TABLET, DELAYED RELEASE ORAL at 22:12

## 2018-02-02 RX ADMIN — INSULIN ASPART SCH UNITS: 100 INJECTION, SOLUTION INTRAVENOUS; SUBCUTANEOUS at 22:16

## 2018-02-02 RX ADMIN — TAMSULOSIN HYDROCHLORIDE SCH MG: 0.4 CAPSULE ORAL at 22:11

## 2018-02-02 RX ADMIN — OXYCODONE HYDROCHLORIDE PRN MG: 5 TABLET ORAL at 22:21

## 2018-02-02 RX ADMIN — METOPROLOL SUCCINATE SCH MG: 50 TABLET, EXTENDED RELEASE ORAL at 22:14

## 2018-02-02 RX ADMIN — GABAPENTIN SCH MG: 300 CAPSULE ORAL at 22:11

## 2018-02-02 RX ADMIN — INSULIN GLARGINE SCH UNITS: 100 INJECTION, SOLUTION SUBCUTANEOUS at 22:17

## 2018-02-02 RX ADMIN — WARFARIN SODIUM SCH MG: 7.5 TABLET ORAL at 22:10

## 2018-02-02 RX ADMIN — MIRTAZAPINE SCH MG: 15 TABLET, FILM COATED ORAL at 22:13

## 2018-02-02 NOTE — HISTORY AND PHYSICAL
History & Physical


Date & Time of Service:


Feb 2, 2018 at 19:04


Chief Complaint:


SOB


Primary Care Physician:


Claude Rowley M.D.


History of Present Illness


Source:  patient, clinic records, hospital records


Patient is a 61 y/o male with a history of CAD, h/o MI s/p stents, HTN, HLD, 

diastolic CHF, COPD, DM II, anxiety, depression, bipolar disorder, anemia and 

GERD, who presented to ED because of worsening SOB x1 day. Patient is now 

requiring 2L O2 supplement, he is normally on 4 L O2. He was most recently 

admitted to [facility] on 1/16-1/23 due to CHF exacerbation. He states he has been 

taking all of his medications as prescribed. He states he is following a low 

sodium diet. He denies any other complaints. He is up roughly 10 kg since 

discharge day on 1/23. Patient denies any fever, chills, sweats, lightheadedness

, dizziness, vision changes, CP, palpitations, edema, wheezing, cough, 

abdominal pain, nausea, vomiting, diarrhea, urinary symptoms, melena, numbness/

tingling, weakness, muscle/joint pain, anxiety/depression, active bleeding, or 

new skin discoloration/changes.





Past Medical/Surgical History


Medical Problems:


1. CAD S/P NSTEMI - L Circ Stent - ACS 20190 - Recent MI in October 2017


2. Grade II Diastolic CHF


3. T2DM


4. Bipolar Disease


5. COPD


6. Chronic Diabetic Foot Ulcer


7. JANE


8. GERD


9. PAD - B/L Femoral Stents


10. Chronic Anemia - Likely Anemia of Chronic Disease


11. CKD Stage III


12. Atrial Thrombus 


13. suspected acute gout





Family History





Cancer (esophageal)


Diabetes mellitus


Heart disease


Hypertension





Social History


Smoking Status:  Former Smoker


Drug Use:  none


Marital Status:  


Housing status:  lives with family, other


Occupational Status:  disabled





Immunizations


History of Influenza Vaccine:  No


Influenza Vaccine Date:  Oct 5, 2009


History of Tetanus Vaccine?:  No


Tetanus Immunization Date:  Mar 1, 2004


History of Pneumococcal:  No


Pneumococcal Date:  Oct 15, 2006


History of Hepatitis B Vaccine:  No





Multi-Drug Resistant Organisms


History of MDRO:  Yes


Type of MDRO:  MRSA





Allergies


Coded Allergies:  


     No Known Allergies (Verified , 2/2/18)





Home Medications


Scheduled


Amlodipine Besylate (Amlodipine Besylate), 10 MG PO QAM


Atorvastatin (Lipitor), 80 MG PO DAILY


Clopidogrel Bisulfate (Clopidogrel), 75 MG PO QAM


Cyanocobalamin (Vitamin B12), 1,000 MCG PO DAILY


Divalproex Sodium (Depakote Delay Rel), 2,000 MG PO HS


Escitalopram Oxalate (Escitalopram Oxalate), 20 MG PO QAM


Ferrous Sulfate (Ferrous Sulfate), 325 MG PO BIDM


Furosemide (Lasix), 20 MG PO DAILY


Gabapentin (Neurontin), 300 MG PO HS


Hydralazine Hcl (Apresoline), 25 MG PO QID


Insulin Glargine (Lantus Solostar), 10 UNITS SC HS


Insulin Lispro (Human) (Humalog Kwikpen), 1 DOSE SC AC


Isosorbide Mononitrate Ext Rel (Imdur Ext Rel), 30 MG PO QAM


Metoprolol Succinate (Metoprolol Succinate ER), 50 MG PO BID


Mirtazapine (Mirtazapine), 45 MG PO HS


Spironolactone (Aldactone), 50 MG PO BID


Tamsulosin HCl (Tamsulosin HCl), 0.4 MG PO HS


Tiotropium Bromide (Spiriva Handihaler), 1 CAP INH DAILY


Warfarin Sodium (Warfarin Sodium), 7.5 MG PO 5XWK


Warfarin Sodium (Warfarin Sodium), 10 MG PO 2XWK





Scheduled PRN


Acetaminophen (Tylenol), 500 MG PO Q4-6HRS PRN for Pain


Albuterol Sulfate (Proair Respiclick), 1-2 PUFFS INH Q4-6HRS PRN for SOB/

Wheezing


Clonazepam (Clonazepam), 1 MG PO HS PRN for Anxiety


Docusate Sodium (Docusate Sodium), 100 MG PO BID PRN for Constipation


Fluticasone Propionate (Nasal) (Flonase Allergy Relief), 2 SPRAYS VERONICA DAILY PRN 

for Nasal Congestion


Nitroglycerin (Nitrostat), 0.4 MG UT UD PRN for Chest Pain


Oxycodone HCl (Oxycodone HCl), 5 MG PO Q6H PRN for Breakthrough Pain


Oxycodone Hcl (Oxycodone Hcl), 10 MG PO Q12 PRN for Severe Pain





Physical Exam


Vital Signs











  Date Time  Temp Pulse Resp B/P (MAP) Pulse Ox O2 Delivery O2 Flow Rate FiO2


 


2/2/18 17:24 36.4 59 18 155/73 97 Nasal Cannula 4.0 


 


2/2/18 17:24     97 Nasal Cannula 4.0 


 


2/2/18 17:22     97 Nasal Cannula 4.0 


 


2/2/18 17:22     97 Nasal Cannula 4.0 


 


2/2/18 17:21  57      








General Appearance:  no apparent distress, + obese, + pertinent finding (O2 NC)


Head:  normocephalic, atraumatic


Eyes:  normal inspection, PERRL


ENT:  hearing grossly normal


Neck:  supple


Respiratory/Chest:  no respiratory distress, no accessory muscle use, + 

decreased breath sounds, + crackles (bilateral lung lobes )


Cardiovascular:  + bradycardia (rhythm regular ), + systolic murmur


Abdomen/GI:  normal bowel sounds, non tender, soft


Back:  normal inspection


Extremities/Musculoskelatal:  no calf tenderness, + swelling (+2 pitting edema 

of bilateral lower extremities), + pertinent finding (L lateral foot wound in 

dressing )


Neurologic/Psych:  alert, oriented x 3, + pertinent finding (drowsy )


Skin:  warm/dry, no rash, + pallor





Diagnostics


Laboratory Results





Results Past 24 Hours








Test


  2/2/18


17:27 Range/Units


 


 


White Blood Count 6.49 4.8-10.8  K/uL


 


Red Blood Count 2.74 4.7-6.1  M/uL


 


Hemoglobin 8.2 14.0-18.0  g/dL


 


Hematocrit 25.1 42-52  %


 


Mean Corpuscular Volume 91.6   fL


 


Mean Corpuscular Hemoglobin 29.9 25-34  pg


 


Mean Corpuscular Hemoglobin


Concent 32.7


  32-36  g/dl


 


 


Platelet Count 216 130-400  K/uL


 


Mean Platelet Volume 9.0 7.4-10.4  fL


 


Neutrophils (%) (Auto) 72.1  %


 


Lymphocytes (%) (Auto) 14.2  %


 


Monocytes (%) (Auto) 9.2  %


 


Eosinophils (%) (Auto) 3.7  %


 


Basophils (%) (Auto) 0.2  %


 


Neutrophils # (Auto) 4.68 1.4-6.5  K/uL


 


Lymphocytes # (Auto) 0.92 1.2-3.4  K/uL


 


Monocytes # (Auto) 0.60 0.11-0.59  K/uL


 


Eosinophils # (Auto) 0.24 0-0.5  K/uL


 


Basophils # (Auto) 0.01 0-0.2  K/uL


 


RDW Standard Deviation 50.8 36.4-46.3  fL


 


RDW Coefficient of Variation 15.2 11.5-14.5  %


 


Immature Granulocyte % (Auto) 0.6  %


 


Immature Granulocyte # (Auto) 0.04 0.00-0.02  K/uL


 


Ovalocytes 1+  


 


Prothrombin Time


  12.9


  9.0-12.0


SECONDS


 


Prothromb Time International


Ratio 1.2


  0.9-1.1  


 


 


Activated Partial


Thromboplast Time 28.9


  21.0-31.0


SECONDS


 


Partial Thromboplastin Ratio 1.1  


 


Sodium Level 138 136-145  mmol/L


 


Potassium Level 5.1 3.5-5.1  mmol/L


 


Chloride Level 109   mmol/L


 


Carbon Dioxide Level 26 21-32  mmol/L


 


Anion Gap 3.0 3-11  mmol/L


 


Blood Urea Nitrogen 45 7-18  mg/dl


 


Creatinine


  1.54


  0.60-1.40


mg/dl


 


Est Creatinine Clear Calc


Drug Dose 61.9


   ml/min


 


 


Estimated GFR (


American) 56.0


   


 


 


Estimated GFR (Non-


American 48.3


   


 


 


BUN/Creatinine Ratio 29.4 10-20  


 


Random Glucose 149 70-99  mg/dl


 


Calcium Level 7.9 8.5-10.1  mg/dl


 


Magnesium Level 2.2 1.8-2.4  mg/dl


 


Total Bilirubin 0.4 0.2-1  mg/dl


 


Aspartate Amino Transf


(AST/SGOT) 10


  15-37  U/L


 


 


Alanine Aminotransferase


(ALT/SGPT) 16


  12-78  U/L


 


 


Alkaline Phosphatase 61   U/L


 


Troponin I 0.017 0-0.045  ng/ml


 


Pro-B-Type Natriuretic Peptide 87603 0-900  pg/ml


 


Total Protein 6.3 6.4-8.2  gm/dl


 


Albumin 2.5 3.4-5.0  gm/dl


 


Globulin 3.8 2.5-4.0  gm/dl


 


Albumin/Globulin Ratio 0.7 0.9-2  


 


Valproic Acid (Depakene) Level 44   mcg/ml











Diagnostic Radiology


SINGLE VIEW CHEST





CLINICAL HISTORY:  Dyspnea.





FINDINGS: An AP, portable, upright chest radiograph is compared to study dated


1/19/2018. The examination is degraded by portable technique and patient


rotation.  The heart is enlarged and there is atherosclerotic calcification of


the thoracic aorta. There is pulmonary vascular congestion and interstitial


edema. Small pleural effusions are identified with bibasilar consolidation. No


pneumothorax is seen. The skeletal structures are osteopenic. The bony thorax is


grossly intact.





IMPRESSION:





1. Cardiomegaly with pulmonary vascular congestion and interstitial edema. This


has worsened from 1/19/2018.





2. Small pleural effusions with bibasilar consolidation.











Electronically signed by:  Heber Hubbard M.D.


2/2/2018 5:34 PM





Dictated Date/Time:  2/2/2018 5:33 PM





 The status of this report is Signed.   


 Draft = Not yet reviewed or approved by Radiologist.  


 Signed = Reviewed and approved by Radiologist.





EKG


MAC SPANGLER ID:[id number] 02-FEB-2018 17:02:02 [facility]


Sinus bradycardia


Otherwise normal ECG


When compared with ECG of 16-JAN-2018 03:44,


Aberrant conduction is no longer Present


Confirmed by Kocher, Christopher ([id number]) on 2/2/2018 6:39:12 PM


25mm/s 10mm/mV 150Hz 8.0 SP2 12SL 241 JEREMIAH: 3


Referred by: Referred Self Confirmed By: Christopher Kocher


Vent. rate 58 BPM


TN interval 156 ms


QRS duration 106 ms


QT/QTc 482/473 ms


P-R-T axes 36 33 70


1955 (62 yr)


Male 


76in 1lb


Room:C08


Loc:15


Technician:Josh Sanders ind:





Impression


Assessment and Plan


Patient is a 61 y/o male with a history of CAD, h/o MI s/p stents, HTN, HLD, 

diastolic CHF, COPD, DM II, anxiety, depression, bipolar disorder, anemia and 

GERD, who presented to ED because of worsening SOB x1 day





Acute on chronic diastolic CHF exacerbation:


- Admit to tele for cardiac monitoring


- Trend cardiac enzymes


- EKG QAM and PRN for chest pain


- O2 protocol- normally wears 2L O2 supplement


- IV Lasix 40 mg x1 in ED; IV Lasix 20 mg BID- hold home Lasix 20 mg daily  


- Monitor I&Os and daily weights 


- Low sodium, 1800 mL fluid restrict 


- ECHO 12/23/17 w/ preserved EF and grade II diastolic CHF 


- Consult cardiology, appreciate recommendations 





Anemia of chronic disease- STABLE: Follow CBC 





CKD stage III- baseline crt 1.6- STABLE 





b/l chronic diabetic wounds: Following w/ wound care- wound care consulted 





CAD w/ cardiac stenting, HTN, HLD- STABLE: Plavix 75 mg daily, Norvasc 10 mg 

daily, Hydralazine 25 mg QID, Imdur 30 mg BID, and Toprol XL 50 mg BID, Lipitor 

80 mg daily





T2DM w/ neuropathy:


- Continue Lantus 10 u HS 


- BSG ACHS and ISS


- Continue Gabapentin  





Chronic atrial thrombus: Continue Coumadin, follow PT/INR and adjust dose PRN 

for INR goal 2-3 





COPD without exacerbation: Continue home inhalers 





Bipolar disorder, anxiety, depression- STABLE: Depakote 2 g daily, Lexapro 20 

mg daily, Remeron 45 mg daily, and Klonopin





GI prophylaxis: Protonix daily 





DVT Prophylaxis: Coumadin





Code status: LEVEL I, FULL





Dispo: From home, has [facility]- PT/OT and CM consulted





Level of Care


Telemetry





Resuscitation Status


FULL RESUSCITATION





VTE Prophylaxis


VTE Risk Assessment Done? Y/N:  Yes


Risk Level:  Moderate


Given or contraindicated:  Warfarin (Coumadin)





Note


Attending Attestation & Admission Note -


Pt seen/examined, chart reviewed, care plan d/w JORDI Maradiaga.


I agree w/ the key components of her admission documentation. 





63yo male with chronic diastolic CHF, COPD, chronic LE wounds, and recent 

hospital stay for acute/chronic CHF - well known to me from that hospital stay 

and previous stays - presenting with worsening dyspnea and LE edema.  Clinical 

picture most c/w acute/chronic diastolic CHF.  Pt reports compliance with 

medications and diet.  


He states his weight had been fluctuating between 210-215 pounds at home and 

only in the last 24 hours did his breathing worsen.  He was using NC O2 only at 

night (as prescribed after last hospital stay) and only in the last 24 hours 

did he use it during the day.  


Outpatient records reviewed - saw internal medicine on 1/26 - weight documented 

as 212 pounds.  Was supposed to have appt with Dr. Yusuf on 1/30 - this was 

canceled.


He also canceled appointment with the eye doctor and coumadin clinic.  Has not 

had an INR check since leaving the hospital when it was 4.  





PMH, PSH, allergies, meds, sochx, famhx, ros - reviewed 





vitals stable but BPs high


requiring NC O2 but sats stable


weight 220 pounds


gen - nad


neck - no obvious JVD


heart - irregular, extra beats, 2/6 systolic murmur RUSB


lungs - bibasilar rales


abd - probable ascites


ext - 2-3+ edema b/l 


skin - left lateral ankle ulcer clean, no drainage or odor, no cellulitis 


linear scars/incisions b/l shins - unchanged from prior admission





INR 1.2


depakote level <50


BMP acceptable





A/P:


acute/chronic diastolic CHF - discharge weight on day of discharge from last 

hospital stay was 90 kg.  


He is 95kg today.


He is up at least 10 pounds from hospital d/c.   


Suspect noncompliance with diet/meds; unsure if he is really checking weights 

daily at home either.  


His INR is nearly normal - is he also noncompliant with coumadin?





Diurese with IV lasix. 


Resume coumadin per outpatient regimen. 


Cardiology consult for any other recommendations.  


Check dopplers to r/o DVT given his subtherapeutic INR. 





Check TSH and u/a to exclude other causes of worsening fluid status (

hypothyroidism, nephrotic syndrome, etc).  





Stu Youngblood MD

## 2018-02-02 NOTE — DIAGNOSTIC IMAGING REPORT
SINGLE VIEW CHEST



CLINICAL HISTORY:  Dyspnea.



FINDINGS: An AP, portable, upright chest radiograph is compared to study dated

1/19/2018. The examination is degraded by portable technique and patient

rotation.  The heart is enlarged and there is atherosclerotic calcification of

the thoracic aorta. There is pulmonary vascular congestion and interstitial

edema. Small pleural effusions are identified with bibasilar consolidation. No

pneumothorax is seen. The skeletal structures are osteopenic. The bony thorax is

grossly intact.



IMPRESSION:



1. Cardiomegaly with pulmonary vascular congestion and interstitial edema. This

has worsened from 1/19/2018.



2. Small pleural effusions with bibasilar consolidation.







Electronically signed by:  Heber Hubbard M.D.

2/2/2018 5:34 PM



Dictated Date/Time:  2/2/2018 5:33 PM

## 2018-02-02 NOTE — EMERGENCY ROOM VISIT NOTE
History


Report prepared by Safia:  Gordon Bourgeois


Under the Supervision of:  Dr. Heber Mendez M.D.


First contact with patient:  17:06


Stated Complaint:  SOB





History of Present Illness


The patient is a 62 year old male who presents to the Emergency Room with 

complaints of worsening difficulty breathing that he first noticed this morning 

when he woke up, several hours ago. The patient states that "I couldn't breath 

with my oxygen this morning." He usually wears 2 liters of oxygen at home. He 

bumped his O2 to 4 liters this morning which did not seem to help. The 

shortness of breath is present when he is at rest and he notes that he gets out 

of breath just trying to walk to the restroom. He uses a walker to get around 

at baseline. He has been coughing slightly, but denies any fevers. His baseline 

difficulty breathing is due to fluid build up. He received 3 nitroglycerin 

tablets via EMS, which he states improved his symptoms. He is not having any 

chest pain. He is on Coumadin, as well as Depakote for Bipolar Disorder. He was 

admitted to the hospital on the 16th of January for shortness of breath and 

discharged on the 23rd.





   Source of History:  patient


   Onset:  Several hours PTA


   Position:  other (Respiratory)


   Quality:  other (SOB)


   Timing:  worsening


   Associated Symptoms:  + cough, No chest pain





Review of Systems


See HPI for pertinent positives & negatives. A total of 10 systems reviewed and 

were otherwise negative.





Past Medical & Surgical


Medical Problems:


(1) Accelerated hypertension


(2) Acute appendicitis with appendiceal abscess


(3) Acute heart failure


(4) Acute renal insufficiency


(5) Acute respiratory failure


(6) Angina pectoris


(7) Bipolar disorder


(8) CAD (coronary artery disease)


(9) Cardiac arrest


(10) Cellulitis


(11) CHF exacerbation


(12) CHF exacerbation


(13) Dehydration


(14) Diabetes


(15) Diastolic CHF, chronic


(16) Elevated INR


(17) Enterocolitis


(18) HCAP (healthcare-associated pneumonia)


(19) Headache


(20) Heel ulcer


(21) Hypertension


(22) Ischemic cardiomyopathy


(23) LVH (left ventricular hypertrophy)


(24) Osteomyelitis of left foot


(25) PAD (peripheral artery disease)


(26) Precordial chest pain


(27) Respiratory distress


(28) Sepsis, unspecified organism


(29) Volume overload








Family History





Cancer (esophageal)


Diabetes mellitus


Heart disease


Hypertension





Social History


Smoking Status:  Former Smoker


Drug Use:  none


Marital Status:  


Housing Status:  lives with family


Occupation Status:  disabled





Current/Historical Medications


Scheduled


Amlodipine Besylate (Amlodipine Besylate), 10 MG PO QAM


Atorvastatin (Lipitor), 80 MG PO DAILY


Clopidogrel Bisulfate (Clopidogrel), 75 MG PO QAM


Cyanocobalamin (Vitamin B12), 1,000 MCG PO DAILY


Divalproex Sodium (Depakote Delay Rel), 2,000 MG PO HS


Escitalopram Oxalate (Escitalopram Oxalate), 20 MG PO QAM


Ferrous Sulfate (Ferrous Sulfate), 325 MG PO BIDM


Furosemide (Lasix), 20 MG PO DAILY


Gabapentin (Neurontin), 300 MG PO HS


Hydralazine Hcl (Apresoline), 25 MG PO QID


Insulin Glargine (Lantus Solostar), 10 UNITS SC HS


Insulin Lispro (Human) (Humalog Kwikpen), 1 DOSE SC AC


Isosorbide Mononitrate Ext Rel (Imdur Ext Rel), 30 MG PO QAM


Metoprolol Succinate (Metoprolol Succinate ER), 50 MG PO BID


Mirtazapine (Mirtazapine), 45 MG PO HS


Spironolactone (Aldactone), 50 MG PO BID


Tamsulosin HCl (Tamsulosin HCl), 0.4 MG PO HS


Tiotropium Bromide (Spiriva Handihaler), 1 CAP INH DAILY


Warfarin Sodium (Warfarin Sodium), 7.5 MG PO 5XWK


Warfarin Sodium (Warfarin Sodium), 10 MG PO 2XWK





Scheduled PRN


Acetaminophen (Tylenol), 500 MG PO Q4-6HRS PRN for Pain


Albuterol Sulfate (Proair Respiclick), 1-2 PUFFS INH Q4-6HRS PRN for SOB/

Wheezing


Clonazepam (Clonazepam), 1 MG PO HS PRN for Anxiety


Docusate Sodium (Docusate Sodium), 100 MG PO BID PRN for Constipation


Fluticasone Propionate (Nasal) (Flonase Allergy Relief), 2 SPRAYS VERONICA DAILY PRN 

for Nasal Congestion


Nitroglycerin (Nitrostat), 0.4 MG UT UD PRN for Chest Pain


Oxycodone HCl (Oxycodone HCl), 5 MG PO Q6H PRN for Breakthrough Pain


Oxycodone Hcl (Oxycodone Hcl), 10 MG PO Q12 PRN for Severe Pain





Allergies


Coded Allergies:  


     No Known Allergies (Verified , 2/2/18)





Physical Exam


Vital Signs











  Date Time  Temp Pulse Resp B/P (MAP) Pulse Ox O2 Delivery O2 Flow Rate FiO2


 


2/2/18 17:24 36.4 59 18 155/73 97 Nasal Cannula 4.0 


 


2/2/18 17:24     97 Nasal Cannula 4.0 


 


2/2/18 17:22     97 Nasal Cannula 4.0 


 


2/2/18 17:22     97 Nasal Cannula 4.0 


 


2/2/18 17:21  57      











Physical Exam


GENERAL: Patient is in no acute distress.


HEENT: No acute trauma, normocephalic atraumatic, mucous membranes moist, no 

nasal congestion, no scleral icterus.


NECK: No stridor, no adenopathy, no meningismus, trachea is midline.


LUNGS: There is decreased breath sounds bilaterally, with scattered crackles 

bilaterally. There is no wheezing and breath sounds are equal. 


HEART: There is a 2/6 systolic murmur. Regular rate and rhythm. 


ABDOMEN: Soft, nontender, bowel sounds positive, no hernias, no peritonitis.


EXTREMITIES: No cyanosis. Moderate bilateral pedal edema. Full range of motion 

of all the joints without pain or difficulty, no signs for acute trauma.


NEUROLOGIC: Oriented x 3, no acute motor or sensory deficits, no focal weakness.


SKIN: No rash, no jaundice, no diaphoresis. Pale.





Medical Decision & Procedures


ER Provider


Diagnostic Interpretation:


.rad








SINGLE VIEW CHEST





CLINICAL HISTORY:  Dyspnea.





FINDINGS: An AP, portable, upright chest radiograph is compared to study dated


1/19/2018. The examination is degraded by portable technique and patient


rotation.  The heart is enlarged and there is atherosclerotic calcification of


the thoracic aorta. There is pulmonary vascular congestion and interstitial


edema. Small pleural effusions are identified with bibasilar consolidation. No


pneumothorax is seen. The skeletal structures are osteopenic. The bony thorax is


grossly intact.





IMPRESSION:





1. Cardiomegaly with pulmonary vascular congestion and interstitial edema. This


has worsened from 1/19/2018.





2. Small pleural effusions with bibasilar consolidation.











Electronically signed by:  Heber Hubbard M.D.


2/2/2018 5:34 PM





Dictated Date/Time:  2/2/2018 5:33 PM





Laboratory Results


2/2/18 17:27








Red Blood Count 2.74, Mean Corpuscular Volume 91.6, Mean Corpuscular Hemoglobin 

29.9, Mean Corpuscular Hemoglobin Concent 32.7, Mean Platelet Volume 9.0, 

Neutrophils (%) (Auto) 72.1, Lymphocytes (%) (Auto) 14.2, Monocytes (%) (Auto) 

9.2, Eosinophils (%) (Auto) 3.7, Basophils (%) (Auto) 0.2, Neutrophils # (Auto) 

4.68, Lymphocytes # (Auto) 0.92, Monocytes # (Auto) 0.60, Eosinophils # (Auto) 

0.24, Basophils # (Auto) 0.01





2/2/18 17:27

















Test


  2/2/18


17:27


 


White Blood Count


  6.49 K/uL


(4.8-10.8)


 


Red Blood Count


  2.74 M/uL


(4.7-6.1)


 


Hemoglobin


  8.2 g/dL


(14.0-18.0)


 


Hematocrit 25.1 % (42-52) 


 


Mean Corpuscular Volume


  91.6 fL


()


 


Mean Corpuscular Hemoglobin


  29.9 pg


(25-34)


 


Mean Corpuscular Hemoglobin


Concent 32.7 g/dl


(32-36)


 


Platelet Count


  216 K/uL


(130-400)


 


Mean Platelet Volume


  9.0 fL


(7.4-10.4)


 


Neutrophils (%) (Auto) 72.1 % 


 


Lymphocytes (%) (Auto) 14.2 % 


 


Monocytes (%) (Auto) 9.2 % 


 


Eosinophils (%) (Auto) 3.7 % 


 


Basophils (%) (Auto) 0.2 % 


 


Neutrophils # (Auto)


  4.68 K/uL


(1.4-6.5)


 


Lymphocytes # (Auto)


  0.92 K/uL


(1.2-3.4)


 


Monocytes # (Auto)


  0.60 K/uL


(0.11-0.59)


 


Eosinophils # (Auto)


  0.24 K/uL


(0-0.5)


 


Basophils # (Auto)


  0.01 K/uL


(0-0.2)


 


RDW Standard Deviation


  50.8 fL


(36.4-46.3)


 


RDW Coefficient of Variation


  15.2 %


(11.5-14.5)


 


Immature Granulocyte % (Auto) 0.6 % 


 


Immature Granulocyte # (Auto)


  0.04 K/uL


(0.00-0.02)


 


Ovalocytes 1+ 


 


Prothrombin Time


  12.9 SECONDS


(9.0-12.0)


 


Prothromb Time International


Ratio 1.2 (0.9-1.1) 


 


 


Activated Partial


Thromboplast Time 28.9 SECONDS


(21.0-31.0)


 


Partial Thromboplastin Ratio 1.1 


 


Anion Gap


  3.0 mmol/L


(3-11)


 


Est Creatinine Clear Calc


Drug Dose 61.9 ml/min 


 


 


Estimated GFR (


American) 56.0 


 


 


Estimated GFR (Non-


American 48.3 


 


 


BUN/Creatinine Ratio 29.4 (10-20) 


 


Calcium Level


  7.9 mg/dl


(8.5-10.1)


 


Magnesium Level


  2.2 mg/dl


(1.8-2.4)


 


Total Bilirubin


  0.4 mg/dl


(0.2-1)


 


Aspartate Amino Transf


(AST/SGOT) 10 U/L (15-37) 


 


 


Alanine Aminotransferase


(ALT/SGPT) 16 U/L (12-78) 


 


 


Alkaline Phosphatase


  61 U/L


()


 


Troponin I


  0.017 ng/ml


(0-0.045)


 


Pro-B-Type Natriuretic Peptide


  32439 pg/ml


(0-900)


 


Total Protein


  6.3 gm/dl


(6.4-8.2)


 


Albumin


  2.5 gm/dl


(3.4-5.0)


 


Globulin


  3.8 gm/dl


(2.5-4.0)


 


Albumin/Globulin Ratio 0.7 (0.9-2) 


 


Valproic Acid (Depakene) Level


  44 mcg/ml


()





Laboratory results reviewed by me.





Medications Administered











 Medications


  (Trade)  Dose


 Ordered  Sig/Dc


 Route  Start Time


 Stop Time Status Last Admin


Dose Admin


 


 Furosemide


  (Lasix Inj)  40 mg  NOW  STAT


 IV  2/2/18 17:54


 2/2/18 17:56 DC 2/2/18 18:11


40 MG


 


 Nitroglycerin


  (Nitroglycerin


 2% Oint)  18 inch  STK-MED ONCE


 EXT  2/2/18 18:08


 2/2/18 18:09 DC 2/2/18 18:10


18 INCH











ECG


Indication:  SOB/dyspnea


Rate (beats per minute):  58


Rhythm:  sinus bradycardia


Findings:  no acute ischemic change, no ectopy


Change:


Patient's EKG per my interpretation.





ED Course


1708: The patient was evaluated in room C8. A complete history and physical 

exam was performed.





1754: Ordered Lasix 40 mg IV, Nitroglycerin 2 inch EXT. 





1835: I reevaluated the patient at this time. He is agreeable to an inpatient 

stay in the hospital. 





1838: I discussed the case with Dr. Rylie Saleh. He will evaluate the 

patient for further treatment.





Medical Decision


Differential Diagnosis includes; anemia, fluid overload, CHF, bronchitis, 

pneumonia, renal failure, myocardial infarction, cardiac ischemia. 





There is no leukocytosis.  The patient is anemic-his hemoglobin has dropped 

about 1 point.  No significant electrolyte abnormality or kidney failure.  No 

hepatitis.  INR is subtherapeutic for someone using Coumadin.  EKG shows sinus 

bradycardia, no acute ischemia.  Cardiac enzyme testing times one is not 

consistent with acute cardiac injury.  Chest x-ray does show CHF, BNP is 

elevated consistent with fluid overload.  Valproic acid level is not toxic.





The patient presents with increasing dyspnea, he has a history of heart failure 

and appears to be in heart failure today.  Given the increased oxygen 

requirement, given his increasing anemia, given the fluid overload and CHF, 

admission/observation is warranted.  I spoke to the patient and case 

management.  The on-call hospitalist was consulted.





The patient was given Nitropaste, he received IV Lasix while in the ER.





Medication Reconcilliation


Current Medication List:  was personally reviewed by me





Blood Pressure Screening


Patient's blood pressure:  Elevated blood pressure


Referred to hospitalist





Consults


Time Called:  1834


Consulting Physician:  Dr. Rylie Saleh


Returned Call:  1838


I discussed the case with Dr. Rylie Saleh. He will evaluate the patient 

for further treatment.





Impression





 Primary Impression:  


 SOB (shortness of breath)


 Additional Impressions:  


 CHF (congestive heart failure)


 Anemia





Scribe Attestation


The scribe's documentation has been prepared under my direction and personally 

reviewed by me in its entirety. I confirm that the note above accurately 

reflects all work, treatment, procedures, and medical decision making performed 

by me.





Departure Information


Dispostion


Being Evaluated By Hospitalist





Referrals


Claude Rowley M.D. (PCP)





Problem Qualifiers

## 2018-02-03 VITALS
OXYGEN SATURATION: 93 % | DIASTOLIC BLOOD PRESSURE: 67 MMHG | TEMPERATURE: 97.7 F | SYSTOLIC BLOOD PRESSURE: 159 MMHG | HEART RATE: 60 BPM

## 2018-02-03 VITALS
HEART RATE: 53 BPM | OXYGEN SATURATION: 94 % | DIASTOLIC BLOOD PRESSURE: 74 MMHG | TEMPERATURE: 97.7 F | SYSTOLIC BLOOD PRESSURE: 168 MMHG

## 2018-02-03 VITALS
SYSTOLIC BLOOD PRESSURE: 135 MMHG | OXYGEN SATURATION: 91 % | HEART RATE: 51 BPM | TEMPERATURE: 97.7 F | DIASTOLIC BLOOD PRESSURE: 65 MMHG

## 2018-02-03 VITALS — OXYGEN SATURATION: 91 % | TEMPERATURE: 98.06 F | SYSTOLIC BLOOD PRESSURE: 136 MMHG | DIASTOLIC BLOOD PRESSURE: 60 MMHG

## 2018-02-03 VITALS
SYSTOLIC BLOOD PRESSURE: 165 MMHG | TEMPERATURE: 98.96 F | HEART RATE: 62 BPM | OXYGEN SATURATION: 93 % | DIASTOLIC BLOOD PRESSURE: 83 MMHG

## 2018-02-03 VITALS — DIASTOLIC BLOOD PRESSURE: 62 MMHG | TEMPERATURE: 98.06 F | OXYGEN SATURATION: 93 % | SYSTOLIC BLOOD PRESSURE: 145 MMHG

## 2018-02-03 LAB
BUN SERPL-MCNC: 45 MG/DL (ref 7–18)
CALCIUM SERPL-MCNC: 7.7 MG/DL (ref 8.5–10.1)
CK MB SERPL-MCNC: 4.5 NG/ML (ref 0.5–3.6)
CK MB SERPL-MCNC: 6.4 NG/ML (ref 0.5–3.6)
CO2 SERPL-SCNC: 27 MMOL/L (ref 21–32)
CREAT SERPL-MCNC: 1.59 MG/DL (ref 0.6–1.4)
EOSINOPHIL NFR BLD AUTO: 227 K/UL (ref 130–400)
GLUCOSE SERPL-MCNC: 159 MG/DL (ref 70–99)
HCT VFR BLD CALC: 24.8 % (ref 42–52)
HGB BLD-MCNC: 8.1 G/DL (ref 14–18)
INR PPP: 1.4 (ref 0.9–1.1)
MCH RBC QN AUTO: 29.9 PG (ref 25–34)
MCHC RBC AUTO-ENTMCNC: 32.7 G/DL (ref 32–36)
MCV RBC AUTO: 91.5 FL (ref 80–100)
PMV BLD AUTO: 9.1 FL (ref 7.4–10.4)
POTASSIUM SERPL-SCNC: 5.1 MMOL/L (ref 3.5–5.1)
RED CELL DISTRIBUTION WIDTH CV: 15 % (ref 11.5–14.5)
RED CELL DISTRIBUTION WIDTH SD: 50 FL (ref 36.4–46.3)
SODIUM SERPL-SCNC: 141 MMOL/L (ref 136–145)
WBC # BLD AUTO: 6.51 K/UL (ref 4.8–10.8)

## 2018-02-03 RX ADMIN — DIVALPROEX SODIUM SCH MG: 500 TABLET, DELAYED RELEASE ORAL at 20:51

## 2018-02-03 RX ADMIN — INSULIN ASPART SCH UNITS: 100 INJECTION, SOLUTION INTRAVENOUS; SUBCUTANEOUS at 20:52

## 2018-02-03 RX ADMIN — ISOSORBIDE MONONITRATE SCH MG: 30 TABLET, EXTENDED RELEASE ORAL at 07:59

## 2018-02-03 RX ADMIN — Medication SCH MCG: at 08:01

## 2018-02-03 RX ADMIN — INSULIN ASPART SCH UNITS: 100 INJECTION, SOLUTION INTRAVENOUS; SUBCUTANEOUS at 17:01

## 2018-02-03 RX ADMIN — INSULIN ASPART SCH UNITS: 100 INJECTION, SOLUTION INTRAVENOUS; SUBCUTANEOUS at 08:09

## 2018-02-03 RX ADMIN — FERROUS SULFATE TAB EC 325 MG (65 MG FE EQUIVALENT) SCH MG: 325 (65 FE) TABLET DELAYED RESPONSE at 16:57

## 2018-02-03 RX ADMIN — FUROSEMIDE SCH MLS/MIN: 10 INJECTION, SOLUTION INTRAMUSCULAR; INTRAVENOUS at 08:06

## 2018-02-03 RX ADMIN — AMLODIPINE BESYLATE SCH MG: 5 TABLET ORAL at 08:01

## 2018-02-03 RX ADMIN — INSULIN GLARGINE SCH UNITS: 100 INJECTION, SOLUTION SUBCUTANEOUS at 20:54

## 2018-02-03 RX ADMIN — TAMSULOSIN HYDROCHLORIDE SCH MG: 0.4 CAPSULE ORAL at 20:51

## 2018-02-03 RX ADMIN — ESCITALOPRAM OXALATE SCH MG: 20 TABLET, FILM COATED ORAL at 07:59

## 2018-02-03 RX ADMIN — INSULIN ASPART SCH UNITS: 100 INJECTION, SOLUTION INTRAVENOUS; SUBCUTANEOUS at 12:46

## 2018-02-03 RX ADMIN — PANTOPRAZOLE SCH MG: 40 TABLET, DELAYED RELEASE ORAL at 08:01

## 2018-02-03 RX ADMIN — ATORVASTATIN CALCIUM SCH MG: 40 TABLET, FILM COATED ORAL at 07:59

## 2018-02-03 RX ADMIN — GABAPENTIN SCH MG: 300 CAPSULE ORAL at 20:50

## 2018-02-03 RX ADMIN — ACETAMINOPHEN PRN MG: 325 TABLET ORAL at 00:07

## 2018-02-03 RX ADMIN — CLOPIDOGREL BISULFATE SCH MG: 75 TABLET, FILM COATED ORAL at 08:01

## 2018-02-03 RX ADMIN — TIOTROPIUM BROMIDE SCH PUFF: 18 CAPSULE ORAL; RESPIRATORY (INHALATION) at 08:06

## 2018-02-03 RX ADMIN — MIRTAZAPINE SCH MG: 15 TABLET, FILM COATED ORAL at 20:50

## 2018-02-03 RX ADMIN — METOPROLOL SUCCINATE SCH MG: 50 TABLET, EXTENDED RELEASE ORAL at 08:00

## 2018-02-03 RX ADMIN — METOPROLOL SUCCINATE SCH MG: 50 TABLET, EXTENDED RELEASE ORAL at 20:51

## 2018-02-03 RX ADMIN — FUROSEMIDE SCH MLS/MIN: 10 INJECTION, SOLUTION INTRAMUSCULAR; INTRAVENOUS at 20:51

## 2018-02-03 RX ADMIN — WARFARIN SCH MG: 10 TABLET ORAL at 16:58

## 2018-02-03 RX ADMIN — FERROUS SULFATE TAB EC 325 MG (65 MG FE EQUIVALENT) SCH MG: 325 (65 FE) TABLET DELAYED RESPONSE at 08:00

## 2018-02-03 NOTE — DIAGNOSTIC IMAGING REPORT
BILATERAL LOWER EXTREMITY VENOUS DOPPLER



CLINICAL HISTORY: Lower extremity edema and pain.    



COMPARISON STUDY:  CT of the abdomen and pelvis November 6, 2017 and bilateral

lower extremity venous Doppler June 26th 2017. 



TECHNIQUE:  Sonography of the deep venous system of the bilateral lower

extremities was performed. Compression and augmentation were evaluated.



FINDINGS:  The lateral common femoral, superficial femoral and popliteal veins

were compressible. Augmentation was normal. Flow was shown within the deep calf

vessels.



Note is made of a prominent left inguinal lymph node that measures 5.4 x 2.1 x

1.1 cm. This is elongated and contains a fatty hilum. This is indeterminate

morphologically benign.



IMPRESSION: No evidence of deep venous thrombus within the bilateral lower

extremities.







Electronically signed by:  Patrick Corbett M.D.

2/3/2018 8:42 AM



Dictated Date/Time:  2/3/2018 8:41 AM

## 2018-02-03 NOTE — PROGRESS NOTE
Subjective


Date of Service:


Feb 3, 2018.


Subjective


Pt evaluation today including:  conversation w/ patient, physical exam


61 yo male reports feeling better today.


He no longer feels SOB.


Patient denies nausea, vomiting, palpitations, nausea, vomiting.





Problem List


Medical Problems:


(1) Acute CHF


Status: Acute  





(2) Acute coronary syndrome


Status: Acute  





(3) Acute headache


Status: Acute  





(4) Acute on chronic congestive heart failure


Status: Acute  





(5) Altered mental status


Status: Acute  





(6) Anemia


Status: Acute  





(7) Anemia


Status: Acute  





(8) Anemia


Status: Acute  





(9) Anginal pain


Status: Acute  





(10) Appendicitis


Status: Acute  





(11) Cellulitis


Status: Acute  





(12) Chest pain


Status: Acute  





(13) CHF (congestive heart failure)


Status: Acute  





(14) CHF (congestive heart failure)


Status: Acute  





(15) CHF (congestive heart failure)


Status: Acute  





(16) Congestive heart failure


Status: Acute  





(17) Congestive heart failure


Status: Acute  





(18) COPD (chronic obstructive pulmonary disease)


Status: Acute  





(19) Diabetes mellitus with hyperglycemia


Status: Acute  





(20) Dyspnea


Status: Acute  





(21) Elevated troponin


Status: Acute  





(22) Failure of outpatient treatment


Status: Acute  





(23) Hypoxia


Status: Acute  





(24) Hypoxia


Status: Acute  





(25) Intra-abdominal abscess


Status: Acute  





(26) Lower abdominal pain of unknown etiology


Status: Acute  





(27) Neck pain


Status: Acute  





(28) Pneumonia


Status: Acute  





(29) Pneumonia


Status: Acute  





(30) Precordial chest pain


Status: Acute  





(31) Pulmonary edema


Status: Acute  





(32) Respiratory acidosis


Status: Acute  





(33) Respiratory distress


Status: Acute  





(34) SOB (shortness of breath)


Status: Acute  





(35) SOB (shortness of breath)


Status: Acute  





(36) Tachypnea


Status: Acute  











Review of Systems


Constitutional:  No fever, No chills


Respiratory:  No cough


Cardiac:  No chest pain


Abdomen:  No pain


Neurologic:  No memory loss


Endo:  No fatigue


Skin:  No rash, No itch


All Other Systems:  Reviewed and Negative





Medications





Current Inpatient Medications








 Medications


  (Trade)  Dose


 Ordered  Sig/Dc


 Route  Start Time


 Stop Time Status Last Admin


Dose Admin


 


 Acetaminophen


  (Tylenol Tab)  650 mg  Q4H  PRN


 PO  2/2/18 19:00


 3/4/18 18:59  2/3/18 00:07


650 MG


 


 Al Hydrox/Mg


 Hydrox/Simethicone


  (Maalox Max Susp)  15 ml  Q4H  PRN


 PO  2/2/18 19:00


 3/4/18 18:59   


 


 


 Magnesium


 Hydroxide


  (Milk Of


 Magnesia Susp)  30 ml  Q12H  PRN


 PO  2/2/18 19:00


 3/4/18 18:59   


 


 


 Ondansetron HCl


  (Zofran Inj)  4 mg  Q6H  PRN


 IV  2/2/18 19:00


 3/4/18 18:59   


 


 


 Nitroglycerin


  (Nitrostat Tab)  0.4 mg  UD  PRN


 SL  2/2/18 19:00


 3/4/18 18:59   


 


 


 Morphine Sulfate


  (MoRPHine


 SULFATE INJ)  2 mg  Q30M  PRN


 IV  2/2/18 19:00


 2/16/18 18:59   


 


 


 Polyethylene


  (Miralax Powder


 Packet)  17 gm  DAILY  PRN


 PO  2/2/18 19:00


 3/4/18 18:59   


 


 


 Atorvastatin


 Calcium


  (Lipitor Tab)  80 mg  DAILY


 PO  2/3/18 09:00


 3/5/18 08:59  2/4/18 08:19


80 MG


 


 Clonazepam


  (Klonopin Tab)  1 mg  HS  PRN


 PO  2/2/18 19:00


 3/4/18 18:59  2/3/18 03:35


1 MG


 


 Clopidogrel


 Bisulfate


  (plAVix TAB)  75 mg  QAM


 PO  2/3/18 09:00


 3/5/18 08:59  2/4/18 08:19


75 MG


 


 Divalproex Sodium


  (Depakote Delay


 Rel Tab)  2,000 mg  HS


 PO  2/2/18 21:00


 3/4/18 20:59  2/3/18 20:51


2,000 MG


 


 Docusate Sodium


  (coLACE CAP)  100 mg  BID  PRN


 PO  2/2/18 19:00


 3/4/18 18:59   


 


 


 Escitalopram


 Oxalate


  (Lexapro Tab)  20 mg  QAM


 PO  2/3/18 09:00


 3/5/18 08:59  2/4/18 08:19


20 MG


 


 Fluticasone


 Propionate


  (Flonase Nasal


 Spray)  2 sprays  DAILY  PRN


 VERONICA  2/2/18 19:00


 3/4/18 18:59   


 


 


 Gabapentin


  (Neurontin Cap)  300 mg  HS


 PO  2/2/18 21:00


 3/4/18 20:59  2/3/18 20:50


300 MG


 


 Hydralazine HCl


  (Apresoline Tab)  25 mg  QID


 PO  2/2/18 21:00


 3/4/18 20:59  2/4/18 08:20


25 MG


 


 Insulin Glargine


  (Lantus Solostar


 Pen)  10 units  HS


 SC  2/2/18 21:00


 3/4/18 20:59  2/3/18 20:54


10 UNITS


 


 Isosorbide


 Mononitrate


  (Imdur Ext Rel


 Tab)  30 mg  QAM


 PO  2/3/18 09:00


 3/5/18 08:59  2/4/18 08:21


30 MG


 


 Metoprolol


 Succinate


  (Toprol Xl Tab)  50 mg  BID


 PO  2/2/18 21:00


 3/4/18 20:59  2/4/18 08:21


50 MG


 


 Tamsulosin HCl


  (Flomax Cap)  0.4 mg  HS


 PO  2/2/18 21:00


 3/4/18 20:59  2/3/18 20:51


0.4 MG


 


 Tiotropium Bromide


  (Spiriva


 Handihaler


 Inhaler)  1 puff  DAILY


 INH  2/3/18 09:00


 3/5/18 08:59  2/4/18 08:20


1 PUFF


 


 Warfarin Sodium


  (Coumadin Tab)  7.5 mg  SuMoWeThFr@1600


 PO  2/2/18 20:00


 3/4/18 19:59  2/2/18 22:10


7.5 MG


 


 Albuterol


  (Ventolin Hfa


 Inhaler)  2 puffs  Q4H  PRN


 INH  2/2/18 20:45


 3/4/18 20:44  2/4/18 00:00


2 PUFFS


 


 Amlodipine


 Besylate


  (Norvasc Tab)  10 mg  QAM


 PO  2/3/18 09:00


 3/5/18 08:59  2/4/18 08:19


10 MG


 


 Cyanocobalamin


  (Vitamin B-12


 Tab)  1,000 mcg  DAILY


 PO  2/3/18 09:00


 3/5/18 08:59  2/4/18 08:19


1,000 MCG


 


 Ferrous Sulfate


  (Feosol Tab)  325 mg  BIDM


 PO  2/3/18 07:30


 3/5/18 07:59  2/4/18 08:19


325 MG


 


 Mirtazapine


  (Remeron Tab)  45 mg  HS


 PO  2/2/18 21:00


 3/4/18 20:59  2/3/18 20:50


45 MG


 


 Oxycodone HCl


  (Roxicodone


 Immediate Rel Tab)  10 mg  Q12  PRN


 PO  2/2/18 19:00


 3/4/18 18:59  2/4/18 03:46


10 MG


 


 Spironolactone


  (Aldactone Tab)  50 mg  BID


 PO  2/2/18 21:00


 3/4/18 20:59 Future Hold  


 


 


 Warfarin Sodium


  (Coumadin Tab)  10 mg  TuSa@1600


 PO  2/3/18 16:00


 3/5/18 15:59  2/3/18 16:58


10 MG


 


 Insulin Aspart


  (novoLOG ASPART)  **SLIDING


 SCALE**


 **G...  ACHS


 SC  2/2/18 21:00


 3/4/18 20:59  2/4/18 08:28


5 UNITS


 


 Miscellaneous


  (Iv Fluids


 Completed)  1 ea  PRN  PRN


 N/A  2/2/18 19:30


 2/2/19 19:29   


 


 


 Furosemide 20 mg/


 Syringe  2 ml @ 4


 mls/min  BID


 IV  2/3/18 09:00


 3/4/18 20:59  2/3/18 20:51


4 MLS/MIN


 


 Pantoprazole


 Sodium


  (Protonix Tab)  40 mg  QAM


 PO  2/3/18 09:00


 3/5/18 08:59  2/4/18 08:19


40 MG


 


 Calcium Carbonate


  (Tums Chew Tab)  500 mg  Q2H  PRN


 PO  2/3/18 10:00


 3/5/18 09:59   


 


 


 Miscellaneous


 Information


  (Nursing Verbal


 Med Order)  1 ea  ONE  ONCE


 N/A  2/4/18 08:30


 2/4/18 08:31 UNV  


 











Objective


Vital Signs











  Date Time  Temp Pulse Resp B/P (MAP) Pulse Ox O2 Delivery O2 Flow Rate FiO2


 


2/3/18 20:00      Nasal Cannula 3.5 


 


2/3/18 19:30 36.5 60 18 159/67 (97) 93 Nasal Cannula 4.0 


 


2/3/18 16:00      Nasal Cannula 4.0 


 


2/3/18 15:13 36.7  20 145/62 (89) 93 Nasal Cannula 3.0 


 


2/3/18 12:16 36.5 53 18 168/74 (105) 94 Nasal Cannula  


 


2/3/18 12:00      Nasal Cannula 4.0 


 


2/3/18 08:05 36.5 51 18 135/65 (88) 91 Nasal Cannula  


 


2/3/18 08:00      Nasal Cannula 4.0 


 


2/3/18 04:00      Nasal Cannula 3.0 


 


2/3/18 02:40 37.2 62 20 165/83 (110) 93 Nasal Cannula 4.0 


 


2/3/18 00:00      Nasal Cannula 3.0 


 


2/2/18 23:31 36.5 64 22 167/78 (107) 93 Nasal Cannula 3.5 











Physical Exam


Comments:


General Appearance:  no apparent distress, + obese, + pertinent finding (O2 NC)


Head:  normocephalic, atraumatic


Eyes:  normal inspection, PERRL


ENT:  hearing grossly normal


Neck:  supple, no JVD


Respiratory/Chest:  no respiratory distress, no accessory muscle use, normal 

breath sounds, rales noted in b/l bases


Cardiovascular:  + bradycardia (rhythm regular ), + systolic murmur


Abdomen/GI:  normal bowel sounds, non tender, soft


Back:  normal inspection


Extremities/Musculoskelatal:  no calf tenderness, + swelling (+2 pitting edema 

of bilateral lower extremities), + pertinent finding (L lateral foot wound in 

dressing )


Neurologic/Psych:  alert, oriented x 3, 


Skin:  warm/dry, no rash, + pallor





Laboratory Results





Last 24 Hours








Test


  2/3/18


02:00 2/3/18


02:49 2/3/18


06:42 2/3/18


11:00


 


Urine Color YELLOW    


 


Urine Appearance CLEAR    


 


Urine pH 5.5    


 


Urine Specific Gravity 1.015    


 


Urine Protein 3+    


 


Urine Glucose (UA) TRACE    


 


Urine Ketones NEG    


 


Urine Occult Blood 1+    


 


Urine Nitrite NEG    


 


Urine Bilirubin NEG    


 


Urine Urobilinogen NEG    


 


Urine Leukocyte Esterase NEG    


 


Urine WBC (Auto) 0 /hpf    


 


Urine RBC (Auto) 5-10 /hpf    


 


Urine Hyaline Casts (Auto) 1-5 /lpf    


 


Urine Epithelial Cells (Auto) 5-10 /lpf    


 


Urine Bacteria (Auto) NEG    


 


White Blood Count  6.51 K/uL   


 


Red Blood Count  2.71 M/uL   


 


Hemoglobin  8.1 g/dL   


 


Hematocrit  24.8 %   


 


Mean Corpuscular Volume  91.5 fL   


 


Mean Corpuscular Hemoglobin  29.9 pg   


 


Mean Corpuscular Hemoglobin


Concent 


  32.7 g/dl 


  


  


 


 


RDW Standard Deviation  50.0 fL   


 


RDW Coefficient of Variation  15.0 %   


 


Platelet Count  227 K/uL   


 


Mean Platelet Volume  9.1 fL   


 


Prothrombin Time  14.7 SECONDS   


 


Prothromb Time International


Ratio 


  1.4 


  


  


 


 


Sodium Level  141 mmol/L   


 


Potassium Level  5.1 mmol/L   


 


Chloride Level  108 mmol/L   


 


Carbon Dioxide Level  27 mmol/L   


 


Anion Gap  6.0 mmol/L   


 


Blood Urea Nitrogen  45 mg/dl   


 


Creatinine  1.59 mg/dl   


 


Est Creatinine Clear Calc


Drug Dose 


  60.0 ml/min 


  


  


 


 


Estimated GFR (


American) 


  53.9 


  


  


 


 


Estimated GFR (Non-


American 


  46.5 


  


  


 


 


BUN/Creatinine Ratio  28.2   


 


Random Glucose  159 mg/dl   


 


Calcium Level  7.7 mg/dl   


 


Creatine Kinase MB  6.4 ng/ml   


 


Creatine Kinase MB Ratio      


 


Troponin I  0.021 ng/ml   


 


Thyroid Stimulating Hormone


(TSH) 


  5.020 uIu/ml 


  


  


 


 


Bedside Glucose   180 mg/dl  


 


Test


  2/3/18


11:02 2/3/18


11:35 2/3/18


16:31 2/3/18


20:29


 


Creatine Kinase MB 4.5 ng/ml    


 


Troponin I 0.026 ng/ml    


 


Bedside Glucose  146 mg/dl  194 mg/dl  98 mg/dl 











Assessment and Plan


Patient is a 63 y/o male with a history of CAD, h/o MI s/p stents, HTN, HLD, 

diastolic CHF, COPD, DM II, anxiety, depression, bipolar disorder, anemia and 

GERD, who presented to ED because of worsening SOB x1 day





Acute on chronic diastolic CHF exacerbation:


-gave lasix 40mg in am and 20 mg PM.


SOB has decreased.


will monitor for another day.


will likely discharge patient in AM if patient is improve


- Admit to tele for cardiac monitoring


- Cardiac enzymes negative x 3


- EKG QAM and PRN for chest pain


- O2 protocol- normally wears 2L O2 supplement


- Monitor I&Os and daily weights 


- Low sodium, 1800 mL fluid restrict 


- ECHO 12/23/17 w/ preserved EF and grade II diastolic CHF 


- Consult cardiology, appreciate recommendations 





Anemia of chronic disease- STABLE: Follow CBC 





CKD stage III- baseline crt 1.6- STABLE 





b/l chronic diabetic wounds: Following w/ wound care- wound care consulted 





CAD w/ cardiac stenting, HTN, HLD- STABLE: Plavix 75 mg daily, Norvasc 10 mg 

daily, Hydralazine 25 mg QID, Imdur 30 mg BID, and Toprol XL 50 mg BID, Lipitor 

80 mg daily





T2DM w/ neuropathy:


- Continue Lantus 10 u HS 


- BSG ACHS and ISS


- Continue Gabapentin  





Chronic atrial thrombus: Continue Coumadin, follow PT/INR and adjust dose PRN 

for INR goal 2-3 





COPD without exacerbation: Continue home inhalers 





Bipolar disorder, anxiety, depression- STABLE: Depakote 2 g daily, Lexapro 20 

mg daily, Remeron 45 mg daily, and Klonopin





GI prophylaxis: Protonix daily 





DVT Prophylaxis: Coumadin





Code status: LEVEL I, FULL





Dispo: From home, has [facility]- PT/OT and CM consulted

## 2018-02-03 NOTE — CARDIOLOGY CONSULTATION
Cardiology Consultation


Date of Consultation:


Feb 3, 2018.


Requesting Physician:


Rylie


Reason for Consultation:


CHF


Pt evaluation today including:  conversation w/ patient, physical exam, chart 

review, lab review, review of studies, review of inpatient medication list, 

conversation w/ attending


History of Present Illness


The patient is a 62-year-old gentleman with a very complicated medical history 

who was recently discharged from [facility] after an 

exacerbation of diastolic heart failure.  Patient states he felt well for a few 

days but g he came more short of breath.  His breathing ability declined to the 

point where he was once again evaluated in the emergency room [facility] in found to be in pulmonary edema. He was admitted for recurrent 

diastolic heart failure.  At the time of admission the patient was felt to be 

at least 10 lb heavier than he was at the time of discharge.  The patient 

claims to be monitoring his weight at home on a daily basis but feels there may 

be some discrepancy between his scale in the 1 used at the hospital.  He has 

some help with medical management from his wife.  He has not made several 

outpatient appointments due to frequent hospitalizations.  He states that he 

has been using supplemental oxygen at nighttime as directed.  He does not 

report significant difficulty sleeping at nighttime.  He did not report overt 

orthopnea or paroxysmal nocturnal dyspnea.  He does have some chronic lower 

extremity edema which she felt was unchanged.  He claims to follow low-sodium 

diet.





In general he is a very sedentary individual who has difficulty with ambulation 

primarily due to leg pain.  He uses a walker to get around but needs to stop 

approximately every 50 feet in order to rest.  This is primarily due to leg 

discomfort but he also has an element of fatigue and dyspnea.  He denies any 

recent episodes of chest discomfort.  He has some mild discomfort in his lower 

extremities.  He has not been aware of any dizziness or lightheadedness.  He 

has not been experiencing any palpitations.





Past Medical/Surgical History


Coronary artery disease


   PCI to left circumflex 2007


   PCI to the LAD with drug-eluting stent July 2010, noted to have 100% in 

stent restenoses of prior PCI to the left circumflex


   Recent NSTEMI at the time of right lower extremity vascular surgery 2017, 

managed conservatively


Diastolic heart failure, stage II


Peripheral vascular disease


   PCI of the left superficial femoral and popliteal artery May 2017, [facility], Dr. Rainey


   Emergent right superficial femoral artery and popliteal artery stenting with 

fasciotomy October 2017, [facility]


      Persistent stenosis of the proximal superficial femoral artery on the 

right with patent distal stents


COPD


Depression


Bipolar disorder


Gastroesophageal reflux disease


Chronic anemia


Chronic renal insufficiency


Diabetes mellitus type 2


Hyperlipidemia


Gout


Hypertension


History of urinary retention requiring High catheter





Family History





Cancer (esophageal)


Diabetes mellitus


Heart disease


Hypertension


Noncontributory given his current comorbidities





Social History


Smoking Status:  Former Smoker


History of Alcohol Use:  No


Currently lives at home with his wife





Review of Systems


No recent fevers or chills.  No coughing.


All Other Systems:  Reviewed and Negative





Allergies


Coded Allergies:  


     No Known Allergies (Verified , 2/2/18)





Medications





Current Inpatient Medications








 Medications


  (Trade)  Dose


 Ordered  Sig/Dc


 Route  Start Time


 Stop Time Status Last Admin


Dose Admin


 


 Acetaminophen


  (Tylenol Tab)  650 mg  Q4H  PRN


 PO  2/2/18 19:00


 3/4/18 18:59  2/3/18 00:07


650 MG


 


 Al Hydrox/Mg


 Hydrox/Simethicone


  (Maalox Max Susp)  15 ml  Q4H  PRN


 PO  2/2/18 19:00


 3/4/18 18:59   


 


 


 Magnesium


 Hydroxide


  (Milk Of


 Magnesia Susp)  30 ml  Q12H  PRN


 PO  2/2/18 19:00


 3/4/18 18:59   


 


 


 Ondansetron HCl


  (Zofran Inj)  4 mg  Q6H  PRN


 IV  2/2/18 19:00


 3/4/18 18:59   


 


 


 Nitroglycerin


  (Nitrostat Tab)  0.4 mg  UD  PRN


 SL  2/2/18 19:00


 3/4/18 18:59   


 


 


 Morphine Sulfate


  (MoRPHine


 SULFATE INJ)  2 mg  Q30M  PRN


 IV  2/2/18 19:00


 2/16/18 18:59   


 


 


 Polyethylene


  (Miralax Powder


 Packet)  17 gm  DAILY  PRN


 PO  2/2/18 19:00


 3/4/18 18:59   


 


 


 Atorvastatin


 Calcium


  (Lipitor Tab)  80 mg  DAILY


 PO  2/3/18 09:00


 3/5/18 08:59  2/3/18 07:59


80 MG


 


 Clonazepam


  (Klonopin Tab)  1 mg  HS  PRN


 PO  2/2/18 19:00


 3/4/18 18:59  2/3/18 03:35


1 MG


 


 Clopidogrel


 Bisulfate


  (plAVix TAB)  75 mg  QAM


 PO  2/3/18 09:00


 3/5/18 08:59  2/3/18 08:01


75 MG


 


 Divalproex Sodium


  (Depakote Delay


 Rel Tab)  2,000 mg  HS


 PO  2/2/18 21:00


 3/4/18 20:59  2/2/18 22:12


2,000 MG


 


 Docusate Sodium


  (coLACE CAP)  100 mg  BID  PRN


 PO  2/2/18 19:00


 3/4/18 18:59   


 


 


 Escitalopram


 Oxalate


  (Lexapro Tab)  20 mg  QAM


 PO  2/3/18 09:00


 3/5/18 08:59  2/3/18 07:59


20 MG


 


 Fluticasone


 Propionate


  (Flonase Nasal


 Spray)  2 sprays  DAILY  PRN


 VERONICA  2/2/18 19:00


 3/4/18 18:59   


 


 


 Gabapentin


  (Neurontin Cap)  300 mg  HS


 PO  2/2/18 21:00


 3/4/18 20:59  2/2/18 22:11


300 MG


 


 Hydralazine HCl


  (Apresoline Tab)  25 mg  QID


 PO  2/2/18 21:00


 3/4/18 20:59  2/3/18 07:59


25 MG


 


 Insulin Glargine


  (Lantus Solostar


 Pen)  10 units  HS


 SC  2/2/18 21:00


 3/4/18 20:59  2/2/18 22:17


10 UNITS


 


 Isosorbide


 Mononitrate


  (Imdur Ext Rel


 Tab)  30 mg  QAM


 PO  2/3/18 09:00


 3/5/18 08:59  2/3/18 07:59


30 MG


 


 Metoprolol


 Succinate


  (Toprol Xl Tab)  50 mg  BID


 PO  2/2/18 21:00


 3/4/18 20:59  2/3/18 08:00


50 MG


 


 Tamsulosin HCl


  (Flomax Cap)  0.4 mg  HS


 PO  2/2/18 21:00


 3/4/18 20:59  2/2/18 22:11


0.4 MG


 


 Tiotropium Bromide


  (Spiriva


 Handihaler


 Inhaler)  1 puff  DAILY


 INH  2/3/18 09:00


 3/5/18 08:59  2/3/18 08:06


1 PUFF


 


 Warfarin Sodium


  (Coumadin Tab)  7.5 mg  SuMoWeThFr@1600


 PO  2/2/18 20:00


 3/4/18 19:59  2/2/18 22:10


7.5 MG


 


 Albuterol


  (Ventolin Hfa


 Inhaler)  2 puffs  Q4H  PRN


 INH  2/2/18 20:45


 3/4/18 20:44   


 


 


 Amlodipine


 Besylate


  (Norvasc Tab)  10 mg  QAM


 PO  2/3/18 09:00


 3/5/18 08:59  2/3/18 08:01


10 MG


 


 Cyanocobalamin


  (Vitamin B-12


 Tab)  1,000 mcg  DAILY


 PO  2/3/18 09:00


 3/5/18 08:59  2/3/18 08:01


1,000 MCG


 


 Ferrous Sulfate


  (Feosol Tab)  325 mg  BIDM


 PO  2/3/18 07:30


 3/5/18 07:59  2/3/18 08:00


325 MG


 


 Mirtazapine


  (Remeron Tab)  45 mg  HS


 PO  2/2/18 21:00


 3/4/18 20:59  2/2/18 22:13


45 MG


 


 Oxycodone HCl


  (Roxicodone


 Immediate Rel Tab)  10 mg  Q12  PRN


 PO  2/2/18 19:00


 3/4/18 18:59  2/2/18 22:21


10 MG


 


 Spironolactone


  (Aldactone Tab)  50 mg  BID


 PO  2/2/18 21:00


 3/4/18 20:59 Future Hold  


 


 


 Warfarin Sodium


  (Coumadin Tab)  10 mg  TuSa@1600


 PO  2/3/18 16:00


 3/5/18 15:59   


 


 


 Insulin Aspart


  (novoLOG ASPART)  **SLIDING


 SCALE**


 **G...  ACHS


 SC  2/2/18 21:00


 3/4/18 20:59  2/3/18 08:09


4 UNITS


 


 Miscellaneous


  (Iv Fluids


 Completed)  1 ea  PRN  PRN


 N/A  2/2/18 19:30


 2/2/19 19:29   


 


 


 Furosemide 20 mg/


 Syringe  2 ml @ 4


 mls/min  BID


 IV  2/3/18 09:00


 3/4/18 20:59  2/3/18 08:06


4 MLS/MIN


 


 Pantoprazole


 Sodium


  (Protonix Tab)  40 mg  QAM


 PO  2/3/18 09:00


 3/5/18 08:59  2/3/18 08:01


40 MG











Physical Exam





Vital Signs Past 12 Hours








  Date Time  Temp Pulse Resp B/P (MAP) Pulse Ox O2 Delivery O2 Flow Rate FiO2


 


2/3/18 08:05 36.5 51 18 135/65 (88) 91 Nasal Cannula  


 


2/3/18 04:00      Nasal Cannula 3.0 


 


2/3/18 02:40 37.2 62 20 165/83 (110) 93 Nasal Cannula 4.0 


 


2/3/18 00:00      Nasal Cannula 3.0 


 


2/2/18 23:31 36.5 64 22 167/78 (107) 93 Nasal Cannula 3.5 








The patient is alert and oriented. Mood and affect appeared normal. He answered 

all questions appropriately.


HEENT:  Pupils are equal and reactive to light and accommodation.  Extraocular 

movements are intact. The sclerae are anicteric.


Neuro:  Cranial nerves intact


Neck:  Patient's neck is supple.  He has palpable carotid pulses bilaterally 

without bruits on auscultation.  There is no evidence of jugular venous 

distention. The thyroid is not enlarged. 


Lungs:  Clear to auscultation bilaterally.  Overall pulmonary excursion was 

reduced. No rales wheezes or rhonchi.


Cardiac:  Heart demonstrates a regular rate and rhythm.  Normal S1 and S2. 

Crescendo systolic murmur.


Pulses:  The patient has palpable radial pulses bilaterally , left being 

slightly weaker than the right


Extremities:  The left leg is more swollen than the right.  He has healing 

incisions on the right tibial area.


Skin:  I did not appreciate any rashes on examination today.





Data


Laboratory Results:





Last 24 Hours








Test


  2/2/18


17:27 2/2/18


20:07 2/3/18


02:00 2/3/18


02:49


 


White Blood Count 6.49 K/uL    6.51 K/uL 


 


Red Blood Count 2.74 M/uL    2.71 M/uL 


 


Hemoglobin 8.2 g/dL    8.1 g/dL 


 


Hematocrit 25.1 %    24.8 % 


 


Mean Corpuscular Volume 91.6 fL    91.5 fL 


 


Mean Corpuscular Hemoglobin 29.9 pg    29.9 pg 


 


Mean Corpuscular Hemoglobin


Concent 32.7 g/dl 


  


  


  32.7 g/dl 


 


 


Platelet Count 216 K/uL    227 K/uL 


 


Mean Platelet Volume 9.0 fL    9.1 fL 


 


Neutrophils (%) (Auto) 72.1 %    


 


Lymphocytes (%) (Auto) 14.2 %    


 


Monocytes (%) (Auto) 9.2 %    


 


Eosinophils (%) (Auto) 3.7 %    


 


Basophils (%) (Auto) 0.2 %    


 


Neutrophils # (Auto) 4.68 K/uL    


 


Lymphocytes # (Auto) 0.92 K/uL    


 


Monocytes # (Auto) 0.60 K/uL    


 


Eosinophils # (Auto) 0.24 K/uL    


 


Basophils # (Auto) 0.01 K/uL    


 


RDW Standard Deviation 50.8 fL    50.0 fL 


 


RDW Coefficient of Variation 15.2 %    15.0 % 


 


Immature Granulocyte % (Auto) 0.6 %    


 


Immature Granulocyte # (Auto) 0.04 K/uL    


 


Ovalocytes 1+    


 


Prothrombin Time 12.9 SECONDS    14.7 SECONDS 


 


Prothromb Time International


Ratio 1.2 


  


  


  1.4 


 


 


Activated Partial


Thromboplast Time 28.9 SECONDS 


  


  


  


 


 


Partial Thromboplastin Ratio 1.1    


 


Sodium Level 138 mmol/L    141 mmol/L 


 


Potassium Level 5.1 mmol/L    5.1 mmol/L 


 


Chloride Level 109 mmol/L    108 mmol/L 


 


Carbon Dioxide Level 26 mmol/L    27 mmol/L 


 


Anion Gap 3.0 mmol/L    6.0 mmol/L 


 


Blood Urea Nitrogen 45 mg/dl    45 mg/dl 


 


Creatinine 1.54 mg/dl    1.59 mg/dl 


 


Est Creatinine Clear Calc


Drug Dose 61.9 ml/min 


  


  


  60.0 ml/min 


 


 


Estimated GFR (


American) 56.0 


  


  


  53.9 


 


 


Estimated GFR (Non-


American 48.3 


  


  


  46.5 


 


 


BUN/Creatinine Ratio 29.4    28.2 


 


Random Glucose 149 mg/dl    159 mg/dl 


 


Calcium Level 7.9 mg/dl    7.7 mg/dl 


 


Magnesium Level 2.2 mg/dl    


 


Total Bilirubin 0.4 mg/dl    


 


Aspartate Amino Transf


(AST/SGOT) 10 U/L 


  


  


  


 


 


Alanine Aminotransferase


(ALT/SGPT) 16 U/L 


  


  


  


 


 


Alkaline Phosphatase 61 U/L    


 


Troponin I 0.017 ng/ml    0.021 ng/ml 


 


Pro-B-Type Natriuretic Peptide 26572 pg/ml    


 


Total Protein 6.3 gm/dl    


 


Albumin 2.5 gm/dl    


 


Globulin 3.8 gm/dl    


 


Albumin/Globulin Ratio 0.7    


 


Valproic Acid (Depakene) Level 44 mcg/ml    


 


Bedside Glucose  134 mg/dl   


 


Urine Color   YELLOW  


 


Urine Appearance   CLEAR  


 


Urine pH   5.5  


 


Urine Specific Gravity   1.015  


 


Urine Protein   3+  


 


Urine Glucose (UA)   TRACE  


 


Urine Ketones   NEG  


 


Urine Occult Blood   1+  


 


Urine Nitrite   NEG  


 


Urine Bilirubin   NEG  


 


Urine Urobilinogen   NEG  


 


Urine Leukocyte Esterase   NEG  


 


Urine WBC (Auto)   0 /hpf  


 


Urine RBC (Auto)   5-10 /hpf  


 


Urine Hyaline Casts (Auto)   1-5 /lpf  


 


Urine Epithelial Cells (Auto)   5-10 /lpf  


 


Urine Bacteria (Auto)   NEG  


 


Creatine Kinase MB    6.4 ng/ml 


 


Creatine Kinase MB Ratio     


 


Thyroid Stimulating Hormone


(TSH) 


  


  


  5.020 uIu/ml 


 


 


Test


  2/3/18


06:42 


  


  


 


 


Bedside Glucose 180 mg/dl    








Imaging:  Chest x-ray demonstrated pulmonary vascular congestion.  Lower 

extremity venous duplex did not reveal any acute thrombosis.





EKG:  This morning's EKG demonstrates sinus bradycardia with PVC.





Telemetry reviewed:  No significant arrhythmia





Echocardiogram performed 12/23/2017:  Preserved LV systolic function with 

ejection fraction 55%.  Right ventricular dilation.  Mild to moderate aortic 

stenosis.  Mild mitral regurgitation.  Severe left atrial dilation.  Stage II 

diastolic dysfunction.





Assessment & Plan


1. Acute decompensated diastolic heart failure:  Patient was recently admitted 

with a similar diagnosis.  Unclear how he developed pulmonary edema again in 

such a short time frame.  He was prescribed steroids at the time of his last 

discharge due to presumed exacerbation of COPD, and this may have contributed 

to fluid retention.  He was discharged on a daily diuretic regimen of which she 

claims to be compliant.  He clearly has gained a significant amount of weight 

and has some evidence of peripheral edema.  His lung examination was fairly 

benign today but his overall pulmonary excursion was poor.  His N terminal 

proBNP is markedly elevated and he has evidence of pulmonary vascular 

congestion on x-ray.  I would agree with diuresis as initiated.  His breathing 

is slightly better this morning but undoubtedly he will require additional 

diuresis.  It is unclear how this can be prevented in the future.  I do have my 

doubts about his medical compliance on a complicated medical regimen.  He has 

been educated in the past regarding low-sodium diet and measuring his weight 

daily.  Unfortunately, he has been admitted many times over the past year for a 

variety of symptoms including decompensated heart failure.  He has been on a 

more aggressive dose of diuretic in the past.  I think we will have to decide 

what the most appropriate outpatient dose will be based on his response to 

diuretics and changes in his renal function during this hospitalization.





2.  Hypertension:  This may could be a contributing factor to his 

decompensation.  Seems to have poorly-controlled hypertension.  His medical 

regimen is complicated with q.i.d. dosing of hydralazine.  Do not think he will 

tolerate any additional doses of beta-blocker given his relative bradycardia.  

Seems that he was on labetalol in the past which may be a good alternative to 

metoprolol.  He is on amlodipine and high-dose spironolactone as well.  There 

has been some concern in the past regarding pseudo hypertension especially in 

the setting of his known peripheral vascular disease.  However, even in that 

case, his blood pressure still appears to be poorly controlled.





3. Coronary artery disease:  Patient has not describe recent symptoms of chest 

discomfort or cardiac ischemia.  Can be maintained on his usual regimen which 

includes warfarin, clopidogrel, high-dose atorvastatin and a beta blocker.  He 

is also on daily nitrate which I believe were more likely for blood pressure 

control.





4. Diastolic dysfunction:  This is believed to be stage II.  While no 

particular therapy is known to be more effective than another, he is on 

spironolactone and a low-dose daily diuretic.  I think optimum control of his 

blood pressure will also improve outcomes in this setting.





5. Valvular heart disease:  Patient is noted to have mild-to-moderate aortic 

stenosis and mild mitral stenosis.  Do not believe this is playing a role in 

his current decompensation.  No role for additional evaluation given the recent 

nature of his last echocardiogram.

## 2018-02-04 VITALS — HEART RATE: 74 BPM | OXYGEN SATURATION: 89 %

## 2018-02-04 VITALS
HEART RATE: 88 BPM | OXYGEN SATURATION: 91 % | DIASTOLIC BLOOD PRESSURE: 68 MMHG | SYSTOLIC BLOOD PRESSURE: 184 MMHG | TEMPERATURE: 98.06 F

## 2018-02-04 VITALS
SYSTOLIC BLOOD PRESSURE: 151 MMHG | DIASTOLIC BLOOD PRESSURE: 69 MMHG | TEMPERATURE: 98.42 F | OXYGEN SATURATION: 90 % | HEART RATE: 75 BPM

## 2018-02-04 VITALS
DIASTOLIC BLOOD PRESSURE: 71 MMHG | SYSTOLIC BLOOD PRESSURE: 166 MMHG | HEART RATE: 70 BPM | TEMPERATURE: 98.06 F | OXYGEN SATURATION: 92 %

## 2018-02-04 VITALS — TEMPERATURE: 98.06 F | HEART RATE: 75 BPM | SYSTOLIC BLOOD PRESSURE: 145 MMHG | DIASTOLIC BLOOD PRESSURE: 55 MMHG

## 2018-02-04 VITALS
TEMPERATURE: 97.88 F | SYSTOLIC BLOOD PRESSURE: 132 MMHG | DIASTOLIC BLOOD PRESSURE: 77 MMHG | HEART RATE: 59 BPM | OXYGEN SATURATION: 92 %

## 2018-02-04 VITALS
DIASTOLIC BLOOD PRESSURE: 70 MMHG | SYSTOLIC BLOOD PRESSURE: 153 MMHG | HEART RATE: 64 BPM | TEMPERATURE: 97.88 F | OXYGEN SATURATION: 95 %

## 2018-02-04 LAB
BUN SERPL-MCNC: 55 MG/DL (ref 7–18)
CALCIUM SERPL-MCNC: 8.2 MG/DL (ref 8.5–10.1)
CO2 SERPL-SCNC: 25 MMOL/L (ref 21–32)
CREAT SERPL-MCNC: 1.82 MG/DL (ref 0.6–1.4)
EOSINOPHIL NFR BLD AUTO: 231 K/UL (ref 130–400)
GLUCOSE SERPL-MCNC: 145 MG/DL (ref 70–99)
HCT VFR BLD CALC: 26.1 % (ref 42–52)
HGB BLD-MCNC: 8.2 G/DL (ref 14–18)
INR PPP: 2.5 (ref 0.9–1.1)
MCH RBC QN AUTO: 29.3 PG (ref 25–34)
MCHC RBC AUTO-ENTMCNC: 31.4 G/DL (ref 32–36)
MCV RBC AUTO: 93.2 FL (ref 80–100)
PMV BLD AUTO: 9.2 FL (ref 7.4–10.4)
POTASSIUM SERPL-SCNC: 5.2 MMOL/L (ref 3.5–5.1)
RED CELL DISTRIBUTION WIDTH CV: 15.1 % (ref 11.5–14.5)
RED CELL DISTRIBUTION WIDTH SD: 51.5 FL (ref 36.4–46.3)
SODIUM SERPL-SCNC: 143 MMOL/L (ref 136–145)
WBC # BLD AUTO: 6.93 K/UL (ref 4.8–10.8)

## 2018-02-04 RX ADMIN — ISOSORBIDE MONONITRATE SCH MG: 30 TABLET, EXTENDED RELEASE ORAL at 08:21

## 2018-02-04 RX ADMIN — PANTOPRAZOLE SCH MG: 40 TABLET, DELAYED RELEASE ORAL at 08:19

## 2018-02-04 RX ADMIN — ESCITALOPRAM OXALATE SCH MG: 20 TABLET, FILM COATED ORAL at 08:19

## 2018-02-04 RX ADMIN — FERROUS SULFATE TAB EC 325 MG (65 MG FE EQUIVALENT) SCH MG: 325 (65 FE) TABLET DELAYED RESPONSE at 08:19

## 2018-02-04 RX ADMIN — ALBUTEROL SULFATE PRN PUFFS: 90 AEROSOL, METERED RESPIRATORY (INHALATION) at 00:00

## 2018-02-04 RX ADMIN — IPRATROPIUM BROMIDE AND ALBUTEROL SULFATE SCH ML: .5; 3 SOLUTION RESPIRATORY (INHALATION) at 23:03

## 2018-02-04 RX ADMIN — INSULIN ASPART SCH UNITS: 100 INJECTION, SOLUTION INTRAVENOUS; SUBCUTANEOUS at 08:28

## 2018-02-04 RX ADMIN — MIRTAZAPINE SCH MG: 15 TABLET, FILM COATED ORAL at 20:50

## 2018-02-04 RX ADMIN — AMLODIPINE BESYLATE SCH MG: 5 TABLET ORAL at 08:19

## 2018-02-04 RX ADMIN — INSULIN ASPART SCH UNITS: 100 INJECTION, SOLUTION INTRAVENOUS; SUBCUTANEOUS at 12:51

## 2018-02-04 RX ADMIN — IPRATROPIUM BROMIDE AND ALBUTEROL SULFATE SCH ML: .5; 3 SOLUTION RESPIRATORY (INHALATION) at 23:00

## 2018-02-04 RX ADMIN — ATORVASTATIN CALCIUM SCH MG: 40 TABLET, FILM COATED ORAL at 08:19

## 2018-02-04 RX ADMIN — ALBUTEROL SULFATE PRN PUFFS: 90 AEROSOL, METERED RESPIRATORY (INHALATION) at 21:55

## 2018-02-04 RX ADMIN — DIVALPROEX SODIUM SCH MG: 500 TABLET, DELAYED RELEASE ORAL at 20:49

## 2018-02-04 RX ADMIN — FUROSEMIDE SCH MLS/MIN: 10 INJECTION, SOLUTION INTRAMUSCULAR; INTRAVENOUS at 08:21

## 2018-02-04 RX ADMIN — WARFARIN SODIUM SCH MG: 7.5 TABLET ORAL at 17:39

## 2018-02-04 RX ADMIN — TAMSULOSIN HYDROCHLORIDE SCH MG: 0.4 CAPSULE ORAL at 20:50

## 2018-02-04 RX ADMIN — METOPROLOL SUCCINATE SCH MG: 50 TABLET, EXTENDED RELEASE ORAL at 20:50

## 2018-02-04 RX ADMIN — TIOTROPIUM BROMIDE SCH PUFF: 18 CAPSULE ORAL; RESPIRATORY (INHALATION) at 08:20

## 2018-02-04 RX ADMIN — Medication SCH MCG: at 08:19

## 2018-02-04 RX ADMIN — GABAPENTIN SCH MG: 300 CAPSULE ORAL at 20:50

## 2018-02-04 RX ADMIN — INSULIN ASPART SCH UNITS: 100 INJECTION, SOLUTION INTRAVENOUS; SUBCUTANEOUS at 20:52

## 2018-02-04 RX ADMIN — CLOPIDOGREL BISULFATE SCH MG: 75 TABLET, FILM COATED ORAL at 08:19

## 2018-02-04 RX ADMIN — OXYCODONE HYDROCHLORIDE PRN MG: 5 TABLET ORAL at 03:46

## 2018-02-04 RX ADMIN — FERROUS SULFATE TAB EC 325 MG (65 MG FE EQUIVALENT) SCH MG: 325 (65 FE) TABLET DELAYED RESPONSE at 17:35

## 2018-02-04 RX ADMIN — INSULIN GLARGINE SCH UNITS: 100 INJECTION, SOLUTION SUBCUTANEOUS at 20:54

## 2018-02-04 RX ADMIN — METOPROLOL SUCCINATE SCH MG: 50 TABLET, EXTENDED RELEASE ORAL at 08:21

## 2018-02-04 RX ADMIN — OXYCODONE HYDROCHLORIDE PRN MG: 5 TABLET ORAL at 23:30

## 2018-02-04 RX ADMIN — INSULIN ASPART SCH UNITS: 100 INJECTION, SOLUTION INTRAVENOUS; SUBCUTANEOUS at 17:09

## 2018-02-04 NOTE — CARDIOLOGY FOLLOW-UP
Subjective


Date of Service:


Feb 4, 2018.


Pt evaluation today including:  conversation w/ patient, physical exam, chart 

review, lab review, review of studies, review of inpatient medication list


History of Present Illness


This morning claims to be feeling okay.  He states that his breathing is 

slightly more labored.  He did not seem to have any orthopnea.  He has been 

ambulatory around the room with minimal dizziness.  He has not report any sense 

of chest pain..





Social History


Smoking Status:  Former Smoker


History of Alcohol Use:  No





Review of Systems


Respiratory:  No cough


Cardiac:  No chest pain


No recent fevers or chills.  No coughing.





Objective





Vital Signs Past 12 Hours








  Date Time  Temp Pulse Resp B/P (MAP) Pulse Ox O2 Delivery O2 Flow Rate FiO2


 


2/4/18 12:00      Nasal Cannula 4.0 


 


2/4/18 11:31 36.9 75 18 151/69 (96) 90   


 


2/4/18 08:05 36.7 70 18 166/71 (102) 92 Nasal Cannula  


 


2/4/18 08:00      Nasal Cannula 4.0 


 


2/4/18 04:00      Nasal Cannula 4.0 


 


2/4/18 03:16 36.6 64 18 153/70 (97) 95 Nasal Cannula 4.0 








Last Recorded Weight-Kilograms:  96.400


Intake & Output











8-Hour Column   


 


 2/4/18 2/5/18 2/5/18





 16:00 00:00 08:00


 


Intake Total 800 ml  


 


Output Total 400 ml  


 


Balance 400 ml  














24-Hour Column 


 


 2/5/18





 08:00


 


Intake Total 800 ml


 


Output Total 400 ml


 


Balance 400 ml








Physical Exam


The patient is alert and oriented. Mood and affect appeared normal. He answered 

all questions appropriately.


HEENT:  Pupils are equal and reactive to light and accommodation.  Extraocular 

movements are intact. The sclerae are anicteric.


Neuro:  Cranial nerves intact


Neck:  Patient's neck is supple.  He has palpable carotid pulses bilaterally 

without bruits on auscultation.  There is no evidence of jugular venous 

distention. The thyroid is not enlarged. 


Lungs:  Occasional crackle at the base bilaterally  Overall pulmonary excursion 

was reduced. No rales wheezes or rhonchi.


Cardiac:  Heart demonstrates a regular rate and rhythm.  Normal S1 and S2. 

Crescendo systolic murmur.


Pulses:  The patient has palpable radial pulses bilaterally , left being 

slightly weaker than the right


Extremities:  The left leg is more swollen than the right.  He has healing 

incisions on the right tibial area.


Skin:  I did not appreciate any rashes on examination today.





Data


Laboratory Results:





Last 24 Hours








Test


  2/3/18


16:31 2/3/18


20:29 2/4/18


06:28 2/4/18


07:13


 


Bedside Glucose 194 mg/dl  98 mg/dl   140 mg/dl 


 


White Blood Count   6.93 K/uL  


 


Red Blood Count   2.80 M/uL  


 


Hemoglobin   8.2 g/dL  


 


Hematocrit   26.1 %  


 


Mean Corpuscular Volume   93.2 fL  


 


Mean Corpuscular Hemoglobin   29.3 pg  


 


Mean Corpuscular Hemoglobin


Concent 


  


  31.4 g/dl 


  


 


 


RDW Standard Deviation   51.5 fL  


 


RDW Coefficient of Variation   15.1 %  


 


Platelet Count   231 K/uL  


 


Mean Platelet Volume   9.2 fL  


 


Prothrombin Time   25.8 SECONDS  


 


Prothromb Time International


Ratio 


  


  2.5 


  


 


 


Sodium Level   143 mmol/L  


 


Potassium Level   5.2 mmol/L  


 


Chloride Level   111 mmol/L  


 


Carbon Dioxide Level   25 mmol/L  


 


Anion Gap   7.0 mmol/L  


 


Blood Urea Nitrogen   55 mg/dl  


 


Creatinine   1.82 mg/dl  


 


Est Creatinine Clear Calc


Drug Dose 


  


  51.1 ml/min 


  


 


 


Estimated GFR (


American) 


  


  45.8 


  


 


 


Estimated GFR (Non-


American 


  


  39.5 


  


 


 


BUN/Creatinine Ratio   30.4  


 


Random Glucose   145 mg/dl  


 


Calcium Level   8.2 mg/dl  


 


Test


  2/4/18


11:10 


  


  


 


 


Bedside Glucose 221 mg/dl    














Assessment and Plan


1. Acute decompensated diastolic heart failure:  He had some mild diuresis 

yesterday.  Overall his weight is still elevated from his last discharge.  His 

breathing is still not normal is requiring some supplemental oxygen.  Would 

seem reasonable to keep him on a dose of diuretic.  Oral dose of 40 twice a day 

may be adequate.  He was sent home on 20 mg daily.  He likely will require more 

diuretic at the time of discharge.





2.  Hypertension:  This may could be a contributing factor to his 

decompensation.  He continues to be hypertensive in the hospital.  I think re-

initiation of spironolactone would be reasonable despite his mildly decreased 

renal function.  We will need to monitor his electrolytes closely.


3. Coronary artery disease:  Patient has not describe recent symptoms of chest 

discomfort or cardiac ischemia.  Can be maintained on his usual regimen which 

includes warfarin, clopidogrel, high-dose atorvastatin and a beta blocker.  He 

is also on daily nitrate which I believe were more likely for blood pressure 

control.





4. Diastolic dysfunction:  This is believed to be stage II.  While no 

particular therapy is known to be more effective than another, he is on 

spironolactone and a low-dose daily diuretic.  I think optimum control of his 

blood pressure will also improve outcomes in this setting.





5. Valvular heart disease:  Patient is noted to have mild-to-moderate aortic 

stenosis and mild mitral stenosis.  Do not believe this is playing a role in 

his current decompensation.  No role for additional evaluation given the recent 

nature of his last echocardiogram.

## 2018-02-05 VITALS
HEART RATE: 58 BPM | OXYGEN SATURATION: 92 % | DIASTOLIC BLOOD PRESSURE: 56 MMHG | SYSTOLIC BLOOD PRESSURE: 124 MMHG | TEMPERATURE: 98.06 F

## 2018-02-05 VITALS — OXYGEN SATURATION: 93 % | HEART RATE: 53 BPM

## 2018-02-05 VITALS — HEART RATE: 71 BPM | OXYGEN SATURATION: 86 %

## 2018-02-05 VITALS
HEART RATE: 67 BPM | DIASTOLIC BLOOD PRESSURE: 84 MMHG | TEMPERATURE: 97.88 F | OXYGEN SATURATION: 96 % | SYSTOLIC BLOOD PRESSURE: 170 MMHG

## 2018-02-05 VITALS — HEART RATE: 84 BPM | OXYGEN SATURATION: 97 %

## 2018-02-05 VITALS — SYSTOLIC BLOOD PRESSURE: 146 MMHG | DIASTOLIC BLOOD PRESSURE: 73 MMHG

## 2018-02-05 VITALS
TEMPERATURE: 98.06 F | DIASTOLIC BLOOD PRESSURE: 59 MMHG | SYSTOLIC BLOOD PRESSURE: 127 MMHG | HEART RATE: 103 BPM | OXYGEN SATURATION: 93 %

## 2018-02-05 VITALS
SYSTOLIC BLOOD PRESSURE: 109 MMHG | HEART RATE: 85 BPM | OXYGEN SATURATION: 97 % | TEMPERATURE: 98.96 F | DIASTOLIC BLOOD PRESSURE: 63 MMHG

## 2018-02-05 VITALS
SYSTOLIC BLOOD PRESSURE: 117 MMHG | OXYGEN SATURATION: 95 % | DIASTOLIC BLOOD PRESSURE: 52 MMHG | HEART RATE: 57 BPM | TEMPERATURE: 98.42 F

## 2018-02-05 VITALS
HEART RATE: 60 BPM | OXYGEN SATURATION: 100 % | DIASTOLIC BLOOD PRESSURE: 66 MMHG | TEMPERATURE: 97.88 F | SYSTOLIC BLOOD PRESSURE: 149 MMHG

## 2018-02-05 VITALS — OXYGEN SATURATION: 97 % | HEART RATE: 102 BPM

## 2018-02-05 VITALS — OXYGEN SATURATION: 94 % | HEART RATE: 56 BPM

## 2018-02-05 VITALS — HEART RATE: 62 BPM | OXYGEN SATURATION: 92 %

## 2018-02-05 LAB
BUN SERPL-MCNC: 66 MG/DL (ref 7–18)
CALCIUM SERPL-MCNC: 7.6 MG/DL (ref 8.5–10.1)
CO2 SERPL-SCNC: 27 MMOL/L (ref 21–32)
CREAT SERPL-MCNC: 2.32 MG/DL (ref 0.6–1.4)
CREAT UR-MCNC: 142 MG/DL
EOSINOPHIL NFR BLD AUTO: 193 K/UL (ref 130–400)
GLUCOSE SERPL-MCNC: 137 MG/DL (ref 70–99)
HCT VFR BLD CALC: 24.8 % (ref 42–52)
HGB BLD-MCNC: 7.7 G/DL (ref 14–18)
INR PPP: 4.2 (ref 0.9–1.1)
Lab: 219.8 MG/DL (ref 0–11.9)
MCH RBC QN AUTO: 29.3 PG (ref 25–34)
MCHC RBC AUTO-ENTMCNC: 31 G/DL (ref 32–36)
MCV RBC AUTO: 94.3 FL (ref 80–100)
PMV BLD AUTO: 9 FL (ref 7.4–10.4)
POTASSIUM SERPL-SCNC: 5 MMOL/L (ref 3.5–5.1)
RED CELL DISTRIBUTION WIDTH CV: 15.3 % (ref 11.5–14.5)
RED CELL DISTRIBUTION WIDTH SD: 52.2 FL (ref 36.4–46.3)
SODIUM SERPL-SCNC: 139 MMOL/L (ref 136–145)
WBC # BLD AUTO: 8.48 K/UL (ref 4.8–10.8)

## 2018-02-05 RX ADMIN — OXYCODONE HYDROCHLORIDE PRN MG: 5 TABLET ORAL at 23:42

## 2018-02-05 RX ADMIN — TAMSULOSIN HYDROCHLORIDE SCH MG: 0.4 CAPSULE ORAL at 21:32

## 2018-02-05 RX ADMIN — PANTOPRAZOLE SCH MG: 40 TABLET, DELAYED RELEASE ORAL at 08:27

## 2018-02-05 RX ADMIN — DIVALPROEX SODIUM SCH MG: 500 TABLET, DELAYED RELEASE ORAL at 21:33

## 2018-02-05 RX ADMIN — CALCIUM CARBONATE PRN MG: 500 TABLET ORAL at 17:38

## 2018-02-05 RX ADMIN — AMLODIPINE BESYLATE SCH MG: 5 TABLET ORAL at 08:24

## 2018-02-05 RX ADMIN — Medication SCH MCG: at 08:25

## 2018-02-05 RX ADMIN — FUROSEMIDE SCH MG: 20 TABLET ORAL at 08:59

## 2018-02-05 RX ADMIN — ISOSORBIDE MONONITRATE SCH MG: 30 TABLET, EXTENDED RELEASE ORAL at 08:25

## 2018-02-05 RX ADMIN — IPRATROPIUM BROMIDE AND ALBUTEROL SULFATE SCH ML: .5; 3 SOLUTION RESPIRATORY (INHALATION) at 23:15

## 2018-02-05 RX ADMIN — CLOPIDOGREL BISULFATE SCH MG: 75 TABLET, FILM COATED ORAL at 08:24

## 2018-02-05 RX ADMIN — METOPROLOL SUCCINATE SCH MG: 50 TABLET, EXTENDED RELEASE ORAL at 21:33

## 2018-02-05 RX ADMIN — ATORVASTATIN CALCIUM SCH MG: 40 TABLET, FILM COATED ORAL at 08:24

## 2018-02-05 RX ADMIN — FERROUS SULFATE TAB EC 325 MG (65 MG FE EQUIVALENT) SCH MG: 325 (65 FE) TABLET DELAYED RESPONSE at 08:23

## 2018-02-05 RX ADMIN — TIOTROPIUM BROMIDE SCH PUFF: 18 CAPSULE ORAL; RESPIRATORY (INHALATION) at 08:27

## 2018-02-05 RX ADMIN — INSULIN ASPART SCH UNITS: 100 INJECTION, SOLUTION INTRAVENOUS; SUBCUTANEOUS at 21:00

## 2018-02-05 RX ADMIN — INSULIN ASPART SCH UNITS: 100 INJECTION, SOLUTION INTRAVENOUS; SUBCUTANEOUS at 08:35

## 2018-02-05 RX ADMIN — INSULIN ASPART SCH UNITS: 100 INJECTION, SOLUTION INTRAVENOUS; SUBCUTANEOUS at 17:38

## 2018-02-05 RX ADMIN — METOPROLOL SUCCINATE SCH MG: 50 TABLET, EXTENDED RELEASE ORAL at 08:26

## 2018-02-05 RX ADMIN — INSULIN GLARGINE SCH UNITS: 100 INJECTION, SOLUTION SUBCUTANEOUS at 21:36

## 2018-02-05 RX ADMIN — GABAPENTIN SCH MG: 300 CAPSULE ORAL at 21:33

## 2018-02-05 RX ADMIN — FERROUS SULFATE TAB EC 325 MG (65 MG FE EQUIVALENT) SCH MG: 325 (65 FE) TABLET DELAYED RESPONSE at 17:35

## 2018-02-05 RX ADMIN — IPRATROPIUM BROMIDE AND ALBUTEROL SULFATE SCH ML: .5; 3 SOLUTION RESPIRATORY (INHALATION) at 11:22

## 2018-02-05 RX ADMIN — IPRATROPIUM BROMIDE AND ALBUTEROL SULFATE SCH ML: .5; 3 SOLUTION RESPIRATORY (INHALATION) at 15:57

## 2018-02-05 RX ADMIN — IPRATROPIUM BROMIDE AND ALBUTEROL SULFATE SCH ML: .5; 3 SOLUTION RESPIRATORY (INHALATION) at 07:33

## 2018-02-05 RX ADMIN — IPRATROPIUM BROMIDE AND ALBUTEROL SULFATE SCH ML: .5; 3 SOLUTION RESPIRATORY (INHALATION) at 19:05

## 2018-02-05 RX ADMIN — ESCITALOPRAM OXALATE SCH MG: 20 TABLET, FILM COATED ORAL at 08:25

## 2018-02-05 RX ADMIN — INSULIN ASPART SCH UNITS: 100 INJECTION, SOLUTION INTRAVENOUS; SUBCUTANEOUS at 12:14

## 2018-02-05 RX ADMIN — MIRTAZAPINE SCH MG: 15 TABLET, FILM COATED ORAL at 21:33

## 2018-02-05 RX ADMIN — IPRATROPIUM BROMIDE AND ALBUTEROL SULFATE SCH ML: .5; 3 SOLUTION RESPIRATORY (INHALATION) at 03:30

## 2018-02-05 NOTE — CARDIOLOGY PROGRESS NOTE
DATE: 02/05/2018

 

SUBJECTIVE:  Mr. Dixon is resting comfortably at the bedside without

complaints of chest pain or dyspnea.

 

OBJECTIVE:

VITAL SIGNS:  Blood pressure 124/56 with a regular pulse of 58.  Respiratory

rate is 20 and the patient is afebrile at 36.7 degrees Celsius.  Saturations

92% on 4 liters nasal cannula.

NECK:  Supple with full carotid upstrokes.  No obvious bruits.  Jugular

venous pressure is flat at 90 degrees.

CARDIOVASCULAR:  Reveals a regular rhythm with distant heart sounds.  A 2/6

basal systolic ejection murmur is noted.

LUNGS:  Clear without rales, rhonchi, or wheezes.

ABDOMEN:  Soft without bruits.

EXTREMITIES:  Reveal intact radial artery pulses bilaterally.  Trace

pretibial edema is noted.

 

DATA:  CBC notes hemoglobin of 7.7, hematocrit 24.8, white count 8.4,

platelet count 193,000.  Electrolytes note a sodium of 139, potassium 5.0,

chloride 107, bicarbonate 27, BUN 66, creatinine 2.32, glucose of 137. 

Telemetry monitor is benign.

 

IMPRESSION AND PLAN:

1.  Acute-on-chronic diastolic congestive heart failure -- the patient

continues to diurese well.

2.  Coronary artery disease -- stable on current medical regimen.

3.  Hypertension -- adequate control at this time.

4.  Mild-to-moderate aortic stenosis.

5.  Mild mitral stenosis.

## 2018-02-05 NOTE — HOSPITALIST PROGRESS NOTE
Hospitalist Progress Note


Date of Service


Feb 5, 2018.





Subjective


Pt evaluation today including:  conversation w/ patient, physical exam, chart 

review, lab review, review of studies


Pain:  None


PO Intake:  Good


Voiding:  no voiding problems


The patient was seen and examined this morning. Pt reports doing well today 

other than feeling tired.  He does typically work third shift and feels his 

sleep is altered.  Main complaint is swelling around his eyes today, which 

occurred overnight. He was up earlier in bedside chair, but at the time I 

visited him he is laying on his left side in bed again.  He reports some 

minimal pain in he right ankle, but that this is chronic.  He tolerated 

breakfast but did not eat much as it "didn't appeal to me".   He is doing well 

otherwise. He did not have any bm yet today.





Overnight nursing reported he had an episode of hypoxia, however it was likely 

that the tubing was not connected and that pulse ox was not reading correctly.  

Nursing did obtain mid to high 90s readings on his home 4 L of O2. 


Bigeminy on tele overnight, but this has been ongoing. 





ROS: 6 point ROS reviewed and otherwise negative.





Objective


Vital Signs











  Date Time  Temp Pulse Resp B/P (MAP) Pulse Ox O2 Delivery O2 Flow Rate FiO2


 


2/5/18 07:40 36.7 103 18 127/59 (81) 93   


 


2/5/18 04:13 37.2 85 18 109/63 (78) 97 Oxymask 4.0 


 


2/5/18 04:00      Oxymask 4.0 


 


2/5/18 03:30  102 20  97 Mask 6.0 


 


2/5/18 00:05    146/73 (97)    


 


2/5/18 00:01      Nasal Cannula 6.0 


 


2/4/18 23:33 36.7 88 24 184/68 (106) 91 Nasal Cannula 6.0 


 


2/4/18 23:00  74 20  89 Nasal Cannula 6.0 


 


2/4/18 20:00      Nasal Cannula 4.0 


 


2/4/18 19:52 36.6 59 18 132/77 (95) 92  4.0 


 


2/4/18 16:05 36.7 75 20 145/55 (85)  Nasal Cannula 4.0 


 


2/4/18 16:00      Nasal Cannula 4.0 


 


2/4/18 12:00      Nasal Cannula 4.0 


 


2/4/18 11:31 36.9 75 18 151/69 (96) 90   











Physical Exam


General Appearance:  WD/WN, no apparent distress, + pertinent finding (fatigued)


Eyes:  PERRL, EOMI, + pertinent finding (periorbital edema bilaterally, worse 

on Left eye likely positional as he is lying on his left side in bed.)


ENT:  hearing grossly normal, pharynx normal, + pertinent finding (MM dry)


Neck:  supple, no JVD


Respiratory/Chest:  chest non-tender, lungs clear, no respiratory distress, no 

accessory muscle use, + pertinent finding (on 4 L via NC)


Cardiovascular:  regular rate, rhythm (extra beats), no JVD, + systolic murmur (

grade III/VI)


Abdomen:  normal bowel sounds, non tender, soft


Extremities:  non-tender, no calf tenderness, + pertinent finding (1+ pitting 

edema in BLE.  Multiple wounds over BLE appear to be healing.  )


Neurologic/Psychiatric:  alert, normal mood/affect, oriented x 3


Skin:  warm/dry





Laboratory Results





Last 24 Hours








Test


  2/4/18


11:10 2/4/18


16:06 2/4/18


19:51 2/5/18


05:30


 


Bedside Glucose 221 mg/dl  155 mg/dl  164 mg/dl  


 


White Blood Count    8.48 K/uL 


 


Red Blood Count    2.63 M/uL 


 


Hemoglobin    7.7 g/dL 


 


Hematocrit    24.8 % 


 


Mean Corpuscular Volume    94.3 fL 


 


Mean Corpuscular Hemoglobin    29.3 pg 


 


Mean Corpuscular Hemoglobin


Concent 


  


  


  31.0 g/dl 


 


 


RDW Standard Deviation    52.2 fL 


 


RDW Coefficient of Variation    15.3 % 


 


Platelet Count    193 K/uL 


 


Mean Platelet Volume    9.0 fL 


 


Prothrombin Time    43.1 SECONDS 


 


Prothromb Time International


Ratio 


  


  


  4.2 


 


 


Sodium Level    139 mmol/L 


 


Potassium Level    5.0 mmol/L 


 


Chloride Level    107 mmol/L 


 


Carbon Dioxide Level    27 mmol/L 


 


Anion Gap    5.0 mmol/L 


 


Blood Urea Nitrogen    66 mg/dl 


 


Creatinine    2.32 mg/dl 


 


Est Creatinine Clear Calc


Drug Dose 


  


  


  40.6 ml/min 


 


 


Estimated GFR (


American) 


  


  


  34.1 


 


 


Estimated GFR (Non-


American 


  


  


  29.4 


 


 


BUN/Creatinine Ratio    28.5 


 


Random Glucose    137 mg/dl 


 


Calcium Level    7.6 mg/dl 


 


Test


  2/5/18


07:34 


  


  


 


 


Bedside Glucose 154 mg/dl    











Assessment and Plan


Patient is a 63 y/o male with a history of CAD, h/o MI s/p stents, HTN, HLD, 

diastolic CHF, COPD, DM II, anxiety, depression, bipolar disorder, anemia and 

GERD, who presented to ED because of worsening SOB x1 day





Acute on chronic diastolic CHF exacerbation:


- Cardiac enzymes negative x 3, ekg prn cp, O2 protocol, home O2 is 2 L 


- Strict I/Ox, daily weights  - compared to last admission pt is still not at 

dry weight therefore [facility] is recommending increased lasix to 40 mg PO BID 

along with spironolactone - concerned with worsening Cr acutely so will hold 

today. 


- Low sodium, 1800 mL fluid restrict 


- ECHO 12/23/17 w/ preserved EF and grade II diastolic CHF 





Elevated INR


- 4.2, hold warfarin today - watch for any signs of bleeding, cbc stable- pt 

denies any occult bleeding. Heme check stools





Anemia of chronic disease- STABLE:


- Hgb slightly dropped this morning, monitor with am labs.  Repeat is pt 

becomes symptomaic. 





CKD stage III


- baseline crt 1.6- has worsened. to 2.32 this morning - likely due to 

aggressive diuresis


- on 2/4 small fluid bolus of 250 ml was given and lasix held, hold diuretics 

again today. 


- K+ normal today, he had required a dose of Kayexalate over the weekend. 





b/l chronic diabetic wounds: 


- Following w/ wound care- wound care consulted 





CAD w/ cardiac stenting


HTN


HLD


- Plavix 75 mg daily, Norvasc 10 mg daily, Hydralazine 25 mg QID, Imdur 30 mg 

BID, and Toprol XL 50 mg BID, Lipitor 80 mg daily





T2DM w/ neuropathy:


- Continue Lantus 10 u HS 


- BSG ACHS and ISS


- Continue Gabapentin  





Chronic atrial thrombus: Continue Coumadin, follow PT/INR and adjust dose PRN 

for INR goal 2-3 


- Holding todays dose with INR of 4.2





COPD without exacerbation: Continue home inhalers 





Bipolar disorder, anxiety, depression- STABLE: Depakote 2 g daily, Lexapro 20 

mg daily, Remeron 45 mg daily, and Klonopin





GI prophylaxis: Protonix daily 





DVT Prophylaxis: Coumadin





Code status: LEVEL I, FULL





Dispo: From home, has [facility]- PT/OT and CM consulted, discharge date unknown, 

possible in >2 days.

## 2018-02-05 NOTE — DIAGNOSTIC IMAGING REPORT
CHEST ONE VIEW PORTABLE



CLINICAL HISTORY: Laboured breathing dyspnea



COMPARISON STUDY:  2/2/2018



FINDINGS: Congestive heart failure. Mild cardiomegaly. Slight improvement in

aeration left lung base. 



IMPRESSION:  Congestive heart failure slightly improved from the prior exam. 











The above report was generated using voice recognition software.  It may contain

grammatical, syntax or spelling errors.









Electronically signed by:  Gerard Meier M.D.

2/5/2018 6:36 AM



Dictated Date/Time:  2/5/2018 6:35 AM

## 2018-02-05 NOTE — PROGRESS NOTE
Subjective


Date of Service:


Feb 4, 2018.


Subjective


61 yo male, reports feeling well.


He reports he feels better today.


He states he would like to go home.





Problem List


Medical Problems:


(1) Acute CHF


Status: Acute  





(2) Acute coronary syndrome


Status: Acute  





(3) Acute headache


Status: Acute  





(4) Acute on chronic congestive heart failure


Status: Acute  





(5) Altered mental status


Status: Acute  





(6) Anemia


Status: Acute  





(7) Anemia


Status: Acute  





(8) Anemia


Status: Acute  





(9) Anginal pain


Status: Acute  





(10) Appendicitis


Status: Acute  





(11) Cellulitis


Status: Acute  





(12) Chest pain


Status: Acute  





(13) CHF (congestive heart failure)


Status: Acute  





(14) CHF (congestive heart failure)


Status: Acute  





(15) CHF (congestive heart failure)


Status: Acute  





(16) Congestive heart failure


Status: Acute  





(17) Congestive heart failure


Status: Acute  





(18) COPD (chronic obstructive pulmonary disease)


Status: Acute  





(19) Diabetes mellitus with hyperglycemia


Status: Acute  





(20) Dyspnea


Status: Acute  





(21) Elevated troponin


Status: Acute  





(22) Failure of outpatient treatment


Status: Acute  





(23) Hypoxia


Status: Acute  





(24) Hypoxia


Status: Acute  





(25) Intra-abdominal abscess


Status: Acute  





(26) Lower abdominal pain of unknown etiology


Status: Acute  





(27) Neck pain


Status: Acute  





(28) Pneumonia


Status: Acute  





(29) Pneumonia


Status: Acute  





(30) Precordial chest pain


Status: Acute  





(31) Pulmonary edema


Status: Acute  





(32) Respiratory acidosis


Status: Acute  





(33) Respiratory distress


Status: Acute  





(34) SOB (shortness of breath)


Status: Acute  





(35) SOB (shortness of breath)


Status: Acute  





(36) Tachypnea


Status: Acute  











Review of Systems


All Other Systems:  Reviewed and Negative





Medications





Current Inpatient Medications








 Medications


  (Trade)  Dose


 Ordered  Sig/Dc


 Route  Start Time


 Stop Time Status Last Admin


Dose Admin


 


 Acetaminophen


  (Tylenol Tab)  650 mg  Q4H  PRN


 PO  2/2/18 19:00


 3/4/18 18:59  2/3/18 00:07


650 MG


 


 Al Hydrox/Mg


 Hydrox/Simethicone


  (Maalox Max Susp)  15 ml  Q4H  PRN


 PO  2/2/18 19:00


 3/4/18 18:59   


 


 


 Magnesium


 Hydroxide


  (Milk Of


 Magnesia Susp)  30 ml  Q12H  PRN


 PO  2/2/18 19:00


 3/4/18 18:59   


 


 


 Ondansetron HCl


  (Zofran Inj)  4 mg  Q6H  PRN


 IV  2/2/18 19:00


 3/4/18 18:59   


 


 


 Nitroglycerin


  (Nitrostat Tab)  0.4 mg  UD  PRN


 SL  2/2/18 19:00


 3/4/18 18:59   


 


 


 Morphine Sulfate


  (MoRPHine


 SULFATE INJ)  2 mg  Q30M  PRN


 IV  2/2/18 19:00


 2/16/18 18:59   


 


 


 Polyethylene


  (Miralax Powder


 Packet)  17 gm  DAILY  PRN


 PO  2/2/18 19:00


 3/4/18 18:59   


 


 


 Atorvastatin


 Calcium


  (Lipitor Tab)  80 mg  DAILY


 PO  2/3/18 09:00


 3/5/18 08:59  2/4/18 08:19


80 MG


 


 Clonazepam


  (Klonopin Tab)  1 mg  HS  PRN


 PO  2/2/18 19:00


 3/4/18 18:59  2/3/18 03:35


1 MG


 


 Clopidogrel


 Bisulfate


  (plAVix TAB)  75 mg  QAM


 PO  2/3/18 09:00


 3/5/18 08:59  2/4/18 08:19


75 MG


 


 Divalproex Sodium


  (Depakote Delay


 Rel Tab)  2,000 mg  HS


 PO  2/2/18 21:00


 3/4/18 20:59  2/4/18 20:49


2,000 MG


 


 Docusate Sodium


  (coLACE CAP)  100 mg  BID  PRN


 PO  2/2/18 19:00


 3/4/18 18:59   


 


 


 Escitalopram


 Oxalate


  (Lexapro Tab)  20 mg  QAM


 PO  2/3/18 09:00


 3/5/18 08:59  2/4/18 08:19


20 MG


 


 Fluticasone


 Propionate


  (Flonase Nasal


 Spray)  2 sprays  DAILY  PRN


 VERONICA  2/2/18 19:00


 3/4/18 18:59   


 


 


 Gabapentin


  (Neurontin Cap)  300 mg  HS


 PO  2/2/18 21:00


 3/4/18 20:59  2/4/18 20:50


300 MG


 


 Hydralazine HCl


  (Apresoline Tab)  25 mg  QID


 PO  2/2/18 21:00


 3/4/18 20:59  2/4/18 20:51


25 MG


 


 Insulin Glargine


  (Lantus Solostar


 Pen)  10 units  HS


 SC  2/2/18 21:00


 3/4/18 20:59  2/4/18 20:54


10 UNITS


 


 Isosorbide


 Mononitrate


  (Imdur Ext Rel


 Tab)  30 mg  QAM


 PO  2/3/18 09:00


 3/5/18 08:59  2/4/18 08:21


30 MG


 


 Metoprolol


 Succinate


  (Toprol Xl Tab)  50 mg  BID


 PO  2/2/18 21:00


 3/4/18 20:59  2/4/18 20:50


50 MG


 


 Tamsulosin HCl


  (Flomax Cap)  0.4 mg  HS


 PO  2/2/18 21:00


 3/4/18 20:59  2/4/18 20:50


0.4 MG


 


 Tiotropium Bromide


  (Spiriva


 Handihaler


 Inhaler)  1 puff  DAILY


 INH  2/3/18 09:00


 3/5/18 08:59  2/4/18 08:20


1 PUFF


 


 Warfarin Sodium


  (Coumadin Tab)  7.5 mg  SuMoWeThFr@1600


 PO  2/2/18 20:00


 3/4/18 19:59  2/4/18 17:39


7.5 MG


 


 Albuterol


  (Ventolin Hfa


 Inhaler)  2 puffs  Q4H  PRN


 INH  2/2/18 20:45


 3/4/18 20:44  2/4/18 21:55


2 PUFFS


 


 Amlodipine


 Besylate


  (Norvasc Tab)  10 mg  QAM


 PO  2/3/18 09:00


 3/5/18 08:59  2/4/18 08:19


10 MG


 


 Cyanocobalamin


  (Vitamin B-12


 Tab)  1,000 mcg  DAILY


 PO  2/3/18 09:00


 3/5/18 08:59  2/4/18 08:19


1,000 MCG


 


 Ferrous Sulfate


  (Feosol Tab)  325 mg  BIDM


 PO  2/3/18 07:30


 3/5/18 07:59  2/4/18 17:35


325 MG


 


 Mirtazapine


  (Remeron Tab)  45 mg  HS


 PO  2/2/18 21:00


 3/4/18 20:59  2/4/18 20:50


45 MG


 


 Oxycodone HCl


  (Roxicodone


 Immediate Rel Tab)  10 mg  Q12  PRN


 PO  2/2/18 19:00


 3/4/18 18:59  2/4/18 23:30


10 MG


 


 Spironolactone


  (Aldactone Tab)  50 mg  BID


 PO  2/2/18 21:00


 3/4/18 20:59 Future Hold  


 


 


 Warfarin Sodium


  (Coumadin Tab)  10 mg  TuSa@1600


 PO  2/3/18 16:00


 3/5/18 15:59  2/3/18 16:58


10 MG


 


 Insulin Aspart


  (novoLOG ASPART)  **SLIDING


 SCALE**


 **G...  ACHS


 SC  2/2/18 21:00


 3/4/18 20:59  2/4/18 17:09


9 UNITS


 


 Miscellaneous


  (Iv Fluids


 Completed)  1 ea  PRN  PRN


 N/A  2/2/18 19:30


 2/2/19 19:29   


 


 


 Furosemide 20 mg/


 Syringe  2 ml @ 4


 mls/min  BID


 IV  2/3/18 09:00


 3/4/18 20:59 Future Hold 2/3/18 20:51


4 MLS/MIN


 


 Pantoprazole


 Sodium


  (Protonix Tab)  40 mg  QAM


 PO  2/3/18 09:00


 3/5/18 08:59  2/4/18 08:19


40 MG


 


 Calcium Carbonate


  (Tums Chew Tab)  500 mg  Q2H  PRN


 PO  2/3/18 10:00


 3/5/18 09:59   


 


 


 Albuterol/


 Ipratropium


  (Duoneb)  3 ml  Q4R


 INH  2/4/18 22:42


 3/6/18 22:41  2/4/18 07:33


3 ML











Objective


Vital Signs











  Date Time  Temp Pulse Resp B/P (MAP) Pulse Ox O2 Delivery O2 Flow Rate FiO2


 


2/5/18 07:40 36.7 103 18 127/59 (81) 93   


 


2/5/18 04:13 37.2 85 18 109/63 (78) 97 Oxymask 4.0 


 


2/5/18 04:00      Oxymask 4.0 


 


2/5/18 03:30  102 20  97 Mask 6.0 


 


2/5/18 00:05    146/73 (97)    


 


2/5/18 00:01      Nasal Cannula 6.0 


 


2/4/18 23:33 36.7 88 24 184/68 (106) 91 Nasal Cannula 6.0 


 


2/4/18 23:00  74 20  89 Nasal Cannula 6.0 


 


2/4/18 20:00      Nasal Cannula 4.0 


 


2/4/18 19:52 36.6 59 18 132/77 (95) 92  4.0 


 


2/4/18 16:05 36.7 75 20 145/55 (85)  Nasal Cannula 4.0 


 


2/4/18 16:00      Nasal Cannula 4.0 


 


2/4/18 12:00      Nasal Cannula 4.0 


 


2/4/18 11:31 36.9 75 18 151/69 (96) 90   


 


2/4/18 08:05 36.7 70 18 166/71 (102) 92 Nasal Cannula  


 


2/4/18 08:00      Nasal Cannula 4.0 











Physical Exam


Comments:


General Appearance:  no apparent distress, + obese, + pertinent finding (O2 NC)


Head:  normocephalic, atraumatic


Eyes:  normal inspection, PERRL


ENT:  hearing grossly normal


Neck:  supple, no JVD


Respiratory/Chest:  no respiratory distress, no accessory muscle use, normal 

breath sounds, rales noted in b/l bases


Cardiovascular:  + bradycardia (rhythm regular ), + systolic murmur


Abdomen/GI:  normal bowel sounds, non tender, soft


Back:  normal inspection


Extremities/Musculoskelatal:  no calf tenderness, + swelling (+2 pitting edema 

of bilateral lower extremities), + pertinent finding (L lateral foot wound in 

dressing )


Neurologic/Psych:  alert, oriented x 3, 


Skin:  warm/dry, no rash, + pallor





Laboratory Results





Last 24 Hours








Test


  2/4/18


11:10 2/4/18


16:06 2/4/18


19:51 2/5/18


05:30


 


Bedside Glucose 221 mg/dl  155 mg/dl  164 mg/dl  


 


White Blood Count    8.48 K/uL 


 


Red Blood Count    2.63 M/uL 


 


Hemoglobin    7.7 g/dL 


 


Hematocrit    24.8 % 


 


Mean Corpuscular Volume    94.3 fL 


 


Mean Corpuscular Hemoglobin    29.3 pg 


 


Mean Corpuscular Hemoglobin


Concent 


  


  


  31.0 g/dl 


 


 


RDW Standard Deviation    52.2 fL 


 


RDW Coefficient of Variation    15.3 % 


 


Platelet Count    193 K/uL 


 


Mean Platelet Volume    9.0 fL 


 


Prothrombin Time    43.1 SECONDS 


 


Prothromb Time International


Ratio 


  


  


  4.2 


 


 


Sodium Level    139 mmol/L 


 


Potassium Level    5.0 mmol/L 


 


Chloride Level    107 mmol/L 


 


Carbon Dioxide Level    27 mmol/L 


 


Anion Gap    5.0 mmol/L 


 


Blood Urea Nitrogen    66 mg/dl 


 


Creatinine    2.32 mg/dl 


 


Est Creatinine Clear Calc


Drug Dose 


  


  


  40.6 ml/min 


 


 


Estimated GFR (


American) 


  


  


  34.1 


 


 


Estimated GFR (Non-


American 


  


  


  29.4 


 


 


BUN/Creatinine Ratio    28.5 


 


Random Glucose    137 mg/dl 


 


Calcium Level    7.6 mg/dl 


 


Test


  2/5/18


07:34 


  


  


 


 


Bedside Glucose 154 mg/dl    











Assessment and Plan


Patient is a 61 y/o male with a history of CAD, h/o MI s/p stents, HTN, HLD, 

diastolic CHF, COPD, DM II, anxiety, depression, bipolar disorder, anemia and 

GERD, who presented to ED because of worsening SOB x1 day





Acute on chronic diastolic CHF exacerbation:


Patient received lasix 60 mg in total yesterday


SOB has decreased.


will monitor for another day given his electrolyte abnormalities. More on this 

below


- Cardiac enzymes negative x 3


- EKG QAM and PRN for chest pain


- O2 protocol- normally wears 2L O2 supplement


- Monitor I&Os and daily weights 


- Low sodium, 1800 mL fluid restrict 


- ECHO 12/23/17 w/ preserved EF and grade II diastolic CHF 


- Consult cardiology, appreciate recommendations 


Cardio recommends increasing lasix, however, worsening creatinine complicates 

this.


 will need to repeat BMP in AM, and determine if this is an option





Anemia of chronic disease- STABLE: Follow CBC 





CKD stage III- baseline crt 1.6- has worsened. Likely due to lasix.


Lasix was held today. will monitor. small fluid bolus of 250 ml was given.


Patient was also more hyperkalemic. Kaylexalate was given.





b/l chronic diabetic wounds: Following w/ wound care- wound care consulted 





CAD w/ cardiac stenting, HTN, HLD- STABLE: Plavix 75 mg daily, Norvasc 10 mg 

daily, Hydralazine 25 mg QID, Imdur 30 mg BID, and Toprol XL 50 mg BID, Lipitor 

80 mg daily





T2DM w/ neuropathy:


- Continue Lantus 10 u HS 


- BSG ACHS and ISS


- Continue Gabapentin  





Chronic atrial thrombus: Continue Coumadin, follow PT/INR and adjust dose PRN 

for INR goal 2-3 





COPD without exacerbation: Continue home inhalers 





Bipolar disorder, anxiety, depression- STABLE: Depakote 2 g daily, Lexapro 20 

mg daily, Remeron 45 mg daily, and Klonopin





GI prophylaxis: Protonix daily 





DVT Prophylaxis: Coumadin





Code status: LEVEL I, FULL





Dispo: From home, has [facility]- PT/OT and CM consulted

## 2018-02-06 VITALS
TEMPERATURE: 98.24 F | HEART RATE: 67 BPM | OXYGEN SATURATION: 92 % | SYSTOLIC BLOOD PRESSURE: 157 MMHG | DIASTOLIC BLOOD PRESSURE: 76 MMHG

## 2018-02-06 VITALS
OXYGEN SATURATION: 91 % | TEMPERATURE: 98.24 F | DIASTOLIC BLOOD PRESSURE: 72 MMHG | SYSTOLIC BLOOD PRESSURE: 124 MMHG | HEART RATE: 70 BPM

## 2018-02-06 VITALS — OXYGEN SATURATION: 93 % | HEART RATE: 53 BPM

## 2018-02-06 VITALS
DIASTOLIC BLOOD PRESSURE: 67 MMHG | SYSTOLIC BLOOD PRESSURE: 133 MMHG | OXYGEN SATURATION: 94 % | HEART RATE: 62 BPM | TEMPERATURE: 98.42 F

## 2018-02-06 VITALS
DIASTOLIC BLOOD PRESSURE: 65 MMHG | OXYGEN SATURATION: 96 % | TEMPERATURE: 98.06 F | HEART RATE: 60 BPM | SYSTOLIC BLOOD PRESSURE: 143 MMHG

## 2018-02-06 VITALS — OXYGEN SATURATION: 95 % | HEART RATE: 62 BPM

## 2018-02-06 VITALS — OXYGEN SATURATION: 96 % | HEART RATE: 54 BPM

## 2018-02-06 VITALS
SYSTOLIC BLOOD PRESSURE: 156 MMHG | TEMPERATURE: 97.7 F | OXYGEN SATURATION: 98 % | DIASTOLIC BLOOD PRESSURE: 74 MMHG | HEART RATE: 70 BPM

## 2018-02-06 VITALS — HEART RATE: 69 BPM | OXYGEN SATURATION: 80 %

## 2018-02-06 VITALS — OXYGEN SATURATION: 96 % | HEART RATE: 65 BPM

## 2018-02-06 VITALS — OXYGEN SATURATION: 94 %

## 2018-02-06 VITALS — OXYGEN SATURATION: 96 %

## 2018-02-06 VITALS
OXYGEN SATURATION: 93 % | SYSTOLIC BLOOD PRESSURE: 113 MMHG | DIASTOLIC BLOOD PRESSURE: 57 MMHG | HEART RATE: 65 BPM | TEMPERATURE: 97.88 F

## 2018-02-06 VITALS — OXYGEN SATURATION: 94 % | HEART RATE: 48 BPM

## 2018-02-06 LAB
ALBUMIN SERPL-MCNC: 2.4 GM/DL (ref 3.4–5)
ALP SERPL-CCNC: 60 U/L (ref 45–117)
ALT SERPL-CCNC: 14 U/L (ref 12–78)
AST SERPL-CCNC: 11 U/L (ref 15–37)
BASOPHILS # BLD: 0.01 K/UL (ref 0–0.2)
BASOPHILS NFR BLD: 0.1 %
BUN SERPL-MCNC: 68 MG/DL (ref 7–18)
CALCIUM SERPL-MCNC: 7.7 MG/DL (ref 8.5–10.1)
CO2 SERPL-SCNC: 25 MMOL/L (ref 21–32)
CREAT SERPL-MCNC: 2.21 MG/DL (ref 0.6–1.4)
EOS ABS #: 0.16 K/UL (ref 0–0.5)
EOSINOPHIL NFR BLD AUTO: 174 K/UL (ref 130–400)
GLUCOSE SERPL-MCNC: 116 MG/DL (ref 70–99)
HCT VFR BLD CALC: 23.8 % (ref 42–52)
HGB BLD-MCNC: 7.6 G/DL (ref 14–18)
IG#: 0.04 K/UL (ref 0–0.02)
IMM GRANULOCYTES NFR BLD AUTO: 13.4 %
INR PPP: 4.1 (ref 0.9–1.1)
LYMPHOCYTES # BLD: 0.96 K/UL (ref 1.2–3.4)
MCH RBC QN AUTO: 30 PG (ref 25–34)
MCHC RBC AUTO-ENTMCNC: 31.9 G/DL (ref 32–36)
MCV RBC AUTO: 94.1 FL (ref 80–100)
MONO ABS #: 0.59 K/UL (ref 0.11–0.59)
MONOCYTES NFR BLD: 8.3 %
NEUT ABS #: 5.39 K/UL (ref 1.4–6.5)
NEUTROPHILS # BLD AUTO: 2.2 %
NEUTROPHILS NFR BLD AUTO: 75.4 %
PMV BLD AUTO: 9 FL (ref 7.4–10.4)
POTASSIUM SERPL-SCNC: 5.2 MMOL/L (ref 3.5–5.1)
PROT SERPL-MCNC: 6.2 GM/DL (ref 6.4–8.2)
RED CELL DISTRIBUTION WIDTH CV: 15.4 % (ref 11.5–14.5)
RED CELL DISTRIBUTION WIDTH SD: 52.9 FL (ref 36.4–46.3)
SODIUM SERPL-SCNC: 137 MMOL/L (ref 136–145)
WBC # BLD AUTO: 7.15 K/UL (ref 4.8–10.8)

## 2018-02-06 RX ADMIN — IPRATROPIUM BROMIDE AND ALBUTEROL SULFATE SCH ML: .5; 3 SOLUTION RESPIRATORY (INHALATION) at 15:32

## 2018-02-06 RX ADMIN — ISOSORBIDE MONONITRATE SCH MG: 30 TABLET, EXTENDED RELEASE ORAL at 08:12

## 2018-02-06 RX ADMIN — METOPROLOL SUCCINATE SCH MG: 50 TABLET, EXTENDED RELEASE ORAL at 20:43

## 2018-02-06 RX ADMIN — INSULIN GLARGINE SCH UNITS: 100 INJECTION, SOLUTION SUBCUTANEOUS at 20:47

## 2018-02-06 RX ADMIN — INSULIN ASPART SCH UNITS: 100 INJECTION, SOLUTION INTRAVENOUS; SUBCUTANEOUS at 08:16

## 2018-02-06 RX ADMIN — CLOPIDOGREL BISULFATE SCH MG: 75 TABLET, FILM COATED ORAL at 08:09

## 2018-02-06 RX ADMIN — MIRTAZAPINE SCH MG: 15 TABLET, FILM COATED ORAL at 20:41

## 2018-02-06 RX ADMIN — ATORVASTATIN CALCIUM SCH MG: 40 TABLET, FILM COATED ORAL at 08:09

## 2018-02-06 RX ADMIN — AMLODIPINE BESYLATE SCH MG: 5 TABLET ORAL at 08:09

## 2018-02-06 RX ADMIN — IPRATROPIUM BROMIDE AND ALBUTEROL SULFATE SCH ML: .5; 3 SOLUTION RESPIRATORY (INHALATION) at 23:19

## 2018-02-06 RX ADMIN — ESCITALOPRAM OXALATE SCH MG: 20 TABLET, FILM COATED ORAL at 08:10

## 2018-02-06 RX ADMIN — GABAPENTIN SCH MG: 300 CAPSULE ORAL at 20:43

## 2018-02-06 RX ADMIN — FUROSEMIDE SCH MG: 20 TABLET ORAL at 08:13

## 2018-02-06 RX ADMIN — METOPROLOL SUCCINATE SCH MG: 50 TABLET, EXTENDED RELEASE ORAL at 08:12

## 2018-02-06 RX ADMIN — IPRATROPIUM BROMIDE AND ALBUTEROL SULFATE SCH ML: .5; 3 SOLUTION RESPIRATORY (INHALATION) at 07:29

## 2018-02-06 RX ADMIN — INSULIN ASPART SCH UNITS: 100 INJECTION, SOLUTION INTRAVENOUS; SUBCUTANEOUS at 20:47

## 2018-02-06 RX ADMIN — IPRATROPIUM BROMIDE AND ALBUTEROL SULFATE SCH ML: .5; 3 SOLUTION RESPIRATORY (INHALATION) at 19:31

## 2018-02-06 RX ADMIN — TAMSULOSIN HYDROCHLORIDE SCH MG: 0.4 CAPSULE ORAL at 20:43

## 2018-02-06 RX ADMIN — FERROUS SULFATE TAB EC 325 MG (65 MG FE EQUIVALENT) SCH MG: 325 (65 FE) TABLET DELAYED RESPONSE at 08:10

## 2018-02-06 RX ADMIN — WARFARIN SCH MG: 10 TABLET ORAL at 17:49

## 2018-02-06 RX ADMIN — IPRATROPIUM BROMIDE AND ALBUTEROL SULFATE SCH ML: .5; 3 SOLUTION RESPIRATORY (INHALATION) at 11:19

## 2018-02-06 RX ADMIN — IPRATROPIUM BROMIDE AND ALBUTEROL SULFATE SCH ML: .5; 3 SOLUTION RESPIRATORY (INHALATION) at 03:49

## 2018-02-06 RX ADMIN — TIOTROPIUM BROMIDE SCH PUFF: 18 CAPSULE ORAL; RESPIRATORY (INHALATION) at 08:14

## 2018-02-06 RX ADMIN — FERROUS SULFATE TAB EC 325 MG (65 MG FE EQUIVALENT) SCH MG: 325 (65 FE) TABLET DELAYED RESPONSE at 17:34

## 2018-02-06 RX ADMIN — PANTOPRAZOLE SCH MG: 40 TABLET, DELAYED RELEASE ORAL at 08:12

## 2018-02-06 RX ADMIN — INSULIN ASPART SCH UNITS: 100 INJECTION, SOLUTION INTRAVENOUS; SUBCUTANEOUS at 11:47

## 2018-02-06 RX ADMIN — DIVALPROEX SODIUM SCH MG: 500 TABLET, DELAYED RELEASE ORAL at 20:42

## 2018-02-06 RX ADMIN — Medication SCH MCG: at 08:09

## 2018-02-06 RX ADMIN — INSULIN ASPART SCH UNITS: 100 INJECTION, SOLUTION INTRAVENOUS; SUBCUTANEOUS at 17:26

## 2018-02-06 NOTE — NEPHROLOGY CONSULTATION
Nephrology Consultation


Date & Providers





Date of Consultation:  Feb 6, 2018.





Primary Care Provider:  Claude Rowley M.D.





Referring Provider:





Reason for Consultation


FITZ, proteinuria





History of Present Illness


Mr. Dixon was seen and examined in the PCU this morning at the request of 

Dr. Vera for evaluation of FITZ and proteinuria.  Medical records in the EMR 

were reviewed and are summarized as follows:  Mr. Dixon has an extensive 

medical history.  He has longstanding AODM complicated by PVD and nephropathy.  

His baseline creatinine has been 1.2 but patient has proteinuria.  He is under 

the care of Dr. Mullins but has not kept regular outpatient Nephrology 

appointments.  Mr. Dixon's medical history is also significant for HTN, 

hyperlipidemia, ASCVD, diastolic CHF and bipolar disorder.  He has had regular 

follow [place] [facility] [place] management of his blood pressure regimen 

and diuretics.  Previously Mr. Dixon was medically stable on a combination 

of Labetalol 200 mg BID, Amlodipine 10 mg qAM, Spironolactone 50 mg BID and 

Furosemide 40 mg BID.  Mr. Dixon was hospitalized 01/16/18 - 01/23/18 with 

decompensated CHF.  He was discharged to home on O2 at 2 L / min NC.  His 

creatinine had risen from 1.2 to 1.8.  His discharge medications included 

Metoprolol, Furosemide, Amlodipine, Hydralazine, Imdur, Spironolactone and 

Tamsulosin.  Mr. Dixon was readmitted 02/02/18 with 10 kg weight gain (

discharge wt 90 kg), LE edema and recurrent CHF.  CXR revealed pulmonary edema.

  BP and diuretics have been adjusted by Cardiology.  Patient has diuresed ~ 2 

L.  Creatinine has risen to 2.2.  Nephrology consultation has been requested 

for evaluation of FITZ and proteinuria.





Past Medical/Surgical History


Medical:


#  CKD from DM, HTN, microvascular disease.  Baseline creatinine has been ~ 

1.2.  11/17 CTA negative for KIANA


#  Proteinuria - UPCR 1.5, likely related to diabetic nephropathy


#  AODM


#  ASCVD


#  Chronic diastolic heart failure managed in [facility]


#  Hyperlipidemia


#  PVD s/p amputation of several fingers R hand, chronic L ankle ulcer


#  Bipolar disorder


#  Neurogenic bladder requiring chronic gillespie catheter


#  Chronic anemia (acceptable iron stores 12/17)


Surgical:


#  LLE PTA w/ stenting


#  Amputation several fingers R hand








Allergies


Coded Allergies:  


     No Known Allergies (Verified , 2/2/18)





Inpatient Medications





Current Inpatient Medications








 Medications


  (Trade)  Dose


 Ordered  Sig/Dc


 Route  Start Time


 Stop Time Status Last Admin


Dose Admin


 


 Acetaminophen


  (Tylenol Tab)  650 mg  Q4H  PRN


 PO  2/2/18 19:00


 3/4/18 18:59  2/3/18 00:07


650 MG


 


 Al Hydrox/Mg


 Hydrox/Simethicone


  (Maalox Max Susp)  15 ml  Q4H  PRN


 PO  2/2/18 19:00


 3/4/18 18:59   


 


 


 Magnesium


 Hydroxide


  (Milk Of


 Magnesia Susp)  30 ml  Q12H  PRN


 PO  2/2/18 19:00


 3/4/18 18:59   


 


 


 Ondansetron HCl


  (Zofran Inj)  4 mg  Q6H  PRN


 IV  2/2/18 19:00


 3/4/18 18:59   


 


 


 Nitroglycerin


  (Nitrostat Tab)  0.4 mg  UD  PRN


 SL  2/2/18 19:00


 3/4/18 18:59   


 


 


 Morphine Sulfate


  (MoRPHine


 SULFATE INJ)  2 mg  Q30M  PRN


 IV  2/2/18 19:00


 2/16/18 18:59   


 


 


 Polyethylene


  (Miralax Powder


 Packet)  17 gm  DAILY  PRN


 PO  2/2/18 19:00


 3/4/18 18:59   


 


 


 Atorvastatin


 Calcium


  (Lipitor Tab)  80 mg  DAILY


 PO  2/3/18 09:00


 3/5/18 08:59  2/6/18 08:09


80 MG


 


 Clonazepam


  (Klonopin Tab)  1 mg  HS  PRN


 PO  2/2/18 19:00


 3/4/18 18:59  2/3/18 03:35


1 MG


 


 Clopidogrel


 Bisulfate


  (plAVix TAB)  75 mg  QAM


 PO  2/3/18 09:00


 3/5/18 08:59  2/6/18 08:09


75 MG


 


 Divalproex Sodium


  (Depakote Delay


 Rel Tab)  2,000 mg  HS


 PO  2/2/18 21:00


 3/4/18 20:59  2/5/18 21:33


2,000 MG


 


 Docusate Sodium


  (coLACE CAP)  100 mg  BID  PRN


 PO  2/2/18 19:00


 3/4/18 18:59   


 


 


 Escitalopram


 Oxalate


  (Lexapro Tab)  20 mg  QAM


 PO  2/3/18 09:00


 3/5/18 08:59  2/6/18 08:10


20 MG


 


 Fluticasone


 Propionate


  (Flonase Nasal


 Spray)  2 sprays  DAILY  PRN


 VERONICA  2/2/18 19:00


 3/4/18 18:59   


 


 


 Gabapentin


  (Neurontin Cap)  300 mg  HS


 PO  2/2/18 21:00


 3/4/18 20:59  2/5/18 21:33


300 MG


 


 Hydralazine HCl


  (Apresoline Tab)  25 mg  QID


 PO  2/2/18 21:00


 3/4/18 20:59  2/6/18 08:11


25 MG


 


 Insulin Glargine


  (Lantus Solostar


 Pen)  10 units  HS


 SC  2/2/18 21:00


 3/4/18 20:59  2/5/18 21:36


10 UNITS


 


 Isosorbide


 Mononitrate


  (Imdur Ext Rel


 Tab)  30 mg  QAM


 PO  2/3/18 09:00


 3/5/18 08:59  2/6/18 08:12


30 MG


 


 Metoprolol


 Succinate


  (Toprol Xl Tab)  50 mg  BID


 PO  2/2/18 21:00


 3/4/18 20:59  2/6/18 08:12


50 MG


 


 Tamsulosin HCl


  (Flomax Cap)  0.4 mg  HS


 PO  2/2/18 21:00


 3/4/18 20:59  2/5/18 21:32


0.4 MG


 


 Tiotropium Bromide


  (Spiriva


 Handihaler


 Inhaler)  1 puff  DAILY


 INH  2/3/18 09:00


 3/5/18 08:59  2/6/18 08:14


1 PUFF


 


 Warfarin Sodium


  (Coumadin Tab)  7.5 mg  SuMoWeThFr@1600


 PO  2/2/18 20:00


 3/4/18 19:59 Future Hold 2/4/18 17:39


7.5 MG


 


 Albuterol


  (Ventolin Hfa


 Inhaler)  2 puffs  Q4H  PRN


 INH  2/2/18 20:45


 3/4/18 20:44  2/4/18 21:55


2 PUFFS


 


 Amlodipine


 Besylate


  (Norvasc Tab)  10 mg  QAM


 PO  2/3/18 09:00


 3/5/18 08:59  2/6/18 08:09


10 MG


 


 Cyanocobalamin


  (Vitamin B-12


 Tab)  1,000 mcg  DAILY


 PO  2/3/18 09:00


 3/5/18 08:59  2/6/18 08:09


1,000 MCG


 


 Ferrous Sulfate


  (Feosol Tab)  325 mg  BIDM


 PO  2/3/18 07:30


 3/5/18 07:59  2/6/18 08:10


325 MG


 


 Mirtazapine


  (Remeron Tab)  45 mg  HS


 PO  2/2/18 21:00


 3/4/18 20:59  2/5/18 21:33


45 MG


 


 Oxycodone HCl


  (Roxicodone


 Immediate Rel Tab)  10 mg  Q12  PRN


 PO  2/2/18 19:00


 3/4/18 18:59  2/5/18 23:42


10 MG


 


 Spironolactone


  (Aldactone Tab)  50 mg  BID


 PO  2/2/18 21:00


 3/4/18 20:59 Future Hold  


 


 


 Warfarin Sodium


  (Coumadin Tab)  10 mg  TuSa@1600


 PO  2/3/18 16:00


 3/5/18 15:59  2/3/18 16:58


10 MG


 


 Insulin Aspart


  (novoLOG ASPART)  **SLIDING


 SCALE**


 **G...  ACHS


 SC  2/2/18 21:00


 3/4/18 20:59  2/6/18 08:16


7 UNITS


 


 Miscellaneous


  (Iv Fluids


 Completed)  1 ea  PRN  PRN


 N/A  2/2/18 19:30


 2/2/19 19:29   


 


 


 Furosemide 20 mg/


 Syringe  2 ml @ 4


 mls/min  BID


 IV  2/3/18 09:00


 3/4/18 20:59 Future Hold 2/3/18 20:51


4 MLS/MIN


 


 Pantoprazole


 Sodium


  (Protonix Tab)  40 mg  QAM


 PO  2/3/18 09:00


 3/5/18 08:59  2/6/18 08:12


40 MG


 


 Calcium Carbonate


  (Tums Chew Tab)  500 mg  Q2H  PRN


 PO  2/3/18 10:00


 3/5/18 09:59  2/5/18 17:38


500 MG


 


 Albuterol/


 Ipratropium


  (Duoneb)  3 ml  Q4R


 INH  2/4/18 22:42


 3/6/18 22:41  2/6/18 07:29


3 ML


 


 Furosemide


  (Lasix Tab)  20 mg  QAM


 PO  2/5/18 09:00


 3/7/18 08:59  2/6/18 08:13


20 MG











Family History





Cancer (esophageal)


Diabetes mellitus


Heart disease


Hypertension


Negative for CKD/ESRD





Social History


Smoking Status:  Former Smoker


Drug Use:  none


Marital Status:  


Housing Status:  lives with family, other


Occupation:  disabled


.  Disabled.  Former smoker





Review of Systems


Constitutional:  No fever


Respiratory:  + dyspnea on exertion


Cardiovascular:  No chest pain


Abdomen:  No nausea, No vomiting


A complete review of systems was performed.  Pertinent positives are noted 

above. All other systems are negative.





Physical Exam











  Date Time  Temp Pulse Resp B/P (MAP) Pulse Ox O2 Delivery O2 Flow Rate FiO2


 


2/6/18 08:00      Nasal Cannula 4.0 


 


2/6/18 07:41 36.5 70 22 156/74 (101) 98 Nasal Cannula 6.0 


 


2/6/18 07:29  69 20  80 Room Air  


 


2/6/18 04:00      Nasal Cannula 4.0 


 


2/6/18 03:49  65 20  96 Nasal Cannula 6.0 


 


2/6/18 03:25 36.8 70 22 124/72 (89) 91 Nasal Cannula 6.0 





      Humidified Oxygen  


 


2/6/18 00:00      Nasal Cannula 4.0 


 


2/5/18 23:48 36.6 67 20 170/84 (112) 96 Nasal Cannula 6.0 





      Humidified Oxygen  


 


2/5/18 23:15  71 20  86 Nasal Cannula 4.0 


 


2/5/18 20:00      Nasal Cannula 4.0 


 


2/5/18 19:20 36.6 60 18 149/66 (93) 100 Nebulizer  


 


2/5/18 19:05  62 20  92 Nasal Cannula 4.0 


 


2/5/18 16:00      Nasal Cannula 4.0 


 


2/5/18 15:57  56 20  94 Nasal Cannula 4.0 


 


2/5/18 15:21 36.9 57 20 117/52 (73) 95 Nasal Cannula 4.0 


 


2/5/18 12:00      Nasal Cannula 4.0 


 


2/5/18 11:33 36.7 58 20 124/56 (78) 92  4.0 


 


2/5/18 11:18  53 20  93 Nasal Cannula 4.0 








General Appearance:  no apparent distress (chronically ill appearing)


Head:  atraumatic


Eyes:  PERRL, EOMI


Neck:  no adenopathy


Respiratory/Chest:  no respiratory distress (poor air exchange.  Basilar rales 

noted)


Cardiovascular:  regular rate, rhythm


Abdomen/GI:  normal bowel sounds, non tender, soft


Genitourinary - Male:  + pertinent finding (gillespie catheter in place)


Extremities/Musculoskelatal:  + pertinent finding (1+ pretibial pitting edema)


Neurologic/Psych:  alert, oriented x 3





Laboratory Results


Renal US 12/17:  14 cm kidneys, doppler negative for KIANA





Echocardiogram 12/17:  LVEF 55%, dilated RV and RA





Last 24 Hours








Test


  2/5/18


11:19 2/5/18


16:23 2/5/18


17:40 2/5/18


20:20


 


Bedside Glucose 229 mg/dl  89 mg/dl   166 mg/dl 


 


Urine Random Creatinine   142.0 mg/dl  


 


Urine Random Total Protein   219.8 mg/dl  


 


Test


  2/6/18


05:56 2/6/18


06:27 


  


 


 


White Blood Count 7.15 K/uL    


 


Red Blood Count 2.53 M/uL    


 


Hemoglobin 7.6 g/dL    


 


Hematocrit 23.8 %    


 


Mean Corpuscular Volume 94.1 fL    


 


Mean Corpuscular Hemoglobin 30.0 pg    


 


Mean Corpuscular Hemoglobin


Concent 31.9 g/dl 


  


  


  


 


 


Platelet Count 174 K/uL    


 


Mean Platelet Volume 9.0 fL    


 


Neutrophils (%) (Auto) 75.4 %    


 


Lymphocytes (%) (Auto) 13.4 %    


 


Monocytes (%) (Auto) 8.3 %    


 


Eosinophils (%) (Auto) 2.2 %    


 


Basophils (%) (Auto) 0.1 %    


 


Neutrophils # (Auto) 5.39 K/uL    


 


Lymphocytes # (Auto) 0.96 K/uL    


 


Monocytes # (Auto) 0.59 K/uL    


 


Eosinophils # (Auto) 0.16 K/uL    


 


Basophils # (Auto) 0.01 K/uL    


 


RDW Standard Deviation 52.9 fL    


 


RDW Coefficient of Variation 15.4 %    


 


Immature Granulocyte % (Auto) 0.6 %    


 


Immature Granulocyte # (Auto) 0.04 K/uL    


 


Red Blood Cell Morphology Unremarkable    


 


Prothrombin Time 42.0 SECONDS    


 


Prothromb Time International


Ratio 4.1 


  


  


  


 


 


Sodium Level 137 mmol/L    


 


Potassium Level 5.2 mmol/L    


 


Chloride Level 106 mmol/L    


 


Carbon Dioxide Level 25 mmol/L    


 


Anion Gap 6.0 mmol/L    


 


Blood Urea Nitrogen 68 mg/dl    


 


Creatinine 2.21 mg/dl    


 


Est Creatinine Clear Calc


Drug Dose 42.6 ml/min 


  


  


  


 


 


Estimated GFR (


American) 36.2 


  


  


  


 


 


Estimated GFR (Non-


American 31.2 


  


  


  


 


 


BUN/Creatinine Ratio 30.7    


 


Random Glucose 116 mg/dl    


 


Calcium Level 7.7 mg/dl    


 


Total Bilirubin 0.2 mg/dl    


 


Direct Bilirubin < 0.1 mg/dl    


 


Aspartate Amino Transf


(AST/SGOT) 11 U/L 


  


  


  


 


 


Alanine Aminotransferase


(ALT/SGPT) 14 U/L 


  


  


  


 


 


Alkaline Phosphatase 60 U/L    


 


Total Protein 6.2 gm/dl    


 


Albumin 2.4 gm/dl    


 


Bedside Glucose  122 mg/dl   











Impression





(1) Acute kidney injury


(2) Proteinuria


(3) Anemia


(4) Acute diastolic (congestive) heart failure


(5) Hypertension


(6) Former smoker


62 year old white male with recurrent CHF.  12/17 echocardiogram revealed LVEF 

55% but dilated RV and RA.  Patient likely has a combination of pulmonary HTN 

and diastolic dysfunction.  Compliance with outpatient medical regimen and 

office visits has been suboptimal.  Kidney function has declined in response to 

diuretic therapy.  12/17 renal US revealed 14 cm kidneys.  Doppler was negative 

for KIANA.  Urine sediment is difficult to interpret due to chronic indwelling 

gillespie catheter.  UPCR 1.5 g.





Recommendations


ACUTE KIDNEY INJURY:


-- Baseline creatinine has been ~ 1.2.  Patient appears clinically volume 

contracted.  Suspect he will always have some LE edema due to pulmonary HTN/

diastolic dysfunction.  CXR films reviewed today and show interval improvement 

in CHF


-- Agree w/ holding diuretic at this time


-- Patient has mild hyperkalemia.  Agree w/ holding Spironolactone at this time.


-- Monitor serial PRP





PROTEINURIA:


-- Likely related to diabetic nephropathy.  Will check UIEP





HYPERTENSION:


-- Patient has had care provided via CHF clinic.  They have been actively 

adjusting his BP meds and diuretics.  Will defer management to them.  Dr. Kocher

's notes have been reviewed.  Agree with trying to simplify patient's medical 

regimen to improve outpatient compliance





ANEMIA:


-- Consider blood transfusion to maintain Hgb > 8.0

## 2018-02-06 NOTE — HOSPITALIST PROGRESS NOTE
Hospitalist Progress Note


Date of Service


Feb 6, 2018.





Subjective


Pt evaluation today including:  conversation w/ patient, physical exam, chart 

review, lab review, review of studies


Pain:  None


PO Intake:  Good


Voiding:  no voiding problems


The patient was seen and examined this morning. Pt reports doing well, he slept 

good overnight. He has no acute complaints. Pt denies feeling swollen in his 

hands or feet.  He has been up and walking without any difficulty.  His eyes 

are less swollen today. 





ROS: 6 point ROS reviewed and otherwise negative.





Objective


Vital Signs











  Date Time  Temp Pulse Resp B/P (MAP) Pulse Ox O2 Delivery O2 Flow Rate FiO2


 


2/6/18 07:41 36.5 70 22 156/74 (101) 98 Nasal Cannula 6.0 


 


2/6/18 07:29  69 20  80 Room Air  


 


2/6/18 04:00      Nasal Cannula 4.0 


 


2/6/18 03:49  65 20  96 Nasal Cannula 6.0 


 


2/6/18 03:25 36.8 70 22 124/72 (89) 91 Nasal Cannula 6.0 





      Humidified Oxygen  


 


2/6/18 00:00      Nasal Cannula 4.0 


 


2/5/18 23:48 36.6 67 20 170/84 (112) 96 Nasal Cannula 6.0 





      Humidified Oxygen  


 


2/5/18 23:15  71 20  86 Nasal Cannula 4.0 


 


2/5/18 20:00      Nasal Cannula 4.0 


 


2/5/18 19:20 36.6 60 18 149/66 (93) 100 Nebulizer  


 


2/5/18 19:05  62 20  92 Nasal Cannula 4.0 


 


2/5/18 16:00      Nasal Cannula 4.0 


 


2/5/18 15:57  56 20  94 Nasal Cannula 4.0 


 


2/5/18 15:21 36.9 57 20 117/52 (73) 95 Nasal Cannula 4.0 


 


2/5/18 12:00      Nasal Cannula 4.0 


 


2/5/18 11:33 36.7 58 20 124/56 (78) 92  4.0 


 


2/5/18 11:18  53 20  93 Nasal Cannula 4.0 











Physical Exam


Notes:


General Appearance:  WD/WN, no apparent distress, 


Eyes:  PERRL, EOMI, + pertinent finding (periorbital edema bilaterally greatly 

improved)


ENT:  hearing grossly normal, pharynx normal, + pertinent finding (MMM)


Neck:  supple, no JVD


Respiratory/Chest:  chest non-tender, lungs clear, no respiratory distress, no 

accessory muscle use, + pertinent finding (on 4 L via NC)


Cardiovascular:  regular rate, rhythm (extra beats), no JVD, + systolic murmur (

grade III/VI)


Abdomen:  normal bowel sounds, non tender, soft


Extremities:  non-tender, no calf tenderness, + pertinent finding (1+ pitting 

edema in BLE.  Multiple wounds over BLE appear to be healing.  )


Neurologic/Psychiatric:  alert, normal mood/affect, oriented x 3


Skin:  warm/dry





Laboratory Results





Last 24 Hours








Test


  2/5/18


11:19 2/5/18


16:23 2/5/18


17:40 2/5/18


20:20


 


Bedside Glucose 229 mg/dl  89 mg/dl   166 mg/dl 


 


Urine Random Creatinine   142.0 mg/dl  


 


Urine Random Total Protein   219.8 mg/dl  


 


Test


  2/6/18


05:56 2/6/18


06:27 


  


 


 


White Blood Count 7.15 K/uL    


 


Red Blood Count 2.53 M/uL    


 


Hemoglobin 7.6 g/dL    


 


Hematocrit 23.8 %    


 


Mean Corpuscular Volume 94.1 fL    


 


Mean Corpuscular Hemoglobin 30.0 pg    


 


Mean Corpuscular Hemoglobin


Concent 31.9 g/dl 


  


  


  


 


 


Platelet Count 174 K/uL    


 


Mean Platelet Volume 9.0 fL    


 


Neutrophils (%) (Auto) 75.4 %    


 


Lymphocytes (%) (Auto) 13.4 %    


 


Monocytes (%) (Auto) 8.3 %    


 


Eosinophils (%) (Auto) 2.2 %    


 


Basophils (%) (Auto) 0.1 %    


 


Neutrophils # (Auto) 5.39 K/uL    


 


Lymphocytes # (Auto) 0.96 K/uL    


 


Monocytes # (Auto) 0.59 K/uL    


 


Eosinophils # (Auto) 0.16 K/uL    


 


Basophils # (Auto) 0.01 K/uL    


 


RDW Standard Deviation 52.9 fL    


 


RDW Coefficient of Variation 15.4 %    


 


Immature Granulocyte % (Auto) 0.6 %    


 


Immature Granulocyte # (Auto) 0.04 K/uL    


 


Red Blood Cell Morphology Unremarkable    


 


Prothrombin Time 42.0 SECONDS    


 


Prothromb Time International


Ratio 4.1 


  


  


  


 


 


Sodium Level 137 mmol/L    


 


Potassium Level 5.2 mmol/L    


 


Chloride Level 106 mmol/L    


 


Carbon Dioxide Level 25 mmol/L    


 


Anion Gap 6.0 mmol/L    


 


Blood Urea Nitrogen 68 mg/dl    


 


Creatinine 2.21 mg/dl    


 


Est Creatinine Clear Calc


Drug Dose 42.6 ml/min 


  


  


  


 


 


Estimated GFR (


American) 36.2 


  


  


  


 


 


Estimated GFR (Non-


American 31.2 


  


  


  


 


 


BUN/Creatinine Ratio 30.7    


 


Random Glucose 116 mg/dl    


 


Calcium Level 7.7 mg/dl    


 


Total Bilirubin 0.2 mg/dl    


 


Direct Bilirubin < 0.1 mg/dl    


 


Aspartate Amino Transf


(AST/SGOT) 11 U/L 


  


  


  


 


 


Alanine Aminotransferase


(ALT/SGPT) 14 U/L 


  


  


  


 


 


Alkaline Phosphatase 60 U/L    


 


Total Protein 6.2 gm/dl    


 


Albumin 2.4 gm/dl    


 


Bedside Glucose  122 mg/dl   











Assessment and Plan


Patient is a 63 y/o male with a history of CAD, h/o MI s/p stents, HTN, HLD, 

diastolic CHF, COPD, DM II, anxiety, depression, bipolar disorder, anemia and 

GERD, who presented to ED because of worsening SOB x1 day





Acute on chronic diastolic CHF exacerbation:


- Cardiac enzymes negative x 3, ekg prn cp, O2 protocol, home O2 is 2 L 


- Strict I/Ox, daily weights  - compared to last admission pt is still not at 

dry weight however appears to be euvolemic.  Physical exam improving, no JVD, 

and likely will continue to have mild pitting edema in BLE chronically.  

Discussed with Dr. Yusuf today regarding regimen, and will likely be able to do 

lasix to 40 mg PO BID along with spironolactone whenever able to be resumed. 


- Cont Lasix 20 mg PO daily for now, Cr. is improved today.  


- Low sodium, 1800 mL fluid restrict 


- ECHO 12/23/17 w/ preserved EF and grade II diastolic CHF 





Elevated INR


- 4.1, hold warfarin today - watch for any signs of bleeding, cbc stable- pt 

denies any occult bleeding. Heme check stools





Anemia of chronic disease- STABLE:


- Hgb stable at 7.1, monitor with am labs.  Repeat is pt becomes symptomatic. 

Will hold on blood transfusion as pt is asymptomatic.





FITZ on CKD stage III


- baseline crt 1.6-  today 2.2, improving slowly - likely due to aggressive 

diuresis


- on 2/4 small fluid bolus of 250 ml was given and lasix held, hold diuretics 

for the third day in a row at this point.  Allow lasix 20 mg PO daily for 

maintenance. 


- Nephology consulted for proteinuria, - appreciate recs - feel this is due to 

diabetic nephropathy.  


- K+ 5.2 today,  he had required a dose of Kayexalate over the weekend  - will 

monitor am labs for now as kayexalate didn't improve hyperkalemia much. 





b/l chronic diabetic wounds: 


- Following w/ wound care- wound care consulted 





CAD w/ cardiac stenting


HTN


HLD


- Plavix 75 mg daily, Norvasc 10 mg daily, Hydralazine 25 mg QID, Imdur 30 mg 

BID, and Toprol XL 50 mg BID, Lipitor 80 mg daily





T2DM w/ neuropathy:


- Continue Lantus 10 u HS 


- BSG ACHS and ISS


- Continue Gabapentin  





Chronic atrial thrombus: Continue Coumadin, follow PT/INR and adjust dose PRN 

for INR goal 2-3 


- Holding todays dose with INR of 4.1





COPD without exacerbation: Continue home inhalers 





Bipolar disorder, anxiety, depression- STABLE: Depakote 2 g daily, Lexapro 20 

mg daily, Remeron 45 mg daily, and Klonopin





GI prophylaxis: Protonix daily 





DVT Prophylaxis: Coumadin





Code status: LEVEL I, FULL





Dispo: From home, has [facility]- PT/OT and CM consulted, discharge date unknown, 

possible in 1-2 days

## 2018-02-06 NOTE — CARDIOLOGY PROGRESS NOTE
DATE: 02/06/2018

 

SUBJECTIVE:  Mr. Dixon is resting comfortably in bed without complaints of

chest pain or dyspnea.

 

OBJECTIVE:

VITAL SIGNS:  Blood pressure 150/76 with a regular pulse of 67.  Respiratory

rate is 20.  The patient is afebrile at 36.8 degrees Celsius.  Saturations

92% on room air.

NECK:  Supple with full carotid upstrokes.  No obvious bruits.  Jugular

venous pressure is flat at 90 degrees.  There is no thyromegaly.

CARDIOVASCULAR:  Reveals a regular rhythm with distant heart sounds.  A 2/6

basal systolic ejection murmur is noted.

LUNGS:  Clear without rales, rhonchi, or wheezes.

ABDOMEN:  Soft without bruits.

EXTREMITIES:  Reveal intact radial artery pulses bilaterally.  Trace

pretibial edema is noted.

 

DATA:  CBC notes a hemoglobin of 7.6, hematocrit 23.8, white count 7.15, and

platelet count of 174,000.  INR is 4.1.  Telemetry monitor notes sinus rhythm

with an occasional PAC and PVC.

 

IMPRESSION AND PLAN:

1. Acute on chronic diastolic congestive heart failure -- his volume status

has improved dramatically from admission.  His spironolactone currently on

hold.  He is on low dose furosemide at 20 mg daily.  Suspect he will need

a higher dose at the time of discharge.

2. Coronary artery disease, stable on current medical regimen.

3. Hypertension -- controlled.

4. Mild to moderate aortic stenosis.

5. Mild mitral stenosis.

 

 

 

 

MTDD

## 2018-02-07 VITALS
SYSTOLIC BLOOD PRESSURE: 147 MMHG | TEMPERATURE: 98.96 F | HEART RATE: 58 BPM | OXYGEN SATURATION: 94 % | DIASTOLIC BLOOD PRESSURE: 80 MMHG

## 2018-02-07 VITALS — OXYGEN SATURATION: 95 % | HEART RATE: 62 BPM

## 2018-02-07 VITALS — DIASTOLIC BLOOD PRESSURE: 46 MMHG | SYSTOLIC BLOOD PRESSURE: 123 MMHG | TEMPERATURE: 98.42 F | HEART RATE: 58 BPM

## 2018-02-07 VITALS — SYSTOLIC BLOOD PRESSURE: 137 MMHG | DIASTOLIC BLOOD PRESSURE: 67 MMHG | TEMPERATURE: 99.14 F | HEART RATE: 80 BPM

## 2018-02-07 VITALS — HEART RATE: 65 BPM | SYSTOLIC BLOOD PRESSURE: 136 MMHG | DIASTOLIC BLOOD PRESSURE: 61 MMHG

## 2018-02-07 VITALS — OXYGEN SATURATION: 93 % | HEART RATE: 55 BPM

## 2018-02-07 VITALS — HEART RATE: 62 BPM | OXYGEN SATURATION: 93 %

## 2018-02-07 VITALS — HEART RATE: 64 BPM | OXYGEN SATURATION: 90 %

## 2018-02-07 VITALS — TEMPERATURE: 98.6 F | DIASTOLIC BLOOD PRESSURE: 63 MMHG | HEART RATE: 60 BPM | SYSTOLIC BLOOD PRESSURE: 122 MMHG

## 2018-02-07 VITALS — HEART RATE: 60 BPM | DIASTOLIC BLOOD PRESSURE: 63 MMHG | TEMPERATURE: 98.6 F | SYSTOLIC BLOOD PRESSURE: 122 MMHG

## 2018-02-07 VITALS
TEMPERATURE: 97.88 F | HEART RATE: 62 BPM | OXYGEN SATURATION: 92 % | DIASTOLIC BLOOD PRESSURE: 70 MMHG | SYSTOLIC BLOOD PRESSURE: 167 MMHG

## 2018-02-07 VITALS
DIASTOLIC BLOOD PRESSURE: 62 MMHG | OXYGEN SATURATION: 93 % | HEART RATE: 56 BPM | TEMPERATURE: 97.88 F | SYSTOLIC BLOOD PRESSURE: 108 MMHG

## 2018-02-07 VITALS — OXYGEN SATURATION: 96 %

## 2018-02-07 VITALS
HEART RATE: 76 BPM | DIASTOLIC BLOOD PRESSURE: 75 MMHG | OXYGEN SATURATION: 92 % | SYSTOLIC BLOOD PRESSURE: 118 MMHG | TEMPERATURE: 98.24 F

## 2018-02-07 VITALS — OXYGEN SATURATION: 92 % | HEART RATE: 64 BPM

## 2018-02-07 VITALS — OXYGEN SATURATION: 92 % | TEMPERATURE: 98.24 F | HEART RATE: 64 BPM

## 2018-02-07 VITALS — HEART RATE: 60 BPM | OXYGEN SATURATION: 94 %

## 2018-02-07 VITALS — OXYGEN SATURATION: 92 %

## 2018-02-07 VITALS — SYSTOLIC BLOOD PRESSURE: 127 MMHG | DIASTOLIC BLOOD PRESSURE: 64 MMHG

## 2018-02-07 LAB
ALBUMIN SERPL-MCNC: 2.6 GM/DL (ref 3.4–5)
ALP SERPL-CCNC: 61 U/L (ref 45–117)
ALT SERPL-CCNC: 21 U/L (ref 12–78)
AST SERPL-CCNC: 22 U/L (ref 15–37)
BASOPHILS # BLD: 0.01 K/UL (ref 0–0.2)
BASOPHILS NFR BLD: 0.2 %
BUN SERPL-MCNC: 76 MG/DL (ref 7–18)
CALCIUM SERPL-MCNC: 8 MG/DL (ref 8.5–10.1)
CO2 SERPL-SCNC: 25 MMOL/L (ref 21–32)
CREAT SERPL-MCNC: 2.23 MG/DL (ref 0.6–1.4)
EOS ABS #: 0.14 K/UL (ref 0–0.5)
EOSINOPHIL NFR BLD AUTO: 171 K/UL (ref 130–400)
GLUCOSE SERPL-MCNC: 144 MG/DL (ref 70–99)
HCT VFR BLD CALC: 23.6 % (ref 42–52)
HGB BLD-MCNC: 7.5 G/DL (ref 14–18)
IG#: 0.04 K/UL (ref 0–0.02)
IMM GRANULOCYTES NFR BLD AUTO: 9.3 %
INR PPP: 2.3 (ref 0.9–1.1)
LYMPHOCYTES # BLD: 0.61 K/UL (ref 1.2–3.4)
MCH RBC QN AUTO: 29.9 PG (ref 25–34)
MCHC RBC AUTO-ENTMCNC: 31.8 G/DL (ref 32–36)
MCV RBC AUTO: 94 FL (ref 80–100)
MONO ABS #: 0.42 K/UL (ref 0.11–0.59)
MONOCYTES NFR BLD: 6.4 %
NEUT ABS #: 5.32 K/UL (ref 1.4–6.5)
NEUTROPHILS # BLD AUTO: 2.1 %
NEUTROPHILS NFR BLD AUTO: 81.4 %
PMV BLD AUTO: 9.1 FL (ref 7.4–10.4)
POTASSIUM SERPL-SCNC: 5.2 MMOL/L (ref 3.5–5.1)
PROT SERPL-MCNC: 6.5 GM/DL (ref 6.4–8.2)
RED CELL DISTRIBUTION WIDTH CV: 15.4 % (ref 11.5–14.5)
RED CELL DISTRIBUTION WIDTH SD: 52.1 FL (ref 36.4–46.3)
SODIUM SERPL-SCNC: 136 MMOL/L (ref 136–145)
TRANSFERRIN SERPL-MCNC: 240 MG/DL (ref 200–360)
WBC # BLD AUTO: 6.54 K/UL (ref 4.8–10.8)

## 2018-02-07 RX ADMIN — IPRATROPIUM BROMIDE AND ALBUTEROL SULFATE SCH ML: .5; 3 SOLUTION RESPIRATORY (INHALATION) at 19:47

## 2018-02-07 RX ADMIN — INSULIN GLARGINE SCH UNITS: 100 INJECTION, SOLUTION SUBCUTANEOUS at 21:07

## 2018-02-07 RX ADMIN — IPRATROPIUM BROMIDE AND ALBUTEROL SULFATE SCH ML: .5; 3 SOLUTION RESPIRATORY (INHALATION) at 11:10

## 2018-02-07 RX ADMIN — GABAPENTIN SCH MG: 300 CAPSULE ORAL at 21:00

## 2018-02-07 RX ADMIN — INSULIN ASPART SCH UNITS: 100 INJECTION, SOLUTION INTRAVENOUS; SUBCUTANEOUS at 21:06

## 2018-02-07 RX ADMIN — Medication SCH GM: at 12:15

## 2018-02-07 RX ADMIN — Medication SCH MCG: at 07:36

## 2018-02-07 RX ADMIN — DOCUSATE SODIUM SCH MG: 100 CAPSULE, LIQUID FILLED ORAL at 21:12

## 2018-02-07 RX ADMIN — AMLODIPINE BESYLATE SCH MG: 5 TABLET ORAL at 07:36

## 2018-02-07 RX ADMIN — DOCUSATE SODIUM SCH MG: 100 CAPSULE, LIQUID FILLED ORAL at 12:16

## 2018-02-07 RX ADMIN — TIOTROPIUM BROMIDE SCH PUFF: 18 CAPSULE ORAL; RESPIRATORY (INHALATION) at 07:34

## 2018-02-07 RX ADMIN — ISOSORBIDE MONONITRATE SCH MG: 30 TABLET, EXTENDED RELEASE ORAL at 07:35

## 2018-02-07 RX ADMIN — IPRATROPIUM BROMIDE AND ALBUTEROL SULFATE SCH ML: .5; 3 SOLUTION RESPIRATORY (INHALATION) at 04:00

## 2018-02-07 RX ADMIN — INSULIN ASPART SCH UNITS: 100 INJECTION, SOLUTION INTRAVENOUS; SUBCUTANEOUS at 12:25

## 2018-02-07 RX ADMIN — ATORVASTATIN CALCIUM SCH MG: 40 TABLET, FILM COATED ORAL at 07:36

## 2018-02-07 RX ADMIN — IPRATROPIUM BROMIDE AND ALBUTEROL SULFATE SCH ML: .5; 3 SOLUTION RESPIRATORY (INHALATION) at 23:29

## 2018-02-07 RX ADMIN — MIRTAZAPINE SCH MG: 15 TABLET, FILM COATED ORAL at 20:59

## 2018-02-07 RX ADMIN — METOPROLOL SUCCINATE SCH MG: 50 TABLET, EXTENDED RELEASE ORAL at 07:36

## 2018-02-07 RX ADMIN — WARFARIN SODIUM SCH MG: 7.5 TABLET ORAL at 17:05

## 2018-02-07 RX ADMIN — IPRATROPIUM BROMIDE AND ALBUTEROL SULFATE SCH ML: .5; 3 SOLUTION RESPIRATORY (INHALATION) at 07:11

## 2018-02-07 RX ADMIN — INSULIN ASPART SCH UNITS: 100 INJECTION, SOLUTION INTRAVENOUS; SUBCUTANEOUS at 17:09

## 2018-02-07 RX ADMIN — IPRATROPIUM BROMIDE AND ALBUTEROL SULFATE SCH ML: .5; 3 SOLUTION RESPIRATORY (INHALATION) at 14:48

## 2018-02-07 RX ADMIN — METOPROLOL SUCCINATE SCH MG: 50 TABLET, EXTENDED RELEASE ORAL at 21:00

## 2018-02-07 RX ADMIN — PANTOPRAZOLE SCH MG: 40 TABLET, DELAYED RELEASE ORAL at 07:34

## 2018-02-07 RX ADMIN — OXYCODONE HYDROCHLORIDE PRN MG: 5 TABLET ORAL at 01:09

## 2018-02-07 RX ADMIN — ESCITALOPRAM OXALATE SCH MG: 20 TABLET, FILM COATED ORAL at 07:35

## 2018-02-07 RX ADMIN — DIVALPROEX SODIUM SCH MG: 500 TABLET, DELAYED RELEASE ORAL at 20:58

## 2018-02-07 RX ADMIN — FERROUS SULFATE TAB EC 325 MG (65 MG FE EQUIVALENT) SCH MG: 325 (65 FE) TABLET DELAYED RESPONSE at 17:05

## 2018-02-07 RX ADMIN — INSULIN ASPART SCH UNITS: 100 INJECTION, SOLUTION INTRAVENOUS; SUBCUTANEOUS at 07:47

## 2018-02-07 RX ADMIN — CLOPIDOGREL BISULFATE SCH MG: 75 TABLET, FILM COATED ORAL at 07:33

## 2018-02-07 RX ADMIN — TAMSULOSIN HYDROCHLORIDE SCH MG: 0.4 CAPSULE ORAL at 20:59

## 2018-02-07 RX ADMIN — FERROUS SULFATE TAB EC 325 MG (65 MG FE EQUIVALENT) SCH MG: 325 (65 FE) TABLET DELAYED RESPONSE at 07:35

## 2018-02-07 NOTE — CARDIOLOGY PROGRESS NOTE
DATE: 02/07/2018

 

SUBJECTIVE:  Mr. Dixon is resting comfortably in bed without complaints of

chest pain or dyspnea.  Seems confused this morning.

 

OBJECTIVE:

VITAL SIGNS:  Blood pressure 167/70 with a regular pulse of 64.  Respiratory

rate is 18 and the patient is afebrile at 36.8 degrees Celsius.  Saturations

92% on 4 liters nasal cannula.

NECK:  Supple with full carotid upstrokes.  There are no carotid bruits. 

Jugular venous pressure is flat at 90 degrees.  There is no thyromegaly.

CARDIOVASCULAR:  Reveals a regular rhythm with normal S1 and S2.  A 2/6 basal

systolic ejection murmur is noted.  No S3.

LUNGS:  Clear without rales, rhonchi, or wheezes.

ABDOMEN:  Soft without bruits.

EXTREMITIES:  Reveal intact radial artery pulses bilaterally.  There is trace

pretibial edema.

 

LABORATORY DATA:  CBC notes a hemoglobin of 7.5, hematocrit 23.6, white count

6.5, platelet count 171,000.  Electrolytes note a sodium of 136, potassium

5.2, chloride 104, bicarbonate 25, BUN 76, creatinine 2.23, glucose 144.  INR

is 2.3.  Telemetry monitor is benign.

 

IMPRESSION AND PLAN:

1.  Acute on chronic diastolic congestive heart failure -- diuretics

currently on hold as he may be volume contracted.  Diuretic management per

Dr. Parkinson.

2.  Coronary artery disease -- stable on current medical regimen.

3.  Hypertension -- controlled.

4.  Mild-to-moderate aortic stenosis.

5.  Mild mitral stenosis.

## 2018-02-07 NOTE — NEPHROLOGY PROGRESS NOTE
Nephrology Progress Note


Date of Service


Feb 7, 2018.





Chief Complaint


FITZ, proteinuria





Subjective


Mr. Dixon was seen & examined in the PCU this morning.  He currently denies 

angina.  He reports that his breathing is more comfortable.  He voices no new 

medical concerns at this time.





Review of Systems


Constitutional:  No fever


Cardiovascular:  No chest pain


Respiratory:  No dyspnea at rest


Abdomen:  No pain, No nausea, No vomiting


Extremities:  + leg edema


A complete review of systems was performed.  Pertinent positives are noted 

above. All other systems are negative.





Vital Signs





Last 8 Hrs








  Date Time  Temp Pulse Resp B/P (MAP) Pulse Ox O2 Delivery O2 Flow Rate FiO2


 


2/7/18 08:11 36.8 64 18  92 Nasal Cannula  


 


2/7/18 08:00      Nasal Cannula 4.0 


 


2/7/18 07:12  64 16  90 Nasal Cannula 6.0 


 


2/7/18 04:00      Nasal Cannula 4.0 


 


2/7/18 04:00  62 16  95 Nasal Cannula 6.0 


 


2/7/18 03:54 36.6 62 22 167/70 (102) 92 Nasal Cannula 6.0 





      Humidified Oxygen  











Last Recorded Weight


Weight (Kilograms):  98.900





Physical Exam


General Appearance:  no apparent distress


Head:  normocephalic, atraumatic


Eyes:  PERRL


Neck:  no adenopathy


Respiratory/Chest:  lungs clear


Cardiovascular:  regular rate, rhythm, + systolic murmur


Abdomen/GI:  normal bowel sounds, non tender, soft


Extremities/Musculoskelatal:  + swelling (1+ pretibial pitting edema)


Neurologic/Psych:  alert, oriented x 3





Family History





Cancer (esophageal)


Diabetes mellitus


Heart disease


Hypertension


Negative for CKD/ESRD





Social History


Smoking Status:  Former smoker


Drug Use:  none


Marital Status:  


Housing Status:  lives with family, other


Occupation:  disabled


.  Disabled.  Former smoker





Laboratory Results


Past 24 Hours


2/7/18 05:46








Red Blood Count 2.51, Mean Corpuscular Volume 94.0, Mean Corpuscular Hemoglobin 

29.9, Mean Corpuscular Hemoglobin Concent 31.8, Mean Platelet Volume 9.1, 

Neutrophils (%) (Auto) 81.4, Lymphocytes (%) (Auto) 9.3, Monocytes (%) (Auto) 

6.4, Eosinophils (%) (Auto) 2.1, Basophils (%) (Auto) 0.2, Neutrophils # (Auto) 

5.32, Lymphocytes # (Auto) 0.61, Monocytes # (Auto) 0.42, Eosinophils # (Auto) 

0.14, Basophils # (Auto) 0.01





2/7/18 05:46

















Test


  2/6/18


11:12 2/6/18


16:23 2/6/18


19:11 2/6/18


20:38


 


Bedside Glucose


  117 mg/dl


(70-99) 111 mg/dl


(70-99) 


  115 mg/dl


(70-99)


 


Test


  2/7/18


05:46 2/7/18


06:33 


  


 


 


White Blood Count


  6.54 K/uL


(4.8-10.8) 


  


  


 


 


Red Blood Count


  2.51 M/uL


(4.7-6.1) 


  


  


 


 


Hemoglobin


  7.5 g/dL


(14.0-18.0) 


  


  


 


 


Hematocrit 23.6 % (42-52)    


 


Mean Corpuscular Volume


  94.0 fL


() 


  


  


 


 


Mean Corpuscular Hemoglobin


  29.9 pg


(25-34) 


  


  


 


 


Mean Corpuscular Hemoglobin


Concent 31.8 g/dl


(32-36) 


  


  


 


 


Platelet Count


  171 K/uL


(130-400) 


  


  


 


 


Mean Platelet Volume


  9.1 fL


(7.4-10.4) 


  


  


 


 


Neutrophils (%) (Auto) 81.4 %    


 


Lymphocytes (%) (Auto) 9.3 %    


 


Monocytes (%) (Auto) 6.4 %    


 


Eosinophils (%) (Auto) 2.1 %    


 


Basophils (%) (Auto) 0.2 %    


 


Neutrophils # (Auto)


  5.32 K/uL


(1.4-6.5) 


  


  


 


 


Lymphocytes # (Auto)


  0.61 K/uL


(1.2-3.4) 


  


  


 


 


Monocytes # (Auto)


  0.42 K/uL


(0.11-0.59) 


  


  


 


 


Eosinophils # (Auto)


  0.14 K/uL


(0-0.5) 


  


  


 


 


Basophils # (Auto)


  0.01 K/uL


(0-0.2) 


  


  


 


 


RDW Standard Deviation


  52.1 fL


(36.4-46.3) 


  


  


 


 


RDW Coefficient of Variation


  15.4 %


(11.5-14.5) 


  


  


 


 


Immature Granulocyte % (Auto) 0.6 %    


 


Immature Granulocyte # (Auto)


  0.04 K/uL


(0.00-0.02) 


  


  


 


 


Red Blood Cell Morphology Unremarkable    


 


Prothrombin Time


  23.4 SECONDS


(9.0-12.0) 


  


  


 


 


Prothromb Time International


Ratio 2.3 (0.9-1.1) 


  


  


  


 


 


Anion Gap


  7.0 mmol/L


(3-11) 


  


  


 


 


Est Creatinine Clear Calc


Drug Dose 42.2 ml/min 


  


  


  


 


 


Estimated GFR (


American) 35.8 


  


  


  


 


 


Estimated GFR (Non-


American 30.9 


  


  


  


 


 


BUN/Creatinine Ratio 34.2 (10-20)    


 


Calcium Level


  8.0 mg/dl


(8.5-10.1) 


  


  


 


 


Bedside Glucose


  


  155 mg/dl


(70-99) 


  


 











Allergies


Coded Allergies:  


     No Known Allergies (Verified , 2/2/18)





Medications





Current Inpatient Medications








 Medications


  (Trade)  Dose


 Ordered  Sig/Dc


 Route  Start Time


 Stop Time Status Last Admin


Dose Admin


 


 Acetaminophen


  (Tylenol Tab)  650 mg  Q4H  PRN


 PO  2/2/18 19:00


 3/4/18 18:59  2/3/18 00:07


650 MG


 


 Al Hydrox/Mg


 Hydrox/Simethicone


  (Maalox Max Susp)  15 ml  Q4H  PRN


 PO  2/2/18 19:00


 3/4/18 18:59   


 


 


 Magnesium


 Hydroxide


  (Milk Of


 Magnesia Susp)  30 ml  Q12H  PRN


 PO  2/2/18 19:00


 3/4/18 18:59   


 


 


 Ondansetron HCl


  (Zofran Inj)  4 mg  Q6H  PRN


 IV  2/2/18 19:00


 3/4/18 18:59   


 


 


 Nitroglycerin


  (Nitrostat Tab)  0.4 mg  UD  PRN


 SL  2/2/18 19:00


 3/4/18 18:59   


 


 


 Morphine Sulfate


  (MoRPHine


 SULFATE INJ)  2 mg  Q30M  PRN


 IV  2/2/18 19:00


 2/16/18 18:59   


 


 


 Polyethylene


  (Miralax Powder


 Packet)  17 gm  DAILY  PRN


 PO  2/2/18 19:00


 3/4/18 18:59   


 


 


 Atorvastatin


 Calcium


  (Lipitor Tab)  80 mg  DAILY


 PO  2/3/18 09:00


 3/5/18 08:59  2/7/18 07:36


80 MG


 


 Clonazepam


  (Klonopin Tab)  1 mg  HS  PRN


 PO  2/2/18 19:00


 3/4/18 18:59  2/3/18 03:35


1 MG


 


 Clopidogrel


 Bisulfate


  (plAVix TAB)  75 mg  QAM


 PO  2/3/18 09:00


 3/5/18 08:59  2/7/18 07:33


75 MG


 


 Divalproex Sodium


  (Depakote Delay


 Rel Tab)  2,000 mg  HS


 PO  2/2/18 21:00


 3/4/18 20:59  2/6/18 20:42


2,000 MG


 


 Docusate Sodium


  (coLACE CAP)  100 mg  BID  PRN


 PO  2/2/18 19:00


 3/4/18 18:59   


 


 


 Escitalopram


 Oxalate


  (Lexapro Tab)  20 mg  QAM


 PO  2/3/18 09:00


 3/5/18 08:59  2/7/18 07:35


20 MG


 


 Fluticasone


 Propionate


  (Flonase Nasal


 Spray)  2 sprays  DAILY  PRN


 VERONICA  2/2/18 19:00


 3/4/18 18:59   


 


 


 Gabapentin


  (Neurontin Cap)  300 mg  HS


 PO  2/2/18 21:00


 3/4/18 20:59  2/6/18 20:43


300 MG


 


 Hydralazine HCl


  (Apresoline Tab)  25 mg  QID


 PO  2/2/18 21:00


 3/4/18 20:59  2/7/18 07:35


25 MG


 


 Insulin Glargine


  (Lantus Solostar


 Pen)  10 units  HS


 SC  2/2/18 21:00


 3/4/18 20:59  2/6/18 20:47


10 UNITS


 


 Isosorbide


 Mononitrate


  (Imdur Ext Rel


 Tab)  30 mg  QAM


 PO  2/3/18 09:00


 3/5/18 08:59  2/7/18 07:35


30 MG


 


 Metoprolol


 Succinate


  (Toprol Xl Tab)  50 mg  BID


 PO  2/2/18 21:00


 3/4/18 20:59  2/7/18 07:36


50 MG


 


 Tamsulosin HCl


  (Flomax Cap)  0.4 mg  HS


 PO  2/2/18 21:00


 3/4/18 20:59  2/6/18 20:43


0.4 MG


 


 Tiotropium Bromide


  (Spiriva


 Handihaler


 Inhaler)  1 puff  DAILY


 INH  2/3/18 09:00


 3/5/18 08:59  2/7/18 07:34


1 PUFF


 


 Warfarin Sodium


  (Coumadin Tab)  7.5 mg  SuMoWeThFr@1600


 PO  2/2/18 20:00


 3/4/18 19:59 Future Hold 2/4/18 17:39


7.5 MG


 


 Albuterol


  (Ventolin Hfa


 Inhaler)  2 puffs  Q4H  PRN


 INH  2/2/18 20:45


 3/4/18 20:44  2/4/18 21:55


2 PUFFS


 


 Amlodipine


 Besylate


  (Norvasc Tab)  10 mg  QAM


 PO  2/3/18 09:00


 3/5/18 08:59  2/7/18 07:36


10 MG


 


 Cyanocobalamin


  (Vitamin B-12


 Tab)  1,000 mcg  DAILY


 PO  2/3/18 09:00


 3/5/18 08:59  2/7/18 07:36


1,000 MCG


 


 Ferrous Sulfate


  (Feosol Tab)  325 mg  BIDM


 PO  2/3/18 07:30


 3/5/18 07:59  2/7/18 07:35


325 MG


 


 Mirtazapine


  (Remeron Tab)  45 mg  HS


 PO  2/2/18 21:00


 3/4/18 20:59  2/6/18 20:41


45 MG


 


 Oxycodone HCl


  (Roxicodone


 Immediate Rel Tab)  10 mg  Q12  PRN


 PO  2/2/18 19:00


 3/4/18 18:59  2/7/18 01:09


10 MG


 


 Spironolactone


  (Aldactone Tab)  50 mg  BID


 PO  2/2/18 21:00


 3/4/18 20:59 Future Hold  


 


 


 Warfarin Sodium


  (Coumadin Tab)  10 mg  TuSa@1600


 PO  2/3/18 16:00


 3/5/18 15:59 Future hold 2/3/18 16:58


10 MG


 


 Insulin Aspart


  (novoLOG ASPART)  **SLIDING


 SCALE**


 **G...  ACHS


 SC  2/2/18 21:00


 3/4/18 20:59  2/7/18 07:47


8 UNITS


 


 Miscellaneous


  (Iv Fluids


 Completed)  1 ea  PRN  PRN


 N/A  2/2/18 19:30


 2/2/19 19:29   


 


 


 Furosemide 20 mg/


 Syringe  2 ml @ 4


 mls/min  BID


 IV  2/3/18 09:00


 3/4/18 20:59 Future Hold 2/3/18 20:51


4 MLS/MIN


 


 Pantoprazole


 Sodium


  (Protonix Tab)  40 mg  QAM


 PO  2/3/18 09:00


 3/5/18 08:59  2/7/18 07:34


40 MG


 


 Calcium Carbonate


  (Tums Chew Tab)  500 mg  Q2H  PRN


 PO  2/3/18 10:00


 3/5/18 09:59  2/5/18 17:38


500 MG


 


 Albuterol/


 Ipratropium


  (Duoneb)  3 ml  Q4R


 INH  2/4/18 22:42


 3/6/18 22:41  2/7/18 07:11


3 ML


 


 Furosemide


  (Lasix Tab)  20 mg  QAM


 PO  2/5/18 09:00


 3/7/18 08:59 Future Hold 2/6/18 08:13


20 MG











Impression





(1) Acute kidney injury


(2) Proteinuria


(3) Anemia


(4) Acute diastolic (congestive) heart failure


(5) Hypertension


(6) Former smoker


62 year old white male with recurrent CHF.  12/17 echocardiogram revealed LVEF 

55% but dilated RV and RA.  Patient likely has a combination of pulmonary HTN 

and diastolic dysfunction.  Compliance with outpatient medical regimen and 

office visits has been suboptimal.  Kidney function has declined in response to 

diuretic therapy.  12/17 renal US revealed 14 cm kidneys.  Doppler was negative 

for KIANA.  Urine sediment is difficult to interpret due to chronic indwelling 

gillespie catheter.  UPCR 1.5 g.





Recommendations


ACUTE KIDNEY INJURY:


-- Baseline creatinine has been ~ 1.2.  Patient appears clinically volume 

contracted.  Suspect he will always have some LE edema due to pulmonary HTN / 

diastolic dysfunction. 


-- Volume status has improved.  Continue holding diuretic at this time


-- Patient has mild hyperkalemia.  Agree w/ holding Spironolactone at this 

time.   Will order low potassium diet (had orange juice this am)


-- Monitor serial PRP





PROTEINURIA:


-- Likely related to diabetic nephropathy.  Will check UIEP





HYPERTENSION:


-- Patient has had care provided via CHF clinic.  They have been actively 

adjusting his BP meds and diuretics.  Will defer management to them.  Dr. Kocher

's notes have been reviewed.  Agree with trying to simplify patient's medical 

regimen to improve outpatient compliance





ANEMIA:


-- Consider blood transfusion to maintain Hgb > 8.0


-- Will order iron studies and FOBT

## 2018-02-07 NOTE — HOSPITALIST PROGRESS NOTE
Hospitalist Progress Note


Date of Service


Feb 7, 2018.





Subjective


Pt evaluation today including:  conversation w/ patient, physical exam, chart 

review, lab review, review of studies


Pain:  None


PO Intake:  Good


Voiding:  no voiding problems


The patient was seen and examined this morning. Pt reports feeling tired today.

  He doesn't offer any acute complaints but falls asleep seconds after I'm 

asking him questions.  He is currently receiving a nebulizer treatment but also 

seems to forget this is even running. Pt denies having a bowel movement recently

, last was over the weekend.  He is urinating without difficulty.  No cp, sob, 

lightheadedness, dizziness, palpitations or flutter. 





ROS: 6 point ROS reviewed and otherwise negative.





Objective


Vital Signs











  Date Time  Temp Pulse Resp B/P (MAP) Pulse Ox O2 Delivery O2 Flow Rate FiO2


 


2/7/18 08:11 36.8 64 18  92 Nasal Cannula  


 


2/7/18 08:00      Nasal Cannula 4.0 


 


2/7/18 07:12  64 16  90 Nasal Cannula 6.0 


 


2/7/18 04:00      Nasal Cannula 4.0 


 


2/7/18 04:00  62 16  95 Nasal Cannula 6.0 


 


2/7/18 03:54 36.6 62 22 167/70 (102) 92 Nasal Cannula 6.0 





      Humidified Oxygen  


 


2/7/18 00:00      Nasal Cannula 4.0 


 


2/6/18 23:53 36.6 65 18 113/57 (75) 93 Nasal Cannula 6.0 





      Humidified Oxygen  


 


2/6/18 23:19  48 18  94 Nasal Cannula 6.0 


 


2/6/18 20:00     96 Nasal Cannula 4.0 


 


2/6/18 19:34  54 18  96 Nasal Cannula 6.0 


 


2/6/18 19:05 36.7 60 20 143/65 (91) 96 Nasal Cannula 6.0 


 


2/6/18 16:00     94 Nasal Cannula 4.0 


 


2/6/18 15:32  62 18  95 Nasal Cannula 6.0 


 


2/6/18 15:06 36.9 62 19 133/67 (89) 94 Nasal Cannula 5.0 


 


2/6/18 12:00      Nasal Cannula 4.0 


 


2/6/18 11:45 36.8 67 22 157/76 (103) 92 Nasal Cannula 6.0 


 


2/6/18 11:19  53 18  93 Nasal Cannula 6.0 











Physical Exam


General Appearance:  WD/WN, no apparent distress


Eyes:  PERRL, EOMI, + pertinent finding (periorbital edema BLE)


ENT:  hearing grossly normal, pharynx normal


Neck:  supple, no JVD


Respiratory/Chest:  chest non-tender, no respiratory distress, + pertinent 

finding (currently on nebulizer, O2 at 4L, no crackles rales or rhonchi)


Cardiovascular:  regular rate, rhythm (bigeminy ), + systolic murmur (grade III/

VI)


Abdomen:  normal bowel sounds, non tender, soft


Extremities:  non-tender, no pedal edema, no calf tenderness


Neurologic/Psychiatric:  oriented x 3, + pertinent finding (drowsy during exam 

although answering appropriately and follows commands)


Skin:  normal color, warm/dry





Laboratory Results





Last 24 Hours








Test


  2/6/18


11:12 2/6/18


16:23 2/6/18


19:11 2/6/18


20:38


 


Bedside Glucose 117 mg/dl  111 mg/dl   115 mg/dl 


 


Test


  2/7/18


05:46 2/7/18


06:33 2/7/18


10:07 2/7/18


10:19


 


White Blood Count 6.54 K/uL    


 


Red Blood Count 2.51 M/uL    


 


Hemoglobin 7.5 g/dL    


 


Hematocrit 23.6 %    


 


Mean Corpuscular Volume 94.0 fL    


 


Mean Corpuscular Hemoglobin 29.9 pg    


 


Mean Corpuscular Hemoglobin


Concent 31.8 g/dl 


  


  


  


 


 


Platelet Count 171 K/uL    


 


Mean Platelet Volume 9.1 fL    


 


Neutrophils (%) (Auto) 81.4 %    


 


Lymphocytes (%) (Auto) 9.3 %    


 


Monocytes (%) (Auto) 6.4 %    


 


Eosinophils (%) (Auto) 2.1 %    


 


Basophils (%) (Auto) 0.2 %    


 


Neutrophils # (Auto) 5.32 K/uL    


 


Lymphocytes # (Auto) 0.61 K/uL    


 


Monocytes # (Auto) 0.42 K/uL    


 


Eosinophils # (Auto) 0.14 K/uL    


 


Basophils # (Auto) 0.01 K/uL    


 


RDW Standard Deviation 52.1 fL    


 


RDW Coefficient of Variation 15.4 %    


 


Immature Granulocyte % (Auto) 0.6 %    


 


Immature Granulocyte # (Auto) 0.04 K/uL    


 


Red Blood Cell Morphology Unremarkable    


 


Prothrombin Time 23.4 SECONDS    


 


Prothromb Time International


Ratio 2.3 


  


  


  


 


 


Sodium Level 136 mmol/L    


 


Potassium Level 5.2 mmol/L    


 


Chloride Level 104 mmol/L    


 


Carbon Dioxide Level 25 mmol/L    


 


Anion Gap 7.0 mmol/L    


 


Blood Urea Nitrogen 76 mg/dl    


 


Creatinine 2.23 mg/dl    


 


Est Creatinine Clear Calc


Drug Dose 42.2 ml/min 


  


  


  


 


 


Estimated GFR (


American) 35.8 


  


  


  


 


 


Estimated GFR (Non-


American 30.9 


  


  


  


 


 


BUN/Creatinine Ratio 34.2    


 


Random Glucose 144 mg/dl    


 


Calcium Level 8.0 mg/dl    


 


Bedside Glucose  155 mg/dl   


 


Transferrin % Saturation    %  


 


Test


  2/7/18


10:58 


  


  


 











Assessment and Plan


Patient is a 63 y/o male with a history of CAD, h/o MI s/p stents, HTN, HLD, 

diastolic CHF, COPD, DM II, anxiety, depression, bipolar disorder, anemia and 

GERD, who presented to ED because of worsening SOB x1 day





Acute on chronic diastolic CHF exacerbation:


- Cardiac enzymes negative x 3, ekg prn cp, O2 protocol, home O2 is 2 L 


- Strict I/Ox, daily weights  - compared to last admission pt is still not at 

dry weight however appears to be euvolemic.  Physical exam improving, no JVD, 

and likely will continue to have mild pitting edema in BLE chronically.  

Discussed with Dr. Yusuf regarding regimen, and will likely be able to do lasix 

to 40 mg PO BID along with spironolactone whenever able to be resumed. 


- Cont Lasix 20 mg PO daily-  Cr. is stable but continues to be elevated. 


- Low sodium and low potassium diet, continue 1800 mL fluid restriction


- ECHO 12/23/17 w/ preserved EF and grade II diastolic CHF 





Elevated INR - resolved


- 2.3 today, Can resume warfarin today - watch for any signs of bleeding, cbc 

stable- pt denies any occult bleeding. Heme check stools (not obtained as no BM 

x 3 days)





Anemia of chronic disease- STABLE:


- Hgb stable at 7.1, monitor with am labs.  Repeat is pt becomes symptomatic. 

Will hold on blood transfusion as pt is asymptomatic.


- Iron panel in process





Fatigue


- Checking ammonia level, will also follow LFTs. Pt is not confused and is able 

to follow commands, answer appropriately to all my questions although falls 

asleep easily during our conversation. 





FITZ on CKD stage III


- baseline crt 1.6-  today 2.2, stable - likely due to aggressive diuresis


- on 2/4 small fluid bolus of 250 ml was given and lasix held, hold diuretics 

for the third day in a row at this point.  Allow lasix 20 mg PO daily for 

maintenance. 


- Nephrology consulted for proteinuria, - appreciate recs - feel this is due to 

diabetic nephropathy.  


- K+ 5.2 today, he had required a dose of Kayexalate over the weekend  - will 

monitor am labs for now as Kayexalate didn't improve hyperkalemia much. For now 

will place on low potassium diet. 





Hyperkalemia


- Required Kayexalate over the weekend, will do low potassium diet for now. 





b/l chronic diabetic wounds: 


- Following w/ wound care- wound care consulted 





CAD w/ cardiac stenting


HTN


HLD


- Plavix 75 mg daily, Norvasc 10 mg daily, Hydralazine 25 mg QID, Imdur 30 mg 

BID, and Toprol XL 50 mg BID, Lipitor 80 mg daily





T2DM w/ neuropathy:


- Continue Lantus 10 u HS 


- BSG ACHS and ISS


- Continue Gabapentin  





Chronic atrial thrombus: Continue Coumadin, follow PT/INR and adjust dose PRN 

for INR goal 2-3 


- Holding todays dose with INR of 4.1





COPD without exacerbation: Continue home inhalers 





Bipolar disorder, anxiety, depression- STABLE: Depakote 2 g daily, Lexapro 20 

mg daily, Remeron 45 mg daily, and Klonopin





GI prophylaxis: Protonix daily 





DVT Prophylaxis: Coumadin





Code status: LEVEL I, FULL





Dispo: From home, has [facility]- PT/OT and CM consulted, discharge date unknown, 

possible in 1-2 days

## 2018-02-08 VITALS
OXYGEN SATURATION: 90 % | DIASTOLIC BLOOD PRESSURE: 80 MMHG | TEMPERATURE: 98.06 F | SYSTOLIC BLOOD PRESSURE: 107 MMHG | HEART RATE: 62 BPM

## 2018-02-08 VITALS — DIASTOLIC BLOOD PRESSURE: 68 MMHG | SYSTOLIC BLOOD PRESSURE: 148 MMHG

## 2018-02-08 VITALS — HEART RATE: 55 BPM | OXYGEN SATURATION: 94 %

## 2018-02-08 VITALS — OXYGEN SATURATION: 70 % | HEART RATE: 54 BPM

## 2018-02-08 VITALS
SYSTOLIC BLOOD PRESSURE: 130 MMHG | OXYGEN SATURATION: 90 % | HEART RATE: 58 BPM | TEMPERATURE: 98.24 F | DIASTOLIC BLOOD PRESSURE: 58 MMHG

## 2018-02-08 VITALS
OXYGEN SATURATION: 99 % | TEMPERATURE: 97.7 F | HEART RATE: 60 BPM | SYSTOLIC BLOOD PRESSURE: 110 MMHG | DIASTOLIC BLOOD PRESSURE: 60 MMHG

## 2018-02-08 VITALS — HEART RATE: 54 BPM | OXYGEN SATURATION: 91 %

## 2018-02-08 VITALS — OXYGEN SATURATION: 89 % | HEART RATE: 61 BPM

## 2018-02-08 VITALS
SYSTOLIC BLOOD PRESSURE: 152 MMHG | DIASTOLIC BLOOD PRESSURE: 60 MMHG | OXYGEN SATURATION: 94 % | HEART RATE: 54 BPM | TEMPERATURE: 97.7 F

## 2018-02-08 VITALS
DIASTOLIC BLOOD PRESSURE: 70 MMHG | HEART RATE: 55 BPM | TEMPERATURE: 97.34 F | SYSTOLIC BLOOD PRESSURE: 161 MMHG | OXYGEN SATURATION: 70 %

## 2018-02-08 VITALS — OXYGEN SATURATION: 87 % | HEART RATE: 59 BPM

## 2018-02-08 VITALS
SYSTOLIC BLOOD PRESSURE: 132 MMHG | DIASTOLIC BLOOD PRESSURE: 79 MMHG | HEART RATE: 55 BPM | OXYGEN SATURATION: 94 % | TEMPERATURE: 97.88 F

## 2018-02-08 VITALS — HEART RATE: 61 BPM | OXYGEN SATURATION: 83 %

## 2018-02-08 VITALS — OXYGEN SATURATION: 94 %

## 2018-02-08 LAB
BASOPHILS # BLD: 0.01 K/UL (ref 0–0.2)
BASOPHILS NFR BLD: 0.2 %
BUN SERPL-MCNC: 83 MG/DL (ref 7–18)
CALCIUM SERPL-MCNC: 8 MG/DL (ref 8.5–10.1)
CO2 SERPL-SCNC: 26 MMOL/L (ref 21–32)
CREAT SERPL-MCNC: 2.32 MG/DL (ref 0.6–1.4)
EOS ABS #: 0.09 K/UL (ref 0–0.5)
EOSINOPHIL NFR BLD AUTO: 171 K/UL (ref 130–400)
GLUCOSE SERPL-MCNC: 81 MG/DL (ref 70–99)
HCT VFR BLD CALC: 24.4 % (ref 42–52)
HGB BLD-MCNC: 8.1 G/DL (ref 14–18)
IG#: 0.03 K/UL (ref 0–0.02)
IMM GRANULOCYTES NFR BLD AUTO: 16.7 %
INR PPP: 2.3 (ref 0.9–1.1)
LYMPHOCYTES # BLD: 0.92 K/UL (ref 1.2–3.4)
MCH RBC QN AUTO: 30.5 PG (ref 25–34)
MCHC RBC AUTO-ENTMCNC: 33.2 G/DL (ref 32–36)
MCV RBC AUTO: 91.7 FL (ref 80–100)
MONO ABS #: 0.55 K/UL (ref 0.11–0.59)
MONOCYTES NFR BLD: 10 %
NEUT ABS #: 3.9 K/UL (ref 1.4–6.5)
NEUTROPHILS # BLD AUTO: 1.6 %
NEUTROPHILS NFR BLD AUTO: 71 %
PMV BLD AUTO: 9.4 FL (ref 7.4–10.4)
POTASSIUM SERPL-SCNC: 4.9 MMOL/L (ref 3.5–5.1)
RED CELL DISTRIBUTION WIDTH CV: 15.2 % (ref 11.5–14.5)
RED CELL DISTRIBUTION WIDTH SD: 50.4 FL (ref 36.4–46.3)
SODIUM SERPL-SCNC: 134 MMOL/L (ref 136–145)
WBC # BLD AUTO: 5.5 K/UL (ref 4.8–10.8)

## 2018-02-08 RX ADMIN — IPRATROPIUM BROMIDE AND ALBUTEROL SULFATE SCH ML: .5; 3 SOLUTION RESPIRATORY (INHALATION) at 03:11

## 2018-02-08 RX ADMIN — CLOPIDOGREL BISULFATE SCH MG: 75 TABLET, FILM COATED ORAL at 08:05

## 2018-02-08 RX ADMIN — IPRATROPIUM BROMIDE AND ALBUTEROL SULFATE SCH ML: .5; 3 SOLUTION RESPIRATORY (INHALATION) at 19:12

## 2018-02-08 RX ADMIN — PANTOPRAZOLE SCH MG: 40 TABLET, DELAYED RELEASE ORAL at 08:07

## 2018-02-08 RX ADMIN — IPRATROPIUM BROMIDE AND ALBUTEROL SULFATE SCH ML: .5; 3 SOLUTION RESPIRATORY (INHALATION) at 23:14

## 2018-02-08 RX ADMIN — Medication SCH MCG: at 08:05

## 2018-02-08 RX ADMIN — ATORVASTATIN CALCIUM SCH MG: 40 TABLET, FILM COATED ORAL at 08:05

## 2018-02-08 RX ADMIN — IRON SUCROSE SCH MLS/HR: 20 INJECTION, SOLUTION INTRAVENOUS at 11:10

## 2018-02-08 RX ADMIN — MIRTAZAPINE SCH MG: 15 TABLET, FILM COATED ORAL at 21:40

## 2018-02-08 RX ADMIN — ISOSORBIDE MONONITRATE SCH MG: 30 TABLET, EXTENDED RELEASE ORAL at 08:06

## 2018-02-08 RX ADMIN — FERROUS SULFATE TAB EC 325 MG (65 MG FE EQUIVALENT) SCH MG: 325 (65 FE) TABLET DELAYED RESPONSE at 16:31

## 2018-02-08 RX ADMIN — DOCUSATE SODIUM SCH MG: 100 CAPSULE, LIQUID FILLED ORAL at 21:38

## 2018-02-08 RX ADMIN — ACETAMINOPHEN PRN MG: 325 TABLET ORAL at 13:44

## 2018-02-08 RX ADMIN — INSULIN ASPART SCH UNITS: 100 INJECTION, SOLUTION INTRAVENOUS; SUBCUTANEOUS at 11:00

## 2018-02-08 RX ADMIN — AMLODIPINE BESYLATE SCH MG: 5 TABLET ORAL at 08:04

## 2018-02-08 RX ADMIN — INSULIN ASPART SCH UNITS: 100 INJECTION, SOLUTION INTRAVENOUS; SUBCUTANEOUS at 17:33

## 2018-02-08 RX ADMIN — CALCIUM CARBONATE PRN MG: 500 TABLET ORAL at 13:41

## 2018-02-08 RX ADMIN — FERROUS SULFATE TAB EC 325 MG (65 MG FE EQUIVALENT) SCH MG: 325 (65 FE) TABLET DELAYED RESPONSE at 08:04

## 2018-02-08 RX ADMIN — GABAPENTIN SCH MG: 300 CAPSULE ORAL at 21:41

## 2018-02-08 RX ADMIN — IPRATROPIUM BROMIDE AND ALBUTEROL SULFATE SCH ML: .5; 3 SOLUTION RESPIRATORY (INHALATION) at 11:34

## 2018-02-08 RX ADMIN — INSULIN ASPART SCH UNITS: 100 INJECTION, SOLUTION INTRAVENOUS; SUBCUTANEOUS at 21:42

## 2018-02-08 RX ADMIN — ESCITALOPRAM OXALATE SCH MG: 20 TABLET, FILM COATED ORAL at 08:05

## 2018-02-08 RX ADMIN — TORSEMIDE SCH MG: 20 TABLET ORAL at 21:39

## 2018-02-08 RX ADMIN — Medication SCH GM: at 08:15

## 2018-02-08 RX ADMIN — DIVALPROEX SODIUM SCH MG: 500 TABLET, DELAYED RELEASE ORAL at 21:39

## 2018-02-08 RX ADMIN — INSULIN GLARGINE SCH UNITS: 100 INJECTION, SOLUTION SUBCUTANEOUS at 21:43

## 2018-02-08 RX ADMIN — DOCUSATE SODIUM SCH MG: 100 CAPSULE, LIQUID FILLED ORAL at 08:15

## 2018-02-08 RX ADMIN — OXYCODONE HYDROCHLORIDE PRN MG: 5 TABLET ORAL at 17:34

## 2018-02-08 RX ADMIN — TAMSULOSIN HYDROCHLORIDE SCH MG: 0.4 CAPSULE ORAL at 21:39

## 2018-02-08 RX ADMIN — IPRATROPIUM BROMIDE AND ALBUTEROL SULFATE SCH ML: .5; 3 SOLUTION RESPIRATORY (INHALATION) at 15:13

## 2018-02-08 RX ADMIN — WARFARIN SODIUM SCH MG: 7.5 TABLET ORAL at 16:35

## 2018-02-08 RX ADMIN — IPRATROPIUM BROMIDE AND ALBUTEROL SULFATE SCH ML: .5; 3 SOLUTION RESPIRATORY (INHALATION) at 07:18

## 2018-02-08 RX ADMIN — LABETALOL HCL SCH MG: 200 TABLET, FILM COATED ORAL at 21:40

## 2018-02-08 RX ADMIN — INSULIN ASPART SCH UNITS: 100 INJECTION, SOLUTION INTRAVENOUS; SUBCUTANEOUS at 08:15

## 2018-02-08 RX ADMIN — TIOTROPIUM BROMIDE SCH PUFF: 18 CAPSULE ORAL; RESPIRATORY (INHALATION) at 11:09

## 2018-02-08 RX ADMIN — METOPROLOL SUCCINATE SCH MG: 50 TABLET, EXTENDED RELEASE ORAL at 08:06

## 2018-02-08 NOTE — ECHOCARDIOGRAM REPORT
*NOTICE TO RECEIVING PARTY AGENCY**  This information is strictly Confidential and protected under 
[place] law.  [place] law prohibits you from making any further disclosure of this 
information unless further disclosure is expressly permitted by the written consent of the person to 
whom it pertains or is authorized by law.  A general authorization for the release of medical or 
other information is not sufficient for this purpose.  Hospital accepts no responsibility if the 
information is made available to any other person, INCLUDING THE PATIENT.



Interpretation Summary

  *  Name: MAC SPANGLER  Study Date: 2018 02:17 PM  BP: 152/60 mmHg

  *  MRN: [id number]  Patient Location: .[id number]\W251\S\1  HR: 54

  *  : 1955 (M/d/yyyy)  Gender: Male  Height: 71 in

  *  Age: 62 yrs  Ethnicity: [place]  Weight: 221 lb

  *  Ordering Physician: Eder Parkinson

  *  Referring Physician: Self, Referred

  *  Performed By: WESTON Varela RCS

  *  Accession# [id number]  Account# [id number]

  *  Reason For Study: CHF

  *  BSA: 2.2 m2

  *  -- Conclusions --

  *  1. Normal left ventricular size and systolic function.  EF 60-65%.  No definite regional wall 
motion abnormalities.  Mild concentric left ventricular hypertrophy.

  *  2. Moderately dilated right ventricle with normal systolic function.

  *  3. Biatrial dilation.

  *  4. Mild valvular aortic stenosis.

  *  5. There is mild mitral regurgitation.

  *  6. Moderately elevated right ventricular systolic pressure; estimated RVSP 55mmHg.

  *  7.  Technically difficult study.

  *  8. Compared to prior study on 2017, left ventricular wall motion abnormalities have 
improved.

Procedure Details

  *  A complete two-dimensional transthoracic echocardiogram was performed (2D, M-mode, Doppler and 
color flow Doppler).

Left Ventricle

  *  Normal left ventricular size and systolic function.  EF 60-65%.  No regional wall motion 
abnormalities.  Mild concentric left ventricular hypertrophy.

Right Ventricle

  *  Moderately dilated right ventricle with normal systolic function.

  *  The right ventricular systolic function is normal as assessed by tricuspid annular plane 
systolic excursion (TAPSE) (normal >1.5 cm).

Atria

  *  The left atrium is moderately dilated.

  *  The right atrium is mildly dilated.

  *  There is no evidence of atrial septal defect, but resolution does not allow assessment for a 
patent foramen ovale.

Mitral Valve

  *  There is mild mitral annular calcification.

  *  There is no mitral valve stenosis.

  *  There is mild mitral regurgitation.

Tricuspid Valve

  *  The tricuspid valve is not well visualized, but is grossly normal.

  *  There is no tricuspid stenosis.

  *  There is trace tricuspid regurgitation.

Aortic Valve

  *  Mild valvular aortic stenosis.

  *  There is no significant aortic regurgitation.

Pulmonic Valve

  *  The pulmonary valve is inadequately visualized, but the Doppler data is adequate for 
interpretation.

  *  There is no pulmonic valvular stenosis.

  *  Mild pulmonic valvular regurgitation.

Great Vessels

  *  The aortic root is normal size.

  *  Blunted pulmonary venous flow pattern.

Pericardium/Pleural

  *  There is no pericardial effusion.

  *  Pleural effusion.

Great Vessels

  *  Dilated IVC with reduced inspiratory collapse.



MMode 2D Measurements and Calculations

IVSd 1.3 cm

IVSs 1.6 cm



LVIDd 5.2 cm

LVIDs 3.3 cm

LVPWd 1.3 cm

LVPWs 1.7 cm



IVS/LVPW 0.98 

FS 35.2 %

EDV(Teich) 127.3 ml

ESV(Teich) 45.5 ml

EF(Teich) 64.2 %



EDV(cubed) 137.6 ml

ESV(cubed) 37.4 ml

EF(cubed) 72.8 %

% IVS thick 23.7 %

% LVPW thick 33.4 %





LV mass(C)d 266.2 grams

LV mass(C)dI 121.0 grams/m\S\2

LV mass(C)s 209.6 grams

LV mass(C)sI 95.3 grams/m\S\2



SV(Teich) 81.8 ml

SI(Teich) 37.2 ml/m\S\2

SV(cubed) 100.2 ml

SI(cubed) 45.5 ml/m\S\2



Ao root diam 3.8 cm

Ao root area 11.2 cm\S\2

ACS 1.3 cm

LA dimension 4.1 cm



asc Aorta Diam 3.4 cm





LA/Ao 1.1 

LVOT diam 2.2 cm

LVOT area 3.9 cm\S\2



EDV(MOD-sp4) 258.5 ml

ESV(MOD-sp4) 112.4 ml

EF(MOD-sp4) 56.5 %



EDV(MOD-sp2) 205.1 ml

ESV(MOD-sp2) 79.7 ml

EF(MOD-sp2) 61.1 %



SV(MOD-sp4) 146.1 ml

SI(MOD-sp4) 66.4 ml/m\S\2





SV(MOD-sp2) 125.4 ml

SI(MOD-sp2) 57.0 ml/m\S\2













Doppler Measurements and Calculations

MV E max elton 144.2 cm/sec

MV A max elton 47.7 cm/sec



MV E/A 3.0 



MV P1/2t max elton 144.0 cm/sec

MV P1/2t 69.9 msec

MVA(P1/2t) 3.1 cm\S\2

MV dec slope 602.9 cm/sec\S\2

MV dec time 0.19 sec



Ao V2 max 227.3 cm/sec

Ao max PG 20.7 mmHg

Ao max PG (full) 16.0 mmHg

Ao V2 mean 142.3 cm/sec

Ao mean PG 9.4 mmHg

Ao mean PG (full) 7.4 mmHg

Ao V2 VTI 53.8 cm

DENILSON(I,A) 1.9 cm\S\2

DENILSON(I,D) 1.9 cm\S\2

DENILSON(V,A) 1.8 cm\S\2

DENILSON(V,D) 1.8 cm\S\2





LV V1 max PG 4.7 mmHg

LV V1 mean PG 2.0 mmHg



LV V1 max 108.2 cm/sec

LV V1 mean 63.8 cm/sec

LV V1 VTI 26.0 cm



SV(Ao) 599.8 ml

SI(Ao) 272.6 ml/m\S\2

SV(LVOT) 100.7 ml

SI(LVOT) 45.8 ml/m\S\2



PA V2 max 98.4 cm/sec

PA max PG 3.9 mmHg





TR max elton 314.4 cm/sec

RVSP(TR) 54.5 mmHg



RAP systole 15.0 mmHg

## 2018-02-08 NOTE — DIAGNOSTIC IMAGING REPORT
CHEST ONE VIEW PORTABLE



CLINICAL HISTORY: CHF dyspnea



COMPARISON STUDY:  2/4/2018



FINDINGS: Congestive failure/pulmonary edema and progressive from the prior

study. Moderate cardiomegaly. Diaphragms smooth. Very slight blunting of the

lateral costophrenic angles. 



IMPRESSION:  Congestive heart failure/pulmonary edema slightly progressive as

compared to the prior study. 











The above report was generated using voice recognition software.  It may contain

grammatical, syntax or spelling errors.









Electronically signed by:  Gerard Meier M.D.

2/8/2018 9:52 AM



Dictated Date/Time:  2/8/2018 9:51 AM

## 2018-02-08 NOTE — CARDIOLOGY PROGRESS NOTE
DATE: 02/08/2018

 

SUBJECTIVE:  Mr. Dixon is resting comfortably in the bedside chair without

complaints of chest pain or dyspnea.  He is anxious for hospital discharge.

 

OBJECTIVE:

VITAL SIGNS:  Blood pressure is 160/70 with a regular pulse of 55. 

Respiratory rate is 18 and the patient is afebrile at 36.3 degrees Celsius. 

Saturation is 94% on 3 liters nasal cannula.

NECK:  Supple with full carotid upstrokes.  There are no carotid bruits. 

Jugular venous pressure is flat at 90 degrees.  There is no thyromegaly.

CARDIOVASCULAR:  Reveals a regular rhythm with normal S1 and S2.  A 2/6 basal

systolic ejection murmur is noted.  No S3.

LUNGS:  Clear without rales, rhonchi, or wheezes.

ABDOMEN:  Soft without bruits.

EXTREMITIES:  Reveal intact radial artery pulses bilaterally.  There is trace

pretibial edema is noted.

 

DATA:  CBC notes a hemoglobin of 8.1, hematocrit 24.4, white count 5.5, and

platelet count 171,000.  Electrolytes note a sodium of 134, potassium 4.9,

chloride 103, bicarb 26, BUN 83, creatinine 2.32.  Telemetry monitor is

benign.

 

IMPRESSION AND PLAN:

1.  Acute on chronic diastolic congestive heart failure -- diuretics

currently on hold due to volume contraction.  Diuretic management per Dr. Parkinson.

2.  Coronary artery disease -- stable on current meds.

3.  Hypertension -- controlled.

4.  Mild to moderate aortic stenosis.

5.  Mild mitral stenosis.

6.  Disposition -- stable for transfer to the floor.

## 2018-02-08 NOTE — NEPHROLOGY PROGRESS NOTE
Nephrology Progress Note


Date of Service


Feb 8, 2018.





Chief Complaint


FITZ, proteinuria





Subjective


Mr. Dixon was seen & examined in the PCU this morning.  He currently denies 

angina or dyspnea.  He voices no new medical concerns.





Review of Systems


Constitutional:  No fever


Cardiovascular:  No chest pain


Respiratory:  No dyspnea at rest


Abdomen:  No pain, No nausea, No vomiting


Extremities:  + leg edema


A complete review of systems was performed.  Pertinent positives are noted 

above. All other systems are negative.





Vital Signs





Last 8 Hrs








  Date Time  Temp Pulse Resp B/P (MAP) Pulse Ox O2 Delivery O2 Flow Rate FiO2


 


2/8/18 07:50     94 Nasal Cannula 3.5 


 


2/8/18 07:30 36.3 55 20 161/70 (100) 70 Room Air  


 


2/8/18 07:30      Nasal Cannula 3.5 


 


2/8/18 07:19  54 18  70 Room Air  


 


2/8/18 04:00      Nasal Cannula 3.5 


 


2/8/18 03:43 36.6 55 22 132/79 (96) 94 Nasal Cannula 3.5 


 


2/8/18 03:19  61 18  83 Room Air  











Last Recorded Weight


Weight (Kilograms):  100.300





Physical Exam


General Appearance:  no apparent distress (appears chronically ill)


Head:  normocephalic, atraumatic


Eyes:  PERRL, EOMI


Neck:  no adenopathy


Respiratory/Chest:  no respiratory distress, + crackles


Cardiovascular:  + bradycardia


Abdomen/GI:  normal bowel sounds, non tender, soft


Extremities/Musculoskelatal:  + swelling (1+ pretibial pitting edema)


Neurologic/Psych:  alert, oriented x 3





Family History





Cancer (esophageal)


Diabetes mellitus


Heart disease


Hypertension


Negative for CKD/ESRD





Social History


Smoking Status:  Former smoker


Drug Use:  none


Marital Status:  


Housing Status:  lives with family, other


Occupation:  disabled


.  Disabled.  Former smoker





Laboratory Results


Past 24 Hours


2/8/18 05:54








Red Blood Count 2.66, Mean Corpuscular Volume 91.7, Mean Corpuscular Hemoglobin 

30.5, Mean Corpuscular Hemoglobin Concent 33.2, Mean Platelet Volume 9.4, 

Neutrophils (%) (Auto) 71.0, Lymphocytes (%) (Auto) 16.7, Monocytes (%) (Auto) 

10.0, Eosinophils (%) (Auto) 1.6, Basophils (%) (Auto) 0.2, Neutrophils # (Auto

) 3.90, Lymphocytes # (Auto) 0.92, Monocytes # (Auto) 0.55, Eosinophils # (Auto

) 0.09, Basophils # (Auto) 0.01





2/8/18 05:54

















Test


  2/7/18


10:19 2/7/18


10:52 2/7/18


11:20 2/7/18


13:50


 


Iron Level


  53 mcg/dl


() 


  


  


 


 


Total Iron Binding Capacity


  312 mcg/dl


(250-450) 


  


  


 


 


Transferrin


  240 mg/dl


(200-360) 


  


  


 


 


Transferrin % Saturation 16 % (20-50)    


 


Ferritin


  167.7 ng/ml


(8.0-388.0) 


  


  


 


 


Total Bilirubin


  0.3 mg/dl


(0.2-1) 


  


  


 


 


Direct Bilirubin


  < 0.1 mg/dl


(0-0.2) 


  


  


 


 


Aspartate Amino Transf


(AST/SGOT) 22 U/L (15-37) 


  


  


  


 


 


Alanine Aminotransferase


(ALT/SGPT) 21 U/L (12-78) 


  


  


  


 


 


Alkaline Phosphatase


  61 U/L


() 


  


  


 


 


Total Protein


  6.5 gm/dl


(6.4-8.2) 


  


  


 


 


Albumin


  2.6 gm/dl


(3.4-5.0) 


  


  


 


 


Bedside Glucose


  


  151 mg/dl


(70-99) 


  


 


 


Ammonia


  


  


  32.2 umol/L


(11-32) 


 


 


Arterial Blood pH


  


  


  


  7.30


(7.35-7.45)


 


Arterial Blood Partial


Pressure CO2 


  


  


  52 mmHg


(35-46)


 


Arterial Blood Partial


Pressure O2 


  


  


  77 mm/Hg


(80-95)


 


Arterial Blood HCO3


  


  


  


  25 mmol/L


(19-24)


 


Arterial Blood Oxygen


Saturation 


  


  


  91.8 % (90-95) 


 


 


Arterial Blood Base Excess


  


  


  


  -1.4 mEq/L


(-9-1.8)


 


Arterial Blood Gas Delivery    6 L 


 


Cameron Test    POS (POS) 


 


Troponin I


  


  


  


  0.061 ng/ml


(0-0.045)


 


Thyroid Stimulating Hormone


(TSH) 


  


  


  4.150 uIu/ml


(0.300-4.500)


 


Test


  2/7/18


16:07 2/7/18


19:56 2/7/18


20:21 2/8/18


05:54


 


Bedside Glucose


  159 mg/dl


(70-99) 


  123 mg/dl


(70-99) 


 


 


Stool Occult Blood


  


  NEGATIVE


(NEGATIVE) 


  


 


 


White Blood Count


  


  


  


  5.50 K/uL


(4.8-10.8)


 


Red Blood Count


  


  


  


  2.66 M/uL


(4.7-6.1)


 


Hemoglobin


  


  


  


  8.1 g/dL


(14.0-18.0)


 


Hematocrit    24.4 % (42-52) 


 


Mean Corpuscular Volume


  


  


  


  91.7 fL


()


 


Mean Corpuscular Hemoglobin


  


  


  


  30.5 pg


(25-34)


 


Mean Corpuscular Hemoglobin


Concent 


  


  


  33.2 g/dl


(32-36)


 


Platelet Count


  


  


  


  171 K/uL


(130-400)


 


Mean Platelet Volume


  


  


  


  9.4 fL


(7.4-10.4)


 


Neutrophils (%) (Auto)    71.0 % 


 


Lymphocytes (%) (Auto)    16.7 % 


 


Monocytes (%) (Auto)    10.0 % 


 


Eosinophils (%) (Auto)    1.6 % 


 


Basophils (%) (Auto)    0.2 % 


 


Neutrophils # (Auto)


  


  


  


  3.90 K/uL


(1.4-6.5)


 


Lymphocytes # (Auto)


  


  


  


  0.92 K/uL


(1.2-3.4)


 


Monocytes # (Auto)


  


  


  


  0.55 K/uL


(0.11-0.59)


 


Eosinophils # (Auto)


  


  


  


  0.09 K/uL


(0-0.5)


 


Basophils # (Auto)


  


  


  


  0.01 K/uL


(0-0.2)


 


RDW Standard Deviation


  


  


  


  50.4 fL


(36.4-46.3)


 


RDW Coefficient of Variation


  


  


  


  15.2 %


(11.5-14.5)


 


Immature Granulocyte % (Auto)    0.5 % 


 


Immature Granulocyte # (Auto)


  


  


  


  0.03 K/uL


(0.00-0.02)


 


Prothrombin Time


  


  


  


  24.2 SECONDS


(9.0-12.0)


 


Prothromb Time International


Ratio 


  


  


  2.3 (0.9-1.1) 


 


 


Anion Gap


  


  


  


  5.0 mmol/L


(3-11)


 


Est Creatinine Clear Calc


Drug Dose 


  


  


  40.8 ml/min 


 


 


Estimated GFR (


American) 


  


  


  34.1 


 


 


Estimated GFR (Non-


American 


  


  


  29.4 


 


 


BUN/Creatinine Ratio    35.9 (10-20) 


 


Calcium Level


  


  


  


  8.0 mg/dl


(8.5-10.1)


 


Test


  2/8/18


06:54 


  


  


 


 


Bedside Glucose


  78 mg/dl


(70-99) 


  


  


 











Allergies


Coded Allergies:  


     No Known Allergies (Verified , 2/2/18)





Medications





Current Inpatient Medications








 Medications


  (Trade)  Dose


 Ordered  Sig/Dc


 Route  Start Time


 Stop Time Status Last Admin


Dose Admin


 


 Acetaminophen


  (Tylenol Tab)  650 mg  Q4H  PRN


 PO  2/2/18 19:00


 3/4/18 18:59  2/3/18 00:07


650 MG


 


 Al Hydrox/Mg


 Hydrox/Simethicone


  (Maalox Max Susp)  15 ml  Q4H  PRN


 PO  2/2/18 19:00


 3/4/18 18:59   


 


 


 Magnesium


 Hydroxide


  (Milk Of


 Magnesia Susp)  30 ml  Q12H  PRN


 PO  2/2/18 19:00


 3/4/18 18:59   


 


 


 Ondansetron HCl


  (Zofran Inj)  4 mg  Q6H  PRN


 IV  2/2/18 19:00


 3/4/18 18:59   


 


 


 Nitroglycerin


  (Nitrostat Tab)  0.4 mg  UD  PRN


 SL  2/2/18 19:00


 3/4/18 18:59   


 


 


 Morphine Sulfate


  (MoRPHine


 SULFATE INJ)  2 mg  Q30M  PRN


 IV  2/2/18 19:00


 2/16/18 18:59   


 


 


 Atorvastatin


 Calcium


  (Lipitor Tab)  80 mg  DAILY


 PO  2/3/18 09:00


 3/5/18 08:59  2/8/18 08:05


80 MG


 


 Clonazepam


  (Klonopin Tab)  1 mg  HS  PRN


 PO  2/2/18 19:00


 3/4/18 18:59  2/3/18 03:35


1 MG


 


 Clopidogrel


 Bisulfate


  (plAVix TAB)  75 mg  QAM


 PO  2/3/18 09:00


 3/5/18 08:59  2/8/18 08:05


75 MG


 


 Divalproex Sodium


  (Depakote Delay


 Rel Tab)  2,000 mg  HS


 PO  2/2/18 21:00


 3/4/18 20:59  2/7/18 20:58


2,000 MG


 


 Escitalopram


 Oxalate


  (Lexapro Tab)  20 mg  QAM


 PO  2/3/18 09:00


 3/5/18 08:59  2/8/18 08:05


20 MG


 


 Fluticasone


 Propionate


  (Flonase Nasal


 Spray)  2 sprays  DAILY  PRN


 VERONICA  2/2/18 19:00


 3/4/18 18:59   


 


 


 Gabapentin


  (Neurontin Cap)  300 mg  HS


 PO  2/2/18 21:00


 3/4/18 20:59  2/7/18 21:00


300 MG


 


 Hydralazine HCl


  (Apresoline Tab)  25 mg  QID


 PO  2/2/18 21:00


 3/4/18 20:59  2/8/18 08:05


25 MG


 


 Insulin Glargine


  (Lantus Solostar


 Pen)  10 units  HS


 SC  2/2/18 21:00


 3/4/18 20:59  2/7/18 21:07


10 UNITS


 


 Isosorbide


 Mononitrate


  (Imdur Ext Rel


 Tab)  30 mg  QAM


 PO  2/3/18 09:00


 3/5/18 08:59  2/8/18 08:06


30 MG


 


 Metoprolol


 Succinate


  (Toprol Xl Tab)  50 mg  BID


 PO  2/2/18 21:00


 3/4/18 20:59  2/8/18 08:06


50 MG


 


 Tamsulosin HCl


  (Flomax Cap)  0.4 mg  HS


 PO  2/2/18 21:00


 3/4/18 20:59  2/7/18 20:59


0.4 MG


 


 Tiotropium Bromide


  (Spiriva


 Handihaler


 Inhaler)  1 puff  DAILY


 INH  2/3/18 09:00


 3/5/18 08:59  2/7/18 07:34


1 PUFF


 


 Warfarin Sodium


  (Coumadin Tab)  7.5 mg  SuMoWeThFr@1600


 PO  2/2/18 20:00


 3/4/18 19:59 Future hold 2/7/18 17:05


7.5 MG


 


 Albuterol


  (Ventolin Hfa


 Inhaler)  2 puffs  Q4H  PRN


 INH  2/2/18 20:45


 3/4/18 20:44  2/4/18 21:55


2 PUFFS


 


 Amlodipine


 Besylate


  (Norvasc Tab)  10 mg  QAM


 PO  2/3/18 09:00


 3/5/18 08:59  2/8/18 08:04


10 MG


 


 Cyanocobalamin


  (Vitamin B-12


 Tab)  1,000 mcg  DAILY


 PO  2/3/18 09:00


 3/5/18 08:59  2/8/18 08:05


1,000 MCG


 


 Ferrous Sulfate


  (Feosol Tab)  325 mg  BIDM


 PO  2/3/18 07:30


 3/5/18 07:59  2/8/18 08:04


325 MG


 


 Mirtazapine


  (Remeron Tab)  45 mg  HS


 PO  2/2/18 21:00


 3/4/18 20:59  2/7/18 20:59


45 MG


 


 Oxycodone HCl


  (Roxicodone


 Immediate Rel Tab)  10 mg  Q12  PRN


 PO  2/2/18 19:00


 3/4/18 18:59  2/7/18 01:09


10 MG


 


 Spironolactone


  (Aldactone Tab)  50 mg  BID


 PO  2/2/18 21:00


 3/4/18 20:59 Future Hold  


 


 


 Warfarin Sodium


  (Coumadin Tab)  10 mg  TuSa@1600


 PO  2/3/18 16:00


 3/5/18 15:59 Future hold 2/3/18 16:58


10 MG


 


 Insulin Aspart


  (novoLOG ASPART)  **SLIDING


 SCALE**


 **G...  ACHS


 SC  2/2/18 21:00


 3/4/18 20:59  2/8/18 08:15


5 UNITS


 


 Miscellaneous


  (Iv Fluids


 Completed)  1 ea  PRN  PRN


 N/A  2/2/18 19:30


 2/2/19 19:29   


 


 


 Furosemide 20 mg/


 Syringe  2 ml @ 4


 mls/min  BID


 IV  2/3/18 09:00


 3/4/18 20:59 Future Hold 2/3/18 20:51


4 MLS/MIN


 


 Pantoprazole


 Sodium


  (Protonix Tab)  40 mg  QAM


 PO  2/3/18 09:00


 3/5/18 08:59  2/8/18 08:07


40 MG


 


 Calcium Carbonate


  (Tums Chew Tab)  500 mg  Q2H  PRN


 PO  2/3/18 10:00


 3/5/18 09:59  2/5/18 17:38


500 MG


 


 Albuterol/


 Ipratropium


  (Duoneb)  3 ml  Q4R


 INH  2/4/18 22:42


 3/6/18 22:41  2/8/18 07:18


3 ML


 


 Furosemide


  (Lasix Tab)  20 mg  QAM


 PO  2/5/18 09:00


 3/7/18 08:59 Future Hold 2/6/18 08:13


20 MG


 


 Docusate Sodium


  (coLACE CAP)  100 mg  BID


 PO  2/7/18 11:30


 3/9/18 11:29  2/7/18 21:12


100 MG


 


 Polyethylene


  (Miralax Powder


 Packet)  17 gm  DAILY


 PO  2/7/18 11:30


 3/4/18 11:29  2/7/18 12:15


17 GM











Impression





(1) Acute kidney injury


(2) Proteinuria


(3) Anemia


(4) Acute diastolic (congestive) heart failure


(5) Hypertension


(6) Former smoker


62 year old white male with recurrent CHF.  12/17 echocardiogram revealed LVEF 

55% but dilated RV and RA.  Patient likely has a combination of pulmonary HTN 

and diastolic dysfunction.  Compliance with outpatient medical regimen and 

office visits has been suboptimal.  Kidney function has declined in response to 

diuretic therapy.  12/17 renal US revealed 14 cm kidneys.  Doppler was negative 

for KIANA.  Urine sediment is difficult to interpret due to chronic indwelling 

gillespie catheter.  UPCR 1.5 g.





Recommendations


ACUTE KIDNEY INJURY:


-- Baseline creatinine had been 1.2.  Patient has been admitted w/ recurrent 

diastolic CHF.  May need to accept worsening kidney function in order to 

correct pulmonary edema


-- CXR film reviewed this am.  Patient has progressive CHF off diuretic therapy


-- Will start Demadex 20 mg po BID


-- Monitor serial PRP





PROTEINURIA:


-- Likely related to diabetic nephropathy.  UIEP results pending





HYPERTENSION:


-- Will attempt to simplify medical regimen.  Will change Metoprolol back to 

Labetalol and attempt to stop Amlodipine and Hydralazine.  May need to avoid 

Spironolactone due to worsening kidney function





CHF:


-- Troponin was mildly elevated.  Will recheck


-- Last echocardiogram was 12/17.  Will repeat study due to recurrent CHF





ANEMIA:


-- Consider blood transfusion to maintain Hgb > 8.0


-- Iron saturation <20% w/ low ferritin and negative FOBT.  Will provide IV 

iron to help correct anemia

## 2018-02-08 NOTE — HOSPITALIST PROGRESS NOTE
Hospitalist Progress Note


Date of Service


Feb 8, 2018.





Subjective


Pt evaluation today including:  conversation w/ patient, physical exam, chart 

review, lab review, review of studies


Pain:  None


PO Intake:  Good


Voiding:  no voiding problems


The patient was seen and examined this morning. Pt reports doing well today, he 

is slightly fatigued. Initially during our conversation he says "I just dont 

feel quite myself yet" and goes on to explain that he's confused. He is able to 

answer all orientation questions without difficulty, participated in discussion 

and even transferred from chair to bed without any issues. He is eating well, 

had a BM yesterday, and denies any issues with breathing, cp, or chest 

tightness.  He has been ambulating about the room. Pt is also agreeable to 

working with physical therapy today since he was too fatigued yesterday to 

participate. 





ROS: 6 point ROS reviewed and otherwise negative.





Objective


Vital Signs











  Date Time  Temp Pulse Resp B/P (MAP) Pulse Ox O2 Delivery O2 Flow Rate FiO2


 


2/8/18 07:50     94 Nasal Cannula 3.5 


 


2/8/18 07:30 36.3 55 20 161/70 (100) 70 Room Air  


 


2/8/18 07:30      Nasal Cannula 3.5 


 


2/8/18 07:19  54 18  70 Room Air  


 


2/8/18 04:00      Nasal Cannula 3.5 


 


2/8/18 03:43 36.6 55 22 132/79 (96) 94 Nasal Cannula 3.5 


 


2/8/18 03:19  61 18  83 Room Air  


 


2/8/18 00:13 36.7 62 23 107/80 (89) 90 Nasal Cannula 3.5 


 


2/7/18 23:59      Nasal Cannula 3.5 


 


2/7/18 23:30  55 18  93 Nasal Cannula 6.0 


 


2/7/18 21:08  65  136/61 (86)    


 


2/7/18 20:00      Nasal Cannula 2.0 





      Humidified Oxygen  


 


2/7/18 19:42  60 18  94 Nasal Cannula 6.0 


 


2/7/18 18:23 37.2 58  147/80 94   


 


2/7/18 17:35 36.9 58  123/46    


 


2/7/18 17:00 37.3 80  137/67    


 


2/7/18 16:43 37.0 60  122/63    


 


2/7/18 16:39 37.0 60  122/63    


 


2/7/18 16:21 36.8 76 20 118/75 92   


 


2/7/18 16:00     92 Nasal Cannula 2.0 





      Humidified Oxygen  


 


2/7/18 14:48  62 18  93 Nasal Cannula 6.0 


 


2/7/18 12:00      Nasal Cannula 4.0 


 


2/7/18 11:55 36.6 56 18 108/62 (77) 93 Nasal Cannula  


 


2/7/18 11:10  64 16  92 Nasal Cannula 6.0 











Physical Exam


Notes:


General Appearance:  WD/WN, no apparent distress, sitting up in bedside chair


Eyes:  PERRL, EOMI, + pertinent finding (periorbital edema bilaterally resolved)


ENT:  hearing grossly normal, pharynx normal, + pertinent finding (MMM)


Neck:  supple, no JVD


Respiratory/Chest:  chest non-tender, lungs clear, no respiratory distress, no 

accessory muscle use, + pertinent finding (on 4 L via NC)


Cardiovascular:  regular rate, rhythm (extra beats), no JVD, + systolic murmur (

grade III/VI)


Abdomen:  normal bowel sounds, non tender, soft


Extremities:  non-tender, no calf tenderness, + pertinent finding (1+ pitting 

edema in BLE.  Multiple wounds over BLE appear to be healing.  )


Neurologic/Psychiatric:  alert, normal mood/affect, oriented x 3


Skin:  warm/dry





Laboratory Results





Last 24 Hours








Test


  2/7/18


10:19 2/7/18


10:52 2/7/18


11:20 2/7/18


13:50


 


Iron Level 53 mcg/dl    


 


Total Iron Binding Capacity 312 mcg/dl    


 


Transferrin 240 mg/dl    


 


Transferrin % Saturation 16 %    


 


Ferritin 167.7 ng/ml    


 


Total Bilirubin 0.3 mg/dl    


 


Direct Bilirubin < 0.1 mg/dl    


 


Aspartate Amino Transf


(AST/SGOT) 22 U/L 


  


  


  


 


 


Alanine Aminotransferase


(ALT/SGPT) 21 U/L 


  


  


  


 


 


Alkaline Phosphatase 61 U/L    


 


Total Protein 6.5 gm/dl    


 


Albumin 2.6 gm/dl    


 


Bedside Glucose  151 mg/dl   


 


Ammonia   32.2 umol/L  


 


Arterial Blood pH    7.30 


 


Arterial Blood Partial


Pressure CO2 


  


  


  52 mmHg 


 


 


Arterial Blood Partial


Pressure O2 


  


  


  77 mm/Hg 


 


 


Arterial Blood HCO3    25 mmol/L 


 


Arterial Blood Oxygen


Saturation 


  


  


  91.8 % 


 


 


Arterial Blood Base Excess    -1.4 mEq/L 


 


Arterial Blood Gas Delivery    6 L 


 


Cameron Test    POS 


 


Troponin I    0.061 ng/ml 


 


Thyroid Stimulating Hormone


(TSH) 


  


  


  4.150 uIu/ml 


 


 


Test


  2/7/18


16:07 2/7/18


19:56 2/7/18


20:21 2/8/18


05:54


 


Bedside Glucose 159 mg/dl   123 mg/dl  


 


Stool Occult Blood  NEGATIVE   


 


White Blood Count    5.50 K/uL 


 


Red Blood Count    2.66 M/uL 


 


Hemoglobin    8.1 g/dL 


 


Hematocrit    24.4 % 


 


Mean Corpuscular Volume    91.7 fL 


 


Mean Corpuscular Hemoglobin    30.5 pg 


 


Mean Corpuscular Hemoglobin


Concent 


  


  


  33.2 g/dl 


 


 


Platelet Count    171 K/uL 


 


Mean Platelet Volume    9.4 fL 


 


Neutrophils (%) (Auto)    71.0 % 


 


Lymphocytes (%) (Auto)    16.7 % 


 


Monocytes (%) (Auto)    10.0 % 


 


Eosinophils (%) (Auto)    1.6 % 


 


Basophils (%) (Auto)    0.2 % 


 


Neutrophils # (Auto)    3.90 K/uL 


 


Lymphocytes # (Auto)    0.92 K/uL 


 


Monocytes # (Auto)    0.55 K/uL 


 


Eosinophils # (Auto)    0.09 K/uL 


 


Basophils # (Auto)    0.01 K/uL 


 


RDW Standard Deviation    50.4 fL 


 


RDW Coefficient of Variation    15.2 % 


 


Immature Granulocyte % (Auto)    0.5 % 


 


Immature Granulocyte # (Auto)    0.03 K/uL 


 


Prothrombin Time    24.2 SECONDS 


 


Prothromb Time International


Ratio 


  


  


  2.3 


 


 


Sodium Level    134 mmol/L 


 


Potassium Level    4.9 mmol/L 


 


Chloride Level    103 mmol/L 


 


Carbon Dioxide Level    26 mmol/L 


 


Anion Gap    5.0 mmol/L 


 


Blood Urea Nitrogen    83 mg/dl 


 


Creatinine    2.32 mg/dl 


 


Est Creatinine Clear Calc


Drug Dose 


  


  


  40.8 ml/min 


 


 


Estimated GFR (


American) 


  


  


  34.1 


 


 


Estimated GFR (Non-


American 


  


  


  29.4 


 


 


BUN/Creatinine Ratio    35.9 


 


Random Glucose    81 mg/dl 


 


Calcium Level    8.0 mg/dl 


 


Test


  2/8/18


06:54 


  


  


 


 


Bedside Glucose 78 mg/dl    











Assessment and Plan


Patient is a 63 y/o male with a history of CAD, h/o MI s/p stents, HTN, HLD, 

diastolic CHF, COPD, DM II, anxiety, depression, bipolar disorder, anemia and 

GERD, who presented to ED because of worsening SOB x1 day





Acute on chronic diastolic CHF exacerbation:


- Cardiac enzymes negative x 3, ekg prn cp, O2 protocol, home O2 is 2 L 


- Strict I/Ox, daily weights  - compared to last admission pt euvolemic.  

Physical exam remains stable-  no JVD,  chronic mild pitting edema in BLE 

stable. 


   Cards recs likely be able to dc with lasix 40 mg PO BID along with 

spironolactone upon discharge.


- Appears volume contracted, holding lasix.  


- Low sodium and low potassium diet, continue 1800 mL fluid restriction


- ECHO 12/23/17 w/ preserved EF and grade II diastolic CHF 


- Trop minimally increased although pt without cardiac symptoms- likely due to 

demand ischemia and low blood count yesterday.  pt s/p 1 U PRBCs at this time. 


- Transfer to med/surg





Elevated INR - resolved


- 2.3 today, cont warfarin 





Anemia of chronic disease- STABLE:


- Hgb stable at 8.1 - s/p 1 U PRBC on 2/7.  Pt energy level slightly improved 

today.


- Iron panel completed - continue IV iron to boost counts. 





Fatigue/Lethargy


- ammonia level > 32,  administered lactulose on 2/7 and pt had large BM- he is 

no longer confused or as fatigued.  





FITZ on CKD stage III


- baseline crt 1.6-  today 2.2, stable - likely due to aggressive diuresis


- holding diuretic


- Nephrology consulted for proteinuria, - appreciate recs - feel this is due to 

diabetic nephropathy.  


- K+ 4.9 today- cont low potassium diet. 





Hyperkalemia


- improving





b/l chronic diabetic wounds: 


- Following w/ wound care- wound care consulted 





CAD w/ cardiac stenting


HTN


HLD


- Plavix 75 mg daily, Norvasc 10 mg daily, Hydralazine 25 mg QID, Imdur 30 mg 

BID, and Toprol XL 50 mg BID, Lipitor 80 mg daily





T2DM w/ neuropathy:


- Continue Lantus 10 u HS 


- BSG ACHS and ISS


- Continue Gabapentin  





Chronic atrial thrombus: Continue Coumadin, follow PT/INR and adjust dose PRN 

for INR goal 2-3 


- INR improved, continue here





COPD without exacerbation: Continue home inhalers 





Bipolar disorder, anxiety, depression- STABLE: Depakote 2 g daily, Lexapro 20 

mg daily, Remeron 45 mg daily, and Klonopin





GI prophylaxis: Protonix daily 





DVT Prophylaxis: Coumadin





Code status: LEVEL I, FULL





Dispo: From home, has [facility]- PT/OT and CM consulted, discharge possibly tomorrow.


   Transfer to med/surg today

## 2018-02-09 VITALS
HEART RATE: 65 BPM | OXYGEN SATURATION: 91 % | TEMPERATURE: 97.34 F | SYSTOLIC BLOOD PRESSURE: 142 MMHG | DIASTOLIC BLOOD PRESSURE: 66 MMHG

## 2018-02-09 VITALS
HEART RATE: 65 BPM | DIASTOLIC BLOOD PRESSURE: 60 MMHG | OXYGEN SATURATION: 91 % | SYSTOLIC BLOOD PRESSURE: 116 MMHG | TEMPERATURE: 98.06 F

## 2018-02-09 VITALS — OXYGEN SATURATION: 88 % | HEART RATE: 66 BPM

## 2018-02-09 VITALS
OXYGEN SATURATION: 90 % | TEMPERATURE: 97.52 F | HEART RATE: 65 BPM | SYSTOLIC BLOOD PRESSURE: 136 MMHG | DIASTOLIC BLOOD PRESSURE: 68 MMHG

## 2018-02-09 VITALS — HEART RATE: 63 BPM | OXYGEN SATURATION: 91 %

## 2018-02-09 VITALS — OXYGEN SATURATION: 93 % | HEART RATE: 67 BPM

## 2018-02-09 VITALS — HEART RATE: 66 BPM | OXYGEN SATURATION: 91 %

## 2018-02-09 VITALS — OXYGEN SATURATION: 91 % | HEART RATE: 63 BPM | DIASTOLIC BLOOD PRESSURE: 51 MMHG | SYSTOLIC BLOOD PRESSURE: 123 MMHG

## 2018-02-09 VITALS — HEART RATE: 61 BPM | OXYGEN SATURATION: 90 %

## 2018-02-09 LAB
BUN SERPL-MCNC: 84 MG/DL (ref 7–18)
CALCIUM SERPL-MCNC: 8.3 MG/DL (ref 8.5–10.1)
CO2 SERPL-SCNC: 26 MMOL/L (ref 21–32)
CREAT SERPL-MCNC: 2.27 MG/DL (ref 0.6–1.4)
GLUCOSE SERPL-MCNC: 108 MG/DL (ref 70–99)
INR PPP: 3.9 (ref 0.9–1.1)
POTASSIUM SERPL-SCNC: 4.8 MMOL/L (ref 3.5–5.1)
SODIUM SERPL-SCNC: 139 MMOL/L (ref 136–145)

## 2018-02-09 RX ADMIN — INSULIN ASPART SCH UNITS: 100 INJECTION, SOLUTION INTRAVENOUS; SUBCUTANEOUS at 08:53

## 2018-02-09 RX ADMIN — ATORVASTATIN CALCIUM SCH MG: 40 TABLET, FILM COATED ORAL at 08:27

## 2018-02-09 RX ADMIN — FERROUS SULFATE TAB EC 325 MG (65 MG FE EQUIVALENT) SCH MG: 325 (65 FE) TABLET DELAYED RESPONSE at 08:28

## 2018-02-09 RX ADMIN — PANTOPRAZOLE SCH MG: 40 TABLET, DELAYED RELEASE ORAL at 08:29

## 2018-02-09 RX ADMIN — TORSEMIDE SCH MG: 20 TABLET ORAL at 08:30

## 2018-02-09 RX ADMIN — FERROUS SULFATE TAB EC 325 MG (65 MG FE EQUIVALENT) SCH MG: 325 (65 FE) TABLET DELAYED RESPONSE at 17:06

## 2018-02-09 RX ADMIN — IPRATROPIUM BROMIDE AND ALBUTEROL SULFATE SCH ML: .5; 3 SOLUTION RESPIRATORY (INHALATION) at 19:23

## 2018-02-09 RX ADMIN — DOCUSATE SODIUM SCH MG: 100 CAPSULE, LIQUID FILLED ORAL at 08:27

## 2018-02-09 RX ADMIN — INSULIN GLARGINE SCH UNITS: 100 INJECTION, SOLUTION SUBCUTANEOUS at 20:25

## 2018-02-09 RX ADMIN — Medication SCH GM: at 08:30

## 2018-02-09 RX ADMIN — ISOSORBIDE MONONITRATE SCH MG: 30 TABLET, EXTENDED RELEASE ORAL at 08:30

## 2018-02-09 RX ADMIN — IPRATROPIUM BROMIDE AND ALBUTEROL SULFATE SCH ML: .5; 3 SOLUTION RESPIRATORY (INHALATION) at 07:14

## 2018-02-09 RX ADMIN — IPRATROPIUM BROMIDE AND ALBUTEROL SULFATE SCH ML: .5; 3 SOLUTION RESPIRATORY (INHALATION) at 23:06

## 2018-02-09 RX ADMIN — OXYCODONE HYDROCHLORIDE PRN MG: 5 TABLET ORAL at 21:42

## 2018-02-09 RX ADMIN — DIVALPROEX SODIUM SCH MG: 500 TABLET, DELAYED RELEASE ORAL at 20:25

## 2018-02-09 RX ADMIN — TORSEMIDE SCH MG: 20 TABLET ORAL at 20:23

## 2018-02-09 RX ADMIN — ALUMINUM HYDROXIDE, MAGNESIUM HYDROXIDE, AND DIMETHICONE PRN ML: 400; 400; 40 SUSPENSION ORAL at 10:35

## 2018-02-09 RX ADMIN — LABETALOL HCL SCH MG: 200 TABLET, FILM COATED ORAL at 08:29

## 2018-02-09 RX ADMIN — IRON SUCROSE SCH MLS/HR: 20 INJECTION, SOLUTION INTRAVENOUS at 08:26

## 2018-02-09 RX ADMIN — INSULIN ASPART SCH UNITS: 100 INJECTION, SOLUTION INTRAVENOUS; SUBCUTANEOUS at 12:20

## 2018-02-09 RX ADMIN — TAMSULOSIN HYDROCHLORIDE SCH MG: 0.4 CAPSULE ORAL at 20:22

## 2018-02-09 RX ADMIN — IPRATROPIUM BROMIDE AND ALBUTEROL SULFATE SCH ML: .5; 3 SOLUTION RESPIRATORY (INHALATION) at 11:43

## 2018-02-09 RX ADMIN — GABAPENTIN SCH MG: 300 CAPSULE ORAL at 20:23

## 2018-02-09 RX ADMIN — MIRTAZAPINE SCH MG: 15 TABLET, FILM COATED ORAL at 20:22

## 2018-02-09 RX ADMIN — ESCITALOPRAM OXALATE SCH MG: 20 TABLET, FILM COATED ORAL at 08:28

## 2018-02-09 RX ADMIN — Medication SCH MCG: at 08:27

## 2018-02-09 RX ADMIN — CLOPIDOGREL BISULFATE SCH MG: 75 TABLET, FILM COATED ORAL at 08:27

## 2018-02-09 RX ADMIN — ACETAMINOPHEN PRN MG: 325 TABLET ORAL at 19:09

## 2018-02-09 RX ADMIN — LABETALOL HCL SCH MG: 200 TABLET, FILM COATED ORAL at 20:26

## 2018-02-09 RX ADMIN — IPRATROPIUM BROMIDE AND ALBUTEROL SULFATE SCH ML: .5; 3 SOLUTION RESPIRATORY (INHALATION) at 03:19

## 2018-02-09 RX ADMIN — IPRATROPIUM BROMIDE AND ALBUTEROL SULFATE SCH ML: .5; 3 SOLUTION RESPIRATORY (INHALATION) at 14:11

## 2018-02-09 RX ADMIN — INSULIN ASPART SCH UNITS: 100 INJECTION, SOLUTION INTRAVENOUS; SUBCUTANEOUS at 20:26

## 2018-02-09 RX ADMIN — DOCUSATE SODIUM SCH MG: 100 CAPSULE, LIQUID FILLED ORAL at 20:26

## 2018-02-09 RX ADMIN — TIOTROPIUM BROMIDE SCH PUFF: 18 CAPSULE ORAL; RESPIRATORY (INHALATION) at 08:31

## 2018-02-09 RX ADMIN — AMLODIPINE BESYLATE SCH MG: 5 TABLET ORAL at 08:27

## 2018-02-09 RX ADMIN — INSULIN ASPART SCH UNITS: 100 INJECTION, SOLUTION INTRAVENOUS; SUBCUTANEOUS at 17:09

## 2018-02-09 RX ADMIN — CALCIUM CARBONATE PRN MG: 500 TABLET ORAL at 19:01

## 2018-02-09 NOTE — NEPHROLOGY PROGRESS NOTE
Nephrology Progress Note


Date of Service


Feb 9, 2018.





Chief Complaint


FITZ, proteinuria





Subjective


Mr. Dixon was seen & examined in his hospital room this morning.  He reports 

bisk diuresis in response to Demadex therapy.  His breathing is subjectively 

improved.  His only concern is R ear discomfort.





Review of Systems


Constitutional:  No fever


Cardiovascular:  No chest pain


Respiratory:  No dyspnea at rest


Abdomen:  No pain, No nausea, No vomiting


Genitourinary - Male:  No dysuria


Extremities:  No leg edema


A complete review of systems was performed.  Pertinent positives are noted 

above. All other systems are negative.





Vital Signs





Last 8 Hrs








  Date Time  Temp Pulse Resp B/P (MAP) Pulse Ox O2 Delivery O2 Flow Rate FiO2


 


2/9/18 07:39      Nasal Cannula 5.0 


 


2/9/18 07:02 36.7 65 18 116/60 (78) 91 Room Air  


 


2/9/18 03:19  66 18  88 Nasal Cannula 5.0 











Last Recorded Weight


Weight (Kilograms):  100.100





Physical Exam


General Appearance:  no apparent distress


Head:  normocephalic, atraumatic


Eyes:  PERRL, EOMI


Neck:  no adenopathy


Respiratory/Chest:  lungs clear, no respiratory distress


Cardiovascular:  regular rate, rhythm


Abdomen/GI:  normal bowel sounds, non tender, soft


Extremities/Musculoskelatal:  no calf tenderness, + pertinent finding (1+ 

pretibial pitting edema)


Neurologic/Psych:  alert, oriented x 3





Family History





Cancer (esophageal)


Diabetes mellitus


Heart disease


Hypertension


Negative for CKD/ESRD





Social History


Smoking Status:  Former smoker


Drug Use:  none


Marital Status:  


Housing Status:  lives with family, other


Occupation:  disabled


.  Disabled.  Former smoker





Laboratory Results


Past 24 Hours


2/9/18 07:28

















Test


  2/8/18


10:45 2/8/18


11:35 2/8/18


16:09 2/8/18


20:20


 


Troponin I


  0.091 ng/ml


(0-0.045) 


  


  


 


 


Bedside Glucose


  


  77 mg/dl


(70-99) 106 mg/dl


(70-99) 122 mg/dl


(70-99)


 


Test


  2/9/18


07:11 2/9/18


07:28 


  


 


 


Bedside Glucose


  122 mg/dl


(70-99) 


  


  


 


 


Prothrombin Time


  


  39.6 SECONDS


(9.0-12.0) 


  


 


 


Prothromb Time International


Ratio 


  3.9 (0.9-1.1) 


  


  


 


 


Anion Gap


  


  8.0 mmol/L


(3-11) 


  


 


 


Est Creatinine Clear Calc


Drug Dose 


  41.7 ml/min 


  


  


 


 


Estimated GFR (


American) 


  35.0 


  


  


 


 


Estimated GFR (Non-


American 


  30.2 


  


  


 


 


BUN/Creatinine Ratio  37.1 (10-20)   


 


Calcium Level


  


  8.3 mg/dl


(8.5-10.1) 


  


 











Allergies


Coded Allergies:  


     No Known Allergies (Verified , 2/2/18)





Medications





Current Inpatient Medications








 Medications


  (Trade)  Dose


 Ordered  Sig/Dc


 Route  Start Time


 Stop Time Status Last Admin


Dose Admin


 


 Acetaminophen


  (Tylenol Tab)  650 mg  Q4H  PRN


 PO  2/2/18 19:00


 3/4/18 18:59  2/8/18 13:44


650 MG


 


 Al Hydrox/Mg


 Hydrox/Simethicone


  (Maalox Max Susp)  15 ml  Q4H  PRN


 PO  2/2/18 19:00


 3/4/18 18:59   


 


 


 Magnesium


 Hydroxide


  (Milk Of


 Magnesia Susp)  30 ml  Q12H  PRN


 PO  2/2/18 19:00


 3/4/18 18:59   


 


 


 Ondansetron HCl


  (Zofran Inj)  4 mg  Q6H  PRN


 IV  2/2/18 19:00


 3/4/18 18:59   


 


 


 Nitroglycerin


  (Nitrostat Tab)  0.4 mg  UD  PRN


 SL  2/2/18 19:00


 3/4/18 18:59   


 


 


 Morphine Sulfate


  (MoRPHine


 SULFATE INJ)  2 mg  Q30M  PRN


 IV  2/2/18 19:00


 2/16/18 18:59   


 


 


 Atorvastatin


 Calcium


  (Lipitor Tab)  80 mg  DAILY


 PO  2/3/18 09:00


 3/5/18 08:59  2/9/18 08:27


80 MG


 


 Clonazepam


  (Klonopin Tab)  1 mg  HS  PRN


 PO  2/2/18 19:00


 3/4/18 18:59  2/3/18 03:35


1 MG


 


 Clopidogrel


 Bisulfate


  (plAVix TAB)  75 mg  QAM


 PO  2/3/18 09:00


 3/5/18 08:59  2/9/18 08:27


75 MG


 


 Divalproex Sodium


  (Depakote Delay


 Rel Tab)  2,000 mg  HS


 PO  2/2/18 21:00


 3/4/18 20:59  2/8/18 21:39


2,000 MG


 


 Escitalopram


 Oxalate


  (Lexapro Tab)  20 mg  QAM


 PO  2/3/18 09:00


 3/5/18 08:59  2/9/18 08:28


20 MG


 


 Fluticasone


 Propionate


  (Flonase Nasal


 Spray)  2 sprays  DAILY  PRN


 VERONICA  2/2/18 19:00


 3/4/18 18:59   


 


 


 Gabapentin


  (Neurontin Cap)  300 mg  HS


 PO  2/2/18 21:00


 3/4/18 20:59  2/8/18 21:41


300 MG


 


 Hydralazine HCl


  (Apresoline Tab)  25 mg  QID


 PO  2/2/18 21:00


 3/4/18 20:59  2/9/18 08:29


25 MG


 


 Insulin Glargine


  (Lantus Solostar


 Pen)  10 units  HS


 SC  2/2/18 21:00


 3/4/18 20:59  2/8/18 21:43


10 UNITS


 


 Isosorbide


 Mononitrate


  (Imdur Ext Rel


 Tab)  30 mg  QAM


 PO  2/3/18 09:00


 3/5/18 08:59  2/9/18 08:30


30 MG


 


 Tamsulosin HCl


  (Flomax Cap)  0.4 mg  HS


 PO  2/2/18 21:00


 3/4/18 20:59  2/8/18 21:39


0.4 MG


 


 Tiotropium Bromide


  (Spiriva


 Handihaler


 Inhaler)  1 puff  DAILY


 INH  2/3/18 09:00


 3/5/18 08:59  2/9/18 08:31


1 PUFF


 


 Warfarin Sodium


  (Coumadin Tab)  7.5 mg  SuMoWeThFr@1600


 PO  2/2/18 20:00


 3/4/18 19:59 Future Hold 2/8/18 16:35


7.5 MG


 


 Albuterol


  (Ventolin Hfa


 Inhaler)  2 puffs  Q4H  PRN


 INH  2/2/18 20:45


 3/4/18 20:44  2/4/18 21:55


2 PUFFS


 


 Amlodipine


 Besylate


  (Norvasc Tab)  10 mg  QAM


 PO  2/3/18 09:00


 3/5/18 08:59  2/9/18 08:27


10 MG


 


 Cyanocobalamin


  (Vitamin B-12


 Tab)  1,000 mcg  DAILY


 PO  2/3/18 09:00


 3/5/18 08:59  2/9/18 08:27


1,000 MCG


 


 Ferrous Sulfate


  (Feosol Tab)  325 mg  BIDM


 PO  2/3/18 07:30


 3/5/18 07:59  2/9/18 08:28


325 MG


 


 Mirtazapine


  (Remeron Tab)  45 mg  HS


 PO  2/2/18 21:00


 3/4/18 20:59  2/8/18 21:40


45 MG


 


 Oxycodone HCl


  (Roxicodone


 Immediate Rel Tab)  10 mg  Q12  PRN


 PO  2/2/18 19:00


 3/4/18 18:59  2/8/18 17:34


10 MG


 


 Spironolactone


  (Aldactone Tab)  50 mg  BID


 PO  2/2/18 21:00


 3/4/18 20:59 Future Hold  


 


 


 Warfarin Sodium


  (Coumadin Tab)  10 mg  TuSa@1600


 PO  2/3/18 16:00


 3/5/18 15:59 Future Hold 2/3/18 16:58


10 MG


 


 Insulin Aspart


  (novoLOG ASPART)  **SLIDING


 SCALE**


 **G...  ACHS


 SC  2/2/18 21:00


 3/4/18 20:59  2/9/18 08:53


4 UNITS


 


 Miscellaneous


  (Iv Fluids


 Completed)  1 ea  PRN  PRN


 N/A  2/2/18 19:30


 2/2/19 19:29   


 


 


 Furosemide 20 mg/


 Syringe  2 ml @ 4


 mls/min  BID


 IV  2/3/18 09:00


 3/4/18 20:59 Future Hold 2/3/18 20:51


4 MLS/MIN


 


 Pantoprazole


 Sodium


  (Protonix Tab)  40 mg  QAM


 PO  2/3/18 09:00


 3/5/18 08:59  2/9/18 08:29


40 MG


 


 Calcium Carbonate


  (Tums Chew Tab)  500 mg  Q2H  PRN


 PO  2/3/18 10:00


 3/5/18 09:59  2/8/18 13:41


500 MG


 


 Albuterol/


 Ipratropium


  (Duoneb)  3 ml  Q4R


 INH  2/4/18 22:42


 3/6/18 22:41  2/9/18 03:19


3 ML


 


 Furosemide


  (Lasix Tab)  20 mg  QAM


 PO  2/5/18 09:00


 3/7/18 08:59 Future Hold 2/6/18 08:13


20 MG


 


 Docusate Sodium


  (coLACE CAP)  100 mg  BID


 PO  2/7/18 11:30


 3/9/18 11:29  2/9/18 08:27


100 MG


 


 Polyethylene


  (Miralax Powder


 Packet)  17 gm  DAILY


 PO  2/7/18 11:30


 3/4/18 11:29  2/7/18 12:15


17 GM


 


 Torsemide


  (Demadex Tab)  20 mg  BID


 PO  2/8/18 21:00


 3/10/18 20:59  2/9/18 08:30


20 MG


 


 Labetalol HCl


  (Normodyne Tab)  200 mg  BID


 PO  2/8/18 21:00


 3/10/18 20:59  2/9/18 08:29


200 MG


 


 Iron Sucrose 100


 mg/Sodium Chloride  105 ml @ 


 420 mls/hr  DAILY


 IV  2/8/18 10:45


 2/17/18 09:14  2/9/18 08:26


420 MLS/HR











Impression





(1) Acute kidney injury


(2) Proteinuria


(3) Anemia


(4) Acute diastolic (congestive) heart failure


(5) Hypertension


(6) Former smoker


62 year old white male with recurrent CHF.  12/17 echocardiogram revealed LVEF 

55% but dilated RV and RA.  Patient likely has a combination of pulmonary HTN 

and diastolic dysfunction.  Compliance with outpatient medical regimen and 

office visits has been suboptimal.  Kidney function has declined in response to 

diuretic therapy.  12/17 renal US revealed 14 cm kidneys.  Doppler was negative 

for KIANA.  Urine sediment is difficult to interpret due to chronic indwelling 

gillespie catheter.  UPCR 1.5 g.





Recommendations


ACUTE KIDNEY INJURY:


-- Baseline creatinine had been 1.2.  Patient has been admitted w/ recurrent 

diastolic CHF.  May need to accept worsening kidney function in order to 

correct pulmonary edema


-- I&O's matched yesterday following Demadex therapy.  Kidney function remains 

essentially unchanged.  Respiratory status is subjectively improved.


-- Will continue Demadex 20 mg po BID


-- Monitor serial PRP





PROTEINURIA:


-- Likely related to diabetic nephropathy.  UIEP negative for monoclonal band





HYPERTENSION:


-- Actively working to simplify medical regimen.  Continue Labetalol 200 mg po 

BID.  HR and blood pressure are acceptable this am.  Will stop Hydralazine





CHF:


-- Troponin remains mildly elevated.  Cardiology is following


-- Echocardiogram 02/08/18:  LVEF 60 - 65%, RVSP 55 mmHG, biatrial dilation





ANEMIA:


-- Iron saturation <20% w/ low ferritin and negative FOBT.  Continue IV Venofer 

100 mg daily x 10 days (day #2)


-- Hgb is mildly improved

## 2018-02-09 NOTE — PROGRESS NOTE
Subjective


Date of Service:


Feb 9, 2018.


Subjective


Pt evaluation today including:  conversation w/ patient, physical exam, lab 

review, review of inpatient medication list


Pain:  no pain


PO Intake:  adequate


Voiding:  no voiding problems


patient continues to be fatigued, wanting to sleep most of the day


evaluated by therapy, he admitted that he needs rehab





reviewed labs, Cr remains high at 2.2





Problem List


Medical Problems:


(1) Acute CHF


Status: Acute  





(2) Acute coronary syndrome


Status: Acute  





(3) Acute headache


Status: Acute  





(4) Acute on chronic congestive heart failure


Status: Acute  





(5) Altered mental status


Status: Acute  





(6) Anemia


Status: Acute  





(7) Anemia


Status: Acute  





(8) Anemia


Status: Acute  





(9) Anginal pain


Status: Acute  





(10) Appendicitis


Status: Acute  





(11) Cellulitis


Status: Acute  





(12) Chest pain


Status: Acute  





(13) CHF (congestive heart failure)


Status: Acute  





(14) CHF (congestive heart failure)


Status: Acute  





(15) CHF (congestive heart failure)


Status: Acute  





(16) Congestive heart failure


Status: Acute  





(17) Congestive heart failure


Status: Acute  





(18) COPD (chronic obstructive pulmonary disease)


Status: Acute  





(19) Diabetes mellitus with hyperglycemia


Status: Acute  





(20) Dyspnea


Status: Acute  





(21) Elevated troponin


Status: Acute  





(22) Failure of outpatient treatment


Status: Acute  





(23) Hypoxia


Status: Acute  





(24) Hypoxia


Status: Acute  





(25) Intra-abdominal abscess


Status: Acute  





(26) Lower abdominal pain of unknown etiology


Status: Acute  





(27) Neck pain


Status: Acute  





(28) Pneumonia


Status: Acute  





(29) Pneumonia


Status: Acute  





(30) Precordial chest pain


Status: Acute  





(31) Pulmonary edema


Status: Acute  





(32) Respiratory acidosis


Status: Acute  





(33) Respiratory distress


Status: Acute  





(34) SOB (shortness of breath)


Status: Acute  





(35) SOB (shortness of breath)


Status: Acute  





(36) Tachypnea


Status: Acute  











Review of Systems


Constitutional:  + weakness, + fatigue


Respiratory:  + dyspnea on exertion


Cardiac:  + edema


Neurologic:  + weakness, + balance problems


Psychiatric:  + depression symptoms


All Other Systems:  Reviewed and Negative





Medications





Current Inpatient Medications








 Medications


  (Trade)  Dose


 Ordered  Sig/Dc


 Route  Start Time


 Stop Time Status Last Admin


Dose Admin


 


 Acetaminophen


  (Tylenol Tab)  650 mg  Q4H  PRN


 PO  2/2/18 19:00


 3/4/18 18:59  2/8/18 13:44


650 MG


 


 Al Hydrox/Mg


 Hydrox/Simethicone


  (Maalox Max Susp)  15 ml  Q4H  PRN


 PO  2/2/18 19:00


 3/4/18 18:59  2/9/18 10:35


15 ML


 


 Magnesium


 Hydroxide


  (Milk Of


 Magnesia Susp)  30 ml  Q12H  PRN


 PO  2/2/18 19:00


 3/4/18 18:59   


 


 


 Ondansetron HCl


  (Zofran Inj)  4 mg  Q6H  PRN


 IV  2/2/18 19:00


 3/4/18 18:59   


 


 


 Nitroglycerin


  (Nitrostat Tab)  0.4 mg  UD  PRN


 SL  2/2/18 19:00


 3/4/18 18:59   


 


 


 Morphine Sulfate


  (MoRPHine


 SULFATE INJ)  2 mg  Q30M  PRN


 IV  2/2/18 19:00


 2/16/18 18:59   


 


 


 Atorvastatin


 Calcium


  (Lipitor Tab)  80 mg  DAILY


 PO  2/3/18 09:00


 3/5/18 08:59  2/9/18 08:27


80 MG


 


 Clonazepam


  (Klonopin Tab)  1 mg  HS  PRN


 PO  2/2/18 19:00


 3/4/18 18:59  2/3/18 03:35


1 MG


 


 Clopidogrel


 Bisulfate


  (plAVix TAB)  75 mg  QAM


 PO  2/3/18 09:00


 3/5/18 08:59  2/9/18 08:27


75 MG


 


 Divalproex Sodium


  (Depakote Delay


 Rel Tab)  2,000 mg  HS


 PO  2/2/18 21:00


 3/4/18 20:59  2/8/18 21:39


2,000 MG


 


 Escitalopram


 Oxalate


  (Lexapro Tab)  20 mg  QAM


 PO  2/3/18 09:00


 3/5/18 08:59  2/9/18 08:28


20 MG


 


 Fluticasone


 Propionate


  (Flonase Nasal


 Spray)  2 sprays  DAILY  PRN


 VERONICA  2/2/18 19:00


 3/4/18 18:59   


 


 


 Gabapentin


  (Neurontin Cap)  300 mg  HS


 PO  2/2/18 21:00


 3/4/18 20:59  2/8/18 21:41


300 MG


 


 Insulin Glargine


  (Lantus Solostar


 Pen)  10 units  HS


 SC  2/2/18 21:00


 3/4/18 20:59  2/8/18 21:43


10 UNITS


 


 Isosorbide


 Mononitrate


  (Imdur Ext Rel


 Tab)  30 mg  QAM


 PO  2/3/18 09:00


 3/5/18 08:59  2/9/18 08:30


30 MG


 


 Tamsulosin HCl


  (Flomax Cap)  0.4 mg  HS


 PO  2/2/18 21:00


 3/4/18 20:59  2/8/18 21:39


0.4 MG


 


 Tiotropium Bromide


  (Spiriva


 Handihaler


 Inhaler)  1 puff  DAILY


 INH  2/3/18 09:00


 3/5/18 08:59  2/9/18 08:31


1 PUFF


 


 Warfarin Sodium


  (Coumadin Tab)  7.5 mg  SuMoWeThFr@1600


 PO  2/2/18 20:00


 3/4/18 19:59 Future Hold 2/8/18 16:35


7.5 MG


 


 Albuterol


  (Ventolin Hfa


 Inhaler)  2 puffs  Q4H  PRN


 INH  2/2/18 20:45


 3/4/18 20:44  2/4/18 21:55


2 PUFFS


 


 Amlodipine


 Besylate


  (Norvasc Tab)  10 mg  QAM


 PO  2/3/18 09:00


 3/5/18 08:59  2/9/18 08:27


10 MG


 


 Cyanocobalamin


  (Vitamin B-12


 Tab)  1,000 mcg  DAILY


 PO  2/3/18 09:00


 3/5/18 08:59  2/9/18 08:27


1,000 MCG


 


 Ferrous Sulfate


  (Feosol Tab)  325 mg  BIDM


 PO  2/3/18 07:30


 3/5/18 07:59  2/9/18 08:28


325 MG


 


 Mirtazapine


  (Remeron Tab)  45 mg  HS


 PO  2/2/18 21:00


 3/4/18 20:59  2/8/18 21:40


45 MG


 


 Oxycodone HCl


  (Roxicodone


 Immediate Rel Tab)  10 mg  Q12  PRN


 PO  2/2/18 19:00


 3/4/18 18:59  2/8/18 17:34


10 MG


 


 Spironolactone


  (Aldactone Tab)  50 mg  BID


 PO  2/2/18 21:00


 3/4/18 20:59 Future Hold  


 


 


 Warfarin Sodium


  (Coumadin Tab)  10 mg  TuSa@1600


 PO  2/3/18 16:00


 3/5/18 15:59 Future Hold 2/3/18 16:58


10 MG


 


 Insulin Aspart


  (novoLOG ASPART)  **SLIDING


 SCALE**


 **G...  ACHS


 SC  2/2/18 21:00


 3/4/18 20:59  2/9/18 12:20


1 UNITS


 


 Miscellaneous


  (Iv Fluids


 Completed)  1 ea  PRN  PRN


 N/A  2/2/18 19:30


 2/2/19 19:29   


 


 


 Furosemide 20 mg/


 Syringe  2 ml @ 4


 mls/min  BID


 IV  2/3/18 09:00


 3/4/18 20:59 Future Hold 2/3/18 20:51


4 MLS/MIN


 


 Pantoprazole


 Sodium


  (Protonix Tab)  40 mg  QAM


 PO  2/3/18 09:00


 3/5/18 08:59  2/9/18 08:29


40 MG


 


 Calcium Carbonate


  (Tums Chew Tab)  500 mg  Q2H  PRN


 PO  2/3/18 10:00


 3/5/18 09:59  2/8/18 13:41


500 MG


 


 Albuterol/


 Ipratropium


  (Duoneb)  3 ml  Q4R


 INH  2/4/18 22:42


 3/6/18 22:41  2/9/18 14:11


3 ML


 


 Furosemide


  (Lasix Tab)  20 mg  QAM


 PO  2/5/18 09:00


 3/7/18 08:59 Future Hold 2/6/18 08:13


20 MG


 


 Docusate Sodium


  (coLACE CAP)  100 mg  BID


 PO  2/7/18 11:30


 3/9/18 11:29  2/9/18 08:27


100 MG


 


 Polyethylene


  (Miralax Powder


 Packet)  17 gm  DAILY


 PO  2/7/18 11:30


 3/4/18 11:29  2/7/18 12:15


17 GM


 


 Torsemide


  (Demadex Tab)  20 mg  BID


 PO  2/8/18 21:00


 3/10/18 20:59  2/9/18 08:30


20 MG


 


 Labetalol HCl


  (Normodyne Tab)  200 mg  BID


 PO  2/8/18 21:00


 3/10/18 20:59  2/9/18 08:29


200 MG


 


 Iron Sucrose 100


 mg/Sodium Chloride  105 ml @ 


 420 mls/hr  DAILY


 IV  2/8/18 10:45


 2/17/18 09:14  2/9/18 08:26


420 MLS/HR











Objective


Vital Signs











  Date Time  Temp Pulse Resp B/P (MAP) Pulse Ox O2 Delivery O2 Flow Rate FiO2


 


2/9/18 14:11  63 18  91 Room Air  


 


2/9/18 11:45  63   91   


 


2/9/18 11:44  67 18  93 Nasal Cannula 5.0 


 


2/9/18 07:39      Nasal Cannula 5.0 


 


2/9/18 07:12  67 18  93 Nasal Cannula 5.0 


 


2/9/18 07:02 36.7 65 18 116/60 (78) 91 Room Air  


 


2/9/18 03:19  66 18  88 Nasal Cannula 5.0 


 


2/9/18 00:00      Nasal Cannula 5.0 


 


2/8/18 23:14  55 18  94 Nasal Cannula 5.0 


 


2/8/18 23:07 36.5 60 18 110/60 (77) 99 Nasal Cannula  


 


2/8/18 21:44    148/68 (94)    


 


2/8/18 19:14  61 18  89 Nasal Cannula 5.0 


 


2/8/18 16:30      Nasal Cannula 3.5 











Physical Exam


General Appearance:  WD/WN, no apparent distress


Eyes:  normal inspection, EOMI, sclerae normal


ENT:  normal ENT inspection, hearing grossly normal, pharynx normal


Neck:  supple, no adenopathy, no JVD, trachea midline


Respiratory/Chest:  chest non-tender, lungs clear, normal breath sounds, no 

respiratory distress, no accessory muscle use


Cardiovascular:  regular rate, rhythm, no gallop, no JVD, no murmur


Abdomen:  normal bowel sounds, non tender, soft, no organomegaly


Extremities:  normal range of motion, non-tender, normal inspection, no calf 

tenderness, pelvis stable, + pedal edema


Neurologic/Psychiatric:  CNs II-XII nml as tested, no motor/sensory deficits, 

alert, normal mood/affect, oriented x 3


Skin:  normal color, warm/dry, no rash


Lymphatic:  no adenopathy





Laboratory Results





Last 24 Hours








Test


  2/8/18


16:09 2/8/18


20:20 2/9/18


07:11 2/9/18


07:28


 


Bedside Glucose 106 mg/dl  122 mg/dl  122 mg/dl  


 


Prothrombin Time    39.6 SECONDS 


 


Prothromb Time International


Ratio 


  


  


  3.9 


 


 


Sodium Level    139 mmol/L 


 


Potassium Level    4.8 mmol/L 


 


Chloride Level    105 mmol/L 


 


Carbon Dioxide Level    26 mmol/L 


 


Anion Gap    8.0 mmol/L 


 


Blood Urea Nitrogen    84 mg/dl 


 


Creatinine    2.27 mg/dl 


 


Est Creatinine Clear Calc


Drug Dose 


  


  


  41.7 ml/min 


 


 


Estimated GFR (


American) 


  


  


  35.0 


 


 


Estimated GFR (Non-


American 


  


  


  30.2 


 


 


BUN/Creatinine Ratio    37.1 


 


Random Glucose    108 mg/dl 


 


Calcium Level    8.3 mg/dl 


 


Test


  2/9/18


11:23 


  


  


 


 


Bedside Glucose 105 mg/dl    











Assessment and Plan


Patient is a 61 y/o male with a history of CAD, h/o MI s/p stents, HTN, HLD, 

diastolic CHF, COPD, DM II, anxiety, depression, bipolar disorder, anemia and 

GERD, who presented to ED because of worsening SOB x1 day





Acute on chronic diastolic CHF exacerbation:


- Cardiac enzymes negative x 3, ekg prn cp, O2 protocol, home O2 is 2 L 


- Strict I/Ox, daily weights  - compared to last admission pt euvolemic.  

Physical exam remains stable-  no JVD,  chronic mild pitting edema in BLE 

stable. 


   Cards recs likely be able to dc with lasix 40 mg PO BID along with 

spironolactone upon discharge.


- Appears volume contracted, holding lasix for several days, no worsening 

symptoms


- Low sodium and low potassium diet, continue 1800 mL fluid restriction


- ECHO 12/23/17 w/ preserved EF and grade II diastolic CHF 





Elevated INR - resolved


- 3.9 today, hold Warfarin





Anemia of chronic disease- STABLE:


- Hgb stable at 8.1 yesterday - s/p 1 U PRBC on 2/7.  Pt energy level slightly 

improved


- Iron panel completed - continue IV iron to boost counts. 


- repeat CBC tomorrow





Fatigue/Lethargy


- ammonia level > 32,  administered lactulose on 2/7 and pt had large BM- he is 

no longer confused or as fatigued.  





FITZ on CKD stage III


- baseline crt 1.6-  today 2.27, stable - likely due to aggressive diuresis


- holding diuretic


- Nephrology consulted for proteinuria, - appreciate recs - feel this is due to 

diabetic nephropathy.  


- K+ 4.8 today- cont low potassium diet. 





Hyperkalemia


- resolved





b/l chronic diabetic wounds: 


- Following w/ wound care- wound care consulted 





CAD w/ cardiac stenting


HTN


HLD


- Plavix 75 mg daily, Norvasc 10 mg daily, Hydralazine 25 mg QID, Imdur 30 mg 

BID, and Toprol XL 50 mg BID, Lipitor 80 mg daily





T2DM w/ neuropathy:


- Continue Lantus 10 u HS 


- BSG ACHS and ISS


- Continue Gabapentin  





Chronic atrial thrombus: Continue Coumadin, follow PT/INR and adjust dose PRN 

for INR goal 2-3 


- see above, INR 3.9





COPD without exacerbation: Continue home inhalers 





Bipolar disorder, anxiety, depression- STABLE: Depakote 2 g daily, Lexapro 20 

mg daily, Remeron 45 mg daily, and Klonopin





GI prophylaxis: Protonix daily 





DVT Prophylaxis: Coumadin





Code status: LEVEL I, FULL





Dispo: PT/OT recommending rehab, he is actually agreeable

## 2018-02-10 VITALS
TEMPERATURE: 97.7 F | SYSTOLIC BLOOD PRESSURE: 111 MMHG | OXYGEN SATURATION: 100 % | DIASTOLIC BLOOD PRESSURE: 54 MMHG | HEART RATE: 52 BPM

## 2018-02-10 VITALS — HEART RATE: 59 BPM | OXYGEN SATURATION: 93 %

## 2018-02-10 VITALS
TEMPERATURE: 97.52 F | SYSTOLIC BLOOD PRESSURE: 152 MMHG | DIASTOLIC BLOOD PRESSURE: 71 MMHG | HEART RATE: 59 BPM | OXYGEN SATURATION: 91 %

## 2018-02-10 VITALS
DIASTOLIC BLOOD PRESSURE: 55 MMHG | OXYGEN SATURATION: 91 % | HEART RATE: 60 BPM | TEMPERATURE: 97.52 F | SYSTOLIC BLOOD PRESSURE: 127 MMHG

## 2018-02-10 VITALS — OXYGEN SATURATION: 92 % | HEART RATE: 56 BPM

## 2018-02-10 VITALS — OXYGEN SATURATION: 87 % | HEART RATE: 60 BPM

## 2018-02-10 VITALS — OXYGEN SATURATION: 96 % | HEART RATE: 56 BPM

## 2018-02-10 LAB
BASOPHILS # BLD: 0.01 K/UL (ref 0–0.2)
BASOPHILS NFR BLD: 0.2 %
BUN SERPL-MCNC: 85 MG/DL (ref 7–18)
CALCIUM SERPL-MCNC: 8.2 MG/DL (ref 8.5–10.1)
CO2 SERPL-SCNC: 28 MMOL/L (ref 21–32)
CREAT SERPL-MCNC: 2.22 MG/DL (ref 0.6–1.4)
EOS ABS #: 0.21 K/UL (ref 0–0.5)
EOSINOPHIL NFR BLD AUTO: 176 K/UL (ref 130–400)
GLUCOSE SERPL-MCNC: 141 MG/DL (ref 70–99)
HCT VFR BLD CALC: 25.1 % (ref 42–52)
HGB BLD-MCNC: 7.9 G/DL (ref 14–18)
IG#: 0.05 K/UL (ref 0–0.02)
IMM GRANULOCYTES NFR BLD AUTO: 8.7 %
INR PPP: 4.3 (ref 0.9–1.1)
LYMPHOCYTES # BLD: 0.55 K/UL (ref 1.2–3.4)
MCH RBC QN AUTO: 30 PG (ref 25–34)
MCHC RBC AUTO-ENTMCNC: 31.5 G/DL (ref 32–36)
MCV RBC AUTO: 95.4 FL (ref 80–100)
MONO ABS #: 0.75 K/UL (ref 0.11–0.59)
MONOCYTES NFR BLD: 11.9 %
NEUT ABS #: 4.75 K/UL (ref 1.4–6.5)
NEUTROPHILS # BLD AUTO: 3.3 %
NEUTROPHILS NFR BLD AUTO: 75.1 %
PMV BLD AUTO: 8.8 FL (ref 7.4–10.4)
POTASSIUM SERPL-SCNC: 4.7 MMOL/L (ref 3.5–5.1)
RED CELL DISTRIBUTION WIDTH CV: 16.4 % (ref 11.5–14.5)
RED CELL DISTRIBUTION WIDTH SD: 54.4 FL (ref 36.4–46.3)
SODIUM SERPL-SCNC: 138 MMOL/L (ref 136–145)
WBC # BLD AUTO: 6.32 K/UL (ref 4.8–10.8)

## 2018-02-10 RX ADMIN — TORSEMIDE SCH MG: 20 TABLET ORAL at 20:27

## 2018-02-10 RX ADMIN — FERROUS SULFATE TAB EC 325 MG (65 MG FE EQUIVALENT) SCH MG: 325 (65 FE) TABLET DELAYED RESPONSE at 07:43

## 2018-02-10 RX ADMIN — DIVALPROEX SODIUM SCH MG: 500 TABLET, DELAYED RELEASE ORAL at 20:25

## 2018-02-10 RX ADMIN — INSULIN ASPART SCH UNITS: 100 INJECTION, SOLUTION INTRAVENOUS; SUBCUTANEOUS at 09:10

## 2018-02-10 RX ADMIN — TORSEMIDE SCH MG: 20 TABLET ORAL at 07:42

## 2018-02-10 RX ADMIN — INSULIN GLARGINE SCH UNITS: 100 INJECTION, SOLUTION SUBCUTANEOUS at 20:32

## 2018-02-10 RX ADMIN — LABETALOL HCL SCH MG: 200 TABLET, FILM COATED ORAL at 20:27

## 2018-02-10 RX ADMIN — INSULIN ASPART SCH UNITS: 100 INJECTION, SOLUTION INTRAVENOUS; SUBCUTANEOUS at 17:20

## 2018-02-10 RX ADMIN — GABAPENTIN SCH MG: 300 CAPSULE ORAL at 20:26

## 2018-02-10 RX ADMIN — CALCIUM CARBONATE PRN MG: 500 TABLET ORAL at 14:57

## 2018-02-10 RX ADMIN — Medication SCH GM: at 07:44

## 2018-02-10 RX ADMIN — CLOPIDOGREL BISULFATE SCH MG: 75 TABLET, FILM COATED ORAL at 07:44

## 2018-02-10 RX ADMIN — Medication SCH MCG: at 07:44

## 2018-02-10 RX ADMIN — ESCITALOPRAM OXALATE SCH MG: 20 TABLET, FILM COATED ORAL at 07:44

## 2018-02-10 RX ADMIN — DOCUSATE SODIUM SCH MG: 100 CAPSULE, LIQUID FILLED ORAL at 07:44

## 2018-02-10 RX ADMIN — TAMSULOSIN HYDROCHLORIDE SCH MG: 0.4 CAPSULE ORAL at 20:25

## 2018-02-10 RX ADMIN — ALUMINUM HYDROXIDE, MAGNESIUM HYDROXIDE, AND DIMETHICONE PRN ML: 400; 400; 40 SUSPENSION ORAL at 16:14

## 2018-02-10 RX ADMIN — IPRATROPIUM BROMIDE AND ALBUTEROL SULFATE SCH ML: .5; 3 SOLUTION RESPIRATORY (INHALATION) at 19:48

## 2018-02-10 RX ADMIN — IPRATROPIUM BROMIDE AND ALBUTEROL SULFATE SCH ML: .5; 3 SOLUTION RESPIRATORY (INHALATION) at 23:21

## 2018-02-10 RX ADMIN — IRON SUCROSE SCH MLS/HR: 20 INJECTION, SOLUTION INTRAVENOUS at 09:01

## 2018-02-10 RX ADMIN — AMLODIPINE BESYLATE SCH MG: 5 TABLET ORAL at 07:44

## 2018-02-10 RX ADMIN — IPRATROPIUM BROMIDE AND ALBUTEROL SULFATE SCH ML: .5; 3 SOLUTION RESPIRATORY (INHALATION) at 03:35

## 2018-02-10 RX ADMIN — OXYCODONE HYDROCHLORIDE PRN MG: 5 TABLET ORAL at 12:34

## 2018-02-10 RX ADMIN — ISOSORBIDE MONONITRATE SCH MG: 30 TABLET, EXTENDED RELEASE ORAL at 07:43

## 2018-02-10 RX ADMIN — MIRTAZAPINE SCH MG: 15 TABLET, FILM COATED ORAL at 20:26

## 2018-02-10 RX ADMIN — CALCIUM CARBONATE PRN MG: 500 TABLET ORAL at 20:59

## 2018-02-10 RX ADMIN — LABETALOL HCL SCH MG: 200 TABLET, FILM COATED ORAL at 07:43

## 2018-02-10 RX ADMIN — FERROUS SULFATE TAB EC 325 MG (65 MG FE EQUIVALENT) SCH MG: 325 (65 FE) TABLET DELAYED RESPONSE at 17:21

## 2018-02-10 RX ADMIN — INSULIN ASPART SCH UNITS: 100 INJECTION, SOLUTION INTRAVENOUS; SUBCUTANEOUS at 12:33

## 2018-02-10 RX ADMIN — TIOTROPIUM BROMIDE SCH PUFF: 18 CAPSULE ORAL; RESPIRATORY (INHALATION) at 07:45

## 2018-02-10 RX ADMIN — IPRATROPIUM BROMIDE AND ALBUTEROL SULFATE SCH ML: .5; 3 SOLUTION RESPIRATORY (INHALATION) at 15:12

## 2018-02-10 RX ADMIN — ATORVASTATIN CALCIUM SCH MG: 40 TABLET, FILM COATED ORAL at 07:44

## 2018-02-10 RX ADMIN — INSULIN ASPART SCH UNITS: 100 INJECTION, SOLUTION INTRAVENOUS; SUBCUTANEOUS at 20:24

## 2018-02-10 RX ADMIN — DOCUSATE SODIUM SCH MG: 100 CAPSULE, LIQUID FILLED ORAL at 20:21

## 2018-02-10 RX ADMIN — PANTOPRAZOLE SCH MG: 40 TABLET, DELAYED RELEASE ORAL at 07:43

## 2018-02-10 RX ADMIN — OXYCODONE HYDROCHLORIDE PRN MG: 5 TABLET ORAL at 23:19

## 2018-02-10 NOTE — HOSPITALIST PROGRESS NOTE
Hospitalist Progress Note


Date of Service


Feb 10, 2018.





Subjective


Pt evaluation today including:  conversation w/ patient, physical exam, chart 

review, lab review, review of studies


Pain:  None


PO Intake:  Good


Voiding:  no voiding problems


The patient was seen and examined this morning. Pt reports doing well. He is 

more awake and alert today, and is asking for another small cup of coffee. He 

still feels like he is not himself, and has O2 turned up to 6 L currently.  He 

is ambulating in the room but still feels a little short of breath.  He is 

tolerating a diet without any difficulty. He has some swelling in his legs but 

reports this isn't more than normal.  He denies any chest pain, tightness, 

palpitation or flutter.  He is hoping to be discharged from the hospital soon. 





ROS: 6 point ROS reviewed and otherwise negative.





Objective


Vital Signs











  Date Time  Temp Pulse Resp B/P (MAP) Pulse Ox O2 Delivery O2 Flow Rate FiO2


 


2/10/18 07:09 36.4 60 20 127/55 (79) 91 Nasal Cannula 5.0 


 


2/10/18 03:35  56 20  96 Nasal Cannula 5.0 


 


2/10/18 00:00      Nasal Cannula 5.0 


 


2/9/18 23:06  61 20  90 Nasal Cannula 5.0 


 


2/9/18 22:59 36.4 65 18 136/68 (90) 90 Room Air  


 


2/9/18 20:00      Nasal Cannula 5.0 


 


2/9/18 19:23  66 18  91 Nasal Cannula 4.0 


 


2/9/18 16:00      Nasal Cannula 5.0 


 


2/9/18 15:37 36.3 65 20 142/66 (91) 91 Room Air  


 


2/9/18 14:11  63 18  91 Room Air  


 


2/9/18 11:45  63   91   


 


2/9/18 11:44  67 18  93 Nasal Cannula 5.0 











Physical Exam


Notes:


General Appearance:  WD/WN, no apparent distress, sitting up in bedside chair


Eyes:  PERRL, EOMI, + pertinent finding (periorbital edema bilaterally resolved)


ENT:  hearing grossly normal, pharynx normal, + pertinent finding (MMM)


Neck:  supple, no JVD


Respiratory/Chest:  chest non-tender, lungs clear, no respiratory distress, no 

accessory muscle use, + pertinent finding (on 6 L via NC)


Cardiovascular:  regular rate, rhythm (extra beats), no JVD, + systolic murmur (

grade III/VI)


Abdomen:  normal bowel sounds, non tender, soft


Extremities:  non-tender, no calf tenderness, + pertinent finding (1+ pitting 

edema in BLE.  Multiple wounds over BLE healing well.)


Neurologic/Psychiatric:  alert, normal mood/affect, oriented x 3


Skin:  warm/dry





Laboratory Results





Last 24 Hours








Test


  2/9/18


11:23 2/9/18


15:59 2/9/18


20:13 2/10/18


05:17


 


Bedside Glucose 105 mg/dl  114 mg/dl  131 mg/dl  


 


White Blood Count    6.32 K/uL 


 


Red Blood Count    2.63 M/uL 


 


Hemoglobin    7.9 g/dL 


 


Hematocrit    25.1 % 


 


Mean Corpuscular Volume    95.4 fL 


 


Mean Corpuscular Hemoglobin    30.0 pg 


 


Mean Corpuscular Hemoglobin


Concent 


  


  


  31.5 g/dl 


 


 


Platelet Count    176 K/uL 


 


Mean Platelet Volume    8.8 fL 


 


Neutrophils (%) (Auto)    75.1 % 


 


Lymphocytes (%) (Auto)    8.7 % 


 


Monocytes (%) (Auto)    11.9 % 


 


Eosinophils (%) (Auto)    3.3 % 


 


Basophils (%) (Auto)    0.2 % 


 


Neutrophils # (Auto)    4.75 K/uL 


 


Lymphocytes # (Auto)    0.55 K/uL 


 


Monocytes # (Auto)    0.75 K/uL 


 


Eosinophils # (Auto)    0.21 K/uL 


 


Basophils # (Auto)    0.01 K/uL 


 


RDW Standard Deviation    54.4 fL 


 


RDW Coefficient of Variation    16.4 % 


 


Immature Granulocyte % (Auto)    0.8 % 


 


Immature Granulocyte # (Auto)    0.05 K/uL 


 


Anisocytosis    PRESENT 


 


Prothrombin Time    44.2 SECONDS 


 


Prothromb Time International


Ratio 


  


  


  4.3 


 


 


Sodium Level    138 mmol/L 


 


Potassium Level    4.7 mmol/L 


 


Chloride Level    105 mmol/L 


 


Carbon Dioxide Level    28 mmol/L 


 


Anion Gap    5.0 mmol/L 


 


Blood Urea Nitrogen    85 mg/dl 


 


Creatinine    2.22 mg/dl 


 


Est Creatinine Clear Calc


Drug Dose 


  


  


  42.6 ml/min 


 


 


Estimated GFR (


American) 


  


  


  36.0 


 


 


Estimated GFR (Non-


American 


  


  


  31.1 


 


 


BUN/Creatinine Ratio    38.3 


 


Random Glucose    141 mg/dl 


 


Calcium Level    8.2 mg/dl 


 


Test


  2/10/18


07:37 


  


  


 


 


Bedside Glucose 168 mg/dl    











Assessment and Plan


Patient is a 63 y/o male with a history of CAD, h/o MI s/p stents, HTN, HLD, 

diastolic CHF, COPD, DM II, anxiety, depression, bipolar disorder, anemia and 

GERD, who presented to ED because of worsening SOB x1 day





Acute on chronic diastolic CHF exacerbation:


- Cardiac enzymes negative x 3, ekg prn cp, O2 protocol, home O2 is 2 L 


- Strict I/Ox, daily weights  - compared to last admission pt euvolemic.  

Physical exam remains stable-  no JVD,  chronic mild pitting edema in BLE 

stable. 


   Cards recs likely be able to dc with lasix 40 mg PO BID along with 

spironolactone upon discharge.


- Appears volume contracted, holding lasix for several days, no worsening 

symptoms


- Low sodium and low potassium diet, continue 1800 mL fluid restriction


- ECHO 12/23/17 w/ preserved EF and grade II diastolic CHF 





Elevated INR - 


- 4.3 today, hold Warfarin again today, pt trend has been up and down, likely 

that 10 mg is too much for him. May consider alternating regimen of 7.5 and 5 

mg. 





Anemia of chronic disease- STABLE:


- Hgb slightly decreased at 7.9 today - s/p 1 U PRBC on 2/7.  Pt energy level 

slightly improved


- Iron panel completed - continue IV iron to boost counts. 


- repeat CBC QOD





Fatigue/Lethargy


- ammonia level > 32,  administered lactulose on 2/7 and pt had large BM- he is 

no longer confused or as fatigued.  





FITZ on CKD stage III


- baseline crt 1.6-  today 2.27, stable - likely due to aggressive diuresis


- holding diuretic


- Nephrology consulted for proteinuria, - appreciate recs - feel this is due to 

diabetic nephropathy.  


- K+ 4.7 today- cont low potassium diet. 





Hyperkalemia


- resolved





b/l chronic diabetic wounds: 


- Following w/ wound care- wound care consulted 





CAD w/ cardiac stenting


HTN


HLD


- Plavix 75 mg daily, Norvasc 10 mg daily, Hydralazine 25 mg QID, Imdur 30 mg 

BID, and Toprol XL 50 mg BID, Lipitor 80 mg daily





T2DM w/ neuropathy:


- Continue Lantus 10 u HS 


- BSG ACHS and ISS


- Continue Gabapentin  





Chronic atrial thrombus: Continue Coumadin, follow PT/INR and adjust dose PRN 

for INR goal 2-3 


- see above, INR 4.3





COPD without exacerbation: Continue home inhalers 





Bipolar disorder, anxiety, depression- STABLE: Depakote 2 g daily, Lexapro 20 

mg daily, Remeron 45 mg daily, and Klonopin





GI prophylaxis: Protonix daily 





DVT Prophylaxis: Coumadin





Code status: LEVEL I, FULL





Dispo: PT/OT recommending rehab, he is actually agreeable, CM assisting with dc 

planning.

## 2018-02-10 NOTE — NEPHROLOGY PROGRESS NOTE
Nephrology Progress Note


Date of Service


Feb 10, 2018.





Chief Complaint


FITZ, proteinuria





Subjective


Mr. Dixon was seen & examined in his hospital room this morning.  He reports 

brisk urine output in response to Demadex therapy.  UO was not recorded over 

the last 24 hours but weight has remained stable.  Mr. Dixon indicates that 

his breathing is improved.  His only complaint this am is weakness.





Review of Systems


Constitutional:  No fever


Cardiovascular:  No chest pain


Respiratory:  + dyspnea on exertion, No dyspnea at rest


Abdomen:  No pain, No nausea, No vomiting


Extremities:  + leg edema


A complete review of systems was performed.  Pertinent positives are noted 

above. All other systems are negative.





Vital Signs





Last 8 Hrs








  Date Time  Temp Pulse Resp B/P (MAP) Pulse Ox O2 Delivery O2 Flow Rate FiO2


 


2/10/18 08:00      Nasal Cannula 5.0 


 


2/10/18 07:09 36.4 60 20 127/55 (79) 91 Nasal Cannula 5.0 


 


2/10/18 03:35  56 20  96 Nasal Cannula 5.0 











Last Recorded Weight


Weight (Kilograms):  100.100





Physical Exam


General Appearance:  no apparent distress


Head:  normocephalic, atraumatic


Eyes:  PERRL, EOMI


Neck:  no adenopathy


Respiratory/Chest:  + rales (at bases bilaterally)


Cardiovascular:  regular rate, rhythm


Abdomen/GI:  normal bowel sounds, non tender, soft


Extremities/Musculoskelatal:  no calf tenderness, no pedal edema


Neurologic/Psych:  alert, oriented x 3





Family History





Cancer (esophageal)


Diabetes mellitus


Heart disease


Hypertension


Negative for CKD/ESRD





Social History


Smoking Status:  Former smoker


Drug Use:  none


Marital Status:  


Housing Status:  lives with family, other


Occupation:  disabled


.  Disabled.  Former smoker





Laboratory Results


Past 24 Hours


2/10/18 05:17








Red Blood Count 2.63, Mean Corpuscular Volume 95.4, Mean Corpuscular Hemoglobin 

30.0, Mean Corpuscular Hemoglobin Concent 31.5, Mean Platelet Volume 8.8, 

Neutrophils (%) (Auto) 75.1, Lymphocytes (%) (Auto) 8.7, Monocytes (%) (Auto) 

11.9, Eosinophils (%) (Auto) 3.3, Basophils (%) (Auto) 0.2, Neutrophils # (Auto

) 4.75, Lymphocytes # (Auto) 0.55, Monocytes # (Auto) 0.75, Eosinophils # (Auto

) 0.21, Basophils # (Auto) 0.01





2/10/18 05:17

















Test


  2/9/18


11:23 2/9/18


15:59 2/9/18


20:13 2/10/18


05:17


 


Bedside Glucose


  105 mg/dl


(70-99) 114 mg/dl


(70-99) 131 mg/dl


(70-99) 


 


 


White Blood Count


  


  


  


  6.32 K/uL


(4.8-10.8)


 


Red Blood Count


  


  


  


  2.63 M/uL


(4.7-6.1)


 


Hemoglobin


  


  


  


  7.9 g/dL


(14.0-18.0)


 


Hematocrit    25.1 % (42-52) 


 


Mean Corpuscular Volume


  


  


  


  95.4 fL


()


 


Mean Corpuscular Hemoglobin


  


  


  


  30.0 pg


(25-34)


 


Mean Corpuscular Hemoglobin


Concent 


  


  


  31.5 g/dl


(32-36)


 


Platelet Count


  


  


  


  176 K/uL


(130-400)


 


Mean Platelet Volume


  


  


  


  8.8 fL


(7.4-10.4)


 


Neutrophils (%) (Auto)    75.1 % 


 


Lymphocytes (%) (Auto)    8.7 % 


 


Monocytes (%) (Auto)    11.9 % 


 


Eosinophils (%) (Auto)    3.3 % 


 


Basophils (%) (Auto)    0.2 % 


 


Neutrophils # (Auto)


  


  


  


  4.75 K/uL


(1.4-6.5)


 


Lymphocytes # (Auto)


  


  


  


  0.55 K/uL


(1.2-3.4)


 


Monocytes # (Auto)


  


  


  


  0.75 K/uL


(0.11-0.59)


 


Eosinophils # (Auto)


  


  


  


  0.21 K/uL


(0-0.5)


 


Basophils # (Auto)


  


  


  


  0.01 K/uL


(0-0.2)


 


RDW Standard Deviation


  


  


  


  54.4 fL


(36.4-46.3)


 


RDW Coefficient of Variation


  


  


  


  16.4 %


(11.5-14.5)


 


Immature Granulocyte % (Auto)    0.8 % 


 


Immature Granulocyte # (Auto)


  


  


  


  0.05 K/uL


(0.00-0.02)


 


Anisocytosis    PRESENT 


 


Prothrombin Time


  


  


  


  44.2 SECONDS


(9.0-12.0)


 


Prothromb Time International


Ratio 


  


  


  4.3 (0.9-1.1) 


 


 


Anion Gap


  


  


  


  5.0 mmol/L


(3-11)


 


Est Creatinine Clear Calc


Drug Dose 


  


  


  42.6 ml/min 


 


 


Estimated GFR (


American) 


  


  


  36.0 


 


 


Estimated GFR (Non-


American 


  


  


  31.1 


 


 


BUN/Creatinine Ratio    38.3 (10-20) 


 


Calcium Level


  


  


  


  8.2 mg/dl


(8.5-10.1)


 


Test


  2/10/18


07:37 


  


  


 


 


Bedside Glucose


  168 mg/dl


(70-99) 


  


  


 











Allergies


Coded Allergies:  


     No Known Allergies (Verified , 2/2/18)





Medications





Current Inpatient Medications








 Medications


  (Trade)  Dose


 Ordered  Sig/Dc


 Route  Start Time


 Stop Time Status Last Admin


Dose Admin


 


 Acetaminophen


  (Tylenol Tab)  650 mg  Q4H  PRN


 PO  2/2/18 19:00


 3/4/18 18:59  2/9/18 19:09


650 MG


 


 Al Hydrox/Mg


 Hydrox/Simethicone


  (Maalox Max Susp)  15 ml  Q4H  PRN


 PO  2/2/18 19:00


 3/4/18 18:59  2/9/18 10:35


15 ML


 


 Magnesium


 Hydroxide


  (Milk Of


 Magnesia Susp)  30 ml  Q12H  PRN


 PO  2/2/18 19:00


 3/4/18 18:59   


 


 


 Ondansetron HCl


  (Zofran Inj)  4 mg  Q6H  PRN


 IV  2/2/18 19:00


 3/4/18 18:59   


 


 


 Nitroglycerin


  (Nitrostat Tab)  0.4 mg  UD  PRN


 SL  2/2/18 19:00


 3/4/18 18:59   


 


 


 Morphine Sulfate


  (MoRPHine


 SULFATE INJ)  2 mg  Q30M  PRN


 IV  2/2/18 19:00


 2/16/18 18:59   


 


 


 Atorvastatin


 Calcium


  (Lipitor Tab)  80 mg  DAILY


 PO  2/3/18 09:00


 3/5/18 08:59  2/10/18 07:44


80 MG


 


 Clonazepam


  (Klonopin Tab)  1 mg  HS  PRN


 PO  2/2/18 19:00


 3/4/18 18:59  2/3/18 03:35


1 MG


 


 Clopidogrel


 Bisulfate


  (plAVix TAB)  75 mg  QAM


 PO  2/3/18 09:00


 3/5/18 08:59  2/10/18 07:44


75 MG


 


 Divalproex Sodium


  (Depakote Delay


 Rel Tab)  2,000 mg  HS


 PO  2/2/18 21:00


 3/4/18 20:59  2/9/18 20:25


2,000 MG


 


 Escitalopram


 Oxalate


  (Lexapro Tab)  20 mg  QAM


 PO  2/3/18 09:00


 3/5/18 08:59  2/10/18 07:44


20 MG


 


 Fluticasone


 Propionate


  (Flonase Nasal


 Spray)  2 sprays  DAILY  PRN


 VERONICA  2/2/18 19:00


 3/4/18 18:59   


 


 


 Gabapentin


  (Neurontin Cap)  300 mg  HS


 PO  2/2/18 21:00


 3/4/18 20:59  2/9/18 20:23


300 MG


 


 Insulin Glargine


  (Lantus Solostar


 Pen)  10 units  HS


 SC  2/2/18 21:00


 3/4/18 20:59  2/9/18 20:25


10 UNITS


 


 Isosorbide


 Mononitrate


  (Imdur Ext Rel


 Tab)  30 mg  QAM


 PO  2/3/18 09:00


 3/5/18 08:59  2/10/18 07:43


30 MG


 


 Tamsulosin HCl


  (Flomax Cap)  0.4 mg  HS


 PO  2/2/18 21:00


 3/4/18 20:59  2/9/18 20:22


0.4 MG


 


 Tiotropium Bromide


  (Spiriva


 Handihaler


 Inhaler)  1 puff  DAILY


 INH  2/3/18 09:00


 3/5/18 08:59  2/10/18 07:45


1 PUFF


 


 Warfarin Sodium


  (Coumadin Tab)  7.5 mg  SuMoWeThFr@1600


 PO  2/2/18 20:00


 3/4/18 19:59 Future Hold 2/8/18 16:35


7.5 MG


 


 Albuterol


  (Ventolin Hfa


 Inhaler)  2 puffs  Q4H  PRN


 INH  2/2/18 20:45


 3/4/18 20:44  2/4/18 21:55


2 PUFFS


 


 Amlodipine


 Besylate


  (Norvasc Tab)  10 mg  QAM


 PO  2/3/18 09:00


 3/5/18 08:59  2/10/18 07:44


10 MG


 


 Cyanocobalamin


  (Vitamin B-12


 Tab)  1,000 mcg  DAILY


 PO  2/3/18 09:00


 3/5/18 08:59  2/10/18 07:44


1,000 MCG


 


 Ferrous Sulfate


  (Feosol Tab)  325 mg  BIDM


 PO  2/3/18 07:30


 3/5/18 07:59  2/10/18 07:43


325 MG


 


 Mirtazapine


  (Remeron Tab)  45 mg  HS


 PO  2/2/18 21:00


 3/4/18 20:59  2/9/18 20:22


45 MG


 


 Oxycodone HCl


  (Roxicodone


 Immediate Rel Tab)  10 mg  Q12  PRN


 PO  2/2/18 19:00


 3/4/18 18:59  2/9/18 21:42


10 MG


 


 Spironolactone


  (Aldactone Tab)  50 mg  BID


 PO  2/2/18 21:00


 3/4/18 20:59 Future Hold  


 


 


 Warfarin Sodium


  (Coumadin Tab)  10 mg  TuSa@1600


 PO  2/3/18 16:00


 3/5/18 15:59 Future Hold 2/3/18 16:58


10 MG


 


 Insulin Aspart


  (novoLOG ASPART)  **SLIDING


 SCALE**


 **G...  ACHS


 SC  2/2/18 21:00


 3/4/18 20:59  2/10/18 09:10


6 UNITS


 


 Miscellaneous


  (Iv Fluids


 Completed)  1 ea  PRN  PRN


 N/A  2/2/18 19:30


 2/2/19 19:29   


 


 


 Furosemide 20 mg/


 Syringe  2 ml @ 4


 mls/min  BID


 IV  2/3/18 09:00


 3/4/18 20:59 Future Hold 2/3/18 20:51


4 MLS/MIN


 


 Pantoprazole


 Sodium


  (Protonix Tab)  40 mg  QAM


 PO  2/3/18 09:00


 3/5/18 08:59  2/10/18 07:43


40 MG


 


 Calcium Carbonate


  (Tums Chew Tab)  500 mg  Q2H  PRN


 PO  2/3/18 10:00


 3/5/18 09:59  2/9/18 19:01


500 MG


 


 Albuterol/


 Ipratropium


  (Duoneb)  3 ml  Q4R


 INH  2/4/18 22:42


 3/6/18 22:41  2/10/18 03:35


3 ML


 


 Furosemide


  (Lasix Tab)  20 mg  QAM


 PO  2/5/18 09:00


 3/7/18 08:59 Future Hold 2/6/18 08:13


20 MG


 


 Docusate Sodium


  (coLACE CAP)  100 mg  BID


 PO  2/7/18 11:30


 3/9/18 11:29  2/10/18 07:44


100 MG


 


 Polyethylene


  (Miralax Powder


 Packet)  17 gm  DAILY


 PO  2/7/18 11:30


 3/4/18 11:29  2/10/18 07:44


17 GM


 


 Torsemide


  (Demadex Tab)  20 mg  BID


 PO  2/8/18 21:00


 3/10/18 20:59  2/10/18 07:42


20 MG


 


 Labetalol HCl


  (Normodyne Tab)  200 mg  BID


 PO  2/8/18 21:00


 3/10/18 20:59  2/10/18 07:43


200 MG


 


 Iron Sucrose 100


 mg/Sodium Chloride  105 ml @ 


 420 mls/hr  DAILY


 IV  2/8/18 10:45


 2/17/18 09:14  2/10/18 09:01


420 MLS/HR











Impression





(1) Acute kidney injury


(2) Proteinuria


(3) Anemia


(4) Acute diastolic (congestive) heart failure


(5) Hypertension


(6) Former smoker


Mr. Dixon has been admitted due to recurrent CHF.  12/17 echocardiogram 

revealed LVEF 55% but dilated RV and RA.  Patient has a combination of 

pulmonary HTN and diastolic dysfunction.  12/17 renal US revealed 14 cm 

kidneys.  Doppler was negative for KIANA.  Urine sediment is difficult to 

interpret due to chronic indwelling gillespie catheter.  UPCR 1.5 g.  Compliance 

with outpatient medical regimen and office visits has been suboptimal.





Recommendations


ACUTE KIDNEY INJURY:


-- Baseline creatinine had been 1.2.  Patient has been admitted w/ recurrent 

diastolic CHF.  May need to accept worsening kidney function in order to 

correct pulmonary edema


-- Continue Demadex 20 mg po BID.  Will ask RN staff to measure UO on daily 

basis.  Weight is stable.


-- Monitor serial PRP





PROTEINURIA:


-- Likely related to diabetic nephropathy.  UIEP negative for monoclonal band





HYPERTENSION:


-- Actively working to simplify medical regimen.  Continue Labetalol 200 mg po 

BID.  HR and blood pressure are acceptable this am.  Hydralazine has been 

stopped.  Amlodipine is being tapered.





CHF:


-- Troponin remains mildly elevated.  Cardiology is following


-- Echocardiogram 02/08/18:  LVEF 60 - 65%, RVSP 55 mmHG, biatrial dilation





ANEMIA:


-- Iron saturation <20% w/ low ferritin and negative FOBT.  Continue IV Venofer 

100 mg daily x 10 days (day #3)


-- Hgb is mildly improved

## 2018-02-11 VITALS — HEART RATE: 53 BPM | OXYGEN SATURATION: 99 %

## 2018-02-11 VITALS — TEMPERATURE: 96.08 F

## 2018-02-11 VITALS — HEART RATE: 41 BPM | DIASTOLIC BLOOD PRESSURE: 60 MMHG | OXYGEN SATURATION: 90 % | SYSTOLIC BLOOD PRESSURE: 128 MMHG

## 2018-02-11 VITALS — OXYGEN SATURATION: 96 % | HEART RATE: 66 BPM

## 2018-02-11 VITALS
TEMPERATURE: 97.7 F | OXYGEN SATURATION: 97 % | DIASTOLIC BLOOD PRESSURE: 69 MMHG | SYSTOLIC BLOOD PRESSURE: 146 MMHG | HEART RATE: 44 BPM

## 2018-02-11 VITALS
SYSTOLIC BLOOD PRESSURE: 124 MMHG | HEART RATE: 44 BPM | TEMPERATURE: 94.1 F | OXYGEN SATURATION: 92 % | DIASTOLIC BLOOD PRESSURE: 56 MMHG

## 2018-02-11 VITALS
HEART RATE: 44 BPM | OXYGEN SATURATION: 97 % | DIASTOLIC BLOOD PRESSURE: 69 MMHG | SYSTOLIC BLOOD PRESSURE: 146 MMHG | TEMPERATURE: 97.7 F

## 2018-02-11 VITALS — OXYGEN SATURATION: 83 % | HEART RATE: 50 BPM

## 2018-02-11 VITALS — HEART RATE: 46 BPM | OXYGEN SATURATION: 95 %

## 2018-02-11 VITALS
OXYGEN SATURATION: 97 % | TEMPERATURE: 98.24 F | HEART RATE: 64 BPM | DIASTOLIC BLOOD PRESSURE: 61 MMHG | SYSTOLIC BLOOD PRESSURE: 130 MMHG

## 2018-02-11 VITALS — OXYGEN SATURATION: 68 % | HEART RATE: 62 BPM

## 2018-02-11 VITALS
DIASTOLIC BLOOD PRESSURE: 56 MMHG | HEART RATE: 45 BPM | SYSTOLIC BLOOD PRESSURE: 137 MMHG | OXYGEN SATURATION: 95 % | TEMPERATURE: 98.24 F

## 2018-02-11 VITALS
DIASTOLIC BLOOD PRESSURE: 57 MMHG | SYSTOLIC BLOOD PRESSURE: 125 MMHG | OXYGEN SATURATION: 90 % | TEMPERATURE: 97.7 F | HEART RATE: 47 BPM

## 2018-02-11 VITALS — HEART RATE: 47 BPM | OXYGEN SATURATION: 94 %

## 2018-02-11 VITALS — OXYGEN SATURATION: 99 % | HEART RATE: 46 BPM

## 2018-02-11 VITALS — HEART RATE: 50 BPM | OXYGEN SATURATION: 96 %

## 2018-02-11 VITALS — HEART RATE: 49 BPM | OXYGEN SATURATION: 93 %

## 2018-02-11 LAB
ALBUMIN SERPL-MCNC: 2.6 GM/DL (ref 3.4–5)
ALP SERPL-CCNC: 72 U/L (ref 45–117)
ALT SERPL-CCNC: 21 U/L (ref 12–78)
AST SERPL-CCNC: 16 U/L (ref 15–37)
BASOPHILS # BLD: 0.01 K/UL (ref 0–0.2)
BASOPHILS NFR BLD: 0.2 %
BUN SERPL-MCNC: 94 MG/DL (ref 7–18)
BUN SERPL-MCNC: 94 MG/DL (ref 7–18)
BUN SERPL-MCNC: 96 MG/DL (ref 7–18)
CALCIUM SERPL-MCNC: 8.1 MG/DL (ref 8.5–10.1)
CALCIUM SERPL-MCNC: 8.3 MG/DL (ref 8.5–10.1)
CALCIUM SERPL-MCNC: 8.4 MG/DL (ref 8.5–10.1)
CO2 SERPL-SCNC: 27 MMOL/L (ref 21–32)
CO2 SERPL-SCNC: 27 MMOL/L (ref 21–32)
CO2 SERPL-SCNC: 29 MMOL/L (ref 21–32)
CREAT SERPL-MCNC: 2.48 MG/DL (ref 0.6–1.4)
CREAT SERPL-MCNC: 2.53 MG/DL (ref 0.6–1.4)
CREAT SERPL-MCNC: 2.63 MG/DL (ref 0.6–1.4)
EOS ABS #: 0.02 K/UL (ref 0–0.5)
EOSINOPHIL NFR BLD AUTO: 157 K/UL (ref 130–400)
EOSINOPHIL NFR BLD AUTO: 161 K/UL (ref 130–400)
GLUCOSE SERPL-MCNC: 112 MG/DL (ref 70–99)
GLUCOSE SERPL-MCNC: 169 MG/DL (ref 70–99)
GLUCOSE SERPL-MCNC: 202 MG/DL (ref 70–99)
HCT VFR BLD CALC: 25.2 % (ref 42–52)
HCT VFR BLD CALC: 26.1 % (ref 42–52)
HGB BLD-MCNC: 7.9 G/DL (ref 14–18)
HGB BLD-MCNC: 8 G/DL (ref 14–18)
IG#: 0.05 K/UL (ref 0–0.02)
IMM GRANULOCYTES NFR BLD AUTO: 10.6 %
INR PPP: 3.8 (ref 0.9–1.1)
LYMPHOCYTES # BLD: 0.58 K/UL (ref 1.2–3.4)
MCH RBC QN AUTO: 29.7 PG (ref 25–34)
MCH RBC QN AUTO: 29.9 PG (ref 25–34)
MCHC RBC AUTO-ENTMCNC: 30.7 G/DL (ref 32–36)
MCHC RBC AUTO-ENTMCNC: 31.3 G/DL (ref 32–36)
MCV RBC AUTO: 95.5 FL (ref 80–100)
MCV RBC AUTO: 97 FL (ref 80–100)
MONO ABS #: 0.41 K/UL (ref 0.11–0.59)
MONOCYTES NFR BLD: 7.5 %
NEUT ABS #: 4.39 K/UL (ref 1.4–6.5)
NEUTROPHILS # BLD AUTO: 0.4 %
NEUTROPHILS NFR BLD AUTO: 80.4 %
NRBC BLD AUTO-RTO: 0.6 %
NUCLEATED RED BLOOD CELL ABS: 0.03 K/UL (ref 0–0)
PMV BLD AUTO: 8.3 FL (ref 7.4–10.4)
PMV BLD AUTO: 9.2 FL (ref 7.4–10.4)
POTASSIUM SERPL-SCNC: 4.8 MMOL/L (ref 3.5–5.1)
POTASSIUM SERPL-SCNC: 4.9 MMOL/L (ref 3.5–5.1)
POTASSIUM SERPL-SCNC: 5.2 MMOL/L (ref 3.5–5.1)
PROT SERPL-MCNC: 6.6 GM/DL (ref 6.4–8.2)
RED CELL DISTRIBUTION WIDTH CV: 16 % (ref 11.5–14.5)
RED CELL DISTRIBUTION WIDTH CV: 16.1 % (ref 11.5–14.5)
RED CELL DISTRIBUTION WIDTH SD: 52.8 FL (ref 36.4–46.3)
RED CELL DISTRIBUTION WIDTH SD: 54.7 FL (ref 36.4–46.3)
SODIUM SERPL-SCNC: 137 MMOL/L (ref 136–145)
SODIUM SERPL-SCNC: 139 MMOL/L (ref 136–145)
SODIUM SERPL-SCNC: 140 MMOL/L (ref 136–145)
WBC # BLD AUTO: 5.46 K/UL (ref 4.8–10.8)
WBC # BLD AUTO: 6.39 K/UL (ref 4.8–10.8)

## 2018-02-11 RX ADMIN — AMLODIPINE BESYLATE SCH MG: 5 TABLET ORAL at 09:00

## 2018-02-11 RX ADMIN — IPRATROPIUM BROMIDE AND ALBUTEROL SULFATE SCH ML: .5; 3 SOLUTION RESPIRATORY (INHALATION) at 19:07

## 2018-02-11 RX ADMIN — FERROUS SULFATE TAB EC 325 MG (65 MG FE EQUIVALENT) SCH MG: 325 (65 FE) TABLET DELAYED RESPONSE at 10:19

## 2018-02-11 RX ADMIN — LABETALOL HCL SCH MG: 200 TABLET, FILM COATED ORAL at 09:00

## 2018-02-11 RX ADMIN — IPRATROPIUM BROMIDE AND ALBUTEROL SULFATE SCH ML: .5; 3 SOLUTION RESPIRATORY (INHALATION) at 15:17

## 2018-02-11 RX ADMIN — IPRATROPIUM BROMIDE AND ALBUTEROL SULFATE SCH ML: .5; 3 SOLUTION RESPIRATORY (INHALATION) at 03:29

## 2018-02-11 RX ADMIN — INSULIN ASPART SCH UNITS: 100 INJECTION, SOLUTION INTRAVENOUS; SUBCUTANEOUS at 13:42

## 2018-02-11 RX ADMIN — INSULIN ASPART SCH UNITS: 100 INJECTION, SOLUTION INTRAVENOUS; SUBCUTANEOUS at 21:00

## 2018-02-11 RX ADMIN — ESCITALOPRAM OXALATE SCH MG: 20 TABLET, FILM COATED ORAL at 10:18

## 2018-02-11 RX ADMIN — PANTOPRAZOLE SCH MG: 40 TABLET, DELAYED RELEASE ORAL at 10:17

## 2018-02-11 RX ADMIN — INSULIN ASPART SCH UNITS: 100 INJECTION, SOLUTION INTRAVENOUS; SUBCUTANEOUS at 16:15

## 2018-02-11 RX ADMIN — IPRATROPIUM BROMIDE AND ALBUTEROL SULFATE SCH ML: .5; 3 SOLUTION RESPIRATORY (INHALATION) at 11:18

## 2018-02-11 RX ADMIN — GABAPENTIN SCH MG: 100 CAPSULE ORAL at 21:17

## 2018-02-11 RX ADMIN — ATORVASTATIN CALCIUM SCH MG: 40 TABLET, FILM COATED ORAL at 10:19

## 2018-02-11 RX ADMIN — FERROUS SULFATE TAB EC 325 MG (65 MG FE EQUIVALENT) SCH MG: 325 (65 FE) TABLET DELAYED RESPONSE at 16:45

## 2018-02-11 RX ADMIN — DOCUSATE SODIUM SCH MG: 100 CAPSULE, LIQUID FILLED ORAL at 21:16

## 2018-02-11 RX ADMIN — IPRATROPIUM BROMIDE AND ALBUTEROL SULFATE SCH ML: .5; 3 SOLUTION RESPIRATORY (INHALATION) at 07:17

## 2018-02-11 RX ADMIN — INSULIN ASPART SCH UNITS: 100 INJECTION, SOLUTION INTRAVENOUS; SUBCUTANEOUS at 09:30

## 2018-02-11 RX ADMIN — TORSEMIDE SCH MG: 20 TABLET ORAL at 10:19

## 2018-02-11 RX ADMIN — LABETALOL HCL SCH MG: 200 TABLET, FILM COATED ORAL at 21:16

## 2018-02-11 RX ADMIN — ISOSORBIDE MONONITRATE SCH MG: 30 TABLET, EXTENDED RELEASE ORAL at 09:00

## 2018-02-11 RX ADMIN — INSULIN GLARGINE SCH UNITS: 100 INJECTION, SOLUTION SUBCUTANEOUS at 21:15

## 2018-02-11 RX ADMIN — Medication SCH GM: at 09:00

## 2018-02-11 RX ADMIN — TORSEMIDE SCH MG: 20 TABLET ORAL at 21:16

## 2018-02-11 RX ADMIN — CLOPIDOGREL BISULFATE SCH MG: 75 TABLET, FILM COATED ORAL at 10:19

## 2018-02-11 RX ADMIN — DOCUSATE SODIUM SCH MG: 100 CAPSULE, LIQUID FILLED ORAL at 09:00

## 2018-02-11 RX ADMIN — DIVALPROEX SODIUM SCH MG: 500 TABLET, DELAYED RELEASE ORAL at 21:16

## 2018-02-11 RX ADMIN — MIRTAZAPINE SCH MG: 15 TABLET, FILM COATED ORAL at 21:16

## 2018-02-11 RX ADMIN — TIOTROPIUM BROMIDE SCH PUFF: 18 CAPSULE ORAL; RESPIRATORY (INHALATION) at 10:18

## 2018-02-11 RX ADMIN — IRON SUCROSE SCH MLS/HR: 20 INJECTION, SOLUTION INTRAVENOUS at 10:17

## 2018-02-11 RX ADMIN — Medication SCH MCG: at 10:19

## 2018-02-11 RX ADMIN — TAMSULOSIN HYDROCHLORIDE SCH MG: 0.4 CAPSULE ORAL at 21:17

## 2018-02-11 NOTE — DIAGNOSTIC IMAGING REPORT
HEAD WITHOUT CONTRAST (CT)



CT DOSE: 1498.81 mGy.cm



HISTORY: Mental status change  Altered mental status



TECHNIQUE: Multiaxial CT images of the head were performed without the use of

intravenous contrast.  A dose lowering technique was utilized adhering to the

principles of ALARA.





Comparison: 12/20/2017



Findings: Minimal mucosal thickening left mastoid air cells. Sinuses otherwise

are clear. The calvarium and skull base are intact. The ventricles and sulci are

within normal limits. There is no mass, hematoma, midline shift, or acute

infarct.



Impression:

No acute intracranial abnormality. Mild mucosal thickening left mastoid air

cells.











The above report was generated using voice recognition software.  It may contain

grammatical, syntax or spelling errors.







Electronically signed by:  Gerard Meier M.D.

2/11/2018 5:54 PM



Dictated Date/Time:  2/11/2018 5:52 PM

## 2018-02-11 NOTE — NEPHROLOGY PROGRESS NOTE
Nephrology Progress Note


Date of Service


Feb 11, 2018.





Chief Complaint


FITZ, proteinuria





Subjective


Mr. Dixon was seen & examined in his hospital room this morning.   RN staff 

notes that he has been poorly responsive.  Hospitalist team was notified and 

ABG and ECG have been ordered.  Mr. Dixon will open his eyes to verbal 

command.  He will speak his name and is oriented to place and month.  Overnight 

he complained of leg pain and required Roxicodone at 11 PM.  AM bp medications 

were held due to mental status changes.





Review of Systems


Constitutional:  No fever


Cardiovascular:  No chest pain


A complete review of systems was performed.  Pertinent positives are noted 

above. All other systems are negative.





Vital Signs





Last 8 Hrs








  Date Time  Temp Pulse Resp B/P (MAP) Pulse Ox O2 Delivery O2 Flow Rate FiO2


 


2/11/18 09:05  41  128/60 (82) 90 Oxymask 6.0 


 


2/11/18 08:00      Oxymask 6.0 


 


2/11/18 07:17  50 16  83 Room Air  


 


2/11/18 07:11 36.5 47 20 125/57 (79) 90 Nasal Cannula 7.0 


 


2/11/18 03:31  62 16  68 Room Air  











Last Recorded Weight


Weight (Kilograms):  100.100





Physical Exam


General Appearance:  + pertinent finding (lethargic)


Head:  normocephalic, atraumatic


Eyes:  PERRL, EOMI


Neck:  no adenopathy


Respiratory/Chest:  lungs clear


Cardiovascular:  + bradycardia


Abdomen/GI:  normal bowel sounds, non tender, soft


Extremities/Musculoskelatal:  + pertinent finding (1+ pretibial edema)


Neurologic/Psych:  + pertinent finding (lethargic)





Family History





Cancer (esophageal)


Diabetes mellitus


Heart disease


Hypertension


Negative for CKD/ESRD





Social History


Smoking Status:  Former smoker


Drug Use:  none


Marital Status:  


Housing Status:  lives with family, other


Occupation:  disabled


.  Disabled.  Former smoker





Laboratory Results


Past 24 Hours


2/11/18 07:21








2/11/18 07:21

















Test


  2/10/18


11:06 2/10/18


16:25 2/10/18


20:05 2/11/18


07:21


 


Bedside Glucose


  142 mg/dl


(70-99) 121 mg/dl


(70-99) 155 mg/dl


(70-99) 


 


 


Red Blood Count


  


  


  


  2.69 M/uL


(4.7-6.1)


 


Mean Corpuscular Volume


  


  


  


  97.0 fL


()


 


Mean Corpuscular Hemoglobin


  


  


  


  29.7 pg


(25-34)


 


Mean Corpuscular Hemoglobin


Concent 


  


  


  30.7 g/dl


(32-36)


 


RDW Standard Deviation


  


  


  


  54.7 fL


(36.4-46.3)


 


RDW Coefficient of Variation


  


  


  


  16.1 %


(11.5-14.5)


 


Mean Platelet Volume


  


  


  


  9.2 fL


(7.4-10.4)


 


Prothrombin Time


  


  


  


  38.8 SECONDS


(9.0-12.0)


 


Prothromb Time International


Ratio 


  


  


  3.8 (0.9-1.1) 


 


 


Anion Gap


  


  


  


  8.0 mmol/L


(3-11)


 


Est Creatinine Clear Calc


Drug Dose 


  


  


  38.2 ml/min 


 


 


Estimated GFR (


American) 


  


  


  31.5 


 


 


Estimated GFR (Non-


American 


  


  


  27.2 


 


 


BUN/Creatinine Ratio    38.5 (10-20) 


 


Calcium Level


  


  


  


  8.4 mg/dl


(8.5-10.1)


 


Test


  2/11/18


07:26 2/11/18


09:38 


  


 


 


Bedside Glucose


  175 mg/dl


(70-99) 


  


  


 


 


Arterial Blood pH


  


  7.14


(7.35-7.45) 


  


 


 


Arterial Blood Partial


Pressure CO2 


  84 mmHg


(35-46) 


  


 


 


Arterial Blood Partial


Pressure O2 


  73 mm/Hg


(80-95) 


  


 


 


Arterial Blood HCO3


  


  28 mmol/L


(19-24) 


  


 


 


Arterial Blood Oxygen


Saturation 


  89.1 % (90-95) 


  


  


 


 


Arterial Blood Base Excess


  


  -2.0 mEq/L


(-9-1.8) 


  


 


 


Arterial Blood Gas Delivery  6L   


 


Cameron Test  POS (POS)   


 


Ammonia


  


  31.4 umol/L


(11-32) 


  


 











Allergies


Coded Allergies:  


     No Known Allergies (Verified , 2/2/18)





Medications





Current Inpatient Medications








 Medications


  (Trade)  Dose


 Ordered  Sig/Dc


 Route  Start Time


 Stop Time Status Last Admin


Dose Admin


 


 Acetaminophen


  (Tylenol Tab)  650 mg  Q4H  PRN


 PO  2/2/18 19:00


 3/4/18 18:59  2/9/18 19:09


650 MG


 


 Al Hydrox/Mg


 Hydrox/Simethicone


  (Maalox Max Susp)  15 ml  Q4H  PRN


 PO  2/2/18 19:00


 3/4/18 18:59  2/10/18 16:14


15 ML


 


 Magnesium


 Hydroxide


  (Milk Of


 Magnesia Susp)  30 ml  Q12H  PRN


 PO  2/2/18 19:00


 3/4/18 18:59   


 


 


 Ondansetron HCl


  (Zofran Inj)  4 mg  Q6H  PRN


 IV  2/2/18 19:00


 3/4/18 18:59   


 


 


 Nitroglycerin


  (Nitrostat Tab)  0.4 mg  UD  PRN


 SL  2/2/18 19:00


 3/4/18 18:59   


 


 


 Morphine Sulfate


  (MoRPHine


 SULFATE INJ)  2 mg  Q30M  PRN


 IV  2/2/18 19:00


 2/16/18 18:59   


 


 


 Atorvastatin


 Calcium


  (Lipitor Tab)  80 mg  DAILY


 PO  2/3/18 09:00


 3/5/18 08:59  2/10/18 07:44


80 MG


 


 Clonazepam


  (Klonopin Tab)  1 mg  HS  PRN


 PO  2/2/18 19:00


 3/4/18 18:59  2/3/18 03:35


1 MG


 


 Clopidogrel


 Bisulfate


  (plAVix TAB)  75 mg  QAM


 PO  2/3/18 09:00


 3/5/18 08:59  2/10/18 07:44


75 MG


 


 Divalproex Sodium


  (Depakote Delay


 Rel Tab)  2,000 mg  HS


 PO  2/2/18 21:00


 3/4/18 20:59  2/10/18 20:25


2,000 MG


 


 Escitalopram


 Oxalate


  (Lexapro Tab)  20 mg  QAM


 PO  2/3/18 09:00


 3/5/18 08:59  2/10/18 07:44


20 MG


 


 Fluticasone


 Propionate


  (Flonase Nasal


 Spray)  2 sprays  DAILY  PRN


 VERONICA  2/2/18 19:00


 3/4/18 18:59   


 


 


 Gabapentin


  (Neurontin Cap)  300 mg  HS


 PO  2/2/18 21:00


 3/4/18 20:59  2/10/18 20:26


300 MG


 


 Insulin Glargine


  (Lantus Solostar


 Pen)  10 units  HS


 SC  2/2/18 21:00


 3/4/18 20:59  2/10/18 20:32


10 UNITS


 


 Isosorbide


 Mononitrate


  (Imdur Ext Rel


 Tab)  30 mg  QAM


 PO  2/3/18 09:00


 3/5/18 08:59  2/10/18 07:43


30 MG


 


 Tamsulosin HCl


  (Flomax Cap)  0.4 mg  HS


 PO  2/2/18 21:00


 3/4/18 20:59  2/10/18 20:25


0.4 MG


 


 Tiotropium Bromide


  (Spiriva


 Handihaler


 Inhaler)  1 puff  DAILY


 INH  2/3/18 09:00


 3/5/18 08:59  2/10/18 07:45


1 PUFF


 


 Warfarin Sodium


  (Coumadin Tab)  7.5 mg  SuMoWeThFr@1600


 PO  2/2/18 20:00


 3/4/18 19:59 Future Hold 2/8/18 16:35


7.5 MG


 


 Albuterol


  (Ventolin Hfa


 Inhaler)  2 puffs  Q4H  PRN


 INH  2/2/18 20:45


 3/4/18 20:44  2/4/18 21:55


2 PUFFS


 


 Cyanocobalamin


  (Vitamin B-12


 Tab)  1,000 mcg  DAILY


 PO  2/3/18 09:00


 3/5/18 08:59  2/10/18 07:44


1,000 MCG


 


 Ferrous Sulfate


  (Feosol Tab)  325 mg  BIDM


 PO  2/3/18 07:30


 3/5/18 07:59  2/10/18 17:21


325 MG


 


 Mirtazapine


  (Remeron Tab)  45 mg  HS


 PO  2/2/18 21:00


 3/4/18 20:59  2/10/18 20:26


45 MG


 


 Oxycodone HCl


  (Roxicodone


 Immediate Rel Tab)  10 mg  Q12  PRN


 PO  2/2/18 19:00


 3/4/18 18:59  2/10/18 23:19


10 MG


 


 Spironolactone


  (Aldactone Tab)  50 mg  BID


 PO  2/2/18 21:00


 3/4/18 20:59 Future Hold  


 


 


 Warfarin Sodium


  (Coumadin Tab)  10 mg  TuSa@1600


 PO  2/3/18 16:00


 3/5/18 15:59 Future Hold 2/3/18 16:58


10 MG


 


 Insulin Aspart


  (novoLOG ASPART)  **SLIDING


 SCALE**


 **G...  ACHS


 SC  2/2/18 21:00


 3/4/18 20:59  2/10/18 12:33


5 UNITS


 


 Miscellaneous


  (Iv Fluids


 Completed)  1 ea  PRN  PRN


 N/A  2/2/18 19:30


 2/2/19 19:29   


 


 


 Furosemide 20 mg/


 Syringe  2 ml @ 4


 mls/min  BID


 IV  2/3/18 09:00


 3/4/18 20:59 Future Hold 2/3/18 20:51


4 MLS/MIN


 


 Pantoprazole


 Sodium


  (Protonix Tab)  40 mg  QAM


 PO  2/3/18 09:00


 3/5/18 08:59  2/10/18 07:43


40 MG


 


 Calcium Carbonate


  (Tums Chew Tab)  500 mg  Q2H  PRN


 PO  2/3/18 10:00


 3/5/18 09:59  2/10/18 20:59


500 MG


 


 Albuterol/


 Ipratropium


  (Duoneb)  3 ml  Q4R


 INH  2/4/18 22:42


 3/6/18 22:41  2/11/18 07:17


3 ML


 


 Furosemide


  (Lasix Tab)  20 mg  QAM


 PO  2/5/18 09:00


 3/7/18 08:59 Future Hold 2/6/18 08:13


20 MG


 


 Docusate Sodium


  (coLACE CAP)  100 mg  BID


 PO  2/7/18 11:30


 3/9/18 11:29  2/10/18 20:21


100 MG


 


 Polyethylene


  (Miralax Powder


 Packet)  17 gm  DAILY


 PO  2/7/18 11:30


 3/4/18 11:29  2/10/18 07:44


17 GM


 


 Torsemide


  (Demadex Tab)  20 mg  BID


 PO  2/8/18 21:00


 3/10/18 20:59  2/10/18 20:27


20 MG


 


 Labetalol HCl


  (Normodyne Tab)  200 mg  BID


 PO  2/8/18 21:00


 3/10/18 20:59  2/10/18 20:27


200 MG


 


 Iron Sucrose 100


 mg/Sodium Chloride  105 ml @ 


 420 mls/hr  DAILY


 IV  2/8/18 10:45


 2/17/18 09:14  2/10/18 09:01


420 MLS/HR


 


 Amlodipine


 Besylate


  (Norvasc Tab)  5 mg  QAM


 PO  2/11/18 09:00


 3/5/18 08:59   


 











Impression





(1) Acute kidney injury


(2) Proteinuria


(3) Anemia


(4) Acute diastolic (congestive) heart failure


(5) Hypertension


(6) Former smoker


Mr. Dixon has been admitted due to recurrent CHF.  12/17 echocardiogram 

revealed LVEF 55% but dilated RV and RA.  Patient has a combination of 

pulmonary HTN and diastolic dysfunction.  12/17 renal US revealed 14 cm 

kidneys.  Doppler was negative for KIANA.  Urine sediment is difficult to 

interpret due to chronic indwelling gillespie catheter.  UPCR 1.5 g.  Compliance 

with outpatient medical regimen and office visits has been suboptimal.





Recommendations


ACUTE KIDNEY INJURY:


-- Baseline creatinine had been 1.2.  Patient has been admitted w/ recurrent 

diastolic CHF.  May need to accept worsening kidney function in order to 

correct pulmonary edema


-- Monitor serial PRP





PROTEINURIA:


-- Likely related to diabetic nephropathy.  UIEP negative for monoclonal band





HYPERTENSION:


-- Actively working to simplify medical regimen.  Continue Labetalol 200 mg po 

BID.  Hydralazine has been stopped.  Amlodipine is being tapered


-- Oral medications held this am due to mental status changes.  Blood pressure 

remains acceptable at this time





CHF:


-- Troponin remains mildly elevated.  Cardiology is following


-- Echocardiogram 02/08/18:  LVEF 60 - 65%, RVSP 55 mmHG, biatrial dilation





ANEMIA:


-- Iron saturation <20% w/ low ferritin and negative FOBT.  Continue IV Venofer 

100 mg daily x 10 days (day #3)


-- Hgb is mildly improved





OTHER:


-- Patient has become lethargic.  RN has notified primary service.  ABG and ECG 

are pending.  Patient may require transfer to monitored bed

## 2018-02-11 NOTE — HOSPITALIST PROGRESS NOTE
Hospitalist Progress Note


Date of Service


Feb 11, 2018.





Subjective


Pt evaluation today including:  conversation w/ patient, physical exam, chart 

review, lab review, review of studies


Pain:  None


PO Intake:  Fair


Voiding:  no voiding problems


The patient was seen and examined this morning.  He is difficult to awaken this 

morning. I have ordered an EKG due to HR in the 40s, kayexylate for elevated 

potassium of 5.2, and will wait for ABG to be completed.  





ROS:


Constitutional: No fever, sweats or chills


Eyes: No diplopia, no worsening or blurred vision


ENT: normal hearing, no trouble swallowing


Respiratory: Overnight required increased O2 to 6 L


Cardiovascular: No chest pain, tightness or palpitations


Abdomen: No pain, nausea, vomiting, diarrhea or constipation


Musculoskeletal: No joint pain, calf pain, swelling


Neurologic: No weakness, numbness/tingling, or balance problems


Psychiatric: No anxiety or depression


Skin: No rash or itch





Objective


Vital Signs











  Date Time  Temp Pulse Resp B/P (MAP) Pulse Ox O2 Delivery O2 Flow Rate FiO2


 


2/11/18 07:17  50 16  83 Room Air  


 


2/11/18 07:11 36.5 47 20 125/57 (79) 90 Nasal Cannula 7.0 


 


2/11/18 03:31  62 16  68 Room Air  


 


2/11/18 00:00      Nasal Cannula 5.0 


 


2/10/18 23:23  59 20  93 Nasal Cannula 6.0 


 


2/10/18 23:03 36.4 59 20 152/71 (98) 91 Nasal Cannula 7.0 


 


2/10/18 20:00      Nasal Cannula 5.0 


 


2/10/18 19:51  60 20  87 Nasal Cannula 5.0 


 


2/10/18 16:00      Nasal Cannula 5.0 


 


2/10/18 15:12  56 20  92 Nasal Cannula 5.0 


 


2/10/18 15:08 36.5 52 20 111/54 (73) 100 Nasal Cannula 5.0 


 


2/10/18 08:00      Nasal Cannula 5.0 











Physical Exam


Notes:


General Appearance:  WD/WN, no apparent distress, asleep, difficult to awaken 

upon entry


Eyes:  PERRL, EOMI


ENT:  hearing grossly normal, pharynx normal, + pertinent finding (MMM)


Neck:  supple, no JVD


Respiratory/Chest:  chest non-tender, no respiratory distress, no accessory 

muscle use, + pertinent finding (on 6 L via Oxymask patient has faint crackles 

in anterior fields, slightly diminished at bases, no wheezing or rales)


Cardiovascular:  bradycardic with HR in mid-40s, no JVD, + systolic murmur (

grade III/VI)


Abdomen:  normal bowel sounds, non tender, soft


Extremities:  non-tender, no calf tenderness, + pertinent finding (1+ pitting 

edema in BLE.  Multiple wounds over BLE healing well.)


Neurologic/Psychiatric:  alert, normal mood/affect, oriented x 3


Skin:  warm/dry





Laboratory Results





Last 24 Hours








Test


  2/10/18


11:06 2/10/18


16:25 2/10/18


20:05 2/11/18


07:21


 


Bedside Glucose 142 mg/dl  121 mg/dl  155 mg/dl  


 


Test


  2/11/18


07:26 


  


  


 


 


Bedside Glucose 175 mg/dl    











Assessment and Plan


Patient is a 63 y/o male with a history of CAD, h/o MI s/p stents, HTN, HLD, 

diastolic CHF, COPD, DM II, anxiety, depression, bipolar disorder, anemia and 

GERD, who presented to ED because of worsening SOB x1 day





Acute on chronic diastolic CHF exacerbation:


- Cardiac enzymes negative x 3, ekg prn cp, O2 protocol, home O2 is 4 L 


- Strict I/Ox, daily weights - compared to last admission pt euvolemic.  

Physical exam remains stable-  no JVD, chronic mild pitting edema in BLE 

stable. 


   Cards recs likely be able to dc with lasix 40 mg PO BID along with 

spironolactone upon discharge.


- Appears volume contracted


- lasix held for 3 days, given 1 dose of Lasix 40 mg IV once today and see how 

the patient responds as he is currently requiring increased O2


- Low sodium and low potassium diet, continue 1800 mL fluid restriction


- ECHO 12/23/17 w/ preserved EF and grade II diastolic CHF 


- Cardiology contacted with new onset sinus bradycardia on EKG.  Discussed with 

cardiology and Dr. Vera - moving to tele today, apply cpap.  





Metabolic acidosis


- Increased respiratory needs, O2 at 6L on oximask, increased fatigue


   - ABG completed with ph of 7.1, CO2 = 84, PO2=73, HC03=28, ask RN to contact 

respiratory for bipap 


- EKG showing new onset 2nd degree AV block, possibly due to elevated 

potassium.  Pt has drank the kayexylate as ordered earlier. 


- Will transfer to [facility]





Elevated INR - 


- 3.8 today, hold Warfarin again today (day #2), pt trend has been up and down, 

likely that 10 mg is too much for him. May consider alternating regimen of 7.5 

and 5 mg. 





Anemia of chronic disease- STABLE:


- Hgb remained stable at 8.0 today - s/p 1 U PRBC on 2/7.  Pt energy level 

slightly improved


- Iron panel completed - continue IV iron (started 2/8), to boost counts. 


- repeat CBC QOD





Fatigue/Lethargy


- ammonia level > 32,  administered lactulose on 2/7 and pt had large BM- he is 

no longer confused or as fatigued.  





FITZ on CKD stage III


- baseline crt 1.6-  today 2.48 despite holding diuretics and giving him time 

to equilibrate without extra fluids.  Today need to give extra dose of Lasix 

for worsening breath sounds, increased O2 needs. 


- Nephrology consulted for proteinuria, - appreciate recs - feel this is due to 

diabetic nephropathy.  


- cont low potassium diet. 





Hyperkalemia


- Today is 5.2 -will order 1 dose Kayexalate





b/l chronic diabetic wounds: 


- Following w/ wound care- wound care consulted 





CAD w/ cardiac stenting


HTN


HLD


- Plavix 75 mg daily, Norvasc 10 mg daily, Hydralazine 25 mg QID, Imdur 30 mg 

BID, and Toprol XL 50 mg BID, Lipitor 80 mg daily





T2DM w/ neuropathy:


- Continue Lantus 10 u HS 


- BSG ACHS and ISS


- Continue Gabapentin  





Chronic atrial thrombus: Continue Coumadin, follow PT/INR and adjust dose PRN 

for INR goal 2-3 


- see above, INR 3.8





COPD without exacerbation: Continue home inhalers 





Bipolar disorder, anxiety, depression- STABLE: Depakote 2 g daily, Lexapro 20 

mg daily, Remeron 45 mg daily, and Klonopin





GI prophylaxis: Protonix daily 





DVT Prophylaxis: Coumadin





Code status: LEVEL I, FULL





Dispo: PT/OT recommending rehab


Transfer to [facility] for bradycardia, metabolic acidosis, elevated K+, increased O2 

needs, needs for new bipap. Repeat labs at noon.

## 2018-02-12 VITALS
HEART RATE: 65 BPM | TEMPERATURE: 98.42 F | OXYGEN SATURATION: 93 % | DIASTOLIC BLOOD PRESSURE: 70 MMHG | SYSTOLIC BLOOD PRESSURE: 132 MMHG

## 2018-02-12 VITALS
DIASTOLIC BLOOD PRESSURE: 57 MMHG | SYSTOLIC BLOOD PRESSURE: 152 MMHG | OXYGEN SATURATION: 98 % | HEART RATE: 63 BPM | TEMPERATURE: 97.52 F

## 2018-02-12 VITALS
OXYGEN SATURATION: 95 % | TEMPERATURE: 98.06 F | DIASTOLIC BLOOD PRESSURE: 88 MMHG | SYSTOLIC BLOOD PRESSURE: 154 MMHG | HEART RATE: 64 BPM

## 2018-02-12 VITALS
HEART RATE: 63 BPM | DIASTOLIC BLOOD PRESSURE: 64 MMHG | SYSTOLIC BLOOD PRESSURE: 126 MMHG | OXYGEN SATURATION: 94 % | TEMPERATURE: 98.24 F

## 2018-02-12 VITALS — OXYGEN SATURATION: 92 % | HEART RATE: 65 BPM

## 2018-02-12 VITALS — OXYGEN SATURATION: 94 % | HEART RATE: 65 BPM

## 2018-02-12 VITALS — HEART RATE: 63 BPM | OXYGEN SATURATION: 96 %

## 2018-02-12 VITALS
TEMPERATURE: 99.5 F | OXYGEN SATURATION: 95 % | HEART RATE: 63 BPM | SYSTOLIC BLOOD PRESSURE: 152 MMHG | DIASTOLIC BLOOD PRESSURE: 64 MMHG

## 2018-02-12 VITALS — HEART RATE: 65 BPM | OXYGEN SATURATION: 94 %

## 2018-02-12 VITALS — HEART RATE: 66 BPM | OXYGEN SATURATION: 95 %

## 2018-02-12 VITALS
SYSTOLIC BLOOD PRESSURE: 122 MMHG | HEART RATE: 59 BPM | OXYGEN SATURATION: 93 % | TEMPERATURE: 97.88 F | DIASTOLIC BLOOD PRESSURE: 64 MMHG

## 2018-02-12 VITALS — HEART RATE: 72 BPM | OXYGEN SATURATION: 91 %

## 2018-02-12 VITALS — HEART RATE: 65 BPM | OXYGEN SATURATION: 92 %

## 2018-02-12 VITALS — OXYGEN SATURATION: 93 % | HEART RATE: 61 BPM

## 2018-02-12 LAB
BUN SERPL-MCNC: 89 MG/DL (ref 7–18)
CALCIUM SERPL-MCNC: 8.1 MG/DL (ref 8.5–10.1)
CO2 SERPL-SCNC: 28 MMOL/L (ref 21–32)
CREAT SERPL-MCNC: 2.3 MG/DL (ref 0.6–1.4)
EOSINOPHIL NFR BLD AUTO: 191 K/UL (ref 130–400)
GLUCOSE SERPL-MCNC: 95 MG/DL (ref 70–99)
HCT VFR BLD CALC: 23.9 % (ref 42–52)
HGB BLD-MCNC: 7.6 G/DL (ref 14–18)
MCH RBC QN AUTO: 30 PG (ref 25–34)
MCHC RBC AUTO-ENTMCNC: 31.8 G/DL (ref 32–36)
MCV RBC AUTO: 94.5 FL (ref 80–100)
PMV BLD AUTO: 8.8 FL (ref 7.4–10.4)
POTASSIUM SERPL-SCNC: 4.7 MMOL/L (ref 3.5–5.1)
RED CELL DISTRIBUTION WIDTH CV: 16.6 % (ref 11.5–14.5)
RED CELL DISTRIBUTION WIDTH SD: 54.1 FL (ref 36.4–46.3)
SODIUM SERPL-SCNC: 142 MMOL/L (ref 136–145)
WBC # BLD AUTO: 5.5 K/UL (ref 4.8–10.8)

## 2018-02-12 RX ADMIN — LABETALOL HCL SCH MG: 200 TABLET, FILM COATED ORAL at 21:31

## 2018-02-12 RX ADMIN — IPRATROPIUM BROMIDE AND ALBUTEROL SULFATE SCH ML: .5; 3 SOLUTION RESPIRATORY (INHALATION) at 23:41

## 2018-02-12 RX ADMIN — PANTOPRAZOLE SCH MG: 40 TABLET, DELAYED RELEASE ORAL at 08:24

## 2018-02-12 RX ADMIN — Medication SCH MCG: at 08:24

## 2018-02-12 RX ADMIN — IRON SUCROSE SCH MLS/HR: 20 INJECTION, SOLUTION INTRAVENOUS at 08:29

## 2018-02-12 RX ADMIN — LABETALOL HCL SCH MG: 200 TABLET, FILM COATED ORAL at 08:24

## 2018-02-12 RX ADMIN — INSULIN ASPART SCH UNITS: 100 INJECTION, SOLUTION INTRAVENOUS; SUBCUTANEOUS at 21:53

## 2018-02-12 RX ADMIN — FERROUS SULFATE TAB EC 325 MG (65 MG FE EQUIVALENT) SCH MG: 325 (65 FE) TABLET DELAYED RESPONSE at 08:25

## 2018-02-12 RX ADMIN — GABAPENTIN SCH MG: 100 CAPSULE ORAL at 21:31

## 2018-02-12 RX ADMIN — TORSEMIDE SCH MG: 20 TABLET ORAL at 21:31

## 2018-02-12 RX ADMIN — IPRATROPIUM BROMIDE AND ALBUTEROL SULFATE SCH ML: .5; 3 SOLUTION RESPIRATORY (INHALATION) at 03:48

## 2018-02-12 RX ADMIN — IPRATROPIUM BROMIDE AND ALBUTEROL SULFATE SCH ML: .5; 3 SOLUTION RESPIRATORY (INHALATION) at 10:57

## 2018-02-12 RX ADMIN — TORSEMIDE SCH MG: 20 TABLET ORAL at 08:25

## 2018-02-12 RX ADMIN — CLOPIDOGREL BISULFATE SCH MG: 75 TABLET, FILM COATED ORAL at 08:24

## 2018-02-12 RX ADMIN — IPRATROPIUM BROMIDE AND ALBUTEROL SULFATE SCH ML: .5; 3 SOLUTION RESPIRATORY (INHALATION) at 00:00

## 2018-02-12 RX ADMIN — INSULIN GLARGINE SCH UNITS: 100 INJECTION, SOLUTION SUBCUTANEOUS at 21:34

## 2018-02-12 RX ADMIN — IPRATROPIUM BROMIDE AND ALBUTEROL SULFATE SCH ML: .5; 3 SOLUTION RESPIRATORY (INHALATION) at 14:35

## 2018-02-12 RX ADMIN — FERROUS SULFATE TAB EC 325 MG (65 MG FE EQUIVALENT) SCH MG: 325 (65 FE) TABLET DELAYED RESPONSE at 16:46

## 2018-02-12 RX ADMIN — INSULIN ASPART SCH UNITS: 100 INJECTION, SOLUTION INTRAVENOUS; SUBCUTANEOUS at 16:52

## 2018-02-12 RX ADMIN — DOCUSATE SODIUM SCH MG: 100 CAPSULE, LIQUID FILLED ORAL at 21:31

## 2018-02-12 RX ADMIN — AMLODIPINE BESYLATE SCH MG: 5 TABLET ORAL at 08:24

## 2018-02-12 RX ADMIN — DOCUSATE SODIUM SCH MG: 100 CAPSULE, LIQUID FILLED ORAL at 08:25

## 2018-02-12 RX ADMIN — ATORVASTATIN CALCIUM SCH MG: 40 TABLET, FILM COATED ORAL at 08:25

## 2018-02-12 RX ADMIN — INSULIN ASPART SCH UNITS: 100 INJECTION, SOLUTION INTRAVENOUS; SUBCUTANEOUS at 12:07

## 2018-02-12 RX ADMIN — Medication SCH GM: at 08:25

## 2018-02-12 RX ADMIN — ACETAMINOPHEN PRN MG: 325 TABLET ORAL at 08:30

## 2018-02-12 RX ADMIN — TIOTROPIUM BROMIDE SCH PUFF: 18 CAPSULE ORAL; RESPIRATORY (INHALATION) at 08:26

## 2018-02-12 RX ADMIN — INSULIN ASPART SCH UNITS: 100 INJECTION, SOLUTION INTRAVENOUS; SUBCUTANEOUS at 10:20

## 2018-02-12 RX ADMIN — IPRATROPIUM BROMIDE AND ALBUTEROL SULFATE SCH ML: .5; 3 SOLUTION RESPIRATORY (INHALATION) at 07:13

## 2018-02-12 RX ADMIN — DIVALPROEX SODIUM SCH MG: 500 TABLET, DELAYED RELEASE ORAL at 21:31

## 2018-02-12 RX ADMIN — IPRATROPIUM BROMIDE AND ALBUTEROL SULFATE SCH ML: .5; 3 SOLUTION RESPIRATORY (INHALATION) at 19:15

## 2018-02-12 RX ADMIN — OXYCODONE HYDROCHLORIDE PRN MG: 5 TABLET ORAL at 21:32

## 2018-02-12 RX ADMIN — ESCITALOPRAM OXALATE SCH MG: 20 TABLET, FILM COATED ORAL at 08:24

## 2018-02-12 RX ADMIN — TAMSULOSIN HYDROCHLORIDE SCH MG: 0.4 CAPSULE ORAL at 21:31

## 2018-02-12 RX ADMIN — ISOSORBIDE MONONITRATE SCH MG: 30 TABLET, EXTENDED RELEASE ORAL at 08:25

## 2018-02-12 RX ADMIN — MIRTAZAPINE SCH MG: 15 TABLET, FILM COATED ORAL at 21:32

## 2018-02-12 NOTE — DIAGNOSTIC IMAGING REPORT
BRAIN WITHOUT CONTRAST



HISTORY:  62 years-old Male altered mental status acute headache with altered

mental status



COMPARISON: CT head 2/11/2018



TECHNIQUE: Multiplanar multisequence MRI of the brain was obtained without

contrast.



FINDINGS: 

There is no restricted diffusion to suggest acute infarction. The midline

structures including the corpus callosum, brainstem, optic chiasm, infundibulum,

pituitary and pineal glands appear unremarkable the sagittal T1 series which is

mildly motion degraded. No cerebellar tonsillar herniation. Degenerative changes

are noted within the imaged cervical spine.



All of the images are motion degraded. Within the limitations of the study, no

definite acute intracranial hemorrhage, midline shift, abnormal extra-axial

collections, hydrocephalus or intracranial mass identified. Minimal punctate

T2/FLAIR prolongation is seen within the periventricular white matter suggesting

minimal chronic microvascular ischemic changes. The major flow voids at the

level of the skull base are patent. Left mastoid effusion. Mild to moderate

mucosal thickening of the ethmoid air cells and maxillary sinuses. The scalp,

however barium and soft tissues are unremarkable. The orbits appear to be within

normal limits as well.



IMPRESSION: 

1. Motion degraded exam without acute intracranial abnormality identified.

2. Left mastoid effusion with mild to moderate paranasal sinus disease. 







The above report was generated using voice recognition software. It may contain

grammatical, syntax or spelling errors.







Electronically signed by:  Ashwin Bhatia M.D.

2/12/2018 7:03 PM



Dictated Date/Time:  2/12/2018 6:59 PM

## 2018-02-12 NOTE — CARDIOLOGY PROGRESS NOTE
DATE: 02/12/2018

 

SUBJECTIVE:  Mr. Dixon is resting comfortably in bed without complaints of

chest pain, dyspnea, or palpitations.  He is anxious for hospital discharge. 

Events of yesterday reviewed.

 

OBJECTIVE:

VITAL SIGNS:  Blood pressure 154/88 with a regular pulse of 64.  Respiratory

rate is 20 and the patient is afebrile at 36.7 degrees Celsius.  Saturations

95% on room air.

NECK:  Supple.  Mildly delayed and prolonged carotid upstrokes.  No bruits or

transmitted murmurs.

CARDIOVASCULAR:  Reveals a regular rhythm with normal S1 and S2.  A 2/6 basal

systolic ejection murmur is noted.

LUNGS:  Clear without rales, rhonchi, or wheezes.

ABDOMEN:  Soft without bruits.

EXTREMITIES:  Reveal intact radial artery pulses bilaterally.  1+ pedal and

trace pretibial edema is noted.

 

LABORATORY DATA:  CBC notes hemoglobin 7.6, hematocrit 23.9, white count 5.5,

and platelet count 191,000.  Electrolytes note a sodium of 142, potassium

4.7, chloride 105, bicarbonate 28, BUN 89, creatinine 2.3, glucose 95.  INR

is 3.8.  Telemetry monitor is benign.

 

IMPRESSION AND PLAN:

1.  Diastolic congestive heart failure -- seems compensated at this time. 

Remains on twice daily Demadex.

2.  Coronary artery disease -- quiescent on current medications.

3.  Hypertension -- controlled.

4.  Mild to moderate aortic stenosis.

5.  Mild mitral stenosis.

6.  Anemia.

## 2018-02-12 NOTE — PROGRESS NOTE
Subjective


Date of Service:


Feb 12, 2018.


Subjective


Pt evaluation today including:  conversation w/ patient, physical exam


61 yo male reports feeling well.


He denies any confusion.


He still has some shortness of breath, but it is very much improved.





Problem List


Medical Problems:


(1) Acute CHF


Status: Acute  





(2) Acute coronary syndrome


Status: Acute  





(3) Acute headache


Status: Acute  





(4) Acute on chronic congestive heart failure


Status: Acute  





(5) Altered mental status


Status: Acute  





(6) Anemia


Status: Acute  





(7) Anemia


Status: Acute  





(8) Anemia


Status: Acute  





(9) Anginal pain


Status: Acute  





(10) Appendicitis


Status: Acute  





(11) Cellulitis


Status: Acute  





(12) Chest pain


Status: Acute  





(13) CHF (congestive heart failure)


Status: Acute  





(14) CHF (congestive heart failure)


Status: Acute  





(15) CHF (congestive heart failure)


Status: Acute  





(16) Congestive heart failure


Status: Acute  





(17) Congestive heart failure


Status: Acute  





(18) COPD (chronic obstructive pulmonary disease)


Status: Acute  





(19) Diabetes mellitus with hyperglycemia


Status: Acute  





(20) Dyspnea


Status: Acute  





(21) Elevated troponin


Status: Acute  





(22) Failure of outpatient treatment


Status: Acute  





(23) Hypoxia


Status: Acute  





(24) Hypoxia


Status: Acute  





(25) Intra-abdominal abscess


Status: Acute  





(26) Lower abdominal pain of unknown etiology


Status: Acute  





(27) Neck pain


Status: Acute  





(28) Pneumonia


Status: Acute  





(29) Pneumonia


Status: Acute  





(30) Precordial chest pain


Status: Acute  





(31) Pulmonary edema


Status: Acute  





(32) Respiratory acidosis


Status: Acute  





(33) Respiratory distress


Status: Acute  





(34) SOB (shortness of breath)


Status: Acute  





(35) SOB (shortness of breath)


Status: Acute  





(36) Tachypnea


Status: Acute  











Review of Systems


All Other Systems:  Reviewed and Negative





Medications





Current Inpatient Medications








 Medications


  (Trade)  Dose


 Ordered  Sig/Dc


 Route  Start Time


 Stop Time Status Last Admin


Dose Admin


 


 Acetaminophen


  (Tylenol Tab)  650 mg  Q4H  PRN


 PO  2/2/18 19:00


 3/4/18 18:59  2/12/18 08:30


650 MG


 


 Al Hydrox/Mg


 Hydrox/Simethicone


  (Maalox Max Susp)  15 ml  Q4H  PRN


 PO  2/2/18 19:00


 3/4/18 18:59  2/10/18 16:14


15 ML


 


 Magnesium


 Hydroxide


  (Milk Of


 Magnesia Susp)  30 ml  Q12H  PRN


 PO  2/2/18 19:00


 3/4/18 18:59   


 


 


 Ondansetron HCl


  (Zofran Inj)  4 mg  Q6H  PRN


 IV  2/2/18 19:00


 3/4/18 18:59   


 


 


 Nitroglycerin


  (Nitrostat Tab)  0.4 mg  UD  PRN


 SL  2/2/18 19:00


 3/4/18 18:59   


 


 


 Morphine Sulfate


  (MoRPHine


 SULFATE INJ)  2 mg  Q30M  PRN


 IV  2/2/18 19:00


 2/16/18 18:59   


 


 


 Atorvastatin


 Calcium


  (Lipitor Tab)  80 mg  DAILY


 PO  2/3/18 09:00


 3/5/18 08:59  2/12/18 08:25


80 MG


 


 Clopidogrel


 Bisulfate


  (plAVix TAB)  75 mg  QAM


 PO  2/3/18 09:00


 3/5/18 08:59  2/12/18 08:24


75 MG


 


 Divalproex Sodium


  (Depakote Delay


 Rel Tab)  2,000 mg  HS


 PO  2/2/18 21:00


 3/4/18 20:59  2/11/18 21:16


2,000 MG


 


 Escitalopram


 Oxalate


  (Lexapro Tab)  20 mg  QAM


 PO  2/3/18 09:00


 3/5/18 08:59  2/12/18 08:24


20 MG


 


 Fluticasone


 Propionate


  (Flonase Nasal


 Spray)  2 sprays  DAILY  PRN


 VERONICA  2/2/18 19:00


 3/4/18 18:59   


 


 


 Insulin Glargine


  (Lantus Solostar


 Pen)  10 units  HS


 SC  2/2/18 21:00


 3/4/18 20:59  2/11/18 21:15


10 UNITS


 


 Isosorbide


 Mononitrate


  (Imdur Ext Rel


 Tab)  30 mg  QAM


 PO  2/3/18 09:00


 3/5/18 08:59  2/12/18 08:25


30 MG


 


 Tamsulosin HCl


  (Flomax Cap)  0.4 mg  HS


 PO  2/2/18 21:00


 3/4/18 20:59  2/11/18 21:17


0.4 MG


 


 Tiotropium Bromide


  (Spiriva


 Handihaler


 Inhaler)  1 puff  DAILY


 INH  2/3/18 09:00


 3/5/18 08:59  2/12/18 08:26


1 PUFF


 


 Warfarin Sodium


  (Coumadin Tab)  7.5 mg  SuMoWeThFr@1600


 PO  2/2/18 20:00


 3/4/18 19:59 Future Hold 2/8/18 16:35


7.5 MG


 


 Albuterol


  (Ventolin Hfa


 Inhaler)  2 puffs  Q4H  PRN


 INH  2/2/18 20:45


 3/4/18 20:44  2/4/18 21:55


2 PUFFS


 


 Cyanocobalamin


  (Vitamin B-12


 Tab)  1,000 mcg  DAILY


 PO  2/3/18 09:00


 3/5/18 08:59  2/12/18 08:24


1,000 MCG


 


 Ferrous Sulfate


  (Feosol Tab)  325 mg  BIDM


 PO  2/3/18 07:30


 3/5/18 07:59  2/12/18 16:46


325 MG


 


 Oxycodone HCl


  (Roxicodone


 Immediate Rel Tab)  10 mg  Q12  PRN


 PO  2/2/18 19:00


 3/4/18 18:59  2/10/18 23:19


10 MG


 


 Spironolactone


  (Aldactone Tab)  50 mg  BID


 PO  2/2/18 21:00


 3/4/18 20:59 Future Hold  


 


 


 Warfarin Sodium


  (Coumadin Tab)  10 mg  TuSa@1600


 PO  2/3/18 16:00


 3/5/18 15:59 Future Hold 2/3/18 16:58


10 MG


 


 Insulin Aspart


  (novoLOG ASPART)  **SLIDING


 SCALE**


 **G...  ACHS


 SC  2/2/18 21:00


 3/4/18 20:59  2/12/18 16:52


5 UNITS


 


 Miscellaneous


  (Iv Fluids


 Completed)  1 ea  PRN  PRN


 N/A  2/2/18 19:30


 2/2/19 19:29   


 


 


 Furosemide 20 mg/


 Syringe  2 ml @ 4


 mls/min  BID


 IV  2/3/18 09:00


 3/4/18 20:59 Future Hold 2/3/18 20:51


4 MLS/MIN


 


 Pantoprazole


 Sodium


  (Protonix Tab)  40 mg  QAM


 PO  2/3/18 09:00


 3/5/18 08:59  2/12/18 08:24


40 MG


 


 Calcium Carbonate


  (Tums Chew Tab)  500 mg  Q2H  PRN


 PO  2/3/18 10:00


 3/5/18 09:59  2/10/18 20:59


500 MG


 


 Albuterol/


 Ipratropium


  (Duoneb)  3 ml  Q4R


 INH  2/4/18 22:42


 3/6/18 22:41  2/12/18 19:15


3 ML


 


 Furosemide


  (Lasix Tab)  20 mg  QAM


 PO  2/5/18 09:00


 3/7/18 08:59 Future Hold 2/6/18 08:13


20 MG


 


 Docusate Sodium


  (coLACE CAP)  100 mg  BID


 PO  2/7/18 11:30


 3/9/18 11:29  2/12/18 08:25


100 MG


 


 Polyethylene


  (Miralax Powder


 Packet)  17 gm  DAILY


 PO  2/7/18 11:30


 3/4/18 11:29  2/12/18 08:25


17 GM


 


 Torsemide


  (Demadex Tab)  20 mg  BID


 PO  2/8/18 21:00


 3/10/18 20:59  2/12/18 08:25


20 MG


 


 Labetalol HCl


  (Normodyne Tab)  200 mg  BID


 PO  2/8/18 21:00


 3/10/18 20:59  2/12/18 08:24


200 MG


 


 Iron Sucrose 100


 mg/Sodium Chloride  105 ml @ 


 420 mls/hr  DAILY


 IV  2/8/18 10:45


 2/17/18 09:14  2/12/18 08:29


420 MLS/HR


 


 Amlodipine


 Besylate


  (Norvasc Tab)  5 mg  QAM


 PO  2/11/18 09:00


 3/5/18 08:59  2/12/18 08:24


5 MG


 


 Gabapentin


  (Neurontin Cap)  200 mg  HS


 PO  2/11/18 21:00


 3/4/18 20:59  2/11/18 21:17


200 MG


 


 Mirtazapine


  (Remeron Tab)  30 mg  HS


 PO  2/11/18 21:00


 3/4/18 20:59  2/11/18 21:16


30 MG











Objective


Vital Signs











  Date Time  Temp Pulse Resp B/P (MAP) Pulse Ox O2 Delivery O2 Flow Rate FiO2


 


2/12/18 20:19 36.4 63 18 152/57 (88) 98 BiPAP  


 


2/12/18 19:14  65   94   40


 


2/12/18 19:11  65 22  94 BiPAP/CPAP  40


 


2/12/18 16:00      Nasal Cannula  


 


2/12/18 15:10 36.6 59 20 122/64 (83) 93 Nasal Cannula 6.0 


 


2/12/18 14:35  61 16  93 Nasal Cannula 5.0 


 


2/12/18 12:47      Nasal Cannula  


 


2/12/18 11:45 36.9 65 23 132/70 (90) 93 BiPAP  


 


2/12/18 10:58  72 14  91 BiPAP/CPAP  40


 


2/12/18 10:58  72   91   40


 


2/12/18 08:30      Nasal Cannula  


 


2/12/18 08:09 36.7 64 25 154/88 (110) 95 BiPAP  


 


2/12/18 07:13  65 16  92 BiPAP/CPAP  40


 


2/12/18 07:11  65   92   40


 


2/12/18 04:00      BiPAP  50


 


2/12/18 03:47  66 17  95 BiPAP/CPAP  40


 


2/12/18 03:47  66   95   40


 


2/12/18 03:33 37.5 63 14 152/64 (93) 95 BiPAP  


 


2/12/18 00:00      BiPAP  50


 


2/11/18 23:49  66   96   40


 


2/11/18 23:48  66 13  96 BiPAP/CPAP  40


 


2/11/18 23:33 36.8 64 17 130/61 (84) 97 BiPAP  











Physical Exam


Comments:


General Appearance:  WD/WN, no apparent distress, asleep, difficult to awaken 

upon entry


Eyes:  PERRL, EOMI


ENT:  hearing grossly normal, pharynx normal, + pertinent finding (MMM)


Neck:  supple, no JVD


Respiratory/Chest:  chest non-tender, no respiratory distress, no accessory 

muscle use, + pertinent finding (on 6 L via Oxymask patient has faint crackles 

in anterior fields, slightly diminished at bases, no wheezing or rales)


Cardiovascular:  bradycardic with HR in mid-40s, no JVD, + systolic murmur (

grade III/VI)


Abdomen:  normal bowel sounds, non tender, soft


Extremities:  non-tender, no calf tenderness, + pertinent finding (1+ pitting 

edema in BLE.  Multiple wounds over BLE healing well.)


Neurologic/Psychiatric:  alert, normal mood/affect, oriented x 3


Skin:  warm/dry





Laboratory Results





Last 24 Hours








Test


  2/12/18


06:56 2/12/18


07:19 2/12/18


11:34 2/12/18


16:35


 


Bedside Glucose 108 mg/dl   100 mg/dl  104 mg/dl 


 


White Blood Count  5.50 K/uL   


 


Red Blood Count  2.53 M/uL   


 


Hemoglobin  7.6 g/dL   


 


Hematocrit  23.9 %   


 


Mean Corpuscular Volume  94.5 fL   


 


Mean Corpuscular Hemoglobin  30.0 pg   


 


Mean Corpuscular Hemoglobin


Concent 


  31.8 g/dl 


  


  


 


 


RDW Standard Deviation  54.1 fL   


 


RDW Coefficient of Variation  16.6 %   


 


Platelet Count  191 K/uL   


 


Mean Platelet Volume  8.8 fL   


 


Arterial Blood pH  7.37   


 


Arterial Blood Partial


Pressure CO2 


  51 mmHg 


  


  


 


 


Arterial Blood Partial


Pressure O2 


  84 mm/Hg 


  


  


 


 


Arterial Blood HCO3  29 mmol/L   


 


Arterial Blood Oxygen


Saturation 


  93.3 % 


  


  


 


 


Arterial Blood Base Excess  3.1 mEq/L   


 


Arterial Blood Gas Delivery  BI PAP 40%   


 


Cameron Test  POS   


 


Sodium Level  142 mmol/L   


 


Potassium Level  4.7 mmol/L   


 


Chloride Level  105 mmol/L   


 


Carbon Dioxide Level  28 mmol/L   


 


Anion Gap  9.0 mmol/L   


 


Blood Urea Nitrogen  89 mg/dl   


 


Creatinine  2.30 mg/dl   


 


Est Creatinine Clear Calc


Drug Dose 


  41.2 ml/min 


  


  


 


 


Estimated GFR (


American) 


  34.5 


  


  


 


 


Estimated GFR (Non-


American 


  29.8 


  


  


 


 


BUN/Creatinine Ratio  38.7   


 


Random Glucose  95 mg/dl   


 


Calcium Level  8.1 mg/dl   


 


Test


  2/12/18


20:28 


  


  


 


 


Bedside Glucose 105 mg/dl    











Assessment and Plan


Patient is a 63 y/o male with a history of CAD, h/o MI s/p stents, HTN, HLD, 

diastolic CHF, COPD, DM II, anxiety, depression, bipolar disorder, anemia and 

GERD, who presented to ED because of worsening SOB x1 day





Acute on chronic diastolic CHF exacerbation:


- Cardiac enzymes negative x 3, ekg prn cp, O2 protocol, home O2 is 4 L 


- Strict I/Ox, daily weights - compared to last admission pt euvolemic.  

Physical exam remains stable-  no JVD, chronic mild pitting edema in BLE 

stable. 


   Cards recs likely be able to dc with lasix 40 mg PO BID along with 

spironolactone upon discharge.


- Appears volume contracted


- lasix held for 3 days, given 1 dose of Lasix 40 mg IV once today and see how 

the patient responds as he is currently requiring increased O2


-Now on demadex.


- Low sodium and low potassium diet, continue 1800 mL fluid restriction


- ECHO 12/23/17 w/ preserved EF and grade II diastolic CHF 


- Cardiology contacted with new onset sinus bradycardia on EKG. 





Respiratory acidosis


- Increased respiratory needs, O2 at 6L on oximask, increased fatigue


   - ABG completed with ph of 7.1, CO2 = 84, PO2=73, HC03=28, ask RN to contact 

respiratory for bipap 


- EKG showing new onset 2nd degree AV block, possibly due to elevated 

potassium.  Pt has drank the kayexylate as ordered earlier. 


- will require bipap as outpatient





Elevated INR - 


- 3.8 yesterday, will repeat in AM, hold Warfarin again today (day #2), pt 

trend has been up and down, likely that 10 mg is too much for him. May consider 

alternating regimen of 7.5 and 5 mg. 





Anemia of chronic disease- STABLE:


- Hgb remained stable at 8.0 today - s/p 1 U PRBC on 2/7.  Pt energy level 

slightly improved


- Iron panel completed - continue IV iron (started 2/8), to boost counts. 


- repeat CBC QOD





Fatigue/Lethargy


- ammonia level > 32,  administered lactulose on 2/7 and pt had large BM- he is 

no longer confused or as fatigued.  





FITZ on CKD stage III


- baseline crt 1.6-  today 2.48 despite holding diuretics and giving him time 

to equilibrate without extra fluids.  Today need to give extra dose of Lasix 

for worsening breath sounds, increased O2 needs. 


- Nephrology consulted for proteinuria, - appreciate recs - feel this is due to 

diabetic nephropathy.  


- cont low potassium diet. 





Hyperkalemia


- Today is 4.7


 -improved after kaylexalate





b/l chronic diabetic wounds: 


- Following w/ wound care- wound care consulted 





CAD w/ cardiac stenting


HTN


HLD


- Plavix 75 mg daily, Norvasc 10 mg daily, Hydralazine 25 mg QID, Imdur 30 mg 

BID, and Toprol XL 50 mg BID, Lipitor 80 mg daily





T2DM w/ neuropathy:


- Continue Lantus 10 u HS 


- BSG ACHS and ISS


- Continue Gabapentin  





Chronic atrial thrombus: Continue Coumadin, follow PT/INR and adjust dose PRN 

for INR goal 2-3 


- see above, INR 3.8





COPD without exacerbation: Continue home inhalers 





Bipolar disorder, anxiety, depression- STABLE: Depakote 2 g daily, Lexapro 20 

mg daily, Remeron 45 mg daily, and Klonopin





GI prophylaxis: Protonix daily 





DVT Prophylaxis: Coumadin





Code status: LEVEL I, FULL

## 2018-02-12 NOTE — NEPHROLOGY PROGRESS NOTE
Nephrology Progress Note


Date of Service


Feb 12, 2018.





Chief Complaint


f/u for FITZ with CKD





Subjective


Paramjit was seen and examined in his room this am. Overall feeling better today, 

awake, alert. Denies SOB, CP, no fever, chills. BP stable. Decent UO, cr 

slightly better, 2.3, electrolyte acceptable.





Review of Systems


A complete review of systems was performed.  Pertinent positives are noted 

above. All other systems are negative.





Vital Signs





Last 8 Hrs








  Date Time  Temp Pulse Resp B/P (MAP) Pulse Ox O2 Delivery O2 Flow Rate FiO2


 


2/12/18 08:09 36.7 64 25 154/88 (110) 95 BiPAP  


 


2/12/18 07:13  65 16  92 BiPAP/CPAP  40


 


2/12/18 07:11  65   92   40


 


2/12/18 04:00      BiPAP  50


 


2/12/18 03:47  66 17  95 BiPAP/CPAP  40


 


2/12/18 03:47  66   95   40


 


2/12/18 03:33 37.5 63 14 152/64 (93) 95 BiPAP  











Last Recorded Weight


Weight (Kilograms):  100.100





Physical Exam


GENERAL:middle aged male,  AAA x 3,  pleasant, healthy-appearing, not in any 

distress.


NECK: Supple, no JVD.


RESPIRATORY: Normal breathing efforts, no accessory muscle use, crackles b/l at 

bases


CARDIOVASCULAR: S1, S2 normal, rate rhythm regular.


EXTREMITY: No lower extremity edema


NEURO: speech fluent.


PSYCHIATRY: Normal mood and judgment





Family History





Cancer (esophageal)


Diabetes mellitus


Heart disease


Hypertension


Negative for CKD/ESRD





Social History


Smoking Status:  Former smoker


Drug Use:  none


Marital Status:  


Housing Status:  lives with family, other


Occupation:  disabled


.  Disabled.  Former smoker





Laboratory Results


Past 24 Hours


2/11/18 17:56








Red Blood Count 2.64, Mean Corpuscular Volume 95.5, Mean Corpuscular Hemoglobin 

29.9, Mean Corpuscular Hemoglobin Concent 31.3, Mean Platelet Volume 8.3, 

Neutrophils (%) (Auto) 80.4, Lymphocytes (%) (Auto) 10.6, Monocytes (%) (Auto) 

7.5, Eosinophils (%) (Auto) 0.4, Basophils (%) (Auto) 0.2, Neutrophils # (Auto) 

4.39, Lymphocytes # (Auto) 0.58, Monocytes # (Auto) 0.41, Eosinophils # (Auto) 

0.02, Basophils # (Auto) 0.01





2/12/18 07:19








2/11/18 11:49








2/11/18 17:56








2/12/18 07:19

















Test


  2/11/18


09:38 2/11/18


11:25 2/11/18


11:49 2/11/18


11:55


 


Arterial Blood pH


  7.14


(7.35-7.45) 


  


  7.20


(7.35-7.45)


 


Arterial Blood Partial


Pressure CO2 84 mmHg


(35-46) 


  


  71 mmHg


(35-46)


 


Arterial Blood Partial


Pressure O2 73 mm/Hg


(80-95) 


  


  78 mm/Hg


(80-95)


 


Arterial Blood HCO3


  28 mmol/L


(19-24) 


  


  27 mmol/L


(19-24)


 


Arterial Blood Oxygen


Saturation 89.1 % (90-95) 


  


  


  91.2 % (90-95) 


 


 


Arterial Blood Base Excess


  -2.0 mEq/L


(-9-1.8) 


  


  -1.1 mEq/L


(-9-1.8)


 


Arterial Blood Gas Delivery 6L    50% FIO2 BIPAP 


 


Cameron Test POS (POS)    POS (POS) 


 


Ammonia


  31.4 umol/L


(11-32) 


  


  


 


 


Bedside Glucose


  


  212 mg/dl


(70-99) 


  


 


 


Anion Gap


  


  


  9.0 mmol/L


(3-11) 


 


 


Est Creatinine Clear Calc


Drug Dose 


  


  36.0 ml/min 


  


 


 


Estimated GFR (


American) 


  


  29.3 


  


 


 


Estimated GFR (Non-


American 


  


  25.3 


  


 


 


BUN/Creatinine Ratio   35.9 (10-20)  


 


Calcium Level


  


  


  8.1 mg/dl


(8.5-10.1) 


 


 


Troponin I


  


  


  0.023 ng/ml


(0-0.045) 


 


 


Test


  2/11/18


16:01 2/11/18


16:15 2/11/18


17:56 2/11/18


20:34


 


Arterial Blood pH


  7.27


(7.35-7.45) 


  


  


 


 


Arterial Blood Partial


Pressure CO2 63 mmHg


(35-46) 


  


  


 


 


Arterial Blood Partial


Pressure O2 109 mm/Hg


(80-95) 


  


  


 


 


Arterial Blood HCO3


  28 mmol/L


(19-24) 


  


  


 


 


Arterial Blood Oxygen


Saturation 95.3 % (90-95) 


  


  


  


 


 


Arterial Blood Base Excess


  0.8 mEq/L


(-9-1.8) 


  


  


 


 


Arterial Blood Gas Delivery 50% O2    


 


Cameron Test POS (POS)    


 


Bedside Glucose


  


  161 mg/dl


(70-99) 


  116 mg/dl


(70-99)


 


White Blood Count


  


  


  5.46 K/uL


(4.8-10.8) 


 


 


Red Blood Count


  


  


  2.64 M/uL


(4.7-6.1) 


 


 


Hemoglobin


  


  


  7.9 g/dL


(14.0-18.0) 


 


 


Hematocrit   25.2 % (42-52)  


 


Mean Corpuscular Volume


  


  


  95.5 fL


() 


 


 


Mean Corpuscular Hemoglobin


  


  


  29.9 pg


(25-34) 


 


 


Mean Corpuscular Hemoglobin


Concent 


  


  31.3 g/dl


(32-36) 


 


 


Platelet Count


  


  


  161 K/uL


(130-400) 


 


 


Mean Platelet Volume


  


  


  8.3 fL


(7.4-10.4) 


 


 


Neutrophils (%) (Auto)   80.4 %  


 


Lymphocytes (%) (Auto)   10.6 %  


 


Monocytes (%) (Auto)   7.5 %  


 


Eosinophils (%) (Auto)   0.4 %  


 


Basophils (%) (Auto)   0.2 %  


 


Neutrophils # (Auto)


  


  


  4.39 K/uL


(1.4-6.5) 


 


 


Lymphocytes # (Auto)


  


  


  0.58 K/uL


(1.2-3.4) 


 


 


Monocytes # (Auto)


  


  


  0.41 K/uL


(0.11-0.59) 


 


 


Eosinophils # (Auto)


  


  


  0.02 K/uL


(0-0.5) 


 


 


Basophils # (Auto)


  


  


  0.01 K/uL


(0-0.2) 


 


 


RDW Standard Deviation


  


  


  52.8 fL


(36.4-46.3) 


 


 


RDW Coefficient of Variation


  


  


  16.0 %


(11.5-14.5) 


 


 


Immature Granulocyte % (Auto)   0.9 %  


 


Immature Granulocyte # (Auto)


  


  


  0.05 K/uL


(0.00-0.02) 


 


 


Nucleated RBC Absolute Count


(auto) 


  


  0.03 K/uL


(0-0) 


 


 


Nucleated Red Blood Cells %   0.6 %  


 


Hypochromasia   PRESENT  


 


Basophilic Stippling   2+  


 


Anion Gap


  


  


  8.0 mmol/L


(3-11) 


 


 


Est Creatinine Clear Calc


Drug Dose 


  


  37.4 ml/min 


  


 


 


Estimated GFR (


American) 


  


  30.7 


  


 


 


Estimated GFR (Non-


American 


  


  26.5 


  


 


 


BUN/Creatinine Ratio   37.1 (10-20)  


 


Calcium Level


  


  


  8.3 mg/dl


(8.5-10.1) 


 


 


Total Bilirubin


  


  


  0.3 mg/dl


(0.2-1) 


 


 


Aspartate Amino Transf


(AST/SGOT) 


  


  16 U/L (15-37) 


  


 


 


Alanine Aminotransferase


(ALT/SGPT) 


  


  21 U/L (12-78) 


  


 


 


Alkaline Phosphatase


  


  


  72 U/L


() 


 


 


Troponin I


  


  


  0.030 ng/ml


(0-0.045) 


 


 


Total Protein


  


  


  6.6 gm/dl


(6.4-8.2) 


 


 


Albumin


  


  


  2.6 gm/dl


(3.4-5.0) 


 


 


Globulin


  


  


  4.0 gm/dl


(2.5-4.0) 


 


 


Albumin/Globulin Ratio   0.7 (0.9-2)  


 


Test


  2/12/18


06:56 2/12/18


07:19 


  


 


 


Bedside Glucose


  108 mg/dl


(70-99) 


  


  


 


 


Red Blood Count


  


  2.53 M/uL


(4.7-6.1) 


  


 


 


Mean Corpuscular Volume


  


  94.5 fL


() 


  


 


 


Mean Corpuscular Hemoglobin


  


  30.0 pg


(25-34) 


  


 


 


Mean Corpuscular Hemoglobin


Concent 


  31.8 g/dl


(32-36) 


  


 


 


RDW Standard Deviation


  


  54.1 fL


(36.4-46.3) 


  


 


 


RDW Coefficient of Variation


  


  16.6 %


(11.5-14.5) 


  


 


 


Mean Platelet Volume


  


  8.8 fL


(7.4-10.4) 


  


 


 


Arterial Blood pH


  


  7.37


(7.35-7.45) 


  


 


 


Arterial Blood Partial


Pressure CO2 


  51 mmHg


(35-46) 


  


 


 


Arterial Blood Partial


Pressure O2 


  84 mm/Hg


(80-95) 


  


 


 


Arterial Blood HCO3


  


  29 mmol/L


(19-24) 


  


 


 


Arterial Blood Oxygen


Saturation 


  93.3 % (90-95) 


  


  


 


 


Arterial Blood Base Excess


  


  3.1 mEq/L


(-9-1.8) 


  


 


 


Arterial Blood Gas Delivery  BI PAP 40%   


 


Cameron Test  POS (POS)   


 


Anion Gap


  


  9.0 mmol/L


(3-11) 


  


 


 


Est Creatinine Clear Calc


Drug Dose 


  41.2 ml/min 


  


  


 


 


Estimated GFR (


American) 


  34.5 


  


  


 


 


Estimated GFR (Non-


American 


  29.8 


  


  


 


 


BUN/Creatinine Ratio  38.7 (10-20)   


 


Calcium Level


  


  8.1 mg/dl


(8.5-10.1) 


  


 











Allergies


Coded Allergies:  


     No Known Allergies (Verified , 2/2/18)





Medications





Current Inpatient Medications








 Medications


  (Trade)  Dose


 Ordered  Sig/Dc


 Route  Start Time


 Stop Time Status Last Admin


Dose Admin


 


 Acetaminophen


  (Tylenol Tab)  650 mg  Q4H  PRN


 PO  2/2/18 19:00


 3/4/18 18:59  2/9/18 19:09


650 MG


 


 Al Hydrox/Mg


 Hydrox/Simethicone


  (Maalox Max Susp)  15 ml  Q4H  PRN


 PO  2/2/18 19:00


 3/4/18 18:59  2/10/18 16:14


15 ML


 


 Magnesium


 Hydroxide


  (Milk Of


 Magnesia Susp)  30 ml  Q12H  PRN


 PO  2/2/18 19:00


 3/4/18 18:59   


 


 


 Ondansetron HCl


  (Zofran Inj)  4 mg  Q6H  PRN


 IV  2/2/18 19:00


 3/4/18 18:59   


 


 


 Nitroglycerin


  (Nitrostat Tab)  0.4 mg  UD  PRN


 SL  2/2/18 19:00


 3/4/18 18:59   


 


 


 Morphine Sulfate


  (MoRPHine


 SULFATE INJ)  2 mg  Q30M  PRN


 IV  2/2/18 19:00


 2/16/18 18:59   


 


 


 Atorvastatin


 Calcium


  (Lipitor Tab)  80 mg  DAILY


 PO  2/3/18 09:00


 3/5/18 08:59  2/11/18 10:19


80 MG


 


 Clopidogrel


 Bisulfate


  (plAVix TAB)  75 mg  QAM


 PO  2/3/18 09:00


 3/5/18 08:59  2/11/18 10:19


75 MG


 


 Divalproex Sodium


  (Depakote Delay


 Rel Tab)  2,000 mg  HS


 PO  2/2/18 21:00


 3/4/18 20:59  2/11/18 21:16


2,000 MG


 


 Escitalopram


 Oxalate


  (Lexapro Tab)  20 mg  QAM


 PO  2/3/18 09:00


 3/5/18 08:59  2/11/18 10:18


20 MG


 


 Fluticasone


 Propionate


  (Flonase Nasal


 Spray)  2 sprays  DAILY  PRN


 VERONICA  2/2/18 19:00


 3/4/18 18:59   


 


 


 Insulin Glargine


  (Lantus Solostar


 Pen)  10 units  HS


 SC  2/2/18 21:00


 3/4/18 20:59  2/11/18 21:15


10 UNITS


 


 Isosorbide


 Mononitrate


  (Imdur Ext Rel


 Tab)  30 mg  QAM


 PO  2/3/18 09:00


 3/5/18 08:59  2/10/18 07:43


30 MG


 


 Tamsulosin HCl


  (Flomax Cap)  0.4 mg  HS


 PO  2/2/18 21:00


 3/4/18 20:59  2/11/18 21:17


0.4 MG


 


 Tiotropium Bromide


  (Spiriva


 Handihaler


 Inhaler)  1 puff  DAILY


 INH  2/3/18 09:00


 3/5/18 08:59  2/11/18 10:18


1 PUFF


 


 Warfarin Sodium


  (Coumadin Tab)  7.5 mg  SuMoWeThFr@1600


 PO  2/2/18 20:00


 3/4/18 19:59 Future Hold 2/8/18 16:35


7.5 MG


 


 Albuterol


  (Ventolin Hfa


 Inhaler)  2 puffs  Q4H  PRN


 INH  2/2/18 20:45


 3/4/18 20:44  2/4/18 21:55


2 PUFFS


 


 Cyanocobalamin


  (Vitamin B-12


 Tab)  1,000 mcg  DAILY


 PO  2/3/18 09:00


 3/5/18 08:59  2/11/18 10:19


1,000 MCG


 


 Ferrous Sulfate


  (Feosol Tab)  325 mg  BIDM


 PO  2/3/18 07:30


 3/5/18 07:59  2/11/18 10:19


325 MG


 


 Oxycodone HCl


  (Roxicodone


 Immediate Rel Tab)  10 mg  Q12  PRN


 PO  2/2/18 19:00


 3/4/18 18:59  2/10/18 23:19


10 MG


 


 Spironolactone


  (Aldactone Tab)  50 mg  BID


 PO  2/2/18 21:00


 3/4/18 20:59 Future Hold  


 


 


 Warfarin Sodium


  (Coumadin Tab)  10 mg  TuSa@1600


 PO  2/3/18 16:00


 3/5/18 15:59 Future Hold 2/3/18 16:58


10 MG


 


 Insulin Aspart


  (novoLOG ASPART)  **SLIDING


 SCALE**


 **G...  ACHS


 SC  2/2/18 21:00


 3/4/18 20:59  2/11/18 13:42


2 UNITS


 


 Miscellaneous


  (Iv Fluids


 Completed)  1 ea  PRN  PRN


 N/A  2/2/18 19:30


 2/2/19 19:29   


 


 


 Furosemide 20 mg/


 Syringe  2 ml @ 4


 mls/min  BID


 IV  2/3/18 09:00


 3/4/18 20:59 Future Hold 2/3/18 20:51


4 MLS/MIN


 


 Pantoprazole


 Sodium


  (Protonix Tab)  40 mg  QAM


 PO  2/3/18 09:00


 3/5/18 08:59  2/11/18 10:17


40 MG


 


 Calcium Carbonate


  (Tums Chew Tab)  500 mg  Q2H  PRN


 PO  2/3/18 10:00


 3/5/18 09:59  2/10/18 20:59


500 MG


 


 Albuterol/


 Ipratropium


  (Duoneb)  3 ml  Q4R


 INH  2/4/18 22:42


 3/6/18 22:41  2/12/18 07:13


3 ML


 


 Furosemide


  (Lasix Tab)  20 mg  QAM


 PO  2/5/18 09:00


 3/7/18 08:59 Future Hold 2/6/18 08:13


20 MG


 


 Docusate Sodium


  (coLACE CAP)  100 mg  BID


 PO  2/7/18 11:30


 3/9/18 11:29  2/11/18 21:16


100 MG


 


 Polyethylene


  (Miralax Powder


 Packet)  17 gm  DAILY


 PO  2/7/18 11:30


 3/4/18 11:29  2/10/18 07:44


17 GM


 


 Torsemide


  (Demadex Tab)  20 mg  BID


 PO  2/8/18 21:00


 3/10/18 20:59  2/11/18 21:16


20 MG


 


 Labetalol HCl


  (Normodyne Tab)  200 mg  BID


 PO  2/8/18 21:00


 3/10/18 20:59  2/11/18 21:16


200 MG


 


 Iron Sucrose 100


 mg/Sodium Chloride  105 ml @ 


 420 mls/hr  DAILY


 IV  2/8/18 10:45


 2/17/18 09:14  2/11/18 10:17


420 MLS/HR


 


 Amlodipine


 Besylate


  (Norvasc Tab)  5 mg  QAM


 PO  2/11/18 09:00


 3/5/18 08:59   


 


 


 Gabapentin


  (Neurontin Cap)  200 mg  HS


 PO  2/11/18 21:00


 3/4/18 20:59  2/11/18 21:17


200 MG


 


 Mirtazapine


  (Remeron Tab)  30 mg  HS


 PO  2/11/18 21:00


 3/4/18 20:59  2/11/18 21:16


30 MG


 


 Dextrose/Sodium


 Chloride  1,000 ml @ 


 75 mls/hr  [id number]


 IV  2/11/18 19:30


 2/12/18 08:49  2/11/18 19:51


75 MLS/HR











Impression





(1) Acute kidney injury


(2) Proteinuria


(3) Anemia


(4) Acute diastolic (congestive) heart failure


(5) Hypertension


(6) Former smoker


Mr. Dixon has been admitted due to recurrent CHF.  12/17 echocardiogram 

revealed LVEF 55% but dilated RV and RA.  Patient has a combination of 

pulmonary HTN and diastolic dysfunction.  12/17 renal US revealed 14 cm 

kidneys.  Doppler was negative for KIANA.  Urine sediment is difficult to 

interpret due to chronic indwelling gillespie catheter.  UPCR 1.5 g.  Compliance 

with outpatient medical regimen and office visits has been suboptimal.  

Baseline creatinine had been 1.2.





Recommendations





-- cr slightly better, however, may need to accept mild azotemia in order to 

correct pulmonary edema


-- DC IV fluid, would continue on Demadex for now


-- Monitor serial PRP


-- Likely related to diabetic nephropathy.  UIEP negative for monoclonal band


-- Iron saturation <20% w/ low ferritin and negative FOBT.  Continue IV Venofer 

100 mg daily x 10 days

## 2018-02-13 VITALS — OXYGEN SATURATION: 95 % | HEART RATE: 63 BPM

## 2018-02-13 VITALS — HEART RATE: 66 BPM | OXYGEN SATURATION: 95 %

## 2018-02-13 VITALS
DIASTOLIC BLOOD PRESSURE: 67 MMHG | SYSTOLIC BLOOD PRESSURE: 144 MMHG | OXYGEN SATURATION: 100 % | TEMPERATURE: 98.6 F | HEART RATE: 61 BPM

## 2018-02-13 VITALS
TEMPERATURE: 98.24 F | SYSTOLIC BLOOD PRESSURE: 144 MMHG | HEART RATE: 64 BPM | DIASTOLIC BLOOD PRESSURE: 47 MMHG | OXYGEN SATURATION: 99 %

## 2018-02-13 VITALS
SYSTOLIC BLOOD PRESSURE: 169 MMHG | OXYGEN SATURATION: 95 % | TEMPERATURE: 97.88 F | HEART RATE: 60 BPM | DIASTOLIC BLOOD PRESSURE: 70 MMHG

## 2018-02-13 VITALS
HEART RATE: 67 BPM | SYSTOLIC BLOOD PRESSURE: 122 MMHG | TEMPERATURE: 98.78 F | DIASTOLIC BLOOD PRESSURE: 57 MMHG | OXYGEN SATURATION: 99 %

## 2018-02-13 VITALS — HEART RATE: 66 BPM | OXYGEN SATURATION: 92 %

## 2018-02-13 VITALS
OXYGEN SATURATION: 97 % | SYSTOLIC BLOOD PRESSURE: 140 MMHG | TEMPERATURE: 98.06 F | DIASTOLIC BLOOD PRESSURE: 90 MMHG | HEART RATE: 61 BPM

## 2018-02-13 VITALS
TEMPERATURE: 97.88 F | DIASTOLIC BLOOD PRESSURE: 73 MMHG | HEART RATE: 62 BPM | SYSTOLIC BLOOD PRESSURE: 154 MMHG | OXYGEN SATURATION: 91 %

## 2018-02-13 VITALS — OXYGEN SATURATION: 93 % | HEART RATE: 65 BPM

## 2018-02-13 LAB
BUN SERPL-MCNC: 91 MG/DL (ref 7–18)
CALCIUM SERPL-MCNC: 8 MG/DL (ref 8.5–10.1)
CO2 SERPL-SCNC: 31 MMOL/L (ref 21–32)
CREAT SERPL-MCNC: 2.13 MG/DL (ref 0.6–1.4)
EOSINOPHIL NFR BLD AUTO: 208 K/UL (ref 130–400)
GLUCOSE SERPL-MCNC: 100 MG/DL (ref 70–99)
HCT VFR BLD CALC: 26.3 % (ref 42–52)
HGB BLD-MCNC: 8.1 G/DL (ref 14–18)
INR PPP: 1.7 (ref 0.9–1.1)
MCH RBC QN AUTO: 29.9 PG (ref 25–34)
MCHC RBC AUTO-ENTMCNC: 30.8 G/DL (ref 32–36)
MCV RBC AUTO: 97 FL (ref 80–100)
PMV BLD AUTO: 8.9 FL (ref 7.4–10.4)
POTASSIUM SERPL-SCNC: 4.7 MMOL/L (ref 3.5–5.1)
RED CELL DISTRIBUTION WIDTH CV: 17.4 % (ref 11.5–14.5)
RED CELL DISTRIBUTION WIDTH SD: 59.4 FL (ref 36.4–46.3)
SODIUM SERPL-SCNC: 141 MMOL/L (ref 136–145)
WBC # BLD AUTO: 7.27 K/UL (ref 4.8–10.8)

## 2018-02-13 RX ADMIN — INSULIN GLARGINE SCH UNITS: 100 INJECTION, SOLUTION SUBCUTANEOUS at 21:00

## 2018-02-13 RX ADMIN — CLOPIDOGREL BISULFATE SCH MG: 75 TABLET, FILM COATED ORAL at 09:51

## 2018-02-13 RX ADMIN — ATORVASTATIN CALCIUM SCH MG: 40 TABLET, FILM COATED ORAL at 09:50

## 2018-02-13 RX ADMIN — ISOSORBIDE MONONITRATE SCH MG: 30 TABLET, EXTENDED RELEASE ORAL at 09:49

## 2018-02-13 RX ADMIN — MIRTAZAPINE SCH MG: 15 TABLET, FILM COATED ORAL at 19:37

## 2018-02-13 RX ADMIN — Medication SCH MCG: at 09:52

## 2018-02-13 RX ADMIN — IPRATROPIUM BROMIDE AND ALBUTEROL SULFATE SCH ML: .5; 3 SOLUTION RESPIRATORY (INHALATION) at 03:30

## 2018-02-13 RX ADMIN — TAMSULOSIN HYDROCHLORIDE SCH MG: 0.4 CAPSULE ORAL at 19:38

## 2018-02-13 RX ADMIN — TORSEMIDE SCH MG: 20 TABLET ORAL at 19:36

## 2018-02-13 RX ADMIN — IPRATROPIUM BROMIDE AND ALBUTEROL SULFATE SCH ML: .5; 3 SOLUTION RESPIRATORY (INHALATION) at 19:11

## 2018-02-13 RX ADMIN — GABAPENTIN SCH MG: 100 CAPSULE ORAL at 19:37

## 2018-02-13 RX ADMIN — INSULIN ASPART SCH UNITS: 100 INJECTION, SOLUTION INTRAVENOUS; SUBCUTANEOUS at 12:26

## 2018-02-13 RX ADMIN — DIVALPROEX SODIUM SCH MG: 500 TABLET, DELAYED RELEASE ORAL at 19:37

## 2018-02-13 RX ADMIN — DOCUSATE SODIUM SCH MG: 100 CAPSULE, LIQUID FILLED ORAL at 19:37

## 2018-02-13 RX ADMIN — IPRATROPIUM BROMIDE AND ALBUTEROL SULFATE SCH ML: .5; 3 SOLUTION RESPIRATORY (INHALATION) at 23:01

## 2018-02-13 RX ADMIN — Medication SCH GM: at 09:50

## 2018-02-13 RX ADMIN — TIOTROPIUM BROMIDE SCH PUFF: 18 CAPSULE ORAL; RESPIRATORY (INHALATION) at 12:25

## 2018-02-13 RX ADMIN — IPRATROPIUM BROMIDE AND ALBUTEROL SULFATE SCH ML: .5; 3 SOLUTION RESPIRATORY (INHALATION) at 15:10

## 2018-02-13 RX ADMIN — ESCITALOPRAM OXALATE SCH MG: 20 TABLET, FILM COATED ORAL at 09:50

## 2018-02-13 RX ADMIN — DOCUSATE SODIUM SCH MG: 100 CAPSULE, LIQUID FILLED ORAL at 09:49

## 2018-02-13 RX ADMIN — INSULIN ASPART SCH UNITS: 100 INJECTION, SOLUTION INTRAVENOUS; SUBCUTANEOUS at 10:53

## 2018-02-13 RX ADMIN — IPRATROPIUM BROMIDE AND ALBUTEROL SULFATE SCH ML: .5; 3 SOLUTION RESPIRATORY (INHALATION) at 11:40

## 2018-02-13 RX ADMIN — LABETALOL HCL SCH MG: 200 TABLET, FILM COATED ORAL at 19:38

## 2018-02-13 RX ADMIN — LABETALOL HCL SCH MG: 200 TABLET, FILM COATED ORAL at 09:50

## 2018-02-13 RX ADMIN — PANTOPRAZOLE SCH MG: 40 TABLET, DELAYED RELEASE ORAL at 09:52

## 2018-02-13 RX ADMIN — TORSEMIDE SCH MG: 20 TABLET ORAL at 09:49

## 2018-02-13 RX ADMIN — IRON SUCROSE SCH MLS/HR: 20 INJECTION, SOLUTION INTRAVENOUS at 09:46

## 2018-02-13 RX ADMIN — IPRATROPIUM BROMIDE AND ALBUTEROL SULFATE SCH ML: .5; 3 SOLUTION RESPIRATORY (INHALATION) at 07:03

## 2018-02-13 RX ADMIN — FERROUS SULFATE TAB EC 325 MG (65 MG FE EQUIVALENT) SCH MG: 325 (65 FE) TABLET DELAYED RESPONSE at 09:46

## 2018-02-13 RX ADMIN — INSULIN ASPART SCH UNITS: 100 INJECTION, SOLUTION INTRAVENOUS; SUBCUTANEOUS at 20:18

## 2018-02-13 RX ADMIN — INSULIN ASPART SCH UNITS: 100 INJECTION, SOLUTION INTRAVENOUS; SUBCUTANEOUS at 17:46

## 2018-02-13 RX ADMIN — AMLODIPINE BESYLATE SCH MG: 5 TABLET ORAL at 09:51

## 2018-02-13 RX ADMIN — FERROUS SULFATE TAB EC 325 MG (65 MG FE EQUIVALENT) SCH MG: 325 (65 FE) TABLET DELAYED RESPONSE at 17:46

## 2018-02-13 NOTE — NEPHROLOGY PROGRESS NOTE
Nephrology Progress Note


Date of Service


Feb 13, 2018.





Chief Complaint


f/u for FITZ with CKD





Subjective


Paramjit was seen and examined in his room this morning.  He has been somnolent 

and difficult to wake barely open eyes and close again.  Did not communicate in 

any meaningful way or answer questions.  Did receive 1 mg of Ativan yesterday 

afternoon. Has been having decent urine output net negative more than 1 L. 

renal function remained stable, creatinine 2.1 although BUN remained elevated 

around 90s. electrolyte acceptable.  Had MRI for further evaluation for change 

in mental status which was unremarkable.





Review of Systems


A complete review of systems was performed.  Pertinent positives are noted 

above. All other systems are negative.





Vital Signs





Last 8 Hrs








  Date Time  Temp Pulse Resp B/P (MAP) Pulse Ox O2 Delivery O2 Flow Rate FiO2


 


2/13/18 08:14 37.0 61 22 144/67 (92) 100   


 


2/13/18 07:21  66 16  92 Nasal Cannula 6.0 


 


2/13/18 04:00      Nasal Cannula 6.0 


 


2/13/18 03:30 36.6 62 14 154/73 (100) 91 Nasal Cannula 6.0 











Last Recorded Weight


Weight (Kilograms):  101.200





Physical Exam


GENERAL:middle aged male, somnolent, not in any distress.


NECK: Supple, no JVD.


RESPIRATORY: Normal breathing efforts, no accessory muscle use, crackles b/l at 

bases


CARDIOVASCULAR: S1, S2 normal, rate rhythm regular.


EXTREMITY: No lower extremity edema


NEURO: speech fluent.


PSYCHIATRY: Normal mood and judgment





Family History





Cancer (esophageal)


Diabetes mellitus


Heart disease


Hypertension


Negative for CKD/ESRD





Social History


Smoking Status:  Former smoker


Drug Use:  none


Marital Status:  


Housing Status:  lives with family, other


Occupation:  disabled


.  Disabled.  Former smoker





Laboratory Results


Past 24 Hours


2/13/18 06:16








2/13/18 06:16

















Test


  2/12/18


11:34 2/12/18


16:35 2/12/18


20:28 2/13/18


06:16


 


Bedside Glucose


  100 mg/dl


(70-99) 104 mg/dl


(70-99) 105 mg/dl


(70-99) 


 


 


Red Blood Count


  


  


  


  2.71 M/uL


(4.7-6.1)


 


Mean Corpuscular Volume


  


  


  


  97.0 fL


()


 


Mean Corpuscular Hemoglobin


  


  


  


  29.9 pg


(25-34)


 


Mean Corpuscular Hemoglobin


Concent 


  


  


  30.8 g/dl


(32-36)


 


RDW Standard Deviation


  


  


  


  59.4 fL


(36.4-46.3)


 


RDW Coefficient of Variation


  


  


  


  17.4 %


(11.5-14.5)


 


Mean Platelet Volume


  


  


  


  8.9 fL


(7.4-10.4)


 


Prothrombin Time


  


  


  


  17.2 SECONDS


(9.0-12.0)


 


Prothromb Time International


Ratio 


  


  


  1.7 (0.9-1.1) 


 


 


Anion Gap


  


  


  


  6.0 mmol/L


(3-11)


 


Est Creatinine Clear Calc


Drug Dose 


  


  


  44.7 ml/min 


 


 


Estimated GFR (


American) 


  


  


  37.8 


 


 


Estimated GFR (Non-


American 


  


  


  32.6 


 


 


BUN/Creatinine Ratio    42.8 (10-20) 


 


Calcium Level


  


  


  


  8.0 mg/dl


(8.5-10.1)


 


Test


  2/13/18


07:15 


  


  


 


 


Bedside Glucose


  118 mg/dl


(70-99) 


  


  


 











Allergies


Coded Allergies:  


     No Known Allergies (Verified , 2/2/18)





Medications





Current Inpatient Medications








 Medications


  (Trade)  Dose


 Ordered  Sig/Dc


 Route  Start Time


 Stop Time Status Last Admin


Dose Admin


 


 Acetaminophen


  (Tylenol Tab)  650 mg  Q4H  PRN


 PO  2/2/18 19:00


 3/4/18 18:59  2/12/18 08:30


650 MG


 


 Al Hydrox/Mg


 Hydrox/Simethicone


  (Maalox Max Susp)  15 ml  Q4H  PRN


 PO  2/2/18 19:00


 3/4/18 18:59  2/10/18 16:14


15 ML


 


 Magnesium


 Hydroxide


  (Milk Of


 Magnesia Susp)  30 ml  Q12H  PRN


 PO  2/2/18 19:00


 3/4/18 18:59   


 


 


 Ondansetron HCl


  (Zofran Inj)  4 mg  Q6H  PRN


 IV  2/2/18 19:00


 3/4/18 18:59   


 


 


 Nitroglycerin


  (Nitrostat Tab)  0.4 mg  UD  PRN


 SL  2/2/18 19:00


 3/4/18 18:59   


 


 


 Morphine Sulfate


  (MoRPHine


 SULFATE INJ)  2 mg  Q30M  PRN


 IV  2/2/18 19:00


 2/16/18 18:59   


 


 


 Atorvastatin


 Calcium


  (Lipitor Tab)  80 mg  DAILY


 PO  2/3/18 09:00


 3/5/18 08:59  2/12/18 08:25


80 MG


 


 Clopidogrel


 Bisulfate


  (plAVix TAB)  75 mg  QAM


 PO  2/3/18 09:00


 3/5/18 08:59  2/12/18 08:24


75 MG


 


 Divalproex Sodium


  (Depakote Delay


 Rel Tab)  2,000 mg  HS


 PO  2/2/18 21:00


 3/4/18 20:59  2/12/18 21:31


2,000 MG


 


 Escitalopram


 Oxalate


  (Lexapro Tab)  20 mg  QAM


 PO  2/3/18 09:00


 3/5/18 08:59  2/12/18 08:24


20 MG


 


 Fluticasone


 Propionate


  (Flonase Nasal


 Spray)  2 sprays  DAILY  PRN


 VERONICA  2/2/18 19:00


 3/4/18 18:59   


 


 


 Insulin Glargine


  (Lantus Solostar


 Pen)  10 units  HS


 SC  2/2/18 21:00


 3/4/18 20:59  2/12/18 21:34


10 UNITS


 


 Isosorbide


 Mononitrate


  (Imdur Ext Rel


 Tab)  30 mg  QAM


 PO  2/3/18 09:00


 3/5/18 08:59  2/12/18 08:25


30 MG


 


 Tamsulosin HCl


  (Flomax Cap)  0.4 mg  HS


 PO  2/2/18 21:00


 3/4/18 20:59  2/12/18 21:31


0.4 MG


 


 Tiotropium Bromide


  (Spiriva


 Handihaler


 Inhaler)  1 puff  DAILY


 INH  2/3/18 09:00


 3/5/18 08:59  2/12/18 08:26


1 PUFF


 


 Warfarin Sodium


  (Coumadin Tab)  7.5 mg  SuMoWeThFr@1600


 PO  2/2/18 20:00


 3/4/18 19:59 Future Hold 2/8/18 16:35


7.5 MG


 


 Albuterol


  (Ventolin Hfa


 Inhaler)  2 puffs  Q4H  PRN


 INH  2/2/18 20:45


 3/4/18 20:44  2/4/18 21:55


2 PUFFS


 


 Cyanocobalamin


  (Vitamin B-12


 Tab)  1,000 mcg  DAILY


 PO  2/3/18 09:00


 3/5/18 08:59  2/12/18 08:24


1,000 MCG


 


 Ferrous Sulfate


  (Feosol Tab)  325 mg  BIDM


 PO  2/3/18 07:30


 3/5/18 07:59  2/12/18 16:46


325 MG


 


 Oxycodone HCl


  (Roxicodone


 Immediate Rel Tab)  10 mg  Q12  PRN


 PO  2/2/18 19:00


 3/4/18 18:59  2/12/18 21:32


10 MG


 


 Spironolactone


  (Aldactone Tab)  50 mg  BID


 PO  2/2/18 21:00


 3/4/18 20:59 Future Hold  


 


 


 Warfarin Sodium


  (Coumadin Tab)  10 mg  TuSa@1600


 PO  2/3/18 16:00


 3/5/18 15:59 Future Hold 2/3/18 16:58


10 MG


 


 Insulin Aspart


  (novoLOG ASPART)  **SLIDING


 SCALE**


 **G...  ACHS


 SC  2/2/18 21:00


 3/4/18 20:59  2/12/18 16:52


5 UNITS


 


 Miscellaneous


  (Iv Fluids


 Completed)  1 ea  PRN  PRN


 N/A  2/2/18 19:30


 2/2/19 19:29   


 


 


 Furosemide 20 mg/


 Syringe  2 ml @ 4


 mls/min  BID


 IV  2/3/18 09:00


 3/4/18 20:59 Future Hold 2/3/18 20:51


4 MLS/MIN


 


 Pantoprazole


 Sodium


  (Protonix Tab)  40 mg  QAM


 PO  2/3/18 09:00


 3/5/18 08:59  2/12/18 08:24


40 MG


 


 Calcium Carbonate


  (Tums Chew Tab)  500 mg  Q2H  PRN


 PO  2/3/18 10:00


 3/5/18 09:59  2/10/18 20:59


500 MG


 


 Albuterol/


 Ipratropium


  (Duoneb)  3 ml  Q4R


 INH  2/4/18 22:42


 3/6/18 22:41  2/13/18 07:03


3 ML


 


 Furosemide


  (Lasix Tab)  20 mg  QAM


 PO  2/5/18 09:00


 3/7/18 08:59 Future Hold 2/6/18 08:13


20 MG


 


 Docusate Sodium


  (coLACE CAP)  100 mg  BID


 PO  2/7/18 11:30


 3/9/18 11:29  2/12/18 21:31


100 MG


 


 Polyethylene


  (Miralax Powder


 Packet)  17 gm  DAILY


 PO  2/7/18 11:30


 3/4/18 11:29  2/12/18 08:25


17 GM


 


 Torsemide


  (Demadex Tab)  20 mg  BID


 PO  2/8/18 21:00


 3/10/18 20:59  2/12/18 21:31


20 MG


 


 Labetalol HCl


  (Normodyne Tab)  200 mg  BID


 PO  2/8/18 21:00


 3/10/18 20:59  2/12/18 21:31


200 MG


 


 Iron Sucrose 100


 mg/Sodium Chloride  105 ml @ 


 420 mls/hr  DAILY


 IV  2/8/18 10:45


 2/17/18 09:14  2/12/18 08:29


420 MLS/HR


 


 Amlodipine


 Besylate


  (Norvasc Tab)  5 mg  QAM


 PO  2/11/18 09:00


 3/5/18 08:59  2/12/18 08:24


5 MG


 


 Gabapentin


  (Neurontin Cap)  200 mg  HS


 PO  2/11/18 21:00


 3/4/18 20:59  2/12/18 21:31


200 MG


 


 Mirtazapine


  (Remeron Tab)  30 mg  HS


 PO  2/11/18 21:00


 3/4/18 20:59  2/12/18 21:32


30 MG











Impression





(1) Acute kidney injury


(2) Proteinuria


(3) Anemia


(4) Acute diastolic (congestive) heart failure


(5) Hypertension


(6) Former smoker


Mr. Dixon has been admitted due to recurrent CHF.  12/17 echocardiogram 

revealed LVEF 55% but dilated RV and RA.  Patient has a combination of 

pulmonary HTN and diastolic dysfunction.  12/17 renal US revealed 14 cm 

kidneys.  Doppler was negative for KIANA.  Urine sediment is difficult to 

interpret due to chronic indwelling gillespie catheter.  UPCR 1.5 g.  Compliance 

with outpatient medical regimen and office visits has been suboptimal.  

Baseline creatinine had been 1.2.





Recommendations





-- renal function stable, creatinine 2.1, decent urine output with net negative


-- continue on Demadex 20 mg b.i.d., goal for net-0.5 to 1 L per 24 hours for 

now


-- Monitor serial PRP


-- Continue IV Venofer 100 mg daily x 10 days 


-- unclear etiology for altered mental status, MRI head was unremarkable, 

possibly some contribution from Ativan yesterday afternoon.








Will follow

## 2018-02-13 NOTE — CARDIOLOGY PROGRESS NOTE
DATE: 02/13/2018

 

SUBJECTIVE:  Mr. Dixon is resting comfortably in bed without complaints of

chest pain or dyspnea.  Somewhat somnolent, but easily arousable and

appropriate.

 

OBJECTIVE:

VITAL SIGNS:  Blood pressure is 144/67 with a regular pulse of 60. 

Respiratory rate is 20 and the patient is afebrile at 37.0 degrees Celsius. 

Saturations 100% on 6 liters nasal cannula.

NECK:  Supple with mildly delayed carotid upstrokes.  No bruits or

transmitted murmurs.

CARDIOVASCULAR:  Reveals a regular rhythm with a 2/6 crescendo decrescendo

systolic murmur heard loudest at the base.  S2 is audible at the apex.  No

diastolic murmurs.

LUNGS:  Clear without rales, rhonchi, or wheezes.

ABDOMEN:  Soft without bruits.

EXTREMITIES:  Reveal intact radial artery pulses bilaterally.  1+ pedal and

pretibial edema is noted.

 

LABORATORY DATA:  CBC notes a hemoglobin of 8.1, hematocrit 26.3, white count

7.2, platelet count 208,000.  Electrolytes note a sodium of 141, potassium

4.7, chloride 104, bicarbonate 31, BUN 91, creatinine 2.13, glucose 100.  INR

is 1.7.  Telemetry monitor is benign.

 

IMPRESSION AND PLAN:

1.  Diastolic congestive heart failure -- the patient seems compensated at

this time.  Continues on Demadex 20 mg b.i.d.

2.  Coronary artery disease -- quiescent on medical regimen.

3.  Hypertension -- controlled.

4.  Mild to moderate aortic stenosis.

5.  Mild mitral stenosis.

6.  Anemia.

## 2018-02-14 VITALS
OXYGEN SATURATION: 96 % | DIASTOLIC BLOOD PRESSURE: 76 MMHG | TEMPERATURE: 97.88 F | SYSTOLIC BLOOD PRESSURE: 177 MMHG | HEART RATE: 69 BPM

## 2018-02-14 VITALS
SYSTOLIC BLOOD PRESSURE: 173 MMHG | DIASTOLIC BLOOD PRESSURE: 67 MMHG | OXYGEN SATURATION: 90 % | HEART RATE: 71 BPM | TEMPERATURE: 98.06 F

## 2018-02-14 VITALS
OXYGEN SATURATION: 95 % | DIASTOLIC BLOOD PRESSURE: 62 MMHG | SYSTOLIC BLOOD PRESSURE: 152 MMHG | HEART RATE: 73 BPM | TEMPERATURE: 97.88 F

## 2018-02-14 VITALS
DIASTOLIC BLOOD PRESSURE: 75 MMHG | HEART RATE: 65 BPM | OXYGEN SATURATION: 94 % | SYSTOLIC BLOOD PRESSURE: 141 MMHG | TEMPERATURE: 98.42 F

## 2018-02-14 VITALS — HEART RATE: 63 BPM | OXYGEN SATURATION: 95 %

## 2018-02-14 VITALS
TEMPERATURE: 98.6 F | SYSTOLIC BLOOD PRESSURE: 149 MMHG | HEART RATE: 64 BPM | OXYGEN SATURATION: 98 % | DIASTOLIC BLOOD PRESSURE: 65 MMHG

## 2018-02-14 VITALS — HEART RATE: 67 BPM | OXYGEN SATURATION: 94 %

## 2018-02-14 VITALS — HEART RATE: 66 BPM | OXYGEN SATURATION: 99 %

## 2018-02-14 VITALS
TEMPERATURE: 97.88 F | DIASTOLIC BLOOD PRESSURE: 55 MMHG | OXYGEN SATURATION: 93 % | HEART RATE: 65 BPM | SYSTOLIC BLOOD PRESSURE: 129 MMHG

## 2018-02-14 VITALS — HEART RATE: 64 BPM | OXYGEN SATURATION: 91 %

## 2018-02-14 VITALS — HEART RATE: 75 BPM | OXYGEN SATURATION: 91 %

## 2018-02-14 VITALS — OXYGEN SATURATION: 96 % | HEART RATE: 67 BPM

## 2018-02-14 VITALS — HEART RATE: 66 BPM | OXYGEN SATURATION: 92 %

## 2018-02-14 LAB
BUN SERPL-MCNC: 84 MG/DL (ref 7–18)
CALCIUM SERPL-MCNC: 8.1 MG/DL (ref 8.5–10.1)
CO2 SERPL-SCNC: 31 MMOL/L (ref 21–32)
CREAT SERPL-MCNC: 1.7 MG/DL (ref 0.6–1.4)
GLUCOSE SERPL-MCNC: 127 MG/DL (ref 70–99)
INR PPP: 1.5 (ref 0.9–1.1)
POTASSIUM SERPL-SCNC: 4 MMOL/L (ref 3.5–5.1)
SODIUM SERPL-SCNC: 144 MMOL/L (ref 136–145)

## 2018-02-14 RX ADMIN — IPRATROPIUM BROMIDE AND ALBUTEROL SULFATE SCH ML: .5; 3 SOLUTION RESPIRATORY (INHALATION) at 19:48

## 2018-02-14 RX ADMIN — DOCUSATE SODIUM SCH MG: 100 CAPSULE, LIQUID FILLED ORAL at 09:41

## 2018-02-14 RX ADMIN — LABETALOL HCL SCH MG: 200 TABLET, FILM COATED ORAL at 09:44

## 2018-02-14 RX ADMIN — Medication SCH GM: at 09:43

## 2018-02-14 RX ADMIN — IPRATROPIUM BROMIDE AND ALBUTEROL SULFATE SCH ML: .5; 3 SOLUTION RESPIRATORY (INHALATION) at 14:47

## 2018-02-14 RX ADMIN — CLOPIDOGREL BISULFATE SCH MG: 75 TABLET, FILM COATED ORAL at 09:00

## 2018-02-14 RX ADMIN — DOCUSATE SODIUM SCH MG: 100 CAPSULE, LIQUID FILLED ORAL at 21:00

## 2018-02-14 RX ADMIN — INSULIN GLARGINE SCH UNITS: 100 INJECTION, SOLUTION SUBCUTANEOUS at 21:45

## 2018-02-14 RX ADMIN — GABAPENTIN SCH MG: 100 CAPSULE ORAL at 21:39

## 2018-02-14 RX ADMIN — IPRATROPIUM BROMIDE AND ALBUTEROL SULFATE SCH ML: .5; 3 SOLUTION RESPIRATORY (INHALATION) at 23:15

## 2018-02-14 RX ADMIN — AMLODIPINE BESYLATE SCH MG: 5 TABLET ORAL at 09:44

## 2018-02-14 RX ADMIN — PANTOPRAZOLE SCH MG: 40 TABLET, DELAYED RELEASE ORAL at 09:00

## 2018-02-14 RX ADMIN — TIOTROPIUM BROMIDE SCH PUFF: 18 CAPSULE ORAL; RESPIRATORY (INHALATION) at 09:39

## 2018-02-14 RX ADMIN — DIVALPROEX SODIUM SCH MG: 500 TABLET, DELAYED RELEASE ORAL at 21:40

## 2018-02-14 RX ADMIN — TORSEMIDE SCH MG: 20 TABLET ORAL at 21:40

## 2018-02-14 RX ADMIN — IPRATROPIUM BROMIDE AND ALBUTEROL SULFATE SCH ML: .5; 3 SOLUTION RESPIRATORY (INHALATION) at 07:17

## 2018-02-14 RX ADMIN — Medication SCH MCG: at 09:46

## 2018-02-14 RX ADMIN — INSULIN ASPART SCH UNITS: 100 INJECTION, SOLUTION INTRAVENOUS; SUBCUTANEOUS at 21:45

## 2018-02-14 RX ADMIN — LABETALOL HCL SCH MG: 200 TABLET, FILM COATED ORAL at 21:39

## 2018-02-14 RX ADMIN — ACETAMINOPHEN PRN MG: 325 TABLET ORAL at 14:31

## 2018-02-14 RX ADMIN — ISOSORBIDE MONONITRATE SCH MG: 30 TABLET, EXTENDED RELEASE ORAL at 09:42

## 2018-02-14 RX ADMIN — MIRTAZAPINE SCH MG: 15 TABLET, FILM COATED ORAL at 21:40

## 2018-02-14 RX ADMIN — FERROUS SULFATE TAB EC 325 MG (65 MG FE EQUIVALENT) SCH MG: 325 (65 FE) TABLET DELAYED RESPONSE at 19:48

## 2018-02-14 RX ADMIN — FERROUS SULFATE TAB EC 325 MG (65 MG FE EQUIVALENT) SCH MG: 325 (65 FE) TABLET DELAYED RESPONSE at 07:30

## 2018-02-14 RX ADMIN — TORSEMIDE SCH MG: 20 TABLET ORAL at 09:00

## 2018-02-14 RX ADMIN — ESCITALOPRAM OXALATE SCH MG: 20 TABLET, FILM COATED ORAL at 09:42

## 2018-02-14 RX ADMIN — ATORVASTATIN CALCIUM SCH MG: 40 TABLET, FILM COATED ORAL at 09:43

## 2018-02-14 RX ADMIN — TAMSULOSIN HYDROCHLORIDE SCH MG: 0.4 CAPSULE ORAL at 21:40

## 2018-02-14 RX ADMIN — INSULIN ASPART SCH UNITS: 100 INJECTION, SOLUTION INTRAVENOUS; SUBCUTANEOUS at 09:33

## 2018-02-14 RX ADMIN — OXYCODONE HYDROCHLORIDE PRN MG: 5 TABLET ORAL at 21:51

## 2018-02-14 RX ADMIN — INSULIN ASPART SCH UNITS: 100 INJECTION, SOLUTION INTRAVENOUS; SUBCUTANEOUS at 15:55

## 2018-02-14 RX ADMIN — IRON SUCROSE SCH MLS/HR: 20 INJECTION, SOLUTION INTRAVENOUS at 09:40

## 2018-02-14 RX ADMIN — IPRATROPIUM BROMIDE AND ALBUTEROL SULFATE SCH ML: .5; 3 SOLUTION RESPIRATORY (INHALATION) at 11:09

## 2018-02-14 RX ADMIN — IPRATROPIUM BROMIDE AND ALBUTEROL SULFATE SCH ML: .5; 3 SOLUTION RESPIRATORY (INHALATION) at 03:14

## 2018-02-14 RX ADMIN — INSULIN ASPART SCH UNITS: 100 INJECTION, SOLUTION INTRAVENOUS; SUBCUTANEOUS at 12:12

## 2018-02-14 RX ADMIN — CALCIUM CARBONATE PRN MG: 500 TABLET ORAL at 22:40

## 2018-02-14 NOTE — NEPHROLOGY PROGRESS NOTE
Nephrology Progress Note


Date of Service


Feb 14, 2018.





Chief Complaint


f/u for FITZ with CKD





Subjective


Paramjit was seen and examined in his room this morning.  Overall looking better 

today alert and oriented, currently wearing BiPAP.  Renal function continues to 

improve creatinine 1.7.  Decent urine output, net negative more than 1 liter, 

electrolyte acceptable.  Blood pressure stable.





Review of Systems


A complete review of systems was performed.  Pertinent positives are noted 

above. All other systems are negative.





Vital Signs





Last 8 Hrs








  Date Time  Temp Pulse Resp B/P (MAP) Pulse Ox O2 Delivery O2 Flow Rate FiO2


 


2/14/18 08:33 36.6 73 14 152/62 (92) 95   


 


2/14/18 07:17  75 16  91 Nasal Cannula 6.0 


 


2/14/18 04:00      Nasal Cannula 6.0 


 


2/14/18 03:14  67 14  94 Nasal Cannula 6.0 


 


2/14/18 03:10 36.9 65 18 141/75 (97) 94 Nasal Cannula 6.0 











Last Recorded Weight


Weight (Kilograms):  99.200





Physical Exam


GENERAL:middle aged male, somnolent, not in any distress.


NECK: Supple, no JVD.


RESPIRATORY: Normal breathing efforts, no accessory muscle use, crackles b/l at 

bases


CARDIOVASCULAR: S1, S2 normal, rate rhythm regular.


EXTREMITY: No lower extremity edema


NEURO: speech fluent.


PSYCHIATRY: Normal mood and judgment





Family History





Cancer (esophageal)


Diabetes mellitus


Heart disease


Hypertension


Negative for CKD/ESRD





Social History


Smoking Status:  Former smoker


Drug Use:  none


Marital Status:  


Housing Status:  lives with family, other


Occupation:  disabled


.  Disabled.  Former smoker





Laboratory Results


Past 24 Hours


2/14/18 05:30

















Test


  2/13/18


11:27 2/13/18


15:58 2/13/18


16:23 2/13/18


20:02


 


Bedside Glucose


  349 mg/dl


(70-99) 


  136 mg/dl


(70-99) 108 mg/dl


(70-99)


 


Venous Blood pH


  


  7.31


(7.36-7.41) 


  


 


 


Venous Blood Partial Pressure


CO2 


  62 mmHg


(38.0-50.0) 


  


 


 


Venous Blood Partial Pressure


O2 


  71 mmHg 


  


  


 


 


Venous Blood HCO3  31 mmol/L   


 


Venous Blood Oxygen Saturation  90.1 %   


 


Venous Blood Base Excess  3.7 mEq/L   


 


Test


  2/14/18


05:30 2/14/18


07:11 


  


 


 


Prothrombin Time


  15.4 SECONDS


(9.0-12.0) 


  


  


 


 


Prothromb Time International


Ratio 1.5 (0.9-1.1) 


  


  


  


 


 


Anion Gap


  7.0 mmol/L


(3-11) 


  


  


 


 


Est Creatinine Clear Calc


Drug Dose 55.5 ml/min 


  


  


  


 


 


Estimated GFR (


American) 49.7 


  


  


  


 


 


Estimated GFR (Non-


American 42.9 


  


  


  


 


 


BUN/Creatinine Ratio 49.5 (10-20)    


 


Calcium Level


  8.1 mg/dl


(8.5-10.1) 


  


  


 


 


Bedside Glucose


  


  132 mg/dl


(70-99) 


  


 











Allergies


Coded Allergies:  


     No Known Allergies (Verified , 2/2/18)





Medications





Current Inpatient Medications








 Medications


  (Trade)  Dose


 Ordered  Sig/Dc


 Route  Start Time


 Stop Time Status Last Admin


Dose Admin


 


 Acetaminophen


  (Tylenol Tab)  650 mg  Q4H  PRN


 PO  2/2/18 19:00


 3/4/18 18:59  2/12/18 08:30


650 MG


 


 Al Hydrox/Mg


 Hydrox/Simethicone


  (Maalox Max Susp)  15 ml  Q4H  PRN


 PO  2/2/18 19:00


 3/4/18 18:59  2/10/18 16:14


15 ML


 


 Magnesium


 Hydroxide


  (Milk Of


 Magnesia Susp)  30 ml  Q12H  PRN


 PO  2/2/18 19:00


 3/4/18 18:59   


 


 


 Ondansetron HCl


  (Zofran Inj)  4 mg  Q6H  PRN


 IV  2/2/18 19:00


 3/4/18 18:59   


 


 


 Nitroglycerin


  (Nitrostat Tab)  0.4 mg  UD  PRN


 SL  2/2/18 19:00


 3/4/18 18:59   


 


 


 Morphine Sulfate


  (MoRPHine


 SULFATE INJ)  2 mg  Q30M  PRN


 IV  2/2/18 19:00


 2/16/18 18:59   


 


 


 Atorvastatin


 Calcium


  (Lipitor Tab)  80 mg  DAILY


 PO  2/3/18 09:00


 3/5/18 08:59  2/13/18 09:50


80 MG


 


 Clopidogrel


 Bisulfate


  (plAVix TAB)  75 mg  QAM


 PO  2/3/18 09:00


 3/5/18 08:59  2/13/18 09:51


75 MG


 


 Divalproex Sodium


  (Depakote Delay


 Rel Tab)  2,000 mg  HS


 PO  2/2/18 21:00


 3/4/18 20:59  2/13/18 19:37


2,000 MG


 


 Escitalopram


 Oxalate


  (Lexapro Tab)  20 mg  QAM


 PO  2/3/18 09:00


 3/5/18 08:59  2/13/18 09:50


20 MG


 


 Fluticasone


 Propionate


  (Flonase Nasal


 Spray)  2 sprays  DAILY  PRN


 VERONICA  2/2/18 19:00


 3/4/18 18:59   


 


 


 Insulin Glargine


  (Lantus Solostar


 Pen)  10 units  HS


 SC  2/2/18 21:00


 3/4/18 20:59  2/12/18 21:34


10 UNITS


 


 Isosorbide


 Mononitrate


  (Imdur Ext Rel


 Tab)  30 mg  QAM


 PO  2/3/18 09:00


 3/5/18 08:59  2/13/18 09:49


30 MG


 


 Tamsulosin HCl


  (Flomax Cap)  0.4 mg  HS


 PO  2/2/18 21:00


 3/4/18 20:59  2/13/18 19:38


0.4 MG


 


 Tiotropium Bromide


  (Spiriva


 Handihaler


 Inhaler)  1 puff  DAILY


 INH  2/3/18 09:00


 3/5/18 08:59  2/13/18 12:25


1 PUFF


 


 Albuterol


  (Ventolin Hfa


 Inhaler)  2 puffs  Q4H  PRN


 INH  2/2/18 20:45


 3/4/18 20:44  2/4/18 21:55


2 PUFFS


 


 Cyanocobalamin


  (Vitamin B-12


 Tab)  1,000 mcg  DAILY


 PO  2/3/18 09:00


 3/5/18 08:59  2/13/18 09:52


1,000 MCG


 


 Ferrous Sulfate


  (Feosol Tab)  325 mg  BIDM


 PO  2/3/18 07:30


 3/5/18 07:59  2/13/18 17:46


325 MG


 


 Oxycodone HCl


  (Roxicodone


 Immediate Rel Tab)  10 mg  Q12  PRN


 PO  2/2/18 19:00


 3/4/18 18:59  2/12/18 21:32


10 MG


 


 Spironolactone


  (Aldactone Tab)  50 mg  BID


 PO  2/2/18 21:00


 3/4/18 20:59 Future Hold  


 


 


 Insulin Aspart


  (novoLOG ASPART)  **SLIDING


 SCALE**


 **G...  ACHS


 SC  2/2/18 21:00


 3/4/18 20:59  2/13/18 17:46


4 UNITS


 


 Miscellaneous


  (Iv Fluids


 Completed)  1 ea  PRN  PRN


 N/A  2/2/18 19:30


 2/2/19 19:29   


 


 


 Furosemide 20 mg/


 Syringe  2 ml @ 4


 mls/min  BID


 IV  2/3/18 09:00


 3/4/18 20:59 Future Hold 2/3/18 20:51


4 MLS/MIN


 


 Pantoprazole


 Sodium


  (Protonix Tab)  40 mg  QAM


 PO  2/3/18 09:00


 3/5/18 08:59  2/13/18 09:52


40 MG


 


 Calcium Carbonate


  (Tums Chew Tab)  500 mg  Q2H  PRN


 PO  2/3/18 10:00


 3/5/18 09:59  2/10/18 20:59


500 MG


 


 Albuterol/


 Ipratropium


  (Duoneb)  3 ml  Q4R


 INH  2/4/18 22:42


 3/6/18 22:41  2/14/18 07:17


3 ML


 


 Furosemide


  (Lasix Tab)  20 mg  QAM


 PO  2/5/18 09:00


 3/7/18 08:59 Future Hold 2/6/18 08:13


20 MG


 


 Docusate Sodium


  (coLACE CAP)  100 mg  BID


 PO  2/7/18 11:30


 3/9/18 11:29  2/13/18 19:37


100 MG


 


 Polyethylene


  (Miralax Powder


 Packet)  17 gm  DAILY


 PO  2/7/18 11:30


 3/4/18 11:29  2/13/18 09:50


17 GM


 


 Torsemide


  (Demadex Tab)  20 mg  BID


 PO  2/8/18 21:00


 3/10/18 20:59  2/13/18 19:36


20 MG


 


 Labetalol HCl


  (Normodyne Tab)  200 mg  BID


 PO  2/8/18 21:00


 3/10/18 20:59  2/13/18 19:38


200 MG


 


 Iron Sucrose 100


 mg/Sodium Chloride  105 ml @ 


 420 mls/hr  DAILY


 IV  2/8/18 10:45


 2/17/18 09:14  2/13/18 09:46


420 MLS/HR


 


 Amlodipine


 Besylate


  (Norvasc Tab)  5 mg  QAM


 PO  2/11/18 09:00


 3/5/18 08:59  2/13/18 09:51


5 MG


 


 Gabapentin


  (Neurontin Cap)  200 mg  HS


 PO  2/11/18 21:00


 3/4/18 20:59  2/13/18 19:37


200 MG


 


 Mirtazapine


  (Remeron Tab)  30 mg  HS


 PO  2/11/18 21:00


 3/4/18 20:59  2/13/18 19:37


30 MG


 


 Warfarin Sodium


  (Coumadin Tab)  7.5 mg  TODAY@1600


 PO  2/14/18 16:00


 2/14/18 16:01   


 











Impression





(1) Acute kidney injury


(2) Proteinuria


(3) Anemia


(4) Acute diastolic (congestive) heart failure


(5) Hypertension


(6) Former smoker


Mr. Dixon has been admitted due to recurrent CHF.  12/17 echocardiogram 

revealed LVEF 55% but dilated RV and RA.  Patient has a combination of 

pulmonary HTN and diastolic dysfunction.  12/17 renal US revealed 14 cm 

kidneys.  Doppler was negative for KIANA.  Urine sediment is difficult to 

interpret due to chronic indwelling gillespie catheter.  UPCR 1.5 g.  Compliance 

with outpatient medical regimen and office visits has been suboptimal.  

Baseline creatinine had been 1.2.





Recommendations





-- renal function continues to improve, creatinine 1.7, decent urine output 

with net negative


-- continue on Demadex 20 mg b.i.d., goal for net-0.5 to 1 L per 24 hours


-- Monitor serial PRP


-- check Mg, phos in am and every other day





Will follow

## 2018-02-14 NOTE — PROGRESS NOTE
Subjective


Date of Service:


Feb 13, 2018.


Subjective


Pt evaluation today including:  conversation w/ patient


Patient seen on Feb 13th.


61 yo male appears to be confused and lethargic today.


He does not provide significant history today.





Problem List


Medical Problems:


(1) Acute CHF


Status: Acute  





(2) Acute coronary syndrome


Status: Acute  





(3) Acute headache


Status: Acute  





(4) Acute on chronic congestive heart failure


Status: Acute  





(5) Altered mental status


Status: Acute  





(6) Anemia


Status: Acute  





(7) Anemia


Status: Acute  





(8) Anemia


Status: Acute  





(9) Anginal pain


Status: Acute  





(10) Appendicitis


Status: Acute  





(11) Cellulitis


Status: Acute  





(12) Chest pain


Status: Acute  





(13) CHF (congestive heart failure)


Status: Acute  





(14) CHF (congestive heart failure)


Status: Acute  





(15) CHF (congestive heart failure)


Status: Acute  





(16) Congestive heart failure


Status: Acute  





(17) Congestive heart failure


Status: Acute  





(18) COPD (chronic obstructive pulmonary disease)


Status: Acute  





(19) Diabetes mellitus with hyperglycemia


Status: Acute  





(20) Dyspnea


Status: Acute  





(21) Elevated troponin


Status: Acute  





(22) Failure of outpatient treatment


Status: Acute  





(23) Hypoxia


Status: Acute  





(24) Hypoxia


Status: Acute  





(25) Intra-abdominal abscess


Status: Acute  





(26) Lower abdominal pain of unknown etiology


Status: Acute  





(27) Neck pain


Status: Acute  





(28) Pneumonia


Status: Acute  





(29) Pneumonia


Status: Acute  





(30) Precordial chest pain


Status: Acute  





(31) Pulmonary edema


Status: Acute  





(32) Respiratory acidosis


Status: Acute  





(33) Respiratory distress


Status: Acute  





(34) SOB (shortness of breath)


Status: Acute  





(35) SOB (shortness of breath)


Status: Acute  





(36) Tachypnea


Status: Acute  











Review of Systems


All Other Systems:  Reviewed and Negative





Medications





Current Inpatient Medications








 Medications


  (Trade)  Dose


 Ordered  Sig/Dc


 Route  Start Time


 Stop Time Status Last Admin


Dose Admin


 


 Acetaminophen


  (Tylenol Tab)  650 mg  Q4H  PRN


 PO  2/2/18 19:00


 3/4/18 18:59  2/12/18 08:30


650 MG


 


 Al Hydrox/Mg


 Hydrox/Simethicone


  (Maalox Max Susp)  15 ml  Q4H  PRN


 PO  2/2/18 19:00


 3/4/18 18:59  2/10/18 16:14


15 ML


 


 Magnesium


 Hydroxide


  (Milk Of


 Magnesia Susp)  30 ml  Q12H  PRN


 PO  2/2/18 19:00


 3/4/18 18:59   


 


 


 Ondansetron HCl


  (Zofran Inj)  4 mg  Q6H  PRN


 IV  2/2/18 19:00


 3/4/18 18:59   


 


 


 Nitroglycerin


  (Nitrostat Tab)  0.4 mg  UD  PRN


 SL  2/2/18 19:00


 3/4/18 18:59   


 


 


 Morphine Sulfate


  (MoRPHine


 SULFATE INJ)  2 mg  Q30M  PRN


 IV  2/2/18 19:00


 2/16/18 18:59   


 


 


 Atorvastatin


 Calcium


  (Lipitor Tab)  80 mg  DAILY


 PO  2/3/18 09:00


 3/5/18 08:59  2/13/18 09:50


80 MG


 


 Clopidogrel


 Bisulfate


  (plAVix TAB)  75 mg  QAM


 PO  2/3/18 09:00


 3/5/18 08:59  2/13/18 09:51


75 MG


 


 Divalproex Sodium


  (Depakote Delay


 Rel Tab)  2,000 mg  HS


 PO  2/2/18 21:00


 3/4/18 20:59  2/13/18 19:37


2,000 MG


 


 Escitalopram


 Oxalate


  (Lexapro Tab)  20 mg  QAM


 PO  2/3/18 09:00


 3/5/18 08:59  2/13/18 09:50


20 MG


 


 Fluticasone


 Propionate


  (Flonase Nasal


 Spray)  2 sprays  DAILY  PRN


 VERONICA  2/2/18 19:00


 3/4/18 18:59   


 


 


 Insulin Glargine


  (Lantus Solostar


 Pen)  10 units  HS


 SC  2/2/18 21:00


 3/4/18 20:59  2/12/18 21:34


10 UNITS


 


 Isosorbide


 Mononitrate


  (Imdur Ext Rel


 Tab)  30 mg  QAM


 PO  2/3/18 09:00


 3/5/18 08:59  2/13/18 09:49


30 MG


 


 Tamsulosin HCl


  (Flomax Cap)  0.4 mg  HS


 PO  2/2/18 21:00


 3/4/18 20:59  2/13/18 19:38


0.4 MG


 


 Tiotropium Bromide


  (Spiriva


 Handihaler


 Inhaler)  1 puff  DAILY


 INH  2/3/18 09:00


 3/5/18 08:59  2/13/18 12:25


1 PUFF


 


 Warfarin Sodium


  (Coumadin Tab)  7.5 mg  SuMoWeThFr@1600


 PO  2/2/18 20:00


 3/4/18 19:59 Future Hold 2/8/18 16:35


7.5 MG


 


 Albuterol


  (Ventolin Hfa


 Inhaler)  2 puffs  Q4H  PRN


 INH  2/2/18 20:45


 3/4/18 20:44  2/4/18 21:55


2 PUFFS


 


 Cyanocobalamin


  (Vitamin B-12


 Tab)  1,000 mcg  DAILY


 PO  2/3/18 09:00


 3/5/18 08:59  2/13/18 09:52


1,000 MCG


 


 Ferrous Sulfate


  (Feosol Tab)  325 mg  BIDM


 PO  2/3/18 07:30


 3/5/18 07:59  2/13/18 17:46


325 MG


 


 Oxycodone HCl


  (Roxicodone


 Immediate Rel Tab)  10 mg  Q12  PRN


 PO  2/2/18 19:00


 3/4/18 18:59  2/12/18 21:32


10 MG


 


 Spironolactone


  (Aldactone Tab)  50 mg  BID


 PO  2/2/18 21:00


 3/4/18 20:59 Future Hold  


 


 


 Warfarin Sodium


  (Coumadin Tab)  10 mg  TuSa@1600


 PO  2/3/18 16:00


 3/5/18 15:59 Future Hold 2/3/18 16:58


10 MG


 


 Insulin Aspart


  (novoLOG ASPART)  **SLIDING


 SCALE**


 **G...  ACHS


 SC  2/2/18 21:00


 3/4/18 20:59  2/13/18 17:46


4 UNITS


 


 Miscellaneous


  (Iv Fluids


 Completed)  1 ea  PRN  PRN


 N/A  2/2/18 19:30


 2/2/19 19:29   


 


 


 Furosemide 20 mg/


 Syringe  2 ml @ 4


 mls/min  BID


 IV  2/3/18 09:00


 3/4/18 20:59 Future Hold 2/3/18 20:51


4 MLS/MIN


 


 Pantoprazole


 Sodium


  (Protonix Tab)  40 mg  QAM


 PO  2/3/18 09:00


 3/5/18 08:59  2/13/18 09:52


40 MG


 


 Calcium Carbonate


  (Tums Chew Tab)  500 mg  Q2H  PRN


 PO  2/3/18 10:00


 3/5/18 09:59  2/10/18 20:59


500 MG


 


 Albuterol/


 Ipratropium


  (Duoneb)  3 ml  Q4R


 INH  2/4/18 22:42


 3/6/18 22:41  2/14/18 07:17


3 ML


 


 Furosemide


  (Lasix Tab)  20 mg  QAM


 PO  2/5/18 09:00


 3/7/18 08:59 Future Hold 2/6/18 08:13


20 MG


 


 Docusate Sodium


  (coLACE CAP)  100 mg  BID


 PO  2/7/18 11:30


 3/9/18 11:29  2/13/18 19:37


100 MG


 


 Polyethylene


  (Miralax Powder


 Packet)  17 gm  DAILY


 PO  2/7/18 11:30


 3/4/18 11:29  2/13/18 09:50


17 GM


 


 Torsemide


  (Demadex Tab)  20 mg  BID


 PO  2/8/18 21:00


 3/10/18 20:59  2/13/18 19:36


20 MG


 


 Labetalol HCl


  (Normodyne Tab)  200 mg  BID


 PO  2/8/18 21:00


 3/10/18 20:59  2/13/18 19:38


200 MG


 


 Iron Sucrose 100


 mg/Sodium Chloride  105 ml @ 


 420 mls/hr  DAILY


 IV  2/8/18 10:45


 2/17/18 09:14  2/13/18 09:46


420 MLS/HR


 


 Amlodipine


 Besylate


  (Norvasc Tab)  5 mg  QAM


 PO  2/11/18 09:00


 3/5/18 08:59  2/13/18 09:51


5 MG


 


 Gabapentin


  (Neurontin Cap)  200 mg  HS


 PO  2/11/18 21:00


 3/4/18 20:59  2/13/18 19:37


200 MG


 


 Mirtazapine


  (Remeron Tab)  30 mg  HS


 PO  2/11/18 21:00


 3/4/18 20:59  2/13/18 19:37


30 MG











Objective


Vital Signs











  Date Time  Temp Pulse Resp B/P (MAP) Pulse Ox O2 Delivery O2 Flow Rate FiO2


 


2/14/18 07:17  75 16  91 Nasal Cannula 6.0 


 


2/14/18 04:00      Nasal Cannula 6.0 


 


2/14/18 03:14  67 14  94 Nasal Cannula 6.0 


 


2/14/18 03:10 36.9 65 18 141/75 (97) 94 Nasal Cannula 6.0 


 


2/14/18 00:01      Nasal Cannula 6.0 


 


2/13/18 23:30 37.1 67 15 122/57 (78) 99 Room Air 6.0 


 


2/13/18 23:01  65 16  93 Nasal Cannula 6.0 


 


2/13/18 20:28 36.6 60 24 169/70 (103) 95 Nasal Cannula 6.0 


 


2/13/18 20:00      Nasal Cannula 6.0 


 


2/13/18 19:15  66 16  95 Nasal Cannula 6.0 


 


2/13/18 16:00      Nasal Cannula 6.0 


 


2/13/18 16:00      Nasal Cannula 6.0 40





      BiPAP  


 


2/13/18 15:16 36.7 61 22 140/90 (107) 97 Nasal Cannula 6.0 


 


2/13/18 15:11  63 16  95 Nasal Cannula 6.0 


 


2/13/18 11:43      Nasal Cannula 6.0 


 


2/13/18 11:41 36.8 64 19 144/47 (79) 99 Nasal Cannula 6.0 


 


2/13/18 08:14 37.0 61 22 144/67 (92) 100   


 


2/13/18 08:00      Nasal Cannula 6.0 











Physical Exam


Comments:


General Appearance:  WD/WN, no apparent distress, asleep, difficult to awaken 

upon entry


Eyes:  PERRL, EOMI


ENT:  hearing grossly normal, pharynx normal, + pertinent finding (MMM)


Neck:  supple, no JVD


Respiratory/Chest:  chest non-tender, no respiratory distress, no accessory 

muscle use, + pertinent finding (on 6 L via Oxymask patient has faint crackles 

in anterior fields, slightly diminished at bases, no wheezing or rales)


Cardiovascular:  RR, no JVD, + systolic murmur (grade III/VI)


Abdomen:  normal bowel sounds, non tender, soft


Extremities:  non-tender, no calf tenderness, + pertinent finding (1+ pitting 

edema in BLE.  Multiple wounds over BLE healing well.)


Neurologic/Psychiatric:  lethargic


Skin:  warm/dry





Laboratory Results





Last 24 Hours








Test


  2/13/18


11:27 2/13/18


15:58 2/13/18


16:23 2/13/18


20:02


 


Bedside Glucose 349 mg/dl   136 mg/dl  108 mg/dl 


 


Venous Blood pH  7.31   


 


Venous Blood Partial Pressure


CO2 


  62 mmHg 


  


  


 


 


Venous Blood Partial Pressure


O2 


  71 mmHg 


  


  


 


 


Venous Blood HCO3  31 mmol/L   


 


Venous Blood Oxygen Saturation  90.1 %   


 


Venous Blood Base Excess  3.7 mEq/L   


 


Test


  2/14/18


05:30 2/14/18


07:11 


  


 


 


Prothrombin Time 15.4 SECONDS    


 


Prothromb Time International


Ratio 1.5 


  


  


  


 


 


Sodium Level 144 mmol/L    


 


Potassium Level 4.0 mmol/L    


 


Chloride Level 106 mmol/L    


 


Carbon Dioxide Level 31 mmol/L    


 


Anion Gap 7.0 mmol/L    


 


Blood Urea Nitrogen 84 mg/dl    


 


Creatinine 1.70 mg/dl    


 


Est Creatinine Clear Calc


Drug Dose 55.5 ml/min 


  


  


  


 


 


Estimated GFR (


American) 49.7 


  


  


  


 


 


Estimated GFR (Non-


American 42.9 


  


  


  


 


 


BUN/Creatinine Ratio 49.5    


 


Random Glucose 127 mg/dl    


 


Calcium Level 8.1 mg/dl    


 


Bedside Glucose  132 mg/dl   











Assessment and Plan


Patient is a 61 y/o male with a history of CAD, h/o MI s/p stents, HTN, HLD, 

diastolic CHF, COPD, DM II, anxiety, depression, bipolar disorder, anemia and 

GERD, who presented to ED because of worsening SOB x1 day





Acute on chronic diastolic CHF exacerbation:


- Cardiac enzymes negative x 3, ekg prn cp, O2 protocol, home O2 is 4 L 


- Strict I/Ox, daily weights - compared to last admission pt euvolemic.  

Physical exam remains stable-  no JVD, chronic mild pitting edema in BLE 

stable. 


   Cards recs likely be able to dc with lasix 40 mg PO BID along with 

spironolactone upon discharge.


-Now on demadex.


-Patient appears to be euvolemic, however, again is lethargic


- Low sodium and low potassium diet, continue 1800 mL fluid restriction


- ECHO 12/23/17 w/ preserved EF and grade II diastolic CHF 


- HR is no longer bradycardic





Respiratory acidosis


Lethargic today.


-Patient refused Bipap.


-Repeated VBG.


Did not show significant respiratory acidosis.


-will monitor.





Elevated INR - 


-Now subtherapeutic.


- Will restart warfarin tomorrow.





Anemia of chronic disease- STABLE:


- Hgb remained stable at 8.0 today - s/p 1 U PRBC on 2/7.  Pt energy level 

slightly improved


- Iron panel completed - continue IV iron (started 2/8), to boost counts. 


- repeat CBC QOD





Fatigue/Lethargy


- ammonia level > 32,  administered lactulose on 2/7 and pt had large BM- he is 

no longer confused or as fatigued.  





FITZ on CKD stage III


- baseline crt 1.6-  today 2.3 


- Nephrology consulted for proteinuria, - appreciate recs - feel this is due to 

diabetic nephropathy.  


- cont low potassium diet. 


-will contiue to diurese despite FITZ, as per nephro and cardio





Hyperkalemia


- Today is 4.7


 -improved after kaylexalate





b/l chronic diabetic wounds: 


- Following w/ wound care- wound care consulted 





CAD w/ cardiac stenting


HTN


HLD


- Plavix 75 mg daily, Norvasc 10 mg daily, Hydralazine 25 mg QID, Imdur 30 mg 

BID, and Toprol XL 50 mg BID, Lipitor 80 mg daily





T2DM w/ neuropathy:


- Continue Lantus 10 u HS 


- BSG ACHS and ISS


- Continue Gabapentin  





Chronic atrial thrombus: Continue Coumadin, follow PT/INR and adjust dose PRN 

for INR goal 2-3 


- see above, subtherapeutic





COPD without exacerbation: Continue home inhalers 





Bipolar disorder, anxiety, depression- STABLE: Depakote 2 g daily, Lexapro 20 

mg daily, Remeron 45 mg daily, and Klonopin





GI prophylaxis: Protonix daily 





DVT Prophylaxis: Coumadin





Code status: LEVEL I, FULL

## 2018-02-15 VITALS
TEMPERATURE: 98.24 F | DIASTOLIC BLOOD PRESSURE: 64 MMHG | HEART RATE: 63 BPM | SYSTOLIC BLOOD PRESSURE: 150 MMHG | OXYGEN SATURATION: 98 %

## 2018-02-15 VITALS
TEMPERATURE: 98.6 F | OXYGEN SATURATION: 93 % | HEART RATE: 73 BPM | DIASTOLIC BLOOD PRESSURE: 83 MMHG | SYSTOLIC BLOOD PRESSURE: 176 MMHG

## 2018-02-15 VITALS
DIASTOLIC BLOOD PRESSURE: 80 MMHG | TEMPERATURE: 98.42 F | SYSTOLIC BLOOD PRESSURE: 181 MMHG | HEART RATE: 69 BPM | OXYGEN SATURATION: 94 %

## 2018-02-15 VITALS — HEART RATE: 72 BPM | OXYGEN SATURATION: 93 %

## 2018-02-15 VITALS
HEART RATE: 82 BPM | OXYGEN SATURATION: 95 % | SYSTOLIC BLOOD PRESSURE: 167 MMHG | TEMPERATURE: 98.06 F | DIASTOLIC BLOOD PRESSURE: 64 MMHG

## 2018-02-15 VITALS — HEART RATE: 70 BPM | OXYGEN SATURATION: 91 %

## 2018-02-15 VITALS
HEART RATE: 72 BPM | OXYGEN SATURATION: 94 % | TEMPERATURE: 98.24 F | DIASTOLIC BLOOD PRESSURE: 73 MMHG | SYSTOLIC BLOOD PRESSURE: 168 MMHG

## 2018-02-15 VITALS — HEART RATE: 68 BPM | OXYGEN SATURATION: 94 %

## 2018-02-15 VITALS — HEART RATE: 70 BPM | OXYGEN SATURATION: 95 %

## 2018-02-15 VITALS — HEART RATE: 64 BPM | OXYGEN SATURATION: 95 %

## 2018-02-15 VITALS
HEART RATE: 70 BPM | OXYGEN SATURATION: 94 % | TEMPERATURE: 98.24 F | DIASTOLIC BLOOD PRESSURE: 86 MMHG | SYSTOLIC BLOOD PRESSURE: 180 MMHG

## 2018-02-15 LAB
BUN SERPL-MCNC: 65 MG/DL (ref 7–18)
CALCIUM SERPL-MCNC: 8.2 MG/DL (ref 8.5–10.1)
CO2 SERPL-SCNC: 34 MMOL/L (ref 21–32)
CREAT SERPL-MCNC: 1.38 MG/DL (ref 0.6–1.4)
EOSINOPHIL NFR BLD AUTO: 203 K/UL (ref 130–400)
GLUCOSE SERPL-MCNC: 90 MG/DL (ref 70–99)
HCT VFR BLD CALC: 25.3 % (ref 42–52)
HGB BLD-MCNC: 8 G/DL (ref 14–18)
INR PPP: 1.3 (ref 0.9–1.1)
MCH RBC QN AUTO: 30.2 PG (ref 25–34)
MCHC RBC AUTO-ENTMCNC: 31.6 G/DL (ref 32–36)
MCV RBC AUTO: 95.5 FL (ref 80–100)
PHOSPHATE SERPL-MCNC: 3.2 MG/DL (ref 2.5–4.9)
PMV BLD AUTO: 8.7 FL (ref 7.4–10.4)
POTASSIUM SERPL-SCNC: 3.9 MMOL/L (ref 3.5–5.1)
RED CELL DISTRIBUTION WIDTH CV: 17.2 % (ref 11.5–14.5)
RED CELL DISTRIBUTION WIDTH SD: 59 FL (ref 36.4–46.3)
SODIUM SERPL-SCNC: 144 MMOL/L (ref 136–145)
WBC # BLD AUTO: 5.63 K/UL (ref 4.8–10.8)

## 2018-02-15 RX ADMIN — INSULIN ASPART SCH UNITS: 100 INJECTION, SOLUTION INTRAVENOUS; SUBCUTANEOUS at 21:03

## 2018-02-15 RX ADMIN — Medication SCH MCG: at 08:40

## 2018-02-15 RX ADMIN — PANTOPRAZOLE SCH MG: 40 TABLET, DELAYED RELEASE ORAL at 07:46

## 2018-02-15 RX ADMIN — TORSEMIDE SCH MG: 20 TABLET ORAL at 21:04

## 2018-02-15 RX ADMIN — MIRTAZAPINE SCH MG: 15 TABLET, FILM COATED ORAL at 21:04

## 2018-02-15 RX ADMIN — DIVALPROEX SODIUM SCH MG: 500 TABLET, DELAYED RELEASE ORAL at 21:05

## 2018-02-15 RX ADMIN — ACETAMINOPHEN PRN MG: 325 TABLET ORAL at 22:25

## 2018-02-15 RX ADMIN — IPRATROPIUM BROMIDE AND ALBUTEROL SULFATE SCH ML: .5; 3 SOLUTION RESPIRATORY (INHALATION) at 06:59

## 2018-02-15 RX ADMIN — FERROUS SULFATE TAB EC 325 MG (65 MG FE EQUIVALENT) SCH MG: 325 (65 FE) TABLET DELAYED RESPONSE at 16:58

## 2018-02-15 RX ADMIN — INSULIN ASPART SCH UNITS: 100 INJECTION, SOLUTION INTRAVENOUS; SUBCUTANEOUS at 16:15

## 2018-02-15 RX ADMIN — TIOTROPIUM BROMIDE SCH PUFF: 18 CAPSULE ORAL; RESPIRATORY (INHALATION) at 07:46

## 2018-02-15 RX ADMIN — ESCITALOPRAM OXALATE SCH MG: 20 TABLET, FILM COATED ORAL at 08:40

## 2018-02-15 RX ADMIN — GABAPENTIN SCH MG: 100 CAPSULE ORAL at 21:04

## 2018-02-15 RX ADMIN — INSULIN ASPART SCH UNITS: 100 INJECTION, SOLUTION INTRAVENOUS; SUBCUTANEOUS at 07:50

## 2018-02-15 RX ADMIN — OXYCODONE HYDROCHLORIDE PRN MG: 5 TABLET ORAL at 12:22

## 2018-02-15 RX ADMIN — IRON SUCROSE SCH MLS/HR: 20 INJECTION, SOLUTION INTRAVENOUS at 10:33

## 2018-02-15 RX ADMIN — IPRATROPIUM BROMIDE AND ALBUTEROL SULFATE SCH ML: .5; 3 SOLUTION RESPIRATORY (INHALATION) at 14:27

## 2018-02-15 RX ADMIN — IPRATROPIUM BROMIDE AND ALBUTEROL SULFATE SCH ML: .5; 3 SOLUTION RESPIRATORY (INHALATION) at 11:06

## 2018-02-15 RX ADMIN — TORSEMIDE SCH MG: 20 TABLET ORAL at 08:40

## 2018-02-15 RX ADMIN — CLOPIDOGREL BISULFATE SCH MG: 75 TABLET, FILM COATED ORAL at 08:40

## 2018-02-15 RX ADMIN — DOCUSATE SODIUM SCH MG: 100 CAPSULE, LIQUID FILLED ORAL at 21:00

## 2018-02-15 RX ADMIN — LABETALOL HCL SCH MG: 200 TABLET, FILM COATED ORAL at 08:41

## 2018-02-15 RX ADMIN — IPRATROPIUM BROMIDE AND ALBUTEROL SULFATE SCH ML: .5; 3 SOLUTION RESPIRATORY (INHALATION) at 19:01

## 2018-02-15 RX ADMIN — LABETALOL HCL SCH MG: 200 TABLET, FILM COATED ORAL at 21:04

## 2018-02-15 RX ADMIN — ATORVASTATIN CALCIUM SCH MG: 40 TABLET, FILM COATED ORAL at 08:41

## 2018-02-15 RX ADMIN — IPRATROPIUM BROMIDE AND ALBUTEROL SULFATE SCH ML: .5; 3 SOLUTION RESPIRATORY (INHALATION) at 22:52

## 2018-02-15 RX ADMIN — INSULIN ASPART SCH UNITS: 100 INJECTION, SOLUTION INTRAVENOUS; SUBCUTANEOUS at 12:45

## 2018-02-15 RX ADMIN — Medication SCH GM: at 07:45

## 2018-02-15 RX ADMIN — ISOSORBIDE MONONITRATE SCH MG: 30 TABLET, EXTENDED RELEASE ORAL at 08:40

## 2018-02-15 RX ADMIN — IPRATROPIUM BROMIDE AND ALBUTEROL SULFATE SCH ML: .5; 3 SOLUTION RESPIRATORY (INHALATION) at 03:45

## 2018-02-15 RX ADMIN — AMLODIPINE BESYLATE SCH MG: 5 TABLET ORAL at 08:41

## 2018-02-15 RX ADMIN — INSULIN GLARGINE SCH UNITS: 100 INJECTION, SOLUTION SUBCUTANEOUS at 21:04

## 2018-02-15 RX ADMIN — FERROUS SULFATE TAB EC 325 MG (65 MG FE EQUIVALENT) SCH MG: 325 (65 FE) TABLET DELAYED RESPONSE at 07:45

## 2018-02-15 RX ADMIN — DOCUSATE SODIUM SCH MG: 100 CAPSULE, LIQUID FILLED ORAL at 08:41

## 2018-02-15 NOTE — CARDIOLOGY PROGRESS NOTE
DATE: 02/15/2018

 

SUBJECTIVE:  Mr. Dixon is resting comfortably in bed without complaints of

chest pain or dyspnea.

 

OBJECTIVE:

VITAL SIGNS:  Blood pressure is 150/64 with a regular pulse of 78. 

Respiratory rate is 18 and the patient is afebrile at 36.8 degrees Celsius. 

Saturation is 98% on 6 liters nasal cannula.

NECK:  Supple with mildly delayed carotid upstrokes.  There are no

transmitted murmurs or obvious bruits.

CARDIOVASCULAR:  Reveals a regular rhythm with a 2/6 crescendo-decrescendo

systolic murmur heard loudest at the base.  S2 is audible at the apex.  No

diastolic murmurs.

LUNGS:  Clear without rales, rhonchi, or wheezes.

ABDOMEN:  Soft without bruits.

EXTREMITIES:  Reveal intact radial artery pulses bilaterally.  1+ pedal and

pretibial edema noted.

 

DATA:  CBC notes hemoglobin of 8.0, hematocrit 25.3, white count 5.6, and

platelet count 203,000.  Electrolytes note a sodium of 144, potassium 3.9,

chloride 106, bicarb 34, BUN 65, creatinine 1.38, and glucose 90.  INR is

1.3.  Telemetry monitor is benign.

 

IMPRESSION AND PLAN:

1.  Diastolic congestive heart failure -- compensated on twice daily Demadex.

2.  Coronary artery disease -- quiescent on current medications.

3.  Hypertension -- borderline control.

4.  Mild to moderate aortic stenosis.

5.  Mild mitral stenosis.

6.  Anemia.

## 2018-02-15 NOTE — NEPHROLOGY PROGRESS NOTE
Nephrology Progress Note


Date of Service


Feb 15, 2018.





Chief Complaint


f/u for FITZ with CKD





Nicole Yusuf was seen and examined in his room this morning.  Overall looking better 

today alert and oriented, currently wearing BiPAP.  Renal function continues to 

improve creatinine 1.4.  Decent urine output, net negative more than 1 liter, 

electrolyte acceptable.  Blood pressure running high





Review of Systems


A complete review of systems was performed.  Pertinent positives are noted 

above. All other systems are negative.





Vital Signs





Last 8 Hrs








  Date Time  Temp Pulse Resp B/P (MAP) Pulse Ox O2 Delivery O2 Flow Rate FiO2


 


2/15/18 08:13 37.0 73 18 176/83 (114) 93   


 


2/15/18 06:59  70 16  95 Nasal Cannula 6.0 


 


2/15/18 04:00      Nasal Cannula 6.0 


 


2/15/18 03:45 36.8 63 16 150/64 (92) 98 Nebulizer  


 


2/15/18 03:45  78 17  89 Nasal Cannula 6.0 











Last Recorded Weight


Weight (Kilograms):  99.100





Physical Exam


GENERAL: middle aged male, AAA x 3,  pleasant, healthy-appearing, not in any 

distress.


NECK: Supple, no JVD.


RESPIRATORY: Normal breathing efforts, no accessory muscle use, clear to 

auscultation bilaterally, no wheezes or rales.


CARDIOVASCULAR: S1, S2 normal, rate rhythm regular.


EXTREMITY:  1+ bilateral lower extremity edema


NEURO: speech fluent.


PSYCHIATRY: Normal mood and judgment





Family History





Cancer (esophageal)


Diabetes mellitus


Heart disease


Hypertension


Negative for CKD/ESRD





Social History


Smoking Status:  Former smoker


Drug Use:  none


Marital Status:  


Housing Status:  lives with family, other


Occupation:  disabled


.  Disabled.  Former smoker





Laboratory Results


Past 24 Hours


2/15/18 05:50








2/15/18 05:47

















Test


  2/14/18


10:40 2/14/18


15:40 2/14/18


20:43 2/15/18


05:47


 


Bedside Glucose


  170 mg/dl


(70-99) 73 mg/dl


(70-99) 192 mg/dl


(70-99) 


 


 


Anion Gap


  


  


  


  4.0 mmol/L


(3-11)


 


Est Creatinine Clear Calc


Drug Dose 


  


  


  68.3 ml/min 


 


 


Estimated GFR (


American) 


  


  


  63.9 


 


 


Estimated GFR (Non-


American 


  


  


  55.2 


 


 


BUN/Creatinine Ratio    46.9 (10-20) 


 


Calcium Level


  


  


  


  8.2 mg/dl


(8.5-10.1)


 


Phosphorus Level


  


  


  


  3.2 mg/dl


(2.5-4.9)


 


Magnesium Level


  


  


  


  2.0 mg/dl


(1.8-2.4)


 


Test


  2/15/18


05:50 2/15/18


06:22 


  


 


 


Red Blood Count


  2.65 M/uL


(4.7-6.1) 


  


  


 


 


Mean Corpuscular Volume


  95.5 fL


() 


  


  


 


 


Mean Corpuscular Hemoglobin


  30.2 pg


(25-34) 


  


  


 


 


Mean Corpuscular Hemoglobin


Concent 31.6 g/dl


(32-36) 


  


  


 


 


RDW Standard Deviation


  59.0 fL


(36.4-46.3) 


  


  


 


 


RDW Coefficient of Variation


  17.2 %


(11.5-14.5) 


  


  


 


 


Mean Platelet Volume


  8.7 fL


(7.4-10.4) 


  


  


 


 


Prothrombin Time


  13.8 SECONDS


(9.0-12.0) 


  


  


 


 


Prothromb Time International


Ratio 1.3 (0.9-1.1) 


  


  


  


 


 


Bedside Glucose


  


  97 mg/dl


(70-99) 


  


 











Allergies


Coded Allergies:  


     No Known Allergies (Verified , 2/2/18)





Medications





Current Inpatient Medications








 Medications


  (Trade)  Dose


 Ordered  Sig/Dc


 Route  Start Time


 Stop Time Status Last Admin


Dose Admin


 


 Acetaminophen


  (Tylenol Tab)  650 mg  Q4H  PRN


 PO  2/2/18 19:00


 3/4/18 18:59  2/14/18 14:31


650 MG


 


 Al Hydrox/Mg


 Hydrox/Simethicone


  (Maalox Max Susp)  15 ml  Q4H  PRN


 PO  2/2/18 19:00


 3/4/18 18:59  2/10/18 16:14


15 ML


 


 Magnesium


 Hydroxide


  (Milk Of


 Magnesia Susp)  30 ml  Q12H  PRN


 PO  2/2/18 19:00


 3/4/18 18:59   


 


 


 Ondansetron HCl


  (Zofran Inj)  4 mg  Q6H  PRN


 IV  2/2/18 19:00


 3/4/18 18:59   


 


 


 Nitroglycerin


  (Nitrostat Tab)  0.4 mg  UD  PRN


 SL  2/2/18 19:00


 3/4/18 18:59   


 


 


 Morphine Sulfate


  (MoRPHine


 SULFATE INJ)  2 mg  Q30M  PRN


 IV  2/2/18 19:00


 2/16/18 18:59   


 


 


 Atorvastatin


 Calcium


  (Lipitor Tab)  80 mg  DAILY


 PO  2/3/18 09:00


 3/5/18 08:59  2/15/18 08:41


80 MG


 


 Clopidogrel


 Bisulfate


  (plAVix TAB)  75 mg  QAM


 PO  2/3/18 09:00


 3/5/18 08:59  2/15/18 08:40


75 MG


 


 Divalproex Sodium


  (Depakote Delay


 Rel Tab)  2,000 mg  HS


 PO  2/2/18 21:00


 3/4/18 20:59  2/14/18 21:40


2,000 MG


 


 Escitalopram


 Oxalate


  (Lexapro Tab)  20 mg  QAM


 PO  2/3/18 09:00


 3/5/18 08:59  2/15/18 08:40


20 MG


 


 Fluticasone


 Propionate


  (Flonase Nasal


 Spray)  2 sprays  DAILY  PRN


 VERONICA  2/2/18 19:00


 3/4/18 18:59   


 


 


 Insulin Glargine


  (Lantus Solostar


 Pen)  10 units  HS


 SC  2/2/18 21:00


 3/4/18 20:59  2/14/18 21:45


10 UNITS


 


 Isosorbide


 Mononitrate


  (Imdur Ext Rel


 Tab)  30 mg  QAM


 PO  2/3/18 09:00


 3/5/18 08:59  2/15/18 08:40


30 MG


 


 Tamsulosin HCl


  (Flomax Cap)  0.4 mg  HS


 PO  2/2/18 21:00


 3/4/18 20:59  2/14/18 21:40


0.4 MG


 


 Tiotropium Bromide


  (Spiriva


 Handihaler


 Inhaler)  1 puff  DAILY


 INH  2/3/18 09:00


 3/5/18 08:59  2/15/18 07:46


1 PUFF


 


 Albuterol


  (Ventolin Hfa


 Inhaler)  2 puffs  Q4H  PRN


 INH  2/2/18 20:45


 3/4/18 20:44  2/4/18 21:55


2 PUFFS


 


 Cyanocobalamin


  (Vitamin B-12


 Tab)  1,000 mcg  DAILY


 PO  2/3/18 09:00


 3/5/18 08:59  2/15/18 08:40


1,000 MCG


 


 Ferrous Sulfate


  (Feosol Tab)  325 mg  BIDM


 PO  2/3/18 07:30


 3/5/18 07:59  2/15/18 07:45


325 MG


 


 Oxycodone HCl


  (Roxicodone


 Immediate Rel Tab)  10 mg  Q12  PRN


 PO  2/2/18 19:00


 3/4/18 18:59  2/14/18 21:51


10 MG


 


 Spironolactone


  (Aldactone Tab)  50 mg  BID


 PO  2/2/18 21:00


 3/4/18 20:59 Future Hold  


 


 


 Insulin Aspart


  (novoLOG ASPART)  **SLIDING


 SCALE**


 **G...  ACHS


 SC  2/2/18 21:00


 3/4/18 20:59  2/15/18 07:50


5 UNITS


 


 Miscellaneous


  (Iv Fluids


 Completed)  1 ea  PRN  PRN


 N/A  2/2/18 19:30


 2/2/19 19:29   


 


 


 Furosemide 20 mg/


 Syringe  2 ml @ 4


 mls/min  BID


 IV  2/3/18 09:00


 3/4/18 20:59 Future Hold 2/3/18 20:51


4 MLS/MIN


 


 Pantoprazole


 Sodium


  (Protonix Tab)  40 mg  QAM


 PO  2/3/18 09:00


 3/5/18 08:59  2/15/18 07:46


40 MG


 


 Calcium Carbonate


  (Tums Chew Tab)  500 mg  Q2H  PRN


 PO  2/3/18 10:00


 3/5/18 09:59  2/14/18 22:40


500 MG


 


 Albuterol/


 Ipratropium


  (Duoneb)  3 ml  Q4R


 INH  2/4/18 22:42


 3/6/18 22:41  2/15/18 06:59


3 ML


 


 Furosemide


  (Lasix Tab)  20 mg  QAM


 PO  2/5/18 09:00


 3/7/18 08:59 Future Hold 2/6/18 08:13


20 MG


 


 Docusate Sodium


  (coLACE CAP)  100 mg  BID


 PO  2/7/18 11:30


 3/9/18 11:29  2/15/18 08:41


100 MG


 


 Polyethylene


  (Miralax Powder


 Packet)  17 gm  DAILY


 PO  2/7/18 11:30


 3/4/18 11:29  2/15/18 07:45


17 GM


 


 Torsemide


  (Demadex Tab)  20 mg  BID


 PO  2/8/18 21:00


 3/10/18 20:59  2/15/18 08:40


20 MG


 


 Labetalol HCl


  (Normodyne Tab)  200 mg  BID


 PO  2/8/18 21:00


 3/10/18 20:59  2/15/18 08:41


200 MG


 


 Iron Sucrose 100


 mg/Sodium Chloride  105 ml @ 


 420 mls/hr  DAILY


 IV  2/8/18 10:45


 2/17/18 09:14  2/14/18 09:40


420 MLS/HR


 


 Amlodipine


 Besylate


  (Norvasc Tab)  5 mg  QAM


 PO  2/11/18 09:00


 3/5/18 08:59  2/15/18 08:41


5 MG


 


 Gabapentin


  (Neurontin Cap)  200 mg  HS


 PO  2/11/18 21:00


 3/4/18 20:59  2/14/18 21:39


200 MG


 


 Mirtazapine


  (Remeron Tab)  30 mg  HS


 PO  2/11/18 21:00


 3/4/18 20:59  2/14/18 21:40


30 MG











Impression





(1) Acute kidney injury


(2) Proteinuria


(3) Anemia


(4) Acute diastolic (congestive) heart failure


(5) Hypertension


(6) Former smoker


Mr. Dixon has been admitted due to recurrent CHF.  12/17 echocardiogram 

revealed LVEF 55% but dilated RV and RA.  Patient has a combination of 

pulmonary HTN and diastolic dysfunction.  12/17 renal US revealed 14 cm 

kidneys.  Doppler was negative for KIANA.  Urine sediment is difficult to 

interpret due to chronic indwelling gillespie catheter.  UPCR 1.5 g.  Compliance 

with outpatient medical regimen and office visits has been suboptimal.  

Baseline creatinine had been 1.2.





Recommendations





-- increase amlodipine to 10 milligrams p.o. daily


-- renal function continues to improve, creatinine 1.7, decent urine output 

with net negative


-- continue on Demadex 20 mg b.i.d., goal for net-0.5 to 1 L per 24 hours


-- Monitor serial PRP


-- check Mg, phos every other day





Will follow

## 2018-02-15 NOTE — DIAGNOSTIC IMAGING REPORT
CHEST 2 VIEWS ROUTINE



HISTORY:  62 years-old Male confusion/ SOB acute confusion with shortness of

breath



COMPARISON: Chest radiograph [id number]



TECHNIQUE: PA and lateral views of the chest



FINDINGS: 

Cardiac silhouette is again mildly enlarged. Atherosclerosis of the aorta. Trace

bilateral pleural effusions are noted without pneumothorax. Pulmonary vascular

congestion is again noted with slightly decreased interstitial coarsening.

Patchy bibasilar opacities are noted with mildly improved aeration of the right

lung base. Bones of the chest appear grossly intact.



IMPRESSION: 

1. Cardiomegaly with mildly improved pulmonary edema.

2. Trace bilateral pleural effusions with bibasilar opacities suggesting

atelectasis or pneumonitis.

3. Mildly improved aeration of the right lung base. 







The above report was generated using voice recognition software. It may contain

grammatical, syntax or spelling errors.







Electronically signed by:  Ashwin Bhatia M.D.

2/15/2018 11:17 AM



Dictated Date/Time:  2/15/2018 11:16 AM

## 2018-02-16 VITALS
HEART RATE: 63 BPM | SYSTOLIC BLOOD PRESSURE: 176 MMHG | TEMPERATURE: 98.24 F | DIASTOLIC BLOOD PRESSURE: 81 MMHG | OXYGEN SATURATION: 96 %

## 2018-02-16 VITALS — HEART RATE: 70 BPM | OXYGEN SATURATION: 94 % | DIASTOLIC BLOOD PRESSURE: 75 MMHG | SYSTOLIC BLOOD PRESSURE: 151 MMHG

## 2018-02-16 VITALS — OXYGEN SATURATION: 92 % | HEART RATE: 67 BPM

## 2018-02-16 VITALS
OXYGEN SATURATION: 95 % | TEMPERATURE: 98.24 F | HEART RATE: 65 BPM | DIASTOLIC BLOOD PRESSURE: 73 MMHG | SYSTOLIC BLOOD PRESSURE: 180 MMHG

## 2018-02-16 VITALS
HEART RATE: 61 BPM | SYSTOLIC BLOOD PRESSURE: 168 MMHG | TEMPERATURE: 98.24 F | OXYGEN SATURATION: 97 % | DIASTOLIC BLOOD PRESSURE: 80 MMHG

## 2018-02-16 VITALS — OXYGEN SATURATION: 93 %

## 2018-02-16 VITALS
SYSTOLIC BLOOD PRESSURE: 154 MMHG | TEMPERATURE: 98.24 F | DIASTOLIC BLOOD PRESSURE: 79 MMHG | HEART RATE: 59 BPM | OXYGEN SATURATION: 97 %

## 2018-02-16 VITALS — OXYGEN SATURATION: 95 % | HEART RATE: 66 BPM

## 2018-02-16 VITALS — OXYGEN SATURATION: 94 % | HEART RATE: 62 BPM

## 2018-02-16 VITALS
HEART RATE: 60 BPM | SYSTOLIC BLOOD PRESSURE: 169 MMHG | OXYGEN SATURATION: 96 % | DIASTOLIC BLOOD PRESSURE: 76 MMHG | TEMPERATURE: 98.6 F

## 2018-02-16 VITALS
SYSTOLIC BLOOD PRESSURE: 176 MMHG | TEMPERATURE: 98.24 F | OXYGEN SATURATION: 96 % | HEART RATE: 63 BPM | DIASTOLIC BLOOD PRESSURE: 81 MMHG

## 2018-02-16 VITALS — HEART RATE: 64 BPM | OXYGEN SATURATION: 93 %

## 2018-02-16 VITALS
SYSTOLIC BLOOD PRESSURE: 145 MMHG | TEMPERATURE: 98.24 F | DIASTOLIC BLOOD PRESSURE: 63 MMHG | OXYGEN SATURATION: 93 % | HEART RATE: 70 BPM

## 2018-02-16 VITALS — OXYGEN SATURATION: 94 % | HEART RATE: 63 BPM

## 2018-02-16 LAB
BUN SERPL-MCNC: 52 MG/DL (ref 7–18)
CALCIUM SERPL-MCNC: 8.1 MG/DL (ref 8.5–10.1)
CO2 SERPL-SCNC: 37 MMOL/L (ref 21–32)
CREAT SERPL-MCNC: 1.35 MG/DL (ref 0.6–1.4)
GLUCOSE SERPL-MCNC: 126 MG/DL (ref 70–99)
INR PPP: 1.3 (ref 0.9–1.1)
POTASSIUM SERPL-SCNC: 3.5 MMOL/L (ref 3.5–5.1)
SODIUM SERPL-SCNC: 143 MMOL/L (ref 136–145)

## 2018-02-16 RX ADMIN — Medication SCH GM: at 07:51

## 2018-02-16 RX ADMIN — LABETALOL HCL SCH MG: 200 TABLET, FILM COATED ORAL at 07:49

## 2018-02-16 RX ADMIN — IPRATROPIUM BROMIDE AND ALBUTEROL SULFATE SCH ML: .5; 3 SOLUTION RESPIRATORY (INHALATION) at 23:06

## 2018-02-16 RX ADMIN — OXYCODONE HYDROCHLORIDE PRN MG: 5 TABLET ORAL at 21:24

## 2018-02-16 RX ADMIN — DOCUSATE SODIUM SCH MG: 100 CAPSULE, LIQUID FILLED ORAL at 21:00

## 2018-02-16 RX ADMIN — TIOTROPIUM BROMIDE SCH PUFF: 18 CAPSULE ORAL; RESPIRATORY (INHALATION) at 07:50

## 2018-02-16 RX ADMIN — ISOSORBIDE MONONITRATE SCH MG: 30 TABLET, EXTENDED RELEASE ORAL at 07:49

## 2018-02-16 RX ADMIN — LABETALOL HCL SCH MG: 200 TABLET, FILM COATED ORAL at 21:18

## 2018-02-16 RX ADMIN — PANTOPRAZOLE SCH MG: 40 TABLET, DELAYED RELEASE ORAL at 07:49

## 2018-02-16 RX ADMIN — AMLODIPINE BESYLATE SCH MG: 5 TABLET ORAL at 07:49

## 2018-02-16 RX ADMIN — IPRATROPIUM BROMIDE AND ALBUTEROL SULFATE SCH ML: .5; 3 SOLUTION RESPIRATORY (INHALATION) at 11:29

## 2018-02-16 RX ADMIN — IPRATROPIUM BROMIDE AND ALBUTEROL SULFATE SCH ML: .5; 3 SOLUTION RESPIRATORY (INHALATION) at 03:51

## 2018-02-16 RX ADMIN — IPRATROPIUM BROMIDE AND ALBUTEROL SULFATE SCH ML: .5; 3 SOLUTION RESPIRATORY (INHALATION) at 14:20

## 2018-02-16 RX ADMIN — ATORVASTATIN CALCIUM SCH MG: 40 TABLET, FILM COATED ORAL at 07:48

## 2018-02-16 RX ADMIN — IPRATROPIUM BROMIDE AND ALBUTEROL SULFATE SCH ML: .5; 3 SOLUTION RESPIRATORY (INHALATION) at 19:26

## 2018-02-16 RX ADMIN — IRON SUCROSE SCH MLS/HR: 20 INJECTION, SOLUTION INTRAVENOUS at 08:55

## 2018-02-16 RX ADMIN — Medication SCH MCG: at 07:51

## 2018-02-16 RX ADMIN — INSULIN ASPART SCH UNITS: 100 INJECTION, SOLUTION INTRAVENOUS; SUBCUTANEOUS at 07:54

## 2018-02-16 RX ADMIN — INSULIN ASPART SCH UNITS: 100 INJECTION, SOLUTION INTRAVENOUS; SUBCUTANEOUS at 21:00

## 2018-02-16 RX ADMIN — DOCUSATE SODIUM SCH MG: 100 CAPSULE, LIQUID FILLED ORAL at 07:47

## 2018-02-16 RX ADMIN — INSULIN GLARGINE SCH UNITS: 100 INJECTION, SOLUTION SUBCUTANEOUS at 21:22

## 2018-02-16 RX ADMIN — ESCITALOPRAM OXALATE SCH MG: 20 TABLET, FILM COATED ORAL at 07:47

## 2018-02-16 RX ADMIN — INSULIN ASPART SCH UNITS: 100 INJECTION, SOLUTION INTRAVENOUS; SUBCUTANEOUS at 17:19

## 2018-02-16 RX ADMIN — INSULIN ASPART SCH UNITS: 100 INJECTION, SOLUTION INTRAVENOUS; SUBCUTANEOUS at 11:40

## 2018-02-16 RX ADMIN — OXYCODONE HYDROCHLORIDE PRN MG: 5 TABLET ORAL at 00:24

## 2018-02-16 RX ADMIN — ACETAMINOPHEN PRN MG: 325 TABLET ORAL at 16:52

## 2018-02-16 RX ADMIN — CLOPIDOGREL BISULFATE SCH MG: 75 TABLET, FILM COATED ORAL at 07:47

## 2018-02-16 RX ADMIN — TORSEMIDE SCH MG: 20 TABLET ORAL at 16:54

## 2018-02-16 RX ADMIN — FERROUS SULFATE TAB EC 325 MG (65 MG FE EQUIVALENT) SCH MG: 325 (65 FE) TABLET DELAYED RESPONSE at 16:52

## 2018-02-16 RX ADMIN — FERROUS SULFATE TAB EC 325 MG (65 MG FE EQUIVALENT) SCH MG: 325 (65 FE) TABLET DELAYED RESPONSE at 07:49

## 2018-02-16 RX ADMIN — TORSEMIDE SCH MG: 20 TABLET ORAL at 07:49

## 2018-02-16 RX ADMIN — IPRATROPIUM BROMIDE AND ALBUTEROL SULFATE SCH ML: .5; 3 SOLUTION RESPIRATORY (INHALATION) at 07:00

## 2018-02-16 RX ADMIN — CALCIUM CARBONATE PRN MG: 500 TABLET ORAL at 12:28

## 2018-02-16 RX ADMIN — DIVALPROEX SODIUM SCH MG: 500 TABLET, DELAYED RELEASE ORAL at 21:17

## 2018-02-16 RX ADMIN — GABAPENTIN SCH MG: 100 CAPSULE ORAL at 21:17

## 2018-02-16 RX ADMIN — MIRTAZAPINE SCH MG: 15 TABLET, FILM COATED ORAL at 21:18

## 2018-02-16 NOTE — NEPHROLOGY PROGRESS NOTE
Nephrology Progress Note


Date of Service


Feb 16, 2018.





Chief Complaint


f/u for FITZ with CKD





Nicole Yusuf was seen and examined in his room this morning.  Overall doing well. 

Renal function stable creatinine 1.4.  Decent urine output, net negative more 

than 1 liter, electrolyte acceptable.  Blood pressure improved.





Review of Systems


A complete review of systems was performed.  Pertinent positives are noted 

above. All other systems are negative.





Vital Signs





Last 8 Hrs








  Date Time  Temp Pulse Resp B/P (MAP) Pulse Ox O2 Delivery O2 Flow Rate FiO2


 


2/16/18 08:00      Nasal Cannula 5.0 


 


2/16/18 07:39 37.0 60 16 169/76 (107) 96   


 


2/16/18 07:00  67 16  92 Nasal Cannula 5.0 


 


2/16/18 04:00     93 Nasal Cannula 5.0 


 


2/16/18 03:52  64 16  93 Nasal Cannula 5.0 


 


2/16/18 03:40 36.8 65 19 180/73 (108) 95 Nasal Cannula 5.0 











Last Recorded Weight


Weight (Kilograms):  99.100





Physical Exam


GENERAL:  Middle-aged male, AAA x 3, not in any distress.


NECK: Supple, no JVD.


RESPIRATORY: Normal breathing efforts, no accessory muscle use, clear to 

auscultation bilaterally, no wheezes or rales.


CARDIOVASCULAR: S1, S2 normal, rate rhythm regular.


EXTREMITY: No lower extremity edema


NEURO: speech fluent.


PSYCHIATRY: Normal mood and judgment





Family History





Cancer (esophageal)


Diabetes mellitus


Heart disease


Hypertension


Negative for CKD/ESRD





Social History


Smoking Status:  Former smoker


Drug Use:  none


Marital Status:  


Housing Status:  lives with family, other


Occupation:  disabled


.  Disabled.  Former smoker





Laboratory Results


Past 24 Hours


2/16/18 05:42

















Test


  2/15/18


11:05 2/15/18


15:54 2/15/18


20:49 2/16/18


05:42


 


Bedside Glucose


  112 mg/dl


(70-99) 149 mg/dl


(70-99) 175 mg/dl


(70-99) 


 


 


Prothrombin Time


  


  


  


  13.9 SECONDS


(9.0-12.0)


 


Prothromb Time International


Ratio 


  


  


  1.3 (0.9-1.1) 


 


 


Anion Gap


  


  


  


  4.0 mmol/L


(3-11)


 


Est Creatinine Clear Calc


Drug Dose 


  


  


  69.8 ml/min 


 


 


Estimated GFR (


American) 


  


  


  65.7 


 


 


Estimated GFR (Non-


American 


  


  


  56.7 


 


 


BUN/Creatinine Ratio    38.2 (10-20) 


 


Calcium Level


  


  


  


  8.1 mg/dl


(8.5-10.1)


 


Test


  2/16/18


06:29 


  


  


 


 


Bedside Glucose


  127 mg/dl


(70-99) 


  


  


 











Allergies


Coded Allergies:  


     No Known Allergies (Verified , 2/2/18)





Medications





Current Inpatient Medications








 Medications


  (Trade)  Dose


 Ordered  Sig/Dc


 Route  Start Time


 Stop Time Status Last Admin


Dose Admin


 


 Acetaminophen


  (Tylenol Tab)  650 mg  Q4H  PRN


 PO  2/2/18 19:00


 3/4/18 18:59  2/15/18 22:25


650 MG


 


 Al Hydrox/Mg


 Hydrox/Simethicone


  (Maalox Max Susp)  15 ml  Q4H  PRN


 PO  2/2/18 19:00


 3/4/18 18:59  2/10/18 16:14


15 ML


 


 Magnesium


 Hydroxide


  (Milk Of


 Magnesia Susp)  30 ml  Q12H  PRN


 PO  2/2/18 19:00


 3/4/18 18:59   


 


 


 Ondansetron HCl


  (Zofran Inj)  4 mg  Q6H  PRN


 IV  2/2/18 19:00


 3/4/18 18:59   


 


 


 Nitroglycerin


  (Nitrostat Tab)  0.4 mg  UD  PRN


 SL  2/2/18 19:00


 3/4/18 18:59   


 


 


 Morphine Sulfate


  (MoRPHine


 SULFATE INJ)  2 mg  Q30M  PRN


 IV  2/2/18 19:00


 2/16/18 18:59   


 


 


 Atorvastatin


 Calcium


  (Lipitor Tab)  80 mg  DAILY


 PO  2/3/18 09:00


 3/5/18 08:59  2/16/18 07:48


80 MG


 


 Clopidogrel


 Bisulfate


  (plAVix TAB)  75 mg  QAM


 PO  2/3/18 09:00


 3/5/18 08:59  2/16/18 07:47


75 MG


 


 Divalproex Sodium


  (Depakote Delay


 Rel Tab)  2,000 mg  HS


 PO  2/2/18 21:00


 3/4/18 20:59  2/15/18 21:05


2,000 MG


 


 Escitalopram


 Oxalate


  (Lexapro Tab)  20 mg  QAM


 PO  2/3/18 09:00


 3/5/18 08:59  2/16/18 07:47


20 MG


 


 Fluticasone


 Propionate


  (Flonase Nasal


 Spray)  2 sprays  DAILY  PRN


 VERONICA  2/2/18 19:00


 3/4/18 18:59   


 


 


 Insulin Glargine


  (Lantus Solostar


 Pen)  10 units  HS


 SC  2/2/18 21:00


 3/4/18 20:59  2/15/18 21:04


10 UNITS


 


 Isosorbide


 Mononitrate


  (Imdur Ext Rel


 Tab)  30 mg  QAM


 PO  2/3/18 09:00


 3/5/18 08:59  2/16/18 07:49


30 MG


 


 Tamsulosin HCl


  (Flomax Cap)  0.4 mg  HS


 PO  2/2/18 21:00


 3/4/18 20:59 Future Hold 2/14/18 21:40


0.4 MG


 


 Tiotropium Bromide


  (Spiriva


 Handihaler


 Inhaler)  1 puff  DAILY


 INH  2/3/18 09:00


 3/5/18 08:59  2/16/18 07:50


1 PUFF


 


 Albuterol


  (Ventolin Hfa


 Inhaler)  2 puffs  Q4H  PRN


 INH  2/2/18 20:45


 3/4/18 20:44  2/4/18 21:55


2 PUFFS


 


 Cyanocobalamin


  (Vitamin B-12


 Tab)  1,000 mcg  DAILY


 PO  2/3/18 09:00


 3/5/18 08:59  2/16/18 07:51


1,000 MCG


 


 Ferrous Sulfate


  (Feosol Tab)  325 mg  BIDM


 PO  2/3/18 07:30


 3/5/18 07:59  2/16/18 07:49


325 MG


 


 Oxycodone HCl


  (Roxicodone


 Immediate Rel Tab)  10 mg  Q12  PRN


 PO  2/2/18 19:00


 3/4/18 18:59  2/16/18 00:24


10 MG


 


 Spironolactone


  (Aldactone Tab)  50 mg  BID


 PO  2/2/18 21:00


 3/4/18 20:59 Future Hold  


 


 


 Insulin Aspart


  (novoLOG ASPART)  **SLIDING


 SCALE**


 **G...  ACHS


 SC  2/2/18 21:00


 3/4/18 20:59  2/16/18 07:54


6 UNITS


 


 Miscellaneous


  (Iv Fluids


 Completed)  1 ea  PRN  PRN


 N/A  2/2/18 19:30


 2/2/19 19:29   


 


 


 Furosemide 20 mg/


 Syringe  2 ml @ 4


 mls/min  BID


 IV  2/3/18 09:00


 3/4/18 20:59 Future Hold 2/3/18 20:51


4 MLS/MIN


 


 Pantoprazole


 Sodium


  (Protonix Tab)  40 mg  QAM


 PO  2/3/18 09:00


 3/5/18 08:59  2/16/18 07:49


40 MG


 


 Calcium Carbonate


  (Tums Chew Tab)  500 mg  Q2H  PRN


 PO  2/3/18 10:00


 3/5/18 09:59  2/14/18 22:40


500 MG


 


 Albuterol/


 Ipratropium


  (Duoneb)  3 ml  Q4R


 INH  2/4/18 22:42


 3/6/18 22:41  2/16/18 07:00


3 ML


 


 Furosemide


  (Lasix Tab)  20 mg  QAM


 PO  2/5/18 09:00


 3/7/18 08:59 Future Hold 2/6/18 08:13


20 MG


 


 Docusate Sodium


  (coLACE CAP)  100 mg  BID


 PO  2/7/18 11:30


 3/9/18 11:29  2/16/18 07:47


100 MG


 


 Polyethylene


  (Miralax Powder


 Packet)  17 gm  DAILY


 PO  2/7/18 11:30


 3/4/18 11:29  2/16/18 07:51


17 GM


 


 Labetalol HCl


  (Normodyne Tab)  200 mg  BID


 PO  2/8/18 21:00


 3/10/18 20:59  2/16/18 07:49


200 MG


 


 Iron Sucrose 100


 mg/Sodium Chloride  105 ml @ 


 420 mls/hr  DAILY


 IV  2/8/18 10:45


 2/17/18 09:14  2/15/18 10:33


420 MLS/HR


 


 Gabapentin


  (Neurontin Cap)  200 mg  HS


 PO  2/11/18 21:00


 3/4/18 20:59  2/15/18 21:04


200 MG


 


 Mirtazapine


  (Remeron Tab)  30 mg  HS


 PO  2/11/18 21:00


 3/4/18 20:59  2/15/18 21:04


30 MG


 


 Amlodipine


 Besylate


  (Norvasc Tab)  10 mg  QAM


 PO  2/16/18 09:00


 3/5/18 08:59  2/16/18 07:49


10 MG


 


 Torsemide


  (Demadex Tab)  20 mg  BID


 PO  2/16/18 09:00


 3/10/18 20:59 UNV  


 











Impression





(1) Acute kidney injury


(2) Proteinuria


(3) Anemia


(4) Acute diastolic (congestive) heart failure


(5) Hypertension


(6) Former smoker


Mr. Dixon has been admitted due to recurrent CHF.  12/17 echocardiogram 

revealed LVEF 55% but dilated RV and RA.  Patient has a combination of 

pulmonary HTN and diastolic dysfunction.  12/17 renal US revealed 14 cm 

kidneys.  Doppler was negative for KIANA.  Urine sediment is difficult to 

interpret due to chronic indwelling gillespie catheter.  UPCR 1.5 g.  Compliance 

with outpatient medical regimen and office visits has been suboptimal.  

Baseline creatinine had been 1.2.





Recommendations





-- continue on torsemide 20 milligram twice a day, start on the acetazolamide 

250 milligram twice a day for 3 days.


-- continue amlodipine 10 milligrams p.o. daily


-- Monitor serial PRP while inpatient


--please schedule outpatient Nephrology follow-up with Dr. Mullins next 2-4 weeks


--suggest checking renal function electrolyte early next week





Will sign off, please contact with any question or concern.

## 2018-02-16 NOTE — PROGRESS NOTE
Subjective


Date of Service:


Feb 15, 2018.


Subjective


Pt evaluation today including:  conversation w/ patient, physical exam


61 yo male reports feeling well.


He states that he feels somewhat tired, but denies any nausea, vomiting, chest 

pain.





I spoke to the nurse, and he has not had any episodes of confusion.





Problem List


Medical Problems:


(1) Acute CHF


Status: Acute  





(2) Acute coronary syndrome


Status: Acute  





(3) Acute headache


Status: Acute  





(4) Acute on chronic congestive heart failure


Status: Acute  





(5) Altered mental status


Status: Acute  





(6) Anemia


Status: Acute  





(7) Anemia


Status: Acute  





(8) Anemia


Status: Acute  





(9) Anginal pain


Status: Acute  





(10) Appendicitis


Status: Acute  





(11) Cellulitis


Status: Acute  





(12) Chest pain


Status: Acute  





(13) CHF (congestive heart failure)


Status: Acute  





(14) CHF (congestive heart failure)


Status: Acute  





(15) CHF (congestive heart failure)


Status: Acute  





(16) Congestive heart failure


Status: Acute  





(17) Congestive heart failure


Status: Acute  





(18) COPD (chronic obstructive pulmonary disease)


Status: Acute  





(19) Diabetes mellitus with hyperglycemia


Status: Acute  





(20) Dyspnea


Status: Acute  





(21) Elevated troponin


Status: Acute  





(22) Failure of outpatient treatment


Status: Acute  





(23) Hypoxia


Status: Acute  





(24) Hypoxia


Status: Acute  





(25) Intra-abdominal abscess


Status: Acute  





(26) Lower abdominal pain of unknown etiology


Status: Acute  





(27) Neck pain


Status: Acute  





(28) Pneumonia


Status: Acute  





(29) Pneumonia


Status: Acute  





(30) Precordial chest pain


Status: Acute  





(31) Pulmonary edema


Status: Acute  





(32) Respiratory acidosis


Status: Acute  





(33) Respiratory distress


Status: Acute  





(34) SOB (shortness of breath)


Status: Acute  





(35) SOB (shortness of breath)


Status: Acute  





(36) Tachypnea


Status: Acute  











Review of Systems


Constitutional:  No fever, No chills


Eyes:  No worsening of vision


Respiratory:  + cough


Cardiac:  No chest pain


Abdomen:  No pain


Neurologic:  No memory loss


Psychiatric:  No depression symptoms


Heme:  No abnormal bleeding/bruising


Endo:  No fatigue


Skin:  No rash


All Other Systems:  Reviewed and Negative





Medications





Current Inpatient Medications








 Medications


  (Trade)  Dose


 Ordered  Sig/Dc


 Route  Start Time


 Stop Time Status Last Admin


Dose Admin


 


 Acetaminophen


  (Tylenol Tab)  650 mg  Q4H  PRN


 PO  2/2/18 19:00


 3/4/18 18:59  2/16/18 16:52


650 MG


 


 Al Hydrox/Mg


 Hydrox/Simethicone


  (Maalox Max Susp)  15 ml  Q4H  PRN


 PO  2/2/18 19:00


 3/4/18 18:59  2/10/18 16:14


15 ML


 


 Magnesium


 Hydroxide


  (Milk Of


 Magnesia Susp)  30 ml  Q12H  PRN


 PO  2/2/18 19:00


 3/4/18 18:59   


 


 


 Ondansetron HCl


  (Zofran Inj)  4 mg  Q6H  PRN


 IV  2/2/18 19:00


 3/4/18 18:59   


 


 


 Nitroglycerin


  (Nitrostat Tab)  0.4 mg  UD  PRN


 SL  2/2/18 19:00


 3/4/18 18:59   


 


 


 Atorvastatin


 Calcium


  (Lipitor Tab)  80 mg  DAILY


 PO  2/3/18 09:00


 3/5/18 08:59  2/16/18 07:48


80 MG


 


 Clopidogrel


 Bisulfate


  (plAVix TAB)  75 mg  QAM


 PO  2/3/18 09:00


 3/5/18 08:59  2/16/18 07:47


75 MG


 


 Divalproex Sodium


  (Depakote Delay


 Rel Tab)  2,000 mg  HS


 PO  2/2/18 21:00


 3/4/18 20:59  2/16/18 21:17


2,000 MG


 


 Escitalopram


 Oxalate


  (Lexapro Tab)  20 mg  QAM


 PO  2/3/18 09:00


 3/5/18 08:59  2/16/18 07:47


20 MG


 


 Fluticasone


 Propionate


  (Flonase Nasal


 Spray)  2 sprays  DAILY  PRN


 VERONICA  2/2/18 19:00


 3/4/18 18:59   


 


 


 Insulin Glargine


  (Lantus Solostar


 Pen)  10 units  HS


 SC  2/2/18 21:00


 3/4/18 20:59  2/16/18 21:22


10 UNITS


 


 Isosorbide


 Mononitrate


  (Imdur Ext Rel


 Tab)  30 mg  QAM


 PO  2/3/18 09:00


 3/5/18 08:59  2/16/18 07:49


30 MG


 


 Tamsulosin HCl


  (Flomax Cap)  0.4 mg  HS


 PO  2/2/18 21:00


 3/4/18 20:59 Future Hold 2/14/18 21:40


0.4 MG


 


 Tiotropium Bromide


  (Spiriva


 Handihaler


 Inhaler)  1 puff  DAILY


 INH  2/3/18 09:00


 3/5/18 08:59  2/16/18 07:50


1 PUFF


 


 Albuterol


  (Ventolin Hfa


 Inhaler)  2 puffs  Q4H  PRN


 INH  2/2/18 20:45


 3/4/18 20:44  2/4/18 21:55


2 PUFFS


 


 Cyanocobalamin


  (Vitamin B-12


 Tab)  1,000 mcg  DAILY


 PO  2/3/18 09:00


 3/5/18 08:59  2/16/18 07:51


1,000 MCG


 


 Ferrous Sulfate


  (Feosol Tab)  325 mg  BIDM


 PO  2/3/18 07:30


 3/5/18 07:59  2/16/18 16:52


325 MG


 


 Oxycodone HCl


  (Roxicodone


 Immediate Rel Tab)  10 mg  Q12  PRN


 PO  2/2/18 19:00


 3/4/18 18:59  2/16/18 21:24


10 MG


 


 Spironolactone


  (Aldactone Tab)  50 mg  BID


 PO  2/2/18 21:00


 3/4/18 20:59 Future Hold  


 


 


 Insulin Aspart


  (novoLOG ASPART)  **SLIDING


 SCALE**


 **G...  ACHS


 SC  2/2/18 21:00


 3/4/18 20:59  2/16/18 17:19


3 UNITS


 


 Miscellaneous


  (Iv Fluids


 Completed)  1 ea  PRN  PRN


 N/A  2/2/18 19:30


 2/2/19 19:29   


 


 


 Furosemide 20 mg/


 Syringe  2 ml @ 4


 mls/min  BID


 IV  2/3/18 09:00


 3/4/18 20:59 Future Hold 2/3/18 20:51


4 MLS/MIN


 


 Pantoprazole


 Sodium


  (Protonix Tab)  40 mg  QAM


 PO  2/3/18 09:00


 3/5/18 08:59  2/16/18 07:49


40 MG


 


 Calcium Carbonate


  (Tums Chew Tab)  500 mg  Q2H  PRN


 PO  2/3/18 10:00


 3/5/18 09:59  2/16/18 12:28


500 MG


 


 Albuterol/


 Ipratropium


  (Duoneb)  3 ml  Q4R


 INH  2/4/18 22:42


 3/6/18 22:41  2/17/18 06:51


3 ML


 


 Furosemide


  (Lasix Tab)  20 mg  QAM


 PO  2/5/18 09:00


 3/7/18 08:59 Future Hold 2/6/18 08:13


20 MG


 


 Docusate Sodium


  (coLACE CAP)  100 mg  BID


 PO  2/7/18 11:30


 3/9/18 11:29  2/16/18 07:47


100 MG


 


 Polyethylene


  (Miralax Powder


 Packet)  17 gm  DAILY


 PO  2/7/18 11:30


 3/4/18 11:29  2/16/18 07:51


17 GM


 


 Labetalol HCl


  (Normodyne Tab)  200 mg  BID


 PO  2/8/18 21:00


 3/10/18 20:59  2/16/18 21:18


200 MG


 


 Iron Sucrose 100


 mg/Sodium Chloride  105 ml @ 


 420 mls/hr  DAILY


 IV  2/8/18 10:45


 2/17/18 09:14  2/16/18 08:55


420 MLS/HR


 


 Gabapentin


  (Neurontin Cap)  200 mg  HS


 PO  2/11/18 21:00


 3/4/18 20:59  2/16/18 21:17


200 MG


 


 Mirtazapine


  (Remeron Tab)  30 mg  HS


 PO  2/11/18 21:00


 3/4/18 20:59  2/16/18 21:18


30 MG


 


 Amlodipine


 Besylate


  (Norvasc Tab)  10 mg  QAM


 PO  2/16/18 09:00


 3/5/18 08:59  2/16/18 07:49


10 MG


 


 Torsemide


  (Demadex Tab)  20 mg  BID17


 PO  2/16/18 17:00


 3/18/18 16:59  2/16/18 16:54


20 MG


 


 Acetazolamide


  (Diamox Tab)  250 mg  BID17


 PO  2/16/18 09:00


 3/18/18 08:59  2/16/18 16:54


250 MG











Objective


Vital Signs











  Date Time  Temp Pulse Resp B/P (MAP) Pulse Ox O2 Delivery O2 Flow Rate FiO2


 


2/16/18 07:00  67 16  92 Nasal Cannula 5.0 


 


2/16/18 04:00     93 Nasal Cannula 5.0 


 


2/16/18 03:52  64 16  93 Nasal Cannula 5.0 


 


2/16/18 03:40 36.8 65 19 180/73 (108) 95 Nasal Cannula 5.0 


 


2/15/18 23:59      Nasal Cannula 6.0 


 


2/15/18 23:33 36.8 70 19 180/86 (117) 94 Nasal Cannula 6.0 


 


2/15/18 22:54  70 16  91 Nasal Cannula 5.0 


 


2/15/18 20:00      Nasal Cannula 6.0 


 


2/15/18 19:03  64 16  95 Nasal Cannula 6.0 


 


2/15/18 19:02 36.7 82 20 167/64 (98) 95 Nasal Cannula 7.0 





      Nebulizer  


 


2/15/18 16:11      Nasal Cannula 6.0 


 


2/15/18 16:00      Nasal Cannula 6.0 


 


2/15/18 15:38 36.8 72 18 168/73 (104) 94 Nasal Cannula 5.0 


 


2/15/18 14:27  68 16  94 Nasal Cannula 6.0 


 


2/15/18 12:00      Nasal Cannula 6.0 


 


2/15/18 11:38      Nasal Cannula 5.0 


 


2/15/18 11:07 36.9 69 18 181/80 (113) 94 Nasal Cannula 5.0 


 


2/15/18 11:06  72 16  93 Nasal Cannula 6.0 


 


2/15/18 08:13 37.0 73 18 176/83 (114) 93   


 


2/15/18 08:00      Nasal Cannula 6.0 











Physical Exam


Comments:


General Appearance:  WD/WN, no apparent distress, asleep, difficult to awaken 

upon entry


Eyes:  PERRL, EOMI


ENT:  hearing grossly normal, pharynx normal, + pertinent finding (MMM)


Neck:  supple, no JVD


Respiratory/Chest:  chest non-tender, no respiratory distress, no accessory 

muscle use, + pertinent finding (on 6 L via Oxymask patient has faint crackles 

in anterior fields, slightly diminished at bases, no wheezing or rales)


Cardiovascular:  normal rate and rhythm, no JVD, + systolic murmur (grade III/VI

)


Abdomen:  normal bowel sounds, non tender, soft


Extremities:  non-tender, no calf tenderness, + pertinent finding (2+ pitting 

edema in BLE.  Multiple wounds over BLE healing well.)


Neurologic/Psychiatric:  alert, normal mood/affect, oriented x 3, foot drop


Skin:  warm/dry





Laboratory Results





Last 24 Hours








Test


  2/15/18


11:05 2/15/18


15:54 2/15/18


20:49 2/16/18


05:42


 


Bedside Glucose 112 mg/dl  149 mg/dl  175 mg/dl  


 


Prothrombin Time    13.9 SECONDS 


 


Prothromb Time International


Ratio 


  


  


  1.3 


 


 


Sodium Level    143 mmol/L 


 


Potassium Level    3.5 mmol/L 


 


Chloride Level    101 mmol/L 


 


Carbon Dioxide Level    37 mmol/L 


 


Anion Gap    4.0 mmol/L 


 


Blood Urea Nitrogen    52 mg/dl 


 


Creatinine    1.35 mg/dl 


 


Est Creatinine Clear Calc


Drug Dose 


  


  


  69.8 ml/min 


 


 


Estimated GFR (


American) 


  


  


  65.7 


 


 


Estimated GFR (Non-


American 


  


  


  56.7 


 


 


BUN/Creatinine Ratio    38.2 


 


Random Glucose    126 mg/dl 


 


Calcium Level    8.1 mg/dl 


 


Test


  2/16/18


06:29 


  


  


 


 


Bedside Glucose 127 mg/dl    











Assessment and Plan


Patient is a 63 y/o male with a history of CAD, h/o MI s/p stents, HTN, HLD, 

diastolic CHF, COPD, DM II, anxiety, depression, bipolar disorder, anemia and 

GERD, who presented to ED because of worsening SOB x1 day





Acute on chronic diastolic CHF exacerbation:


- Cardiac enzymes negative x 3, ekg prn cp, O2 protocol, home O2 is 4 L 


- Strict I/Ox, daily weights - compared to last admission pt euvolemic.  

Physical exam remains stable-  no JVD, chronic mild pitting edema in BLE 

stable. 


   Cards recs likely be able to dc with lasix 40 mg PO BID along with 

spironolactone upon discharge.


-Now on demadex.


-Patient appears to be euvolemic, however, mental status has been waxing and 

wanin


-However for past 48 hors, patient has no longer been confused.


- Low sodium and low potassium diet, continue 1800 mL fluid restriction


- ECHO 12/23/17 w/ preserved EF and grade II diastolic CHF 


- HR has not been bradycardic in past 24 hours.





Respiratory acidosis


Improved on BIPAP.


Improved mental status.


will monitor patient.


Patient will require BIPAP at discharge





Elevated INR - 


-Now subtherapeutic.


-will resume once patient remains awake


-risk of falling, as patient has been intermittently confused





Anemia of chronic disease- STABLE:


- Hgb remained stable at 8.1 today - s/p 1 U PRBC on 2/7.  Pt energy level 

slightly improved


- Iron panel completed - continue IV iron (started 2/8), to boost counts. 


- repeat CBC QOD





Fatigue/Lethargy


- ammonia level > 32,  administered lactulose on 2/7 and pt had large BM- he is 

no longer confused or as fatigued.  


-Patient no longer appears to be lethargic, no lactulose was given this time. 

Unsure of the etiology. I believe this may be from his resp. acidosis.


-will monitor.





FITZ on CKD stage III


- baseline crt 1.6-  today 2.3 


- Nephrology consulted for proteinuria, - appreciate recs - feel this is due to 

diabetic nephropathy.  


- cont low potassium diet. 


-will contiue to diurese despite FITZ, as per nephro and cardio





Hyperkalemia


- Today is 4.7


 -improved after kaylexalate





b/l chronic diabetic wounds: 


- Following w/ wound care- wound care consulted 





CAD w/ cardiac stenting


HTN


HLD


- Plavix 75 mg daily, Norvasc 10 mg daily, Hydralazine 25 mg QID, Imdur 30 mg 

BID, and Toprol XL 50 mg BID, Lipitor 80 mg daily





T2DM w/ neuropathy:


- Continue Lantus 10 u HS 


- BSG ACHS and ISS


- Continue Gabapentin  





Chronic atrial thrombus: Continue Coumadin, follow PT/INR and adjust dose PRN 

for INR goal 2-3 


- see above, subtherapeutic





COPD without exacerbation: Continue home inhalers 





Bipolar disorder, anxiety, depression- STABLE: Depakote 2 g daily, Lexapro 20 

mg daily, Remeron 45 mg daily, and Klonopin





GI prophylaxis: Protonix daily 





DVT Prophylaxis: Coumadin





Code status: LEVEL I, FULL

## 2018-02-16 NOTE — PROGRESS NOTE
Subjective


Date of Service:


Feb 16, 2018.


Subjective


Patient reports feeling better today.


He has not been confused today.


Patient reports he will be ambulating more today.


Patient denies SOB, nausea, vomiting, diarrhea.





Problem List


Medical Problems:


(1) Acute CHF


Status: Acute  





(2) Acute coronary syndrome


Status: Acute  





(3) Acute headache


Status: Acute  





(4) Acute on chronic congestive heart failure


Status: Acute  





(5) Altered mental status


Status: Acute  





(6) Anemia


Status: Acute  





(7) Anemia


Status: Acute  





(8) Anemia


Status: Acute  





(9) Anginal pain


Status: Acute  





(10) Appendicitis


Status: Acute  





(11) Cellulitis


Status: Acute  





(12) Chest pain


Status: Acute  





(13) CHF (congestive heart failure)


Status: Acute  





(14) CHF (congestive heart failure)


Status: Acute  





(15) CHF (congestive heart failure)


Status: Acute  





(16) Congestive heart failure


Status: Acute  





(17) Congestive heart failure


Status: Acute  





(18) COPD (chronic obstructive pulmonary disease)


Status: Acute  





(19) Diabetes mellitus with hyperglycemia


Status: Acute  





(20) Dyspnea


Status: Acute  





(21) Elevated troponin


Status: Acute  





(22) Failure of outpatient treatment


Status: Acute  





(23) Hypoxia


Status: Acute  





(24) Hypoxia


Status: Acute  





(25) Intra-abdominal abscess


Status: Acute  





(26) Lower abdominal pain of unknown etiology


Status: Acute  





(27) Neck pain


Status: Acute  





(28) Pneumonia


Status: Acute  





(29) Pneumonia


Status: Acute  





(30) Precordial chest pain


Status: Acute  





(31) Pulmonary edema


Status: Acute  





(32) Respiratory acidosis


Status: Acute  





(33) Respiratory distress


Status: Acute  





(34) SOB (shortness of breath)


Status: Acute  





(35) SOB (shortness of breath)


Status: Acute  





(36) Tachypnea


Status: Acute  











Review of Systems


Constitutional:  No fever


Eyes:  No worsening of vision


ENT:  No hearing loss


Respiratory:  No cough


Cardiac:  No chest pain


Abdomen:  No pain


Neurologic:  No memory loss


Psychiatric:  No depression symptoms


Endo:  No fatigue


Skin:  No rash


All Other Systems:  Reviewed and Negative





Medications





Current Inpatient Medications








 Medications


  (Trade)  Dose


 Ordered  Sig/Dc


 Route  Start Time


 Stop Time Status Last Admin


Dose Admin


 


 Acetaminophen


  (Tylenol Tab)  650 mg  Q4H  PRN


 PO  2/2/18 19:00


 3/4/18 18:59  2/16/18 16:52


650 MG


 


 Al Hydrox/Mg


 Hydrox/Simethicone


  (Maalox Max Susp)  15 ml  Q4H  PRN


 PO  2/2/18 19:00


 3/4/18 18:59  2/10/18 16:14


15 ML


 


 Magnesium


 Hydroxide


  (Milk Of


 Magnesia Susp)  30 ml  Q12H  PRN


 PO  2/2/18 19:00


 3/4/18 18:59   


 


 


 Ondansetron HCl


  (Zofran Inj)  4 mg  Q6H  PRN


 IV  2/2/18 19:00


 3/4/18 18:59   


 


 


 Nitroglycerin


  (Nitrostat Tab)  0.4 mg  UD  PRN


 SL  2/2/18 19:00


 3/4/18 18:59   


 


 


 Atorvastatin


 Calcium


  (Lipitor Tab)  80 mg  DAILY


 PO  2/3/18 09:00


 3/5/18 08:59  2/16/18 07:48


80 MG


 


 Clopidogrel


 Bisulfate


  (plAVix TAB)  75 mg  QAM


 PO  2/3/18 09:00


 3/5/18 08:59  2/16/18 07:47


75 MG


 


 Divalproex Sodium


  (Depakote Delay


 Rel Tab)  2,000 mg  HS


 PO  2/2/18 21:00


 3/4/18 20:59  2/16/18 21:17


2,000 MG


 


 Escitalopram


 Oxalate


  (Lexapro Tab)  20 mg  QAM


 PO  2/3/18 09:00


 3/5/18 08:59  2/16/18 07:47


20 MG


 


 Fluticasone


 Propionate


  (Flonase Nasal


 Spray)  2 sprays  DAILY  PRN


 VERONICA  2/2/18 19:00


 3/4/18 18:59   


 


 


 Insulin Glargine


  (Lantus Solostar


 Pen)  10 units  HS


 SC  2/2/18 21:00


 3/4/18 20:59  2/16/18 21:22


10 UNITS


 


 Isosorbide


 Mononitrate


  (Imdur Ext Rel


 Tab)  30 mg  QAM


 PO  2/3/18 09:00


 3/5/18 08:59  2/16/18 07:49


30 MG


 


 Tamsulosin HCl


  (Flomax Cap)  0.4 mg  HS


 PO  2/2/18 21:00


 3/4/18 20:59 Future Hold 2/14/18 21:40


0.4 MG


 


 Tiotropium Bromide


  (Spiriva


 Handihaler


 Inhaler)  1 puff  DAILY


 INH  2/3/18 09:00


 3/5/18 08:59  2/16/18 07:50


1 PUFF


 


 Albuterol


  (Ventolin Hfa


 Inhaler)  2 puffs  Q4H  PRN


 INH  2/2/18 20:45


 3/4/18 20:44  2/4/18 21:55


2 PUFFS


 


 Cyanocobalamin


  (Vitamin B-12


 Tab)  1,000 mcg  DAILY


 PO  2/3/18 09:00


 3/5/18 08:59  2/16/18 07:51


1,000 MCG


 


 Ferrous Sulfate


  (Feosol Tab)  325 mg  BIDM


 PO  2/3/18 07:30


 3/5/18 07:59  2/16/18 16:52


325 MG


 


 Oxycodone HCl


  (Roxicodone


 Immediate Rel Tab)  10 mg  Q12  PRN


 PO  2/2/18 19:00


 3/4/18 18:59  2/16/18 21:24


10 MG


 


 Spironolactone


  (Aldactone Tab)  50 mg  BID


 PO  2/2/18 21:00


 3/4/18 20:59 Future Hold  


 


 


 Insulin Aspart


  (novoLOG ASPART)  **SLIDING


 SCALE**


 **G...  ACHS


 SC  2/2/18 21:00


 3/4/18 20:59  2/16/18 17:19


3 UNITS


 


 Miscellaneous


  (Iv Fluids


 Completed)  1 ea  PRN  PRN


 N/A  2/2/18 19:30


 2/2/19 19:29   


 


 


 Furosemide 20 mg/


 Syringe  2 ml @ 4


 mls/min  BID


 IV  2/3/18 09:00


 3/4/18 20:59 Future Hold 2/3/18 20:51


4 MLS/MIN


 


 Pantoprazole


 Sodium


  (Protonix Tab)  40 mg  QAM


 PO  2/3/18 09:00


 3/5/18 08:59  2/16/18 07:49


40 MG


 


 Calcium Carbonate


  (Tums Chew Tab)  500 mg  Q2H  PRN


 PO  2/3/18 10:00


 3/5/18 09:59  2/16/18 12:28


500 MG


 


 Albuterol/


 Ipratropium


  (Duoneb)  3 ml  Q4R


 INH  2/4/18 22:42


 3/6/18 22:41  2/17/18 06:51


3 ML


 


 Furosemide


  (Lasix Tab)  20 mg  QAM


 PO  2/5/18 09:00


 3/7/18 08:59 Future Hold 2/6/18 08:13


20 MG


 


 Docusate Sodium


  (coLACE CAP)  100 mg  BID


 PO  2/7/18 11:30


 3/9/18 11:29  2/16/18 07:47


100 MG


 


 Polyethylene


  (Miralax Powder


 Packet)  17 gm  DAILY


 PO  2/7/18 11:30


 3/4/18 11:29  2/16/18 07:51


17 GM


 


 Labetalol HCl


  (Normodyne Tab)  200 mg  BID


 PO  2/8/18 21:00


 3/10/18 20:59  2/16/18 21:18


200 MG


 


 Iron Sucrose 100


 mg/Sodium Chloride  105 ml @ 


 420 mls/hr  DAILY


 IV  2/8/18 10:45


 2/17/18 09:14  2/16/18 08:55


420 MLS/HR


 


 Gabapentin


  (Neurontin Cap)  200 mg  HS


 PO  2/11/18 21:00


 3/4/18 20:59  2/16/18 21:17


200 MG


 


 Mirtazapine


  (Remeron Tab)  30 mg  HS


 PO  2/11/18 21:00


 3/4/18 20:59  2/16/18 21:18


30 MG


 


 Amlodipine


 Besylate


  (Norvasc Tab)  10 mg  QAM


 PO  2/16/18 09:00


 3/5/18 08:59  2/16/18 07:49


10 MG


 


 Torsemide


  (Demadex Tab)  20 mg  BID17


 PO  2/16/18 17:00


 3/18/18 16:59  2/16/18 16:54


20 MG


 


 Acetazolamide


  (Diamox Tab)  250 mg  BID17


 PO  2/16/18 09:00


 3/18/18 08:59  2/16/18 16:54


250 MG


 


 Warfarin Sodium


  (Coumadin Tab)  10 mg  NOW  ONCE


 PO  2/17/18 07:15


 2/17/18 07:16   


 











Objective


Vital Signs











  Date Time  Temp Pulse Resp B/P (MAP) Pulse Ox O2 Delivery O2 Flow Rate FiO2


 


2/16/18 20:00     96 Nasal Cannula 4.0 


 


2/16/18 20:00 36.8 63 22 176/81 (112) 96 Nasal Cannula 4.0 





  63      


 


2/16/18 19:27  66 14  95 Nasal Cannula 5.0 


 


2/16/18 18:48 36.8 63 22 176/81 (112) 96 Nasal Cannula 4.0 


 


2/16/18 16:00     93 Nasal Cannula 4.0 


 


2/16/18 15:09 36.8 70 23 145/63 (90) 93 Nasal Cannula 5.0 


 


2/16/18 14:20  63 16  94 Nasal Cannula 4.0 


 


2/16/18 12:00      Nasal Cannula 5.0 


 


2/16/18 11:44 36.8 59 18 154/79 (104) 97   


 


2/16/18 11:30  62 16  94 Nasal Cannula 5.0 


 


2/16/18 09:00  70   94   


 


2/16/18 08:00      Nasal Cannula 5.0 


 


2/16/18 07:39 37.0 60 16 169/76 (107) 96   


 


2/16/18 07:00  67 16  92 Nasal Cannula 5.0 


 


2/16/18 04:00     93 Nasal Cannula 5.0 


 


2/16/18 03:52  64 16  93 Nasal Cannula 5.0 


 


2/16/18 03:40 36.8 65 19 180/73 (108) 95 Nasal Cannula 5.0 


 


2/15/18 23:59      Nasal Cannula 6.0 


 


2/15/18 23:33 36.8 70 19 180/86 (117) 94 Nasal Cannula 6.0 











Physical Exam


Eyes:  + pertinent finding


Comments:


General Appearance:  WD/WN, no apparent distress, asleep, difficult to awaken 

upon entry


Eyes:  PERRL, EOMI


ENT:  hearing grossly normal, pharynx normal, + pertinent finding (MMM)


Neck:  supple, no JVD


Respiratory/Chest:  chest non-tender, no respiratory distress, no accessory 

muscle use, + pertinent finding (on 6 L via Oxymask patient has faint crackles 

in anterior fields, slightly diminished at bases, no wheezing or rales)


Cardiovascular:  normal rate and rhythm, no JVD, + systolic murmur (grade III/VI

)


Abdomen:  normal bowel sounds, non tender, soft


Extremities:  non-tender, no calf tenderness, + pertinent finding (2+ pitting 

edema in BLE.  Multiple wounds over BLE healing well.)


Neurologic/Psychiatric:  alert, normal mood/affect, oriented x 3


Skin:  warm/dry





Laboratory Results





Last 24 Hours








Test


  2/16/18


05:42 2/16/18


06:29 2/16/18


11:16 2/16/18


16:04


 


Prothrombin Time 13.9 SECONDS    


 


Prothromb Time International


Ratio 1.3 


  


  


  


 


 


Sodium Level 143 mmol/L    


 


Potassium Level 3.5 mmol/L    


 


Chloride Level 101 mmol/L    


 


Carbon Dioxide Level 37 mmol/L    


 


Anion Gap 4.0 mmol/L    


 


Blood Urea Nitrogen 52 mg/dl    


 


Creatinine 1.35 mg/dl    


 


Est Creatinine Clear Calc


Drug Dose 69.8 ml/min 


  


  


  


 


 


Estimated GFR (


American) 65.7 


  


  


  


 


 


Estimated GFR (Non-


American 56.7 


  


  


  


 


 


BUN/Creatinine Ratio 38.2    


 


Random Glucose 126 mg/dl    


 


Calcium Level 8.1 mg/dl    


 


Bedside Glucose  127 mg/dl  75 mg/dl  135 mg/dl 


 


Test


  2/16/18


20:08 


  


  


 


 


Bedside Glucose 138 mg/dl    











Assessment and Plan


Patient is a 63 y/o male with a history of CAD, h/o MI s/p stents, HTN, HLD, 

diastolic CHF, COPD, DM II, anxiety, depression, bipolar disorder, anemia and 

GERD, who presented to ED because of worsening SOB x1 day





Acute on chronic diastolic CHF exacerbation:


- Cardiac enzymes negative x 3, ekg prn cp, O2 protocol, home O2 is 4 L 


- Strict I/Ox, daily weights - compared to last admission pt euvolemic.  

Physical exam remains stable-  no JVD, chronic mild pitting edema in BLE 

stable. 


   Cards recs likely be able to dc with lasix 40 mg PO BID along with 

spironolactone upon discharge.


-Now on demadex.


-Patient appears to be euvolemic, however, mental status has been waxing and 

wanin


-However for past 72 hours, patient has no longer been confused.


- Low sodium and low potassium diet, continue 1800 mL fluid restriction


- ECHO 12/23/17 w/ preserved EF and grade II diastolic CHF 


- HR has not been bradycardic in past 24 hours.





Respiratory acidosis


Improved on BIPAP.


Improved mental status.


will monitor patient.


Patient will require BIPAP at discharge





Elevated INR - 


-Now subtherapeutic.


-will restart Coumadin as patient has not been confused for past 72 hours.





Anemia of chronic disease- STABLE:


- Hgb remained stable at 8.1 today - s/p 1 U PRBC on 2/7.  Pt energy level 

slightly improved


- Iron panel completed - continue IV iron (started 2/8), to boost counts. 


- repeat CBC QOD





Fatigue/Lethargy


- ammonia level > 32,  administered lactulose on 2/7 and pt had large BM- he is 

no longer confused or as fatigued.  


-Patient no longer appears to be lethargic, no lactulose was given this time. 

Unsure of the etiology. I believe this may be from his resp. acidosis.


-will monitor.





FITZ on CKD stage III


- baseline crt 1.6-  today 1.3


- Nephrology consulted for proteinuria, - appreciate recs - feel this is due to 

diabetic nephropathy.  


- cont low potassium diet. 


-will continue to diurese  as per nephro and cardio





Hyperkalemia


- Today is 4.7


 -improved after kaylexalate





b/l chronic diabetic wounds: 


- Following w/ wound care- wound care consulted 





CAD w/ cardiac stenting


HTN


HLD


- Plavix 75 mg daily, Norvasc 10 mg daily, Hydralazine 25 mg QID, Imdur 30 mg 

BID, and Toprol XL 50 mg BID, Lipitor 80 mg daily





T2DM w/ neuropathy:


- Continue Lantus 10 u HS 


- BSG ACHS and ISS


- Continue Gabapentin  





Chronic atrial thrombus: Continue Coumadin, follow PT/INR and adjust dose PRN 

for INR goal 2-3 


- see above, subtherapeutic





COPD without exacerbation: Continue home inhalers 





Bipolar disorder, anxiety, depression- STABLE: Depakote 2 g daily, Lexapro 20 

mg daily, Remeron 45 mg daily, and Klonopin





GI prophylaxis: Protonix daily 





DVT Prophylaxis: Coumadin





Code status: LEVEL I, FULL








DISP:


Insurance denies skilled nursing facility despite patient being SOB, requiring 

assistance during pT and being fatigued.


I epxplain this to the insurance but he was still denied.


Patient will require BIPAP at home.


Ordered pvernight pulse oxymetry, and ABG ion AM while withholding bipap





Will require a smlaller dose of coumadin at DIscharge. Possibly alternating 

between 6 and 7mg daily. Will give a large dose of 10 of coumadin first.


Continued [facility] stay due to:  other


Discharge planning:  uncertain

## 2018-02-17 VITALS — OXYGEN SATURATION: 94 % | SYSTOLIC BLOOD PRESSURE: 179 MMHG | HEART RATE: 65 BPM | DIASTOLIC BLOOD PRESSURE: 85 MMHG

## 2018-02-17 VITALS — HEART RATE: 58 BPM | OXYGEN SATURATION: 94 %

## 2018-02-17 VITALS — HEART RATE: 69 BPM | OXYGEN SATURATION: 94 %

## 2018-02-17 VITALS
TEMPERATURE: 97.7 F | HEART RATE: 58 BPM | OXYGEN SATURATION: 94 % | SYSTOLIC BLOOD PRESSURE: 151 MMHG | DIASTOLIC BLOOD PRESSURE: 72 MMHG

## 2018-02-17 VITALS
SYSTOLIC BLOOD PRESSURE: 151 MMHG | DIASTOLIC BLOOD PRESSURE: 72 MMHG | HEART RATE: 60 BPM | OXYGEN SATURATION: 95 % | TEMPERATURE: 97.7 F

## 2018-02-17 VITALS
SYSTOLIC BLOOD PRESSURE: 164 MMHG | OXYGEN SATURATION: 97 % | DIASTOLIC BLOOD PRESSURE: 76 MMHG | TEMPERATURE: 98.24 F | HEART RATE: 63 BPM

## 2018-02-17 VITALS
TEMPERATURE: 98.24 F | HEART RATE: 59 BPM | SYSTOLIC BLOOD PRESSURE: 161 MMHG | DIASTOLIC BLOOD PRESSURE: 63 MMHG | OXYGEN SATURATION: 91 %

## 2018-02-17 VITALS
TEMPERATURE: 97.88 F | SYSTOLIC BLOOD PRESSURE: 177 MMHG | OXYGEN SATURATION: 95 % | HEART RATE: 61 BPM | DIASTOLIC BLOOD PRESSURE: 75 MMHG

## 2018-02-17 VITALS — OXYGEN SATURATION: 95 % | HEART RATE: 59 BPM

## 2018-02-17 VITALS — HEART RATE: 59 BPM | OXYGEN SATURATION: 97 %

## 2018-02-17 VITALS — OXYGEN SATURATION: 96 % | HEART RATE: 63 BPM

## 2018-02-17 VITALS
SYSTOLIC BLOOD PRESSURE: 181 MMHG | HEART RATE: 65 BPM | DIASTOLIC BLOOD PRESSURE: 86 MMHG | OXYGEN SATURATION: 95 % | TEMPERATURE: 97.88 F

## 2018-02-17 LAB
BUN SERPL-MCNC: 47 MG/DL (ref 7–18)
CALCIUM SERPL-MCNC: 8.2 MG/DL (ref 8.5–10.1)
CO2 SERPL-SCNC: 33 MMOL/L (ref 21–32)
CREAT SERPL-MCNC: 1.38 MG/DL (ref 0.6–1.4)
GLUCOSE SERPL-MCNC: 120 MG/DL (ref 70–99)
INR PPP: 1.3 (ref 0.9–1.1)
POTASSIUM SERPL-SCNC: 3.6 MMOL/L (ref 3.5–5.1)
SODIUM SERPL-SCNC: 144 MMOL/L (ref 136–145)

## 2018-02-17 RX ADMIN — TIOTROPIUM BROMIDE SCH PUFF: 18 CAPSULE ORAL; RESPIRATORY (INHALATION) at 07:40

## 2018-02-17 RX ADMIN — IPRATROPIUM BROMIDE AND ALBUTEROL SULFATE SCH ML: .5; 3 SOLUTION RESPIRATORY (INHALATION) at 03:13

## 2018-02-17 RX ADMIN — IPRATROPIUM BROMIDE AND ALBUTEROL SULFATE SCH ML: .5; 3 SOLUTION RESPIRATORY (INHALATION) at 06:51

## 2018-02-17 RX ADMIN — INSULIN ASPART SCH UNITS: 100 INJECTION, SOLUTION INTRAVENOUS; SUBCUTANEOUS at 07:43

## 2018-02-17 RX ADMIN — INSULIN ASPART SCH UNITS: 100 INJECTION, SOLUTION INTRAVENOUS; SUBCUTANEOUS at 22:01

## 2018-02-17 RX ADMIN — PANTOPRAZOLE SCH MG: 40 TABLET, DELAYED RELEASE ORAL at 07:39

## 2018-02-17 RX ADMIN — TORSEMIDE SCH MG: 20 TABLET ORAL at 17:49

## 2018-02-17 RX ADMIN — DIVALPROEX SODIUM SCH MG: 500 TABLET, DELAYED RELEASE ORAL at 21:40

## 2018-02-17 RX ADMIN — IRON SUCROSE SCH MLS/HR: 20 INJECTION, SOLUTION INTRAVENOUS at 08:48

## 2018-02-17 RX ADMIN — FERROUS SULFATE TAB EC 325 MG (65 MG FE EQUIVALENT) SCH MG: 325 (65 FE) TABLET DELAYED RESPONSE at 17:50

## 2018-02-17 RX ADMIN — LABETALOL HCL SCH MG: 200 TABLET, FILM COATED ORAL at 21:45

## 2018-02-17 RX ADMIN — INSULIN ASPART SCH UNITS: 100 INJECTION, SOLUTION INTRAVENOUS; SUBCUTANEOUS at 11:43

## 2018-02-17 RX ADMIN — INSULIN GLARGINE SCH UNITS: 100 INJECTION, SOLUTION SUBCUTANEOUS at 22:02

## 2018-02-17 RX ADMIN — IPRATROPIUM BROMIDE AND ALBUTEROL SULFATE SCH ML: .5; 3 SOLUTION RESPIRATORY (INHALATION) at 11:10

## 2018-02-17 RX ADMIN — INSULIN ASPART SCH UNITS: 100 INJECTION, SOLUTION INTRAVENOUS; SUBCUTANEOUS at 18:04

## 2018-02-17 RX ADMIN — ISOSORBIDE MONONITRATE SCH MG: 30 TABLET, EXTENDED RELEASE ORAL at 07:39

## 2018-02-17 RX ADMIN — LABETALOL HCL SCH MG: 200 TABLET, FILM COATED ORAL at 07:39

## 2018-02-17 RX ADMIN — AMLODIPINE BESYLATE SCH MG: 5 TABLET ORAL at 07:38

## 2018-02-17 RX ADMIN — Medication SCH GM: at 07:40

## 2018-02-17 RX ADMIN — DOCUSATE SODIUM SCH MG: 100 CAPSULE, LIQUID FILLED ORAL at 21:41

## 2018-02-17 RX ADMIN — MIRTAZAPINE SCH MG: 15 TABLET, FILM COATED ORAL at 21:40

## 2018-02-17 RX ADMIN — DOCUSATE SODIUM SCH MG: 100 CAPSULE, LIQUID FILLED ORAL at 07:38

## 2018-02-17 RX ADMIN — GABAPENTIN SCH MG: 100 CAPSULE ORAL at 21:41

## 2018-02-17 RX ADMIN — TORSEMIDE SCH MG: 20 TABLET ORAL at 07:40

## 2018-02-17 RX ADMIN — IPRATROPIUM BROMIDE AND ALBUTEROL SULFATE SCH ML: .5; 3 SOLUTION RESPIRATORY (INHALATION) at 19:41

## 2018-02-17 RX ADMIN — FERROUS SULFATE TAB EC 325 MG (65 MG FE EQUIVALENT) SCH MG: 325 (65 FE) TABLET DELAYED RESPONSE at 07:39

## 2018-02-17 RX ADMIN — ATORVASTATIN CALCIUM SCH MG: 40 TABLET, FILM COATED ORAL at 07:38

## 2018-02-17 RX ADMIN — INSULIN ASPART SCH UNITS: 100 INJECTION, SOLUTION INTRAVENOUS; SUBCUTANEOUS at 18:02

## 2018-02-17 RX ADMIN — IPRATROPIUM BROMIDE AND ALBUTEROL SULFATE SCH ML: .5; 3 SOLUTION RESPIRATORY (INHALATION) at 14:33

## 2018-02-17 RX ADMIN — ESCITALOPRAM OXALATE SCH MG: 20 TABLET, FILM COATED ORAL at 07:39

## 2018-02-17 RX ADMIN — IPRATROPIUM BROMIDE AND ALBUTEROL SULFATE SCH ML: .5; 3 SOLUTION RESPIRATORY (INHALATION) at 23:13

## 2018-02-17 RX ADMIN — Medication SCH MCG: at 07:40

## 2018-02-17 RX ADMIN — CLOPIDOGREL BISULFATE SCH MG: 75 TABLET, FILM COATED ORAL at 07:38

## 2018-02-17 RX ADMIN — OXYCODONE HYDROCHLORIDE PRN MG: 5 TABLET ORAL at 21:54

## 2018-02-17 NOTE — PROGRESS NOTE
DATE: 02/17/2018

 

HISTORY OF PRESENT ILLNESS:  Mr. Dixon is a 62-year-old obese white male

with a history of CAD status post intracoronary stents, peripheral arterial

disease, COPD, depression, bipolar disorder, GERD, chronic anemia, type 2

diabetes mellitus, dyslipidemia, hypertension and gout who was admitted

acutely on 02/02/2018 with acute on chronic diastolic CHF, respiratory

acidosis, and acute renal insufficiency.  He has been gradually improving,

and appears compensated from a heart failure standpoint.  Referral was made

to [facility], however, the insurance denies admission to a skilled nursing

facility and recommend that he go home with home health.

 

The patient is feeling significantly better than he did on admission.  He

denies any shortness of breath at rest, orthopnea or PND.  He denies any

chest pain, heaviness, tightness, or pressure.  No angina pectoris.  He

denies any palpitations, syncope, or near syncope.

 

His mental status has improved, but he still has tremor once in a while.

 

His body weight is down to 95.8 kilograms (down 5.9 kilograms since

admission) and he has a negative fluid balance of 1445 mL overnight.

 

Additionally, patient underwent ABGs this morning which showed a pH of 7.35,

pCO2 64, pO2 of 80 and FiO2 40% BiPAP.  Overnight pulse oximetry revealed

several oxygen desaturations with 130 total hypoxic events, for a total

duration of 69.4 minutes with an average event duration of 32 seconds.  Basal

SpO2 was 89.7% and he spent 107.3 minutes at less than 88% SpO2.  Minimum

SpO2 was 57, average low SpO2 was 79.9%.  Therefore, the patient qualifies

for BIPAP at home.  Once those arrangements are in place, patient may be

discharged to home.

 

The patient offers no other complaints or concerns.

 

MEDICATIONS:

1.  Torsemide 20 mg b.i.d.

2.  Amlodipine 10 mg q.a.m.

3.  Diamox 250 mg b.i.d.

4.  Neurontin 200 mg at bedtime.

5.  Mirtazapine 30 mg q.

6.  Labetalol 200 mg b.i.d.

7.  IV iron sucrose daily.

8.  Colace 100 mg b.i.d.

9.  MiraLax powder 70 grams daily p.r.n.

10.  DuoNeb nebulizers q. 4 hours.

11.  Calcium carbonate 500 mg q. 2 hours p.r.n. for heartburn.

12.  Lipitor 80 mg daily.

13.  Plavix 75 mg daily.

14.  Lexapro 20 mg daily.

15.  Imdur 30 mg daily.

16.  Spiriva HandiHaler 1 puff daily.

17.  Vitamin B12 1000 mcg daily.

18.  Protonix 40 mg a day.

19.  Ferrous sulfate 325 mg b.i.d.

20.  Depakote 2000 mg at bedtime

21.  Lantus 10 units subcutaneous injection at bedtime.

22.  NovoLog sliding scale insulin.

23.  Ventolin inhaler 2 puffs p.o. q. 4 hours p.r.n. for shortness of breath

or wheezing.

24.  Tylenol p.r.n.

25.  Maalox Max p.r.n.

26.  Milk of magnesia p.r.n.

27.  Zofran p.r.n.

28.  Sublingual nitroglycerin p.r.n.

29.  Flonase nasal spray 2 sprays in each nostril daily as needed.

30.  OxyIR 2 mg p.o. q. 12 hours p.r.n. for severe pain.

31.  Warfarin 10 mg x1 dose thus far.

 

PHYSICAL EXAMINATION:

VITAL SIGNS:  Temperature is 36.8 degrees Celsius, pulse 63 and regular,

respiratory rate is 18 and unlabored, blood pressure was 164/76.  SpO2 is 97%

on 5 liters oxygen via nasal cannula.  I&O's -1445 mL overnight.  Body weight

is down 5.9 kilograms from admission.

GENERAL:  The patient in no acute distress.

HEENT:  Head is atraumatic, normocephalic.  EOMs intact.  Sclerae are

anicteric.  Face asymmetric.  No perioral cyanosis.

NECK:  Without JVD.  Jugular venous pressure is at the level of the clavicle.

CHEST AND LUNGS:  With faint crackles at the left base, mildly diminished

breath sounds throughout, otherwise clear.

CARDIOVASCULAR:  S1 and S2 are regular with a grade 2/6 basal systolic murmur

best heard at the second intercostal space, radiates to the suprasternal

notch and left sternal border.  No diastolic murmurs appreciated.  No obvious

gallops or rubs.  PMI is nondisplaced.  No lifts, heaves, or thrills.  No

abdominal, aortic or renal bruits.

ABDOMEN:  Bowel sounds present.

EXTREMITIES:  Nontender.  Calves are nontender.  There is +2 pitting edema in

the legs with multiple wounds over bilateral lower extremities, they appear

to be healing.

NEUROLOGIC:  The patient is awake, alert and oriented.  Pleasant and

cooperative.  Answers questions appropriately.  Speech is clear.  Normal

movement in the upper extremities.  Gait pattern not assessed.

 

ASSESSMENT:

1.  Acute on chronic diastolic congestive heart failure.

2.  Hypoxemia improved, but still desaturating overnight, qualifies for

BiPAP.

3.  Respiratory acidosis improved on BiPAP.  Improvement status as well.  He

will need BiPAP arranged for going home.

4.  Anemia of chronic disease, stable.

5.  Acute on chronic renal insufficiency, markedly improved.

6.  Hyperkalemia, resolved.

7.  Coronary artery disease, status post intracoronary stents.

8.  Hypertension.

9.  Dyslipidemia.

10.  Type 2 diabetes mellitus.

11.  Chronic atrial thrombus, on chronic Coumadin.

12.  Chronic obstructive pulmonary disease.

13.  Bipolar disorder.

 

PLAN:

1.  Once arrangements are made for home BiPAP, the patient will be discharged

to home with home health services.

2.  Continue supplemental oxygen during the day, BIPAP at night.

3.  Continue Demadex 20 mg p.o. b.i.d. upon discharge.

4.  Continue acetazolamide as per nephrology.

5.  Continue Norvasc 10 mg daily.

6.  Continue labetalol 200 mg p.o. b.i.d.

7.  Continue Lipitor 80 mg daily.

8.  Continue long-term Plavix.

9.  Continue Lexapro 20 mg daily.

10.  Continue Imdur 30 mg daily.

11.  Continue Spiriva and albuterol as directed.

12.  Continue Protonix 40 mg daily for GI prophylaxis.

13.  Maintain a low sodium, low potassium diet.  1800 mL fluid restriction.

14.  Closely monitor daily weights.

15.  Continue usual psychotropic medications.

16.  The patient should be enrolled in the heart failure clinic and followed

closely as an outpatient.

15.  We will continue to follow closely.

 

 

 

 

ALBERTD

## 2018-02-18 VITALS
OXYGEN SATURATION: 93 % | SYSTOLIC BLOOD PRESSURE: 168 MMHG | TEMPERATURE: 97.7 F | HEART RATE: 67 BPM | DIASTOLIC BLOOD PRESSURE: 68 MMHG

## 2018-02-18 VITALS
OXYGEN SATURATION: 91 % | SYSTOLIC BLOOD PRESSURE: 150 MMHG | TEMPERATURE: 98.06 F | DIASTOLIC BLOOD PRESSURE: 72 MMHG | HEART RATE: 68 BPM

## 2018-02-18 VITALS — HEART RATE: 64 BPM | OXYGEN SATURATION: 94 %

## 2018-02-18 VITALS
OXYGEN SATURATION: 96 % | HEART RATE: 59 BPM | SYSTOLIC BLOOD PRESSURE: 148 MMHG | TEMPERATURE: 97.7 F | DIASTOLIC BLOOD PRESSURE: 61 MMHG

## 2018-02-18 VITALS — HEART RATE: 63 BPM | OXYGEN SATURATION: 98 %

## 2018-02-18 VITALS
TEMPERATURE: 98.06 F | OXYGEN SATURATION: 98 % | DIASTOLIC BLOOD PRESSURE: 64 MMHG | HEART RATE: 64 BPM | SYSTOLIC BLOOD PRESSURE: 146 MMHG

## 2018-02-18 VITALS — HEART RATE: 62 BPM | OXYGEN SATURATION: 79 %

## 2018-02-18 VITALS — OXYGEN SATURATION: 93 % | HEART RATE: 62 BPM

## 2018-02-18 LAB — INR PPP: 1.3 (ref 0.9–1.1)

## 2018-02-18 RX ADMIN — DOCUSATE SODIUM SCH MG: 100 CAPSULE, LIQUID FILLED ORAL at 20:52

## 2018-02-18 RX ADMIN — TIOTROPIUM BROMIDE SCH PUFF: 18 CAPSULE ORAL; RESPIRATORY (INHALATION) at 09:07

## 2018-02-18 RX ADMIN — TORSEMIDE SCH MG: 20 TABLET ORAL at 07:32

## 2018-02-18 RX ADMIN — INSULIN ASPART SCH UNITS: 100 INJECTION, SOLUTION INTRAVENOUS; SUBCUTANEOUS at 21:00

## 2018-02-18 RX ADMIN — FERROUS SULFATE TAB EC 325 MG (65 MG FE EQUIVALENT) SCH MG: 325 (65 FE) TABLET DELAYED RESPONSE at 18:05

## 2018-02-18 RX ADMIN — ACETAMINOPHEN PRN MG: 325 TABLET ORAL at 11:59

## 2018-02-18 RX ADMIN — PANTOPRAZOLE SCH MG: 40 TABLET, DELAYED RELEASE ORAL at 07:34

## 2018-02-18 RX ADMIN — Medication SCH GM: at 07:33

## 2018-02-18 RX ADMIN — INSULIN GLARGINE SCH UNITS: 100 INJECTION, SOLUTION SUBCUTANEOUS at 21:03

## 2018-02-18 RX ADMIN — CLOPIDOGREL BISULFATE SCH MG: 75 TABLET, FILM COATED ORAL at 07:34

## 2018-02-18 RX ADMIN — LABETALOL HCL SCH MG: 200 TABLET, FILM COATED ORAL at 20:53

## 2018-02-18 RX ADMIN — IPRATROPIUM BROMIDE AND ALBUTEROL SULFATE SCH ML: .5; 3 SOLUTION RESPIRATORY (INHALATION) at 20:06

## 2018-02-18 RX ADMIN — IPRATROPIUM BROMIDE AND ALBUTEROL SULFATE SCH ML: .5; 3 SOLUTION RESPIRATORY (INHALATION) at 03:26

## 2018-02-18 RX ADMIN — ISOSORBIDE MONONITRATE SCH MG: 30 TABLET, EXTENDED RELEASE ORAL at 07:33

## 2018-02-18 RX ADMIN — OXYCODONE HYDROCHLORIDE PRN MG: 5 TABLET ORAL at 20:57

## 2018-02-18 RX ADMIN — DOCUSATE SODIUM SCH MG: 100 CAPSULE, LIQUID FILLED ORAL at 07:32

## 2018-02-18 RX ADMIN — INSULIN ASPART SCH UNITS: 100 INJECTION, SOLUTION INTRAVENOUS; SUBCUTANEOUS at 07:51

## 2018-02-18 RX ADMIN — ATORVASTATIN CALCIUM SCH MG: 40 TABLET, FILM COATED ORAL at 07:33

## 2018-02-18 RX ADMIN — FERROUS SULFATE TAB EC 325 MG (65 MG FE EQUIVALENT) SCH MG: 325 (65 FE) TABLET DELAYED RESPONSE at 09:07

## 2018-02-18 RX ADMIN — GABAPENTIN SCH MG: 100 CAPSULE ORAL at 20:53

## 2018-02-18 RX ADMIN — ESCITALOPRAM OXALATE SCH MG: 20 TABLET, FILM COATED ORAL at 07:34

## 2018-02-18 RX ADMIN — IPRATROPIUM BROMIDE AND ALBUTEROL SULFATE SCH ML: .5; 3 SOLUTION RESPIRATORY (INHALATION) at 07:17

## 2018-02-18 RX ADMIN — TORSEMIDE SCH MG: 20 TABLET ORAL at 18:05

## 2018-02-18 RX ADMIN — INSULIN ASPART SCH UNITS: 100 INJECTION, SOLUTION INTRAVENOUS; SUBCUTANEOUS at 18:03

## 2018-02-18 RX ADMIN — MIRTAZAPINE SCH MG: 15 TABLET, FILM COATED ORAL at 20:53

## 2018-02-18 RX ADMIN — IPRATROPIUM BROMIDE AND ALBUTEROL SULFATE SCH ML: .5; 3 SOLUTION RESPIRATORY (INHALATION) at 14:58

## 2018-02-18 RX ADMIN — IPRATROPIUM BROMIDE AND ALBUTEROL SULFATE SCH ML: .5; 3 SOLUTION RESPIRATORY (INHALATION) at 11:14

## 2018-02-18 RX ADMIN — IPRATROPIUM BROMIDE AND ALBUTEROL SULFATE SCH ML: .5; 3 SOLUTION RESPIRATORY (INHALATION) at 23:05

## 2018-02-18 RX ADMIN — Medication SCH MCG: at 07:34

## 2018-02-18 RX ADMIN — AMLODIPINE BESYLATE SCH MG: 5 TABLET ORAL at 07:34

## 2018-02-18 RX ADMIN — INSULIN ASPART SCH UNITS: 100 INJECTION, SOLUTION INTRAVENOUS; SUBCUTANEOUS at 11:56

## 2018-02-18 RX ADMIN — DIVALPROEX SODIUM SCH MG: 500 TABLET, DELAYED RELEASE ORAL at 20:53

## 2018-02-18 RX ADMIN — LABETALOL HCL SCH MG: 200 TABLET, FILM COATED ORAL at 07:33

## 2018-02-18 NOTE — PROGRESS NOTE
DATE: 02/18/2018

 

HISTORY OF PRESENT ILLNESS:  Mr. Dixon is a 62-year-old obese white male

with a history of CAD status post intracoronary stents, PAD, COPD,

depression, bipolar disorder, GERD, chronic anemia, type 2 diabetes mellitus,

dyslipidemia, hypertension and gout and was admitted on 02/02/2018 with acute

on chronic diastolic CHF, respiratory acidosis, and acute renal

insufficiency.

 

The patient is currently being seen in room 260, and he offers no complaints.

 He appears euvolemic.  His breathing is at baseline, and he has not had any

further respiratory distress, shortness of breath, and his edema is minimal.

 

A referral was made to [facility], however, the insurance denied admission

to a skilled nursing facility and recommended this patient go home with home

health.  He will be able to go home tomorrow and he will have BiPAP available

to him at home.

 

The patient offers no other complaints.

 

MEDICATIONS:

1.  Torsemide 20 mg p.o. b.i.d.

2.  Amlodipine 10 mg daily.

3.  Diamox 250 mg b.i.d.

4.  Gabapentin 200 mg at bedtime.

5.  Remeron 30 mg at bedtime.

6.  Normodyne 200 mg b.i.d.

7.  Colace 100 mg b.i.d.

8.  MiraLax 17 grams daily.

9.  DuoNeb nebulizers q.4 hours.

10.  Calcium carbonate 500 mg every 2 hours as needed for heartburn.

11.  Lipitor 80 mg daily.

12.  Plavix 75 mg daily.

13.  Lexapro 20 mg q.a.m.

14.  Imdur 30 mg daily.

15.  Spiriva 1 puff daily.

16.  Vitamin B12 1000 mcg daily.

17.  Protonix 40 mg a day.

18.  Ferrous sulfate 325 mg b.i.d.

19.  Depakote delayed release tablet 2000 mg at bedtime.

20.  Lantus insulin 10 units subcutaneous injection at bedtime.

21.  NovoLog sliding scale insulin.

22.  Albuterol MDI 2 puffs p.o. q. 4 hours p.r.n.

23.  Tylenol p.r.n.

24.  Milk of magnesia p.r.n.

25.  Maalox Max p.r.n.

26.  Zofran 4 mg p.r.n.

27.  Sublingual nitroglycerin 0.4 mg p.r.n.

28.  Flonase nasal spray 2 sprays in each nostril everyday as needed.

29.  OxyIR 10 mg p.o. q. 12 hours p.r.n. for severe pain.

 

ALLERGIES:  NKDA.

 

PHYSICAL EXAMINATION:

VITAL SIGNS:  Temperature is 36.5 degrees Celsius, pulse 59 and regular,

respiratory rate is 16 and unlabored, blood pressure is 148/61.  SPO2 is 96%

on 4 liters oxygen via nasal cannula.  I's and O's positive 790 mL overnight.

GENERAL:  The patient is in no acute distress.

HEENT:  Head is atraumatic, normocephalic.  EOMs intact.  Sclerae are

anicteric.  Face is symmetric.  No perioral cyanosis.

NECK:  Without JVD.  Jugular venous pressure is at the level of clavicle

sitting upright.

CHEST AND LUNGS:  With mildly diminished breath sounds throughout, otherwise

clear.  No wheezes, rales, or crackles.

CARDIOVASCULAR:  S1 and S2 are regular with a grade 2/6 basal systolic murmur

heard best over the right second intercostal space.  Radiates to the

suprasternal notch and left sternal border.  No diastolic murmurs.  No

obvious gallops or rubs.  PMI is nondisplaced.  No lifts, heaves, or thrills.

 No abdominal, aortic or renal bruits.

ABDOMEN:  Bowel sounds are present.

EXTREMITIES:  Calves are soft, nontender.  There is +1 pretibial edema over

bilateral legs.  Integrity of skin appears to be improving.

NEUROLOGIC:  The patient is awake, alert and oriented.  Pleasant and

cooperative.  Answers questions appropriately.  Speech is clear.  Gait

pattern not assessed.

 

ASSESSMENT:

1.  Acute on chronic diastolic CHF.

2.  Hypoxemia with respiratory acidosis -- appears compensated, although he

still has some oxygen desaturation at night.  Qualifies for BiPAP.

3.  Respiratory acidosis, improved on BiPAP.

4.  Anemia of chronic disease, stable.

5.  Acute on chronic renal insufficiency, improved.

6.  Hyperkalemia, resolved.

7.  Coronary artery disease status post intracoronary stents.

8.  Hypertension.

9.  Dyslipidemia.

10.  Type 2 diabetes mellitus.

11.  Chronic atrial thrombus, on chronic Coumadin.

12.  Chronic obstructive pulmonary disease.

13.  Bipolar disorder.

 

PLAN:

1.  The patient will be discharged to home tomorrow.  Home BiPAP unit has

been set up.  He will need home health services coming in to his home.

2.  Continue supplemental oxygen during the day, BIPAP at night.

3.  Continue Demadex 20 mg p.o. b.i.d. upon discharge.

4.  Continue Acetazolamide 250 mg as directed per nephrology.

5.  Continue Norvasc 10 mg a day.

6.  Continue Labetalol 200 mg b.i.d.

7.  Continue Lipitor 80 mg daily.

8.  Continue Plavix 75 mg daily.

9.  Continue Imdur 30 mg daily.

10.  Continue Lexapro 20 mg daily.

11.  Continue inhalers as directed.

12.  Continue Protonix 40 mg daily.

13.  He will be sent home tomorrow on a low sodium, low potassium diet.  

1800 mL fluid restriction as well.

14.  The patient was instructed to monitor his daily weights.

15.  Continue usual psychotropic medications.

16.  The patient should be followed in heart failure clinic as an outpatient.

 I have contacted our office to make a followup appointment with our heart

failure clinic.

 

 

 

 

CHIVO

## 2018-02-19 VITALS — OXYGEN SATURATION: 94 % | HEART RATE: 55 BPM

## 2018-02-19 VITALS — SYSTOLIC BLOOD PRESSURE: 177 MMHG | DIASTOLIC BLOOD PRESSURE: 67 MMHG | HEART RATE: 65 BPM

## 2018-02-19 VITALS — OXYGEN SATURATION: 95 %

## 2018-02-19 VITALS
SYSTOLIC BLOOD PRESSURE: 151 MMHG | OXYGEN SATURATION: 90 % | TEMPERATURE: 98.6 F | DIASTOLIC BLOOD PRESSURE: 79 MMHG | HEART RATE: 61 BPM

## 2018-02-19 VITALS
HEART RATE: 56 BPM | OXYGEN SATURATION: 96 % | DIASTOLIC BLOOD PRESSURE: 74 MMHG | SYSTOLIC BLOOD PRESSURE: 167 MMHG | TEMPERATURE: 97.7 F

## 2018-02-19 VITALS — OXYGEN SATURATION: 91 % | HEART RATE: 66 BPM

## 2018-02-19 VITALS — HEART RATE: 61 BPM | OXYGEN SATURATION: 97 %

## 2018-02-19 VITALS — HEART RATE: 61 BPM | OXYGEN SATURATION: 94 %

## 2018-02-19 LAB — INR PPP: 1.4 (ref 0.9–1.1)

## 2018-02-19 RX ADMIN — Medication SCH MCG: at 09:10

## 2018-02-19 RX ADMIN — IPRATROPIUM BROMIDE AND ALBUTEROL SULFATE SCH ML: .5; 3 SOLUTION RESPIRATORY (INHALATION) at 11:05

## 2018-02-19 RX ADMIN — TORSEMIDE SCH MG: 20 TABLET ORAL at 09:11

## 2018-02-19 RX ADMIN — Medication SCH GM: at 09:00

## 2018-02-19 RX ADMIN — LABETALOL HCL SCH MG: 200 TABLET, FILM COATED ORAL at 09:10

## 2018-02-19 RX ADMIN — ISOSORBIDE MONONITRATE SCH MG: 30 TABLET, EXTENDED RELEASE ORAL at 09:10

## 2018-02-19 RX ADMIN — DOCUSATE SODIUM SCH MG: 100 CAPSULE, LIQUID FILLED ORAL at 09:10

## 2018-02-19 RX ADMIN — PANTOPRAZOLE SCH MG: 40 TABLET, DELAYED RELEASE ORAL at 09:11

## 2018-02-19 RX ADMIN — ESCITALOPRAM OXALATE SCH MG: 20 TABLET, FILM COATED ORAL at 09:10

## 2018-02-19 RX ADMIN — TORSEMIDE SCH MG: 20 TABLET ORAL at 17:15

## 2018-02-19 RX ADMIN — INSULIN ASPART SCH UNITS: 100 INJECTION, SOLUTION INTRAVENOUS; SUBCUTANEOUS at 09:46

## 2018-02-19 RX ADMIN — AMLODIPINE BESYLATE SCH MG: 5 TABLET ORAL at 09:11

## 2018-02-19 RX ADMIN — IPRATROPIUM BROMIDE AND ALBUTEROL SULFATE SCH ML: .5; 3 SOLUTION RESPIRATORY (INHALATION) at 16:13

## 2018-02-19 RX ADMIN — TIOTROPIUM BROMIDE SCH PUFF: 18 CAPSULE ORAL; RESPIRATORY (INHALATION) at 09:09

## 2018-02-19 RX ADMIN — INSULIN ASPART SCH UNITS: 100 INJECTION, SOLUTION INTRAVENOUS; SUBCUTANEOUS at 13:11

## 2018-02-19 RX ADMIN — FERROUS SULFATE TAB EC 325 MG (65 MG FE EQUIVALENT) SCH MG: 325 (65 FE) TABLET DELAYED RESPONSE at 17:15

## 2018-02-19 RX ADMIN — CLOPIDOGREL BISULFATE SCH MG: 75 TABLET, FILM COATED ORAL at 09:11

## 2018-02-19 RX ADMIN — IPRATROPIUM BROMIDE AND ALBUTEROL SULFATE SCH ML: .5; 3 SOLUTION RESPIRATORY (INHALATION) at 07:55

## 2018-02-19 RX ADMIN — ATORVASTATIN CALCIUM SCH MG: 40 TABLET, FILM COATED ORAL at 09:11

## 2018-02-19 RX ADMIN — FERROUS SULFATE TAB EC 325 MG (65 MG FE EQUIVALENT) SCH MG: 325 (65 FE) TABLET DELAYED RESPONSE at 09:11

## 2018-02-19 RX ADMIN — IPRATROPIUM BROMIDE AND ALBUTEROL SULFATE SCH ML: .5; 3 SOLUTION RESPIRATORY (INHALATION) at 03:35

## 2018-02-19 NOTE — DISCHARGE INSTRUCTIONS
Discharge Instructions


Date of Service


Feb 19, 2018.





Admission


Reason for Admission:  Chf Exacerbation





Discharge


Discharge Diagnosis / Problem:  Acute diastolic heart failure, Hypercapnic 

respiratory failure





Discharge Goals


Goal(s):  Improve function, Improve disease control





Activity Recommendations


Activity Limitations:  resume your previous activity





.





Instructions / Follow-Up


Instructions / Follow-Up


Medications





- REMERON: dose decreased to 30mg from 45mg


- GABAPENTIN: dose decreased from 300mg to 200mg


- TORSEMIDE: new diuretic, this replaces Lasix, take twice a day


- LABETALOL: 200mg twice a day, this replaces metoprolol





BIPAP: this is a mask that you should use at night to help keep carbon dioxide 

normal, wear EVERY night


   it will actually help your heart function at night as well, help heart 

failure and help blood pressure control





Diastolic heart failure: currently your volume is normal, you need to weigh 

yourself every morning to make sure you are not gaining weight


   follow a fluid restriction of 1800mL (seven 8 oz cups) every day


   continue heart failure medications





**FOLLOW UP**


- call for appointment with Dr. Rowley in 5-7 days, request a hospital follow up 

appointment for heart failure


- call for appointment with [facility] cardiology to be seen in 2-3 weeks 

for heart failure














Call your Primary Care doctor if any of the following symptoms or problems 

start or get worse:





* Shortness of breath or difficulty breathing


* Wake up at night short of breath


* Chest pain


* Cough


* Swelling of your hands, feet, or legs


* More fatigued or tired with your normal activity


* Palpitations - sudden fast heart beats





WEIGHT





* Weigh yourself every morning after using the bathroom.


* Use the same scale.


* Wear the same amount of clothing.


* Write your weight down on a chart.


* Call your Primary Care doctor if you gain more than 2-3 pounds in 1-2 days.





MEDICATIONS





* Use this discharge instruction sheet for medication instructions.


* Take your medications at the time your doctor ordered.


* Do not skip a dose of your medicines.


* If you miss a dose of medicine, take it as soon as possible, but DO NOT 

DOUBLE A DOSE.


* Read your medicine information when you get home.


* Know all of the side effects of your medicine.  If in doubt, ask your 

pharmacist


* Call your Primary Care doctor's office if you have any side effects.


* Be sure all of your doctors know what medicine and herbs you take (including 

cold, flu, and herbal medicine).








Take the following with you to your follow-up doctor appointments:





* Weight Chart


* Medication List


* List of questions





Do not drink excessive alcohol, beer or wine.





Current Hospital Diet


Patient's current hospital diet: Low Potassium Diet (2g K), Low Sodium Diet (

2gm Na), Diabetes Type 2 Diet





Discharge Diet


Recommended Diet:  Low Sodium Diet (2gm Na), Diabetes Type 2 Diet, Low 

Potassium Diet (2g K)


Fluid Restriction:  1800 ml (7 cups)





Pending Studies


Studies pending at discharge:  no





Laboratory Results





Hemoglobin A1c








Test


  1/1/18


09:56 Range/Units


 


 


Estimated Average Glucose 134   mg/dl


 


Hemoglobin A1c 6.3 H 4.5-5.6  %











Medical Emergencies








.


Who to Call and When:





Call 911 or go to the Emergency Room if:





* If at any time you feel your situation is an emergency


* You have tightness or pain in your chest that does not go away with rest or 

Nitroglycerin


* You are very short of breath even with rest





.





Non-Emergent Contact


Non-Emergency issues call your:  Primary Care Provider, Cardiologist


Call Non-Emergent contact if:  you have any medication questions


if you gain 2-3 pounds from baseline weight, if you have increased edema in legs


.


.








"Provider Documentation" section prepared by Adam Vera.








.





VTE Core Measure


Inpt VTE Proph given/why not?:  Warfarin (Coumadin)





[place] Drug Monitoring Program


Search Results:  no issues identified

## 2018-02-21 NOTE — DISCHARGE SUMMARY
Discharge Summary


Date of Service


Feb 19, 2018.





Discharge Summary


Admission Date:


Feb 5, 2018 at 13:13


Discharge Date:  Feb 19, 2018


Discharge Disposition:  Home with services


Principal Diagnosis:  Acute on chronic diastolic heart failure


Problems/Secondary Diagnoses:


Acute hypercapnic respiratory failure


Acute hypoxic respiratory failure


Bradycardia


FITZ on CKD stage III


Anemia of chronic disease


CAD with h/o stenting


DM type II


Hyperkalemia


Coumadin induced coagulopathy


Immunizations:  


   Have You Had Influenza Vaccine:  No


   Influenza Vaccine Date:  Oct 5, 2009


   History of Tetanus Vaccine?:  No


   Tetanus Immunization Date:  Mar 1, 2004


   History of Pneumococcal:  No


   Pneumococcal Date:  Oct 15, 2006


   History of Hepatitis B Vaccine:  No


Procedures:


none


Consultations:


Cardiology


Nephrology





Medication Reconciliation


New Medications:  


Warfarin Sod (Coumadin) 7.5 Mg Tab


7.5 MG PO DAILY for 30 Days, #30 TABS 0 Refills





Gabapentin (Gabapentin) 100 Mg Cap


200 MG PO HS, #60 CAP 3 Refills





Labetalol HCl (Labetalol HCl) 200 Mg Tab


200 MG PO BID, #60 TAB 3 Refills





Mirtazapine (Mirtazapine) 15 Mg Tab


30 MG PO HS, #60 TAB 3 Refills





Torsemide (Torsemide) 20 Mg Tab


20 MG PO BID17, #60 TAB 3 Refills





 


Continued Medications:  


Acetaminophen (Tylenol) 500 Mg Tab


500 MG PO Q4-6HRS PRN for Pain, TAB





Albuterol Sulfate (Proair Respiclick) 108 Mcg/Act Aer


1-2 PUFFS INH Q4-6HRS PRN for SOB/Wheezing





Amlodipine Besylate (Amlodipine Besylate) 10 Mg Tab


10 MG PO QAM





Atorvastatin (Lipitor) 80 Mg Tab


80 MG PO DAILY, TAB





Clonazepam (Clonazepam) 1 Mg Tab


1 MG PO HS PRN for Anxiety





Clopidogrel Bisulfate (Clopidogrel) 75 Mg Tab


75 MG PO QAM





Cyanocobalamin (Vitamin B12) 1,000 Mcg Tab


1000 MCG PO DAILY





Divalproex Sodium (Depakote Delay Rel) 500 Mg Tab


2000 MG PO HS





Docusate Sodium (Docusate Sodium) 100 Mg Cap


100 MG PO BID PRN for Constipation





Escitalopram Oxalate (Escitalopram Oxalate) 20 Mg Tab


20 MG PO QAM





Ferrous Sulfate (Ferrous Sulfate) 325 Mg Tab


325 MG PO BIDM





Fluticasone Propionate (Nasal) (Flonase Allergy Relief) 50 Mcg/Act Spr


2 SPRAYS VERONICA DAILY PRN for Nasal Congestion





Insulin Glargine (Lantus Solostar) 100 Unit/Ml Inj


10 UNITS SC HS, PEN





Insulin Lispro (Human) (Humalog Kwikpen) 100 Unit/Ml Inj


1 DOSE SC AC


COVERAGE AS DIRECTED BY SLIDING SCALE


Isosorbide Mononitrate Ext Rel (Imdur Ext Rel) 30 Mg Ertab


30 MG PO QAM, TAB





Nitroglycerin (Nitrostat) 0.4 Mg Tab


0.4 MG UT UD PRN for Chest Pain


DISSOLVE ONE TABLET UNDER THE TONGUE AS DIRECTED


Oxycodone HCl (Oxycodone HCl) 5 Mg Tab


5 MG PO Q6H PRN for Breakthrough Pain





Oxycodone Hcl (Oxycodone Hcl) 10 Mg Tab


10 MG PO Q12 PRN for Severe Pain





Tamsulosin HCl (Tamsulosin HCl) 0.4 Mg Cap


0.4 MG PO HS





Tiotropium Bromide (Spiriva Handihaler) 30 Puff/540 Mcg Aerp


1 CAP INH DAILY, INHALER





 


Discontinued Medications:  


Furosemide (Lasix) 20 Mg Tab


20 MG PO DAILY for 14 Days, #14 TAB





Gabapentin (Neurontin) 300 Mg Cap


300 MG PO HS, CAP





Metoprolol Succinate (Metoprolol Succinate ER) 50 Mg Tabcr


50 MG PO BID





Mirtazapine (Mirtazapine) 45 Mg Tab


45 MG PO HS, TAB





Spironolactone (Aldactone) 50 Mg Tab


50 MG PO BID











Discharge Exam


Patient breathing well on day of discharge, much more alert when waking up.  

Sleeping most of the day, but discussing with patient and his wife, this is 

what he does all the time at home.  No pain.  Appetite normal.  Labs reviewed, 

stable.  Tolerating BIPAP at home, ready to use.  Two step performed, does not 

need oxygen at rest or on exertion.





Discussed fluid restriction in detail, only seven 8oz cups of fluid (coffee, 

water, etc).  Patient said that would put a damper on his coffee intake, says 

he drinks a whole pot a day.  Told him that he would need to stop, maybe two 

8oz cups a day, otherwise he would go back into heart failure.  Confirmed he 

has a scale, stressed importance of weighing himself everyday and contacting 

cardiology if he gains weight.


Review of Systems:  


   Constitutional:  + weakness, + fatigue, No fever, No chills, No sweats, No 

weight loss, No problem reported


   Eyes:  No worsening of vision, No eye pain, No redness, No discharge, No 

diplopia, No problem reported


   ENT:  No hearing loss, No unusual epistaxis, No nasal symptoms, No sore 

throat, No tinnitus, No dental problems, No trouble swallowing, No problem 

reported


   Respiratory:  + dyspnea on exertion, No cough, No sputum, No wheezing, No 

shortness of breath, No dyspnea at rest, No hemoptysis, No problem reported


   Cardiovascular:  No chest pain, No orthopnea, No PND, No edema, No 

claudication, No palpitations, No problem reported


   Abdomen:  No pain, No nausea, No vomiting, No diarrhea, No constipation, No 

GI bleeding, No problem reported


   Musculoskeletal:  No joint pain, No muscle pain, No swelling, No calf pain, 

No problem reported


   Genitourinary - Male:  No hematuria, No dysuria, No urinary frequency, No 

urinary urgency


   Neurologic:  + weakness, No memory loss, No paralysis, No numbness/tingling, 

No vertigo, No balance problems, No problem reported


   Psychiatric:  + depression symptoms, + anxiety, No anhedonism, No insomnia, 

No substance abuse, No problem reported


   Endocrine:  No fatigue, No excessive thirst, No excessive urination, No 

problem reported


   Hematologic / Lymphatic:  No abnormal bleeding/bruising, No clotting problems

, No swollen lymph nodes, No night sweats, No problem reported


   Integumentary:  No rash, No itch, No new/changing skin lesions, No color 

change, No bleeding, No problem reported


Physical Exam:  


   General Appearance:  WD/WN, no apparent distress


   Eyes:  normal inspection, EOMI, sclerae normal


   ENT:  normal ENT inspection, hearing grossly normal, pharynx normal


   Neck:  supple, no adenopathy, no JVD, trachea midline


   Respiratory/Chest:  chest non-tender, lungs clear, normal breath sounds, no 

respiratory distress, no accessory muscle use


   Cardiovascular:  regular rate, rhythm, no edema, no gallop, no JVD, no murmur

, normal peripheral pulses


   Abdomen / GI:  normal bowel sounds, non tender, soft, no organomegaly


   Extremities:  no calf tenderness, normal capillary refill, no pedal edema, 

normal range of motion, pelvis stable, + pertinent finding (s/p amputaion of 

several fingers on right hand)


   Neurologic/Psychiatric:  CNs II-XII nml as tested, alert, normal reflexes, 

oriented x 3, + motor weakness, + depressed affect


   Skin:  normal color, warm/dry, no rash





Hospital Course


Patient is a 61 y/o male with a history of CAD, h/o MI s/p stents, HTN, HLD, 

diastolic CHF, COPD, DM II, anxiety, depression, bipolar disorder, anemia and 

GERD, who presented to ED because of worsening SOB x1 day





Acute on chronic diastolic CHF exacerbation: due to poor compliance with 

medications, drinking too much fluids


- Cardiac enzymes negative x 3, ekg prn cp, O2 protocol, home O2 is 2 L 


- diuretics changed while he was admitted, now on Torsemide, Lasix and 

Spironolactone d/c due to volume contraction, elevated Cr and hyperkalemia


- will continue on Torsemide BID


- Low sodium and low potassium diet, continue 1800 mL fluid restriction


   **stressed importance of fluid restriction on discharge, provided education, 

he was drinking a whole pot of coffee every day prior to admission


   **stressed importance of stepping on scale every morning, contacting 

cardiology if he gains weight


- ECHO 12/23/17 w/ preserved EF and grade II diastolic CHF


- now on BIPAP for hypercapnia, should help with heart function, offloading 

heart at night, hopefully prevent further decompensation





Acute hypercapnia: due to sedation, poor ventilation, likely from bipolar 

medications, sleeps all the time


- improved with BIPAP, continue at home


- decreased dose of Gabapentin and Remeron, needs Depakote and other bipolar 

medications, wife says he gets agitated and mean when doses changed





Acute hypoxic respiratory failure: resolved, two step on day of discharge 

showed no hypoxia





Hyperkalemia: resolved, stopped Spironolactone this admission





Elevated INR: takes for atrial thrombus in the past


- reduced Coumadin to 7.5mg daily, will no longer take 10mg two days a week





Anemia of chronic disease- STABLE: did require one unit of PRBC when Hb dropped 

below 7.5


- received Venofer infusions to boost iron levels


- Hb stable although chronically low





Fatigue/Lethargy


- ammonia level > 32,  administered lactulose on 2/7 and pt had large BM- he is 

no longer confused or as fatigued.  





FITZ on CKD stage III


- Cr improving, Lasix stopped, now on Torsemide per nephrology


- euvolemic, making urine every day


- follow with nephrology





Hyperkalemia


- resolved





b/l chronic diabetic wounds: 


- Following w/ wound care- wound care consulted 





CAD w/ cardiac stenting


HTN


HLD


- Plavix 75 mg daily, Norvasc 10 mg daily, Imdur 30 mg BID, and Lipitor 80 mg 

daily





T2DM w/ neuropathy:


- Continue Lantus 10 u HS 


- BSG ACHS and ISS


- Continue Gabapentin but at lower dose, 200mg HS 





Chronic atrial thrombus: Continue Coumadin, follow PT/INR and adjust dose PRN 

for INR goal 2-3 


- see above, INR subtherapeutic on discharge, will continue 7.5mg daily





COPD without exacerbation: Continue home inhalers 





Bipolar disorder, anxiety, depression- STABLE: Depakote 2 g daily, Lexapro 20 

mg daily, Remeron dose decreased to 30mg daily from 45mg to have less sedation





GI prophylaxis: Protonix daily 





DVT Prophylaxis: Coumadin





Code status: LEVEL I, FULL





Dispo: tried to get into SNF but he was denied by insurance, will go home with 

home health serviced, BIPAP arranged


does not need oxygen


Total Time Spent:  Greater than 30 minutes


This includes examination of the patient, discharge planning, medication 

reconciliation, and communication with other providers.





Discharge Instructions


Please refer to the electronic Patient Visit Report (Discharge Instructions) 

for additional information.





Follow-Up


Dr. Rowley in one week


Dr. Yusuf in 2-3 weeks


Nephrology in 3-4 weeks





Additional Copies To


Claude Yusuf M.D.; Daphne Galvan MD; Claude Rowley M.D.

## 2018-02-24 ENCOUNTER — HOSPITAL ENCOUNTER (INPATIENT)
Dept: HOSPITAL 45 - C.EDB | Age: 63
LOS: 8 days | DRG: 207 | End: 2018-03-04
Attending: HOSPITALIST | Admitting: HOSPITALIST
Payer: COMMERCIAL

## 2018-02-24 VITALS
DIASTOLIC BLOOD PRESSURE: 58 MMHG | SYSTOLIC BLOOD PRESSURE: 117 MMHG | TEMPERATURE: 93.2 F | OXYGEN SATURATION: 99 % | HEART RATE: 61 BPM

## 2018-02-24 VITALS
DIASTOLIC BLOOD PRESSURE: 76 MMHG | SYSTOLIC BLOOD PRESSURE: 117 MMHG | HEART RATE: 50 BPM | TEMPERATURE: 91.22 F | OXYGEN SATURATION: 99 %

## 2018-02-24 VITALS
TEMPERATURE: 91.58 F | DIASTOLIC BLOOD PRESSURE: 86 MMHG | SYSTOLIC BLOOD PRESSURE: 119 MMHG | OXYGEN SATURATION: 95 % | HEART RATE: 48 BPM

## 2018-02-24 VITALS
HEART RATE: 49 BPM | OXYGEN SATURATION: 96 % | TEMPERATURE: 90.5 F | SYSTOLIC BLOOD PRESSURE: 113 MMHG | DIASTOLIC BLOOD PRESSURE: 81 MMHG

## 2018-02-24 VITALS
SYSTOLIC BLOOD PRESSURE: 119 MMHG | TEMPERATURE: 92.48 F | OXYGEN SATURATION: 100 % | DIASTOLIC BLOOD PRESSURE: 58 MMHG | HEART RATE: 58 BPM

## 2018-02-24 VITALS
TEMPERATURE: 93.92 F | DIASTOLIC BLOOD PRESSURE: 64 MMHG | OXYGEN SATURATION: 95 % | SYSTOLIC BLOOD PRESSURE: 134 MMHG | HEART RATE: 60 BPM

## 2018-02-24 VITALS
TEMPERATURE: 93.2 F | OXYGEN SATURATION: 93 % | DIASTOLIC BLOOD PRESSURE: 65 MMHG | HEART RATE: 57 BPM | SYSTOLIC BLOOD PRESSURE: 131 MMHG

## 2018-02-24 VITALS
WEIGHT: 211.2 LBS | WEIGHT: 211.2 LBS | BODY MASS INDEX: 29.57 KG/M2 | BODY MASS INDEX: 29.57 KG/M2 | HEIGHT: 71 IN | BODY MASS INDEX: 29.57 KG/M2 | HEIGHT: 71 IN

## 2018-02-24 VITALS
HEART RATE: 44 BPM | DIASTOLIC BLOOD PRESSURE: 120 MMHG | OXYGEN SATURATION: 89 % | SYSTOLIC BLOOD PRESSURE: 167 MMHG | TEMPERATURE: 96.08 F

## 2018-02-24 VITALS
SYSTOLIC BLOOD PRESSURE: 108 MMHG | TEMPERATURE: 90.68 F | OXYGEN SATURATION: 98 % | HEART RATE: 47 BPM | DIASTOLIC BLOOD PRESSURE: 57 MMHG

## 2018-02-24 VITALS
HEART RATE: 52 BPM | TEMPERATURE: 92.84 F | OXYGEN SATURATION: 94 % | DIASTOLIC BLOOD PRESSURE: 60 MMHG | SYSTOLIC BLOOD PRESSURE: 120 MMHG

## 2018-02-24 VITALS
DIASTOLIC BLOOD PRESSURE: 86 MMHG | SYSTOLIC BLOOD PRESSURE: 122 MMHG | HEART RATE: 46 BPM | OXYGEN SATURATION: 97 % | TEMPERATURE: 91.58 F

## 2018-02-24 VITALS
OXYGEN SATURATION: 87 % | DIASTOLIC BLOOD PRESSURE: 150 MMHG | TEMPERATURE: 95.9 F | HEART RATE: 40 BPM | SYSTOLIC BLOOD PRESSURE: 198 MMHG

## 2018-02-24 VITALS
TEMPERATURE: 92.66 F | OXYGEN SATURATION: 95 % | SYSTOLIC BLOOD PRESSURE: 109 MMHG | DIASTOLIC BLOOD PRESSURE: 57 MMHG | HEART RATE: 51 BPM

## 2018-02-24 VITALS
HEART RATE: 48 BPM | OXYGEN SATURATION: 97 % | DIASTOLIC BLOOD PRESSURE: 60 MMHG | TEMPERATURE: 90.5 F | SYSTOLIC BLOOD PRESSURE: 113 MMHG

## 2018-02-24 VITALS
TEMPERATURE: 91.4 F | SYSTOLIC BLOOD PRESSURE: 117 MMHG | DIASTOLIC BLOOD PRESSURE: 76 MMHG | OXYGEN SATURATION: 97 % | HEART RATE: 50 BPM

## 2018-02-24 VITALS
HEART RATE: 59 BPM | OXYGEN SATURATION: 93 % | SYSTOLIC BLOOD PRESSURE: 123 MMHG | TEMPERATURE: 93.74 F | DIASTOLIC BLOOD PRESSURE: 94 MMHG

## 2018-02-24 VITALS
TEMPERATURE: 93.2 F | HEART RATE: 60 BPM | DIASTOLIC BLOOD PRESSURE: 84 MMHG | OXYGEN SATURATION: 100 % | SYSTOLIC BLOOD PRESSURE: 125 MMHG

## 2018-02-24 VITALS
TEMPERATURE: 93.56 F | OXYGEN SATURATION: 92 % | HEART RATE: 58 BPM | SYSTOLIC BLOOD PRESSURE: 119 MMHG | DIASTOLIC BLOOD PRESSURE: 59 MMHG

## 2018-02-24 VITALS
TEMPERATURE: 94.28 F | DIASTOLIC BLOOD PRESSURE: 79 MMHG | HEART RATE: 63 BPM | SYSTOLIC BLOOD PRESSURE: 184 MMHG | OXYGEN SATURATION: 92 %

## 2018-02-24 VITALS
OXYGEN SATURATION: 90 % | TEMPERATURE: 94.64 F | HEART RATE: 73 BPM | DIASTOLIC BLOOD PRESSURE: 77 MMHG | SYSTOLIC BLOOD PRESSURE: 196 MMHG

## 2018-02-24 VITALS
DIASTOLIC BLOOD PRESSURE: 59 MMHG | HEART RATE: 51 BPM | SYSTOLIC BLOOD PRESSURE: 111 MMHG | TEMPERATURE: 90.68 F | OXYGEN SATURATION: 97 %

## 2018-02-24 VITALS
DIASTOLIC BLOOD PRESSURE: 52 MMHG | OXYGEN SATURATION: 99 % | TEMPERATURE: 93.02 F | HEART RATE: 58 BPM | SYSTOLIC BLOOD PRESSURE: 103 MMHG

## 2018-02-24 VITALS
SYSTOLIC BLOOD PRESSURE: 93 MMHG | DIASTOLIC BLOOD PRESSURE: 49 MMHG | TEMPERATURE: 96.26 F | OXYGEN SATURATION: 96 % | HEART RATE: 70 BPM

## 2018-02-24 VITALS
OXYGEN SATURATION: 91 % | DIASTOLIC BLOOD PRESSURE: 111 MMHG | TEMPERATURE: 95.36 F | HEART RATE: 71 BPM | SYSTOLIC BLOOD PRESSURE: 187 MMHG

## 2018-02-24 VITALS
HEART RATE: 75 BPM | TEMPERATURE: 95.54 F | DIASTOLIC BLOOD PRESSURE: 110 MMHG | SYSTOLIC BLOOD PRESSURE: 196 MMHG | OXYGEN SATURATION: 90 %

## 2018-02-24 VITALS — OXYGEN SATURATION: 99 %

## 2018-02-24 DIAGNOSIS — E11.21: ICD-10-CM

## 2018-02-24 DIAGNOSIS — R79.89: ICD-10-CM

## 2018-02-24 DIAGNOSIS — G93.1: ICD-10-CM

## 2018-02-24 DIAGNOSIS — Z79.4: ICD-10-CM
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
## 2018-02-24 DIAGNOSIS — I25.2: ICD-10-CM

## 2018-02-24 DIAGNOSIS — E11.621: ICD-10-CM

## 2018-02-24 DIAGNOSIS — Z66: ICD-10-CM

## 2018-02-24 DIAGNOSIS — Z83.3: ICD-10-CM

## 2018-02-24 DIAGNOSIS — Z79.01: ICD-10-CM

## 2018-02-24 DIAGNOSIS — I25.10: ICD-10-CM

## 2018-02-24 DIAGNOSIS — J98.11: ICD-10-CM

## 2018-02-24 DIAGNOSIS — D64.9: ICD-10-CM

## 2018-02-24 DIAGNOSIS — I50.33: ICD-10-CM

## 2018-02-24 DIAGNOSIS — Z95.5: ICD-10-CM

## 2018-02-24 DIAGNOSIS — Z99.81: ICD-10-CM

## 2018-02-24 DIAGNOSIS — I42.9: ICD-10-CM

## 2018-02-24 DIAGNOSIS — N18.3: ICD-10-CM

## 2018-02-24 DIAGNOSIS — M1A.9XX0: ICD-10-CM

## 2018-02-24 DIAGNOSIS — G93.40: ICD-10-CM

## 2018-02-24 DIAGNOSIS — I25.5: ICD-10-CM

## 2018-02-24 DIAGNOSIS — F31.9: ICD-10-CM

## 2018-02-24 DIAGNOSIS — N31.9: ICD-10-CM

## 2018-02-24 DIAGNOSIS — T17.508A: ICD-10-CM

## 2018-02-24 DIAGNOSIS — M62.82: ICD-10-CM

## 2018-02-24 DIAGNOSIS — E11.42: ICD-10-CM

## 2018-02-24 DIAGNOSIS — K21.9: ICD-10-CM
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
## 2018-02-24 DIAGNOSIS — L97.909: ICD-10-CM

## 2018-02-24 DIAGNOSIS — S22.49XA: ICD-10-CM

## 2018-02-24 DIAGNOSIS — E11.65: ICD-10-CM

## 2018-02-24 DIAGNOSIS — Z87.891: ICD-10-CM

## 2018-02-24 DIAGNOSIS — J95.851: ICD-10-CM

## 2018-02-24 DIAGNOSIS — Z51.5: ICD-10-CM

## 2018-02-24 DIAGNOSIS — I63.9: ICD-10-CM

## 2018-02-24 DIAGNOSIS — I13.0: ICD-10-CM

## 2018-02-24 DIAGNOSIS — I46.8: ICD-10-CM

## 2018-02-24 DIAGNOSIS — J44.1: Primary | ICD-10-CM

## 2018-02-24 DIAGNOSIS — I73.9: ICD-10-CM

## 2018-02-24 DIAGNOSIS — J96.21: ICD-10-CM

## 2018-02-24 DIAGNOSIS — I74.10: ICD-10-CM

## 2018-02-24 DIAGNOSIS — Z91.14: ICD-10-CM
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
## 2018-02-24 DIAGNOSIS — N17.0: ICD-10-CM

## 2018-02-24 DIAGNOSIS — N39.0: ICD-10-CM
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
## 2018-02-24 LAB
ALBUMIN SERPL-MCNC: 2.4 GM/DL (ref 3.4–5)
ALP SERPL-CCNC: 70 U/L (ref 45–117)
ALT SERPL-CCNC: 30 U/L (ref 12–78)
AST SERPL-CCNC: 45 U/L (ref 15–37)
BASOPHILS # BLD: 0.01 K/UL (ref 0–0.2)
BASOPHILS # BLD: 0.01 K/UL (ref 0–0.2)
BASOPHILS # BLD: 0.02 K/UL (ref 0–0.2)
BASOPHILS # BLD: 0.03 K/UL (ref 0–0.2)
BASOPHILS NFR BLD: 0.1 %
BASOPHILS NFR BLD: 0.1 %
BASOPHILS NFR BLD: 0.3 %
BASOPHILS NFR BLD: 0.3 %
BUN SERPL-MCNC: 28 MG/DL (ref 7–18)
BUN SERPL-MCNC: 30 MG/DL (ref 7–18)
BUN SERPL-MCNC: 32 MG/DL (ref 7–18)
CA-I BLD-SCNC: 1.11 MMOL/L (ref 1.12–1.32)
CALCIUM SERPL-MCNC: 7.5 MG/DL (ref 8.5–10.1)
CALCIUM SERPL-MCNC: 7.7 MG/DL (ref 8.5–10.1)
CALCIUM SERPL-MCNC: 7.8 MG/DL (ref 8.5–10.1)
CK MB SERPL-MCNC: 4.2 NG/ML (ref 0.5–3.6)
CO2 SERPL-SCNC: 22 MMOL/L (ref 21–32)
CO2 SERPL-SCNC: 24 MMOL/L (ref 21–32)
CO2 SERPL-SCNC: 26 MMOL/L (ref 21–32)
CREAT BLD-MCNC: 1.3 MG/DL (ref 0.6–1.3)
CREAT SERPL-MCNC: 1.17 MG/DL (ref 0.6–1.4)
CREAT SERPL-MCNC: 1.36 MG/DL (ref 0.6–1.4)
CREAT SERPL-MCNC: 1.53 MG/DL (ref 0.6–1.4)
EOS ABS #: 0.01 K/UL (ref 0–0.5)
EOS ABS #: 0.01 K/UL (ref 0–0.5)
EOS ABS #: 0.02 K/UL (ref 0–0.5)
EOS ABS #: 0.15 K/UL (ref 0–0.5)
EOSINOPHIL NFR BLD AUTO: 227 K/UL (ref 130–400)
EOSINOPHIL NFR BLD AUTO: 266 K/UL (ref 130–400)
EOSINOPHIL NFR BLD AUTO: 269 K/UL (ref 130–400)
EOSINOPHIL NFR BLD AUTO: 317 K/UL (ref 130–400)
GLUCOSE SERPL-MCNC: 162 MG/DL (ref 70–99)
GLUCOSE SERPL-MCNC: 297 MG/DL (ref 70–99)
GLUCOSE SERPL-MCNC: 303 MG/DL (ref 70–99)
HCT VFR BLD CALC: 28 % (ref 42–52)
HCT VFR BLD CALC: 28.9 % (ref 42–52)
HCT VFR BLD CALC: 29.1 % (ref 42–52)
HCT VFR BLD CALC: 30.4 % (ref 42–52)
HGB BLD-MCNC: 8.8 G/DL (ref 14–18)
HGB BLD-MCNC: 9 G/DL (ref 14–18)
HGB BLD-MCNC: 9.2 G/DL (ref 14–18)
HGB BLD-MCNC: 9.4 G/DL (ref 14–18)
IG#: 0.02 K/UL (ref 0–0.02)
IG#: 0.03 K/UL (ref 0–0.02)
IG#: 0.03 K/UL (ref 0–0.02)
IG#: 0.23 K/UL (ref 0–0.02)
IMM GRANULOCYTES NFR BLD AUTO: 15.4 %
IMM GRANULOCYTES NFR BLD AUTO: 16.5 %
IMM GRANULOCYTES NFR BLD AUTO: 5.9 %
IMM GRANULOCYTES NFR BLD AUTO: 6.6 %
INFLUENZA B ANTIGEN: (no result)
INR PPP: 1.2 (ref 0.9–1.1)
INR PPP: 1.3 (ref 0.9–1.1)
INR PPP: 1.3 (ref 0.9–1.1)
ISTAT POTASSIUM: 4.2 MEQ/L (ref 3.3–5)
LIPASE: 282 U/L (ref 73–393)
LYMPHOCYTES # BLD: 0.42 K/UL (ref 1.2–3.4)
LYMPHOCYTES # BLD: 0.44 K/UL (ref 1.2–3.4)
LYMPHOCYTES # BLD: 1.12 K/UL (ref 1.2–3.4)
LYMPHOCYTES # BLD: 1.44 K/UL (ref 1.2–3.4)
MCH RBC QN AUTO: 29.3 PG (ref 25–34)
MCH RBC QN AUTO: 29.4 PG (ref 25–34)
MCH RBC QN AUTO: 29.4 PG (ref 25–34)
MCH RBC QN AUTO: 30.1 PG (ref 25–34)
MCHC RBC AUTO-ENTMCNC: 30.9 G/DL (ref 32–36)
MCHC RBC AUTO-ENTMCNC: 31.1 G/DL (ref 32–36)
MCHC RBC AUTO-ENTMCNC: 31.4 G/DL (ref 32–36)
MCHC RBC AUTO-ENTMCNC: 31.6 G/DL (ref 32–36)
MCV RBC AUTO: 93 FL (ref 80–100)
MCV RBC AUTO: 93.6 FL (ref 80–100)
MCV RBC AUTO: 94.1 FL (ref 80–100)
MCV RBC AUTO: 97.4 FL (ref 80–100)
MONO ABS #: 0.45 K/UL (ref 0.11–0.59)
MONO ABS #: 0.63 K/UL (ref 0.11–0.59)
MONO ABS #: 0.9 K/UL (ref 0.11–0.59)
MONO ABS #: 1.03 K/UL (ref 0.11–0.59)
MONOCYTES NFR BLD: 11.8 %
MONOCYTES NFR BLD: 13.4 %
MONOCYTES NFR BLD: 6.4 %
MONOCYTES NFR BLD: 8.7 %
NEUT ABS #: 5.31 K/UL (ref 1.4–6.5)
NEUT ABS #: 5.47 K/UL (ref 1.4–6.5)
NEUT ABS #: 5.83 K/UL (ref 1.4–6.5)
NEUT ABS #: 6.14 K/UL (ref 1.4–6.5)
NEUTROPHILS # BLD AUTO: 0.1 %
NEUTROPHILS # BLD AUTO: 0.1 %
NEUTROPHILS # BLD AUTO: 0.3 %
NEUTROPHILS # BLD AUTO: 1.7 %
NEUTROPHILS NFR BLD AUTO: 67.1 %
NEUTROPHILS NFR BLD AUTO: 75 %
NEUTROPHILS NFR BLD AUTO: 79.4 %
NEUTROPHILS NFR BLD AUTO: 87.1 %
PHOSPHATE SERPL-MCNC: 2.8 MG/DL (ref 2.5–4.9)
PHOSPHATE SERPL-MCNC: 3 MG/DL (ref 2.5–4.9)
PMV BLD AUTO: 8.3 FL (ref 7.4–10.4)
PMV BLD AUTO: 8.7 FL (ref 7.4–10.4)
POTASSIUM SERPL-SCNC: 4 MMOL/L (ref 3.5–5.1)
POTASSIUM SERPL-SCNC: 4 MMOL/L (ref 3.5–5.1)
POTASSIUM SERPL-SCNC: 4.2 MMOL/L (ref 3.5–5.1)
PROT SERPL-MCNC: 6.8 GM/DL (ref 6.4–8.2)
PTT PATIENT: 27.9 SECONDS (ref 21–31)
PTT PATIENT: 29.2 SECONDS (ref 21–31)
PTT PATIENT: 29.4 SECONDS (ref 21–31)
RED CELL DISTRIBUTION WIDTH CV: 15.5 % (ref 11.5–14.5)
RED CELL DISTRIBUTION WIDTH CV: 15.6 % (ref 11.5–14.5)
RED CELL DISTRIBUTION WIDTH CV: 15.7 % (ref 11.5–14.5)
RED CELL DISTRIBUTION WIDTH CV: 15.8 % (ref 11.5–14.5)
RED CELL DISTRIBUTION WIDTH SD: 52.9 FL (ref 36.4–46.3)
RED CELL DISTRIBUTION WIDTH SD: 53.4 FL (ref 36.4–46.3)
RED CELL DISTRIBUTION WIDTH SD: 53.6 FL (ref 36.4–46.3)
RED CELL DISTRIBUTION WIDTH SD: 55.6 FL (ref 36.4–46.3)
SODIUM BLD-SCNC: 140 MEQ/L (ref 135–144)
SODIUM SERPL-SCNC: 136 MMOL/L (ref 136–145)
SODIUM SERPL-SCNC: 139 MMOL/L (ref 136–145)
SODIUM SERPL-SCNC: 140 MMOL/L (ref 136–145)
WBC # BLD AUTO: 6.7 K/UL (ref 4.8–10.8)
WBC # BLD AUTO: 7.06 K/UL (ref 4.8–10.8)
WBC # BLD AUTO: 7.28 K/UL (ref 4.8–10.8)
WBC # BLD AUTO: 8.71 K/UL (ref 4.8–10.8)

## 2018-02-24 PROCEDURE — 6A4Z0ZZ HYPOTHERMIA, SINGLE: ICD-10-PCS | Performed by: ANESTHESIOLOGY

## 2018-02-24 PROCEDURE — 03HY32Z INSERTION OF MONITORING DEVICE INTO UPPER ARTERY, PERCUTANEOUS APPROACH: ICD-10-PCS | Performed by: INTERNAL MEDICINE

## 2018-02-24 PROCEDURE — 0BCB8ZZ EXTIRPATION OF MATTER FROM LEFT LOWER LOBE BRONCHUS, VIA NATURAL OR ARTIFICIAL OPENING ENDOSCOPIC: ICD-10-PCS | Performed by: INTERNAL MEDICINE

## 2018-02-24 PROCEDURE — 5A1955Z RESPIRATORY VENTILATION, GREATER THAN 96 CONSECUTIVE HOURS: ICD-10-PCS | Performed by: ANESTHESIOLOGY

## 2018-02-24 PROCEDURE — 0BH18EZ INSERTION OF ENDOTRACHEAL AIRWAY INTO TRACHEA, VIA NATURAL OR ARTIFICIAL OPENING ENDOSCOPIC: ICD-10-PCS | Performed by: INTERNAL MEDICINE

## 2018-02-24 PROCEDURE — 0BC68ZZ EXTIRPATION OF MATTER FROM RIGHT LOWER LOBE BRONCHUS, VIA NATURAL OR ARTIFICIAL OPENING ENDOSCOPIC: ICD-10-PCS | Performed by: INTERNAL MEDICINE

## 2018-02-24 RX ADMIN — PIPERACILLIN SODIUM, TAZOBACTAM SODIUM SCH MLS/HR: 4; .5 INJECTION, POWDER, LYOPHILIZED, FOR SOLUTION INTRAVENOUS at 15:08

## 2018-02-24 RX ADMIN — HUMAN INSULIN SCH MLS/HR: 100 INJECTION, SOLUTION SUBCUTANEOUS at 14:10

## 2018-02-24 RX ADMIN — ENOXAPARIN SODIUM SCH MG: 100 INJECTION SUBCUTANEOUS at 21:23

## 2018-02-24 RX ADMIN — PIPERACILLIN SODIUM, TAZOBACTAM SODIUM SCH MLS/HR: 4; .5 INJECTION, POWDER, LYOPHILIZED, FOR SOLUTION INTRAVENOUS at 23:01

## 2018-02-24 RX ADMIN — IPRATROPIUM BROMIDE SCH PUFFS: 17 AEROSOL, METERED RESPIRATORY (INHALATION) at 20:56

## 2018-02-24 RX ADMIN — INSULIN ASPART SCH UNITS: 100 INJECTION, SOLUTION INTRAVENOUS; SUBCUTANEOUS at 21:00

## 2018-02-24 RX ADMIN — ALBUTEROL SULFATE SCH PUFFS: 90 AEROSOL, METERED RESPIRATORY (INHALATION) at 20:56

## 2018-02-24 RX ADMIN — VALPROATE SODIUM SCH MLS/HR: 500 INJECTION INTRAVENOUS at 21:27

## 2018-02-24 RX ADMIN — MEPERIDINE HYDROCHLORIDE PRN MG: 25 INJECTION, SOLUTION INTRAMUSCULAR; INTRAVENOUS; SUBCUTANEOUS at 21:26

## 2018-02-24 RX ADMIN — INSULIN ASPART SCH UNITS: 100 INJECTION, SOLUTION INTRAVENOUS; SUBCUTANEOUS at 16:52

## 2018-02-24 RX ADMIN — FENTANYL CITRATE SCH MLS/HR: 50 INJECTION, SOLUTION INTRAMUSCULAR; INTRAVENOUS at 20:56

## 2018-02-24 NOTE — DIAGNOSTIC IMAGING REPORT
CHEST ONE VIEW PORTABLE



CLINICAL HISTORY: Fever. Sepsis.    



COMPARISON STUDY:  Chest radiograph February 15, 2018.



FINDINGS: The tip of the endotracheal tube is 8.5 cm above the olivier. There is

no pneumothorax. Mild cardiomegaly is noted. There is mild pulmonary edema.

There is hazy left basilar opacity. There is no pneumothorax. Costophrenic

angles are not included. 



IMPRESSION:  



1. Tip of endotracheal tube 8.5 cm above the olivier. The tube could be advanced

3 cm.



2. Mild to moderate pulmonary edema.



3. Hazy left basilar opacity. 









Electronically signed by:  Patrick Corbett M.D.

2/24/2018 8:50 AM



Dictated Date/Time:  2/24/2018 8:47 AM

## 2018-02-24 NOTE — PROCEDURE NOTE
Procedure Note


Procedure Date


Feb 24, 2018.





Procedure Description


Procedure Name:


A-line


Procedure time out:  side/site verified, patient ID confirmed


Consent obtained:  emergent consent implied


Performed by:  attending


Indications:  diagnostic


Contraindications:  none


Description:


The patient had the procedure done in the right radial area, indication for A-

line is blood pressure monitoring as the patient blood pressure was not range 

of 200, the skin was prepped with chlorhexidine and under strict sterile field, 

the patient had the procedure using Seldinger technique, 2 attempts, A waves 

were noted on the monitor, the line was secured with 1 suture, and covered with 

surgical dressing.  No immediate complication.


Complications:  none

## 2018-02-24 NOTE — PROCEDURE NOTE
Procedure Note


Procedure Date


Feb 24, 2018.





Procedure Description


Procedure Name:


Bronchoscopy.


Procedure time out:  side/site verified, patient ID confirmed, correct procedure


Consent obtained:  emergent consent implied


Performed by:  attending


Indications:  diagnostic, therapeutic


Contraindications:  none


Description:


The patient has bronchoscopy due to hypoxia accompanied with atelectasis of the 

right upper lobe.  The family were notified about the procedure and agreed to 

do all the procedures under emergent situation.  The patient was already 

sedated with fentanyl and Versed drip.  The patient monitored in the ICU as he 

was intubated in bed #7 with a standard ICU monitoring.  No additional 

injections were needed.  The bronchoscope passed through #8 ET tube and the 

tracheal bronchial tree examined to the sub-subsegmental level.  Noted that the 

patient has total occlusion of the right upper lobe takeoff with mucoid 

impaction which was suctioned to clear.  Minimal areas of mucous plugging noted 

also in the right lower lobe and left lower lobe all suctioned to clear.  

Afterward, the ET tube was adjusted to 4 cm above the olivier using the 

bronchoscope as guidewire.  Tolerated the procedure well, no immediate 

complication, his O2 saturation returned back again to 98%.


Complications:  none


Patient tolerated procedure:  well

## 2018-02-24 NOTE — HISTORY AND PHYSICAL
History & Physical


Date & Time of Service:


Feb 24, 2018 at 10:09


Chief Complaint:


Cardiac Arrest


Primary Care Physician:


Claude Rowley M.D.


History of Present Illness


62 M with poorly controlled diabetes, copd and bipolar who presented to EMS 

this am and walked to unit, then arrested with CPR and intubation in the field, 

reportedly did have PEA with what medics described as sinus tach on monitor, 

appears to have mild heart failure on imaging as well as some rib fractures.  

Initally has mild troponin elevation and was given vecuronium in the ER.  He is 

found to have ST elevation and there was some discussion of having a heart 

alert.  The pt however since has survived a cardiac arrest will be a code 

arctic and will first attempt to stabilize his pulmonary and CNS status.  He 

will be put on nitro gtt, full dose lovenox and will use iv labetolol in  with 

eventual move to [facility]





He had issues with mucus plugging and did have an urgent bronchoscopy





Past Medical/Surgical History


Medical Problems:


1. CAD S/P NSTEMI - L Circ Stent - ACS 20190 - Recent MI in October 2017


2. Grade II Diastolic CHF


3. T2DM


4. Bipolar Disease


5. COPD


6. Chronic Diabetic Foot Ulcer


7. JANE


8. GERD


9. PAD - B/L Femoral Stents


10. Chronic Anemia - Likely Anemia of Chronic Disease


11. CKD Stage III


12. Aortic  Thrombus on chronic anticoagulation from coumadin


13.  gout





Family History





Cancer (esophageal)


Diabetes mellitus


Heart disease


Hypertension





Social History


Smoking Status:  Former Smoker


Drug Use:  none


Marital Status:  


Housing status:  lives with family, other


Occupational Status:  disabled





Immunizations


History of Influenza Vaccine:  No


Influenza Vaccine Date:  Oct 5, 2009


History of Tetanus Vaccine?:  No


Tetanus Immunization Date:  Mar 1, 2004


History of Pneumococcal:  No


Pneumococcal Date:  Oct 15, 2006


History of Hepatitis B Vaccine:  No





Multi-Drug Resistant Organisms


History of MDRO:  Yes


Type of MDRO:  MRSA





Allergies


Coded Allergies:  


     No Known Allergies (Verified , 2/24/18)





Home Medications


Scheduled


Amlodipine Besylate (Amlodipine Besylate), 10 MG PO QAM


Atorvastatin (Lipitor), 80 MG PO DAILY


Clopidogrel Bisulfate (Clopidogrel), 75 MG PO QAM


Cyanocobalamin (Vitamin B12), 1,000 MCG PO DAILY


Divalproex Sodium (Depakote Delay Rel), 2,000 MG PO HS


Escitalopram Oxalate (Escitalopram Oxalate), 20 MG PO QAM


Ferrous Sulfate (Ferrous Sulfate), 325 MG PO BIDM


Gabapentin (Gabapentin), 200 MG PO HS


Insulin Glargine (Lantus Solostar), 10 UNITS SC HS


Insulin Lispro (Human) (Humalog Kwikpen), 1 DOSE SC AC


Isosorbide Mononitrate Ext Rel (Imdur Ext Rel), 30 MG PO QAM


Labetalol HCl (Labetalol HCl), 200 MG PO BID


Mirtazapine (Mirtazapine), 30 MG PO HS


Tamsulosin HCl (Tamsulosin HCl), 0.4 MG PO HS


Tiotropium Bromide (Spiriva Handihaler), 1 CAP INH DAILY


Torsemide (Torsemide), 20 MG PO BID17


Warfarin Sodium (Warfarin Sodium), 7.5 MG PO 5XWK


Warfarin Sodium (Warfarin Sodium), 10 MG PO 2XWK





Scheduled PRN


Acetaminophen (Tylenol), 500 MG PO Q4-6HRS PRN for Pain


Albuterol Sulfate (Proair Respiclick), 1-2 PUFFS INH Q4-6HRS PRN for SOB/

Wheezing


Clonazepam (Clonazepam), 1 MG PO HS PRN for Anxiety


Docusate Sodium (Docusate Sodium), 100 MG PO BID PRN for Constipation


Fluticasone Propionate (Nasal) (Flonase Allergy Relief), 2 SPRAYS VERONICA DAILY PRN 

for Nasal Congestion


Nitroglycerin (Nitrostat), 0.4 MG UT UD PRN for Chest Pain


Oxycodone HCl (Oxycodone HCl), 5 MG PO Q6H PRN for Breakthrough Pain





Review of Systems


ROS UNABLE TO OBTAIN DUE TO SEDATION





Physical Exam


Vital Signs











  Date Time  Temp Pulse Resp B/P (MAP) Pulse Ox O2 Delivery O2 Flow Rate FiO2


 


2/24/18 10:04  103  194/98 94 Mechanical Ventilator  60


 


2/24/18 09:58  97  202/103 95 Mechanical Ventilator  


 


2/24/18 09:48  95 20 193/97 96 Mechanical Ventilator  60


 


2/24/18 09:46     96   


 


2/24/18 09:43  97 20 196/97 96 Mechanical Ventilator  60


 


2/24/18 09:37  97  197/100 96 Mechanical Ventilator  60


 


2/24/18 09:20  96 20 188/95 96 Mechanical Ventilator  


 


2/24/18 09:11  98  189/94 95   


 


2/24/18 09:06  97  189/94 95 Mechanical Ventilator  


 


2/24/18 08:46      Mechanical Ventilator  


 


2/24/18 08:44 36.7 92  189/125 99 Nebulizer  


 


2/24/18 08:40        60


 


2/24/18 08:28  108      


 


2/24/18 08:20  107  218/109 98 Ambu-Bag  








General Appearance:  + severe distress, + thin


Head:  normocephalic, atraumatic


Respiratory/Chest:  + respiratory distress, + decreased breath sounds, + 

accessory muscle use


Cardiovascular:  + tachycardia, + systolic murmur


Abdomen/GI:  normal bowel sounds, soft


Neurologic/Psych:  + pertinent finding (pt is paralysed)


Skin:  normal color, warm/dry, no rash





Diagnostics


Laboratory Results





Results Past 24 Hours








Test


  2/24/18


08:24 2/24/18


08:25 2/24/18


08:29 2/24/18


08:30 Range/Units


 


 


Bedside Glucose 320    70-99  mg/dl


 


White Blood Count  8.71   4.8-10.8  K/uL


 


Red Blood Count  3.12   4.7-6.1  M/uL


 


Hemoglobin  9.4   14.0-18.0  g/dL


 


Hematocrit  30.4   42-52  %


 


Mean Corpuscular Volume  97.4     fL


 


Mean Corpuscular Hemoglobin  30.1   25-34  pg


 


Mean Corpuscular Hemoglobin


Concent 


  30.9


  


  


  32-36  g/dl


 


 


Platelet Count  317   130-400  K/uL


 


Mean Platelet Volume  8.7   7.4-10.4  fL


 


Neutrophils (%) (Auto)  67.1    %


 


Lymphocytes (%) (Auto)  16.5    %


 


Monocytes (%) (Auto)  11.8    %


 


Eosinophils (%) (Auto)  1.7    %


 


Basophils (%) (Auto)  0.3    %


 


Neutrophils # (Auto)  5.83   1.4-6.5  K/uL


 


Lymphocytes # (Auto)  1.44   1.2-3.4  K/uL


 


Monocytes # (Auto)  1.03   0.11-0.59  K/uL


 


Eosinophils # (Auto)  0.15   0-0.5  K/uL


 


Basophils # (Auto)  0.03   0-0.2  K/uL


 


RDW Standard Deviation  55.6   36.4-46.3  fL


 


RDW Coefficient of Variation  15.8   11.5-14.5  %


 


Immature Granulocyte % (Auto)  2.6    %


 


Immature Granulocyte # (Auto)  0.23   0.00-0.02  K/uL


 


Prothrombin Time


  


  13.5


  


  


  9.0-12.0


SECONDS


 


Prothromb Time International


Ratio 


  1.3


  


  


  0.9-1.1  


 


 


Activated Partial


Thromboplast Time 


  27.9


  


  


  21.0-31.0


SECONDS


 


Partial Thromboplastin Ratio  1.1    


 


Sodium Level  139   136-145  mmol/L


 


Potassium Level  4.2   3.5-5.1  mmol/L


 


Chloride Level  106     mmol/L


 


Carbon Dioxide Level  22   21-32  mmol/L


 


Anion Gap  11.0 17.0  16-25  mmol/L


 


Blood Urea Nitrogen  28   7-18  mg/dl


 


Creatinine


  


  1.53


  


  


  0.60-1.40


mg/dl


 


Estimated GFR (


American) 


  56.4


  


  


   


 


 


Estimated GFR (Non-


American 


  48.7


  


  


   


 


 


BUN/Creatinine Ratio  18.5   10-20  


 


Random Glucose  303   70-99  mg/dl


 


Calcium Level  7.8   8.5-10.1  mg/dl


 


Total Bilirubin  0.2   0.2-1  mg/dl


 


Direct Bilirubin  < 0.1   0-0.2  mg/dl


 


Aspartate Amino Transf


(AST/SGOT) 


  45


  


  


  15-37  U/L


 


 


Alanine Aminotransferase


(ALT/SGPT) 


  30


  


  


  12-78  U/L


 


 


Alkaline Phosphatase  70     U/L


 


Total Creatine Kinase  243     U/L


 


Creatine Kinase MB  4.2   0.5-3.6  ng/ml


 


Creatine Kinase MB Ratio  1.7   0-3.0  


 


Troponin I  0.060   0-0.045  ng/ml


 


Total Protein  6.8   6.4-8.2  gm/dl


 


Albumin  2.4   3.4-5.0  gm/dl


 


Lipase  282     U/L


 


Beta-Hydroxybutyric Acid     0.2-2.81  mg/dL


 


Thyroid Stimulating Hormone


(TSH) 


  12.200


  


  


  0.300-4.500


uIu/ml


 


Bedside Hemoglobin   9.9  14.0-18.0  g/dl


 


Bedside Hematocrit   29  42-52  %


 


Bedside Sodium   140  135-144  mEq/L


 


Bedside Potassium   4.2  3.3-5.0  mEq/L


 


Bedside Chloride   105  101-112  mEq/L


 


Bedside Total CO2   23  24-31  mEq/l


 


Bedside Blood Urea Nitrogen   29  7-18  mg/dl


 


Bedside Creatinine   1.3  0.6-1.3  mg/dl


 


Bedside Glucose (other)   321  70-99  mg/dl


 


Bedside Ionized Calcium (José)


  


  


  1.11


  


  1.12-1.32


mmol/l


 


Urine Color    YELLOW  


 


Urine Appearance    CLOUDY CLEAR  


 


Urine pH    6.0 4.5-7.5  


 


Urine Specific Gravity    1.018 1.000-1.030  


 


Urine Protein    4+ NEG  


 


Urine Glucose (UA)    3+ NEG  


 


Urine Ketones    NEG NEG  


 


Urine Occult Blood    2+ NEG  


 


Urine Nitrite    NEG NEG  


 


Urine Bilirubin    NEG NEG  


 


Urine Urobilinogen    NEG NEG  


 


Urine Leukocyte Esterase    NEG NEG  


 


Urine WBC (Auto)    >30 0-5  /hpf


 


Urine RBC (Auto)    >30 0-4  /hpf


 


Urine Hyaline Casts (Auto)    1-5 0-5  /lpf


 


Urine Epithelial Cells (Auto)    10-20 0-5  /lpf


 


Urine Bacteria (Auto)    1+ NEG  


 


Urine Yeast (Auto)     NONE PRSENT  


 


Test


  2/24/18


08:34 2/24/18


09:30 2/24/18


09:48 2/24/18


10:06 Range/Units


 


 


Bedside Lactic Acid Venous


  6.84


  


  


  


  0.90-1.70


mmol/L


 


Influenza Type A Antigen  Neg for Influ A   NEG  


 


Influenza Type B Antigen  Neg for Influ B   NEG  


 


Arterial Blood pH   7.19  7.35-7.45  


 


Arterial Blood Partial


Pressure CO2 


  


  64


  


  35-46  mmHg


 


 


Arterial Blood Partial


Pressure O2 


  


  73


  


  80-95  mm/Hg


 


 


Arterial Blood HCO3   24  19-24  mmol/L


 


Arterial Blood Oxygen


Saturation 


  


  88.6


  


  90-95  %


 


 


Arterial Blood Base Excess   -4.8  -9-1.8  mEq/L


 


Arterial Blood Gas Delivery   60%   


 


Cameron Test   POS  POS  








Microbiology Results


2/24/18 Urine Culture, Received


          Pending





Diagnostic Radiology


CTA no PE, hf seen


other (HEART FAILURE, TUBE IS HIGH AND WAS RE POSITIONED)


other (ABNORMAL ECG BUT WITH cncern for ACUTE ISCHEMIC CHANGES in anterior leads

)





Impression


Assessment and Plan


62 M cardiac arrest survivor, monaley STEMI and concern for anoxic brain injury





Cardiac arrest survivor, ICU team initiated therapeutic hypothermia





STEMI, elevated trop and abnormal ECG, labetolol IV, aspirin down OG tube, full 

dose lovenox





HF has history of diastolic HF, given lasix, minimize fluids given





uncontrolled diabetes, insulin ssi, may need some basal insulin also





40 minutes of critical care time to admit patient to ICU, I did discuss this 

case personally with DR Elder and Dr Martínez.  I reviewed initial CXR to see 

hf and poorly postioned ETT





Level of Care


Critical Care





Resuscitation Status


FULL RESUSCITATION





VTE Prophylaxis


VTE Risk Assessment Done? Y/N:  Yes


Risk Level:  Moderate


Given or contraindicated:  Enoxaparin (Lovenox)SQ





Note


Total Time:   Critical Care 30 - 74 minutes

## 2018-02-24 NOTE — DIAGNOSTIC IMAGING REPORT
CHEST ONE VIEW PORTABLE



CLINICAL HISTORY: post bronch    



COMPARISON STUDY:  2/24/2018



FINDINGS: There is a nasogastric tube which passes into the stomach. There is an

endotracheal tube 4.8 cm above the olivier. There is a right subclavian central

venous catheter. The heart remains enlarged. There is bilateral residual

thickening. A small right pleural effusion is suspected. There is improved

aeration of the right upper lobe. There is no pneumothorax..[ 



IMPRESSION: 

1. Improved aeration the right upper lobe

2. No evidence of pneumothorax status post bronchoscopy

3. Persistent interstitial thickening/edema







Electronically signed by:  Basil Chapin M.D.

2/24/2018 2:48 PM



Dictated Date/Time:  2/24/2018 2:46 PM

## 2018-02-24 NOTE — CARDIOLOGY CONSULTATION
DATE OF CONSULTATION:  02/24/2018

 

TIME:  14:11 p.m.

 

CONSULTING PHYSICIAN:  Nicolás Elder MD

 

REASON FOR CONSULTATION:  Status post code blue.

 

PRIMARY CARDIOLOGIST:  Claude Yusuf MD

 

HISTORY OF PRESENT ILLNESS:  Mr. Dixon is a 62-year-old gentleman with a

history significant for diastolic CHF, multivessel CAD status post LAD and

circumflex PCI, chronic kidney disease, peripheral arterial disease status

post lower extremity stents, COPD, type 2 diabetes, hypertension, and

dyslipidemia.  He also has been prescribed Coumadin, which after extensive

record review was [place] [facility] [place]. 

There were no other details available regarding this diagnosis at the time of

this note.

 

Mr. Dixon was recently discharged from [facility] on

02/19/2018 when he was hospitalized with acute-on-chronic diastolic

congestive heart failure and hypoxemia with respiratory acidosis.  He was

discharged on Demadex 20 mg p.o. b.i.d.  Throughout that hospitalization, his

fluid balance was incomplete, but he had been diuresed with intravenous

Lasix.

 

His wife states that he is noncompliant with medications, including

diuretics.  On the day of discharge while walking into the home, he fell and

laid on concrete for 45 minutes before the family found him.  They did not

 the diuretics for another day or two and then she states that he

often does not take his medications.  He apparently does not take most of his

medications from what her and family members mentioned, not just his

diuretic.  They state that he does not like to take medications.  He also

appears to be noncompliant with his diet.  He continues to eat foods high in

sodium content.  He also is noncompliant with followup.  According to

outpatient records, his last 6 appointments have been canceled or no showed

with cardiology clinic.  He was scheduled to be followed up in the office

this upcoming week from this most recent hospitalization.

 

His wife states that he had no specific complaints yesterday.  In the past

when having myocardial infarction, he had angina.  He states that he abruptly

became short of breath today.  He uses oxygen at night and because of his

shortness of breath, he put himself on 6 liters of oxygen through nasal

cannula.  He still appeared to be very short of breath and was diaphoretic,

but had no angina.  He has a pulse oximeter at home and she states that his

pulse ox was 66% on 6 liters of supplemental oxygen.  He apparently was

initially awake for EMS, but then developed PEA and CPR was initiated.  The

ribs were reportedly broken.  He received 3 doses of epinephrine prior to

arrival to the Emergency Department according to the ER records.  He was also

intubated in the field.  An ECG was done, which demonstrated sinus

tachycardia at 110 beats per minute.  There were ectopic beats as well. 

There were ST elevations in V2 and V3, which when reviewing prior ECGs have

been intermittently present in the past.

 

Dr. Elder of the critical care team states that he had to be reintubated due

to placement of his first ET tube and in while caring for him, he noticed

that he was not overly responsive and appeared to be posturing at one point

despite no continuous sedation.  He then called to see if he should be taken

to the cardiac catheterization lab.  He had noticed sinus rhythm with

bigeminy on telemetry.  His blood pressure at one point was in the 90s

systolic, but then an A-line was placed and his systolic blood pressure in

the 180s-190s.  He then felt as though he was starting to wake up and began

sedating him.

 

His wife did not note any nausea, vomiting, diarrhea or bleeding, other than

from when he fell on the concrete, scraping his knee.  This was 5 or 6 days

ago.  He states that he has not complained of any fevers or chills.

 

There is concern that he has a prior seizure history, but his wife states

that he has not had any seizures as far she knows.  His brother and sister

were also present and they also agree that they do not recall a history of

seizure disorder.  They admit that he does have bipolar disorder and takes

medications for that.

 

REVIEW OF SYSTEMS:  As above and review of systems otherwise unobtainable due

to patient's current sedated and ventilated state.

 

PAST MEDICAL HISTORY:

1.  CAD status post circumflex PCI in 2007 and LAD PCI in 2010.

2.  Diastolic CHF.

3.  Peripheral arterial disease including PCI of left SFA and left popliteal

artery in May 2017 by Dr. Rainey.  [place] distal [place]al [place] [facility] [place] [facility] [place]

4.  [place] [facility] [place] for which anticoagulation

therapy has been started.

5.  Chronic anticoagulation therapy as noted above; however, he is

noncompliant.

6.  Diastolic CHF with noncompliance for medical therapy and followup.

7.  COPD.

8.  Hypertension.

9.  Dyslipidemia.

10.  Bipolar disorder.

11.  GERD.

12.  Anemia.

13.  Chronic kidney disease.

14.  Type 2 diabetes.

15.  Gout.

 

HOME MEDICATIONS AND RECENT DISCHARGE MEDICATIONS:  From 02/19/2018 include:

1.  Demadex 20 mg twice daily.

2.  Labetalol 200 mg twice daily.

3.  Gabapentin 200 mg at bedtime.

4.  Mirtazapine 15 mg at bedtime.

5.  Coumadin 7.5 mg daily.

6.  Plavix 75 mg daily.

7.  Atorvastatin 80 mg daily.

8.  Amlodipine 10 mg daily.

9.  Albuterol inhaler every 4-6 hours.

10.  Depakote 2000 mg at bedtime.

11.  Escitalopram 20 mg daily.

12.  Ferrous sulfate 325 mg twice daily.

13.  Lantus 10 units at bedtime.

14.  Lispro insulin with meals.

15.  Isosorbide mononitrate 30 mg daily.

 

Please note he was noncompliant with his discharge medications according to

his wife.

 

INPATIENT MEDICATIONS:  Include Plavix 75 mg daily, Lovenox 1 mg/kg

subcutaneous q. 12 hours, fentanyl and Versed for sedation, Lasix 80 mg IV,

Zosyn, valproate 1000 mg IV q. 12 hours.

 

ALLERGIES:  No known drug allergies.

 

SOCIAL HISTORY:  He has smoked for 50 years, 2+ packs per day, but quit in

2017.  No significant alcohol.  No biologic children, but has an adopted

daughter who happens to be his niece.  He is  and lives with his wife.

 His wife, sister, brother, and sister-in-law were present in the waiting

area.

 

FAMILY HISTORY:  Heart failure, hypertension, diabetes.

 

PHYSICAL EXAMINATION:

VITAL SIGNS:  Temperature 36.7 degrees, heart rate 69 beats per minute,

respiration rate 20, blood pressure 138/69 mmHg, oxygen saturation 95% on

mechanical ventilator, FIO2 0.6.  His blood pressure while at the bedside had

a systolic in 180s-190s.  Weight 101 kg.

GENERAL:  He is currently sedated and unresponsive.

HEENT:  Pupils are equal and round.  Anicteric sclerae.

NECK:  Difficult to assess JVP.  No bruits.  Normal carotid upstrokes

bilaterally.

CARDIAC EXAMINATION:  PMI was nonpalpable.  There was no ventricular heave. 

Regular, normal S1, S2.  There was a 2/6 early peaking systolic ejection

murmur in the right upper sternal border.  No rubs or gallops.

LUNGS:  Clear to auscultation on anterior auscultation bilaterally.

ABDOMEN:  Soft, nondistended, normoactive bowel sounds.

EXTREMITIES:  1+ bilateral lower extremity edema to the knees.  No cyanosis. 

2+ left radial pulse.  There is a right radial arterial line in place.  2+

posterior tibialis pulses bilaterally.

 

LABORATORY DATA:  White blood cell count 8.7, hemoglobin 9.4, platelets 317. 

Sodium 139, potassium 4.2, BUN 28, creatinine 1.53, troponin 0.06, AST 45,

glucose 303, lactic acid 6.84.  Albumin 2.4, TSH 12.2, INR 1.3.  ABG pH 7.19,

pCO2 64, pO2 73.  Influenza A and B are negative.  Blood cultures and urine

culture pending.  Chest x-ray performed this afternoon demonstrated a new

right apical opacity favoring right upper lobe atelectasis as per radiology.

 

Head CT, no acute intracranial findings as per radiology.

 

CT scan of the chest per radiology no pulmonary emboli identified.  Small to

moderate bilateral pleural effusions.  Acute appearing nondisplaced fractures

in multiple anterior bilateral ribs.  No pneumothorax.  Possible acute

nondisplaced inferior sternal fracture.  Extensive coronary artery

calcification.

 

ECG personally reviewed.  Sinus tachycardia with PVCs.  Nonspecific T-wave

abnormality.  ST elevation in V2 and V3, which had been present on ECGs in

the past.

 

Echocardiogram images were personally reviewed.  LV systolic function is

reduced compared to echo performed on 02/08/2018 when his LV systolic

function was normal.  He appears to have global hypokinesis.  Full report to

follow.

 

Extensive records reviewed including outpatient records and prior

hospitalization records at [facility].

 

There was also a conversation with the patient's wife, brother and sister.

 

ASSESSMENT AND PLAN:

1.  Pulseless electrical activity, code blue:  This may have been a

hypoxia-driven event, especially given the fact that his O2 sat at home was

apparently 66% on 6 liters of oxygen according to his wife.  He has since

undergone a mucous plug removal by Dr. Elder in the ICU due to collapsed

right upper lobe.  He also appears to be hypervolemic.  There is no urgent

indication for cardiac catheterization at this time.  We would recommend

serial troponin levels as he does have extensive coronary artery disease

according to records.  He has not required pressor support.

2.  Acute-on-chronic diastolic congestive heart failure:  He does appear to

be hypervolemic.  Recommend IV diuretic.  He is noncompliant with medications

and diet as well as followup in the past.  His wife admits that he did not

take his diuretics for at least a few of the days this past week since

discharge from heart failure admission.  If/when he awakens, further

education is recommended.  Strict I's and O's and daily weights recommended.

3.  Elevated troponins:  This will likely become more elevated following

cardiopulmonary resuscitation.  Check serial troponins until peaked.  He did

not appear to present with acute coronary syndrome as he did not admit to any

angina to his wife and there are no acute changes on his ECG to suggest such.

 He also has global hypokinesis on preliminary review of his echo.

4.  Multivessel coronary artery disease status post percutaneous coronary

intervention:  Continue antiplatelet therapy indefinitely.  Continue beta

blocker therapy.  Continue high-intensity statin therapy.

5.  Aortic thrombus.  This [place] [facility] [place]  There are no further details available at this time.  He is on chronic

anticoagulation, but is noncompliant and has not been taking the medication

regularly.  Can continue anticoagulation for now if no contraindications.

6.  Hypertension:  He became more hypertensive after he was in the ICU.  This

will be managed by the critical care team.  They first are attempting to

sedate him to see if that improves his blood pressure.  If not, we would

resume beta blocker therapy, but would start carvedilol, given his reduced

left ventricular systolic function.

7.  Cardiomyopathy:  Likely secondary to his pulseless activity event earlier

today.  Hopefully, left ventricular systolic function improves over time. 

When restarting beta blocker, we would consider carvedilol, given his reduced

function and significant coronary artery disease.

8.  Hypoxia:  Likely multifactorial.  He did have significant mucous plugging

according to Dr. Elder and also appears to be hypervolemic with

acute-on-chronic diastolic congestive heart failure.

9.  Disposition:  Cardiology will continue to follow.  Dr. Yusuf is away for

the next several days.  The patient's care has been discussed with Dr. Elder

and Dr. Omalley of the primary hospitalist service.  He is currently

undergoing hypothermia protocol as per critical care team.

 

Highly complex medical issues.  Ninety minutes critical care time spent with

extensive time spent counseling the family members and also helping

coordinate care after extensive chart review as well.  Studies were also

reviewed as noted above.

 

Thank you for allowing me to participate in the care of Mr. Dixon.

## 2018-02-24 NOTE — DIAGNOSTIC IMAGING REPORT
CT ANGIOGRAPHY OF THE CHEST, PULMONARY EMBOLUS PROTOCOL



CLINICAL HISTORY: Cardiac arrest.    



COMPARISON STUDY:  Chest CT June 26, 2017 and chest radiograph performed earlier

today. 



TECHNIQUE: Following IV administration of 93 mL of Optiray-320, helical axial

images of the chest were obtained utilizing the pulmonary embolus protocol. 

Maximal intensity projections and sagittal and coronal reformats were viewed on

an independent 3D workstation.  IV contrast was administered without

complication.  A dose lowering technique was utilized adhering to the principles

of ALARA.





CT DOSE: 1226.32 mGy.cm



FINDINGS:  No pulmonary emboli are identified although the segmental and

subsegmental pulmonary arteries are suboptimally assessed due to respiratory

motion. Thoracic aortic opacification is suboptimal but there is no evidence for

thoracic aortic dissection. The heart is moderately enlarged. There is extensive

coronary artery calcification. There are a few mildly enlarged mediastinal lymph

nodes, including an AP window node that measures 1.3 cm in short axis diameter.

There is no pneumothorax. There are small to moderate bilateral pleural

effusions. Associated bilateral lower lobe opacities are noted. There is

interlobular septal thickening. There are also groundglass opacities within the

upper lobes. No acute thoracic spine fracture is present. There are acute

nondisplaced fractures of multiple anterior bilateral ribs. There is a possible

acute nondisplaced inferior sternal fracture. Upper abdomen is unremarkable.



IMPRESSION:  



1. No pulmonary emboli identified although segmental and subsegmental pulmonary

arteries suboptimally assessed due to respiratory motion.



2. Interlobular septal thickening consistent with moderate interstitial

pulmonary edema.



3. Small to moderate bilateral pleural effusions.



4. Dependent airspace opacities within the lungs which favor atelectasis. An

infectious process or alveolar edema could appear similar. 



5. Acute appearing nondisplaced fractures of multiple anterior bilateral ribs.

No pneumothorax. Possible acute nondisplaced inferior sternal fracture.



6. Moderate cardiomegaly and extensive coronary artery calcification.











Electronically signed by:  Patrick Corbett M.D.

2/24/2018 9:23 AM



Dictated Date/Time:  2/24/2018 9:12 AM

## 2018-02-24 NOTE — CRITICAL CARE CONSULTATION
Critical Care Consultation


Date of Consultation:


Feb 24, 2018.


Attending Physician:


Chuy Omalley M.D.


Reason for Consultation:


Cardiac arrest


History of Present Illness


62-year-old gentleman with a history of congestive heart failure, coronary 

artery disease status post stenting to the LAD and RCA last year, the patient 

has been maintained on aspirin and clopidogrel, the patient is totally 

noncompliant and according to the family he has not been taking his 

medications.  The patient according to Dr. Martínez, has been treated with 

Coumadin for aortic thrombus.  Documentation was not found in that regard.  

According to the wife he did have arrhythmia and he was started on Coumadin, 

however, the patient was not taking the medication anyway as his INR on arrival 

was 1.3.





The patient recently discharged from the hospital with CHF and was placed on 

regimen to be followed up as an outpatient.  Today the patient started having 

shortness of breath and called the EMS, on arrival the patient walked into the 

unit after that collapsed.  The patient was in PEA arrest, the patient was 

intubated in the field and brought to the emergency room CPR was started and 

lasted for 8 minutes.  The patient succeeded to ROSC, and was admitted to the 

ICU for further management.





According to Dr. Omalley, the patient was somewhat agitated, has to be 

paralyzed, unclear whether the patient had a seizure activity as the patient 

had history of seizures.  The patient was maintained on Depakote.  Review of 

system was not obtainable from the patient.  Noted the patient has somewhat 

decortication movement in the upper extremity and was unresponsive, open his 

eyes but doesn't follow or track any orders, due to these neurologic changes, 

the patient was started on TTM.





His physical exam revealed a 62-year-old gentleman currently sedated and 

intubated, initial blood pressure was 200/110, heart rate was bigeminy 

alternating with normal sinus rhythm, O2 saturation 99% on 70%, heart 

examination S1 and S2 with bigeminy, distant breath sounds bilaterally, abdomen 

is benign, trace edema in the periphery and signs and marks of fasciotomy in 

the past.





Due to the decreased from the ET tube, a patient underwent changing 82 from #7 

to #8 glide scope, dictated separately, the patient also had an A-line in the 

right radial and right central line is subclavian area with initial CVP was 10-

12.  The patient collapses right upper lobe and end up having a bronchoscopy 

with good results and 82 but adjusted accordingly.  All these procedures 

dictated separately.





Discussed with the family and details of regard of his prognosis and his plan.





Family History





Cancer (esophageal)


Diabetes mellitus


Heart disease


Hypertension





Social History


Smoking Status:  Former Smoker


Drug Use:  none


Marital Status:  


Housing Status:  lives with family


Occupation Status:  employed





Allergies


Coded Allergies:  


     No Known Allergies (Verified , 2/24/18)





Home Medications


Scheduled


Amlodipine Besylate (Amlodipine Besylate), 10 MG PO QAM


Atorvastatin (Lipitor), 80 MG PO DAILY


Clopidogrel Bisulfate (Clopidogrel), 75 MG PO QAM


Cyanocobalamin (Vitamin B12), 1,000 MCG PO DAILY


Divalproex Sodium (Depakote Delay Rel), 2,000 MG PO HS


Escitalopram Oxalate (Escitalopram Oxalate), 20 MG PO QAM


Ferrous Sulfate (Ferrous Sulfate), 325 MG PO BIDM


Gabapentin (Gabapentin), 200 MG PO HS


Insulin Glargine (Lantus Solostar), 10 UNITS SC HS


Insulin Lispro (Human) (Humalog Kwikpen), 1 DOSE SC AC


Isosorbide Mononitrate Ext Rel (Imdur Ext Rel), 30 MG PO QAM


Labetalol HCl (Labetalol HCl), 200 MG PO BID


Mirtazapine (Mirtazapine), 30 MG PO HS


Tamsulosin HCl (Tamsulosin HCl), 0.4 MG PO HS


Tiotropium Bromide (Spiriva Handihaler), 1 CAP INH DAILY


Torsemide (Torsemide), 20 MG PO BID17


Warfarin Sodium (Warfarin Sodium), 7.5 MG PO 5XWK


Warfarin Sodium (Warfarin Sodium), 10 MG PO 2XWK





Scheduled PRN


Acetaminophen (Tylenol), 500 MG PO Q4-6HRS PRN for Pain


Albuterol Sulfate (Proair Respiclick), 1-2 PUFFS INH Q4-6HRS PRN for SOB/

Wheezing


Clonazepam (Clonazepam), 1 MG PO HS PRN for Anxiety


Docusate Sodium (Docusate Sodium), 100 MG PO BID PRN for Constipation


Fluticasone Propionate (Nasal) (Flonase Allergy Relief), 2 SPRAYS VERONICA DAILY PRN 

for Nasal Congestion


Nitroglycerin (Nitrostat), 0.4 MG UT UD PRN for Chest Pain


Oxycodone HCl (Oxycodone HCl), 5 MG PO Q6H PRN for Breakthrough Pain





Current Inpatient Medications





Current Inpatient Medications








 Medications


  (Trade)  Dose


 Ordered  Sig/Dc


 Route  Start Time


 Stop Time Status Last Admin


Dose Admin


 


 Ioversol


  (Optiray 320)  100 ml  UD  PRN


 IV  2/24/18 09:15


 2/28/18 09:14   


 


 


 Miscellaneous


 Information


  ([facility] Protocol


 For Hyperglycemia)  1 ea  PRN  PRN


 N/A  2/24/18 10:00


 2/26/18 09:59   


 


 


 Clopidogrel


 Bisulfate


  (plAVix TAB)  75 mg  QAM


 NG  2/25/18 09:00


 3/27/18 08:59   


 


 


 Albuterol


  (Ventolin Hfa


 Inhaler)  4 puffs  Q6


 INH  2/24/18 18:00


 3/26/18 17:59   


 


 


 Ipratropium


 Bromide


  (Atrovent Hfa


 Inhaler)  4 puffs  Q6


 INH  2/24/18 18:00


 3/26/18 17:59   


 


 


 Piperacillin Sod/


 Tazobactam Sod


 4.5 gm/Dextrose  120 ml @ 


 30 mls/hr  Q8H


 IV  2/24/18 14:00


 3/3/18 08:59  2/24/18 15:08


30 MLS/HR


 


 Miscellaneous


 Information


  (Consult)  1 ea  UD  PRN


 N/A  2/24/18 10:00


 3/26/18 09:59   


 


 


 Miscellaneous


 Information


  (Consult


 Glycemic


 Management


 Pharmacy)  1 ea  UD  PRN


 N/A  2/24/18 12:30


 3/26/18 12:29   


 


 


 Fentanyl Citrate


  (Fentanyl Inj)  25 mcg  Q1H  PRN


 IV  2/24/18 10:30


 3/10/18 10:29   


 


 


 Fentanyl Citrate


  (Fentanyl Inj)  50 mcg  Q1H  PRN


 IV  2/24/18 10:30


 3/10/18 10:29   


 


 


 Artificial Tears


  (Lacri-Lube Oph


 Oint)  1 appln  Q2H  PRN


 OPB  2/24/18 12:00


 3/26/18 11:59   


 


 


 Pantoprazole


 Sodium 40 mg/


 Syringe  10 ml @ 5


 mls/min  DAILY@11


 IV  2/25/18 11:00


 3/27/18 10:59   


 


 


 Midazolam HCl  250 ml @ 0


 mls/hr  Q0M PRN


 IV  2/24/18 11:57


 3/26/18 11:56   


 


 


 Fentanyl Citrate  250 ml @ 0


 mls/hr  Q0M


 IV  2/24/18 11:57


 3/10/18 11:56   


 


 


 Meperidine HCl


  (Demerol Inj)  25 mg  Q2H  PRN


 IV  2/24/18 12:00


 3/10/18 11:59   


 


 


 Acetaminophen


  (Tylenol Supp)  650 mg  ONE  PRN


 ME  2/24/18 12:00


 3/26/18 11:59   


 


 


 Valproate Sodium


 1000 mg/Dextrose  60 ml @ 55


 mls/hr  Q12H


 IV  2/24/18 22:00


 3/26/18 21:59   


 


 


 Insulin Aspart


  (novoLOG ASPART)  SLIDING


 SCALE  HS


 SC  2/24/18 17:15


 3/26/18 17:14   


 


 


 Nitroglycerin/


 Dextrose  250 ml @ 0


 mls/hr  Q0M PRN


 IV  2/24/18 13:30


 3/26/18 13:29   


 


 


 Chlorhexidine


 Gluconate


  (Peridex Oral


 Soln)  15 ml  DAILY


 MT  2/25/18 09:00


 3/27/18 08:59   


 


 


 Insulin Human


 Regular 250 units/


 Sodium Chloride  252.5 ml @ 


 0 mls/hr  Q24H


 IV  2/24/18 14:00


 3/26/18 13:59  2/24/18 14:10


2.5 MLS/HR


 


 Enoxaparin Sodium


  (Lovenox Inj)  100 mg  Q12


 SQ  2/24/18 21:00


 3/26/18 20:59   


 











Review of Systems


Not obtainable.





Physical Exam











  Date Time  Temp Pulse Resp B/P (MAP) Pulse Ox O2 Delivery O2 Flow Rate FiO2


 


2/24/18 17:00 32.5 48 20 113/60 (77) 97 Mechanical Ventilator  70





    117/81 (93)    


 


2/24/18 16:00 33.1 46 20 119/61 (80) 97 Mechanical Ventilator  70





    122/86 (98)    


 


2/24/18 16:00 33.1 48 20 122/86 (78) 95   





    119/61    


 


2/24/18 16:00        70


 


2/24/18 16:00     98 Mechanical Ventilator  70


 


2/24/18 15:05        40


 


2/24/18 15:00 33.7 51 20 105/57 (73) 95 Mechanical Ventilator  70





    109/80 (90)    


 


2/24/18 15:00 33.7 51 20 105/57 95   


 


2/24/18 14:47      Mechanical Ventilator  70


 


2/24/18 14:45 33.8 52 20 120/60 (80) 94 Mechanical Ventilator  70


 


2/24/18 14:30 34.0 57 20 131/65 (87) 93 Mechanical Ventilator  70


 


2/24/18 14:15 34.2 58 20 119/59 (79) 92 Mechanical Ventilator  70


 


2/24/18 14:00 34.3 59 20 135/66 (89) 98 Mechanical Ventilator  70


 


2/24/18 14:00 34.3 59 20 123/61 (81) 93 Mechanical Ventilator  70





    133/94 (107)    


 


2/24/18 13:45 34.4 60 10 134/64 (87) 95 Mechanical Ventilator  70


 


2/24/18 13:30 34.6 63 20 184/79 (114) 92 Mechanical Ventilator  70


 


2/24/18 13:15 34.8 73 15 196/77 (116) 90 Mechanical Ventilator  70


 


2/24/18 13:00 35.2 71 20 188/75 (112) 91 Mechanical Ventilator  70





    187/111 (136)    


 


2/24/18 13:00 35.0 71 20 191/75 (113) 91 Mechanical Ventilator  70


 


2/24/18 12:45 35.3 75 20 196/110 (138) 90 Mechanical Ventilator  70


 


2/24/18 12:30 35.5 40 20 198/150 (166) 87 Mechanical Ventilator  70


 


2/24/18 12:19 35.6 44 20 167/120 (136) 89 Mechanical Ventilator  70


 


2/24/18 12:00 35.7 70 19 93/49 (64) 96 Mechanical Ventilator  70


 


2/24/18 11:20        60


 


2/24/18 10:44  69 20 138/69 95 Mechanical Ventilator  60


 


2/24/18 10:31  72  146/70 95 Mechanical Ventilator  60


 


2/24/18 10:04  103  194/98 94 Mechanical Ventilator  60


 


2/24/18 09:58  97  202/103 95 Mechanical Ventilator  


 


2/24/18 09:48  95 20 193/97 96 Mechanical Ventilator  60


 


2/24/18 09:46     96   


 


2/24/18 09:43  97 20 196/97 96 Mechanical Ventilator  60


 


2/24/18 09:37  97  197/100 96 Mechanical Ventilator  60


 


2/24/18 09:20  96 20 188/95 96 Mechanical Ventilator  


 


2/24/18 09:11  98  189/94 95   


 


2/24/18 09:06  97  189/94 95 Mechanical Ventilator  


 


2/24/18 08:46      Mechanical Ventilator  


 


2/24/18 08:44 36.7 92  189/125 99 Nebulizer  


 


2/24/18 08:40        60


 


2/24/18 08:28  108      


 


2/24/18 08:20  107  218/109 98 Ambu-Bag  








General Appearance:  no apparent distress


Eyes:  other (sedated.)


Neck:  supple


Respiratory:  other (distant breath sounds bilaterally.)


Cardiovasular:  regular rate/rhythm, no murmur, no JVD


Abdomen:  non tender, no hernia


Edema:  Bilateral LE (2+)


Neuro:  other (patient is sedated and paralyzed.)





Laboratory Results





Last 24 Hours








Test


  2/24/18


08:24 2/24/18


08:25 2/24/18


08:29 2/24/18


08:30


 


Bedside Glucose 320 mg/dl    


 


White Blood Count  8.71 K/uL   


 


Red Blood Count  3.12 M/uL   


 


Hemoglobin  9.4 g/dL   


 


Hematocrit  30.4 %   


 


Mean Corpuscular Volume  97.4 fL   


 


Mean Corpuscular Hemoglobin  30.1 pg   


 


Mean Corpuscular Hemoglobin


Concent 


  30.9 g/dl 


  


  


 


 


Platelet Count  317 K/uL   


 


Mean Platelet Volume  8.7 fL   


 


Neutrophils (%) (Auto)  67.1 %   


 


Lymphocytes (%) (Auto)  16.5 %   


 


Monocytes (%) (Auto)  11.8 %   


 


Eosinophils (%) (Auto)  1.7 %   


 


Basophils (%) (Auto)  0.3 %   


 


Neutrophils # (Auto)  5.83 K/uL   


 


Lymphocytes # (Auto)  1.44 K/uL   


 


Monocytes # (Auto)  1.03 K/uL   


 


Eosinophils # (Auto)  0.15 K/uL   


 


Basophils # (Auto)  0.03 K/uL   


 


RDW Standard Deviation  55.6 fL   


 


RDW Coefficient of Variation  15.8 %   


 


Immature Granulocyte % (Auto)  2.6 %   


 


Immature Granulocyte # (Auto)  0.23 K/uL   


 


Prothrombin Time  13.5 SECONDS   


 


Prothromb Time International


Ratio 


  1.3 


  


  


 


 


Activated Partial


Thromboplast Time 


  27.9 SECONDS 


  


  


 


 


Partial Thromboplastin Ratio  1.1   


 


Sodium Level  139 mmol/L   


 


Potassium Level  4.2 mmol/L   


 


Chloride Level  106 mmol/L   


 


Carbon Dioxide Level  22 mmol/L   


 


Anion Gap  11.0 mmol/L  17.0 mmol/L  


 


Blood Urea Nitrogen  28 mg/dl   


 


Creatinine  1.53 mg/dl   


 


Estimated GFR (


American) 


  56.4 


  


  


 


 


Estimated GFR (Non-


American 


  48.7 


  


  


 


 


BUN/Creatinine Ratio  18.5   


 


Random Glucose  303 mg/dl   


 


Calcium Level  7.8 mg/dl   


 


Total Bilirubin  0.2 mg/dl   


 


Direct Bilirubin  < 0.1 mg/dl   


 


Aspartate Amino Transf


(AST/SGOT) 


  45 U/L 


  


  


 


 


Alanine Aminotransferase


(ALT/SGPT) 


  30 U/L 


  


  


 


 


Alkaline Phosphatase  70 U/L   


 


Total Creatine Kinase  243 U/L   


 


Creatine Kinase MB  4.2 ng/ml   


 


Creatine Kinase MB Ratio  1.7   


 


Troponin I  0.060 ng/ml   


 


Total Protein  6.8 gm/dl   


 


Albumin  2.4 gm/dl   


 


Lipase  282 U/L   


 


Beta-Hydroxybutyric Acid   mg/dL   


 


Thyroid Stimulating Hormone


(TSH) 


  12.200 uIu/ml 


  


  


 


 


Valproic Acid (Depakene) Level  10 mcg/ml   


 


Bedside Hemoglobin   9.9 g/dl  


 


Bedside Hematocrit   29 %  


 


Bedside Sodium   140 mEq/L  


 


Bedside Potassium   4.2 mEq/L  


 


Bedside Chloride   105 mEq/L  


 


Bedside Total CO2   23 mEq/l  


 


Bedside Blood Urea Nitrogen   29 mg/dl  


 


Bedside Creatinine   1.3 mg/dl  


 


Bedside Glucose (other)   321 mg/dl  


 


Bedside Ionized Calcium (José)   1.11 mmol/l  


 


Urine Color    YELLOW 


 


Urine Appearance    CLOUDY 


 


Urine pH    6.0 


 


Urine Specific Gravity    1.018 


 


Urine Protein    4+ 


 


Urine Glucose (UA)    3+ 


 


Urine Ketones    NEG 


 


Urine Occult Blood    2+ 


 


Urine Nitrite    NEG 


 


Urine Bilirubin    NEG 


 


Urine Urobilinogen    NEG 


 


Urine Leukocyte Esterase    NEG 


 


Urine WBC (Auto)    >30 /hpf 


 


Urine RBC (Auto)    >30 /hpf 


 


Urine Hyaline Casts (Auto)    1-5 /lpf 


 


Urine Epithelial Cells (Auto)    10-20 /lpf 


 


Urine Bacteria (Auto)    1+ 


 


Urine Yeast (Auto)     


 


Test


  2/24/18


08:34 2/24/18


09:30 2/24/18


09:48 2/24/18


12:57


 


Bedside Lactic Acid Venous 6.84 mmol/L    


 


Influenza Type A Antigen


  


  Neg for Influ


A 


  


 


 


Influenza Type B Antigen


  


  Neg for Influ


B 


  


 


 


Arterial Blood pH   7.19  


 


Arterial Blood Partial


Pressure CO2 


  


  64 mmHg 


  


 


 


Arterial Blood Partial


Pressure O2 


  


  73 mm/Hg 


  


 


 


Arterial Blood HCO3   24 mmol/L  


 


Arterial Blood Oxygen


Saturation 


  


  88.6 % 


  


 


 


Arterial Blood Base Excess   -4.8 mEq/L  


 


Arterial Blood Gas Delivery   60%  


 


Cameron Test   POS  


 


Bedside Glucose (other)    309 mg/dl 


 


Test


  2/24/18


13:58 2/24/18


14:08 2/24/18


14:25 2/24/18


15:03


 


Bedside Glucose 294 mg/dl    271 mg/dl 


 


Lactic Acid Level  0.8 mmol/L   


 


Troponin I  0.247 ng/ml   


 


White Blood Count   7.06 K/uL  


 


Red Blood Count   2.99 M/uL  


 


Hemoglobin   8.8 g/dL  


 


Hematocrit   28.0 %  


 


Mean Corpuscular Volume   93.6 fL  


 


Mean Corpuscular Hemoglobin   29.4 pg  


 


Mean Corpuscular Hemoglobin


Concent 


  


  31.4 g/dl 


  


 


 


Platelet Count   269 K/uL  


 


Mean Platelet Volume   8.7 fL  


 


Neutrophils (%) (Auto)   87.1 %  


 


Lymphocytes (%) (Auto)   5.9 %  


 


Monocytes (%) (Auto)   6.4 %  


 


Eosinophils (%) (Auto)   0.1 %  


 


Basophils (%) (Auto)   0.1 %  


 


Neutrophils # (Auto)   6.14 K/uL  


 


Lymphocytes # (Auto)   0.42 K/uL  


 


Monocytes # (Auto)   0.45 K/uL  


 


Eosinophils # (Auto)   0.01 K/uL  


 


Basophils # (Auto)   0.01 K/uL  


 


RDW Standard Deviation   52.9 fL  


 


RDW Coefficient of Variation   15.5 %  


 


Immature Granulocyte % (Auto)   0.4 %  


 


Immature Granulocyte # (Auto)   0.03 K/uL  


 


Hypochromasia   PRESENT  


 


Basophilic Stippling   1+  


 


Prothrombin Time   13.3 SECONDS  


 


Prothromb Time International


Ratio 


  


  1.3 


  


 


 


Activated Partial


Thromboplast Time 


  


  29.2 SECONDS 


  


 


 


Partial Thromboplastin Ratio   1.1  


 


Sodium Level   136 mmol/L  


 


Potassium Level   4.0 mmol/L  


 


Chloride Level   107 mmol/L  


 


Carbon Dioxide Level   24 mmol/L  


 


Anion Gap   5.0 mmol/L  


 


Blood Urea Nitrogen   30 mg/dl  


 


Creatinine   1.17 mg/dl  


 


Est Creatinine Clear Calc


Drug Dose 


  


  81.3 ml/min 


  


 


 


Estimated GFR (


American) 


  


  78.1 


  


 


 


Estimated GFR (Non-


American 


  


  67.4 


  


 


 


BUN/Creatinine Ratio   25.6  


 


Random Glucose   297 mg/dl  


 


Calcium Level   7.7 mg/dl  


 


Phosphorus Level   2.8 mg/dl  


 


Magnesium Level   2.2 mg/dl  


 


Total Creatine Kinase   206 U/L  


 


Valproic Acid (Depakene) Level   10 mcg/ml  


 


Test


  2/24/18


16:04 2/24/18


16:08 


  


 


 


Bedside Glucose 247 mg/dl    


 


Blood Gas Sample Site  Art Line   


 


Bedside Blood Gas pH (LAB)  7.31   


 


Bedside Blood Gas pCO2 (LAB)  45 mmHg   


 


Bedside Blood Gas pO2 (LAB)  74 mmHg   


 


Bedside Blood Gas HCO3 (LAB)  24 meq/L   


 


Bedside Blood Gas Total CO2  25 mEq/l   


 


Bedside Blood Gas Base Excess


(LAB) 


  -3.0 meq/L 


  


  


 


 


Bedside Blood Gas O2


Saturation 


  96.0 % 


  


  


 


 


Cameron Test  NA   


 


Oxygen Delivery Device  Ventilator   


 


Bedside Oxygen Rate


(breaths/min) 


  20 


  


  


 


 


Blood Gas Minute Ventilation  9.7   


 


Bedside FiO2  70 %   


 


Blood Gas Tidal Volume  550   


 


Blood Gas PEEP  5   











Diagnostic Results


Labs were consistent with chronic kidney disease, slightly elevated troponin, 

hyperglycemia, ABG with hypercapnic respiratory failure acute on chronic,





X-rays also reviewed which include first chest x-ray and CAT scan showed 

bilateral pleural effusion.  Pulmonary vascular congestion and cardiac megaly.  

Repeat chest x-ray showed atelectasis of the right upper lobe.  Central line 

was in good position.  ET tube was in good position.  Repeat chest x-ray after 

the bronchus revealed inflation of the right upper lobe.  The patient remains 

in CHF.





Assessment & Plan


#1 cardiac arrest, with PEA, unclear etiology whether this respiratory or the 

patient decompensated cardiac-wise.  His presentation showed CHF with 

exacerbation.  Bilateral pleural effusion and poor cardiac function on today's 

echo compared to previous echocardiogram.


#2 acute respiratory failure with CHF exacerbation and bilateral pleural 

effusion.


#3 hyperglycemia.


#4 although the patient had a history of seizure disorder has been maintained 

on Depakote, no evidence of seizure activity on this admission.


#5 chronic kidney disease with poor urine output.


#6 diabetes poorly controlled.


#7 noncompliance.


#8 history of coronary artery disease status post stents to the LAD and RCA 

with aspirin and Plavix, the patient was not compliant with it.


#9 history of aortic thrombus maintained on Coumadin, documentation noted to be 

found according Dr. Martínez, appreciate his input.





Plan:





#1 due to the uncertain neurologic status after code, I initiated TTM on this 

patient.  I discussed the case with Dr. Martínez who agreed, no plan for PCI 

admission troponin is significantly elevated.  Patient has extensive coronary 

artery disease and recent stenting.


#2 ventilatory support.


#3 ventilator associated pneumonia bundle.


#4 diuresis was given to the patient however his urine output appeared to be on 

the low side.


#5 central line placement with CVP measurement every 4 hours, most recent CVP 

was 8.


#6 A-line was placed and blood pressure was controlled currently with 

nitroglycerin.  To keep map above the 70.


#7 glucose control with insulin drip to maintain blood sugar between 120-180.


#8 DVT prophylaxis however the patient started on full dose of Lovenox for 

outpatient diagnosis of aortic thrombosis.  Unclear to me if this diagnosis has 

been confirmed, I have no access to the records from [facility].


#9 continue with PPI.


#10 obtain continuous EEG.


#11 discussed with Dr. Coronado, appreciate his input, agreed with continuous EEG.


#12 nothing by mouth for now.


#13 fentanyl and Versed drip for sedation.


#14 I would avoid propofol in the postcode period.


#15 I have discussed the case in details with the family including the wife, 

the sister and a brother with a sister-in-law and all their questions being 

answered.


#16 low dose beta blockers if tolerable.


#17 continue with Depakote IV as the patient cannot take oral medications at 

this time as the patient is undergoing TTM.


#18 will obtain Depakote level.


#19 all procedures were dictated separately.


#20 critical care time spent with the patient was 90 minutes excluding 

procedure time.

## 2018-02-24 NOTE — DIAGNOSTIC IMAGING REPORT
CT OF THE HEAD WITHOUT CONTRAST



CLINICAL HISTORY: s/p arrest    



COMPARISON STUDY:  Head CT February 11, 2018 and MRI of the brain February 12, 2018. 



TECHNIQUE: Helical axial images of the head were obtained without IV contrast.

Automated exposure control was utilized for the study.  A dose lowering

technique was utilized adhering to the principles of ALARA.





FINDINGS: This exam is mildly compromised by motion artifact. No acute

intracranial hemorrhage, midline shift or mass effect is present. Ventricular

system is normal. The basilar cisterns are patent. There are no extra-axial

collections. Gray-white differentiation is preserved. There are no findings to

suggest acute dural sinus thrombosis or acute territorial infarct. A small

amount of fluid within the left mastoid air cells is unchanged since head CT of

February 11, 2018. There is mild mucosal thickening of the ethmoid sinuses.

There are no significant calvarial abnormalities.



IMPRESSION:  No acute intracranial findings. 







Electronically signed by:  Patrick Corbett M.D.

2/24/2018 9:12 AM



Dictated Date/Time:  2/24/2018 9:10 AM

## 2018-02-24 NOTE — ECHOCARDIOGRAM REPORT
*NOTICE TO RECEIVING PARTY AGENCY**  This information is strictly Confidential and protected under 
[place] law.  [place] law prohibits you from making any further disclosure of this 
information unless further disclosure is expressly permitted by the written consent of the person to 
whom it pertains or is authorized by law.  A general authorization for the release of medical or 
other information is not sufficient for this purpose.  Hospital accepts no responsibility if the 
information is made available to any other person, INCLUDING THE PATIENT.



Interpretation Summary

  *  Name: MAC SPANGLER  Study Date: 2018 12:15 PM  BP: 138/69 mmHg

  *  MRN: [id number]  [facility]  HR: 75

  *  : 1955 (M/d/yyy)  Gender: Male  Height: 71 in

  *  Age: 62 yrs  Ethnicity: [place]  Weight: 221 lb

  *  Ordering Physician: Chuy Omalley

  *  Referring Physician: Self, Referred

  *  Performed By: Edilia Morales RCS

  *  Accession# [id number]  Account# [id number]

  *  Reason For Study: CHF

  *  BSA: 2.2 m2

  *  -- Conclusions --

  *  1. Normal left ventricular size with mildly reduced systolic function.  EF 45-50%.  Global 
hypokinesis.  Severe concentric left ventricular hypertrophy.  Tissue Doppler suggests elevated left 
atrial pressure.

  *  2. Mildly dilated right ventricle with mildly reduced systolic function.

  *  3. The left atrium is moderately dilated.

  *  4. Possible mild aortic stenosis (low gradient with reduced LV systolic function).

  *  5. Technically difficult study.

  *  6. Compared to prior study on 2018, LV systolic function is now mildly reduced.  RVSP 
cannot be calculated on current study as there was not inadequate TR jet.

Procedure Details

  *  A complete two-dimensional transthoracic echocardiogram was performed (2D, M-mode, Doppler and 
color flow Doppler).

Left Ventricle

  *  Normal left ventricular size with mildly reduced systolic function.  EF 45-50%.  Global 
hypokinesis.  Severe concentric left ventricular hypertrophy.  Tissue Doppler suggests elevated left 
atrial pressure.

Right Ventricle

  *  Mildly dilated right ventricle with mildly reduced systolic function.

  *  The right ventricular systolic function is reduced as assessed by tricuspid annular plane 
systolic excursion (TAPSE) (TAPSE <1.6 cm).

Atria

  *  The left atrium is moderately dilated.

  *  Right atrial size is normal.

  *  There is no evidence of atrial septal defect, but resolution does not allow assessment for a 
patent foramen ovale.

Mitral Valve

  *  There is mild mitral annular calcification.

  *  There is no mitral valve stenosis.

  *  Significant mitral regurgitation is absent.

Tricuspid Valve

  *  The tricuspid valve is not well visualized, but is grossly normal.

  *  There is no tricuspid stenosis.

  *  There is trace tricuspid regurgitation.

Aortic Valve

  *  The aortic valve is not well visualized.

  *  Dimensionless index 0.36.

  *  No aortic regurgitation is present.

  *  Possible mild aortic stenosis (low gradient with reduced LV systolic function).

Pulmonic Valve

  *  The pulmonary valve is inadequately visualized, but the Doppler data is adequate for 
interpretation.

  *  There is no pulmonic valvular stenosis.

  *  There is no significant pulmonary regurgitation.

Great Vessels

  *  The aortic root is normal size.

Pericardium/Pleural

  *  There is no pericardial effusion.

Great Vessels

  *  IVC not visualized.



MMode 2D Measurements and Calculations

IVSd 1.8 cm



LVIDd 5.0 cm

LVIDs 3.3 cm

LVPWd 1.6 cm



IVS/LVPW 1.1 

FS 33.7 %

EDV(Teich) 115.6 ml

ESV(Teich) 43.5 ml

EF(Teich) 62.3 %



EDV(cubed) 121.4 ml

ESV(cubed) 35.3 ml

EF(cubed) 70.9 %





LV mass(C)d 388.9 grams

LV mass(C)dI 176.7 grams/m\S\2



SV(Teich) 72.1 ml

SI(Teich) 32.7 ml/m\S\2

SV(cubed) 86.1 ml

SI(cubed) 39.1 ml/m\S\2



Ao root diam 3.9 cm

Ao root area 12.0 cm\S\2



LVOT diam 2.2 cm

LVOT area 4.0 cm\S\2





LVAd ap4 37.1 cm\S\2

LVLd ap4 9.4 cm

EDV(MOD-sp4) 127.7 ml

EDV(sp4-el) 123.8 ml

LVAs ap4 25.7 cm\S\2

LVLs ap4 8.3 cm

ESV(MOD-sp4) 71.6 ml

ESV(sp4-el) 67.4 ml

EF(MOD-sp4) 43.9 %

EF(sp4-el) 45.5 %



LVAd ap2 35.7 cm\S\2

LVLd ap2 9.2 cm

EDV(MOD-sp2) 120.9 ml

EDV(sp2-el) 117.1 ml

LVAs ap2 24.4 cm\S\2

LVLs ap2 8.1 cm

ESV(MOD-sp2) 65.2 ml

ESV(sp2-el) 62.7 ml

EF(MOD-sp2) 46.0 %

EF(sp2-el) 46.5 %



LVLd %diff -2.19 %

EDV(MOD-bp) 124.7 ml

LVLs %diff -3.33 %

ESV(MOD-bp) 69.2 ml

EF(MOD-bp) 44.5 %



SV(MOD-sp4) 56.1 ml

SI(MOD-sp4) 25.5 ml/m\S\2





SV(MOD-sp2) 55.7 ml

SI(MOD-sp2) 25.3 ml/m\S\2



SV(MOD-bp) 55.5 ml

SI(MOD-bp) 25.2 ml/m\S\2



SV(sp4-el) 56.3 ml

SI(sp4-el) 25.6 ml/m\S\2



SV(sp2-el) 54.5 ml

SI(sp2-el) 24.8 ml/m\S\2







Doppler Measurements and Calculations

MV E max elton 96.2 cm/sec

MV A max elton 65.1 cm/sec



MV E/A 1.5 



MV P1/2t max elton 91.5 cm/sec

MV P1/2t 91.7 msec

MVA(P1/2t) 2.4 cm\S\2

MV dec slope 292.1 cm/sec\S\2

MV dec time 0.19 sec



Ao V2 max 186.8 cm/sec

Ao max PG 14.0 mmHg

Ao max PG (full) 12.1 mmHg

Ao V2 mean 101.0 cm/sec

Ao mean PG 5.2 mmHg

Ao V2 VTI 35.5 cm

DENILSON(V,A) 1.4 cm\S\2

DENILSON(V,D) 1.4 cm\S\2





LV V1 max PG 1.9 mmHg



LV V1 max 68.0 cm/sec



SV(Ao) 426.1 ml

SI(Ao) 193.7 ml/m\S\2

## 2018-02-24 NOTE — PROCEDURE NOTE
Procedure Note


Procedure Date


Feb 24, 2018.





Procedure Description


Procedure Name:


Intubation, indication is air leak from #780 tube that was placed in the field 

and does not appear to be in good position.


Procedure time out:  side/site verified, patient ID confirmed, correct procedure


Consent obtained:  emergent consent implied


Performed by:  attending


Indications:  diagnostic, therapeutic


Contraindications:  none


Description:


The patient was place in supine position, and bag valve mask was ready, the 

patient was oxygenated with a bag valve via the current ET tube, using glide 

scope, the patient vocal cords were visualized, the patient was premedicated 

with 100 mics of fentanyl and 50 mg of rocuronium due to history of seizure in 

the past, after vocal cords were visualized the ET tube was removed after 

deflating the cuff, using #8 ET tube it was visualized and passed through the 

vocal cord and placed into the airways at 23 cm by the lip.  The balloon was 

inflated.  The stylet was removed and confirm data to position with entitle CO2 

which was yellow, later on the patient underwent a bronchoscopy and the tube 

was adjusted 2 cm towards the olivier.  Tolerated the procedure very well with 

no immediate complication equal breath sounds.


Complications:  none


Patient tolerated procedure:  well

## 2018-02-24 NOTE — EMERGENCY ROOM VISIT NOTE
History


Report prepared by Scribe:  Alea Robertson


Under the Supervision of:  Dr. Felix Villalobos D.O.


First contact with patient:  08:11


Stated Complaint:  CARDIAC ARREST





History of Present Illness


The patient is a 62 year old male who presents to the Emergency Room with 

complaints of cardiac arrest. He was brought to the ED via EMS from home after 

1 hour of reported respiratory difficulty. His pulse ox was 80s on 61 NC. The 

patient normally wears 41 NC. After getting into the ambulance, the patient 

went into PEA and CPR was started. The patient received 3 doses of epi PTA and 

was intubated with a 7.0 tube at the lip. He does have a pulse on arrival to 

the ED. Nursing reports he was recently discharged from the hospital on 

February 21st for previous respiratory issues. Additional information is unable 

to be obtained secondary to the patients current condition.





   Source of History:  EMS, nursing staff


   Onset:  PTA


   Position:  chest


   Timing:  resolved


   Modifying Factors (Relieving):  oxygen, other (intubation, Epinephrine)





Review of Systems


See HPI for pertinent positives & negatives. A total of 10 systems reviewed and 

were otherwise negative.





Past Medical & Surgical


Medical Problems:


(1) Accelerated hypertension


(2) Acute appendicitis with appendiceal abscess


(3) Acute diastolic (congestive) heart failure


(4) Acute heart failure


(5) Acute kidney injury


(6) Acute renal insufficiency


(7) Acute respiratory failure


(8) Angina pectoris


(9) Bipolar disorder


(10) CAD (coronary artery disease)


(11) Cardiac arrest


(12) Cellulitis


(13) CHF exacerbation


(14) CHF exacerbation


(15) Dehydration


(16) Diabetes


(17) Diastolic CHF, chronic


(18) Elevated INR


(19) Enterocolitis


(20) HCAP (healthcare-associated pneumonia)


(21) Headache


(22) Heel ulcer


(23) Hypertension


(24) Ischemic cardiomyopathy


(25) LVH (left ventricular hypertrophy)


(26) Osteomyelitis of left foot


(27) PAD (peripheral artery disease)


(28) Precordial chest pain


(29) Proteinuria


(30) Respiratory distress


(31) Sepsis, unspecified organism


(32) Volume overload


Social History Problems:


(1) Former smoker








Social History


Alcohol Use:  occasionally


Drug Use:  none


Marital Status:  


Housing Status:  lives with family


Occupation Status:  employed





Current/Historical Medications


Scheduled


Amlodipine Besylate (Amlodipine Besylate), 10 MG PO QAM


Atorvastatin (Lipitor), 80 MG PO DAILY


Clopidogrel Bisulfate (Clopidogrel), 75 MG PO QAM


Cyanocobalamin (Vitamin B12), 1,000 MCG PO DAILY


Divalproex Sodium (Depakote Delay Rel), 2,000 MG PO HS


Escitalopram Oxalate (Escitalopram Oxalate), 20 MG PO QAM


Ferrous Sulfate (Ferrous Sulfate), 325 MG PO BIDM


Gabapentin (Gabapentin), 200 MG PO HS


Insulin Glargine (Lantus Solostar), 10 UNITS SC HS


Insulin Lispro (Human) (Humalog Kwikpen), 1 DOSE SC AC


Isosorbide Mononitrate Ext Rel (Imdur Ext Rel), 30 MG PO QAM


Labetalol HCl (Labetalol HCl), 200 MG PO BID


Mirtazapine (Mirtazapine), 30 MG PO HS


Tamsulosin HCl (Tamsulosin HCl), 0.4 MG PO HS


Tiotropium Bromide (Spiriva Handihaler), 1 CAP INH DAILY


Torsemide (Torsemide), 20 MG PO BID17


Warfarin Sod (Coumadin), 7.5 MG PO DAILY





Scheduled PRN


Acetaminophen (Tylenol), 500 MG PO Q4-6HRS PRN for Pain


Albuterol Sulfate (Proair Respiclick), 1-2 PUFFS INH Q4-6HRS PRN for SOB/

Wheezing


Clonazepam (Clonazepam), 1 MG PO HS PRN for Anxiety


Docusate Sodium (Docusate Sodium), 100 MG PO BID PRN for Constipation


Fluticasone Propionate (Nasal) (Flonase Allergy Relief), 2 SPRAYS VERONICA DAILY PRN 

for Nasal Congestion


Nitroglycerin (Nitrostat), 0.4 MG UT UD PRN for Chest Pain


Oxycodone HCl (Oxycodone HCl), 5 MG PO Q6H PRN for Breakthrough Pain


Oxycodone Hcl (Oxycodone Hcl), 10 MG PO Q12 PRN for Severe Pain





Allergies


Coded Allergies:  


     No Known Allergies (Verified , 2/2/18)





Physical Exam


Vital Signs











  Date Time  Temp Pulse Resp B/P (MAP) Pulse Ox O2 Delivery O2 Flow Rate FiO2


 


2/24/18 10:04  103  194/98 94 Mechanical Ventilator  60


 


2/24/18 09:58  97  202/103 95 Mechanical Ventilator  


 


2/24/18 09:48  95 20 193/97 96 Mechanical Ventilator  60


 


2/24/18 09:46     96   


 


2/24/18 09:43  97 20 196/97 96 Mechanical Ventilator  60


 


2/24/18 09:37  97  197/100 96 Mechanical Ventilator  60


 


2/24/18 09:20  96 20 188/95 96 Mechanical Ventilator  


 


2/24/18 09:11  98  189/94 95   


 


2/24/18 09:06  97  189/94 95 Mechanical Ventilator  


 


2/24/18 08:46      Mechanical Ventilator  


 


2/24/18 08:44 36.7 92  189/125 99 Nebulizer  


 


2/24/18 08:40        60


 


2/24/18 08:28  108      


 


2/24/18 08:20  107  218/109 98 Ambu-Bag  











Physical Exam


CONSTITUTIONAL/VITAL SIGNS: Reviewed / noted above.


GENERAL: Non-toxic in appearance. 


INTEGUMENTARY: Warm, dry, and Pink.


HEAD: Normocephalic.


EYES: without scleral icterus or trauma.


ENT/OROPHARYNX: Clear and moist. Endotracheal tube in place. 


LYMPHADENOPATHY/NECK: Is supple without lymphadenopathy or meningismus.


RESPIRATORY: Occasional spontaneous respirations. 


CARDIOVASCULAR: Regular rate and rhythm.


GI/ABDOMEN: Soft and nontender. No organomegaly or pulsatile mass. No rebound 

or guarding. Normal bowel sounds.


EXTREMITIES: Warm and well perfused.


BACK: No CVA tenderness.


NEUROLOGICAL: Intact without focal deficits. 


PSYCHIATRIC: normal affect.


MUSCULOSKELETAL: Normally developed with good muscle tone.





Medical Decision & Procedures


ER Provider


Diagnostic Interpretation:


Radiology results as stated below per my review and radiologist interpretation:





CHEST ONE VIEW PORTABLE





CLINICAL HISTORY: Fever. Sepsis.    





COMPARISON STUDY:  Chest radiograph February 15, 2018.





FINDINGS: The tip of the endotracheal tube is 8.5 cm above the olivier. There is


no pneumothorax. Mild cardiomegaly is noted. There is mild pulmonary edema.


There is hazy left basilar opacity. There is no pneumothorax. Costophrenic


angles are not included. 





IMPRESSION:  





1. Tip of endotracheal tube 8.5 cm above the oliveir. The tube could be advanced


3 cm.


2. Mild to moderate pulmonary edema.


3. Hazy left basilar opacity. 





Electronically signed by:  Patrick Corbett M.D.


2/24/2018 8:50 AM








CT OF THE HEAD WITHOUT CONTRAST





CLINICAL HISTORY: s/p arrest    





COMPARISON STUDY:  Head CT February 11, 2018 and MRI of the brain February 12, 2018. 





TECHNIQUE: Helical axial images of the head were obtained without IV contrast.


Automated exposure control was utilized for the study.  A dose lowering


technique was utilized adhering to the principles of ALARA.





FINDINGS: This exam is mildly compromised by motion artifact. No acute


intracranial hemorrhage, midline shift or mass effect is present. Ventricular


system is normal. The basilar cisterns are patent. There are no extra-axial


collections. Gray-white differentiation is preserved. There are no findings to


suggest acute dural sinus thrombosis or acute territorial infarct. A small


amount of fluid within the left mastoid air cells is unchanged since head CT of


February 11, 2018. There is mild mucosal thickening of the ethmoid sinuses.


There are no significant calvarial abnormalities.





IMPRESSION:  No acute intracranial findings. 





Electronically signed by:  Patrick Corbett M.D.


2/24/2018 9:12 AM








CT ANGIOGRAPHY OF THE CHEST, PULMONARY EMBOLUS PROTOCOL





CLINICAL HISTORY: Cardiac arrest.    





COMPARISON STUDY:  Chest CT June 26, 2017 and chest radiograph performed earlier


today. 





TECHNIQUE: Following IV administration of 93 mL of Optiray-320, helical axial


images of the chest were obtained utilizing the pulmonary embolus protocol. 


Maximal intensity projections and sagittal and coronal reformats were viewed on


an independent 3D workstation.  IV contrast was administered without


complication.  A dose lowering technique was utilized adhering to the principles


of ALARA.





CT DOSE: 1226.32 mGy.cm





FINDINGS:  No pulmonary emboli are identified although the segmental and


subsegmental pulmonary arteries are suboptimally assessed due to respiratory


motion. Thoracic aortic opacification is suboptimal but there is no evidence for


thoracic aortic dissection. The heart is moderately enlarged. There is extensive


coronary artery calcification. There are a few mildly enlarged mediastinal lymph


nodes, including an AP window node that measures 1.3 cm in short axis diameter.


There is no pneumothorax. There are small to moderate bilateral pleural


effusions. Associated bilateral lower lobe opacities are noted. There is


interlobular septal thickening. There are also groundglass opacities within the


upper lobes. No acute thoracic spine fracture is present. There are acute


nondisplaced fractures of multiple anterior bilateral ribs. There is a possible


acute nondisplaced inferior sternal fracture. Upper abdomen is unremarkable.





IMPRESSION:  





1. No pulmonary emboli identified although segmental and subsegmental pulmonary


arteries suboptimally assessed due to respiratory motion.


2. Interlobular septal thickening consistent with moderate interstitial


pulmonary edema.


3. Small to moderate bilateral pleural effusions.


4. Dependent airspace opacities within the lungs which favor atelectasis. An


infectious process or alveolar edema could appear similar. 


5. Acute appearing nondisplaced fractures of multiple anterior bilateral ribs.


No pneumothorax. Possible acute nondisplaced inferior sternal fracture.


6. Moderate cardiomegaly and extensive coronary artery calcification.





Electronically signed by:  Patrick Corbett M.D.


2/24/2018 9:23 AM





Laboratory Results


2/24/18 08:25








Red Blood Count 3.12, Mean Corpuscular Volume 97.4, Mean Corpuscular Hemoglobin 

30.1, Mean Corpuscular Hemoglobin Concent 30.9, Mean Platelet Volume 8.7, 

Neutrophils (%) (Auto) 67.1, Lymphocytes (%) (Auto) 16.5, Monocytes (%) (Auto) 

11.8, Eosinophils (%) (Auto) 1.7, Basophils (%) (Auto) 0.3, Neutrophils # (Auto

) 5.83, Lymphocytes # (Auto) 1.44, Monocytes # (Auto) 1.03, Eosinophils # (Auto

) 0.15, Basophils # (Auto) 0.03





2/24/18 08:25

















Test


  2/24/18


08:24 2/24/18


08:25 2/24/18


08:29 2/24/18


08:30


 


Bedside Glucose


  320 mg/dl


(70-99) 


  


  


 


 


White Blood Count


  


  8.71 K/uL


(4.8-10.8) 


  


 


 


Red Blood Count


  


  3.12 M/uL


(4.7-6.1) 


  


 


 


Hemoglobin


  


  9.4 g/dL


(14.0-18.0) 


  


 


 


Hematocrit  30.4 % (42-52)   


 


Mean Corpuscular Volume


  


  97.4 fL


() 


  


 


 


Mean Corpuscular Hemoglobin


  


  30.1 pg


(25-34) 


  


 


 


Mean Corpuscular Hemoglobin


Concent 


  30.9 g/dl


(32-36) 


  


 


 


Platelet Count


  


  317 K/uL


(130-400) 


  


 


 


Mean Platelet Volume


  


  8.7 fL


(7.4-10.4) 


  


 


 


Neutrophils (%) (Auto)  67.1 %   


 


Lymphocytes (%) (Auto)  16.5 %   


 


Monocytes (%) (Auto)  11.8 %   


 


Eosinophils (%) (Auto)  1.7 %   


 


Basophils (%) (Auto)  0.3 %   


 


Neutrophils # (Auto)


  


  5.83 K/uL


(1.4-6.5) 


  


 


 


Lymphocytes # (Auto)


  


  1.44 K/uL


(1.2-3.4) 


  


 


 


Monocytes # (Auto)


  


  1.03 K/uL


(0.11-0.59) 


  


 


 


Eosinophils # (Auto)


  


  0.15 K/uL


(0-0.5) 


  


 


 


Basophils # (Auto)


  


  0.03 K/uL


(0-0.2) 


  


 


 


RDW Standard Deviation


  


  55.6 fL


(36.4-46.3) 


  


 


 


RDW Coefficient of Variation


  


  15.8 %


(11.5-14.5) 


  


 


 


Immature Granulocyte % (Auto)  2.6 %   


 


Immature Granulocyte # (Auto)


  


  0.23 K/uL


(0.00-0.02) 


  


 


 


Prothrombin Time


  


  13.5 SECONDS


(9.0-12.0) 


  


 


 


Prothromb Time International


Ratio 


  1.3 (0.9-1.1) 


  


  


 


 


Activated Partial


Thromboplast Time 


  27.9 SECONDS


(21.0-31.0) 


  


 


 


Partial Thromboplastin Ratio  1.1   


 


Estimated GFR (


American) 


  56.4 


  


  


 


 


Estimated GFR (Non-


American 


  48.7 


  


  


 


 


BUN/Creatinine Ratio  18.5 (10-20)   


 


Calcium Level


  


  7.8 mg/dl


(8.5-10.1) 


  


 


 


Total Bilirubin


  


  0.2 mg/dl


(0.2-1) 


  


 


 


Direct Bilirubin


  


  < 0.1 mg/dl


(0-0.2) 


  


 


 


Aspartate Amino Transf


(AST/SGOT) 


  45 U/L (15-37) 


  


  


 


 


Alanine Aminotransferase


(ALT/SGPT) 


  30 U/L (12-78) 


  


  


 


 


Alkaline Phosphatase


  


  70 U/L


() 


  


 


 


Total Creatine Kinase


  


  243 U/L


() 


  


 


 


Creatine Kinase MB


  


  4.2 ng/ml


(0.5-3.6) 


  


 


 


Creatine Kinase MB Ratio  1.7 (0-3.0)   


 


Troponin I


  


  0.060 ng/ml


(0-0.045) 


  


 


 


Total Protein


  


  6.8 gm/dl


(6.4-8.2) 


  


 


 


Albumin


  


  2.4 gm/dl


(3.4-5.0) 


  


 


 


Lipase


  


  282 U/L


() 


  


 


 


Beta-Hydroxybutyric Acid


  


  mg/dL


(0.2-2.81) 


  


 


 


Thyroid Stimulating Hormone


(TSH) 


  12.200 uIu/ml


(0.300-4.500) 


  


 


 


Bedside Hemoglobin


  


  


  9.9 g/dl


(14.0-18.0) 


 


 


Bedside Hematocrit   29 % (42-52)  


 


Bedside Sodium


  


  


  140 mEq/L


(135-144) 


 


 


Bedside Potassium


  


  


  4.2 mEq/L


(3.3-5.0) 


 


 


Bedside Chloride


  


  


  105 mEq/L


(101-112) 


 


 


Bedside Total CO2


  


  


  23 mEq/l


(24-31) 


 


 


Anion Gap


  


  


  17.0 mmol/L


(16-25) 


 


 


Bedside Blood Urea Nitrogen


  


  


  29 mg/dl


(7-18) 


 


 


Bedside Creatinine


  


  


  1.3 mg/dl


(0.6-1.3) 


 


 


Bedside Glucose (other)


  


  


  321 mg/dl


(70-99) 


 


 


Bedside Ionized Calcium (José)


  


  


  1.11 mmol/l


(1.12-1.32) 


 


 


Urine Color    YELLOW 


 


Urine Appearance    CLOUDY (CLEAR) 


 


Urine pH    6.0 (4.5-7.5) 


 


Urine Specific Gravity


  


  


  


  1.018


(1.000-1.030)


 


Urine Protein    4+ (NEG) 


 


Urine Glucose (UA)    3+ (NEG) 


 


Urine Ketones    NEG (NEG) 


 


Urine Occult Blood    2+ (NEG) 


 


Urine Nitrite    NEG (NEG) 


 


Urine Bilirubin    NEG (NEG) 


 


Urine Urobilinogen    NEG (NEG) 


 


Urine Leukocyte Esterase    NEG (NEG) 


 


Urine WBC (Auto)    >30 /hpf (0-5) 


 


Urine RBC (Auto)    >30 /hpf (0-4) 


 


Urine Hyaline Casts (Auto)    1-5 /lpf (0-5) 


 


Urine Epithelial Cells (Auto)


  


  


  


  10-20 /lpf


(0-5)


 


Urine Bacteria (Auto)    1+ (NEG) 


 


Urine Yeast (Auto)     (NONE PRSENT) 


 


Test


  2/24/18


08:34 2/24/18


09:30 2/24/18


09:48 


 


 


Bedside Lactic Acid Venous


  6.84 mmol/L


(0.90-1.70) 


  


  


 


 


Influenza Type A Antigen


  


  Neg for Influ


A (NEG) 


  


 


 


Influenza Type B Antigen


  


  Neg for Influ


B (NEG) 


  


 


 


Arterial Blood pH


  


  


  7.19


(7.35-7.45) 


 


 


Arterial Blood Partial


Pressure CO2 


  


  64 mmHg


(35-46) 


 


 


Arterial Blood Partial


Pressure O2 


  


  73 mm/Hg


(80-95) 


 


 


Arterial Blood HCO3


  


  


  24 mmol/L


(19-24) 


 


 


Arterial Blood Oxygen


Saturation 


  


  88.6 % (90-95) 


  


 


 


Arterial Blood Base Excess


  


  


  -4.8 mEq/L


(-9-1.8) 


 


 


Arterial Blood Gas Delivery   60%  


 


Cameron Test   POS (POS)  





Laboratory results as stated above per my review.





Medications Administered











 Medications


  (Trade)  Dose


 Ordered  Sig/Dc


 Route  Start Time


 Stop Time Status Last Admin


Dose Admin


 


 Vecuronium Bromide


  (Vecuronium


 Bromide Inj)  10 mg  NOW  STAT


 IV  2/24/18 08:34


 2/24/18 08:35 DC 2/24/18 08:42


10 MG


 


 Lorazepam


  (Ativan Inj)  2 mg  NOW  STAT


 IV  2/24/18 08:34


 2/24/18 08:35 DC 2/24/18 08:42


2 MG


 


 Piperacillin Sod/


 Tazobactam Sod


  (Zosyn Iv)  4.5 gm  NOW  STAT


 IV  2/24/18 09:22


 2/24/18 09:24 DC 2/24/18 09:22


4.5 GM











ECG Per My Interpretation


Indication:  other (cardiac arrest)


Rate (beats per minute):  110


Rhythm:  sinus tachycardia


Findings:  PVC, other (No ST elevations)





ED Course


0816: Previous medical records were reviewed. The patient was evaluated in room 

B1. A complete history and physical examination was performed.





0834: Ativan 2 mg IV, Vecuronium Bromide 10 mg IV. 





0905: I reevaluated the patient. He is stable and resting. 





0922: Zosyn 4.5 gm IV. 





0926: I discussed the patients case with Dr. Omalley, [facility] Hospitalist. The 

patient will be further evaluated.





Medical Decision


Differential includes acute cardiac dysrhythmia, microinfarction, CVA, TIA, 

dehydration, anemia, electrolyte disturbance, seizure, trauma, intracranial 

bleeding, acute vascular catastrophe, thoracic aortic dissection, PE, abdominal 

aortic aneurysm rupture, infection, hypoglycemia, overdose, trauma.





This is a 62-year-old male who presents to the ED with a chief complaint of 

status post cardiac arrest.  The patient was having shortness of breath and EMS 

was summoned.  The patient actually walked to the ambulance and shortly after 

arriving to the ambulance went into a PEA arrest, according to EMS providers.  

CPR was initiated and 3 epinephrine was administered as well as endotracheal 

intubation.  Upon the patient's arrival here, the blood pressure was in the 200 

systolic range and heart rate was in the low 100s.  He had good pulses in all 

extremities.  The patient was unable to provide any information as he was 

intubated.  The patient's exam was noted above.  There is no obvious 

abnormalities noted on the exam.  Endotracheal tube was then placed.  White 

blood cell count was normal, hemoglobin was 9.4.  Troponin was mildly elevated 

at 0.06.  Chemistry panel was unremarkable.  Lactate level was 6.8.  Urine 

suggested some bacteriuria.  Flu swab was negative.  An ABG revealed a pH of 

7.19 with a PCO2 of 64 and oxygen level of 73.  This suggest a combined 

metabolic and respiratory acidosis.  The patient's ventilator setting was at 16 

breaths per minute.  CT scan of the chest did not show evidence of PE.  There 

was pleural effusions as well as atelectasis.  There was evidence of some 

broken ribs and a infrasternal fracture.  CT scan of the brain did not show 

acute process.  Chest x-ray suggested some pulmonary edema.  The patient was 

given IV vecuronium initially during his ED evaluation as he was fighting the 

ventilator somewhat.  He was also given 2 mg of IV lorazepam and empirically 

treated with Zosyn IV.  The patient was seen by the hospitalist service for 

further inpatient evaluation and care.  The patient remained hemodynamically 

stable during his ED stay.





Medication Reconcilliation


Current Medication List:  was personally reviewed by me





Blood Pressure Screening


Patient's blood pressure:  Elevated blood pressure


The patients elevated blood pressure will be further evaluated by the inpatient 

hospital medicine team.





Consults


Time Called:  0920


Consulting Physician:  Dr. Omalley, [facility] Hospitalist


Returned Call:  0926


I discussed the patients case with Dr. Omalley, [facility] Hospitalist. The patient 

will be further evaluated.





Impression





 Primary Impression:  


 Cardiac arrest


 Additional Impressions:  


 Elevated troponin


 UTI (urinary tract infection)





Critical Care


I have personally spent greater than 35 minutes of critical care time in the 

direct management of this patient.  This includes bedside care, interpretation 

of diagnostic studies, and testing, discussion with consultants, patient, and 

family members, and other required patient management activities.  This 35 

minutes is in excess of all separately billable procedures.





Scribe Attestation


The scribe's documentation has been prepared under my direction and personally 

reviewed by me in its entirety. I confirm that the note above accurately 

reflects all work, treatment, procedures, and medical decision making performed 

by me.





Departure Information


Dispostion


Being Evaluated By Hospitalist





Problem Qualifiers

## 2018-02-24 NOTE — PHARMACY PROGRESS NOTE
Glycemic Control Intl Consult


Date of Service


Feb 24, 2018.





Scope


Glycemic Pharmacist consulted by Dr Omalley on 2/24/18 for glycemic control 

and to write orders per [facility] inpatient glycemic control protocol





Objective


Weight (Kilograms):  101.000


Accuchecks BSG (last 24hrs):











Test


  2/24/18


08:24 2/24/18


08:25


 


Bedside Glucose


  320 mg/dl


(70-99) 


 


 


Random Glucose


  


  303 mg/dl


(70-99)








Laboratory Data (last 24hrs)











Test


  2/24/18


08:25 2/24/18


08:29


 


Anion Gap 11.0 mmol/L  17.0 mmol/L 


 


BUN/Creatinine Ratio 18.5  


 


Blood Urea Nitrogen 28 mg/dl  


 


Creatinine 1.53 mg/dl  


 


Potassium Level 4.2 mmol/L  


 


Sodium Level 139 mmol/L  


 


White Blood Count 8.71 K/uL  


 


Red Blood Count 3.12 M/uL  


 


Hemoglobin 9.4 g/dL  


 


Hematocrit 30.4 %  


 


Mean Corpuscular Volume 97.4 fL  


 


Mean Corpuscular Hemoglobin 30.1 pg  


 


Mean Corpuscular Hemoglobin


Concent 30.9 g/dl 


  


 


 


Platelet Count 317 K/uL  


 


Mean Platelet Volume 8.7 fL  


 


Neutrophils (%) (Auto) 67.1 %  


 


Lymphocytes (%) (Auto) 16.5 %  


 


Monocytes (%) (Auto) 11.8 %  


 


Eosinophils (%) (Auto) 1.7 %  


 


Basophils (%) (Auto) 0.3 %  


 


Neutrophils # (Auto) 5.83 K/uL  


 


Lymphocytes # (Auto) 1.44 K/uL  


 


Monocytes # (Auto) 1.03 K/uL  


 


Eosinophils # (Auto) 0.15 K/uL  


 


Basophils # (Auto) 0.03 K/uL  











Recent Pertinent Medications


Outpatient Anti-diabetic Regimen: 


* Lantus 10 qPM plus Humalog AC


* A1c = 6.3 %  1/1/18








Risk Factors for Insulin Resistance:


* Infection: Zosyn


* Mechanical Ventilation: mechanically ventilated sedated with fentanyl and 

midazolam





Assessment & Plan


ASSESSMENT:


* Mr Dixon is a 61 y/o M with a PMH of COPD, seizures, and what appears to 

be well controlled type 2 diabetes although the patient has a long history of 

noncompliance who presents with a cardiac arrest. Patient had CPR and was 

intubated in the field. As patient is NPO and in ICU standard of care is 

insulin infusion.





PLAN FOR INPATIENT GLYCEMIC CONTROL:


* Starting IV insulin infusion per moderate (moderate/severe) stress protocol


 * Goal Range 140 - 180 mg/dl


 * In the critical care setting, continuous IV insulin infusion has been shown 

to be the best method for achieving glycemic targets. 








* Please note that the plan above was derived based on current level of insulin 

resistance and hospital stress. These recommendations are appropriate for 

inpatient admission only. Plan of care upon discharge will need to be 

reassessed to avoid potential outpatient hypo/hyperglycemia. 





Thank you.

## 2018-02-24 NOTE — PROCEDURE NOTE
Procedure Note


Procedure Date


Feb 24, 2018.





Procedure Description


Procedure Name:


Central line placement


Procedure time out:  side/site verified, patient ID confirmed, correct procedure


Consent obtained:  emergent consent implied


Performed by:  attending


Indications:  diagnostic, therapeutic


Contraindications:  none


Description:


The patient had central line placed after cardiac arrest, the patient does not 

have good IV access, and its needed for CVP measurement.  Patient was in supine 

position, the right subclavian area under strict sterile field, was sterilized 

with chlorhexidine, the skin was injected with 5 mL of 1% lidocaine, 1 attempt, 

the line was placed using Seldinger technique but no scalpel, dilator was used, 

the line was placed to 15 cm and secured with 2 sutures, all ports were flushed 

with normal saline, and covered with surgical dressing, the tip of the central 

line at the SVC, no pneumothorax.  Tolerated well.  No immediate complication.


Complications:  none


Patient tolerated procedure:  well

## 2018-02-25 VITALS
OXYGEN SATURATION: 99 % | HEART RATE: 70 BPM | SYSTOLIC BLOOD PRESSURE: 113 MMHG | DIASTOLIC BLOOD PRESSURE: 54 MMHG | TEMPERATURE: 91.58 F

## 2018-02-25 VITALS
TEMPERATURE: 90.5 F | OXYGEN SATURATION: 98 % | HEART RATE: 65 BPM | DIASTOLIC BLOOD PRESSURE: 57 MMHG | SYSTOLIC BLOOD PRESSURE: 150 MMHG

## 2018-02-25 VITALS
SYSTOLIC BLOOD PRESSURE: 121 MMHG | OXYGEN SATURATION: 99 % | HEART RATE: 59 BPM | DIASTOLIC BLOOD PRESSURE: 43 MMHG | TEMPERATURE: 92.12 F

## 2018-02-25 VITALS
DIASTOLIC BLOOD PRESSURE: 54 MMHG | OXYGEN SATURATION: 94 % | TEMPERATURE: 95.18 F | SYSTOLIC BLOOD PRESSURE: 125 MMHG | HEART RATE: 81 BPM

## 2018-02-25 VITALS
OXYGEN SATURATION: 99 % | SYSTOLIC BLOOD PRESSURE: 110 MMHG | HEART RATE: 67 BPM | DIASTOLIC BLOOD PRESSURE: 66 MMHG | TEMPERATURE: 91.58 F

## 2018-02-25 VITALS
TEMPERATURE: 91.58 F | HEART RATE: 67 BPM | DIASTOLIC BLOOD PRESSURE: 57 MMHG | SYSTOLIC BLOOD PRESSURE: 137 MMHG | OXYGEN SATURATION: 99 %

## 2018-02-25 VITALS
DIASTOLIC BLOOD PRESSURE: 66 MMHG | SYSTOLIC BLOOD PRESSURE: 127 MMHG | OXYGEN SATURATION: 100 % | TEMPERATURE: 91.76 F | HEART RATE: 65 BPM

## 2018-02-25 VITALS
DIASTOLIC BLOOD PRESSURE: 83 MMHG | TEMPERATURE: 91.58 F | SYSTOLIC BLOOD PRESSURE: 118 MMHG | OXYGEN SATURATION: 99 % | HEART RATE: 66 BPM

## 2018-02-25 VITALS
TEMPERATURE: 94.46 F | OXYGEN SATURATION: 95 % | DIASTOLIC BLOOD PRESSURE: 53 MMHG | HEART RATE: 81 BPM | SYSTOLIC BLOOD PRESSURE: 128 MMHG

## 2018-02-25 VITALS
DIASTOLIC BLOOD PRESSURE: 57 MMHG | HEART RATE: 83 BPM | OXYGEN SATURATION: 94 % | SYSTOLIC BLOOD PRESSURE: 131 MMHG | TEMPERATURE: 95.36 F

## 2018-02-25 VITALS
SYSTOLIC BLOOD PRESSURE: 150 MMHG | HEART RATE: 69 BPM | DIASTOLIC BLOOD PRESSURE: 71 MMHG | OXYGEN SATURATION: 96 % | TEMPERATURE: 91.58 F

## 2018-02-25 VITALS
DIASTOLIC BLOOD PRESSURE: 56 MMHG | OXYGEN SATURATION: 98 % | TEMPERATURE: 91.4 F | HEART RATE: 65 BPM | SYSTOLIC BLOOD PRESSURE: 113 MMHG

## 2018-02-25 VITALS
DIASTOLIC BLOOD PRESSURE: 57 MMHG | SYSTOLIC BLOOD PRESSURE: 136 MMHG | HEART RATE: 66 BPM | OXYGEN SATURATION: 98 % | TEMPERATURE: 91.4 F

## 2018-02-25 VITALS
SYSTOLIC BLOOD PRESSURE: 129 MMHG | DIASTOLIC BLOOD PRESSURE: 54 MMHG | TEMPERATURE: 91.4 F | HEART RATE: 78 BPM | OXYGEN SATURATION: 98 %

## 2018-02-25 VITALS
SYSTOLIC BLOOD PRESSURE: 105 MMHG | DIASTOLIC BLOOD PRESSURE: 54 MMHG | OXYGEN SATURATION: 100 % | HEART RATE: 67 BPM | TEMPERATURE: 91.58 F

## 2018-02-25 VITALS — TEMPERATURE: 91.4 F | HEART RATE: 64 BPM | DIASTOLIC BLOOD PRESSURE: 236 MMHG | SYSTOLIC BLOOD PRESSURE: 237 MMHG

## 2018-02-25 VITALS
HEART RATE: 62 BPM | DIASTOLIC BLOOD PRESSURE: 43 MMHG | TEMPERATURE: 91.76 F | OXYGEN SATURATION: 99 % | SYSTOLIC BLOOD PRESSURE: 102 MMHG

## 2018-02-25 VITALS
TEMPERATURE: 91.22 F | SYSTOLIC BLOOD PRESSURE: 159 MMHG | OXYGEN SATURATION: 100 % | HEART RATE: 71 BPM | DIASTOLIC BLOOD PRESSURE: 61 MMHG

## 2018-02-25 VITALS
HEART RATE: 64 BPM | DIASTOLIC BLOOD PRESSURE: 83 MMHG | SYSTOLIC BLOOD PRESSURE: 152 MMHG | TEMPERATURE: 91.58 F | OXYGEN SATURATION: 100 %

## 2018-02-25 VITALS
OXYGEN SATURATION: 100 % | DIASTOLIC BLOOD PRESSURE: 55 MMHG | TEMPERATURE: 91.4 F | SYSTOLIC BLOOD PRESSURE: 127 MMHG | HEART RATE: 68 BPM

## 2018-02-25 VITALS
OXYGEN SATURATION: 100 % | DIASTOLIC BLOOD PRESSURE: 55 MMHG | SYSTOLIC BLOOD PRESSURE: 121 MMHG | HEART RATE: 67 BPM | TEMPERATURE: 91.58 F

## 2018-02-25 VITALS
SYSTOLIC BLOOD PRESSURE: 114 MMHG | HEART RATE: 63 BPM | DIASTOLIC BLOOD PRESSURE: 73 MMHG | TEMPERATURE: 90.5 F | OXYGEN SATURATION: 100 %

## 2018-02-25 VITALS
HEART RATE: 66 BPM | OXYGEN SATURATION: 100 % | SYSTOLIC BLOOD PRESSURE: 118 MMHG | DIASTOLIC BLOOD PRESSURE: 82 MMHG | TEMPERATURE: 91.94 F

## 2018-02-25 VITALS
HEART RATE: 80 BPM | DIASTOLIC BLOOD PRESSURE: 58 MMHG | SYSTOLIC BLOOD PRESSURE: 122 MMHG | OXYGEN SATURATION: 97 % | TEMPERATURE: 95 F

## 2018-02-25 VITALS
DIASTOLIC BLOOD PRESSURE: 47 MMHG | HEART RATE: 73 BPM | SYSTOLIC BLOOD PRESSURE: 157 MMHG | TEMPERATURE: 91.58 F | OXYGEN SATURATION: 95 %

## 2018-02-25 VITALS
TEMPERATURE: 95.36 F | SYSTOLIC BLOOD PRESSURE: 124 MMHG | HEART RATE: 80 BPM | DIASTOLIC BLOOD PRESSURE: 55 MMHG | OXYGEN SATURATION: 95 %

## 2018-02-25 VITALS
DIASTOLIC BLOOD PRESSURE: 53 MMHG | HEART RATE: 68 BPM | SYSTOLIC BLOOD PRESSURE: 107 MMHG | OXYGEN SATURATION: 99 % | TEMPERATURE: 91.58 F

## 2018-02-25 VITALS
TEMPERATURE: 91.4 F | OXYGEN SATURATION: 98 % | HEART RATE: 65 BPM | DIASTOLIC BLOOD PRESSURE: 70 MMHG | SYSTOLIC BLOOD PRESSURE: 113 MMHG

## 2018-02-25 VITALS
SYSTOLIC BLOOD PRESSURE: 100 MMHG | HEART RATE: 60 BPM | DIASTOLIC BLOOD PRESSURE: 48 MMHG | OXYGEN SATURATION: 99 % | TEMPERATURE: 90.86 F

## 2018-02-25 VITALS
OXYGEN SATURATION: 98 % | HEART RATE: 70 BPM | DIASTOLIC BLOOD PRESSURE: 61 MMHG | TEMPERATURE: 91.4 F | SYSTOLIC BLOOD PRESSURE: 153 MMHG

## 2018-02-25 VITALS
OXYGEN SATURATION: 99 % | HEART RATE: 66 BPM | DIASTOLIC BLOOD PRESSURE: 52 MMHG | SYSTOLIC BLOOD PRESSURE: 103 MMHG | TEMPERATURE: 91.4 F

## 2018-02-25 VITALS
OXYGEN SATURATION: 98 % | TEMPERATURE: 91.58 F | HEART RATE: 59 BPM | DIASTOLIC BLOOD PRESSURE: 50 MMHG | SYSTOLIC BLOOD PRESSURE: 124 MMHG

## 2018-02-25 VITALS
DIASTOLIC BLOOD PRESSURE: 67 MMHG | TEMPERATURE: 91.4 F | SYSTOLIC BLOOD PRESSURE: 126 MMHG | OXYGEN SATURATION: 99 % | HEART RATE: 66 BPM

## 2018-02-25 VITALS
OXYGEN SATURATION: 100 % | SYSTOLIC BLOOD PRESSURE: 102 MMHG | TEMPERATURE: 91.04 F | HEART RATE: 57 BPM | DIASTOLIC BLOOD PRESSURE: 57 MMHG

## 2018-02-25 VITALS
SYSTOLIC BLOOD PRESSURE: 136 MMHG | TEMPERATURE: 91.76 F | OXYGEN SATURATION: 100 % | DIASTOLIC BLOOD PRESSURE: 57 MMHG | HEART RATE: 69 BPM

## 2018-02-25 VITALS
OXYGEN SATURATION: 100 % | SYSTOLIC BLOOD PRESSURE: 226 MMHG | TEMPERATURE: 91.58 F | DIASTOLIC BLOOD PRESSURE: 225 MMHG | HEART RATE: 66 BPM

## 2018-02-25 VITALS
TEMPERATURE: 91.4 F | DIASTOLIC BLOOD PRESSURE: 88 MMHG | HEART RATE: 75 BPM | SYSTOLIC BLOOD PRESSURE: 149 MMHG | OXYGEN SATURATION: 99 %

## 2018-02-25 VITALS
OXYGEN SATURATION: 98 % | HEART RATE: 65 BPM | SYSTOLIC BLOOD PRESSURE: 143 MMHG | DIASTOLIC BLOOD PRESSURE: 78 MMHG | TEMPERATURE: 91.4 F

## 2018-02-25 VITALS
SYSTOLIC BLOOD PRESSURE: 122 MMHG | DIASTOLIC BLOOD PRESSURE: 72 MMHG | HEART RATE: 83 BPM | OXYGEN SATURATION: 97 % | TEMPERATURE: 91.94 F

## 2018-02-25 VITALS
HEART RATE: 66 BPM | OXYGEN SATURATION: 99 % | SYSTOLIC BLOOD PRESSURE: 118 MMHG | DIASTOLIC BLOOD PRESSURE: 57 MMHG | TEMPERATURE: 90.86 F

## 2018-02-25 VITALS
HEART RATE: 82 BPM | SYSTOLIC BLOOD PRESSURE: 111 MMHG | DIASTOLIC BLOOD PRESSURE: 52 MMHG | OXYGEN SATURATION: 94 % | TEMPERATURE: 93.2 F

## 2018-02-25 VITALS
DIASTOLIC BLOOD PRESSURE: 55 MMHG | TEMPERATURE: 91.76 F | SYSTOLIC BLOOD PRESSURE: 110 MMHG | OXYGEN SATURATION: 100 % | HEART RATE: 61 BPM

## 2018-02-25 VITALS
HEART RATE: 80 BPM | SYSTOLIC BLOOD PRESSURE: 121 MMHG | TEMPERATURE: 95 F | DIASTOLIC BLOOD PRESSURE: 59 MMHG | OXYGEN SATURATION: 98 %

## 2018-02-25 VITALS
SYSTOLIC BLOOD PRESSURE: 112 MMHG | DIASTOLIC BLOOD PRESSURE: 56 MMHG | HEART RATE: 66 BPM | TEMPERATURE: 91.4 F | OXYGEN SATURATION: 99 %

## 2018-02-25 VITALS — TEMPERATURE: 90.5 F

## 2018-02-25 LAB
BASOPHILS # BLD: 0.01 K/UL (ref 0–0.2)
BASOPHILS # BLD: 0.01 K/UL (ref 0–0.2)
BASOPHILS # BLD: 0.02 K/UL (ref 0–0.2)
BASOPHILS # BLD: 0.02 K/UL (ref 0–0.2)
BASOPHILS # BLD: 0.03 K/UL (ref 0–0.2)
BASOPHILS NFR BLD: 0.1 %
BASOPHILS NFR BLD: 0.1 %
BASOPHILS NFR BLD: 0.2 %
BASOPHILS NFR BLD: 0.2 %
BASOPHILS NFR BLD: 0.4 %
BUN SERPL-MCNC: 33 MG/DL (ref 7–18)
BUN SERPL-MCNC: 34 MG/DL (ref 7–18)
BUN SERPL-MCNC: 35 MG/DL (ref 7–18)
BUN SERPL-MCNC: 35 MG/DL (ref 7–18)
BUN SERPL-MCNC: 36 MG/DL (ref 7–18)
BUN SERPL-MCNC: 38 MG/DL (ref 7–18)
CALCIUM SERPL-MCNC: 7.5 MG/DL (ref 8.5–10.1)
CALCIUM SERPL-MCNC: 7.6 MG/DL (ref 8.5–10.1)
CALCIUM SERPL-MCNC: 7.7 MG/DL (ref 8.5–10.1)
CALCIUM SERPL-MCNC: 7.7 MG/DL (ref 8.5–10.1)
CO2 SERPL-SCNC: 22 MMOL/L (ref 21–32)
CO2 SERPL-SCNC: 23 MMOL/L (ref 21–32)
CO2 SERPL-SCNC: 24 MMOL/L (ref 21–32)
CO2 SERPL-SCNC: 24 MMOL/L (ref 21–32)
CO2 SERPL-SCNC: 25 MMOL/L (ref 21–32)
CO2 SERPL-SCNC: 26 MMOL/L (ref 21–32)
CREAT SERPL-MCNC: 1.44 MG/DL (ref 0.6–1.4)
CREAT SERPL-MCNC: 1.6 MG/DL (ref 0.6–1.4)
CREAT SERPL-MCNC: 1.67 MG/DL (ref 0.6–1.4)
CREAT SERPL-MCNC: 1.77 MG/DL (ref 0.6–1.4)
CREAT SERPL-MCNC: 1.91 MG/DL (ref 0.6–1.4)
CREAT SERPL-MCNC: 1.94 MG/DL (ref 0.6–1.4)
EOS ABS #: 0.05 K/UL (ref 0–0.5)
EOS ABS #: 0.06 K/UL (ref 0–0.5)
EOS ABS #: 0.06 K/UL (ref 0–0.5)
EOS ABS #: 0.09 K/UL (ref 0–0.5)
EOS ABS #: 0.11 K/UL (ref 0–0.5)
EOSINOPHIL NFR BLD AUTO: 196 K/UL (ref 130–400)
EOSINOPHIL NFR BLD AUTO: 202 K/UL (ref 130–400)
EOSINOPHIL NFR BLD AUTO: 211 K/UL (ref 130–400)
EOSINOPHIL NFR BLD AUTO: 223 K/UL (ref 130–400)
EOSINOPHIL NFR BLD AUTO: 254 K/UL (ref 130–400)
GLUCOSE SERPL-MCNC: 112 MG/DL (ref 70–99)
GLUCOSE SERPL-MCNC: 119 MG/DL (ref 70–99)
GLUCOSE SERPL-MCNC: 122 MG/DL (ref 70–99)
GLUCOSE SERPL-MCNC: 139 MG/DL (ref 70–99)
GLUCOSE SERPL-MCNC: 157 MG/DL (ref 70–99)
GLUCOSE SERPL-MCNC: 182 MG/DL (ref 70–99)
HCT VFR BLD CALC: 27.4 % (ref 42–52)
HCT VFR BLD CALC: 27.9 % (ref 42–52)
HCT VFR BLD CALC: 29 % (ref 42–52)
HCT VFR BLD CALC: 29.4 % (ref 42–52)
HCT VFR BLD CALC: 29.6 % (ref 42–52)
HGB BLD-MCNC: 8.9 G/DL (ref 14–18)
HGB BLD-MCNC: 8.9 G/DL (ref 14–18)
HGB BLD-MCNC: 9 G/DL (ref 14–18)
HGB BLD-MCNC: 9.3 G/DL (ref 14–18)
HGB BLD-MCNC: 9.5 G/DL (ref 14–18)
IG#: 0.02 K/UL (ref 0–0.02)
IG#: 0.03 K/UL (ref 0–0.02)
IMM GRANULOCYTES NFR BLD AUTO: 11.9 %
IMM GRANULOCYTES NFR BLD AUTO: 12.7 %
IMM GRANULOCYTES NFR BLD AUTO: 7.8 %
IMM GRANULOCYTES NFR BLD AUTO: 8.1 %
IMM GRANULOCYTES NFR BLD AUTO: 9.6 %
INR PPP: 1.2 (ref 0.9–1.1)
INR PPP: 1.3 (ref 0.9–1.1)
LYMPHOCYTES # BLD: 0.67 K/UL (ref 1.2–3.4)
LYMPHOCYTES # BLD: 0.75 K/UL (ref 1.2–3.4)
LYMPHOCYTES # BLD: 0.8 K/UL (ref 1.2–3.4)
LYMPHOCYTES # BLD: 0.97 K/UL (ref 1.2–3.4)
LYMPHOCYTES # BLD: 1.05 K/UL (ref 1.2–3.4)
MCH RBC QN AUTO: 29.3 PG (ref 25–34)
MCH RBC QN AUTO: 29.7 PG (ref 25–34)
MCH RBC QN AUTO: 29.9 PG (ref 25–34)
MCH RBC QN AUTO: 30 PG (ref 25–34)
MCH RBC QN AUTO: 30.4 PG (ref 25–34)
MCHC RBC AUTO-ENTMCNC: 31 G/DL (ref 32–36)
MCHC RBC AUTO-ENTMCNC: 31.6 G/DL (ref 32–36)
MCHC RBC AUTO-ENTMCNC: 31.9 G/DL (ref 32–36)
MCHC RBC AUTO-ENTMCNC: 32.1 G/DL (ref 32–36)
MCHC RBC AUTO-ENTMCNC: 32.5 G/DL (ref 32–36)
MCV RBC AUTO: 92.3 FL (ref 80–100)
MCV RBC AUTO: 93.6 FL (ref 80–100)
MCV RBC AUTO: 93.9 FL (ref 80–100)
MCV RBC AUTO: 94.5 FL (ref 80–100)
MCV RBC AUTO: 94.6 FL (ref 80–100)
MONO ABS #: 0.81 K/UL (ref 0.11–0.59)
MONO ABS #: 0.97 K/UL (ref 0.11–0.59)
MONO ABS #: 1.08 K/UL (ref 0.11–0.59)
MONO ABS #: 1.09 K/UL (ref 0.11–0.59)
MONO ABS #: 1.13 K/UL (ref 0.11–0.59)
MONOCYTES NFR BLD: 11.2 %
MONOCYTES NFR BLD: 11.9 %
MONOCYTES NFR BLD: 13.2 %
MONOCYTES NFR BLD: 13.6 %
MONOCYTES NFR BLD: 9.8 %
NEUT ABS #: 5.99 K/UL (ref 1.4–6.5)
NEUT ABS #: 6.05 K/UL (ref 1.4–6.5)
NEUT ABS #: 6.29 K/UL (ref 1.4–6.5)
NEUT ABS #: 6.68 K/UL (ref 1.4–6.5)
NEUT ABS #: 7.67 K/UL (ref 1.4–6.5)
NEUTROPHILS # BLD AUTO: 0.6 %
NEUTROPHILS # BLD AUTO: 0.6 %
NEUTROPHILS # BLD AUTO: 0.7 %
NEUTROPHILS # BLD AUTO: 1.1 %
NEUTROPHILS # BLD AUTO: 1.4 %
NEUTROPHILS NFR BLD AUTO: 72.7 %
NEUTROPHILS NFR BLD AUTO: 74.3 %
NEUTROPHILS NFR BLD AUTO: 75.8 %
NEUTROPHILS NFR BLD AUTO: 79.9 %
NEUTROPHILS NFR BLD AUTO: 80.4 %
PHOSPHATE SERPL-MCNC: 3.5 MG/DL (ref 2.5–4.9)
PHOSPHATE SERPL-MCNC: 3.6 MG/DL (ref 2.5–4.9)
PHOSPHATE SERPL-MCNC: 3.8 MG/DL (ref 2.5–4.9)
PHOSPHATE SERPL-MCNC: 4 MG/DL (ref 2.5–4.9)
PMV BLD AUTO: 8.3 FL (ref 7.4–10.4)
PMV BLD AUTO: 8.4 FL (ref 7.4–10.4)
PMV BLD AUTO: 8.4 FL (ref 7.4–10.4)
PMV BLD AUTO: 8.5 FL (ref 7.4–10.4)
PMV BLD AUTO: 8.7 FL (ref 7.4–10.4)
POTASSIUM SERPL-SCNC: 3.7 MMOL/L (ref 3.5–5.1)
POTASSIUM SERPL-SCNC: 3.9 MMOL/L (ref 3.5–5.1)
POTASSIUM SERPL-SCNC: 4 MMOL/L (ref 3.5–5.1)
POTASSIUM SERPL-SCNC: 4.1 MMOL/L (ref 3.5–5.1)
POTASSIUM SERPL-SCNC: 4.2 MMOL/L (ref 3.5–5.1)
POTASSIUM SERPL-SCNC: 4.2 MMOL/L (ref 3.5–5.1)
PTT PATIENT: 33.8 SECONDS (ref 21–31)
PTT PATIENT: 34.5 SECONDS (ref 21–31)
PTT PATIENT: 35.1 SECONDS (ref 21–31)
PTT PATIENT: 39.7 SECONDS (ref 21–31)
PTT PATIENT: 41.8 SECONDS (ref 21–31)
PTT PATIENT: 42.3 SECONDS (ref 21–31)
RED CELL DISTRIBUTION WIDTH CV: 15.5 % (ref 11.5–14.5)
RED CELL DISTRIBUTION WIDTH CV: 15.6 % (ref 11.5–14.5)
RED CELL DISTRIBUTION WIDTH CV: 15.6 % (ref 11.5–14.5)
RED CELL DISTRIBUTION WIDTH CV: 15.7 % (ref 11.5–14.5)
RED CELL DISTRIBUTION WIDTH CV: 15.8 % (ref 11.5–14.5)
RED CELL DISTRIBUTION WIDTH SD: 52.7 FL (ref 36.4–46.3)
RED CELL DISTRIBUTION WIDTH SD: 53.4 FL (ref 36.4–46.3)
RED CELL DISTRIBUTION WIDTH SD: 53.5 FL (ref 36.4–46.3)
RED CELL DISTRIBUTION WIDTH SD: 53.6 FL (ref 36.4–46.3)
RED CELL DISTRIBUTION WIDTH SD: 53.8 FL (ref 36.4–46.3)
SODIUM SERPL-SCNC: 138 MMOL/L (ref 136–145)
SODIUM SERPL-SCNC: 139 MMOL/L (ref 136–145)
SODIUM SERPL-SCNC: 139 MMOL/L (ref 136–145)
SODIUM SERPL-SCNC: 140 MMOL/L (ref 136–145)
WBC # BLD AUTO: 8.14 K/UL (ref 4.8–10.8)
WBC # BLD AUTO: 8.24 K/UL (ref 4.8–10.8)
WBC # BLD AUTO: 8.3 K/UL (ref 4.8–10.8)
WBC # BLD AUTO: 8.31 K/UL (ref 4.8–10.8)
WBC # BLD AUTO: 9.61 K/UL (ref 4.8–10.8)

## 2018-02-25 RX ADMIN — PIPERACILLIN SODIUM, TAZOBACTAM SODIUM SCH MLS/HR: 4; .5 INJECTION, POWDER, LYOPHILIZED, FOR SOLUTION INTRAVENOUS at 22:28

## 2018-02-25 RX ADMIN — ALBUTEROL SULFATE SCH PUFFS: 90 AEROSOL, METERED RESPIRATORY (INHALATION) at 03:00

## 2018-02-25 RX ADMIN — ENOXAPARIN SODIUM SCH MG: 100 INJECTION SUBCUTANEOUS at 21:07

## 2018-02-25 RX ADMIN — MEPERIDINE HYDROCHLORIDE PRN MG: 25 INJECTION, SOLUTION INTRAMUSCULAR; INTRAVENOUS; SUBCUTANEOUS at 00:35

## 2018-02-25 RX ADMIN — PANTOPRAZOLE SODIUM SCH MLS/MIN: 40 INJECTION, POWDER, FOR SOLUTION INTRAVENOUS at 10:35

## 2018-02-25 RX ADMIN — FENTANYL CITRATE SCH MLS/HR: 50 INJECTION, SOLUTION INTRAMUSCULAR; INTRAVENOUS at 14:15

## 2018-02-25 RX ADMIN — IPRATROPIUM BROMIDE SCH PUFFS: 17 AEROSOL, METERED RESPIRATORY (INHALATION) at 21:06

## 2018-02-25 RX ADMIN — INSULIN ASPART SCH UNITS: 100 INJECTION, SOLUTION INTRAVENOUS; SUBCUTANEOUS at 08:00

## 2018-02-25 RX ADMIN — FUROSEMIDE ONE MLS/MIN: 10 INJECTION, SOLUTION INTRAMUSCULAR; INTRAVENOUS at 11:09

## 2018-02-25 RX ADMIN — VALPROATE SODIUM SCH MLS/HR: 500 INJECTION INTRAVENOUS at 21:07

## 2018-02-25 RX ADMIN — FENTANYL CITRATE SCH MLS/HR: 50 INJECTION, SOLUTION INTRAMUSCULAR; INTRAVENOUS at 06:25

## 2018-02-25 RX ADMIN — VALPROATE SODIUM SCH MLS/HR: 500 INJECTION INTRAVENOUS at 10:35

## 2018-02-25 RX ADMIN — INSULIN ASPART SCH UNITS: 100 INJECTION, SOLUTION INTRAVENOUS; SUBCUTANEOUS at 20:44

## 2018-02-25 RX ADMIN — CLOPIDOGREL BISULFATE SCH MG: 75 TABLET, FILM COATED ORAL at 10:35

## 2018-02-25 RX ADMIN — IPRATROPIUM BROMIDE SCH PUFFS: 17 AEROSOL, METERED RESPIRATORY (INHALATION) at 03:00

## 2018-02-25 RX ADMIN — FENTANYL CITRATE SCH MLS/HR: 50 INJECTION, SOLUTION INTRAMUSCULAR; INTRAVENOUS at 23:30

## 2018-02-25 RX ADMIN — IPRATROPIUM BROMIDE SCH PUFFS: 17 AEROSOL, METERED RESPIRATORY (INHALATION) at 14:33

## 2018-02-25 RX ADMIN — INSULIN ASPART SCH UNITS: 100 INJECTION, SOLUTION INTRAVENOUS; SUBCUTANEOUS at 10:36

## 2018-02-25 RX ADMIN — HUMAN INSULIN SCH MLS/HR: 100 INJECTION, SOLUTION SUBCUTANEOUS at 14:00

## 2018-02-25 RX ADMIN — MEPERIDINE HYDROCHLORIDE PRN MG: 25 INJECTION, SOLUTION INTRAMUSCULAR; INTRAVENOUS; SUBCUTANEOUS at 12:16

## 2018-02-25 RX ADMIN — ALBUTEROL SULFATE SCH PUFFS: 90 AEROSOL, METERED RESPIRATORY (INHALATION) at 07:53

## 2018-02-25 RX ADMIN — FUROSEMIDE ONE MLS/MIN: 10 INJECTION, SOLUTION INTRAMUSCULAR; INTRAVENOUS at 12:16

## 2018-02-25 RX ADMIN — DOPAMINE HYDROCHLORIDE SCH MLS/HR: 160 INJECTION, SOLUTION INTRAVENOUS at 15:17

## 2018-02-25 RX ADMIN — IPRATROPIUM BROMIDE SCH PUFFS: 17 AEROSOL, METERED RESPIRATORY (INHALATION) at 07:53

## 2018-02-25 RX ADMIN — ALBUTEROL SULFATE SCH PUFFS: 90 AEROSOL, METERED RESPIRATORY (INHALATION) at 21:06

## 2018-02-25 RX ADMIN — HUMAN INSULIN SCH MLS/HR: 100 INJECTION, SOLUTION SUBCUTANEOUS at 18:25

## 2018-02-25 RX ADMIN — ENOXAPARIN SODIUM SCH MG: 100 INJECTION SUBCUTANEOUS at 09:19

## 2018-02-25 RX ADMIN — ALBUTEROL SULFATE SCH PUFFS: 90 AEROSOL, METERED RESPIRATORY (INHALATION) at 14:33

## 2018-02-25 RX ADMIN — INSULIN ASPART SCH UNITS: 100 INJECTION, SOLUTION INTRAVENOUS; SUBCUTANEOUS at 16:52

## 2018-02-25 RX ADMIN — CHLORHEXIDINE GLUCONATE SCH ML: 1.2 RINSE ORAL at 09:18

## 2018-02-25 RX ADMIN — MEPERIDINE HYDROCHLORIDE PRN MG: 25 INJECTION, SOLUTION INTRAMUSCULAR; INTRAVENOUS; SUBCUTANEOUS at 03:32

## 2018-02-25 RX ADMIN — PIPERACILLIN SODIUM, TAZOBACTAM SODIUM SCH MLS/HR: 4; .5 INJECTION, POWDER, LYOPHILIZED, FOR SOLUTION INTRAVENOUS at 06:05

## 2018-02-25 RX ADMIN — PIPERACILLIN SODIUM, TAZOBACTAM SODIUM SCH MLS/HR: 4; .5 INJECTION, POWDER, LYOPHILIZED, FOR SOLUTION INTRAVENOUS at 13:58

## 2018-02-25 NOTE — CARDIOLOGY PROGRESS NOTE
DATE: 02/25/2018

 

TIME:  10:15 a.m.

 

SUBJECTIVE:  He remains sedated and is currently hypothermic as part of the

hypothermia protocol.  He has not had any arrhythmias on telemetry, but does

have intermittent bigeminy with PVCs.  He was started on dobutamine low dose

2.5 mcg/kg per minute yesterday for low urine output by the primary

intensivist, Dr. Elder.  Plan is to start the rewarming process this

afternoon as per Dr. Elder.

 

OBJECTIVE:

VITAL SIGNS:  Temperature 32.5 degrees Celsius rectally, heart rate 65 beats

per minute, respiration rate 18, blood pressure 114/53 mmHg, oxygen

saturation 100% on mechanical ventilator.  I's and O's positive 1.2 liters

yesterday.  He has had decreased urine output, especially over the past

several hours.

GENERAL:  He is sedated.

HEENT:  Pupils are equal and round.  Anicteric sclerae.

NECK:  Cannot assess JVD.

CARDIAC EXAM:  No ventricular heave.  Regular.  Normal S1 and S2.  Has 2/6

early peaking systolic ejection murmur best heard at the right upper sternal

border.  No rubs or gallops.

LUNGS:  Clear to auscultation bilaterally on anterior auscultation.

ABDOMEN:  Soft.  Nondistended.  Hypoactive bowel sounds.

EXTREMITIES:  Has 1-2+ bilateral lower extremity edema.  No cyanosis.

 

MEDICATIONS:  Include Plavix 75 mg daily, dobutamine 2.5 mcg/kg per minute,

Lovenox 100 mg subQ q. 12 hours, insulin drip.  He was on a nitroglycerin

drip, which has been since held, Protonix 40 mg IV daily, Zosyn IV, valproate

1000 mg IV q. 12 hours.

 

LABORATORY DATA:  White blood cell count is 8.31, hemoglobin 9.3, and

platelets 223.  Sodium 140, potassium 4.2, BUN 34, creatinine 1.67.  Peak

troponin was 0.247.

 

IMAGING STUDIES:  Chest x-ray, image personally reviewed from this morning. 

No obvious infiltrate.  Improving congestive failure per Radiology.

 

ECG on 02/25/2018 at 3:49 a.m., sinus rhythm at 66 beats per minute. 

Prolonged QT.  Nonspecific T-wave abnormality.

 

ASSESSMENT AND PLAN:

1.  Status post code blue/pulseless electrical activity:  Could have been

driven by hypoxia as his O2 sat was apparently 66% on 6 liters of oxygen at

home.  He does appear to be in acute on chronic heart failure.  He has been

maintaining blood pressure without pressor support.  He is currently

undergoing the hypothermia protocol.

2.  Acute on chronic diastolic congestive heart failure:  He is hypervolemic.

 His central venous pressure is elevated and exam would also suggest

congestive heart failure.  He has not made much urine, especially the past

several hours.  We will give a dose of Diuril followed by Lasix 80 mg IV. 

Attempt more aggressive diuresis.  Monitor renal function and electrolytes

carefully.

3.  Elevated troponins:  He did not present with acute coronary syndrome and

did not complain of any angina according to his family.  He has had angina in

the past with his coronary artery disease.  Troponin elevation is not

unexpected for undergoing pulseless electrical activity/code blue situation. 

No urgent indication for cardiac catheterization.

4.  Multivessel coronary artery disease status post percutaneous coronary

intervention:  Continue antiplatelet therapy indefinitely.  Resume beta

blocker would blood pressure allows.  Would use carvedilol or metoprolol

succinate in place of labetalol given his reduced systolic function

currently.  High intensity statin therapy indicated.

5.  Cardiomyopathy:  Likely secondary to pulseless electrical activity and

his acute illness.  Hopefully, his left ventricular systolic function

improves.  We would resume beta blocker when appropriate with carvedilol or

metoprolol succinate in place of labetalol.  We can then later discuss ACE

inhibitor if appropriate if renal function allows, but currently he is

intermittently mildly hypotensive.  I would not initiate these medications at

this time.

6.  Hypertension:  He was hypertensive in the ICU yesterday, but is currently

normotensive or at times mildly hypotensive.  Plan as above.

7.  Aortic thrombus:  Attempts will be made to get records from [facility] to

outline this actual diagnosis on where the thrombus was located and what

imaging study was used.  He has been on anticoagulation therapy since October of 2017, but has not been compliant.  For now, can continue anticoagulation

if no contraindications.

8.  Disposition:  Cardiology will continue to follow.  The patient's care has

been discussed with Dr. Elder of the Intensivist Service.  Update was also

given to family members present at the bedside.

 

The patient's care also discussed with nursing staff.

 

Thirty five minutes critical care time spent including counseling family,

reviewing labs/imaging, and coordinating care.  The patient's care has also

been discussed with Dr. Omlaley of the primary hospitalist service.

## 2018-02-25 NOTE — PROGRESS NOTE
Subjective


Date of Service:


Feb 25, 2018.


Subjective


this pt is sedated and therapeutically cooled due to survival of cardiac arrest 

and ROSC.





I updated his wife today, plans on re warming this afternoon





still unclear if any CNS damage was done





Problem List


Medical Problems:


(1) Acute CHF


Status: Acute  





(2) Acute coronary syndrome


Status: Acute  





(3) Acute headache


Status: Acute  





(4) Acute on chronic congestive heart failure


Status: Acute  





(5) Altered mental status


Status: Acute  





(6) Anemia


Status: Acute  





(7) Anemia


Status: Acute  





(8) Anemia


Status: Acute  





(9) Anginal pain


Status: Acute  





(10) Appendicitis


Status: Acute  





(11) Cellulitis


Status: Acute  





(12) Chest pain


Status: Acute  





(13) CHF (congestive heart failure)


Status: Acute  





(14) CHF (congestive heart failure)


Status: Acute  





(15) CHF (congestive heart failure)


Status: Acute  





(16) Congestive heart failure


Status: Acute  





(17) Congestive heart failure


Status: Acute  





(18) COPD (chronic obstructive pulmonary disease)


Status: Acute  





(19) Diabetes mellitus with hyperglycemia


Status: Acute  





(20) Dyspnea


Status: Acute  





(21) Elevated troponin


Status: Acute  





(22) Elevated troponin


Status: Acute  





(23) Failure of outpatient treatment


Status: Acute  





(24) Hypoxia


Status: Acute  





(25) Hypoxia


Status: Acute  





(26) Intra-abdominal abscess


Status: Acute  





(27) Lower abdominal pain of unknown etiology


Status: Acute  





(28) Neck pain


Status: Acute  





(29) Pneumonia


Status: Acute  





(30) Pneumonia


Status: Acute  





(31) Precordial chest pain


Status: Acute  





(32) Pulmonary edema


Status: Acute  





(33) Respiratory acidosis


Status: Acute  





(34) Respiratory distress


Status: Acute  





(35) SOB (shortness of breath)


Status: Acute  





(36) SOB (shortness of breath)


Status: Acute  





(37) Tachypnea


Status: Acute  





(38) UTI (urinary tract infection)


Status: Acute  











Review of Systems


Constitutional:  + problem reported (cannot perform ROS due to sedation and 

ventilation)





Objective


Vital Signs











  Date Time  Temp Pulse Resp B/P (MAP) Pulse Ox O2 Delivery O2 Flow Rate FiO2


 


2/25/18 11:25        55


 


2/25/18 11:00 33.4 59 20 121/71 (88) 99 Mechanical Ventilator  55





    104/43 (63)    


 


2/25/18 10:00 33.2 62 20 108/71 (83) 99 Mechanical Ventilator  55





    102/43 (62)    


 


2/25/18 08:00        55


 


2/25/18 08:00      Mechanical Ventilator  55


 


2/25/18 08:00 32.5 63 20 116/73 (87) 100 Mechanical Ventilator  55





    112/49 (70)    


 


2/25/18 08:00 32.5 65 18 133/83 (100) 99 Mechanical Ventilator  55





    114/53 (73)    


 


2/25/18 07:53        55


 


2/25/18 07:00 32.5 65 20 117/57 98   





    150/67    


 


2/25/18 07:00 32.5 65 20 150/67 (94) 98 Mechanical Ventilator  55


 


2/25/18 05:54        55


 


2/25/18 05:16 33.0 66 20 136/74 98   





    117/57    


 


2/25/18 05:15 33.0 65 21  98   





    113/56    


 


2/25/18 05:01 33.0 65 23 122/70 98   





    113/56    


 


2/25/18 05:00 33.0 66 20  99   





    112/56    


 


2/25/18 04:46 33.0 66 20 126/67 99   





    110/55    


 


2/25/18 04:45 33.0 66 23  99   





    103/52    


 


2/25/18 04:31 33.1 67 21 129/66 99   





    110/54    


 


2/25/18 04:30 33.1 67 18  100   





    105/54    


 


2/25/18 04:16 33.1 70 23 113/77 99   





    110/54    


 


2/25/18 04:15 33.1 68 21  99   





    107/53    


 


2/25/18 04:01 33.1 69 20 142/62 96   





    150/71    


 


2/25/18 04:00      Mechanical Ventilator  55


 


2/25/18 04:00        55


 


2/25/18 04:00 33.1 73 20  95   





    157/47    


 


2/25/18 03:46 33.1 66 21 118/83 99   





    216/201    


 


2/25/18 03:45 33.1 66 17  100   





    226/225    


 


2/25/18 03:31 33.1 67 22 143/89 99   





    137/57    


 


2/25/18 03:30 33.1 67 16  100   





    121/55    


 


2/25/18 03:16 33.0 78 19 144/90 98   





    129/54    


 


2/25/18 03:15 33.0 68 19  100   





    127/55    


 


2/25/18 03:01 33.0 70 21 153/92 98   





    142/61    


 


2/25/18 02:46 33.0 75 20 164/88 99   





    149/55    


 


2/25/18 02:31 32.9 71 18 180/104 100   





    159/61    


 


2/25/18 02:16        55


 


2/25/18 02:15        70


 


2/25/18 02:01 33.0 64 20 152/83    





    237/236    


 


2/25/18 01:00 33.2 61 20  100   





    110/55    


 


2/25/18 00:00 33.3 66 20  100   





    118/82    


 


2/24/18 23:59      Mechanical Ventilator  70


 


2/24/18 23:59        70


 


2/24/18 23:20        70


 


2/24/18 23:00 33.6 58 21  100   





    119/58    


 


2/24/18 22:00 34.0 60 20 126/84 100   





    125/60    


 


2/24/18 21:01 34.0 61 22 122/78 99   





    117/58    


 


2/24/18 20:56        70


 


2/24/18 20:40 33.9 58 20 103/68 99   





    95/52    


 


2/24/18 20:00        70


 


2/24/18 20:00     99 Mechanical Ventilator  70


 


2/24/18 19:01 33.0 50 20 117/76 97   





    107/56    


 


2/24/18 19:00 32.9 50 20 108/56 (73) 99 Mechanical Ventilator  70





    117/76 (90)    


 


2/24/18 18:01        70


 


2/24/18 18:01 32.6 51 20 116/76 97   





    111/59    


 


2/24/18 18:00 32.6 47 20 108/57 (74) 98 Mechanical Ventilator  70





    116/76 (89)    


 


2/24/18 17:01 32.5 49 20 117/81 96   





    113/59    


 


2/24/18 17:00 32.5 48 20 113/60 (77) 97 Mechanical Ventilator  70





    117/81 (93)    


 


2/24/18 16:01 33.1 48 20 122/86 95   





    119/61    


 


2/24/18 16:00 33.1 46 20 119/61 (80) 97 Mechanical Ventilator  70





    122/86 (98)    


 


2/24/18 16:00 33.1 48 20 122/86 (78) 95   





    119/61    


 


2/24/18 16:00        70


 


2/24/18 16:00     98 Mechanical Ventilator  70


 


2/24/18 15:05        70


 


2/24/18 15:00 33.7 51 20 105/57 (73) 95 Mechanical Ventilator  70





    109/80 (90)    


 


2/24/18 15:00 33.7 51 20 105/57 95   


 


2/24/18 14:47      Mechanical Ventilator  70


 


2/24/18 14:45 33.8 52 20 120/60 (80) 94 Mechanical Ventilator  70


 


2/24/18 14:30 34.0 57 20 131/65 (87) 93 Mechanical Ventilator  70


 


2/24/18 14:15 34.2 58 20 119/59 (79) 92 Mechanical Ventilator  70


 


2/24/18 14:00 34.3 59 20 135/66 (89) 98 Mechanical Ventilator  70


 


2/24/18 14:00 34.3 59 20 123/61 (81) 93 Mechanical Ventilator  70





    133/94 (107)    


 


2/24/18 13:45 34.4 60 10 134/64 (87) 95 Mechanical Ventilator  70


 


2/24/18 13:30 34.6 63 20 184/79 (114) 92 Mechanical Ventilator  70


 


2/24/18 13:15 34.8 73 15 196/77 (116) 90 Mechanical Ventilator  70


 


2/24/18 13:00 35.2 71 20 188/75 (112) 91 Mechanical Ventilator  70





    187/111 (136)    


 


2/24/18 13:00 35.0 71 20 191/75 (113) 91 Mechanical Ventilator  70


 


2/24/18 12:45 35.3 75 20 196/110 (138) 90 Mechanical Ventilator  70


 


2/24/18 12:30 35.5 40 20 198/150 (166) 87 Mechanical Ventilator  70


 


2/24/18 12:19 35.6 44 20 167/120 (136) 89 Mechanical Ventilator  70


 


2/24/18 12:00 35.7 70 19 93/49 (64) 96 Mechanical Ventilator  70











Physical Exam


General Appearance:  WD/WN, + severe distress


Eyes:  normal inspection, sclerae normal


Respiratory/Chest:  + respiratory distress, + pertinent finding (coarse breath 

sounds)


Cardiovascular:  regular rate, rhythm, no murmur


Abdomen:  soft, + abnormal bowel sounds





Laboratory Results





Last 24 Hours








Test


  2/24/18


12:57 2/24/18


13:58 2/24/18


14:08 2/24/18


14:25


 


Bedside Glucose (other) 309 mg/dl    


 


Bedside Glucose  294 mg/dl   


 


Lactic Acid Level   0.8 mmol/L  


 


Troponin I   0.247 ng/ml  


 


White Blood Count    7.06 K/uL 


 


Red Blood Count    2.99 M/uL 


 


Hemoglobin    8.8 g/dL 


 


Hematocrit    28.0 % 


 


Mean Corpuscular Volume    93.6 fL 


 


Mean Corpuscular Hemoglobin    29.4 pg 


 


Mean Corpuscular Hemoglobin


Concent 


  


  


  31.4 g/dl 


 


 


Platelet Count    269 K/uL 


 


Mean Platelet Volume    8.7 fL 


 


Neutrophils (%) (Auto)    87.1 % 


 


Lymphocytes (%) (Auto)    5.9 % 


 


Monocytes (%) (Auto)    6.4 % 


 


Eosinophils (%) (Auto)    0.1 % 


 


Basophils (%) (Auto)    0.1 % 


 


Neutrophils # (Auto)    6.14 K/uL 


 


Lymphocytes # (Auto)    0.42 K/uL 


 


Monocytes # (Auto)    0.45 K/uL 


 


Eosinophils # (Auto)    0.01 K/uL 


 


Basophils # (Auto)    0.01 K/uL 


 


RDW Standard Deviation    52.9 fL 


 


RDW Coefficient of Variation    15.5 % 


 


Immature Granulocyte % (Auto)    0.4 % 


 


Immature Granulocyte # (Auto)    0.03 K/uL 


 


Hypochromasia    PRESENT 


 


Basophilic Stippling    1+ 


 


Prothrombin Time    13.3 SECONDS 


 


Prothromb Time International


Ratio 


  


  


  1.3 


 


 


Activated Partial


Thromboplast Time 


  


  


  29.2 SECONDS 


 


 


Partial Thromboplastin Ratio    1.1 


 


Sodium Level    136 mmol/L 


 


Potassium Level    4.0 mmol/L 


 


Chloride Level    107 mmol/L 


 


Carbon Dioxide Level    24 mmol/L 


 


Anion Gap    5.0 mmol/L 


 


Blood Urea Nitrogen    30 mg/dl 


 


Creatinine    1.17 mg/dl 


 


Est Creatinine Clear Calc


Drug Dose 


  


  


  81.3 ml/min 


 


 


Estimated GFR (


American) 


  


  


  78.1 


 


 


Estimated GFR (Non-


American 


  


  


  67.4 


 


 


BUN/Creatinine Ratio    25.6 


 


Random Glucose    297 mg/dl 


 


Calcium Level    7.7 mg/dl 


 


Phosphorus Level    2.8 mg/dl 


 


Magnesium Level    2.2 mg/dl 


 


Total Creatine Kinase    206 U/L 


 


Valproic Acid (Depakene) Level    10 mcg/ml 


 


Test


  2/24/18


15:03 2/24/18


16:04 2/24/18


16:08 2/24/18


17:03


 


Bedside Glucose 271 mg/dl  247 mg/dl   229 mg/dl 


 


Blood Gas Sample Site   Art Line  


 


Bedside Blood Gas pH (LAB)   7.31  


 


Bedside Blood Gas pCO2 (LAB)   45 mmHg  


 


Bedside Blood Gas pO2 (LAB)   74 mmHg  


 


Bedside Blood Gas HCO3 (LAB)   24 meq/L  


 


Bedside Blood Gas Total CO2   25 mEq/l  


 


Bedside Blood Gas Base Excess


(LAB) 


  


  -3.0 meq/L 


  


 


 


Bedside Blood Gas O2


Saturation 


  


  96.0 % 


  


 


 


Cameron Test   NA  


 


Oxygen Delivery Device   Ventilator  


 


Bedside Oxygen Rate


(breaths/min) 


  


  20 


  


 


 


Blood Gas Minute Ventilation   9.7  


 


Bedside FiO2   70 %  


 


Blood Gas Tidal Volume   550  


 


Blood Gas PEEP   5  


 


Test


  2/24/18


18:04 2/24/18


19:00 2/24/18


20:07 2/24/18


20:08


 


Bedside Glucose 216 mg/dl  191 mg/dl   


 


White Blood Count   6.70 K/uL  


 


Red Blood Count   3.07 M/uL  


 


Hemoglobin   9.0 g/dL  


 


Hematocrit   28.9 %  


 


Mean Corpuscular Volume   94.1 fL  


 


Mean Corpuscular Hemoglobin   29.3 pg  


 


Mean Corpuscular Hemoglobin


Concent 


  


  31.1 g/dl 


  


 


 


Platelet Count   266 K/uL  


 


Mean Platelet Volume   8.7 fL  


 


Neutrophils (%) (Auto)   79.4 %  


 


Lymphocytes (%) (Auto)   6.6 %  


 


Monocytes (%) (Auto)   13.4 %  


 


Eosinophils (%) (Auto)   0.1 %  


 


Basophils (%) (Auto)   0.1 %  


 


Neutrophils # (Auto)   5.31 K/uL  


 


Lymphocytes # (Auto)   0.44 K/uL  


 


Monocytes # (Auto)   0.90 K/uL  


 


Eosinophils # (Auto)   0.01 K/uL  


 


Basophils # (Auto)   0.01 K/uL  


 


RDW Standard Deviation   53.4 fL  


 


RDW Coefficient of Variation   15.6 %  


 


Immature Granulocyte % (Auto)   0.4 %  


 


Immature Granulocyte # (Auto)   0.03 K/uL  


 


Prothrombin Time   12.7 SECONDS  


 


Prothromb Time International


Ratio 


  


  1.2 


  


 


 


Activated Partial


Thromboplast Time 


  


  29.4 SECONDS 


  


 


 


Partial Thromboplastin Ratio   1.1  


 


Sodium Level   140 mmol/L  


 


Potassium Level   4.0 mmol/L  


 


Chloride Level   108 mmol/L  


 


Carbon Dioxide Level   26 mmol/L  


 


Anion Gap   6.0 mmol/L  


 


Blood Urea Nitrogen   32 mg/dl  


 


Creatinine   1.36 mg/dl  


 


Est Creatinine Clear Calc


Drug Dose 


  


  69.9 ml/min 


  


 


 


Estimated GFR (


American) 


  


  65.1 


  


 


 


Estimated GFR (Non-


American 


  


  56.2 


  


 


 


BUN/Creatinine Ratio   23.6  


 


Random Glucose   162 mg/dl  


 


Lactic Acid Level   1.2 mmol/L  


 


Calcium Level   7.5 mg/dl  


 


Phosphorus Level   3.0 mg/dl  


 


Magnesium Level   2.2 mg/dl  


 


Total Creatine Kinase   188 U/L  


 


Troponin I   0.235 ng/ml  


 


Bedside Glucose (other)    165 mg/dl 


 


Test


  2/24/18


21:01 2/24/18


22:06 2/24/18


23:38 2/24/18


23:55


 


Bedside Glucose (other) 148 mg/dl  143 mg/dl   117 mg/dl 


 


White Blood Count   7.28 K/uL  


 


Red Blood Count   3.13 M/uL  


 


Hemoglobin   9.2 g/dL  


 


Hematocrit   29.1 %  


 


Mean Corpuscular Volume   93.0 fL  


 


Mean Corpuscular Hemoglobin   29.4 pg  


 


Mean Corpuscular Hemoglobin


Concent 


  


  31.6 g/dl 


  


 


 


Platelet Count   227 K/uL  


 


Mean Platelet Volume   8.3 fL  


 


Neutrophils (%) (Auto)   75.0 %  


 


Lymphocytes (%) (Auto)   15.4 %  


 


Monocytes (%) (Auto)   8.7 %  


 


Eosinophils (%) (Auto)   0.3 %  


 


Basophils (%) (Auto)   0.3 %  


 


Neutrophils # (Auto)   5.47 K/uL  


 


Lymphocytes # (Auto)   1.12 K/uL  


 


Monocytes # (Auto)   0.63 K/uL  


 


Eosinophils # (Auto)   0.02 K/uL  


 


Basophils # (Auto)   0.02 K/uL  


 


RDW Standard Deviation   53.6 fL  


 


RDW Coefficient of Variation   15.7 %  


 


Immature Granulocyte % (Auto)   0.3 %  


 


Immature Granulocyte # (Auto)   0.02 K/uL  


 


Prothrombin Time   12.4 SECONDS  


 


Prothromb Time International


Ratio 


  


  1.2 


  


 


 


Activated Partial


Thromboplast Time 


  


  33.8 SECONDS 


  


 


 


Partial Thromboplastin Ratio   1.3  


 


Sodium Level   139 mmol/L  


 


Potassium Level   4.2 mmol/L  


 


Chloride Level   109 mmol/L  


 


Carbon Dioxide Level   23 mmol/L  


 


Anion Gap   7.0 mmol/L  


 


Blood Urea Nitrogen   33 mg/dl  


 


Creatinine   1.44 mg/dl  


 


Est Creatinine Clear Calc


Drug Dose 


  


  66.0 ml/min 


  


 


 


Estimated GFR (


American) 


  


  60.7 


  


 


 


Estimated GFR (Non-


American 


  


  52.4 


  


 


 


BUN/Creatinine Ratio   23.2  


 


Random Glucose   122 mg/dl  


 


Lactic Acid Level   1.4 mmol/L  


 


Calcium Level   7.6 mg/dl  


 


Phosphorus Level   3.5 mg/dl  


 


Magnesium Level   2.3 mg/dl  


 


Total Creatine Kinase   205 U/L  


 


Test


  2/25/18


01:05 2/25/18


01:33 2/25/18


02:03 2/25/18


02:33


 


Bedside Glucose (other) 102 mg/dl  102 mg/dl  109 mg/dl  41 mg/dl 


 


Test


  2/25/18


02:36 2/25/18


03:46 2/25/18


03:47 2/25/18


04:58


 


Troponin I 0.204 ng/ml    


 


White Blood Count  9.61 K/uL   


 


Red Blood Count  3.13 M/uL   


 


Hemoglobin  9.5 g/dL   


 


Hematocrit  29.6 %   


 


Mean Corpuscular Volume  94.6 fL   


 


Mean Corpuscular Hemoglobin  30.4 pg   


 


Mean Corpuscular Hemoglobin


Concent 


  32.1 g/dl 


  


  


 


 


Platelet Count  254 K/uL   


 


Mean Platelet Volume  8.7 fL   


 


Neutrophils (%) (Auto)  79.9 %   


 


Lymphocytes (%) (Auto)  7.8 %   


 


Monocytes (%) (Auto)  11.2 %   


 


Eosinophils (%) (Auto)  0.6 %   


 


Basophils (%) (Auto)  0.2 %   


 


Neutrophils # (Auto)  7.67 K/uL   


 


Lymphocytes # (Auto)  0.75 K/uL   


 


Monocytes # (Auto)  1.08 K/uL   


 


Eosinophils # (Auto)  0.06 K/uL   


 


Basophils # (Auto)  0.02 K/uL   


 


RDW Standard Deviation  53.6 fL   


 


RDW Coefficient of Variation  15.6 %   


 


Immature Granulocyte % (Auto)  0.3 %   


 


Immature Granulocyte # (Auto)  0.03 K/uL   


 


Prothrombin Time  12.4 SECONDS   


 


Prothromb Time International


Ratio 


  1.2 


  


  


 


 


Activated Partial


Thromboplast Time 


  34.5 SECONDS 


  


  


 


 


Partial Thromboplastin Ratio  1.3   


 


Sodium Level  138 mmol/L   


 


Potassium Level  4.1 mmol/L   


 


Chloride Level  109 mmol/L   


 


Carbon Dioxide Level  22 mmol/L   


 


Anion Gap  7.0 mmol/L   


 


Blood Urea Nitrogen  35 mg/dl   


 


Creatinine  1.60 mg/dl   


 


Est Creatinine Clear Calc


Drug Dose 


  59.4 ml/min 


  


  


 


 


Estimated GFR (


American) 


  53.5 


  


  


 


 


Estimated GFR (Non-


American 


  46.1 


  


  


 


 


BUN/Creatinine Ratio  22.0   


 


Random Glucose  182 mg/dl   


 


Lactic Acid Level  1.2 mmol/L   


 


Calcium Level  7.7 mg/dl   


 


Phosphorus Level  3.8 mg/dl   


 


Magnesium Level  2.2 mg/dl   


 


Total Creatine Kinase  407 U/L   


 


Bedside Glucose (other)   182 mg/dl  146 mg/dl 


 


Test


  2/25/18


05:27 2/25/18


06:06 2/25/18


07:06 2/25/18


08:23


 


Blood Gas Sample Site Art Line    


 


Bedside Blood Gas pH (LAB) 7.28    


 


Bedside Blood Gas pCO2 (LAB) 48 mmHg    


 


Bedside Blood Gas pO2 (LAB) 93 mmHg    


 


Bedside Blood Gas HCO3 (LAB) 24 meq/L    


 


Bedside Blood Gas Total CO2 25 mEq/l    


 


Bedside Blood Gas Base Excess


(LAB) -4.0 meq/L 


  


  


  


 


 


Bedside Blood Gas O2


Saturation 97.0 % 


  


  


  


 


 


Cameron Test NA    


 


Bedside Glucose (other)  122 mg/dl  110 mg/dl  


 


White Blood Count    8.31 K/uL 


 


Red Blood Count    3.13 M/uL 


 


Hemoglobin    9.3 g/dL 


 


Hematocrit    29.4 % 


 


Mean Corpuscular Volume    93.9 fL 


 


Mean Corpuscular Hemoglobin    29.7 pg 


 


Mean Corpuscular Hemoglobin


Concent 


  


  


  31.6 g/dl 


 


 


Platelet Count    223 K/uL 


 


Mean Platelet Volume    8.4 fL 


 


Neutrophils (%) (Auto)    75.8 % 


 


Lymphocytes (%) (Auto)    9.6 % 


 


Monocytes (%) (Auto)    13.6 % 


 


Eosinophils (%) (Auto)    0.7 % 


 


Basophils (%) (Auto)    0.1 % 


 


Neutrophils # (Auto)    6.29 K/uL 


 


Lymphocytes # (Auto)    0.80 K/uL 


 


Monocytes # (Auto)    1.13 K/uL 


 


Eosinophils # (Auto)    0.06 K/uL 


 


Basophils # (Auto)    0.01 K/uL 


 


RDW Standard Deviation    53.5 fL 


 


RDW Coefficient of Variation    15.7 % 


 


Immature Granulocyte % (Auto)    0.2 % 


 


Immature Granulocyte # (Auto)    0.02 K/uL 


 


Prothrombin Time    12.8 SECONDS 


 


Prothromb Time International


Ratio 


  


  


  1.2 


 


 


Activated Partial


Thromboplast Time 


  


  


  35.1 SECONDS 


 


 


Partial Thromboplastin Ratio    1.4 


 


Sodium Level    140 mmol/L 


 


Potassium Level    4.2 mmol/L 


 


Chloride Level    108 mmol/L 


 


Carbon Dioxide Level    24 mmol/L 


 


Anion Gap    8.0 mmol/L 


 


Blood Urea Nitrogen    34 mg/dl 


 


Creatinine    1.67 mg/dl 


 


Est Creatinine Clear Calc


Drug Dose 


  


  


  56.9 ml/min 


 


 


Estimated GFR (


American) 


  


  


  50.8 


 


 


Estimated GFR (Non-


American 


  


  


  43.8 


 


 


BUN/Creatinine Ratio    20.3 


 


Random Glucose    112 mg/dl 


 


Lactic Acid Level    1.0 mmol/L 


 


Calcium Level    7.7 mg/dl 


 


Phosphorus Level    3.6 mg/dl 


 


Magnesium Level    2.1 mg/dl 


 


Total Creatine Kinase    959 U/L 


 


Test


  2/25/18


09:01 2/25/18


09:36 2/25/18


11:05 


 


 


Bedside Glucose (other) 121 mg/dl  119 mg/dl  119 mg/dl  











Assessment and Plan


62 M cardiac arrest survivor, stacia STEMI and concern for anoxic brain injury, 

was therapeutically cooled and with continuous EEG





Cardiac arrest survivor, ICU team initiated therapeutic hypothermia, I updated 

the patients wife 2/25





Metabolic acidosis, unclear etiology maybe renal failure, pt is now almost 

anuric, Cr is only slightly worse, expect it to trend upward consider 

Nephrology consult





STEMI, elevated trop and abnormal ECG, labetolol IV, aspirin down OG tube, full 

dose lovenox, no significant trend of Trops





HF has history of diastolic HF, given lasix, minimize fluids given





uncontrolled diabetes, insulin Gtt and pharmacy management





I personally discussed the case with intensivist this am and with nephrology

## 2018-02-25 NOTE — DIAGNOSTIC IMAGING REPORT
CHEST ONE VIEW PORTABLE



CLINICAL HISTORY: follow up cardiac arrest, fx ribs RESPIRATORY FAILURE



COMPARISON STUDY:  February 24, 2018



FINDINGS: There is a right subclavian central venous catheter unchanged in

position. There is a nasogastric tube which passes into the stomach. There is an

endotracheal tube positioned 3.7 cm above the olivier. The heart remains

enlarged. There is persistent but improving congestive failure/fluid overload.

The left hemidiaphragm is obscured suggesting a left pleural effusion and/or

left basilar atelectasis/consolidation.[ 



IMPRESSION: Cardiomegaly and persistent but improving congestive failure/fluid

overload.







Electronically signed by:  Basil Chapin M.D.

2/25/2018 7:23 AM



Dictated Date/Time:  2/25/2018 7:21 AM

## 2018-02-25 NOTE — NEUROLOGY CONSULTATION
Neurology Consultation


Date of Consultation:


Feb 25, 2018.


Attending Physician:


Chuy Omalley M.D.


Primary Care Physician:


Claude Rowley M.D.


Reason for Consultation:


Consultation for continuous EEG monitoring


History of Present Illness


The patient is a 62-year-old male who was admitted to the hospital yesterday 

after cardiac arrest. He has been admitted to the intensive care unit and 

placed on the hypothermia protocol which requires continuous EEG monitoring. I 

discussed his case with the intensivist yesterday and assisted with 

coordination of placement of the continuous EEG monitor with the technologist 

and discussed the case with Dr. Washburn. I have reviewed the data from the 

continuous EEG monitoring and did not find any evidence of epileptiform 

abnormalities or a burst suppression pattern.





Past Medical/Surgical History


Medical Problems:


(1) Acute CHF


Status: Acute  





(2) Acute coronary syndrome


Status: Acute  





(3) Acute headache


Status: Acute  





(4) Acute on chronic congestive heart failure


Status: Acute  





(5) Altered mental status


Status: Acute  





(6) Anemia


Status: Acute  





(7) Anemia


Status: Acute  





(8) Anemia


Status: Acute  





(9) Anginal pain


Status: Acute  





(10) Appendicitis


Status: Acute  





(11) Cellulitis


Status: Acute  





(12) Chest pain


Status: Acute  





(13) CHF (congestive heart failure)


Status: Acute  





(14) CHF (congestive heart failure)


Status: Acute  





(15) CHF (congestive heart failure)


Status: Acute  





(16) Congestive heart failure


Status: Acute  





(17) Congestive heart failure


Status: Acute  





(18) COPD (chronic obstructive pulmonary disease)


Status: Acute  





(19) Diabetes mellitus with hyperglycemia


Status: Acute  





(20) Dyspnea


Status: Acute  





(21) Elevated troponin


Status: Acute  





(22) Elevated troponin


Status: Acute  





(23) Failure of outpatient treatment


Status: Acute  





(24) Hypoxia


Status: Acute  





(25) Hypoxia


Status: Acute  





(26) Intra-abdominal abscess


Status: Acute  





(27) Lower abdominal pain of unknown etiology


Status: Acute  





(28) Neck pain


Status: Acute  





(29) Pneumonia


Status: Acute  





(30) Pneumonia


Status: Acute  





(31) Precordial chest pain


Status: Acute  





(32) Pulmonary edema


Status: Acute  





(33) Respiratory acidosis


Status: Acute  





(34) Respiratory distress


Status: Acute  





(35) SOB (shortness of breath)


Status: Acute  





(36) SOB (shortness of breath)


Status: Acute  





(37) Tachypnea


Status: Acute  





(38) UTI (urinary tract infection)


Status: Acute  











Social History


Smoking Status:  Former smoker


Drug Use:  none


Marital Status:  


Housing Status:  lives with family


Occupation Status:  employed





Allergies


Coded Allergies:  


     No Known Allergies (Verified , 2/24/18)





Current Inpatient Medications





Current Inpatient Medications








 Medications


  (Trade)  Dose


 Ordered  Sig/Dc


 Route  Start Time


 Stop Time Status Last Admin


Dose Admin


 


 Ioversol


  (Optiray 320)  100 ml  UD  PRN


 IV  2/24/18 09:15


 2/28/18 09:14   


 


 


 Miscellaneous


 Information


  ([facility] Protocol


 For Hyperglycemia)  1 ea  PRN  PRN


 N/A  2/24/18 10:00


 2/26/18 09:59   


 


 


 Clopidogrel


 Bisulfate


  (plAVix TAB)  75 mg  QAM


 NG  2/25/18 09:00


 3/27/18 08:59  2/25/18 10:35


75 MG


 


 Piperacillin Sod/


 Tazobactam Sod


 4.5 gm/Dextrose  120 ml @ 


 30 mls/hr  Q8H


 IV  2/24/18 14:00


 3/3/18 08:59  2/25/18 06:05


30 MLS/HR


 


 Miscellaneous


 Information


  (Consult)  1 ea  UD  PRN


 N/A  2/24/18 10:00


 3/26/18 09:59   


 


 


 Miscellaneous


 Information


  (Consult


 Glycemic


 Management


 Pharmacy)  1 ea  UD  PRN


 N/A  2/24/18 12:30


 3/26/18 12:29   


 


 


 Fentanyl Citrate


  (Fentanyl Inj)  25 mcg  Q1H  PRN


 IV  2/24/18 10:30


 3/10/18 10:29   


 


 


 Fentanyl Citrate


  (Fentanyl Inj)  50 mcg  Q1H  PRN


 IV  2/24/18 10:30


 3/10/18 10:29   


 


 


 Artificial Tears


  (Lacri-Lube Oph


 Oint)  1 appln  Q2H  PRN


 OPB  2/24/18 12:00


 3/26/18 11:59   


 


 


 Pantoprazole


 Sodium 40 mg/


 Syringe  10 ml @ 5


 mls/min  DAILY@11


 IV  2/25/18 11:00


 3/27/18 10:59  2/25/18 10:35


5 MLS/MIN


 


 Midazolam HCl  250 ml @ 0


 mls/hr  Q0M PRN


 IV  2/24/18 11:57


 3/26/18 11:56   


 


 


 Fentanyl Citrate  250 ml @ 0


 mls/hr  Q0M


 IV  2/24/18 11:57


 3/10/18 11:56  2/25/18 06:25


30 MLS/HR


 


 Meperidine HCl


  (Demerol Inj)  25 mg  Q2H  PRN


 IV  2/24/18 12:00


 3/10/18 11:59  2/25/18 03:32


25 MG


 


 Acetaminophen


  (Tylenol Supp)  650 mg  ONE  PRN


 MS  2/24/18 12:00


 3/26/18 11:59   


 


 


 Valproate Sodium


 1000 mg/Dextrose  60 ml @ 55


 mls/hr  Q12H


 IV  2/24/18 22:00


 3/26/18 21:59  2/25/18 10:35


55 MLS/HR


 


 Insulin Aspart


  (novoLOG ASPART)  SLIDING


 SCALE  PCHS


 SC  2/24/18 17:15


 3/26/18 17:14   


 


 


 Nitroglycerin/


 Dextrose  250 ml @ 0


 mls/hr  Q0M PRN


 IV  2/24/18 13:30


 3/26/18 13:29   


 


 


 Chlorhexidine


 Gluconate


  (Peridex Oral


 Soln)  15 ml  DAILY


 MT  2/25/18 09:00


 3/27/18 08:59  2/25/18 09:18


15 ML


 


 Insulin Human


 Regular 250 units/


 Sodium Chloride  252.5 ml @ 


 0 mls/hr  Q24H


 IV  2/24/18 14:00


 3/26/18 13:59  2/24/18 14:10


2.5 MLS/HR


 


 Enoxaparin Sodium


  (Lovenox Inj)  100 mg  Q12


 SQ  2/24/18 21:00


 3/26/18 20:59  2/25/18 09:19


100 MG


 


 Albuterol


  (Ventolin Hfa


 Inhaler)  4 puffs  Q6R


 INH  2/24/18 21:00


 3/26/18 17:59  2/25/18 07:53


4 PUFFS


 


 Ipratropium


 Bromide


  (Atrovent Hfa


 Inhaler)  4 puffs  Q6R


 INH  2/24/18 21:00


 3/26/18 17:59  2/25/18 07:53


4 PUFFS


 


 Dobutamine HCl  250 ml @ 0


 mls/hr  Q0M


 IV  2/24/18 19:00


 3/26/18 18:59  2/24/18 19:56


7.6 MLS/HR


 


 Chlorothiazide


 Sodium 250 mg/


 Dextrose  59 ml @ 


 200 mls/hr  NOW  ONCE


 IV  2/25/18 11:00


 2/25/18 11:17   


 


 


 Furosemide 80 mg/


 Syringe  8 ml @ 4


 mls/min  TODAY@1130  ONCE


 IV  2/25/18 11:30


 2/25/18 11:31   


 











Physical Exam


Vital Signs (Past 24 Hrs):











  Date Time  Temp Pulse Resp B/P (MAP) Pulse Ox O2 Delivery O2 Flow Rate FiO2


 


2/25/18 10:00 33.2 62 20 108/71 (83) 99 Mechanical Ventilator  55





    102/43 (62)    


 


2/25/18 08:00        55


 


2/25/18 08:00      Mechanical Ventilator  55


 


2/25/18 08:00 32.5 63 20 116/73 (87) 100 Mechanical Ventilator  55





    112/49 (70)    


 


2/25/18 08:00 32.5 65 18 133/83 (100) 99 Mechanical Ventilator  55





    114/53 (73)    


 


2/25/18 07:53        55


 


2/25/18 07:00 32.5 65 20 117/57 98   





    150/67    


 


2/25/18 07:00 32.5 65 20 150/67 (94) 98 Mechanical Ventilator  55


 


2/25/18 05:54        55


 


2/25/18 05:16 33.0 66 20 136/74 98   





    117/57    


 


2/25/18 05:15 33.0 65 21  98   





    113/56    


 


2/25/18 05:01 33.0 65 23 122/70 98   





    113/56    


 


2/25/18 05:00 33.0 66 20  99   





    112/56    


 


2/25/18 04:46 33.0 66 20 126/67 99   





    110/55    


 


2/25/18 04:45 33.0 66 23  99   





    103/52    


 


2/25/18 04:31 33.1 67 21 129/66 99   





    110/54    


 


2/25/18 04:30 33.1 67 18  100   





    105/54    


 


2/25/18 04:16 33.1 70 23 113/77 99   





    110/54    


 


2/25/18 04:15 33.1 68 21  99   





    107/53    


 


2/25/18 04:01 33.1 69 20 142/62 96   





    150/71    


 


2/25/18 04:00      Mechanical Ventilator  55


 


2/25/18 04:00        55


 


2/25/18 04:00 33.1 73 20  95   





    157/47    


 


2/25/18 03:46 33.1 66 21 118/83 99   





    216/201    


 


2/25/18 03:45 33.1 66 17  100   





    226/225    


 


2/25/18 03:31 33.1 67 22 143/89 99   





    137/57    


 


2/25/18 03:30 33.1 67 16  100   





    121/55    


 


2/25/18 03:16 33.0 78 19 144/90 98   





    129/54    


 


2/25/18 03:15 33.0 68 19  100   





    127/55    


 


2/25/18 03:01 33.0 70 21 153/92 98   





    142/61    


 


2/25/18 02:46 33.0 75 20 164/88 99   





    149/55    


 


2/25/18 02:31 32.9 71 18 180/104 100   





    159/61    


 


2/25/18 02:16        55


 


2/25/18 02:15        70


 


2/25/18 02:01 33.0 64 20 152/83    





    237/236    


 


2/25/18 01:00 33.2 61 20  100   





    110/55    


 


2/25/18 00:00 33.3 66 20  100   





    118/82    


 


2/24/18 23:59      Mechanical Ventilator  70


 


2/24/18 23:59        70


 


2/24/18 23:20        70


 


2/24/18 23:00 33.6 58 21  100   





    119/58    


 


2/24/18 22:00 34.0 60 20 126/84 100   





    125/60    


 


2/24/18 21:01 34.0 61 22 122/78 99   





    117/58    


 


2/24/18 20:56        70


 


2/24/18 20:40 33.9 58 20 103/68 99   





    95/52    


 


2/24/18 20:00        70


 


2/24/18 20:00     99 Mechanical Ventilator  70


 


2/24/18 19:01 33.0 50 20 117/76 97   





    107/56    


 


2/24/18 19:00 32.9 50 20 108/56 (73) 99 Mechanical Ventilator  70





    117/76 (90)    


 


2/24/18 18:01        70


 


2/24/18 18:01 32.6 51 20 116/76 97   





    111/59    


 


2/24/18 18:00 32.6 47 20 108/57 (74) 98 Mechanical Ventilator  70





    116/76 (89)    


 


2/24/18 17:01 32.5 49 20 117/81 96   





    113/59    


 


2/24/18 17:00 32.5 48 20 113/60 (77) 97 Mechanical Ventilator  70





    117/81 (93)    


 


2/24/18 16:01 33.1 48 20 122/86 95   





    119/61    


 


2/24/18 16:00 33.1 46 20 119/61 (80) 97 Mechanical Ventilator  70





    122/86 (98)    


 


2/24/18 16:00 33.1 48 20 122/86 (78) 95   





    119/61    


 


2/24/18 16:00        70


 


2/24/18 16:00     98 Mechanical Ventilator  70


 


2/24/18 15:05        70


 


2/24/18 15:00 33.7 51 20 105/57 (73) 95 Mechanical Ventilator  70





    109/80 (90)    


 


2/24/18 15:00 33.7 51 20 105/57 95   


 


2/24/18 14:47      Mechanical Ventilator  70


 


2/24/18 14:45 33.8 52 20 120/60 (80) 94 Mechanical Ventilator  70


 


2/24/18 14:30 34.0 57 20 131/65 (87) 93 Mechanical Ventilator  70


 


2/24/18 14:15 34.2 58 20 119/59 (79) 92 Mechanical Ventilator  70


 


2/24/18 14:00 34.3 59 20 135/66 (89) 98 Mechanical Ventilator  70


 


2/24/18 14:00 34.3 59 20 123/61 (81) 93 Mechanical Ventilator  70





    133/94 (107)    


 


2/24/18 13:45 34.4 60 10 134/64 (87) 95 Mechanical Ventilator  70


 


2/24/18 13:30 34.6 63 20 184/79 (114) 92 Mechanical Ventilator  70


 


2/24/18 13:15 34.8 73 15 196/77 (116) 90 Mechanical Ventilator  70


 


2/24/18 13:00 35.2 71 20 188/75 (112) 91 Mechanical Ventilator  70





    187/111 (136)    


 


2/24/18 13:00 35.0 71 20 191/75 (113) 91 Mechanical Ventilator  70


 


2/24/18 12:45 35.3 75 20 196/110 (138) 90 Mechanical Ventilator  70


 


2/24/18 12:30 35.5 40 20 198/150 (166) 87 Mechanical Ventilator  70


 


2/24/18 12:19 35.6 44 20 167/120 (136) 89 Mechanical Ventilator  70


 


2/24/18 12:00 35.7 70 19 93/49 (64) 96 Mechanical Ventilator  70


 


2/24/18 11:20        60











Laboratory Results


Past 24 Hours:


2/25/18 08:23








Red Blood Count 3.13, Mean Corpuscular Volume 93.9, Mean Corpuscular Hemoglobin 

29.7, Mean Corpuscular Hemoglobin Concent 31.6, Mean Platelet Volume 8.4, 

Neutrophils (%) (Auto) 75.8, Lymphocytes (%) (Auto) 9.6, Monocytes (%) (Auto) 

13.6, Eosinophils (%) (Auto) 0.7, Basophils (%) (Auto) 0.1, Neutrophils # (Auto

) 6.29, Lymphocytes # (Auto) 0.80, Monocytes # (Auto) 1.13, Eosinophils # (Auto

) 0.06, Basophils # (Auto) 0.01





2/25/18 08:23

















Test


  2/24/18


14:25 2/24/18


16:08 2/24/18


19:00 2/25/18


02:36


 


Hypochromasia PRESENT    


 


Basophilic Stippling 1+    


 


Valproic Acid (Depakene) Level


  10 mcg/ml


() 


  


  


 


 


Oxygen Delivery Device  Ventilator   


 


Bedside Oxygen Rate


(breaths/min) 


  20 


  


  


 


 


Blood Gas Minute Ventilation  9.7   


 


Bedside FiO2  70 %   


 


Blood Gas Tidal Volume  550   


 


Blood Gas PEEP  5   


 


Bedside Glucose


  


  


  191 mg/dl


(70-99) 


 


 


Troponin I


  


  


  


  0.204 ng/ml


(0-0.045)


 


Test


  2/25/18


05:27 2/25/18


08:23 2/25/18


09:36 


 


 


Blood Gas Sample Site Art Line    


 


Bedside Blood Gas pH (LAB)


  7.28


(7.35-7.45) 


  


  


 


 


Bedside Blood Gas pCO2 (LAB)


  48 mmHg


(35-46) 


  


  


 


 


Bedside Blood Gas pO2 (LAB)


  93 mmHg


(80-95) 


  


  


 


 


Bedside Blood Gas HCO3 (LAB)


  24 meq/L


(19-24) 


  


  


 


 


Bedside Blood Gas Total CO2


  25 mEq/l


(24-31) 


  


  


 


 


Bedside Blood Gas Base Excess


(LAB) -4.0 meq/L


(-9-1.8) 


  


  


 


 


Bedside Blood Gas O2


Saturation 97.0 % (90-95) 


  


  


  


 


 


Cameron Test NA    


 


White Blood Count


  


  8.31 K/uL


(4.8-10.8) 


  


 


 


Red Blood Count


  


  3.13 M/uL


(4.7-6.1) 


  


 


 


Hemoglobin


  


  9.3 g/dL


(14.0-18.0) 


  


 


 


Hematocrit  29.4 % (42-52)   


 


Mean Corpuscular Volume


  


  93.9 fL


() 


  


 


 


Mean Corpuscular Hemoglobin


  


  29.7 pg


(25-34) 


  


 


 


Mean Corpuscular Hemoglobin


Concent 


  31.6 g/dl


(32-36) 


  


 


 


Platelet Count


  


  223 K/uL


(130-400) 


  


 


 


Mean Platelet Volume


  


  8.4 fL


(7.4-10.4) 


  


 


 


Neutrophils (%) (Auto)  75.8 %   


 


Lymphocytes (%) (Auto)  9.6 %   


 


Monocytes (%) (Auto)  13.6 %   


 


Eosinophils (%) (Auto)  0.7 %   


 


Basophils (%) (Auto)  0.1 %   


 


Neutrophils # (Auto)


  


  6.29 K/uL


(1.4-6.5) 


  


 


 


Lymphocytes # (Auto)


  


  0.80 K/uL


(1.2-3.4) 


  


 


 


Monocytes # (Auto)


  


  1.13 K/uL


(0.11-0.59) 


  


 


 


Eosinophils # (Auto)


  


  0.06 K/uL


(0-0.5) 


  


 


 


Basophils # (Auto)


  


  0.01 K/uL


(0-0.2) 


  


 


 


RDW Standard Deviation


  


  53.5 fL


(36.4-46.3) 


  


 


 


RDW Coefficient of Variation


  


  15.7 %


(11.5-14.5) 


  


 


 


Immature Granulocyte % (Auto)  0.2 %   


 


Immature Granulocyte # (Auto)


  


  0.02 K/uL


(0.00-0.02) 


  


 


 


Prothrombin Time


  


  12.8 SECONDS


(9.0-12.0) 


  


 


 


Prothromb Time International


Ratio 


  1.2 (0.9-1.1) 


  


  


 


 


Activated Partial


Thromboplast Time 


  35.1 SECONDS


(21.0-31.0) 


  


 


 


Partial Thromboplastin Ratio  1.4   


 


Anion Gap


  


  8.0 mmol/L


(3-11) 


  


 


 


Est Creatinine Clear Calc


Drug Dose 


  56.9 ml/min 


  


  


 


 


Estimated GFR (


American) 


  50.8 


  


  


 


 


Estimated GFR (Non-


American 


  43.8 


  


  


 


 


BUN/Creatinine Ratio  20.3 (10-20)   


 


Lactic Acid Level


  


  1.0 mmol/L


(0.4-2.0) 


  


 


 


Calcium Level


  


  7.7 mg/dl


(8.5-10.1) 


  


 


 


Phosphorus Level


  


  3.6 mg/dl


(2.5-4.9) 


  


 


 


Magnesium Level


  


  2.1 mg/dl


(1.8-2.4) 


  


 


 


Total Creatine Kinase


  


  959 U/L


() 


  


 


 


Bedside Glucose (other)


  


  


  119 mg/dl


(70-99) 


 














 Date/Time


Source Procedure


Growth Status


 


 


 2/24/18 14:40


Nasal MRSA DNA Surveillance Screen - Final


Specimen Negative for MRSA by DNA Probe Complete











Impression


This is a 62-year-old male who is status post cardiac arrest who was placed on 

the hypothermia protocol yesterday including continuous EEG monitoring.





I do not find any evidence of epileptiform abnormalities or a burst suppression 

pattern.





There is generalized slowing suggestive of encephalopathy..





Dr. Washburn will perform the formal review of this patient's continuous 

monitoring which will be available tomorrow.

## 2018-02-25 NOTE — CRITICAL CARE PROGRESS NOTE
Critical Care Progress Note


Date of Service


Feb 25, 2018.





Attending


Dr. Jerrod Rivera


The patient remains vented and sedated, TTM is ongoing, he is complaining the 

first 24 hours of going process.  The patient become anuric, which could be 

related to hypothermia versus ischemic ATN.  The patient was not hypotensive 

but hypertensive on admission.  Overnight the patient tolerated dobutamine, he 

has been of nitroglycerin drip.





Objective


Review of system is not obtainable, vital signs remained stable, S1-S2 regular 

rate and rhythm, did not require any paralytics, distant breath sounds 

bilaterally, abdomen is benign, edema in the periphery noted.  Neurologically 

difficult to assess as the patient being cooled.  Pupils are midsize reactive.  

Cough reflex was noted.  He open his eyes but not to command only with movement.





Chest x-ray revealed pulmonary vascular congestion Accardi megaly.  All lines 

are in good position.  Labs are consistent with normal WBC, troponin elevated 

but not climbing, CPK is elevated to 1600 likely related to according process.  

Urine output declined to less than 40 mL.





Assessment & Plan


#1 cardiac arrest secondary to PEA.


#2 acute respiratory failure secondary to above.


#3 cardiomyopathy, echocardiogram was done, appreciate Dr. Martínez input.


#4 acute kidney insufficiency.  With no urine output.


#5 rhabdomyolysis, mild, secondary to TTM.


#6 diabetes, poorly controlled, now on insulin drip.


#7 I aortic thrombosis by records, we do not have the hard copy of it, the 

patient is on enoxaparin full dose in that regard.  If the patient truly had 

aortic thrombosis, I am concerned that she might have had fragmentation into 

the renal artery.


#8 acute MI, treated with TTM, cardiology is following.


#9 noncompliance.





Plan:





#1 continue with TTM until completed 24 hours and then start rewarming over 12 

hours.


#2 we'll evaluate his neurologic status in 24 hours.


#3 the patient did not show any evidence of epileptic activity on the EEG.


#4 continue with current sedation with fentanyl and Versed.  In fact Versed was 

stopped due to bradycardia.


#5 once the patient is rewarmed, likely he'll vasodilate and I anticipate more 

instability and his hemodynamics, in that regard I will switch him from 

dobutamine to dopamine.


#6 appreciate Dr. Martínez input.


#7 agree with Lasix and Diuril.


#8 renal consult if the patient does not have urine output after rewarming.  

Evaluation for ischemic ATN.


#9 continue CVP measurement.


#10 continue enoxaparin full dose.


#11 glucose control with insulin drip.


#12 family updated.





Discussed in details with the staff.





Critical care time spent with the patient was 60 minutes.





Data


Medications:





Current Inpatient Medications








 Medications


  (Trade)  Dose


 Ordered  Sig/Dc


 Route  Start Time


 Stop Time Status Last Admin


Dose Admin


 


 Ioversol


  (Optiray 320)  100 ml  UD  PRN


 IV  2/24/18 09:15


 2/28/18 09:14   


 


 


 Miscellaneous


 Information


  ([facility] Protocol


 For Hyperglycemia)  1 ea  PRN  PRN


 N/A  2/24/18 10:00


 2/26/18 09:59   


 


 


 Clopidogrel


 Bisulfate


  (plAVix TAB)  75 mg  QAM


 NG  2/25/18 09:00


 3/27/18 08:59  2/25/18 10:35


75 MG


 


 Piperacillin Sod/


 Tazobactam Sod


 4.5 gm/Dextrose  120 ml @ 


 30 mls/hr  Q8H


 IV  2/24/18 14:00


 3/3/18 08:59  2/25/18 06:05


30 MLS/HR


 


 Miscellaneous


 Information


  (Consult)  1 ea  UD  PRN


 N/A  2/24/18 10:00


 3/26/18 09:59   


 


 


 Miscellaneous


 Information


  (Consult


 Glycemic


 Management


 Pharmacy)  1 ea  UD  PRN


 N/A  2/24/18 12:30


 3/26/18 12:29   


 


 


 Fentanyl Citrate


  (Fentanyl Inj)  25 mcg  Q1H  PRN


 IV  2/24/18 10:30


 3/10/18 10:29   


 


 


 Fentanyl Citrate


  (Fentanyl Inj)  50 mcg  Q1H  PRN


 IV  2/24/18 10:30


 3/10/18 10:29   


 


 


 Artificial Tears


  (Lacri-Lube Oph


 Oint)  1 appln  Q2H  PRN


 OPB  2/24/18 12:00


 3/26/18 11:59   


 


 


 Pantoprazole


 Sodium 40 mg/


 Syringe  10 ml @ 5


 mls/min  DAILY@11


 IV  2/25/18 11:00


 3/27/18 10:59  2/25/18 10:35


5 MLS/MIN


 


 Midazolam HCl  250 ml @ 0


 mls/hr  Q0M PRN


 IV  2/24/18 11:57


 3/26/18 11:56   


 


 


 Fentanyl Citrate  250 ml @ 0


 mls/hr  Q0M


 IV  2/24/18 11:57


 3/10/18 11:56  2/25/18 06:25


30 MLS/HR


 


 Meperidine HCl


  (Demerol Inj)  25 mg  Q2H  PRN


 IV  2/24/18 12:00


 3/10/18 11:59  2/25/18 12:16


25 MG


 


 Acetaminophen


  (Tylenol Supp)  650 mg  ONE  PRN


 OH  2/24/18 12:00


 3/26/18 11:59   


 


 


 Valproate Sodium


 1000 mg/Dextrose  60 ml @ 55


 mls/hr  Q12H


 IV  2/24/18 22:00


 3/26/18 21:59  2/25/18 10:35


55 MLS/HR


 


 Insulin Aspart


  (novoLOG ASPART)  SLIDING


 [facility]  2/24/18 17:15


 3/26/18 17:14   


 


 


 Nitroglycerin/


 Dextrose  250 ml @ 0


 mls/hr  Q0M PRN


 IV  2/24/18 13:30


 3/26/18 13:29   


 


 


 Chlorhexidine


 Gluconate


  (Peridex Oral


 Soln)  15 ml  DAILY


 MT  2/25/18 09:00


 3/27/18 08:59  2/25/18 09:18


15 ML


 


 Insulin Human


 Regular 250 units/


 Sodium Chloride  252.5 ml @ 


 0 mls/hr  Q24H


 IV  2/24/18 14:00


 3/26/18 13:59  2/24/18 14:10


2.5 MLS/HR


 


 Enoxaparin Sodium


  (Lovenox Inj)  100 mg  Q12


 SQ  2/24/18 21:00


 3/26/18 20:59  2/25/18 09:19


100 MG


 


 Albuterol


  (Ventolin Hfa


 Inhaler)  4 puffs  Q6R


 INH  2/24/18 21:00


 3/26/18 17:59  2/25/18 07:53


4 PUFFS


 


 Ipratropium


 Bromide


  (Atrovent Hfa


 Inhaler)  4 puffs  Q6R


 INH  2/24/18 21:00


 3/26/18 17:59  2/25/18 07:53


4 PUFFS


 


 Dobutamine HCl  250 ml @ 0


 mls/hr  Q0M


 IV  2/24/18 19:00


 3/26/18 18:59  2/24/18 19:56


7.6 MLS/HR








I & O:











24-Hour Column 


 


 2/26/18





 08:00


 


Output Total 25 ml


 


Balance -25 ml








Vital Signs:











  Date Time  Temp Pulse Resp B/P (MAP) Pulse Ox O2 Delivery O2 Flow Rate FiO2


 


2/25/18 13:00 32.7 60 20 106/75 (85) 99 Mechanical Ventilator  55





    100/48 (65)    


 


2/25/18 12:00        55


 


2/25/18 12:00 33.0 65 20 156/78 (104) 98 Mechanical Ventilator  55





    143/58 (86)    


 


2/25/18 12:00      Mechanical Ventilator  55


 


2/25/18 11:25        55


 


2/25/18 11:00 33.4 59 20 121/71 (88) 99 Mechanical Ventilator  55





    104/43 (63)    


 


2/25/18 10:00 33.2 62 20 108/71 (83) 99 Mechanical Ventilator  55





    102/43 (62)    


 


2/25/18 08:00        55


 


2/25/18 08:00      Mechanical Ventilator  55


 


2/25/18 08:00 32.5 63 20 116/73 (87) 100 Mechanical Ventilator  55





    112/49 (70)    


 


2/25/18 08:00 32.5 65 18 133/83 (100) 99 Mechanical Ventilator  55





    114/53 (73)    


 


2/25/18 07:53        55


 


2/25/18 07:00 32.5 65 20 117/57 98   





    150/67    


 


2/25/18 07:00 32.5 65 20 150/67 (94) 98 Mechanical Ventilator  55


 


2/25/18 05:54        55


 


2/25/18 05:16 33.0 66 20 136/74 98   





    117/57    


 


2/25/18 05:15 33.0 65 21  98   





    113/56    


 


2/25/18 05:01 33.0 65 23 122/70 98   





    113/56    


 


2/25/18 05:00 33.0 66 20  99   





    112/56    


 


2/25/18 04:46 33.0 66 20 126/67 99   





    110/55    


 


2/25/18 04:45 33.0 66 23  99   





    103/52    


 


2/25/18 04:31 33.1 67 21 129/66 99   





    110/54    


 


2/25/18 04:30 33.1 67 18  100   





    105/54    


 


2/25/18 04:16 33.1 70 23 113/77 99   





    110/54    


 


2/25/18 04:15 33.1 68 21  99   





    107/53    


 


2/25/18 04:01 33.1 69 20 142/62 96   





    150/71    


 


2/25/18 04:00      Mechanical Ventilator  55


 


2/25/18 04:00        55


 


2/25/18 04:00 33.1 73 20  95   





    157/47    


 


2/25/18 03:46 33.1 66 21 118/83 99   





    216/201    


 


2/25/18 03:45 33.1 66 17  100   





    226/225    


 


2/25/18 03:31 33.1 67 22 143/89 99   





    137/57    


 


2/25/18 03:30 33.1 67 16  100   





    121/55    


 


2/25/18 03:16 33.0 78 19 144/90 98   





    129/54    


 


2/25/18 03:15 33.0 68 19  100   





    127/55    


 


2/25/18 03:01 33.0 70 21 153/92 98   





    142/61    


 


2/25/18 02:46 33.0 75 20 164/88 99   





    149/55    


 


2/25/18 02:31 32.9 71 18 180/104 100   





    159/61    


 


2/25/18 02:16        55


 


2/25/18 02:15        70


 


2/25/18 02:01 33.0 64 20 152/83    





    237/236    


 


2/25/18 01:00 33.2 61 20  100   





    110/55    


 


2/25/18 00:00 33.3 66 20  100   





    118/82    


 


2/24/18 23:59      Mechanical Ventilator  70


 


2/24/18 23:59        70


 


2/24/18 23:20        70


 


2/24/18 23:00 33.6 58 21  100   





    119/58    


 


2/24/18 22:00 34.0 60 20 126/84 100   





    125/60    


 


2/24/18 21:01 34.0 61 22 122/78 99   





    117/58    


 


2/24/18 20:56        70


 


2/24/18 20:40 33.9 58 20 103/68 99   





    95/52    


 


2/24/18 20:00        70


 


2/24/18 20:00     99 Mechanical Ventilator  70


 


2/24/18 19:01 33.0 50 20 117/76 97   





    107/56    


 


2/24/18 19:00 32.9 50 20 108/56 (73) 99 Mechanical Ventilator  70





    117/76 (90)    


 


2/24/18 18:01        70


 


2/24/18 18:01 32.6 51 20 116/76 97   





    111/59    


 


2/24/18 18:00 32.6 47 20 108/57 (74) 98 Mechanical Ventilator  70





    116/76 (89)    


 


2/24/18 17:01 32.5 49 20 117/81 96   





    113/59    


 


2/24/18 17:00 32.5 48 20 113/60 (77) 97 Mechanical Ventilator  70





    117/81 (93)    


 


2/24/18 16:01 33.1 48 20 122/86 95   





    119/61    


 


2/24/18 16:00 33.1 46 20 119/61 (80) 97 Mechanical Ventilator  70





    122/86 (98)    


 


2/24/18 16:00 33.1 48 20 122/86 (78) 95   





    119/61    


 


2/24/18 16:00        70


 


2/24/18 16:00     98 Mechanical Ventilator  70


 


2/24/18 15:05        70


 


2/24/18 15:00 33.7 51 20 105/57 (73) 95 Mechanical Ventilator  70





    109/80 (90)    


 


2/24/18 15:00 33.7 51 20 105/57 95   


 


2/24/18 14:47      Mechanical Ventilator  70


 


2/24/18 14:45 33.8 52 20 120/60 (80) 94 Mechanical Ventilator  70


 


2/24/18 14:30 34.0 57 20 131/65 (87) 93 Mechanical Ventilator  70


 


2/24/18 14:15 34.2 58 20 119/59 (79) 92 Mechanical Ventilator  70


 


2/24/18 14:00 34.3 59 20 135/66 (89) 98 Mechanical Ventilator  70


 


2/24/18 14:00 34.3 59 20 123/61 (81) 93 Mechanical Ventilator  70





    133/94 (107)    


 


2/24/18 13:45 34.4 60 10 134/64 (87) 95 Mechanical Ventilator  70


 


2/24/18 13:30 34.6 63 20 184/79 (114) 92 Mechanical Ventilator  70








Laboratory Results:





Last 24 Hours








Test


  2/24/18


13:58 2/24/18


14:08 2/24/18


14:25 2/24/18


15:03


 


Bedside Glucose 294 mg/dl    271 mg/dl 


 


Lactic Acid Level  0.8 mmol/L   


 


Troponin I  0.247 ng/ml   


 


White Blood Count   7.06 K/uL  


 


Red Blood Count   2.99 M/uL  


 


Hemoglobin   8.8 g/dL  


 


Hematocrit   28.0 %  


 


Mean Corpuscular Volume   93.6 fL  


 


Mean Corpuscular Hemoglobin   29.4 pg  


 


Mean Corpuscular Hemoglobin


Concent 


  


  31.4 g/dl 


  


 


 


Platelet Count   269 K/uL  


 


Mean Platelet Volume   8.7 fL  


 


Neutrophils (%) (Auto)   87.1 %  


 


Lymphocytes (%) (Auto)   5.9 %  


 


Monocytes (%) (Auto)   6.4 %  


 


Eosinophils (%) (Auto)   0.1 %  


 


Basophils (%) (Auto)   0.1 %  


 


Neutrophils # (Auto)   6.14 K/uL  


 


Lymphocytes # (Auto)   0.42 K/uL  


 


Monocytes # (Auto)   0.45 K/uL  


 


Eosinophils # (Auto)   0.01 K/uL  


 


Basophils # (Auto)   0.01 K/uL  


 


RDW Standard Deviation   52.9 fL  


 


RDW Coefficient of Variation   15.5 %  


 


Immature Granulocyte % (Auto)   0.4 %  


 


Immature Granulocyte # (Auto)   0.03 K/uL  


 


Hypochromasia   PRESENT  


 


Basophilic Stippling   1+  


 


Prothrombin Time   13.3 SECONDS  


 


Prothromb Time International


Ratio 


  


  1.3 


  


 


 


Activated Partial


Thromboplast Time 


  


  29.2 SECONDS 


  


 


 


Partial Thromboplastin Ratio   1.1  


 


Sodium Level   136 mmol/L  


 


Potassium Level   4.0 mmol/L  


 


Chloride Level   107 mmol/L  


 


Carbon Dioxide Level   24 mmol/L  


 


Anion Gap   5.0 mmol/L  


 


Blood Urea Nitrogen   30 mg/dl  


 


Creatinine   1.17 mg/dl  


 


Est Creatinine Clear Calc


Drug Dose 


  


  81.3 ml/min 


  


 


 


Estimated GFR (


American) 


  


  78.1 


  


 


 


Estimated GFR (Non-


American 


  


  67.4 


  


 


 


BUN/Creatinine Ratio   25.6  


 


Random Glucose   297 mg/dl  


 


Calcium Level   7.7 mg/dl  


 


Phosphorus Level   2.8 mg/dl  


 


Magnesium Level   2.2 mg/dl  


 


Total Creatine Kinase   206 U/L  


 


Valproic Acid (Depakene) Level   10 mcg/ml  


 


Test


  2/24/18


16:04 2/24/18


16:08 2/24/18


17:03 2/24/18


18:04


 


Bedside Glucose 247 mg/dl   229 mg/dl  216 mg/dl 


 


Blood Gas Sample Site  Art Line   


 


Bedside Blood Gas pH (LAB)  7.31   


 


Bedside Blood Gas pCO2 (LAB)  45 mmHg   


 


Bedside Blood Gas pO2 (LAB)  74 mmHg   


 


Bedside Blood Gas HCO3 (LAB)  24 meq/L   


 


Bedside Blood Gas Total CO2  25 mEq/l   


 


Bedside Blood Gas Base Excess


(LAB) 


  -3.0 meq/L 


  


  


 


 


Bedside Blood Gas O2


Saturation 


  96.0 % 


  


  


 


 


Cameron Test  NA   


 


Oxygen Delivery Device  Ventilator   


 


Bedside Oxygen Rate


(breaths/min) 


  20 


  


  


 


 


Blood Gas Minute Ventilation  9.7   


 


Bedside FiO2  70 %   


 


Blood Gas Tidal Volume  550   


 


Blood Gas PEEP  5   


 


Test


  2/24/18


19:00 2/24/18


20:07 2/24/18


20:08 2/24/18


21:01


 


Bedside Glucose 191 mg/dl    


 


White Blood Count  6.70 K/uL   


 


Red Blood Count  3.07 M/uL   


 


Hemoglobin  9.0 g/dL   


 


Hematocrit  28.9 %   


 


Mean Corpuscular Volume  94.1 fL   


 


Mean Corpuscular Hemoglobin  29.3 pg   


 


Mean Corpuscular Hemoglobin


Concent 


  31.1 g/dl 


  


  


 


 


Platelet Count  266 K/uL   


 


Mean Platelet Volume  8.7 fL   


 


Neutrophils (%) (Auto)  79.4 %   


 


Lymphocytes (%) (Auto)  6.6 %   


 


Monocytes (%) (Auto)  13.4 %   


 


Eosinophils (%) (Auto)  0.1 %   


 


Basophils (%) (Auto)  0.1 %   


 


Neutrophils # (Auto)  5.31 K/uL   


 


Lymphocytes # (Auto)  0.44 K/uL   


 


Monocytes # (Auto)  0.90 K/uL   


 


Eosinophils # (Auto)  0.01 K/uL   


 


Basophils # (Auto)  0.01 K/uL   


 


RDW Standard Deviation  53.4 fL   


 


RDW Coefficient of Variation  15.6 %   


 


Immature Granulocyte % (Auto)  0.4 %   


 


Immature Granulocyte # (Auto)  0.03 K/uL   


 


Prothrombin Time  12.7 SECONDS   


 


Prothromb Time International


Ratio 


  1.2 


  


  


 


 


Activated Partial


Thromboplast Time 


  29.4 SECONDS 


  


  


 


 


Partial Thromboplastin Ratio  1.1   


 


Sodium Level  140 mmol/L   


 


Potassium Level  4.0 mmol/L   


 


Chloride Level  108 mmol/L   


 


Carbon Dioxide Level  26 mmol/L   


 


Anion Gap  6.0 mmol/L   


 


Blood Urea Nitrogen  32 mg/dl   


 


Creatinine  1.36 mg/dl   


 


Est Creatinine Clear Calc


Drug Dose 


  69.9 ml/min 


  


  


 


 


Estimated GFR (


American) 


  65.1 


  


  


 


 


Estimated GFR (Non-


American 


  56.2 


  


  


 


 


BUN/Creatinine Ratio  23.6   


 


Random Glucose  162 mg/dl   


 


Lactic Acid Level  1.2 mmol/L   


 


Calcium Level  7.5 mg/dl   


 


Phosphorus Level  3.0 mg/dl   


 


Magnesium Level  2.2 mg/dl   


 


Total Creatine Kinase  188 U/L   


 


Troponin I  0.235 ng/ml   


 


Bedside Glucose (other)   165 mg/dl  148 mg/dl 


 


Test


  2/24/18


22:06 2/24/18


23:38 2/24/18


23:55 2/25/18


01:05


 


Bedside Glucose (other) 143 mg/dl   117 mg/dl  102 mg/dl 


 


White Blood Count  7.28 K/uL   


 


Red Blood Count  3.13 M/uL   


 


Hemoglobin  9.2 g/dL   


 


Hematocrit  29.1 %   


 


Mean Corpuscular Volume  93.0 fL   


 


Mean Corpuscular Hemoglobin  29.4 pg   


 


Mean Corpuscular Hemoglobin


Concent 


  31.6 g/dl 


  


  


 


 


Platelet Count  227 K/uL   


 


Mean Platelet Volume  8.3 fL   


 


Neutrophils (%) (Auto)  75.0 %   


 


Lymphocytes (%) (Auto)  15.4 %   


 


Monocytes (%) (Auto)  8.7 %   


 


Eosinophils (%) (Auto)  0.3 %   


 


Basophils (%) (Auto)  0.3 %   


 


Neutrophils # (Auto)  5.47 K/uL   


 


Lymphocytes # (Auto)  1.12 K/uL   


 


Monocytes # (Auto)  0.63 K/uL   


 


Eosinophils # (Auto)  0.02 K/uL   


 


Basophils # (Auto)  0.02 K/uL   


 


RDW Standard Deviation  53.6 fL   


 


RDW Coefficient of Variation  15.7 %   


 


Immature Granulocyte % (Auto)  0.3 %   


 


Immature Granulocyte # (Auto)  0.02 K/uL   


 


Prothrombin Time  12.4 SECONDS   


 


Prothromb Time International


Ratio 


  1.2 


  


  


 


 


Activated Partial


Thromboplast Time 


  33.8 SECONDS 


  


  


 


 


Partial Thromboplastin Ratio  1.3   


 


Sodium Level  139 mmol/L   


 


Potassium Level  4.2 mmol/L   


 


Chloride Level  109 mmol/L   


 


Carbon Dioxide Level  23 mmol/L   


 


Anion Gap  7.0 mmol/L   


 


Blood Urea Nitrogen  33 mg/dl   


 


Creatinine  1.44 mg/dl   


 


Est Creatinine Clear Calc


Drug Dose 


  66.0 ml/min 


  


  


 


 


Estimated GFR (


American) 


  60.7 


  


  


 


 


Estimated GFR (Non-


American 


  52.4 


  


  


 


 


BUN/Creatinine Ratio  23.2   


 


Random Glucose  122 mg/dl   


 


Lactic Acid Level  1.4 mmol/L   


 


Calcium Level  7.6 mg/dl   


 


Phosphorus Level  3.5 mg/dl   


 


Magnesium Level  2.3 mg/dl   


 


Total Creatine Kinase  205 U/L   


 


Test


  2/25/18


01:33 2/25/18


02:03 2/25/18


02:33 2/25/18


02:36


 


Bedside Glucose (other) 102 mg/dl  109 mg/dl  41 mg/dl  


 


Troponin I    0.204 ng/ml 


 


Test


  2/25/18


03:46 2/25/18


03:47 2/25/18


04:58 2/25/18


05:27


 


White Blood Count 9.61 K/uL    


 


Red Blood Count 3.13 M/uL    


 


Hemoglobin 9.5 g/dL    


 


Hematocrit 29.6 %    


 


Mean Corpuscular Volume 94.6 fL    


 


Mean Corpuscular Hemoglobin 30.4 pg    


 


Mean Corpuscular Hemoglobin


Concent 32.1 g/dl 


  


  


  


 


 


Platelet Count 254 K/uL    


 


Mean Platelet Volume 8.7 fL    


 


Neutrophils (%) (Auto) 79.9 %    


 


Lymphocytes (%) (Auto) 7.8 %    


 


Monocytes (%) (Auto) 11.2 %    


 


Eosinophils (%) (Auto) 0.6 %    


 


Basophils (%) (Auto) 0.2 %    


 


Neutrophils # (Auto) 7.67 K/uL    


 


Lymphocytes # (Auto) 0.75 K/uL    


 


Monocytes # (Auto) 1.08 K/uL    


 


Eosinophils # (Auto) 0.06 K/uL    


 


Basophils # (Auto) 0.02 K/uL    


 


RDW Standard Deviation 53.6 fL    


 


RDW Coefficient of Variation 15.6 %    


 


Immature Granulocyte % (Auto) 0.3 %    


 


Immature Granulocyte # (Auto) 0.03 K/uL    


 


Prothrombin Time 12.4 SECONDS    


 


Prothromb Time International


Ratio 1.2 


  


  


  


 


 


Activated Partial


Thromboplast Time 34.5 SECONDS 


  


  


  


 


 


Partial Thromboplastin Ratio 1.3    


 


Sodium Level 138 mmol/L    


 


Potassium Level 4.1 mmol/L    


 


Chloride Level 109 mmol/L    


 


Carbon Dioxide Level 22 mmol/L    


 


Anion Gap 7.0 mmol/L    


 


Blood Urea Nitrogen 35 mg/dl    


 


Creatinine 1.60 mg/dl    


 


Est Creatinine Clear Calc


Drug Dose 59.4 ml/min 


  


  


  


 


 


Estimated GFR (


American) 53.5 


  


  


  


 


 


Estimated GFR (Non-


American 46.1 


  


  


  


 


 


BUN/Creatinine Ratio 22.0    


 


Random Glucose 182 mg/dl    


 


Lactic Acid Level 1.2 mmol/L    


 


Calcium Level 7.7 mg/dl    


 


Phosphorus Level 3.8 mg/dl    


 


Magnesium Level 2.2 mg/dl    


 


Total Creatine Kinase 407 U/L    


 


Bedside Glucose (other)  182 mg/dl  146 mg/dl  


 


Blood Gas Sample Site    Art Line 


 


Bedside Blood Gas pH (LAB)    7.28 


 


Bedside Blood Gas pCO2 (LAB)    48 mmHg 


 


Bedside Blood Gas pO2 (LAB)    93 mmHg 


 


Bedside Blood Gas HCO3 (LAB)    24 meq/L 


 


Bedside Blood Gas Total CO2    25 mEq/l 


 


Bedside Blood Gas Base Excess


(LAB) 


  


  


  -4.0 meq/L 


 


 


Bedside Blood Gas O2


Saturation 


  


  


  97.0 % 


 


 


Cameron Test    NA 


 


Test


  2/25/18


06:06 2/25/18


07:06 2/25/18


08:23 2/25/18


09:01


 


Bedside Glucose (other) 122 mg/dl  110 mg/dl   121 mg/dl 


 


White Blood Count   8.31 K/uL  


 


Red Blood Count   3.13 M/uL  


 


Hemoglobin   9.3 g/dL  


 


Hematocrit   29.4 %  


 


Mean Corpuscular Volume   93.9 fL  


 


Mean Corpuscular Hemoglobin   29.7 pg  


 


Mean Corpuscular Hemoglobin


Concent 


  


  31.6 g/dl 


  


 


 


Platelet Count   223 K/uL  


 


Mean Platelet Volume   8.4 fL  


 


Neutrophils (%) (Auto)   75.8 %  


 


Lymphocytes (%) (Auto)   9.6 %  


 


Monocytes (%) (Auto)   13.6 %  


 


Eosinophils (%) (Auto)   0.7 %  


 


Basophils (%) (Auto)   0.1 %  


 


Neutrophils # (Auto)   6.29 K/uL  


 


Lymphocytes # (Auto)   0.80 K/uL  


 


Monocytes # (Auto)   1.13 K/uL  


 


Eosinophils # (Auto)   0.06 K/uL  


 


Basophils # (Auto)   0.01 K/uL  


 


RDW Standard Deviation   53.5 fL  


 


RDW Coefficient of Variation   15.7 %  


 


Immature Granulocyte % (Auto)   0.2 %  


 


Immature Granulocyte # (Auto)   0.02 K/uL  


 


Prothrombin Time   12.8 SECONDS  


 


Prothromb Time International


Ratio 


  


  1.2 


  


 


 


Activated Partial


Thromboplast Time 


  


  35.1 SECONDS 


  


 


 


Partial Thromboplastin Ratio   1.4  


 


Sodium Level   140 mmol/L  


 


Potassium Level   4.2 mmol/L  


 


Chloride Level   108 mmol/L  


 


Carbon Dioxide Level   24 mmol/L  


 


Anion Gap   8.0 mmol/L  


 


Blood Urea Nitrogen   34 mg/dl  


 


Creatinine   1.67 mg/dl  


 


Est Creatinine Clear Calc


Drug Dose 


  


  56.9 ml/min 


  


 


 


Estimated GFR (


American) 


  


  50.8 


  


 


 


Estimated GFR (Non-


American 


  


  43.8 


  


 


 


BUN/Creatinine Ratio   20.3  


 


Random Glucose   112 mg/dl  


 


Lactic Acid Level   1.0 mmol/L  


 


Calcium Level   7.7 mg/dl  


 


Phosphorus Level   3.6 mg/dl  


 


Magnesium Level   2.1 mg/dl  


 


Total Creatine Kinase   959 U/L  


 


Test


  2/25/18


09:36 2/25/18


11:05 2/25/18


11:58 2/25/18


12:06


 


Bedside Glucose (other) 119 mg/dl  119 mg/dl  127 mg/dl  


 


White Blood Count    8.24 K/uL 


 


Red Blood Count    2.98 M/uL 


 


Hemoglobin    8.9 g/dL 


 


Hematocrit    27.9 % 


 


Mean Corpuscular Volume    93.6 fL 


 


Mean Corpuscular Hemoglobin    29.9 pg 


 


Mean Corpuscular Hemoglobin


Concent 


  


  


  31.9 g/dl 


 


 


Platelet Count    202 K/uL 


 


Mean Platelet Volume    8.5 fL 


 


Neutrophils (%) (Auto)    72.7 % 


 


Lymphocytes (%) (Auto)    12.7 % 


 


Monocytes (%) (Auto)    13.2 % 


 


Eosinophils (%) (Auto)    0.6 % 


 


Basophils (%) (Auto)    0.4 % 


 


Neutrophils # (Auto)    5.99 K/uL 


 


Lymphocytes # (Auto)    1.05 K/uL 


 


Monocytes # (Auto)    1.09 K/uL 


 


Eosinophils # (Auto)    0.05 K/uL 


 


Basophils # (Auto)    0.03 K/uL 


 


RDW Standard Deviation    53.8 fL 


 


RDW Coefficient of Variation    15.8 % 


 


Immature Granulocyte % (Auto)    0.4 % 


 


Immature Granulocyte # (Auto)    0.03 K/uL 


 


Red Blood Cell Morphology    Unremarkable 


 


Prothrombin Time    13.4 SECONDS 


 


Prothromb Time International


Ratio 


  


  


  1.3 


 


 


Activated Partial


Thromboplast Time 


  


  


  39.7 SECONDS 


 


 


Partial Thromboplastin Ratio    1.5 


 


Sodium Level    140 mmol/L 


 


Potassium Level    4.0 mmol/L 


 


Chloride Level    108 mmol/L 


 


Carbon Dioxide Level    25 mmol/L 


 


Anion Gap    7.0 mmol/L 


 


Blood Urea Nitrogen    36 mg/dl 


 


Creatinine    1.77 mg/dl 


 


Est Creatinine Clear Calc


Drug Dose 


  


  


  53.7 ml/min 


 


 


Estimated GFR (


American) 


  


  


  47.3 


 


 


Estimated GFR (Non-


American 


  


  


  40.8 


 


 


BUN/Creatinine Ratio    20.3 


 


Random Glucose    119 mg/dl 


 


Lactic Acid Level    1.2 mmol/L 


 


Calcium Level    7.6 mg/dl 


 


Phosphorus Level    3.6 mg/dl 


 


Magnesium Level    2.0 mg/dl 


 


Total Creatine Kinase    1609 U/L

## 2018-02-26 VITALS
HEART RATE: 82 BPM | SYSTOLIC BLOOD PRESSURE: 149 MMHG | TEMPERATURE: 97.52 F | DIASTOLIC BLOOD PRESSURE: 61 MMHG | OXYGEN SATURATION: 100 %

## 2018-02-26 VITALS
DIASTOLIC BLOOD PRESSURE: 57 MMHG | HEART RATE: 69 BPM | SYSTOLIC BLOOD PRESSURE: 126 MMHG | TEMPERATURE: 98.78 F | OXYGEN SATURATION: 100 %

## 2018-02-26 VITALS
SYSTOLIC BLOOD PRESSURE: 133 MMHG | HEART RATE: 86 BPM | DIASTOLIC BLOOD PRESSURE: 61 MMHG | TEMPERATURE: 97.34 F | OXYGEN SATURATION: 98 %

## 2018-02-26 VITALS
HEART RATE: 75 BPM | SYSTOLIC BLOOD PRESSURE: 150 MMHG | TEMPERATURE: 95.9 F | OXYGEN SATURATION: 100 % | DIASTOLIC BLOOD PRESSURE: 82 MMHG

## 2018-02-26 VITALS
TEMPERATURE: 98.78 F | DIASTOLIC BLOOD PRESSURE: 62 MMHG | OXYGEN SATURATION: 100 % | HEART RATE: 69 BPM | SYSTOLIC BLOOD PRESSURE: 108 MMHG

## 2018-02-26 VITALS
TEMPERATURE: 99.14 F | DIASTOLIC BLOOD PRESSURE: 75 MMHG | HEART RATE: 81 BPM | OXYGEN SATURATION: 100 % | SYSTOLIC BLOOD PRESSURE: 186 MMHG

## 2018-02-26 VITALS
DIASTOLIC BLOOD PRESSURE: 58 MMHG | SYSTOLIC BLOOD PRESSURE: 141 MMHG | OXYGEN SATURATION: 99 % | HEART RATE: 81 BPM | TEMPERATURE: 97.16 F

## 2018-02-26 VITALS
TEMPERATURE: 96.44 F | DIASTOLIC BLOOD PRESSURE: 61 MMHG | OXYGEN SATURATION: 100 % | HEART RATE: 78 BPM | SYSTOLIC BLOOD PRESSURE: 148 MMHG

## 2018-02-26 VITALS
TEMPERATURE: 99.32 F | OXYGEN SATURATION: 100 % | SYSTOLIC BLOOD PRESSURE: 119 MMHG | HEART RATE: 70 BPM | DIASTOLIC BLOOD PRESSURE: 53 MMHG

## 2018-02-26 VITALS
SYSTOLIC BLOOD PRESSURE: 135 MMHG | TEMPERATURE: 98.6 F | DIASTOLIC BLOOD PRESSURE: 58 MMHG | OXYGEN SATURATION: 100 % | HEART RATE: 73 BPM

## 2018-02-26 VITALS — TEMPERATURE: 98.24 F | DIASTOLIC BLOOD PRESSURE: 67 MMHG | HEART RATE: 79 BPM | SYSTOLIC BLOOD PRESSURE: 136 MMHG

## 2018-02-26 VITALS
DIASTOLIC BLOOD PRESSURE: 58 MMHG | SYSTOLIC BLOOD PRESSURE: 114 MMHG | OXYGEN SATURATION: 98 % | HEART RATE: 78 BPM | TEMPERATURE: 95.36 F

## 2018-02-26 VITALS
TEMPERATURE: 98.24 F | SYSTOLIC BLOOD PRESSURE: 125 MMHG | DIASTOLIC BLOOD PRESSURE: 55 MMHG | OXYGEN SATURATION: 96 % | HEART RATE: 78 BPM

## 2018-02-26 VITALS
TEMPERATURE: 99.86 F | HEART RATE: 72 BPM | DIASTOLIC BLOOD PRESSURE: 101 MMHG | OXYGEN SATURATION: 100 % | SYSTOLIC BLOOD PRESSURE: 186 MMHG

## 2018-02-26 VITALS
DIASTOLIC BLOOD PRESSURE: 69 MMHG | OXYGEN SATURATION: 99 % | SYSTOLIC BLOOD PRESSURE: 175 MMHG | HEART RATE: 87 BPM | TEMPERATURE: 97.88 F

## 2018-02-26 VITALS
SYSTOLIC BLOOD PRESSURE: 188 MMHG | HEART RATE: 80 BPM | DIASTOLIC BLOOD PRESSURE: 78 MMHG | OXYGEN SATURATION: 100 % | TEMPERATURE: 99.5 F

## 2018-02-26 VITALS — DIASTOLIC BLOOD PRESSURE: 67 MMHG | SYSTOLIC BLOOD PRESSURE: 121 MMHG | HEART RATE: 71 BPM | TEMPERATURE: 99.32 F

## 2018-02-26 VITALS
HEART RATE: 71 BPM | TEMPERATURE: 98.96 F | DIASTOLIC BLOOD PRESSURE: 54 MMHG | SYSTOLIC BLOOD PRESSURE: 110 MMHG | OXYGEN SATURATION: 100 %

## 2018-02-26 VITALS
OXYGEN SATURATION: 100 % | HEART RATE: 76 BPM | SYSTOLIC BLOOD PRESSURE: 136 MMHG | TEMPERATURE: 98.6 F | DIASTOLIC BLOOD PRESSURE: 76 MMHG

## 2018-02-26 VITALS
SYSTOLIC BLOOD PRESSURE: 133 MMHG | TEMPERATURE: 98.6 F | DIASTOLIC BLOOD PRESSURE: 74 MMHG | HEART RATE: 75 BPM | OXYGEN SATURATION: 100 %

## 2018-02-26 VITALS
HEART RATE: 78 BPM | TEMPERATURE: 98.42 F | SYSTOLIC BLOOD PRESSURE: 167 MMHG | DIASTOLIC BLOOD PRESSURE: 78 MMHG | OXYGEN SATURATION: 99 %

## 2018-02-26 VITALS — HEART RATE: 75 BPM | DIASTOLIC BLOOD PRESSURE: 76 MMHG | TEMPERATURE: 100.04 F | SYSTOLIC BLOOD PRESSURE: 161 MMHG

## 2018-02-26 VITALS — DIASTOLIC BLOOD PRESSURE: 82 MMHG | HEART RATE: 77 BPM | OXYGEN SATURATION: 100 % | SYSTOLIC BLOOD PRESSURE: 172 MMHG

## 2018-02-26 VITALS
HEART RATE: 73 BPM | TEMPERATURE: 100.04 F | DIASTOLIC BLOOD PRESSURE: 49 MMHG | SYSTOLIC BLOOD PRESSURE: 97 MMHG | OXYGEN SATURATION: 99 %

## 2018-02-26 VITALS
DIASTOLIC BLOOD PRESSURE: 62 MMHG | TEMPERATURE: 96.8 F | SYSTOLIC BLOOD PRESSURE: 151 MMHG | OXYGEN SATURATION: 100 % | HEART RATE: 78 BPM

## 2018-02-26 VITALS
DIASTOLIC BLOOD PRESSURE: 76 MMHG | TEMPERATURE: 99.14 F | SYSTOLIC BLOOD PRESSURE: 159 MMHG | OXYGEN SATURATION: 100 % | HEART RATE: 76 BPM

## 2018-02-26 LAB
BASOPHILS # BLD: 0.01 K/UL (ref 0–0.2)
BASOPHILS # BLD: 0.01 K/UL (ref 0–0.2)
BASOPHILS NFR BLD: 0.1 %
BASOPHILS NFR BLD: 0.1 %
BUN SERPL-MCNC: 38 MG/DL (ref 7–18)
BUN SERPL-MCNC: 39 MG/DL (ref 7–18)
CALCIUM SERPL-MCNC: 7.5 MG/DL (ref 8.5–10.1)
CALCIUM SERPL-MCNC: 7.6 MG/DL (ref 8.5–10.1)
CO2 SERPL-SCNC: 23 MMOL/L (ref 21–32)
CO2 SERPL-SCNC: 24 MMOL/L (ref 21–32)
CREAT SERPL-MCNC: 2.07 MG/DL (ref 0.6–1.4)
CREAT SERPL-MCNC: 2.11 MG/DL (ref 0.6–1.4)
EOS ABS #: 0.16 K/UL (ref 0–0.5)
EOS ABS #: 0.19 K/UL (ref 0–0.5)
EOSINOPHIL NFR BLD AUTO: 196 K/UL (ref 130–400)
EOSINOPHIL NFR BLD AUTO: 198 K/UL (ref 130–400)
GLUCOSE SERPL-MCNC: 101 MG/DL (ref 70–99)
GLUCOSE SERPL-MCNC: 127 MG/DL (ref 70–99)
HCT VFR BLD CALC: 26.4 % (ref 42–52)
HCT VFR BLD CALC: 27.8 % (ref 42–52)
HGB BLD-MCNC: 8.7 G/DL (ref 14–18)
HGB BLD-MCNC: 9.1 G/DL (ref 14–18)
IG#: 0.02 K/UL (ref 0–0.02)
IG#: 0.03 K/UL (ref 0–0.02)
IMM GRANULOCYTES NFR BLD AUTO: 5.6 %
IMM GRANULOCYTES NFR BLD AUTO: 7 %
INR PPP: 1.5 (ref 0.9–1.1)
INR PPP: 1.6 (ref 0.9–1.1)
LYMPHOCYTES # BLD: 0.51 K/UL (ref 1.2–3.4)
LYMPHOCYTES # BLD: 0.54 K/UL (ref 1.2–3.4)
MCH RBC QN AUTO: 30.1 PG (ref 25–34)
MCH RBC QN AUTO: 30.5 PG (ref 25–34)
MCHC RBC AUTO-ENTMCNC: 32.7 G/DL (ref 32–36)
MCHC RBC AUTO-ENTMCNC: 33 G/DL (ref 32–36)
MCV RBC AUTO: 92.1 FL (ref 80–100)
MCV RBC AUTO: 92.6 FL (ref 80–100)
MONO ABS #: 0.89 K/UL (ref 0.11–0.59)
MONO ABS #: 1.26 K/UL (ref 0.11–0.59)
MONOCYTES NFR BLD: 11.5 %
MONOCYTES NFR BLD: 13.9 %
NEUT ABS #: 6.09 K/UL (ref 1.4–6.5)
NEUT ABS #: 7.09 K/UL (ref 1.4–6.5)
NEUTROPHILS # BLD AUTO: 2.1 %
NEUTROPHILS # BLD AUTO: 2.1 %
NEUTROPHILS NFR BLD AUTO: 78 %
NEUTROPHILS NFR BLD AUTO: 79 %
PHOSPHATE SERPL-MCNC: 5 MG/DL (ref 2.5–4.9)
PHOSPHATE SERPL-MCNC: 5.4 MG/DL (ref 2.5–4.9)
PMV BLD AUTO: 8.2 FL (ref 7.4–10.4)
PMV BLD AUTO: 8.3 FL (ref 7.4–10.4)
POTASSIUM SERPL-SCNC: 4 MMOL/L (ref 3.5–5.1)
POTASSIUM SERPL-SCNC: 4.2 MMOL/L (ref 3.5–5.1)
PTT PATIENT: 48 SECONDS (ref 21–31)
PTT PATIENT: 52.3 SECONDS (ref 21–31)
PTT PATIENT: 60.5 SECONDS (ref 21–31)
RED CELL DISTRIBUTION WIDTH CV: 15.3 % (ref 11.5–14.5)
RED CELL DISTRIBUTION WIDTH CV: 15.6 % (ref 11.5–14.5)
RED CELL DISTRIBUTION WIDTH SD: 52 FL (ref 36.4–46.3)
RED CELL DISTRIBUTION WIDTH SD: 53 FL (ref 36.4–46.3)
SODIUM SERPL-SCNC: 137 MMOL/L (ref 136–145)
SODIUM SERPL-SCNC: 139 MMOL/L (ref 136–145)
WBC # BLD AUTO: 7.71 K/UL (ref 4.8–10.8)
WBC # BLD AUTO: 9.09 K/UL (ref 4.8–10.8)

## 2018-02-26 RX ADMIN — IPRATROPIUM BROMIDE SCH PUFFS: 17 AEROSOL, METERED RESPIRATORY (INHALATION) at 07:40

## 2018-02-26 RX ADMIN — FENTANYL CITRATE PRN MCG: 50 INJECTION, SOLUTION INTRAMUSCULAR; INTRAVENOUS at 14:40

## 2018-02-26 RX ADMIN — ALBUTEROL SULFATE SCH PUFFS: 90 AEROSOL, METERED RESPIRATORY (INHALATION) at 19:11

## 2018-02-26 RX ADMIN — FUROSEMIDE SCH MLS/MIN: 10 INJECTION, SOLUTION INTRAMUSCULAR; INTRAVENOUS at 21:38

## 2018-02-26 RX ADMIN — INSULIN ASPART SCH UNITS: 100 INJECTION, SOLUTION INTRAVENOUS; SUBCUTANEOUS at 11:00

## 2018-02-26 RX ADMIN — VALPROATE SODIUM SCH MLS/HR: 500 INJECTION INTRAVENOUS at 21:38

## 2018-02-26 RX ADMIN — FENTANYL CITRATE PRN MCG: 50 INJECTION, SOLUTION INTRAMUSCULAR; INTRAVENOUS at 19:12

## 2018-02-26 RX ADMIN — CLOPIDOGREL BISULFATE SCH MG: 75 TABLET, FILM COATED ORAL at 08:56

## 2018-02-26 RX ADMIN — PIPERACILLIN SODIUM, TAZOBACTAM SODIUM SCH MLS/HR: 4; .5 INJECTION, POWDER, LYOPHILIZED, FOR SOLUTION INTRAVENOUS at 06:11

## 2018-02-26 RX ADMIN — IPRATROPIUM BROMIDE SCH PUFFS: 17 AEROSOL, METERED RESPIRATORY (INHALATION) at 19:11

## 2018-02-26 RX ADMIN — CHLORHEXIDINE GLUCONATE SCH ML: 1.2 RINSE ORAL at 08:56

## 2018-02-26 RX ADMIN — ALBUTEROL SULFATE SCH PUFFS: 90 AEROSOL, METERED RESPIRATORY (INHALATION) at 14:25

## 2018-02-26 RX ADMIN — INSULIN ASPART SCH UNITS: 100 INJECTION, SOLUTION INTRAVENOUS; SUBCUTANEOUS at 16:00

## 2018-02-26 RX ADMIN — DOPAMINE HYDROCHLORIDE SCH MLS/HR: 160 INJECTION, SOLUTION INTRAVENOUS at 04:11

## 2018-02-26 RX ADMIN — ALBUTEROL SULFATE SCH PUFFS: 90 AEROSOL, METERED RESPIRATORY (INHALATION) at 07:40

## 2018-02-26 RX ADMIN — FENTANYL CITRATE PRN MCG: 50 INJECTION, SOLUTION INTRAMUSCULAR; INTRAVENOUS at 23:17

## 2018-02-26 RX ADMIN — PANTOPRAZOLE SODIUM SCH MLS/MIN: 40 INJECTION, POWDER, FOR SOLUTION INTRAVENOUS at 10:43

## 2018-02-26 RX ADMIN — INSULIN ASPART SCH UNITS: 100 INJECTION, SOLUTION INTRAVENOUS; SUBCUTANEOUS at 20:00

## 2018-02-26 RX ADMIN — HEPARIN SODIUM PRN MLS/HR: 5000 INJECTION, SOLUTION INTRAVENOUS at 11:42

## 2018-02-26 RX ADMIN — IPRATROPIUM BROMIDE SCH PUFFS: 17 AEROSOL, METERED RESPIRATORY (INHALATION) at 14:25

## 2018-02-26 RX ADMIN — VALPROATE SODIUM SCH MLS/HR: 500 INJECTION INTRAVENOUS at 08:58

## 2018-02-26 NOTE — DIAGNOSTIC IMAGING REPORT
DUPLEX RENAL ARTERY



CLINICAL HISTORY: 62 years-old Male presenting with aortic thrombus now FITZ. 



TECHNIQUE: Real-time grayscale and color and spectral Doppler ultrasound imaging

of the kidneys was performed. 



COMPARISON: 12/24/2017.



FINDINGS:



Right kidney: 

Normal echogenicity of renal parenchyma. Right kidney measures 12.3 cm. No

hydronephrosis. No convincing evidence of calculus or mass. 

Intrarenal resistive indices range from 0.78 to 0.82, which is slightly

elevated. Slightly peaked intrarenal arterial waveforms. Renal artery patent

with peak systolic velocity 85 cm/s in the midportion and 89 cm/s distally.

Renal vein patent though with interrupted flow. 



Left kidney:  

Normal echogenicity of renal parenchyma. Left kidney measures 13.0 cm. No

hydronephrosis. No convincing evidence of calculus or mass.

Intrarenal resistive index 0.69. Normal intrarenal arterial waveforms. Renal

artery patent with peak systolic velocity 53 cm/s distally. Renal vein

demonstrates poor flow.



Abdominal aorta: Patent. Peak systolic velocity 57 cm/s.



Ratio of right renal artery PSV/aortic PSV: 1.56.

Ratio of left renal artery PSV/aortic PSV: 0.93.



Other: None.



Reference ranges:

Normal main renal artery peak systolic velocity less than 180 cm/s. Ratio of

renal artery PSV to aortic PSV less than 3.5 equates to normal or less than 60%

stenosis.



IMPRESSION:  

1.  No evidence of renal artery stenosis. 



2.  Elevated intrarenal resistive indices in the right kidney, which is

nonspecific and could vascular insufficiency or suggest medical renal disease.



3.  Apparent interrupted flow in the right renal vein with poor flow in the left

renal vein. This may be artifactual, as the examination was limited by poor

sonographic windows to technical difficulties related to the ventilated patient.

If there is clinical concern for renal vein patency, contrast-enhanced CT of the

abdomen and pelvis could be obtained.



The report will be called/faxed according to standard departmental protocol.







Electronically signed by:  Richard Deluna M.D.

2/26/2018 11:58 AM



Dictated Date/Time:  2/26/2018 11:52 AM

## 2018-02-26 NOTE — EEG PROCEDURE NOTE
EEG Procedure Note


Date of Service


Feb 25, 2018.





Start / End Times


Start Time:  2/24/18 @ 14:39PM


End Time:  2/26/18 @ 9:39 AM





Referring Physician


Dr Elder





History


62 year old male presents with cardiac arrest. Continuous video EEG monitoring 

per code [facility] protocol to monitor for subclinical seizures.





Home Medication List


Scheduled


Amlodipine Besylate (Amlodipine Besylate), 10 MG PO QAM


Atorvastatin (Lipitor), 80 MG PO DAILY


Clopidogrel Bisulfate (Clopidogrel), 75 MG PO QAM


Cyanocobalamin (Vitamin B12), 1,000 MCG PO DAILY


Divalproex Sodium (Depakote Delay Rel), 2,000 MG PO HS


Escitalopram Oxalate (Escitalopram Oxalate), 20 MG PO QAM


Ferrous Sulfate (Ferrous Sulfate), 325 MG PO BIDM


Gabapentin (Gabapentin), 200 MG PO HS


Insulin Glargine (Lantus Solostar), 10 UNITS SC HS


Insulin Lispro (Human) (Humalog Kwikpen), 1 DOSE SC AC


Isosorbide Mononitrate Ext Rel (Imdur Ext Rel), 30 MG PO QAM


Labetalol HCl (Labetalol HCl), 200 MG PO BID


Mirtazapine (Mirtazapine), 30 MG PO HS


Tamsulosin HCl (Tamsulosin HCl), 0.4 MG PO HS


Tiotropium Bromide (Spiriva Handihaler), 1 CAP INH DAILY


Torsemide (Torsemide), 20 MG PO BID17


Warfarin Sodium (Warfarin Sodium), 7.5 MG PO 5XWK


Warfarin Sodium (Warfarin Sodium), 10 MG PO 2XWK





Scheduled PRN


Acetaminophen (Tylenol), 500 MG PO Q4-6HRS PRN for Pain


Albuterol Sulfate (Proair Respiclick), 1-2 PUFFS INH Q4-6HRS PRN for SOB/

Wheezing


Clonazepam (Clonazepam), 1 MG PO HS PRN for Anxiety


Docusate Sodium (Docusate Sodium), 100 MG PO BID PRN for Constipation


Fluticasone Propionate (Nasal) (Flonase Allergy Relief), 2 SPRAYS VERONICA DAILY PRN 

for Nasal Congestion


Nitroglycerin (Nitrostat), 0.4 MG UT UD PRN for Chest Pain


Oxycodone HCl (Oxycodone HCl), 5 MG PO Q6H PRN for Breakthrough Pain





Inpatient Medication List





Current Inpatient Medications








 Medications


  (Trade)  Dose


 Ordered  Sig/Dc


 Route  Start Time


 Stop Time Status Last Admin


Dose Admin


 


 Ioversol


  (Optiray 320)  100 ml  UD  PRN


 IV  2/24/18 09:15


 2/28/18 09:14   


 


 


 Miscellaneous


 Information


  ([facility] Protocol


 For Hyperglycemia)  1 ea  PRN  PRN


 N/A  2/24/18 10:00


 2/26/18 09:59   


 


 


 Clopidogrel


 Bisulfate


  (plAVix TAB)  75 mg  QAM


 NG  2/25/18 09:00


 3/27/18 08:59  2/25/18 10:35


75 MG


 


 Piperacillin Sod/


 Tazobactam Sod


 4.5 gm/Dextrose  120 ml @ 


 30 mls/hr  Q8H


 IV  2/24/18 14:00


 3/3/18 08:59  2/25/18 13:58


30 MLS/HR


 


 Miscellaneous


 Information


  (Consult)  1 ea  UD  PRN


 N/A  2/24/18 10:00


 3/26/18 09:59   


 


 


 Miscellaneous


 Information


  (Consult


 Glycemic


 Management


 Pharmacy)  1 ea  UD  PRN


 N/A  2/24/18 12:30


 3/26/18 12:29   


 


 


 Fentanyl Citrate


  (Fentanyl Inj)  25 mcg  Q1H  PRN


 IV  2/24/18 10:30


 3/10/18 10:29   


 


 


 Fentanyl Citrate


  (Fentanyl Inj)  50 mcg  Q1H  PRN


 IV  2/24/18 10:30


 3/10/18 10:29   


 


 


 Artificial Tears


  (Lacri-Lube Oph


 Oint)  1 appln  Q2H  PRN


 OPB  2/24/18 12:00


 3/26/18 11:59   


 


 


 Pantoprazole


 Sodium 40 mg/


 Syringe  10 ml @ 5


 mls/min  DAILY@11


 IV  2/25/18 11:00


 3/27/18 10:59  2/25/18 10:35


5 MLS/MIN


 


 Midazolam HCl  250 ml @ 0


 mls/hr  Q0M PRN


 IV  2/24/18 11:57


 3/26/18 11:56   


 


 


 Fentanyl Citrate  250 ml @ 0


 mls/hr  Q0M


 IV  2/24/18 11:57


 3/10/18 11:56  2/25/18 14:15


30 MLS/HR


 


 Meperidine HCl


  (Demerol Inj)  25 mg  Q2H  PRN


 IV  2/24/18 12:00


 3/10/18 11:59  2/25/18 12:16


25 MG


 


 Acetaminophen


  (Tylenol Supp)  650 mg  ONE  PRN


 IL  2/24/18 12:00


 3/26/18 11:59   


 


 


 Valproate Sodium


 1000 mg/Dextrose  60 ml @ 55


 mls/hr  Q12H


 IV  2/24/18 22:00


 3/26/18 21:59  2/25/18 10:35


55 MLS/HR


 


 Insulin Aspart


  (novoLOG ASPART)  SLIDING


 SCALE  [facility]


 SC  2/24/18 17:15


 3/26/18 17:14   


 


 


 Nitroglycerin/


 Dextrose  250 ml @ 0


 mls/hr  Q0M PRN


 IV  2/24/18 13:30


 3/26/18 13:29   


 


 


 Chlorhexidine


 Gluconate


  (Peridex Oral


 Soln)  15 ml  DAILY


 MT  2/25/18 09:00


 3/27/18 08:59  2/25/18 09:18


15 ML


 


 Insulin Human


 Regular 250 units/


 Sodium Chloride  252.5 ml @ 


 0 mls/hr  Q24H


 IV  2/24/18 14:00


 3/26/18 13:59  2/24/18 14:10


2.5 MLS/HR


 


 Enoxaparin Sodium


  (Lovenox Inj)  100 mg  Q12


 SQ  2/24/18 21:00


 3/26/18 20:59  2/25/18 09:19


100 MG


 


 Albuterol


  (Ventolin Hfa


 Inhaler)  4 puffs  Q6R


 INH  2/24/18 21:00


 3/26/18 17:59  2/25/18 14:33


4 PUFFS


 


 Ipratropium


 Bromide


  (Atrovent Hfa


 Inhaler)  4 puffs  Q6R


 INH  2/24/18 21:00


 3/26/18 17:59  2/25/18 14:33


4 PUFFS


 


 Dopamine HCl/


 Dextrose  250 ml @ 0


 mls/hr  Q0M


 IV  2/25/18 13:45


 3/27/18 13:44  2/25/18 15:17


18.9 MLS/HR











Description


This is a 21 electrode continuous video EEG with a single channel dedicated to 

limited EKG. The electrodes were placed in accordance with the International 10-

20 system.


Post processing with an event detection algorithm is applied to the continuous 

data collection for the purpose of identifying abnormal epochs. These 

detections are reviewed and all candidate seizures are processed for further 

analysis. Data is reviewed in its raw format and patient events are processed, 

edited and stored.





There was frequent diffuse background artifact (either muscle or electrode 

artifact) that at times severely limited the read and interpretation of this 

EEG (particularly the first half of the 2/25) 





At the start of this recording the patient was unresponsive. Background was 

poorly organized with no anterior to posterior gradient. Background was 

composed of low to attenuated symmetric predominantly 2-3Hz delta activity. 

There was no state changes or sleep transients. After 17:00pm on 2/25, 

background was composed of symmetric moderate amplitude predominantly 3-4 Hz 

delta frequencies. Towards the end of the recording, there was noted to be more 

intermixed theta frequencies.





Interpretation


This is an abnormal continuous video EEG secondary to moderate diffuse 

background disorganization and slowing.





There was no electrographic seizures or epileptiform discharges.





Clinical Correlation


This EEG indicates a moderate encephalopathy of nonspecific etiology.


Cooling protocol and certain sedating medications could contribute to 

background encephalopathy.

## 2018-02-26 NOTE — CRITICAL CARE PROGRESS NOTE
Critical Care Progress Note


Date of Service


Feb 26, 2018.





ICU Day


ICU Day Number:  3





Attending


Dr. Arnold





Subjective


Patient unable to provide review of systems secondary to condition





Day #3 in the intensive care unit.  Status post PEA arrest.  Underwent 

hypothermic protocol.  Patient normothermic 2/26/18 at 10 AM





Patient continues with eyes open but no purposeful movement or response to 

stimuli





Objective


GENERAL : [No acute distress]


EYES:  No icterus, gaze conjugate and to the left.  PERRL.  Does not close eyes 

to command.  No corneal reflex


NOSE: No evidence of epistaxis


MOUTH:  No lesions or candidiasis.  Endotracheal tube in place


NECK: No JVD or air leak detected


LUNGS:  CTA B/L, no wheezes, rales or rhonchi.  Decreased at the bases


HEART:  Regular, rate controlled


ABDOMEN:  Soft, NT, ND, BS Present


EXTREMITIES:  No LE edema, pedal pulses intact


NEURO: Unresponsive.  Normothermic.  Status post hypothermic protocol





Assessment & Plan


Neuro


* Patient remains nonresponsive although he is now normothermic


* Continuous EEG abnormal with moderate diffuse background disorganization and 

slowing but no evidence of epileptiform changes


* Started on valproic acid -check repeat level with a.m. labs


* Neurology has been consulted and is following


* Patient now normothermic


* Patient now opening and closing eyes to command but not moving extremities, 

unable to answer positive or negative with blinking of eyes


* Will obtain noncontrast MRI of the brain as well as noncontrast MRI of the 

cervical spine


* Further discussion with neurology tomorrow





Cardiovascular


* PEA cardiac arrest witnessed by EMS staff most likely secondary to hypoxia


*  no ST changes


* Cardiology consulted -no urgency for cardiac catheterization.  Prior history 

of multivessel coronary artery disease


* Patient normothermic as of 10 AM this morning


* Hypertensive with a systolic blood pressure of 210 with family in the room -

improved with fentanyl.  Metoprolol XL restarted


* Per cardiology note, use carvedilol for hypertension


* Continue to monitor on telemetry





Pulmonary


* Quit smoking over a year ago


* Has now been on supplemental oxygen at home for over a year; baseline 6 L/min 

via nasal cannula her family


* Continue with mechanical ventilation -AC 20/55/5/50 %


* CT of chest with no pulmonary  emboli identified; evidence of small to 

moderate bilateral pleural effusions; moderate interstitial pulmonary edema


* No pleural effusions are identified on today's portable chest x-ray


* Continue with diuresis.


* Monitor clinically





Musculoskeletal


* Multiple nondisplaced rib fractures - suspected etiology is cardiac 

compressions


* Probable sternal fracture -suspected etiology cardiac compressions


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


* Patient appears to have foot drop on the right





Renal 


* Renal ultrasound today with no evidence of renal artery stenosis


* There is mention of question of interrupted flow in the right and left renal 

vein.  However, this study was a very poor study with technical difficulty 

related to poor sonographic windows.  Continue to monitor.


* Urine output has picked up dramatically over the last 6 hours -continue to 

follow strict I's&O's


* Continues with acute renal failure


* Avoid nephrotoxic medications


* Follow serial labs





ID


* Ag negative for influenza A&B


* Blood cultures 2 negative


* Urine culture with no growth


* MRSA specimen is negative by DNA probe


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





Endocrine


* History of type 2 diabetes mellitus


* Has been titrated off of insulin drip -continue gtt as needed


* Beta hydroxybutyric acid was hemolyzed


* Checking hemoglobin A1c with a.m. lab


* Continue BSGs per protocol


* Lantus and NovoLog per protocol -glycemic management per pharmacy





Heme


* 2 units held in the event that Hgb drops further - currently stable at 8.7


* Hemodynamically stable





Nutrition


* Started on Peptamen intensive VHP 2/26/18 -goal of 60 mL/h


* OG tube in place





GI


* Pantoprazole 40 mg IV daily





DVT prophylaxis


* TEDs/SCDs





CCT: 40 minutes including discussion with family and independent of any 

procedures





Thank you for including us in the care of this patient.  Please refer to Dr. Arnold's addendum for further recommendations.





During my evaluation the patient exhibited a left upward gaze deviation and 

lack of confrontational blink.  Hopefully will obtain advanced neuro imaging 

once continuous EEG monitoring has stopped.  Continue current treatment, 

awaiting for improvement in neuro status





Consults & Procedures


Consultants:


Neurology - Dr. Washburn


Cardiology -Dr. Martínez


Critical care medicine -Dr. Elder


Procedures:


Right radial arterial line -2/24/2018 by Dr. Elder


Endotracheal intubation -2/24/2018 -prehospital tube replaced by Dr. Elder


Right subclavian triple-lumen central venous catheter -  2/24/2018 by Dr. Elder


Fiberoptic bronchoscopy -2/24/2018 by Dr. Elder





Data


Medications:





Current Inpatient Medications








 Medications


  (Trade)  Dose


 Ordered  Sig/Dc


 Route  Start Time


 Stop Time Status Last Admin


Dose Admin


 


 Ioversol


  (Optiray 320)  100 ml  UD  PRN


 IV  2/24/18 09:15


 2/28/18 09:14   


 


 


 Clopidogrel


 Bisulfate


  (plAVix TAB)  75 mg  QAM


 NG  2/25/18 09:00


 3/27/18 08:59  2/26/18 08:56


75 MG


 


 Miscellaneous


 Information


  (Consult


 Glycemic


 Management


 Pharmacy)  1 ea  UD  PRN


 N/A  2/24/18 12:30


 3/26/18 12:29   


 


 


 Artificial Tears


  (Lacri-Lube Oph


 Oint)  1 appln  Q2H  PRN


 OPB  2/24/18 12:00


 3/26/18 11:59   


 


 


 Pantoprazole


 Sodium 40 mg/


 Syringe  10 ml @ 5


 mls/min  DAILY@11


 IV  2/25/18 11:00


 3/27/18 10:59  2/26/18 10:43


5 MLS/MIN


 


 Acetaminophen


  (Tylenol Supp)  650 mg  ONE  PRN


 CO  2/24/18 12:00


 3/26/18 11:59   


 


 


 Valproate Sodium


 1000 mg/Dextrose  60 ml @ 55


 mls/hr  Q12H


 IV  2/24/18 22:00


 3/26/18 21:59  2/26/18 08:58


55 MLS/HR


 


 Chlorhexidine


 Gluconate


  (Peridex Oral


 Soln)  15 ml  DAILY


 MT  2/25/18 09:00


 3/27/18 08:59  2/26/18 08:56


15 ML


 


 Albuterol


  (Ventolin Hfa


 Inhaler)  4 puffs  Q6R


 INH  2/24/18 21:00


 3/26/18 17:59  2/26/18 14:25


4 PUFFS


 


 Ipratropium


 Bromide


  (Atrovent Hfa


 Inhaler)  4 puffs  Q6R


 INH  2/24/18 21:00


 3/26/18 17:59  2/26/18 14:25


4 PUFFS


 


 Dopamine HCl/


 Dextrose  250 ml @ 0


 mls/hr  Q0M


 IV  2/25/18 13:45


 3/27/18 13:44  2/26/18 04:11


18.9 MLS/HR


 


 Fentanyl Citrate


  (Fentanyl Inj)  100 mcg  Q2H  PRN


 IV  2/26/18 09:15


 3/12/18 09:14  2/26/18 14:40


100 MCG


 


 Midazolam HCl


  (Versed Inj)  2 mg  Q2H  PRN


 IV  2/26/18 09:15


 3/28/18 09:14   


 


 


 Insulin Aspart


  (novoLOG ASPART)  SLIDING


 SCALE  Q4


 SC  2/26/18 11:00


 3/28/18 10:59   


 


 


 Enteral


 Nutritional


 Formula


  (Peptamen


 Intense VHP)  1,000 ml  UD


 OG  2/27/18 09:00


 3/29/18 08:59  2/26/18 11:42


1,000 ML


 


 Furosemide 80 mg/


 Syringe  8 ml @ 4


 mls/min  BID


 IV  2/26/18 21:00


 3/28/18 20:59   


 


 


 Glucose


  (Glucose 40% Gel)  15-30


 GRAMS 15


 GRAMS...  UD  PRN


 PO  2/26/18 11:00


 3/28/18 10:59   


 


 


 Glucose


  (Glucose Chew


 Tab)  4-8


 Tablets 4


 Tabl...  UD  PRN


 PO  2/26/18 11:00


 3/28/18 10:59   


 


 


 Dextrose


  (Dextrose 50%


 50ML Syringe)  25-50ML OF


 50% DW IV


 FOR...  UD  PRN


 IV  2/26/18 11:00


 3/28/18 10:59   


 


 


 Glucagon


  (Glucagon Inj)  1 mg  UD  PRN


 SQ  2/26/18 11:00


 3/28/18 10:59   


 


 


 Heparin Sodium/


 Dextrose  500 ml @ 


 20 mls/hr  Q24H PRN


 IV  2/26/18 11:45


 3/28/18 11:44  2/26/18 11:42


20 MLS/HR


 


 Insulin Glargine


  (Lantus Solostar


 Pen)    Q12@0800,2000


 SC  2/26/18 20:00


 3/28/18 19:59   


 








I & O:











24-Hour Column 


 


 2/27/18





 07:59


 


Intake Total 668 ml


 


Output Total 705 ml


 


Balance -37 ml








Vital Signs:











  Date Time  Temp Pulse Resp B/P (MAP) Pulse Ox O2 Delivery O2 Flow Rate FiO2


 


2/26/18 14:43  77 20 172/82 (112) 100 Mechanical Ventilator  55


 


2/26/18 14:35        55


 


2/26/18 14:01 37.1 69 20 108/62 (77) 100 Mechanical Ventilator  55


 


2/26/18 14:00 37.1 69 20 126/57 (80) 100 Mechanical Ventilator  55


 


2/26/18 12:01 37.0 75 20 133/74 (93) 100 Mechanical Ventilator  55


 


2/26/18 12:00      Mechanical Ventilator  55


 


2/26/18 12:00 37.0 73 20 135/58 (83) 100 Mechanical Ventilator  55


 


2/26/18 12:00        55


 


2/26/18 11:19        55


 


2/26/18 10:00 37.0 76 20 136/76 (96) 100 Mechanical Ventilator  55





    140/58 (85)    


 


2/26/18 09:00 36.9 78 20 154/61 (92) 99 Mechanical Ventilator  55





    167/78 (107)    


 


2/26/18 08:01 36.8 79 20 136/67 (90)    


 


2/26/18 08:00      Mechanical Ventilator  55


 


2/26/18 08:00        55


 


2/26/18 08:00      Mechanical Ventilator  55


 


2/26/18 08:00 36.8 78 20 136/67 (90) 96 Mechanical Ventilator  55





    125/55 (78)    


 


2/26/18 07:38        55


 


2/26/18 07:00 36.6 87 20 175/81 (112) 99 Mechanical Ventilator  55





    172/69 (103)    


 


2/26/18 06:17        55


 


2/26/18 06:00 36.4 82 20 149/61 (90) 100 Mechanical Ventilator  55


 


2/26/18 05:00 36.3 86 20 133/61 (85) 98 Mechanical Ventilator  55


 


2/26/18 04:00 36.2 81 20 141/58 (85) 99 Mechanical Ventilator  55


 


2/26/18 04:00      Mechanical Ventilator  55


 


2/26/18 04:00        55


 


2/26/18 03:00 36.0 78 20 151/62 (91) 100 Mechanical Ventilator  55


 


2/26/18 02:20        55


 


2/26/18 02:00 35.8 78 20 148/61 (90) 100 Mechanical Ventilator  55


 


2/26/18 01:00 35.5 75 20 150/82 (104) 100 Mechanical Ventilator  55


 


2/26/18 00:01 35.2 78 20 114/58 (76) 98   55


 


2/25/18 23:59      Mechanical Ventilator  55


 


2/25/18 23:59        55


 


2/25/18 23:02        55


 


2/25/18 23:00 35.0 80 20 121/59 (79) 98 Mechanical Ventilator  55


 


2/25/18 22:00 35.0 80 20 122/58 (79) 97 Mechanical Ventilator  55


 


2/25/18 21:00 35.2 80 20 124/55 (78) 95 Mechanical Ventilator  55


 


2/25/18 20:30        55


 


2/25/18 20:00      Mechanical Ventilator  55


 


2/25/18 20:00        55


 


2/25/18 20:00 35.2 83 20 131/57 (81) 94 Mechanical Ventilator  55


 


2/25/18 19:00 35.1 81 20 136/67 (90) 94 Mechanical Ventilator  55





    125/54 (77)    


 


2/25/18 18:00        55


 


2/25/18 18:00 34.7 81 20 128/69 (88) 95 Mechanical Ventilator  55





    119/53 (75)    


 


2/25/18 17:00 34.0 82 20 132/69 (90) 94 Mechanical Ventilator  55





    111/52 (71)    


 


2/25/18 16:00        55


 


2/25/18 16:00 33.3 83 20 131/72 (91) 97 Mechanical Ventilator  55





    122/55 (77)    


 


2/25/18 16:00      Mechanical Ventilator  55








Laboratory Results:





Last 24 Hours








Test


  2/25/18


16:00 2/25/18


18:09 2/25/18


19:01 2/25/18


20:07


 


White Blood Count 8.14 K/uL    8.30 K/uL 


 


Red Blood Count 3.07 M/uL    2.97 M/uL 


 


Hemoglobin 9.0 g/dL    8.9 g/dL 


 


Hematocrit 29.0 %    27.4 % 


 


Mean Corpuscular Volume 94.5 fL    92.3 fL 


 


Mean Corpuscular Hemoglobin 29.3 pg    30.0 pg 


 


Mean Corpuscular Hemoglobin


Concent 31.0 g/dl 


  


  


  32.5 g/dl 


 


 


Platelet Count 211 K/uL    196 K/uL 


 


Mean Platelet Volume 8.4 fL    8.3 fL 


 


Neutrophils (%) (Auto) 74.3 %    80.4 % 


 


Lymphocytes (%) (Auto) 11.9 %    8.1 % 


 


Monocytes (%) (Auto) 11.9 %    9.8 % 


 


Eosinophils (%) (Auto) 1.4 %    1.1 % 


 


Basophils (%) (Auto) 0.1 %    0.2 % 


 


Neutrophils # (Auto) 6.05 K/uL    6.68 K/uL 


 


Lymphocytes # (Auto) 0.97 K/uL    0.67 K/uL 


 


Monocytes # (Auto) 0.97 K/uL    0.81 K/uL 


 


Eosinophils # (Auto) 0.11 K/uL    0.09 K/uL 


 


Basophils # (Auto) 0.01 K/uL    0.02 K/uL 


 


RDW Standard Deviation 53.4 fL    52.7 fL 


 


RDW Coefficient of Variation 15.5 %    15.6 % 


 


Immature Granulocyte % (Auto) 0.4 %    0.4 % 


 


Immature Granulocyte # (Auto) 0.03 K/uL    0.03 K/uL 


 


Prothrombin Time 13.5 SECONDS    13.9 SECONDS 


 


Prothromb Time International


Ratio 1.3 


  


  


  1.3 


 


 


Activated Partial


Thromboplast Time 42.3 SECONDS 


  


  


  41.8 SECONDS 


 


 


Partial Thromboplastin Ratio 1.6    1.6 


 


Sodium Level 140 mmol/L    139 mmol/L 


 


Potassium Level 3.9 mmol/L    3.7 mmol/L 


 


Chloride Level 106 mmol/L    106 mmol/L 


 


Carbon Dioxide Level 24 mmol/L    26 mmol/L 


 


Anion Gap 10.0 mmol/L    8.0 mmol/L 


 


Blood Urea Nitrogen 35 mg/dl    38 mg/dl 


 


Creatinine 1.91 mg/dl    1.94 mg/dl 


 


Est Creatinine Clear Calc


Drug Dose 49.8 ml/min 


  


  


  49.0 ml/min 


 


 


Estimated GFR (


American) 43.2 


  


  


  42.4 


 


 


Estimated GFR (Non-


American 37.2 


  


  


  36.6 


 


 


BUN/Creatinine Ratio 18.5    19.4 


 


Random Glucose 139 mg/dl    157 mg/dl 


 


Lactic Acid Level 1.2 mmol/L    0.6 mmol/L 


 


Calcium Level 7.6 mg/dl    7.5 mg/dl 


 


Phosphorus Level 3.6 mg/dl    4.0 mg/dl 


 


Magnesium Level 2.0 mg/dl    1.8 mg/dl 


 


Total Creatine Kinase 2341 U/L    2349 U/L 


 


Bedside Glucose (other)  161 mg/dl  164 mg/dl  


 


Red Blood Cell Morphology    Unremarkable 


 


Test


  2/25/18


20:10 2/25/18


21:06 2/25/18


22:10 2/25/18


23:58


 


Bedside Glucose (other) 160 mg/dl  150 mg/dl  141 mg/dl  


 


White Blood Count    9.09 K/uL 


 


Red Blood Count    3.02 M/uL 


 


Hemoglobin    9.1 g/dL 


 


Hematocrit    27.8 % 


 


Mean Corpuscular Volume    92.1 fL 


 


Mean Corpuscular Hemoglobin    30.1 pg 


 


Mean Corpuscular Hemoglobin


Concent 


  


  


  32.7 g/dl 


 


 


Platelet Count    198 K/uL 


 


Mean Platelet Volume    8.3 fL 


 


Neutrophils (%) (Auto)    78.0 % 


 


Lymphocytes (%) (Auto)    5.6 % 


 


Monocytes (%) (Auto)    13.9 % 


 


Eosinophils (%) (Auto)    2.1 % 


 


Basophils (%) (Auto)    0.1 % 


 


Neutrophils # (Auto)    7.09 K/uL 


 


Lymphocytes # (Auto)    0.51 K/uL 


 


Monocytes # (Auto)    1.26 K/uL 


 


Eosinophils # (Auto)    0.19 K/uL 


 


Basophils # (Auto)    0.01 K/uL 


 


RDW Standard Deviation    53.0 fL 


 


RDW Coefficient of Variation    15.6 % 


 


Immature Granulocyte % (Auto)    0.3 % 


 


Immature Granulocyte # (Auto)    0.03 K/uL 


 


Prothrombin Time    15.5 SECONDS 


 


Prothromb Time International


Ratio 


  


  


  1.5 


 


 


Activated Partial


Thromboplast Time 


  


  


  48.0 SECONDS 


 


 


Partial Thromboplastin Ratio    1.8 


 


Sodium Level    139 mmol/L 


 


Potassium Level    4.2 mmol/L 


 


Chloride Level    105 mmol/L 


 


Carbon Dioxide Level    23 mmol/L 


 


Anion Gap    11.0 mmol/L 


 


Blood Urea Nitrogen    39 mg/dl 


 


Creatinine    2.07 mg/dl 


 


Est Creatinine Clear Calc


Drug Dose 


  


  


  45.9 ml/min 


 


 


Estimated GFR (


American) 


  


  


  39.2 


 


 


Estimated GFR (Non-


American 


  


  


  33.8 


 


 


BUN/Creatinine Ratio    18.7 


 


Random Glucose    127 mg/dl 


 


Lactic Acid Level    0.7 mmol/L 


 


Calcium Level    7.5 mg/dl 


 


Phosphorus Level    5.0 mg/dl 


 


Magnesium Level    2.0 mg/dl 


 


Total Creatine Kinase    2199 U/L 


 


Test


  2/26/18


00:07 2/26/18


02:17 2/26/18


04:21 2/26/18


04:42


 


Bedside Glucose (other) 129 mg/dl  113 mg/dl   99 mg/dl 


 


White Blood Count   7.71 K/uL  


 


Red Blood Count   2.85 M/uL  


 


Hemoglobin   8.7 g/dL  


 


Hematocrit   26.4 %  


 


Mean Corpuscular Volume   92.6 fL  


 


Mean Corpuscular Hemoglobin   30.5 pg  


 


Mean Corpuscular Hemoglobin


Concent 


  


  33.0 g/dl 


  


 


 


Platelet Count   196 K/uL  


 


Mean Platelet Volume   8.2 fL  


 


Neutrophils (%) (Auto)   79.0 %  


 


Lymphocytes (%) (Auto)   7.0 %  


 


Monocytes (%) (Auto)   11.5 %  


 


Eosinophils (%) (Auto)   2.1 %  


 


Basophils (%) (Auto)   0.1 %  


 


Neutrophils # (Auto)   6.09 K/uL  


 


Lymphocytes # (Auto)   0.54 K/uL  


 


Monocytes # (Auto)   0.89 K/uL  


 


Eosinophils # (Auto)   0.16 K/uL  


 


Basophils # (Auto)   0.01 K/uL  


 


RDW Standard Deviation   52.0 fL  


 


RDW Coefficient of Variation   15.3 %  


 


Immature Granulocyte % (Auto)   0.3 %  


 


Immature Granulocyte # (Auto)   0.02 K/uL  


 


Schistocytes   1+  


 


Prothrombin Time   16.7 SECONDS  


 


Prothromb Time International


Ratio 


  


  1.6 


  


 


 


Activated Partial


Thromboplast Time 


  


  52.3 SECONDS 


  


 


 


Partial Thromboplastin Ratio   2.0  


 


Sodium Level   137 mmol/L  


 


Potassium Level   4.0 mmol/L  


 


Chloride Level   106 mmol/L  


 


Carbon Dioxide Level   24 mmol/L  


 


Anion Gap   7.0 mmol/L  


 


Blood Urea Nitrogen   38 mg/dl  


 


Creatinine   2.11 mg/dl  


 


Est Creatinine Clear Calc


Drug Dose 


  


  45.1 ml/min 


  


 


 


Estimated GFR (


American) 


  


  38.3 


  


 


 


Estimated GFR (Non-


American 


  


  33.0 


  


 


 


BUN/Creatinine Ratio   18.0  


 


Random Glucose   101 mg/dl  


 


Lactic Acid Level   0.5 mmol/L  


 


Calcium Level   7.6 mg/dl  


 


Phosphorus Level   5.4 mg/dl  


 


Magnesium Level   1.9 mg/dl  


 


Total Creatine Kinase   1800 U/L  


 


Test


  2/26/18


06:19 2/26/18


08:29 2/26/18


09:00 2/26/18


11:47


 


Bedside Glucose (other) 100 mg/dl    


 


Heparin Anti-Xa Act, Low


Molec Wt 


  1.37 IU/ML 


  


  


 


 


Urine Color   YELLOW  


 


Urine Appearance   CLEAR  


 


Urine pH   5.0  


 


Urine Specific Gravity   1.023  


 


Urine Protein   1+  


 


Urine Glucose (UA)   NEG  


 


Urine Ketones   NEG  


 


Urine Occult Blood   1+  


 


Urine Nitrite   NEG  


 


Urine Bilirubin   NEG  


 


Urine Urobilinogen   NEG  


 


Urine Leukocyte Esterase   TRACE  


 


Urine WBC (Auto)   1-5 /hpf  


 


Urine RBC (Auto)   5-10 /hpf  


 


Urine Hyaline Casts (Auto)   1-5 /lpf  


 


Urine Epithelial Cells (Auto)   20-30 /lpf  


 


Urine Bacteria (Auto)   NEG  


 


Urine Crystals


  


  


  AMORPHOUS


SEDIMENT 


 


 


Urine Yeast (Auto)     


 


Bedside Glucose    109 mg/dl

## 2018-02-26 NOTE — PHARMACY PROGRESS NOTE
Glycemic Control Progress Note


Date of Service


Feb 26, 2018.





Scope


Glycemic Pharmacist consulted for glycemic control to write orders per [facility] 

inpatient glycemic control protocol.





Objective


Accuchecks BSG (last 24hrs):











Test


  2/25/18


16:00 2/25/18


20:07 2/25/18


23:58 2/26/18


04:21


 


Random Glucose


  139 mg/dl


(70-99) 157 mg/dl


(70-99) 127 mg/dl


(70-99) 101 mg/dl


(70-99)


 


Test


  2/26/18


11:47 


  


  


 


 


Bedside Glucose


  109 mg/dl


(70-99) 


  


  


 











Recent Pertinent Medications








The patient is currently receiving:


* Basal insulin:            None





* Correctional Insulin:   Insulin drip





* Prandial insulin:         None





Outpatient Anti-Diabetic Meds








Basal Insulin





Bolus Insulin





Assessment & Plan


ASSESSMENT:


* See progress note from 2/24 for more background info, in short:


 


* 61 yo M critically ill in ICU s/p cardiac arrest, started on TTM, now 

rewarmed as of 0600 today.





* Pt receiving IV insulin regimen for hyperglycemia secondary to baseline DM (

outpatient regimen on hold),stress/infection,pressors, mechanical ventilation





* Changes to stressors/dextrose administration


 * Rewarmed this AM


 * Weaning dopamine


 * Zosyn discontinued


 * Starting Peptamen VHP


 * Starting heparin drip





* Patient was on insulin drip - OK to transition per Dr. Arnold


 * Overnight RN/pharmacist discontinued drip @ 0400 without Lantus on board.  

Was running at 0.4 units/hr


 * Gave Lantus this AM. No need to resume insulin gtt at this time for normal 2-

6 hr overlap as BSG's stable from 0400 to 0600 without IV insulin





* Changes needed to insulin regimen: 


 * OK to discontinue insulin drip


 * Adding Lantus - total daily dose of 20 units was a little too much last 

admission (BSG's 76-92 mg/dL) when pt ordered T2DM diet. Will give around 1/2 

that dose x1 as BSG's are below goal range for ICU at this time


 * Will resume Novolog correction factor and carb ratio from last admission, 

but slightly higher goal 2nd ICU status and q4h checks 2nd tubefeeds. Peptamen 

VHP CHO to be included in CHO counts











PLAN FOR INPATIENT GLYCEMIC CONTROL:





* Discontinue IV insulin infusion 





* Basal insulin: Lantus 12 units SQ x1 this AM then ongoing based on BSG


 * Hold for BSG less than 140 mg/dL


 * 5 units for -180 mg/dL


 * 8 units for BSG greater than 180 mg/dL





* Bolus insulin 


 * NovoLog per scale q4h


 * Goal Range:  Low 120 mg/dL - High 160 mg/dL


 * Correction Factor: 25 mg/dL/unit


 * Nutritional / Prandial insulin per carb ratio of 1 unit per 8 grams CHO 

consumed








* Please note that the plan above was derived based on current level of insulin 

resistance and hospital stress. These recommendations are appropriate for 

inpatient admission only. Plan of care upon discharge will need to be 

reassessed to avoid potential outpatient hypo/hyperglycemia. 








Thank you.

## 2018-02-26 NOTE — HOSPITALIST PROGRESS NOTE
Hospitalist Progress Note


Date of Service


Feb 26, 2018.





Subjective


Pt evaluation today including:  conversation w/ patient, conversation w/ family

, physical exam, chart review, lab review, review of studies, conversation w/ 

consultant, review of inpatient medication list


Patient seen and evaluated. Remains on mechanical ventilation without sedation. 

Would move eyes to touch but would not open his eyes.


Urine output improving and no cyanosis of extremities. Will await clinical 

course.





   Additional Comments:


ROS not obtained due to ventilation





Medications





Current Inpatient Medications








 Medications


  (Trade)  Dose


 Ordered  Sig/Dc


 Route  Start Time


 Stop Time Status Last Admin


Dose Admin


 


 Ioversol


  (Optiray 320)  100 ml  UD  PRN


 IV  2/24/18 09:15


 2/28/18 09:14   


 


 


 Clopidogrel


 Bisulfate


  (plAVix TAB)  75 mg  QAM


 NG  2/25/18 09:00


 3/27/18 08:59  2/26/18 08:56


75 MG


 


 Miscellaneous


 Information


  (Consult


 Glycemic


 Management


 Pharmacy)  1 ea  UD  PRN


 N/A  2/24/18 12:30


 3/26/18 12:29   


 


 


 Artificial Tears


  (Lacri-Lube Oph


 Oint)  1 appln  Q2H  PRN


 OPB  2/24/18 12:00


 3/26/18 11:59   


 


 


 Pantoprazole


 Sodium 40 mg/


 Syringe  10 ml @ 5


 mls/min  DAILY@11


 IV  2/25/18 11:00


 3/27/18 10:59  2/26/18 10:43


5 MLS/MIN


 


 Acetaminophen


  (Tylenol Supp)  650 mg  ONE  PRN


 AL  2/24/18 12:00


 3/26/18 11:59   


 


 


 Valproate Sodium


 1000 mg/Dextrose  60 ml @ 55


 mls/hr  Q12H


 IV  2/24/18 22:00


 3/26/18 21:59  2/26/18 08:58


55 MLS/HR


 


 Chlorhexidine


 Gluconate


  (Peridex Oral


 Soln)  15 ml  DAILY


 MT  2/25/18 09:00


 3/27/18 08:59  2/26/18 08:56


15 ML


 


 Albuterol


  (Ventolin Hfa


 Inhaler)  4 puffs  Q6R


 INH  2/24/18 21:00


 3/26/18 17:59  2/26/18 07:40


4 PUFFS


 


 Ipratropium


 Bromide


  (Atrovent Hfa


 Inhaler)  4 puffs  Q6R


 INH  2/24/18 21:00


 3/26/18 17:59  2/26/18 07:40


4 PUFFS


 


 Dopamine HCl/


 Dextrose  250 ml @ 0


 mls/hr  Q0M


 IV  2/25/18 13:45


 3/27/18 13:44  2/26/18 04:11


18.9 MLS/HR


 


 Fentanyl Citrate


  (Fentanyl Inj)  100 mcg  Q2H  PRN


 IV  2/26/18 09:15


 3/12/18 09:14   


 


 


 Midazolam HCl


  (Versed Inj)  2 mg  Q2H  PRN


 IV  2/26/18 09:15


 3/28/18 09:14   


 


 


 Insulin Aspart


  (novoLOG ASPART)  SLIDING


 SCALE  Q4


 SC  2/26/18 11:00


 3/28/18 10:59   


 


 


 Enteral


 Nutritional


 Formula


  (Peptamen


 Intense VHP)  1,000 ml  UD


 OG  2/27/18 09:00


 3/29/18 08:59  2/26/18 11:42


1,000 ML


 


 Furosemide 80 mg/


 Syringe  8 ml @ 4


 mls/min  BID


 IV  2/26/18 21:00


 3/28/18 20:59   


 


 


 Glucose


  (Glucose 40% Gel)  15-30


 GRAMS 15


 GRAMS...  UD  PRN


 PO  2/26/18 11:00


 3/28/18 10:59   


 


 


 Glucose


  (Glucose Chew


 Tab)  4-8


 Tablets 4


 Tabl...  UD  PRN


 PO  2/26/18 11:00


 3/28/18 10:59   


 


 


 Dextrose


  (Dextrose 50%


 50ML Syringe)  25-50ML OF


 50% DW IV


 FOR...  UD  PRN


 IV  2/26/18 11:00


 3/28/18 10:59   


 


 


 Glucagon


  (Glucagon Inj)  1 mg  UD  PRN


 SQ  2/26/18 11:00


 3/28/18 10:59   


 


 


 Heparin Sodium/


 Dextrose  500 ml @ 


 20 mls/hr  Q24H PRN


 IV  2/26/18 11:45


 3/28/18 11:44  2/26/18 11:42


20 MLS/HR


 


 Insulin Glargine


  (Lantus Solostar


 Pen)    Q12@0800,2000


 SC  2/26/18 20:00


 3/28/18 19:59   


 











Objective


Vital Signs











  Date Time  Temp Pulse Resp B/P (MAP) Pulse Ox O2 Delivery O2 Flow Rate FiO2


 


2/26/18 14:35        55


 


2/26/18 14:01 37.1 69 20 108/62 (77) 100 Mechanical Ventilator  55


 


2/26/18 14:00 37.1 69 20 126/57 (80) 100 Mechanical Ventilator  55


 


2/26/18 12:01 37.0 75 20 133/74 (93) 100 Mechanical Ventilator  55


 


2/26/18 12:00      Mechanical Ventilator  55


 


2/26/18 12:00 37.0 73 20 135/58 (83) 100 Mechanical Ventilator  55


 


2/26/18 12:00        55


 


2/26/18 11:19        55


 


2/26/18 10:00 37.0 76 20 136/76 (96) 100 Mechanical Ventilator  55





    140/58 (85)    


 


2/26/18 09:00 36.9 78 20 154/61 (92) 99 Mechanical Ventilator  55





    167/78 (107)    


 


2/26/18 08:01 36.8 79 20 136/67 (90)    


 


2/26/18 08:00      Mechanical Ventilator  55


 


2/26/18 08:00        55


 


2/26/18 08:00      Mechanical Ventilator  55


 


2/26/18 08:00 36.8 78 20 136/67 (90) 96 Mechanical Ventilator  55





    125/55 (78)    


 


2/26/18 07:38        55


 


2/26/18 07:00 36.6 87 20 175/81 (112) 99 Mechanical Ventilator  55





    172/69 (103)    


 


2/26/18 06:17        55


 


2/26/18 06:00 36.4 82 20 149/61 (90) 100 Mechanical Ventilator  55


 


2/26/18 05:00 36.3 86 20 133/61 (85) 98 Mechanical Ventilator  55


 


2/26/18 04:00 36.2 81 20 141/58 (85) 99 Mechanical Ventilator  55


 


2/26/18 04:00      Mechanical Ventilator  55


 


2/26/18 04:00        55


 


2/26/18 03:00 36.0 78 20 151/62 (91) 100 Mechanical Ventilator  55


 


2/26/18 02:20        55


 


2/26/18 02:00 35.8 78 20 148/61 (90) 100 Mechanical Ventilator  55


 


2/26/18 01:00 35.5 75 20 150/82 (104) 100 Mechanical Ventilator  55


 


2/26/18 00:01 35.2 78 20 114/58 (76) 98   55


 


2/25/18 23:59      Mechanical Ventilator  55


 


2/25/18 23:59        55


 


2/25/18 23:02        55


 


2/25/18 23:00 35.0 80 20 121/59 (79) 98 Mechanical Ventilator  55


 


2/25/18 22:00 35.0 80 20 122/58 (79) 97 Mechanical Ventilator  55


 


2/25/18 21:00 35.2 80 20 124/55 (78) 95 Mechanical Ventilator  55


 


2/25/18 20:30        55


 


2/25/18 20:00      Mechanical Ventilator  55


 


2/25/18 20:00        55


 


2/25/18 20:00 35.2 83 20 131/57 (81) 94 Mechanical Ventilator  55


 


2/25/18 19:00 35.1 81 20 136/67 (90) 94 Mechanical Ventilator  55





    125/54 (77)    


 


2/25/18 18:00        55


 


2/25/18 18:00 34.7 81 20 128/69 (88) 95 Mechanical Ventilator  55





    119/53 (75)    


 


2/25/18 17:00 34.0 82 20 132/69 (90) 94 Mechanical Ventilator  55





    111/52 (71)    


 


2/25/18 16:00        55


 


2/25/18 16:00 33.3 83 20 131/72 (91) 97 Mechanical Ventilator  55





    122/55 (77)    


 


2/25/18 16:00      Mechanical Ventilator  55


 


2/25/18 15:00 33.1 59 20 124/61 (82) 98 Mechanical Ventilator  55





    118/50 (72)    











Physical Exam


General Appearance:  no apparent distress


Neck:  supple, + pertinent finding (intubated)


Respiratory/Chest:  no respiratory distress, no accessory muscle use, + 

decreased breath sounds


Cardiovascular:  regular rate, rhythm, + systolic murmur


Abdomen:  + abnormal bowel sounds (hypoactive bowel sounds)


Extremities:  + swelling (b/l lower extremities)


Neurologic/Psychiatric:  + pertinent finding (moves eyes to touch but will not 

open eyes - off sedation)


Skin:  normal color





Laboratory Results





Last 24 Hours








Test


  2/25/18


16:00 2/25/18


18:09 2/25/18


19:01 2/25/18


20:07


 


White Blood Count 8.14 K/uL    8.30 K/uL 


 


Red Blood Count 3.07 M/uL    2.97 M/uL 


 


Hemoglobin 9.0 g/dL    8.9 g/dL 


 


Hematocrit 29.0 %    27.4 % 


 


Mean Corpuscular Volume 94.5 fL    92.3 fL 


 


Mean Corpuscular Hemoglobin 29.3 pg    30.0 pg 


 


Mean Corpuscular Hemoglobin


Concent 31.0 g/dl 


  


  


  32.5 g/dl 


 


 


Platelet Count 211 K/uL    196 K/uL 


 


Mean Platelet Volume 8.4 fL    8.3 fL 


 


Neutrophils (%) (Auto) 74.3 %    80.4 % 


 


Lymphocytes (%) (Auto) 11.9 %    8.1 % 


 


Monocytes (%) (Auto) 11.9 %    9.8 % 


 


Eosinophils (%) (Auto) 1.4 %    1.1 % 


 


Basophils (%) (Auto) 0.1 %    0.2 % 


 


Neutrophils # (Auto) 6.05 K/uL    6.68 K/uL 


 


Lymphocytes # (Auto) 0.97 K/uL    0.67 K/uL 


 


Monocytes # (Auto) 0.97 K/uL    0.81 K/uL 


 


Eosinophils # (Auto) 0.11 K/uL    0.09 K/uL 


 


Basophils # (Auto) 0.01 K/uL    0.02 K/uL 


 


RDW Standard Deviation 53.4 fL    52.7 fL 


 


RDW Coefficient of Variation 15.5 %    15.6 % 


 


Immature Granulocyte % (Auto) 0.4 %    0.4 % 


 


Immature Granulocyte # (Auto) 0.03 K/uL    0.03 K/uL 


 


Prothrombin Time 13.5 SECONDS    13.9 SECONDS 


 


Prothromb Time International


Ratio 1.3 


  


  


  1.3 


 


 


Activated Partial


Thromboplast Time 42.3 SECONDS 


  


  


  41.8 SECONDS 


 


 


Partial Thromboplastin Ratio 1.6    1.6 


 


Sodium Level 140 mmol/L    139 mmol/L 


 


Potassium Level 3.9 mmol/L    3.7 mmol/L 


 


Chloride Level 106 mmol/L    106 mmol/L 


 


Carbon Dioxide Level 24 mmol/L    26 mmol/L 


 


Anion Gap 10.0 mmol/L    8.0 mmol/L 


 


Blood Urea Nitrogen 35 mg/dl    38 mg/dl 


 


Creatinine 1.91 mg/dl    1.94 mg/dl 


 


Est Creatinine Clear Calc


Drug Dose 49.8 ml/min 


  


  


  49.0 ml/min 


 


 


Estimated GFR (


American) 43.2 


  


  


  42.4 


 


 


Estimated GFR (Non-


American 37.2 


  


  


  36.6 


 


 


BUN/Creatinine Ratio 18.5    19.4 


 


Random Glucose 139 mg/dl    157 mg/dl 


 


Lactic Acid Level 1.2 mmol/L    0.6 mmol/L 


 


Calcium Level 7.6 mg/dl    7.5 mg/dl 


 


Phosphorus Level 3.6 mg/dl    4.0 mg/dl 


 


Magnesium Level 2.0 mg/dl    1.8 mg/dl 


 


Total Creatine Kinase 2341 U/L    2349 U/L 


 


Bedside Glucose (other)  161 mg/dl  164 mg/dl  


 


Red Blood Cell Morphology    Unremarkable 


 


Test


  2/25/18


20:10 2/25/18


21:06 2/25/18


22:10 2/25/18


23:58


 


Bedside Glucose (other) 160 mg/dl  150 mg/dl  141 mg/dl  


 


White Blood Count    9.09 K/uL 


 


Red Blood Count    3.02 M/uL 


 


Hemoglobin    9.1 g/dL 


 


Hematocrit    27.8 % 


 


Mean Corpuscular Volume    92.1 fL 


 


Mean Corpuscular Hemoglobin    30.1 pg 


 


Mean Corpuscular Hemoglobin


Concent 


  


  


  32.7 g/dl 


 


 


Platelet Count    198 K/uL 


 


Mean Platelet Volume    8.3 fL 


 


Neutrophils (%) (Auto)    78.0 % 


 


Lymphocytes (%) (Auto)    5.6 % 


 


Monocytes (%) (Auto)    13.9 % 


 


Eosinophils (%) (Auto)    2.1 % 


 


Basophils (%) (Auto)    0.1 % 


 


Neutrophils # (Auto)    7.09 K/uL 


 


Lymphocytes # (Auto)    0.51 K/uL 


 


Monocytes # (Auto)    1.26 K/uL 


 


Eosinophils # (Auto)    0.19 K/uL 


 


Basophils # (Auto)    0.01 K/uL 


 


RDW Standard Deviation    53.0 fL 


 


RDW Coefficient of Variation    15.6 % 


 


Immature Granulocyte % (Auto)    0.3 % 


 


Immature Granulocyte # (Auto)    0.03 K/uL 


 


Prothrombin Time    15.5 SECONDS 


 


Prothromb Time International


Ratio 


  


  


  1.5 


 


 


Activated Partial


Thromboplast Time 


  


  


  48.0 SECONDS 


 


 


Partial Thromboplastin Ratio    1.8 


 


Sodium Level    139 mmol/L 


 


Potassium Level    4.2 mmol/L 


 


Chloride Level    105 mmol/L 


 


Carbon Dioxide Level    23 mmol/L 


 


Anion Gap    11.0 mmol/L 


 


Blood Urea Nitrogen    39 mg/dl 


 


Creatinine    2.07 mg/dl 


 


Est Creatinine Clear Calc


Drug Dose 


  


  


  45.9 ml/min 


 


 


Estimated GFR (


American) 


  


  


  39.2 


 


 


Estimated GFR (Non-


American 


  


  


  33.8 


 


 


BUN/Creatinine Ratio    18.7 


 


Random Glucose    127 mg/dl 


 


Lactic Acid Level    0.7 mmol/L 


 


Calcium Level    7.5 mg/dl 


 


Phosphorus Level    5.0 mg/dl 


 


Magnesium Level    2.0 mg/dl 


 


Total Creatine Kinase    2199 U/L 


 


Test


  2/26/18


00:07 2/26/18


02:17 2/26/18


04:21 2/26/18


04:42


 


Bedside Glucose (other) 129 mg/dl  113 mg/dl   99 mg/dl 


 


White Blood Count   7.71 K/uL  


 


Red Blood Count   2.85 M/uL  


 


Hemoglobin   8.7 g/dL  


 


Hematocrit   26.4 %  


 


Mean Corpuscular Volume   92.6 fL  


 


Mean Corpuscular Hemoglobin   30.5 pg  


 


Mean Corpuscular Hemoglobin


Concent 


  


  33.0 g/dl 


  


 


 


Platelet Count   196 K/uL  


 


Mean Platelet Volume   8.2 fL  


 


Neutrophils (%) (Auto)   79.0 %  


 


Lymphocytes (%) (Auto)   7.0 %  


 


Monocytes (%) (Auto)   11.5 %  


 


Eosinophils (%) (Auto)   2.1 %  


 


Basophils (%) (Auto)   0.1 %  


 


Neutrophils # (Auto)   6.09 K/uL  


 


Lymphocytes # (Auto)   0.54 K/uL  


 


Monocytes # (Auto)   0.89 K/uL  


 


Eosinophils # (Auto)   0.16 K/uL  


 


Basophils # (Auto)   0.01 K/uL  


 


RDW Standard Deviation   52.0 fL  


 


RDW Coefficient of Variation   15.3 %  


 


Immature Granulocyte % (Auto)   0.3 %  


 


Immature Granulocyte # (Auto)   0.02 K/uL  


 


Schistocytes   1+  


 


Prothrombin Time   16.7 SECONDS  


 


Prothromb Time International


Ratio 


  


  1.6 


  


 


 


Activated Partial


Thromboplast Time 


  


  52.3 SECONDS 


  


 


 


Partial Thromboplastin Ratio   2.0  


 


Sodium Level   137 mmol/L  


 


Potassium Level   4.0 mmol/L  


 


Chloride Level   106 mmol/L  


 


Carbon Dioxide Level   24 mmol/L  


 


Anion Gap   7.0 mmol/L  


 


Blood Urea Nitrogen   38 mg/dl  


 


Creatinine   2.11 mg/dl  


 


Est Creatinine Clear Calc


Drug Dose 


  


  45.1 ml/min 


  


 


 


Estimated GFR (


American) 


  


  38.3 


  


 


 


Estimated GFR (Non-


American 


  


  33.0 


  


 


 


BUN/Creatinine Ratio   18.0  


 


Random Glucose   101 mg/dl  


 


Lactic Acid Level   0.5 mmol/L  


 


Calcium Level   7.6 mg/dl  


 


Phosphorus Level   5.4 mg/dl  


 


Magnesium Level   1.9 mg/dl  


 


Total Creatine Kinase   1800 U/L  


 


Test


  2/26/18


06:19 2/26/18


08:29 2/26/18


09:00 2/26/18


11:47


 


Bedside Glucose (other) 100 mg/dl    


 


Heparin Anti-Xa Act, Low


Molec Wt 


  1.37 IU/ML 


  


  


 


 


Urine Color   YELLOW  


 


Urine Appearance   CLEAR  


 


Urine pH   5.0  


 


Urine Specific Gravity   1.023  


 


Urine Protein   1+  


 


Urine Glucose (UA)   NEG  


 


Urine Ketones   NEG  


 


Urine Occult Blood   1+  


 


Urine Nitrite   NEG  


 


Urine Bilirubin   NEG  


 


Urine Urobilinogen   NEG  


 


Urine Leukocyte Esterase   TRACE  


 


Urine WBC (Auto)   1-5 /hpf  


 


Urine RBC (Auto)   5-10 /hpf  


 


Urine Hyaline Casts (Auto)   1-5 /lpf  


 


Urine Epithelial Cells (Auto)   20-30 /lpf  


 


Urine Bacteria (Auto)   NEG  


 


Urine Crystals


  


  


  AMORPHOUS


SEDIMENT 


 


 


Urine Yeast (Auto)     


 


Bedside Glucose    109 mg/dl 











Assessment and Plan


62 M cardiac arrest survivor, stacia STEMI and concern for anoxic brain injury, 

was therapeutically cooled and with continuous EEG





Cardiac Arrest 2/2 PEA:


- Completed therapeutic hypothermia and currently rewarmed - will monitor for 

neurological improvement





Acute Hypoxic Respiratory Failure 2/2 Above:


- Possible anoxic brain injury - EEG reveals mmoderate encephalopathy


- Remains on mechanical ventilation - appreciate intensivists input on wean





Acute on Chronic Diastolic CHF with Chronic CAD/Cardiomyopathy:


- Echo with global hypokinesis - BB on hold given inotropic agents


- Cardiology following - appreciate input





FITZ on CKD Stage III:


- Continue to monitor - unable to truly assess baseline as records reveal a 

quite labile Cr





T2DM, Uncontrolled:


- Pharmacy following for glycemic management - off insulin gtt and instituted 

in SSI and Lantus





Rhabdomyolysis:


- Somewhat improving - will continue to monitor given ventilation and bedfast 

state





Metabolic Acidosis: 


- Continue to monitor





Code Status: FULL RESUSCITATION





DVT Prophylaxis: Heparin gtt





Disposition:


- Await clinical course


Continued [facility] stay due to:  multiple IV medications needed


Discharge planning:  uncertain

## 2018-02-26 NOTE — DIAGNOSTIC IMAGING REPORT
CHEST ONE VIEW PORTABLE



CLINICAL HISTORY: intubated    



COMPARISON STUDY:  Chest radiograph February 25, 2018.



FINDINGS: Exam is compromised by artifact. Tip of nasogastric tube is below the

lower aspect of this image but at least within the proximal stomach. A right

subclavian central line remains in place. Tip of endotracheal tube is 3.3 cm

above the olivier. Cardiomegaly is unchanged. There is no pneumothorax.  Note is

made of left basilar opacity. Mild pulmonary edema persists. No pleural effusion

is identified.



IMPRESSION:  



1. Satisfactory positioning of lines and tubes.



2. Persistent mild pulmonary edema.



3. Left basilar opacity which may reflect atelectasis or consolidation. 









Electronically signed by:  Patrick Corbett M.D.

2/26/2018 7:00 AM



Dictated Date/Time:  2/26/2018 6:58 AM

## 2018-02-26 NOTE — CARDIOLOGY PROGRESS NOTE
DATE: 02/26/2018

 

TIME:  9:27 a.m.

 

SUBJECTIVE:  Mr. Dixon is sedated on mechanical ventilator.  He has been

weaned off of dopamine and dobutamine.  Nursing notes states that he has

opened eyes to name, but no purposeful movement has been reported at this

time.  The rewarming phase of his hypothermia protocol began yesterday.  He

is still not making much urine.

 

OBJECTIVE:

VITAL SIGNS:  Temperature 36.8 degrees, heart rate 79 beats per minute,

respiration rate 20, blood pressure 136/67 mmHg, oxygen saturation 96% on

mechanical ventilator, FIO2 0.55.  I's and O's positive 2.7 liters yesterday.

GENERAL:  No acute distress, but he is sedated.

NECK:  Cannot assess JVD.

CARDIAC EXAM:  No ventricular heave.  Regular.  Normal S1 and S2.  A 2/6

early peaking systolic ejection murmur.  No rubs or gallops.

LUNGS:  Clear on anterior auscultation.

ABDOMEN:  Soft, nondistended, hypoactive bowel sounds.

EXTREMITIES:  2-3+ bilateral lower extremity edema to the knees and 1+ above

the knees.  No cyanosis.

 

MEDICATIONS:  Include Plavix 75 mg daily, fentanyl, Versed, insulin, Protonix

40 mg daily, valproate sodium 1 gram IV q. 24 hours and last dose of Zosyn

was this morning.

 

LABORATORY DATA:  White blood cell count is 7.71, hemoglobin 8.7, platelets

196.  Sodium 137, potassium 4, BUN 38, creatinine 2.11 up from 2.07 last

night, magnesium 1.9.

 

Telemetry personally reviewed.  No arrhythmia.

 

Outside records from [facility] were reviewed.  Other than mentioning aortic

thrombus in the discharge summary, there was no imaging study that was

obtained that outlines the aortic thrombus.

 

ASSESSMENT AND PLAN:

1.  Pulse electrical activity:  May have been secondary to hypoxia given the

history provided with an O2 sat of 66% on 6 liters of oxygen at home.  He

appears to be in acute-on-chronic heart failure and has been noncompliant

with his medications following recent hospitalization for heart failure. 

Awaiting to see his neurologic function.

2.  Acute-on-chronic diastolic congestive heart failure:  He appears more

hypervolemic as he is making very little urine at this time.  We will try

another dose of Diuril and Lasix 80 mg and continue Lasix 80 mg IV twice

daily.  Consider nephrology consultation.

3.  Elevated troponins:  He did not present with ST-elevation myocardial

infarction or acute coronary syndrome.  He presented with hypoxia and pulse

electrical activity.  This degree of troponin elevation is not unexpected

following his pulse electrical activity/code blue event with prolonged

hypoxia.  There is no indication for urgent cardiac catheterization.

4.  Multivessel coronary artery disease status post percutaneous coronary

intervention:  Continue antiplatelet therapy indefinitely.  When beta

blockers resumed, we would recommend carvedilol.  If he becomes hypertensive,

we would recommend low-dose carvedilol and titrate as appropriate. 

High-intensity statin therapy is also indicated.

5.  Cardiomyopathy:  Global hypokinesis on echo.  This is likely secondary to

his pulse electrical activity/hypoxic event.  Can repeat echo in the future

later this hospitalization.  ACE inhibitor is not being used secondary to

worsening renal function.  Can resume low-dose beta blocker once he is

stabilized.  He has been on dobutamine and dopamine in the past 24 hours;

therefore, this will not be started at this time.

6.  Hypertension:  He was hypertensive initially and has had some

hypertensive readings since overnight hours.  If he remains hypertensive, we

would consider starting low-dose carvedilol as noted above.

7.  Aortic thrombus:  There is an [facility] discharge summary outlining aortic

thrombus, but the imaging studies were requested and there is no mention of

aortic thrombus in other records.  It is not clear the finding that was

outlined at [facility] at this time or the indication for a long-term

anticoagulation.  He has been noncompliant as an outpatient with Coumadin.

8.  Disposition:  Cardiology will continue to follow along.  The patient's

care has been discussed with Dr. Arnold of the critical care service.

## 2018-02-27 VITALS
HEART RATE: 70 BPM | TEMPERATURE: 99.86 F | OXYGEN SATURATION: 100 % | SYSTOLIC BLOOD PRESSURE: 158 MMHG | DIASTOLIC BLOOD PRESSURE: 61 MMHG

## 2018-02-27 VITALS
SYSTOLIC BLOOD PRESSURE: 174 MMHG | DIASTOLIC BLOOD PRESSURE: 66 MMHG | OXYGEN SATURATION: 100 % | HEART RATE: 75 BPM | TEMPERATURE: 100.04 F

## 2018-02-27 VITALS
DIASTOLIC BLOOD PRESSURE: 72 MMHG | HEART RATE: 67 BPM | OXYGEN SATURATION: 99 % | TEMPERATURE: 99.68 F | SYSTOLIC BLOOD PRESSURE: 204 MMHG

## 2018-02-27 VITALS
SYSTOLIC BLOOD PRESSURE: 143 MMHG | TEMPERATURE: 99.86 F | HEART RATE: 69 BPM | OXYGEN SATURATION: 100 % | DIASTOLIC BLOOD PRESSURE: 60 MMHG

## 2018-02-27 VITALS
OXYGEN SATURATION: 100 % | SYSTOLIC BLOOD PRESSURE: 202 MMHG | TEMPERATURE: 100.22 F | DIASTOLIC BLOOD PRESSURE: 75 MMHG | HEART RATE: 74 BPM

## 2018-02-27 VITALS — HEART RATE: 75 BPM | DIASTOLIC BLOOD PRESSURE: 73 MMHG | SYSTOLIC BLOOD PRESSURE: 135 MMHG | TEMPERATURE: 100.04 F

## 2018-02-27 VITALS
SYSTOLIC BLOOD PRESSURE: 126 MMHG | DIASTOLIC BLOOD PRESSURE: 54 MMHG | HEART RATE: 71 BPM | TEMPERATURE: 100.4 F | OXYGEN SATURATION: 97 %

## 2018-02-27 VITALS
OXYGEN SATURATION: 97 % | HEART RATE: 72 BPM | SYSTOLIC BLOOD PRESSURE: 154 MMHG | TEMPERATURE: 100.04 F | DIASTOLIC BLOOD PRESSURE: 78 MMHG

## 2018-02-27 VITALS — OXYGEN SATURATION: 97 %

## 2018-02-27 VITALS
SYSTOLIC BLOOD PRESSURE: 138 MMHG | HEART RATE: 73 BPM | DIASTOLIC BLOOD PRESSURE: 68 MMHG | TEMPERATURE: 100.04 F | OXYGEN SATURATION: 95 %

## 2018-02-27 VITALS
DIASTOLIC BLOOD PRESSURE: 74 MMHG | OXYGEN SATURATION: 97 % | HEART RATE: 58 BPM | SYSTOLIC BLOOD PRESSURE: 150 MMHG | TEMPERATURE: 99.86 F

## 2018-02-27 VITALS — DIASTOLIC BLOOD PRESSURE: 61 MMHG | SYSTOLIC BLOOD PRESSURE: 122 MMHG | TEMPERATURE: 100.04 F | HEART RATE: 65 BPM

## 2018-02-27 VITALS — SYSTOLIC BLOOD PRESSURE: 173 MMHG | DIASTOLIC BLOOD PRESSURE: 81 MMHG | HEART RATE: 76 BPM | TEMPERATURE: 100.04 F

## 2018-02-27 VITALS — TEMPERATURE: 100.22 F | DIASTOLIC BLOOD PRESSURE: 82 MMHG | HEART RATE: 73 BPM | SYSTOLIC BLOOD PRESSURE: 153 MMHG

## 2018-02-27 VITALS
TEMPERATURE: 100.22 F | OXYGEN SATURATION: 100 % | SYSTOLIC BLOOD PRESSURE: 209 MMHG | DIASTOLIC BLOOD PRESSURE: 84 MMHG | HEART RATE: 85 BPM

## 2018-02-27 VITALS
HEART RATE: 101 BPM | SYSTOLIC BLOOD PRESSURE: 206 MMHG | OXYGEN SATURATION: 93 % | DIASTOLIC BLOOD PRESSURE: 105 MMHG | TEMPERATURE: 100.58 F

## 2018-02-27 VITALS
OXYGEN SATURATION: 99 % | DIASTOLIC BLOOD PRESSURE: 90 MMHG | SYSTOLIC BLOOD PRESSURE: 177 MMHG | HEART RATE: 79 BPM | TEMPERATURE: 100.04 F

## 2018-02-27 VITALS
DIASTOLIC BLOOD PRESSURE: 65 MMHG | OXYGEN SATURATION: 99 % | SYSTOLIC BLOOD PRESSURE: 172 MMHG | TEMPERATURE: 100.22 F | HEART RATE: 70 BPM

## 2018-02-27 VITALS
SYSTOLIC BLOOD PRESSURE: 136 MMHG | DIASTOLIC BLOOD PRESSURE: 73 MMHG | OXYGEN SATURATION: 100 % | HEART RATE: 67 BPM | TEMPERATURE: 100.22 F

## 2018-02-27 VITALS
OXYGEN SATURATION: 94 % | HEART RATE: 71 BPM | SYSTOLIC BLOOD PRESSURE: 186 MMHG | DIASTOLIC BLOOD PRESSURE: 64 MMHG | TEMPERATURE: 99.86 F

## 2018-02-27 VITALS
DIASTOLIC BLOOD PRESSURE: 80 MMHG | TEMPERATURE: 99.5 F | OXYGEN SATURATION: 99 % | HEART RATE: 64 BPM | SYSTOLIC BLOOD PRESSURE: 166 MMHG

## 2018-02-27 VITALS
HEART RATE: 74 BPM | TEMPERATURE: 100.22 F | DIASTOLIC BLOOD PRESSURE: 64 MMHG | OXYGEN SATURATION: 100 % | SYSTOLIC BLOOD PRESSURE: 151 MMHG

## 2018-02-27 VITALS
DIASTOLIC BLOOD PRESSURE: 67 MMHG | HEART RATE: 74 BPM | SYSTOLIC BLOOD PRESSURE: 157 MMHG | TEMPERATURE: 100.04 F | OXYGEN SATURATION: 99 %

## 2018-02-27 VITALS
SYSTOLIC BLOOD PRESSURE: 206 MMHG | OXYGEN SATURATION: 99 % | TEMPERATURE: 100.22 F | HEART RATE: 83 BPM | DIASTOLIC BLOOD PRESSURE: 90 MMHG

## 2018-02-27 VITALS
TEMPERATURE: 99.86 F | SYSTOLIC BLOOD PRESSURE: 153 MMHG | OXYGEN SATURATION: 99 % | HEART RATE: 71 BPM | DIASTOLIC BLOOD PRESSURE: 61 MMHG

## 2018-02-27 VITALS
DIASTOLIC BLOOD PRESSURE: 62 MMHG | SYSTOLIC BLOOD PRESSURE: 170 MMHG | OXYGEN SATURATION: 96 % | HEART RATE: 58 BPM | TEMPERATURE: 99.86 F

## 2018-02-27 VITALS
SYSTOLIC BLOOD PRESSURE: 156 MMHG | OXYGEN SATURATION: 99 % | DIASTOLIC BLOOD PRESSURE: 66 MMHG | HEART RATE: 77 BPM | TEMPERATURE: 100.22 F

## 2018-02-27 VITALS
OXYGEN SATURATION: 98 % | HEART RATE: 68 BPM | DIASTOLIC BLOOD PRESSURE: 73 MMHG | SYSTOLIC BLOOD PRESSURE: 196 MMHG | TEMPERATURE: 99.68 F

## 2018-02-27 VITALS — HEART RATE: 72 BPM | DIASTOLIC BLOOD PRESSURE: 62 MMHG | TEMPERATURE: 100.04 F | SYSTOLIC BLOOD PRESSURE: 154 MMHG

## 2018-02-27 VITALS
OXYGEN SATURATION: 99 % | SYSTOLIC BLOOD PRESSURE: 138 MMHG | HEART RATE: 66 BPM | TEMPERATURE: 100.04 F | DIASTOLIC BLOOD PRESSURE: 72 MMHG

## 2018-02-27 VITALS
HEART RATE: 72 BPM | DIASTOLIC BLOOD PRESSURE: 61 MMHG | OXYGEN SATURATION: 100 % | TEMPERATURE: 100.04 F | SYSTOLIC BLOOD PRESSURE: 153 MMHG

## 2018-02-27 VITALS
TEMPERATURE: 100.4 F | SYSTOLIC BLOOD PRESSURE: 116 MMHG | HEART RATE: 82 BPM | DIASTOLIC BLOOD PRESSURE: 65 MMHG | OXYGEN SATURATION: 97 %

## 2018-02-27 VITALS — SYSTOLIC BLOOD PRESSURE: 114 MMHG | HEART RATE: 71 BPM | TEMPERATURE: 100.4 F | DIASTOLIC BLOOD PRESSURE: 65 MMHG

## 2018-02-27 VITALS
OXYGEN SATURATION: 99 % | TEMPERATURE: 100.04 F | DIASTOLIC BLOOD PRESSURE: 58 MMHG | SYSTOLIC BLOOD PRESSURE: 146 MMHG | HEART RATE: 70 BPM

## 2018-02-27 VITALS
DIASTOLIC BLOOD PRESSURE: 75 MMHG | SYSTOLIC BLOOD PRESSURE: 145 MMHG | TEMPERATURE: 100.4 F | OXYGEN SATURATION: 96 % | HEART RATE: 78 BPM

## 2018-02-27 VITALS
DIASTOLIC BLOOD PRESSURE: 69 MMHG | TEMPERATURE: 99.86 F | SYSTOLIC BLOOD PRESSURE: 182 MMHG | HEART RATE: 67 BPM | OXYGEN SATURATION: 97 %

## 2018-02-27 VITALS
DIASTOLIC BLOOD PRESSURE: 70 MMHG | SYSTOLIC BLOOD PRESSURE: 143 MMHG | HEART RATE: 67 BPM | OXYGEN SATURATION: 98 % | TEMPERATURE: 100.4 F

## 2018-02-27 VITALS
SYSTOLIC BLOOD PRESSURE: 149 MMHG | DIASTOLIC BLOOD PRESSURE: 67 MMHG | HEART RATE: 69 BPM | TEMPERATURE: 99.86 F | OXYGEN SATURATION: 97 %

## 2018-02-27 VITALS
HEART RATE: 67 BPM | TEMPERATURE: 99.86 F | SYSTOLIC BLOOD PRESSURE: 181 MMHG | OXYGEN SATURATION: 99 % | DIASTOLIC BLOOD PRESSURE: 66 MMHG

## 2018-02-27 VITALS
DIASTOLIC BLOOD PRESSURE: 75 MMHG | TEMPERATURE: 99.68 F | OXYGEN SATURATION: 97 % | SYSTOLIC BLOOD PRESSURE: 173 MMHG | HEART RATE: 77 BPM

## 2018-02-27 LAB
BASOPHILS # BLD: 0.01 K/UL (ref 0–0.2)
BASOPHILS # BLD: 0.01 K/UL (ref 0–0.2)
BASOPHILS NFR BLD: 0.1 %
BASOPHILS NFR BLD: 0.1 %
BUN SERPL-MCNC: 42 MG/DL (ref 7–18)
CALCIUM SERPL-MCNC: 7.6 MG/DL (ref 8.5–10.1)
CO2 SERPL-SCNC: 24 MMOL/L (ref 21–32)
CREAT SERPL-MCNC: 2.49 MG/DL (ref 0.6–1.4)
EOS ABS #: 0.06 K/UL (ref 0–0.5)
EOS ABS #: 0.06 K/UL (ref 0–0.5)
EOSINOPHIL NFR BLD AUTO: 189 K/UL (ref 130–400)
EOSINOPHIL NFR BLD AUTO: 197 K/UL (ref 130–400)
GLUCOSE SERPL-MCNC: 113 MG/DL (ref 70–99)
HCT VFR BLD CALC: 21.7 % (ref 42–52)
HCT VFR BLD CALC: 23.9 % (ref 42–52)
HGB BLD-MCNC: 7.3 G/DL (ref 14–18)
HGB BLD-MCNC: 7.9 G/DL (ref 14–18)
IG#: 0.02 K/UL (ref 0–0.02)
IG#: 0.02 K/UL (ref 0–0.02)
IMM GRANULOCYTES NFR BLD AUTO: 6.4 %
IMM GRANULOCYTES NFR BLD AUTO: 7.5 %
LYMPHOCYTES # BLD: 0.54 K/UL (ref 1.2–3.4)
LYMPHOCYTES # BLD: 0.56 K/UL (ref 1.2–3.4)
MCH RBC QN AUTO: 30.2 PG (ref 25–34)
MCH RBC QN AUTO: 30.5 PG (ref 25–34)
MCHC RBC AUTO-ENTMCNC: 33.1 G/DL (ref 32–36)
MCHC RBC AUTO-ENTMCNC: 33.6 G/DL (ref 32–36)
MCV RBC AUTO: 90.8 FL (ref 80–100)
MCV RBC AUTO: 91.2 FL (ref 80–100)
MONO ABS #: 0.87 K/UL (ref 0.11–0.59)
MONO ABS #: 1.18 K/UL (ref 0.11–0.59)
MONOCYTES NFR BLD: 11.7 %
MONOCYTES NFR BLD: 14 %
NEUT ABS #: 5.93 K/UL (ref 1.4–6.5)
NEUT ABS #: 6.6 K/UL (ref 1.4–6.5)
NEUTROPHILS # BLD AUTO: 0.7 %
NEUTROPHILS # BLD AUTO: 0.8 %
NEUTROPHILS NFR BLD AUTO: 78.6 %
NEUTROPHILS NFR BLD AUTO: 79.6 %
PHOSPHATE SERPL-MCNC: 6.6 MG/DL (ref 2.5–4.9)
PMV BLD AUTO: 8.1 FL (ref 7.4–10.4)
PMV BLD AUTO: 8.4 FL (ref 7.4–10.4)
POTASSIUM SERPL-SCNC: 3.8 MMOL/L (ref 3.5–5.1)
PTT PATIENT: 45.8 SECONDS (ref 21–31)
PTT PATIENT: 46 SECONDS (ref 21–31)
RED CELL DISTRIBUTION WIDTH CV: 15.5 % (ref 11.5–14.5)
RED CELL DISTRIBUTION WIDTH CV: 15.6 % (ref 11.5–14.5)
RED CELL DISTRIBUTION WIDTH SD: 52.4 FL (ref 36.4–46.3)
RED CELL DISTRIBUTION WIDTH SD: 52.5 FL (ref 36.4–46.3)
SODIUM SERPL-SCNC: 140 MMOL/L (ref 136–145)
WBC # BLD AUTO: 7.45 K/UL (ref 4.8–10.8)
WBC # BLD AUTO: 8.41 K/UL (ref 4.8–10.8)

## 2018-02-27 RX ADMIN — AMOXICILLIN AND CLAVULANATE POTASSIUM SCH MG: 250; 62.5 POWDER, FOR SUSPENSION ORAL at 20:21

## 2018-02-27 RX ADMIN — FENTANYL CITRATE PRN MCG: 50 INJECTION, SOLUTION INTRAMUSCULAR; INTRAVENOUS at 16:05

## 2018-02-27 RX ADMIN — VALPROATE SODIUM SCH MLS/HR: 500 INJECTION INTRAVENOUS at 21:33

## 2018-02-27 RX ADMIN — HEPARIN SODIUM PRN MLS/HR: 5000 INJECTION, SOLUTION INTRAVENOUS at 18:20

## 2018-02-27 RX ADMIN — FENTANYL CITRATE PRN MCG: 50 INJECTION, SOLUTION INTRAMUSCULAR; INTRAVENOUS at 20:21

## 2018-02-27 RX ADMIN — FENTANYL CITRATE PRN MCG: 50 INJECTION, SOLUTION INTRAMUSCULAR; INTRAVENOUS at 22:40

## 2018-02-27 RX ADMIN — FENTANYL CITRATE PRN MCG: 50 INJECTION, SOLUTION INTRAMUSCULAR; INTRAVENOUS at 04:08

## 2018-02-27 RX ADMIN — OXYMETAZOLINE HYDROCHLORIDE SCH SPRAYS: 0.05 SPRAY NASAL at 20:21

## 2018-02-27 RX ADMIN — IPRATROPIUM BROMIDE SCH PUFFS: 17 AEROSOL, METERED RESPIRATORY (INHALATION) at 02:05

## 2018-02-27 RX ADMIN — MIDAZOLAM HYDROCHLORIDE PRN MG: 1 INJECTION, SOLUTION INTRAMUSCULAR; INTRAVENOUS at 21:33

## 2018-02-27 RX ADMIN — Medication SCH SPRAYS: at 11:39

## 2018-02-27 RX ADMIN — ALBUTEROL SULFATE SCH PUFFS: 90 AEROSOL, METERED RESPIRATORY (INHALATION) at 11:49

## 2018-02-27 RX ADMIN — FENTANYL CITRATE PRN MCG: 50 INJECTION, SOLUTION INTRAMUSCULAR; INTRAVENOUS at 12:24

## 2018-02-27 RX ADMIN — CHLORHEXIDINE GLUCONATE SCH ML: 1.2 RINSE ORAL at 07:59

## 2018-02-27 RX ADMIN — HEPARIN SODIUM PRN MLS/HR: 5000 INJECTION, SOLUTION INTRAVENOUS at 13:26

## 2018-02-27 RX ADMIN — ALBUTEROL SULFATE SCH PUFFS: 90 AEROSOL, METERED RESPIRATORY (INHALATION) at 02:05

## 2018-02-27 RX ADMIN — FUROSEMIDE SCH MLS/MIN: 10 INJECTION, SOLUTION INTRAMUSCULAR; INTRAVENOUS at 07:59

## 2018-02-27 RX ADMIN — METHYLCELLULOSE SCH GM: 2 POWDER, FOR SOLUTION ORAL at 16:05

## 2018-02-27 RX ADMIN — Medication SCH SPRAYS: at 10:57

## 2018-02-27 RX ADMIN — ACETAMINOPHEN PRN MG: 160 SOLUTION ORAL at 18:59

## 2018-02-27 RX ADMIN — VALPROATE SODIUM SCH MLS/HR: 500 INJECTION INTRAVENOUS at 10:48

## 2018-02-27 RX ADMIN — INSULIN ASPART SCH UNITS: 100 INJECTION, SOLUTION INTRAVENOUS; SUBCUTANEOUS at 23:48

## 2018-02-27 RX ADMIN — INSULIN ASPART SCH UNITS: 100 INJECTION, SOLUTION INTRAVENOUS; SUBCUTANEOUS at 04:21

## 2018-02-27 RX ADMIN — IPRATROPIUM BROMIDE SCH PUFFS: 17 AEROSOL, METERED RESPIRATORY (INHALATION) at 11:49

## 2018-02-27 RX ADMIN — METHYLCELLULOSE SCH GM: 2 POWDER, FOR SOLUTION ORAL at 20:29

## 2018-02-27 RX ADMIN — INSULIN ASPART SCH UNITS: 100 INJECTION, SOLUTION INTRAVENOUS; SUBCUTANEOUS at 11:03

## 2018-02-27 RX ADMIN — MIDAZOLAM HYDROCHLORIDE PRN MG: 1 INJECTION, SOLUTION INTRAMUSCULAR; INTRAVENOUS at 01:30

## 2018-02-27 RX ADMIN — CLOPIDOGREL BISULFATE SCH MG: 75 TABLET, FILM COATED ORAL at 07:59

## 2018-02-27 RX ADMIN — ACETAMINOPHEN PRN MG: 160 SOLUTION ORAL at 10:47

## 2018-02-27 RX ADMIN — CALCIUM CARBONATE SCH MG: 500 TABLET ORAL at 20:22

## 2018-02-27 RX ADMIN — MIDAZOLAM HYDROCHLORIDE PRN MG: 1 INJECTION, SOLUTION INTRAMUSCULAR; INTRAVENOUS at 17:06

## 2018-02-27 RX ADMIN — INSULIN ASPART SCH UNITS: 100 INJECTION, SOLUTION INTRAVENOUS; SUBCUTANEOUS at 00:48

## 2018-02-27 RX ADMIN — IPRATROPIUM BROMIDE SCH PUFFS: 17 AEROSOL, METERED RESPIRATORY (INHALATION) at 07:34

## 2018-02-27 RX ADMIN — INSULIN GLARGINE SCH UNITS: 100 INJECTION, SOLUTION SUBCUTANEOUS at 20:25

## 2018-02-27 RX ADMIN — ALBUTEROL SULFATE SCH PUFFS: 90 AEROSOL, METERED RESPIRATORY (INHALATION) at 18:03

## 2018-02-27 RX ADMIN — INSULIN ASPART SCH UNITS: 100 INJECTION, SOLUTION INTRAVENOUS; SUBCUTANEOUS at 16:21

## 2018-02-27 RX ADMIN — IPRATROPIUM BROMIDE SCH PUFFS: 17 AEROSOL, METERED RESPIRATORY (INHALATION) at 18:03

## 2018-02-27 RX ADMIN — PANTOPRAZOLE SODIUM SCH MLS/MIN: 40 INJECTION, POWDER, FOR SOLUTION INTRAVENOUS at 10:47

## 2018-02-27 RX ADMIN — OXYMETAZOLINE HYDROCHLORIDE SCH SPRAYS: 0.05 SPRAY NASAL at 10:57

## 2018-02-27 RX ADMIN — ALBUTEROL SULFATE SCH PUFFS: 90 AEROSOL, METERED RESPIRATORY (INHALATION) at 07:35

## 2018-02-27 RX ADMIN — INSULIN ASPART SCH UNITS: 100 INJECTION, SOLUTION INTRAVENOUS; SUBCUTANEOUS at 20:27

## 2018-02-27 RX ADMIN — FUROSEMIDE SCH MLS/MIN: 10 INJECTION, SOLUTION INTRAMUSCULAR; INTRAVENOUS at 20:21

## 2018-02-27 RX ADMIN — AMOXICILLIN AND CLAVULANATE POTASSIUM SCH MG: 250; 62.5 POWDER, FOR SUSPENSION ORAL at 10:48

## 2018-02-27 RX ADMIN — FENTANYL CITRATE PRN MCG: 50 INJECTION, SOLUTION INTRAMUSCULAR; INTRAVENOUS at 08:04

## 2018-02-27 RX ADMIN — INSULIN ASPART SCH UNITS: 100 INJECTION, SOLUTION INTRAVENOUS; SUBCUTANEOUS at 08:07

## 2018-02-27 NOTE — HOSPITALIST PROGRESS NOTE
Hospitalist Progress Note


Date of Service


Feb 27, 2018.





Subjective


Pt evaluation today including:  conversation w/ family, physical exam, chart 

review, lab review, review of studies, conversation w/ consultant (Intensivist)

, review of inpatient medication list


Voiding:  gillespie catheter in place


Patient seen and evaluated. Remains mechanically ventilated. Opens eyes to 

verbal stimuli. Doesn't follow commands but will squeeze your hand with his L 

hand but not by command more from reflex when grabbing his hand.


Has been febrile through the night and will continue to monitor. Tube feedings 

placed. Awaiting neurological status.


Talked with multiple family members at bedside. They are still very hopeful for 

his recovery yet understand he is not yet out of the woods.





   Additional Comments:


ROS unobtainable due to mechanical ventilation and neurological status.





Medications





Current Inpatient Medications








 Medications


  (Trade)  Dose


 Ordered  Sig/Dc


 Route  Start Time


 Stop Time Status Last Admin


Dose Admin


 


 Ioversol


  (Optiray 320)  100 ml  UD  PRN


 IV  2/24/18 09:15


 2/28/18 09:14   


 


 


 Clopidogrel


 Bisulfate


  (plAVix TAB)  75 mg  QAM


 NG  2/25/18 09:00


 3/27/18 08:59  2/27/18 07:59


75 MG


 


 Miscellaneous


 Information


  (Consult


 Glycemic


 Management


 Pharmacy)  1 ea  UD  PRN


 N/A  2/24/18 12:30


 3/26/18 12:29   


 


 


 Artificial Tears


  (Lacri-Lube Oph


 Oint)  1 appln  Q2H  PRN


 OPB  2/24/18 12:00


 3/26/18 11:59   


 


 


 Pantoprazole


 Sodium 40 mg/


 Syringe  10 ml @ 5


 mls/min  DAILY@11


 IV  2/25/18 11:00


 3/27/18 10:59  2/27/18 10:47


5 MLS/MIN


 


 Valproate Sodium


 1000 mg/Dextrose  60 ml @ 55


 mls/hr  Q12H


 IV  2/24/18 22:00


 3/26/18 21:59  2/27/18 10:48


55 MLS/HR


 


 Chlorhexidine


 Gluconate


  (Peridex Oral


 Soln)  15 ml  DAILY


 MT  2/25/18 09:00


 3/27/18 08:59  2/27/18 07:59


15 ML


 


 Albuterol


  (Ventolin Hfa


 Inhaler)  4 puffs  Q6R


 INH  2/24/18 21:00


 3/26/18 17:59  2/27/18 11:49


4 PUFFS


 


 Ipratropium


 Bromide


  (Atrovent Hfa


 Inhaler)  4 puffs  Q6R


 INH  2/24/18 21:00


 3/26/18 17:59  2/27/18 11:49


4 PUFFS


 


 Fentanyl Citrate


  (Fentanyl Inj)  100 mcg  Q2H  PRN


 IV  2/26/18 09:15


 3/12/18 09:14  2/27/18 16:05


100 MCG


 


 Midazolam HCl


  (Versed Inj)  2 mg  Q2H  PRN


 IV  2/26/18 09:15


 3/28/18 09:14  2/27/18 01:30


2 MG


 


 Insulin Aspart


  (novoLOG ASPART)  SLIDING


 SCALE  Q4


 SC  2/26/18 11:00


 3/28/18 10:59  2/27/18 16:21


5 UNITS


 


 Furosemide 80 mg/


 Syringe  8 ml @ 4


 mls/min  BID


 IV  2/26/18 21:00


 3/28/18 20:59  2/27/18 07:59


4 MLS/MIN


 


 Glucose


  (Glucose 40% Gel)  15-30


 GRAMS 15


 GRAMS...  UD  PRN


 PO  2/26/18 11:00


 3/28/18 10:59   


 


 


 Glucose


  (Glucose Chew


 Tab)  4-8


 Tablets 4


 Tabl...  UD  PRN


 PO  2/26/18 11:00


 3/28/18 10:59   


 


 


 Dextrose


  (Dextrose 50%


 50ML Syringe)  25-50ML OF


 50% DW IV


 FOR...  UD  PRN


 IV  2/26/18 11:00


 3/28/18 10:59   


 


 


 Glucagon


  (Glucagon Inj)  1 mg  UD  PRN


 SQ  2/26/18 11:00


 3/28/18 10:59   


 


 


 Heparin Sodium/


 Dextrose  500 ml @ 


 22 mls/hr  [id number] PRN


 IV  2/26/18 11:45


 3/28/18 11:44  2/27/18 13:26


22 MLS/HR


 


 Calcium Carbonate


  (Tums Chew Tab)  1,500 mg  BID


 OG  2/27/18 21:00


 3/29/18 20:59   


 


 


 Acetaminophen


  (Tylenol Soln)  650 mg  Q6H  PRN


 PO  2/27/18 09:45


 3/29/18 09:44  2/27/18 10:47


650 MG


 


 Oxycodone HCl


  (Roxicodone Soln)  5 mg  Q8H  PRN


 PO  2/27/18 09:45


 3/13/18 09:44  2/27/18 10:46


5 MG


 


 Oxymetazoline HCl


  (Afrin 0.05%


 Nasal Spray)  2 sprays  BID


 VERONICA  2/27/18 10:00


 3/1/18 21:01  2/27/18 10:57


2 SPRAYS


 


 Amoxicillin/


 Clavulanate


 Potassium


  (Augmentin Susp)  500 mg  BIDM


 PO  2/27/18 10:30


 3/1/18 10:29  2/27/18 10:48


500 MG


 


 Enteral


 Nutritional


 Formula


  (Novasource


 Renal)  1,000 ml  UD  PRN


 OG  2/27/18 10:45


 3/29/18 10:44  2/27/18 13:24


1,000 ML


 


 Carvedilol


  (Coreg Tab)  6.25 mg  BID


 PO  2/27/18 21:00


 3/29/18 20:59   


 


 


 Methylcellulose


  (Citrucel Powder)  19 gm  BID


 PO  2/27/18 13:00


 3/29/18 12:59   


 


 


 Insulin Glargine


  (Lantus Solostar


 Pen)    QPM


 SC  2/27/18 21:00


 3/29/18 20:59   


 


 


 Insulin Glargine


  (Lantus Solostar


 Pen)    QAM


 SC  2/28/18 09:00


 3/30/18 08:59   


 











Objective


Vital Signs











  Date Time  Temp Pulse Resp B/P (MAP) Pulse Ox O2 Delivery O2 Flow Rate FiO2


 


2/27/18 14:30        40


 


2/27/18 14:01 37.5 64 20 166/80 (108) 99 Mechanical Ventilator  40


 


2/27/18 13:00 37.7 67 20 181/66 (104) 99 Mechanical Ventilator  40


 


2/27/18 12:41 37.8 65 20 122/61 (81)    


 


2/27/18 12:02 37.9 74 20 202/75 (117) 100 Mechanical Ventilator  40


 


2/27/18 12:00      Mechanical Ventilator  


 


2/27/18 12:00        40


 


2/27/18 11:40        40


 


2/27/18 11:01 37.9 73 20 153/82 (105)    


 


2/27/18 11:00 37.9 70 20 172/65 (100) 99 Mechanical Ventilator  40


 


2/27/18 10:01 38.0 67 20 143/70 (94) 98 Mechanical Ventilator  40


 


2/27/18 09:01 38.0 71 20 114/65 (81)    


 


2/27/18 09:00 38.0 71 20 126/54 (78) 97 Mechanical Ventilator  40


 


2/27/18 08:26 38.0 78 20 145/75 (98) 96 Mechanical Ventilator  40


 


2/27/18 08:01 38.1 101 20 206/105 (138) 93 Mechanical Ventilator  40


 


2/27/18 08:00      Mechanical Ventilator  55


 


2/27/18 08:00      Mechanical Ventilator  


 


2/27/18 08:00        40


 


2/27/18 07:35        55


 


2/27/18 07:01 37.9 67 20 136/73 (94) 100 Mechanical Ventilator  55


 


2/27/18 07:00 37.9 74 20 151/64 (93) 100 Mechanical Ventilator  55


 


2/27/18 06:01 37.8 66 20 138/72 (85) 99   





    147/59    


 


2/27/18 06:00 37.8 70 20  (83) 99   





    146/58    


 


2/27/18 05:43        55


 


2/27/18 05:01 37.8 72 20 137/74 (86)    





    154/62    


 


2/27/18 05:00 37.8 72 20  (89) 100   





    153/61    


 


2/27/18 04:53 37.8 75 20 135/73 (78)    





    152/60    


 


2/27/18 04:01 37.9 83 21 200/90 (124) 99   





    206/78    


 


2/27/18 04:00 37.9 85 17  (129) 100   





    209/84    


 


2/27/18 04:00      Mechanical Ventilator  


 


2/27/18 04:00        55


 


2/27/18 03:40        55


 


2/27/18 03:01 37.8 76 20 168/81 (103)    





    173/66    


 


2/27/18 03:00 37.8 75 20  (101) 100   





    174/66    


 


2/27/18 02:01 37.7 71 20 153/80 (104) 99 Mechanical Ventilator  55


 


2/27/18 02:01 37.7 71 20 153/80 (100) 99   





    160/61    


 


2/27/18 02:00 37.7 70 20  (91) 100   





    158/61    


 


2/27/18 01:45 37.7 69 20 143/68 (88) 100   





    153/60    


 


2/27/18 01:25 37.8 79 20 177/90 (118) 99   





    184/72    


 


2/27/18 01:01 37.8 74 20 157/67 (97) 99 Mechanical Ventilator  55


 


2/27/18 01:01 37.8 74 20 151/81 (93) 99   





    157/67    


 


2/27/18 01:00 37.9 77 20  (94) 99   





    156/66    


 


2/27/18 00:23        55


 


2/27/18 00:17 38.0 82 20 116/65 (82) 97 Mechanical Ventilator  55


 


2/27/18 00:15        55


 


2/27/18 00:15      Mechanical Ventilator  


 


2/26/18 20:11        55


 


2/26/18 20:07      Mechanical Ventilator  55


 


2/26/18 20:00 37.8 75 20 161/76 (104)  Mechanical Ventilator  55


 


2/26/18 19:30 37.8 73 20 97/49 (65) 99 Mechanical Ventilator  55


 


2/26/18 19:05        55


 


2/26/18 19:00 37.7 72 20 186/101 (129) 100 Mechanical Ventilator  55


 


2/26/18 18:10        55


 


2/26/18 18:00 37.5 80 17 188/78 (114) 100 Mechanical Ventilator  55


 


2/26/18 17:30 37.4 70 20 119/53 (75) 100 Mechanical Ventilator  55


 


2/26/18 17:00 37.4 71 20 121/67 (85)  Mechanical Ventilator  55











Physical Exam


General Appearance:  no apparent distress


Neck:  supple, no JVD, trachea midline


Respiratory/Chest:  no respiratory distress, no accessory muscle use, + 

decreased breath sounds


Cardiovascular:  regular rate, rhythm, + systolic murmur


Abdomen:  normal bowel sounds, non tender, soft


Extremities:  + pertinent finding (minimal swelling of b/l lower extremities; 

fasciotomy scars well-healed)


Neurologic/Psychiatric:  + pertinent finding (obtunded but does open eyes to 

verbal stimuli but no purposeful movement)


Skin:  normal color





Laboratory Results





Last 24 Hours








Test


  2/26/18


18:14 2/26/18


19:59 2/27/18


00:28 2/27/18


04:10


 


Activated Partial


Thromboplast Time 60.5 SECONDS 


  


  


  


 


 


Partial Thromboplastin Ratio 2.3    


 


Bedside Glucose  94 mg/dl  107 mg/dl  120 mg/dl 


 


Test


  2/27/18


06:10 2/27/18


09:20 


  


 


 


White Blood Count 7.45 K/uL    


 


Red Blood Count 2.39 M/uL    


 


Hemoglobin 7.3 g/dL    


 


Hematocrit 21.7 %    


 


Mean Corpuscular Volume 90.8 fL    


 


Mean Corpuscular Hemoglobin 30.5 pg    


 


Mean Corpuscular Hemoglobin


Concent 33.6 g/dl 


  


  


  


 


 


Platelet Count 189 K/uL    


 


Mean Platelet Volume 8.1 fL    


 


Neutrophils (%) (Auto) 79.6 %    


 


Lymphocytes (%) (Auto) 7.5 %    


 


Monocytes (%) (Auto) 11.7 %    


 


Eosinophils (%) (Auto) 0.8 %    


 


Basophils (%) (Auto) 0.1 %    


 


Neutrophils # (Auto) 5.93 K/uL    


 


Lymphocytes # (Auto) 0.56 K/uL    


 


Monocytes # (Auto) 0.87 K/uL    


 


Eosinophils # (Auto) 0.06 K/uL    


 


Basophils # (Auto) 0.01 K/uL    


 


RDW Standard Deviation 52.5 fL    


 


RDW Coefficient of Variation 15.6 %    


 


Immature Granulocyte % (Auto) 0.3 %    


 


Immature Granulocyte # (Auto) 0.02 K/uL    


 


Poikilocytosis PRESENT    


 


Anisocytosis PRESENT    


 


Spherocytes 1+    


 


Venous Blood pH 7.33    


 


Venous Blood Partial Pressure


CO2 46 mmHg 


  


  


  


 


 


Venous Blood Partial Pressure


O2 43 mmHg 


  


  


  


 


 


Venous Blood HCO3 24 mmol/L    


 


Venous Blood Oxygen Saturation 73.4 %    


 


Venous Blood Base Excess -2.1 mEq/L    


 


Sodium Level 140 mmol/L    


 


Potassium Level 3.8 mmol/L    


 


Chloride Level 106 mmol/L    


 


Carbon Dioxide Level 24 mmol/L    


 


Anion Gap 9.0 mmol/L    


 


Blood Urea Nitrogen 42 mg/dl    


 


Creatinine 2.49 mg/dl    


 


Est Creatinine Clear Calc


Drug Dose 37.7 ml/min 


  


  


  


 


 


Estimated GFR (


American) 31.3 


  


  


  


 


 


Estimated GFR (Non-


American 27.0 


  


  


  


 


 


BUN/Creatinine Ratio 17.0    


 


Random Glucose 113 mg/dl    


 


Calcium Level 7.6 mg/dl    


 


Phosphorus Level 6.6 mg/dl    


 


Magnesium Level 2.0 mg/dl    


 


Activated Partial


Thromboplast Time 


  45.8 SECONDS 


  


  


 


 


Partial Thromboplastin Ratio  1.8   


 


Valproic Acid (Depakene) Level  29 mcg/ml   











Assessment and Plan


62 M cardiac arrest survivor with concern for anoxic brain injury, was 

therapeutically cooled and with continuous EEG completed





Cardiac Arrest 2/2 PEA:


- Completed therapeutic hypothermia and currently rewarmed and currently 

febrile - continue to monitor for neurological improvement - patient opening 

eyes to verbal stimuli but no purposeful movement





Acute Hypoxic Respiratory Failure 2/2 Above:


- Possible anoxic brain injury - EEG reveals moderate encephalopathy


- Remains on mechanical ventilation - appreciate intensivists input on wean





Acute on Chronic Diastolic CHF with Chronic CAD/Cardiomyopathy:


- Echo with global hypokinesis - Coreg 6.25 mg BID


- Lasix 80 mg IV BID at this time


- Cardiology following - appreciate input





Acute vs Subacute Infarction:


- Addendum to brain MRI - punctate focus of mid R cerebellar hemisphere


- Currently on Plavix 75 mg daily





FITZ on CKD Stage III:


- Given cardiac arrest this is like ATN - continues to produce adequate amounts 

of urine


- Continue to monitor - unable to truly assess baseline as records reveal a 

quite labile Cr


- Nephrology following - appreciate recommendations





T2DM, Uncontrolled:


- Pharmacy following for glycemic management - SSI and Lantus


- Currently with tube feedings





Rhabdomyolysis:


- Somewhat improving - will continue to monitor given ventilation and bedfast 

state





Metabolic Acidosis: 


- Continue to monitor





Code Status: FULL RESUSCITATION





DVT Prophylaxis: Heparin gtt





Disposition:


- Await clinical course - guarded prognosis


Continued [facility] stay due to:  multiple IV medications needed


Discharge planning:  uncertain

## 2018-02-27 NOTE — NEUROLOGY CONSULTATION
Neurology Consultation


Date of Consultation:


Feb 27, 2018.


Attending Physician:


Jamey Pena MD, PhD


Primary Care Physician:


Claude Rowley M.D.


Reason for Consultation:


Patient is a 62-year-old, who was asked to see the request of Dr. Arnold for 

neurologic consultation regarding unresponsive state.


History of Present Illness


Source:  patient, caregiver, spouse, hospital records


This patient has a history of significant medical problems including diabetes, 

COPD, bipolar disorder, grade 2 diastolic heart failure, dyslipidemia, gout, 

coronary artery disease post MI in October of 2017, obstructive sleep apnea, 

chronic anemia, peripheral arterial disease, and chronic diabetic foot ulcers.





Patient saw Dr. Coronado in consultation in 2007 for encephalopathy and myoclonus.

  An EEG was normal at that time.





The patient suffered a cardiac event on February 24th described as pulseless 

electrical activity.  He was unresponsive but resuscitated by EMS rather 

quickly.  He was admitted February 24th and put on "code arctic" protocol.  He 

had continuous EEG monitoring until the morning of February 26.  His EEG was 

remarkable for a moderate generalized slowing but no focal abnormalities or 

potentially epileptogenic discharges.





On February 26th, an MRI of the brain showed no acute infarct although there 

may have been a very tiny focus of increased diffusion seen in the right 

cerebellar hemisphere.  There was no evidence for other acute infarct or global 

ischemia by MRI.  I reviewed these films and report with Dr. Bhatia.





Patient had an MRI of the cervical spine which showed multiple degenerative 

changes disc in bone with multiple levels of varying degrees of spinal 

stenosis.  There was a extruded calcified mass at the C7 level probably 

impinging on the left T1 nerve root.  





Patient has a history of surgery of the cervical spine at C6-7, around the year 

2000, but I do not have any details regarding this previous history.  I saw 

this patient in the late 1990s and early 2000s for testing regarding his 

cervical spine issues, but I have not seen him in over 15 years.





This morning, the patient is still intubated, and nursing reports no convulsive 

activity since being off the EEG monitoring. They report that he will open his 

eyes spontaneously but not make eye contact or follow commands.





Past Medical/Surgical History


Medical Problems:


(1) Acute CHF


Status: Acute  





(2) Acute coronary syndrome


Status: Acute  





(3) Acute headache


Status: Acute  





(4) Acute on chronic congestive heart failure


Status: Acute  





(5) Altered mental status


Status: Acute  





(6) Anemia


Status: Acute  





(7) Anemia


Status: Acute  





(8) Anemia


Status: Acute  





(9) Anginal pain


Status: Acute  





(10) Appendicitis


Status: Acute  





(11) Cellulitis


Status: Acute  





(12) Chest pain


Status: Acute  





(13) CHF (congestive heart failure)


Status: Acute  





(14) CHF (congestive heart failure)


Status: Acute  





(15) CHF (congestive heart failure)


Status: Acute  





(16) Congestive heart failure


Status: Acute  





(17) Congestive heart failure


Status: Acute  





(18) COPD (chronic obstructive pulmonary disease)


Status: Acute  





(19) Diabetes mellitus with hyperglycemia


Status: Acute  





(20) Dyspnea


Status: Acute  





(21) Elevated troponin


Status: Acute  





(22) Elevated troponin


Status: Acute  





(23) Failure of outpatient treatment


Status: Acute  





(24) Hypoxia


Status: Acute  





(25) Hypoxia


Status: Acute  





(26) Intra-abdominal abscess


Status: Acute  





(27) Lower abdominal pain of unknown etiology


Status: Acute  





(28) Neck pain


Status: Acute  





(29) Pneumonia


Status: Acute  





(30) Pneumonia


Status: Acute  





(31) Precordial chest pain


Status: Acute  





(32) Pulmonary edema


Status: Acute  





(33) Respiratory acidosis


Status: Acute  





(34) Respiratory distress


Status: Acute  





(35) SOB (shortness of breath)


Status: Acute  





(36) SOB (shortness of breath)


Status: Acute  





(37) Tachypnea


Status: Acute  





(38) UTI (urinary tract infection)


Status: Acute  








Family History


Noncontributory in the patient cannot offer any information as he is intubated 

and unresponsive.





Social History


Patient was a heavy cigarette smoker in the past but quit smoking sometime more 

recently.


He is a former , now currently disabled.


Smoking Status:  Former smoker


Drug Use:  none


Marital Status:  


Housing Status:  lives with family


Occupation Status:  employed





Allergies


Coded Allergies:  


     No Known Allergies (Verified , 2/24/18)





Current Inpatient Medications





Current Inpatient Medications








 Medications


  (Trade)  Dose


 Ordered  Sig/Dc


 Route  Start Time


 Stop Time Status Last Admin


Dose Admin


 


 Ioversol


  (Optiray 320)  100 ml  UD  PRN


 IV  2/24/18 09:15


 2/28/18 09:14   


 


 


 Clopidogrel


 Bisulfate


  (plAVix TAB)  75 mg  QAM


 NG  2/25/18 09:00


 3/27/18 08:59  2/27/18 07:59


75 MG


 


 Miscellaneous


 Information


  (Consult


 Glycemic


 Management


 Pharmacy)  1 ea  UD  PRN


 N/A  2/24/18 12:30


 3/26/18 12:29   


 


 


 Artificial Tears


  (Lacri-Lube Oph


 Oint)  1 appln  Q2H  PRN


 OPB  2/24/18 12:00


 3/26/18 11:59   


 


 


 Pantoprazole


 Sodium 40 mg/


 Syringe  10 ml @ 5


 mls/min  DAILY@11


 IV  2/25/18 11:00


 3/27/18 10:59  2/27/18 10:47


5 MLS/MIN


 


 Valproate Sodium


 1000 mg/Dextrose  60 ml @ 55


 mls/hr  Q12H


 IV  2/24/18 22:00


 3/26/18 21:59  2/27/18 10:48


55 MLS/HR


 


 Chlorhexidine


 Gluconate


  (Peridex Oral


 Soln)  15 ml  DAILY


 MT  2/25/18 09:00


 3/27/18 08:59  2/27/18 07:59


15 ML


 


 Albuterol


  (Ventolin Hfa


 Inhaler)  4 puffs  Q6R


 INH  2/24/18 21:00


 3/26/18 17:59  2/27/18 11:49


4 PUFFS


 


 Ipratropium


 Bromide


  (Atrovent Hfa


 Inhaler)  4 puffs  Q6R


 INH  2/24/18 21:00


 3/26/18 17:59  2/27/18 11:49


4 PUFFS


 


 Fentanyl Citrate


  (Fentanyl Inj)  100 mcg  Q2H  PRN


 IV  2/26/18 09:15


 3/12/18 09:14  2/27/18 12:24


100 MCG


 


 Midazolam HCl


  (Versed Inj)  2 mg  Q2H  PRN


 IV  2/26/18 09:15


 3/28/18 09:14  2/27/18 01:30


2 MG


 


 Insulin Aspart


  (novoLOG ASPART)  SLIDING


 SCALE  Q4


 SC  2/26/18 11:00


 3/28/18 10:59  2/27/18 11:03


2 UNITS


 


 Furosemide 80 mg/


 Syringe  8 ml @ 4


 mls/min  BID


 IV  2/26/18 21:00


 3/28/18 20:59  2/27/18 07:59


4 MLS/MIN


 


 Glucose


  (Glucose 40% Gel)  15-30


 GRAMS 15


 GRAMS...  UD  PRN


 PO  2/26/18 11:00


 3/28/18 10:59   


 


 


 Glucose


  (Glucose Chew


 Tab)  4-8


 Tablets 4


 Tabl...  UD  PRN


 PO  2/26/18 11:00


 3/28/18 10:59   


 


 


 Dextrose


  (Dextrose 50%


 50ML Syringe)  25-50ML OF


 50% DW IV


 FOR...  UD  PRN


 IV  2/26/18 11:00


 3/28/18 10:59   


 


 


 Glucagon


  (Glucagon Inj)  1 mg  UD  PRN


 SQ  2/26/18 11:00


 3/28/18 10:59   


 


 


 Heparin Sodium/


 Dextrose  500 ml @ 


 22 mls/hr  [id number] PRN


 IV  2/26/18 11:45


 3/28/18 11:44  2/26/18 11:42


20 MLS/HR


 


 Calcium Carbonate


  (Tums Chew Tab)  1,500 mg  BID


 OG  2/27/18 21:00


 3/29/18 20:59   


 


 


 Acetaminophen


  (Tylenol Soln)  650 mg  Q6H  PRN


 PO  2/27/18 09:45


 3/29/18 09:44  2/27/18 10:47


650 MG


 


 Oxycodone HCl


  (Roxicodone Soln)  5 mg  Q8H  PRN


 PO  2/27/18 09:45


 3/13/18 09:44  2/27/18 10:46


5 MG


 


 Oxymetazoline HCl


  (Afrin 0.05%


 Nasal Spray)  2 sprays  BID


 VERONICA  2/27/18 10:00


 3/1/18 21:01  2/27/18 10:57


2 SPRAYS


 


 Amoxicillin/


 Clavulanate


 Potassium


  (Augmentin Susp)  500 mg  BIDM


 PO  2/27/18 10:30


 3/1/18 10:29  2/27/18 10:48


500 MG


 


 Enteral


 Nutritional


 Formula


  (Novasource


 Renal)  1,000 ml  UD  PRN


 OG  2/27/18 10:45


 3/29/18 10:44   


 


 


 Carvedilol


  (Coreg Tab)  6.25 mg  BID


 PO  2/27/18 21:00


 3/29/18 20:59   


 


 


 Methylcellulose


  (Citrucel Powder)  19 gm  BID


 PO  2/27/18 13:00


 3/29/18 12:59   


 











Review of Systems


Review of systems cannot be obtained since the patient is unresponsive and 

intubated.





Physical Exam


Vital Signs (Past 24 Hrs):











  Date Time  Temp Pulse Resp B/P (MAP) Pulse Ox O2 Delivery O2 Flow Rate FiO2


 


2/27/18 12:41 37.8 65 20 122/61 (81)    


 


2/27/18 12:02 37.9 74 20 202/75 (117) 100 Mechanical Ventilator  40


 


2/27/18 12:00      Mechanical Ventilator  


 


2/27/18 12:00        40


 


2/27/18 11:40        40


 


2/27/18 11:01 37.9 73 20 153/82 (105)    


 


2/27/18 11:00 37.9 70 20 172/65 (100) 99 Mechanical Ventilator  40


 


2/27/18 10:01 38.0 67 20 143/70 (94) 98 Mechanical Ventilator  40


 


2/27/18 09:01 38.0 71 20 114/65 (81)    


 


2/27/18 09:00 38.0 71 20 126/54 (78) 97 Mechanical Ventilator  40


 


2/27/18 08:26 38.0 78 20 145/75 (98) 96 Mechanical Ventilator  40


 


2/27/18 08:01 38.1 101 20 206/105 (138) 93 Mechanical Ventilator  40


 


2/27/18 08:00      Mechanical Ventilator  


 


2/27/18 08:00        40


 


2/27/18 07:35        55


 


2/27/18 07:01 37.9 67 20 136/73 (94) 100 Mechanical Ventilator  55


 


2/27/18 07:00 37.9 74 20 151/64 (93) 100 Mechanical Ventilator  55


 


2/27/18 06:01 37.8 66 20 138/72 (85) 99   





    147/59    


 


2/27/18 06:00 37.8 70 20  (83) 99   





    146/58    


 


2/27/18 05:43        55


 


2/27/18 05:01 37.8 72 20 137/74 (86)    





    154/62    


 


2/27/18 05:00 37.8 72 20  (89) 100   





    153/61    


 


2/27/18 04:53 37.8 75 20 135/73 (78)    





    152/60    


 


2/27/18 04:01 37.9 83 21 200/90 (124) 99   





    206/78    


 


2/27/18 04:00 37.9 85 17  (129) 100   





    209/84    


 


2/27/18 04:00      Mechanical Ventilator  


 


2/27/18 04:00        55


 


2/27/18 03:40        55


 


2/27/18 03:01 37.8 76 20 168/81 (103)    





    173/66    


 


2/27/18 03:00 37.8 75 20  (101) 100   





    174/66    


 


2/27/18 02:01 37.7 71 20 153/80 (104) 99 Mechanical Ventilator  55


 


2/27/18 02:01 37.7 71 20 153/80 (100) 99   





    160/61    


 


2/27/18 02:00 37.7 70 20  (91) 100   





    158/61    


 


2/27/18 01:45 37.7 69 20 143/68 (88) 100   





    153/60    


 


2/27/18 01:25 37.8 79 20 177/90 (118) 99   





    184/72    


 


2/27/18 01:01 37.8 74 20 157/67 (97) 99 Mechanical Ventilator  55


 


2/27/18 01:01 37.8 74 20 151/81 (93) 99   





    157/67    


 


2/27/18 01:00 37.9 77 20  (94) 99   





    156/66    


 


2/27/18 00:23        55


 


2/27/18 00:17 38.0 82 20 116/65 (82) 97 Mechanical Ventilator  55


 


2/27/18 00:15        55


 


2/27/18 00:15      Mechanical Ventilator  


 


2/26/18 20:11        55


 


2/26/18 20:07      Mechanical Ventilator  55


 


2/26/18 20:00 37.8 75 20 161/76 (104)  Mechanical Ventilator  55


 


2/26/18 19:30 37.8 73 20 97/49 (65) 99 Mechanical Ventilator  55


 


2/26/18 19:05        55


 


2/26/18 19:00 37.7 72 20 186/101 (129) 100 Mechanical Ventilator  55


 


2/26/18 18:10        55


 


2/26/18 18:00 37.5 80 17 188/78 (114) 100 Mechanical Ventilator  55


 


2/26/18 17:30 37.4 70 20 119/53 (75) 100 Mechanical Ventilator  55


 


2/26/18 17:00 37.4 71 20 121/67 (85)  Mechanical Ventilator  55


 


2/26/18 16:30 37.3 81 20 186/75 (112) 100 Mechanical Ventilator  55


 


2/26/18 16:00        55


 


2/26/18 16:00 37.3 76 20 159/76 (103) 100 Mechanical Ventilator  55


 


2/26/18 16:00      Mechanical Ventilator  55


 


2/26/18 15:00 37.2 71 20 110/54 (72) 100 Mechanical Ventilator  55


 


2/26/18 14:43  77 20 172/82 (112) 100 Mechanical Ventilator  55


 


2/26/18 14:35        55


 


2/26/18 14:01 37.1 69 20 108/62 (77) 100 Mechanical Ventilator  55


 


2/26/18 14:00 37.1 69 20 126/57 (80) 100 Mechanical Ventilator  55








Patient is not breathing over the ventilator.  His vital signs have been stable





Patient is lying still with no significant spontaneous movement.  His eyes are 

closed.  With voice he will half open his eyes.  With shout and sternal 

stimulation he will open his eyes a little wider but quickly closes his eyes 

when left alone after few seconds.  He will not fixate with his eyes.  His eyes 

are front and have some horizontal nystagmus spontaneously.  Neck is somewhat 

stiff particularly in a flexion extension fashion but he has positive 

oculocephalic response in all directions without gaze issues.  He has positive 

corneal responses bilaterally.  Pupils are 3-4 mm bilaterally reactive to 

light. Discs seem sharp with positive venous pulsations bilaterally.





He is intubated but has a positive gag bilaterally.  He has positive cough to 

suctioning.





He has decreased tone in his left upper extremity more so than the right upper 

extremity.  The legs have equal tone, decreased but not flaccid.  Reflexes are 1

/4 in the biceps, triceps, and brachioradialis tendons bilaterally.  Quadriceps 

tendons are 2/4 and Achilles tendon reflexes are absent bilaterally. Toes are 

neutral to plantar stimulation bilaterally.





To deep pain he has no withdrawal or reaction in the feet bilaterally. In the 

fingers, deep pain produces eye opening and some grimacing without withdrawal.





Laboratory Results


Past 24 Hours:


2/27/18 06:10








Red Blood Count 2.39, Mean Corpuscular Volume 90.8, Mean Corpuscular Hemoglobin 

30.5, Mean Corpuscular Hemoglobin Concent 33.6, Mean Platelet Volume 8.1, 

Neutrophils (%) (Auto) 79.6, Lymphocytes (%) (Auto) 7.5, Monocytes (%) (Auto) 

11.7, Eosinophils (%) (Auto) 0.8, Basophils (%) (Auto) 0.1, Neutrophils # (Auto

) 5.93, Lymphocytes # (Auto) 0.56, Monocytes # (Auto) 0.87, Eosinophils # (Auto

) 0.06, Basophils # (Auto) 0.01





2/27/18 06:10

















Test


  2/27/18


04:10 2/27/18


06:10 2/27/18


09:20


 


Bedside Glucose


  120 mg/dl


(70-99) 


  


 


 


White Blood Count


  


  7.45 K/uL


(4.8-10.8) 


 


 


Red Blood Count


  


  2.39 M/uL


(4.7-6.1) 


 


 


Hemoglobin


  


  7.3 g/dL


(14.0-18.0) 


 


 


Hematocrit  21.7 % (42-52)  


 


Mean Corpuscular Volume


  


  90.8 fL


() 


 


 


Mean Corpuscular Hemoglobin


  


  30.5 pg


(25-34) 


 


 


Mean Corpuscular Hemoglobin


Concent 


  33.6 g/dl


(32-36) 


 


 


Platelet Count


  


  189 K/uL


(130-400) 


 


 


Mean Platelet Volume


  


  8.1 fL


(7.4-10.4) 


 


 


Neutrophils (%) (Auto)  79.6 %  


 


Lymphocytes (%) (Auto)  7.5 %  


 


Monocytes (%) (Auto)  11.7 %  


 


Eosinophils (%) (Auto)  0.8 %  


 


Basophils (%) (Auto)  0.1 %  


 


Neutrophils # (Auto)


  


  5.93 K/uL


(1.4-6.5) 


 


 


Lymphocytes # (Auto)


  


  0.56 K/uL


(1.2-3.4) 


 


 


Monocytes # (Auto)


  


  0.87 K/uL


(0.11-0.59) 


 


 


Eosinophils # (Auto)


  


  0.06 K/uL


(0-0.5) 


 


 


Basophils # (Auto)


  


  0.01 K/uL


(0-0.2) 


 


 


RDW Standard Deviation


  


  52.5 fL


(36.4-46.3) 


 


 


RDW Coefficient of Variation


  


  15.6 %


(11.5-14.5) 


 


 


Immature Granulocyte % (Auto)  0.3 %  


 


Immature Granulocyte # (Auto)


  


  0.02 K/uL


(0.00-0.02) 


 


 


Poikilocytosis  PRESENT  


 


Anisocytosis  PRESENT  


 


Spherocytes  1+  


 


Venous Blood pH


  


  7.33


(7.36-7.41) 


 


 


Venous Blood Partial Pressure


CO2 


  46 mmHg


(38.0-50.0) 


 


 


Venous Blood Partial Pressure


O2 


  43 mmHg 


  


 


 


Venous Blood HCO3  24 mmol/L  


 


Venous Blood Oxygen Saturation  73.4 %  


 


Venous Blood Base Excess  -2.1 mEq/L  


 


Anion Gap


  


  9.0 mmol/L


(3-11) 


 


 


Est Creatinine Clear Calc


Drug Dose 


  37.7 ml/min 


  


 


 


Estimated GFR (


American) 


  31.3 


  


 


 


Estimated GFR (Non-


American 


  27.0 


  


 


 


BUN/Creatinine Ratio  17.0 (10-20)  


 


Calcium Level


  


  7.6 mg/dl


(8.5-10.1) 


 


 


Phosphorus Level


  


  6.6 mg/dl


(2.5-4.9) 


 


 


Magnesium Level


  


  2.0 mg/dl


(1.8-2.4) 


 


 


Activated Partial


Thromboplast Time 


  


  45.8 SECONDS


(21.0-31.0)


 


Partial Thromboplastin Ratio   1.8 


 


Valproic Acid (Depakene) Level


  


  


  29 mcg/ml


()











Imaging


BRAIN WITHOUT CONTRAST





HISTORY:  62 years-old Male Unrespomnsive, slow progression on EEG acutely


unresponsive patient. Acute altered mental status with shortness of breath





COMPARISON: Cervical spine MRI of same day, CT head 2/24/2018, MRI brain


2/12/2018





TECHNIQUE: Multiplanar multisequence MRI of the brain was obtained without


contrast.





FINDINGS: 


Several of the sequences are very motion degraded. The sagittal T1 series are


nondiagnostic. There is no restricted diffusion to suggest acute infarction. No


cerebellar tonsillar herniation. Degenerative changes are seen within the imaged


cervical spine. Mild right atrophy redemonstrated without acute intracranial


hemorrhage, midline shift, abnormal extra-axial collections, hydrocephalus or


intracranial mass. No significant T2/FLAIR signal abnormalities of the brain


parenchyma. There is no pathologic blooming artifact on the T2 star series.





The major flow voids at the level of the skull bases appear patent. The orbits


are symmetric. Bilateral mastoid effusions, left greater than right


redemonstrated. Moderate mucosal thickening with air-fluid levels seen within


the right maxillary and sphenoid sinuses. Moderate ethmoid sinus disease with


mild left sphenoid, left maxillary and bilateral frontal sinus disease. Mild


rightward bowing and spurring of the nasal septum. Soft tissues, scalp and


calvarium appear unremarkable.





IMPRESSION: 


1. Motion degraded exam without acute intracranial abnormality identified. No


evidence of acute infarction.


2. Bilateral mastoid effusions.


3. Moderate paranasal sinus disease as above with air-fluid levels of the right


maxillary and right sphenoid sinuses compatible with acute sinusitis. 











The above report was generated using voice recognition software. It may contain


grammatical, syntax or spelling errors.











Electronically signed by:  Ashwin Bhatia M.D.


2/27/2018 7:48 AM








[~ rep ct add3]]


CERVICAL WITHOUT CONTRAST





CLINICAL HISTORY: 62 years-old Male presenting with No specific movement of


extremities, opens/closes eyes to command. 





TECHNIQUE: Multisequence, multiplanar MR imaging of the cervical spine was


performed without the use of intravenous contrast. IV contrast: None. 





COMPARISON: Plain radiographs from 8/28/2015.





FINDINGS:





Localizer images: Unremarkable.





Incidental note made of air-fluid level in the right maxillary sinus, which may


be due to the presence of an endotracheal tube. Fluid noted in the hypopharynx.





Image quality is highly degraded by motion artifact limiting diagnostic


sensitivity the exam. Within this limitation, straightening of normal cervical


lordosis as on prior radiographs. 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 Mild


vertebral body height loss of C5 likely degenerative in etiology. Diffuse


intervertebral disc desiccation with height loss at C5-6. Disc osteophyte


complexes noted from C3-4 through C5-6 with the greatest degree of spinal


stenosis noted at C3-4 though CSF signal is preserved. No cord flattening or


contouring of the spinal cord. Degenerative change results in neural foraminal


narrowing on the left at C2-3, bilaterally at C3-4, on the left at C4-5, and


left greater than right at C5-6. Neural foramina of C6-7 are likely congenitally


diminutive.





Extensive paraspinal muscular edema. Additionally, focal bone marrow edema in


the pedicles and facets at C7 and T1 on the left with more focal paraspinal


edema at the left C7-T1 facet joint. No significant fluid within the left C7-T1


facet. Mild bony edema also noted in the spinous process of C7.





Allowing for limited image quality, cervical spinal cord normal in morphology


and signal intensity. However, significant mass effect on the left posterior


lateral aspect of the spinal cord is noted at C7, possibly due to exuberant


focal calcification or other extradural abnormality. This likely exerts mass


effect on the transiting T1 nerve roots on the left. This does not appear to


result in spinal cord impingement at this level.





IMPRESSION:


1.  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





2.  Multilevel degenerative changes with very degrees of spinal canal and neural


foraminal stenosis. Evaluation is overall limited secondary to motion artifact.





3.  Focal mass effect on the left posterior lateral aspect of the spinal cord at


C7 due to an extradural abnormality, possibly exuberant focal calcification.


This is incompletely characterized secondary to degraded image quality. This


likely exerts mass effect on the transiting left T1 nerve roots.





4.  Paraspinal musculature edema and minimal bony edema as detailed above,


possibly due to degenerative change, muscle spasm or trauma. No convincing


evidence of infection.











Electronically signed by:  Richard Deluna M.D.


2/27/2018 7:46 AM





Impression


1.  Post cardiac arrest/circulatory collapse from PEA February 24th, now warmed 

and not fully responsive.





This patient has suffered a global hypoxic ischemic event.  The MRI does not 

show any focal neurologic findings except for a punctate acute stroke in the 

right cerebellar hemisphere.  I suspect that he has global cortical damage from 

hypoxia.





Clinically he has made some improvements however he is not responding to 

commands or showing signs higher cortical functioning.  He does have a very 

good brainstem functioning on exam.





2. 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 

level impinging T1 nerve root but not the spinal cord.  He has had chronic 

cervical spine issues over time, and currently has multiple levels of cervical 

spinal stenosis.  





He does not display any upper motor neuron signs on examination in his lower 

extremities.





3. Severe generalized polyneuropathy likely secondary to diabetes.





This polyneuropathy will mask any upper motor neuron signs or even normal 

physiologic signs like toe extensor movements to plantar stimulation.





4. Multiple significant medical problems including diabetes, COPD, bipolar 

disorder, and others as listed above.





Plan


1. There is no need for additional neurologic testing at this time.





2. Overall his prognosis is poor to guarded.  He does show some signs of 

responsiveness and brainstem function but no higher cortical functioning yet.  

It is still early, being only 24 hours after being rewarmed.





We will follow closely over the next 24-48 hours and evaluate for any 

neurologic improvement.








I spoke with Dr. Arnold regarding this case.  Overall, I spent a total of 75 

minutes with this case including records review, discussion with clinicians and 

staff as well as his wife who was present at the bedside, and direct patient 

evaluation.

## 2018-02-27 NOTE — DIAGNOSTIC IMAGING REPORT
CERVICAL WITHOUT CONTRAST



CLINICAL HISTORY: 62 years-old Male presenting with No specific movement of

extremities, opens/closes eyes to command. 



TECHNIQUE: Multisequence, multiplanar MR imaging of the cervical spine was

performed without the use of intravenous contrast. IV contrast: None. 



COMPARISON: Plain radiographs from 8/28/2015.



FINDINGS:



Localizer images: Unremarkable.



Incidental note made of air-fluid level in the right maxillary sinus, which may

be due to the presence of an endotracheal tube. Fluid noted in the hypopharynx.



Image quality is highly degraded by motion artifact limiting diagnostic

sensitivity the exam. Within this limitation, straightening of normal cervical

lordosis as on prior radiographs. 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 Mild

vertebral body height loss of C5 likely degenerative in etiology. Diffuse

intervertebral disc desiccation with height loss at C5-6. Disc osteophyte

complexes noted from C3-4 through C5-6 with the greatest degree of spinal

stenosis noted at C3-4 though CSF signal is preserved. No cord flattening or

contouring of the spinal cord. Degenerative change results in neural foraminal

narrowing on the left at C2-3, bilaterally at C3-4, on the left at C4-5, and

left greater than right at C5-6. Neural foramina of C6-7 are likely congenitally

diminutive.



Extensive paraspinal muscular edema. Additionally, focal bone marrow edema in

the pedicles and facets at C7 and T1 on the left with more focal paraspinal

edema at the left C7-T1 facet joint. No significant fluid within the left C7-T1

facet. Mild bony edema also noted in the spinous process of C7.



Allowing for limited image quality, cervical spinal cord normal in morphology

and signal intensity. However, significant mass effect on the left posterior

lateral aspect of the spinal cord is noted at C7, possibly due to exuberant

focal calcification or other extradural abnormality. This likely exerts mass

effect on the transiting T1 nerve roots on the left. This does not appear to

result in spinal cord impingement at this level.



IMPRESSION:

1.  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



2.  Multilevel degenerative changes with very degrees of spinal canal and neural

foraminal stenosis. Evaluation is overall limited secondary to motion artifact.



3.  Focal mass effect on the left posterior lateral aspect of the spinal cord at

C7 due to an extradural abnormality, possibly exuberant focal calcification.

This is incompletely characterized secondary to degraded image quality. This

likely exerts mass effect on the transiting left T1 nerve roots.



4.  Paraspinal musculature edema and minimal bony edema as detailed above,

possibly due to degenerative change, muscle spasm or trauma. No convincing

evidence of infection.







Electronically signed by:  Richard Deluna M.D.

2/27/2018 7:46 AM



Dictated Date/Time:  2/27/2018 6:54 AM

## 2018-02-27 NOTE — CRITICAL CARE PROGRESS NOTE
Critical Care Progress Note


Date of Service


Feb 27, 2018.





ICU Day


ICU Day Number:  4





Attending


Dr. Arnold





Subjective


Patient seen and examined at bedside. Unable to provide ROS due to patient 

condition. Patient remains intubated and on mechanical ventilation. He has less 

leftward gaze today and seems slightly more responsive today. He does not 

demonstrate any intentional movement and does not follow any simple commands 

other than open and close eyes to verbal command. Nursing reports that he does 

squeeze his hand from time to time. He does have unintentional movement of his 

feet per family.





Patient did have reported fever with a Tmax of 38.1 overnight. No rigors or 

sweats. Patient tolerated tube feeds at goal rate of 60 ml/hr. No evidence of 

seizures. No other acute changes.





Objective


GENERAL : No acute distress. Minimal responsiveness


EYES:  No icterus, gaze conjugate and less lateral gaze to the left.  PERRL.  

No corneal reflex


NOSE: No evidence of epistaxis


MOUTH:  No lesions or candidiasis.  Endotracheal tube in place and secure


NECK: No JVD or air leak detected


LUNGS:  CTA B/L, no wheezes, rales or rhonchi.  Continues to be decreased at 

the bases


HEART:  Regular, rate controlled. 


ABDOMEN:  Soft, NT, ND, BS Present


EXTREMITIES:  No LE edema, pedal pulses intact


NEURO: Unresponsive.  Status post hypothermic protocol





Assessment & Plan


Neuro


* Patient more responsive todaya but not following commands.


* Continuous EEG abnormal with moderate diffuse background disorganization and 

slowing but no evidence of epileptiform changes


* Neurology and pharmacy managing valproic acid


* Neurology has been consulted and is following - appreciate Dr. Mcclellan's input


* Noncontrast MRI of the brain and noncontrast MRI of the cervical spine 

unremarkable





Cardiovascular


* PEA cardiac arrest witnessed by EMS staff most likely secondary to hypoxia


*  no ST changes


* Cardiology consulted -no urgency for cardiac catheterization.  Prior history 

of multivessel coronary artery disease


* PO carvedilol for hypertension - increase from 3.25 to 6.5mg


* Continue to monitor on telemetry





Pulmonary


* Quit smoking over a year ago


* Has now been on supplemental oxygen at home for over a year; baseline 6 L/min 

via nasal cannula her family


* Continue with mechanical ventilation -AC 20/55/5/50 %


* CT of chest with no pulmonary  emboli identified; evidence of small to 

moderate bilateral pleural effusions; moderate interstitial pulmonary edema


* Pleural effusions now resolved


* Continue with diuresis and monitor I&Os


* Monitor clinically





Musculoskeletal


* Multiple nondisplaced rib fractures - suspected etiology is cardiac 

compressions


* Probable sternal fracture -suspected etiology cardiac compressions


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


* Patient appears to have foot drop on the right





Renal 


* Renal ultrasound today with no evidence of renal artery stenosis


* There is mention of question of interrupted flow in the right and left renal 

vein.  However, this study was a very poor study with technical difficulty 

related to poor sonographic windows.  Continue to monitor.


* Urine output has picked up dramatically over the last 6 hours -continue to 

follow strict I's&O's


* Continues with acute renal failure


* Avoid nephrotoxic medications


* Follow serial labs





ID


* Ag negative for influenza A&B


* Blood cultures 2 negative


* Urine culture with no growth


* MRSA specimen is negative by DNA probe


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





Endocrine


* History of type 2 diabetes mellitus


* Has been titrated off of insulin drip -continue gtt as needed


* Beta hydroxybutyric acid was hemolyzed


* Checking hemoglobin A1c with a.m. lab


* Continue BSGs per protocol


* Lantus and NovoLog per protocol -glycemic management per pharmacy





Heme


* 2 units held in the event that Hgb drops further - currently stable at 8.7


* Hemodynamically stable


* Transfuse if drops below 7





Nutrition


* Hold Peptamen due to high phos


* Nutrition consult for tube feed management


* Add fiber packet BID 


* OG tube in place





GI


* Pantoprazole 40 mg IV daily





DVT prophylaxis


* TEDs/SCDs





CCT: 40 minutes including discussion with family and independent of any 

procedures





Thank you for including us in the care of this patient.  Please refer to Dr. Arnold's addendum for further recommendations.





I have personally evaluated and examined this patient.  I agree with assessment 

and plan of RENE Davis PA-C.





Continued encephalopathy, not candidate for extubation.  Still continuing to 

better control the patient's blood pressure.





Consults & Procedures


Consultants:


Neurology - Dr. Washburn


Cardiology -Dr. Martínez


Critical care medicine -Dr. Elder


Procedures:


Right radial arterial line -2/24/2018 by Dr. Elder


Endotracheal intubation -2/24/2018 -prehospital tube replaced by Dr. Elder


Right subclavian triple-lumen central venous catheter -  2/24/2018 by Dr. Elder


Fiberoptic bronchoscopy -2/24/2018 by Dr. Elder





Data


Medications:





Current Inpatient Medications








 Medications


  (Trade)  Dose


 Ordered  Sig/Dc


 Route  Start Time


 Stop Time Status Last Admin


Dose Admin


 


 Ioversol


  (Optiray 320)  100 ml  UD  PRN


 IV  2/24/18 09:15


 2/28/18 09:14   


 


 


 Clopidogrel


 Bisulfate


  (plAVix TAB)  75 mg  QAM


 NG  2/25/18 09:00


 3/27/18 08:59  2/27/18 07:59


75 MG


 


 Miscellaneous


 Information


  (Consult


 Glycemic


 Management


 Pharmacy)  1 ea  UD  PRN


 N/A  2/24/18 12:30


 3/26/18 12:29   


 


 


 Artificial Tears


  (Lacri-Lube Oph


 Oint)  1 appln  Q2H  PRN


 OPB  2/24/18 12:00


 3/26/18 11:59   


 


 


 Pantoprazole


 Sodium 40 mg/


 Syringe  10 ml @ 5


 mls/min  DAILY@11


 IV  2/25/18 11:00


 3/27/18 10:59  2/27/18 10:47


5 MLS/MIN


 


 Valproate Sodium


 1000 mg/Dextrose  60 ml @ 55


 mls/hr  Q12H


 IV  2/24/18 22:00


 3/26/18 21:59  2/27/18 10:48


55 MLS/HR


 


 Chlorhexidine


 Gluconate


  (Peridex Oral


 Soln)  15 ml  DAILY


 MT  2/25/18 09:00


 3/27/18 08:59  2/27/18 07:59


15 ML


 


 Albuterol


  (Ventolin Hfa


 Inhaler)  4 puffs  Q6R


 INH  2/24/18 21:00


 3/26/18 17:59  2/27/18 11:49


4 PUFFS


 


 Ipratropium


 Bromide


  (Atrovent Hfa


 Inhaler)  4 puffs  Q6R


 INH  2/24/18 21:00


 3/26/18 17:59  2/27/18 11:49


4 PUFFS


 


 Dopamine HCl/


 Dextrose  250 ml @ 0


 mls/hr  Q0M


 IV  2/25/18 13:45


 3/27/18 13:44  2/26/18 04:11


18.9 MLS/HR


 


 Fentanyl Citrate


  (Fentanyl Inj)  100 mcg  Q2H  PRN


 IV  2/26/18 09:15


 3/12/18 09:14  2/27/18 08:04


100 MCG


 


 Midazolam HCl


  (Versed Inj)  2 mg  Q2H  PRN


 IV  2/26/18 09:15


 3/28/18 09:14  2/27/18 01:30


2 MG


 


 Insulin Aspart


  (novoLOG ASPART)  SLIDING


 SCALE  Q4


 SC  2/26/18 11:00


 3/28/18 10:59  2/27/18 11:03


2 UNITS


 


 Enteral


 Nutritional


 Formula


  (Peptamen


 Intense VHP)  1,000 ml  UD


 OG  2/27/18 09:00


 3/29/18 08:59  2/26/18 11:42


1,000 ML


 


 Furosemide 80 mg/


 Syringe  8 ml @ 4


 mls/min  BID


 IV  2/26/18 21:00


 3/28/18 20:59  2/27/18 07:59


4 MLS/MIN


 


 Glucose


  (Glucose 40% Gel)  15-30


 GRAMS 15


 GRAMS...  UD  PRN


 PO  2/26/18 11:00


 3/28/18 10:59   


 


 


 Glucose


  (Glucose Chew


 Tab)  4-8


 Tablets 4


 Tabl...  UD  PRN


 PO  2/26/18 11:00


 3/28/18 10:59   


 


 


 Dextrose


  (Dextrose 50%


 50ML Syringe)  25-50ML OF


 50% DW IV


 FOR...  UD  PRN


 IV  2/26/18 11:00


 3/28/18 10:59   


 


 


 Glucagon


  (Glucagon Inj)  1 mg  UD  PRN


 SQ  2/26/18 11:00


 3/28/18 10:59   


 


 


 Heparin Sodium/


 Dextrose  500 ml @ 


 22 mls/hr  [id number] PRN


 IV  2/26/18 11:45


 3/28/18 11:44  2/26/18 11:42


20 MLS/HR


 


 Calcium Carbonate


  (Tums Chew Tab)  1,500 mg  BID


 OG  2/27/18 21:00


 3/29/18 20:59   


 


 


 Acetaminophen


  (Tylenol Soln)  650 mg  Q6H  PRN


 PO  2/27/18 09:45


 3/29/18 09:44  2/27/18 10:47


650 MG


 


 Oxycodone HCl


  (Roxicodone Soln)  5 mg  Q8H  PRN


 PO  2/27/18 09:45


 3/13/18 09:44  2/27/18 10:46


5 MG


 


 Phenylephrine HCl


  (Nba-Synephrine


 0.25% Nasal Spr


  (Mild))  2 sprays  BID


 VERONICA  2/27/18 10:00


 3/29/18 09:59  2/27/18 11:39


2 SPRAYS


 


 Oxymetazoline HCl


  (Afrin 0.05%


 Nasal Spray)  2 sprays  BID


 VERONICA  2/27/18 10:00


 3/1/18 21:01  2/27/18 10:57


2 SPRAYS


 


 Amoxicillin/


 Clavulanate


 Potassium


  (Augmentin Susp)  500 mg  BIDM


 PO  2/27/18 10:30


 3/1/18 10:29  2/27/18 10:48


500 MG


 


 Enteral


 Nutritional


 Formula


  (Novasource


 Renal)  1,000 ml  UD  PRN


 OG  2/27/18 10:45


 3/29/18 10:44 UNV  


 


 


 Heparin Sodium


  (Porcine) 3000


 unit/Syringe  3 ml @ 10


 mls/min  TODAY@1130


 IV  2/27/18 11:30


 2/27/18 13:00  2/27/18 11:40


10 MLS/MIN


 


 Carvedilol


  (Coreg Tab)  3.125 mg  NOW  ONCE


 PO  2/27/18 12:15


 2/27/18 12:16   


 








Vital Signs:











  Date Time  Temp Pulse Resp B/P (MAP) Pulse Ox O2 Delivery O2 Flow Rate FiO2


 


2/27/18 11:40        40


 


2/27/18 08:26 38.0 78 20 145/75 (98) 96 Mechanical Ventilator  40


 


2/27/18 08:01 38.1 101 20 206/105 (138) 93 Mechanical Ventilator  40


 


2/27/18 08:00      Mechanical Ventilator  


 


2/27/18 08:00        40


 


2/27/18 07:35        55


 


2/27/18 07:01 37.9 67 20 136/73 (94) 100 Mechanical Ventilator  55


 


2/27/18 07:00 37.9 74 20 151/64 (93) 100 Mechanical Ventilator  55


 


2/27/18 06:01 37.8 66 20 138/72 (85) 99   





    147/59    


 


2/27/18 06:00 37.8 70 20  (83) 99   





    146/58    


 


2/27/18 05:43        55


 


2/27/18 05:01 37.8 72 20 137/74 (86)    





    154/62    


 


2/27/18 05:00 37.8 72 20  (89) 100   





    153/61    


 


2/27/18 04:53 37.8 75 20 135/73 (78)    





    152/60    


 


2/27/18 04:01 37.9 83 21 200/90 (124) 99   





    206/78    


 


2/27/18 04:00 37.9 85 17  (129) 100   





    209/84    


 


2/27/18 04:00      Mechanical Ventilator  


 


2/27/18 04:00        55


 


2/27/18 03:40        55


 


2/27/18 03:01 37.8 76 20 168/81 (103)    





    173/66    


 


2/27/18 03:00 37.8 75 20  (101) 100   





    174/66    


 


2/27/18 02:01 37.7 71 20 153/80 (104) 99 Mechanical Ventilator  55


 


2/27/18 02:01 37.7 71 20 153/80 (100) 99   





    160/61    


 


2/27/18 02:00 37.7 70 20  (91) 100   





    158/61    


 


2/27/18 01:45 37.7 69 20 143/68 (88) 100   





    153/60    


 


2/27/18 01:25 37.8 79 20 177/90 (118) 99   





    184/72    


 


2/27/18 01:01 37.8 74 20 157/67 (97) 99 Mechanical Ventilator  55


 


2/27/18 01:01 37.8 74 20 151/81 (93) 99   





    157/67    


 


2/27/18 01:00 37.9 77 20  (94) 99   





    156/66    


 


2/27/18 00:23        55


 


2/27/18 00:17 38.0 82 20 116/65 (82) 97 Mechanical Ventilator  55


 


2/27/18 00:15        55


 


2/27/18 00:15      Mechanical Ventilator  


 


2/26/18 20:11        55


 


2/26/18 20:07      Mechanical Ventilator  55


 


2/26/18 20:00 37.8 75 20 161/76 (104)  Mechanical Ventilator  55


 


2/26/18 19:30 37.8 73 20 97/49 (65) 99 Mechanical Ventilator  55


 


2/26/18 19:05        55


 


2/26/18 19:00 37.7 72 20 186/101 (129) 100 Mechanical Ventilator  55


 


2/26/18 18:10        55


 


2/26/18 18:00 37.5 80 17 188/78 (114) 100 Mechanical Ventilator  55


 


2/26/18 17:30 37.4 70 20 119/53 (75) 100 Mechanical Ventilator  55


 


2/26/18 17:00 37.4 71 20 121/67 (85)  Mechanical Ventilator  55


 


2/26/18 16:30 37.3 81 20 186/75 (112) 100 Mechanical Ventilator  55


 


2/26/18 16:00        55


 


2/26/18 16:00 37.3 76 20 159/76 (103) 100 Mechanical Ventilator  55


 


2/26/18 16:00      Mechanical Ventilator  55


 


2/26/18 15:00 37.2 71 20 110/54 (72) 100 Mechanical Ventilator  55


 


2/26/18 14:43  77 20 172/82 (112) 100 Mechanical Ventilator  55


 


2/26/18 14:35        55


 


2/26/18 14:01 37.1 69 20 108/62 (77) 100 Mechanical Ventilator  55


 


2/26/18 14:00 37.1 69 20 126/57 (80) 100 Mechanical Ventilator  55








Laboratory Results:





Last 24 Hours








Test


  2/26/18


16:15 2/26/18


18:14 2/26/18


19:59 2/27/18


00:28


 


Bedside Glucose 94 mg/dl   94 mg/dl  107 mg/dl 


 


Activated Partial


Thromboplast Time 


  60.5 SECONDS 


  


  


 


 


Partial Thromboplastin Ratio  2.3   


 


Test


  2/27/18


04:10 2/27/18


06:10 2/27/18


09:20 


 


 


Bedside Glucose 120 mg/dl    


 


White Blood Count  7.45 K/uL   


 


Red Blood Count  2.39 M/uL   


 


Hemoglobin  7.3 g/dL   


 


Hematocrit  21.7 %   


 


Mean Corpuscular Volume  90.8 fL   


 


Mean Corpuscular Hemoglobin  30.5 pg   


 


Mean Corpuscular Hemoglobin


Concent 


  33.6 g/dl 


  


  


 


 


Platelet Count  189 K/uL   


 


Mean Platelet Volume  8.1 fL   


 


Neutrophils (%) (Auto)  79.6 %   


 


Lymphocytes (%) (Auto)  7.5 %   


 


Monocytes (%) (Auto)  11.7 %   


 


Eosinophils (%) (Auto)  0.8 %   


 


Basophils (%) (Auto)  0.1 %   


 


Neutrophils # (Auto)  5.93 K/uL   


 


Lymphocytes # (Auto)  0.56 K/uL   


 


Monocytes # (Auto)  0.87 K/uL   


 


Eosinophils # (Auto)  0.06 K/uL   


 


Basophils # (Auto)  0.01 K/uL   


 


RDW Standard Deviation  52.5 fL   


 


RDW Coefficient of Variation  15.6 %   


 


Immature Granulocyte % (Auto)  0.3 %   


 


Immature Granulocyte # (Auto)  0.02 K/uL   


 


Poikilocytosis  PRESENT   


 


Anisocytosis  PRESENT   


 


Spherocytes  1+   


 


Venous Blood pH  7.33   


 


Venous Blood Partial Pressure


CO2 


  46 mmHg 


  


  


 


 


Venous Blood Partial Pressure


O2 


  43 mmHg 


  


  


 


 


Venous Blood HCO3  24 mmol/L   


 


Venous Blood Oxygen Saturation  73.4 %   


 


Venous Blood Base Excess  -2.1 mEq/L   


 


Sodium Level  140 mmol/L   


 


Potassium Level  3.8 mmol/L   


 


Chloride Level  106 mmol/L   


 


Carbon Dioxide Level  24 mmol/L   


 


Anion Gap  9.0 mmol/L   


 


Blood Urea Nitrogen  42 mg/dl   


 


Creatinine  2.49 mg/dl   


 


Est Creatinine Clear Calc


Drug Dose 


  37.7 ml/min 


  


  


 


 


Estimated GFR (


American) 


  31.3 


  


  


 


 


Estimated GFR (Non-


American 


  27.0 


  


  


 


 


BUN/Creatinine Ratio  17.0   


 


Random Glucose  113 mg/dl   


 


Calcium Level  7.6 mg/dl   


 


Phosphorus Level  6.6 mg/dl   


 


Magnesium Level  2.0 mg/dl   


 


Activated Partial


Thromboplast Time 


  


  45.8 SECONDS 


  


 


 


Partial Thromboplastin Ratio   1.8  


 


Valproic Acid (Depakene) Level   29 mcg/ml

## 2018-02-27 NOTE — PHARMACY PROGRESS NOTE
Glycemic Control Progress Note


Date of Service


Feb 27, 2018.





Scope


Glycemic Pharmacist consulted for glycemic control to write orders per [facility] 

inpatient glycemic control protocol.





Objective


Accuchecks BSG (last 24hrs):











Test


  2/26/18


16:15 2/26/18


19:59 2/27/18


00:28 2/27/18


04:10


 


Bedside Glucose


  94 mg/dl


(70-99) 94 mg/dl


(70-99) 107 mg/dl


(70-99) 120 mg/dl


(70-99)


 


Test


  2/27/18


06:10 


  


  


 


 


Random Glucose


  113 mg/dl


(70-99) 


  


  


 











Recent Pertinent Medications








Outpatient Anti-Diabetic Meds








Basal Insulin





Bolus Insulin





Assessment & Plan


ASSESSMENT:


* See progress note from 2/24 for more background info, in short:


 


* 61 yo M critically ill in ICU s/p cardiac arrest, started on TTM, rewarmed as 

of 2/26 AM





* BSG's ranging  mg/dL over the last 24 hours





* Changes to stressors/dextrose administration


 * Changing Peptamen VHP to Novasource Renal (significant increase in CHO)





* Changes needed to insulin regimen: 


 * Only received basal insulin yesterday (no bolus) and most BSG's below goal 

range - will lower Lantus


 * Will cover additional CHO in tubefeeds with Novolog, based on rate of 

Novasource








PLAN FOR INPATIENT GLYCEMIC CONTROL:





* Basal insulin: Lantus 8 units SQ x1 this AM then qPM based on BSG as follows:


 * Hold for BSG less than 180 mg/dL


 * 5 units for BSG greater than 180 mg/dL





* Bolus insulin 


 * NovoLog per scale q4h


 * Goal Range:  Low 120 mg/dL - High 160 mg/dL


 * Correction Factor: 25 mg/dL/unit


 * Nutritional / Prandial insulin per carb ratio of 1 unit per 8 grams CHO 

consumed








* Please note that the plan above was derived based on current level of insulin 

resistance and hospital stress. These recommendations are appropriate for 

inpatient admission only. Plan of care upon discharge will need to be 

reassessed to avoid potential outpatient hypo/hyperglycemia. 








Thank you.

## 2018-02-27 NOTE — DIAGNOSTIC IMAGING REPORT
CHEST ONE VIEW PORTABLE



HISTORY:  62 years-old Male follow up cardiac arrest, fx ribs acute respiratory

distress



COMPARISON: Chest radiograph 2/26/2018



TECHNIQUE: Portable AP view of the chest



FINDINGS: 

The patient is slightly rotated to the right. Endotracheal tube overlies the

midline, 4.0 cm superior to the olivier. Enteric tube courses below the diaphragm

into the region of the gastric lumen. Right subclavian central venous catheter

appears unchanged. Cardiac silhouette is again enlarged. Pulmonary vascular

congestion with persistent mild pulmonary edema. Atherosclerosis of the aorta.

Slightly improved aeration of the left lung base with persistent hazy left

basilar opacity. Mild blunting of the costophrenic angles may reflect trace

effusions. No pneumothorax. The bones appear grossly intact.



IMPRESSION: 

1. Satisfactory positioning of life support lines and tubes.

2. Cardiomegaly with persistent mild pulmonary edema.

3. Mildly improved aeration of the left lung base. 







The above report was generated using voice recognition software. It may contain

grammatical, syntax or spelling errors.







Electronically signed by:  Ashwin Bhatia M.D.

2/27/2018 7:07 AM



Dictated Date/Time:  2/27/2018 7:05 AM

## 2018-02-27 NOTE — DIAGNOSTIC IMAGING REPORT
******** ADDENDUM ********





Upon further review of the case, there is a punctate focus of mildly increased

diffusion-weighted signal involving the mid right cerebellar hemisphere on image

5 series 7 with possible corresponding decreased signal on the ADC map

suspicious for a tiny focus of acute or subacute infarction.







Electronically signed by:  Ashwin Bhatia M.D.

2/27/2018 2:37 PM



Dictated Date/Time:  2/27/2018 2:35 PM



******** ORIGINAL REPORT ********





BRAIN WITHOUT CONTRAST



HISTORY:  62 years-old Male Unrespomnsive, slow progression on EEG acutely

unresponsive patient. Acute altered mental status with shortness of breath



COMPARISON: Cervical spine MRI of same day, CT head 2/24/2018, MRI brain

2/12/2018



TECHNIQUE: Multiplanar multisequence MRI of the brain was obtained without

contrast.



FINDINGS: 

Several of the sequences are very motion degraded. The sagittal T1 series are

nondiagnostic. There is no restricted diffusion to suggest acute infarction. No

cerebellar tonsillar herniation. Degenerative changes are seen within the imaged

cervical spine. Mild right atrophy redemonstrated without acute intracranial

hemorrhage, midline shift, abnormal extra-axial collections, hydrocephalus or

intracranial mass. No significant T2/FLAIR signal abnormalities of the brain

parenchyma. There is no pathologic blooming artifact on the T2 star series.



The major flow voids at the level of the skull bases appear patent. The orbits

are symmetric. Bilateral mastoid effusions, left greater than right

redemonstrated. Moderate mucosal thickening with air-fluid levels seen within

the right maxillary and sphenoid sinuses. Moderate ethmoid sinus disease with

mild left sphenoid, left maxillary and bilateral frontal sinus disease. Mild

rightward bowing and spurring of the nasal septum. Soft tissues, scalp and

calvarium appear unremarkable.



IMPRESSION: 

1. Motion degraded exam without acute intracranial abnormality identified. No

evidence of acute infarction.

2. Bilateral mastoid effusions.

3. Moderate paranasal sinus disease as above with air-fluid levels of the right

maxillary and right sphenoid sinuses compatible with acute sinusitis. 







The above report was generated using voice recognition software. It may contain

grammatical, syntax or spelling errors.







Electronically signed by:  Ashwin Bhatia M.D.

2/27/2018 7:48 AM



Dictated Date/Time:  2/27/2018 6:58 AM

## 2018-02-27 NOTE — NEPHROLOGY CONSULTATION
Nephrology Consultation


Date & Providers





Date of Consultation:  Feb 27, 2018.





Primary Care Provider:  Claude Rowley M.D.





Referring Provider:





Reason for Consultation


Acute on chronic kidney injury following cardiopulmonary arrest





History of Present Illness


Mr. Dixon was seen and examined in the ICU this afternoon at the request of 

Dr. Arnold for evaluation of acute on chronic kidney injury following 

cardiopulmonary arrest.  Medical records in the EMR were reviewed and are 

summarized as follows:  Mr. Dixon has an extensive medical history.  He has 

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  His baseline creatinine 

has been 1.2 but patient has proteinuria.  He is under the care of Dr. Mullins but 

has not kept regular outpatient Nephrology appointments.  Mr. Dixon's 

medical history is also significant for HTN, hyperlipidemia, ASCVD, diastolic 

CHF and bipolar disorder.  He has had regular follow [place] [facility] 

for management of his blood pressure regimen and diuretics.  Previously Mr. Dixon was medically stable on a combination of Labetalol 200 mg BID, 

Amlodipine 10 mg qAM, Spironolactone 50 mg BID and Furosemide 40 mg BID.  He 

has had several recent hospitalizations for decompensated heart failure.  His 

family reports that Mr. Dixon has been noncompliant with his medications and 

home oxygen.  On 02/24/18 Mr. Dixon called EMS due to dyspnea.  Upon 

transport to the hospital he suffered a cardiopulmonary arrest and was 

successfully resuscitated.  He was placed on hypothermia protocol in the ICU.  

This morning the ICU team began to warm him.  He appears to have suffered 

significant neurologic injury.  Neurology note this morning indicates that MRI 

shows only a small area of infarction but EEG is consistent only w/ brain stem 

function.  Further neurologic evaluation will be carried out 48 hours after 

warming has been started.  Serum creatinine has risen form 1.2 to 2.4.  Patient 

is now nonoliguric.  He is responding to bolus IV Furosemide





Past Medical/Surgical History


Medical:


#  CKD from DM, HTN, microvascular disease.  Baseline creatinine has been ~ 

1.2.  11/17 CTA negative for KIANA


#  Proteinuria - UPCR 1.5, likely related to diabetic nephropathy


#  AODM


#  ASCVD


#  Chronic diastolic heart failure managed [place] [facility]


#  Hyperlipidemia


#  PVD s/p amputation of several fingers R hand, chronic L ankle ulcer


#  Bipolar disorder


#  Neurogenic bladder requiring chronic gillespie catheter


#  Chronic anemia (acceptable iron stores 12/17)


Surgical:


#  LLE PTA w/ stenting


#  Amputation several fingers R hand








Allergies


Coded Allergies:  


     No Known Allergies (Verified , 2/24/18)





Inpatient Medications





Current Inpatient Medications








 Medications


  (Trade)  Dose


 Ordered  Sig/Dc


 Route  Start Time


 Stop Time Status Last Admin


Dose Admin


 


 Ioversol


  (Optiray 320)  100 ml  UD  PRN


 IV  2/24/18 09:15


 2/28/18 09:14   


 


 


 Clopidogrel


 Bisulfate


  (plAVix TAB)  75 mg  QAM


 NG  2/25/18 09:00


 3/27/18 08:59  2/27/18 07:59


75 MG


 


 Miscellaneous


 Information


  (Consult


 Glycemic


 Management


 Pharmacy)  1 ea  UD  PRN


 N/A  2/24/18 12:30


 3/26/18 12:29   


 


 


 Artificial Tears


  (Lacri-Lube Oph


 Oint)  1 appln  Q2H  PRN


 OPB  2/24/18 12:00


 3/26/18 11:59   


 


 


 Pantoprazole


 Sodium 40 mg/


 Syringe  10 ml @ 5


 mls/min  DAILY@11


 IV  2/25/18 11:00


 3/27/18 10:59  2/27/18 10:47


5 MLS/MIN


 


 Valproate Sodium


 1000 mg/Dextrose  60 ml @ 55


 mls/hr  Q12H


 IV  2/24/18 22:00


 3/26/18 21:59  2/27/18 10:48


55 MLS/HR


 


 Chlorhexidine


 Gluconate


  (Peridex Oral


 Soln)  15 ml  DAILY


 MT  2/25/18 09:00


 3/27/18 08:59  2/27/18 07:59


15 ML


 


 Albuterol


  (Ventolin Hfa


 Inhaler)  4 puffs  Q6R


 INH  2/24/18 21:00


 3/26/18 17:59  2/27/18 11:49


4 PUFFS


 


 Ipratropium


 Bromide


  (Atrovent Hfa


 Inhaler)  4 puffs  Q6R


 INH  2/24/18 21:00


 3/26/18 17:59  2/27/18 11:49


4 PUFFS


 


 Fentanyl Citrate


  (Fentanyl Inj)  100 mcg  Q2H  PRN


 IV  2/26/18 09:15


 3/12/18 09:14  2/27/18 12:24


100 MCG


 


 Midazolam HCl


  (Versed Inj)  2 mg  Q2H  PRN


 IV  2/26/18 09:15


 3/28/18 09:14  2/27/18 01:30


2 MG


 


 Insulin Aspart


  (novoLOG ASPART)  SLIDING


 SCALE  Q4


 SC  2/26/18 11:00


 3/28/18 10:59  2/27/18 11:03


2 UNITS


 


 Furosemide 80 mg/


 Syringe  8 ml @ 4


 mls/min  BID


 IV  2/26/18 21:00


 3/28/18 20:59  2/27/18 07:59


4 MLS/MIN


 


 Glucose


  (Glucose 40% Gel)  15-30


 GRAMS 15


 GRAMS...  UD  PRN


 PO  2/26/18 11:00


 3/28/18 10:59   


 


 


 Glucose


  (Glucose Chew


 Tab)  4-8


 Tablets 4


 Tabl...  UD  PRN


 PO  2/26/18 11:00


 3/28/18 10:59   


 


 


 Dextrose


  (Dextrose 50%


 50ML Syringe)  25-50ML OF


 50% DW IV


 FOR...  UD  PRN


 IV  2/26/18 11:00


 3/28/18 10:59   


 


 


 Glucagon


  (Glucagon Inj)  1 mg  UD  PRN


 SQ  2/26/18 11:00


 3/28/18 10:59   


 


 


 Heparin Sodium/


 Dextrose  500 ml @ 


 22 mls/hr  [id number] PRN


 IV  2/26/18 11:45


 3/28/18 11:44  2/27/18 13:26


22 MLS/HR


 


 Calcium Carbonate


  (Tums Chew Tab)  1,500 mg  BID


 OG  2/27/18 21:00


 3/29/18 20:59   


 


 


 Acetaminophen


  (Tylenol Soln)  650 mg  Q6H  PRN


 PO  2/27/18 09:45


 3/29/18 09:44  2/27/18 10:47


650 MG


 


 Oxycodone HCl


  (Roxicodone Soln)  5 mg  Q8H  PRN


 PO  2/27/18 09:45


 3/13/18 09:44  2/27/18 10:46


5 MG


 


 Oxymetazoline HCl


  (Afrin 0.05%


 Nasal Spray)  2 sprays  BID


 VERONICA  2/27/18 10:00


 3/1/18 21:01  2/27/18 10:57


2 SPRAYS


 


 Amoxicillin/


 Clavulanate


 Potassium


  (Augmentin Susp)  500 mg  BIDM


 PO  2/27/18 10:30


 3/1/18 10:29  2/27/18 10:48


500 MG


 


 Enteral


 Nutritional


 Formula


  (Novasource


 Renal)  1,000 ml  UD  PRN


 OG  2/27/18 10:45


 3/29/18 10:44  2/27/18 13:24


1,000 ML


 


 Carvedilol


  (Coreg Tab)  6.25 mg  BID


 PO  2/27/18 21:00


 3/29/18 20:59   


 


 


 Methylcellulose


  (Citrucel Powder)  19 gm  BID


 PO  2/27/18 13:00


 3/29/18 12:59   


 


 


 Insulin Glargine


  (Lantus Solostar


 Pen)    QPM


 SC  2/27/18 21:00


 3/29/18 20:59   


 


 


 Insulin Glargine


  (Lantus Solostar


 Pen)    QAM


 SC  2/28/18 09:00


 3/30/18 08:59   


 











Family History





Cancer (esophageal)


Diabetes mellitus


Heart disease


Hypertension





Negative for CKD / ESRD





Social History


Smoking Status:  Former Smoker


Drug Use:  none


Marital Status:  


Housing Status:  lives with family, other


Occupation:  employed








.  Disabled.  Former smoker





Review of Systems


Unable to obtain.  Patient is on mechanical ventilation.





Physical Exam











  Date Time  Temp Pulse Resp B/P (MAP) Pulse Ox O2 Delivery O2 Flow Rate FiO2


 


2/27/18 14:30        40


 


2/27/18 14:01 37.5 64 20 166/80 (108) 99 Mechanical Ventilator  40


 


2/27/18 13:00 37.7 67 20 181/66 (104) 99 Mechanical Ventilator  40


 


2/27/18 12:41 37.8 65 20 122/61 (81)    


 


2/27/18 12:02 37.9 74 20 202/75 (117) 100 Mechanical Ventilator  40


 


2/27/18 12:00      Mechanical Ventilator  


 


2/27/18 12:00        40


 


2/27/18 11:40        40


 


2/27/18 11:01 37.9 73 20 153/82 (105)    


 


2/27/18 11:00 37.9 70 20 172/65 (100) 99 Mechanical Ventilator  40


 


2/27/18 10:01 38.0 67 20 143/70 (94) 98 Mechanical Ventilator  40


 


2/27/18 09:01 38.0 71 20 114/65 (81)    


 


2/27/18 09:00 38.0 71 20 126/54 (78) 97 Mechanical Ventilator  40


 


2/27/18 08:26 38.0 78 20 145/75 (98) 96 Mechanical Ventilator  40


 


2/27/18 08:01 38.1 101 20 206/105 (138) 93 Mechanical Ventilator  40


 


2/27/18 08:00      Mechanical Ventilator  55


 


2/27/18 08:00      Mechanical Ventilator  


 


2/27/18 08:00        40


 


2/27/18 07:35        55


 


2/27/18 07:01 37.9 67 20 136/73 (94) 100 Mechanical Ventilator  55


 


2/27/18 07:00 37.9 74 20 151/64 (93) 100 Mechanical Ventilator  55


 


2/27/18 06:01 37.8 66 20 138/72 (85) 99   





    147/59    


 


2/27/18 06:00 37.8 70 20  (83) 99   





    146/58    


 


2/27/18 05:43        55


 


2/27/18 05:01 37.8 72 20 137/74 (86)    





    154/62    


 


2/27/18 05:00 37.8 72 20  (89) 100   





    153/61    


 


2/27/18 04:53 37.8 75 20 135/73 (78)    





    152/60    


 


2/27/18 04:01 37.9 83 21 200/90 (124) 99   





    206/78    


 


2/27/18 04:00 37.9 85 17  (129) 100   





    209/84    


 


2/27/18 04:00      Mechanical Ventilator  


 


2/27/18 04:00        55


 


2/27/18 03:40        55


 


2/27/18 03:01 37.8 76 20 168/81 (103)    





    173/66    


 


2/27/18 03:00 37.8 75 20  (101) 100   





    174/66    


 


2/27/18 02:01 37.7 71 20 153/80 (104) 99 Mechanical Ventilator  55


 


2/27/18 02:01 37.7 71 20 153/80 (100) 99   





    160/61    


 


2/27/18 02:00 37.7 70 20  (91) 100   





    158/61    


 


2/27/18 01:45 37.7 69 20 143/68 (88) 100   





    153/60    


 


2/27/18 01:25 37.8 79 20 177/90 (118) 99   





    184/72    


 


2/27/18 01:01 37.8 74 20 157/67 (97) 99 Mechanical Ventilator  55


 


2/27/18 01:01 37.8 74 20 151/81 (93) 99   





    157/67    


 


2/27/18 01:00 37.9 77 20  (94) 99   





    156/66    


 


2/27/18 00:23        55


 


2/27/18 00:17 38.0 82 20 116/65 (82) 97 Mechanical Ventilator  55


 


2/27/18 00:15        55


 


2/27/18 00:15      Mechanical Ventilator  


 


2/26/18 20:11        55


 


2/26/18 20:07      Mechanical Ventilator  55


 


2/26/18 20:00 37.8 75 20 161/76 (104)  Mechanical Ventilator  55


 


2/26/18 19:30 37.8 73 20 97/49 (65) 99 Mechanical Ventilator  55


 


2/26/18 19:05        55


 


2/26/18 19:00 37.7 72 20 186/101 (129) 100 Mechanical Ventilator  55


 


2/26/18 18:10        55


 


2/26/18 18:00 37.5 80 17 188/78 (114) 100 Mechanical Ventilator  55


 


2/26/18 17:30 37.4 70 20 119/53 (75) 100 Mechanical Ventilator  55


 


2/26/18 17:00 37.4 71 20 121/67 (85)  Mechanical Ventilator  55


 


2/26/18 16:30 37.3 81 20 186/75 (112) 100 Mechanical Ventilator  55


 


2/26/18 16:00        55


 


2/26/18 16:00 37.3 76 20 159/76 (103) 100 Mechanical Ventilator  55


 


2/26/18 16:00      Mechanical Ventilator  55








General Appearance:  no apparent distress


Head:  normocephalic, atraumatic


Eyes:  PERRL, EOMI


Neck:  no adenopathy


Respiratory/Chest:  + pertinent finding (coarse breath sounds bilaterally)


Cardiovascular:  regular rate, rhythm


Abdomen/GI:  soft


Extremities/Musculoskelatal:  + pertinent finding (2+ pretibial pitting edema)


Neurologic/Psych:  + pertinent finding (opens eyes to voice but does not follow 

commands)


Skin:  warm/dry





Laboratory Results





Last 24 Hours








Test


  2/26/18


16:15 2/26/18


18:14 2/26/18


19:59 2/27/18


00:28


 


Bedside Glucose 94 mg/dl   94 mg/dl  107 mg/dl 


 


Activated Partial


Thromboplast Time 


  60.5 SECONDS 


  


  


 


 


Partial Thromboplastin Ratio  2.3   


 


Test


  2/27/18


04:10 2/27/18


06:10 2/27/18


09:20 


 


 


Bedside Glucose 120 mg/dl    


 


White Blood Count  7.45 K/uL   


 


Red Blood Count  2.39 M/uL   


 


Hemoglobin  7.3 g/dL   


 


Hematocrit  21.7 %   


 


Mean Corpuscular Volume  90.8 fL   


 


Mean Corpuscular Hemoglobin  30.5 pg   


 


Mean Corpuscular Hemoglobin


Concent 


  33.6 g/dl 


  


  


 


 


Platelet Count  189 K/uL   


 


Mean Platelet Volume  8.1 fL   


 


Neutrophils (%) (Auto)  79.6 %   


 


Lymphocytes (%) (Auto)  7.5 %   


 


Monocytes (%) (Auto)  11.7 %   


 


Eosinophils (%) (Auto)  0.8 %   


 


Basophils (%) (Auto)  0.1 %   


 


Neutrophils # (Auto)  5.93 K/uL   


 


Lymphocytes # (Auto)  0.56 K/uL   


 


Monocytes # (Auto)  0.87 K/uL   


 


Eosinophils # (Auto)  0.06 K/uL   


 


Basophils # (Auto)  0.01 K/uL   


 


RDW Standard Deviation  52.5 fL   


 


RDW Coefficient of Variation  15.6 %   


 


Immature Granulocyte % (Auto)  0.3 %   


 


Immature Granulocyte # (Auto)  0.02 K/uL   


 


Poikilocytosis  PRESENT   


 


Anisocytosis  PRESENT   


 


Spherocytes  1+   


 


Venous Blood pH  7.33   


 


Venous Blood Partial Pressure


CO2 


  46 mmHg 


  


  


 


 


Venous Blood Partial Pressure


O2 


  43 mmHg 


  


  


 


 


Venous Blood HCO3  24 mmol/L   


 


Venous Blood Oxygen Saturation  73.4 %   


 


Venous Blood Base Excess  -2.1 mEq/L   


 


Sodium Level  140 mmol/L   


 


Potassium Level  3.8 mmol/L   


 


Chloride Level  106 mmol/L   


 


Carbon Dioxide Level  24 mmol/L   


 


Anion Gap  9.0 mmol/L   


 


Blood Urea Nitrogen  42 mg/dl   


 


Creatinine  2.49 mg/dl   


 


Est Creatinine Clear Calc


Drug Dose 


  37.7 ml/min 


  


  


 


 


Estimated GFR (


American) 


  31.3 


  


  


 


 


Estimated GFR (Non-


American 


  27.0 


  


  


 


 


BUN/Creatinine Ratio  17.0   


 


Random Glucose  113 mg/dl   


 


Calcium Level  7.6 mg/dl   


 


Phosphorus Level  6.6 mg/dl   


 


Magnesium Level  2.0 mg/dl   


 


Activated Partial


Thromboplast Time 


  


  45.8 SECONDS 


  


 


 


Partial Thromboplastin Ratio   1.8  


 


Valproic Acid (Depakene) Level   29 mcg/ml  











Impression





(1) Cardiac arrest


(2) Acute kidney injury


(3) Ischemic cardiomyopathy





Recommendations


-- ATN following cardiopulmonary arrest


-- Electrolyte balance is acceptable at this time


-- CXR film reviewed today and shows mild pulmonary congestion.  Patient is on 

40% FiO2 w/ 5 PEEP and oxygenating well


-- Patient is responding to bolus loop diuretic therapy.  He is net 700 cc 

negative so far today.  Continue Furosemide 80 mg IV BID


-- Monitor PRP


-- Prognosis is guarded.  Await further Neurologic testing in am 





60 minutes critical care time provided.  This was necessary to examine patient, 

review medical records and discuss management w/ ICU staff.

## 2018-02-27 NOTE — CARDIOLOGY PROGRESS NOTE
DATE: 02/27/2018

 

TIME:  7:37 a.m.

 

SUBJECTIVE:  He remains sedated on mechanical ventilation.  He has opened his

eyes to verbal stimuli, according to records.  He appeared to attempt to open

his eyes upon verbal command this morning but did not fully open them and

otherwise did not have any purposeful movement upon verbal stimuli.  He did

start to improve urine output after diuretic yesterday.  Critical care team

has started carvedilol last evening.

 

OBJECTIVE:

VITAL SIGNS:  Temperature 37.8 degrees, heart rate 66 beats per minute,

respiration rate 20, blood pressure 147/59 mmHg via the arterial line, oxygen

saturation is 99% on mechanical ventilator.  I's and O's, he was positive 1.1

liters yesterday but did make 955 mL of urine.  He is negative 955 mL so far

this morning.  His weight is 98.4 kg.

GENERAL:  He is sedated and attempts to open his eyes upon verbal stimuli. 

Otherwise, no purposeful movement while on sedation.

NECK:  Thick, cannot assess JVD.

CARDIAC EXAM:  No ventricular heave, regular, normal S1, S2, 2/6 early

peaking systolic ejection murmur best heard at the right upper sternal

border.  No rubs or gallops.

LUNGS:  Clear on anterior auscultation.

ABDOMEN:  Soft, nondistended.  Hypoactive bowel sounds.

EXTREMITIES:  2-3+ bilateral lower extremity edema to the knees and 1+ above

the knees.  No cyanosis.

 

MEDICATIONS:  Include  Plavix 75 mg daily, carvedilol 3.125 mg p.o. b.i.d.,

Lasix 80 mg IV b.i.d., heparin drip per protocol, insulin, Protonix,

valproate sodium 1000 mg IV q. 12 hours.

 

LABORATORY DATA:  Sodium 140, potassium 3.8, BUN 42, creatinine 2.49,

magnesium 2.  WBC 7.45, hemoglobin 7.3, platelets 189.  Telemetry personally

reviewed.  No arrhythmia.  Remains in sinus rhythm.  Chest x-ray performed

this morning personally reviewed.  No obvious infiltrate.  Still evidence of

vascular congestion.

 

Brain MRI official reading is currently pending.

 

ECG from this morning personally reviewed.  Sinus rhythm at 74 beats per

minute with PACs.  LVH.  Deep T-wave inversion throughout the anterior leads,

but also T-wave inversion diffusely.  Prolonged QT.

 

ASSESSMENT AND PLAN:

1.  Pulseless electrical activity/code blue:  Likely a hypoxic event as he

had O2 sat of 66% at home on 6 liters of oxygen.  He presented with acute on

chronic congestive heart failure.  Continue diuresis.

2.  Acute on chronic diastolic congestive heart failure:  Continue diuresis. 

He is negative so far this morning.  Continue Lasix 80 mg IV twice daily. 

Monitor renal function closely and consider nephrology consultation,

especially if no improvement.

3.  Elevated troponins:  They were not diagnostic of myocardial infarction

and the degree of elevation was not unexpected given his prolonged hypoxic

event.  No indication urgent cardiac catheterization at this time.

4.  Multivessel coronary artery disease status post percutaneous coronary

intervention:  Continue antiplatelet therapy indefinitely.  Beta blocker was

resumed last night.  Can titrate carvedilol as appropriate.  High intensity

statin therapy is indicated.

5.  Cardiomyopathy:  Global hypokinesis on echo.  Left ventricular systolic

function was mildly reduced.  Will likely repeat an echocardiogram later this

hospitalization.  Agree with carvedilol.  Titrate as able.  I would not start

ACE inhibitor at this point secondary to his renal function.

6.  Hypertension:  Carvedilol initiated last night.  Titrate as appropriate.

7.  Aortic thrombus:  This diagnosis was made at [facility] and noted on discharge

summary.  Imaging reports were obtained and reviewed and did not mention

aortic thrombus.  It is not clear if long-term anticoagulation is indicated

at this time.  Perhaps his primary cardiologist, Dr. Yusuf may know more

details when he returns later this week.  He is currently on a heparin drip.

8.  Acute renal insufficiency:  Likely secondary to his presenting event with

pulseless electrical activity and hypoxia, possibly acute tubular necrosis. 

Consider nephrology consultation.  Fortunately, he is now making urine. 

Hopefully, his renal function starts to improve.

9.  Disposition:  Cardiology will continue to follow.  The patient's care has

been discussed today with nursing staff and Heber Davis of the critical care

team.

## 2018-02-28 VITALS
HEART RATE: 72 BPM | DIASTOLIC BLOOD PRESSURE: 76 MMHG | TEMPERATURE: 100.4 F | SYSTOLIC BLOOD PRESSURE: 200 MMHG | OXYGEN SATURATION: 97 %

## 2018-02-28 VITALS
HEART RATE: 90 BPM | TEMPERATURE: 99.68 F | DIASTOLIC BLOOD PRESSURE: 84 MMHG | SYSTOLIC BLOOD PRESSURE: 222 MMHG | OXYGEN SATURATION: 96 %

## 2018-02-28 VITALS
SYSTOLIC BLOOD PRESSURE: 207 MMHG | TEMPERATURE: 100.04 F | HEART RATE: 69 BPM | DIASTOLIC BLOOD PRESSURE: 88 MMHG | OXYGEN SATURATION: 97 %

## 2018-02-28 VITALS — OXYGEN SATURATION: 97 % | HEART RATE: 60 BPM

## 2018-02-28 VITALS
HEART RATE: 66 BPM | TEMPERATURE: 99.86 F | SYSTOLIC BLOOD PRESSURE: 200 MMHG | DIASTOLIC BLOOD PRESSURE: 73 MMHG | OXYGEN SATURATION: 99 %

## 2018-02-28 VITALS
HEART RATE: 84 BPM | DIASTOLIC BLOOD PRESSURE: 83 MMHG | TEMPERATURE: 99.86 F | SYSTOLIC BLOOD PRESSURE: 162 MMHG | OXYGEN SATURATION: 94 %

## 2018-02-28 VITALS
SYSTOLIC BLOOD PRESSURE: 209 MMHG | DIASTOLIC BLOOD PRESSURE: 74 MMHG | HEART RATE: 84 BPM | OXYGEN SATURATION: 95 % | TEMPERATURE: 99.68 F

## 2018-02-28 VITALS
TEMPERATURE: 99.86 F | HEART RATE: 69 BPM | SYSTOLIC BLOOD PRESSURE: 198 MMHG | DIASTOLIC BLOOD PRESSURE: 83 MMHG | OXYGEN SATURATION: 97 %

## 2018-02-28 VITALS
SYSTOLIC BLOOD PRESSURE: 145 MMHG | HEART RATE: 59 BPM | OXYGEN SATURATION: 95 % | TEMPERATURE: 99.86 F | DIASTOLIC BLOOD PRESSURE: 59 MMHG

## 2018-02-28 VITALS
SYSTOLIC BLOOD PRESSURE: 172 MMHG | DIASTOLIC BLOOD PRESSURE: 64 MMHG | OXYGEN SATURATION: 96 % | HEART RATE: 61 BPM | TEMPERATURE: 99.14 F

## 2018-02-28 VITALS
TEMPERATURE: 99.14 F | OXYGEN SATURATION: 99 % | DIASTOLIC BLOOD PRESSURE: 54 MMHG | HEART RATE: 56 BPM | SYSTOLIC BLOOD PRESSURE: 160 MMHG

## 2018-02-28 VITALS
HEART RATE: 65 BPM | DIASTOLIC BLOOD PRESSURE: 66 MMHG | SYSTOLIC BLOOD PRESSURE: 179 MMHG | OXYGEN SATURATION: 97 % | TEMPERATURE: 99.14 F

## 2018-02-28 VITALS
SYSTOLIC BLOOD PRESSURE: 208 MMHG | DIASTOLIC BLOOD PRESSURE: 77 MMHG | HEART RATE: 64 BPM | OXYGEN SATURATION: 99 % | TEMPERATURE: 99.86 F

## 2018-02-28 VITALS
DIASTOLIC BLOOD PRESSURE: 76 MMHG | OXYGEN SATURATION: 94 % | HEART RATE: 66 BPM | SYSTOLIC BLOOD PRESSURE: 208 MMHG | TEMPERATURE: 99.14 F

## 2018-02-28 VITALS
OXYGEN SATURATION: 97 % | TEMPERATURE: 99.32 F | HEART RATE: 63 BPM | DIASTOLIC BLOOD PRESSURE: 80 MMHG | SYSTOLIC BLOOD PRESSURE: 177 MMHG

## 2018-02-28 VITALS
DIASTOLIC BLOOD PRESSURE: 68 MMHG | HEART RATE: 62 BPM | SYSTOLIC BLOOD PRESSURE: 181 MMHG | TEMPERATURE: 99.68 F | OXYGEN SATURATION: 97 %

## 2018-02-28 VITALS
SYSTOLIC BLOOD PRESSURE: 206 MMHG | DIASTOLIC BLOOD PRESSURE: 75 MMHG | OXYGEN SATURATION: 100 % | TEMPERATURE: 99.68 F | HEART RATE: 64 BPM

## 2018-02-28 VITALS — TEMPERATURE: 99.5 F | SYSTOLIC BLOOD PRESSURE: 216 MMHG | DIASTOLIC BLOOD PRESSURE: 79 MMHG | HEART RATE: 69 BPM

## 2018-02-28 VITALS
HEART RATE: 80 BPM | DIASTOLIC BLOOD PRESSURE: 107 MMHG | OXYGEN SATURATION: 94 % | SYSTOLIC BLOOD PRESSURE: 172 MMHG | TEMPERATURE: 100.04 F

## 2018-02-28 VITALS — OXYGEN SATURATION: 97 % | HEART RATE: 85 BPM

## 2018-02-28 VITALS
DIASTOLIC BLOOD PRESSURE: 62 MMHG | OXYGEN SATURATION: 90 % | SYSTOLIC BLOOD PRESSURE: 167 MMHG | HEART RATE: 65 BPM | TEMPERATURE: 99.5 F

## 2018-02-28 VITALS — HEART RATE: 79 BPM | OXYGEN SATURATION: 95 %

## 2018-02-28 VITALS
OXYGEN SATURATION: 98 % | SYSTOLIC BLOOD PRESSURE: 150 MMHG | DIASTOLIC BLOOD PRESSURE: 65 MMHG | TEMPERATURE: 99.14 F | HEART RATE: 57 BPM

## 2018-02-28 VITALS — OXYGEN SATURATION: 97 %

## 2018-02-28 VITALS
TEMPERATURE: 99.86 F | DIASTOLIC BLOOD PRESSURE: 89 MMHG | OXYGEN SATURATION: 99 % | HEART RATE: 58 BPM | SYSTOLIC BLOOD PRESSURE: 187 MMHG

## 2018-02-28 VITALS
DIASTOLIC BLOOD PRESSURE: 54 MMHG | OXYGEN SATURATION: 97 % | HEART RATE: 60 BPM | SYSTOLIC BLOOD PRESSURE: 119 MMHG | TEMPERATURE: 99.5 F

## 2018-02-28 VITALS
HEART RATE: 61 BPM | SYSTOLIC BLOOD PRESSURE: 156 MMHG | DIASTOLIC BLOOD PRESSURE: 66 MMHG | TEMPERATURE: 99.32 F | OXYGEN SATURATION: 96 %

## 2018-02-28 VITALS
DIASTOLIC BLOOD PRESSURE: 59 MMHG | HEART RATE: 65 BPM | TEMPERATURE: 100.04 F | SYSTOLIC BLOOD PRESSURE: 167 MMHG | OXYGEN SATURATION: 98 %

## 2018-02-28 VITALS
TEMPERATURE: 99.14 F | SYSTOLIC BLOOD PRESSURE: 203 MMHG | DIASTOLIC BLOOD PRESSURE: 76 MMHG | OXYGEN SATURATION: 95 % | HEART RATE: 69 BPM

## 2018-02-28 VITALS
SYSTOLIC BLOOD PRESSURE: 127 MMHG | OXYGEN SATURATION: 96 % | HEART RATE: 63 BPM | TEMPERATURE: 100.22 F | DIASTOLIC BLOOD PRESSURE: 56 MMHG

## 2018-02-28 VITALS
SYSTOLIC BLOOD PRESSURE: 220 MMHG | DIASTOLIC BLOOD PRESSURE: 84 MMHG | OXYGEN SATURATION: 98 % | TEMPERATURE: 99.32 F | HEART RATE: 64 BPM

## 2018-02-28 VITALS — DIASTOLIC BLOOD PRESSURE: 79 MMHG | SYSTOLIC BLOOD PRESSURE: 176 MMHG | TEMPERATURE: 99.86 F | HEART RATE: 60 BPM

## 2018-02-28 VITALS
TEMPERATURE: 99.14 F | OXYGEN SATURATION: 98 % | DIASTOLIC BLOOD PRESSURE: 88 MMHG | HEART RATE: 68 BPM | SYSTOLIC BLOOD PRESSURE: 198 MMHG

## 2018-02-28 VITALS
DIASTOLIC BLOOD PRESSURE: 76 MMHG | TEMPERATURE: 99.14 F | SYSTOLIC BLOOD PRESSURE: 189 MMHG | HEART RATE: 59 BPM | OXYGEN SATURATION: 98 %

## 2018-02-28 VITALS
OXYGEN SATURATION: 98 % | DIASTOLIC BLOOD PRESSURE: 67 MMHG | HEART RATE: 67 BPM | TEMPERATURE: 99.86 F | SYSTOLIC BLOOD PRESSURE: 146 MMHG

## 2018-02-28 LAB
BASOPHILS # BLD: 0 K/UL (ref 0–0.2)
BASOPHILS NFR BLD: 0 %
BUN SERPL-MCNC: 45 MG/DL (ref 7–18)
CALCIUM SERPL-MCNC: 8.2 MG/DL (ref 8.5–10.1)
CO2 SERPL-SCNC: 24 MMOL/L (ref 21–32)
CREAT SERPL-MCNC: 2.22 MG/DL (ref 0.6–1.4)
EOS ABS #: 0.04 K/UL (ref 0–0.5)
EOSINOPHIL NFR BLD AUTO: 205 K/UL (ref 130–400)
GLUCOSE SERPL-MCNC: 155 MG/DL (ref 70–99)
HBA1C MFR BLD: 6 % (ref 4.5–5.6)
HCT VFR BLD CALC: 21.3 % (ref 42–52)
HCT VFR BLD CALC: 24.4 % (ref 42–52)
HGB BLD-MCNC: 7 G/DL (ref 14–18)
HGB BLD-MCNC: 8.2 G/DL (ref 14–18)
IG#: 0.02 K/UL (ref 0–0.02)
IMM GRANULOCYTES NFR BLD AUTO: 7.6 %
LYMPHOCYTES # BLD: 0.54 K/UL (ref 1.2–3.4)
MCH RBC QN AUTO: 29.8 PG (ref 25–34)
MCHC RBC AUTO-ENTMCNC: 32.9 G/DL (ref 32–36)
MCV RBC AUTO: 90.6 FL (ref 80–100)
MONO ABS #: 1 K/UL (ref 0.11–0.59)
MONOCYTES NFR BLD: 14 %
NEUT ABS #: 5.54 K/UL (ref 1.4–6.5)
NEUTROPHILS # BLD AUTO: 0.6 %
NEUTROPHILS NFR BLD AUTO: 77.5 %
PHOSPHATE SERPL-MCNC: 4.6 MG/DL (ref 2.5–4.9)
PMV BLD AUTO: 8.8 FL (ref 7.4–10.4)
POTASSIUM SERPL-SCNC: 3.5 MMOL/L (ref 3.5–5.1)
PTT PATIENT: 41 SECONDS (ref 21–31)
PTT PATIENT: 43 SECONDS (ref 21–31)
PTT PATIENT: 46.6 SECONDS (ref 21–31)
RED CELL DISTRIBUTION WIDTH CV: 15.6 % (ref 11.5–14.5)
RED CELL DISTRIBUTION WIDTH SD: 52 FL (ref 36.4–46.3)
RETIC COUNT %: 1.9 % (ref 0.5–2)
SODIUM SERPL-SCNC: 138 MMOL/L (ref 136–145)
WBC # BLD AUTO: 7.14 K/UL (ref 4.8–10.8)

## 2018-02-28 RX ADMIN — POTASSIUM CHLORIDE SCH MLS/HR: 10 INJECTION, SOLUTION INTRAVENOUS at 19:33

## 2018-02-28 RX ADMIN — INSULIN ASPART SCH UNITS: 100 INJECTION, SOLUTION INTRAVENOUS; SUBCUTANEOUS at 21:07

## 2018-02-28 RX ADMIN — IPRATROPIUM BROMIDE AND ALBUTEROL SULFATE SCH ML: .5; 3 SOLUTION RESPIRATORY (INHALATION) at 16:00

## 2018-02-28 RX ADMIN — INSULIN ASPART SCH UNITS: 100 INJECTION, SOLUTION INTRAVENOUS; SUBCUTANEOUS at 10:04

## 2018-02-28 RX ADMIN — VALPROATE SODIUM SCH MLS/HR: 500 INJECTION INTRAVENOUS at 10:09

## 2018-02-28 RX ADMIN — OXYCODONE HYDROCHLORIDE SCH MG: 5 SOLUTION ORAL at 16:21

## 2018-02-28 RX ADMIN — ACETAMINOPHEN PRN MG: 160 SOLUTION ORAL at 01:40

## 2018-02-28 RX ADMIN — INSULIN ASPART SCH UNITS: 100 INJECTION, SOLUTION INTRAVENOUS; SUBCUTANEOUS at 16:00

## 2018-02-28 RX ADMIN — CALCIUM CARBONATE SCH MG: 500 TABLET ORAL at 20:59

## 2018-02-28 RX ADMIN — FUROSEMIDE SCH MLS/MIN: 10 INJECTION, SOLUTION INTRAMUSCULAR; INTRAVENOUS at 08:31

## 2018-02-28 RX ADMIN — LABETALOL HYDROCHLORIDE PRN MG: 5 INJECTION, SOLUTION INTRAVENOUS at 04:37

## 2018-02-28 RX ADMIN — CLOPIDOGREL BISULFATE SCH MG: 75 TABLET, FILM COATED ORAL at 13:41

## 2018-02-28 RX ADMIN — CHLORHEXIDINE GLUCONATE SCH ML: 1.2 RINSE ORAL at 08:28

## 2018-02-28 RX ADMIN — POTASSIUM CHLORIDE SCH MLS/HR: 10 INJECTION, SOLUTION INTRAVENOUS at 17:02

## 2018-02-28 RX ADMIN — INSULIN GLARGINE SCH UNITS: 100 INJECTION, SOLUTION SUBCUTANEOUS at 10:05

## 2018-02-28 RX ADMIN — METHYLCELLULOSE SCH GM: 2 POWDER, FOR SOLUTION ORAL at 08:26

## 2018-02-28 RX ADMIN — PANTOPRAZOLE SODIUM SCH MLS/MIN: 40 INJECTION, POWDER, FOR SOLUTION INTRAVENOUS at 10:09

## 2018-02-28 RX ADMIN — FENTANYL CITRATE PRN MCG: 50 INJECTION, SOLUTION INTRAMUSCULAR; INTRAVENOUS at 15:02

## 2018-02-28 RX ADMIN — INSULIN ASPART SCH UNITS: 100 INJECTION, SOLUTION INTRAVENOUS; SUBCUTANEOUS at 04:38

## 2018-02-28 RX ADMIN — ALBUTEROL SULFATE SCH PUFFS: 90 AEROSOL, METERED RESPIRATORY (INHALATION) at 07:30

## 2018-02-28 RX ADMIN — ALBUTEROL SULFATE SCH PUFFS: 90 AEROSOL, METERED RESPIRATORY (INHALATION) at 01:15

## 2018-02-28 RX ADMIN — CALCIUM CARBONATE SCH MG: 500 TABLET ORAL at 08:26

## 2018-02-28 RX ADMIN — HEPARIN SODIUM PRN MLS/HR: 5000 INJECTION, SOLUTION INTRAVENOUS at 20:48

## 2018-02-28 RX ADMIN — INSULIN ASPART SCH UNITS: 100 INJECTION, SOLUTION INTRAVENOUS; SUBCUTANEOUS at 12:00

## 2018-02-28 RX ADMIN — AMOXICILLIN AND CLAVULANATE POTASSIUM SCH MG: 250; 62.5 POWDER, FOR SUSPENSION ORAL at 17:03

## 2018-02-28 RX ADMIN — IPRATROPIUM BROMIDE SCH PUFFS: 17 AEROSOL, METERED RESPIRATORY (INHALATION) at 01:15

## 2018-02-28 RX ADMIN — CARVEDILOL SCH MG: 6.25 TABLET, FILM COATED ORAL at 08:25

## 2018-02-28 RX ADMIN — OXYMETAZOLINE HYDROCHLORIDE SCH SPRAYS: 0.05 SPRAY NASAL at 21:00

## 2018-02-28 RX ADMIN — FENTANYL CITRATE PRN MCG: 50 INJECTION, SOLUTION INTRAMUSCULAR; INTRAVENOUS at 06:27

## 2018-02-28 RX ADMIN — POTASSIUM CHLORIDE SCH MLS/HR: 10 INJECTION, SOLUTION INTRAVENOUS at 13:41

## 2018-02-28 RX ADMIN — FENTANYL CITRATE PRN MCG: 50 INJECTION, SOLUTION INTRAMUSCULAR; INTRAVENOUS at 01:38

## 2018-02-28 RX ADMIN — LABETALOL HYDROCHLORIDE PRN MG: 5 INJECTION, SOLUTION INTRAVENOUS at 10:22

## 2018-02-28 RX ADMIN — VALPROATE SODIUM SCH MLS/HR: 500 INJECTION INTRAVENOUS at 22:08

## 2018-02-28 RX ADMIN — INSULIN GLARGINE SCH UNITS: 100 INJECTION, SOLUTION SUBCUTANEOUS at 21:00

## 2018-02-28 RX ADMIN — POTASSIUM CHLORIDE SCH MLS/HR: 10 INJECTION, SOLUTION INTRAVENOUS at 16:12

## 2018-02-28 RX ADMIN — AMOXICILLIN AND CLAVULANATE POTASSIUM SCH MG: 250; 62.5 POWDER, FOR SUSPENSION ORAL at 06:27

## 2018-02-28 RX ADMIN — CARVEDILOL SCH MG: 6.25 TABLET, FILM COATED ORAL at 21:00

## 2018-02-28 RX ADMIN — HEPARIN SODIUM PRN MLS/HR: 5000 INJECTION, SOLUTION INTRAVENOUS at 10:06

## 2018-02-28 RX ADMIN — OXYCODONE HYDROCHLORIDE SCH MG: 5 SOLUTION ORAL at 20:59

## 2018-02-28 RX ADMIN — OXYCODONE HYDROCHLORIDE SCH MG: 5 SOLUTION ORAL at 09:00

## 2018-02-28 RX ADMIN — FENTANYL CITRATE PRN MCG: 50 INJECTION, SOLUTION INTRAMUSCULAR; INTRAVENOUS at 21:32

## 2018-02-28 RX ADMIN — IPRATROPIUM BROMIDE SCH PUFFS: 17 AEROSOL, METERED RESPIRATORY (INHALATION) at 07:30

## 2018-02-28 RX ADMIN — FENTANYL CITRATE PRN MCG: 50 INJECTION, SOLUTION INTRAMUSCULAR; INTRAVENOUS at 04:36

## 2018-02-28 RX ADMIN — POTASSIUM CHLORIDE SCH MLS/HR: 10 INJECTION, SOLUTION INTRAVENOUS at 18:09

## 2018-02-28 RX ADMIN — POTASSIUM CHLORIDE SCH MLS/HR: 10 INJECTION, SOLUTION INTRAVENOUS at 14:59

## 2018-02-28 RX ADMIN — HEPARIN SODIUM PRN MLS/HR: 5000 INJECTION, SOLUTION INTRAVENOUS at 01:17

## 2018-02-28 RX ADMIN — IPRATROPIUM BROMIDE AND ALBUTEROL SULFATE SCH ML: .5; 3 SOLUTION RESPIRATORY (INHALATION) at 19:48

## 2018-02-28 RX ADMIN — MIDAZOLAM HYDROCHLORIDE PRN MG: 1 INJECTION, SOLUTION INTRAMUSCULAR; INTRAVENOUS at 00:30

## 2018-02-28 RX ADMIN — FENTANYL CITRATE PRN MCG: 50 INJECTION, SOLUTION INTRAMUSCULAR; INTRAVENOUS at 09:49

## 2018-02-28 RX ADMIN — OXYMETAZOLINE HYDROCHLORIDE SCH SPRAYS: 0.05 SPRAY NASAL at 08:27

## 2018-02-28 NOTE — CARDIOLOGY PROGRESS NOTE
DATE: 02/28/2018

 

TIME:  7:41 a.m.

 

SUBJECTIVE:  Mr. Dixon remains sedated on mechanical ventilation.  He has

been followed by neurology, critical care team, nephrology.  He cannot

provide review of systems given his sedated state.  He does open eyes on

verbal stimuli but does not follow commands this morning.  Neurology has

stated that it appears as though he has had some global hypoxic ischemic

event and also an acute stroke noted on brain MRI in the mid right cerebellar

hemisphere.

 

OBJECTIVE:

VITAL SIGNS:  Temperature 37.3 degrees, heart rate 69 beats per minute,

respiration rate 17, blood pressure 165/58 mmHg and he has been hypertensive

throughout yesterday as well.  Oxygen saturation 95% with an FiO2 of 0.4 on

mechanical ventilation.  I's and O's, negative 1.6 liters yesterday.  Weight

is 96.7 kg.

GENERAL:  No acute distress but sedated.  Opens eyes on command but does not

follow any other commands currently.

NECK:  Cannot assess JVD.  Thick neck.

CARDIAC:  No ventricular heave noted.  Regular.  Normal S1, S2.  2/6 early

peaking systolic ejection murmur, best heard at the right upper sternal

border.  No rubs or gallops.

LUNGS:  Clear to auscultation anteriorly.

ABDOMEN:  Soft, nondistended.  Hypoactive bowel sounds.

EXTREMITIES:  2+ bilateral lower extremity edema nearly to the knees.  Trace

to 1+ edema above the knees.  No cyanosis.

 

MEDICATIONS:  Include Augmentin; carvedilol 6.25 mg twice daily, first dose

last night, otherwise was on 3.125 mg daily; Plavix 75 mg daily; Lasix 80 mg

IV b.i.d.; heparin drip per protocol; Protonix 40 mg IV daily; valproate

sodium 1000 mg IV q. 12 hours.

 

LABORATORY DATA:  White blood cell count 7.14, hemoglobin 7, down from 7.9,

platelets 205.  Sodium 138, potassium 3.5, BUN 45, creatinine 2.22, down from

2.49, magnesium 2.2.

 

Chest x-ray performed this morning personally reviewed.  His pulmonary

vascular congestion appears to be improved.  No obvious infiltrate.

 

Telemetry personally reviewed.  No arrhythmia.

 

Chart reviewed.

 

ASSESSMENT AND PLAN:

1.  Cardiac arrest/pulseless electrical activity, likely driven by hypoxia

given presentation as previously noted.  Continue diuresis for congestive

heart failure exacerbation.

2.  Acute on chronic diastolic congestive heart failure:  He is diuresing

nicely currently.  Continue current dose of Lasix 80 mg IV b.i.d.  He

continues to appear hypervolemic.  Renal function is improving.

3.  Elevated troponins:  Not diagnostic of myocardial infarction and not

unexpected given prolonged hypoxic event and CPR.  No indication for urgent

cardiac catheterization.

4.  Multivessel coronary artery disease, status post percutaneous coronary

intervention:  He has had percutaneous coronary intervention in the past. 

Continue antiplatelet therapy indefinitely.  Beta blocker has been

reinitiated.  Titrate as appropriate for hypertension.  He has been on

labetalol in the past, and therefore, we will increase carvedilol to 12.5 mg

twice daily as heart rate can tolerate.  High intensity statin therapy

indicated if it appears as though he will have a meaningful recovery.

5.  Cardiomyopathy:  Global hypokinesis on echo with new mildly reduced left

ventricular systolic function.  This is likely secondary to his prolonged

hypoxic episode.  Can repeat echo prior to discharge if he has meaningful

recovery.  No ACE inhibitor at this point secondary to his renal dysfunction.

6.  Hypertension:  We will increase carvedilol to 12.5 mg twice daily.  He

has been on beta blockers chronically.  Would also consider hydralazine if

further blood pressure control is appropriate.

7.  Aortic thrombus:  Diagnosed at [facility] [place], [place] noted on a discharge summary.  It is

not clear as imaging reports obtained from that hospitalization do not

mention aortic thrombus.  He is currently being anticoagulated.

8.  Disposition:  Cardiology will continue to follow.  His primary

cardiologist, Dr. Yusuf, will resume his care tomorrow.

## 2018-02-28 NOTE — DIAGNOSTIC IMAGING REPORT
CHEST ONE VIEW PORTABLE



CLINICAL HISTORY: Cardiac Arrest dyspnea



COMPARISON STUDY:  2/27/2018



FINDINGS: Endotracheal tube 3.8 cm above the olivier. Mild atelectatic change

right and to lesser extent left base. Diaphragms smooth. Upper lungs are clear. 



IMPRESSION:  Mild bibasilar atelectasis. Endotracheal tube 3.8 cm above the

olivier. Improved pulmonary vasculature











The above report was generated using voice recognition software.  It may contain

grammatical, syntax or spelling errors.









Electronically signed by:  Gerard Meier M.D.

2/28/2018 7:17 AM



Dictated Date/Time:  2/28/2018 7:14 AM

## 2018-02-28 NOTE — NEPHROLOGY PROGRESS NOTE
Nephrology Progress Note


Date of Service


Feb 28, 2018.





Chief Complaint


Acute on chronic kidney injury following cardiopulmonary arrest





Subjective


Mr. Dixon was seen & examined in the ICU this am.  His medical management 

has been discussed w/ the ICU team.  Mr. Dixon will open his eyes to verbal 

stimulus but does not follow other commands.  He is responding to bolus IV 

Furosemide.  He remains mechanically ventilated on 40% FiO2





Review of Systems


Mechanically ventilated.  Unable to provide ROS





Vital Signs





Last 8 Hrs








  Date Time  Temp Pulse Resp B/P (MAP) Pulse Ox O2 Delivery O2 Flow Rate FiO2


 


2/28/18 11:15 37.6 64 14 206/75 (118) 100  6.0 


 


2/28/18 11:01 37.7 58 15 187/89 (121) 99  6.0 


 


2/28/18 11:00 37.7 66 16 200/73 (115) 99  6.0 


 


2/28/18 10:31 37.7 60 14 176/79 (111)  Oxymask 6.0 


 


2/28/18 10:30 37.7 64 14 208/77 (120) 99 Oxymask 6.0 


 


2/28/18 10:00 37.7 69 14 198/83 (121) 97 Oxymask 6.0 


 


2/28/18 09:00 37.5 69 19 216/79 (124)  Mechanical Ventilator  40


 


2/28/18 08:01 37.3 59 16 189/76 (113) 98 Mechanical Ventilator  40


 


2/28/18 08:00     97 Mechanical Ventilator  40


 


2/28/18 08:00        40


 


2/28/18 07:15        40


 


2/28/18 07:00 37.3 56 20 160/54 (89) 99 Mechanical Ventilator  40


 


2/28/18 06:32 37.3 69 17 203/76 (101) 95   





    165/58 (90)    


 


2/28/18 06:01 37.3 61 20 172/75 (102) 96   





    192/64 (103)    


 


2/28/18 05:52        40


 


2/28/18 05:32 37.3 65 20 179/66 (109) 97   





    191/67 (110)    


 


2/28/18 05:01 37.3 57 20 150/67 (92) 98   





    168/65 (95)    


 


2/28/18 04:43        40


 


2/28/18 04:32 37.3 68 20 198/90 (136) 98   





    220/88 (109)    


 


2/28/18 04:02 37.4 61 20 156/66 (96) 96   





    177/63 (102)    


 


2/28/18 04:00        30


 


2/28/18 04:00     97 Mechanical Ventilator  30











Last Recorded Weight


Weight (Kilograms):  96.700





Physical Exam


General Appearance:  + pertinent finding (acutely ill appearing, mechanically 

ventilated)


Head:  normocephalic, atraumatic


Eyes:  + pertinent finding (roving eye movements)


ENT:  + pertinent finding (orotracheal intubation)


Neck:  supple


Respiratory/Chest:  + pertinent finding (coarse breath sounds bilaterally)


Cardiovascular:  regular rate, rhythm


Abdomen/GI:  non tender, soft


Genitourinary - Male:  + pertinent finding (gillespie catheter draining clear 

yellow urine)


Extremities/Musculoskelatal:  + swelling (1+ pretibial pitting edema)


Neurologic/Psych:  + pertinent finding (opens eyes to voice)





Family History





Cancer (esophageal)


Diabetes mellitus


Heart disease


Hypertension





Negative for CKD / ESRD





Social History


Smoking Status:  Former smoker


Drug Use:  none


Marital Status:  


Housing Status:  lives with family, other


Occupation:  employed








.  Disabled.  Former smoker





Laboratory Results


Past 24 Hours


2/27/18 16:50








Red Blood Count 2.62, Mean Corpuscular Volume 91.2, Mean Corpuscular Hemoglobin 

30.2, Mean Corpuscular Hemoglobin Concent 33.1, Mean Platelet Volume 8.4, 

Neutrophils (%) (Auto) 78.6, Lymphocytes (%) (Auto) 6.4, Monocytes (%) (Auto) 

14.0, Eosinophils (%) (Auto) 0.7, Basophils (%) (Auto) 0.1, Neutrophils # (Auto

) 6.60, Lymphocytes # (Auto) 0.54, Monocytes # (Auto) 1.18, Eosinophils # (Auto

) 0.06, Basophils # (Auto) 0.01





2/28/18 06:39








Red Blood Count 2.35, Mean Corpuscular Volume 90.6, Mean Corpuscular Hemoglobin 

29.8, Mean Corpuscular Hemoglobin Concent 32.9, Mean Platelet Volume 8.8, 

Neutrophils (%) (Auto) 77.5, Lymphocytes (%) (Auto) 7.6, Monocytes (%) (Auto) 

14.0, Eosinophils (%) (Auto) 0.6, Basophils (%) (Auto) 0.0, Neutrophils # (Auto

) 5.54, Lymphocytes # (Auto) 0.54, Monocytes # (Auto) 1.00, Eosinophils # (Auto

) 0.04, Basophils # (Auto) 0.00





2/28/18 06:39

















Test


  2/27/18


16:09 2/27/18


16:50 2/27/18


20:25 2/27/18


23:27


 


Bedside Glucose


  162 mg/dl


(70-99) 


  167 mg/dl


(70-99) 142 mg/dl


(70-99)


 


White Blood Count


  


  8.41 K/uL


(4.8-10.8) 


  


 


 


Red Blood Count


  


  2.62 M/uL


(4.7-6.1) 


  


 


 


Hemoglobin


  


  7.9 g/dL


(14.0-18.0) 


  


 


 


Hematocrit  23.9 % (42-52)   


 


Mean Corpuscular Volume


  


  91.2 fL


() 


  


 


 


Mean Corpuscular Hemoglobin


  


  30.2 pg


(25-34) 


  


 


 


Mean Corpuscular Hemoglobin


Concent 


  33.1 g/dl


(32-36) 


  


 


 


Platelet Count


  


  197 K/uL


(130-400) 


  


 


 


Mean Platelet Volume


  


  8.4 fL


(7.4-10.4) 


  


 


 


Neutrophils (%) (Auto)  78.6 %   


 


Lymphocytes (%) (Auto)  6.4 %   


 


Monocytes (%) (Auto)  14.0 %   


 


Eosinophils (%) (Auto)  0.7 %   


 


Basophils (%) (Auto)  0.1 %   


 


Neutrophils # (Auto)


  


  6.60 K/uL


(1.4-6.5) 


  


 


 


Lymphocytes # (Auto)


  


  0.54 K/uL


(1.2-3.4) 


  


 


 


Monocytes # (Auto)


  


  1.18 K/uL


(0.11-0.59) 


  


 


 


Eosinophils # (Auto)


  


  0.06 K/uL


(0-0.5) 


  


 


 


Basophils # (Auto)


  


  0.01 K/uL


(0-0.2) 


  


 


 


RDW Standard Deviation


  


  52.4 fL


(36.4-46.3) 


  


 


 


RDW Coefficient of Variation


  


  15.5 %


(11.5-14.5) 


  


 


 


Immature Granulocyte % (Auto)  0.2 %   


 


Immature Granulocyte # (Auto)


  


  0.02 K/uL


(0.00-0.02) 


  


 


 


Polychromasia  1+   


 


Basophilic Stippling  OCCASIONAL   


 


Anisocytosis  PRESENT   


 


Spherocytes  1+   


 


Activated Partial


Thromboplast Time 


  46.0 SECONDS


(21.0-31.0) 


  


 


 


Partial Thromboplastin Ratio  1.8   


 


Test


  2/28/18


00:23 2/28/18


04:32 2/28/18


06:39 


 


 


Activated Partial


Thromboplast Time 43.0 SECONDS


(21.0-31.0) 


  46.6 SECONDS


(21.0-31.0) 


 


 


Partial Thromboplastin Ratio 1.7   1.8  


 


Bedside Glucose


  


  189 mg/dl


(70-99) 


  


 


 


White Blood Count


  


  


  7.14 K/uL


(4.8-10.8) 


 


 


Red Blood Count


  


  


  2.35 M/uL


(4.7-6.1) 


 


 


Hemoglobin


  


  


  7.0 g/dL


(14.0-18.0) 


 


 


Hematocrit   21.3 % (42-52)  


 


Mean Corpuscular Volume


  


  


  90.6 fL


() 


 


 


Mean Corpuscular Hemoglobin


  


  


  29.8 pg


(25-34) 


 


 


Mean Corpuscular Hemoglobin


Concent 


  


  32.9 g/dl


(32-36) 


 


 


Platelet Count


  


  


  205 K/uL


(130-400) 


 


 


Mean Platelet Volume


  


  


  8.8 fL


(7.4-10.4) 


 


 


Neutrophils (%) (Auto)   77.5 %  


 


Lymphocytes (%) (Auto)   7.6 %  


 


Monocytes (%) (Auto)   14.0 %  


 


Eosinophils (%) (Auto)   0.6 %  


 


Basophils (%) (Auto)   0.0 %  


 


Neutrophils # (Auto)


  


  


  5.54 K/uL


(1.4-6.5) 


 


 


Lymphocytes # (Auto)


  


  


  0.54 K/uL


(1.2-3.4) 


 


 


Monocytes # (Auto)


  


  


  1.00 K/uL


(0.11-0.59) 


 


 


Eosinophils # (Auto)


  


  


  0.04 K/uL


(0-0.5) 


 


 


Basophils # (Auto)


  


  


  0.00 K/uL


(0-0.2) 


 


 


RDW Standard Deviation


  


  


  52.0 fL


(36.4-46.3) 


 


 


RDW Coefficient of Variation


  


  


  15.6 %


(11.5-14.5) 


 


 


Immature Granulocyte % (Auto)   0.3 %  


 


Immature Granulocyte # (Auto)


  


  


  0.02 K/uL


(0.00-0.02) 


 


 


Spherocytes   OCCASIONAL  


 


Venous Blood pH


  


  


  7.35


(7.36-7.41) 


 


 


Venous Blood Partial Pressure


CO2 


  


  46 mmHg


(38.0-50.0) 


 


 


Venous Blood Partial Pressure


O2 


  


  41 mmHg 


  


 


 


Venous Blood HCO3   25 mmol/L  


 


Venous Blood Oxygen Saturation   71.3 %  


 


Venous Blood Base Excess   -0.8 mEq/L  


 


Anion Gap


  


  


  8.0 mmol/L


(3-11) 


 


 


Est Creatinine Clear Calc


Drug Dose 


  


  42.0 ml/min 


  


 


 


Estimated GFR (


American) 


  


  36.0 


  


 


 


Estimated GFR (Non-


American 


  


  31.1 


  


 


 


BUN/Creatinine Ratio   20.3 (10-20)  


 


Estimated Average Glucose   126 mg/dl  


 


Hemoglobin A1c


  


  


  6.0 %


(4.5-5.6) 


 


 


Calcium Level


  


  


  8.2 mg/dl


(8.5-10.1) 


 


 


Phosphorus Level


  


  


  4.6 mg/dl


(2.5-4.9) 


 


 


Magnesium Level


  


  


  2.2 mg/dl


(1.8-2.4) 


 











Allergies


Coded Allergies:  


     No Known Allergies (Verified , 2/24/18)





Medications





Current Inpatient Medications








 Medications


  (Trade)  Dose


 Ordered  Sig/Dc


 Route  Start Time


 Stop Time Status Last Admin


Dose Admin


 


 Clopidogrel


 Bisulfate


  (plAVix TAB)  75 mg  QAM


 NG  2/25/18 09:00


 3/27/18 08:59  2/27/18 07:59


75 MG


 


 Miscellaneous


 Information


  (Consult


 Glycemic


 Management


 Pharmacy)  1 ea  UD  PRN


 N/A  2/24/18 12:30


 3/26/18 12:29   


 


 


 Artificial Tears


  (Lacri-Lube Oph


 Oint)  1 appln  Q2H  PRN


 OPB  2/24/18 12:00


 3/26/18 11:59  2/28/18 08:31


1 APPLN


 


 Pantoprazole


 Sodium 40 mg/


 Syringe  10 ml @ 5


 mls/min  DAILY@11


 IV  2/25/18 11:00


 3/27/18 10:59  2/28/18 10:09


5 MLS/MIN


 


 Valproate Sodium


 1000 mg/Dextrose  60 ml @ 55


 mls/hr  Q12H


 IV  2/24/18 22:00


 3/26/18 21:59  2/28/18 10:09


55 MLS/HR


 


 Chlorhexidine


 Gluconate


  (Peridex Oral


 Soln)  15 ml  DAILY


 MT  2/25/18 09:00


 3/27/18 08:59  2/28/18 08:28


15 ML


 


 Albuterol


  (Ventolin Hfa


 Inhaler)  4 puffs  Q6R


 INH  2/24/18 21:00


 3/26/18 17:59  2/28/18 07:30


4 PUFFS


 


 Ipratropium


 Bromide


  (Atrovent Hfa


 Inhaler)  4 puffs  Q6R


 INH  2/24/18 21:00


 3/26/18 17:59  2/28/18 07:30


4 PUFFS


 


 Fentanyl Citrate


  (Fentanyl Inj)  100 mcg  Q2H  PRN


 IV  2/26/18 09:15


 3/12/18 09:14  2/28/18 09:49


100 MCG


 


 Midazolam HCl


  (Versed Inj)  2 mg  Q2H  PRN


 IV  2/26/18 09:15


 3/28/18 09:14  2/28/18 00:30


2 MG


 


 Insulin Aspart


  (novoLOG ASPART)  SLIDING


 SCALE  Q4


 SC  2/26/18 11:00


 3/28/18 10:59  2/28/18 10:04


5 UNITS


 


 Furosemide 80 mg/


 Syringe  8 ml @ 4


 mls/min  BID


 IV  2/26/18 21:00


 3/28/18 20:59  2/28/18 08:31


4 MLS/MIN


 


 Glucose


  (Glucose 40% Gel)  15-30


 GRAMS 15


 GRAMS...  UD  PRN


 PO  2/26/18 11:00


 3/28/18 10:59   


 


 


 Glucose


  (Glucose Chew


 Tab)  4-8


 Tablets 4


 Tabl...  UD  PRN


 PO  2/26/18 11:00


 3/28/18 10:59   


 


 


 Dextrose


  (Dextrose 50%


 50ML Syringe)  25-50ML OF


 50% DW IV


 FOR...  UD  PRN


 IV  2/26/18 11:00


 3/28/18 10:59   


 


 


 Glucagon


  (Glucagon Inj)  1 mg  UD  PRN


 SQ  2/26/18 11:00


 3/28/18 10:59   


 


 


 Heparin Sodium/


 Dextrose  500 ml @ 


 26 mls/hr  [id number] PRN


 IV  2/26/18 11:45


 3/28/18 11:44  2/28/18 10:06


26 MLS/HR


 


 Calcium Carbonate


  (Tums Chew Tab)  1,500 mg  BID


 OG  2/27/18 21:00


 3/29/18 20:59  2/28/18 08:26


1,500 MG


 


 Acetaminophen


  (Tylenol Soln)  650 mg  Q6H  PRN


 PO  2/27/18 09:45


 3/29/18 09:44  2/28/18 01:40


650 MG


 


 Oxymetazoline HCl


  (Afrin 0.05%


 Nasal Spray)  2 sprays  BID


 VERONICA  2/27/18 10:00


 3/1/18 21:01  2/28/18 08:27


2 SPRAYS


 


 Amoxicillin/


 Clavulanate


 Potassium


  (Augmentin Susp)  500 mg  BIDM


 PO  2/27/18 10:30


 3/1/18 10:29  2/28/18 06:27


500 MG


 


 Enteral


 Nutritional


 Formula


  (Novasource


 Renal)  1,000 ml  UD  PRN


 OG  2/27/18 10:45


 3/29/18 10:44  2/27/18 13:24


1,000 ML


 


 Insulin Glargine


  (Lantus Solostar


 Pen)    QPM


 SC  2/27/18 21:00


 3/29/18 20:59   


 


 


 Insulin Glargine


  (Lantus Solostar


 Pen)    QAM


 SC  2/28/18 09:00


 3/30/18 08:59  2/28/18 10:05


12 UNITS


 


 Labetalol HCl


  (Normodyne IV)  10 mg  ONE  PRN


 IV  2/28/18 01:45


 3/30/18 01:44  2/28/18 10:22


10 MG


 


 Carvedilol


  (Coreg Tab)  12.5 mg  BID


 PO  2/28/18 09:00


 3/29/18 20:59  2/28/18 08:25


12.5 MG


 


 Oxycodone HCl


  (Roxicodone Soln)  5 mg  Q6H


 PO  2/28/18 09:00


 3/14/18 08:59   


 


 


 Sterile Water


  (Tube Feeding


 Water Flush)  30 ea  Q4


 NG  2/28/18 12:00


 3/30/18 11:59   


 











Impression





(1) Cardiac arrest


(2) Acute kidney injury


(3) Ischemic cardiomyopathy





Recommendations


ACUTE KIDNEY INJURY:


-- ATN following cardiopulmonary arrest


-- Kidney function is mildly improved this am.  Electrolyte balance remains 

acceptable


-- CXR film reviewed today and shows mild pulmonary congestion.  Patient is on 

40% FiO2 w/ 5 PEEP and oxygenating well


-- Patient is responding to bolus loop diuretic therapy.  He is net 1500 cc 

negative over the last 24 hours.  Continue Furosemide 80 mg IV BID


-- Monitor PRP





HYPERTENSION:


-- Continue IV Labetolol


-- If blood pressure remains labile consider adding IV NTG gtt





ANEMIA:


-- Consider blood transfusion to maintain Hgb > or = 8.0





NEURO:


-- Probable anoxic injury.  Prognosis remains guarded.  Await further Neurology 

input.





60 minutes critical care time provided.  This was necessary to examine patient, 

review medical records and discuss management w/ ICU staff.

## 2018-02-28 NOTE — HOSPITALIST PROGRESS NOTE
Hospitalist Progress Note


Date of Service


Feb 28, 2018.


 (Brenda Reyes PA-C)





Subjective


Pt evaluation today including:  physical exam, chart review, lab review, review 

of studies, conversation w/ consultant ([employer]), review of inpatient 

medication list


Patient seen and evaluated. Discussed with ICU team. Patient was evaluated this 

AM prior to extubation.


Patient had eyes open when I entered the room and initially looked at me but 

then would not follow commands when asked. However per discussion with staff he 

was following commands with them.


Patient was successfully extubated today and maintaining airway independently.





   Additional Comments:


ROS unobtainable as patient was intubated during my examination at approx. 0830.


 (Brenda Reyes PA-C)





Medications





Current Inpatient Medications








 Medications


  (Trade)  Dose


 Ordered  Sig/Dc


 Route  Start Time


 Stop Time Status Last Admin


Dose Admin


 


 Clopidogrel


 Bisulfate


  (plAVix TAB)  75 mg  QAM


 NG  2/25/18 09:00


 3/27/18 08:59  2/28/18 13:41


75 MG


 


 Miscellaneous


 Information


  (Consult


 Glycemic


 Management


 Pharmacy)  1 ea  UD  PRN


 N/A  2/24/18 12:30


 3/26/18 12:29   


 


 


 Artificial Tears


  (Lacri-Lube Oph


 Oint)  1 appln  Q2H  PRN


 OPB  2/24/18 12:00


 3/26/18 11:59  2/28/18 08:31


1 APPLN


 


 Pantoprazole


 Sodium 40 mg/


 Syringe  10 ml @ 5


 mls/min  DAILY@11


 IV  2/25/18 11:00


 3/27/18 10:59  2/28/18 10:09


5 MLS/MIN


 


 Valproate Sodium


 1000 mg/Dextrose  60 ml @ 55


 mls/hr  Q12H


 IV  2/24/18 22:00


 3/26/18 21:59  2/28/18 10:09


55 MLS/HR


 


 Chlorhexidine


 Gluconate


  (Peridex Oral


 Soln)  15 ml  DAILY


 MT  2/25/18 09:00


 3/27/18 08:59  2/28/18 08:28


15 ML


 


 Fentanyl Citrate


  (Fentanyl Inj)  100 mcg  Q2H  PRN


 IV  2/26/18 09:15


 3/12/18 09:14  2/28/18 15:02


100 MCG


 


 Insulin Aspart


  (novoLOG ASPART)  SLIDING


 SCALE  Q4


 SC  2/26/18 11:00


 3/28/18 10:59  2/28/18 10:04


5 UNITS


 


 Furosemide 80 mg/


 Syringe  8 ml @ 4


 mls/min  BID


 IV  2/26/18 21:00


 3/28/18 20:59 Future Hold 2/28/18 08:31


4 MLS/MIN


 


 Glucose


  (Glucose 40% Gel)  15-30


 GRAMS 15


 GRAMS...  UD  PRN


 PO  2/26/18 11:00


 3/28/18 10:59   


 


 


 Glucose


  (Glucose Chew


 Tab)  4-8


 Tablets 4


 Tabl...  UD  PRN


 PO  2/26/18 11:00


 3/28/18 10:59   


 


 


 Dextrose


  (Dextrose 50%


 50ML Syringe)  25-50ML OF


 50% DW IV


 FOR...  UD  PRN


 IV  2/26/18 11:00


 3/28/18 10:59   


 


 


 Glucagon


  (Glucagon Inj)  1 mg  UD  PRN


 SQ  2/26/18 11:00


 3/28/18 10:59   


 


 


 Heparin Sodium/


 Dextrose  500 ml @ 


 26 mls/hr  [id number] PRN


 IV  2/26/18 11:45


 3/28/18 11:44  2/28/18 10:06


26 MLS/HR


 


 Calcium Carbonate


  (Tums Chew Tab)  1,500 mg  BID


 OG  2/27/18 21:00


 3/29/18 20:59  2/28/18 08:26


1,500 MG


 


 Acetaminophen


  (Tylenol Soln)  650 mg  Q6H  PRN


 PO  2/27/18 09:45


 3/29/18 09:44  2/28/18 01:40


650 MG


 


 Oxymetazoline HCl


  (Afrin 0.05%


 Nasal Spray)  2 sprays  BID


 VERONICA  2/27/18 10:00


 3/1/18 21:01  2/28/18 08:27


2 SPRAYS


 


 Amoxicillin/


 Clavulanate


 Potassium


  (Augmentin Susp)  500 mg  BIDM


 PO  2/27/18 10:30


 3/1/18 10:29  2/28/18 06:27


500 MG


 


 Enteral


 Nutritional


 Formula


  (Novasource


 Renal)  1,000 ml  UD  PRN


 OG  2/27/18 10:45


 3/29/18 10:44  2/27/18 13:24


1,000 ML


 


 Insulin Glargine


  (Lantus Solostar


 Pen)    QPM


 SC  2/27/18 21:00


 3/29/18 20:59   


 


 


 Insulin Glargine


  (Lantus Solostar


 Pen)    QAM


 SC  2/28/18 09:00


 3/30/18 08:59  2/28/18 10:05


12 UNITS


 


 Carvedilol


  (Coreg Tab)  12.5 mg  BID


 PO  2/28/18 09:00


 3/29/18 20:59  2/28/18 08:25


12.5 MG


 


 Oxycodone HCl


  (Roxicodone Soln)  5 mg  Q6H


 PO  2/28/18 09:00


 3/14/18 08:59   


 


 


 Sterile Water


  (Tube Feeding


 Water Flush)  30 ea  Q4


 NG  2/28/18 12:00


 3/30/18 11:59   


 


 


 Hydralazine HCl


  (HydrALAZINE INJ)  5 mg  Q4H  PRN


 IV.  2/28/18 12:45


 3/30/18 12:44  2/28/18 15:00


5 MG


 


 Potassium


 Chloride 10 meq/


 Prmx  100 ml @ 


 100 mls/hr  Q1H


 IV  2/28/18 13:00


 2/28/18 18:59  2/28/18 14:59


100 MLS/HR


 


 Albuterol/


 Ipratropium


  (Duoneb)  3 ml  QIDR


 INH  2/28/18 16:00


 3/30/18 15:59   


 








 (Brenda Reyes, TAWANDA)





Objective


Vital Signs











  Date Time  Temp Pulse Resp B/P (MAP) Pulse Ox O2 Delivery O2 Flow Rate FiO2


 


2/28/18 11:15 37.6 64 14 206/75 (118) 100  6.0 


 


2/28/18 11:01 37.7 58 15 187/89 (121) 99  6.0 


 


2/28/18 11:00 37.7 66 16 200/73 (115) 99  6.0 


 


2/28/18 10:31 37.7 60 14 176/79 (111)  Oxymask 6.0 


 


2/28/18 10:30 37.7 64 14 208/77 (120) 99 Oxymask 6.0 


 


2/28/18 10:00 37.7 69 14 198/83 (121) 97 Oxymask 6.0 


 


2/28/18 09:00 37.5 69 19 216/79 (124)  Mechanical Ventilator  40


 


2/28/18 08:01 37.3 59 16 189/76 (113) 98 Mechanical Ventilator  40


 


2/28/18 08:00     97 Mechanical Ventilator  40


 


2/28/18 08:00        40


 


2/28/18 07:15        40


 


2/28/18 07:00 37.3 56 20 160/54 (89) 99 Mechanical Ventilator  40


 


2/28/18 06:32 37.3 69 17 203/76 (101) 95   





    165/58 (90)    


 


2/28/18 06:01 37.3 61 20 172/75 (102) 96   





    192/64 (103)    


 


2/28/18 05:52        40


 


2/28/18 05:32 37.3 65 20 179/66 (109) 97   





    191/67 (110)    


 


2/28/18 05:01 37.3 57 20 150/67 (92) 98   





    168/65 (95)    


 


2/28/18 04:43        40


 


2/28/18 04:32 37.3 68 20 198/90 (136) 98   





    220/88 (109)    


 


2/28/18 04:02 37.4 61 20 156/66 (96) 96   





    177/63 (102)    


 


2/28/18 04:00        30


 


2/28/18 04:00     97 Mechanical Ventilator  30


 


2/28/18 03:31 37.5 60 20 119/59 (76) 97   





    124/54 (73)    


 


2/28/18 03:01 37.6 62 20 161/70 (104) 97   





    181/68 (105)    


 


2/28/18 02:31 37.7 59 20 145/62 (88) 95   





    163/59 (88)    


 


2/28/18 02:01 37.9 63 20 127/62 (72) 96   





    158/56 (84)    


 


2/28/18 01:32 38.0 72 18 200/76 (126) 97   





    222/79 (124)    


 


2/28/18 01:15        30


 


2/28/18 01:02 37.8 65 22 144/70 (88) 98   





    167/59 (93)    


 


2/28/18 00:31 37.8 69 21 207/88 (116) 97   





    241/88 (138)    


 


2/28/18 00:01 37.7 67 17 146/67 (98) 98   





    174/67 (100)    


 


2/27/18 23:59        30


 


2/27/18 23:59     97 Mechanical Ventilator  30


 


2/27/18 23:49 37.7 67 17 148/69 (101) 97   





    182/69    


 


2/27/18 23:30        30


 


2/27/18 23:01 37.7 58 20 150/74 (88) 96   





    170/62 (92)    


 


2/27/18 23:00 37.7 58 20 150/74 (99) 97 Mechanical Ventilator  40


 


2/27/18 23:00        40


 


2/27/18 22:31  72  236/83    


 


2/27/18 22:00 37.6 68 20 196/73 (114) 98 Mechanical Ventilator  40


 


2/27/18 20:31 37.8 73 20 138/68 (91) 95 Mechanical Ventilator  40


 


2/27/18 20:28        40


 


2/27/18 20:00      Mechanical Ventilator  


 


2/27/18 20:00        40


 


2/27/18 20:00 37.7 71 20 186/64 (104) 94 Mechanical Ventilator  40


 


2/27/18 19:55        40


 


2/27/18 18:00 37.8 72 20 154/78 (103) 97 Mechanical Ventilator  40


 


2/27/18 17:58        40


 


2/27/18 17:30 37.7 69 20 149/67 (94) 97 Mechanical Ventilator  40


 


2/27/18 17:00 37.6 77 20 173/60 (97) 97 Mechanical Ventilator  40


 


2/27/18 17:00 37.6 77 16 213/75 (121) 97   


 


2/27/18 16:00 37.6 67 20 204/72 (116) 99 Mechanical Ventilator  40


 


2/27/18 16:00        40


 


2/27/18 16:00      Mechanical Ventilator  


 


2/27/18 14:30        40


 


2/27/18 14:01 37.5 64 20 166/80 (108) 99 Mechanical Ventilator  40








 (Brenda Reyes PA-C)





Physical Exam


General Appearance:  no apparent distress


Eyes:  sclerae normal


Neck:  no JVD


Respiratory/Chest:  no respiratory distress, no accessory muscle use, + 

decreased breath sounds (diminished at bases)


Cardiovascular:  regular rate, rhythm, + systolic murmur


Abdomen:  normal bowel sounds, non tender, soft


Neurologic/Psychiatric:  alert, + pertinent finding (initially made eye contact 

but did not follow commands)


Skin:  normal color


 (Brenda Reyes PA-C)





Laboratory Results





Last 24 Hours








Test


  2/27/18


16:09 2/27/18


16:50 2/27/18


20:25 2/27/18


23:27


 


Bedside Glucose 162 mg/dl   167 mg/dl  142 mg/dl 


 


White Blood Count  8.41 K/uL   


 


Red Blood Count  2.62 M/uL   


 


Hemoglobin  7.9 g/dL   


 


Hematocrit  23.9 %   


 


Mean Corpuscular Volume  91.2 fL   


 


Mean Corpuscular Hemoglobin  30.2 pg   


 


Mean Corpuscular Hemoglobin


Concent 


  33.1 g/dl 


  


  


 


 


Platelet Count  197 K/uL   


 


Mean Platelet Volume  8.4 fL   


 


Neutrophils (%) (Auto)  78.6 %   


 


Lymphocytes (%) (Auto)  6.4 %   


 


Monocytes (%) (Auto)  14.0 %   


 


Eosinophils (%) (Auto)  0.7 %   


 


Basophils (%) (Auto)  0.1 %   


 


Neutrophils # (Auto)  6.60 K/uL   


 


Lymphocytes # (Auto)  0.54 K/uL   


 


Monocytes # (Auto)  1.18 K/uL   


 


Eosinophils # (Auto)  0.06 K/uL   


 


Basophils # (Auto)  0.01 K/uL   


 


RDW Standard Deviation  52.4 fL   


 


RDW Coefficient of Variation  15.5 %   


 


Immature Granulocyte % (Auto)  0.2 %   


 


Immature Granulocyte # (Auto)  0.02 K/uL   


 


Polychromasia  1+   


 


Basophilic Stippling  OCCASIONAL   


 


Anisocytosis  PRESENT   


 


Spherocytes  1+   


 


Activated Partial


Thromboplast Time 


  46.0 SECONDS 


  


  


 


 


Partial Thromboplastin Ratio  1.8   


 


Test


  2/28/18


00:23 2/28/18


04:32 2/28/18


06:39 2/28/18


08:35


 


Activated Partial


Thromboplast Time 43.0 SECONDS 


  


  46.6 SECONDS 


  


 


 


Partial Thromboplastin Ratio 1.7   1.8  


 


Bedside Glucose  189 mg/dl   154 mg/dl 


 


White Blood Count   7.14 K/uL  


 


Red Blood Count   2.35 M/uL  


 


Hemoglobin   7.0 g/dL  


 


Hematocrit   21.3 %  


 


Mean Corpuscular Volume   90.6 fL  


 


Mean Corpuscular Hemoglobin   29.8 pg  


 


Mean Corpuscular Hemoglobin


Concent 


  


  32.9 g/dl 


  


 


 


Platelet Count   205 K/uL  


 


Mean Platelet Volume   8.8 fL  


 


Neutrophils (%) (Auto)   77.5 %  


 


Lymphocytes (%) (Auto)   7.6 %  


 


Monocytes (%) (Auto)   14.0 %  


 


Eosinophils (%) (Auto)   0.6 %  


 


Basophils (%) (Auto)   0.0 %  


 


Neutrophils # (Auto)   5.54 K/uL  


 


Lymphocytes # (Auto)   0.54 K/uL  


 


Monocytes # (Auto)   1.00 K/uL  


 


Eosinophils # (Auto)   0.04 K/uL  


 


Basophils # (Auto)   0.00 K/uL  


 


RDW Standard Deviation   52.0 fL  


 


RDW Coefficient of Variation   15.6 %  


 


Immature Granulocyte % (Auto)   0.3 %  


 


Immature Granulocyte # (Auto)   0.02 K/uL  


 


Spherocytes   OCCASIONAL  


 


Venous Blood pH   7.35  


 


Venous Blood Partial Pressure


CO2 


  


  46 mmHg 


  


 


 


Venous Blood Partial Pressure


O2 


  


  41 mmHg 


  


 


 


Venous Blood HCO3   25 mmol/L  


 


Venous Blood Oxygen Saturation   71.3 %  


 


Venous Blood Base Excess   -0.8 mEq/L  


 


Sodium Level   138 mmol/L  


 


Potassium Level   3.5 mmol/L  


 


Chloride Level   106 mmol/L  


 


Carbon Dioxide Level   24 mmol/L  


 


Anion Gap   8.0 mmol/L  


 


Blood Urea Nitrogen   45 mg/dl  


 


Creatinine   2.22 mg/dl  


 


Est Creatinine Clear Calc


Drug Dose 


  


  42.0 ml/min 


  


 


 


Estimated GFR (


American) 


  


  36.0 


  


 


 


Estimated GFR (Non-


American 


  


  31.1 


  


 


 


BUN/Creatinine Ratio   20.3  


 


Random Glucose   155 mg/dl  


 


Estimated Average Glucose   126 mg/dl  


 


Hemoglobin A1c   6.0 %  


 


Calcium Level   8.2 mg/dl  


 


Phosphorus Level   4.6 mg/dl  


 


Magnesium Level   2.2 mg/dl  


 


Test


  2/28/18


12:02 2/28/18


12:45 


  


 


 


Bedside Glucose 134 mg/dl    


 


Absolute Reticulocyte Count  0.05 10^6/uL   


 


Percent Reticulocyte Count  1.9 %   








 (Brenda Reyes, PA-C)





Assessment and Plan


62 M cardiac arrest survivor with concern for anoxic brain injury, was 

therapeutically cooled and with continuous EEG completed





Cardiac Arrest 2/2 PEA:


- Completed therapeutic hypothermia and currently rewarmed and currently 

febrile - continue to monitor for neurological improvement - patient opening 

eyes easily to verbal stimuli and is intermittently following commands - still 

awaiting to see what neurological function will be present





Acute Hypoxic Respiratory Failure 2/2 Above:


- Possible anoxic brain injury - EEG reveals moderate encephalopathy


- Remains on mechanical ventilation - appreciate intensivists input on wean





Acute on Chronic Diastolic CHF with Chronic CAD/Cardiomyopathy:


- Echo with global hypokinesis - Coreg 12.5 mg BID - was increased due to 

hypertensive episodes


- Lasix 80 mg IV BID at this time


- Cardiology following - appreciate input





Acute vs Subacute Infarction: STABLE


- Addendum to brain MRI - punctate focus of mid R cerebellar hemisphere


- Currently on Plavix 75 mg daily


- Neurology following - suspect global hypoxic ischemic event - plan to 

continue to assess for improvement





Acute on Chronic Anemia:


- On previous admissions he has had progressively declining Hgbs and required 

transfusions - Hgb currently at 7.0 and will continue to monitor and transfuse 

as necessary





FITZ on CKD Stage III:


- Given cardiac arrest this is like ATN - continues to produce adequate amounts 

of urine and Cr beginning to improve today


- Nephrology following - appreciate recommendations





T2DM, Uncontrolled:


- Pharmacy following for glycemic management - SSI and Lantus





Rhabdomyolysis: STABLE


- Somewhat improving - will continue to monitor given ventilation and bedfast 

state





Code Status: FULL RESUSCITATION





DVT Prophylaxis: Heparin gtt





Disposition:


- Remains acute ill...D/C uncertain


Continued [facility] stay due to:  multiple IV medications needed


Discharge planning:  uncertain


 (Brenda Reyes, PAMelC)


Attending physician progress note


pt seen and examed, d/w PA MS. Reyes about eddy points of dignosis and care plan

, agreed current management, for details please referral to PA's  note 





S: Extubated on Oxymask,  opening eyes, no conversation or follow-up commands, T

-max was 37.7, negative urine output 3.5 L, with 1.7 kg weight loss


 no able to obtain review of system because patient is sedated





O: VS reviewed, no communication


   Lungs: decreased breathing sound, occasional wheezing, no r /c


   Cardiovascular:  regular rate, rhythm, 2+ edema, normal peripheral pulses


   Abdomen / GI:  normal bowel sounds, non tender, soft, High catheter in 

place with clean urine


   Extremities: No cyanosis clubbing


   Neurologic/Psychiatric: Responds open eyes to voice, otherwise not able to 

have further evaluation


   Skin, left lateral ankle has superficial open wound 5x5 mm, no obvious 

drainage





labs, images reviewed , see PA's  note





A/P:





62 M s/p cardiac arrest  with PEA, or NSTEMI VS tn leakage from arrest, and 

concern for anoxic brain injury, was therapeutically cooled and with continuous 

EEG monitoring, 





Cardiac Arrest 2/2 PEA, 


NSTEMI VS tn leakage from arrest, hx of of CAD


Acute Hypoxic Respiratory Failure 


Acute on Chronic Diastolic CHF with Chronic CAD/Cardiomyopathy/Aortic thrombus (

etiology unknown) is on heparin drip


FITZ on CKD Stage III: Urine output has significant improved, possibly ATN 

polyuria.  BUN/creatinine related to stable, will watch while Lasix


T2DM, Uncontrolled:


Rhabdomyolysis:


Metabolic Acidosis: 


Possible acute on chronic anemia, with hemoglobin 7, discussed with intensivist

, will continue to watch, transfuse as needed


Specialists input appreciated, patient reminds  critical, prognosis remains 

guarded, updated patient's wife and other family member at bedside, nondistended


 (Jamey Pena MD, PhD)

## 2018-02-28 NOTE — PHARMACY PROGRESS NOTE
Glycemic Control Progress Note


Date of Service


Feb 28, 2018.





Scope


Glycemic Pharmacist consulted for glycemic control to write orders per [facility] 

inpatient glycemic control protocol.





Objective


Accuchecks BSG (last 24hrs):











Test


  2/27/18


16:09 2/27/18


20:25 2/27/18


23:27 2/28/18


04:32


 


Bedside Glucose


  162 mg/dl


(70-99) 167 mg/dl


(70-99) 142 mg/dl


(70-99) 189 mg/dl


(70-99)


 


Test


  2/28/18


06:39 


  


  


 


 


Random Glucose


  155 mg/dl


(70-99) 


  


  


 








HbA1c:











Test


  2/28/18


06:39


 


Hemoglobin A1c


  6.0 %


(4.5-5.6)  H











Recent Pertinent Medications








Outpatient Anti-Diabetic Meds








Basal Insulin





Bolus Insulin





Assessment & Plan


ASSESSMENT:


* See progress note from 2/24 for more background info, in short:


 


* 63 yo M critically ill in ICU s/p cardiac arrest, started on TTM, rewarmed as 

of 2/26 AM





* BSG's ranging 140-189 mg/dL over the last 24 hours.  


 * No BSG's less than 140 mg/dL, one BSG greater than 180 mg/dL


 * Will slightly adjust regimen in attempt to get all BSG's less than 180 mg/dL





* Changes to stressors/dextrose administration


 * Extubated this AM @ ~1100





* Changes needed to insulin regimen: 


 * Slightly increase Lantus (back to previous dose)


 * Slightly tighten correction factor and CHO ratio








PLAN FOR INPATIENT GLYCEMIC CONTROL:





* Increase Basal insulin: Lantus 12 units SQ x1 this AM then qPM based on BSG 

as follows:


 * Hold for BSG less than 180 mg/dL


 * 5 units for BSG greater than 180 mg/dL





* Bolus insulin 


 * NovoLog per scale q4h


 * Goal Range:  Low 120 mg/dL - High 160 mg/dL


 * Tighten Correction Factor: 20 mg/dL/unit


 * Tighten Nutritional / Prandial insulin per carb ratio of 1 unit per 7 grams 

CHO consumed








* Please note that the plan above was derived based on current level of insulin 

resistance and hospital stress. These recommendations are appropriate for 

inpatient admission only. Plan of care upon discharge will need to be 

reassessed to avoid potential outpatient hypo/hyperglycemia. 








Thank you.

## 2018-02-28 NOTE — CRITICAL CARE PROGRESS NOTE
Critical Care Progress Note


Date of Service


Feb 28, 2018.





Attending


Dr. Arnold





Subjective


Patient successfully extubated this morning.  Opens and closes eyes to command.

  Able to express that he has pain but unable to say where.  Answers questions 

with head nod head shake and attempts to speak.  Hoarse voice is present.  

Patient denies shortness of breath with headshake.





Objective


GENERAL : Mild acute distress


EYES:  No icterus, gaze conjugate. PERRL.  Closes eyes to loud clap


NOSE: No evidence of epistaxis.  Nasal cannula in place


MOUTH:  No lesions or candidiasis.  


NECK: No JVD 


LUNGS: Bibasilar rales.  Some rhonchi in mid lung field


HEART:  Regular, rate controlled in the 70s


ABDOMEN:  Soft, NT, ND, BS Present


EXTREMITIES: +2 bilateral LE edema, pedal pulses intact.


NEURO: Opens eyes to command.  Can answer simple questions affirmative or 

negative with headshake  head nod.  Wiggles toes on both feet to command.  

Opens mouth to command





Assessment & Plan


Neuro


* Patient following commands now.


* Continuous EEG abnormal with moderate diffuse background disorganization and 

slowing but no evidence of epileptiform changes


* Neurology and pharmacy managing valproic acid


* Neurology has been consulted and is following - appreciate Dr. Mcclellan's input


* Noncontrast MRI of the brain and noncontrast MRI of the cervical spine 

unremarkable


* Moving extremities voluntarily continue to monitor


* Continue to monitor





Cardiovascular


* PEA cardiac arrest witnessed by EMS staff most likely secondary to hypoxia


*  no ST changes


* Cardiology consulted -no urgency for cardiac catheterization.  Prior history 

of multivessel coronary artery disease


* PO carvedilol for hypertension - increased to 12.5 mg bid per cardiology


* Hydralazine added for hypertension


* Continue to monitor on telemetry


* Dr. Yusuf is his primary cardiologist


* Further echocardiograms per cardiology





Pulmonary


* Quit smoking over a year ago


* Has now been on supplemental oxygen at home for over a year; baseline 6 L/min 

via nasal cannula her family


* Extubated today


* Some evidence of tachypnea and hypoxia this afternoon.  ABG 7.3/52/58/26.  

Oxymask for hypoxia.  BiPAP available and as needed overnight


* CT of chest with no pulmonary  emboli identified; evidence of small to 

moderate bilateral pleural effusions; moderate interstitial pulmonary edema


* Pleural effusions now resolved


* Continue with diuresis and monitor I&Os


* Monitor clinically





Musculoskeletal


* Multiple nondisplaced rib fractures - suspected etiology is cardiac 

compressions


* Pain control with as needed fentanyl.  Patient able to complain of pain this 

afternoon


* Probable sternal fracture -suspected etiology cardiac compressions


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


* Patient appears to have foot drop on the right





Renal 


* Renal ultrasound with no evidence of renal artery stenosis


* There is mention of question of interrupted flow in the right and left renal 

vein.  However, this study was a very poor study with technical difficulty 

related to poor sonographic windows.  Continue to monitor.


* Urine output adequate with diuresis


* Continues with acute renal failure


* Nephrology consulted -appreciate Dr. King's input


* Creatinine with slight improvement to 2.22 today


* Avoid nephrotoxic medications


* Follow serial labs and ins and outs





ID


* Ag negative for influenza A&B


* Blood cultures 2 negative


* Urine culture with no growth


* MRSA specimen is negative by DNA probe


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





Endocrine


* History of type 2 diabetes mellitus


* Has been titrated off of insulin drip -continue gtt as needed


* Hemoglobin A1c 6.0


* Continue BSGs per protocol


* Lantus and NovoLog per protocol -glycemic management per pharmacy


* Continue with tube feeds





Heme


* 2 units held in the event that Hgb drops further - currently 7.0


* Hemodynamically stable


* Transfuse if drops below 7


* Repeat H&H at 1900





Nutrition


* Hold Peptamen due to high phos


* TF changed to Novasource Renal


* Nutrition consult for tube feed management


* 2 fiber packets BID yesterday resulting in 3 stools 


* Coresafe tube in place





GI


* Pantoprazole 40 mg IV daily





DVT prophylaxis


* TEDs/SCDs





CCT: 35 minutes including discussion with family and independent of any 

procedures





Thank you for including us in the care of this patient.  Please refer to Dr. Arnold's addendum for further recommendations.





I have personally evaluated and examined this patient.  I agree with assessment 

and plan of RENE Davis PA-C.





Patient had significant urine output with initial dose of Lasix, held 

additional Lasix for today.  Still titrating medications to achieve better 

blood pressure control.





Consults & Procedures


Consultants:


Neurology - Dr. Washburn


Cardiology -Dr. Martínez


Critical care medicine -Dr. Elder


Procedures:


Right radial arterial line -2/24/2018 by Dr. Elder


Endotracheal intubation -2/24/2018 -prehospital tube replaced by Dr. Elder


Right subclavian triple-lumen central venous catheter -  2/24/2018 by Dr. Elder


Fiberoptic bronchoscopy -2/24/2018 by Dr. Elder





Data


Medications:





Current Inpatient Medications








 Medications


  (Trade)  Dose


 Ordered  Sig/Dc


 Route  Start Time


 Stop Time Status Last Admin


Dose Admin


 


 Clopidogrel


 Bisulfate


  (plAVix TAB)  75 mg  QAM


 NG  2/25/18 09:00


 3/27/18 08:59  2/28/18 13:41


75 MG


 


 Miscellaneous


 Information


  (Consult


 Glycemic


 Management


 Pharmacy)  1 ea  UD  PRN


 N/A  2/24/18 12:30


 3/26/18 12:29   


 


 


 Artificial Tears


  (Lacri-Lube Oph


 Oint)  1 appln  Q2H  PRN


 OPB  2/24/18 12:00


 3/26/18 11:59  2/28/18 08:31


1 APPLN


 


 Pantoprazole


 Sodium 40 mg/


 Syringe  10 ml @ 5


 mls/min  DAILY@11


 IV  2/25/18 11:00


 3/27/18 10:59  2/28/18 10:09


5 MLS/MIN


 


 Valproate Sodium


 1000 mg/Dextrose  60 ml @ 55


 mls/hr  Q12H


 IV  2/24/18 22:00


 3/26/18 21:59  2/28/18 10:09


55 MLS/HR


 


 Chlorhexidine


 Gluconate


  (Peridex Oral


 Soln)  15 ml  DAILY


 MT  2/25/18 09:00


 3/27/18 08:59  2/28/18 08:28


15 ML


 


 Fentanyl Citrate


  (Fentanyl Inj)  100 mcg  Q2H  PRN


 IV  2/26/18 09:15


 3/12/18 09:14  2/28/18 15:02


100 MCG


 


 Insulin Aspart


  (novoLOG ASPART)  SLIDING


 SCALE  Q4


 SC  2/26/18 11:00


 3/28/18 10:59  2/28/18 10:04


5 UNITS


 


 Furosemide 80 mg/


 Syringe  8 ml @ 4


 mls/min  BID


 IV  2/26/18 21:00


 3/28/18 20:59 Future Hold 2/28/18 08:31


4 MLS/MIN


 


 Glucose


  (Glucose 40% Gel)  15-30


 GRAMS 15


 GRAMS...  UD  PRN


 PO  2/26/18 11:00


 3/28/18 10:59   


 


 


 Glucose


  (Glucose Chew


 Tab)  4-8


 Tablets 4


 Tabl...  UD  PRN


 PO  2/26/18 11:00


 3/28/18 10:59   


 


 


 Dextrose


  (Dextrose 50%


 50ML Syringe)  25-50ML OF


 50% DW IV


 FOR...  UD  PRN


 IV  2/26/18 11:00


 3/28/18 10:59   


 


 


 Glucagon


  (Glucagon Inj)  1 mg  UD  PRN


 SQ  2/26/18 11:00


 3/28/18 10:59   


 


 


 Heparin Sodium/


 Dextrose  500 ml @ 


 26 mls/hr  [id number] PRN


 IV  2/26/18 11:45


 3/28/18 11:44  2/28/18 10:06


26 MLS/HR


 


 Calcium Carbonate


  (Tums Chew Tab)  1,500 mg  BID


 OG  2/27/18 21:00


 3/29/18 20:59  2/28/18 08:26


1,500 MG


 


 Acetaminophen


  (Tylenol Soln)  650 mg  Q6H  PRN


 PO  2/27/18 09:45


 3/29/18 09:44  2/28/18 01:40


650 MG


 


 Oxymetazoline HCl


  (Afrin 0.05%


 Nasal Spray)  2 sprays  BID


 VERONICA  2/27/18 10:00


 3/1/18 21:01  2/28/18 08:27


2 SPRAYS


 


 Amoxicillin/


 Clavulanate


 Potassium


  (Augmentin Susp)  500 mg  BIDM


 PO  2/27/18 10:30


 3/1/18 10:29  2/28/18 06:27


500 MG


 


 Enteral


 Nutritional


 Formula


  (Novasource


 Renal)  1,000 ml  UD  PRN


 OG  2/27/18 10:45


 3/29/18 10:44  2/27/18 13:24


1,000 ML


 


 Insulin Glargine


  (Lantus Solostar


 Pen)    QPM


 SC  2/27/18 21:00


 3/29/18 20:59   


 


 


 Insulin Glargine


  (Lantus Solostar


 Pen)    QAM


 SC  2/28/18 09:00


 3/30/18 08:59  2/28/18 10:05


12 UNITS


 


 Carvedilol


  (Coreg Tab)  12.5 mg  BID


 PO  2/28/18 09:00


 3/29/18 20:59  2/28/18 08:25


12.5 MG


 


 Oxycodone HCl


  (Roxicodone Soln)  5 mg  Q6H


 PO  2/28/18 09:00


 3/14/18 08:59   


 


 


 Sterile Water


  (Tube Feeding


 Water Flush)  30 ea  Q4


 NG  2/28/18 12:00


 3/30/18 11:59   


 


 


 Hydralazine HCl


  (HydrALAZINE INJ)  5 mg  Q4H  PRN


 IV.  2/28/18 12:45


 3/30/18 12:44  2/28/18 15:00


5 MG


 


 Potassium


 Chloride 10 meq/


 Prmx  100 ml @ 


 100 mls/hr  Q1H


 IV  2/28/18 13:00


 2/28/18 18:59  2/28/18 14:59


100 MLS/HR


 


 Albuterol/


 Ipratropium


  (Duoneb)  3 ml  QIDR


 INH  2/28/18 16:00


 3/30/18 15:59   


 








I & O:











24-Hour Column 


 


 3/1/18





 08:00


 


Intake Total 377 ml


 


Output Total 1700 ml


 


Balance -1323 ml








Vital Signs:











  Date Time  Temp Pulse Resp B/P (MAP) Pulse Ox O2 Delivery O2 Flow Rate FiO2


 


2/28/18 15:03  60 12  97 Nasal Cannula 4.0 


 


2/28/18 14:00 37.3 66 20 208/76 (120) 94   


 


2/28/18 13:00 37.4 64 18 220/84 (129) 98   


 


2/28/18 12:00 37.4 63 20 177/80 (112)    


 


2/28/18 12:00     97 Oxymask  


 


2/28/18 11:15 37.6 64 14 206/75 (118) 100  6.0 


 


2/28/18 11:01 37.7 58 15 187/89 (121) 99  6.0 


 


2/28/18 11:00 37.7 66 16 200/73 (115) 99  6.0 


 


2/28/18 10:31 37.7 60 14 176/79 (111)  Oxymask 6.0 


 


2/28/18 10:30 37.7 64 14 208/77 (120) 99 Oxymask 6.0 


 


2/28/18 10:00 37.7 69 14 198/83 (121) 97 Oxymask 6.0 


 


2/28/18 09:00 37.5 69 19 216/79 (124)  Mechanical Ventilator  40


 


2/28/18 08:01 37.3 59 16 189/76 (113) 98 Mechanical Ventilator  40


 


2/28/18 08:00     97 Mechanical Ventilator  40


 


2/28/18 08:00      Mechanical Ventilator  40


 


2/28/18 08:00        40


 


2/28/18 07:15        40


 


2/28/18 07:00 37.3 56 20 160/54 (89) 99 Mechanical Ventilator  40


 


2/28/18 06:32 37.3 69 17 203/76 (101) 95   





    165/58 (90)    


 


2/28/18 06:01 37.3 61 20 172/75 (102) 96   





    192/64 (103)    


 


2/28/18 05:52        40


 


2/28/18 05:32 37.3 65 20 179/66 (109) 97   





    191/67 (110)    


 


2/28/18 05:01 37.3 57 20 150/67 (92) 98   





    168/65 (95)    


 


2/28/18 04:43        40


 


2/28/18 04:32 37.3 68 20 198/90 (136) 98   





    220/88 (109)    


 


2/28/18 04:02 37.4 61 20 156/66 (96) 96   





    177/63 (102)    


 


2/28/18 04:00        30


 


2/28/18 04:00     97 Mechanical Ventilator  30


 


2/28/18 03:31 37.5 60 20 119/59 (76) 97   





    124/54 (73)    


 


2/28/18 03:01 37.6 62 20 161/70 (104) 97   





    181/68 (105)    


 


2/28/18 02:31 37.7 59 20 145/62 (88) 95   





    163/59 (88)    


 


2/28/18 02:01 37.9 63 20 127/62 (72) 96   





    158/56 (84)    


 


2/28/18 01:32 38.0 72 18 200/76 (126) 97   





    222/79 (124)    


 


2/28/18 01:15        30


 


2/28/18 01:02 37.8 65 22 144/70 (88) 98   





    167/59 (93)    


 


2/28/18 00:31 37.8 69 21 207/88 (116) 97   





    241/88 (138)    


 


2/28/18 00:01 37.7 67 17 146/67 (98) 98   





    174/67 (100)    


 


2/27/18 23:59        30


 


2/27/18 23:59     97 Mechanical Ventilator  30


 


2/27/18 23:49 37.7 67 17 148/69 (101) 97   





    182/69    


 


2/27/18 23:30        30


 


2/27/18 23:01 37.7 58 20 150/74 (88) 96   





    170/62 (92)    


 


2/27/18 23:00 37.7 58 20 150/74 (99) 97 Mechanical Ventilator  40


 


2/27/18 23:00        40


 


2/27/18 22:31  72  236/83    


 


2/27/18 22:00 37.6 68 20 196/73 (114) 98 Mechanical Ventilator  40


 


2/27/18 20:31 37.8 73 20 138/68 (91) 95 Mechanical Ventilator  40


 


2/27/18 20:28        40


 


2/27/18 20:00      Mechanical Ventilator  


 


2/27/18 20:00        40


 


2/27/18 20:00 37.7 71 20 186/64 (104) 94 Mechanical Ventilator  40


 


2/27/18 19:55        40


 


2/27/18 18:00 37.8 72 20 154/78 (103) 97 Mechanical Ventilator  40


 


2/27/18 17:58        40


 


2/27/18 17:30 37.7 69 20 149/67 (94) 97 Mechanical Ventilator  40


 


2/27/18 17:00 37.6 77 20 173/60 (97) 97 Mechanical Ventilator  40


 


2/27/18 17:00 37.6 77 16 213/75 (121) 97   








Laboratory Results:





Last 24 Hours








Test


  2/27/18


16:09 2/27/18


16:50 2/27/18


20:25 2/27/18


23:27


 


Bedside Glucose 162 mg/dl   167 mg/dl  142 mg/dl 


 


White Blood Count  8.41 K/uL   


 


Red Blood Count  2.62 M/uL   


 


Hemoglobin  7.9 g/dL   


 


Hematocrit  23.9 %   


 


Mean Corpuscular Volume  91.2 fL   


 


Mean Corpuscular Hemoglobin  30.2 pg   


 


Mean Corpuscular Hemoglobin


Concent 


  33.1 g/dl 


  


  


 


 


Platelet Count  197 K/uL   


 


Mean Platelet Volume  8.4 fL   


 


Neutrophils (%) (Auto)  78.6 %   


 


Lymphocytes (%) (Auto)  6.4 %   


 


Monocytes (%) (Auto)  14.0 %   


 


Eosinophils (%) (Auto)  0.7 %   


 


Basophils (%) (Auto)  0.1 %   


 


Neutrophils # (Auto)  6.60 K/uL   


 


Lymphocytes # (Auto)  0.54 K/uL   


 


Monocytes # (Auto)  1.18 K/uL   


 


Eosinophils # (Auto)  0.06 K/uL   


 


Basophils # (Auto)  0.01 K/uL   


 


RDW Standard Deviation  52.4 fL   


 


RDW Coefficient of Variation  15.5 %   


 


Immature Granulocyte % (Auto)  0.2 %   


 


Immature Granulocyte # (Auto)  0.02 K/uL   


 


Polychromasia  1+   


 


Basophilic Stippling  OCCASIONAL   


 


Anisocytosis  PRESENT   


 


Spherocytes  1+   


 


Activated Partial


Thromboplast Time 


  46.0 SECONDS 


  


  


 


 


Partial Thromboplastin Ratio  1.8   


 


Test


  2/28/18


00:23 2/28/18


04:32 2/28/18


06:39 2/28/18


08:35


 


Activated Partial


Thromboplast Time 43.0 SECONDS 


  


  46.6 SECONDS 


  


 


 


Partial Thromboplastin Ratio 1.7   1.8  


 


Bedside Glucose  189 mg/dl   154 mg/dl 


 


White Blood Count   7.14 K/uL  


 


Red Blood Count   2.35 M/uL  


 


Hemoglobin   7.0 g/dL  


 


Hematocrit   21.3 %  


 


Mean Corpuscular Volume   90.6 fL  


 


Mean Corpuscular Hemoglobin   29.8 pg  


 


Mean Corpuscular Hemoglobin


Concent 


  


  32.9 g/dl 


  


 


 


Platelet Count   205 K/uL  


 


Mean Platelet Volume   8.8 fL  


 


Neutrophils (%) (Auto)   77.5 %  


 


Lymphocytes (%) (Auto)   7.6 %  


 


Monocytes (%) (Auto)   14.0 %  


 


Eosinophils (%) (Auto)   0.6 %  


 


Basophils (%) (Auto)   0.0 %  


 


Neutrophils # (Auto)   5.54 K/uL  


 


Lymphocytes # (Auto)   0.54 K/uL  


 


Monocytes # (Auto)   1.00 K/uL  


 


Eosinophils # (Auto)   0.04 K/uL  


 


Basophils # (Auto)   0.00 K/uL  


 


RDW Standard Deviation   52.0 fL  


 


RDW Coefficient of Variation   15.6 %  


 


Immature Granulocyte % (Auto)   0.3 %  


 


Immature Granulocyte # (Auto)   0.02 K/uL  


 


Spherocytes   OCCASIONAL  


 


Venous Blood pH   7.35  


 


Venous Blood Partial Pressure


CO2 


  


  46 mmHg 


  


 


 


Venous Blood Partial Pressure


O2 


  


  41 mmHg 


  


 


 


Venous Blood HCO3   25 mmol/L  


 


Venous Blood Oxygen Saturation   71.3 %  


 


Venous Blood Base Excess   -0.8 mEq/L  


 


Sodium Level   138 mmol/L  


 


Potassium Level   3.5 mmol/L  


 


Chloride Level   106 mmol/L  


 


Carbon Dioxide Level   24 mmol/L  


 


Anion Gap   8.0 mmol/L  


 


Blood Urea Nitrogen   45 mg/dl  


 


Creatinine   2.22 mg/dl  


 


Est Creatinine Clear Calc


Drug Dose 


  


  42.0 ml/min 


  


 


 


Estimated GFR (


American) 


  


  36.0 


  


 


 


Estimated GFR (Non-


American 


  


  31.1 


  


 


 


BUN/Creatinine Ratio   20.3  


 


Random Glucose   155 mg/dl  


 


Estimated Average Glucose   126 mg/dl  


 


Hemoglobin A1c   6.0 %  


 


Calcium Level   8.2 mg/dl  


 


Phosphorus Level   4.6 mg/dl  


 


Magnesium Level   2.2 mg/dl  


 


Test


  2/28/18


12:02 2/28/18


12:45 2/28/18


15:21 


 


 


Bedside Glucose 134 mg/dl    


 


Absolute Reticulocyte Count  0.05 10^6/uL   


 


Percent Reticulocyte Count  1.9 %   


 


Blood Gas Sample Site   Art Line  


 


Bedside Blood Gas pH (LAB)   7.31  


 


Bedside Blood Gas pCO2 (LAB)   50 mmHg  


 


Bedside Blood Gas pO2 (LAB)   56 mmHg  


 


Bedside Blood Gas HCO3 (LAB)   26 meq/L  


 


Bedside Blood Gas Total CO2   27 mEq/l  


 


Bedside Blood Gas Base Excess


(LAB) 


  


  -1.0 meq/L 


  


 


 


Bedside Blood Gas O2


Saturation 


  


  87.0 % 


  


 


 


Cameron Test   NA  


 


Oxygen Delivery Device   Cannula

## 2018-03-01 VITALS
OXYGEN SATURATION: 95 % | HEART RATE: 67 BPM | DIASTOLIC BLOOD PRESSURE: 60 MMHG | SYSTOLIC BLOOD PRESSURE: 172 MMHG | TEMPERATURE: 98.42 F

## 2018-03-01 VITALS — HEART RATE: 71 BPM | OXYGEN SATURATION: 97 %

## 2018-03-01 VITALS — HEART RATE: 64 BPM | OXYGEN SATURATION: 93 %

## 2018-03-01 VITALS
TEMPERATURE: 98.78 F | DIASTOLIC BLOOD PRESSURE: 58 MMHG | SYSTOLIC BLOOD PRESSURE: 179 MMHG | OXYGEN SATURATION: 90 % | HEART RATE: 67 BPM

## 2018-03-01 VITALS
TEMPERATURE: 98.6 F | DIASTOLIC BLOOD PRESSURE: 58 MMHG | SYSTOLIC BLOOD PRESSURE: 180 MMHG | HEART RATE: 65 BPM | OXYGEN SATURATION: 95 %

## 2018-03-01 VITALS
TEMPERATURE: 98.78 F | HEART RATE: 68 BPM | SYSTOLIC BLOOD PRESSURE: 171 MMHG | DIASTOLIC BLOOD PRESSURE: 69 MMHG | OXYGEN SATURATION: 96 %

## 2018-03-01 VITALS
OXYGEN SATURATION: 98 % | DIASTOLIC BLOOD PRESSURE: 57 MMHG | SYSTOLIC BLOOD PRESSURE: 172 MMHG | HEART RATE: 64 BPM | TEMPERATURE: 99.14 F

## 2018-03-01 VITALS
DIASTOLIC BLOOD PRESSURE: 68 MMHG | HEART RATE: 63 BPM | TEMPERATURE: 98.06 F | SYSTOLIC BLOOD PRESSURE: 154 MMHG | OXYGEN SATURATION: 99 %

## 2018-03-01 VITALS
OXYGEN SATURATION: 95 % | SYSTOLIC BLOOD PRESSURE: 158 MMHG | DIASTOLIC BLOOD PRESSURE: 60 MMHG | TEMPERATURE: 98.6 F | HEART RATE: 67 BPM

## 2018-03-01 VITALS
OXYGEN SATURATION: 99 % | TEMPERATURE: 99.14 F | HEART RATE: 68 BPM | DIASTOLIC BLOOD PRESSURE: 72 MMHG | SYSTOLIC BLOOD PRESSURE: 196 MMHG

## 2018-03-01 VITALS
DIASTOLIC BLOOD PRESSURE: 52 MMHG | OXYGEN SATURATION: 94 % | TEMPERATURE: 98.78 F | SYSTOLIC BLOOD PRESSURE: 169 MMHG | HEART RATE: 70 BPM

## 2018-03-01 VITALS — HEART RATE: 77 BPM | OXYGEN SATURATION: 95 %

## 2018-03-01 VITALS
OXYGEN SATURATION: 97 % | DIASTOLIC BLOOD PRESSURE: 85 MMHG | SYSTOLIC BLOOD PRESSURE: 191 MMHG | TEMPERATURE: 99.5 F | HEART RATE: 80 BPM

## 2018-03-01 VITALS
HEART RATE: 69 BPM | SYSTOLIC BLOOD PRESSURE: 174 MMHG | OXYGEN SATURATION: 95 % | DIASTOLIC BLOOD PRESSURE: 54 MMHG | TEMPERATURE: 98.78 F

## 2018-03-01 VITALS
OXYGEN SATURATION: 92 % | HEART RATE: 78 BPM | DIASTOLIC BLOOD PRESSURE: 78 MMHG | TEMPERATURE: 98.78 F | SYSTOLIC BLOOD PRESSURE: 176 MMHG

## 2018-03-01 VITALS
TEMPERATURE: 98.78 F | DIASTOLIC BLOOD PRESSURE: 54 MMHG | SYSTOLIC BLOOD PRESSURE: 170 MMHG | OXYGEN SATURATION: 94 % | HEART RATE: 64 BPM

## 2018-03-01 VITALS
DIASTOLIC BLOOD PRESSURE: 57 MMHG | SYSTOLIC BLOOD PRESSURE: 151 MMHG | HEART RATE: 66 BPM | TEMPERATURE: 98.78 F | OXYGEN SATURATION: 94 %

## 2018-03-01 VITALS
TEMPERATURE: 99.14 F | DIASTOLIC BLOOD PRESSURE: 58 MMHG | OXYGEN SATURATION: 98 % | HEART RATE: 67 BPM | SYSTOLIC BLOOD PRESSURE: 172 MMHG

## 2018-03-01 VITALS
TEMPERATURE: 99.14 F | HEART RATE: 69 BPM | SYSTOLIC BLOOD PRESSURE: 205 MMHG | OXYGEN SATURATION: 97 % | DIASTOLIC BLOOD PRESSURE: 72 MMHG

## 2018-03-01 VITALS — OXYGEN SATURATION: 93 %

## 2018-03-01 VITALS
TEMPERATURE: 98.6 F | OXYGEN SATURATION: 94 % | HEART RATE: 66 BPM | SYSTOLIC BLOOD PRESSURE: 155 MMHG | DIASTOLIC BLOOD PRESSURE: 56 MMHG

## 2018-03-01 VITALS — OXYGEN SATURATION: 99 % | HEART RATE: 68 BPM

## 2018-03-01 VITALS
OXYGEN SATURATION: 93 % | DIASTOLIC BLOOD PRESSURE: 56 MMHG | TEMPERATURE: 98.06 F | HEART RATE: 68 BPM | SYSTOLIC BLOOD PRESSURE: 129 MMHG

## 2018-03-01 VITALS
OXYGEN SATURATION: 96 % | TEMPERATURE: 98.06 F | DIASTOLIC BLOOD PRESSURE: 71 MMHG | SYSTOLIC BLOOD PRESSURE: 138 MMHG | HEART RATE: 65 BPM

## 2018-03-01 VITALS — OXYGEN SATURATION: 96 %

## 2018-03-01 VITALS — HEART RATE: 67 BPM | OXYGEN SATURATION: 93 %

## 2018-03-01 VITALS
OXYGEN SATURATION: 96 % | DIASTOLIC BLOOD PRESSURE: 52 MMHG | TEMPERATURE: 98.78 F | SYSTOLIC BLOOD PRESSURE: 164 MMHG | HEART RATE: 62 BPM

## 2018-03-01 VITALS — OXYGEN SATURATION: 95 % | HEART RATE: 66 BPM

## 2018-03-01 VITALS — HEART RATE: 65 BPM | OXYGEN SATURATION: 93 %

## 2018-03-01 LAB
BASOPHILS # BLD: 0.01 K/UL (ref 0–0.2)
BASOPHILS NFR BLD: 0.1 %
BUN SERPL-MCNC: 42 MG/DL (ref 7–18)
CALCIUM SERPL-MCNC: 8.6 MG/DL (ref 8.5–10.1)
CO2 SERPL-SCNC: 23 MMOL/L (ref 21–32)
CREAT SERPL-MCNC: 1.83 MG/DL (ref 0.6–1.4)
EOS ABS #: 0.05 K/UL (ref 0–0.5)
EOSINOPHIL NFR BLD AUTO: 234 K/UL (ref 130–400)
GLUCOSE SERPL-MCNC: 147 MG/DL (ref 70–99)
HCT VFR BLD CALC: 22.6 % (ref 42–52)
HGB BLD-MCNC: 7.4 G/DL (ref 14–18)
IG#: 0.03 K/UL (ref 0–0.02)
IMM GRANULOCYTES NFR BLD AUTO: 8.5 %
LYMPHOCYTES # BLD: 0.69 K/UL (ref 1.2–3.4)
MCH RBC QN AUTO: 29.6 PG (ref 25–34)
MCHC RBC AUTO-ENTMCNC: 32.7 G/DL (ref 32–36)
MCV RBC AUTO: 90.4 FL (ref 80–100)
MONO ABS #: 1.13 K/UL (ref 0.11–0.59)
MONOCYTES NFR BLD: 13.9 %
NEUT ABS #: 6.21 K/UL (ref 1.4–6.5)
NEUTROPHILS # BLD AUTO: 0.6 %
NEUTROPHILS NFR BLD AUTO: 76.5 %
PHOSPHATE SERPL-MCNC: 3.6 MG/DL (ref 2.5–4.9)
PMV BLD AUTO: 8.7 FL (ref 7.4–10.4)
POTASSIUM SERPL-SCNC: 3.9 MMOL/L (ref 3.5–5.1)
PTT PATIENT: 42.2 SECONDS (ref 21–31)
PTT PATIENT: 43.7 SECONDS (ref 21–31)
PTT PATIENT: 49.2 SECONDS (ref 21–31)
RED CELL DISTRIBUTION WIDTH CV: 15.5 % (ref 11.5–14.5)
RED CELL DISTRIBUTION WIDTH SD: 51 FL (ref 36.4–46.3)
SODIUM SERPL-SCNC: 139 MMOL/L (ref 136–145)
WBC # BLD AUTO: 8.12 K/UL (ref 4.8–10.8)

## 2018-03-01 RX ADMIN — IPRATROPIUM BROMIDE AND ALBUTEROL SULFATE SCH ML: .5; 3 SOLUTION RESPIRATORY (INHALATION) at 14:15

## 2018-03-01 RX ADMIN — FENTANYL CITRATE PRN MCG: 50 INJECTION, SOLUTION INTRAMUSCULAR; INTRAVENOUS at 02:03

## 2018-03-01 RX ADMIN — NICARDIPINE HYDROCHLORIDE PRN MLS/HR: 25 INJECTION INTRAVENOUS at 16:28

## 2018-03-01 RX ADMIN — IPRATROPIUM BROMIDE AND ALBUTEROL SULFATE SCH ML: .5; 3 SOLUTION RESPIRATORY (INHALATION) at 14:16

## 2018-03-01 RX ADMIN — PANTOPRAZOLE SODIUM SCH MLS/MIN: 40 INJECTION, POWDER, FOR SOLUTION INTRAVENOUS at 11:09

## 2018-03-01 RX ADMIN — OXYCODONE HYDROCHLORIDE SCH MG: 5 SOLUTION ORAL at 09:00

## 2018-03-01 RX ADMIN — IPRATROPIUM BROMIDE AND ALBUTEROL SULFATE SCH ML: .5; 3 SOLUTION RESPIRATORY (INHALATION) at 11:30

## 2018-03-01 RX ADMIN — INSULIN ASPART SCH UNITS: 100 INJECTION, SOLUTION INTRAVENOUS; SUBCUTANEOUS at 00:00

## 2018-03-01 RX ADMIN — NICARDIPINE HYDROCHLORIDE PRN MLS/HR: 25 INJECTION INTRAVENOUS at 11:06

## 2018-03-01 RX ADMIN — INSULIN ASPART SCH UNITS: 100 INJECTION, SOLUTION INTRAVENOUS; SUBCUTANEOUS at 08:00

## 2018-03-01 RX ADMIN — INSULIN ASPART SCH UNITS: 100 INJECTION, SOLUTION INTRAVENOUS; SUBCUTANEOUS at 16:00

## 2018-03-01 RX ADMIN — CARVEDILOL SCH MG: 6.25 TABLET, FILM COATED ORAL at 11:32

## 2018-03-01 RX ADMIN — CLOPIDOGREL BISULFATE SCH MG: 75 TABLET, FILM COATED ORAL at 11:31

## 2018-03-01 RX ADMIN — VALPROATE SODIUM SCH MLS/HR: 500 INJECTION INTRAVENOUS at 22:13

## 2018-03-01 RX ADMIN — IPRATROPIUM BROMIDE AND ALBUTEROL SULFATE SCH ML: .5; 3 SOLUTION RESPIRATORY (INHALATION) at 19:44

## 2018-03-01 RX ADMIN — INSULIN GLARGINE SCH UNITS: 100 INJECTION, SOLUTION SUBCUTANEOUS at 20:21

## 2018-03-01 RX ADMIN — IPRATROPIUM BROMIDE AND ALBUTEROL SULFATE SCH ML: .5; 3 SOLUTION RESPIRATORY (INHALATION) at 16:00

## 2018-03-01 RX ADMIN — OXYCODONE HYDROCHLORIDE SCH MG: 5 SOLUTION ORAL at 03:00

## 2018-03-01 RX ADMIN — HEPARIN SODIUM PRN MLS/HR: 5000 INJECTION, SOLUTION INTRAVENOUS at 23:18

## 2018-03-01 RX ADMIN — INSULIN ASPART SCH UNITS: 100 INJECTION, SOLUTION INTRAVENOUS; SUBCUTANEOUS at 11:40

## 2018-03-01 RX ADMIN — INSULIN ASPART SCH UNITS: 100 INJECTION, SOLUTION INTRAVENOUS; SUBCUTANEOUS at 04:00

## 2018-03-01 RX ADMIN — IPRATROPIUM BROMIDE AND ALBUTEROL SULFATE SCH ML: .5; 3 SOLUTION RESPIRATORY (INHALATION) at 07:45

## 2018-03-01 RX ADMIN — HEPARIN SODIUM PRN MLS/HR: 5000 INJECTION, SOLUTION INTRAVENOUS at 13:46

## 2018-03-01 RX ADMIN — INSULIN GLARGINE SCH UNITS: 100 INJECTION, SOLUTION SUBCUTANEOUS at 11:09

## 2018-03-01 RX ADMIN — VALPROATE SODIUM SCH MLS/HR: 500 INJECTION INTRAVENOUS at 11:09

## 2018-03-01 RX ADMIN — OXYMETAZOLINE HYDROCHLORIDE SCH SPRAYS: 0.05 SPRAY NASAL at 09:00

## 2018-03-01 RX ADMIN — OXYCODONE HYDROCHLORIDE SCH MG: 5 SOLUTION ORAL at 13:48

## 2018-03-01 RX ADMIN — OXYCODONE HYDROCHLORIDE SCH MG: 5 SOLUTION ORAL at 20:20

## 2018-03-01 RX ADMIN — CHLORHEXIDINE GLUCONATE SCH ML: 1.2 RINSE ORAL at 09:00

## 2018-03-01 RX ADMIN — NICARDIPINE HYDROCHLORIDE PRN MLS/HR: 25 INJECTION INTRAVENOUS at 23:37

## 2018-03-01 RX ADMIN — OXYMETAZOLINE HYDROCHLORIDE SCH SPRAYS: 0.05 SPRAY NASAL at 20:21

## 2018-03-01 RX ADMIN — CARVEDILOL SCH MG: 6.25 TABLET, FILM COATED ORAL at 20:20

## 2018-03-01 RX ADMIN — FENTANYL CITRATE PRN MCG: 50 INJECTION, SOLUTION INTRAMUSCULAR; INTRAVENOUS at 10:30

## 2018-03-01 RX ADMIN — INSULIN ASPART SCH UNITS: 100 INJECTION, SOLUTION INTRAVENOUS; SUBCUTANEOUS at 23:18

## 2018-03-01 RX ADMIN — INSULIN ASPART SCH UNITS: 100 INJECTION, SOLUTION INTRAVENOUS; SUBCUTANEOUS at 20:00

## 2018-03-01 RX ADMIN — NICARDIPINE HYDROCHLORIDE PRN MLS/HR: 25 INJECTION INTRAVENOUS at 20:20

## 2018-03-01 NOTE — CRITICAL CARE PROGRESS NOTE
Critical Care Progress Note


Date of Service


Mar 1, 2018.





Attending


Dr. Arnold





Objective


GENERAL : Mild acute distress


EYES:  No icterus, gaze conjugate. PERRL.  Opens and closes to verbal command


NOSE: No evidence of epistaxis.  Core safe tube present


NECK: No JVD 


LUNGS: Scattered rhonchi bilaterally


HEART:  Regular, rate controlled in the 70s


ABDOMEN:  Soft, NT, ND, BS Present


EXTREMITIES: +2 bilateral LE edema, pedal pulses intact.


NEURO: More drowsy and less robust command following today compared to yesterday





Assessment & Plan


Neuro


* Repeat head scan today, findings reviewed


 * This was secondary to elevated blood pressures and heparin drip


 * Discussed the case with Dr. Mcclellan of neurology continue current treatment





Cardiovascular


* PEA cardiac arrest witnessed by EMS staff most likely secondary to hypoxia


*  no ST changes


* Cardiology consulted -no urgency for cardiac catheterization.  Prior history 

of multivessel coronary artery disease


 * With decreased neuro status, unsure if patient is able to swallow holding 

oral medications at this time


 * Started nicardipine drip for blood pressures reaching into the 200s 

secondary to no enteral access





Pulmonary


* Quit smoking over a year ago


* Requiring intermittent noninvasive support


* Given 1 racemic epinephrine for upper airway sounds today








Musculoskeletal


* Multiple nondisplaced rib fractures - suspected etiology is cardiac 

compressions


* Pain control with as needed fentanyl.  Patient able to complain of pain this 

afternoon


* Probable sternal fracture -suspected etiology cardiac compressions


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


* Patient appears to have foot drop on the right





Renal 


* Renal ultrasound with no evidence of renal artery stenosis


* There is mention of question of interrupted flow in the right and left renal 

vein.  However, this study was a very poor study with technical difficulty 

related to poor sonographic windows.  Continue to monitor.


* Discussed with nephrology, patient diuresing currently without the assistance 

of diuretics





ID


* Ag negative for influenza A&B


* Blood cultures 2 negative


* Urine culture with no growth


* MRSA specimen is negative by DNA probe


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





Endocrine


* History of type 2 diabetes mellitus


* Has been titrated off of insulin drip -continue gtt as needed


* Hemoglobin A1c 6.0


* Continue BSGs per protocol


* Lantus and NovoLog per protocol -glycemic management per pharmacy


* Restarting tube feeds





Heme


* 2 units held in the event that Hgb drops further


* Hemodynamically stable


* Transfuse if drops below 7


* Repeat H&H at 1900





Nutrition


* Hold Peptamen due to high phos


* [facility]


* Nutrition consult for tube feed management


* Coresafe tube in place





GI


* Pantoprazole 40 mg IV daily





DVT prophylaxis


* TEDs/SCDs





I have personally spent 35 minutes of critical care time in the direct 

management of this patient.  This is a life/limb threatening event.  This 

includes time spent evaluating patient, direct bedside care, chart review, 

placing orders, interpretation of diagnostic studies, discussion with 

consultants, patient, and/or family members regarding treatment decisions, as 

well as other required patient management activities.


This time is exclusive of all separately billable procedures, and teaching time 

and separate from and in addition to any other critical care service time.





Consults & Procedures


Consultants:


Neurology - Dr. Washburn


Cardiology -Dr. Martínez


Critical care medicine -Dr. Elder


Procedures:


Right radial arterial line -2/24/2018 by Dr. Elder


Endotracheal intubation -2/24/2018 -prehospital tube replaced by Dr. Elder


Right subclavian triple-lumen central venous catheter -  2/24/2018 by Dr. Elder


Fiberoptic bronchoscopy -2/24/2018 by Dr. Elder





Data


Medications:





Current Inpatient Medications








 Medications


  (Trade)  Dose


 Ordered  Sig/Dc


 Route  Start Time


 Stop Time Status Last Admin


Dose Admin


 


 Clopidogrel


 Bisulfate


  (plAVix TAB)  75 mg  QAM


 NG  2/25/18 09:00


 3/27/18 08:59  3/1/18 11:31


75 MG


 


 Miscellaneous


 Information


  (Consult


 Glycemic


 Management


 Pharmacy)  1 ea  UD  PRN


 N/A  2/24/18 12:30


 3/26/18 12:29   


 


 


 Artificial Tears


  (Lacri-Lube Oph


 Oint)  1 appln  Q2H  PRN


 OPB  2/24/18 12:00


 3/26/18 11:59  2/28/18 08:31


1 APPLN


 


 Pantoprazole


 Sodium 40 mg/


 Syringe  10 ml @ 5


 mls/min  DAILY@11


 IV  2/25/18 11:00


 3/27/18 10:59  3/1/18 11:09


5 MLS/MIN


 


 Valproate Sodium


 1000 mg/Dextrose  60 ml @ 55


 mls/hr  Q12H


 IV  2/24/18 22:00


 3/26/18 21:59  3/1/18 11:09


55 MLS/HR


 


 Chlorhexidine


 Gluconate


  (Peridex Oral


 Soln)  15 ml  DAILY


 MT  2/25/18 09:00


 3/27/18 08:59  3/1/18 09:00


15 ML


 


 Fentanyl Citrate


  (Fentanyl Inj)  100 mcg  Q2H  PRN


 IV  2/26/18 09:15


 3/12/18 09:14  3/1/18 10:30


100 MCG


 


 Insulin Aspart


  (novoLOG ASPART)  SLIDING


 SCALE  Q4


 SC  2/26/18 11:00


 3/28/18 10:59  3/1/18 11:40


1 UNITS


 


 Glucose


  (Glucose 40% Gel)  15-30


 GRAMS 15


 GRAMS...  UD  PRN


 PO  2/26/18 11:00


 3/28/18 10:59   


 


 


 Glucose


  (Glucose Chew


 Tab)  4-8


 Tablets 4


 Tabl...  UD  PRN


 PO  2/26/18 11:00


 3/28/18 10:59   


 


 


 Dextrose


  (Dextrose 50%


 50ML Syringe)  25-50ML OF


 50% DW IV


 FOR...  UD  PRN


 IV  2/26/18 11:00


 3/28/18 10:59   


 


 


 Glucagon


  (Glucagon Inj)  1 mg  UD  PRN


 SQ  2/26/18 11:00


 3/28/18 10:59   


 


 


 Heparin Sodium/


 Dextrose  500 ml @ 


 32 mls/hr  [id number] PRN


 IV  2/26/18 11:45


 3/28/18 11:44  3/1/18 13:46


32 MLS/HR


 


 Acetaminophen


  (Tylenol Soln)  650 mg  Q6H  PRN


 PO  2/27/18 09:45


 3/29/18 09:44  2/28/18 01:40


650 MG


 


 Oxymetazoline HCl


  (Afrin 0.05%


 Nasal Spray)  2 sprays  BID


 VERONICA  2/27/18 10:00


 3/1/18 21:01  2/28/18 08:27


2 SPRAYS


 


 Insulin Glargine


  (Lantus Solostar


 Pen)    QPM


 SC  2/27/18 21:00


 3/29/18 20:59   


 


 


 Insulin Glargine


  (Lantus Solostar


 Pen)    QAM


 SC  2/28/18 09:00


 3/30/18 08:59  3/1/18 11:09


12 UNITS


 


 Carvedilol


  (Coreg Tab)  12.5 mg  BID


 PO  2/28/18 09:00


 3/29/18 20:59 Future hold 3/1/18 11:32


12.5 MG


 


 Oxycodone HCl


  (Roxicodone Soln)  5 mg  Q6H


 PO  2/28/18 09:00


 3/14/18 08:59  3/1/18 13:48


5 MG


 


 Sterile Water


  (Tube Feeding


 Water Flush)  30 ea  Q4


 NG  2/28/18 12:00


 3/30/18 11:59  3/1/18 16:30


30 EA


 


 Albuterol/


 Ipratropium


  (Duoneb)  3 ml  QIDR


 INH  2/28/18 16:00


 3/30/18 15:59  3/1/18 16:00


3 ML


 


 Nicardipine HCl


 25 mg/Sodium


 Chloride  250 ml @ 0


 mls/hr  Q0M PRN


 IV  3/1/18 09:00


 3/31/18 08:59  3/1/18 16:28


50 MLS/HR


 


 Enteral


 Nutritional


 Formula


  (Novasource


 Renal)  1,000 ml  UD  PRN


 NG  3/1/18 16:15


 3/31/18 16:14   


 








I & O:











24-Hour Column 


 


 3/2/18





 08:00


 


Intake Total 675 ml


 


Output Total 350 ml


 


Balance 325 ml








Vital Signs:











  Date Time  Temp Pulse Resp B/P (MAP) Pulse Ox O2 Delivery O2 Flow Rate FiO2


 


3/1/18 18:00 36.7 65 22 138/71 (93) 96 Nasal Cannula  30


 


3/1/18 16:00 36.7 65 22 154/68 (96) 93 BiPAP  30


 


3/1/18 16:00  63 15  99 Nasal Cannula 5.0 


 


3/1/18 16:00      BiPAP  30


 


3/1/18 15:45  67 16  93 Nasal Cannula 5.0 


 


3/1/18 14:27  65   93   30


 


3/1/18 14:16  77 14  95 Nasal Cannula 5.0 


 


3/1/18 14:00 36.7 68 22 129/56 (80) 93 BiPAP  30


 


3/1/18 12:00      BiPAP  30


 


3/1/18 12:00 36.9 67 15 172/60 (97) 95 BiPAP  30


 


3/1/18 11:30  66   95   30


 


3/1/18 11:30  66 17  95 BiPAP/CPAP  30


 


3/1/18 09:45 37.1 62 17 164/52 (89) 96 BiPAP  30


 


3/1/18 08:00 37.3 68 13 196/72 (113) 99 BiPAP  30


 


3/1/18 08:00      BiPAP  30





      Mechanical Ventilator  


 


3/1/18 08:00      BiPAP  30


 


3/1/18 07:45  71   97   30


 


3/1/18 07:45  71 15  97 BiPAP/CPAP  30


 


3/1/18 06:00 37.3 64 15 172/57 (95) 98 BiPAP  30


 


3/1/18 04:00      BiPAP  30


 


3/1/18 04:00 37.3 67 17 172/58 (96) 98 BiPAP  30


 


3/1/18 02:00 37.3 69 12 205/72 (116) 97 BiPAP  30


 


3/1/18 00:01 37.5 80 16 191/85 (120) 97 BiPAP  30


 


2/28/18 23:59      BiPAP  30


 


2/28/18 22:00 37.6 84 16 209/74 (119) 95 BiPAP  30


 


2/28/18 21:07  85   97   30


 


2/28/18 20:00 37.6 90 17 222/84 (130) 96 Nasal Cannula 3.0 


 


2/28/18 20:00      Nasal Cannula 3.0 


 


2/28/18 19:48  79 18  95 Nasal Cannula 3.0 








Laboratory Results:





Last 24 Hours








Test


  2/28/18


19:36 2/28/18


21:02 3/1/18


00:18 3/1/18


01:33


 


Hemoglobin 8.2 g/dL    


 


Hematocrit 24.4 %    


 


Bedside Glucose  140 mg/dl  146 mg/dl  


 


Activated Partial


Thromboplast Time 


  


  


  42.2 SECONDS 


 


 


Partial Thromboplastin Ratio    1.6 


 


Test


  3/1/18


04:01 3/1/18


05:21 3/1/18


08:30 3/1/18


09:03


 


Bedside Glucose 154 mg/dl   160 mg/dl  


 


White Blood Count  8.12 K/uL   


 


Red Blood Count  2.50 M/uL   


 


Hemoglobin  7.4 g/dL   


 


Hematocrit  22.6 %   


 


Mean Corpuscular Volume  90.4 fL   


 


Mean Corpuscular Hemoglobin  29.6 pg   


 


Mean Corpuscular Hemoglobin


Concent 


  32.7 g/dl 


  


  


 


 


Platelet Count  234 K/uL   


 


Mean Platelet Volume  8.7 fL   


 


Neutrophils (%) (Auto)  76.5 %   


 


Lymphocytes (%) (Auto)  8.5 %   


 


Monocytes (%) (Auto)  13.9 %   


 


Eosinophils (%) (Auto)  0.6 %   


 


Basophils (%) (Auto)  0.1 %   


 


Neutrophils # (Auto)  6.21 K/uL   


 


Lymphocytes # (Auto)  0.69 K/uL   


 


Monocytes # (Auto)  1.13 K/uL   


 


Eosinophils # (Auto)  0.05 K/uL   


 


Basophils # (Auto)  0.01 K/uL   


 


RDW Standard Deviation  51.0 fL   


 


RDW Coefficient of Variation  15.5 %   


 


Immature Granulocyte % (Auto)  0.4 %   


 


Immature Granulocyte # (Auto)  0.03 K/uL   


 


Red Blood Cell Morphology  Unremarkable   


 


Sodium Level  139 mmol/L   


 


Potassium Level  3.9 mmol/L   


 


Chloride Level  107 mmol/L   


 


Carbon Dioxide Level  23 mmol/L   


 


Anion Gap  9.0 mmol/L   


 


Blood Urea Nitrogen  42 mg/dl   


 


Creatinine  1.83 mg/dl   


 


Est Creatinine Clear Calc


Drug Dose 


  50.9 ml/min 


  


  


 


 


Estimated GFR (


American) 


  45.5 


  


  


 


 


Estimated GFR (Non-


American 


  39.2 


  


  


 


 


BUN/Creatinine Ratio  23.0   


 


Random Glucose  147 mg/dl   


 


Calcium Level  8.6 mg/dl   


 


Phosphorus Level  3.6 mg/dl   


 


Magnesium Level  2.1 mg/dl   


 


Blood Gas Sample Site    Art Line 


 


Bedside Blood Gas pH (LAB)    7.41 


 


Bedside Blood Gas pCO2 (LAB)    38 mmHg 


 


Bedside Blood Gas pO2 (LAB)    91 mmHg 


 


Bedside Blood Gas HCO3 (LAB)    24 meq/L 


 


Bedside Blood Gas Total CO2    25 mEq/l 


 


Bedside Blood Gas Base Excess


(LAB) 


  


  


  -1.0 meq/L 


 


 


Bedside Blood Gas O2


Saturation 


  


  


  97.0 % 


 


 


Cameron Test    NA 


 


Oxygen Delivery Device    BIPAP 


 


Bedside Oxygen Rate


(breaths/min) 


  


  


  12 


 


 


Bedside FiO2    30 % 


 


Blood Gas IPAP    12 


 


Test


  3/1/18


09:17 3/1/18


18:19 


  


 


 


Activated Partial


Thromboplast Time 43.7 SECONDS 


  49.2 SECONDS 


  


  


 


 


Partial Thromboplastin Ratio 1.7  1.9

## 2018-03-01 NOTE — DIAGNOSTIC IMAGING REPORT
CHEST ONE VIEW PORTABLE



CLINICAL HISTORY: 62 years-old Male presenting with hypoxia. 



TECHNIQUE: Portable upright AP view of the chest was obtained.



COMPARISON: 2/28/2018.



FINDINGS:

There has been interval extubation. A weighted feeding catheter is now in place

terminating in the stomach. The nasogastric tube has been removed. Right

subclavian central venous catheter again terminates in the lower SVC.



Atherosclerosis of the aortic arch. Cardiac silhouette mildly enlarged,

unchanged. Mild prominence of pulmonary vasculature slightly increased from

prior. Mildly low lung volumes with hypoventilatory changes. Increased left

retrocardiac opacity with possible left pleural effusion. Trace right pleural

effusion suspected. No large pneumothorax.   



IMPRESSION:

1.  Mild cardiomegaly with evidence of volume overload. No sherlyn pulmonary

edema.



2.  Increased left basilar consolidation and pleural effusion. Infection is

difficult to exclude, although this may represent aspiration or extensive

atelectasis.



3.  The weighted feeding catheter terminates in the gastric lumen; advancement

recommended.



The report will be called/faxed according to standard departmental protocol.







Electronically signed by:  Richard Deluna M.D.

3/1/2018 4:20 PM



Dictated Date/Time:  3/1/2018 4:18 PM

## 2018-03-01 NOTE — PROGRESS NOTE
Subjective


Date of Service:


Mar 1, 2018.


Subjective


Pt evaluation today including:  conversation w/ family, physical exam, chart 

review, lab review, review of studies, conversation w/ consultant, review of 

inpatient medication list


Voiding:  gillespie catheter in place


Yesterday he was extubated, however CPAP machine, has increased urine output 

afebrile, patient needed fentanyl IV while he was doing a CT, was agitation 

when in  CT examination, currently is sleepy and quiet, no opening his eyes, 

bilateral lower extremity involuntary movements





Problem List


Medical Problems:


(1) Acute CHF


Status: Acute  





(2) Acute coronary syndrome


Status: Acute  





(3) Acute headache


Status: Acute  





(4) Acute on chronic congestive heart failure


Status: Acute  





(5) Altered mental status


Status: Acute  





(6) Anemia


Status: Acute  





(7) Anemia


Status: Acute  





(8) Anemia


Status: Acute  





(9) Anginal pain


Status: Acute  





(10) Appendicitis


Status: Acute  





(11) Cellulitis


Status: Acute  





(12) Chest pain


Status: Acute  





(13) CHF (congestive heart failure)


Status: Acute  





(14) CHF (congestive heart failure)


Status: Acute  





(15) CHF (congestive heart failure)


Status: Acute  





(16) Congestive heart failure


Status: Acute  





(17) Congestive heart failure


Status: Acute  





(18) COPD (chronic obstructive pulmonary disease)


Status: Acute  





(19) Diabetes mellitus with hyperglycemia


Status: Acute  





(20) Dyspnea


Status: Acute  





(21) Elevated troponin


Status: Acute  





(22) Elevated troponin


Status: Acute  





(23) Failure of outpatient treatment


Status: Acute  





(24) Hypoxia


Status: Acute  





(25) Hypoxia


Status: Acute  





(26) Intra-abdominal abscess


Status: Acute  





(27) Lower abdominal pain of unknown etiology


Status: Acute  





(28) Neck pain


Status: Acute  





(29) Pneumonia


Status: Acute  





(30) Pneumonia


Status: Acute  





(31) Precordial chest pain


Status: Acute  





(32) Pulmonary edema


Status: Acute  





(33) Respiratory acidosis


Status: Acute  





(34) Respiratory distress


Status: Acute  





(35) SOB (shortness of breath)


Status: Acute  





(36) SOB (shortness of breath)


Status: Acute  





(37) Tachypnea


Status: Acute  





(38) UTI (urinary tract infection)


Status: Acute  








Review of Systems


Constitutional:  + problem reported (Not able to obtain,)





Objective


Vital Signs











  Date Time  Temp Pulse Resp B/P (MAP) Pulse Ox O2 Delivery O2 Flow Rate FiO2


 


3/1/18 14:27  65   93   30


 


3/1/18 14:16  77 14  95 Nasal Cannula 5.0 


 


3/1/18 14:00 36.7 68 22 129/56 (80) 93 BiPAP  30


 


3/1/18 12:00      BiPAP  30


 


3/1/18 12:00 36.9 67 15 172/60 (97) 95 BiPAP  30


 


3/1/18 11:30  66   95   30


 


3/1/18 11:30  66 17  95 BiPAP/CPAP  30


 


3/1/18 09:45 37.1 62 17 164/52 (89) 96 BiPAP  30


 


3/1/18 08:00 37.3 68 13 196/72 (113) 99 BiPAP  30


 


3/1/18 08:00      BiPAP  30





      Mechanical Ventilator  


 


3/1/18 08:00      BiPAP  30


 


3/1/18 07:45  71   97   30


 


3/1/18 07:45  71 15  97 BiPAP/CPAP  30


 


3/1/18 06:00 37.3 64 15 172/57 (95) 98 BiPAP  30


 


3/1/18 04:00      BiPAP  30


 


3/1/18 04:00 37.3 67 17 172/58 (96) 98 BiPAP  30


 


3/1/18 02:00 37.3 69 12 205/72 (116) 97 BiPAP  30


 


3/1/18 00:01 37.5 80 16 191/85 (120) 97 BiPAP  30


 


2/28/18 23:59      BiPAP  30


 


2/28/18 22:00 37.6 84 16 209/74 (119) 95 BiPAP  30


 


2/28/18 21:07  85   97   30


 


2/28/18 20:00 37.6 90 17 222/84 (130) 96 Nasal Cannula 3.0 


 


2/28/18 20:00      Nasal Cannula 3.0 


 


2/28/18 19:48  79 18  95 Nasal Cannula 3.0 


 


2/28/18 17:00 37.8 80 20 172/107 (128) 94   


 


2/28/18 16:49     97 Nasal Cannula 3.0 


 


2/28/18 16:00 37.7 84 18 162/83 (109) 94   











Physical Exam


General Appearance:  + pertinent finding (On BiPAP, not opening eyes,  )


Eyes:  normal inspection, PERRL


ENT:  pharynx normal


Neck:  supple, no adenopathy


Respiratory/Chest:  chest non-tender, + decreased breath sounds


Cardiovascular:  regular rate, rhythm, + pertinent finding (1+)


Abdomen:  normal bowel sounds, non tender, soft


Extremities:  normal range of motion, non-tender, normal inspection, + swelling


Neurologic/Psychiatric:  + pertinent finding (Patient possibly sleeping, no 

conversation for now, no facial droop)





Laboratory Results





Last 24 Hours








Test


  2/28/18


15:21 2/28/18


16:14 2/28/18


18:25 2/28/18


19:36


 


Blood Gas Sample Site Art Line    


 


Bedside Blood Gas pH (LAB) 7.31    


 


Bedside Blood Gas pCO2 (LAB) 50 mmHg    


 


Bedside Blood Gas pO2 (LAB) 56 mmHg    


 


Bedside Blood Gas HCO3 (LAB) 26 meq/L    


 


Bedside Blood Gas Total CO2 27 mEq/l    


 


Bedside Blood Gas Base Excess


(LAB) -1.0 meq/L 


  


  


  


 


 


Bedside Blood Gas O2


Saturation 87.0 % 


  


  


  


 


 


Cameron Test NA    


 


Oxygen Delivery Device Cannula    


 


Bedside Glucose  137 mg/dl   


 


Activated Partial


Thromboplast Time 


  


  41.0 SECONDS 


  


 


 


Partial Thromboplastin Ratio   1.6  


 


Hemoglobin    8.2 g/dL 


 


Hematocrit    24.4 % 


 


Test


  2/28/18


21:02 3/1/18


00:18 3/1/18


01:33 3/1/18


04:01


 


Bedside Glucose 140 mg/dl  146 mg/dl   154 mg/dl 


 


Activated Partial


Thromboplast Time 


  


  42.2 SECONDS 


  


 


 


Partial Thromboplastin Ratio   1.6  


 


Test


  3/1/18


05:21 3/1/18


08:30 3/1/18


09:03 3/1/18


09:17


 


White Blood Count 8.12 K/uL    


 


Red Blood Count 2.50 M/uL    


 


Hemoglobin 7.4 g/dL    


 


Hematocrit 22.6 %    


 


Mean Corpuscular Volume 90.4 fL    


 


Mean Corpuscular Hemoglobin 29.6 pg    


 


Mean Corpuscular Hemoglobin


Concent 32.7 g/dl 


  


  


  


 


 


Platelet Count 234 K/uL    


 


Mean Platelet Volume 8.7 fL    


 


Neutrophils (%) (Auto) 76.5 %    


 


Lymphocytes (%) (Auto) 8.5 %    


 


Monocytes (%) (Auto) 13.9 %    


 


Eosinophils (%) (Auto) 0.6 %    


 


Basophils (%) (Auto) 0.1 %    


 


Neutrophils # (Auto) 6.21 K/uL    


 


Lymphocytes # (Auto) 0.69 K/uL    


 


Monocytes # (Auto) 1.13 K/uL    


 


Eosinophils # (Auto) 0.05 K/uL    


 


Basophils # (Auto) 0.01 K/uL    


 


RDW Standard Deviation 51.0 fL    


 


RDW Coefficient of Variation 15.5 %    


 


Immature Granulocyte % (Auto) 0.4 %    


 


Immature Granulocyte # (Auto) 0.03 K/uL    


 


Red Blood Cell Morphology Unremarkable    


 


Sodium Level 139 mmol/L    


 


Potassium Level 3.9 mmol/L    


 


Chloride Level 107 mmol/L    


 


Carbon Dioxide Level 23 mmol/L    


 


Anion Gap 9.0 mmol/L    


 


Blood Urea Nitrogen 42 mg/dl    


 


Creatinine 1.83 mg/dl    


 


Est Creatinine Clear Calc


Drug Dose 50.9 ml/min 


  


  


  


 


 


Estimated GFR (


American) 45.5 


  


  


  


 


 


Estimated GFR (Non-


American 39.2 


  


  


  


 


 


BUN/Creatinine Ratio 23.0    


 


Random Glucose 147 mg/dl    


 


Calcium Level 8.6 mg/dl    


 


Phosphorus Level 3.6 mg/dl    


 


Magnesium Level 2.1 mg/dl    


 


Bedside Glucose  160 mg/dl   


 


Blood Gas Sample Site   Art Line  


 


Bedside Blood Gas pH (LAB)   7.41  


 


Bedside Blood Gas pCO2 (LAB)   38 mmHg  


 


Bedside Blood Gas pO2 (LAB)   91 mmHg  


 


Bedside Blood Gas HCO3 (LAB)   24 meq/L  


 


Bedside Blood Gas Total CO2   25 mEq/l  


 


Bedside Blood Gas Base Excess


(LAB) 


  


  -1.0 meq/L 


  


 


 


Bedside Blood Gas O2


Saturation 


  


  97.0 % 


  


 


 


Cameron Test   NA  


 


Oxygen Delivery Device   BIPAP  


 


Bedside Oxygen Rate


(breaths/min) 


  


  12 


  


 


 


Bedside FiO2   30 %  


 


Blood Gas IPAP   12  


 


Activated Partial


Thromboplast Time 


  


  


  43.7 SECONDS 


 


 


Partial Thromboplastin Ratio    1.7 











Assessment and Plan


62 M s/p cardiac arrest  with PEA, or NSTEMI VS tn leakage from arrest, and 

concern for anoxic brain injury, was therapeutically cooled and with continuous 

EEG monitoring, 





Cardiac Arrest 2/2 PEA, prognosis is very account


NSTEMI VS tn leakage from arrest, hx of of CAD; stable


Accelerated hypertension, continue Cardene drip was ordered by ICU team


Acute Hypoxic Respiratory Failure 


Acute on Chronic Diastolic CHF with Chronic CAD/Cardiomyopathy/Aortic thrombus (

etiology unknown) is on heparin drip


FITZ on CKD Stage III: Urine output has significant improved, possibly ATN 

polyuria.  BUN/creatinine related to stable, will watch while Lasix


T2DM, Uncontrolled:


Rhabdomyolysis:: Stable and improving


Metabolic Acidosis: Improving





Continue current care, discussed with intensivist and nephrologist


Continued [facility] stay due to:  multiple IV medications needed


Discharge planning:  uncertain

## 2018-03-01 NOTE — CARDIOLOGY PROGRESS NOTE
DATE: 03/01/2018

 

SUBJECTIVE:  Mr. Dixon is sedated and intubated in the intensive care

unit.

 

OBJECTIVE:

VITAL SIGNS:  Blood pressure 170/60 with a regular pulse of 65.  Respiratory

rate is 15.  The patient is afebrile at 37.3 degrees Celsius.  Saturation 90%

on 30% FIO2.

HEENT:  Notes a BiPAP mask in place.

NECK:  [id number] with full carotid upstrokes.  No obvious bruits.  Jugular

venous pressure cannot be assessed.

CARDIOVASCULAR:  Reveals a regular rhythm with distant heart sounds.  A 2/6

basal systolic ejection murmur is noted.

LUNGS:  Clear anteriorly.

ABDOMEN:  Soft without bruits.

EXTREMITIES:  Note intact radial artery pulses bilaterally.  Trace to 1+

pretibial edema is noted.

 

DATA:  CBC notes a hemoglobin of 7.4, hematocrit 22.6, white count 8.1,

platelet count 234,000.  Electrolytes note a sodium of 139, potassium 3.9,

chloride 107, bicarbonate 23, BUN 42, creatinine 1.8, and glucose 147.

 

IMPRESSION AND PLAN:

1. Status post cardiac arrest -- patient developed PEA while in the ambulance

on presentation on February 24th.

2. Acute on chronic diastolic congestive heart failure -- continues

intravenous diuresis.

3. Mild left ventricular dysfunction -- ejection fraction 45-50%, on

echocardiogram earlier in this hospitalization.

4. Coronary artery disease -- multivessel disease, history of a prior

percutaneous coronary intervention.

5. Hypertension -- agree with up titration of carvedilol.

6. History of arterial thrombus -- October 2017 -- [facility].

7. Mild aortic stenosis.

8. Prognosis -- guarded.

## 2018-03-01 NOTE — NEPHROLOGY PROGRESS NOTE
Nephrology Progress Note


Date of Service


Mar 1, 2018.





Chief Complaint


Acute on chronic kidney injury following cardiopulmonary arrest





Subjective


Mr. Dixon was seen & examined in the ICU this morning.  His medical 

management was discussed w/ the ICU team.  Mr. Dixon has been weaned from 

the ventilator.  He opens his eyes to voice and mumbles his name when asked.





Review of Systems


Patient unable to participate in ROS





Vital Signs





Last 8 Hrs








  Date Time  Temp Pulse Resp B/P (MAP) Pulse Ox O2 Delivery O2 Flow Rate FiO2


 


3/1/18 14:27  65   93   30


 


3/1/18 14:16  77 14  95 Nasal Cannula 5.0 


 


3/1/18 14:00 36.7 68 22 129/56 (80) 93 BiPAP  30


 


3/1/18 12:00      BiPAP  30


 


3/1/18 12:00 36.9 67 15 172/60 (97) 95 BiPAP  30


 


3/1/18 11:30  66   95   30


 


3/1/18 11:30  66 17  95 BiPAP/CPAP  30


 


3/1/18 09:45 37.1 62 17 164/52 (89) 96 BiPAP  30


 


3/1/18 08:00 37.3 68 13 196/72 (113) 99 BiPAP  30


 


3/1/18 08:00      BiPAP  30





      Mechanical Ventilator  


 


3/1/18 08:00      BiPAP  30


 


3/1/18 07:45  71   97   30


 


3/1/18 07:45  71 15  97 BiPAP/CPAP  30











I & O











24-Hour Column 


 


 3/2/18





 08:00


 


Intake Total 675 ml


 


Output Total 350 ml


 


Balance 325 ml











Last Recorded Weight


Weight (Kilograms):  95.400





Physical Exam


General Appearance:  + pertinent finding (acutely ill appearing)


Head:  normocephalic, atraumatic


Eyes:  PERRL, EOMI


Neck:  no adenopathy


Respiratory/Chest:  lungs clear (anteriorly)


Cardiovascular:  regular rate, rhythm


Abdomen/GI:  normal bowel sounds, non tender, soft


Genitourinary - Male:  + pertinent finding (gillespie catheter draining clear 

yellow urine)


Extremities/Musculoskelatal:  no calf tenderness, + pertinent finding (trace 

pretibial pitting edema)


Neurologic/Psych:  + pertinent finding (awake, opens eyes to voice, mumbles his 

name)





Family History





Cancer (esophageal)


Diabetes mellitus


Heart disease


Hypertension





Negative for CKD / ESRD





Social History


Smoking Status:  Former smoker


Drug Use:  none


Marital Status:  


Housing Status:  lives with family, other


Occupation:  employed








.  Disabled.  Former smoker





Laboratory Results


Past 24 Hours


2/28/18 19:36








3/1/18 05:21








Red Blood Count 2.50, Mean Corpuscular Volume 90.4, Mean Corpuscular Hemoglobin 

29.6, Mean Corpuscular Hemoglobin Concent 32.7, Mean Platelet Volume 8.7, 

Neutrophils (%) (Auto) 76.5, Lymphocytes (%) (Auto) 8.5, Monocytes (%) (Auto) 

13.9, Eosinophils (%) (Auto) 0.6, Basophils (%) (Auto) 0.1, Neutrophils # (Auto

) 6.21, Lymphocytes # (Auto) 0.69, Monocytes # (Auto) 1.13, Eosinophils # (Auto

) 0.05, Basophils # (Auto) 0.01





3/1/18 05:21

















Test


  2/28/18


16:14 2/28/18


18:25 2/28/18


21:02 3/1/18


00:18


 


Bedside Glucose


  137 mg/dl


(70-99) 


  140 mg/dl


(70-99) 146 mg/dl


(70-99)


 


Activated Partial


Thromboplast Time 


  41.0 SECONDS


(21.0-31.0) 


  


 


 


Partial Thromboplastin Ratio  1.6   


 


Test


  3/1/18


01:33 3/1/18


04:01 3/1/18


05:21 3/1/18


08:30


 


Activated Partial


Thromboplast Time 42.2 SECONDS


(21.0-31.0) 


  


  


 


 


Partial Thromboplastin Ratio 1.6    


 


Bedside Glucose


  


  154 mg/dl


(70-99) 


  160 mg/dl


(70-99)


 


White Blood Count


  


  


  8.12 K/uL


(4.8-10.8) 


 


 


Red Blood Count


  


  


  2.50 M/uL


(4.7-6.1) 


 


 


Hemoglobin


  


  


  7.4 g/dL


(14.0-18.0) 


 


 


Hematocrit   22.6 % (42-52)  


 


Mean Corpuscular Volume


  


  


  90.4 fL


() 


 


 


Mean Corpuscular Hemoglobin


  


  


  29.6 pg


(25-34) 


 


 


Mean Corpuscular Hemoglobin


Concent 


  


  32.7 g/dl


(32-36) 


 


 


Platelet Count


  


  


  234 K/uL


(130-400) 


 


 


Mean Platelet Volume


  


  


  8.7 fL


(7.4-10.4) 


 


 


Neutrophils (%) (Auto)   76.5 %  


 


Lymphocytes (%) (Auto)   8.5 %  


 


Monocytes (%) (Auto)   13.9 %  


 


Eosinophils (%) (Auto)   0.6 %  


 


Basophils (%) (Auto)   0.1 %  


 


Neutrophils # (Auto)


  


  


  6.21 K/uL


(1.4-6.5) 


 


 


Lymphocytes # (Auto)


  


  


  0.69 K/uL


(1.2-3.4) 


 


 


Monocytes # (Auto)


  


  


  1.13 K/uL


(0.11-0.59) 


 


 


Eosinophils # (Auto)


  


  


  0.05 K/uL


(0-0.5) 


 


 


Basophils # (Auto)


  


  


  0.01 K/uL


(0-0.2) 


 


 


RDW Standard Deviation


  


  


  51.0 fL


(36.4-46.3) 


 


 


RDW Coefficient of Variation


  


  


  15.5 %


(11.5-14.5) 


 


 


Immature Granulocyte % (Auto)   0.4 %  


 


Immature Granulocyte # (Auto)


  


  


  0.03 K/uL


(0.00-0.02) 


 


 


Red Blood Cell Morphology   Unremarkable  


 


Anion Gap


  


  


  9.0 mmol/L


(3-11) 


 


 


Est Creatinine Clear Calc


Drug Dose 


  


  50.9 ml/min 


  


 


 


Estimated GFR (


American) 


  


  45.5 


  


 


 


Estimated GFR (Non-


American 


  


  39.2 


  


 


 


BUN/Creatinine Ratio   23.0 (10-20)  


 


Calcium Level


  


  


  8.6 mg/dl


(8.5-10.1) 


 


 


Phosphorus Level


  


  


  3.6 mg/dl


(2.5-4.9) 


 


 


Magnesium Level


  


  


  2.1 mg/dl


(1.8-2.4) 


 


 


Test


  3/1/18


09:03 3/1/18


09:17 


  


 


 


Blood Gas Sample Site Art Line    


 


Bedside Blood Gas pH (LAB)


  7.41


(7.35-7.45) 


  


  


 


 


Bedside Blood Gas pCO2 (LAB)


  38 mmHg


(35-46) 


  


  


 


 


Bedside Blood Gas pO2 (LAB)


  91 mmHg


(80-95) 


  


  


 


 


Bedside Blood Gas HCO3 (LAB)


  24 meq/L


(19-24) 


  


  


 


 


Bedside Blood Gas Total CO2


  25 mEq/l


(24-31) 


  


  


 


 


Bedside Blood Gas Base Excess


(LAB) -1.0 meq/L


(-9-1.8) 


  


  


 


 


Bedside Blood Gas O2


Saturation 97.0 % (90-95) 


  


  


  


 


 


Cameron Test NA    


 


Oxygen Delivery Device BIPAP    


 


Bedside Oxygen Rate


(breaths/min) 12 


  


  


  


 


 


Bedside FiO2 30 %    


 


Blood Gas IPAP 12    


 


Activated Partial


Thromboplast Time 


  43.7 SECONDS


(21.0-31.0) 


  


 


 


Partial Thromboplastin Ratio  1.7   











Allergies


Coded Allergies:  


     No Known Allergies (Verified , 2/24/18)





Medications





Current Inpatient Medications








 Medications


  (Trade)  Dose


 Ordered  Sig/Dc


 Route  Start Time


 Stop Time Status Last Admin


Dose Admin


 


 Clopidogrel


 Bisulfate


  (plAVix TAB)  75 mg  QAM


 NG  2/25/18 09:00


 3/27/18 08:59  3/1/18 11:31


75 MG


 


 Miscellaneous


 Information


  (Consult


 Glycemic


 Management


 Pharmacy)  1 ea  UD  PRN


 N/A  2/24/18 12:30


 3/26/18 12:29   


 


 


 Artificial Tears


  (Lacri-Lube Oph


 Oint)  1 appln  Q2H  PRN


 OPB  2/24/18 12:00


 3/26/18 11:59  2/28/18 08:31


1 APPLN


 


 Pantoprazole


 Sodium 40 mg/


 Syringe  10 ml @ 5


 mls/min  DAILY@11


 IV  2/25/18 11:00


 3/27/18 10:59  3/1/18 11:09


5 MLS/MIN


 


 Valproate Sodium


 1000 mg/Dextrose  60 ml @ 55


 mls/hr  Q12H


 IV  2/24/18 22:00


 3/26/18 21:59  3/1/18 11:09


55 MLS/HR


 


 Chlorhexidine


 Gluconate


  (Peridex Oral


 Soln)  15 ml  DAILY


 MT  2/25/18 09:00


 3/27/18 08:59  3/1/18 09:00


15 ML


 


 Fentanyl Citrate


  (Fentanyl Inj)  100 mcg  Q2H  PRN


 IV  2/26/18 09:15


 3/12/18 09:14  3/1/18 10:30


100 MCG


 


 Insulin Aspart


  (novoLOG ASPART)  SLIDING


 SCALE  Q4


 SC  2/26/18 11:00


 3/28/18 10:59  3/1/18 11:40


1 UNITS


 


 Glucose


  (Glucose 40% Gel)  15-30


 GRAMS 15


 GRAMS...  UD  PRN


 PO  2/26/18 11:00


 3/28/18 10:59   


 


 


 Glucose


  (Glucose Chew


 Tab)  4-8


 Tablets 4


 Tabl...  UD  PRN


 PO  2/26/18 11:00


 3/28/18 10:59   


 


 


 Dextrose


  (Dextrose 50%


 50ML Syringe)  25-50ML OF


 50% DW IV


 FOR...  UD  PRN


 IV  2/26/18 11:00


 3/28/18 10:59   


 


 


 Glucagon


  (Glucagon Inj)  1 mg  UD  PRN


 SQ  2/26/18 11:00


 3/28/18 10:59   


 


 


 Heparin Sodium/


 Dextrose  500 ml @ 


 32 mls/hr  [id number] PRN


 IV  2/26/18 11:45


 3/28/18 11:44  3/1/18 13:46


32 MLS/HR


 


 Acetaminophen


  (Tylenol Soln)  650 mg  Q6H  PRN


 PO  2/27/18 09:45


 3/29/18 09:44  2/28/18 01:40


650 MG


 


 Oxymetazoline HCl


  (Afrin 0.05%


 Nasal Spray)  2 sprays  BID


 VERONICA  2/27/18 10:00


 3/1/18 21:01  2/28/18 08:27


2 SPRAYS


 


 Enteral


 Nutritional


 Formula


  (Novasource


 Renal)  1,000 ml  UD  PRN


 OG  2/27/18 10:45


 3/29/18 10:44  2/27/18 13:24


1,000 ML


 


 Insulin Glargine


  (Lantus Solostar


 Pen)    QPM


 SC  2/27/18 21:00


 3/29/18 20:59   


 


 


 Insulin Glargine


  (Lantus Solostar


 Pen)    QAM


 SC  2/28/18 09:00


 3/30/18 08:59  3/1/18 11:09


12 UNITS


 


 Carvedilol


  (Coreg Tab)  12.5 mg  BID


 PO  2/28/18 09:00


 3/29/18 20:59 Future hold 3/1/18 11:32


12.5 MG


 


 Oxycodone HCl


  (Roxicodone Soln)  5 mg  Q6H


 PO  2/28/18 09:00


 3/14/18 08:59  3/1/18 13:48


5 MG


 


 Sterile Water


  (Tube Feeding


 Water Flush)  30 ea  Q4


 NG  2/28/18 12:00


 3/30/18 11:59   


 


 


 Albuterol/


 Ipratropium


  (Duoneb)  3 ml  QIDR


 INH  2/28/18 16:00


 3/30/18 15:59  3/1/18 14:15


3 ML


 


 Nicardipine HCl


 25 mg/Sodium


 Chloride  250 ml @ 0


 mls/hr  Q0M PRN


 IV  3/1/18 09:00


 3/31/18 08:59  3/1/18 11:06


50 MLS/HR











Impression





(1) Cardiac arrest


(2) Acute kidney injury


(3) Ischemic cardiomyopathy





Recommendations


ACUTE KIDNEY INJURY:


-- ATN following cardiopulmonary arrest


-- Kidney function is improved this am (creatinine dropped from 2.2 to 1.8).  

Electrolyte balance remains acceptable


-- Net 4 L diuresis over the last 48 hours.  Agree w/ stopping IV Furosemide


-- Monitor PRP





HYPERTENSION:


-- Continue IV Nicardipine and po Carvedilol


-- If blood pressure remains labile consider adding IV NTG gtt





ANEMIA:


-- Consider blood transfusion to maintain Hgb > or = 8.0





NEURO:


-- Probable anoxic injury.  Prognosis remains guarded.  Await further Neurology 

input.





30 minutes critical care time provided.  This was necessary to examine patient, 

review medical records and discuss management w/ ICU staff.

## 2018-03-01 NOTE — DIAGNOSTIC IMAGING REPORT
NARDA



CLINICAL HISTORY: feeding tube tube position



COMPARISON STUDY:  No previous studies for comparison. 



FINDINGS: Feeding tube placed in the mid stomach. Nonobstructive bowel pattern.



IMPRESSION:  Feeding tube placed in the mid stomach. 













The above report was generated using voice recognition software.  It may contain

grammatical, syntax or spelling errors.







Electronically signed by:  Gerard Meier M.D.

3/1/2018 10:43 AM



Dictated Date/Time:  3/1/2018 10:42 AM

## 2018-03-01 NOTE — DIAGNOSTIC IMAGING REPORT
CT OF THE HEAD WITHOUT CONTRAST



CLINICAL HISTORY: Altered level of consciousness.    



COMPARISON STUDY:  Head CT February 24, 2018 and MRI of the brain February 26, 2018. 

CT DOSE: 2939.26 mGy.cm



TECHNIQUE: Helical axial images of the head were obtained without IV contrast.

Automated exposure control was utilized for the study.  A dose lowering

technique was utilized adhering to the principles of ALARA.





FINDINGS: Endotracheal and nasogastric tubes are partially imaged no acute

intracranial hemorrhage, midline shift or mass effect is present. Ventricular

system is normal. Basilar cisterns are patent. There are no extra-axial

collections. There are no findings to suggest acute dural sinus thrombosis or

acute territorial infarct. A moderate amount of fluid within left mastoid air

cells is noted. A small amount of fluid within the right mastoid air cells is

noted. This has increased since prior head CT. There is mild mucosal thickening

of the sinuses with postoperative findings within the sinuses. There are no

significant calvarial abnormalities.



IMPRESSION:  



1. No acute intracranial findings.



2. Small to moderate bilateral mastoid effusions, greater on the left, which are

similar to MRI of February 26, 2018 and may be related to intubation.







Electronically signed by:  Patrick Corbett M.D.

3/1/2018 10:35 AM



Dictated Date/Time:  3/1/2018 10:30 AM

## 2018-03-02 VITALS — OXYGEN SATURATION: 95 % | HEART RATE: 64 BPM

## 2018-03-02 VITALS
SYSTOLIC BLOOD PRESSURE: 164 MMHG | HEART RATE: 71 BPM | OXYGEN SATURATION: 95 % | DIASTOLIC BLOOD PRESSURE: 59 MMHG | TEMPERATURE: 99.32 F

## 2018-03-02 VITALS
OXYGEN SATURATION: 97 % | HEART RATE: 68 BPM | TEMPERATURE: 99.14 F | SYSTOLIC BLOOD PRESSURE: 165 MMHG | DIASTOLIC BLOOD PRESSURE: 58 MMHG

## 2018-03-02 VITALS
DIASTOLIC BLOOD PRESSURE: 54 MMHG | OXYGEN SATURATION: 95 % | SYSTOLIC BLOOD PRESSURE: 147 MMHG | HEART RATE: 69 BPM | TEMPERATURE: 99.32 F

## 2018-03-02 VITALS — OXYGEN SATURATION: 100 %

## 2018-03-02 VITALS
DIASTOLIC BLOOD PRESSURE: 52 MMHG | TEMPERATURE: 99.14 F | HEART RATE: 66 BPM | OXYGEN SATURATION: 95 % | SYSTOLIC BLOOD PRESSURE: 146 MMHG

## 2018-03-02 VITALS
OXYGEN SATURATION: 96 % | HEART RATE: 67 BPM | SYSTOLIC BLOOD PRESSURE: 177 MMHG | DIASTOLIC BLOOD PRESSURE: 60 MMHG | TEMPERATURE: 99.32 F

## 2018-03-02 VITALS
OXYGEN SATURATION: 95 % | HEART RATE: 63 BPM | TEMPERATURE: 98.96 F | DIASTOLIC BLOOD PRESSURE: 73 MMHG | SYSTOLIC BLOOD PRESSURE: 148 MMHG

## 2018-03-02 VITALS — DIASTOLIC BLOOD PRESSURE: 53 MMHG | HEART RATE: 62 BPM | SYSTOLIC BLOOD PRESSURE: 165 MMHG | OXYGEN SATURATION: 93 %

## 2018-03-02 VITALS
OXYGEN SATURATION: 90 % | DIASTOLIC BLOOD PRESSURE: 54 MMHG | TEMPERATURE: 99.14 F | HEART RATE: 61 BPM | SYSTOLIC BLOOD PRESSURE: 154 MMHG

## 2018-03-02 VITALS
TEMPERATURE: 98.96 F | OXYGEN SATURATION: 92 % | SYSTOLIC BLOOD PRESSURE: 155 MMHG | DIASTOLIC BLOOD PRESSURE: 58 MMHG | HEART RATE: 65 BPM

## 2018-03-02 VITALS — SYSTOLIC BLOOD PRESSURE: 149 MMHG | HEART RATE: 60 BPM | DIASTOLIC BLOOD PRESSURE: 55 MMHG | OXYGEN SATURATION: 98 %

## 2018-03-02 VITALS — HEART RATE: 62 BPM | DIASTOLIC BLOOD PRESSURE: 52 MMHG | SYSTOLIC BLOOD PRESSURE: 150 MMHG | OXYGEN SATURATION: 96 %

## 2018-03-02 VITALS — HEART RATE: 72 BPM | OXYGEN SATURATION: 92 %

## 2018-03-02 VITALS — HEART RATE: 65 BPM | OXYGEN SATURATION: 94 %

## 2018-03-02 VITALS
SYSTOLIC BLOOD PRESSURE: 172 MMHG | HEART RATE: 66 BPM | TEMPERATURE: 99.32 F | OXYGEN SATURATION: 95 % | DIASTOLIC BLOOD PRESSURE: 58 MMHG

## 2018-03-02 VITALS — HEART RATE: 59 BPM | OXYGEN SATURATION: 95 %

## 2018-03-02 VITALS
SYSTOLIC BLOOD PRESSURE: 161 MMHG | TEMPERATURE: 99.32 F | OXYGEN SATURATION: 93 % | HEART RATE: 68 BPM | DIASTOLIC BLOOD PRESSURE: 51 MMHG

## 2018-03-02 VITALS
HEART RATE: 64 BPM | TEMPERATURE: 98.78 F | SYSTOLIC BLOOD PRESSURE: 159 MMHG | DIASTOLIC BLOOD PRESSURE: 55 MMHG | OXYGEN SATURATION: 94 %

## 2018-03-02 VITALS — HEART RATE: 61 BPM | OXYGEN SATURATION: 91 %

## 2018-03-02 VITALS
OXYGEN SATURATION: 92 % | SYSTOLIC BLOOD PRESSURE: 156 MMHG | DIASTOLIC BLOOD PRESSURE: 52 MMHG | HEART RATE: 60 BPM | TEMPERATURE: 98.24 F

## 2018-03-02 VITALS
SYSTOLIC BLOOD PRESSURE: 150 MMHG | TEMPERATURE: 99.32 F | DIASTOLIC BLOOD PRESSURE: 51 MMHG | HEART RATE: 66 BPM | OXYGEN SATURATION: 95 %

## 2018-03-02 VITALS
TEMPERATURE: 99.32 F | OXYGEN SATURATION: 95 % | SYSTOLIC BLOOD PRESSURE: 155 MMHG | DIASTOLIC BLOOD PRESSURE: 70 MMHG | HEART RATE: 69 BPM

## 2018-03-02 VITALS
OXYGEN SATURATION: 96 % | HEART RATE: 72 BPM | SYSTOLIC BLOOD PRESSURE: 154 MMHG | DIASTOLIC BLOOD PRESSURE: 54 MMHG | TEMPERATURE: 99.5 F

## 2018-03-02 VITALS — SYSTOLIC BLOOD PRESSURE: 156 MMHG | OXYGEN SATURATION: 99 % | DIASTOLIC BLOOD PRESSURE: 57 MMHG | HEART RATE: 58 BPM

## 2018-03-02 VITALS — HEART RATE: 63 BPM | OXYGEN SATURATION: 95 %

## 2018-03-02 VITALS
DIASTOLIC BLOOD PRESSURE: 51 MMHG | HEART RATE: 65 BPM | SYSTOLIC BLOOD PRESSURE: 166 MMHG | TEMPERATURE: 99.32 F | OXYGEN SATURATION: 94 %

## 2018-03-02 VITALS
DIASTOLIC BLOOD PRESSURE: 52 MMHG | HEART RATE: 62 BPM | OXYGEN SATURATION: 96 % | SYSTOLIC BLOOD PRESSURE: 160 MMHG | TEMPERATURE: 98.96 F

## 2018-03-02 VITALS — HEART RATE: 69 BPM | OXYGEN SATURATION: 94 %

## 2018-03-02 VITALS — DIASTOLIC BLOOD PRESSURE: 56 MMHG | OXYGEN SATURATION: 97 % | SYSTOLIC BLOOD PRESSURE: 165 MMHG | HEART RATE: 62 BPM

## 2018-03-02 VITALS — OXYGEN SATURATION: 96 %

## 2018-03-02 LAB
BASOPHILS # BLD: 0.01 K/UL (ref 0–0.2)
BASOPHILS NFR BLD: 0.2 %
BUN SERPL-MCNC: 39 MG/DL (ref 7–18)
BUN SERPL-MCNC: 41 MG/DL (ref 7–18)
CALCIUM SERPL-MCNC: 8.5 MG/DL (ref 8.5–10.1)
CALCIUM SERPL-MCNC: 8.7 MG/DL (ref 8.5–10.1)
CO2 SERPL-SCNC: 25 MMOL/L (ref 21–32)
CO2 SERPL-SCNC: 26 MMOL/L (ref 21–32)
CREAT SERPL-MCNC: 1.61 MG/DL (ref 0.6–1.4)
CREAT SERPL-MCNC: 1.75 MG/DL (ref 0.6–1.4)
EOS ABS #: 0.13 K/UL (ref 0–0.5)
EOSINOPHIL NFR BLD AUTO: 214 K/UL (ref 130–400)
EOSINOPHIL NFR BLD AUTO: 223 K/UL (ref 130–400)
GLUCOSE SERPL-MCNC: 158 MG/DL (ref 70–99)
GLUCOSE SERPL-MCNC: 169 MG/DL (ref 70–99)
HCT VFR BLD CALC: 20.9 % (ref 42–52)
HCT VFR BLD CALC: 21.3 % (ref 42–52)
HGB BLD-MCNC: 6.8 G/DL (ref 14–18)
HGB BLD-MCNC: 7 G/DL (ref 14–18)
IG#: 0.03 K/UL (ref 0–0.02)
IMM GRANULOCYTES NFR BLD AUTO: 14.3 %
INR PPP: 1.1 (ref 0.9–1.1)
LYMPHOCYTES # BLD: 0.89 K/UL (ref 1.2–3.4)
MCH RBC QN AUTO: 29.7 PG (ref 25–34)
MCH RBC QN AUTO: 30 PG (ref 25–34)
MCHC RBC AUTO-ENTMCNC: 32.5 G/DL (ref 32–36)
MCHC RBC AUTO-ENTMCNC: 32.9 G/DL (ref 32–36)
MCV RBC AUTO: 90.3 FL (ref 80–100)
MCV RBC AUTO: 92.1 FL (ref 80–100)
MONO ABS #: 0.77 K/UL (ref 0.11–0.59)
MONOCYTES NFR BLD: 12.3 %
NEUT ABS #: 4.41 K/UL (ref 1.4–6.5)
NEUTROPHILS # BLD AUTO: 2.1 %
NEUTROPHILS NFR BLD AUTO: 70.6 %
PHOSPHATE SERPL-MCNC: 3.8 MG/DL (ref 2.5–4.9)
PMV BLD AUTO: 8.3 FL (ref 7.4–10.4)
PMV BLD AUTO: 8.9 FL (ref 7.4–10.4)
POTASSIUM SERPL-SCNC: 3.4 MMOL/L (ref 3.5–5.1)
POTASSIUM SERPL-SCNC: 3.6 MMOL/L (ref 3.5–5.1)
PTT PATIENT: 41.3 SECONDS (ref 21–31)
PTT PATIENT: 42 SECONDS (ref 21–31)
PTT PATIENT: 47.9 SECONDS (ref 21–31)
RED CELL DISTRIBUTION WIDTH CV: 15.1 % (ref 11.5–14.5)
RED CELL DISTRIBUTION WIDTH CV: 15.2 % (ref 11.5–14.5)
RED CELL DISTRIBUTION WIDTH SD: 50 FL (ref 36.4–46.3)
RED CELL DISTRIBUTION WIDTH SD: 51 FL (ref 36.4–46.3)
SODIUM SERPL-SCNC: 140 MMOL/L (ref 136–145)
SODIUM SERPL-SCNC: 140 MMOL/L (ref 136–145)
WBC # BLD AUTO: 5.91 K/UL (ref 4.8–10.8)
WBC # BLD AUTO: 6.24 K/UL (ref 4.8–10.8)

## 2018-03-02 RX ADMIN — CLOPIDOGREL BISULFATE SCH MG: 75 TABLET, FILM COATED ORAL at 08:45

## 2018-03-02 RX ADMIN — VALPROIC ACID SCH MG: 250 SOLUTION ORAL at 20:20

## 2018-03-02 RX ADMIN — CARVEDILOL SCH MG: 25 TABLET, FILM COATED ORAL at 20:14

## 2018-03-02 RX ADMIN — AMLODIPINE BESYLATE SCH MG: 5 TABLET ORAL at 11:07

## 2018-03-02 RX ADMIN — OXYCODONE HYDROCHLORIDE SCH MG: 5 SOLUTION ORAL at 15:30

## 2018-03-02 RX ADMIN — INSULIN ASPART SCH UNITS: 100 INJECTION, SOLUTION INTRAVENOUS; SUBCUTANEOUS at 08:44

## 2018-03-02 RX ADMIN — METHYLCELLULOSE SCH GM: 2 POWDER, FOR SOLUTION ORAL at 20:19

## 2018-03-02 RX ADMIN — INSULIN ASPART SCH UNITS: 100 INJECTION, SOLUTION INTRAVENOUS; SUBCUTANEOUS at 13:15

## 2018-03-02 RX ADMIN — IPRATROPIUM BROMIDE AND ALBUTEROL SULFATE SCH ML: .5; 3 SOLUTION RESPIRATORY (INHALATION) at 11:12

## 2018-03-02 RX ADMIN — NICARDIPINE HYDROCHLORIDE PRN MLS/HR: 25 INJECTION INTRAVENOUS at 06:23

## 2018-03-02 RX ADMIN — METHYLCELLULOSE SCH GM: 2 POWDER, FOR SOLUTION ORAL at 11:06

## 2018-03-02 RX ADMIN — INSULIN ASPART SCH UNITS: 100 INJECTION, SOLUTION INTRAVENOUS; SUBCUTANEOUS at 16:11

## 2018-03-02 RX ADMIN — Medication SCH MEQ: at 13:12

## 2018-03-02 RX ADMIN — NICARDIPINE HYDROCHLORIDE PRN MLS/HR: 25 INJECTION INTRAVENOUS at 23:00

## 2018-03-02 RX ADMIN — Medication SCH MEQ: at 20:14

## 2018-03-02 RX ADMIN — VALPROATE SODIUM SCH MLS/HR: 500 INJECTION INTRAVENOUS at 09:09

## 2018-03-02 RX ADMIN — Medication PRN ML: at 06:55

## 2018-03-02 RX ADMIN — NICARDIPINE HYDROCHLORIDE PRN MLS/HR: 25 INJECTION INTRAVENOUS at 18:11

## 2018-03-02 RX ADMIN — OXYCODONE HYDROCHLORIDE SCH MG: 5 SOLUTION ORAL at 20:18

## 2018-03-02 RX ADMIN — NICARDIPINE HYDROCHLORIDE PRN MLS/HR: 25 INJECTION INTRAVENOUS at 13:26

## 2018-03-02 RX ADMIN — INSULIN GLARGINE SCH UNITS: 100 INJECTION, SOLUTION SUBCUTANEOUS at 09:08

## 2018-03-02 RX ADMIN — IPRATROPIUM BROMIDE AND ALBUTEROL SULFATE SCH ML: .5; 3 SOLUTION RESPIRATORY (INHALATION) at 07:37

## 2018-03-02 RX ADMIN — INSULIN ASPART SCH UNITS: 100 INJECTION, SOLUTION INTRAVENOUS; SUBCUTANEOUS at 03:30

## 2018-03-02 RX ADMIN — FAMOTIDINE SCH MG: 20 TABLET, FILM COATED ORAL at 13:12

## 2018-03-02 RX ADMIN — IPRATROPIUM BROMIDE AND ALBUTEROL SULFATE SCH ML: .5; 3 SOLUTION RESPIRATORY (INHALATION) at 20:04

## 2018-03-02 RX ADMIN — INSULIN GLARGINE SCH UNITS: 100 INJECTION, SOLUTION SUBCUTANEOUS at 20:15

## 2018-03-02 RX ADMIN — OXYCODONE HYDROCHLORIDE SCH MG: 5 SOLUTION ORAL at 02:32

## 2018-03-02 RX ADMIN — HEPARIN SODIUM PRN MLS/HR: 5000 INJECTION, SOLUTION INTRAVENOUS at 07:27

## 2018-03-02 RX ADMIN — OXYCODONE HYDROCHLORIDE SCH MG: 5 SOLUTION ORAL at 09:06

## 2018-03-02 RX ADMIN — IPRATROPIUM BROMIDE AND ALBUTEROL SULFATE SCH ML: .5; 3 SOLUTION RESPIRATORY (INHALATION) at 15:17

## 2018-03-02 RX ADMIN — INSULIN ASPART SCH UNITS: 100 INJECTION, SOLUTION INTRAVENOUS; SUBCUTANEOUS at 20:11

## 2018-03-02 RX ADMIN — Medication PRN ML: at 22:47

## 2018-03-02 RX ADMIN — CARVEDILOL SCH MG: 6.25 TABLET, FILM COATED ORAL at 08:45

## 2018-03-02 RX ADMIN — NICARDIPINE HYDROCHLORIDE PRN MLS/HR: 25 INJECTION INTRAVENOUS at 03:00

## 2018-03-02 RX ADMIN — CHLORHEXIDINE GLUCONATE SCH ML: 1.2 RINSE ORAL at 08:45

## 2018-03-02 RX ADMIN — HEPARIN SODIUM PRN MLS/HR: 5000 INJECTION, SOLUTION INTRAVENOUS at 13:27

## 2018-03-02 RX ADMIN — HEPARIN SODIUM PRN MLS/HR: 5000 INJECTION, SOLUTION INTRAVENOUS at 15:23

## 2018-03-02 NOTE — HOSPITALIST PROGRESS NOTE
Hospitalist Progress Note


Date of Service


Mar 2, 2018.





Subjective


Pt evaluation today including:  conversation w/ patient, physical exam, chart 

review, lab review, review of studies, review of inpatient medication list


Patient seen and evaluated. BP better controlled and tolerating oxymask/BiPAP.


Opens eyes to verbal cues and would say one to two word answers but mostly just 

closed his eyes and goes back to sleep. 


Could not get a full ROS due to this.





   Additional Comments:


ROS deferred due to mostly sleeping





Medications





Current Inpatient Medications








 Medications


  (Trade)  Dose


 Ordered  Sig/Dc


 Route  Start Time


 Stop Time Status Last Admin


Dose Admin


 


 Clopidogrel


 Bisulfate


  (plAVix TAB)  75 mg  QAM


 NG  2/25/18 09:00


 3/27/18 08:59  3/2/18 08:45


75 MG


 


 Miscellaneous


 Information


  (Consult


 Glycemic


 Management


 Pharmacy)  1 ea  UD  PRN


 N/A  2/24/18 12:30


 3/26/18 12:29   


 


 


 Artificial Tears


  (Lacri-Lube Oph


 Oint)  1 appln  Q2H  PRN


 OPB  2/24/18 12:00


 3/26/18 11:59  2/28/18 08:31


1 APPLN


 


 Chlorhexidine


 Gluconate


  (Peridex Oral


 Soln)  15 ml  DAILY


 MT  2/25/18 09:00


 3/27/18 08:59  3/2/18 08:45


15 ML


 


 Insulin Aspart


  (novoLOG ASPART)  SLIDING


 SCALE  Q4


 SC  2/26/18 11:00


 3/28/18 10:59  3/2/18 13:15


6 UNITS


 


 Glucose


  (Glucose 40% Gel)  15-30


 GRAMS 15


 GRAMS...  UD  PRN


 PO  2/26/18 11:00


 3/28/18 10:59   


 


 


 Glucose


  (Glucose Chew


 Tab)  4-8


 Tablets 4


 Tabl...  UD  PRN


 PO  2/26/18 11:00


 3/28/18 10:59   


 


 


 Dextrose


  (Dextrose 50%


 50ML Syringe)  25-50ML OF


 50% DW IV


 FOR...  UD  PRN


 IV  2/26/18 11:00


 3/28/18 10:59   


 


 


 Glucagon


  (Glucagon Inj)  1 mg  UD  PRN


 SQ  2/26/18 11:00


 3/28/18 10:59   


 


 


 Heparin Sodium/


 Dextrose  500 ml @ 


 36 mls/hr  [id number] PRN


 IV  2/26/18 11:45


 3/28/18 11:44  3/2/18 15:23


36 MLS/HR


 


 Acetaminophen


  (Tylenol Soln)  650 mg  Q6H  PRN


 PO  2/27/18 09:45


 3/29/18 09:44  2/28/18 01:40


650 MG


 


 Insulin Glargine


  (Lantus Solostar


 Pen)    QPM


 SC  2/27/18 21:00


 3/29/18 20:59   


 


 


 Insulin Glargine


  (Lantus Solostar


 Pen)    QAM


 SC  2/28/18 09:00


 3/30/18 08:59  3/2/18 09:08


12 UNITS


 


 Oxycodone HCl


  (Roxicodone Soln)  5 mg  Q6H


 PO  2/28/18 09:00


 3/14/18 08:59  3/2/18 09:06


5 MG


 


 Sterile Water


  (Tube Feeding


 Water Flush)  30 ea  Q4


 NG  2/28/18 12:00


 3/30/18 11:59  3/2/18 15:24


30 EA


 


 Albuterol/


 Ipratropium


  (Duoneb)  3 ml  QIDR


 INH  2/28/18 16:00


 3/30/18 15:59  3/2/18 15:17


3 ML


 


 Nicardipine HCl


 25 mg/Sodium


 Chloride  250 ml @ 0


 mls/hr  Q0M PRN


 IV  3/1/18 09:00


 3/31/18 08:59  3/2/18 13:26


75 MLS/HR


 


 Enteral


 Nutritional


 Formula


  (Novasource


 Renal)  1,000 ml  UD  PRN


 NG  3/1/18 16:15


 3/31/18 16:14  3/2/18 06:55


1,000 ML


 


 Furosemide 20 mg/


 Syringe  2 ml @ 4


 mls/min  DAILY


 IV  3/3/18 09:00


 4/2/18 08:59   


 


 


 Carvedilol


  (Coreg Tab)  25 mg  BID


 PO  3/2/18 21:00


 4/1/18 20:59   


 


 


 Amlodipine


 Besylate


  (Norvasc Tab)  5 mg  QAM


 PO  3/2/18 11:00


 4/1/18 10:59  3/2/18 11:07


5 MG


 


 Methylcellulose


  (Citrucel Powder)  19 gm  BID


 PO  3/2/18 10:30


 3/2/18 21:01  3/2/18 11:06


19 GM


 


 Potassium Chloride


  (Gladys Ciel Elix)  40 meq  BID


 PO  3/2/18 11:00


 3/2/18 21:01  3/2/18 13:12


40 MEQ


 


 Oxycodone HCl


  (Roxicodone Soln)  5 mg  Q6H  PRN


 PO  3/2/18 11:00


 3/16/18 10:59   


 


 


 Warfarin Sodium


  (Coumadin Tab)  10 mg  TuSa@1600


 PO  3/3/18 16:00


 4/2/18 15:59   


 


 


 Warfarin Sodium


  (Coumadin Tab)  7.5 mg  SuMoWeThFr@1600


 PO  3/4/18 16:00


 4/3/18 15:59   


 


 


 Valproic Acid


  (Depakene Syrup)  1,000 mg  BID


 PO  3/2/18 21:00


 4/1/18 20:59   


 


 


 Famotidine


  (Pepcid Tab)  20 mg  QAM


 PO  3/2/18 12:00


 4/1/18 11:59  3/2/18 13:12


20 MG











Objective


Vital Signs











  Date Time  Temp Pulse Resp B/P (MAP) Pulse Ox O2 Delivery O2 Flow Rate FiO2


 


3/2/18 15:21  59   95   50


 


3/2/18 15:20  64 20  95 BiPAP/CPAP  50


 


3/2/18 12:00 37.2 65 17 155/58 (90) 92 Oxymask 5.0 


 


3/2/18 11:16  63 20  95 Mask 5.0 


 


3/2/18 10:00 37.3 68 18 165/58 (93) 97 Oxymask 5.0 


 


3/2/18 08:01 37.4 69 16 155/70 (98) 95 BiPAP  30


 


3/2/18 08:00      BiPAP  30


 


3/2/18 08:00      Nasal Cannula  30





      Oxymask  





      BiPAP  





      Mechanical Ventilator  


 


3/2/18 08:00 37.4 71 15 164/59 (94) 95 BiPAP  30


 


3/2/18 07:40  69   94   30


 


3/2/18 07:37  65 20  94 BiPAP/CPAP  30


 


3/2/18 06:01 37.4 67 14 150/68 (95) 96   





    177/60    


 


3/2/18 06:00 37.4 66 19 172/58 (89) 95   


 


3/2/18 05:30 37.4 65 13 166/51 (80) 94   


 


3/2/18 05:01 37.4 66 16 150/67 (102) 95   





    160/51    


 


3/2/18 04:30 37.4 68 13 161/51 (81) 93   


 


3/2/18 04:01 37.4 69 11 147/66 (89) 95   





    161/54 (76)    


 


3/2/18 04:00     96 BiPAP  30


 


3/2/18 03:01 37.5 72 13 154/68 (97) 96   





    150/54 (81)    


 


3/2/18 02:01 37.3 66 15 146/63 (80) 95   





    164/52 (84)    


 


3/2/18 01:01 37.2 63 17 148/73 (95) 95   





    160/51 (83)    


 


3/2/18 00:30 37.2 62 18 160/52 (80) 96   


 


3/2/18 00:01 37.1 64 20 141/71 (99) 94   





    159/55 (79)    


 


3/1/18 23:59     96 BiPAP  30


 


3/1/18 23:30 37.0 67 11 158/60 (89) 95   


 


3/1/18 23:00 37.1 64 16 170/54 (84) 94   


 


3/1/18 22:30 37.0 65 8 180/58 (90) 95   


 


3/1/18 22:14  64   93   30


 


3/1/18 22:01 37.0 66 13 155/72 (97) 94   





    173/56 (86)    


 


3/1/18 21:30 37.1 69 15 174/54 (84) 95   


 


3/1/18 21:01 37.1 66 14 151/72 (88) 94   





    180/57 (90)    


 


3/1/18 20:30 37.1 67 12 179/58 (91) 90   


 


3/1/18 20:01 37.1 78 21 154/78 (120) 92   





    176/60 (93)    


 


3/1/18 20:00     93 Nasal Cannula 5.0 


 


3/1/18 19:46  68 15  99 Nasal Cannula 5.0 


 


3/1/18 19:30 37.1 70 15 169/52 (85) 94   


 


3/1/18 19:01 37.1 68 14 142/69 (108) 96   





    171/52 (87)    


 


3/1/18 18:00 36.7 65 22 138/71 (93) 96 Nasal Cannula  30











Physical Exam


General Appearance:  no apparent distress


Neck:  no JVD, trachea midline


Respiratory/Chest:  lungs clear, no respiratory distress, no accessory muscle 

use


Cardiovascular:  regular rate, rhythm, + systolic murmur


Abdomen:  normal bowel sounds, non tender, soft


Neurologic/Psychiatric:  alert (but quickly closes eyes)


Skin:  normal color





Laboratory Results





Last 24 Hours








Test


  3/1/18


16:24 3/1/18


18:19 3/1/18


19:41 3/1/18


23:15


 


Bedside Glucose 158 mg/dl   144 mg/dl  166 mg/dl 


 


Activated Partial


Thromboplast Time 


  49.2 SECONDS 


  


  


 


 


Partial Thromboplastin Ratio  1.9   


 


Test


  3/2/18


03:26 3/2/18


05:41 3/2/18


07:36 3/2/18


12:45


 


Bedside Glucose 165 mg/dl    


 


White Blood Count  5.91 K/uL  6.24 K/uL  


 


Red Blood Count  2.27 M/uL  2.36 M/uL  


 


Hemoglobin  6.8 g/dL  7.0 g/dL  


 


Hematocrit  20.9 %  21.3 %  


 


Mean Corpuscular Volume  92.1 fL  90.3 fL  


 


Mean Corpuscular Hemoglobin  30.0 pg  29.7 pg  


 


Mean Corpuscular Hemoglobin


Concent 


  32.5 g/dl 


  32.9 g/dl 


  


 


 


RDW Standard Deviation  51.0 fL  50.0 fL  


 


RDW Coefficient of Variation  15.2 %  15.1 %  


 


Platelet Count  223 K/uL  214 K/uL  


 


Mean Platelet Volume  8.9 fL  8.3 fL  


 


Activated Partial


Thromboplast Time 


  41.3 SECONDS 


  


  42.0 SECONDS 


 


 


Partial Thromboplastin Ratio  1.6   1.6 


 


Venous Blood pH  7.38   


 


Venous Blood Partial Pressure


CO2 


  44 mmHg 


  


  


 


 


Venous Blood Partial Pressure


O2 


  48 mmHg 


  


  


 


 


Venous Blood HCO3  26 mmol/L   


 


Venous Blood Oxygen Saturation  79.9 %   


 


Venous Blood Base Excess  0.6 mEq/L   


 


Sodium Level  140 mmol/L  140 mmol/L  


 


Potassium Level  3.4 mmol/L  3.6 mmol/L  


 


Chloride Level  107 mmol/L  108 mmol/L  


 


Carbon Dioxide Level  25 mmol/L  26 mmol/L  


 


Anion Gap  8.0 mmol/L  6.0 mmol/L  


 


Blood Urea Nitrogen  41 mg/dl  39 mg/dl  


 


Creatinine  1.75 mg/dl  1.61 mg/dl  


 


Est Creatinine Clear Calc


Drug Dose 


  53.1 ml/min 


  57.7 ml/min 


  


 


 


Estimated GFR (


American) 


  48.0 


  53.1 


  


 


 


Estimated GFR (Non-


American 


  41.4 


  45.8 


  


 


 


BUN/Creatinine Ratio  23.6  24.3  


 


Random Glucose  158 mg/dl  169 mg/dl  


 


Calcium Level  8.7 mg/dl  8.5 mg/dl  


 


Phosphorus Level  3.8 mg/dl   


 


Magnesium Level  2.2 mg/dl  2.3 mg/dl  


 


Neutrophils (%) (Auto)   70.6 %  


 


Lymphocytes (%) (Auto)   14.3 %  


 


Monocytes (%) (Auto)   12.3 %  


 


Eosinophils (%) (Auto)   2.1 %  


 


Basophils (%) (Auto)   0.2 %  


 


Neutrophils # (Auto)   4.41 K/uL  


 


Lymphocytes # (Auto)   0.89 K/uL  


 


Monocytes # (Auto)   0.77 K/uL  


 


Eosinophils # (Auto)   0.13 K/uL  


 


Basophils # (Auto)   0.01 K/uL  


 


Immature Granulocyte % (Auto)   0.5 %  


 


Immature Granulocyte # (Auto)   0.03 K/uL  


 


Large Platelets   1+  


 


Prothrombin Time    11.3 SECONDS 


 


Prothromb Time International


Ratio 


  


  


  1.1 


 











Assessment and Plan


62 M cardiac arrest survivor with concern for anoxic brain injury, was 

therapeutically cooled and with continuous EEG completed





Cardiac Arrest 2/2 PEA:


- Completed therapeutic hypothermia - neurologically improving each day but not 

at baseline mentation - unsure of long-term neurological recovery





Acute Hypoxic Respiratory Failure 2/2 Above: IMPROVING


- Possible anoxic brain injury - EEG reveals moderate encephalopathy


- Removed from mechanical ventilation and utilizing oxymask/BiPAP





Acute on Chronic Diastolic CHF with Chronic CAD/Cardiomyopathy and HTN:


- Echo with global hypokinesis - Coreg increased to 25 mg mg BID


- Has been experiencing frequent hypertensive episodes - is improving with 

Nicardipine gtt and Norvasc 5 mg daily


- Lasix 20 mg IV daily at this time


- Cardiology following - appreciate input





Acute vs Subacute Infarction: STABLE


- Addendum to brain MRI - punctate focus of mid R cerebellar hemisphere


- Currently on Plavix 75 mg daily


- Neurology following - suspect global hypoxic ischemic event - plan to 

continue to assess for improvement - no further neurological testing warranted 

at this time





Acute on Chronic Anemia:


- On previous admissions he has had progressively declining Hgbs and required 

transfusions - Hgb currently at 7.0 and will continue to monitor and transfuse 

as necessary


- 2 units have been placed on hold if necessary





FITZ on CKD Stage III: IMPROVING


- Given cardiac arrest this is like ATN - continues to with good urinary output


- Nephrology following - appreciate recommendations





T2DM, Uncontrolled:


- Pharmacy following for glycemic management - SSI and Lantus





Rhabdomyolysis: STABLE


- Somewhat improving - will continue to monitor given ventilation and bedfast 

state





Code Status: FULL RESUSCITATION





DVT Prophylaxis: Heparin gtt





Disposition:


- Remains acute ill...D/C uncertain - Possible [facility]


Continued [facility] stay due to:  multiple IV medications needed


Discharge planning:  other (Possible [facility] )

## 2018-03-02 NOTE — PALLIATIVE CARE CONSULTATION
Consultation


Date of Consultation:


Mar 2, 2018.


Requesting Physician:


Dr. Arnold


Attending Physician:


Dr. Pena


Reason for Consultation:


Goals of care, code status


History of Present Illness


This 62 year old male patient with PMH CHF, CAD s/p stenting to LAD and RCA 

last year, on Plavix, and others listed below, presented to the ED a week ago 

with increased SOB. In the ED, patient was PEA cardiac arrest.  CPR was 

initiated and patient achieved ROSC, he was placed on hypothermic protocol. 

Today is ICU day seven and patient has had very slow daily progression as far 

as his mental status, but his condition remains guarded and rehab potential is 

uncertain. Neurology is consulted and following. EEG abnormal with 

disorganization and slowing, MRI of the brain unremarkable. Cardiology 

following as well. PEA arrest likely due to hypoxia, patient has now been on 

home oxygen for about a year. Patient also with acute kidney injury- creatinine 

peaked at 2.49 and is now down to 1.61 with adequate urine output. Patient will 

need neurologic rehab post-hospitalization. Goals of care were addressed with 

patient's wife, including code status. Patient's wife seems to have poor 

healthcare literacy and could use some additional support. Palliative care is 

consulted.





I met with patient in room 107 this morning around 0900. He is drowsy/lethargic

, was slightly difficult to arouse. He did follow some commands such as 

squeezing my hand, but did not open eyes, make eye contact, or really follow 

any other commands. For ICU team, it looks as though he was following commands 

such as giving thumbs up. Patient did not verbally respond to me at all, so ROS 

unable to be obtained. Patient unable to participate in meaningful 

conversation. I called patient's wife at both numbers listed, but it continued 

to ring and no voicemail picked up. I told nursing to page me if wife presents 

at bedside and I would be happy to sit and talk with her.





Past Medical/Surgical History


Medical History:


CAD S/P NSTEMI - L Circ Stent - ACS 20190


Recent MI in October 2017


Diastolic CHF


T2DM


Bipolar Disease


COPD


Chronic Diabetic Foot Ulcer


JANE


GERD


PAD - B/L Femoral Stents


Chronic Anemia


CKD stage III


Aortic  Thrombus on chronic anticoagulation from Coumadin


Gout





Social History


Smoking Status:  Former Smoker


History of Alcohol Use:  No


Drug Use:  none


Marital Status:  


Housing Status:  lives with family, other


Occupation Status:  employed





Review of Systems


unable to obtain ROS due to patient condition





Allergies


Coded Allergies:  


     No Known Allergies (Verified , 2/24/18)





Medications





Current Inpatient Medications








 Medications


  (Trade)  Dose


 Ordered  Sig/Dc


 Route  Start Time


 Stop Time Status Last Admin


Dose Admin


 


 Clopidogrel


 Bisulfate


  (plAVix TAB)  75 mg  QAM


 NG  2/25/18 09:00


 3/27/18 08:59  3/2/18 08:45


75 MG


 


 Miscellaneous


 Information


  (Consult


 Glycemic


 Management


 Pharmacy)  1 ea  UD  PRN


 N/A  2/24/18 12:30


 3/26/18 12:29   


 


 


 Artificial Tears


  (Lacri-Lube Oph


 Oint)  1 appln  Q2H  PRN


 OPB  2/24/18 12:00


 3/26/18 11:59  2/28/18 08:31


1 APPLN


 


 Chlorhexidine


 Gluconate


  (Peridex Oral


 Soln)  15 ml  DAILY


 MT  2/25/18 09:00


 3/27/18 08:59  3/2/18 08:45


15 ML


 


 Insulin Aspart


  (novoLOG ASPART)  SLIDING


 SCALE  Q4


 SC  2/26/18 11:00


 3/28/18 10:59  3/2/18 13:15


6 UNITS


 


 Glucose


  (Glucose 40% Gel)  15-30


 GRAMS 15


 GRAMS...  UD  PRN


 PO  2/26/18 11:00


 3/28/18 10:59   


 


 


 Glucose


  (Glucose Chew


 Tab)  4-8


 Tablets 4


 Tabl...  UD  PRN


 PO  2/26/18 11:00


 3/28/18 10:59   


 


 


 Dextrose


  (Dextrose 50%


 50ML Syringe)  25-50ML OF


 50% DW IV


 FOR...  UD  PRN


 IV  2/26/18 11:00


 3/28/18 10:59   


 


 


 Glucagon


  (Glucagon Inj)  1 mg  UD  PRN


 SQ  2/26/18 11:00


 3/28/18 10:59   


 


 


 Heparin Sodium/


 Dextrose  500 ml @ 


 36 mls/hr  [id number] PRN


 IV  2/26/18 11:45


 3/28/18 11:44  3/2/18 15:23


36 MLS/HR


 


 Acetaminophen


  (Tylenol Soln)  650 mg  Q6H  PRN


 PO  2/27/18 09:45


 3/29/18 09:44  2/28/18 01:40


650 MG


 


 Insulin Glargine


  (Lantus Solostar


 Pen)    QPM


 SC  2/27/18 21:00


 3/29/18 20:59   


 


 


 Insulin Glargine


  (Lantus Solostar


 Pen)    QAM


 SC  2/28/18 09:00


 3/30/18 08:59  3/2/18 09:08


12 UNITS


 


 Oxycodone HCl


  (Roxicodone Soln)  5 mg  Q6H


 PO  2/28/18 09:00


 3/14/18 08:59  3/2/18 09:06


5 MG


 


 Sterile Water


  (Tube Feeding


 Water Flush)  30 ea  Q4


 NG  2/28/18 12:00


 3/30/18 11:59  3/2/18 15:24


30 EA


 


 Albuterol/


 Ipratropium


  (Duoneb)  3 ml  QIDR


 INH  2/28/18 16:00


 3/30/18 15:59  3/2/18 15:17


3 ML


 


 Nicardipine HCl


 25 mg/Sodium


 Chloride  250 ml @ 0


 mls/hr  Q0M PRN


 IV  3/1/18 09:00


 3/31/18 08:59  3/2/18 13:26


75 MLS/HR


 


 Enteral


 Nutritional


 Formula


  (Novasource


 Renal)  1,000 ml  UD  PRN


 NG  3/1/18 16:15


 3/31/18 16:14  3/2/18 06:55


1,000 ML


 


 Furosemide 20 mg/


 Syringe  2 ml @ 4


 mls/min  DAILY


 IV  3/3/18 09:00


 4/2/18 08:59   


 


 


 Carvedilol


  (Coreg Tab)  25 mg  BID


 PO  3/2/18 21:00


 4/1/18 20:59   


 


 


 Amlodipine


 Besylate


  (Norvasc Tab)  5 mg  QAM


 PO  3/2/18 11:00


 4/1/18 10:59  3/2/18 11:07


5 MG


 


 Methylcellulose


  (Citrucel Powder)  19 gm  BID


 PO  3/2/18 10:30


 3/2/18 21:01  3/2/18 11:06


19 GM


 


 Potassium Chloride


  (Gladys [place]iel Elix)  40 meq  BID


 PO  3/2/18 11:00


 3/2/18 21:01  3/2/18 13:12


40 MEQ


 


 Oxycodone HCl


  (Roxicodone Soln)  5 mg  Q6H  PRN


 PO  3/2/18 11:00


 3/16/18 10:59   


 


 


 Warfarin Sodium


  (Coumadin Tab)  10 mg  TuSa@1600


 PO  3/3/18 16:00


 4/2/18 15:59   


 


 


 Warfarin Sodium


  (Coumadin Tab)  7.5 mg  SuMoWeThFr@1600


 PO  3/4/18 16:00


 4/3/18 15:59   


 


 


 Valproic Acid


  (Depakene Syrup)  1,000 mg  BID


 PO  3/2/18 21:00


 4/1/18 20:59   


 


 


 Famotidine


  (Pepcid Tab)  20 mg  QAM


 PO  3/2/18 12:00


 4/1/18 11:59  3/2/18 13:12


20 MG











Physical Exam











  Date Time  Temp Pulse Resp B/P (MAP) Pulse Ox O2 Delivery O2 Flow Rate FiO2


 


3/2/18 15:21  59   95   50


 


3/2/18 15:20  64 20  95 BiPAP/CPAP  50


 


3/2/18 12:00 37.2 65 17 155/58 (90) 92 Oxymask 5.0 


 


3/2/18 11:16  63 20  95 Mask 5.0 


 


3/2/18 10:00 37.3 68 18 165/58 (93) 97 Oxymask 5.0 


 


3/2/18 08:01 37.4 69 16 155/70 (98) 95 BiPAP  30


 


3/2/18 08:00      BiPAP  30


 


3/2/18 08:00      Nasal Cannula  30





      Oxymask  





      BiPAP  





      Mechanical Ventilator  


 


3/2/18 08:00 37.4 71 15 164/59 (94) 95 BiPAP  30


 


3/2/18 07:40  69   94   30


 


3/2/18 07:37  65 20  94 BiPAP/CPAP  30


 


3/2/18 06:01 37.4 67 14 150/68 (95) 96   





    177/60    


 


3/2/18 06:00 37.4 66 19 172/58 (89) 95   


 


3/2/18 05:30 37.4 65 13 166/51 (80) 94   


 


3/2/18 05:01 37.4 66 16 150/67 (102) 95   





    160/51    


 


3/2/18 04:30 37.4 68 13 161/51 (81) 93   


 


3/2/18 04:01 37.4 69 11 147/66 (89) 95   





    161/54 (76)    


 


3/2/18 04:00     96 BiPAP  30


 


3/2/18 03:01 37.5 72 13 154/68 (97) 96   





    150/54 (81)    


 


3/2/18 02:01 37.3 66 15 146/63 (80) 95   





    164/52 (84)    


 


3/2/18 01:01 37.2 63 17 148/73 (95) 95   





    160/51 (83)    


 


3/2/18 00:30 37.2 62 18 160/52 (80) 96   


 


3/2/18 00:01 37.1 64 20 141/71 (99) 94   





    159/55 (79)    


 


3/1/18 23:59     96 BiPAP  30


 


3/1/18 23:30 37.0 67 11 158/60 (89) 95   


 


3/1/18 23:00 37.1 64 16 170/54 (84) 94   


 


3/1/18 22:30 37.0 65 8 180/58 (90) 95   


 


3/1/18 22:14  64   93   30


 


3/1/18 22:01 37.0 66 13 155/72 (97) 94   





    173/56 (86)    


 


3/1/18 21:30 37.1 69 15 174/54 (84) 95   


 


3/1/18 21:01 37.1 66 14 151/72 (88) 94   





    180/57 (90)    


 


3/1/18 20:30 37.1 67 12 179/58 (91) 90   


 


3/1/18 20:01 37.1 78 21 154/78 (120) 92   





    176/60 (93)    


 


3/1/18 20:00     93 Nasal Cannula 5.0 


 


3/1/18 19:46  68 15  99 Nasal Cannula 5.0 


 


3/1/18 19:30 37.1 70 15 169/52 (85) 94   


 


3/1/18 19:01 37.1 68 14 142/69 (108) 96   





    171/52 (87)    


 


3/1/18 18:00 36.7 65 22 138/71 (93) 96 Nasal Cannula  30








General Appearance:  no apparent distress


Eyes:  PERRL


Neck:  supple, no JVD


Respiratory:  no respiratory distress, no accessory muscle use, + decreased 

breath sounds


Cardiovascular:  regular rate, rhythm, + normal peripheral pulses


Abdomen:  normal bowel sounds, soft


Neurologic/Psychiatric:  + pertinent finding (drowsy/lethargic. only followed 

one command for me)





Laboratory Results





Last 24 Hours








Test


  3/1/18


16:24 3/1/18


18:19 3/1/18


19:41 3/1/18


23:15


 


Bedside Glucose 158 mg/dl   144 mg/dl  166 mg/dl 


 


Activated Partial


Thromboplast Time 


  49.2 SECONDS 


  


  


 


 


Partial Thromboplastin Ratio  1.9   


 


Test


  3/2/18


03:26 3/2/18


05:41 3/2/18


07:36 3/2/18


12:45


 


Bedside Glucose 165 mg/dl    


 


White Blood Count  5.91 K/uL  6.24 K/uL  


 


Red Blood Count  2.27 M/uL  2.36 M/uL  


 


Hemoglobin  6.8 g/dL  7.0 g/dL  


 


Hematocrit  20.9 %  21.3 %  


 


Mean Corpuscular Volume  92.1 fL  90.3 fL  


 


Mean Corpuscular Hemoglobin  30.0 pg  29.7 pg  


 


Mean Corpuscular Hemoglobin


Concent 


  32.5 g/dl 


  32.9 g/dl 


  


 


 


RDW Standard Deviation  51.0 fL  50.0 fL  


 


RDW Coefficient of Variation  15.2 %  15.1 %  


 


Platelet Count  223 K/uL  214 K/uL  


 


Mean Platelet Volume  8.9 fL  8.3 fL  


 


Activated Partial


Thromboplast Time 


  41.3 SECONDS 


  


  42.0 SECONDS 


 


 


Partial Thromboplastin Ratio  1.6   1.6 


 


Venous Blood pH  7.38   


 


Venous Blood Partial Pressure


CO2 


  44 mmHg 


  


  


 


 


Venous Blood Partial Pressure


O2 


  48 mmHg 


  


  


 


 


Venous Blood HCO3  26 mmol/L   


 


Venous Blood Oxygen Saturation  79.9 %   


 


Venous Blood Base Excess  0.6 mEq/L   


 


Sodium Level  140 mmol/L  140 mmol/L  


 


Potassium Level  3.4 mmol/L  3.6 mmol/L  


 


Chloride Level  107 mmol/L  108 mmol/L  


 


Carbon Dioxide Level  25 mmol/L  26 mmol/L  


 


Anion Gap  8.0 mmol/L  6.0 mmol/L  


 


Blood Urea Nitrogen  41 mg/dl  39 mg/dl  


 


Creatinine  1.75 mg/dl  1.61 mg/dl  


 


Est Creatinine Clear Calc


Drug Dose 


  53.1 ml/min 


  57.7 ml/min 


  


 


 


Estimated GFR (


American) 


  48.0 


  53.1 


  


 


 


Estimated GFR (Non-


American 


  41.4 


  45.8 


  


 


 


BUN/Creatinine Ratio  23.6  24.3  


 


Random Glucose  158 mg/dl  169 mg/dl  


 


Calcium Level  8.7 mg/dl  8.5 mg/dl  


 


Phosphorus Level  3.8 mg/dl   


 


Magnesium Level  2.2 mg/dl  2.3 mg/dl  


 


Neutrophils (%) (Auto)   70.6 %  


 


Lymphocytes (%) (Auto)   14.3 %  


 


Monocytes (%) (Auto)   12.3 %  


 


Eosinophils (%) (Auto)   2.1 %  


 


Basophils (%) (Auto)   0.2 %  


 


Neutrophils # (Auto)   4.41 K/uL  


 


Lymphocytes # (Auto)   0.89 K/uL  


 


Monocytes # (Auto)   0.77 K/uL  


 


Eosinophils # (Auto)   0.13 K/uL  


 


Basophils # (Auto)   0.01 K/uL  


 


Immature Granulocyte % (Auto)   0.5 %  


 


Immature Granulocyte # (Auto)   0.03 K/uL  


 


Large Platelets   1+  


 


Prothrombin Time    11.3 SECONDS 


 


Prothromb Time International


Ratio 


  


  


  1.1 


 











Assessment & Plan


Palliative Performance Scale:  20 %





Problem list:


AMS 2/2 anoxic brain injury


Cardiac arrest s/s hypoxia


NSTEMI


Acute on chronic diastolic CHF


FITZ on CKD


Goals of care





Palliative care recs:


-Patient remains a full code at this time. 


-I have been unable to reach patient's wife to discuss code status/goals of care

, but I will return on Monday and try again.


-Patient will need neuro rehab after hospitalization. Case management is 

following and made referral to [facility].


-Patient's condition remains guarded with uncertain rehab potential, but it 

seems that he is making small progress daily.


-Further recommendations to follow after discussion with patient's wife.





Thank you kindly for this consult.








Total time spent 50 minutes with >50% of time spent at patient's bedside 

rounding/discussing plan of care and collaborating with ICU team.

## 2018-03-02 NOTE — CRITICAL CARE PROGRESS NOTE
Critical Care Progress Note


Date of Service


Mar 2, 2018.





Attending


Dr. Arnold





Subjective


This is day 7 in the intensive care unit for this patient.  Hypothermic 

protocol and has been slowly improving from a neurological standpoint daily.  

He will require a neurological rehab after discharge from this facility.  The 

patient is able to answer questions yes and no.  More understandable when he 

tries to speak.  He is not able to provide full review of systems.  He does 

follow some simple commands giving a left-sided thumbs up and opening closing 

eyes as well as moving his left foot.  He is unable to comply with any other 

simple commands.





Objective


GENERAL : No acute distress


EYES:  No icterus, gaze conjugate. PERRL.  Opens and closes to verbal command


NOSE: No evidence of epistaxis.  Core safe tube present


NECK: No JVD 


LUNGS: Generally clear to auscultation bilaterally


HEART:  Regular, rate controlled


ABDOMEN:  Soft, NT, ND, BS Present


EXTREMITIES: +2 bilateral LE edema, pedal pulses intact..  Right-sided 

flaccidness of the upper and lower extremities


NEURO: Opens eyes to commands and closes eyes to command able to give a left-

sided thumbs up.  Left-sided reflexes 2 out of 4 to the brachioradialis, bicep, 

and patellar tendons.  Able to hold left arm up after I positioned it.  I do 

not get deep tendon reflexes on the right and patient is flaccid on the right.  

Pupils are equal round and reactive to light with a conjugate gaze





Assessment & Plan


Neuro


* Patient following commands now.


* Continuous EEG abnormal with moderate diffuse background disorganization and 

slowing but no evidence of epileptiform changes


* Neurology and pharmacy managing valproic acid


* Neurology has been consulted and is following - appreciate Dr. Mcclellan's input


* Noncontrast MRI of the brain and noncontrast MRI of the cervical spine 

unremarkable


* Change valproic acid to 1000 mg p.o. twice daily.  Check a level in 3-5 days.


* Continue to monitor.  No further neuro imaging recommended by neurology





Cardiovascular


* PEA cardiac arrest witnessed by EMS staff most likely secondary to hypoxia


* No ST changes


* Cardiology consulted -no urgency for cardiac catheterization.  Prior history 

of multivessel coronary artery disease


* PO carvedilol for hypertension - increased to 25 mg bid per cardiology


* Hydralazine added for hypertension


* Continue to monitor on telemetry


* Dr. Yusuf is his primary cardiologist


* Further echocardiograms per cardiology


* Nephrology managing diuretics





Pulmonary


* Quit smoking over a year ago


* Has now been on supplemental oxygen at home for over a year; baseline 6 L/min 

via nasal cannula her family


* Extubated 2/28/2018


* Oxymask for hypoxia.  CPAP at bedtime and as needed during the day


* CT of chest with no pulmonary  emboli identified; evidence of small to 

moderate bilateral pleural effusions; moderate interstitial pulmonary edema


* Pleural effusions now resolved


* Continue with diuresis and monitor I&Os


* Monitor clinically





Musculoskeletal


* Multiple nondisplaced rib fractures - suspected etiology is cardiac 

compressions


* Pain control as needed with fentanyl.  


* Probable sternal fracture -suspected etiology cardiac compressions


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


* Patient appears to have foot drop on the right





Renal 


* Renal ultrasound with no evidence of renal artery stenosis


* There is mention of question of interrupted flow in the right and left renal 

vein.  However, this study was a very poor study with technical difficulty 

related to poor sonographic windows.  Continue to monitor.


* Urine output adequate with diuresis


* Continues with acute renal failure


* Nephrology consulted -appreciate Dr. King's input


* Creatinine continues to improve.  Creatinine today 1.61


* Avoid nephrotoxic medications


* Follow serial labs and ins and outs





ID


* Ag negative for influenza A&B


* Blood cultures 2 negative


* Urine culture with no growth


* MRSA specimen is negative by DNA probe


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


* Completed course of Augmentin for sinusitis


* No leukocytosis or fever





Electrolytes


* Give 40 mcg of potassium chloride twice daily today via NG tube


* Follow serial labs





Endocrine


* History of type 2 diabetes mellitus


* Has been titrated off of insulin drip -continue gtt as needed


* Hemoglobin A1c 6.0


* Continue BSGs per protocol


* Lantus and NovoLog per protocol -glycemic management per pharmacy


* Continue with tube feeds





Heme


* 2 units held in the event that Hgb drops further - currently 7.0


* Hemodynamically stable


* Transfuse if drops below 7


* Follow serial labs





Nutrition


* Continue Novasource Renal


* Nutrition consult for tube feed management


* 2 fiber packets BID again today for stool


* Coresafe tube in place





GI


* Changed to famotidine 20 mg tablet via NG tube





DVT prophylaxis


* TEDs/SCDs





CCT: 30 minutes including discussion with family and independent of any 

procedures





Thank you for including us in the care of this patient.  Please refer to Dr. Arnold's addendum for further recommendations.





I have personally evaluated and examined this patient.  I agree with assessment 

and plan of RENE Davis PA-C.





I had a discussion with the patient, the patient's adoptive daughter Marie 

and the patient's wife regarding prognosis and goals of care should he have a 

subsequent cardiac arrest.  Patient's daughter believes the patient was 

understanding the conversation but he would not give a decision whether or not 

he would want to undergo heroic measures should he under suffered a second 

cardiac arrest.  However she has stated that he has felt extremely tired for 

the past several months but it is his nature to not talk about these issues.  

Both the patient's wife and patient's daughter believe that he would not want 

to undergo heroic measures for a second cardiac arrest.  With regards to the 

patient's mental status am not confident that he has comprehensive skills given 

the anoxic injury and he has been intermittently following simple commands for 

myself as well as additional medical staff.  I am deferring the decision to the 

patient's wife and that decision is also supported by the patient's daughter.  

Accordingly have made the patient a DO NOT RESUSCITATE in event of cardiac 

arrest.





Consults & Procedures


Consultants:


Neurology - Dr. Washburn


Cardiology -Dr. Martínez


Critical care medicine -Dr. Elder


Procedures:


Right radial arterial line -2/24/2018 by Dr. Elder


Endotracheal intubation -2/24/2018 -prehospital tube replaced by Dr. Elder


Right subclavian triple-lumen central venous catheter -  2/24/2018 by Dr. Elder


Fiberoptic bronchoscopy -2/24/2018 by Dr. Elder





Data


Medications:





Current Inpatient Medications








 Medications


  (Trade)  Dose


 Ordered  Sig/Dc


 Route  Start Time


 Stop Time Status Last Admin


Dose Admin


 


 Clopidogrel


 Bisulfate


  (plAVix TAB)  75 mg  QAM


 NG  2/25/18 09:00


 3/27/18 08:59  3/2/18 08:45


75 MG


 


 Miscellaneous


 Information


  (Consult


 Glycemic


 Management


 Pharmacy)  1 ea  UD  PRN


 N/A  2/24/18 12:30


 3/26/18 12:29   


 


 


 Artificial Tears


  (Lacri-Lube Oph


 Oint)  1 appln  Q2H  PRN


 OPB  2/24/18 12:00


 3/26/18 11:59  2/28/18 08:31


1 APPLN


 


 Chlorhexidine


 Gluconate


  (Peridex Oral


 Soln)  15 ml  DAILY


 MT  2/25/18 09:00


 3/27/18 08:59  3/2/18 08:45


15 ML


 


 Insulin Aspart


  (novoLOG ASPART)  SLIDING


 SCALE  Q4


 SC  2/26/18 11:00


 3/28/18 10:59  3/2/18 08:44


6 UNITS


 


 Glucose


  (Glucose 40% Gel)  15-30


 GRAMS 15


 GRAMS...  UD  PRN


 PO  2/26/18 11:00


 3/28/18 10:59   


 


 


 Glucose


  (Glucose Chew


 Tab)  4-8


 Tablets 4


 Tabl...  UD  PRN


 PO  2/26/18 11:00


 3/28/18 10:59   


 


 


 Dextrose


  (Dextrose 50%


 50ML Syringe)  25-50ML OF


 50% DW IV


 FOR...  UD  PRN


 IV  2/26/18 11:00


 3/28/18 10:59   


 


 


 Glucagon


  (Glucagon Inj)  1 mg  UD  PRN


 SQ  2/26/18 11:00


 3/28/18 10:59   


 


 


 Heparin Sodium/


 Dextrose  500 ml @ 


 34 mls/hr  [id number] PRN


 IV  2/26/18 11:45


 3/28/18 11:44  3/2/18 07:27


34 MLS/HR


 


 Acetaminophen


  (Tylenol Soln)  650 mg  Q6H  PRN


 PO  2/27/18 09:45


 3/29/18 09:44  2/28/18 01:40


650 MG


 


 Insulin Glargine


  (Lantus Solostar


 Pen)    QPM


 SC  2/27/18 21:00


 3/29/18 20:59   


 


 


 Insulin Glargine


  (Lantus Solostar


 Pen)    QAM


 SC  2/28/18 09:00


 3/30/18 08:59  3/2/18 09:08


12 UNITS


 


 Oxycodone HCl


  (Roxicodone Soln)  5 mg  Q6H


 PO  2/28/18 09:00


 3/14/18 08:59  3/2/18 09:06


5 MG


 


 Sterile Water


  (Tube Feeding


 Water Flush)  30 ea  Q4


 NG  2/28/18 12:00


 3/30/18 11:59  3/2/18 08:41


30 EA


 


 Albuterol/


 Ipratropium


  (Duoneb)  3 ml  QIDR


 INH  2/28/18 16:00


 3/30/18 15:59  3/2/18 11:12


3 ML


 


 Nicardipine HCl


 25 mg/Sodium


 Chloride  250 ml @ 0


 mls/hr  Q0M PRN


 IV  3/1/18 09:00


 3/31/18 08:59  3/2/18 06:23


75 MLS/HR


 


 Enteral


 Nutritional


 Formula


  (Novasource


 Renal)  1,000 ml  UD  PRN


 NG  3/1/18 16:15


 3/31/18 16:14  3/2/18 06:55


1,000 ML


 


 Furosemide 20 mg/


 Syringe  2 ml @ 4


 mls/min  DAILY


 IV  3/3/18 09:00


 4/2/18 08:59   


 


 


 Carvedilol


  (Coreg Tab)  25 mg  BID


 PO  3/2/18 21:00


 4/1/18 20:59   


 


 


 Amlodipine


 Besylate


  (Norvasc Tab)  5 mg  QAM


 PO  3/2/18 11:00


 4/1/18 10:59  3/2/18 11:07


5 MG


 


 Methylcellulose


  (Citrucel Powder)  19 gm  BID


 PO  3/2/18 10:30


 3/2/18 21:01  3/2/18 11:06


19 GM


 


 Potassium Chloride


  (Gladys Ciel Elix)  40 meq  BID


 PO  3/2/18 11:00


 3/2/18 21:01   


 


 


 Oxycodone HCl


  (Roxicodone Soln)  5 mg  Q6H  PRN


 PO  3/2/18 11:00


 3/16/18 10:59   


 


 


 Warfarin Sodium


  (Coumadin Tab)  10 mg  TODAY@1600  ONCE


 PO  3/2/18 16:00


 3/2/18 16:01   


 


 


 Warfarin Sodium


  (Coumadin Tab)  10 mg  TuSa@1600


 PO  3/3/18 16:00


 4/2/18 15:59   


 


 


 Warfarin Sodium


  (Coumadin Tab)  7.5 mg  SuMoWeThFr@1600


 PO  3/4/18 16:00


 4/3/18 15:59   


 


 


 Valproic Acid


  (Depakene Syrup)  1,000 mg  BID


 PO  3/2/18 21:00


 4/1/18 20:59   


 


 


 Famotidine


  (Pepcid Tab)  20 mg  QAM


 PO  3/2/18 12:00


 4/1/18 11:59   


 








Vital Signs:











  Date Time  Temp Pulse Resp B/P (MAP) Pulse Ox O2 Delivery O2 Flow Rate FiO2


 


3/2/18 11:16  63 20  95 Mask 5.0 


 


3/2/18 10:00 37.3 68 18 165/58 (93) 97 Oxymask 5.0 


 


3/2/18 08:01 37.4 69 16 155/70 (98) 95 BiPAP  30


 


3/2/18 08:00      BiPAP  30


 


3/2/18 08:00 37.4 71 15 164/59 (94) 95 BiPAP  30


 


3/2/18 07:40  69   94   30


 


3/2/18 07:37  65 20  94 BiPAP/CPAP  30


 


3/2/18 06:01 37.4 67 14 150/68 (95) 96   





    177/60    


 


3/2/18 06:00 37.4 66 19 172/58 (89) 95   


 


3/2/18 05:30 37.4 65 13 166/51 (80) 94   


 


3/2/18 05:01 37.4 66 16 150/67 (102) 95   





    160/51    


 


3/2/18 04:30 37.4 68 13 161/51 (81) 93   


 


3/2/18 04:01 37.4 69 11 147/66 (89) 95   





    161/54 (76)    


 


3/2/18 04:00     96 BiPAP  30


 


3/2/18 03:01 37.5 72 13 154/68 (97) 96   





    150/54 (81)    


 


3/2/18 02:01 37.3 66 15 146/63 (80) 95   





    164/52 (84)    


 


3/2/18 01:01 37.2 63 17 148/73 (95) 95   





    160/51 (83)    


 


3/2/18 00:30 37.2 62 18 160/52 (80) 96   


 


3/2/18 00:01 37.1 64 20 141/71 (99) 94   





    159/55 (79)    


 


3/1/18 23:59     96 BiPAP  30


 


3/1/18 23:30 37.0 67 11 158/60 (89) 95   


 


3/1/18 23:00 37.1 64 16 170/54 (84) 94   


 


3/1/18 22:30 37.0 65 8 180/58 (90) 95   


 


3/1/18 22:14  64   93   30


 


3/1/18 22:01 37.0 66 13 155/72 (97) 94   





    173/56 (86)    


 


3/1/18 21:30 37.1 69 15 174/54 (84) 95   


 


3/1/18 21:01 37.1 66 14 151/72 (88) 94   





    180/57 (90)    


 


3/1/18 20:30 37.1 67 12 179/58 (91) 90   


 


3/1/18 20:01 37.1 78 21 154/78 (120) 92   





    176/60 (93)    


 


3/1/18 20:00     93 Nasal Cannula 5.0 


 


3/1/18 19:46  68 15  99 Nasal Cannula 5.0 


 


3/1/18 19:30 37.1 70 15 169/52 (85) 94   


 


3/1/18 19:01 37.1 68 14 142/69 (108) 96   





    171/52 (87)    


 


3/1/18 18:00 36.7 65 22 138/71 (93) 96 Nasal Cannula  30


 


3/1/18 16:00 36.7 65 22 154/68 (96) 93 BiPAP  30


 


3/1/18 16:00  63 15  99 Nasal Cannula 5.0 


 


3/1/18 16:00      BiPAP  30


 


3/1/18 15:45  67 16  93 Nasal Cannula 5.0 


 


3/1/18 14:27  65   93   30


 


3/1/18 14:16  77 14  95 Nasal Cannula 5.0 


 


3/1/18 14:00 36.7 68 22 129/56 (80) 93 BiPAP  30








Laboratory Results:





Last 24 Hours








Test


  3/1/18


16:24 3/1/18


18:19 3/1/18


19:41 3/1/18


23:15


 


Bedside Glucose 158 mg/dl   144 mg/dl  166 mg/dl 


 


Activated Partial


Thromboplast Time 


  49.2 SECONDS 


  


  


 


 


Partial Thromboplastin Ratio  1.9   


 


Test


  3/2/18


03:26 3/2/18


05:41 3/2/18


07:36 3/2/18


12:45


 


Bedside Glucose 165 mg/dl    


 


White Blood Count  5.91 K/uL  6.24 K/uL  


 


Red Blood Count  2.27 M/uL  2.36 M/uL  


 


Hemoglobin  6.8 g/dL  7.0 g/dL  


 


Hematocrit  20.9 %  21.3 %  


 


Mean Corpuscular Volume  92.1 fL  90.3 fL  


 


Mean Corpuscular Hemoglobin  30.0 pg  29.7 pg  


 


Mean Corpuscular Hemoglobin


Concent 


  32.5 g/dl 


  32.9 g/dl 


  


 


 


RDW Standard Deviation  51.0 fL  50.0 fL  


 


RDW Coefficient of Variation  15.2 %  15.1 %  


 


Platelet Count  223 K/uL  214 K/uL  


 


Mean Platelet Volume  8.9 fL  8.3 fL  


 


Activated Partial


Thromboplast Time 


  41.3 SECONDS 


  


  


 


 


Partial Thromboplastin Ratio  1.6   


 


Venous Blood pH  7.38   


 


Venous Blood Partial Pressure


CO2 


  44 mmHg 


  


  


 


 


Venous Blood Partial Pressure


O2 


  48 mmHg 


  


  


 


 


Venous Blood HCO3  26 mmol/L   


 


Venous Blood Oxygen Saturation  79.9 %   


 


Venous Blood Base Excess  0.6 mEq/L   


 


Sodium Level  140 mmol/L  140 mmol/L  


 


Potassium Level  3.4 mmol/L  3.6 mmol/L  


 


Chloride Level  107 mmol/L  108 mmol/L  


 


Carbon Dioxide Level  25 mmol/L  26 mmol/L  


 


Anion Gap  8.0 mmol/L  6.0 mmol/L  


 


Blood Urea Nitrogen  41 mg/dl  39 mg/dl  


 


Creatinine  1.75 mg/dl  1.61 mg/dl  


 


Est Creatinine Clear Calc


Drug Dose 


  53.1 ml/min 


  57.7 ml/min 


  


 


 


Estimated GFR (


American) 


  48.0 


  53.1 


  


 


 


Estimated GFR (Non-


American 


  41.4 


  45.8 


  


 


 


BUN/Creatinine Ratio  23.6  24.3  


 


Random Glucose  158 mg/dl  169 mg/dl  


 


Calcium Level  8.7 mg/dl  8.5 mg/dl  


 


Phosphorus Level  3.8 mg/dl   


 


Magnesium Level  2.2 mg/dl  2.3 mg/dl  


 


Neutrophils (%) (Auto)   70.6 %  


 


Lymphocytes (%) (Auto)   14.3 %  


 


Monocytes (%) (Auto)   12.3 %  


 


Eosinophils (%) (Auto)   2.1 %  


 


Basophils (%) (Auto)   0.2 %  


 


Neutrophils # (Auto)   4.41 K/uL  


 


Lymphocytes # (Auto)   0.89 K/uL  


 


Monocytes # (Auto)   0.77 K/uL  


 


Eosinophils # (Auto)   0.13 K/uL  


 


Basophils # (Auto)   0.01 K/uL  


 


Immature Granulocyte % (Auto)   0.5 %  


 


Immature Granulocyte # (Auto)   0.03 K/uL  


 


Large Platelets   1+

## 2018-03-02 NOTE — NEPHROLOGY PROGRESS NOTE
Nephrology Progress Note


Date of Service


Mar 2, 2018.





Chief Complaint


Acute on chronic kidney injury following cardiopulmonary arrest





Subjective


Mr. Dixon was seen & examined in the ICU this morning.  He is oxygenating 

well on 30% BiPAP.  On my exam he was unresponsive to verbal or tactile 

stimulus.





Review of Systems


Patient was unable to participate in ROS





Vital Signs





Last 8 Hrs








  Date Time  Temp Pulse Resp B/P (MAP) Pulse Ox O2 Delivery O2 Flow Rate FiO2


 


3/2/18 07:40  69   94   30


 


3/2/18 07:37  65 20  94 BiPAP/CPAP  30


 


3/2/18 06:01 37.4 67 14 150/68 (95) 96   





    177/60    


 


3/2/18 06:00 37.4 66 19 172/58 (89) 95   


 


3/2/18 05:30 37.4 65 13 166/51 (80) 94   


 


3/2/18 05:01 37.4 66 16 150/67 (102) 95   





    160/51    


 


3/2/18 04:30 37.4 68 13 161/51 (81) 93   


 


3/2/18 04:01 37.4 69 11 147/66 (89) 95   





    161/54 (76)    


 


3/2/18 04:00     96 BiPAP  30


 


3/2/18 03:01 37.5 72 13 154/68 (97) 96   





    150/54 (81)    











Last Recorded Weight


Weight (Kilograms):  96.200





Physical Exam


General Appearance:  + pertinent finding (acutely ill appearing)


Head:  normocephalic, atraumatic


Eyes:  PERRL, EOMI


Neck:  no adenopathy


Respiratory/Chest:  + pertinent finding (coarse breath sounds bilaterally)


Cardiovascular:  regular rate, rhythm


Abdomen/GI:  non tender, soft


Extremities/Musculoskelatal:  no pedal edema


Neurologic/Psych:  + pertinent finding (unresponsive)





Family History





Cancer (esophageal)


Diabetes mellitus


Heart disease


Hypertension





Negative for CKD / ESRD





Social History


Smoking Status:  Former smoker


Drug Use:  none


Marital Status:  


Housing Status:  lives with family, other


Occupation:  employed








.  Disabled.  Former smoker





Laboratory Results


Past 24 Hours


3/2/18 05:41








3/2/18 07:36








Red Blood Count 2.36, Mean Corpuscular Volume 90.3, Mean Corpuscular Hemoglobin 

29.7, Mean Corpuscular Hemoglobin Concent 32.9, Mean Platelet Volume 8.3, 

Neutrophils (%) (Auto) 70.6, Lymphocytes (%) (Auto) 14.3, Monocytes (%) (Auto) 

12.3, Eosinophils (%) (Auto) 2.1, Basophils (%) (Auto) 0.2, Neutrophils # (Auto

) 4.41, Lymphocytes # (Auto) 0.89, Monocytes # (Auto) 0.77, Eosinophils # (Auto

) 0.13, Basophils # (Auto) 0.01





3/2/18 05:41








3/2/18 07:36

















Test


  3/1/18


11:35 3/1/18


16:24 3/1/18


18:19 3/1/18


19:41


 


Bedside Glucose


  170 mg/dl


(70-99) 158 mg/dl


(70-99) 


  144 mg/dl


(70-99)


 


Activated Partial


Thromboplast Time 


  


  49.2 SECONDS


(21.0-31.0) 


 


 


Partial Thromboplastin Ratio   1.9  


 


Test


  3/1/18


23:15 3/2/18


03:26 3/2/18


05:41 3/2/18


07:36


 


Bedside Glucose


  166 mg/dl


(70-99) 165 mg/dl


(70-99) 


  


 


 


Red Blood Count


  


  


  2.27 M/uL


(4.7-6.1) 2.36 M/uL


(4.7-6.1)


 


Mean Corpuscular Volume


  


  


  92.1 fL


() 90.3 fL


()


 


Mean Corpuscular Hemoglobin


  


  


  30.0 pg


(25-34) 29.7 pg


(25-34)


 


Mean Corpuscular Hemoglobin


Concent 


  


  32.5 g/dl


(32-36) 32.9 g/dl


(32-36)


 


RDW Standard Deviation


  


  


  51.0 fL


(36.4-46.3) 50.0 fL


(36.4-46.3)


 


RDW Coefficient of Variation


  


  


  15.2 %


(11.5-14.5) 15.1 %


(11.5-14.5)


 


Mean Platelet Volume


  


  


  8.9 fL


(7.4-10.4) 8.3 fL


(7.4-10.4)


 


Activated Partial


Thromboplast Time 


  


  41.3 SECONDS


(21.0-31.0) 


 


 


Partial Thromboplastin Ratio   1.6  


 


Venous Blood pH


  


  


  7.38


(7.36-7.41) 


 


 


Venous Blood Partial Pressure


CO2 


  


  44 mmHg


(38.0-50.0) 


 


 


Venous Blood Partial Pressure


O2 


  


  48 mmHg 


  


 


 


Venous Blood HCO3   26 mmol/L  


 


Venous Blood Oxygen Saturation   79.9 %  


 


Venous Blood Base Excess   0.6 mEq/L  


 


Anion Gap


  


  


  8.0 mmol/L


(3-11) 6.0 mmol/L


(3-11)


 


Est Creatinine Clear Calc


Drug Dose 


  


  53.1 ml/min 


  57.7 ml/min 


 


 


Estimated GFR (


American) 


  


  48.0 


  53.1 


 


 


Estimated GFR (Non-


American 


  


  41.4 


  45.8 


 


 


BUN/Creatinine Ratio   23.6 (10-20)  24.3 (10-20) 


 


Calcium Level


  


  


  8.7 mg/dl


(8.5-10.1) 8.5 mg/dl


(8.5-10.1)


 


Phosphorus Level


  


  


  3.8 mg/dl


(2.5-4.9) 


 


 


Magnesium Level


  


  


  2.2 mg/dl


(1.8-2.4) 2.3 mg/dl


(1.8-2.4)


 


White Blood Count


  


  


  


  6.24 K/uL


(4.8-10.8)


 


Hemoglobin


  


  


  


  7.0 g/dL


(14.0-18.0)


 


Hematocrit    21.3 % (42-52) 


 


Platelet Count


  


  


  


  214 K/uL


(130-400)


 


Neutrophils (%) (Auto)    70.6 % 


 


Lymphocytes (%) (Auto)    14.3 % 


 


Monocytes (%) (Auto)    12.3 % 


 


Eosinophils (%) (Auto)    2.1 % 


 


Basophils (%) (Auto)    0.2 % 


 


Neutrophils # (Auto)


  


  


  


  4.41 K/uL


(1.4-6.5)


 


Lymphocytes # (Auto)


  


  


  


  0.89 K/uL


(1.2-3.4)


 


Monocytes # (Auto)


  


  


  


  0.77 K/uL


(0.11-0.59)


 


Eosinophils # (Auto)


  


  


  


  0.13 K/uL


(0-0.5)


 


Basophils # (Auto)


  


  


  


  0.01 K/uL


(0-0.2)


 


Immature Granulocyte % (Auto)    0.5 % 


 


Immature Granulocyte # (Auto)


  


  


  


  0.03 K/uL


(0.00-0.02)


 


Large Platelets    1+ 











Allergies


Coded Allergies:  


     No Known Allergies (Verified , 2/24/18)





Medications





Current Inpatient Medications








 Medications


  (Trade)  Dose


 Ordered  Sig/Dc


 Route  Start Time


 Stop Time Status Last Admin


Dose Admin


 


 Clopidogrel


 Bisulfate


  (plAVix TAB)  75 mg  QAM


 NG  2/25/18 09:00


 3/27/18 08:59  3/2/18 08:45


75 MG


 


 Miscellaneous


 Information


  (Consult


 Glycemic


 Management


 Pharmacy)  1 ea  UD  PRN


 N/A  2/24/18 12:30


 3/26/18 12:29   


 


 


 Artificial Tears


  (Lacri-Lube Oph


 Oint)  1 appln  Q2H  PRN


 OPB  2/24/18 12:00


 3/26/18 11:59  2/28/18 08:31


1 APPLN


 


 Pantoprazole


 Sodium 40 mg/


 Syringe  10 ml @ 5


 mls/min  DAILY@11


 IV  2/25/18 11:00


 3/27/18 10:59  3/1/18 11:09


5 MLS/MIN


 


 Valproate Sodium


 1000 mg/Dextrose  60 ml @ 55


 mls/hr  Q12H


 IV  2/24/18 22:00


 3/26/18 21:59  3/2/18 09:09


55 MLS/HR


 


 Chlorhexidine


 Gluconate


  (Peridex Oral


 Soln)  15 ml  DAILY


 MT  2/25/18 09:00


 3/27/18 08:59  3/2/18 08:45


15 ML


 


 Fentanyl Citrate


  (Fentanyl Inj)  100 mcg  Q2H  PRN


 IV  2/26/18 09:15


 3/12/18 09:14  3/1/18 10:30


100 MCG


 


 Insulin Aspart


  (novoLOG ASPART)  SLIDING


 SCALE  Q4


 SC  2/26/18 11:00


 3/28/18 10:59  3/2/18 08:44


6 UNITS


 


 Glucose


  (Glucose 40% Gel)  15-30


 GRAMS 15


 GRAMS...  UD  PRN


 PO  2/26/18 11:00


 3/28/18 10:59   


 


 


 Glucose


  (Glucose Chew


 Tab)  4-8


 Tablets 4


 Tabl...  UD  PRN


 PO  2/26/18 11:00


 3/28/18 10:59   


 


 


 Dextrose


  (Dextrose 50%


 50ML Syringe)  25-50ML OF


 50% DW IV


 FOR...  UD  PRN


 IV  2/26/18 11:00


 3/28/18 10:59   


 


 


 Glucagon


  (Glucagon Inj)  1 mg  UD  PRN


 SQ  2/26/18 11:00


 3/28/18 10:59   


 


 


 Heparin Sodium/


 Dextrose  500 ml @ 


 34 mls/hr  [id number] PRN


 IV  2/26/18 11:45


 3/28/18 11:44  3/2/18 07:27


34 MLS/HR


 


 Acetaminophen


  (Tylenol Soln)  650 mg  Q6H  PRN


 PO  2/27/18 09:45


 3/29/18 09:44  2/28/18 01:40


650 MG


 


 Insulin Glargine


  (Lantus Solostar


 Pen)    QPM


 SC  2/27/18 21:00


 3/29/18 20:59   


 


 


 Insulin Glargine


  (Lantus Solostar


 Pen)    QAM


 SC  2/28/18 09:00


 3/30/18 08:59  3/2/18 09:08


12 UNITS


 


 Carvedilol


  (Coreg Tab)  12.5 mg  BID


 PO  2/28/18 09:00


 3/29/18 20:59 Future hold 3/2/18 08:45


12.5 MG


 


 Oxycodone HCl


  (Roxicodone Soln)  5 mg  Q6H


 PO  2/28/18 09:00


 3/14/18 08:59  3/2/18 09:06


5 MG


 


 Sterile Water


  (Tube Feeding


 Water Flush)  30 ea  Q4


 NG  2/28/18 12:00


 3/30/18 11:59  3/2/18 08:41


30 EA


 


 Albuterol/


 Ipratropium


  (Duoneb)  3 ml  QIDR


 INH  2/28/18 16:00


 3/30/18 15:59  3/2/18 07:37


3 ML


 


 Nicardipine HCl


 25 mg/Sodium


 Chloride  250 ml @ 0


 mls/hr  Q0M PRN


 IV  3/1/18 09:00


 3/31/18 08:59  3/2/18 06:23


75 MLS/HR


 


 Enteral


 Nutritional


 Formula


  (Novasource


 Renal)  1,000 ml  UD  PRN


 NG  3/1/18 16:15


 3/31/18 16:14  3/2/18 06:55


1,000 ML











Impression





(1) Cardiac arrest


(2) Acute kidney injury


(3) Ischemic cardiomyopathy





Recommendations


ACUTE KIDNEY INJURY:


-- ATN following cardiopulmonary arrest


-- Kidney function has improved (baseline creatinine 1.2).  Electrolyte balance 

remains acceptable


-- Net 0.5 L volume positive last 24 hours.  Will schedule low dose loop 

diuretic (Furosemide 20 mg IV daily)


-- Monitor PRP





HYPERTENSION:


-- Continue IV Nicardipine and po Carvedilol


-- If blood pressure remains labile consider adding IV NTG gtt





ANEMIA:


-- Consider blood transfusion to maintain Hgb > or = 8.0





NEURO:


-- Probable anoxic injury.  Prognosis remains guarded.  Await further Neurology 

input.





30 minutes critical care time provided.  This was necessary to examine patient, 

review medical records and discuss management w/ ICU staff.

## 2018-03-02 NOTE — CARDIOLOGY PROGRESS NOTE
DATE: 03/02/2018

 

SUBJECTIVE:  Mr. Dixon is resting quietly in bed.  No purposeful movement

to commands.

 

OBJECTIVE:

VITAL SIGNS:  Blood pressure is 150-170/60-70.  Heart rate is 67. 

Respiratory rate is 18 and the patient is afebrile 37.4 degrees centigrade. 

Saturations 94% on 30% FIO2.

NECK:  Supple with full carotid upstrokes.  No obvious bruits.  Jugular

venous pressure is difficult to assess.

CARDIOVASCULAR:  Regular rhythm with distant heart sounds.  A 2/6 basal

systolic ejection murmur is noted.

LUNGS:  Clear anteriorly.

ABDOMEN:  Soft without bruits.

EXTREMITIES:  Reveal intact radial artery pulse bilaterally.  Trace pretibial

edema is noted.

 

DATA:  CBC notes hemoglobin of 7.0, hematocrit 21.3, white count 6.2, and

platelet count 214,000.  Electrolytes note a sodium 140, potassium 3.6,

chloride 108, bicarb 26, BUN 39, creatinine 1.6, glucose 169.  PTT is 41.3. 

Telemetry monitor notes occasional PVCs.

 

IMPRESSION AND PLAN:

1. Status post cardiac arrest -- patient developed PEA while in the ambulance

en route to the hospital February 24th.

2. Acute on chronic diastolic congestive heart failure -- continues diuresis.

3. Mild left ventricular dysfunction -- ejection fraction 45-50% on his

recent echocardiogram.

4. Coronary artery disease -- multivessel disease noted previously.  Has a

history of prior percutaneous coronary intervention.

5. Hypertension -- discussed with Dr. Arnold, is making a change to

nicardipine.

6. History of arterial thrombus -- October 2017 at [facility].

7. Mild aortic stenosis.

8. Prognosis -- poor.

## 2018-03-02 NOTE — NEUROLOGY PROGRESS NOTES
Neurology Progress Note


Date of Service


Mar 2, 2018.





Subjective


Nursing reports no seizure activity or new problems.


He has been stable and last night apparently would say his name when spoken to 

at times.  This morning he is somewhat sleepy.





Objective











  Date Time  Temp Pulse Resp B/P (MAP) Pulse Ox O2 Delivery O2 Flow Rate FiO2


 


3/2/18 07:40  69   94   30


 


3/2/18 07:37  65 20  94 BiPAP/CPAP  30


 


3/2/18 06:01 37.4 67 14 150/68 (95) 96   





    177/60    


 


3/2/18 06:00 37.4 66 19 172/58 (89) 95   


 


3/2/18 05:30 37.4 65 13 166/51 (80) 94   


 


3/2/18 05:01 37.4 66 16 150/67 (102) 95   





    160/51    


 


3/2/18 04:30 37.4 68 13 161/51 (81) 93   


 


3/2/18 04:01 37.4 69 11 147/66 (89) 95   





    161/54 (76)    


 


3/2/18 04:00     96 BiPAP  30


 


3/2/18 03:01 37.5 72 13 154/68 (97) 96   





    150/54 (81)    


 


3/2/18 02:01 37.3 66 15 146/63 (80) 95   





    164/52 (84)    


 


3/2/18 01:01 37.2 63 17 148/73 (95) 95   





    160/51 (83)    


 


3/2/18 00:30 37.2 62 18 160/52 (80) 96   


 


3/2/18 00:01 37.1 64 20 141/71 (99) 94   





    159/55 (79)    


 


3/1/18 23:59     96 BiPAP  30


 


3/1/18 23:30 37.0 67 11 158/60 (89) 95   


 


3/1/18 23:00 37.1 64 16 170/54 (84) 94   


 


3/1/18 22:30 37.0 65 8 180/58 (90) 95   


 


3/1/18 22:14  64   93   30


 


3/1/18 22:01 37.0 66 13 155/72 (97) 94   





    173/56 (86)    


 


3/1/18 21:30 37.1 69 15 174/54 (84) 95   


 


3/1/18 21:01 37.1 66 14 151/72 (88) 94   





    180/57 (90)    


 


3/1/18 20:30 37.1 67 12 179/58 (91) 90   


 


3/1/18 20:01 37.1 78 21 154/78 (120) 92   





    176/60 (93)    


 


3/1/18 20:00     93 Nasal Cannula 5.0 


 


3/1/18 19:46  68 15  99 Nasal Cannula 5.0 


 


3/1/18 19:30 37.1 70 15 169/52 (85) 94   


 


3/1/18 19:01 37.1 68 14 142/69 (108) 96   





    171/52 (87)    


 


3/1/18 18:00 36.7 65 22 138/71 (93) 96 Nasal Cannula  30


 


3/1/18 16:00 36.7 65 22 154/68 (96) 93 BiPAP  30


 


3/1/18 16:00  63 15  99 Nasal Cannula 5.0 


 


3/1/18 16:00      BiPAP  30


 


3/1/18 15:45  67 16  93 Nasal Cannula 5.0 


 


3/1/18 14:27  65   93   30


 


3/1/18 14:16  77 14  95 Nasal Cannula 5.0 


 


3/1/18 14:00 36.7 68 22 129/56 (80) 93 BiPAP  30


 


3/1/18 12:00      BiPAP  30


 


3/1/18 12:00 36.9 67 15 172/60 (97) 95 BiPAP  30


 


3/1/18 11:30  66   95   30


 


3/1/18 11:30  66 17  95 BiPAP/CPAP  30


 


3/1/18 09:45 37.1 62 17 164/52 (89) 96 BiPAP  30











Last 24 Hours








Test


  3/1/18


09:03 3/1/18


09:17 3/1/18


11:35 3/1/18


16:24


 


Blood Gas Sample Site Art Line    


 


Bedside Blood Gas pH (LAB) 7.41    


 


Bedside Blood Gas pCO2 (LAB) 38 mmHg    


 


Bedside Blood Gas pO2 (LAB) 91 mmHg    


 


Bedside Blood Gas HCO3 (LAB) 24 meq/L    


 


Bedside Blood Gas Total CO2 25 mEq/l    


 


Bedside Blood Gas Base Excess


(LAB) -1.0 meq/L 


  


  


  


 


 


Bedside Blood Gas O2


Saturation 97.0 % 


  


  


  


 


 


Cameron Test NA    


 


Oxygen Delivery Device BIPAP    


 


Bedside Oxygen Rate


(breaths/min) 12 


  


  


  


 


 


Bedside FiO2 30 %    


 


Blood Gas IPAP 12    


 


Activated Partial


Thromboplast Time 


  43.7 SECONDS 


  


  


 


 


Partial Thromboplastin Ratio  1.7   


 


Bedside Glucose   170 mg/dl  158 mg/dl 


 


Test


  3/1/18


18:19 3/1/18


19:41 3/1/18


23:15 3/2/18


03:26


 


Activated Partial


Thromboplast Time 49.2 SECONDS 


  


  


  


 


 


Partial Thromboplastin Ratio 1.9    


 


Bedside Glucose  144 mg/dl  166 mg/dl  165 mg/dl 


 


Test


  3/2/18


05:41 3/2/18


07:36 


  


 


 


White Blood Count 5.91 K/uL  6.24 K/uL   


 


Red Blood Count 2.27 M/uL  2.36 M/uL   


 


Hemoglobin 6.8 g/dL  7.0 g/dL   


 


Hematocrit 20.9 %  21.3 %   


 


Mean Corpuscular Volume 92.1 fL  90.3 fL   


 


Mean Corpuscular Hemoglobin 30.0 pg  29.7 pg   


 


Mean Corpuscular Hemoglobin


Concent 32.5 g/dl 


  32.9 g/dl 


  


  


 


 


RDW Standard Deviation 51.0 fL  50.0 fL   


 


RDW Coefficient of Variation 15.2 %  15.1 %   


 


Platelet Count 223 K/uL  214 K/uL   


 


Mean Platelet Volume 8.9 fL  8.3 fL   


 


Activated Partial


Thromboplast Time 41.3 SECONDS 


  


  


  


 


 


Partial Thromboplastin Ratio 1.6    


 


Venous Blood pH 7.38    


 


Venous Blood Partial Pressure


CO2 44 mmHg 


  


  


  


 


 


Venous Blood Partial Pressure


O2 48 mmHg 


  


  


  


 


 


Venous Blood HCO3 26 mmol/L    


 


Venous Blood Oxygen Saturation 79.9 %    


 


Venous Blood Base Excess 0.6 mEq/L    


 


Sodium Level 140 mmol/L  140 mmol/L   


 


Potassium Level 3.4 mmol/L  3.6 mmol/L   


 


Chloride Level 107 mmol/L  108 mmol/L   


 


Carbon Dioxide Level 25 mmol/L  26 mmol/L   


 


Anion Gap 8.0 mmol/L  6.0 mmol/L   


 


Blood Urea Nitrogen 41 mg/dl  39 mg/dl   


 


Creatinine 1.75 mg/dl  1.61 mg/dl   


 


Est Creatinine Clear Calc


Drug Dose 53.1 ml/min 


  57.7 ml/min 


  


  


 


 


Estimated GFR (


American) 48.0 


  53.1 


  


  


 


 


Estimated GFR (Non-


American 41.4 


  45.8 


  


  


 


 


BUN/Creatinine Ratio 23.6  24.3   


 


Random Glucose 158 mg/dl  169 mg/dl   


 


Calcium Level 8.7 mg/dl  8.5 mg/dl   


 


Phosphorus Level 3.8 mg/dl    


 


Magnesium Level 2.2 mg/dl  2.3 mg/dl   


 


Neutrophils (%) (Auto)  70.6 %   


 


Lymphocytes (%) (Auto)  14.3 %   


 


Monocytes (%) (Auto)  12.3 %   


 


Eosinophils (%) (Auto)  2.1 %   


 


Basophils (%) (Auto)  0.2 %   


 


Neutrophils # (Auto)  4.41 K/uL   


 


Lymphocytes # (Auto)  0.89 K/uL   


 


Monocytes # (Auto)  0.77 K/uL   


 


Eosinophils # (Auto)  0.13 K/uL   


 


Basophils # (Auto)  0.01 K/uL   


 


Immature Granulocyte % (Auto)  0.5 %   


 


Immature Granulocyte # (Auto)  0.03 K/uL   


 


Large Platelets  1+   








Exam:


The patient is lying in bed with eyes closed and little spontaneous movement.  

With voice he will attempt open his eyes and mumble.  With sternal rub and 

shout he managed to say his name when directed.  He quickly goes back to sleep.

  There is some resistance to eye opening. Eyes are front and he has positive 

oculocephalics.  Pupils are for 3-4 millimeters bilaterally reactive to light. 

There is no facial droop.  He has some withdrawal and grimace to deep pain in 

the left upper extremity but not the right.  He has no withdrawal to deep pain 

in the toes bilaterally. He has tone in all 4 limbs.





Current Inpatient Medications








 Medications


  (Trade)  Dose


 Ordered  Sig/Dc


 Route  Start Time


 Stop Time Status Last Admin


Dose Admin


 


 Clopidogrel


 Bisulfate


  (plAVix TAB)  75 mg  QAM


 NG  2/25/18 09:00


 3/27/18 08:59  3/1/18 11:31


75 MG


 


 Miscellaneous


 Information


  (Consult


 Glycemic


 Management


 Pharmacy)  1 ea  UD  PRN


 N/A  2/24/18 12:30


 3/26/18 12:29   


 


 


 Artificial Tears


  (Lacri-Lube Oph


 Oint)  1 appln  Q2H  PRN


 OPB  2/24/18 12:00


 3/26/18 11:59  2/28/18 08:31


1 APPLN


 


 Pantoprazole


 Sodium 40 mg/


 Syringe  10 ml @ 5


 mls/min  DAILY@11


 IV  2/25/18 11:00


 3/27/18 10:59  3/1/18 11:09


5 MLS/MIN


 


 Valproate Sodium


 1000 mg/Dextrose  60 ml @ 55


 mls/hr  Q12H


 IV  2/24/18 22:00


 3/26/18 21:59  3/1/18 22:13


55 MLS/HR


 


 Chlorhexidine


 Gluconate


  (Peridex Oral


 Soln)  15 ml  DAILY


 MT  2/25/18 09:00


 3/27/18 08:59  3/1/18 09:00


15 ML


 


 Fentanyl Citrate


  (Fentanyl Inj)  100 mcg  Q2H  PRN


 IV  2/26/18 09:15


 3/12/18 09:14  3/1/18 10:30


100 MCG


 


 Insulin Aspart


  (novoLOG ASPART)  SLIDING


 SCALE  Q4


 SC  2/26/18 11:00


 3/28/18 10:59  3/2/18 03:30


6 UNITS


 


 Glucose


  (Glucose 40% Gel)  15-30


 GRAMS 15


 GRAMS...  UD  PRN


 PO  2/26/18 11:00


 3/28/18 10:59   


 


 


 Glucose


  (Glucose Chew


 Tab)  4-8


 Tablets 4


 Tabl...  UD  PRN


 PO  2/26/18 11:00


 3/28/18 10:59   


 


 


 Dextrose


  (Dextrose 50%


 50ML Syringe)  25-50ML OF


 50% DW IV


 FOR...  UD  PRN


 IV  2/26/18 11:00


 3/28/18 10:59   


 


 


 Glucagon


  (Glucagon Inj)  1 mg  UD  PRN


 SQ  2/26/18 11:00


 3/28/18 10:59   


 


 


 Heparin Sodium/


 Dextrose  500 ml @ 


 34 mls/hr  [id number] PRN


 IV  2/26/18 11:45


 3/28/18 11:44  3/2/18 07:27


34 MLS/HR


 


 Acetaminophen


  (Tylenol Soln)  650 mg  Q6H  PRN


 PO  2/27/18 09:45


 3/29/18 09:44  2/28/18 01:40


650 MG


 


 Insulin Glargine


  (Lantus Solostar


 Pen)    QPM


 SC  2/27/18 21:00


 3/29/18 20:59   


 


 


 Insulin Glargine


  (Lantus Solostar


 Pen)    QAM


 SC  2/28/18 09:00


 3/30/18 08:59  3/1/18 11:09


12 UNITS


 


 Carvedilol


  (Coreg Tab)  12.5 mg  BID


 PO  2/28/18 09:00


 3/29/18 20:59 Future hold 3/1/18 20:20


12.5 MG


 


 Oxycodone HCl


  (Roxicodone Soln)  5 mg  Q6H


 PO  2/28/18 09:00


 3/14/18 08:59  3/2/18 02:32


5 MG


 


 Sterile Water


  (Tube Feeding


 Water Flush)  30 ea  Q4


 NG  2/28/18 12:00


 3/30/18 11:59  3/2/18 03:30


30 EA


 


 Albuterol/


 Ipratropium


  (Duoneb)  3 ml  QIDR


 INH  2/28/18 16:00


 3/30/18 15:59  3/2/18 07:37


3 ML


 


 Nicardipine HCl


 25 mg/Sodium


 Chloride  250 ml @ 0


 mls/hr  Q0M PRN


 IV  3/1/18 09:00


 3/31/18 08:59  3/2/18 06:23


75 MLS/HR


 


 Enteral


 Nutritional


 Formula


  (Novasource


 Renal)  1,000 ml  UD  PRN


 NG  3/1/18 16:15


 3/31/18 16:14  3/2/18 06:55


1,000 ML











Impression


1.  Post cardiac arrest/circulatory collapse from PEA February 24th,





This patient has suffered a global hypoxic ischemic event.  The MRI does not 

show any focal neurologic findings except for a punctate acute stroke in the 

right cerebellar hemisphere.  I suspect that he has global cortical damage from 

hypoxia.





Clinically he has made some improvements however he is not responding to 

commands or showing signs higher cortical functioning.  He does have a very 

good brainstem functioning on exam.





He is still somewhat obtunded but seems to be a little more responsive than 

yesterday.





2. 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 

level impinging T1 nerve root but not the spinal cord.  He has had chronic 

cervical spine issues over time, and currently has multiple levels of cervical 

spinal stenosis.  





He does not display any upper motor neuron signs on examination in his lower 

extremities.





3. Severe generalized polyneuropathy likely secondary to diabetes.





This polyneuropathy will mask any upper motor neuron signs or even normal 

physiologic signs like toe extensor movements to plantar stimulation.





4. Multiple significant medical problems including diabetes, COPD, bipolar 

disorder, and others as listed above.





Plan


1. There is no need for additional neurologic testing at this time.





2. Overall his prognosis is guarded.  He does not have a lot of cognitive 

functioning at day 6, but may continue make improvements over time.





Please contact me if I can be of further assistance on this case.








I spoke with Dr. Arnold regarding this case.

## 2018-03-03 VITALS — SYSTOLIC BLOOD PRESSURE: 160 MMHG | OXYGEN SATURATION: 98 % | DIASTOLIC BLOOD PRESSURE: 60 MMHG | HEART RATE: 59 BPM

## 2018-03-03 VITALS — SYSTOLIC BLOOD PRESSURE: 156 MMHG | DIASTOLIC BLOOD PRESSURE: 64 MMHG | HEART RATE: 66 BPM | OXYGEN SATURATION: 95 %

## 2018-03-03 VITALS — HEART RATE: 66 BPM | OXYGEN SATURATION: 96 %

## 2018-03-03 VITALS
TEMPERATURE: 98.6 F | OXYGEN SATURATION: 84 % | DIASTOLIC BLOOD PRESSURE: 92 MMHG | HEART RATE: 71 BPM | SYSTOLIC BLOOD PRESSURE: 163 MMHG

## 2018-03-03 VITALS — OXYGEN SATURATION: 98 %

## 2018-03-03 VITALS — OXYGEN SATURATION: 100 % | HEART RATE: 58 BPM | DIASTOLIC BLOOD PRESSURE: 55 MMHG | SYSTOLIC BLOOD PRESSURE: 156 MMHG

## 2018-03-03 VITALS
SYSTOLIC BLOOD PRESSURE: 152 MMHG | DIASTOLIC BLOOD PRESSURE: 53 MMHG | OXYGEN SATURATION: 99 % | HEART RATE: 57 BPM | TEMPERATURE: 98.78 F

## 2018-03-03 VITALS
TEMPERATURE: 97.52 F | HEART RATE: 67 BPM | DIASTOLIC BLOOD PRESSURE: 60 MMHG | OXYGEN SATURATION: 97 % | SYSTOLIC BLOOD PRESSURE: 160 MMHG

## 2018-03-03 VITALS — HEART RATE: 55 BPM | SYSTOLIC BLOOD PRESSURE: 149 MMHG | OXYGEN SATURATION: 94 % | DIASTOLIC BLOOD PRESSURE: 58 MMHG

## 2018-03-03 VITALS — OXYGEN SATURATION: 98 % | HEART RATE: 56 BPM | SYSTOLIC BLOOD PRESSURE: 153 MMHG | DIASTOLIC BLOOD PRESSURE: 56 MMHG

## 2018-03-03 VITALS
SYSTOLIC BLOOD PRESSURE: 151 MMHG | DIASTOLIC BLOOD PRESSURE: 93 MMHG | OXYGEN SATURATION: 88 % | TEMPERATURE: 98.6 F | HEART RATE: 67 BPM

## 2018-03-03 VITALS
HEART RATE: 61 BPM | OXYGEN SATURATION: 99 % | SYSTOLIC BLOOD PRESSURE: 146 MMHG | TEMPERATURE: 98.06 F | DIASTOLIC BLOOD PRESSURE: 73 MMHG

## 2018-03-03 VITALS — OXYGEN SATURATION: 94 % | HEART RATE: 59 BPM

## 2018-03-03 VITALS — OXYGEN SATURATION: 100 %

## 2018-03-03 VITALS — HEART RATE: 62 BPM | OXYGEN SATURATION: 96 %

## 2018-03-03 VITALS — HEART RATE: 59 BPM | OXYGEN SATURATION: 94 %

## 2018-03-03 VITALS — SYSTOLIC BLOOD PRESSURE: 141 MMHG | DIASTOLIC BLOOD PRESSURE: 70 MMHG | OXYGEN SATURATION: 88 % | HEART RATE: 67 BPM

## 2018-03-03 VITALS — OXYGEN SATURATION: 94 % | HEART RATE: 70 BPM

## 2018-03-03 VITALS — OXYGEN SATURATION: 85 %

## 2018-03-03 VITALS — HEART RATE: 65 BPM | OXYGEN SATURATION: 95 %

## 2018-03-03 VITALS
DIASTOLIC BLOOD PRESSURE: 86 MMHG | SYSTOLIC BLOOD PRESSURE: 154 MMHG | TEMPERATURE: 97.88 F | HEART RATE: 58 BPM | OXYGEN SATURATION: 94 %

## 2018-03-03 VITALS — OXYGEN SATURATION: 97 % | HEART RATE: 62 BPM

## 2018-03-03 VITALS — HEART RATE: 64 BPM | OXYGEN SATURATION: 95 %

## 2018-03-03 VITALS — HEART RATE: 75 BPM | SYSTOLIC BLOOD PRESSURE: 151 MMHG | DIASTOLIC BLOOD PRESSURE: 45 MMHG | OXYGEN SATURATION: 95 %

## 2018-03-03 VITALS — OXYGEN SATURATION: 99 % | HEART RATE: 71 BPM | SYSTOLIC BLOOD PRESSURE: 155 MMHG | DIASTOLIC BLOOD PRESSURE: 93 MMHG

## 2018-03-03 VITALS — SYSTOLIC BLOOD PRESSURE: 167 MMHG | OXYGEN SATURATION: 91 % | DIASTOLIC BLOOD PRESSURE: 85 MMHG | HEART RATE: 61 BPM

## 2018-03-03 VITALS — HEART RATE: 64 BPM | OXYGEN SATURATION: 94 %

## 2018-03-03 VITALS — OXYGEN SATURATION: 100 % | SYSTOLIC BLOOD PRESSURE: 137 MMHG | HEART RATE: 75 BPM | DIASTOLIC BLOOD PRESSURE: 92 MMHG

## 2018-03-03 LAB
ALBUMIN SERPL-MCNC: 1.8 GM/DL (ref 3.4–5)
ALP SERPL-CCNC: 50 U/L (ref 45–117)
ALT SERPL-CCNC: 22 U/L (ref 12–78)
AST SERPL-CCNC: 25 U/L (ref 15–37)
BASOPHILS # BLD: 0.01 K/UL (ref 0–0.2)
BASOPHILS NFR BLD: 0.2 %
BUN SERPL-MCNC: 41 MG/DL (ref 7–18)
CALCIUM SERPL-MCNC: 8.2 MG/DL (ref 8.5–10.1)
CO2 SERPL-SCNC: 24 MMOL/L (ref 21–32)
CREAT SERPL-MCNC: 1.66 MG/DL (ref 0.6–1.4)
EOS ABS #: 0.23 K/UL (ref 0–0.5)
EOSINOPHIL NFR BLD AUTO: 243 K/UL (ref 130–400)
GLUCOSE SERPL-MCNC: 130 MG/DL (ref 70–99)
HCT VFR BLD CALC: 21.6 % (ref 42–52)
HGB BLD-MCNC: 7 G/DL (ref 14–18)
IG#: 0.05 K/UL (ref 0–0.02)
IMM GRANULOCYTES NFR BLD AUTO: 14 %
LYMPHOCYTES # BLD: 0.82 K/UL (ref 1.2–3.4)
MCH RBC QN AUTO: 29.7 PG (ref 25–34)
MCHC RBC AUTO-ENTMCNC: 32.4 G/DL (ref 32–36)
MCV RBC AUTO: 91.5 FL (ref 80–100)
MONO ABS #: 0.91 K/UL (ref 0.11–0.59)
MONOCYTES NFR BLD: 15.6 %
NEUT ABS #: 3.82 K/UL (ref 1.4–6.5)
NEUTROPHILS # BLD AUTO: 3.9 %
NEUTROPHILS NFR BLD AUTO: 65.4 %
PMV BLD AUTO: 8.8 FL (ref 7.4–10.4)
POTASSIUM SERPL-SCNC: 4.2 MMOL/L (ref 3.5–5.1)
PROT SERPL-MCNC: 6.2 GM/DL (ref 6.4–8.2)
PTT PATIENT: 43.4 SECONDS (ref 21–31)
PTT PATIENT: 54.4 SECONDS (ref 21–31)
RED CELL DISTRIBUTION WIDTH CV: 15.1 % (ref 11.5–14.5)
RED CELL DISTRIBUTION WIDTH SD: 50.7 FL (ref 36.4–46.3)
SODIUM SERPL-SCNC: 140 MMOL/L (ref 136–145)
WBC # BLD AUTO: 5.84 K/UL (ref 4.8–10.8)

## 2018-03-03 RX ADMIN — INSULIN ASPART SCH UNITS: 100 INJECTION, SOLUTION INTRAVENOUS; SUBCUTANEOUS at 04:14

## 2018-03-03 RX ADMIN — INSULIN GLARGINE SCH UNITS: 100 INJECTION, SOLUTION SUBCUTANEOUS at 07:49

## 2018-03-03 RX ADMIN — INSULIN ASPART SCH UNITS: 100 INJECTION, SOLUTION INTRAVENOUS; SUBCUTANEOUS at 11:48

## 2018-03-03 RX ADMIN — CARVEDILOL SCH MG: 25 TABLET, FILM COATED ORAL at 07:46

## 2018-03-03 RX ADMIN — METHYLCELLULOSE SCH GM: 2 POWDER, FOR SOLUTION ORAL at 21:45

## 2018-03-03 RX ADMIN — HEPARIN SODIUM PRN MLS/HR: 5000 INJECTION, SOLUTION INTRAVENOUS at 17:42

## 2018-03-03 RX ADMIN — FUROSEMIDE SCH MLS/MIN: 10 INJECTION, SOLUTION INTRAMUSCULAR; INTRAVENOUS at 11:57

## 2018-03-03 RX ADMIN — IPRATROPIUM BROMIDE AND ALBUTEROL SULFATE SCH ML: .5; 3 SOLUTION RESPIRATORY (INHALATION) at 18:58

## 2018-03-03 RX ADMIN — INSULIN GLARGINE SCH UNITS: 100 INJECTION, SOLUTION SUBCUTANEOUS at 20:43

## 2018-03-03 RX ADMIN — CHLORHEXIDINE GLUCONATE SCH ML: 1.2 RINSE ORAL at 07:45

## 2018-03-03 RX ADMIN — IPRATROPIUM BROMIDE AND ALBUTEROL SULFATE SCH ML: .5; 3 SOLUTION RESPIRATORY (INHALATION) at 06:55

## 2018-03-03 RX ADMIN — OXYCODONE HYDROCHLORIDE SCH MG: 5 SOLUTION ORAL at 03:00

## 2018-03-03 RX ADMIN — OXYCODONE HYDROCHLORIDE SCH MG: 5 SOLUTION ORAL at 14:12

## 2018-03-03 RX ADMIN — IPRATROPIUM BROMIDE AND ALBUTEROL SULFATE SCH ML: .5; 3 SOLUTION RESPIRATORY (INHALATION) at 10:59

## 2018-03-03 RX ADMIN — VALPROIC ACID SCH MG: 250 SOLUTION ORAL at 07:46

## 2018-03-03 RX ADMIN — AMANTADINE HYDROCHLORIDE SCH MG: 50 SOLUTION ORAL at 21:41

## 2018-03-03 RX ADMIN — INSULIN ASPART SCH UNITS: 100 INJECTION, SOLUTION INTRAVENOUS; SUBCUTANEOUS at 16:00

## 2018-03-03 RX ADMIN — HEPARIN SODIUM PRN MLS/HR: 5000 INJECTION, SOLUTION INTRAVENOUS at 07:22

## 2018-03-03 RX ADMIN — NICARDIPINE HYDROCHLORIDE PRN MLS/HR: 25 INJECTION INTRAVENOUS at 03:40

## 2018-03-03 RX ADMIN — OXYCODONE HYDROCHLORIDE SCH MG: 5 SOLUTION ORAL at 21:45

## 2018-03-03 RX ADMIN — NICARDIPINE HYDROCHLORIDE PRN MLS/HR: 25 INJECTION INTRAVENOUS at 09:14

## 2018-03-03 RX ADMIN — AMLODIPINE BESYLATE SCH MG: 5 TABLET ORAL at 07:46

## 2018-03-03 RX ADMIN — OXYCODONE HYDROCHLORIDE SCH MG: 5 SOLUTION ORAL at 07:50

## 2018-03-03 RX ADMIN — INSULIN ASPART SCH UNITS: 100 INJECTION, SOLUTION INTRAVENOUS; SUBCUTANEOUS at 07:52

## 2018-03-03 RX ADMIN — INSULIN ASPART SCH UNITS: 100 INJECTION, SOLUTION INTRAVENOUS; SUBCUTANEOUS at 00:22

## 2018-03-03 RX ADMIN — FAMOTIDINE SCH MG: 20 TABLET, FILM COATED ORAL at 07:46

## 2018-03-03 RX ADMIN — CARVEDILOL SCH MG: 25 TABLET, FILM COATED ORAL at 21:40

## 2018-03-03 RX ADMIN — IPRATROPIUM BROMIDE AND ALBUTEROL SULFATE SCH ML: .5; 3 SOLUTION RESPIRATORY (INHALATION) at 15:01

## 2018-03-03 RX ADMIN — HEPARIN SODIUM PRN MLS/HR: 5000 INJECTION, SOLUTION INTRAVENOUS at 03:42

## 2018-03-03 RX ADMIN — VALPROIC ACID SCH MG: 250 SOLUTION ORAL at 21:00

## 2018-03-03 RX ADMIN — INSULIN ASPART SCH UNITS: 100 INJECTION, SOLUTION INTRAVENOUS; SUBCUTANEOUS at 20:00

## 2018-03-03 RX ADMIN — CLOPIDOGREL BISULFATE SCH MG: 75 TABLET, FILM COATED ORAL at 07:45

## 2018-03-03 NOTE — CRITICAL CARE PROGRESS NOTE
Critical Care Progress Note


Date of Service


Mar 3, 2018.





ICU Day


ICU Day Number:  8





Attending


Dr. Arnold





Subjective


No events overnight.  Neuro status largely unchanged.  Respiratory attempted to 

remove BiPAP, developed tachypnea and hypoxemia was placed back on noninvasive 

ventilation





Objective


GENERAL : No acute distress


EYES:  No icterus, gaze conjugate. PERRL.  Opens and closes to verbal command


NOSE: No evidence of epistaxis.  Core safe tube present


NECK: No JVD 


LUNGS: Coarse sounds bilaterally


HEART:  Regular, rate controlled


ABDOMEN:  Soft, NT, ND, BS Present


EXTREMITIES: +2 bilateral LE edema, pedal pulses intact..  Right-sided 

flaccidness of the upper and lower extremities


NEURO: Opens eyes to commands and closes eyes to command.  Normal reflexes 

bilaterally, mildly more brisk on the left





Assessment & Plan


Neuro


* Patient with waxing and waning mental status.


* Continuous EEG abnormal with moderate diffuse background disorganization and 

slowing but no evidence of epileptiform changes


* Neurology and pharmacy managing valproic acid


* Neurology has been consulted and is following - appreciate Dr. Mcclellan's input


* Noncontrast MRI of the brain and noncontrast MRI of the cervical spine 

unremarkable


* Change valproic acid to 1000 mg p.o. twice daily.  Check a level in 3-5 days.


* Continue to monitor.  No further neuro imaging recommended by neurology





Cardiovascular


* PEA cardiac arrest witnessed by EMS staff most likely secondary to hypoxia


* No ST changes


* Cardiology consulted -no urgency for cardiac catheterization.  Prior history 

of multivessel coronary artery disease


* PO carvedilol for hypertension - increased to 50 mg bid 


 * Attempt to wean off nicardipine today, will have optimized Coreg


* Continue to monitor on telemetry


* Dr. Yusuf is his primary cardiologist


* Further echocardiograms per cardiology


* Nephrology managing diuretics





Pulmonary


* Quit smoking over a year ago


* Has now been on supplemental oxygen at home for over a year; baseline 6 L/min 

via nasal cannula her family


* Extubated 2/28/2018


* Oxymask for hypoxia.  CPAP at bedtime and as needed during the day


* CT of chest with no pulmonary  emboli identified; evidence of small to 

moderate bilateral pleural effusions; moderate interstitial pulmonary edema


* Pleural effusions now resolved


* Continue with diuresis and monitor I&Os


 * Waxing and waning respiratory status.  Patient is always at risk for 

aspiration given his waxing and waning mental status


 * Will discuss definitive management goals with wife regarding possible 

reintubation and/or need for tracheostomy as well as PEG tube placement





Musculoskeletal


* Multiple nondisplaced rib fractures - suspected etiology is cardiac 

compressions


* Pain control as needed with fentanyl.  


* Probable sternal fracture -suspected etiology cardiac compressions


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


* Patient appears to have foot drop on the right





Renal 


* Renal ultrasound with no evidence of renal artery stenosis


* There is mention of question of interrupted flow in the right and left renal 

vein.  However, this study was a very poor study with technical difficulty 

related to poor sonographic windows.  Continue to monitor.


* Urine output adequate with diuresis


* Continues with acute renal failure


* Nephrology consulted -appreciate Dr. King's input


* Creatinine continues to improve.  Creatinine today 1.66


* Avoid nephrotoxic medications


* Additional diuresis, increase Lasix from 20 to 30 daily, goal -1 L today





ID


* Ag negative for influenza A&B


* Blood cultures 2 negative


* Urine culture with no growth


* MRSA specimen is negative by DNA probe


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


* Completed course of Augmentin for sinusitis


* No leukocytosis or fever





Electrolytes


* Give 20 mcg of potassium chloride daily today via NG tube


* Follow serial labs





Endocrine


* History of type 2 diabetes mellitus


* Has been titrated off of insulin drip -continue gtt as needed


* Hemoglobin A1c 6.0


* Continue BSGs per protocol


* Lantus and NovoLog per protocol -glycemic management per pharmacy


* Continue with tube feeds





Heme


* 2 units held in the event that Hgb drops further - currently 7.0


* Hemodynamically stable


* Transfuse if drops below 7


* Follow serial labs


* Heparin infusion [facility]


 * It is recognized that we have been unable to find definitive imaging reports 

demonstrating this thrombus


 * History of arterial thrombus





Nutrition


* Continue Novasource Renal


* Nutrition consult for tube feed management


* 2 fiber packets BID again today for stool


* Coresafe tube in place





GI


* Changed to famotidine 20 mg tablet via NG tube





DVT prophylaxis


* TEDs/SCDs


* Heparin infusion





CODE STATUS: DO NOT RESUSCITATE in event of cardiac arrest





The patient has had 8 days since his cardiac arrest.  His mental status is 

waxing and waning.  He has most certainly at risk for aspiration despite having 

a course of a feeding tube.  Additionally has aspiration may be from normal 

bodily secretions and not that of tube feeds.  At this point we have been able 

to improve his oxygenation with intermittent positive pressure ventilation.  I 

will discuss definitive respiratory plans with the patient's wife, do not 

believe simply intubation would significantly improve his clinical status 

without a follow-up tracheostomy.  Additionally I would highly consider placing 

of a PEG tube.  Both his daughter and wife indicate that he has stated on 

numerous occasions he would not want aggressive life-sustaining procedures; 

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 

never completed a living will nor healthcare proxy.  





I have personally spent 60 minutes of critical care time in the direct 

management of this patient.  This is a life/limb threatening event.  This 

includes time spent evaluating patient, direct bedside care, chart review, 

placing orders, interpretation of diagnostic studies, discussion with 

consultants, patient, and/or family members regarding treatment decisions, as 

well as other required patient management activities.


This time is exclusive of all separately billable procedures, and teaching time 

and separate from and in addition to any other critical care service time.





Consults & Procedures


Consultants:


Neurology - Dr. Washburn


Cardiology -Dr. Martínez


Critical care medicine -Dr. Elder


Procedures:


Right radial arterial line -2/24/2018 by Dr. Elder


Endotracheal intubation -2/24/2018 -prehospital tube replaced by Dr. Elder


Right subclavian triple-lumen central venous catheter -  2/24/2018 by Dr. Elder


Fiberoptic bronchoscopy -2/24/2018 by Dr. Elder





Data


Medications:





Current Inpatient Medications








 Medications


  (Trade)  Dose


 Ordered  Sig/Dc


 Route  Start Time


 Stop Time Status Last Admin


Dose Admin


 


 Clopidogrel


 Bisulfate


  (plAVix TAB)  75 mg  QAM


 NG  2/25/18 09:00


 3/27/18 08:59  3/3/18 07:45


75 MG


 


 Miscellaneous


 Information


  (Consult


 Glycemic


 Management


 Pharmacy)  1 ea  UD  PRN


 N/A  2/24/18 12:30


 3/26/18 12:29   


 


 


 Artificial Tears


  (Lacri-Lube Oph


 Oint)  1 appln  Q2H  PRN


 OPB  2/24/18 12:00


 3/26/18 11:59  2/28/18 08:31


1 APPLN


 


 Chlorhexidine


 Gluconate


  (Peridex Oral


 Soln)  15 ml  DAILY


 MT  2/25/18 09:00


 3/27/18 08:59  3/3/18 07:45


15 ML


 


 Insulin Aspart


  (novoLOG ASPART)  SLIDING


 SCALE  Q4


 SC  2/26/18 11:00


 3/28/18 10:59  3/3/18 07:52


6 UNITS


 


 Glucose


  (Glucose 40% Gel)  15-30


 GRAMS 15


 GRAMS...  UD  PRN


 PO  2/26/18 11:00


 3/28/18 10:59   


 


 


 Glucose


  (Glucose Chew


 Tab)  4-8


 Tablets 4


 Tabl...  UD  PRN


 PO  2/26/18 11:00


 3/28/18 10:59   


 


 


 Dextrose


  (Dextrose 50%


 50ML Syringe)  25-50ML OF


 50% DW IV


 FOR...  UD  PRN


 IV  2/26/18 11:00


 3/28/18 10:59   


 


 


 Glucagon


  (Glucagon Inj)  1 mg  UD  PRN


 SQ  2/26/18 11:00


 3/28/18 10:59   


 


 


 Heparin Sodium/


 Dextrose  500 ml @ 


 38 mls/hr  [id number] PRN


 IV  2/26/18 11:45


 3/28/18 11:44  3/3/18 07:22


38 MLS/HR


 


 Acetaminophen


  (Tylenol Soln)  650 mg  Q6H  PRN


 PO  2/27/18 09:45


 3/29/18 09:44  2/28/18 01:40


650 MG


 


 Insulin Glargine


  (Lantus Solostar


 Pen)    QPM


 SC  2/27/18 21:00


 3/29/18 20:59   


 


 


 Insulin Glargine


  (Lantus Solostar


 Pen)    QAM


 SC  2/28/18 09:00


 3/30/18 08:59  3/3/18 07:49


12 UNITS


 


 Oxycodone HCl


  (Roxicodone Soln)  5 mg  Q6H


 PO  2/28/18 09:00


 3/14/18 08:59  3/3/18 07:50


5 MG


 


 Sterile Water


  (Tube Feeding


 Water Flush)  30 ea  Q4


 NG  2/28/18 12:00


 3/30/18 11:59  3/3/18 07:44


30 EA


 


 Albuterol/


 Ipratropium


  (Duoneb)  3 ml  QIDR


 INH  2/28/18 16:00


 3/30/18 15:59  3/3/18 06:55


3 ML


 


 Nicardipine HCl


 25 mg/Sodium


 Chloride  250 ml @ 0


 mls/hr  Q0M PRN


 IV  3/1/18 09:00


 3/31/18 08:59  3/3/18 09:14


50 MLS/HR


 


 Enteral


 Nutritional


 Formula


  (Novasource


 Renal)  1,000 ml  UD  PRN


 NG  3/1/18 16:15


 3/31/18 16:14  3/2/18 22:47


1,000 ML


 


 Furosemide 20 mg/


 Syringe  2 ml @ 4


 mls/min  DAILY


 IV  3/3/18 09:00


 4/2/18 08:59  3/3/18 07:50


4 MLS/MIN


 


 Carvedilol


  (Coreg Tab)  25 mg  BID


 PO  3/2/18 21:00


 4/1/18 20:59  3/3/18 07:46


25 MG


 


 Amlodipine


 Besylate


  (Norvasc Tab)  5 mg  QAM


 PO  3/2/18 11:00


 4/1/18 10:59  3/3/18 07:46


5 MG


 


 Oxycodone HCl


  (Roxicodone Soln)  5 mg  Q6H  PRN


 PO  3/2/18 11:00


 3/16/18 10:59   


 


 


 Warfarin Sodium


  (Coumadin Tab)  10 mg  TuSa@1600


 PO  3/3/18 16:00


 4/2/18 15:59   


 


 


 Warfarin Sodium


  (Coumadin Tab)  7.5 mg  SuMoWeThFr@1600


 PO  3/4/18 16:00


 4/3/18 15:59   


 


 


 Valproic Acid


  (Depakene Syrup)  1,000 mg  BID


 PO  3/2/18 21:00


 4/1/18 20:59  3/3/18 07:46


1,000 MG


 


 Famotidine


  (Pepcid Tab)  20 mg  QAM


 PO  3/2/18 12:00


 4/1/18 11:59  3/3/18 07:46


20 MG








Vital Signs:











  Date Time  Temp Pulse Resp B/P (MAP) Pulse Ox O2 Delivery O2 Flow Rate FiO2


 


3/3/18 10:00  55 16 155/80 (105) 94 CPAP  35





    149/58 (88)    


 


3/3/18 08:00 37.0 67 22 151/93 (112) 88 CPAP  40





    168/88 (114)    


 


3/3/18 08:00     88 CPAP  40


 


3/3/18 06:56  66 21  96 BiPAP/CPAP  40


 


3/3/18 05:48  75 18 137/92 (107) 100   


 


3/3/18 04:00 36.4 67 19 160/60 (93) 97   


 


3/3/18 04:00     100 BiPAP  40


 


3/3/18 03:00  56 12 153/56 (88) 98   


 


3/3/18 02:30  62   96   40


 


3/3/18 02:00  59 11 160/60 (93) 98   


 


3/3/18 01:00  58 15 156/55 (88) 100 BiPAP  40


 


3/3/18 00:00 37.1 57 12 152/53 (86) 99 BiPAP  40


 


3/2/18 23:59     100 BiPAP  40


 


3/2/18 23:00  58 15 156/57 (90) 99 BiPAP  40


 


3/2/18 22:00  60 15 149/55 (86) 98 BiPAP  40


 


3/2/18 21:00  62 17 165/56 (92) 97 BiPAP  40


 


3/2/18 20:17  72   92   40


 


3/2/18 20:05  61 18  91 BiPAP/CPAP  40


 


3/2/18 20:00 36.8 60 15 156/52 (86) 92 BiPAP  40


 


3/2/18 20:00     92 BiPAP  40


 


3/2/18 18:00  62 14 165/53 (90) 93 BiPAP  40


 


3/2/18 16:00      BiPAP  40


 


3/2/18 16:00 37.3 61 16 154/54 (87) 90 BiPAP  40


 


3/2/18 15:21  59   95   50


 


3/2/18 15:20  64 20  95 BiPAP/CPAP  50


 


3/2/18 14:00  62 14 150/52 (84) 96 BiPAP  50


 


3/2/18 12:00      Oxymask 5.0 


 


3/2/18 12:00 37.2 65 17 155/58 (90) 92 Oxymask 5.0 


 


3/2/18 11:16  63 20  95 Mask 5.0 








Laboratory Results:





Last 24 Hours








Test


  3/2/18


11:46 3/2/18


12:45 3/2/18


15:40 3/2/18


19:44


 


Bedside Glucose 178 mg/dl   164 mg/dl  


 


Prothrombin Time  11.3 SECONDS   


 


Prothromb Time International


Ratio 


  1.1 


  


  


 


 


Activated Partial


Thromboplast Time 


  42.0 SECONDS 


  


  47.9 SECONDS 


 


 


Partial Thromboplastin Ratio  1.6   1.8 


 


Test


  3/2/18


19:48 3/3/18


00:07 3/3/18


04:10 3/3/18


05:26


 


Bedside Glucose 135 mg/dl  170 mg/dl  115 mg/dl  


 


White Blood Count    5.84 K/uL 


 


Red Blood Count    2.36 M/uL 


 


Hemoglobin    7.0 g/dL 


 


Hematocrit    21.6 % 


 


Mean Corpuscular Volume    91.5 fL 


 


Mean Corpuscular Hemoglobin    29.7 pg 


 


Mean Corpuscular Hemoglobin


Concent 


  


  


  32.4 g/dl 


 


 


Platelet Count    243 K/uL 


 


Mean Platelet Volume    8.8 fL 


 


Neutrophils (%) (Auto)    65.4 % 


 


Lymphocytes (%) (Auto)    14.0 % 


 


Monocytes (%) (Auto)    15.6 % 


 


Eosinophils (%) (Auto)    3.9 % 


 


Basophils (%) (Auto)    0.2 % 


 


Neutrophils # (Auto)    3.82 K/uL 


 


Lymphocytes # (Auto)    0.82 K/uL 


 


Monocytes # (Auto)    0.91 K/uL 


 


Eosinophils # (Auto)    0.23 K/uL 


 


Basophils # (Auto)    0.01 K/uL 


 


RDW Standard Deviation    50.7 fL 


 


RDW Coefficient of Variation    15.1 % 


 


Immature Granulocyte % (Auto)    0.9 % 


 


Immature Granulocyte # (Auto)    0.05 K/uL 


 


Red Blood Cell Morphology    Unremarkable 


 


Activated Partial


Thromboplast Time 


  


  


  43.4 SECONDS 


 


 


Partial Thromboplastin Ratio    1.7 


 


Sodium Level    140 mmol/L 


 


Potassium Level    4.2 mmol/L 


 


Chloride Level    108 mmol/L 


 


Carbon Dioxide Level    24 mmol/L 


 


Anion Gap    8.0 mmol/L 


 


Blood Urea Nitrogen    41 mg/dl 


 


Creatinine    1.66 mg/dl 


 


Est Creatinine Clear Calc


Drug Dose 


  


  


  56.5 ml/min 


 


 


Estimated GFR (


American) 


  


  


  51.1 


 


 


Estimated GFR (Non-


American 


  


  


  44.1 


 


 


BUN/Creatinine Ratio    24.7 


 


Random Glucose    130 mg/dl 


 


Calcium Level    8.2 mg/dl 


 


Magnesium Level    2.2 mg/dl 


 


Total Bilirubin    0.2 mg/dl 


 


Direct Bilirubin    < 0.1 mg/dl 


 


Aspartate Amino Transf


(AST/SGOT) 


  


  


  25 U/L 


 


 


Alanine Aminotransferase


(ALT/SGPT) 


  


  


  22 U/L 


 


 


Alkaline Phosphatase    50 U/L 


 


Total Protein    6.2 gm/dl 


 


Albumin    1.8 gm/dl 


 


Test


  3/3/18


07:43 


  


  


 


 


Bedside Glucose 154 mg/dl

## 2018-03-03 NOTE — PHARMACY PROGRESS NOTE
Glycemic: Assessment & Plan


Date of Service


Mar 3, 2018.





Assessment & Plan


The patient is currently receiving ~50 units of insulin per day with continuous 

tube feedings running.  BSGs ranging 115 - 178 mg/dl over the past 24hrs. 





* Basal insulin:             Lantus 12 units every 24 hours given in the moring





* Correctional Insulin:   Novolog Correction per scale Q4hrs


                         Goal Range: Low 120 mg/dL - High 150 mg/dL


                         Correction Factor: 20 mg/dL/unit





* Prandial insulin:         Per carb ratio of 1 unit per 7 grams CHO consumed 


                               (this equates to 5 units every 4 hours to cover 

33g CHO in Novosource renal @ 45ml/hr) 








BSGs continue to improve, no changes needed to inpatient regimen at this time. 

Pharmacy will continue to monitor patient daily and write orders per [facility] 

inpatient glycemic control protocol. Thanks.








* Please note that the plan above was derived based on current level of insulin 

resistance and hospital stress. These recommendations are appropriate for 

inpatient admission only. Plan of care upon discharge will need to be 

reassessed to avoid potential outpatient hypo/hyperglycemia.

## 2018-03-03 NOTE — DIAGNOSTIC IMAGING REPORT
CHEST ONE VIEW PORTABLE



CLINICAL HISTORY: 62 years-old Male presenting with hypoxia. 



TECHNIQUE: Portable upright AP view of the chest was obtained.



COMPARISON: 3/1/2018.



FINDINGS:

Weighted feeding catheter terminates in the gastric lumen. Atherosclerosis of

aortic arch. Cardiac silhouette moderately enlarged. Pulmonary vasculature

prominent. Hazy left mid and lower lung opacity. Trace bilateral pleural

effusions may be present. No pneumothorax. Multiple overlying external leads

degrade evaluation. Osseous structures normal. Upper abdomen normal.



IMPRESSION:

1.  Cardiomegaly with hazy left mid and lower lung opacities. This could

represent asymmetric pulmonary edema although underlying infection or aspiration

should be considered.



2.  Weighted feeding catheter within the gastric lumen; advancement recommended

if a postpyloric position is desired.











Electronically signed by:  Richard Deluna M.D.

3/3/2018 3:34 PM



Dictated Date/Time:  3/3/2018 3:32 PM

## 2018-03-03 NOTE — PROGRESS NOTE
Subjective


Date of Service:


Mar 3, 2018.


Subjective


Pt evaluation today including:  physical exam, chart review, lab review, review 

of studies, conversation w/ consultant, review of inpatient medication list


Voiding:  gillespie catheter in place


No events overnight.  Neuro status largely unchanged.  Respiratory attempted to 

remove BiPAP, developed tachypnea and hypoxemia was placed back on noninvasive 

ventilation





Problem List


Medical Problems:


(1) Acute CHF


Status: Acute  





(2) Acute coronary syndrome


Status: Acute  





(3) Acute headache


Status: Acute  





(4) Acute on chronic congestive heart failure


Status: Acute  





(5) Altered mental status


Status: Acute  





(6) Anemia


Status: Acute  





(7) Anemia


Status: Acute  





(8) Anemia


Status: Acute  





(9) Anginal pain


Status: Acute  





(10) Appendicitis


Status: Acute  





(11) Cellulitis


Status: Acute  





(12) Chest pain


Status: Acute  





(13) CHF (congestive heart failure)


Status: Acute  





(14) CHF (congestive heart failure)


Status: Acute  





(15) CHF (congestive heart failure)


Status: Acute  





(16) Congestive heart failure


Status: Acute  





(17) Congestive heart failure


Status: Acute  





(18) COPD (chronic obstructive pulmonary disease)


Status: Acute  





(19) Diabetes mellitus with hyperglycemia


Status: Acute  





(20) Dyspnea


Status: Acute  





(21) Elevated troponin


Status: Acute  





(22) Elevated troponin


Status: Acute  





(23) Failure of outpatient treatment


Status: Acute  





(24) Hypoxia


Status: Acute  





(25) Hypoxia


Status: Acute  





(26) Intra-abdominal abscess


Status: Acute  





(27) Lower abdominal pain of unknown etiology


Status: Acute  





(28) Neck pain


Status: Acute  





(29) Pneumonia


Status: Acute  





(30) Pneumonia


Status: Acute  





(31) Precordial chest pain


Status: Acute  





(32) Pulmonary edema


Status: Acute  





(33) Respiratory acidosis


Status: Acute  





(34) Respiratory distress


Status: Acute  





(35) SOB (shortness of breath)


Status: Acute  





(36) SOB (shortness of breath)


Status: Acute  





(37) Tachypnea


Status: Acute  





(38) UTI (urinary tract infection)


Status: Acute  











Review of Systems


unable to obtain





Medications











 Medications


  (Trade)  Dose


 Ordered  Sig/Dc


 Route  Start Time


 Stop Time Status Last Admin


Dose Admin


 


 Furosemide 20 mg/


 Syringe  2 ml @ 4


 mls/min  DAILY


 IV  3/3/18 09:00


 3/3/18 10:18 DC 3/3/18 07:50


4 MLS/MIN


 


 Warfarin Sodium


  (Coumadin Tab)  10 mg  TuSa@1600


 PO  3/3/18 16:00


 4/2/18 15:59  3/3/18 16:26


10 MG


 


 Heparin Sodium


  (Porcine) 3000


 unit/Syringe  3 ml @ 10


 mls/min  NOW  ONCE


 IV  3/3/18 06:45


 3/3/18 06:46 DC 3/3/18 07:20


10 MLS/MIN


 


 Carvedilol


  (Coreg Tab)  50 mg  BID


 PO  3/3/18 21:00


 4/1/18 20:59  3/3/18 21:40


50 MG


 


 Carvedilol


  (Coreg Tab)  25 mg  NOW  STAT


 PO  3/3/18 10:33


 3/3/18 10:35 DC 3/3/18 11:46


25 MG


 


 Furosemide 30 mg/


 Syringe  3 ml @ 4


 mls/min  Q24H


 IV  3/3/18 11:00


 4/2/18 10:59  3/3/18 11:57


4 MLS/MIN


 


 Methylcellulose


  (Citrucel Powder)  19 gm  BID


 PO  3/3/18 21:00


 3/4/18 09:01  3/3/18 21:45


19 GM


 


 Amantadine HCl


  (Symmetrel Syrup)  100 mg  BID


 PO  3/3/18 21:00


 4/2/18 20:59  3/3/18 21:41


100 MG


 


 Amantadine HCl


  (Symmetrel Syrup)  50 mg  NOW  ONCE


 PO  3/3/18 13:51


 3/3/18 13:52 DC 3/3/18 14:12


50 MG











Objective


Vital Signs











  Date Time  Temp Pulse Resp B/P (MAP) Pulse Ox O2 Delivery O2 Flow Rate FiO2


 


3/3/18 22:33  62   97   90


 


3/3/18 22:03  75 31 151/45 (80) 95 CPAP  


 


3/3/18 21:02  71 23 155/93 (113) 99 CPAP  


 


3/3/18 20:02 37.0 71 21 163/92 (115) 84 CPAP  


 


3/3/18 20:00     85 CPAP 9.0 50


 


3/3/18 19:04  64 19  94 Mask  50


 


3/3/18 19:02  61 15 167/85 (112) 92 CPAP  


 


3/3/18 19:02  64   91   50


 


3/3/18 18:00  67 24 141/70 (93) 88 Mechanical Ventilator  50


 


3/3/18 16:56     98   


 


3/3/18 16:46  65   95   50


 


3/3/18 16:00 36.7 61 14 146/73 (97) 99 CPAP  50


 


3/3/18 16:00     99 CPAP  50


 


3/3/18 15:24  64   95   40


 


3/3/18 15:02  70 19  94 Mask 9.0 


 


3/3/18 14:00  66 22 156/64 (94) 95 Oxymask 10.0 


 


3/3/18 12:00     94 CPAP  40


 


3/3/18 12:00 36.6 58 16 154/86 (108) 94 CPAP  40





    156/61 (92)    


 


3/3/18 11:00  59   94   40


 


3/3/18 10:59  59 19  94 BiPAP/CPAP  40


 


3/3/18 10:00  55 16 155/80 (105) 94 CPAP  35





    149/58 (88)    


 


3/3/18 08:00 37.0 67 22 151/93 (112) 88 CPAP  40





    168/88 (114)    


 


3/3/18 08:00     88 CPAP  40


 


3/3/18 06:56  66 21  96 BiPAP/CPAP  40


 


3/3/18 05:48  75 18 137/92 (107) 100   


 


3/3/18 04:00 36.4 67 19 160/60 (93) 97   


 


3/3/18 04:00     100 BiPAP  40


 


3/3/18 03:00  56 12 153/56 (88) 98   


 


3/3/18 02:30  62   96   40


 


3/3/18 02:00  59 11 160/60 (93) 98   


 


3/3/18 01:00  58 15 156/55 (88) 100 BiPAP  40


 


3/3/18 00:00 37.1 57 12 152/53 (86) 99 BiPAP  40


 


3/2/18 23:59     100 BiPAP  40











Physical Exam


Comments:


GENERAL : No acute distress


EYES:  No icterus, gaze conjugate. PERRL.  Opens and closes to verbal command


NOSE: No evidence of epistaxis.  Core safe tube present


NECK: No JVD 


LUNGS: Coarse sounds bilaterally


HEART:  Regular, rate controlled


ABDOMEN:  Soft, NT, ND, BS Present


EXTREMITIES: +2 bilateral LE edema, pedal pulses intact..  Right-sided 

flaccidness of the upper and lower extremities


NEURO: Opens eyes to commands and closes eyes to command.  Normal reflexes 

bilaterally, mildly more brisk on the left





Laboratory Results





Last 24 Hours








Test


  3/3/18


00:07 3/3/18


04:10 3/3/18


05:26 3/3/18


07:43


 


Bedside Glucose 170 mg/dl  115 mg/dl   154 mg/dl 


 


White Blood Count   5.84 K/uL  


 


Red Blood Count   2.36 M/uL  


 


Hemoglobin   7.0 g/dL  


 


Hematocrit   21.6 %  


 


Mean Corpuscular Volume   91.5 fL  


 


Mean Corpuscular Hemoglobin   29.7 pg  


 


Mean Corpuscular Hemoglobin


Concent 


  


  32.4 g/dl 


  


 


 


Platelet Count   243 K/uL  


 


Mean Platelet Volume   8.8 fL  


 


Neutrophils (%) (Auto)   65.4 %  


 


Lymphocytes (%) (Auto)   14.0 %  


 


Monocytes (%) (Auto)   15.6 %  


 


Eosinophils (%) (Auto)   3.9 %  


 


Basophils (%) (Auto)   0.2 %  


 


Neutrophils # (Auto)   3.82 K/uL  


 


Lymphocytes # (Auto)   0.82 K/uL  


 


Monocytes # (Auto)   0.91 K/uL  


 


Eosinophils # (Auto)   0.23 K/uL  


 


Basophils # (Auto)   0.01 K/uL  


 


RDW Standard Deviation   50.7 fL  


 


RDW Coefficient of Variation   15.1 %  


 


Immature Granulocyte % (Auto)   0.9 %  


 


Immature Granulocyte # (Auto)   0.05 K/uL  


 


Red Blood Cell Morphology   Unremarkable  


 


Activated Partial


Thromboplast Time 


  


  43.4 SECONDS 


  


 


 


Partial Thromboplastin Ratio   1.7  


 


Sodium Level   140 mmol/L  


 


Potassium Level   4.2 mmol/L  


 


Chloride Level   108 mmol/L  


 


Carbon Dioxide Level   24 mmol/L  


 


Anion Gap   8.0 mmol/L  


 


Blood Urea Nitrogen   41 mg/dl  


 


Creatinine   1.66 mg/dl  


 


Est Creatinine Clear Calc


Drug Dose 


  


  56.5 ml/min 


  


 


 


Estimated GFR (


American) 


  


  51.1 


  


 


 


Estimated GFR (Non-


American 


  


  44.1 


  


 


 


BUN/Creatinine Ratio   24.7  


 


Random Glucose   130 mg/dl  


 


Calcium Level   8.2 mg/dl  


 


Magnesium Level   2.2 mg/dl  


 


Total Bilirubin   0.2 mg/dl  


 


Direct Bilirubin   < 0.1 mg/dl  


 


Aspartate Amino Transf


(AST/SGOT) 


  


  25 U/L 


  


 


 


Alanine Aminotransferase


(ALT/SGPT) 


  


  22 U/L 


  


 


 


Alkaline Phosphatase   50 U/L  


 


Total Protein   6.2 gm/dl  


 


Albumin   1.8 gm/dl  


 


Test


  3/3/18


11:32 3/3/18


12:48 3/3/18


16:28 3/3/18


20:18


 


Bedside Glucose 152 mg/dl   145 mg/dl  131 mg/dl 


 


Activated Partial


Thromboplast Time 


  54.4 SECONDS 


  


  


 


 


Partial Thromboplastin Ratio  2.1   











Assessment and Plan


62 M  with Cardiac Arrest 2/2 PEA, was on therapeutic hypothermia - 

neurologically slow improving each day but not at baseline mentation


Acute Hypoxic Respiratory Failure 2/2 cardiac arrest and PA: IMPROVING, 

extubated and stable, Currently on OxyMask, and nighttime need BiPAP


Acute on Chronic Diastolic CHF with Chronic CAD/Cardiomyopathy and HTN: Which 

required a Cardene drip and now is improved


- Echo with global hypokinesis - Coreg increased to 25 mg mg BID, continue 

Lasix 20 mg IV daily at this time


- Cardiology following - appreciate input


Acute vs Subacute Infarction, with punctate focus of mid R cerebellar 

hemisphere per MRI, cont  Plavix 75 mg daily


Acute on Chronic Anemia, Hgb currently at 7.0 and will continue to monitor and 

transfuse as necessary, transfuse or not will be decided by intensivist


FITZ on CKD Stage III: Improving nephrology follow-up


Patient remained acutely ill and critical,


Continued [facility] stay due to:  multiple IV medications needed


Discharge planning:  other ([facility] )

## 2018-03-03 NOTE — NEPHROLOGY PROGRESS NOTE
Nephrology Progress Note


Date of Service


Mar 3, 2018.





Chief Complaint


Follow-up for acute kidney injury.





Nicole Yusuf was seen and examined in his room this morning.  He has been off of 

pressor.  Blood pressure improved.  Has been urinating, net positive.  Kidney 

function stable creatinine 1.7.  Hemoglobin dropped and remained low around 7.





Review of Systems


A complete review of systems was performed.  Pertinent positives are noted 

above. All other systems are negative.





Vital Signs





Last 8 Hrs








  Date Time  Temp Pulse Resp B/P (MAP) Pulse Ox O2 Delivery O2 Flow Rate FiO2


 


3/3/18 08:00 37.0 67 22 151/93 (112) 88 CPAP  40





    168/88 (114)    


 


3/3/18 08:00     88 CPAP  40


 


3/3/18 06:56  66 21  96 BiPAP/CPAP  40


 


3/3/18 05:48  75 18 137/92 (107) 100   


 


3/3/18 04:00 36.4 67 19 160/60 (93) 97   


 


3/3/18 04:00     100 BiPAP  40


 


3/3/18 03:00  56 12 153/56 (88) 98   


 


3/3/18 02:30  62   96   40


 


3/3/18 02:00  59 11 160/60 (93) 98   











Last Recorded Weight


Weight (Kilograms):  98.100





Physical Exam


GENERAL:  Middle-aged male, awake but did not communicate,  not in any distress.


NECK: Supple, no JVD.


RESPIRATORY: Normal breathing efforts on BiPAP, basilar rales


CARDIOVASCULAR: S1, S2 normal, rate rhythm regular.


EXTREMITY: trace b/l lower extremity edema


NEURO: could not follow any commands





Family History





Cancer (esophageal)


Diabetes mellitus


Heart disease


Hypertension





Negative for CKD / ESRD





Social History


Smoking Status:  Former smoker


Drug Use:  none


Marital Status:  


Housing Status:  lives with family, other


Occupation:  employed








.  Disabled.  Former smoker





Laboratory Results


Past 24 Hours


3/3/18 05:26








Red Blood Count 2.36, Mean Corpuscular Volume 91.5, Mean Corpuscular Hemoglobin 

29.7, Mean Corpuscular Hemoglobin Concent 32.4, Mean Platelet Volume 8.8, 

Neutrophils (%) (Auto) 65.4, Lymphocytes (%) (Auto) 14.0, Monocytes (%) (Auto) 

15.6, Eosinophils (%) (Auto) 3.9, Basophils (%) (Auto) 0.2, Neutrophils # (Auto

) 3.82, Lymphocytes # (Auto) 0.82, Monocytes # (Auto) 0.91, Eosinophils # (Auto

) 0.23, Basophils # (Auto) 0.01





3/3/18 05:26

















Test


  3/2/18


11:46 3/2/18


12:45 3/2/18


15:40 3/2/18


19:44


 


Bedside Glucose


  178 mg/dl


(70-99) 


  164 mg/dl


(70-99) 


 


 


Prothrombin Time


  


  11.3 SECONDS


(9.0-12.0) 


  


 


 


Prothromb Time International


Ratio 


  1.1 (0.9-1.1) 


  


  


 


 


Activated Partial


Thromboplast Time 


  42.0 SECONDS


(21.0-31.0) 


  47.9 SECONDS


(21.0-31.0)


 


Partial Thromboplastin Ratio  1.6   1.8 


 


Test


  3/2/18


19:48 3/3/18


00:07 3/3/18


04:10 3/3/18


05:26


 


Bedside Glucose


  135 mg/dl


(70-99) 170 mg/dl


(70-99) 115 mg/dl


(70-99) 


 


 


White Blood Count


  


  


  


  5.84 K/uL


(4.8-10.8)


 


Red Blood Count


  


  


  


  2.36 M/uL


(4.7-6.1)


 


Hemoglobin


  


  


  


  7.0 g/dL


(14.0-18.0)


 


Hematocrit    21.6 % (42-52) 


 


Mean Corpuscular Volume


  


  


  


  91.5 fL


()


 


Mean Corpuscular Hemoglobin


  


  


  


  29.7 pg


(25-34)


 


Mean Corpuscular Hemoglobin


Concent 


  


  


  32.4 g/dl


(32-36)


 


Platelet Count


  


  


  


  243 K/uL


(130-400)


 


Mean Platelet Volume


  


  


  


  8.8 fL


(7.4-10.4)


 


Neutrophils (%) (Auto)    65.4 % 


 


Lymphocytes (%) (Auto)    14.0 % 


 


Monocytes (%) (Auto)    15.6 % 


 


Eosinophils (%) (Auto)    3.9 % 


 


Basophils (%) (Auto)    0.2 % 


 


Neutrophils # (Auto)


  


  


  


  3.82 K/uL


(1.4-6.5)


 


Lymphocytes # (Auto)


  


  


  


  0.82 K/uL


(1.2-3.4)


 


Monocytes # (Auto)


  


  


  


  0.91 K/uL


(0.11-0.59)


 


Eosinophils # (Auto)


  


  


  


  0.23 K/uL


(0-0.5)


 


Basophils # (Auto)


  


  


  


  0.01 K/uL


(0-0.2)


 


RDW Standard Deviation


  


  


  


  50.7 fL


(36.4-46.3)


 


RDW Coefficient of Variation


  


  


  


  15.1 %


(11.5-14.5)


 


Immature Granulocyte % (Auto)    0.9 % 


 


Immature Granulocyte # (Auto)


  


  


  


  0.05 K/uL


(0.00-0.02)


 


Red Blood Cell Morphology    Unremarkable 


 


Activated Partial


Thromboplast Time 


  


  


  43.4 SECONDS


(21.0-31.0)


 


Partial Thromboplastin Ratio    1.7 


 


Anion Gap


  


  


  


  8.0 mmol/L


(3-11)


 


Est Creatinine Clear Calc


Drug Dose 


  


  


  56.5 ml/min 


 


 


Estimated GFR (


American) 


  


  


  51.1 


 


 


Estimated GFR (Non-


American 


  


  


  44.1 


 


 


BUN/Creatinine Ratio    24.7 (10-20) 


 


Calcium Level


  


  


  


  8.2 mg/dl


(8.5-10.1)


 


Magnesium Level


  


  


  


  2.2 mg/dl


(1.8-2.4)


 


Total Bilirubin


  


  


  


  0.2 mg/dl


(0.2-1)


 


Direct Bilirubin


  


  


  


  < 0.1 mg/dl


(0-0.2)


 


Aspartate Amino Transf


(AST/SGOT) 


  


  


  25 U/L (15-37) 


 


 


Alanine Aminotransferase


(ALT/SGPT) 


  


  


  22 U/L (12-78) 


 


 


Alkaline Phosphatase


  


  


  


  50 U/L


()


 


Total Protein


  


  


  


  6.2 gm/dl


(6.4-8.2)


 


Albumin


  


  


  


  1.8 gm/dl


(3.4-5.0)


 


Test


  3/3/18


07:43 


  


  


 


 


Bedside Glucose


  154 mg/dl


(70-99) 


  


  


 











Allergies


Coded Allergies:  


     No Known Allergies (Verified , 2/24/18)





Medications





Current Inpatient Medications








 Medications


  (Trade)  Dose


 Ordered  Sig/Dc


 Route  Start Time


 Stop Time Status Last Admin


Dose Admin


 


 Clopidogrel


 Bisulfate


  (plAVix TAB)  75 mg  QAM


 NG  2/25/18 09:00


 3/27/18 08:59  3/3/18 07:45


75 MG


 


 Miscellaneous


 Information


  (Consult


 Glycemic


 Management


 Pharmacy)  1 ea  UD  PRN


 N/A  2/24/18 12:30


 3/26/18 12:29   


 


 


 Artificial Tears


  (Lacri-Lube Oph


 Oint)  1 appln  Q2H  PRN


 OPB  2/24/18 12:00


 3/26/18 11:59  2/28/18 08:31


1 APPLN


 


 Chlorhexidine


 Gluconate


  (Peridex Oral


 Soln)  15 ml  DAILY


 MT  2/25/18 09:00


 3/27/18 08:59  3/3/18 07:45


15 ML


 


 Insulin Aspart


  (novoLOG ASPART)  SLIDING


 SCALE  Q4


 SC  2/26/18 11:00


 3/28/18 10:59  3/3/18 07:52


6 UNITS


 


 Glucose


  (Glucose 40% Gel)  15-30


 GRAMS 15


 GRAMS...  UD  PRN


 PO  2/26/18 11:00


 3/28/18 10:59   


 


 


 Glucose


  (Glucose Chew


 Tab)  4-8


 Tablets 4


 Tabl...  UD  PRN


 PO  2/26/18 11:00


 3/28/18 10:59   


 


 


 Dextrose


  (Dextrose 50%


 50ML Syringe)  25-50ML OF


 50% DW IV


 FOR...  UD  PRN


 IV  2/26/18 11:00


 3/28/18 10:59   


 


 


 Glucagon


  (Glucagon Inj)  1 mg  UD  PRN


 SQ  2/26/18 11:00


 3/28/18 10:59   


 


 


 Heparin Sodium/


 Dextrose  500 ml @ 


 38 mls/hr  [id number] PRN


 IV  2/26/18 11:45


 3/28/18 11:44  3/3/18 07:22


38 MLS/HR


 


 Acetaminophen


  (Tylenol Soln)  650 mg  Q6H  PRN


 PO  2/27/18 09:45


 3/29/18 09:44  2/28/18 01:40


650 MG


 


 Insulin Glargine


  (Lantus Solostar


 Pen)    QPM


 SC  2/27/18 21:00


 3/29/18 20:59   


 


 


 Insulin Glargine


  (Lantus Solostar


 Pen)    QAM


 SC  2/28/18 09:00


 3/30/18 08:59  3/3/18 07:49


12 UNITS


 


 Oxycodone HCl


  (Roxicodone Soln)  5 mg  Q6H


 PO  2/28/18 09:00


 3/14/18 08:59  3/3/18 07:50


5 MG


 


 Sterile Water


  (Tube Feeding


 Water Flush)  30 ea  Q4


 NG  2/28/18 12:00


 3/30/18 11:59  3/3/18 07:44


30 EA


 


 Albuterol/


 Ipratropium


  (Duoneb)  3 ml  QIDR


 INH  2/28/18 16:00


 3/30/18 15:59  3/3/18 06:55


3 ML


 


 Nicardipine HCl


 25 mg/Sodium


 Chloride  250 ml @ 0


 mls/hr  Q0M PRN


 IV  3/1/18 09:00


 3/31/18 08:59  3/3/18 03:40


50 MLS/HR


 


 Enteral


 Nutritional


 Formula


  (Novasource


 Renal)  1,000 ml  UD  PRN


 NG  3/1/18 16:15


 3/31/18 16:14  3/2/18 22:47


1,000 ML


 


 Furosemide 20 mg/


 Syringe  2 ml @ 4


 mls/min  DAILY


 IV  3/3/18 09:00


 4/2/18 08:59  3/3/18 07:50


4 MLS/MIN


 


 Carvedilol


  (Coreg Tab)  25 mg  BID


 PO  3/2/18 21:00


 4/1/18 20:59  3/3/18 07:46


25 MG


 


 Amlodipine


 Besylate


  (Norvasc Tab)  5 mg  QAM


 PO  3/2/18 11:00


 4/1/18 10:59  3/3/18 07:46


5 MG


 


 Oxycodone HCl


  (Roxicodone Soln)  5 mg  Q6H  PRN


 PO  3/2/18 11:00


 3/16/18 10:59   


 


 


 Warfarin Sodium


  (Coumadin Tab)  10 mg  TuSa@1600


 PO  3/3/18 16:00


 4/2/18 15:59   


 


 


 Warfarin Sodium


  (Coumadin Tab)  7.5 mg  [id number]@1600


 PO  3/4/18 16:00


 4/3/18 15:59   


 


 


 Valproic Acid


  (Depakene Syrup)  1,000 mg  BID


 PO  3/2/18 21:00


 4/1/18 20:59  3/3/18 07:46


1,000 MG


 


 Famotidine


  (Pepcid Tab)  20 mg  QAM


 PO  3/2/18 12:00


 4/1/18 11:59  3/3/18 07:46


20 MG











Impression





(1) Cardiac arrest


(2) Acute kidney injury


(3) Ischemic cardiomyopathy


Today is a 62-year-old gentlemen with stage 3 chronic kidney disease baseline 

creatinine around 1.2-1.4, admitted after cardiac arrest, resuscitated 

successfully but most likely suffered anoxic brain injury.  Developed acute 

kidney injury creatinine peaked to 2.5 which now improving slowly.  Has been 

off of pressor.  Remained net positive but non-oliguric.  He is now DNR.





Recommendations





-- ATN following cardiopulmonary arrest


-- Kidney function has improved, creatinine down to 1.7 (baseline creatinine 1.2

).  Electrolyte balance remains acceptable


-- Net 0.5 L volume positive last 24 hours, agree with continuing on Lasix 40 

milligram IV daily


-- Monitor PRP


-- Consider blood transfusion to maintain Hgb > or = 8.0


-- Probable anoxic injury.  Prognosis remains guarded.  





Will follow.

## 2018-03-04 VITALS
HEART RATE: 61 BPM | TEMPERATURE: 98.24 F | SYSTOLIC BLOOD PRESSURE: 126 MMHG | OXYGEN SATURATION: 96 % | DIASTOLIC BLOOD PRESSURE: 64 MMHG

## 2018-03-04 VITALS — HEART RATE: 57 BPM | DIASTOLIC BLOOD PRESSURE: 57 MMHG | OXYGEN SATURATION: 97 % | SYSTOLIC BLOOD PRESSURE: 121 MMHG

## 2018-03-04 VITALS — SYSTOLIC BLOOD PRESSURE: 162 MMHG | OXYGEN SATURATION: 98 % | HEART RATE: 61 BPM | DIASTOLIC BLOOD PRESSURE: 75 MMHG

## 2018-03-04 VITALS — HEART RATE: 59 BPM | OXYGEN SATURATION: 96 %

## 2018-03-04 VITALS — DIASTOLIC BLOOD PRESSURE: 66 MMHG | SYSTOLIC BLOOD PRESSURE: 145 MMHG | OXYGEN SATURATION: 95 % | HEART RATE: 61 BPM

## 2018-03-04 VITALS — OXYGEN SATURATION: 95 % | HEART RATE: 60 BPM

## 2018-03-04 VITALS — SYSTOLIC BLOOD PRESSURE: 160 MMHG | HEART RATE: 61 BPM | OXYGEN SATURATION: 96 % | DIASTOLIC BLOOD PRESSURE: 81 MMHG

## 2018-03-04 VITALS — DIASTOLIC BLOOD PRESSURE: 56 MMHG | SYSTOLIC BLOOD PRESSURE: 131 MMHG | HEART RATE: 62 BPM | OXYGEN SATURATION: 87 %

## 2018-03-04 VITALS — OXYGEN SATURATION: 83 % | HEART RATE: 60 BPM | SYSTOLIC BLOOD PRESSURE: 116 MMHG | DIASTOLIC BLOOD PRESSURE: 64 MMHG

## 2018-03-04 VITALS
OXYGEN SATURATION: 97 % | DIASTOLIC BLOOD PRESSURE: 74 MMHG | SYSTOLIC BLOOD PRESSURE: 130 MMHG | HEART RATE: 59 BPM | TEMPERATURE: 97.52 F

## 2018-03-04 VITALS — DIASTOLIC BLOOD PRESSURE: 57 MMHG | SYSTOLIC BLOOD PRESSURE: 111 MMHG | HEART RATE: 57 BPM | OXYGEN SATURATION: 80 %

## 2018-03-04 VITALS — DIASTOLIC BLOOD PRESSURE: 63 MMHG | SYSTOLIC BLOOD PRESSURE: 119 MMHG | OXYGEN SATURATION: 75 % | HEART RATE: 66 BPM

## 2018-03-04 VITALS
DIASTOLIC BLOOD PRESSURE: 67 MMHG | HEART RATE: 62 BPM | SYSTOLIC BLOOD PRESSURE: 138 MMHG | TEMPERATURE: 97.52 F | OXYGEN SATURATION: 96 %

## 2018-03-04 VITALS — HEART RATE: 66 BPM

## 2018-03-04 VITALS
HEART RATE: 56 BPM | TEMPERATURE: 98.42 F | SYSTOLIC BLOOD PRESSURE: 141 MMHG | OXYGEN SATURATION: 93 % | DIASTOLIC BLOOD PRESSURE: 85 MMHG

## 2018-03-04 VITALS — HEART RATE: 63 BPM | OXYGEN SATURATION: 97 %

## 2018-03-04 VITALS
SYSTOLIC BLOOD PRESSURE: 147 MMHG | DIASTOLIC BLOOD PRESSURE: 85 MMHG | HEART RATE: 76 BPM | TEMPERATURE: 98.42 F | OXYGEN SATURATION: 90 %

## 2018-03-04 VITALS — OXYGEN SATURATION: 100 %

## 2018-03-04 LAB — PTT PATIENT: 56.5 SECONDS (ref 21–31)

## 2018-03-04 RX ADMIN — AMLODIPINE BESYLATE SCH MG: 5 TABLET ORAL at 07:40

## 2018-03-04 RX ADMIN — FAMOTIDINE SCH MG: 20 TABLET, FILM COATED ORAL at 07:41

## 2018-03-04 RX ADMIN — IPRATROPIUM BROMIDE AND ALBUTEROL SULFATE SCH ML: .5; 3 SOLUTION RESPIRATORY (INHALATION) at 11:32

## 2018-03-04 RX ADMIN — OXYCODONE HYDROCHLORIDE SCH MG: 5 SOLUTION ORAL at 15:58

## 2018-03-04 RX ADMIN — HEPARIN SODIUM PRN MLS/HR: 5000 INJECTION, SOLUTION INTRAVENOUS at 06:22

## 2018-03-04 RX ADMIN — OXYCODONE HYDROCHLORIDE SCH MG: 5 SOLUTION ORAL at 07:47

## 2018-03-04 RX ADMIN — CHLORHEXIDINE GLUCONATE SCH ML: 1.2 RINSE ORAL at 07:37

## 2018-03-04 RX ADMIN — METHYLCELLULOSE SCH GM: 2 POWDER, FOR SOLUTION ORAL at 07:47

## 2018-03-04 RX ADMIN — VALPROIC ACID SCH MG: 250 SOLUTION ORAL at 07:40

## 2018-03-04 RX ADMIN — CARVEDILOL SCH MG: 25 TABLET, FILM COATED ORAL at 07:39

## 2018-03-04 RX ADMIN — OXYCODONE HYDROCHLORIDE SCH MG: 5 SOLUTION ORAL at 03:43

## 2018-03-04 RX ADMIN — INSULIN GLARGINE SCH UNITS: 100 INJECTION, SOLUTION SUBCUTANEOUS at 07:46

## 2018-03-04 RX ADMIN — INSULIN ASPART SCH UNITS: 100 INJECTION, SOLUTION INTRAVENOUS; SUBCUTANEOUS at 07:47

## 2018-03-04 RX ADMIN — INSULIN ASPART SCH UNITS: 100 INJECTION, SOLUTION INTRAVENOUS; SUBCUTANEOUS at 03:43

## 2018-03-04 RX ADMIN — INSULIN ASPART SCH UNITS: 100 INJECTION, SOLUTION INTRAVENOUS; SUBCUTANEOUS at 00:00

## 2018-03-04 RX ADMIN — IPRATROPIUM BROMIDE AND ALBUTEROL SULFATE SCH ML: .5; 3 SOLUTION RESPIRATORY (INHALATION) at 15:21

## 2018-03-04 RX ADMIN — CLOPIDOGREL BISULFATE SCH MG: 75 TABLET, FILM COATED ORAL at 07:39

## 2018-03-04 RX ADMIN — INSULIN ASPART SCH UNITS: 100 INJECTION, SOLUTION INTRAVENOUS; SUBCUTANEOUS at 11:15

## 2018-03-04 RX ADMIN — FUROSEMIDE SCH MLS/MIN: 10 INJECTION, SOLUTION INTRAMUSCULAR; INTRAVENOUS at 11:12

## 2018-03-04 RX ADMIN — AMANTADINE HYDROCHLORIDE SCH MG: 50 SOLUTION ORAL at 07:41

## 2018-03-04 RX ADMIN — INSULIN ASPART SCH UNITS: 100 INJECTION, SOLUTION INTRAVENOUS; SUBCUTANEOUS at 15:55

## 2018-03-04 RX ADMIN — IPRATROPIUM BROMIDE AND ALBUTEROL SULFATE SCH ML: .5; 3 SOLUTION RESPIRATORY (INHALATION) at 07:27

## 2018-03-04 NOTE — CRITICAL CARE PROGRESS NOTE
Critical Care Progress Note


Date of Service


Mar 4, 2018.





ICU Day


ICU Day Number:  [id number]





Attending


Dr. Arnold





Subjective


Increase oxygen requirements overnight, currently on noninvasive ventilator at 

100%





Objective


GENERAL : No acute distress


EYES:  No icterus, gaze conjugate. PERRL.  Opens and closes to verbal command


NOSE: No evidence of epistaxis.  Core safe tube present


NECK: No JVD 


LUNGS: Coarse sounds bilaterally


HEART:  Regular, rate controlled


ABDOMEN:  Soft, NT, ND, BS Present


EXTREMITIES: +2 bilateral LE edema, pedal pulses intact..  Right-sided 

flaccidness of the upper and lower extremities


NEURO: Opens eyes to commands and closes eyes to command.  Normal reflexes 

bilaterally, mildly more brisk on the left





Assessment & Plan


Neuro


* Patient with waxing and waning mental status.


* Continuous EEG abnormal with moderate diffuse background disorganization and 

slowing but no evidence of epileptiform changes


* Neurology and pharmacy managing valproic acid


* Neurology has been consulted and is following - appreciate Dr. Mcclellan's input


* Noncontrast MRI of the brain and noncontrast MRI of the cervical spine 

unremarkable


* Change valproic acid to 1000 mg p.o. twice daily.  Check a level in 3-5 days.


* Continue to monitor.  No further neuro imaging recommended by neurology





Cardiovascular


* PEA cardiac arrest witnessed by EMS staff most likely secondary to hypoxia


* No ST changes


* Cardiology consulted -no urgency for cardiac catheterization.  Prior history 

of multivessel coronary artery disease


* PO carvedilol 50 mg twice daily


* Continue to monitor on telemetry


* Dr. Yusuf is his primary cardiologist


* Further echocardiograms per cardiology


* Nephrology managing diuretics





Pulmonary


* Quit smoking over a year ago


* Has now been on supplemental oxygen at home for over a year; baseline 6 L/min 

via nasal cannula her family


* Extubated 2/28/2018


* Oxymask for hypoxia.  CPAP at bedtime and as needed during the day


* CT of chest with no pulmonary  emboli identified; evidence of small to 

moderate bilateral pleural effusions; moderate interstitial pulmonary edema


* Pleural effusions now resolved


* Continue with diuresis and monitor I&Os


 * Discussed goals of care for possible respiratory insufficiency/respiratory 

failure.  Patient's daughter and wife agree patient would not want intubation.


 * Patient continues to decompensate from a respiratory standpoint, on maximum 

noninvasive therapy at this time


 


Musculoskeletal


* Multiple nondisplaced rib fractures - suspected etiology is cardiac 

compressions


* Pain control on enteral oxycodone.  


* Probable sternal fracture -suspected etiology cardiac compressions


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





Renal 


* Renal ultrasound with no evidence of renal artery stenosis


* There is mention of question of interrupted flow in the right and left renal 

vein.  However, this study was a very poor study with technical difficulty 

related to poor sonographic windows.  Continue to monitor.


* Urine output adequate with diuresis


* Continues with acute renal failure


* Creatinine continues to improve.  Creatinine today 1.66


* Avoid nephrotoxic medications


* Euvolemic from yesterday





ID


* Ag negative for influenza A&B


* Blood cultures 2 negative


* Urine culture with no growth


* MRSA specimen is negative by DNA probe


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


* Completed course of Augmentin for sinusitis


* No leukocytosis or fever





Endocrine


* History of type 2 diabetes mellitus


* Has been titrated off of insulin drip -continue gtt as needed


* Hemoglobin A1c 6.0


* Continue BSGs per protocol


* Lantus and NovoLog per protocol -glycemic management per pharmacy


* tube feeds on hold





Heme


* 2 units held in the event that Hgb drops further - currently 7.0


* Hemodynamically stable


* Transfuse if drops below 7


* Follow serial labs


* Heparin infusion for report of aortic thrombus on prior [facility] records.


 * It is recognized that we have been unable to find definitive imaging reports 

demonstrating this thrombus


 * History of arterial thrombus





Nutrition


* Continue Novasource Renal


* Nutrition consult for tube feed management


* Coresafe tube in place





GI


* Changed to famotidine 20 mg tablet via NG tube





DVT prophylaxis


* TEDs/SCDs


* Heparin infusion





CODE STATUS: DO NOT RESUSCITATE in event of cardiac arrest





The patient has had 9 days since his cardiac arrest.  Patient continues to 

slowly decompensate.  In discussing long-term goals of care the patient would 

not want tracheostomy nor feeding tube.  Decision yesterday evening was to 

continue current level of care without exclamation.  Family members coming to 

visit today, family leaning towards progression to comfort measures.  Per their 

request we have stopped additional lab draws currently awaiting additional 

family to come to bedside





I have personally spent 85 minutes of critical care time in the direct 

management of this patient.  This is a life/limb threatening event.  This 

includes time spent evaluating patient, direct bedside care, chart review, 

placing orders, interpretation of diagnostic studies, discussion with 

consultants, patient, and/or family members regarding treatment decisions, as 

well as other required patient management activities.


This time is exclusive of all separately billable procedures, and teaching time 

and separate from and in addition to any other critical care service time.





Consults & Procedures


Consultants:


Neurology - Dr. Washburn


Cardiology -Dr. Martínez


Critical care medicine -Dr. Elder


Procedures:


Right radial arterial line -2/24/2018 by Dr. Elder


Endotracheal intubation -2/24/2018 -prehospital tube replaced by Dr. Elder


Right subclavian triple-lumen central venous catheter -  2/24/2018 by Dr. Elder


Fiberoptic bronchoscopy -2/24/2018 by Dr. Elder





Data


Medications:





Current Inpatient Medications








 Medications


  (Trade)  Dose


 Ordered  Sig/Dc


 Route  Start Time


 Stop Time Status Last Admin


Dose Admin


 


 Clopidogrel


 Bisulfate


  (plAVix TAB)  75 mg  QAM


 NG  2/25/18 09:00


 3/27/18 08:59  3/4/18 07:39


75 MG


 


 Miscellaneous


 Information


  (Consult


 Glycemic


 Management


 Pharmacy)  1 ea  UD  PRN


 N/A  2/24/18 12:30


 3/26/18 12:29   


 


 


 Artificial Tears


  (Lacri-Lube Oph


 Oint)  1 appln  Q2H  PRN


 OPB  2/24/18 12:00


 3/26/18 11:59  2/28/18 08:31


1 APPLN


 


 Chlorhexidine


 Gluconate


  (Peridex Oral


 Soln)  15 ml  DAILY


 MT  2/25/18 09:00


 3/27/18 08:59  3/4/18 07:37


15 ML


 


 Insulin Aspart


  (novoLOG ASPART)  SLIDING


 SCALE  Q4


 SC  2/26/18 11:00


 3/28/18 10:59  3/3/18 11:48


1 UNITS


 


 Glucose


  (Glucose 40% Gel)  15-30


 GRAMS 15


 GRAMS...  UD  PRN


 PO  2/26/18 11:00


 3/28/18 10:59   


 


 


 Glucose


  (Glucose Chew


 Tab)  4-8


 Tablets 4


 Tabl...  UD  PRN


 PO  2/26/18 11:00


 3/28/18 10:59   


 


 


 Dextrose


  (Dextrose 50%


 50ML Syringe)  25-50ML OF


 50% DW IV


 FOR...  UD  PRN


 IV  2/26/18 11:00


 3/28/18 10:59   


 


 


 Glucagon


  (Glucagon Inj)  1 mg  UD  PRN


 SQ  2/26/18 11:00


 3/28/18 10:59   


 


 


 Heparin Sodium/


 Dextrose  500 ml @ 


 38 mls/hr  [id number] PRN


 IV  2/26/18 11:45


 3/28/18 11:44  3/4/18 06:22


38 MLS/HR


 


 Acetaminophen


  (Tylenol Soln)  650 mg  Q6H  PRN


 PO  2/27/18 09:45


 3/29/18 09:44  2/28/18 01:40


650 MG


 


 Insulin Glargine


  (Lantus Solostar


 Pen)    QPM


 SC  2/27/18 21:00


 3/29/18 20:59   


 


 


 Insulin Glargine


  (Lantus Solostar


 Pen)    QAM


 SC  2/28/18 09:00


 3/30/18 08:59  3/4/18 07:46


12 UNITS


 


 Oxycodone HCl


  (Roxicodone Soln)  5 mg  Q6H


 PO  2/28/18 09:00


 3/14/18 08:59  3/4/18 07:47


5 MG


 


 Sterile Water


  (Tube Feeding


 Water Flush)  30 ea  Q4


 NG  2/28/18 12:00


 3/30/18 11:59  3/4/18 11:12


30 EA


 


 Albuterol/


 Ipratropium


  (Duoneb)  3 ml  QIDR


 INH  2/28/18 16:00


 3/30/18 15:59  3/4/18 11:32


3 ML


 


 Nicardipine HCl


 25 mg/Sodium


 Chloride  250 ml @ 0


 mls/hr  Q0M PRN


 IV  3/1/18 09:00


 3/31/18 08:59  3/3/18 09:14


50 MLS/HR


 


 Enteral


 Nutritional


 Formula


  (Novasource


 Renal)  1,000 ml  UD  PRN


 NG  3/1/18 16:15


 3/31/18 16:14  3/2/18 22:47


1,000 ML


 


 Amlodipine


 Besylate


  (Norvasc Tab)  5 mg  QAM


 PO  3/2/18 11:00


 4/1/18 10:59  3/4/18 07:40


5 MG


 


 Oxycodone HCl


  (Roxicodone Soln)  5 mg  Q6H  PRN


 PO  3/2/18 11:00


 3/16/18 10:59  3/3/18 17:49


5 MG


 


 Warfarin Sodium


  (Coumadin Tab)  10 mg  TuSa@1600


 PO  3/3/18 16:00


 4/2/18 15:59  3/3/18 16:26


10 MG


 


 Warfarin Sodium


  (Coumadin Tab)  7.5 mg  SuMoWeThFr@1600


 PO  3/4/18 16:00


 4/3/18 15:59   


 


 


 Valproic Acid


  (Depakene Syrup)  1,000 mg  BID


 PO  3/2/18 21:00


 4/1/18 20:59  3/4/18 07:40


1,000 MG


 


 Famotidine


  (Pepcid Tab)  20 mg  QAM


 PO  3/2/18 12:00


 4/1/18 11:59  3/4/18 07:41


20 MG


 


 Carvedilol


  (Coreg Tab)  50 mg  BID


 PO  3/3/18 21:00


 4/1/18 20:59  3/4/18 07:39


50 MG


 


 Furosemide 30 mg/


 Syringe  3 ml @ 4


 mls/min  Q24H


 IV  3/3/18 11:00


 4/2/18 10:59  3/4/18 11:12


4 MLS/MIN


 


 Potassium Chloride


  (Klor-Con Pwd)  20 meq  QAM


 PO  3/4/18 09:00


 4/3/18 08:59  3/4/18 07:40


20 MEQ


 


 Amantadine HCl


  (Symmetrel Syrup)  100 mg  BID


 PO  3/3/18 21:00


 4/2/18 20:59  3/4/18 07:41


100 MG








Vital Signs:











  Date Time  Temp Pulse Resp B/P (MAP) Pulse Ox O2 Delivery O2 Flow Rate FiO2


 


3/4/18 12:00     97 CPAP  100


 


3/4/18 12:00 36.4 59 16 130/74 (92) 97 CPAP  100


 


3/4/18 11:36  60 17  95 BiPAP/CPAP  100


 


3/4/18 11:36  60   95   100


 


3/4/18 10:00  57 14 121/57 (78) 97 CPAP  100


 


3/4/18 08:00 36.4 62 14 138/67 (90) 96 Mechanical Ventilator  100


 


3/4/18 08:00     96 CPAP  100


 


3/4/18 07:28  66      100


 


3/4/18 07:27  66 19   BiPAP/CPAP  100


 


3/4/18 06:02  62 20 131/56 (81) 87   


 


3/4/18 05:08  66 17 119/63 (81) 75   


 


3/4/18 05:03  57 14 111/57 (75) 80   


 


3/4/18 04:02 36.9 76 30 147/85 (105) 90   


 


3/4/18 04:00     100 CPAP 9.0 50


 


3/4/18 03:02  61 18 145/66 (92) 95   


 


3/4/18 02:02  61 15 162/75 (104) 98   


 


3/4/18 01:24  63   97   80


 


3/4/18 01:01  61 14 160/81 (107) 96 CPAP  


 


3/4/18 00:02 36.9 56 13 141/85 (103) 93 CPAP  


 


3/3/18 23:59     100 CPAP 9.0 50


 


3/3/18 22:33  62   97   90


 


3/3/18 22:03  75 31 151/45 (80) 95 CPAP  


 


3/3/18 21:02  71 23 155/93 (113) 99 CPAP  


 


3/3/18 20:02 37.0 71 21 163/92 (115) 84 CPAP  


 


3/3/18 20:00     85 CPAP 9.0 50


 


3/3/18 19:04  64 19  94 Mask  50


 


3/3/18 19:02  61 15 167/85 (112) 92 CPAP  


 


3/3/18 19:02  64   91   50


 


3/3/18 18:00  67 24 141/70 (93) 88 Mechanical Ventilator  50


 


3/3/18 16:56     98   


 


3/3/18 16:46  65   95   50


 


3/3/18 16:00 36.7 61 14 146/73 (97) 99 CPAP  50


 


3/3/18 16:00     99 CPAP  50


 


3/3/18 15:24  64   95   40


 


3/3/18 15:02  70 19  94 Mask 9.0 


 


3/3/18 14:00  66 22 156/64 (94) 95 Oxymask 10.0 








Laboratory Results:





Last 24 Hours








Test


  3/3/18


12:48 3/3/18


16:28 3/3/18


20:18 3/4/18


00:27


 


Activated Partial


Thromboplast Time 54.4 SECONDS 


  


  


  


 


 


Partial Thromboplastin Ratio 2.1    


 


Bedside Glucose  145 mg/dl  131 mg/dl  145 mg/dl 


 


Test


  3/4/18


03:41 3/4/18


05:31 3/4/18


07:42 3/4/18


11:14


 


Bedside Glucose 130 mg/dl   162 mg/dl  146 mg/dl 


 


Activated Partial


Thromboplast Time 


  56.5 SECONDS 


  


  


 


 


Partial Thromboplastin Ratio  2.2

## 2018-03-04 NOTE — DEATH SUMMARY
Summary of Death


Admission Date


Feb 24, 2018 at 10:00





Date & Time of Death


Mar 4, 2018.


17:56





Cause of Death


Anoxic brain injury





Secondary Diagnoses


PEA cardiac arrest


COPD exacerbation


Long-term tobacco use greater than 20-pack-year smoking history


Peripheral vascular disease


Coronary artery disease





Hospital Course


Patient was admitted on February 24 following PEA cardiac arrest, CPR performed 

by paramedics and given ACLS medications.  Patient had return of spontaneous 

circulation and underwent therapeutic hypothermia.  Patient suffered an acute 

kidney injury as well as febrile illness requiring antibiotics.  Patient was 

able to be extubated on February 28, 2018.  Patient's neuro status did not 

significantly improve and required placement of course a feeding tube for 

feeding access.  The patient's respiratory status and marginally recover, he 

likely continued to silently aspirate secondary to his significant anoxic 

injury.  Patient's family agreed that he would not be comfortable in his 

current state and after 8 days with minimal change in his neurologic status and 

worsening respiratory course patient was transitioned to comfort care.  Further 

aggressive measures were discontinued and the patient passed away peacefully 

with his family at the bedside.

## 2018-03-04 NOTE — NEPHROLOGY PROGRESS NOTE
Nephrology Progress Note


Date of Service


Mar 4, 2018.





Chief Complaint


Follow-up for acute kidney injury.





Nicole Yusuf was seen and examined in his room this morning with family at bedside.  

He has been off of pressor.  Blood pressure improved.  Has been urinating, 

remain net positive.  Kidney function stable creatinine 1.7 yesterday, did not 

have  this morning.





Review of Systems


A complete review of systems was performed.  Pertinent positives are noted 

above. All other systems are negative.





Vital Signs





Last 8 Hrs








  Date Time  Temp Pulse Resp B/P (MAP) Pulse Ox O2 Delivery O2 Flow Rate FiO2


 


3/4/18 08:00 36.4 62 14 138/67 (90) 96 Mechanical Ventilator  100


 


3/4/18 08:00     96 CPAP  100


 


3/4/18 07:28  66      100


 


3/4/18 07:27  66 19   BiPAP/CPAP  100


 


3/4/18 06:02  62 20 131/56 (81) 87   


 


3/4/18 05:08  66 17 119/63 (81) 75   


 


3/4/18 05:03  57 14 111/57 (75) 80   


 


3/4/18 04:02 36.9 76 30 147/85 (105) 90   


 


3/4/18 04:00     100 CPAP 9.0 50


 


3/4/18 03:02  61 18 145/66 (92) 95   











Last Recorded Weight


Weight (Kilograms):  95.800





Physical Exam


GENERAL:  Middle-aged male,  remained minimally responsive, in resp distress, 

wearing face musk.


NECK: Supple, no JVD.


RESPIRATORY: using accessory muscle, decreased BS


CARDIOVASCULAR: S1, S2 normal, rate rhythm regular.


EXTREMITY:  Trace bilateral lower extremity edema


NEURO: minimally responsive, did not follow commands





Family History





Cancer (esophageal)


Diabetes mellitus


Heart disease


Hypertension





Negative for CKD / ESRD





Social History


Smoking Status:  Former smoker


Drug Use:  none


Marital Status:  


Housing Status:  lives with family, other


Occupation:  employed








.  Disabled.  Former smoker





Laboratory Results


Past 24 Hours











Test


  3/3/18


11:32 3/3/18


12:48 3/3/18


16:28 3/3/18


20:18


 


Bedside Glucose


  152 mg/dl


(70-99) 


  145 mg/dl


(70-99) 131 mg/dl


(70-99)


 


Activated Partial


Thromboplast Time 


  54.4 SECONDS


(21.0-31.0) 


  


 


 


Partial Thromboplastin Ratio  2.1   


 


Test


  3/4/18


00:27 3/4/18


03:41 3/4/18


05:31 3/4/18


07:42


 


Bedside Glucose


  145 mg/dl


(70-99) 130 mg/dl


(70-99) 


  162 mg/dl


(70-99)


 


Activated Partial


Thromboplast Time 


  


  56.5 SECONDS


(21.0-31.0) 


 


 


Partial Thromboplastin Ratio   2.2  











Allergies


Coded Allergies:  


     No Known Allergies (Verified , 2/24/18)





Medications





Current Inpatient Medications








 Medications


  (Trade)  Dose


 Ordered  Sig/Dc


 Route  Start Time


 Stop Time Status Last Admin


Dose Admin


 


 Clopidogrel


 Bisulfate


  (plAVix TAB)  75 mg  QAM


 NG  2/25/18 09:00


 3/27/18 08:59  3/4/18 07:39


75 MG


 


 Miscellaneous


 Information


  (Consult


 Glycemic


 Management


 Pharmacy)  1 ea  UD  PRN


 N/A  2/24/18 12:30


 3/26/18 12:29   


 


 


 Artificial Tears


  (Lacri-Lube Oph


 Oint)  1 appln  Q2H  PRN


 OPB  2/24/18 12:00


 3/26/18 11:59  2/28/18 08:31


1 APPLN


 


 Chlorhexidine


 Gluconate


  (Peridex Oral


 Soln)  15 ml  DAILY


 MT  2/25/18 09:00


 3/27/18 08:59  3/4/18 07:37


15 ML


 


 Insulin Aspart


  (novoLOG ASPART)  SLIDING


 SCALE  Q4


 SC  2/26/18 11:00


 3/28/18 10:59  3/3/18 11:48


1 UNITS


 


 Glucose


  (Glucose 40% Gel)  15-30


 GRAMS 15


 GRAMS...  UD  PRN


 PO  2/26/18 11:00


 3/28/18 10:59   


 


 


 Glucose


  (Glucose Chew


 Tab)  4-8


 Tablets 4


 Tabl...  UD  PRN


 PO  2/26/18 11:00


 3/28/18 10:59   


 


 


 Dextrose


  (Dextrose 50%


 50ML Syringe)  25-50ML OF


 50% DW IV


 FOR...  UD  PRN


 IV  2/26/18 11:00


 3/28/18 10:59   


 


 


 Glucagon


  (Glucagon Inj)  1 mg  UD  PRN


 SQ  2/26/18 11:00


 3/28/18 10:59   


 


 


 Heparin Sodium/


 Dextrose  500 ml @ 


 38 mls/hr  [id number] PRN


 IV  2/26/18 11:45


 3/28/18 11:44  3/4/18 06:22


38 MLS/HR


 


 Acetaminophen


  (Tylenol Soln)  650 mg  Q6H  PRN


 PO  2/27/18 09:45


 3/29/18 09:44  2/28/18 01:40


650 MG


 


 Insulin Glargine


  (Lantus Solostar


 Pen)    QPM


 SC  2/27/18 21:00


 3/29/18 20:59   


 


 


 Insulin Glargine


  (Lantus Solostar


 Pen)    QAM


 SC  2/28/18 09:00


 3/30/18 08:59  3/4/18 07:46


12 UNITS


 


 Oxycodone HCl


  (Roxicodone Soln)  5 mg  Q6H


 PO  2/28/18 09:00


 3/14/18 08:59  3/4/18 07:47


5 MG


 


 Sterile Water


  (Tube Feeding


 Water Flush)  30 ea  Q4


 NG  2/28/18 12:00


 3/30/18 11:59  3/4/18 07:36


30 EA


 


 Albuterol/


 Ipratropium


  (Duoneb)  3 ml  QIDR


 INH  2/28/18 16:00


 3/30/18 15:59  3/4/18 07:27


3 ML


 


 Nicardipine HCl


 25 mg/Sodium


 Chloride  250 ml @ 0


 mls/hr  Q0M PRN


 IV  3/1/18 09:00


 3/31/18 08:59  3/3/18 09:14


50 MLS/HR


 


 Enteral


 Nutritional


 Formula


  (Novasource


 Renal)  1,000 ml  UD  PRN


 NG  3/1/18 16:15


 3/31/18 16:14  3/2/18 22:47


1,000 ML


 


 Amlodipine


 Besylate


  (Norvasc Tab)  5 mg  QAM


 PO  3/2/18 11:00


 4/1/18 10:59  3/4/18 07:40


5 MG


 


 Oxycodone HCl


  (Roxicodone Soln)  5 mg  Q6H  PRN


 PO  3/2/18 11:00


 3/16/18 10:59  3/3/18 17:49


5 MG


 


 Warfarin Sodium


  (Coumadin Tab)  10 mg  TuSa@1600


 PO  3/3/18 16:00


 4/2/18 15:59  3/3/18 16:26


10 MG


 


 Warfarin Sodium


  (Coumadin Tab)  7.5 mg  SuMoWeThFr@1600


 PO  3/4/18 16:00


 4/3/18 15:59   


 


 


 Valproic Acid


  (Depakene Syrup)  1,000 mg  BID


 PO  3/2/18 21:00


 4/1/18 20:59  3/4/18 07:40


1,000 MG


 


 Famotidine


  (Pepcid Tab)  20 mg  QAM


 PO  3/2/18 12:00


 4/1/18 11:59  3/4/18 07:41


20 MG


 


 Carvedilol


  (Coreg Tab)  50 mg  BID


 PO  3/3/18 21:00


 4/1/18 20:59  3/4/18 07:39


50 MG


 


 Furosemide 30 mg/


 Syringe  3 ml @ 4


 mls/min  Q24H


 IV  3/3/18 11:00


 4/2/18 10:59  3/3/18 11:57


4 MLS/MIN


 


 Potassium Chloride


  (Klor-Con Pwd)  20 meq  QAM


 PO  3/4/18 09:00


 4/3/18 08:59  3/4/18 07:40


20 MEQ


 


 Amantadine HCl


  (Symmetrel Syrup)  100 mg  BID


 PO  3/3/18 21:00


 4/2/18 20:59  3/4/18 07:41


100 MG











Impression





(1) Cardiac arrest


(2) Acute kidney injury


(3) Ischemic cardiomyopathy


Today is a 62-year-old gentlemen with stage 3 chronic kidney disease baseline 

creatinine around 1.2-1.4, admitted after cardiac arrest, resuscitated 

successfully but most likely suffered anoxic brain injury.  Developed acute 

kidney injury creatinine peaked to 2.5 which now improving slowly.  Has been 

off of pressor.  Remained net positive but non-oliguric.  He is now DNR.





Recommendations





-- ATN following cardiopulmonary arrest


-- Kidney function has improving, creatinine down to 1.7 (baseline creatinine 

1.2) as of yesterday but family now leaning toward comfort care and no more labs


-- remained net positive, OK to continue on diuretics as needed.





Will sign off.

## 2018-03-04 NOTE — PROGRESS NOTE
Subjective


Date of Service:


Mar 4, 2018.


Subjective


Pt evaluation today including:  physical exam, lab review, conversation w/ 

consultant, review of inpatient medication list


Voiding:  gillespie catheter in place





Problem List


Medical Problems:


(1) Acute CHF


Status: Acute  





(2) Acute coronary syndrome


Status: Acute  





(3) Acute headache


Status: Acute  





(4) Acute on chronic congestive heart failure


Status: Acute  





(5) Altered mental status


Status: Acute  





(6) Anemia


Status: Acute  





(7) Anemia


Status: Acute  





(8) Anemia


Status: Acute  





(9) Anginal pain


Status: Acute  





(10) Appendicitis


Status: Acute  





(11) Cellulitis


Status: Acute  





(12) Chest pain


Status: Acute  





(13) CHF (congestive heart failure)


Status: Acute  





(14) CHF (congestive heart failure)


Status: Acute  





(15) CHF (congestive heart failure)


Status: Acute  





(16) Congestive heart failure


Status: Acute  





(17) Congestive heart failure


Status: Acute  





(18) COPD (chronic obstructive pulmonary disease)


Status: Acute  





(19) Diabetes mellitus with hyperglycemia


Status: Acute  





(20) Dyspnea


Status: Acute  





(21) Elevated troponin


Status: Acute  





(22) Elevated troponin


Status: Acute  





(23) Failure of outpatient treatment


Status: Acute  





(24) Hypoxia


Status: Acute  





(25) Hypoxia


Status: Acute  





(26) Intra-abdominal abscess


Status: Acute  





(27) Lower abdominal pain of unknown etiology


Status: Acute  





(28) Neck pain


Status: Acute  





(29) Pneumonia


Status: Acute  





(30) Pneumonia


Status: Acute  





(31) Precordial chest pain


Status: Acute  





(32) Pulmonary edema


Status: Acute  





(33) Respiratory acidosis


Status: Acute  





(34) Respiratory distress


Status: Acute  





(35) SOB (shortness of breath)


Status: Acute  





(36) SOB (shortness of breath)


Status: Acute  





(37) Tachypnea


Status: Acute  





(38) UTI (urinary tract infection)


Status: Acute  











Review of Systems


unable to provide





Medications











 Medications


  (Trade)  Dose


 Ordered  Sig/Dc


 Route  Start Time


 Stop Time Status Last Admin


Dose Admin


 


 Warfarin Sodium


  (Coumadin Tab)  10 mg  TuSa@1600


 PO  3/3/18 16:00


 4/2/18 15:59  3/3/18 16:26


10 MG


 


 Carvedilol


  (Coreg Tab)  50 mg  BID


 PO  3/3/18 21:00


 4/1/18 20:59  3/4/18 07:39


50 MG


 


 Potassium Chloride


  (Klor-Con Pwd)  20 meq  QAM


 PO  3/4/18 09:00


 4/3/18 08:59  3/4/18 07:40


20 MEQ


 


 Methylcellulose


  (Citrucel Powder)  19 gm  BID


 PO  3/3/18 21:00


 3/4/18 09:01 DC 3/4/18 07:47


19 GM


 


 Amantadine HCl


  (Symmetrel Syrup)  100 mg  BID


 PO  3/3/18 21:00


 4/2/18 20:59  3/4/18 07:41


100 MG


 


 Amantadine HCl


  (Symmetrel Syrup)  50 mg  NOW  ONCE


 PO  3/3/18 13:51


 3/3/18 13:52 DC 3/3/18 14:12


50 MG











Objective


Vital Signs











  Date Time  Temp Pulse Resp B/P (MAP) Pulse Ox O2 Delivery O2 Flow Rate FiO2


 


3/4/18 12:00     97 CPAP  100


 


3/4/18 12:00 36.4 59 16 130/74 (92) 97 CPAP  100


 


3/4/18 11:36  60 17  95 BiPAP/CPAP  100


 


3/4/18 11:36  60   95   100


 


3/4/18 10:00  57 14 121/57 (78) 97 CPAP  100


 


3/4/18 08:00 36.4 62 14 138/67 (90) 96 Mechanical Ventilator  100


 


3/4/18 08:00     96 CPAP  100


 


3/4/18 07:28  66      100


 


3/4/18 07:27  66 19   BiPAP/CPAP  100


 


3/4/18 06:02  62 20 131/56 (81) 87   


 


3/4/18 05:08  66 17 119/63 (81) 75   


 


3/4/18 05:03  57 14 111/57 (75) 80   


 


3/4/18 04:02 36.9 76 30 147/85 (105) 90   


 


3/4/18 04:00     100 CPAP 9.0 50


 


3/4/18 03:02  61 18 145/66 (92) 95   


 


3/4/18 02:02  61 15 162/75 (104) 98   


 


3/4/18 01:24  63   97   80


 


3/4/18 01:01  61 14 160/81 (107) 96 CPAP  


 


3/4/18 00:02 36.9 56 13 141/85 (103) 93 CPAP  


 


3/3/18 23:59     100 CPAP 9.0 50


 


3/3/18 22:33  62   97   90


 


3/3/18 22:03  75 31 151/45 (80) 95 CPAP  


 


3/3/18 21:02  71 23 155/93 (113) 99 CPAP  


 


3/3/18 20:02 37.0 71 21 163/92 (115) 84 CPAP  


 


3/3/18 20:00     85 CPAP 9.0 50


 


3/3/18 19:04  64 19  94 Mask  50


 


3/3/18 19:02  61 15 167/85 (112) 92 CPAP  


 


3/3/18 19:02  64   91   50


 


3/3/18 18:00  67 24 141/70 (93) 88 Mechanical Ventilator  50


 


3/3/18 16:56     98   


 


3/3/18 16:46  65   95   50


 


3/3/18 16:00 36.7 61 14 146/73 (97) 99 CPAP  50


 


3/3/18 16:00     99 CPAP  50


 


3/3/18 15:24  64   95   40


 


3/3/18 15:02  70 19  94 Mask 9.0 


 


3/3/18 14:00  66 22 156/64 (94) 95 Oxymask 10.0 











Physical Exam


Comments:


GENERAL : No acute distress


EYES:  No icterus, gaze conjugate. PERRL.  Opens and closes to verbal command


NOSE: No evidence of epistaxis.  Core safe tube present


NECK: No JVD 


LUNGS: Coarse sounds bilaterally


HEART:  Regular, rate controlled


ABDOMEN:  Soft, NT, ND, BS Present


EXTREMITIES: +2 bilateral LE edema, pedal pulses intact..  Right-sided 

flaccidness of the upper and lower extremities


NEURO: Opens eyes to commands and closes eyes to command.  Normal reflexes 

bilaterally, mildly more brisk on the left





Laboratory Results





Last 24 Hours








Test


  3/3/18


16:28 3/3/18


20:18 3/4/18


00:27 3/4/18


03:41


 


Bedside Glucose 145 mg/dl  131 mg/dl  145 mg/dl  130 mg/dl 


 


Test


  3/4/18


05:31 3/4/18


07:42 3/4/18


11:14 


 


 


Activated Partial


Thromboplast Time 56.5 SECONDS 


  


  


  


 


 


Partial Thromboplastin Ratio 2.2    


 


Bedside Glucose  162 mg/dl  146 mg/dl  











Assessment and Plan


62 M  with Cardiac Arrest 2/2 PEA, was on therapeutic hypothermia - 

neurologically slow improving each day but not at baseline mentation


Acute Hypoxic Respiratory Failure 2/2 cardiac arrest and PA: IMPROVING, 

extubated and stable, Currently on OxyMask, and nighttime need BiPAP


Acute on Chronic Diastolic CHF with Chronic CAD/Cardiomyopathy and HTN: Which 

required a Cardene drip and now is improved


- Echo with global hypokinesis - Coreg increased to 25 mg mg BID, continue 

Lasix 20 mg IV daily at this time


- Cardiology following - appreciate input


Acute vs Subacute Infarction, with punctate focus of mid R cerebellar 

hemisphere per MRI, cont  Plavix 75 mg daily


Acute on Chronic Anemia, Hgb currently at 7.0 and will continue to monitor and 

transfuse as necessary, transfuse or not will be decided by intensivist


FITZ on CKD Stage III: Improving nephrology follow-up


Patient remained acutely ill and critical, pt family will be in later most like 

looking towards CMO


Continued [facility] stay due to:  multiple IV medications needed


Discharge planning:  other ([facility] )

## 2018-03-08 NOTE — EDITING REQUIRED CODING QUERY
CODING QUERY



To promote full compliance with coding requirements relating to patient care, provider 
participation is requested in all cases of  uncertainty.  Please assist us with the 
question(s) below:



Coding Question(s):       



Patient admitted status post cardiac arrest/PEA.  H/P documented " likely STEMI, concern 
for anoxic brain injury, elevated troponin .  3/2 progress note cardiology stated "anoxic 
brain injury, cardiac arrest s/p NSTEMI. Please document, if known or suspected, the 
etiology of the patient's cardiac arrest/PEA. Thank you!  Andrea Lazcano [facility] 



Physician's Response(s): 



Copd exacerbation, ( hypoxic respiratory failure





Principal Diagnosis: "_that condition established after study, to be chiefly responsible 
for occasioning the admission of the patient to the hospital for care."

Co-Existing Principal Diagnosis:  "_when two or more diagnoses equally meet the criteria 
for principal diagnosis as determined by the circumstances of admission, diagnostic work 
up, and/or therapy provided, and the Alphabetic Index, Tabular List, or another coding 
guideline does not provide sequencing direction, any one of the diagnoses may be sequenced 
first."

"When the physician has documented what appears to be a current diagnosis in the body of 
the record, but has not included the diagnosis in the final diagnostic statement, the 
physician should be asked whether the diagnosis should be added."

(Source [facility] 2 QTR90. p3-4)

## 2018-03-08 NOTE — EDITING REQUIRED CODING QUERY
anoXCODING QUERY



To promote full compliance with coding requirements relating to patient care, provider 
participation is requested in all cases of  uncertainty.  Please assist us with the 
question(s) below:



Coding Question(s):       

Patient admitted with dx of cardiac arrest/PEA. Neurology C/S 2/27 states "MRI shows 
punctuate acute stroke in right hemisphere.  Please check below the statement that 
describes the neuro diagnosis that was observed/treated. Thank you.  Andrea Lazcano [facility] 





Physician's Response(s): 



_________   Patient was treated for an acute stroke , POA



__________   Patient was treated for an acute stroke, Not POA 



__________  Patient was not treated for a stroke



__________  Cannot clinically correlate 

X

__________  Other: Please document: ___anoxic brain injury_______________________________







Principal Diagnosis: "_that condition established after study, to be chiefly responsible 
for occasioning the admission of the patient to the hospital for care."

Co-Existing Principal Diagnosis:  "_when two or more diagnoses equally meet the criteria 
for principal diagnosis as determined by the circumstances of admission, diagnostic work 
up, and/or therapy provided, and the Alphabetic Index, Tabular List, or another coding 
guideline does not provide sequencing direction, any one of the diagnoses may be sequenced 
first."

"When the physician has documented what appears to be a current diagnosis in the body of 
the record, but has not included the diagnosis in the final diagnostic statement, the 
physician should be asked whether the diagnosis should be added."

(Source [facility]oding [facility] 2 QTR90. p3-4)

## 2020-12-06 NOTE — DIAGNOSTIC IMAGING REPORT
KUB



CLINICAL HISTORY: 62 years-old Male presenting with upper abd pain. 



TECHNIQUE: Single supine view of the abdomen was obtained.



COMPARISON: CT from 11/6/2017.



FINDINGS:

Nonobstructive bowel gas pattern. No gross pneumoperitoneum.



Significant atherosclerosis. The renal shadows are poorly evaluated.



Degenerative changes of the spine. Lung bases clear.



IMPRESSION:

1.  No obstruction or gross free air.







Electronically signed by:  Richard Deluna M.D.

12/20/2017 7:23 PM



Dictated Date/Time:  12/20/2017 7:21 PM normal S1, S2 heard

## 2021-06-08 NOTE — PROGRESS NOTE
Subjective


Date of Service:


Feb 14, 2018.


Subjective


Patient seen on Feb 14th, 2018.





Patient reports feeling better today.


He is no longer confused.


He is concerned though that he was confused yesterday.


Patient reports he will be ambulating more today.


Patient denies SOB, nausea, vomiting, diarrhea.





Problem List


Medical Problems:


(1) Acute CHF


Status: Acute  





(2) Acute coronary syndrome


Status: Acute  





(3) Acute headache


Status: Acute  





(4) Acute on chronic congestive heart failure


Status: Acute  





(5) Altered mental status


Status: Acute  





(6) Anemia


Status: Acute  





(7) Anemia


Status: Acute  





(8) Anemia


Status: Acute  





(9) Anginal pain


Status: Acute  





(10) Appendicitis


Status: Acute  





(11) Cellulitis


Status: Acute  





(12) Chest pain


Status: Acute  





(13) CHF (congestive heart failure)


Status: Acute  





(14) CHF (congestive heart failure)


Status: Acute  





(15) CHF (congestive heart failure)


Status: Acute  





(16) Congestive heart failure


Status: Acute  





(17) Congestive heart failure


Status: Acute  





(18) COPD (chronic obstructive pulmonary disease)


Status: Acute  





(19) Diabetes mellitus with hyperglycemia


Status: Acute  





(20) Dyspnea


Status: Acute  





(21) Elevated troponin


Status: Acute  





(22) Failure of outpatient treatment


Status: Acute  





(23) Hypoxia


Status: Acute  





(24) Hypoxia


Status: Acute  





(25) Intra-abdominal abscess


Status: Acute  





(26) Lower abdominal pain of unknown etiology


Status: Acute  





(27) Neck pain


Status: Acute  





(28) Pneumonia


Status: Acute  





(29) Pneumonia


Status: Acute  





(30) Precordial chest pain


Status: Acute  





(31) Pulmonary edema


Status: Acute  





(32) Respiratory acidosis


Status: Acute  





(33) Respiratory distress


Status: Acute  





(34) SOB (shortness of breath)


Status: Acute  





(35) SOB (shortness of breath)


Status: Acute  





(36) Tachypnea


Status: Acute  











Review of Systems


Constitutional:  No fever, No chills


ENT:  No hearing loss


Respiratory:  No cough, No sputum


Cardiac:  No chest pain


Abdomen:  No pain


Neurologic:  No memory loss


Psychiatric:  No depression symptoms


Heme:  No abnormal bleeding/bruising


Endo:  No fatigue


Skin:  No rash


All Other Systems:  Reviewed and Negative





Medications





Current Inpatient Medications








 Medications


  (Trade)  Dose


 Ordered  Sig/Dc


 Route  Start Time


 Stop Time Status Last Admin


Dose Admin


 


 Acetaminophen


  (Tylenol Tab)  650 mg  Q4H  PRN


 PO  2/2/18 19:00


 3/4/18 18:59  2/14/18 14:31


650 MG


 


 Al Hydrox/Mg


 Hydrox/Simethicone


  (Maalox Max Susp)  15 ml  Q4H  PRN


 PO  2/2/18 19:00


 3/4/18 18:59  2/10/18 16:14


15 ML


 


 Magnesium


 Hydroxide


  (Milk Of


 Magnesia Susp)  30 ml  Q12H  PRN


 PO  2/2/18 19:00


 3/4/18 18:59   


 


 


 Ondansetron HCl


  (Zofran Inj)  4 mg  Q6H  PRN


 IV  2/2/18 19:00


 3/4/18 18:59   


 


 


 Nitroglycerin


  (Nitrostat Tab)  0.4 mg  UD  PRN


 SL  2/2/18 19:00


 3/4/18 18:59   


 


 


 Morphine Sulfate


  (MoRPHine


 SULFATE INJ)  2 mg  Q30M  PRN


 IV  2/2/18 19:00


 2/16/18 18:59   


 


 


 Atorvastatin


 Calcium


  (Lipitor Tab)  80 mg  DAILY


 PO  2/3/18 09:00


 3/5/18 08:59  2/14/18 09:43


80 MG


 


 Clopidogrel


 Bisulfate


  (plAVix TAB)  75 mg  QAM


 PO  2/3/18 09:00


 3/5/18 08:59  2/14/18 09:00


75 MG


 


 Divalproex Sodium


  (Depakote Delay


 Rel Tab)  2,000 mg  HS


 PO  2/2/18 21:00


 3/4/18 20:59  2/14/18 21:40


2,000 MG


 


 Escitalopram


 Oxalate


  (Lexapro Tab)  20 mg  QAM


 PO  2/3/18 09:00


 3/5/18 08:59  2/14/18 09:42


20 MG


 


 Fluticasone


 Propionate


  (Flonase Nasal


 Spray)  2 sprays  DAILY  PRN


 VERONICA  2/2/18 19:00


 3/4/18 18:59   


 


 


 Insulin Glargine


  (Lantus Solostar


 Pen)  10 units  HS


 SC  2/2/18 21:00


 3/4/18 20:59  2/14/18 21:45


10 UNITS


 


 Isosorbide


 Mononitrate


  (Imdur Ext Rel


 Tab)  30 mg  QAM


 PO  2/3/18 09:00


 3/5/18 08:59  2/14/18 09:42


30 MG


 


 Tamsulosin HCl


  (Flomax Cap)  0.4 mg  HS


 PO  2/2/18 21:00


 3/4/18 20:59  2/14/18 21:40


0.4 MG


 


 Tiotropium Bromide


  (Spiriva


 Handihaler


 Inhaler)  1 puff  DAILY


 INH  2/3/18 09:00


 3/5/18 08:59  2/15/18 07:46


1 PUFF


 


 Albuterol


  (Ventolin Hfa


 Inhaler)  2 puffs  Q4H  PRN


 INH  2/2/18 20:45


 3/4/18 20:44  2/4/18 21:55


2 PUFFS


 


 Cyanocobalamin


  (Vitamin B-12


 Tab)  1,000 mcg  DAILY


 PO  2/3/18 09:00


 3/5/18 08:59  2/14/18 09:46


1,000 MCG


 


 Ferrous Sulfate


  (Feosol Tab)  325 mg  BIDM


 PO  2/3/18 07:30


 3/5/18 07:59  2/15/18 07:45


325 MG


 


 Oxycodone HCl


  (Roxicodone


 Immediate Rel Tab)  10 mg  Q12  PRN


 PO  2/2/18 19:00


 3/4/18 18:59  2/14/18 21:51


10 MG


 


 Spironolactone


  (Aldactone Tab)  50 mg  BID


 PO  2/2/18 21:00


 3/4/18 20:59 Future Hold  


 


 


 Insulin Aspart


  (novoLOG ASPART)  **SLIDING


 SCALE**


 **G...  ACHS


 SC  2/2/18 21:00


 3/4/18 20:59  2/15/18 07:50


5 UNITS


 


 Miscellaneous


  (Iv Fluids


 Completed)  1 ea  PRN  PRN


 N/A  2/2/18 19:30


 2/2/19 19:29   


 


 


 Furosemide 20 mg/


 Syringe  2 ml @ 4


 mls/min  BID


 IV  2/3/18 09:00


 3/4/18 20:59 Future Hold 2/3/18 20:51


4 MLS/MIN


 


 Pantoprazole


 Sodium


  (Protonix Tab)  40 mg  QAM


 PO  2/3/18 09:00


 3/5/18 08:59  2/15/18 07:46


40 MG


 


 Calcium Carbonate


  (Tums Chew Tab)  500 mg  Q2H  PRN


 PO  2/3/18 10:00


 3/5/18 09:59  2/14/18 22:40


500 MG


 


 Albuterol/


 Ipratropium


  (Duoneb)  3 ml  Q4R


 INH  2/4/18 22:42


 3/6/18 22:41  2/15/18 06:59


3 ML


 


 Furosemide


  (Lasix Tab)  20 mg  QAM


 PO  2/5/18 09:00


 3/7/18 08:59 Future Hold 2/6/18 08:13


20 MG


 


 Docusate Sodium


  (coLACE CAP)  100 mg  BID


 PO  2/7/18 11:30


 3/9/18 11:29  2/14/18 09:41


100 MG


 


 Polyethylene


  (Miralax Powder


 Packet)  17 gm  DAILY


 PO  2/7/18 11:30


 3/4/18 11:29  2/15/18 07:45


17 GM


 


 Torsemide


  (Demadex Tab)  20 mg  BID


 PO  2/8/18 21:00


 3/10/18 20:59  2/14/18 21:40


20 MG


 


 Labetalol HCl


  (Normodyne Tab)  200 mg  BID


 PO  2/8/18 21:00


 3/10/18 20:59  2/14/18 21:39


200 MG


 


 Iron Sucrose 100


 mg/Sodium Chloride  105 ml @ 


 420 mls/hr  DAILY


 IV  2/8/18 10:45


 2/17/18 09:14  2/14/18 09:40


420 MLS/HR


 


 Amlodipine


 Besylate


  (Norvasc Tab)  5 mg  QAM


 PO  2/11/18 09:00


 3/5/18 08:59  2/14/18 09:44


5 MG


 


 Gabapentin


  (Neurontin Cap)  200 mg  HS


 PO  2/11/18 21:00


 3/4/18 20:59  2/14/18 21:39


200 MG


 


 Mirtazapine


  (Remeron Tab)  30 mg  HS


 PO  2/11/18 21:00


 3/4/18 20:59  2/14/18 21:40


30 MG











Objective


Vital Signs











  Date Time  Temp Pulse Resp B/P (MAP) Pulse Ox O2 Delivery O2 Flow Rate FiO2


 


2/15/18 08:13 37.0 73 18 176/83 (114) 93   


 


2/15/18 06:59  70 16  95 Nasal Cannula 6.0 


 


2/15/18 04:00      Nasal Cannula 6.0 


 


2/15/18 03:45 36.8 63 16 150/64 (92) 98 Nebulizer  


 


2/15/18 03:45  78 17  89 Nasal Cannula 6.0 


 


2/15/18 00:00      Nasal Cannula 6.0 


 


2/14/18 23:15  67 17  96 Nasal Cannula 6.0 


 


2/14/18 23:08 36.7 71 19 173/67 (102) 90 Nasal Cannula 5.0 


 


2/14/18 20:00      Nasal Cannula 6.0 


 


2/14/18 19:48  66 14  92 Nasal Cannula 6.0 


 


2/14/18 19:38 36.6 69 13 177/76 (109) 96 Nasal Cannula 4.0 


 


2/14/18 16:03      Nasal Cannula 6.0 


 


2/14/18 15:35 36.6 65 12 129/55 (79) 93 Nasal Cannula 5.0 


 


2/14/18 14:47  63 14  95 Nasal Cannula 6.0 


 


2/14/18 11:45 37.0 64 18 149/65 (93) 98   





  16      


 


2/14/18 11:33      Nasal Cannula 6.0 


 


2/14/18 11:10  66   99   40


 


2/14/18 11:09  64 17  91 BiPAP/CPAP  40


 


2/14/18 10:46      BiPAP  


 


2/14/18 08:33 36.6 73 14 152/62 (92) 95   











Physical Exam


Comments:


General Appearance:  WD/WN, no apparent distress, asleep, difficult to awaken 

upon entry


Eyes:  PERRL, EOMI


ENT:  hearing grossly normal, pharynx normal, + pertinent finding (MMM)


Neck:  supple, no JVD


Respiratory/Chest:  chest non-tender, no respiratory distress, no accessory 

muscle use, + pertinent finding (on 6 L via Oxymask patient has faint crackles 

in anterior fields, slightly diminished at bases, no wheezing or rales)


Cardiovascular:  normal rate and rhythm, no JVD, + systolic murmur (grade III/VI

)


Abdomen:  normal bowel sounds, non tender, soft


Extremities:  non-tender, no calf tenderness, + pertinent finding (1+ pitting 

edema in BLE.  Multiple wounds over BLE healing well.)


Neurologic/Psychiatric:  alert, normal mood/affect, oriented x 3


Skin:  warm/dry





Laboratory Results





Last 24 Hours








Test


  2/14/18


10:40 2/14/18


15:40 2/14/18


20:43 2/15/18


05:47


 


Bedside Glucose 170 mg/dl  73 mg/dl  192 mg/dl  


 


Sodium Level    144 mmol/L 


 


Potassium Level    3.9 mmol/L 


 


Chloride Level    106 mmol/L 


 


Carbon Dioxide Level    34 mmol/L 


 


Anion Gap    4.0 mmol/L 


 


Blood Urea Nitrogen    65 mg/dl 


 


Creatinine    1.38 mg/dl 


 


Est Creatinine Clear Calc


Drug Dose 


  


  


  68.3 ml/min 


 


 


Estimated GFR (


American) 


  


  


  63.9 


 


 


Estimated GFR (Non-


American 


  


  


  55.2 


 


 


BUN/Creatinine Ratio    46.9 


 


Random Glucose    90 mg/dl 


 


Calcium Level    8.2 mg/dl 


 


Phosphorus Level    3.2 mg/dl 


 


Magnesium Level    2.0 mg/dl 


 


Test


  2/15/18


05:50 2/15/18


06:22 


  


 


 


Prothrombin Time 13.8 SECONDS    


 


Prothromb Time International


Ratio 1.3 


  


  


  


 


 


Bedside Glucose  97 mg/dl   











Assessment and Plan


Patient is a 61 y/o male with a history of CAD, h/o MI s/p stents, HTN, HLD, 

diastolic CHF, COPD, DM II, anxiety, depression, bipolar disorder, anemia and 

GERD, who presented to ED because of worsening SOB x1 day





Acute on chronic diastolic CHF exacerbation:


- Cardiac enzymes negative x 3, ekg prn cp, O2 protocol, home O2 is 4 L 


- Strict I/Ox, daily weights - compared to last admission pt euvolemic.  

Physical exam remains stable-  no JVD, chronic mild pitting edema in BLE 

stable. 


   Cards recs likely be able to dc with lasix 40 mg PO BID along with 

spironolactone upon discharge.


-Now on demadex.


-Patient appears to be euvolemic, however, mental status has been waxing and 

wanin


- Low sodium and low potassium diet, continue 1800 mL fluid restriction


- ECHO 12/23/17 w/ preserved EF and grade II diastolic CHF 


- HR has not been bradycardic in past 24 hours.





Respiratory acidosis


Improved on BIPAP.


Improved mental status.


will monitor patient.





Elevated INR - 


-Now subtherapeutic.


- Will restart warfarin tomorrow.





Anemia of chronic disease- STABLE:


- Hgb remained stable at 8.1 today - s/p 1 U PRBC on 2/7.  Pt energy level 

slightly improved


- Iron panel completed - continue IV iron (started 2/8), to boost counts. 


- repeat CBC QOD





Fatigue/Lethargy


- ammonia level > 32,  administered lactulose on 2/7 and pt had large BM- he is 

no longer confused or as fatigued.  


-Patient improved today, no lactulose was given this time. Unsure of the 

etiology. I believe this may be from his resp. acidosis.


-will monitor.





FITZ on CKD stage III


- baseline crt 1.6-  today 2.3 


- Nephrology consulted for proteinuria, - appreciate recs - feel this is due to 

diabetic nephropathy.  


- cont low potassium diet. 


-will contiue to diurese despite FITZ, as per nephro and cardio





Hyperkalemia


- Today is 4.7


 -improved after kaylexalate





b/l chronic diabetic wounds: 


- Following w/ wound care- wound care consulted 





CAD w/ cardiac stenting


HTN


HLD


- Plavix 75 mg daily, Norvasc 10 mg daily, Hydralazine 25 mg QID, Imdur 30 mg 

BID, and Toprol XL 50 mg BID, Lipitor 80 mg daily





T2DM w/ neuropathy:


- Continue Lantus 10 u HS 


- BSG ACHS and ISS


- Continue Gabapentin  





Chronic atrial thrombus: Continue Coumadin, follow PT/INR and adjust dose PRN 

for INR goal 2-3 


- see above, subtherapeutic





COPD without exacerbation: Continue home inhalers 





Bipolar disorder, anxiety, depression- STABLE: Depakote 2 g daily, Lexapro 20 

mg daily, Remeron 45 mg daily, and Klonopin





GI prophylaxis: Protonix daily 





DVT Prophylaxis: Coumadin





Code status: LEVEL I, FULL What Is The Reason For Today's Visit?: Preventative Skin Check

## 2021-11-30 NOTE — PHARMACY PROGRESS NOTE
Glycemic Control:  Progress Nt


Date of Service


May 26, 2017.





Scope


Glycemic Pharmacist consulted by Dr Cabrera on 5/22/17 for glycemic control and 

to write orders per [facility] inpatient glycemic control protocol.





Objective


Accuchecks BSG (last 24hrs):











Test


  5/25/17


17:24 5/25/17


20:32 5/26/17


08:52 5/26/17


11:03


 


Bedside Glucose


  150 mg/dl


(70-99) 264 mg/dl


(70-99) 100 mg/dl


(70-99) 99 mg/dl


(70-99)














Test


  5/26/17


12:16 


  


  


 


 


Bedside Glucose


  123 mg/dl


(70-99) 


  


  


 








HbA1c:





 








Test


  5/22/17


22:25


 


Hemoglobin A1c


  13.3 %


(4.5-5.6)  H











Recent Pertinent Medications


Outpatient Anti-diabetic Regimen:


* Lantus and Humalog at home (per diabetes notes from 2016 Dr Arango was 

trying to have patient give himself one dose of  ~30 units Lantus in middle of 

day plus Humalog with his largest meal of the day ~20 units)





The patient is currently receiving:


* Basal insulin:             Lantus 15 units every 12 hours





* Correctional Insulin:   Novolog Correction per scale ACHS


                          Goal Range: Low 100 mg/dL - High 140 mg/dL


                          Correction Factor: 20 mg/dL/unit





* Prandial insulin:         Per carb ratio of 1 unit per 7 grams CHO consumed





Risk Factors for Insulin Resistance:


* Infection: necrotic foot ulcer on Zosyn


* Recent Surgery: s/p I&D of foot 5/23/17  & arteriogram w/intervention 5/26/17


* Diet: NPO this AM --> then resume Type 2 diabetic diet








Assessment & Plan


ASSESSMENT:


* ADA & AACE recommend a goal blood sugar range 140-180 mg/dl for the majority 

of critically ill & non-critically ill patients.  However, more stringent 

targets may be selected in individual cases. Will utilize more stringent goal 

of 100-140mg/dl based on patient age & comorbidities. Additionally, tighter 

glycemic control is warranted to facilitate wound/infection healing.


* Mr Dixon is a 62 y/o M with uncontrolled diabetes who is admitted for a 

necrotic foot ulcer. Patient remains on broad spectrum antibiotics. As an 

outpatient, he struggles with many issues that prevent compliance with his 

insulin. 


* Patient is currently receiving an average of ~50 units of insulin per day


 * 30 units of basal insulin


 * 20 units of prandial/correctional insulin


 * BSGs ranging 123 -264mg/dl over the past 24hrs





* NPO this AM prior to LLE arteriography --> will give reduced basal insulin 

dose


* Post-prandial BSGs near adequate control --> no changes to CF/CR needed at 

this time. 








PLAN FOR INPATIENT GLYCEMIC CONTROL:


* Basal Insulin: 


 * One time decreased dose of Lantus 10 units SQ x 1 this AM secondary to OR/NPO


 * Then, resume Lantus Lantus 15 units SQ BID 


 


* Bolus Insulin - no changes


 * NOVOLOG per scale ACHS


 * Goal Range:  Low 100 mg/dL - High 140 mg/dL


 * Correction Factor: 20 mg/dL/unit


 * Nutritional / Prandial insulin per carb ratio of 1 unit per 7 grams CHO 

consumed








LOOKING AHEAD FOR DISCHARGE:


* Poor outpatient control evidenced by A1c of 13.3%


* Recommend follow up with Dr. Arango on discharge


 * It is noted that Dr. Arango previously recommended Lantus 30 units once 

daily (given mid-day) and Humalog 20 units with the largest meal of the day. General

## 2022-03-01 NOTE — HOSPITALIST PROGRESS NOTE
Hospitalist Progress Note


Date of Service


Jan 19, 2018.


 (Brenda Reyes PA-C)





Subjective


Pt evaluation today including:  conversation w/ patient, physical exam, chart 

review, lab review, review of studies, review of inpatient medication list


Patient seen and evaluated. No acute events overnight. Pulse oximetry supports 

that he will need night O2 at least.


Patient does not get up much, mostly sleeps during the day. Expressed that he 

needs to get up and move. Will add incentive spirometry.


Will hold on treatment of wounds given chronic nature and likely colonization 

given skin swab.


Reporting breathing feels stable but still requiring supplemental O2.





   Constitutional:  No fever, No chills


   Respiratory:  + dyspnea on exertion, No cough, No dyspnea at rest


   Cardiovascular:  No chest pain


   Abdomen:  No pain, No nausea, No vomiting, No diarrhea, No constipation


   Musculoskeletal:  + problem reported (chronic lower extremity pain - chronic 

ulcers - STABLE)


   Heme:  No abnormal bleeding/bruising (Brenda Reyes, PA-C)





Medications





Current Inpatient Medications








 Medications


  (Trade)  Dose


 Ordered  Sig/Dc


 Route  Start Time


 Stop Time Status Last Admin


Dose Admin


 


 Atorvastatin


 Calcium


  (Lipitor Tab)  80 mg  DAILY


 PO  1/16/18 09:00


 2/15/18 08:59  1/19/18 08:48


80 MG


 


 Clonazepam


  (Klonopin Tab)  1 mg  HS  PRN


 PO  1/16/18 06:00


 2/15/18 05:59   


 


 


 Clopidogrel


 Bisulfate


  (plAVix TAB)  75 mg  QAM


 PO  1/16/18 09:00


 2/15/18 08:59  1/19/18 08:48


75 MG


 


 Divalproex Sodium


  (Depakote Delay


 Rel Tab)  2,000 mg  HS


 PO  1/16/18 21:00


 2/15/18 20:59  1/18/18 20:44


2,000 MG


 


 Escitalopram


 Oxalate


  (Lexapro Tab)  20 mg  QAM


 PO  1/16/18 09:00


 2/15/18 08:59  1/19/18 08:48


20 MG


 


 Gabapentin


  (Neurontin Cap)  300 mg  HS


 PO  1/16/18 21:00


 2/15/18 20:59  1/18/18 20:45


300 MG


 


 Hydralazine HCl


  (Apresoline Tab)  25 mg  QID


 PO  1/16/18 09:00


 2/15/18 08:59  1/19/18 12:41


25 MG


 


 Insulin Glargine


  (Lantus Solostar


 Pen)  10 units  HS


 SC  1/16/18 21:00


 2/15/18 20:59  1/18/18 20:54


10 UNITS


 


 Isosorbide


 Mononitrate


  (Imdur Ext Rel


 Tab)  30 mg  QAM


 PO  1/16/18 09:00


 2/15/18 08:59  1/19/18 08:48


30 MG


 


 Metoprolol


 Succinate


  (Toprol Xl Tab)  50 mg  BID


 PO  1/16/18 09:00


 2/15/18 08:59  1/18/18 20:48


50 MG


 


 Tamsulosin HCl


  (Flomax Cap)  0.4 mg  HS


 PO  1/16/18 21:00


 2/15/18 20:59  1/18/18 20:45


0.4 MG


 


 Tiotropium Bromide


  (Spiriva


 Handihaler


 Inhaler)  1 puff  DAILY


 INH  1/16/18 09:00


 2/15/18 08:59  1/19/18 08:49


1 PUFF


 


 Warfarin Sodium


  (Coumadin Tab)  7.5 mg  DAILY@1600


 PO  1/16/18 16:00


 2/15/18 15:59  1/18/18 16:15


7.5 MG


 


 Albuterol


  (Ventolin Hfa


 Inhaler)  2 puffs  Q4H  PRN


 INH  1/16/18 06:00


 2/15/18 05:59   


 


 


 Amlodipine


 Besylate


  (Norvasc Tab)  10 mg  QAM


 PO  1/16/18 09:00


 2/15/18 08:59  1/19/18 08:49


10 MG


 


 Cyanocobalamin


  (Vitamin B-12


 Tab)  1,000 mcg  DAILY


 PO  1/16/18 09:00


 2/15/18 08:59  1/19/18 08:48


1,000 MCG


 


 Ferrous Sulfate


  (Feosol Tab)  325 mg  BIDM


 PO  1/16/18 07:15


 2/15/18 07:59  1/19/18 08:48


325 MG


 


 Mirtazapine


  (Remeron Tab)  45 mg  HS


 PO  1/16/18 21:00


 2/15/18 20:59  1/18/18 20:47


45 MG


 


 Oxycodone HCl


  (Roxicodone


 Immediate Rel Tab)  10 mg  Q12  PRN


 PO  1/16/18 06:00


 2/15/18 05:59  1/18/18 20:56


10 MG


 


 Spironolactone


  (Aldactone Tab)  50 mg  BIDM


 PO  1/16/18 07:15


 2/15/18 07:14  1/19/18 08:49


50 MG


 


 Acetaminophen


  (Tylenol Tab)  650 mg  Q4H  PRN


 PO  1/16/18 06:00


 2/15/18 05:59  1/18/18 23:31


650 MG


 


 Al Hydrox/Mg


 Hydrox/Simethicone


  (Maalox Max Susp)  15 ml  Q4H  PRN


 PO  1/16/18 06:00


 2/15/18 05:59   


 


 


 Magnesium


 Hydroxide


  (Milk Of


 Magnesia Susp)  30 ml  Q12H  PRN


 PO  1/16/18 06:00


 2/15/18 05:59   


 


 


 Ondansetron HCl


  (Zofran Inj)  4 mg  Q6H  PRN


 IV  1/16/18 06:00


 2/15/18 05:59   


 


 


 Nitroglycerin


  (Nitrostat Tab)  0.4 mg  UD  PRN


 SL  1/16/18 06:00


 2/15/18 05:59   


 


 


 Morphine Sulfate


  (MoRPHine


 SULFATE INJ)  2 mg  Q30M  PRN


 IV  1/16/18 06:00


 1/30/18 05:59   


 


 


 Polyethylene


  (Miralax Powder


 Packet)  17 gm  DAILY  PRN


 PO  1/16/18 06:00


 2/15/18 05:59   


 


 


 Glucose


  (Glucose 40% Gel)  15-30


 GRAMS 15


 GRAMS...  UD  PRN


 PO  1/16/18 06:45


 2/15/18 06:44   


 


 


 Glucose


  (Glucose Chew


 Tab)  4-8


 Tablets 4


 Tabl...  UD  PRN


 PO  1/16/18 06:45


 2/15/18 06:44   


 


 


 Dextrose


  (Dextrose 50%


 50ML Syringe)  25-50ML OF


 50% DW IV


 FOR...  UD  PRN


 IV  1/16/18 06:45


 2/15/18 06:44   


 


 


 Glucagon


  (Glucagon Inj)  1 mg  UD  PRN


 SQ  1/16/18 06:45


 2/15/18 06:44   


 


 


 Oxycodone HCl


  (Roxicodone


 Immediate Rel Tab)  5 mg  Q6H  PRN


 PO  1/17/18 15:45


 1/31/18 15:44   


 


 


 Insulin Aspart


  (novoLOG ASPART)  SLIDING


 SCALE


 PARAMETER  ACHS


 SC  1/17/18 17:28


 2/16/18 17:27  1/19/18 12:41


5 UNITS


 


 Pantoprazole


 Sodium


  (Protonix Tab)  40 mg  BID


 PO  1/18/18 21:00


 1/22/18 20:59  1/19/18 08:49


40 MG


 


 Furosemide 40 mg/


 Syringe  4 ml @ 4


 mls/min  BID17


 IV  1/19/18 17:00


 2/15/18 19:59   


 








 (Brenda Reyes, PA-C)





Objective


Vital Signs











  Date Time  Temp Pulse Resp B/P (MAP) Pulse Ox O2 Delivery O2 Flow Rate FiO2


 


1/19/18 16:15 36.8 60 20 167/80 (109) 96 Nasal Cannula 3.0 


 


1/19/18 16:00      Nasal Cannula 3.0 


 


1/19/18 12:00      Nasal Cannula 3.0 


 


1/19/18 11:51 36.9 43 18 160/65 (96) 93   


 


1/19/18 08:00      Nasal Cannula 3.0 


 


1/19/18 07:50 36.9 48 18 158/61 (93) 92  3.0 


 


1/19/18 05:32  68 18  92 Room Air  


 


1/19/18 04:00     92 Nasal Cannula 3.0 


 


1/19/18 03:50 36.9 55 16 143/59 (87) 92 Nasal Cannula 3.0 


 


1/19/18 00:00     93 Nasal Cannula 3.0 


 


1/19/18 00:00 37.0 60  176/72 (106) 93 Nasal Cannula 3.0 


 


1/18/18 20:48 36.8 57 18 172/63 (99) 94 Nasal Cannula 2.0 


 


1/18/18 20:00     94 Nasal Cannula 3.0 








 (Brenda Reyes, PA-C)





Physical Exam


General Appearance:  no apparent distress


Neck:  supple, no JVD, trachea midline


Respiratory/Chest:  no respiratory distress, no accessory muscle use, + 

decreased breath sounds


Cardiovascular:  regular rate, rhythm, + systolic murmur


Abdomen:  normal bowel sounds, non tender, soft


Neurologic/Psychiatric:  alert


 (Brenda Reyes, PA-C)





Laboratory Results





Last 24 Hours








Test


  1/18/18


20:51 1/19/18


06:56 1/19/18


08:16 1/19/18


11:31


 


Bedside Glucose 153 mg/dl  81 mg/dl   134 mg/dl 


 


White Blood Count   5.99 K/uL  


 


Red Blood Count   3.03 M/uL  


 


Hemoglobin   9.0 g/dL  


 


Hematocrit   27.3 %  


 


Mean Corpuscular Volume   90.1 fL  


 


Mean Corpuscular Hemoglobin   29.7 pg  


 


Mean Corpuscular Hemoglobin


Concent 


  


  33.0 g/dl 


  


 


 


RDW Standard Deviation   51.8 fL  


 


RDW Coefficient of Variation   15.8 %  


 


Platelet Count   195 K/uL  


 


Mean Platelet Volume   8.4 fL  


 


Prothrombin Time   29.4 SECONDS  


 


Prothromb Time International


Ratio 


  


  2.9 


  


 


 


Sodium Level   141 mmol/L  


 


Potassium Level   5.1 mmol/L  


 


Chloride Level   107 mmol/L  


 


Carbon Dioxide Level   27 mmol/L  


 


Anion Gap   7.0 mmol/L  


 


Blood Urea Nitrogen   51 mg/dl  


 


Creatinine   1.63 mg/dl  


 


Est Creatinine Clear Calc


Drug Dose 


  


  55.5 ml/min 


  


 


 


Estimated GFR (


American) 


  


  52.3 


  


 


 


Estimated GFR (Non-


American 


  


  45.1 


  


 


 


BUN/Creatinine Ratio   31.5  


 


Random Glucose   115 mg/dl  


 


Calcium Level   8.5 mg/dl  








 (Brenda Reyes, PA-C)





Assessment and Plan


63 y/o M with extensive medical issues including severe CAD, PVD, COPD, DM II, 

bipolar disease, chronic anemia, diastolic CHF, recent diagnosis of atrial 

thrombus - on Coumadin - pt had an MI at [facility] 10/17 which was medically 

managed. He frequently presents to the hospital due to SOB/volume overload and 

recently presented with CP and anemia as well. 


The pt presents today due to acute SOB.  Initial imaging and exam is consistent 

with exacerbation of CHF.  He was requiring high-flow oxygen on arrival but has 

improved with Lasix administration.  He denies CP, N/V, diarrhea or dysuria.  





Acute Hypoxic Respiratory Failure 2/2 Acute on Chronic Diastolic CHF vs Acute 

Anemia:


- Still requiring supplemental O2 normally is on RA - finally at a negative 

fluid balance; does qualify for nocturnal O2 and will get two-step prior to DC


- Lasix 40 mg IV daily and monitor I&Os and daily weights; monitor kidney 

function


- Continue Spironolactone 50 mg BID





Acute on Chronic Anemia: Anemia of Chronic Disease:


- Hgb improved to 9; transfused 1 unit on 1/18


- B12, Folate, Iron Studies WNL; Labs support anemia of chronic disease





Elevated Cr: IMPROVING


- Baseline is hard to determine due to labile on labs in-hospital


- Continue to monitor with diuresis





B/L Chronic Diabetic Wounds:


- Culture with staph that is MSSA and MRSA - wounds look at baseline per 

patient and appears to be improving; likely colonization and will hold 

treatment at this time


- Wound care following





CAD: STABLE


- Troponin mildly bumped and trending down - no further intervention at this 

time; denies CP


- Plavix 75 mg daily


- Norvasc 10 mg daily, Hydralazine 25 mg QID, Imdur 30 mg daily, and Toprol XL 

50 mg BID, Lipitor 80 mg daily





T2DM:


- Continue Lantus and SSI





Chronic Atrial Thrombus:


- INR continues to be therapeutic - continue Coumadin and monitor INR





COPD without Exacerbation:


- Ventolin PRN





Bipolar Disorder: STABLE


- Depakote 2 g daily, Lexapro 20 mg daily, Remeron 45 mg daily, and Klonopin





DVT Prophylaxis: Coumadin





Code Status: FULL RESUSCITATION





Disposition: Continue diuresis and hopeful D/C 1-2 days; overnight pulse ox and 

two step prior to D/C


Continued [facility] stay due to:  multiple IV medications needed


Discharge planning:  home with home health


 (Brenda eRyes, PA-C)


Attending Attestation:


Pt seen/examined, chart reviewed, care plan d/w JORDI Reyes.


I agree w/ the eddy components of her documentation. 





Pt w/ no major complaints.


States "my right leg looks way better than it did."


He reports the ulcer on the right shin was much bigger months ago when he had 

nec fasc requiring surgery.


c/o mild STANTON.


tele stable overnight. 





VSS


afebrile


net neg fluid balance





gen - nad


neck - no obvious JVD at 90 degrees


heart - RRR


lungs - bibasilar rales


abd - soft


ext - 1-2+ edema on right, 1+ edema on left, pulses 2+ b/l


skin - very tiny ulcer on right shin, minimal amount of non-purulent drainage, 

no odor, no surrounding erythema 





A/P:


acute/chronic diastolic CHF - slowly improving, but weight is still much above 

baseline weight, and he is still requiring o2.


try increasing lasix dose to 40mg BID.


BMP/mag in am. 


wean o2 as tolerated


agree with Ms. Eric would NOT RX for infection of right leg as wound cx was a 

surface swab.  MRSA is likely colonization. 





SANDRA YOUNGBLOOD MD


 (Stu Youngblood MD) Recent Review Flowsheet Data     Date 3/1/2022    Adult PHQ 2 Score 1    Adult PHQ 2 Interpretation No further screening needed    Little interest or pleasure in activity? Not at all    Feeling down, depressed or hopeless? Several days        Health Maintenance Due   Topic Date Due   • Shingles Vaccine (1 of 2) Never done   • Diabetes Eye Exam  05/20/2020   • DM/CKD Microalbumin  06/08/2021   • Medicare Wellness Visit  02/04/2022   • Diabetes Foot Exam  04/20/2022       Patient is due for the topics as listed above and wishes to proceed with [facility] today.